# Patient Record
Sex: FEMALE | Race: WHITE | NOT HISPANIC OR LATINO | Employment: OTHER | URBAN - METROPOLITAN AREA
[De-identification: names, ages, dates, MRNs, and addresses within clinical notes are randomized per-mention and may not be internally consistent; named-entity substitution may affect disease eponyms.]

---

## 2017-01-02 ENCOUNTER — HOSPITAL ENCOUNTER (OUTPATIENT)
Dept: MAMMOGRAPHY | Facility: CLINIC | Age: 82
Discharge: HOME | End: 2017-01-02
Attending: INTERNAL MEDICINE | Admitting: INTERNAL MEDICINE
Payer: COMMERCIAL

## 2017-01-02 ENCOUNTER — ANTICOAGULATION THERAPY VISIT (OUTPATIENT)
Dept: ANTICOAGULATION | Facility: CLINIC | Age: 82
End: 2017-01-02
Payer: COMMERCIAL

## 2017-01-02 DIAGNOSIS — I48.0 INTERMITTENT ATRIAL FIBRILLATION (H): Primary | ICD-10-CM

## 2017-01-02 DIAGNOSIS — Z79.01 LONG TERM CURRENT USE OF ANTICOAGULANT THERAPY: Primary | ICD-10-CM

## 2017-01-02 DIAGNOSIS — Z85.3 HISTORY OF LEFT BREAST CANCER: ICD-10-CM

## 2017-01-02 DIAGNOSIS — I82.409 DVT (DEEP VENOUS THROMBOSIS) (H): ICD-10-CM

## 2017-01-02 LAB — INR POINT OF CARE: 1.4 (ref 0.86–1.14)

## 2017-01-02 PROCEDURE — 36416 COLLJ CAPILLARY BLOOD SPEC: CPT

## 2017-01-02 PROCEDURE — 99207 ZZC NO CHARGE NURSE ONLY: CPT

## 2017-01-02 PROCEDURE — 85610 PROTHROMBIN TIME: CPT | Mod: QW

## 2017-01-02 PROCEDURE — G0202 SCR MAMMO BI INCL CAD: HCPCS | Mod: 52

## 2017-01-02 RX ORDER — WARFARIN SODIUM 1 MG/1
TABLET ORAL
Qty: 105 TABLET | Refills: 1 | Status: SHIPPED | OUTPATIENT
Start: 2017-01-02 | End: 2017-01-12

## 2017-01-02 NOTE — PROGRESS NOTES
ANTICOAGULATION FOLLOW-UP CLINIC VISIT    Patient Name:  Ellie Leigh  Date:  1/2/2017  Contact Type:  Face to Face    SUBJECTIVE:     Patient Findings     Positives Unexplained INR or factor level change (no reason found for the low INR today)    Comments Pt states no missed dose and less greens than usual           OBJECTIVE    INR PROTIME   Date Value Ref Range Status   01/02/2017 1.4* 0.86 - 1.14 Final       ASSESSMENT / PLAN  INR assessment SUB    Recheck INR In: 3 WEEKS    INR Location Clinic      Anticoagulation Summary as of 1/2/2017     INR goal 1.5-2.0   Selected INR 1.4! (1/2/2017)   Maintenance plan 2 mg (1 mg x 2) on Mon; 1 mg (1 mg x 1) all other days   Full instructions 1/3: 2 mg; Otherwise 2 mg on Mon; 1 mg all other days   Weekly total 8 mg   Plan last modified Jessica Johnson RN (9/8/2016)   Next INR check 1/23/2017   Priority INR   Target end date Indefinite    Indications   Atrial fibrillation with rapid ventricular response (H) (Resolved) [I48.91]  DVT (deep venous thrombosis) [I82.409]  Long term current use of anticoagulant therapy [Z79.01]         Anticoagulation Episode Summary     INR check location     Preferred lab     Send INR reminders to [facility] CLINIC POOL    Comments * Actual INR goal is 1.7-2.3  please follow Dec 2015 INR referral (June 2016 goal range is an error)      Anticoagulation Care Providers     Provider Role Specialty Phone number    Josefina Gómez MD [facility] [phone]            See the Encounter Report to view Anticoagulation Flowsheet and Dosing Calendar (Go to Encounters tab in chart review, and find the Anticoagulation Therapy Visit)        Sapna Peterson RN

## 2017-01-02 NOTE — MR AVS SNAPSHOT
Ellie CARVER Lu   1/2/2017 2:15 PM   Anticoagulation Therapy Visit    Description:  86 year old female   Provider:  [place] [facility]   Department:  [facility]           INR as of 1/2/2017     Selected INR 1.4! (1/2/2017)      Anticoagulation Summary as of 1/2/2017     INR goal 1.5-2.0   Selected INR 1.4! (1/2/2017)   Full instructions 1/3: 2 mg; Otherwise 2 mg on Mon; 1 mg all other days   Next INR check 1/23/2017    Indications   Atrial fibrillation with rapid ventricular response (H) (Resolved) [I48.91]  DVT (deep venous thrombosis) [I82.409]  Long term current use of anticoagulant therapy [Z79.01]         Description     Recheck  INR 1/23/17  Take warfarin 2 mg Mon, and Tues, then resume usual dose of 2 mg Mon, 1 mg rest of week .  No greens for 2 days      Your next Anticoagulation Clinic appointment(s)     Jan 23, 2017  2:30 PM   [facility] with [place] [facility]   [facility]    [address] [facility] [address] [phone]              Contact Numbers     Please call [phone] to cancel and/or reschedule your appointment.  Please call [phone] with any problems or questions regarding your therapy          January 2017 Details    Sun Mon Tue Wed Thu Fri Sat     1               2      2 mg   See details      3      2 mg   See details      4      1 mg         5      1 mg         6      1 mg         7      1 mg           8      1 mg         9      2 mg         10      1 mg         11      1 mg         12      1 mg         13      1 mg         14      1 mg           15      1 mg         16      2 mg         17      1 mg         18      1 mg         19      1 mg         20      1 mg         21      1 mg           22      1 mg         23            24               25               26               27               28                 29               30               31                    Date Details   01/02 This INR check   no greens      01/03  Take 2 mg (1 mg tablets x 2)   no greens       Date of next INR:  1/23/2017         How to take your warfarin dose     To take:  1 mg Take 1 of the 1 mg tablets.    To take:  2 mg Take 2 of the 1 mg tablets.

## 2017-01-02 NOTE — TELEPHONE ENCOUNTER
Warfarin 1 mg    Last Written Prescription Date: 10/18/16  Last Fill Qty: 90, # refills: 0  Last Office Visit with [facility], [facility] or [facility] prescribing provider: 12/16/16   Next 5 appointments (look out 90 days)     Jan 05, 2017 10:30 AM   Return Visit with Gayle Nieves MD   [facility] ([facility])    [facility]  [address]  [facility] [address]   [phone]            Jan 13, 2017 11:00 AM   SHORT with Josefina Gómez MD   [facility] ([facility])    [address] [place] [address]   [phone]                   Date and Result of Last PT/INR:   INR     1.98   12/23/2016  INR      1.4   12/20/2016  INR      1.7   11/22/2016  INR     1.85   8/22/2016  PT      24.4   6/29/2014

## 2017-01-05 ENCOUNTER — ONCOLOGY VISIT (OUTPATIENT)
Dept: ONCOLOGY | Facility: CLINIC | Age: 82
End: 2017-01-05
Attending: INTERNAL MEDICINE
Payer: COMMERCIAL

## 2017-01-05 VITALS
DIASTOLIC BLOOD PRESSURE: 86 MMHG | HEART RATE: 110 BPM | TEMPERATURE: 98 F | BODY MASS INDEX: 25.05 KG/M2 | OXYGEN SATURATION: 94 % | SYSTOLIC BLOOD PRESSURE: 146 MMHG | RESPIRATION RATE: 16 BRPM | WEIGHT: 119.8 LBS

## 2017-01-05 DIAGNOSIS — M81.0 OSTEOPOROSIS: ICD-10-CM

## 2017-01-05 DIAGNOSIS — D64.9 ANEMIA, UNSPECIFIED TYPE: ICD-10-CM

## 2017-01-05 DIAGNOSIS — E87.1 HYPONATREMIA: ICD-10-CM

## 2017-01-05 DIAGNOSIS — Z85.3 HISTORY OF LEFT BREAST CANCER: Primary | ICD-10-CM

## 2017-01-05 PROCEDURE — 99214 OFFICE O/P EST MOD 30 MIN: CPT | Performed by: INTERNAL MEDICINE

## 2017-01-05 PROCEDURE — 99211 OFF/OP EST MAY X REQ PHY/QHP: CPT

## 2017-01-05 ASSESSMENT — PAIN SCALES - GENERAL: PAINLEVEL: NO PAIN (0)

## 2017-01-05 NOTE — PATIENT INSTRUCTIONS
We will see you back in 1 year.   Mammogram on right side December 2017.     Call with questions or concerns in the interim [phone].   Makenzie Roblero

## 2017-01-05 NOTE — NURSING NOTE
"Ellie Leigh is a 86 year old female who presents for:  Chief Complaint   Patient presents with     Oncology Clinic Visit     1 year recheck Breast CA, review Mammogram        Initial Vitals:  /86 mmHg  Pulse 110  Temp(Src) 98  F (36.7  C) (Tympanic)  Resp 16  Ht   Wt 54.341 kg (119 lb 12.8 oz)  SpO2 94%  LMP 06/15/1985  Breastfeeding? No Estimated body mass index is 25.05 kg/(m^2) as calculated from the following:    Height as of 8/26/16: 1.473 m (4' 10\").    Weight as of this encounter: 54.341 kg (119 lb 12.8 oz).. There is no height on file to calculate BSA. BP completed using cuff size: regular  No Pain (0) Patient's last menstrual period was 06/15/1985. Allergies and medications reviewed.     Medications: MEDICATION REFILLS NEEDED TODAY.  Pharmacy name entered into [employer]:    [id number] - [facility] [address] [id number] [address]    Comments:     10  minutes for nursing intake (face to face time)   Marizol Carrera CMA        Ellie Leigh is a 86 year old female who presents for:  Chief Complaint   Patient presents with     Oncology Clinic Visit     1 year recheck Breast CA, review Mammogram        Initial Vitals:  /86 mmHg  Pulse 110  Temp(Src) 98  F (36.7  C) (Tympanic)  Resp 16  Ht   Wt 54.341 kg (119 lb 12.8 oz)  SpO2 94%  LMP 06/15/1985  Breastfeeding? No Estimated body mass index is 25.05 kg/(m^2) as calculated from the following:    Height as of 8/26/16: 1.473 m (4' 10\").    Weight as of this encounter: 54.341 kg (119 lb 12.8 oz).. There is no height on file to calculate BSA. BP completed using cuff size: regular  No Pain (0) Patient's last menstrual period was 06/15/1985. Allergies and medications reviewed.     Medications: MEDICATION REFILLS NEEDED TODAY.  Pharmacy name entered into [employer]: [facility] #[id number] - [facility] [address] [facility]    Comments: 1 year recheck Breast CA, review " Mammogram. Patient has been Nauseous for 6 months.     10  minutes for nursing intake (face to face time)   Marizol Carrera CMA

## 2017-01-05 NOTE — PROGRESS NOTES
CHIEF COMPLAINT AND REASON FOR VISIT: left breast cancer followup.   HISTORY OF PRESENT ILLNESS: Ellie Leigh was diagnosed with a screening mammogram more than 10 years ago on the left side. 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 She had 6 months of CMF treatment followed by 5 years of tamoxifen. She is currently on followup.     PAST MEDICAL HISTORY: Benign high blood pressure, reflux, osteoporosis, asthma, osteoarthritis, pulmonary fibrosis by imaging, allergy. Cervical spine fracture after a fall in 2011, had fusion procedure done. On coumadin for A fib.  SIADH dx in 2014, hx of TB    MEDICATIONS: Reviewed in Epic system.   ALLERGIES: Penicillin, cephalosporins, sulfa.   FAMILY HISTORY: One daughter had breast cancer. She is a survivor.   SOCIAL HISTORY: She is . She lives with daughter.     REVIEW OF SYSTEMS:  She was found to have SIADH in [place] 2014.   She has chronic episodic and self limiting nausea with extensive work up with her PMD, no etiology found.   She is taking vitamin D. Weight is down,  no particular pain. No night sweat.  She is on coumadin for DVT in leg and A fib.     PHYSICAL EXAMINATION:   VITAL SIGNS: Blood pressure 146/86, pulse 110, temperature 98  F (36.7  C), temperature source Tympanic, resp. rate 16, weight 54.341 kg (119 lb 12.8 oz), last menstrual period 06/15/1985, SpO2 94 %, not currently breastfeeding.  GENERAL APPEARANCE: Elderly lady looks like her stated age, not in acute distress.   BACK: Severe kyphosis in the thoracic spine.   BREASTS: Right breast reveals no palpable lesions, no skin changes. Left chest wall, no palpable lesions.   HEENT: The patient is normocephalic, atraumatic. Pupils are equal, react to light. Sclerae are anicteric. Moist oral mucosa. Negative pharynx. No oral thrush.   NECK: Supple. No jugular venous distention. Thyroid is not palpable.   LYMPH NODES: Superficial lymphadenopathy is not  appreciable in the bilateral cervical, supraclavicular, axillary or inguinal adenopathy.   CARDIOVASCULAR: S1, S2 regular, with no murmurs or gallops. No carotid or abdominal bruits. Pace maker on left upper chest wall.  PULMONARY: Lungs are clear to auscultation and percussion bilaterally. There is no wheezing or rhonchi.   GASTROINTESTINAL: Abdomen is soft, nontender. No hepatosplenomegaly. No signs of ascites. No mass appreciable.   MUSCULOSKELETAL AND EXTREMITIES: No edema. No cyanotic changes. No signs of joint deformity. No lymphedema.   NEUROLOGIC: Cranial nerves II through XII are grossly intact. Sensation intact. Muscle strength and muscle tone symmetrical all through, 5/5.     CURRENT LAB DATA   Hb 11.5, MCV nl.   12/2016 Na 131 stable from last yr, from previous 128  since earlier 2014, Na NL  in 2013.    CURRENT IMAGE  Right MA 1/2017: NEGATIVE  CT head 12/2016: negative.     OLD DATA REVIEW IN SUMMARY:   3/2016 hb 11.8  7/2014 urine and serum osmolarity studies were consistent with SIADH    CT chest 12/2014: new patchy and consolidative process in RLL ? For PNA. Stable fibronodular abnormality and bronchiectasis in right upper lung zone.  CT a/p 7/2014: No mass, adenopathy, or acute finding is seen.   CT head in [facility] 6/2014: no acute disease.     DEXA scan from 02/2011 indicating osteoporosis. CT chest: Patchy consolidation with associated bronchiectasis in the upper lobes, right greater than left, consistent with chronic fibrosis.     ASSESSMENT AND PLAN:   1. Breast cancer. Continue yearly followup with us, self-breast check, chest wall check every once in a while, annual mammogram on the right side.   She wants to come here for yearly follow up. Due right MA end of yr.    2. Osteoporosis, status post fall with fracture. Advise vitamin D.  She did not tolerate bisphosphonate.     3. hyponatremia since 2014, work up is most consistent with SIADN. She also has abnormal CT chest. These two could be  related. Recommended seeing Dr. Maloney for further work up. DDX is broad now, with infection, inflammation, granulomatous disease, or Ca.  She was cleared by Dr. Burton later. Her Na is stable.    4. Tough of anemia in 12/2016. She had similar reading of hb in 3/2016. We talked about eating balanced diet.

## 2017-01-05 NOTE — MR AVS SNAPSHOT
After Visit Summary   1/5/2017    Ellie Leigh    MRN: [id number]           Patient Information     Date Of Birth          9/12/1930        Visit Information        Provider Department      1/5/2017 10:30 AM Gayle Nieves MD [facility]        Today's Diagnoses     History of left breast cancer    -  1     Osteoporosis         Hyponatremia         Anemia, unspecified type           Care Instructions    We will see you back in 1 year.   Mammogram on right side December 2017.     Call with questions or concerns in the interim [phone].   Makenzie or Glenda Roblero         Follow-ups after your visit        Your next 10 appointments already scheduled     Jan 13, 2017 11:00 AM   SHORT with Josefina Gómez MD   [facility]    [address] [place] [address] [phone]            Jan 16, 2017  8:00 AM   Remote PPM Check with RUEDA TECH1   [facility] [place] ([facility])    [address] [place], [place] [id number]  [facility] [id number] [phone]           This appointment is for a remote check of your pacemaker.  This is not an appointment at the office.            Jan 23, 2017  2:30 PM   Anticoagulation Visit with [place] [facility]   [facility]    [address] [facility] [address]   [phone]            Jan 03, 2018  2:00 PM   LAB with [facility] ([facility])    [address] [facility] [address]   [phone]           Patient must bring picture ID.  Patient should be prepared to give a urine specimen  Please do not eat 10-12 hours before your appointment if you are coming in fasting for labs on lipids, cholesterol, or glucose (sugar).  Pregnant women should follow their Care Team instructions. Water with medications is okay. Do not drink coffee or other fluids.   If you have concerns  about taking  your medications, please ask at office or if scheduling via [employer], send a message by clicking on Secure Messaging, Message Your Care Team.            Jan 03, 2018  2:45 PM   MA SCREENING DIGITAL RIGHT with [email or web address]   [facility] ([facility])    [address]  [facility] [address]   [phone]           Do not use any powder, lotion or deodorant under your arms or on your breast. If you do, we will ask you to remove it before your exam.  Wear comfortable, two-piece clothing.  If you have any allergies, tell your care team.  Bring any previous mammograms from other facilities or have them mailed to the breast center.              Future tests that were ordered for you today     Open Future Orders        Priority Expected Expires Ordered    Folate Routine 12/1/2017 1/5/2018 1/5/2017    Ferritin Routine 12/1/2017 1/5/2018 1/5/2017    Vitamin B12 Routine 12/1/2017 1/5/2018 1/5/2017    Ca27.29  breast tumor marker Routine 12/1/2017 1/5/2018 1/5/2017    CBC with platelets differential Routine 12/1/2017 1/5/2018 1/5/2017    Comprehensive metabolic panel Routine 12/1/2017 1/5/2018 1/5/2017    MA Screening Digital Right Routine 12/1/2017 1/5/2018 1/5/2017            Who to contact     If you have questions or need follow up information about today's clinic visit or your schedule please contact [facility] directly at [phone].  Normal or non-critical lab and imaging results will be communicated to you by MyChart, letter or phone within 4 business days after the clinic has received the results. If you do not hear from us within 7 days, please contact the clinic through [employer] or phone. If you have a critical or abnormal lab result, we will notify you by phone as soon as possible.  Submit refill requests through [employer] or call your pharmacy and they will forward the refill request to us. Please allow 3 business days for your refill to be completed.           "Additional Information About Your Visit        MyChart Information     Epos lets you send messages to your doctor, view your test results, renew your prescriptions, schedule appointments and more. To sign up, go to [email or web address] . Click on \"Log in\" on the left side of the screen, which will take you to the Welcome page. Then click on \"Sign up Now\" on the right side of the page.     You will be asked to enter the access code listed below, as well as some personal information. Please follow the directions to create your username and password.     Your access code is: [email or web address]  Expires: 2017  2:36 PM     Your access code will  in 90 days. If you need help or a new code, please call your [place] clinic or [phone].        Care EveryWhere ID     This is your Care EveryWhere ID. This could be used by other organizations to access your [place] medical records  [id number]        Your Vitals Were     Pulse Temperature Respirations Pulse Oximetry Last Period       110 98  F (36.7  C) (Tympanic) 16 94% 06/15/1985     Breastfeeding?                   No            Blood Pressure from Last 3 Encounters:   17 146/86   16 186/97   16 144/78    Weight from Last 3 Encounters:   17 54.341 kg (119 lb 12.8 oz)   16 53.615 kg (118 lb 3.2 oz)   16 53.524 kg (118 lb)               Primary Care Provider Office Phone # Fax #    Josefina Gómez [phone]       [facility] [address] [facility] [address]        Thank you!     Thank you for choosing [facility]  for your care. Our goal is always to provide you with excellent care. Hearing back from our patients is one way we can continue to improve our services. Please take a few minutes to complete the written survey that you may receive in the mail after your visit with us. Thank you!             Your Updated Medication List - Protect others around you: Learn how to " safely use, store and throw away your medicines at www.disposemymeds.org.          This list is accurate as of: 1/5/17 12:10 PM.  Always use your most recent med list.                   Brand Name Dispense Instructions for use    albuterol 108 (90 BASE) MCG/ACT Inhaler    PROAIR HFA/PROVENTIL HFA/VENTOLIN HFA    3 Inhaler    Inhale 2 puffs into the lungs every 6 hours as needed for shortness of breath / dyspnea       budesonide-formoterol 160-4.5 MCG/ACT Inhaler    SYMBICORT    1 Inhaler    Inhale 2 puffs into the lungs 2 times daily       CRANBERRY      Take 475 mg by mouth 2 times daily.       diclofenac 1 % Gel topical gel    VOLTAREN    100 g    Apply 4 grams to knees or 2 grams to hands four times daily using enclosed dosing card.       ipratropium - albuterol 0.5 mg/2.5 mg/3 mL 0.5-2.5 (3) MG/3ML neb solution    DUONEB    90 vial    Take 1 vial (3 mLs) by nebulization every 6 hours as needed for shortness of breath / dyspnea or wheezing       levothyroxine 50 MCG tablet    SYNTHROID/LEVOTHROID    90 tablet    Take 1 tablet (50 mcg) by mouth daily       metoprolol 25 MG 24 hr tablet    TOPROL-XL    180 tablet    2  tablets (50 mg) twice daily       omeprazole 20 MG CR capsule    priLOSEC    60 capsule    Take 1 capsule (20 mg) by mouth 2 times daily       polyethylene glycol powder    MIRALAX    510 g    Take 17 g by mouth daily as needed       probiotic Caps      Take 1 capsule by mouth every evening       sucralfate 1 GM/10ML suspension    CARAFATE    420 mL    Take 10 mLs (1 g) by mouth 4 times daily       warfarin 1 MG tablet    COUMADIN    105 tablet    Take 2mg by mouth Mondays and 1mg all other days or as directed by Anticoagulation Clinic

## 2017-01-12 ENCOUNTER — ANTICOAGULATION THERAPY VISIT (OUTPATIENT)
Dept: ANTICOAGULATION | Facility: CLINIC | Age: 82
End: 2017-01-12
Payer: COMMERCIAL

## 2017-01-12 DIAGNOSIS — I48.0 INTERMITTENT ATRIAL FIBRILLATION (H): ICD-10-CM

## 2017-01-12 DIAGNOSIS — Z79.01 LONG TERM CURRENT USE OF ANTICOAGULANT THERAPY: Primary | ICD-10-CM

## 2017-01-12 LAB — INR POINT OF CARE: 1.5 (ref 0.86–1.14)

## 2017-01-12 PROCEDURE — 99207 ZZC NO CHARGE NURSE ONLY: CPT

## 2017-01-12 PROCEDURE — 36416 COLLJ CAPILLARY BLOOD SPEC: CPT

## 2017-01-12 PROCEDURE — 85610 PROTHROMBIN TIME: CPT | Mod: QW

## 2017-01-12 RX ORDER — WARFARIN SODIUM 1 MG/1
TABLET ORAL
Qty: 105 TABLET | Refills: 1 | COMMUNITY
Start: 2017-01-12 | End: 2017-02-02

## 2017-01-12 NOTE — PROGRESS NOTES
"  ANTICOAGULATION FOLLOW-UP CLINIC VISIT    Patient Name:  Ellie Leigh  Date:  1/12/2017  Contact Type:  Face to Face    SUBJECTIVE:     Patient Findings     Positives Diet Changes (report she recently \"cut back\" on greens), No Problem Findings    Comments Has appt with PCP tomorrow.            OBJECTIVE    INR PROTIME   Date Value Ref Range Status   01/12/2017 1.5* 0.86 - 1.14 Final       ASSESSMENT / PLAN  INR assessment SUB for goal range of 1.7 - 2.3. Increase maintenance dose 12.5%   Recheck INR In: 3 WEEKS    INR Location Clinic      Anticoagulation Summary as of 1/12/2017     INR goal 1.5-2.0   Selected INR 1.5 (1/12/2017)   Maintenance plan 2 mg (1 mg x 2) on Mon, Thu; 1 mg (1 mg x 1) all other days   Full instructions 2 mg on Mon, Thu; 1 mg all other days   Weekly total 9 mg   Plan last modified Jessica Johnson RN (1/12/2017)   Next INR check 2/2/2017   Priority INR   Target end date Indefinite    Indications   Atrial fibrillation with rapid ventricular response (H) (Resolved) [I48.91]  DVT (deep venous thrombosis) [I82.409]  Long term current use of anticoagulant therapy [Z79.01]         Anticoagulation Episode Summary     INR check location     Preferred lab     Send INR reminders to [facility] POOL    Comments * Actual INR goal is 1.7-2.3  please follow Dec 2015 INR referral (June 2016 goal range is an error)      Anticoagulation Care Providers     Provider Role Specialty Phone number    Josefina Gómez MD [facility] [phone]            See the Encounter Report to view Anticoagulation Flowsheet and Dosing Calendar (Go to Encounters tab in chart review, and find the Anticoagulation Therapy Visit)    Jessica Johnson RN                 "

## 2017-01-12 NOTE — MR AVS SNAPSHOT
Ellie Leigh   1/12/2017 3:00 PM   Anticoagulation Therapy Visit    Description:  86 year old female   Provider:  [place] [facility]   Department:  [facility]           INR as of 1/12/2017     Selected INR 1.5 (1/12/2017)      Anticoagulation Summary as of 1/12/2017     INR goal 1.5-2.0   Selected INR 1.5 (1/12/2017)   Full instructions 2 mg on Mon, Thu; 1 mg all other days   Next INR check 2/2/2017    Indications   Atrial fibrillation with rapid ventricular response (H) (Resolved) [I48.91]  DVT (deep venous thrombosis) [I82.409]  Long term current use of anticoagulant therapy [Z79.01]         Description     Increase dose to 2mg every Mon & Thurs and 1mg all other days.  Recheck INR in three weeks.      Your next Anticoagulation Clinic appointment(s)     Feb 02, 2017 11:15 AM   Anticoagulation Visit with [place] AN[facility]AG   [facility]    [address] [facility] [address] [phone]              Contact Numbers     Please call [phone] to cancel and/or reschedule your appointment.  Please call [phone] with any problems or questions regarding your therapy          January 2017 Details    Sun Mon Tue Wed Thu Fri Sat     1               2               3               4               5               6               7                 8               9               10               11               12      2 mg   See details      13      1 mg         14      1 mg           15      1 mg         16      2 mg         17      1 mg         18      1 mg         19      2 mg         20      1 mg         21      1 mg           22      1 mg         23      2 mg         24      1 mg         25      1 mg         26      2 mg         27      1 mg         28      1 mg           29      1 mg         30      2 mg         31      1 mg              Date Details   01/12 This INR check               How to take your warfarin dose     To take:  1 mg Take 1 of the 1 mg tablets.     To take:  2 mg Take 2 of the 1 mg tablets.           February 2017 Details    Sun Mon Tue Wed Thu Fri Sat        1      1 mg         2            3               4                 5               6               7               8               9               10               11                 12               13               14               15               16               17               18                 19               20               21               22               23               24               25                 26               27               28                    Date Details   No additional details    Date of next INR:  2/2/2017         How to take your warfarin dose     To take:  1 mg Take 1 of the 1 mg tablets.    To take:  2 mg Take 2 of the 1 mg tablets.

## 2017-01-13 ENCOUNTER — OFFICE VISIT (OUTPATIENT)
Dept: FAMILY MEDICINE | Facility: CLINIC | Age: 82
End: 2017-01-13
Payer: COMMERCIAL

## 2017-01-13 VITALS
TEMPERATURE: 97 F | SYSTOLIC BLOOD PRESSURE: 132 MMHG | HEIGHT: 58 IN | OXYGEN SATURATION: 97 % | HEART RATE: 65 BPM | DIASTOLIC BLOOD PRESSURE: 64 MMHG | WEIGHT: 118.2 LBS | BODY MASS INDEX: 24.81 KG/M2

## 2017-01-13 DIAGNOSIS — J45.30 MILD PERSISTENT ASTHMA WITHOUT COMPLICATION: ICD-10-CM

## 2017-01-13 DIAGNOSIS — K21.9 GASTROESOPHAGEAL REFLUX DISEASE WITHOUT ESOPHAGITIS: ICD-10-CM

## 2017-01-13 DIAGNOSIS — R11.0 NAUSEA: Primary | ICD-10-CM

## 2017-01-13 DIAGNOSIS — M15.0 PRIMARY OSTEOARTHRITIS INVOLVING MULTIPLE JOINTS: ICD-10-CM

## 2017-01-13 PROCEDURE — 99214 OFFICE O/P EST MOD 30 MIN: CPT | Performed by: FAMILY MEDICINE

## 2017-01-13 RX ORDER — ALBUTEROL SULFATE 90 UG/1
2 AEROSOL, METERED RESPIRATORY (INHALATION) EVERY 6 HOURS PRN
Qty: 3 INHALER | Refills: 1 | Status: SHIPPED | OUTPATIENT
Start: 2017-01-13 | End: 2017-10-27

## 2017-01-13 RX ORDER — BUDESONIDE AND FORMOTEROL FUMARATE DIHYDRATE 160; 4.5 UG/1; UG/1
2 AEROSOL RESPIRATORY (INHALATION) 2 TIMES DAILY
Qty: 1 INHALER | Refills: 6 | Status: SHIPPED | OUTPATIENT
Start: 2017-01-13 | End: 2017-04-24

## 2017-01-13 RX ORDER — SUCRALFATE ORAL 1 G/10ML
1 SUSPENSION ORAL 4 TIMES DAILY
Qty: 420 ML | Refills: 3 | Status: SHIPPED | OUTPATIENT
Start: 2017-01-13 | End: 2017-04-28

## 2017-01-13 ASSESSMENT — ANXIETY QUESTIONNAIRES
6. BECOMING EASILY ANNOYED OR IRRITABLE: NOT AT ALL
2. NOT BEING ABLE TO STOP OR CONTROL WORRYING: NOT AT ALL
1. FEELING NERVOUS, ANXIOUS, OR ON EDGE: NOT AT ALL
GAD7 TOTAL SCORE: 0
3. WORRYING TOO MUCH ABOUT DIFFERENT THINGS: NOT AT ALL
5. BEING SO RESTLESS THAT IT IS HARD TO SIT STILL: NOT AT ALL
7. FEELING AFRAID AS IF SOMETHING AWFUL MIGHT HAPPEN: NOT AT ALL

## 2017-01-13 ASSESSMENT — PATIENT HEALTH QUESTIONNAIRE - PHQ9: 5. POOR APPETITE OR OVEREATING: NOT AT ALL

## 2017-01-13 NOTE — MR AVS SNAPSHOT
After Visit Summary   1/13/2017    Ellie Leigh    MRN: [id number]           Patient Information     Date Of Birth          9/12/1930        Visit Information        Provider Department      1/13/2017 11:00 AM Josefina Gómez MD [facility]        Today's Diagnoses     Primary osteoarthritis involving multiple joints    -  1     Gastroesophageal reflux disease without esophagitis         Mild persistent asthma without complication           Care Instructions    Refilled meds    See me back in 6-8 weeks     Stay on gluten free diet        Follow-ups after your visit        Your next 10 appointments already scheduled     Jan 16, 2017  8:00 AM   Remote PPM Check with RUEDA TECH1   [facility] [place] ([facility])    [address]  [place] [address] [phone]           This appointment is for a remote check of your pacemaker.  This is not an appointment at the office.            Feb 02, 2017 11:15 AM   Anticoagulation Visit with [place] ANTI COAG   [facility] ([facility]    [address] [address]   [phone]            Jan 03, 2018  2:00 PM   LAB with [facility] ([facility])    [address] [address]   [phone]           Patient must bring picture ID.  Patient should be prepared to give a urine specimen  Please do not eat 10-12 hours before your appointment if you are coming in fasting for labs on lipids, cholesterol, or glucose (sugar).  Pregnant women should follow their Care Team instructions. Water with medications is okay. Do not drink coffee or other fluids.   If you have concerns about taking  your medications, please ask at office or if scheduling via [employer], send a message by clicking on Secure Messaging, Message Your Care Team.            Jan 03, 2018  2:45 PM   MA SCREENING DIGITAL RIGHT  "with W[place]MA2   [facility] ([facility])    [address]  [facility] [id number] [phone]           Do not use any powder, lotion or deodorant under your arms or on your breast. If you do, we will ask you to remove it before your exam.  Wear comfortable, two-piece clothing.  If you have any allergies, tell your care team.  Bring any previous mammograms from other facilities or have them mailed to the breast center.            Jan 11, 2018 11:00 AM   Return Visit with Gayle Nieves MD   [facility] ([facility])    [facility]  [address]  [facility] [id number] [phone]              Who to contact     If you have questions or need follow up information about today's clinic visit or your schedule please contact [facility] directly at [phone].  Normal or non-critical lab and imaging results will be communicated to you by MyChart, letter or phone within 4 business days after the clinic has received the results. If you do not hear from us within 7 days, please contact the clinic through [employer] or phone. If you have a critical or abnormal lab result, we will notify you by phone as soon as possible.  Submit refill requests through [employer] or call your pharmacy and they will forward the refill request to us. Please allow 3 business days for your refill to be completed.          Additional Information About Your Visit        [employer] Information     [employer] lets you send messages to your doctor, view your test results, renew your prescriptions, schedule appointments and more. To sign up, go to [email or web address] . Click on \"Log in\" on the left side of the screen, which will take you to the Welcome page. Then click on \"Sign up Now\" on the right side of the page.     You will be asked to enter the access code listed below, as well as some personal information. Please follow the directions to create " "your username and password.     Your access code is: [email or web address]  Expires: 2017  2:36 PM     Your access code will  in 90 days. If you need help or a new code, please call your [facility] clinic or [phone].        Care EveryWhere ID     This is your Care EveryWhere ID. This could be used by other organizations to access your [facility] medical records  [id number]        Your Vitals Were     Pulse Temperature Height BMI (Body Mass Index) Pulse Oximetry Last Period    65 97  F (36.1  C) (Tympanic) 4' 10\" (1.473 m) 24.71 kg/m2 97% 06/15/1985    Breastfeeding?                   No            Blood Pressure from Last 3 Encounters:   17 132/64   17 146/86   16 186/97    Weight from Last 3 Encounters:   17 118 lb 3.2 oz (53.615 kg)   17 119 lb 12.8 oz (54.341 kg)   16 118 lb 3.2 oz (53.615 kg)              Today, you had the following     No orders found for display         Where to get your medicines      These medications were sent to [facility] [place] [id number]     Phone:  [phone]    - albuterol 108 (90 BASE) MCG/ACT Inhaler  - budesonide-formoterol 160-4.5 MCG/ACT Inhaler  - diclofenac 1 % Gel topical gel  - sucralfate 1 GM/10ML suspension       Primary Care Provider Office Phone # Fax #    Josefina Gómez [phone]       [address] [address]        Thank you!     Thank you for choosing [facility]  for your care. Our goal is always to provide you with excellent care. Hearing back from our patients is one way we can continue to improve our services. Please take a few minutes to complete the written survey that you may receive in the mail after your visit with us. Thank you!             Your Updated Medication List - Protect others around you: Learn how to safely use, store and throw away your " medicines at www.disposemymeds.org.          This list is accurate as of: 1/13/17 11:21 AM.  Always use your most recent med list.                   Brand Name Dispense Instructions for use    albuterol 108 (90 BASE) MCG/ACT Inhaler    PROAIR HFA/PROVENTIL HFA/VENTOLIN HFA    3 Inhaler    Inhale 2 puffs into the lungs every 6 hours as needed for shortness of breath / dyspnea       budesonide-formoterol 160-4.5 MCG/ACT Inhaler    SYMBICORT    1 Inhaler    Inhale 2 puffs into the lungs 2 times daily       CRANBERRY      Take 475 mg by mouth 2 times daily.       diclofenac 1 % Gel topical gel    VOLTAREN    100 g    Apply 4 grams to knees or 2 grams to hands four times daily using enclosed dosing card.       ipratropium - albuterol 0.5 mg/2.5 mg/3 mL 0.5-2.5 (3) MG/3ML neb solution    DUONEB    90 vial    Take 1 vial (3 mLs) by nebulization every 6 hours as needed for shortness of breath / dyspnea or wheezing       levothyroxine 50 MCG tablet    SYNTHROID/LEVOTHROID    90 tablet    Take 1 tablet (50 mcg) by mouth daily       metoprolol 25 MG 24 hr tablet    TOPROL-XL    180 tablet    2  tablets (50 mg) twice daily       omeprazole 20 MG CR capsule    priLOSEC    60 capsule    Take 1 capsule (20 mg) by mouth 2 times daily       polyethylene glycol powder    MIRALAX    510 g    Take 17 g by mouth daily as needed       probiotic Caps      Take 1 capsule by mouth every evening       sucralfate 1 GM/10ML suspension    CARAFATE    420 mL    Take 10 mLs (1 g) by mouth 4 times daily       warfarin 1 MG tablet    COUMADIN    105 tablet    Take 2mg by mouth Mon & Thurs and 1mg all other days or as directed by Anticoagulation Clinic

## 2017-01-13 NOTE — PROGRESS NOTES
"  SUBJECTIVE:                                                    Ellie Leigh is a 86 year old female who presents to clinic today for the following health issues:      Nausea recheck          Therapies tried and outcome: prilosec, carafate.  She has noticed a significant improvement after rally committing to the gluten free diet    Had not been doing well over the holidays and now is really determined and feels much better    Also is off of prilosec     carafate is now just tid     No stool issues        Arthritis pain is well controlled on the voltaren gel    Asthma Follow-Up    Was ACT completed today?    Yes    ACT Total Scores 1/13/2017   ACT TOTAL SCORE -   ASTHMA ER VISITS -   ASTHMA HOSPITALIZATIONS -   ACT TOTAL SCORE (Goal Greater than or Equal to 20) 20   In the past 12 months, how many times did you visit the emergency room for your asthma without being admitted to the hospital? 0   In the past 12 months, how many times were you hospitalized overnight because of your asthma? 0     symbicort is very expensive is wanting to try one puff bid       Problem list and histories reviewed & adjusted, as indicated.  Additional history: as documented    Problem list, Medication list, Allergies, and Medical/Social/Surgical histories reviewed in [facility] and updated as appropriate.    ROS:  Constitutional, HEENT, cardiovascular, pulmonary, gi and gu systems are negative, except as otherwise noted.    OBJECTIVE:                                                    /64 mmHg  Pulse 65  Temp(Src) 97  F (36.1  C) (Tympanic)  Ht 4' 10\" (1.473 m)  Wt 118 lb 3.2 oz (53.615 kg)  BMI 24.71 kg/m2  SpO2 97%  LMP 06/15/1985  Breastfeeding? No  Body mass index is 24.71 kg/(m^2).  GENERAL APPEARANCE: healthy, alert and no distress  RESP: lungs clear to auscultation - no rales, rhonchi or wheezes  CV: regular rates and rhythm, normal S1 S2, no S3 or S4 and no murmur, click or rub  ABDOMEN: soft, nontender, without " hepatosplenomegaly or masses and bowel sounds normal  PSYCH: mentation appears normal and affect normal/bright         ASSESSMENT/PLAN:                                                    1. Nausea  Improved on diet     2. Gastroesophageal reflux disease without esophagitis  Stable no change in treatment plan.   - sucralfate (CARAFATE) 1 GM/10ML suspension; Take 10 mLs (1 g) by mouth 4 times daily  Dispense: 420 mL; Refill: 3    3. Primary osteoarthritis involving multiple joints  Stable no change in treatment plan.   - diclofenac (VOLTAREN) 1 % GEL topical gel; Apply 4 grams to knees or 2 grams to hands four times daily using enclosed dosing card.  Dispense: 100 g; Refill: 1    4. Mild persistent asthma without complication  Stable no change in treatment plan.   - albuterol (PROAIR HFA/PROVENTIL HFA/VENTOLIN HFA) 108 (90 BASE) MCG/ACT Inhaler; Inhale 2 puffs into the lungs every 6 hours as needed for shortness of breath / dyspnea  Dispense: 3 Inhaler; Refill: 1  - budesonide-formoterol (SYMBICORT) 160-4.5 MCG/ACT Inhaler; Inhale 2 puffs into the lungs 2 times daily  Dispense: 1 Inhaler; Refill: 6      Patient Instructions   Refilled meds    See me back in 6-8 weeks     Stay on gluten free diet      Risks, benefits, side effects and rationale for treatment plan fully discussed with the patient and understanding expressed.   Josefina Gómez MD  [facility]

## 2017-01-13 NOTE — NURSING NOTE
"Chief Complaint   Patient presents with     Arthritis     /64 mmHg  Pulse 65  Temp(Src) 97  F (36.1  C) (Tympanic)  Ht 4' 10\" (1.473 m)  Wt 118 lb 3.2 oz (53.615 kg)  BMI 24.71 kg/m2  SpO2 97%  LMP 06/15/1985  Breastfeeding? No Estimated body mass index is 24.71 kg/(m^2) as calculated from the following:    Height as of this encounter: 4' 10\" (1.473 m).    Weight as of this encounter: 118 lb 3.2 oz (53.615 kg).  bp completed using cuff size: regular            "

## 2017-01-14 ASSESSMENT — PATIENT HEALTH QUESTIONNAIRE - PHQ9: SUM OF ALL RESPONSES TO PHQ QUESTIONS 1-9: 0

## 2017-01-14 ASSESSMENT — ASTHMA QUESTIONNAIRES: ACT_TOTALSCORE: 20

## 2017-01-14 ASSESSMENT — ANXIETY QUESTIONNAIRES: GAD7 TOTAL SCORE: 0

## 2017-01-16 ENCOUNTER — ALLIED HEALTH/NURSE VISIT (OUTPATIENT)
Dept: CARDIOLOGY | Facility: CLINIC | Age: 82
End: 2017-01-16
Payer: COMMERCIAL

## 2017-01-16 DIAGNOSIS — Z95.0 CARDIAC PACEMAKER IN SITU: Primary | ICD-10-CM

## 2017-01-16 PROCEDURE — 93294 REM INTERROG EVL PM/LDLS PM: CPT | Performed by: INTERNAL MEDICINE

## 2017-01-16 PROCEDURE — 93296 REM INTERROG EVL PM/IDS: CPT | Performed by: INTERNAL MEDICINE

## 2017-01-16 NOTE — PROGRESS NOTES
B[employer] Evia DR-T Remote PPM Device Check  AP: 65% : 2%  Mode: DDD-CLS        Presenting Rhythm: AP/VS  Heart Rate: adequate heart rates per histogram  Sensing: stable    Pacing Threshold: stable    Impedance: stable  Battery Status: 75% full  Atrial Arrhythmia: over 1500 mode switch episodes. Longest episode lasted 2.5 hours. Controlled vent response while in mode switch. Taking Warfarin.  Ventricular Arrhythmia: none     Care Plan: F/U [employer] q 3 months.  Gave results and next transmission date over the phone to daughter AnaChilo PETERT

## 2017-01-31 DIAGNOSIS — I10 ESSENTIAL HYPERTENSION, BENIGN: Primary | ICD-10-CM

## 2017-01-31 RX ORDER — METOPROLOL SUCCINATE 25 MG/1
TABLET, EXTENDED RELEASE ORAL
Qty: 180 TABLET | Refills: 0 | Status: SHIPPED | OUTPATIENT
Start: 2017-01-31 | End: 2017-03-12

## 2017-01-31 NOTE — TELEPHONE ENCOUNTER
Toprol XL 25 mg      Last Written Prescription Date: 10/21/16  Last Fill Quantity: 180, # refills: 1  Last Office Visit with G, P or [facility] prescribing provider: 1/13/17   Next 5 appointments (look out 90 days)     Feb 24, 2017  3:20 PM   SHORT with Josefina Gómez MD   [facility] ([facility])    [address] [place] [address]   [phone]                   POTASSIUM   Date Value Ref Range Status   12/23/2016 4.0 3.4 - 5.3 mmol/L Final     CREATININE   Date Value Ref Range Status   12/23/2016 0.64 0.52 - 1.04 mg/dL Final     BP Readings from Last 3 Encounters:   01/13/17 132/64   01/05/17 146/86   12/23/16 186/97

## 2017-02-02 ENCOUNTER — ANTICOAGULATION THERAPY VISIT (OUTPATIENT)
Dept: ANTICOAGULATION | Facility: CLINIC | Age: 82
End: 2017-02-02
Payer: COMMERCIAL

## 2017-02-02 DIAGNOSIS — I48.0 INTERMITTENT ATRIAL FIBRILLATION (H): ICD-10-CM

## 2017-02-02 DIAGNOSIS — Z79.01 LONG TERM CURRENT USE OF ANTICOAGULANT THERAPY: Primary | ICD-10-CM

## 2017-02-02 DIAGNOSIS — I82.409 DVT (DEEP VENOUS THROMBOSIS) (H): ICD-10-CM

## 2017-02-02 LAB — INR POINT OF CARE: 1.6 (ref 0.86–1.14)

## 2017-02-02 PROCEDURE — 99207 ZZC NO CHARGE NURSE ONLY: CPT

## 2017-02-02 PROCEDURE — 36416 COLLJ CAPILLARY BLOOD SPEC: CPT

## 2017-02-02 PROCEDURE — 85610 PROTHROMBIN TIME: CPT | Mod: QW

## 2017-02-02 RX ORDER — WARFARIN SODIUM 1 MG/1
TABLET ORAL
Qty: 105 TABLET | Refills: 1 | COMMUNITY
Start: 2017-02-02 | End: 2017-08-02

## 2017-02-02 NOTE — PROGRESS NOTES
ANTICOAGULATION FOLLOW-UP CLINIC VISIT    Patient Name:  Ellie Leigh  Date:  2/2/2017  Contact Type:  Face to Face    SUBJECTIVE:     Patient Findings     Positives Unexplained INR or factor level change    Comments Despite dose increase, INR still subtherapeutic No changes noted, denies missed doses. Plan to increase dose by ~5.6% and recheck INR in 2 weeks.           OBJECTIVE    INR PROTIME   Date Value Ref Range Status   02/02/2017 1.6* 0.86 - 1.14 Final       ASSESSMENT / PLAN  INR assessment SUB    Recheck INR In: 2 WEEKS    INR Location Clinic      Anticoagulation Summary as of 2/2/2017     INR goal 1.5-2.0   Selected INR 1.6 (2/2/2017)   Maintenance plan 1 mg (1 mg x 1) on Mon, Fri; 1.5 mg (1 mg x 1.5) all other days   Full instructions 1 mg on Mon, Fri; 1.5 mg all other days   Weekly total 9.5 mg   Plan last modified Katia Landrum RN (2/2/2017)   Next INR check 2/16/2017   Priority INR   Target end date Indefinite    Indications   Atrial fibrillation with rapid ventricular response (H) (Resolved) [I48.91]  DVT (deep venous thrombosis) [I82.409]  Long term current use of anticoagulant therapy [Z79.01]         Anticoagulation Episode Summary     INR check location     Preferred lab     Send INR reminders to [facility]    Comments * Actual INR goal is 1.7-2.3  please follow Dec 2015 INR referral (June 2016 goal range is an error)      Anticoagulation Care Providers     Provider Role Specialty Phone number    Josefina Gómez MD [facility] [phone]            See the Encounter Report to view Anticoagulation Flowsheet and Dosing Calendar (Go to Encounters tab in chart review, and find the Anticoagulation Therapy Visit)        Katia Landrum RN

## 2017-02-02 NOTE — MR AVS SNAPSHOT
Ellie Blakeshivani   2/2/2017 3:30 PM   Anticoagulation Therapy Visit    Description:  86 year old female   Provider:  [place] ANTI LALITA   Department:  [place] Anticoag           INR as of 2/2/2017     Selected INR 1.6 (2/2/2017)      Anticoagulation Summary as of 2/2/2017     INR goal 1.5-2.0   Selected INR 1.6 (2/2/2017)   Full instructions 1 mg on Mon, Fri; 1.5 mg all other days   Next INR check 2/16/2017    Indications   Atrial fibrillation with rapid ventricular response (H) (Resolved) [I48.91]  DVT (deep venous thrombosis) [I82.409]  Long term current use of anticoagulant therapy [Z79.01]         Your next Anticoagulation Clinic appointment(s)     Feb 16, 2017 10:30 AM   Anticoagulation Visit with [place] ANTI COAG   [facility]    [address] [address] [phone]              Contact Numbers     Please call [phone] to cancel and/or reschedule your appointment.  Please call [phone] with any problems or questions regarding your therapy          February 2017 Details    Sun Mon Tue Wed Thu Fri Sat        1               2      1.5 mg   See details      3      1 mg         4      1.5 mg           5      1.5 mg         6      1 mg         7      1.5 mg         8      1.5 mg         9      1.5 mg         10      1 mg         11      1.5 mg           12      1.5 mg         13      1 mg         14      1.5 mg         15      1.5 mg         16            17               18                 19               20               21               22               23               24               25                 26               27               28                    Date Details   02/02 This INR check       Date of next INR:  2/16/2017         How to take your warfarin dose     To take:  1 mg Take 1 of the 1 mg tablets.    To take:  1.5 mg Take 1.5 of the 1 mg tablets.

## 2017-02-03 ENCOUNTER — DOCUMENTATION ONLY (OUTPATIENT)
Dept: CASE MANAGEMENT | Facility: CLINIC | Age: 82
End: 2017-02-03

## 2017-02-16 ENCOUNTER — ANTICOAGULATION THERAPY VISIT (OUTPATIENT)
Dept: ANTICOAGULATION | Facility: CLINIC | Age: 82
End: 2017-02-16
Payer: COMMERCIAL

## 2017-02-16 DIAGNOSIS — Z79.01 LONG TERM CURRENT USE OF ANTICOAGULANT THERAPY: ICD-10-CM

## 2017-02-16 LAB — INR POINT OF CARE: 1.9 (ref 0.86–1.14)

## 2017-02-16 PROCEDURE — 85610 PROTHROMBIN TIME: CPT | Mod: QW

## 2017-02-16 PROCEDURE — 99207 ZZC NO CHARGE NURSE ONLY: CPT

## 2017-02-16 PROCEDURE — 36416 COLLJ CAPILLARY BLOOD SPEC: CPT

## 2017-02-16 NOTE — MR AVS SNAPSHOT
Ellie Leigh   2/16/2017 10:30 AM   Anticoagulation Therapy Visit    Description:  86 year old female   Provider:  [place] ANTI LALITA   Department:  [place] Anticoag           INR as of 2/16/2017     Today's INR 1.9      Anticoagulation Summary as of 2/16/2017     INR goal 1.5-2.0   Today's INR 1.9   Full instructions 1 mg on Mon, Fri; 1.5 mg all other days   Next INR check 3/16/2017    Indications   Atrial fibrillation with rapid ventricular response (H) (Resolved) [I48.91]  DVT (deep venous thrombosis) [I82.409]  Long term current use of anticoagulant therapy [Z79.01]         Description     No change, recheck INR in 4 weeks.      Your next Anticoagulation Clinic appointment(s)     Mar 16, 2017 10:30 AM CDT   Anticoagulation Visit with [place] ANTI COAG   [facility]    [address] [address] [phone]              Contact Numbers     Please call [phone] to cancel and/or reschedule your appointment.  Please call [phone] with any problems or questions regarding your therapy          February 2017 Details    Sun Mon Tue Wed Thu Fri Sat        1               2               3               4                 5               6               7               8               9               10               11                 12               13               14               15               16      1.5 mg   See details      17      1 mg         18      1.5 mg           19      1.5 mg         20      1 mg         21      1.5 mg         22      1.5 mg         23      1.5 mg         24      1 mg         25      1.5 mg           26      1.5 mg         27      1 mg         28      1.5 mg              Date Details   02/16 This INR check               How to take your warfarin dose     To take:  1 mg Take 1 of the 1 mg tablets.    To take:  1.5 mg Take 1.5 of the 1 mg tablets.           March 2017 Details    Sun Mon Tue Wed Thu Fri Sat        1      1.5 mg          2      1.5 mg         3      1 mg         4      1.5 mg           5      1.5 mg         6      1 mg         7      1.5 mg         8      1.5 mg         9      1.5 mg         10      1 mg         11      1.5 mg           12      1.5 mg         13      1 mg         14      1.5 mg         15      1.5 mg         16            17               18                 19               20               21               22               23               24               25                 26               27               28               29               30               31                 Date Details   No additional details    Date of next INR:  3/16/2017         How to take your warfarin dose     To take:  1 mg Take 1 of the 1 mg tablets.    To take:  1.5 mg Take 1.5 of the 1 mg tablets.

## 2017-02-16 NOTE — PROGRESS NOTES
ANTICOAGULATION FOLLOW-UP CLINIC VISIT    Patient Name:  Ellie Leigh  Date:  2/16/2017  Contact Type:  Face to Face, accompanied by dtr    SUBJECTIVE:     Patient Findings     Positives No Problem Findings    Comments Has appt with Dr. Gómez 2/24/17.           OBJECTIVE    INR Protime   Date Value Ref Range Status   02/16/2017 1.9 (A) 0.86 - 1.14 Final       ASSESSMENT / PLAN  INR assessment THER    Recheck INR In: 4 WEEKS    INR Location Clinic      Anticoagulation Summary as of 2/16/2017     INR goal 1.5-2.0   Today's INR 1.9   Maintenance plan 1 mg (1 mg x 1) on Mon, Fri; 1.5 mg (1 mg x 1.5) all other days   Full instructions 1 mg on Mon, Fri; 1.5 mg all other days   Weekly total 9.5 mg   No change documented Jessica Johnson RN   Plan last modified Katia Landrum RN (2/2/2017)   Next INR check 3/16/2017   Priority INR   Target end date Indefinite    Indications   Atrial fibrillation with rapid ventricular response (H) (Resolved) [I48.91]  DVT (deep venous thrombosis) [I82.409]  Long term current use of anticoagulant therapy [Z79.01]         Anticoagulation Episode Summary     INR check location     Preferred lab     Send INR reminders to [facility] POOL    Comments * Actual INR goal is 1.7-2.3  please follow Dec 2015 INR referral (June 2016 goal range is an error)      Anticoagulation Care Providers     Provider Role Specialty Phone number    Josefian Gómez MD [facility] [phone]            See the Encounter Report to view Anticoagulation Flowsheet and Dosing Calendar (Go to Encounters tab in chart review, and find the Anticoagulation Therapy Visit)    Jessica Johnson, AYLIN

## 2017-02-21 ENCOUNTER — OFFICE VISIT (OUTPATIENT)
Dept: FAMILY MEDICINE | Facility: CLINIC | Age: 82
End: 2017-02-21
Payer: COMMERCIAL

## 2017-02-21 VITALS
HEIGHT: 58 IN | HEART RATE: 85 BPM | WEIGHT: 115 LBS | BODY MASS INDEX: 24.14 KG/M2 | SYSTOLIC BLOOD PRESSURE: 115 MMHG | DIASTOLIC BLOOD PRESSURE: 55 MMHG | TEMPERATURE: 95.6 F | OXYGEN SATURATION: 95 %

## 2017-02-21 DIAGNOSIS — J40 BRONCHITIS: Primary | ICD-10-CM

## 2017-02-21 PROCEDURE — 99213 OFFICE O/P EST LOW 20 MIN: CPT | Performed by: FAMILY MEDICINE

## 2017-02-21 RX ORDER — PREDNISONE 20 MG/1
20 TABLET ORAL DAILY
Qty: 5 TABLET | Refills: 0 | Status: SHIPPED | OUTPATIENT
Start: 2017-02-21 | End: 2017-04-10

## 2017-02-21 NOTE — NURSING NOTE
"Chief Complaint   Patient presents with     URI       Initial /55 (BP Location: Right arm, Cuff Size: Adult Regular)  Pulse 85  Temp 95.6  F (35.3  C) (Oral)  Ht 4' 10\" (1.473 m)  Wt 115 lb (52.2 kg)  LMP 06/15/1985  SpO2 95%  BMI 24.04 kg/m2 Estimated body mass index is 24.04 kg/(m^2) as calculated from the following:    Height as of this encounter: 4' 10\" (1.473 m).    Weight as of this encounter: 115 lb (52.2 kg).  Medication Reconciliation: complete  "

## 2017-02-21 NOTE — MR AVS SNAPSHOT
After Visit Summary   2/21/2017    Ellie Leigh    MRN: [id number]           Patient Information     Date Of Birth          9/12/1930        Visit Information        Provider Department      2/21/2017 9:40 AM Ailyn Roland MD [facility]        Today's Diagnoses     Bronchitis    -  1      Care Instructions          Thank you for choosing [facility].  You may be receiving a survey in the mail from [facility] regarding your visit today.  Please take a few minutes to complete and return the survey to let us know how we are doing.      If you have questions or concerns, please contact us via [employer] or you can contact your care team at [phone].    Our Clinic hours are:  Monday 6:40 am  to 7:00 pm  Tuesday -Friday 6:40 am to 5:00 pm    The [place] outpatient lab hours are:  Monday - Friday 6:10 am to 4:45 pm  Saturdays 7:00 am to 11:00 am  Appointments are required, call [phone]    If you have clinical questions after hours or would like to schedule an appointment,  call the clinic at [phone].  Bronchitis, Viral (Adult)    You have a viral bronchitis. Bronchitis is inflammation and swelling of the lining of the lungs. This is often caused by an infection. Symptoms include a dry, hacking cough that is worse at night. The cough may bring up yellow-green mucus. You may also feel short of breath or wheeze. Other symptoms may include tiredness, chest discomfort, and chills.  Bronchitis that is caused by a virus is not treated with antibiotics. Instead, medicines may be given to help relieve symptoms. Symptoms can last up to 2 weeks, although the cough may last much longer.  This illness is contagious during the first few days and is spread through the air by coughing and sneezing, or by direct contact (touching the sick person and then touching your own eyes, nose, or mouth).  Most viral illnesses resolve within 10 to 14 days with rest and simple home remedies,  although they may sometimes last for several weeks.  Home care    If symptoms are severe, rest at home for the first 2 to 3 days. When you go back to your usual activities, don't let yourself get too tired.    Do not smoke. Also avoid being exposed to secondhand smoke.    You may use over-the-counter medicine to control fever or pain, unless another pain medicine was prescribed. (Note: If you have chronic liver or kidney disease or have ever had a stomach ulcer or gastrointestinal bleeding, talk with your healthcare provider before using these medicines. Also talk to your provider if you are taking medicine to prevent blood clots.) Aspirin should never be given to anyone younger than 18 years of age who is ill with a viral infection or fever. It may cause severe liver or brain damage.    Your appetite may be poor, so a light diet is fine. Avoid dehydration by drinking 6 to 8 glasses of fluids per day (such as water, soft drinks, sports drinks, juices, tea, or soup). Extra fluids will help loosen secretions in the nose and lungs.    Over-the-counter cough, cold, and sore-throat medicines will not shorten the length of the illness, but they may help to reduce symptoms. (Note: Do not use decongestants if you have high blood pressure.)  Follow-up care  Follow up with your healthcare provider, or as advised.  If you had an X-ray or ECG (electrocardiogram), a specialist will review it. You will be notified of any new findings that may affect your care.  Note: If you are age 65 or older, or if you have a chronic lung disease or condition that affects your immune system, or you smoke, talk to your healthcare provider about having pneumococcal vaccinations and a yearly influenza vaccination (flu shot).  When to seek medical advice   Call your healthcare provider right away if any of these occur:    Fever of 100.4 F (38 C) or higher    Coughing up increased amounts of colored sputum    Weakness, drowsiness, headache, facial  pain, ear pain, or a stiff neck  Call 911, or get immediate medical care  Contact emergency services right away if any of these occur:    Coughing up blood    Worsening weakness, drowsiness, headache, or stiff neck    Trouble breathing, wheezing, or pain with breathing    [id number] [employer]. [address] [place] [address]. All rights reserved. This information is not intended as a substitute for professional medical care. Always follow your healthcare professional's instructions.              Follow-ups after your visit        Your next 10 appointments already scheduled     Feb 24, 2017  3:20 PM CST   SHORT with Josefina Gómez MD   [facility] ([facility]    [address] [place] [address]   [phone]            Mar 16, 2017 10:30 AM CDT   Anticoagulation Visit with [place] ANTI COAG   [facility]    [address] [address]   [phone]            Apr 17, 2017 10:00 AM CDT   Remote PPM Check with RUEDA TECH1   [facility] [place] ([facility])    [address]  [facility] [address]   [phone]           This appointment is for a remote check of your pacemaker.  This is not an appointment at the office.            Jan 03, 2018  2:00 PM CST   LAB with [facility] ([facility])    [address] [address]   [phone]           Patient must bring picture ID.  Patient should be prepared to give a urine specimen  Please do not eat 10-12 hours before your appointment if you are coming in fasting for labs on lipids, cholesterol, or glucose (sugar).  Pregnant women should follow their Care Team instructions. Water with medications is okay. Do not drink coffee or other fluids.   If you have concerns about taking  your medications, please ask at office or if  "scheduling via [employer], send a message by clicking on Secure Messaging, Message Your Care Team.            Jan 03, 2018  2:45 PM CST   MA SCREENING DIGITAL RIGHT with [place]MA2   [facility] ([facility])    [address]  [facility] [address] [phone]           Do not use any powder, lotion or deodorant under your arms or on your breast. If you do, we will ask you to remove it before your exam.  Wear comfortable, two-piece clothing.  If you have any allergies, tell your care team.  Bring any previous mammograms from other facilities or have them mailed to the breast center.            Jan 11, 2018 11:00 AM CST   Return Visit with Gayle Nieves MD   [facility] ([facility])    [facility]  [address]  [facility] [address] [phone]              Who to contact     If you have questions or need follow up information about today's clinic visit or your schedule please contact [facility] directly at [phone].  Normal or non-critical lab and imaging results will be communicated to you by MyChart, letter or phone within 4 business days after the clinic has received the results. If you do not hear from us within 7 days, please contact the clinic through MyChart or phone. If you have a critical or abnormal lab result, we will notify you by phone as soon as possible.  Submit refill requests through [employer] or call your pharmacy and they will forward the refill request to us. Please allow 3 business days for your refill to be completed.          Additional Information About Your Visit        [employer]or Information     [employer] lets you send messages to your doctor, view your test results, renew your prescriptions, schedule appointments and more. To sign up, go to [email or web address] . Click on \"Log in\" on the left side of the screen, which will take you to the Welcome page. Then click on \"Sign up Now\" on the " "right side of the page.     You will be asked to enter the access code listed below, as well as some personal information. Please follow the directions to create your username and password.     Your access code is: [email or web address]  Expires: 2017  9:54 AM     Your access code will  in 90 days. If you need help or a new code, please call your [facility] or [phone].        Care EveryWhere ID     This is your Care EveryWhere ID. This could be used by other organizations to access your [place] medical records  [id number]        Your Vitals Were     Pulse Temperature Height Last Period Pulse Oximetry BMI (Body Mass Index)    85 95.6  F (35.3  C) (Oral) 4' 10\" (1.473 m) 06/15/1985 95% 24.04 kg/m2       Blood Pressure from Last 3 Encounters:   17 115/55   17 132/64   17 146/86    Weight from Last 3 Encounters:   17 115 lb (52.2 kg)   17 118 lb 3.2 oz (53.6 kg)   17 119 lb 12.8 oz (54.3 kg)              Today, you had the following     No orders found for display         Today's Medication Changes          These changes are accurate as of: 17  9:54 AM.  If you have any questions, ask your nurse or doctor.               Start taking these medicines.        Dose/Directions    predniSONE 20 MG tablet   Commonly known as:  DELTASONE   Used for:  Bronchitis   Started by:  Ailyn Roland MD        Dose:  20 mg   Take 1 tablet (20 mg) by mouth daily   Quantity:  5 tablet   Refills:  0            Where to get your medicines      Call your pharmacy to confirm that your medication is ready for pickup. It may take up to 24 hours for them to receive the prescription. If the prescription is not ready within 3 business days, please contact your clinic or your provider.     We will let you know when these medications are ready. If you don't hear back within 3 business days, please contact us.     predniSONE 20 MG tablet                Primary Care Provider Office " Phone # Fax #    Josefina Del Gómez [phone]       [facility] [address] [place] [address]        Thank you!     Thank you for choosing [facility]  for your care. Our goal is always to provide you with excellent care. Hearing back from our patients is one way we can continue to improve our services. Please take a few minutes to complete the written survey that you may receive in the mail after your visit with us. Thank you!             Your Updated Medication List - Protect others around you: Learn how to safely use, store and throw away your medicines at www.disposemymeds.org.          This list is accurate as of: 2/21/17  9:54 AM.  Always use your most recent med list.                   Brand Name Dispense Instructions for use    albuterol 108 (90 BASE) MCG/ACT Inhaler    PROAIR HFA/PROVENTIL HFA/VENTOLIN HFA    3 Inhaler    Inhale 2 puffs into the lungs every 6 hours as needed for shortness of breath / dyspnea       budesonide-formoterol 160-4.5 MCG/ACT Inhaler    SYMBICORT    1 Inhaler    Inhale 2 puffs into the lungs 2 times daily       CRANBERRY      Take 475 mg by mouth 2 times daily.       diclofenac 1 % Gel topical gel    VOLTAREN    100 g    Apply 4 grams to knees or 2 grams to hands four times daily using enclosed dosing card.       ipratropium - albuterol 0.5 mg/2.5 mg/3 mL 0.5-2.5 (3) MG/3ML neb solution    DUONEB    90 vial    Take 1 vial (3 mLs) by nebulization every 6 hours as needed for shortness of breath / dyspnea or wheezing       levothyroxine 50 MCG tablet    SYNTHROID/LEVOTHROID    90 tablet    Take 1 tablet (50 mcg) by mouth daily       metoprolol 25 MG 24 hr tablet    TOPROL-XL    180 tablet    2  tablets (50 mg) twice daily       omeprazole 20 MG CR capsule    priLOSEC    60 capsule    Take 1 capsule (20 mg) by mouth 2 times daily       polyethylene glycol powder    MIRALAX    510 g    Take 17 g by mouth daily as needed        predniSONE 20 MG tablet    DELTASONE    5 tablet    Take 1 tablet (20 mg) by mouth daily       probiotic Caps      Take 1 capsule by mouth every evening       sucralfate 1 GM/10ML suspension    CARAFATE    420 mL    Take 10 mLs (1 g) by mouth 4 times daily       warfarin 1 MG tablet    COUMADIN    105 tablet    Take 1 mg on Mon, Fri; 1.5 mg all other days  or as directed by Anticoagulation Clinic

## 2017-02-21 NOTE — PROGRESS NOTES
SUBJECTIVE:                                                    Ellie Colvin is a 86 year old female who presents to clinic today for the following health issues:      Acute Illness   Acute illness concerns: URI  Onset: 5 days     Fever: no     Chills/Sweats: no     Headache (location?): YES    Sinus Pressure:no    Conjunctivitis:  no    Ear Pain: no    Rhinorrhea: YES    Congestion: YES- chest and nasal     Sore Throat: YES- off and on     Cough: YES-mostly dry      shortness of breath- no     Wheeze: no    Decreased Appetite: no    Nausea: YES    Vomiting: no    Diarrhea:  no    Dysuria/Freq.: no    Fatigue/Achiness: YES    Sick/Strep Exposure: no     Therapies Tried and outcome: tylenol and nebs       Patient comes in with a cough, ongoing for four to five days. Cough is mostly dry but she feels like she is wanting to bring up some sputum but is unable to. Patient reports that she has had no fevers and no shortness of breath. No contact with sick persons. She denies any runny nose or sinus congestion. She has been doing her nebs at home and this have helped some . She reports that she got her Flu shot.    Problem list and histories reviewed & adjusted, as indicated.  Additional history: as documented    Patient Active Problem List   Diagnosis     Sensorineural hearing loss, asymmetrical     Generalized osteoarthrosis, unspecified site     Disease of lung     PERSONAL HISTORY OF MALIGNANCY- BREAST     Esophageal reflux     Chronic allergic conjunctivitis     Atopic rhinitis     Rhinitis, allergic seasonal     Hyperlipidemia LDL goal <130     Chronic constipation     Osteoporosis     Health Care Home     Right arm weakness     Ulnar neuropathy     Headache     Mild major depression     DVT (deep venous thrombosis)     Anxiety     Cervical vertebral fracture (H)     Intermittent atrial fibrillation (H)     Squamous cell carcinoma in situ of skin of face     SK (seborrheic keratosis)     Hypothyroidism      Hyponatremia     Recurrent UTI     History of skin cancer     BPPV (benign paroxysmal positional vertigo)     Benign essential HTN     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)     Positive fecal occult blood test     COPD (chronic obstructive pulmonary disease) (H)     Infectious colitis, enteritis and gastroenteritis     Advance Care Planning     Syncope     Hemoptysis     Long term current use of anticoagulant therapy     Mild persistent asthma without complication     Chest pain at rest     Unresponsive episode     Irritable bowel syndrome without diarrhea     Elevated troponin     Past Surgical History   Procedure Laterality Date     Surgical history of -   05/22/2001     Colonoscopy     Mastectomy, simple rt/lt/lexi       Left breast - following breast ca     Appendectomy       Cholecystectomy       Inject epidural lumbar  3/23/2011     INJECT EPIDURAL LUMBAR performed by GENERIC ANESTHESIA PROVIDER at [facility] OR     Arthroscopy knee  4/15/2011     Procedure:ARTHROSCOPY KNEE; removal of loose body; Surgeon:LEY, JEFFREY DUANE; Location:[facility] OR     Other surgical history       C1-C2 fusion after fx      Tonsillectomy       Joint replacement, hip rt/lt       Joint Replacement Hip Rt     Implant pacemaker  5/21/2013     Biotronik Moderl [id number] Serial#[id number]     Esophagoscopy, gastroscopy, duodenoscopy (egd), combined  6/9/2014     Procedure: COMBINED ESOPHAGOSCOPY, GASTROSCOPY, DUODENOSCOPY (EGD);  Surgeon: Gilberto Richard MD;  Location: [facility] GI       Social History   Substance Use Topics     Smoking status: Never Smoker     Smokeless tobacco: Never Used     Alcohol use No     Family History   Problem Relation Age of Onset     CEREBROVASCULAR DISEASE Mother      HEART DISEASE Mother      MI     CEREBROVASCULAR DISEASE Father      HEART DISEASE Father      MI     Respiratory Maternal Grandfather      TB     Neurologic Disorder Brother      ALS     HEART DISEASE Brother      CEREBROVASCULAR DISEASE Brother       Breast Cancer Daughter      age:49     Asthma Brother      CANCER Brother      brain and lung     CANCER Daughter      thyroid         Current Outpatient Prescriptions   Medication Sig Dispense Refill     predniSONE (DELTASONE) 20 MG tablet Take 1 tablet (20 mg) by mouth daily 5 tablet 0     warfarin (COUMADIN) 1 MG tablet Take 1 mg on Mon, Fri; 1.5 mg all other days  or as directed by Anticoagulation Clinic 105 tablet 1     metoprolol (TOPROL-XL) 25 MG 24 hr tablet 2  tablets (50 mg) twice daily 180 tablet 0     diclofenac (VOLTAREN) 1 % GEL topical gel Apply 4 grams to knees or 2 grams to hands four times daily using enclosed dosing card. 100 g 1     sucralfate (CARAFATE) 1 GM/10ML suspension Take 10 mLs (1 g) by mouth 4 times daily 420 mL 3     albuterol (PROAIR HFA/PROVENTIL HFA/VENTOLIN HFA) 108 (90 BASE) MCG/ACT Inhaler Inhale 2 puffs into the lungs every 6 hours as needed for shortness of breath / dyspnea 3 Inhaler 1     budesonide-formoterol (SYMBICORT) 160-4.5 MCG/ACT Inhaler Inhale 2 puffs into the lungs 2 times daily 1 Inhaler 6     ipratropium - albuterol 0.5 mg/2.5 mg/3 mL (DUONEB) 0.5-2.5 (3) MG/3ML nebulization Take 1 vial (3 mLs) by nebulization every 6 hours as needed for shortness of breath / dyspnea or wheezing 90 vial 3     levothyroxine (SYNTHROID, LEVOTHROID) 50 MCG tablet Take 1 tablet (50 mcg) by mouth daily 90 tablet 3     omeprazole (PRILOSEC) 20 MG capsule Take 1 capsule (20 mg) by mouth 2 times daily 60 capsule 3     probiotic CAPS Take 1 capsule by mouth every evening        polyethylene glycol (MIRALAX) powder Take 17 g by mouth daily as needed 510 g 5     CRANBERRY Take 475 mg by mouth 2 times daily.       Allergies   Allergen Reactions     Cephalosporins Nausea     Cefzil     Doxycycline Nausea and Vomiting     Sulfa Drugs Nausea     Penicillins Rash     PCN       ROS:  C: NEGATIVE for fever, chills, change in weight  E/M: NEGATIVE for ear, mouth and throat problems  RESP:POSITIVE for  "cough-non productive  CV: NEGATIVE for chest pain, palpitations or peripheral edema    OBJECTIVE:                                                    /55 (BP Location: Right arm, Cuff Size: Adult Regular)  Pulse 85  Temp 95.6  F (35.3  C) (Oral)  Ht 4' 10\" (1.473 m)  Wt 115 lb (52.2 kg)  LMP 06/15/1985  SpO2 95%  BMI 24.04 kg/m2  Body mass index is 24.04 kg/(m^2).  GENERAL: healthy, alert and no distress  NECK: no adenopathy, no asymmetry, masses, or scars and thyroid normal to palpation  RESP: bronchial breath sounds all over lungs  CV: regular rate and rhythm, normal S1 S2, no S3 or S4, no murmur, click or rub, no peripheral edema and peripheral pulses strong  ABDOMEN: soft, nontender, no hepatosplenomegaly, no masses and bowel sounds normal  MS: no gross musculoskeletal defects noted, no edema    Diagnostic Test Results:  none      ASSESSMENT/PLAN:                                                    1. Bronchitis  Likely a viral bronchitis. If her symptoms do not improve she is asked to call the clinic.   - predniSONE (DELTASONE) 20 MG tablet; Take 1 tablet (20 mg) by mouth daily  Dispense: 5 tablet; Refill: 0    FUTURE APPOINTMENTS:       - Follow-up visit as needed.    Ailyn Roland MD  [facility]  "

## 2017-02-21 NOTE — PATIENT INSTRUCTIONS
Thank you for choosing [facility].  You may be receiving a survey in the mail from [employer] regarding your visit today.  Please take a few minutes to complete and return the survey to let us know how we are doing.      If you have questions or concerns, please contact us via [employer] or you can contact your care team at [phone].    Our Clinic hours are:  Monday 6:40 am  to 7:00 pm  Tuesday -Friday 6:40 am to 5:00 pm    The [place] outpatient lab hours are:  Monday - Friday 6:10 am to 4:45 pm  Saturdays 7:00 am to 11:00 am  Appointments are required, call [phone]    If you have clinical questions after hours or would like to schedule an appointment,  call the clinic at [phone].  Bronchitis, Viral (Adult)    You have a viral bronchitis. Bronchitis is inflammation and swelling of the lining of the lungs. This is often caused by an infection. Symptoms include a dry, hacking cough that is worse at night. The cough may bring up yellow-green mucus. You may also feel short of breath or wheeze. Other symptoms may include tiredness, chest discomfort, and chills.  Bronchitis that is caused by a virus is not treated with antibiotics. Instead, medicines may be given to help relieve symptoms. Symptoms can last up to 2 weeks, although the cough may last much longer.  This illness is contagious during the first few days and is spread through the air by coughing and sneezing, or by direct contact (touching the sick person and then touching your own eyes, nose, or mouth).  Most viral illnesses resolve within 10 to 14 days with rest and simple home remedies, although they may sometimes last for several weeks.  Home care    If symptoms are severe, rest at home for the first 2 to 3 days. When you go back to your usual activities, don't let yourself get too tired.    Do not smoke. Also avoid being exposed to secondhand smoke.    You may use over-the-counter medicine to control fever or pain, unless another  pain medicine was prescribed. (Note: If you have chronic liver or kidney disease or have ever had a stomach ulcer or gastrointestinal bleeding, talk with your healthcare provider before using these medicines. Also talk to your provider if you are taking medicine to prevent blood clots.) Aspirin should never be given to anyone younger than 18 years of age who is ill with a viral infection or fever. It may cause severe liver or brain damage.    Your appetite may be poor, so a light diet is fine. Avoid dehydration by drinking 6 to 8 glasses of fluids per day (such as water, soft drinks, sports drinks, juices, tea, or soup). Extra fluids will help loosen secretions in the nose and lungs.    Over-the-counter cough, cold, and sore-throat medicines will not shorten the length of the illness, but they may help to reduce symptoms. (Note: Do not use decongestants if you have high blood pressure.)  Follow-up care  Follow up with your healthcare provider, or as advised.  If you had an X-ray or ECG (electrocardiogram), a specialist will review it. You will be notified of any new findings that may affect your care.  Note: If you are age 65 or older, or if you have a chronic lung disease or condition that affects your immune system, or you smoke, talk to your healthcare provider about having pneumococcal vaccinations and a yearly influenza vaccination (flu shot).  When to seek medical advice   Call your healthcare provider right away if any of these occur:    Fever of 100.4 F (38 C) or higher    Coughing up increased amounts of colored sputum    Weakness, drowsiness, headache, facial pain, ear pain, or a stiff neck  Call 911, or get immediate medical care  Contact emergency services right away if any of these occur:    Coughing up blood    Worsening weakness, drowsiness, headache, or stiff neck    Trouble breathing, wheezing, or pain with breathing    [id number] [employer]. [address] [place] [address], [place] [address].  All rights reserved. This information is not intended as a substitute for professional medical care. Always follow your healthcare professional's instructions.

## 2017-02-24 ENCOUNTER — OFFICE VISIT (OUTPATIENT)
Dept: FAMILY MEDICINE | Facility: CLINIC | Age: 82
End: 2017-02-24
Payer: COMMERCIAL

## 2017-02-24 VITALS
DIASTOLIC BLOOD PRESSURE: 62 MMHG | RESPIRATION RATE: 18 BRPM | BODY MASS INDEX: 24.75 KG/M2 | HEART RATE: 84 BPM | WEIGHT: 118.4 LBS | TEMPERATURE: 97.5 F | SYSTOLIC BLOOD PRESSURE: 108 MMHG

## 2017-02-24 DIAGNOSIS — R11.0 NAUSEA: Primary | ICD-10-CM

## 2017-02-24 DIAGNOSIS — I48.0 INTERMITTENT ATRIAL FIBRILLATION (H): ICD-10-CM

## 2017-02-24 DIAGNOSIS — F32.0 MAJOR DEPRESSIVE DISORDER, SINGLE EPISODE, MILD (H): ICD-10-CM

## 2017-02-24 PROCEDURE — 99214 OFFICE O/P EST MOD 30 MIN: CPT | Performed by: FAMILY MEDICINE

## 2017-02-24 ASSESSMENT — PAIN SCALES - GENERAL: PAINLEVEL: NO PAIN (0)

## 2017-02-24 NOTE — PROGRESS NOTES
SUBJECTIVE:                                                    Ellie Leigh is a 86 year old female who presents to clinic today for the following health issues:      Concern - Nausea     Onset: x 2 weeks    Description:   Each day is different. Sometimes is all day, and others is less. Worse in the AM    Intensity: severe    Progression of Symptoms:  worsening    Accompanying Signs & Symptoms:  None       Previous history of similar problem:   Yes    Precipitating factors:   Worsened by: Unsure    Alleviating factors:  Improved by: None       Therapies Tried and outcome: Gluten Free Diet - Does not seem to be helping    This nausea has been something we have been working at for years and it seems to come and go and at times she is good and more recently she has not been good. She has had more nausea throughout the day     daughter states she is under sig stress and has been very anxious lately due to her other daughter going through chemo  She has been very upset about this     She has no appetite she states she is forcing herself to eat  She does not have emesis    She has felt like she has had more palpitations lately and perhaps in afib more often than not  She has not had SOA or dizzy or chest pain   She is on coumadin     She has had extensive work up for nausea including upper and lower scopes she did have gastric emptying study and this revealed very slow emptying so we have been working on diet changes and that seems to help at times    She has IBS and has been seeing dietician   She is really upset by how she feels           Problem list and histories reviewed & adjusted, as indicated.  Additional history: as documented    Problem list, Medication list, Allergies, and Medical/Social/Surgical histories reviewed in EPIC and updated as appropriate.    ROS:  Constitutional, HEENT, cardiovascular, pulmonary, gi and gu systems are negative, except as otherwise noted.    OBJECTIVE:                                                     /62 (BP Location: Right arm, Patient Position: Chair, Cuff Size: Adult Regular)  Pulse 84  Temp 97.5  F (36.4  C) (Tympanic)  Resp 18  Wt 118 lb 6.4 oz (53.7 kg)  LMP 06/15/1985  BMI 24.75 kg/m2  Body mass index is 24.75 kg/(m^2).  GENERAL APPEARANCE: healthy, alert and no distress  NECK: no adenopathy, no asymmetry, masses, or scars and thyroid normal to palpation  RESP: lungs clear to auscultation - no rales, rhonchi or wheezes  CV: normal S1 S2, no S3 or S4, no murmur, click or rub, irregularly irregular rhythm and normal rate  ABDOMEN: soft, nontender, without hepatosplenomegaly or masses and bowel sounds normal  MS: extremities normal- no gross deformities noted  PSYCH: mentation appears normal and anxious         ASSESSMENT/PLAN:                                                    1. Nausea  Likely multifactorial related to stress and anxiety and slow gastric emptying also possibly related to her paroxysmal a fib     2. Intermittent atrial fibrillation (H)  See if afib episodes correspond to nausea   - Zio Patch Holter; Future  - CARDIOLOGY EVAL ADULT REFERRAL    3. Major depressive disorder, single episode, mild (H)  Fair control refuses meds       Patient Instructions   Set up zio patch  [phone]    Set up an appt with cardiology     Risks, benefits, side effects and rationale for treatment plan fully discussed with the patient and understanding expressed.   Josefina Gómez MD  [facility]

## 2017-02-24 NOTE — NURSING NOTE
"Chief Complaint   Patient presents with     RECHECK       Initial /62 (BP Location: Right arm, Patient Position: Chair, Cuff Size: Adult Regular)  Pulse 84  Temp 97.5  F (36.4  C) (Tympanic)  Resp 18  Wt 118 lb 6.4 oz (53.7 kg)  LMP 06/15/1985  BMI 24.75 kg/m2 Estimated body mass index is 24.75 kg/(m^2) as calculated from the following:    Height as of 2/21/17: 4' 10\" (1.473 m).    Weight as of this encounter: 118 lb 6.4 oz (53.7 kg).  Medication Reconciliation: complete    Health Maintenance that is potentially due pending provider review:  NONE    Aisha Cole MA  3:25 PM 2/24/2017  .    "

## 2017-02-24 NOTE — PROGRESS NOTES
"  SUBJECTIVE:                                                    Ellie Leigh is a 86 year old female who presents to clinic today for the following health issues:        Chronic Pain Follow-Up       Type / Location of Pain: Arthritis  Analgesia/pain control:       Recent changes:  { :941907382}      Overall control: { :248344::\"Tolerable with discomfort\"}  Activity level/function:      Daily activities:  { :617410}    Work:  { :722406}  Adverse effects:  { :649359::\"No\"}  Adherance    Taking medication as directed?  { :504156::\"Yes\"}    Participating in other treatments: { :228151::\"yes\"}  Risk Factors:    Sleep:  { :910076}    Mood/anxiety:  { :734082::\"controlled\"}    Recent family or social stressors:  { :213348::\"none noted\"}    Other aggravating factors: { :323041::\"none\"}  PHQ-9 SCORE 1/5/2016 6/6/2016 1/13/2017   Total Score - - -   Total Score 0 2 0     ALYSSA-7 SCORE 11/3/2015 1/5/2016 1/13/2017   Total Score - - -   Total Score 0 0 0     Encounter-Level CSA:     There are no encounter-level csa.             Amount of exercise or physical activity: {Exercise frequency days per week:368305}    Problems taking medications regularly: {Med Problems:331286::\"No\"}    Medication side effects: {CHRONIC MED SIDE EFFECTS:707851::\"none\"}  Diet: { :560228}    {additional problems for provider to add:386644}    Problem list and histories reviewed & adjusted, as indicated.  Additional history: {NONE - AS DOCUMENTED:553714::\"as documented\"}    {HIST REVIEW/ LINKS 2:759747}    {PROVIDER CHARTING PREFERENCE:081200}  "

## 2017-02-24 NOTE — MR AVS SNAPSHOT
After Visit Summary   2/24/2017    Ellie Leigh    MRN: [id number]           Patient Information     Date Of Birth          9/12/1930        Visit Information        Provider Department      2/24/2017 3:20 PM Josefina Gómez MD [facility]        Today's Diagnoses     Intermittent atrial fibrillation (H)    -  1      Care Instructions    Set up zio patch  [phone]    Set up an appt with cardiology         Follow-ups after your visit        Additional Services     CARDIOLOGY EVAL ADULT REFERRAL       Your provider has referred you to:  [facility]: [facility] [phone]   [email or web address]    Please be aware that coverage of these services is subject to the terms and limitations of your health insurance plan.  Call member services at your health plan with any benefit or coverage questions.      Type of Referral:  Cardiology Follow Up    Timeframe requested:  Within 1 month    Please bring the following to your appointment:  >>   Any x-rays, CTs or MRIs which have been performed.  Contact the facility where they were done to arrange for  prior to your scheduled appointment.    >>   List of current medications  >>   This referral request   >>   Any documents/labs given to you for this referral                  Your next 10 appointments already scheduled     Mar 16, 2017 10:30 AM CDT   Anticoagulation Visit with [place] [facility]   [facility] ([facility])    [address] [address] [phone]            Apr 17, 2017 10:00 AM CDT   Remote PPM Check with RUEDA TECH1   [facility] [place] ([facility])    [address] [place] [address] [address]  [facility] [id number] [phone]           This appointment is for a remote check of your pacemaker.  This is not an appointment at the office.            Jan 03, 2018   2:00 PM CST   LAB with [facility] ([facility])    [address] [address]   [phone]           Patient must bring picture ID.  Patient should be prepared to give a urine specimen  Please do not eat 10-12 hours before your appointment if you are coming in fasting for labs on lipids, cholesterol, or glucose (sugar).  Pregnant women should follow their Care Team instructions. Water with medications is okay. Do not drink coffee or other fluids.   If you have concerns about taking  your medications, please ask at office or if scheduling via [employer] send a message by clicking on Secure Messaging, Message Your Care Team.            Jan 03, 2018  2:45 PM CST   MA SCREENING DIGITAL RIGHT with [address] [facility] ([facility])    [address] [address]   [phone]           Do not use any powder, lotion or deodorant under your arms or on your breast. If you do, we will ask you to remove it before your exam.  Wear comfortable, two-piece clothing.  If you have any allergies, tell your care team.  Bring any previous mammograms from other facilities or have them mailed to the breast center.            Jan 11, 2018 11:00 AM CST   Return Visit with Gayle Nieves MD   [facility] ([facility])    [facility]  [address]  [facility] [address]   [phone]              Future tests that were ordered for you today     Open Future Orders        Priority Expected Expires Ordered    Zio Patch Holter Routine  4/10/2017 2/24/2017            Who to contact     If you have questions or need follow up information about today's clinic visit or your schedule please contact [facility] directly at [phone].  Normal or non-critical lab and imaging results will be communicated to you by MyChart, letter or phone within 4 business days after the clinic  "has received the results. If you do not hear from us within 7 days, please contact the clinic through [employer] or phone. If you have a critical or abnormal lab result, we will notify you by phone as soon as possible.  Submit refill requests through [employer] or call your pharmacy and they will forward the refill request to us. Please allow 3 business days for your refill to be completed.          Additional Information About Your Visit        [employer]â„¢ Information     DGTS lets you send messages to your doctor, view your test results, renew your prescriptions, schedule appointments and more. To sign up, go to [email or web address] . Click on \"Log in\" on the left side of the screen, which will take you to the Welcome page. Then click on \"Sign up Now\" on the right side of the page.     You will be asked to enter the access code listed below, as well as some personal information. Please follow the directions to create your username and password.     Your access code is: [email or web address]  Expires: 2017  9:54 AM     Your access code will  in 90 days. If you need help or a new code, please call your [place] clinic or [phone].        Care EveryWhere ID     This is your Care EveryWhere ID. This could be used by other organizations to access your [place] medical records  [id number]        Your Vitals Were     Pulse Temperature Respirations Last Period BMI (Body Mass Index)       84 97.5  F (36.4  C) (Tympanic) 18 06/15/1985 24.75 kg/m2        Blood Pressure from Last 3 Encounters:   17 108/62   17 115/55   17 132/64    Weight from Last 3 Encounters:   17 118 lb 6.4 oz (53.7 kg)   17 115 lb (52.2 kg)   17 118 lb 3.2 oz (53.6 kg)              We Performed the Following     CARDIOLOGY EVAL ADULT REFERRAL        Primary Care Provider Office Phone # Fax #    Josefina Gómez [phone]       [address] [address]   "      Thank you!     Thank you for choosing [facility]  for your care. Our goal is always to provide you with excellent care. Hearing back from our patients is one way we can continue to improve our services. Please take a few minutes to complete the written survey that you may receive in the mail after your visit with us. Thank you!             Your Updated Medication List - Protect others around you: Learn how to safely use, store and throw away your medicines at www.disposemymeds.org.          This list is accurate as of: 2/24/17  3:55 PM.  Always use your most recent med list.                   Brand Name Dispense Instructions for use    albuterol 108 (90 BASE) MCG/ACT Inhaler    PROAIR HFA/PROVENTIL HFA/VENTOLIN HFA    3 Inhaler    Inhale 2 puffs into the lungs every 6 hours as needed for shortness of breath / dyspnea       budesonide-formoterol 160-4.5 MCG/ACT Inhaler    SYMBICORT    1 Inhaler    Inhale 2 puffs into the lungs 2 times daily       CRANBERRY      Take 475 mg by mouth 2 times daily.       diclofenac 1 % Gel topical gel    VOLTAREN    100 g    Apply 4 grams to knees or 2 grams to hands four times daily using enclosed dosing card.       ipratropium - albuterol 0.5 mg/2.5 mg/3 mL 0.5-2.5 (3) MG/3ML neb solution    DUONEB    90 vial    Take 1 vial (3 mLs) by nebulization every 6 hours as needed for shortness of breath / dyspnea or wheezing       levothyroxine 50 MCG tablet    SYNTHROID/LEVOTHROID    90 tablet    Take 1 tablet (50 mcg) by mouth daily       metoprolol 25 MG 24 hr tablet    TOPROL-XL    180 tablet    2  tablets (50 mg) twice daily       omeprazole 20 MG CR capsule    priLOSEC    60 capsule    Take 1 capsule (20 mg) by mouth 2 times daily       polyethylene glycol powder    MIRALAX    510 g    Take 17 g by mouth daily as needed       predniSONE 20 MG tablet    DELTASONE    5 tablet    Take 1 tablet (20 mg) by mouth daily       probiotic Caps      Take 1 capsule by mouth  every evening       sucralfate 1 GM/10ML suspension    CARAFATE    420 mL    Take 10 mLs (1 g) by mouth 4 times daily       warfarin 1 MG tablet    COUMADIN    105 tablet    Take 1 mg on Mon, Fri; 1.5 mg all other days  or as directed by Anticoagulation Clinic

## 2017-03-02 ENCOUNTER — HOSPITAL ENCOUNTER (OUTPATIENT)
Dept: CARDIOLOGY | Facility: CLINIC | Age: 82
Discharge: HOME | End: 2017-03-02
Attending: FAMILY MEDICINE | Admitting: FAMILY MEDICINE
Payer: COMMERCIAL

## 2017-03-02 DIAGNOSIS — I48.0 INTERMITTENT ATRIAL FIBRILLATION (H): ICD-10-CM

## 2017-03-02 PROCEDURE — 0298T ZZC EXT ECG > 48HR TO 21 DAY REVIEW AND INTERPRETATN: CPT | Performed by: INTERNAL MEDICINE

## 2017-03-02 PROCEDURE — 0296T ZIO PATCH HOLTER: CPT

## 2017-03-12 DIAGNOSIS — I10 ESSENTIAL HYPERTENSION, BENIGN: ICD-10-CM

## 2017-03-12 NOTE — TELEPHONE ENCOUNTER
Toprol      Last Written Prescription Date: 01/31/17  Last Fill Quantity: 180, # refills: 0    [place] Office Visit with [facility] or [facility] prescribing provider:  02/24/17   Future Office Visit:    Next 5 appointments (look out 90 days)     Apr 18, 2017  1:30 PM CDT   Return Visit with Philippe Luna MD   [facility] ([facility])    [address] [place] [address]   [phone]                    BP Readings from Last 3 Encounters:   02/24/17 108/62   02/21/17 115/55   01/13/17 132/64

## 2017-03-13 RX ORDER — METOPROLOL SUCCINATE 25 MG/1
TABLET, EXTENDED RELEASE ORAL
Qty: 180 TABLET | Refills: 1 | Status: SHIPPED | OUTPATIENT
Start: 2017-03-13 | End: 2017-04-10

## 2017-03-16 ENCOUNTER — ANTICOAGULATION THERAPY VISIT (OUTPATIENT)
Dept: ANTICOAGULATION | Facility: CLINIC | Age: 82
End: 2017-03-16
Payer: COMMERCIAL

## 2017-03-16 DIAGNOSIS — Z79.01 LONG TERM CURRENT USE OF ANTICOAGULANT THERAPY: ICD-10-CM

## 2017-03-16 LAB — INR POINT OF CARE: 2 (ref 0.86–1.14)

## 2017-03-16 PROCEDURE — 36416 COLLJ CAPILLARY BLOOD SPEC: CPT

## 2017-03-16 PROCEDURE — 99207 ZZC NO CHARGE NURSE ONLY: CPT

## 2017-03-16 PROCEDURE — 85610 PROTHROMBIN TIME: CPT | Mod: QW

## 2017-03-16 NOTE — PROGRESS NOTES
ANTICOAGULATION FOLLOW-UP CLINIC VISIT    Patient Name:  Ellie Leigh  Date:  3/16/2017  Contact Type:  Face to Face    SUBJECTIVE:     Patient Findings     Positives No Problem Findings           OBJECTIVE    INR Protime   Date Value Ref Range Status   03/16/2017 2.0 (A) 0.86 - 1.14 Final       ASSESSMENT / PLAN  INR assessment THER    Recheck INR In: 4 WEEKS    INR Location Clinic      Anticoagulation Summary as of 3/16/2017     INR goal 1.5-2.0   Today's INR 2.0   Maintenance plan 1 mg (1 mg x 1) on Mon, Fri; 1.5 mg (1 mg x 1.5) all other days   Full instructions 1 mg on Mon, Fri; 1.5 mg all other days   Weekly total 9.5 mg   Plan last modified Katia Landrum RN (2/2/2017)   Next INR check 4/13/2017   Priority INR   Target end date Indefinite    Indications   Atrial fibrillation with rapid ventricular response (H) (Resolved) [I48.91]  DVT (deep venous thrombosis) [I82.409]  Long term current use of anticoagulant therapy [Z79.01]         Anticoagulation Episode Summary     INR check location     Preferred lab     Send INR reminders to [facility] POOL    Comments * Actual INR goal is 1.7-2.3  please follow Dec 2015 INR referral (June 2016 goal range is an error)      Anticoagulation Care Providers     Provider Role Specialty Phone number    Josefina Gómez MD [facility] [phone]            See the Encounter Report to view Anticoagulation Flowsheet and Dosing Calendar (Go to Encounters tab in chart review, and find the Anticoagulation Therapy Visit)      Kirsten Mathis XIANG

## 2017-03-16 NOTE — MR AVS SNAPSHOT
Ellie Leigh   3/16/2017 10:30 AM   Anticoagulation Therapy Visit    Description:  86 year old female   Provider:  [place] [facility]   Department:  [facility]           INR as of 3/16/2017     Today's INR 2.0      Anticoagulation Summary as of 3/16/2017     INR goal 1.5-2.0   Today's INR 2.0   Full instructions 1 mg on Mon, Fri; 1.5 mg all other days   Next INR check 4/13/2017    Indications   Atrial fibrillation with rapid ventricular response (H) (Resolved) [I48.91]  DVT (deep venous thrombosis) [I82.409]  Long term current use of anticoagulant therapy [Z79.01]         Description     Continue 1 mg on MF, 1.5 mg rest of week. Recheck INR in 4 weeks on 4/13 at 10:45 am.      March 2017 Details    Sun Mon Tue Wed Thu Fri Sat        1               2               3               4                 5               6               7               8               9               10               11                 12               13               14               15               16      1.5 mg   See details      17      1 mg         18      1.5 mg           19      1.5 mg         20      1 mg         21      1.5 mg         22      1.5 mg         23      1.5 mg         24      1 mg         25      1.5 mg           26      1.5 mg         27      1 mg         28      1.5 mg         29      1.5 mg         30      1.5 mg         31      1 mg           Date Details   03/16 This INR check               How to take your warfarin dose     To take:  1 mg Take 1 of the 1 mg tablets.    To take:  1.5 mg Take 1.5 of the 1 mg tablets.           April 2017 Details    Sun Mon Tue Wed Thu Fri Sat           1      1.5 mg           2      1.5 mg         3      1 mg         4      1.5 mg         5      1.5 mg         6      1.5 mg         7      1 mg         8      1.5 mg           9      1.5 mg         10      1 mg         11      1.5 mg         12      1.5 mg         13            14               15                 16                17               18               19               20               21               22                 23               24               25               26               27               28               29                 30                      Date Details   No additional details    Date of next INR:  4/13/2017         How to take your warfarin dose     To take:  1 mg Take 1 of the 1 mg tablets.    To take:  1.5 mg Take 1.5 of the 1 mg tablets.

## 2017-04-02 DIAGNOSIS — M15.0 PRIMARY OSTEOARTHRITIS INVOLVING MULTIPLE JOINTS: ICD-10-CM

## 2017-04-10 ENCOUNTER — OFFICE VISIT (OUTPATIENT)
Dept: FAMILY MEDICINE | Facility: CLINIC | Age: 82
End: 2017-04-10
Payer: COMMERCIAL

## 2017-04-10 VITALS
RESPIRATION RATE: 12 BRPM | HEART RATE: 143 BPM | HEIGHT: 58 IN | DIASTOLIC BLOOD PRESSURE: 83 MMHG | SYSTOLIC BLOOD PRESSURE: 124 MMHG | TEMPERATURE: 97.9 F | BODY MASS INDEX: 24.56 KG/M2 | WEIGHT: 117 LBS | OXYGEN SATURATION: 99 %

## 2017-04-10 DIAGNOSIS — I10 ESSENTIAL HYPERTENSION, BENIGN: ICD-10-CM

## 2017-04-10 DIAGNOSIS — R00.0 TACHYCARDIA: ICD-10-CM

## 2017-04-10 DIAGNOSIS — N30.01 ACUTE CYSTITIS WITH HEMATURIA: Primary | ICD-10-CM

## 2017-04-10 DIAGNOSIS — R30.0 DYSURIA: ICD-10-CM

## 2017-04-10 LAB
ALBUMIN UR-MCNC: NEGATIVE MG/DL
APPEARANCE UR: CLEAR
BILIRUB UR QL STRIP: NEGATIVE
COLOR UR AUTO: YELLOW
GLUCOSE UR STRIP-MCNC: NEGATIVE MG/DL
HGB UR QL STRIP: ABNORMAL
KETONES UR STRIP-MCNC: NEGATIVE MG/DL
LEUKOCYTE ESTERASE UR QL STRIP: NEGATIVE
NITRATE UR QL: NEGATIVE
PH UR STRIP: 6 PH (ref 5–7)
RBC #/AREA URNS AUTO: ABNORMAL /HPF (ref 0–2)
SP GR UR STRIP: 1.01 (ref 1–1.03)
URN SPEC COLLECT METH UR: ABNORMAL
UROBILINOGEN UR STRIP-ACNC: 0.2 EU/DL (ref 0.2–1)
WBC #/AREA URNS AUTO: ABNORMAL /HPF (ref 0–2)

## 2017-04-10 PROCEDURE — 99214 OFFICE O/P EST MOD 30 MIN: CPT | Performed by: INTERNAL MEDICINE

## 2017-04-10 PROCEDURE — 81001 URINALYSIS AUTO W/SCOPE: CPT | Performed by: INTERNAL MEDICINE

## 2017-04-10 PROCEDURE — 93000 ELECTROCARDIOGRAM COMPLETE: CPT | Performed by: INTERNAL MEDICINE

## 2017-04-10 RX ORDER — METOPROLOL SUCCINATE 25 MG/1
TABLET, EXTENDED RELEASE ORAL
Qty: 180 TABLET | Refills: 1 | Status: SHIPPED | OUTPATIENT
Start: 2017-04-10 | End: 2017-04-28

## 2017-04-10 RX ORDER — SULFAMETHOXAZOLE/TRIMETHOPRIM 800-160 MG
1 TABLET ORAL 2 TIMES DAILY
Qty: 6 TABLET | Refills: 0 | Status: SHIPPED | OUTPATIENT
Start: 2017-04-10 | End: 2017-04-12

## 2017-04-10 NOTE — NURSING NOTE
"Chief Complaint   Patient presents with     Urinary Problem       Initial /83 (BP Location: Right arm)  Pulse 143  Temp 97.9  F (36.6  C) (Tympanic)  Resp 12  Ht 4' 10\" (1.473 m)  Wt 117 lb (53.1 kg)  LMP 06/15/1985  SpO2 99%  BMI 24.45 kg/m2 Estimated body mass index is 24.45 kg/(m^2) as calculated from the following:    Height as of this encounter: 4' 10\" (1.473 m).    Weight as of this encounter: 117 lb (53.1 kg).  Medication Reconciliation: complete    "

## 2017-04-10 NOTE — MR AVS SNAPSHOT
After Visit Summary   4/10/2017    Ellie Leigh    MRN: [id number]           Patient Information     Date Of Birth          9/12/1930        Visit Information        Provider Department      4/10/2017 2:40 PM Nelson Warner MD [facility]        Today's Diagnoses     Dysuria    -  1    Tachycardia        Acute cystitis with hematuria        Essential hypertension, benign          Care Instructions    It's not clear if you're having a urine infection or now- we'll start the Bactrim in case you are.  If you are still having symptoms, especially blood in the urine in one week, please make another doctor's appointment.      Your heart rate is fast, so we will increase the metoprolol to 75mg (3 pills) twice per day- keep an eye on your blood pressure at home and return later this week (Friday) to have your blood pressure and heart rate checked with a nurse visit.        Follow-ups after your visit        Your next 10 appointments already scheduled     Apr 13, 2017 10:45 AM CDT   Anticoagulation Visit with [place] ANTI COAG   [facility] ([facility]    [address] [address]   [phone]            Apr 17, 2017 10:00 AM CDT   Remote PPM Check with RUEDA TECH1   [facility] [place] ([facility])    [address] [place] [id number] [phone]           This appointment is for a remote check of your pacemaker.  This is not an appointment at the office.            Apr 18, 2017  1:30 PM CDT   P EP RETURN with Philippe Luna MD   [facility] [place] ([facility])    [address] [address]   [phone]            Jan 03, 2018  2:00 PM CST   LAB with [facility] ([facility])    [address] [address]   [phone]           Patient must bring picture ID.   Patient should be prepared to give a urine specimen  Please do not eat 10-12 hours before your appointment if you are coming in fasting for labs on lipids, cholesterol, or glucose (sugar).  Pregnant women should follow their Care Team instructions. Water with medications is okay. Do not drink coffee or other fluids.   If you have concerns about taking  your medications, please ask at office or if scheduling via [facility], send a message by clicking on Secure Messaging, Message Your Care Team.            Jan 03, 2018  2:45 PM CST   MA SCREENING DIGITAL RIGHT with [place]MA2   [facility] ([facility])    [address] [place]  [facility] [address]   [phone]           Do not use any powder, lotion or deodorant under your arms or on your breast. If you do, we will ask you to remove it before your exam.  Wear comfortable, two-piece clothing.  If you have any allergies, tell your care team.  Bring any previous mammograms from other facilities or have them mailed to the breast center.            Jan 11, 2018 11:00 AM CST   Return Visit with Gayle Nieves MD   [facility] ([facility])    [facility]  [address]  [facility] [address]   [phone]              Who to contact     If you have questions or need follow up information about today's clinic visit or your schedule please contact [facility] directly at [phone].  Normal or non-critical lab and imaging results will be communicated to you by MyChart, letter or phone within 4 business days after the clinic has received the results. If you do not hear from us within 7 days, please contact the clinic through MyChart or phone. If you have a critical or abnormal lab result, we will notify you by phone as soon as possible.  Submit refill requests through [facility] or call your pharmacy and they will forward the refill request to us. Please allow 3 business days for your  "refill to be completed.          Additional Information About Your Visit        [employer] Information     [employer] lets you send messages to your doctor, view your test results, renew your prescriptions, schedule appointments and more. To sign up, go to [email or web address] . Click on \"Log in\" on the left side of the screen, which will take you to the Welcome page. Then click on \"Sign up Now\" on the right side of the page.     You will be asked to enter the access code listed below, as well as some personal information. Please follow the directions to create your username and password.     Your access code is: [email or web address]  Expires: 2017 10:54 AM     Your access code will  in 90 days. If you need help or a new code, please call your [facility] clinic or [phone].        Care EveryWhere ID     This is your [facility] EveryWhere ID. This could be used by other organizations to access your [facility] medical records  [id number]        Your Vitals Were     Pulse Temperature Respirations Height Last Period Pulse Oximetry    143 97.9  F (36.6  C) (Tympanic) 12 4' 10\" (1.473 m) 06/15/1985 99%    BMI (Body Mass Index)                   24.45 kg/m2            Blood Pressure from Last 3 Encounters:   04/10/17 124/83   17 108/62   17 115/55    Weight from Last 3 Encounters:   04/10/17 117 lb (53.1 kg)   17 118 lb 6.4 oz (53.7 kg)   17 115 lb (52.2 kg)              We Performed the Following     *UA reflex to Microscopic and Culture ([place] and [facility] (except [place] and [place])     EKG 12-lead complete w/read - Clinics     Urine Microscopic          Today's Medication Changes          These changes are accurate as of: 4/10/17  3:45 PM.  If you have any questions, ask your nurse or doctor.               Start taking these medicines.        Dose/Directions    sulfamethoxazole-trimethoprim 800-160 MG per tablet   Commonly known as:  BACTRIM DS/SEPTRA DS   Used for:  Acute cystitis with " hematuria        Dose:  1 tablet   Take 1 tablet by mouth 2 times daily for 3 days   Quantity:  6 tablet   Refills:  0         These medicines have changed or have updated prescriptions.        Dose/Directions    metoprolol 25 MG 24 hr tablet   Commonly known as:  TOPROL-XL   This may have changed:  additional instructions   Used for:  Essential hypertension, benign        3  tablets (75 mg) twice daily   Quantity:  180 tablet   Refills:  1            Where to get your medicines      These medications were sent to [facility] [address]  [address] [address] [facility] [address]     Phone:  [phone]     metoprolol 25 MG 24 hr tablet    sulfamethoxazole-trimethoprim 800-160 MG per tablet                Primary Care Provider Office Phone # Fax #    Josefina Gómez [phone]       [facility] [address] [facility] [address]        Thank you!     Thank you for choosing [facility]  for your care. Our goal is always to provide you with excellent care. Hearing back from our patients is one way we can continue to improve our services. Please take a few minutes to complete the written survey that you may receive in the mail after your visit with us. Thank you!             Your Updated Medication List - Protect others around you: Learn how to safely use, store and throw away your medicines at www.disposemymeds.org.          This list is accurate as of: 4/10/17  3:45 PM.  Always use your most recent med list.                   Brand Name Dispense Instructions for use    albuterol 108 (90 BASE) MCG/ACT Inhaler    PROAIR HFA/PROVENTIL HFA/VENTOLIN HFA    3 Inhaler    Inhale 2 puffs into the lungs every 6 hours as needed for shortness of breath / dyspnea       budesonide-formoterol 160-4.5 MCG/ACT Inhaler    SYMBICORT    1 Inhaler    Inhale 2 puffs into the lungs 2 times daily       CRANBERRY      Take 475 mg by mouth 2 times daily.        * diclofenac 1 % Gel topical gel    VOLTAREN    100 g    Apply 4 grams to knees or 2 grams to hands four times daily using enclosed dosing card.       * diclofenac 1 % Gel topical gel    VOLTAREN    100 g    APPLY 4 GRAMS TO KNEES OR 2 GRAMS TO HANDS FOUR TIMES DAILY USING ENCLOSED DOSING CARD.       ipratropium - albuterol 0.5 mg/2.5 mg/3 mL 0.5-2.5 (3) MG/3ML neb solution    DUONEB    90 vial    Take 1 vial (3 mLs) by nebulization every 6 hours as needed for shortness of breath / dyspnea or wheezing       levothyroxine 50 MCG tablet    SYNTHROID/LEVOTHROID    90 tablet    Take 1 tablet (50 mcg) by mouth daily       metoprolol 25 MG 24 hr tablet    TOPROL-XL    180 tablet    3  tablets (75 mg) twice daily       omeprazole 20 MG CR capsule    priLOSEC    60 capsule    Take 1 capsule (20 mg) by mouth 2 times daily       polyethylene glycol powder    MIRALAX    510 g    Take 17 g by mouth daily as needed       probiotic Caps      Take 1 capsule by mouth every evening       sucralfate 1 GM/10ML suspension    CARAFATE    420 mL    Take 10 mLs (1 g) by mouth 4 times daily       sulfamethoxazole-trimethoprim 800-160 MG per tablet    BACTRIM DS/SEPTRA DS    6 tablet    Take 1 tablet by mouth 2 times daily for 3 days       warfarin 1 MG tablet    COUMADIN    105 tablet    Take 1 mg on Mon, Fri; 1.5 mg all other days  or as directed by Anticoagulation Clinic       * Notice:  This list has 2 medication(s) that are the same as other medications prescribed for you. Read the directions carefully, and ask your doctor or other care provider to review them with you.

## 2017-04-10 NOTE — PROGRESS NOTES
"  SUBJECTIVE:                                                    Ellie Leigh is a 86 year old female who presents to clinic today for the following health issues:      URINARY TRACT SYMPTOMS     Onset: x2 days    Description:   Painful urination (Dysuria): YES  Blood in urine (Hematuria): YES- pt noticed it this morning   Delay in urine (Hesitency): YES    Intensity: moderate    Progression of Symptoms:  same    Accompanying Signs & Symptoms:  Fever/chills: no   Flank pain no   Nausea and vomiting: YES- some nausea; ongoing issue  Any vaginal symptoms: none  Abdominal/Pelvic Pain: YES over bladder  No chest pain, SOB   History:   History of frequent UTI's: YES  History of kidney stones: YES- \"once a long time ago\"   Sexually Active: no   Possibility of pregnancy: No    Precipitating factors:   Unknown          Therapies Tried and outcome: Lots of water;  Cranberry juice prn (contraindicated in Coumadin patients)    Ellie recently had a Holter monitor that showed abnormal rhythms including ventricular pacing and frequent ectopic beats.  She is rather tachycardic on presentation to the clinic today to a rate of 143 but is not have cardiac symptoms.        Problem list and histories reviewed & adjusted, as indicated.  Additional history: as documented    Current Outpatient Prescriptions   Medication Sig Dispense Refill     sulfamethoxazole-trimethoprim (BACTRIM DS/SEPTRA DS) 800-160 MG per tablet Take 1 tablet by mouth 2 times daily for 3 days 6 tablet 0     metoprolol (TOPROL-XL) 25 MG 24 hr tablet 3  tablets (75 mg) twice daily 180 tablet 1     diclofenac (VOLTAREN) 1 % GEL topical gel APPLY 4 GRAMS TO KNEES OR 2 GRAMS TO HANDS FOUR TIMES DAILY USING ENCLOSED DOSING CARD. 100 g 0     warfarin (COUMADIN) 1 MG tablet Take 1 mg on Mon, Fri; 1.5 mg all other days  or as directed by Anticoagulation Clinic 105 tablet 1     diclofenac (VOLTAREN) 1 % GEL topical gel Apply 4 grams to knees or 2 grams to hands four times " "daily using enclosed dosing card. 100 g 1     sucralfate (CARAFATE) 1 GM/10ML suspension Take 10 mLs (1 g) by mouth 4 times daily 420 mL 3     albuterol (PROAIR HFA/PROVENTIL HFA/VENTOLIN HFA) 108 (90 BASE) MCG/ACT Inhaler Inhale 2 puffs into the lungs every 6 hours as needed for shortness of breath / dyspnea 3 Inhaler 1     budesonide-formoterol (SYMBICORT) 160-4.5 MCG/ACT Inhaler Inhale 2 puffs into the lungs 2 times daily 1 Inhaler 6     ipratropium - albuterol 0.5 mg/2.5 mg/3 mL (DUONEB) 0.5-2.5 (3) MG/3ML nebulization Take 1 vial (3 mLs) by nebulization every 6 hours as needed for shortness of breath / dyspnea or wheezing 90 vial 3     levothyroxine (SYNTHROID, LEVOTHROID) 50 MCG tablet Take 1 tablet (50 mcg) by mouth daily 90 tablet 3     omeprazole (PRILOSEC) 20 MG capsule Take 1 capsule (20 mg) by mouth 2 times daily 60 capsule 3     probiotic CAPS Take 1 capsule by mouth every evening        polyethylene glycol (MIRALAX) powder Take 17 g by mouth daily as needed 510 g 5     CRANBERRY Take 475 mg by mouth 2 times daily.       [DISCONTINUED] metoprolol (TOPROL-XL) 25 MG 24 hr tablet 2  tablets (50 mg) twice daily 180 tablet 1     Allergies   Allergen Reactions     Cephalosporins Nausea     Cefzil     Doxycycline Nausea and Vomiting     Sulfa Drugs Nausea     Penicillins Rash     PCN       Reviewed and updated as needed this visit by clinical staff       Reviewed and updated as needed this visit by Provider         ROS:  Constitutional, cardiovascular, gi and gu systems are negative, except as otherwise noted.    OBJECTIVE:                                                    /83 (BP Location: Right arm)  Pulse 143  Temp 97.9  F (36.6  C) (Tympanic)  Resp 12  Ht 4' 10\" (1.473 m)  Wt 117 lb (53.1 kg)  LMP 06/15/1985  SpO2 99%  BMI 24.45 kg/m2  Body mass index is 24.45 kg/(m^2).    GENERAL: healthy, alert and no distress  RESP: lungs clear to auscultation - no rales, rhonchi or wheezes  CV: " tachycardic and irregular, normal S1 S2, no S3 or S4, no murmur, click or rub  ABDOMEN: soft, tender over the bladder, no hepatosplenomegaly, no masses and bowel sounds normal  BACK: no CVA tenderness      Diagnostic Test Results:  Results for orders placed or performed in visit on 04/10/17 (from the past 24 hour(s))   *UA reflex to Microscopic and Culture ([place] and [facility] (except [place] and [place])   Result Value Ref Range    Color Urine Yellow     Appearance Urine Clear     Glucose Urine Negative NEG mg/dL    Bilirubin Urine Negative NEG    Ketones Urine Negative NEG mg/dL    Specific Gravity Urine 1.010 1.003 - 1.035    Blood Urine Moderate (A) NEG    pH Urine 6.0 5.0 - 7.0 pH    Protein Albumin Urine Negative NEG mg/dL    Urobilinogen Urine 0.2 0.2 - 1.0 EU/dL    Nitrite Urine Negative NEG    Leukocyte Esterase Urine Negative NEG    Source Midstream Urine    Urine Microscopic   Result Value Ref Range    WBC Urine O - 2 0 - 2 /HPF    RBC Urine 2-5 (A) 0 - 2 /HPF     *Note: Due to a large number of results and/or encounters for the requested time period, some results have not been displayed. A complete set of results can be found in Results Review.        ASSESSMENT/PLAN:                                                        1. Acute cystitis with hematuria  2. Dysuria    Ellie's U/A shows blood but not WBCs, so is not definitive for UTI.  However, her symptoms are suggestive of infection, so we will treat for possible infection with Bactrim (she was asked to inform the INR clinic that she is starting this today).  If hematuria or other symptoms do not resolve with treatment, would pursue further work-up.      - sulfamethoxazole-trimethoprim (BACTRIM DS/SEPTRA DS) 800-160 MG per tablet; Take 1 tablet by mouth 2 times daily for 3 days  Dispense: 6 tablet; Refill: 0  - *UA reflex to Microscopic and Culture ([place] and [facility] (except [place] and [place])  - Urine Microscopic    3.  Tachycardia    Ellie was initially tachycardic to 143 on presentation to the clinic.  EKG was done that showed a ventricular rhythm, which was also occasionally present on her recent Holter monitoring.  Rate had improved to 115.  She was not having any chest pain or SOB.  We discussed possible admission to the hospital for observation of her heart rhythm and close monitoring of medication adjustments, but she declined and prefers to monitor HRs and BPs at home as we increase her metoprolol from 50mg BID to 75mg BID to try to get better heart rate control (she does have a pacemaker in place).  Asked her to follow-up with an RN vital sign check later this week as well.  Call if develops chest pain, SOB, dizziness.  She has an appointment already scheduled with cardiology next week as well.      - EKG 12-lead complete w/read - Clinics  - metoprolol (TOPROL-XL) 25 MG 24 hr tablet; 3  tablets (75 mg) twice daily  Dispense: 180 tablet; Refill: 1      Nelson Warner MD  [facility]

## 2017-04-10 NOTE — PATIENT INSTRUCTIONS
It's not clear if you're having a urine infection or now- we'll start the Bactrim in case you are.  If you are still having symptoms, especially blood in the urine in one week, please make another doctor's appointment.      Your heart rate is fast, so we will increase the metoprolol to 75mg (3 pills) twice per day- keep an eye on your blood pressure at home and return later this week (Friday) to have your blood pressure and heart rate checked with a nurse visit.    Let the INR clinic know today that you are starting Bactrim for 3 days.

## 2017-04-11 ENCOUNTER — ANTICOAGULATION THERAPY VISIT (OUTPATIENT)
Dept: ANTICOAGULATION | Facility: CLINIC | Age: 82
End: 2017-04-11
Payer: COMMERCIAL

## 2017-04-11 DIAGNOSIS — I82.409 DVT (DEEP VENOUS THROMBOSIS) (H): ICD-10-CM

## 2017-04-11 DIAGNOSIS — Z79.01 LONG TERM CURRENT USE OF ANTICOAGULANT THERAPY: ICD-10-CM

## 2017-04-11 PROCEDURE — 99207 ZZC NO CHARGE NURSE ONLY: CPT | Performed by: REGISTERED NURSE

## 2017-04-11 NOTE — PROGRESS NOTES
ANTICOAGULATION FOLLOW-UP CLINIC VISIT    Patient Name:  Ellie Leigh  Date:  4/11/2017  Contact Type:  Telephone/ Ellie    SUBJECTIVE:     Patient Findings     Positives Antibiotic use or infection (Bactrim started yesterday)           OBJECTIVE    INR Protime   Date Value Ref Range Status   03/16/2017 2.0 (A) 0.86 - 1.14 Final       ASSESSMENT / PLAN  INR assessment THER    Recheck INR In: 2 DAYS    INR Location Clinic      Anticoagulation Summary as of 4/11/2017     INR goal 1.5-2.0   Today's INR No new INR was available at the time of this encounter.   Maintenance plan 1 mg (1 mg x 1) on Mon, Fri; 1.5 mg (1 mg x 1.5) all other days   Full instructions 4/11: 1 mg; 4/12: 1 mg; Otherwise 1 mg on Mon, Fri; 1.5 mg all other days   Weekly total 9.5 mg   Plan last modified Katia Landrum RN (2/2/2017)   Next INR check 4/13/2017   Priority INR   Target end date Indefinite    Indications   Atrial fibrillation with rapid ventricular response (H) (Resolved) [I48.91]  DVT (deep venous thrombosis) [I82.409]  Long term current use of anticoagulant therapy [Z79.01]         Anticoagulation Episode Summary     INR check location     Preferred lab     Send INR reminders to [facility]    Comments * Actual INR goal is 1.7-2.3  please follow Dec 2015 INR referral (June 2016 goal range is an error)      Anticoagulation Care Providers     Provider Role Specialty Phone number    Josefina Gómez MD [facility] [phone]            See the Encounter Report to view Anticoagulation Flowsheet and Dosing Calendar (Go to Encounters tab in chart review, and find the Anticoagulation Therapy Visit)        Aysha Canada RN

## 2017-04-11 NOTE — MR AVS SNAPSHOT
Ellie CARVER Lu   4/11/2017   Anticoagulation Therapy Visit    Description:  86 year old female   Provider:  Aysha Canada, RN   Department:  [place]           INR as of 4/11/2017     Today's INR No new INR was available at the time of this encounter.      Anticoagulation Summary as of 4/11/2017     INR goal 1.5-2.0   Today's INR No new INR was available at the time of this encounter.   Full instructions 4/11: 1 mg; 4/12: 1 mg; Otherwise 1 mg on Mon, Fri; 1.5 mg all other days   Next INR check 4/13/2017    Indications   Atrial fibrillation with rapid ventricular response (H) (Resolved) [I48.91]  DVT (deep venous thrombosis) [I82.409]  Long term current use of anticoagulant therapy [Z79.01]         Description     Warfarin dose 1mg daily      Your next Anticoagulation Clinic appointment(s)     Apr 13, 2017 10:45 AM CDT   [facility]agulation [facility] with [place] ANTI COAG   [facility]    [address] [address]   [phone]              April 2017 Details    Sun Mon Tue Wed Thu Fri Sat           1                 2               3               4               5               6               7               8                 9               10               11      1 mg   See details      12      1 mg         13            14               15                 16               17               18               19               20               21               22                 23               24               25               26               27               28               29                 30                      Date Details   04/11 This INR check       Date of next INR:  4/13/2017         How to take your warfarin dose     To take:  1 mg Take 1 of the 1 mg tablets.    To take:  1.5 mg Take 1.5 of the 1 mg tablets.

## 2017-04-13 ENCOUNTER — ALLIED HEALTH/NURSE VISIT (OUTPATIENT)
Dept: FAMILY MEDICINE | Facility: CLINIC | Age: 82
End: 2017-04-13
Payer: COMMERCIAL

## 2017-04-13 ENCOUNTER — ANTICOAGULATION THERAPY VISIT (OUTPATIENT)
Dept: ANTICOAGULATION | Facility: CLINIC | Age: 82
End: 2017-04-13
Payer: COMMERCIAL

## 2017-04-13 VITALS — HEART RATE: 76 BPM | DIASTOLIC BLOOD PRESSURE: 66 MMHG | SYSTOLIC BLOOD PRESSURE: 107 MMHG | RESPIRATION RATE: 18 BRPM

## 2017-04-13 DIAGNOSIS — R55 SYNCOPE, UNSPECIFIED SYNCOPE TYPE: Primary | ICD-10-CM

## 2017-04-13 DIAGNOSIS — Z79.01 LONG TERM CURRENT USE OF ANTICOAGULANT THERAPY: ICD-10-CM

## 2017-04-13 LAB — INR POINT OF CARE: 1.8 (ref 0.86–1.14)

## 2017-04-13 PROCEDURE — 99207 ZZC NO CHARGE NURSE ONLY: CPT

## 2017-04-13 PROCEDURE — 36416 COLLJ CAPILLARY BLOOD SPEC: CPT

## 2017-04-13 PROCEDURE — 85610 PROTHROMBIN TIME: CPT | Mod: QW

## 2017-04-13 NOTE — MR AVS SNAPSHOT
After Visit Summary   4/13/2017    Ellie Leigh    MRN: [id number]           Patient Information     Date Of Birth          9/12/1930        Visit Information        Provider Department      4/13/2017 11:15 AM BOY CAMARA FP/IM RN [facility]        Today's Diagnoses     Syncope, unspecified syncope type    -  1       Follow-ups after your visit        Your next 10 appointments already scheduled     Apr 13, 2017  2:00 PM CDT   SHORT with LARRY SAME DAY PROVIDER   [facility] ([facility])    [address] [place] [address]   [phone]            Apr 17, 2017 10:00 AM CDT   Remote PPM Check with RUEDA TECH1   [facility] PHYSICIANS HEART AT [place] ([facility])    [address]  [facility] [id number] [phone]           This appointment is for a remote check of your pacemaker.  This is not an appointment at the office.            Apr 18, 2017  1:30 PM CDT   [facility] EP RETURN with Philippe Luna MD   [facility] PHYSICIAN HEART AT [facility] ([facility])    [address] [facility] [address]   [phone]            May 11, 2017 10:45 AM CDT   Anticoagulation Visit with [place] ANTI COAG   [facility] ([facility])    [address] [facility] [address]   [phone]            Jan 03, 2018  2:00 PM CST   LAB with [facility] ([facility])    [address] [facility] [address]   [phone]           Patient must bring picture ID.  Patient should be prepared to give a urine specimen  Please do not eat 10-12 hours before your appointment if you are coming in fasting for labs on lipids, cholesterol, or glucose (sugar).  Pregnant women should follow their Care Team instructions. Water with medications is okay. Do not drink coffee or other fluids.   If you have concerns about taking  your  "medications, please ask at office or if scheduling via [employer], send a message by clicking on Secure Messaging, Message Your Care Team.            Jan 03, 2018  2:45 PM CST   MA SCREENING DIGITAL RIGHT with WYMA2   [facility] ([facility])    [address]  [facility] [address]   [phone]           Do not use any powder, lotion or deodorant under your arms or on your breast. If you do, we will ask you to remove it before your exam.  Wear comfortable, two-piece clothing.  If you have any allergies, tell your care team.  Bring any previous mammograms from other facilities or have them mailed to the breast center.            Jan 11, 2018 11:00 AM CST   Return Visit with Gayle Nieves MD   [facility] ([facility])    [facility]  [address]  [facility] [address]   [phone]              Who to contact     If you have questions or need follow up information about today's clinic visit or your schedule please contact [facility] directly at [phone].  Normal or non-critical lab and imaging results will be communicated to you by MyChart, letter or phone within 4 business days after the clinic has received the results. If you do not hear from us within 7 days, please contact the clinic through MyChart or phone. If you have a critical or abnormal lab result, we will notify you by phone as soon as possible.  Submit refill requests through [employer] or call your pharmacy and they will forward the refill request to us. Please allow 3 business days for your refill to be completed.          Additional Information About Your Visit        [employer] Information     [employer] lets you send messages to your doctor, view your test results, renew your prescriptions, schedule appointments and more. To sign up, go to [email or web address] . Click on \"Log in\" on the left side of the screen, which will take you to the Welcome " "page. Then click on \"Sign up Now\" on the right side of the page.     You will be asked to enter the access code listed below, as well as some personal information. Please follow the directions to create your username and password.     Your access code is: [email or web address]  Expires: 2017 10:54 AM     Your access code will  in 90 days. If you need help or a new code, please call your [place] clinic or [phone].        Care EveryWhere ID     This is your Care EveryWhere ID. This could be used by other organizations to access your [facility] medical records  [id number]        Your Vitals Were     Pulse Respirations Last Period             76 18 06/15/1985          Blood Pressure from Last 3 Encounters:   17 107/66   04/10/17 124/83   17 108/62    Weight from Last 3 Encounters:   04/10/17 117 lb (53.1 kg)   17 118 lb 6.4 oz (53.7 kg)   17 115 lb (52.2 kg)              Today, you had the following     No orders found for display       Primary Care Provider Office Phone # Fax #    Josefina Gómez [phone]       [address] [address]        Thank you!     Thank you for choosing [facility]  for your care. Our goal is always to provide you with excellent care. Hearing back from our patients is one way we can continue to improve our services. Please take a few minutes to complete the written survey that you may receive in the mail after your visit with us. Thank you!             Your Updated Medication List - Protect others around you: Learn how to safely use, store and throw away your medicines at www.disposemymeds.org.          This list is accurate as of: 17 12:33 PM.  Always use your most recent med list.                   Brand Name Dispense Instructions for use    albuterol 108 (90 BASE) MCG/ACT Inhaler    PROAIR HFA/PROVENTIL HFA/VENTOLIN HFA    3 Inhaler    Inhale 2 puffs into the lungs every 6 hours " as needed for shortness of breath / dyspnea       budesonide-formoterol 160-4.5 MCG/ACT Inhaler    SYMBICORT    1 Inhaler    Inhale 2 puffs into the lungs 2 times daily       CRANBERRY      Take 475 mg by mouth 2 times daily.       * diclofenac 1 % Gel topical gel    VOLTAREN    100 g    Apply 4 grams to knees or 2 grams to hands four times daily using enclosed dosing card.       * diclofenac 1 % Gel topical gel    VOLTAREN    100 g    APPLY 4 GRAMS TO KNEES OR 2 GRAMS TO HANDS FOUR TIMES DAILY USING ENCLOSED DOSING CARD.       ipratropium - albuterol 0.5 mg/2.5 mg/3 mL 0.5-2.5 (3) MG/3ML neb solution    DUONEB    90 vial    Take 1 vial (3 mLs) by nebulization every 6 hours as needed for shortness of breath / dyspnea or wheezing       levothyroxine 50 MCG tablet    SYNTHROID/LEVOTHROID    90 tablet    Take 1 tablet (50 mcg) by mouth daily       metoprolol 25 MG 24 hr tablet    TOPROL-XL    180 tablet    3  tablets (75 mg) twice daily       omeprazole 20 MG CR capsule    priLOSEC    60 capsule    Take 1 capsule (20 mg) by mouth 2 times daily       polyethylene glycol powder    MIRALAX    510 g    Take 17 g by mouth daily as needed       probiotic Caps      Take 1 capsule by mouth every evening       sucralfate 1 GM/10ML suspension    CARAFATE    420 mL    Take 10 mLs (1 g) by mouth 4 times daily       warfarin 1 MG tablet    COUMADIN    105 tablet    Take 1 mg on Mon, Fri; 1.5 mg all other days  or as directed by Anticoagulation Clinic       * Notice:  This list has 2 medication(s) that are the same as other medications prescribed for you. Read the directions carefully, and ask your doctor or other care provider to review them with you.

## 2017-04-13 NOTE — PROGRESS NOTES
Patient came into clinic today to have blood pressure checked.  Daughter reports she believes her mother did pass out at home this morning.  Daughter reports her mother was sitting in the chair and had her eyes closed but did not respond for a few moments. Patient denies passing out this morning.  The patient states she heard her the entire time.   Patient did not fall over or hit her head.  Patient has passed out before.  Daughter reports it has been since June or July since the last time this has happened.  Patient and daughter report they have been told it could be from a low sodium level.  Daughter remembers the last time they tried to increase her metoprolol she has this happen to her too. Patient has increased her salt intake. Patient was seen in clinic on 4/10/17 for UTI with hematuria.  Patient did have elevated blood pressure and tachycardia.  Provider recommended to increase her metoprolol to 75 mg bid.   Patient is done with the antibiotics.  Patient has been nausea this is not new.  Patient states she has been eating and drinking ok.  Patient did check her blood pressure this morning before her medication and it was 134/81.      Advised patient to go to UC/ER for further evaluation.  Patient refuses to go to ER/UC.  Patient will see a provider in clinic but will not go to UC/ER.  Patient does have a cardiology appt on 4/18/17.  Patient denies any signs or symptoms of chest pain, SOB, or dizziness at this time. OV with Dr. Warner patient was advised to be admitted to the hospital for observation and the patient refused.  Advised patient and daughter this is not appropriate for clinic but patient said this is the only thing she will do. Patient and daughter were advised if she has dizziness, chest pain or shortness of breath to go to ED.       Edelmira ALBARADO RN

## 2017-04-13 NOTE — PROGRESS NOTES
ANTICOAGULATION FOLLOW-UP CLINIC VISIT    Patient Name:  Ellie Leigh  Date:  4/13/2017  Contact Type:  Face to Face, accompanied by dtr    SUBJECTIVE:     Patient Findings     Positives Change in medications (metoprolol dose increased 4/10/2017), Antibiotic use or infection (completed course of Bactrim DS yesterday)    Comments Pt reports a /81 prior to taking meds this morning, pulse in the 70's. Became very dizzy sitting at the kitchen table this morning, as well as nauseated. Pt repots this has happened in the past when metoprolol dose was increased. States she did not mention this to the MD Monday. Suggested pt go downstairs and have BP check done today vs tomorrow and report concerns to care team.  Has cardiology appt next week also.           OBJECTIVE    INR Protime   Date Value Ref Range Status   04/13/2017 1.8 (A) 0.86 - 1.14 Final       ASSESSMENT / PLAN  INR assessment THER    Recheck INR In: 4 WEEKS    INR Location Clinic      Anticoagulation Summary as of 4/13/2017     INR goal 1.5-2.0   Today's INR 1.8   Maintenance plan 1 mg (1 mg x 1) on Mon, Fri; 1.5 mg (1 mg x 1.5) all other days   Full instructions 1 mg on Mon, Fri; 1.5 mg all other days   Weekly total 9.5 mg   No change documented Jessica Johnson, RN   Plan last modified Katia Landrum RN (2/2/2017)   Next INR check 5/11/2017   Priority INR   Target end date Indefinite    Indications   Atrial fibrillation with rapid ventricular response (H) (Resolved) [I48.91]  DVT (deep venous thrombosis) [I82.409]  Long term current use of anticoagulant therapy [Z79.01]         Anticoagulation Episode Summary     INR check location     Preferred lab     Send INR reminders to [facility] CLINIC POOL    Comments * Actual INR goal is 1.7-2.3  please follow Dec 2015 INR referral (June 2016 goal range is an error)      Anticoagulation Care Providers     Provider Role Specialty Phone number    Josefina Gómez MD Responsible Family Practice  [phone]            See the Encounter Report to view Anticoagulation Flowsheet and Dosing Calendar (Go to Encounters tab in chart review, and find the Anticoagulation Therapy Visit)    Jessica Johnson RN

## 2017-04-13 NOTE — MR AVS SNAPSHOT
Ellie CARVER Lu   4/13/2017 10:45 AM   Anticoagulation Therapy Visit    Description:  86 year old female   Provider:  [place] ANTI COAG   Department:  [place] Anticoag           INR as of 4/13/2017     Today's INR 1.8      Anticoagulation Summary as of 4/13/2017     INR goal 1.5-2.0   Today's INR 1.8   Full instructions 1 mg on Mon, Fri; 1.5 mg all other days   Next INR check 5/11/2017    Indications   Atrial fibrillation with rapid ventricular response (H) (Resolved) [I48.91]  DVT (deep venous thrombosis) [I82.409]  Long term current use of anticoagulant therapy [Z79.01]         Description     Resume warfarin as usual: 1mg every Monday and Friday and 1.5mg all other days.  Recheck INR in 4 weeks.      Your next Anticoagulation Clinic appointment(s)     May 11, 2017 10:45 AM CDT   Anticoagulation Visit with [place] ANTI COAG   [facility] ([facility])    [address] [address] [phone]              Contact Numbers     Please call [phone] to cancel and/or reschedule your appointment.  Please call [phone] with any problems or questions regarding your therapy          April 2017 Details    Sun Mon Tue Wed Thu Fri Sat           1                 2               3               4               5               6               7               8                 9               10               11               12               13      1.5 mg   See details      14      1 mg         15      1.5 mg           16      1.5 mg         17      1 mg         18      1.5 mg         19      1.5 mg         20      1.5 mg         21      1 mg         22      1.5 mg           23      1.5 mg         24      1 mg         25      1.5 mg         26      1.5 mg         27      1.5 mg         28      1 mg         29      1.5 mg           30      1.5 mg                Date Details   04/13 This INR check               How to take your warfarin dose     To take:  1 mg Take 1 of the 1 mg  tablets.    To take:  1.5 mg Take 1.5 of the 1 mg tablets.           May 2017 Details    Sun Mon Tue Wed Thu Fri Sat      1      1 mg         2      1.5 mg         3      1.5 mg         4      1.5 mg         5      1 mg         6      1.5 mg           7      1.5 mg         8      1 mg         9      1.5 mg         10      1.5 mg         11            12               13                 14               15               16               17               18               19               20                 21               22               23               24               25               26               27                 28               29               30               31                   Date Details   No additional details    Date of next INR:  5/11/2017         How to take your warfarin dose     To take:  1 mg Take 1 of the 1 mg tablets.    To take:  1.5 mg Take 1.5 of the 1 mg tablets.

## 2017-04-17 ENCOUNTER — ALLIED HEALTH/NURSE VISIT (OUTPATIENT)
Dept: CARDIOLOGY | Facility: CLINIC | Age: 82
End: 2017-04-17
Payer: COMMERCIAL

## 2017-04-17 DIAGNOSIS — Z95.0 CARDIAC PACEMAKER IN SITU: Primary | ICD-10-CM

## 2017-04-17 PROCEDURE — 93296 REM INTERROG EVL PM/IDS: CPT | Performed by: INTERNAL MEDICINE

## 2017-04-17 PROCEDURE — 93294 REM INTERROG EVL PM/LDLS PM: CPT | Performed by: INTERNAL MEDICINE

## 2017-04-17 NOTE — PROGRESS NOTES
[employer] Denia RALPH (ANIYA) Remote PPM Device Check  AP: 68% : 3%  Mode: DDD-CLS        Presenting Rhythm: AP/VS  Heart Rate: adequate heart rates per histogram  Sensing: stable    Pacing Threshold: stable    Impedance: stable  Battery Status: 75% full  Atrial Arrhythmia: 3000 mode switch episodes. Longest episode lasted 2 hours. Controlled vent response while in mode switch. Taking Warfarin.   Ventricular Arrhythmia: none     Care Plan: order placed for annual threshold to be scheduled in July. No answer, left message with results on daughter Ana's voicemail. Hung SANTIAGO

## 2017-04-17 NOTE — MR AVS SNAPSHOT
After Visit Summary   4/17/2017    Ellie Leigh    MRN: [id number]           Patient Information     Date Of Birth          9/12/1930        Visit Information        Provider Department      4/17/2017 10:00 AM RUEDA TECH1 [facility] PHYSICIANS HEART AT [place]        Today's Diagnoses     Cardiac pacemaker in situ    -  1       Follow-ups after your visit        Additional Services     Follow-Up with Device Clinic                 Your next 10 appointments already scheduled     Apr 18, 2017  1:30 PM CDT   [facility] EP RETURN with Philippe Luna MD   [facility] PHYSICIAN HEART AT [facility] ([facility])    [address] [address]   [phone]            Apr 28, 2017  2:20 PM CDT   SHORT with Josefina Gómez MD   [facility] ([facility])    [address] [place] [address]   [phone]            May 11, 2017 10:45 AM CDT   Anticoagulation Visit with [place] ANTI COAG   [facility] ([facility])    [address] [address]   [phone]            Jan 03, 2018  2:00 PM CST   LAB with [facility] ([facility])    [address] [address]   [phone]           Patient must bring picture ID.  Patient should be prepared to give a urine specimen  Please do not eat 10-12 hours before your appointment if you are coming in fasting for labs on lipids, cholesterol, or glucose (sugar).  Pregnant women should follow their Care Team instructions. Water with medications is okay. Do not drink coffee or other fluids.   If you have concerns about taking  your medications, please ask at office or if scheduling via [employer], send a message by clicking on Secure Messaging, Message Your Care Team.            Jan 03, 2018  2:45 PM CST   MA SCREENING DIGITAL RIGHT with [address] [facility] ([place]  "[facility])    [address]  [facility] [address]   [phone]           Do not use any powder, lotion or deodorant under your arms or on your breast. If you do, we will ask you to remove it before your exam.  Wear comfortable, two-piece clothing.  If you have any allergies, tell your care team.  Bring any previous mammograms from other facilities or have them mailed to the breast center.            Jan 11, 2018 11:00 AM CST   Return Visit with Gayle Nieves MD   [facility] ([facility])    [facility]  [address]  [facility] [address]   [phone]              Future tests that were ordered for you today     Open Future Orders        Priority Expected Expires Ordered    Follow-Up with Device Clinic Routine 7/17/2017 5/22/2018 4/17/2017            Who to contact     If you have questions or need follow up information about today's clinic visit or your schedule please contact [facility] PHYS[facility] HE[facility] AT [place] directly at [phone].  Normal or non-critical lab and imaging results will be communicated to you by [employer], letter or phone within 4 business days after the clinic has received the results. If you do not hear from us within 7 days, please contact the clinic through [employer] or phone. If you have a critical or abnormal lab result, we will notify you by phone as soon as possible.  Submit refill requests through [employer] or call your pharmacy and they will forward the refill request to us. Please allow 3 business days for your refill to be completed.          Additional Information About Your Visit        [employer] Information     [employer] lets you send messages to your doctor, view your test results, renew your prescriptions, schedule appointments and more. To sign up, go to [email or web address] . Click on \"Log in\" on the left side of the screen, which will take you to the Welcome page. Then click on \"Sign up Now\" " on the right side of the page.     You will be asked to enter the access code listed below, as well as some personal information. Please follow the directions to create your username and password.     Your access code is: [email or web address]  Expires: 2017 10:54 AM     Your access code will  in 90 days. If you need help or a new code, please call your [place] clinic or [phone].        Care EveryWhere ID     This is your Care EveryWhere ID. This could be used by other organizations to access your [place] medical records  [id number]        Your Vitals Were     Last Period                   06/15/1985            Blood Pressure from Last 3 Encounters:   17 107/66   04/10/17 124/83   17 108/62    Weight from Last 3 Encounters:   04/10/17 53.1 kg (117 lb)   17 53.7 kg (118 lb 6.4 oz)   17 52.2 kg (115 lb)              We Performed the Following     INTERROGATION DEVICE EVAL REMOTE, PACER/ICD (05569)     PM DEVICE INTERROGATE REMOTE (18262)        Primary Care Provider Office Phone # Fax #    Josefina Gómez [phone]       [address] [address]        Thank you!     Thank you for choosing [facility]YSIC[facility]ANS HEART AT [place]  for your care. Our goal is always to provide you with excellent care. Hearing back from our patients is one way we can continue to improve our services. Please take a few minutes to complete the written survey that you may receive in the mail after your visit with us. Thank you!             Your Updated Medication List - Protect others around you: Learn how to safely use, store and throw away your medicines at www.disposemymeds.org.          This list is accurate as of: 17 10:41 AM.  Always use your most recent med list.                   Brand Name Dispense Instructions for use    albuterol 108 (90 BASE) MCG/ACT Inhaler    PROAIR HFA/PROVENTIL HFA/VENTOLIN HFA    3 Inhaler     Inhale 2 puffs into the lungs every 6 hours as needed for shortness of breath / dyspnea       budesonide-formoterol 160-4.5 MCG/ACT Inhaler    SYMBICORT    1 Inhaler    Inhale 2 puffs into the lungs 2 times daily       CRANBERRY      Take 475 mg by mouth 2 times daily.       * diclofenac 1 % Gel topical gel    VOLTAREN    100 g    Apply 4 grams to knees or 2 grams to hands four times daily using enclosed dosing card.       * diclofenac 1 % Gel topical gel    VOLTAREN    100 g    APPLY 4 GRAMS TO KNEES OR 2 GRAMS TO HANDS FOUR TIMES DAILY USING ENCLOSED DOSING CARD.       ipratropium - albuterol 0.5 mg/2.5 mg/3 mL 0.5-2.5 (3) MG/3ML neb solution    DUONEB    90 vial    Take 1 vial (3 mLs) by nebulization every 6 hours as needed for shortness of breath / dyspnea or wheezing       levothyroxine 50 MCG tablet    SYNTHROID/LEVOTHROID    90 tablet    Take 1 tablet (50 mcg) by mouth daily       metoprolol 25 MG 24 hr tablet    TOPROL-XL    180 tablet    3  tablets (75 mg) twice daily       omeprazole 20 MG CR capsule    priLOSEC    60 capsule    Take 1 capsule (20 mg) by mouth 2 times daily       polyethylene glycol powder    MIRALAX    510 g    Take 17 g by mouth daily as needed       probiotic Caps      Take 1 capsule by mouth every evening       sucralfate 1 GM/10ML suspension    CARAFATE    420 mL    Take 10 mLs (1 g) by mouth 4 times daily       warfarin 1 MG tablet    COUMADIN    105 tablet    Take 1 mg on Mon, Fri; 1.5 mg all other days  or as directed by Anticoagulation Clinic       * Notice:  This list has 2 medication(s) that are the same as other medications prescribed for you. Read the directions carefully, and ask your doctor or other care provider to review them with you.

## 2017-04-18 ENCOUNTER — OFFICE VISIT (OUTPATIENT)
Dept: CARDIOLOGY | Facility: CLINIC | Age: 82
End: 2017-04-18
Payer: COMMERCIAL

## 2017-04-18 VITALS
WEIGHT: 118.6 LBS | BODY MASS INDEX: 24.79 KG/M2 | OXYGEN SATURATION: 98 % | DIASTOLIC BLOOD PRESSURE: 70 MMHG | HEART RATE: 66 BPM | SYSTOLIC BLOOD PRESSURE: 129 MMHG

## 2017-04-18 DIAGNOSIS — Z95.0 CARDIAC PACEMAKER IN SITU: ICD-10-CM

## 2017-04-18 PROCEDURE — 99214 OFFICE O/P EST MOD 30 MIN: CPT | Performed by: INTERNAL MEDICINE

## 2017-04-18 NOTE — PROGRESS NOTES
HPI and Plan:   See dictation  [id number]  No orders of the defined types were placed in this encounter.      No orders of the defined types were placed in this encounter.      Medications Discontinued During This Encounter   Medication Reason     diclofenac (VOLTAREN) 1 % GEL topical gel Medication Reconciliation Clean Up         Encounter Diagnosis   Name Primary?     Cardiac pacemaker in situ        CURRENT MEDICATIONS:  Current Outpatient Prescriptions   Medication Sig Dispense Refill     metoprolol (TOPROL-XL) 25 MG 24 hr tablet 3  tablets (75 mg) twice daily 180 tablet 1     diclofenac (VOLTAREN) 1 % GEL topical gel APPLY 4 GRAMS TO KNEES OR 2 GRAMS TO HANDS FOUR TIMES DAILY USING ENCLOSED DOSING CARD. 100 g 0     warfarin (COUMADIN) 1 MG tablet Take 1 mg on Mon, Fri; 1.5 mg all other days  or as directed by Anticoagulation Clinic 105 tablet 1     sucralfate (CARAFATE) 1 GM/10ML suspension Take 10 mLs (1 g) by mouth 4 times daily 420 mL 3     albuterol (PROAIR HFA/PROVENTIL HFA/VENTOLIN HFA) 108 (90 BASE) MCG/ACT Inhaler Inhale 2 puffs into the lungs every 6 hours as needed for shortness of breath / dyspnea 3 Inhaler 1     budesonide-formoterol (SYMBICORT) 160-4.5 MCG/ACT Inhaler Inhale 2 puffs into the lungs 2 times daily 1 Inhaler 6     ipratropium - albuterol 0.5 mg/2.5 mg/3 mL (DUONEB) 0.5-2.5 (3) MG/3ML nebulization Take 1 vial (3 mLs) by nebulization every 6 hours as needed for shortness of breath / dyspnea or wheezing 90 vial 3     levothyroxine (SYNTHROID, LEVOTHROID) 50 MCG tablet Take 1 tablet (50 mcg) by mouth daily 90 tablet 3     omeprazole (PRILOSEC) 20 MG capsule Take 1 capsule (20 mg) by mouth 2 times daily 60 capsule 3     probiotic CAPS Take 1 capsule by mouth every evening        polyethylene glycol (MIRALAX) powder Take 17 g by mouth daily as needed 510 g 5     CRANBERRY Take 475 mg by mouth 2 times daily.         ALLERGIES     Allergies   Allergen Reactions     Cephalosporins Nausea     Cefzil      Doxycycline Nausea and Vomiting     Sulfa Drugs Nausea     Penicillins Rash     PCN       PAST MEDICAL HISTORY:  Past Medical History:   Diagnosis Date     Breast cancer (H)      COPD (chronic obstructive pulmonary disease) (H)     ct 2014 does show some bronchiectaisi RUL as well as fibronodular disease bilat upper lobes     Dust allergy      DVT (deep vein thrombosis) in pregnancy (H)     after neck fracture when in hospital     HTN (hypertension)      Hyponatremia     chronic     Hypothyroid      Intermittent atrial fibrillation (H) 12/1/2011    hx tachy-keri, has pacer, on chronic coumadin     Loose body in joint 4/15/2011     Neck fracture (H)     after a fall     Syncope 5/12/2013    multiple hospital visits, lates 3/2016, 6/2016, 7/2016 with no clear cause found       PAST SURGICAL HISTORY:  Past Surgical History:   Procedure Laterality Date     APPENDECTOMY       ARTHROSCOPY KNEE  4/15/2011    Procedure:ARTHROSCOPY KNEE; removal of loose body; Surgeon:LEY, JEFFREY DUANE; Location:[facility]     CHOLECYSTECTOMY       ESOPHAGOSCOPY, GASTROSCOPY, DUODENOSCOPY (EGD), COMBINED  6/9/2014    Procedure: COMBINED ESOPHAGOSCOPY, GASTROSCOPY, DUODENOSCOPY (EGD);  Surgeon: Gilberto Richard MD;  Location: [facility]     IMPLANT PACEMAKER  5/21/2013    Biotronik Moder [id number] Serial#[id number]     INJECT EPIDURAL LUMBAR  3/23/2011    INJECT EPIDURAL LUMBAR performed by GENERIC ANESTHESIA PROVIDER at [facility]     JOINT REPLACEMENT, HIP RT/LT      Joint Replacement Hip Rt     MASTECTOMY, SIMPLE RT/LT/CAITLYN      Left breast - following breast ca     OTHER SURGICAL HISTORY      C1-C2 fusion after fx      SURGICAL HISTORY OF -   05/22/2001    Colonoscopy     TONSILLECTOMY         FAMILY HISTORY:  Family History   Problem Relation Age of Onset     CEREBROVASCULAR DISEASE Mother      HEART DISEASE Mother      MI     CEREBROVASCULAR DISEASE Father      HEART DISEASE Father      MI     Respiratory Maternal Grandfather      TB     Neurologic  Disorder Brother      ALS     HEART DISEASE Brother      CEREBROVASCULAR DISEASE Brother      Breast Cancer Daughter      age:49     Asthma Brother      CANCER Brother      brain and lung     CANCER Daughter      thyroid       SOCIAL HISTORY:  Social History     Social History     Marital status:      Spouse name: N/A     Number of children: N/A     Years of education: N/A     Occupational History      Retired     Social History Main Topics     Smoking status: Never Smoker     Smokeless tobacco: Never Used     Alcohol use No     Drug use: No     Sexual activity: Not Currently     Other Topics Concern     Parent/Sibling W/ Cabg, Mi Or Angioplasty Before 65f 55m? No     Social History Narrative    Lives in [facility].      Daughters helping since her accident, getting home physical therapy.      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      - 2000    5 daughters, 11 grandchildren, 3 great granchildren       Review of Systems:  Skin:  Negative       Eyes:  Positive for glasses    ENT:  Positive for hearing loss    Respiratory:  Positive for shortness of breath;dyspnea on exertion;wheezing;cough Asthma   Cardiovascular:  Negative      Gastroenterology: Positive for nausea    Genitourinary:  Negative      Musculoskeletal:  Negative      Neurologic:  Positive for headaches;numbness or tingling of hands    Psychiatric:  Negative      Heme/Lymph/Imm:  Negative      Endocrine:  Negative        Physical Exam:  Vitals: /70 (BP Location: Right arm, Patient Position: Chair, Cuff Size: Adult Regular)  Pulse 66  Wt 53.8 kg (118 lb 9.6 oz)  LMP 06/15/1985  SpO2 98%  BMI 24.79 kg/m2    Constitutional:  cooperative, alert and oriented, well developed, well nourished, in no acute distress        Skin:  warm and dry to the touch, no apparent skin lesions or masses noted        Head:  normocephalic, no masses or lesions        Eyes:  pupils equal and round, conjunctivae and lids  unremarkable, sclera white, no xanthalasma, EOMS intact, no nystagmus        ENT:  no pallor or cyanosis, dentition good        Neck:  carotid pulses are full and equal bilaterally, JVP normal, no carotid bruit, no thyromegaly        Chest:  normal breath sounds, clear to auscultation, normal A-P diameter, normal symmetry, normal respiratory excursion, no use of accessory muscles          Cardiac: regular rhythm, normal S1/S2, no S3 or S4, apical impulse not displaced, no murmurs, gallops or rubs                  Abdomen:  abdomen soft, non-tender, BS normoactive, no mass, no HSM, no bruits        Vascular: pulses full and equal, no bruits auscultated                                        Extremities and Back:  no deformities, clubbing, cyanosis, erythema observed              Neurological:  affect appropriate, oriented to time, person and place              CC  No referring provider defined for this encounter.

## 2017-04-18 NOTE — MR AVS SNAPSHOT
After Visit Summary   4/18/2017    Ellie Leigh    MRN: [id number]           Patient Information     Date Of Birth          9/12/1930        Visit Information        Provider Department      4/18/2017 1:30 PM Philippe Orozco MD [facility] PHYSICIAN HEART AT [facility]        Today's Diagnoses     Cardiac pacemaker in situ           Follow-ups after your visit        Your next 10 appointments already scheduled     Apr 28, 2017  2:20 PM CDT   SHORT with Josefina Gómez MD   [facility] ([facility])    [address] [place] [address]   [phone]            May 11, 2017 10:45 AM CDT   Anticoagulation Visit with [facility]   [facility] ([facility])    [address] [address]   [phone]            Jan 03, 2018  2:00 PM CST   LAB with [facility] ([facility])    [address] [address]   [phone]           Patient must bring picture ID.  Patient should be prepared to give a urine specimen  Please do not eat 10-12 hours before your appointment if you are coming in fasting for labs on lipids, cholesterol, or glucose (sugar).  Pregnant women should follow their Care Team instructions. Water with medications is okay. Do not drink coffee or other fluids.   If you have concerns about taking  your medications, please ask at office or if scheduling via [employer], send a message by clicking on Secure Messaging, Message Your Care Team.            Jan 03, 2018  2:45 PM CST   MA SCREENING DIGITAL RIGHT with [address] [facility] ([facility])    [address] [address]   [phone]           Do not use any powder, lotion or deodorant under your arms or on your breast. If you do, we will ask you to remove it before your exam.  Wear comfortable, two-piece clothing.  If you have  "any allergies, tell your care team.  Bring any previous mammograms from other facilities or have them mailed to the breast center.            Jan 11, 2018 11:00 AM CST   Return Visit with Gayle Nieves MD   [facility] ([facility])    [facility]  [address] [facility] [address]  [facility] [address]   [phone]              Future tests that were ordered for you today     Open Future Orders        Priority Expected Expires Ordered    Follow-Up with Device Clinic Routine 7/17/2017 5/22/2018 4/17/2017            Who to contact     If you have questions or need follow up information about today's clinic visit or your schedule please contact [facility] PHYSICIAN HEART AT [facility] directly at [phone].  Normal or non-critical lab and imaging results will be communicated to you by MyChart, letter or phone within 4 business days after the clinic has received the results. If you do not hear from us within 7 days, please contact the clinic through MyChart or phone. If you have a critical or abnormal lab result, we will notify you by phone as soon as possible.  Submit refill requests through [employer] or call your pharmacy and they will forward the refill request to us. Please allow 3 business days for your refill to be completed.          Additional Information About Your Visit        [employer] Information     [employer] lets you send messages to your doctor, view your test results, renew your prescriptions, schedule appointments and more. To sign up, go to [email or web address] . Click on \"Log in\" on the left side of the screen, which will take you to the Welcome page. Then click on \"Sign up Now\" on the right side of the page.     You will be asked to enter the access code listed below, as well as some personal information. Please follow the directions to create your username and password.     Your access code is: [email or web address]  Expires: 5/22/2017 10:54 AM     Your " access code will  in 90 days. If you need help or a new code, please call your [facility] clinic or [phone].        Care EveryWhere ID     This is your Care EveryWhere ID. This could be used by other organizations to access your [facility] medical records  [id number]        Your Vitals Were     Pulse Last Period Pulse Oximetry BMI (Body Mass Index)          66 06/15/1985 98% 24.79 kg/m2         Blood Pressure from Last 3 Encounters:   17 129/70   17 107/66   04/10/17 124/83    Weight from Last 3 Encounters:   17 53.8 kg (118 lb 9.6 oz)   04/10/17 53.1 kg (117 lb)   17 53.7 kg (118 lb 6.4 oz)              We Performed the Following     Follow-Up with Electrophysiologist        Primary Care Provider Office Phone # Fax #    Josefina Gómez [phone]       [address] [address]        Thank you!     Thank you for choosing [facility] PHYSICIAN HEART AT [facility]  for your care. Our goal is always to provide you with excellent care. Hearing back from our patients is one way we can continue to improve our services. Please take a few minutes to complete the written survey that you may receive in the mail after your visit with us. Thank you!             Your Updated Medication List - Protect others around you: Learn how to safely use, store and throw away your medicines at www.disposemymeds.org.          This list is accurate as of: 17  2:00 PM.  Always use your most recent med list.                   Brand Name Dispense Instructions for use    albuterol 108 (90 BASE) MCG/ACT Inhaler    PROAIR HFA/PROVENTIL HFA/VENTOLIN HFA    3 Inhaler    Inhale 2 puffs into the lungs every 6 hours as needed for shortness of breath / dyspnea       budesonide-formoterol 160-4.5 MCG/ACT Inhaler    SYMBICORT    1 Inhaler    Inhale 2 puffs into the lungs 2 times daily       CRANBERRY      Take 475 mg by mouth 2 times daily.        diclofenac 1 % Gel topical gel    VOLTAREN    100 g    APPLY 4 GRAMS TO KNEES OR 2 GRAMS TO HANDS FOUR TIMES DAILY USING ENCLOSED DOSING CARD.       ipratropium - albuterol 0.5 mg/2.5 mg/3 mL 0.5-2.5 (3) MG/3ML neb solution    DUONEB    90 vial    Take 1 vial (3 mLs) by nebulization every 6 hours as needed for shortness of breath / dyspnea or wheezing       levothyroxine 50 MCG tablet    SYNTHROID/LEVOTHROID    90 tablet    Take 1 tablet (50 mcg) by mouth daily       metoprolol 25 MG 24 hr tablet    TOPROL-XL    180 tablet    3  tablets (75 mg) twice daily       omeprazole 20 MG CR capsule    priLOSEC    60 capsule    Take 1 capsule (20 mg) by mouth 2 times daily       polyethylene glycol powder    MIRALAX    510 g    Take 17 g by mouth daily as needed       probiotic Caps      Take 1 capsule by mouth every evening       sucralfate 1 GM/10ML suspension    CARAFATE    420 mL    Take 10 mLs (1 g) by mouth 4 times daily       warfarin 1 MG tablet    COUMADIN    105 tablet    Take 1 mg on Mon, Fri; 1.5 mg all other days  or as directed by Anticoagulation Clinic

## 2017-04-18 NOTE — LETTER
4/18/2017    Josefina Gómez MD  [facility]   [address] [place] [address]      RE: Ellie Leigh       Dear Colleague,    Thank you for allowing me to participate in the care of your delightful patient.  As you know, Ellie is an 86-year-old female with a history of tachybrady syndrome which may be the reason why she had a syncopal episode in the past, and therefore I recommend a pacemaker with a Biotronik device 4 years ago.  Since then she has not had any eder syncopal episodes asides from near-syncope mostly due to hypotension.  Unfortunately, she has been having nausea for the past couple of years, along with fatigue and shortness of breath.  I understand you have had her undergo an extensive evaluation for nausea but nothing revealing at this point in time.  You had her subsequently wear 1 week's worth of Zio Patch monitor, looking for evidence of atrial tachyarrhythmias as well.  In light of that, you asked her to see me for further evaluation.      I did review the results of the Zio Patch monitor.  On those 7 days, she did not have any evidence of atrial fibrillation.  There was some nonsustained atrial tachyarrhythmias for which the patient was asymptomatic.  All of the episodes of nausea were related to normal sinus rhythm.  I understand you have reduced the dose of her metoprolol as well.  Initially she was on 75 mg twice a day, but you reduced it to 50 mg twice a day.  Certainly, as one ages they do not metabolize the drug as well, and it could be that the Toprol is too much at this point in time.  I would encourage her to taper off the metoprolol and reassess, and if she does have atrial fibrillation with rapid ventricular response and yet she feels much better off the beta blocker, I would consider an AV node ablation.      Regarding her nausea, given the fact that extensive evaluation was done in the past and no unifying diagnosis has been made, one should consider a  medicinal use of marijuana in this case, as it has been done in the past on patients on chemotherapy.  I have referred the patient to you for discussion of these issues, as the patient is quite symptomatic from her chronic nausea.  I told Ellie and her daughter that I am not too worried about the atrial fibrillation with rapid ventricular response, should she have it when the metoprolol is weaning off.  If that is the case and the patient feels much better, as I mentioned, we have enough evidence of suggest that an AV node ablation will be helpful for her without resorting back to a different medication to control her atrial fibrillation.     Again, thank you for allowing me to participate in the care of your patient.      Sincerely,    Philippe Luna MD     [facility]

## 2017-04-19 NOTE — PROGRESS NOTES
HISTORY OF PRESENT ILLNESS:  Thank you for allowing me to participate in the care of your delightful patient.  As you know, Ellie is an 86-year-old female with a history of tachybrady syndrome which may be the reason why she had a syncopal episode in the past, and therefore I recommend a pacemaker with a Biotronik device 4 years ago.  Since then she has not had any eder syncopal episodes asides from near-syncope mostly due to hypotension.  Unfortunately, she has been having nausea for the past couple of years, along with fatigue and shortness of breath.  I understand you have had her undergo an extensive evaluation for nausea but nothing revealing at this point in time.  You had her subsequently wear 1 week's worth of Zio Patch monitor, looking for evidence of atrial tachyarrhythmias as well.  In light of that, you asked her to see me for further evaluation.      I did review the results of the Zio Patch monitor.  On those 7 days, she did not have any evidence of atrial fibrillation.  There was some nonsustained atrial tachyarrhythmias for which the patient was asymptomatic.  All of the episodes of nausea were related to normal sinus rhythm.  I understand you have reduced the dose of her metoprolol as well.  Initially she was on 75 mg twice a day, but you reduced it to 50 mg twice a day.  Certainly, as one ages they do not metabolize the drug as well, and it could be that the Toprol is too much at this point in time.  I would encourage her to taper off the metoprolol and reassess, and if she does have atrial fibrillation with rapid ventricular response and yet she feels much better off the beta blocker, I would consider an AV node ablation.      Regarding her nausea, given the fact that extensive evaluation was done in the past and no unifying diagnosis has been made, one should consider a medicinal use of marijuana in this case, as it has been done in the past on patients on chemotherapy.  I have referred the  patient to you for discussion of these issues, as the patient is quite symptomatic from her chronic nausea.  I told Ellie and her daughter that I am not too worried about the atrial fibrillation with rapid ventricular response, should she have it when the metoprolol is weaning off.  If that is the case and the patient feels much better, as I mentioned, we have enough evidence of suggest that an AV node ablation will be helpful for her without resorting back to a different medication to control her atrial fibrillation.      cc:   Josefina Gmó[place] [address]         JATINDER GARDNER MD             D: 2017 13:59   T: 2017 05:01   MT: ADAL      Name:     ELLIE JOSHI   MRN:      [id number]        Account:      [id number]   :      1930           Service Date: 2017      Document: [id number]

## 2017-04-24 DIAGNOSIS — J44.9 CHRONIC OBSTRUCTIVE PULMONARY DISEASE, UNSPECIFIED COPD TYPE (H): ICD-10-CM

## 2017-04-24 DIAGNOSIS — J45.30 MILD PERSISTENT ASTHMA WITHOUT COMPLICATION: ICD-10-CM

## 2017-04-24 RX ORDER — BUDESONIDE AND FORMOTEROL FUMARATE DIHYDRATE 160; 4.5 UG/1; UG/1
AEROSOL RESPIRATORY (INHALATION)
Qty: 10.2 G | Refills: 5 | Status: SHIPPED | OUTPATIENT
Start: 2017-04-24 | End: 2018-05-30

## 2017-04-24 RX ORDER — IPRATROPIUM BROMIDE AND ALBUTEROL SULFATE 2.5; .5 MG/3ML; MG/3ML
SOLUTION RESPIRATORY (INHALATION)
Qty: 180 ML | Refills: 10 | Status: SHIPPED | OUTPATIENT
Start: 2017-04-24 | End: 2018-01-17

## 2017-04-24 NOTE — TELEPHONE ENCOUNTER
ipratropium - albuterol 0.5 mg/2.5 mg/3 mL (DUONEB) 0.5-2.5 (3) MG/3ML nebulization       Last Written Prescription Date: 9/14/17  Last Fill Quantity: 90, # refills: 3    Last Office Visit with G, P or [facility] prescribing provider:  4/18/17   Future Office Visit:    Next 5 appointments (look out 90 days)     Apr 28, 2017  2:20 PM CDT   SHORT with Josefina G[facility]mez MD   [facility]    [address] [place] [address]   [phone]                   Date of Last Asthma Action Plan Letter:   Asthma Action Plan Q1 Year    Topic Date Due     Asthma Action Plan - yearly  03/25/2017      Asthma Control Test:   ACT Total Scores 1/13/2017   ACT TOTAL SCORE -   ASTHMA ER VISITS -   ASTHMA HOSPITALIZATIONS -   ACT TOTAL SCORE (Goal Greater than or Equal to 20) 20   In the past 12 months, how many times did you visit the emergency room for your asthma without being admitted to the hospital? 0   In the past 12 months, how many times were you hospitalized overnight because of your asthma? 0       Date of Last Spirometry Test:   No results found. However, due to the size of the patient record, not all encounters were searched. Please check Results Review for a complete set of results.

## 2017-04-24 NOTE — TELEPHONE ENCOUNTER
Symbicort       Last Written Prescription Date: 1/13/17  Last Fill Quantity: 1, # refills: 6    Last Office Visit with [facility], P or [facility] prescribing provider:  4/10/17   Future Office Visit:    Next 5 appointments (look out 90 days)     Apr 28, 2017  2:20 PM CDT   SHORT with Josefina Gómez MD   [facility]    [address] [place] [address] [phone]                   Date of Last Asthma Action Plan Letter:   Asthma Action Plan Q1 Year    Topic Date Due     Asthma Action Plan - yearly  03/25/2017      Asthma Control Test:   ACT Total Scores 1/13/2017   ACT TOTAL SCORE -   ASTHMA ER VISITS -   ASTHMA HOSPITALIZATIONS -   ACT TOTAL SCORE (Goal Greater than or Equal to 20) 20   In the past 12 months, how many times did you visit the emergency room for your asthma without being admitted to the hospital? 0   In the past 12 months, how many times were you hospitalized overnight because of your asthma? 0       Date of Last Spirometry Test:   No results found. However, due to the size of the patient record, not all encounters were searched. Please check Results Review for a complete set of results.

## 2017-04-24 NOTE — TELEPHONE ENCOUNTER
budesonide-formoterol (SYMBICORT) 160-4.5 MCG/ACT Inhaler       Last Written Prescription Date: 1/13/178  Last Fill Quantity: 1, # refills: 6    Last Office Visit with G, P or [facility] prescribing provider:  4/18/17   Future Office Visit:    Next 5 appointments (look out 90 days)     Apr 28, 2017  2:20 PM CDT   SHORT with Josefina Gómez MD   [facility]    [address] [place] [address]   [phone]                   Date of Last Asthma Action Plan Letter:   Asthma Action Plan Q1 Year    Topic Date Due     Asthma Action Plan - yearly  03/25/2017      Asthma Control Test:   ACT Total Scores 1/13/2017   ACT TOTAL SCORE -   ASTHMA ER VISITS -   ASTHMA HOSPITALIZATIONS -   ACT TOTAL SCORE (Goal Greater than or Equal to 20) 20   In the past 12 months, how many times did you visit the emergency room for your asthma without being admitted to the hospital? 0   In the past 12 months, how many times were you hospitalized overnight because of your asthma? 0       Date of Last Spirometry Test:   No results found. However, due to the size of the patient record, not all encounters were searched. Please check Results Review for a complete set of results.

## 2017-04-28 ENCOUNTER — ANTICOAGULATION THERAPY VISIT (OUTPATIENT)
Dept: ANTICOAGULATION | Facility: CLINIC | Age: 82
End: 2017-04-28
Payer: COMMERCIAL

## 2017-04-28 ENCOUNTER — OFFICE VISIT (OUTPATIENT)
Dept: FAMILY MEDICINE | Facility: CLINIC | Age: 82
End: 2017-04-28
Payer: COMMERCIAL

## 2017-04-28 VITALS
WEIGHT: 118.2 LBS | BODY MASS INDEX: 24.7 KG/M2 | SYSTOLIC BLOOD PRESSURE: 122 MMHG | HEART RATE: 68 BPM | TEMPERATURE: 97.8 F | DIASTOLIC BLOOD PRESSURE: 66 MMHG | OXYGEN SATURATION: 96 %

## 2017-04-28 DIAGNOSIS — R11.0 NAUSEA: Primary | ICD-10-CM

## 2017-04-28 DIAGNOSIS — I10 ESSENTIAL HYPERTENSION, BENIGN: ICD-10-CM

## 2017-04-28 DIAGNOSIS — Z79.01 LONG TERM CURRENT USE OF ANTICOAGULANT THERAPY: ICD-10-CM

## 2017-04-28 PROCEDURE — 99207 ZZC NO CHARGE NURSE ONLY: CPT | Performed by: REGISTERED NURSE

## 2017-04-28 PROCEDURE — 99213 OFFICE O/P EST LOW 20 MIN: CPT | Performed by: FAMILY MEDICINE

## 2017-04-28 RX ORDER — METOPROLOL SUCCINATE 25 MG/1
TABLET, EXTENDED RELEASE ORAL
Qty: 180 TABLET | Refills: 1 | COMMUNITY
Start: 2017-04-28 | End: 2017-04-28

## 2017-04-28 RX ORDER — METOPROLOL SUCCINATE 25 MG/1
TABLET, EXTENDED RELEASE ORAL
Qty: 180 TABLET | Refills: 1 | COMMUNITY
Start: 2017-04-28 | End: 2017-07-07

## 2017-04-28 NOTE — PATIENT INSTRUCTIONS
Stop coumadin for one month     See me back in one month     Cut dose of omeprazole down to 1 daily and if no heartburn stop it

## 2017-04-28 NOTE — PROGRESS NOTES
ANTICOAGULATION FOLLOW-UP CLINIC VISIT    Patient Name:  Ellie Leigh  Date:  4/28/2017  Contact Type:  Telephone/ pt called to update and cancel [facility] appt scheduled for 5/11/17    SUBJECTIVE:     Patient Findings     Positives Change in medications (4/18/17, Dr. Orozco: 'I would encourage her to taper off the metoprolol and reassess,'), OTC meds (4/28/17: 'Cut dose of omeprazole down to 1 daily and if no heartburn stop it ')    Comments Seen by Dr. Orozco 4/18/17.  Seen by Dr. Gómez today.            OBJECTIVE    INR Protime   Date Value Ref Range Status   04/13/2017 1.8 (A) 0.86 - 1.14 Final       ASSESSMENT / PLAN  No question data found.  Anticoagulation Summary as of 4/28/2017     INR goal 1.5-2.0   Today's INR No new INR was available at the time of this encounter.   Maintenance plan 1 mg (1 mg x 1) on Mon, Fri; 1.5 mg (1 mg x 1.5) all other days   Full instructions 4/28: Hold; 4/29: Hold; 4/30: Hold; 5/1: Hold; 5/2: Hold; 5/3: Hold; 5/4: Hold; 5/5: Hold; 5/6: Hold; 5/7: Hold; 5/8: Hold; 5/9: Hold; 5/10: Hold; 5/11: Hold; 5/12: Hold; 5/13: Hold; 5/14: Hold; 5/15: Hold; 5/16: Hold; 5/17: Hold; 5/18: Hold; 5/19: Hold; 5/20: Hold; 5/21: Hold; 5/22: Hold; 5/23: Hold; 5/24: Hold; 5/25: Hold; Otherwise 1 mg on Mon, Fri; 1.5 mg all other days   Weekly total 9.5 mg   Plan last modified Katia Landrum RN (2/2/2017)   Next INR check 5/26/2017   Priority INR   Target end date Indefinite    Indications   Atrial fibrillation with rapid ventricular response (H) (Resolved) [I48.91]  DVT (deep venous thrombosis) [I82.409]  Long term current use of anticoagulant therapy [Z79.01]         Anticoagulation Episode Summary     INR check location     Preferred lab     Send INR reminders to [facility] POOL    Comments * Actual INR goal is 1.7-2.3  please follow Dec 2015 INR referral (June 2016 goal range is an error)      Anticoagulation Care Providers     Provider Role Specialty Phone number    Josefina Gómez,  MD [facility] [phone]            See the Encounter Report to view Anticoagulation Flowsheet and Dosing Calendar (Go to Encounters tab in chart review, and find the Anticoagulation Therapy Visit)    Jessica Johnson RN

## 2017-04-28 NOTE — MR AVS SNAPSHOT
Ellie Leigh   4/28/2017   Anticoagulation Therapy Visit    Description:  86 year old female   Provider:  Jessica Johnson, RN   Department:  [facility]           INR as of 4/28/2017     Today's INR No new INR was available at the time of this encounter.      Anticoagulation Summary as of 4/28/2017     INR goal 1.5-2.0   Today's INR No new INR was available at the time of this encounter.   Full instructions 4/28: Hold; 4/29: Hold; 4/30: Hold; 5/1: Hold; 5/2: Hold; 5/3: Hold; 5/4: Hold; 5/5: Hold; 5/6: Hold; 5/7: Hold; 5/8: Hold; 5/9: Hold; 5/10: Hold; 5/11: Hold; 5/12: Hold; 5/13: Hold; 5/14: Hold; 5/15: Hold; 5/16: Hold; 5/17: Hold; 5/18: Hold; 5/19: Hold; 5/20: Hold; 5/21: Hold; 5/22: Hold; 5/23: Hold; 5/24: Hold; 5/25: Hold; Otherwise 1 mg on Mon, Fri; 1.5 mg all other days   Next INR check 5/26/2017    Indications   Atrial fibrillation with rapid ventricular response (H) (Resolved) [I48.91]  DVT (deep venous thrombosis) [I82.409]  Long term current use of anticoagulant therapy [Z79.01]         Description     4/28/2017: 'Stay off warfarin for one month' - per Dr Gómez      April 2017 Details    Sun Mon Tue Wed Thu Fri Sat           1                 2               3               4               5               6               7               8                 9               10               11               12               13               14               15                 16               17               18               19               20               21               22                 23               24               25               26               27               28      Hold   See details      29      Hold           30      Hold                Date Details   04/28 This INR check               How to take your warfarin dose     Hold Do not take your warfarin dose. See the Details table to the right for additional instructions.                May 2017 Details    Sun Mon Tue Wed Thu Fri  Sat      1      Hold         2      Hold         3      Hold         4      Hold         5      Hold         6      Hold           7      Hold         8      Hold         9      Hold         10      Hold         11      Hold         12      Hold         13      Hold           14      Hold         15      Hold         16      Hold         17      Hold         18      Hold         19      Hold         20      Hold           21      Hold         22      Hold         23      Hold         24      Hold         25      Hold         26            27                 28               29               30               31                   Date Details   No additional details    Date of next INR:  5/26/2017         How to take your warfarin dose     To take:  1 mg Take 1 of the 1 mg tablets.    Hold Do not take your warfarin dose. See the Details table to the right for additional instructions.

## 2017-04-28 NOTE — NURSING NOTE
"Chief Complaint   Patient presents with     Nausea       Initial /66 (BP Location: Right arm, Patient Position: Chair, Cuff Size: Adult Regular)  Pulse 68  Temp 97.8  F (36.6  C) (Tympanic)  Wt 118 lb 3.2 oz (53.6 kg)  LMP 06/15/1985  SpO2 96%  BMI 24.7 kg/m2 Estimated body mass index is 24.7 kg/(m^2) as calculated from the following:    Height as of 4/10/17: 4' 10\" (1.473 m).    Weight as of this encounter: 118 lb 3.2 oz (53.6 kg).  Medication Reconciliation: complete        "

## 2017-04-28 NOTE — MR AVS SNAPSHOT
After Visit Summary   4/28/2017    Ellie Leigh    MRN: [id number]           Patient Information     Date Of Birth          9/12/1930        Visit Information        Provider Department      4/28/2017 2:20 PM Josefina Gómez MD [facility]        Today's Diagnoses     Essential hypertension, benign          Care Instructions    Stop coumadin for one month     See me back in one month     Cut dose of omeprazole down to 1 daily and if no heartburn stop it             Follow-ups after your visit        Your next 10 appointments already scheduled     May 11, 2017 10:45 AM CDT   [place] Visit with [place] [facility]   [facility] ([facility])    [address] [address]   [phone]            Jul 11, 2017 11:20 AM CDT   Pacemaker Check with [place] [facility]   [facility] ([facility])    [address] [facility] [address]   [phone]            Jan 03, 2018  2:00 PM CST   LAB with [place] [facility]   [facility] ([facility])    [address] [address]   [phone]           Patient must bring picture ID.  Patient should be prepared to give a urine specimen  Please do not eat 10-12 hours before your appointment if you are coming in fasting for labs on lipids, cholesterol, or glucose (sugar).  Pregnant women should follow their Care Team instructions. Water with medications is okay. Do not drink coffee or other fluids.   If you have concerns about taking  your medications, please ask at office or if scheduling via [employer], send a message by clicking on Secure Messaging, Message Your Care Team.            Jan 03, 2018  2:45 PM CST   MA SCREENING DIGITAL RIGHT with [place]   [facility] ([facility])    [address] [address]   [phone]           Do not use any powder, lotion or deodorant  "under your arms or on your breast. If you do, we will ask you to remove it before your exam.  Wear comfortable, two-piece clothing.  If you have any allergies, tell your care team.  Bring any previous mammograms from other facilities or have them mailed to the breast center.            2018 11:00 AM CST   Return Visit with Gayle Nieves MD   [facility] ([facility])    [facility]  [address]  [facility] [address] [phone]              Who to contact     If you have questions or need follow up information about today's clinic visit or your schedule please contact [facility] directly at [phone].  Normal or non-critical lab and imaging results will be communicated to you by MyChart, letter or phone within 4 business days after the clinic has received the results. If you do not hear from us within 7 days, please contact the clinic through MyChart or phone. If you have a critical or abnormal lab result, we will notify you by phone as soon as possible.  Submit refill requests through [employer] or call your pharmacy and they will forward the refill request to us. Please allow 3 business days for your refill to be completed.          Additional Information About Your Visit        [employer] Information     [employer] lets you send messages to your doctor, view your test results, renew your prescriptions, schedule appointments and more. To sign up, go to [email or web address] . Click on \"Log in\" on the left side of the screen, which will take you to the Welcome page. Then click on \"Sign up Now\" on the right side of the page.     You will be asked to enter the access code listed below, as well as some personal information. Please follow the directions to create your username and password.     Your access code is: [email or web address]  Expires: 2017 10:54 AM     Your access code will  in 90 days. If you need help or a new code, please " call your [place] clinic or [phone].        Care EveryWhere ID     This is your Care EveryWhere ID. This could be used by other organizations to access your [place] medical records  [id number]        Your Vitals Were     Pulse Temperature Last Period Pulse Oximetry BMI (Body Mass Index)       68 97.8  F (36.6  C) (Tympanic) 06/15/1985 96% 24.7 kg/m2        Blood Pressure from Last 3 Encounters:   04/28/17 122/66   04/18/17 129/70   04/13/17 107/66    Weight from Last 3 Encounters:   04/28/17 118 lb 3.2 oz (53.6 kg)   04/18/17 118 lb 9.6 oz (53.8 kg)   04/10/17 117 lb (53.1 kg)              We Performed the Following     DEPRESSION ACTION PLAN (DAP)          Today's Medication Changes          These changes are accurate as of: 4/28/17  2:57 PM.  If you have any questions, ask your nurse or doctor.               Start taking these medicines.        Dose/Directions    metoprolol 25 MG 24 hr tablet   Commonly known as:  TOPROL-XL   Used for:  Essential hypertension, benign   Started by:  Josefina Gómez MD        2  tablets twice daily   Quantity:  180 tablet   Refills:  1                Primary Care Provider Office Phone # Fax #    Josefina Gómez [phone]       [facility] [address] [place] [address]        Thank you!     Thank you for choosing [facility]  for your care. Our goal is always to provide you with excellent care. Hearing back from our patients is one way we can continue to improve our services. Please take a few minutes to complete the written survey that you may receive in the mail after your visit with us. Thank you!             Your Updated Medication List - Protect others around you: Learn how to safely use, store and throw away your medicines at www.disposemymeds.org.          This list is accurate as of: 4/28/17  2:57 PM.  Always use your most recent med list.                   Brand Name Dispense Instructions for use     albuterol 108 (90 BASE) MCG/ACT Inhaler    PROAIR HFA/PROVENTIL HFA/VENTOLIN HFA    3 Inhaler    Inhale 2 puffs into the lungs every 6 hours as needed for shortness of breath / dyspnea       CRANBERRY      Take 475 mg by mouth 2 times daily.       diclofenac 1 % Gel topical gel    VOLTAREN    100 g    APPLY 4 GRAMS TO KNEES OR 2 GRAMS TO HANDS FOUR TIMES DAILY USING ENCLOSED DOSING CARD.       ipratropium - albuterol 0.5 mg/2.5 mg/3 mL 0.5-2.5 (3) MG/3ML neb solution    DUONEB    180 mL    TAKE 1 VIAL (3 MLS) BY NEBULIZATION EVERY 6 HOURS AS NEEDED FOR SHORTNESS OF BREATH / DYSPNEA OR WHEEZING       levothyroxine 50 MCG tablet    SYNTHROID/LEVOTHROID    90 tablet    Take 1 tablet (50 mcg) by mouth daily       metoprolol 25 MG 24 hr tablet    TOPROL-XL    180 tablet    2  tablets twice daily       omeprazole 20 MG CR capsule    priLOSEC    60 capsule    Take 1 capsule (20 mg) by mouth 2 times daily       polyethylene glycol powder    MIRALAX    510 g    Take 17 g by mouth daily as needed       probiotic Caps      Take 1 capsule by mouth every evening       SYMBICORT 160-4.5 MCG/ACT Inhaler   Generic drug:  budesonide-formoterol     10.2 g    INHALE 2 PUFFS INTO THE LUNGS 2 TIMES DAILY       warfarin 1 MG tablet    COUMADIN    105 tablet    Take 1 mg on Mon, Fri; 1.5 mg all other days  or as directed by Anticoagulation Clinic

## 2017-04-28 NOTE — PROGRESS NOTES
SUBJECTIVE:                                                    Ellie Leigh is a 86 year old female who presents to clinic today for the following health issues:      Nausea      Duration: years    Description (location/character/radiation): none    Intensity:  moderate    Accompanying signs and symptoms: none    History (similar episodes/previous evaluation): yes    Precipitating or alleviating factors: None    Therapies tried and outcome: multiple testing and medications and nothing has been found    She states there has been really no change at all in her sxs   She is intermittently nauseous   No weight loss   Not associated with a fib at all            Hypertension Follow-up      Outpatient blood pressures are not being checked.    Low Salt Diet: no added salt  Patient presents for evaluation of hypertension.  She indicates that she is feeling well and denies any symptoms referable to her elevated blood pressure. Specifically denies chest pain, palpitations, dyspnea, orthopnea, PND or peripheral edema. Current medication regimen is as listed below. Patient denies any side effects of medication.           Problem list and histories reviewed & adjusted, as indicated.  Additional history: as documented    Labs reviewed in EPIC    Reviewed and updated as needed this visit by clinical staff  Tobacco  Allergies  Meds  Problems       Reviewed and updated as needed this visit by Provider  Allergies  Meds  Problems         ROS:  Constitutional, HEENT, cardiovascular, pulmonary, gi and gu systems are negative, except as otherwise noted.    OBJECTIVE:                                                    /66 (BP Location: Right arm, Patient Position: Chair, Cuff Size: Adult Regular)  Pulse 68  Temp 97.8  F (36.6  C) (Tympanic)  Wt 118 lb 3.2 oz (53.6 kg)  LMP 06/15/1985  SpO2 96%  BMI 24.7 kg/m2  Body mass index is 24.7 kg/(m^2).  GENERAL APPEARANCE: healthy, alert and no distress  RESP: lungs clear  to auscultation - no rales, rhonchi or wheezes  CV: regular rates and rhythm, normal S1 S2, no S3 or S4 and no murmur, click or rub  ABDOMEN: soft, nontender, without hepatosplenomegaly or masses and bowel sounds normal  PSYCH: mentation appears normal and affect normal/bright         ASSESSMENT/PLAN:                                                    1. Nausea  Unknown etiol     2. Essential hypertension, benign  Stable no change in treatment plan.   - metoprolol (TOPROL-XL) 25 MG 24 hr tablet; 2  tablets twice daily  Dispense: 180 tablet; Refill: 1    25 minutes spent with this patient greater than 50% in face to face counseling regarding the above. Long discussion regarding the etiol of nausea and we may not ever find it. She would like to try one more thing and feels perhaps it is the coumadin. Would like to try off for one month and see.     She understands the risk of doing this and we will meet back in one month and determine what next steps are   Daughter present for entire visit   Patient Instructions   Stop coumadin for one month     See me back in one month     Cut dose of omeprazole down to 1 daily and if no heartburn stop it         Risks, benefits, side effects and rationale for treatment plan fully discussed with the patient and understanding expressed.   Josefina Gómez MD  [facility]

## 2017-05-26 ENCOUNTER — OFFICE VISIT (OUTPATIENT)
Dept: FAMILY MEDICINE | Facility: CLINIC | Age: 82
End: 2017-05-26
Payer: COMMERCIAL

## 2017-05-26 VITALS
OXYGEN SATURATION: 95 % | HEART RATE: 81 BPM | HEIGHT: 58 IN | BODY MASS INDEX: 24.77 KG/M2 | RESPIRATION RATE: 16 BRPM | SYSTOLIC BLOOD PRESSURE: 129 MMHG | TEMPERATURE: 97.9 F | DIASTOLIC BLOOD PRESSURE: 71 MMHG | WEIGHT: 118 LBS

## 2017-05-26 DIAGNOSIS — R11.0 NAUSEA: Primary | ICD-10-CM

## 2017-05-26 PROCEDURE — 99213 OFFICE O/P EST LOW 20 MIN: CPT | Performed by: FAMILY MEDICINE

## 2017-05-26 NOTE — MR AVS SNAPSHOT
After Visit Summary   5/26/2017    Ellie Leigh    MRN: [id number]           Patient Information     Date Of Birth          9/12/1930        Visit Information        Provider Department      5/26/2017 4:00 PM Josefina Gómez MD [facility]        Care Instructions    Gluten free     Journal diet again     More fresh air          Follow-ups after your visit        Your next 10 appointments already scheduled     Jul 11, 2017 11:20 AM CDT   [place] Check with [place] [facility]   [facility] ([facility])    [address] [address]   [phone]            Jan 03, 2018  2:00 PM CST   LAB with [place] [facility] ([facility])    [address] [facility] [address]   [phone]           Patient must bring picture ID.  Patient should be prepared to give a urine specimen  Please do not eat 10-12 hours before your appointment if you are coming in fasting for labs on lipids, cholesterol, or glucose (sugar).  Pregnant women should follow their Care Team instructions. Water with medications is okay. Do not drink coffee or other fluids.   If you have concerns about taking  your medications, please ask at office or if scheduling via [employer], send a message by clicking on Secure Messaging, Message Your Care Team.            Jan 03, 2018  2:45 PM CST   [place] SCREENING DIGITAL RIGHT with [facility]   [facility] ([facility])    [address] [address] [facility] [address]   [phone]           Do not use any powder, lotion or deodorant under your arms or on your breast. If you do, we will ask you to remove it before your exam.  Wear comfortable, two-piece clothing.  If you have any allergies, tell your care team.  Bring any previous mammograms from other facilities or have them mailed to the breast center.            Jan 11, 2018 11:00 AM CST   Return Visit with Gayle  "La Nieves MD   [facility] ([facility])    [facility]  [address]  [facility] [address] [phone]              Who to contact     If you have questions or need follow up information about today's clinic visit or your schedule please contact [facility] directly at [phone].  Normal or non-critical lab and imaging results will be communicated to you by MyChart, letter or phone within 4 business days after the clinic has received the results. If you do not hear from us within 7 days, please contact the clinic through MyChart or phone. If you have a critical or abnormal lab result, we will notify you by phone as soon as possible.  Submit refill requests through [employer] or call your pharmacy and they will forward the refill request to us. Please allow 3 business days for your refill to be completed.          Additional Information About Your Visit        [email or web address] Information     [employer] lets you send messages to your doctor, view your test results, renew your prescriptions, schedule appointments and more. To sign up, go to [email or web address] . Click on \"Log in\" on the left side of the screen, which will take you to the Welcome page. Then click on \"Sign up Now\" on the right side of the page.     You will be asked to enter the access code listed below, as well as some personal information. Please follow the directions to create your username and password.     Your access code is: [email or web address]  Expires: 2017  4:24 PM     Your access code will  in 90 days. If you need help or a new code, please call your [facility] or [phone].        Care EveryWhere ID     This is your Care EveryWhere ID. This could be used by other organizations to access your [place] medical records  [id number]        Your Vitals Were     Pulse Temperature Respirations Height Last Period Pulse Oximetry    81 97.9  F (36.6  C) (Tympanic) 16 4' " "10\" (1.473 m) 06/15/1985 95%    BMI (Body Mass Index)                   24.66 kg/m2            Blood Pressure from Last 3 Encounters:   05/26/17 129/71   04/28/17 122/66   04/18/17 129/70    Weight from Last 3 Encounters:   05/26/17 118 lb (53.5 kg)   04/28/17 118 lb 3.2 oz (53.6 kg)   04/18/17 118 lb 9.6 oz (53.8 kg)              Today, you had the following     No orders found for display         Today's Medication Changes          These changes are accurate as of: 5/26/17  4:24 PM.  If you have any questions, ask your nurse or doctor.               These medicines have changed or have updated prescriptions.        Dose/Directions    omeprazole 20 MG CR capsule   Commonly known as:  priLOSEC   This may have changed:  when to take this   Used for:  Gastroesophageal reflux disease without esophagitis        Dose:  20 mg   Take 1 capsule (20 mg) by mouth 2 times daily   Quantity:  60 capsule   Refills:  3                Primary Care Provider Office Phone # Fax #    Joesfina Gómez [phone]       [facility] [address] [place] [address]        Thank you!     Thank you for choosing [facility]  for your care. Our goal is always to provide you with excellent care. Hearing back from our patients is one way we can continue to improve our services. Please take a few minutes to complete the written survey that you may receive in the mail after your visit with us. Thank you!             Your Updated Medication List - Protect others around you: Learn how to safely use, store and throw away your medicines at www.disposemymeds.org.          This list is accurate as of: 5/26/17  4:24 PM.  Always use your most recent med list.                   Brand Name Dispense Instructions for use    albuterol 108 (90 BASE) MCG/ACT Inhaler    PROAIR HFA/PROVENTIL HFA/VENTOLIN HFA    3 Inhaler    Inhale 2 puffs into the lungs every 6 hours as needed for shortness of breath / " dyspnea       CRANBERRY      Take 475 mg by mouth 2 times daily.       diclofenac 1 % Gel topical gel    VOLTAREN    100 g    APPLY 4 GRAMS TO KNEES OR 2 GRAMS TO HANDS FOUR TIMES DAILY USING ENCLOSED DOSING CARD.       ipratropium - albuterol 0.5 mg/2.5 mg/3 mL 0.5-2.5 (3) MG/3ML neb solution    DUONEB    180 mL    TAKE 1 VIAL (3 MLS) BY NEBULIZATION EVERY 6 HOURS AS NEEDED FOR SHORTNESS OF BREATH / DYSPNEA OR WHEEZING       levothyroxine 50 MCG tablet    SYNTHROID/LEVOTHROID    90 tablet    Take 1 tablet (50 mcg) by mouth daily       metoprolol 25 MG 24 hr tablet    TOPROL-XL    180 tablet    2  tablets twice daily       omeprazole 20 MG CR capsule    priLOSEC    60 capsule    Take 1 capsule (20 mg) by mouth 2 times daily       polyethylene glycol powder    MIRALAX    510 g    Take 17 g by mouth daily as needed       probiotic Caps      Take 1 capsule by mouth every evening       SYMBICORT 160-4.5 MCG/ACT Inhaler   Generic drug:  budesonide-formoterol     10.2 g    INHALE 2 PUFFS INTO THE LUNGS 2 TIMES DAILY       warfarin 1 MG tablet    COUMADIN    105 tablet    Take 1 mg on Mon, Fri; 1.5 mg all other days  or as directed by Anticoagulation Clinic

## 2017-05-26 NOTE — NURSING NOTE
"Chief Complaint   Patient presents with     RECHECK       Initial /73 (BP Location: Right arm, Patient Position: Chair, Cuff Size: Adult Regular)  Pulse 81  Temp 97.9  F (36.6  C) (Tympanic)  Resp 16  Ht 4' 10\" (1.473 m)  Wt 118 lb (53.5 kg)  LMP 06/15/1985  SpO2 95%  BMI 24.66 kg/m2 Estimated body mass index is 24.66 kg/(m^2) as calculated from the following:    Height as of this encounter: 4' 10\" (1.473 m).    Weight as of this encounter: 118 lb (53.5 kg).  Medication Reconciliation: complete  "

## 2017-05-26 NOTE — PROGRESS NOTES
"  SUBJECTIVE:                                                    Ellie Leigh is a 86 year old female who presents to clinic today for the following health issues:    Nausea  Pt is here for a recheck after stopping the Coumadin and taking just 1 Prilosec daily and is no better.  Not any better after change in meds as above. She is still the same and has 2 out of 7 good days if she has nausea it does not last all day     The only pattern she sees is that if she goes out of the house she feels good    She also notes if she tries to just push through it she is ok after a while   She has had a lot of stress with her daughter going through chemo for recurrent breast CA   She is anxious about this   We have discussed that as playing a role in how she feels     She has worked extensively with dietician and gluten free is better for her and she has not been strict on this       Problem list and histories reviewed & adjusted, as indicated.  Additional history: as documented    Labs reviewed in EPIC    Reviewed and updated as needed this visit by clinical staff  Tobacco  Allergies  Meds  Problems  Med Hx  Surg Hx  Fam Hx  Soc Hx        Reviewed and updated as needed this visit by Provider  Allergies  Meds  Problems         ROS:  Constitutional, HEENT, cardiovascular, pulmonary, gi and gu systems are negative, except as otherwise noted.    OBJECTIVE:                                                    /71  Pulse 81  Temp 97.9  F (36.6  C) (Tympanic)  Resp 16  Ht 4' 10\" (1.473 m)  Wt 118 lb (53.5 kg)  LMP 06/15/1985  SpO2 95%  BMI 24.66 kg/m2  Body mass index is 24.66 kg/(m^2).  GENERAL APPEARANCE: healthy, alert and no distress  ABDOMEN: soft, nontender, without hepatosplenomegaly or masses and bowel sounds normal  PSYCH: mentation appears normal and anxious         ASSESSMENT/PLAN:                                                    1. Nausea  Unclear etiol and no further work up is wanted at this time "     This is most likely related to her anxiety and slow emptying as found on nuclear study years ago  This has been an issue for 10+ years and seems to come and go for her     Patient Instructions   Gluten free     Journal diet again     More fresh air    Risks, benefits, side effects and rationale for treatment plan fully discussed with the patient and understanding expressed.   Josefina Gómez MD  [facility]

## 2017-05-30 ENCOUNTER — TELEPHONE (OUTPATIENT)
Dept: FAMILY MEDICINE | Facility: CLINIC | Age: 82
End: 2017-05-30

## 2017-05-30 DIAGNOSIS — F32.0 MAJOR DEPRESSIVE DISORDER, SINGLE EPISODE, MILD (H): Primary | ICD-10-CM

## 2017-05-30 DIAGNOSIS — E03.9 HYPOTHYROIDISM, UNSPECIFIED TYPE: Chronic | ICD-10-CM

## 2017-05-30 NOTE — TELEPHONE ENCOUNTER
Patient's daughter says Ellie has been very tired lately and she is wondering if Dr Gómez could order lab to check thyroid, Vitamin D and whatever she feels she may need.  Please call Luz Maria at her work number if orders are put in. [phone]   OK to leave message on her private work number.

## 2017-05-31 DIAGNOSIS — E03.9 HYPOTHYROIDISM, UNSPECIFIED TYPE: Chronic | ICD-10-CM

## 2017-05-31 DIAGNOSIS — F32.0 MAJOR DEPRESSIVE DISORDER, SINGLE EPISODE, MILD (H): ICD-10-CM

## 2017-05-31 LAB
DEPRECATED CALCIDIOL+CALCIFEROL SERPL-MC: 52 UG/L (ref 20–75)
ERYTHROCYTE [DISTWIDTH] IN BLOOD BY AUTOMATED COUNT: 14.1 % (ref 10–15)
HCT VFR BLD AUTO: 38.4 % (ref 35–47)
HGB BLD-MCNC: 13.1 G/DL (ref 11.7–15.7)
MCH RBC QN AUTO: 28.6 PG (ref 26.5–33)
MCHC RBC AUTO-ENTMCNC: 34.1 G/DL (ref 31.5–36.5)
MCV RBC AUTO: 84 FL (ref 78–100)
PLATELET # BLD AUTO: 213 10E9/L (ref 150–450)
RBC # BLD AUTO: 4.58 10E12/L (ref 3.8–5.2)
TSH SERPL DL<=0.005 MIU/L-ACNC: 1.91 MU/L (ref 0.4–4)
WBC # BLD AUTO: 4.4 10E9/L (ref 4–11)

## 2017-05-31 PROCEDURE — 36415 COLL VENOUS BLD VENIPUNCTURE: CPT | Performed by: FAMILY MEDICINE

## 2017-05-31 PROCEDURE — 85027 COMPLETE CBC AUTOMATED: CPT | Performed by: FAMILY MEDICINE

## 2017-05-31 PROCEDURE — 82306 VITAMIN D 25 HYDROXY: CPT | Performed by: FAMILY MEDICINE

## 2017-05-31 PROCEDURE — 84443 ASSAY THYROID STIM HORMONE: CPT | Performed by: FAMILY MEDICINE

## 2017-06-12 ENCOUNTER — TELEPHONE (OUTPATIENT)
Dept: CARDIOLOGY | Facility: CLINIC | Age: 82
End: 2017-06-12

## 2017-06-13 ENCOUNTER — TELEPHONE (OUTPATIENT)
Dept: FAMILY MEDICINE | Facility: CLINIC | Age: 82
End: 2017-06-13

## 2017-06-13 DIAGNOSIS — K21.9 GASTROESOPHAGEAL REFLUX DISEASE WITHOUT ESOPHAGITIS: ICD-10-CM

## 2017-06-13 DIAGNOSIS — I10 ESSENTIAL HYPERTENSION, BENIGN: ICD-10-CM

## 2017-06-13 RX ORDER — SUCRALFATE ORAL 1 G/10ML
1 SUSPENSION ORAL 4 TIMES DAILY
Qty: 420 ML | Refills: 3 | Status: SHIPPED | OUTPATIENT
Start: 2017-06-13 | End: 2017-06-14

## 2017-06-13 RX ORDER — SUCRALFATE ORAL 1 G/10ML
1 SUSPENSION ORAL 4 TIMES DAILY
Qty: 420 ML | Status: CANCELLED | OUTPATIENT
Start: 2017-06-13

## 2017-06-13 RX ORDER — METOPROLOL SUCCINATE 25 MG/1
TABLET, EXTENDED RELEASE ORAL
Qty: 180 TABLET | Refills: 0 | Status: SHIPPED | OUTPATIENT
Start: 2017-06-13 | End: 2017-07-31

## 2017-06-13 NOTE — TELEPHONE ENCOUNTER
Spoke with Ellie, she will restart the Carafate, advised to call back if stools continue to be dark

## 2017-06-13 NOTE — TELEPHONE ENCOUNTER
Carafate      Last Written Prescription Date: 6/13/2017  Last Fill Quantity: 420 ml,  # refills: 3   Last Office Visit with G, P or [facility] prescribing provider: 5/26/2017                                         Next 5 appointments (look out 90 days)     Jul 14, 2017  3:40 PM CDT   Office Visit with Josefina Gómez MD   [facility]    [address] [place] [id number] [phone]                  Per pharmacy Change to tablets. Note ! Please resend order.  Insurance does not cover suspension

## 2017-06-13 NOTE — TELEPHONE ENCOUNTER
metoprolol (TOPROL-XL) 25 MG 24 hr tablet      Last Written Prescription Date: 4/28/2017  Last Fill Quantity: 180, # refills: 1    Last Office Visit with [facility], [facility] or [facility] prescribing provider:  5/26/2017   Future Office Visit:    Next 5 appointments (look out 90 days)     Jul 14, 2017  3:40 PM CDT   Office Visit with Josefina [facility]z MD   [facility]    [address] [place] [id number] [phone]                    BP Readings from Last 3 Encounters:   05/26/17 129/71   04/28/17 122/66   04/18/17 129/70

## 2017-06-13 NOTE — TELEPHONE ENCOUNTER
Reason for call:  Patient reporting a symptom    Symptom or request: Possible Bleeding Ulcer     Duration (how long have symptoms been present): Heart Burn off and on Black Stools for 2 days    Have you been treated for this before? Yes    Additional comments: She is having heart burn and black stools for 2 days.  She thinks that she might have a bleeding ulcer.  She has been off Carafate for about 1 month.  Please advise.  She uses [employer].    Phone Number patient can be reached at:  Home number on file [phone] (home)    Best Time:  any    Can we leave a detailed message on this number:  YES    Call taken on 6/13/2017 at 8:57 AM by Dariana Mills

## 2017-06-14 ENCOUNTER — TELEPHONE (OUTPATIENT)
Dept: FAMILY MEDICINE | Facility: CLINIC | Age: 82
End: 2017-06-14

## 2017-06-14 DIAGNOSIS — K21.9 GASTROESOPHAGEAL REFLUX DISEASE WITHOUT ESOPHAGITIS: ICD-10-CM

## 2017-06-14 RX ORDER — SUCRALFATE ORAL 1 G/10ML
SUSPENSION ORAL 4 TIMES DAILY
Qty: 420 ML | Refills: 3 | Status: CANCELLED | OUTPATIENT
Start: 2017-06-14

## 2017-06-14 RX ORDER — SUCRALFATE 1 G/1
1 TABLET ORAL 4 TIMES DAILY
Qty: 40 TABLET | Refills: 1 | Status: SHIPPED | OUTPATIENT
Start: 2017-06-14 | End: 2017-07-07 | Stop reason: ALTCHOICE

## 2017-06-14 NOTE — TELEPHONE ENCOUNTER
Dr. Gómez,   [facility] pharmacy in [place] called and stated that the carafate liquid is not covered by patient's insurance, and are wondering if this can be switched to tablet form instead which is covered. Patient has paid for 2 days worth to get the medication started. Please advise.  Wilner

## 2017-06-14 NOTE — TELEPHONE ENCOUNTER
Carafate switched to pill form per Dr. Gómez, have contacted [facility] pharmacy and notified.  Wilner

## 2017-06-23 ENCOUNTER — HOSPITAL ENCOUNTER (EMERGENCY)
Facility: CLINIC | Age: 82
Discharge: HOME | End: 2017-06-23
Attending: STUDENT IN AN ORGANIZED HEALTH CARE EDUCATION/TRAINING PROGRAM | Admitting: STUDENT IN AN ORGANIZED HEALTH CARE EDUCATION/TRAINING PROGRAM
Payer: COMMERCIAL

## 2017-06-23 VITALS
TEMPERATURE: 97.6 F | RESPIRATION RATE: 9 BRPM | SYSTOLIC BLOOD PRESSURE: 153 MMHG | HEART RATE: 95 BPM | OXYGEN SATURATION: 95 % | DIASTOLIC BLOOD PRESSURE: 93 MMHG

## 2017-06-23 DIAGNOSIS — I10 BENIGN ESSENTIAL HYPERTENSION: ICD-10-CM

## 2017-06-23 LAB
ANION GAP SERPL CALCULATED.3IONS-SCNC: 4 MMOL/L (ref 3–14)
BASOPHILS # BLD AUTO: 0 10E9/L (ref 0–0.2)
BASOPHILS NFR BLD AUTO: 0.5 %
BUN SERPL-MCNC: 21 MG/DL (ref 7–30)
CALCIUM SERPL-MCNC: 9.1 MG/DL (ref 8.5–10.1)
CHLORIDE SERPL-SCNC: 95 MMOL/L (ref 94–109)
CO2 SERPL-SCNC: 31 MMOL/L (ref 20–32)
CREAT SERPL-MCNC: 0.56 MG/DL (ref 0.52–1.04)
DIFFERENTIAL METHOD BLD: NORMAL
EOSINOPHIL # BLD AUTO: 0.1 10E9/L (ref 0–0.7)
EOSINOPHIL NFR BLD AUTO: 1.9 %
ERYTHROCYTE [DISTWIDTH] IN BLOOD BY AUTOMATED COUNT: 13.9 % (ref 10–15)
GFR SERPL CREATININE-BSD FRML MDRD: ABNORMAL ML/MIN/1.7M2
GLUCOSE SERPL-MCNC: 102 MG/DL (ref 70–99)
HCT VFR BLD AUTO: 38 % (ref 35–47)
HGB BLD-MCNC: 12.8 G/DL (ref 11.7–15.7)
IMM GRANULOCYTES # BLD: 0 10E9/L (ref 0–0.4)
IMM GRANULOCYTES NFR BLD: 0.2 %
INR PPP: 0.97 (ref 0.86–1.14)
LIPASE SERPL-CCNC: 332 U/L (ref 73–393)
LYMPHOCYTES # BLD AUTO: 2.8 10E9/L (ref 0.8–5.3)
LYMPHOCYTES NFR BLD AUTO: 43.4 %
MCH RBC QN AUTO: 28.1 PG (ref 26.5–33)
MCHC RBC AUTO-ENTMCNC: 33.7 G/DL (ref 31.5–36.5)
MCV RBC AUTO: 84 FL (ref 78–100)
MONOCYTES # BLD AUTO: 0.8 10E9/L (ref 0–1.3)
MONOCYTES NFR BLD AUTO: 12.6 %
NEUTROPHILS # BLD AUTO: 2.6 10E9/L (ref 1.6–8.3)
NEUTROPHILS NFR BLD AUTO: 41.4 %
PLATELET # BLD AUTO: 245 10E9/L (ref 150–450)
POTASSIUM SERPL-SCNC: 4.1 MMOL/L (ref 3.4–5.3)
RBC # BLD AUTO: 4.55 10E12/L (ref 3.8–5.2)
SODIUM SERPL-SCNC: 130 MMOL/L (ref 133–144)
TROPONIN I SERPL-MCNC: 0.04 UG/L (ref 0–0.04)
WBC # BLD AUTO: 6.4 10E9/L (ref 4–11)

## 2017-06-23 PROCEDURE — 99284 EMERGENCY DEPT VISIT MOD MDM: CPT | Mod: 25

## 2017-06-23 PROCEDURE — 84484 ASSAY OF TROPONIN QUANT: CPT | Performed by: STUDENT IN AN ORGANIZED HEALTH CARE EDUCATION/TRAINING PROGRAM

## 2017-06-23 PROCEDURE — 80048 BASIC METABOLIC PNL TOTAL CA: CPT | Performed by: STUDENT IN AN ORGANIZED HEALTH CARE EDUCATION/TRAINING PROGRAM

## 2017-06-23 PROCEDURE — 85025 COMPLETE CBC W/AUTO DIFF WBC: CPT | Performed by: STUDENT IN AN ORGANIZED HEALTH CARE EDUCATION/TRAINING PROGRAM

## 2017-06-23 PROCEDURE — 93010 ELECTROCARDIOGRAM REPORT: CPT | Performed by: STUDENT IN AN ORGANIZED HEALTH CARE EDUCATION/TRAINING PROGRAM

## 2017-06-23 PROCEDURE — 93005 ELECTROCARDIOGRAM TRACING: CPT

## 2017-06-23 PROCEDURE — 85610 PROTHROMBIN TIME: CPT | Performed by: STUDENT IN AN ORGANIZED HEALTH CARE EDUCATION/TRAINING PROGRAM

## 2017-06-23 PROCEDURE — 25000132 ZZH RX MED GY IP 250 OP 250 PS 637: Performed by: STUDENT IN AN ORGANIZED HEALTH CARE EDUCATION/TRAINING PROGRAM

## 2017-06-23 PROCEDURE — 83690 ASSAY OF LIPASE: CPT | Performed by: STUDENT IN AN ORGANIZED HEALTH CARE EDUCATION/TRAINING PROGRAM

## 2017-06-23 PROCEDURE — 99285 EMERGENCY DEPT VISIT HI MDM: CPT | Mod: 25 | Performed by: STUDENT IN AN ORGANIZED HEALTH CARE EDUCATION/TRAINING PROGRAM

## 2017-06-23 RX ORDER — ASPIRIN 81 MG/1
162 TABLET, CHEWABLE ORAL ONCE
Status: COMPLETED | OUTPATIENT
Start: 2017-06-23 | End: 2017-06-23

## 2017-06-23 RX ORDER — LIDOCAINE 40 MG/G
CREAM TOPICAL
Status: DISCONTINUED | OUTPATIENT
Start: 2017-06-23 | End: 2017-06-24 | Stop reason: HOSPADM

## 2017-06-23 RX ADMIN — ASPIRIN 162 MG: 81 TABLET, CHEWABLE ORAL at 22:17

## 2017-06-23 NOTE — ED AVS SNAPSHOT
[facility]    [address] [facility] [address]    Phone:  [phone]    Fax:  [phone]                                       Ellie Leigh   MRN: [id number]    Department:  [facility]   Date of Visit:  6/23/2017           Patient Information     Date Of Birth          9/12/1930        Your diagnoses for this visit were:     Benign essential hypertension        You were seen by Anjum Hobbs DO.      Follow-up Information     Follow up with Josefina Gómez MD. Schedule an appointment as soon as possible for a visit in 3 days.    Specialty:  Family Practice    Why:  Followup for reevaluation and managment plan.    Contact information:    [facility]  [address] [facility] [address]  [phone]          Discharge Instructions         Step-by-Step  Checking Your Blood Pressure    Date Last Reviewed: 4/27/2016 2000-2017 [employer]. [address] [place] [address]. All rights reserved. This information is not intended as a substitute for professional medical care. Always follow your healthcare professional's instructions.          Future Appointments        Provider Department Dept Phone Center    7/11/2017 11:20 AM [place] [facility] [phone] [facility]    7/14/2017 3:40 PM Josefina Gómez MD [facility] [phone] [facility]    1/3/2018 2:00 PM [facility] [phone] [facility]    1/3/2018 2:45 PM [facility] MAMMO ROOM 2 [facility] Imaging [phone] [facility]    1/11/2018 11:00 AM Gayle Nieves MD, MD [facility] [phone] [facility]      24 Hour Appointment Hotline       To make an appointment at any [facility], call [phone] ([phone]). If you don't have a family doctor or clinic, we will help you find one. [facility] are conveniently located to  serve the needs of you and your family.             Review of your medicines      Our records show that you are taking the medicines listed below. If these are incorrect, please call your family doctor or clinic.        Dose / Directions Last dose taken    albuterol 108 (90 BASE) MCG/ACT Inhaler   Commonly known as:  PROAIR HFA/PROVENTIL HFA/VENTOLIN HFA   Dose:  2 puff   Quantity:  3 Inhaler        Inhale 2 puffs into the lungs every 6 hours as needed for shortness of breath / dyspnea   Refills:  1        CRANBERRY   Dose:  475 mg        Take 475 mg by mouth 2 times daily.   Refills:  0        diclofenac 1 % Gel topical gel   Commonly known as:  VOLTAREN   Quantity:  100 g        APPLY 4 GRAMS TO KNEES OR 2 GRAMS TO HANDS FOUR TIMES DAILY USING ENCLOSED DOSING CARD.   Refills:  0        ipratropium - albuterol 0.5 mg/2.5 mg/3 mL 0.5-2.5 (3) MG/3ML neb solution   Commonly known as:  DUONEB   Quantity:  180 mL        TAKE 1 VIAL (3 MLS) BY NEBULIZATION EVERY 6 HOURS AS NEEDED FOR SHORTNESS OF BREATH / DYSPNEA OR WHEEZING   Refills:  10        levothyroxine 50 MCG tablet   Commonly known as:  SYNTHROID/LEVOTHROID   Dose:  50 mcg   Quantity:  90 tablet        Take 1 tablet (50 mcg) by mouth daily   Refills:  3        * metoprolol 25 MG 24 hr tablet   Commonly known as:  TOPROL-XL   Quantity:  180 tablet        2  tablets twice daily   Refills:  1        * metoprolol 25 MG 24 hr tablet   Commonly known as:  TOPROL-XL   Quantity:  180 tablet        TAKE TWO TABLETS BY MOUTH TWICE DAILY   Refills:  0        omeprazole 20 MG CR capsule   Commonly known as:  priLOSEC   Dose:  20 mg   Quantity:  60 capsule        Take 1 capsule (20 mg) by mouth 2 times daily   Refills:  3        polyethylene glycol powder   Commonly known as:  MIRALAX   Dose:  1 capful   Quantity:  510 g        Take 17 g by mouth daily as needed   Refills:  5        probiotic Caps   Dose:  1 capsule        Take 1 capsule by mouth every evening   Refills:  0         sucralfate 1 GM tablet   Commonly known as:  CARAFATE   Dose:  1 g   Quantity:  40 tablet        Take 1 tablet (1 g) by mouth 4 times daily   Refills:  1        SYMBICORT 160-4.5 MCG/ACT Inhaler   Quantity:  10.2 g   Generic drug:  budesonide-formoterol        INHALE 2 PUFFS INTO THE LUNGS 2 TIMES DAILY   Refills:  5        warfarin 1 MG tablet   Commonly known as:  COUMADIN   Quantity:  105 tablet        Take 1 mg on Mon, Fri; 1.5 mg all other days  or as directed by Anticoagulation Clinic   Refills:  1        * Notice:  This list has 2 medication(s) that are the same as other medications prescribed for you. Read the directions carefully, and ask your doctor or other care provider to review them with you.            Procedures and tests performed during your visit     Procedure/Test Number of Times Performed    Basic metabolic panel 1    CBC with platelets differential 1    Cardiac Continuous Monitoring 1    EKG 12 lead 3    EKG 12-lead, tracing only 1    INR 1    Lipase 1    Peripheral IV: Standard 1    Pulse oximetry nursing 1    Troponin I 1    Vital signs 1      Orders Needing Specimen Collection     None      Pending Results     No orders found from 6/21/2017 to 6/24/2017.            Pending Culture Results     No orders found from 6/21/2017 to 6/24/2017.            Pending Results Instructions     If you had any lab results that were not finalized at the time of your Discharge, you can call the ED Lab Result RN at [phone]. You will be contacted by this team for any positive Lab results or changes in treatment. The nurses are available 7 days a week from 10A to 6:30P.  You can leave a message 24 hours per day and they will return your call.        Test Results From Your Hospital Stay        6/23/2017 10:11 PM      Component Results     Component Value Ref Range & Units Status    WBC 6.4 4.0 - 11.0 10e9/L Final    RBC Count 4.55 3.8 - 5.2 10e12/L Final    Hemoglobin 12.8 11.7 - 15.7 g/dL Final     Hematocrit 38.0 35.0 - 47.0 % Final    MCV 84 78 - 100 fl Final    MCH 28.1 26.5 - 33.0 pg Final    MCHC 33.7 31.5 - 36.5 g/dL Final    RDW 13.9 10.0 - 15.0 % Final    Platelet Count 245 150 - 450 10e9/L Final    Diff Method Automated Method  Final    % Neutrophils 41.4 % Final    % Lymphocytes 43.4 % Final    % Monocytes 12.6 % Final    % Eosinophils 1.9 % Final    % Basophils 0.5 % Final    % Immature Granulocytes 0.2 % Final    Absolute Neutrophil 2.6 1.6 - 8.3 10e9/L Final    Absolute Lymphocytes 2.8 0.8 - 5.3 10e9/L Final    Absolute Monocytes 0.8 0.0 - 1.3 10e9/L Final    Absolute Eosinophils 0.1 0.0 - 0.7 10e9/L Final    Absolute Basophils 0.0 0.0 - 0.2 10e9/L Final    Abs Immature Granulocytes 0.0 0 - 0.4 10e9/L Final         6/23/2017 10:31 PM      Component Results     Component Value Ref Range & Units Status    Sodium 130 (L) 133 - 144 mmol/L Final    Potassium 4.1 3.4 - 5.3 mmol/L Final    Chloride 95 94 - 109 mmol/L Final    Carbon Dioxide 31 20 - 32 mmol/L Final    Anion Gap 4 3 - 14 mmol/L Final    Glucose 102 (H) 70 - 99 mg/dL Final    Urea Nitrogen 21 7 - 30 mg/dL Final    Creatinine 0.56 0.52 - 1.04 mg/dL Final    GFR Estimate >90  Non  GFR Calc   >60 mL/min/1.7m2 Final    GFR Estimate If Black >90   GFR Calc   >60 mL/min/1.7m2 Final    Calcium 9.1 8.5 - 10.1 mg/dL Final         6/23/2017 10:31 PM      Component Results     Component Value Ref Range & Units Status    Troponin I ES 0.035 0.000 - 0.045 ug/L Final    The 99th percentile for upper reference range is 0.045 ug/L.  Troponin values   in   the range of 0.045 - 0.120 ug/L may be associated with risks of adverse   clinical events.           6/23/2017 10:19 PM      Component Results     Component Value Ref Range & Units Status    INR 0.97 0.86 - 1.14 Final         6/23/2017 10:31 PM      Component Results     Component Value Ref Range & Units Status    Lipase 332 73 - 393 U/L Final                Thank you for  "choosing [facility]       Thank you for choosing [facility] for your care. Our goal is always to provide you with excellent care. Hearing back from our patients is one way we can continue to improve our services. Please take a few minutes to complete the written survey that you may receive in the mail after you visit with us. Thank you!        [employer] Information     [employer] lets you send messages to your doctor, view your test results, renew your prescriptions, schedule appointments and more. To sign up, go to [email or web address] . Click on \"Log in\" on the left side of the screen, which will take you to the Welcome page. Then click on \"Sign up Now\" on the right side of the page.     You will be asked to enter the access code listed below, as well as some personal information. Please follow the directions to create your username and password.     Your access code is: [email or web address]  Expires: 2017  4:24 PM     Your access code will  in 90 days. If you need help or a new code, please call your [facility] clinic or [phone].        Care EveryWhere ID     This is your Care EveryWhere ID. This could be used by other organizations to access your [facility] medical records  [id number]        Equal Access to Services     BROOKS JOSÉ AH: Reyna Interiano, wadeeptida leopoldo, qaybta kaalmada adebakari, verónica estrada. So [facility] [phone].    ATENCIÓN: Si habla español, tiene a sahni disposición servicios gratuitos de asistencia lingüística. Llame al [phone].    We comply with applicable federal civil rights laws and [place] laws. We do not discriminate on the basis of race, color, national origin, age, disability sex, sexual orientation or gender identity.            After Visit Summary       This is your record. Keep this with you and show to your community pharmacist(s) and doctor(s) at your next visit.                  "

## 2017-06-23 NOTE — ED AVS SNAPSHOT
[facility]    [address] [facility] [address]    Phone:  [phone]    Fax:  [phone]                                       Ellie Leigh   MRN: [id number]    Department:  [facility]   Date of Visit:  6/23/2017           After Visit Summary Signature Page     I have received my discharge instructions, and my questions have been answered. I have discussed any challenges I see with this plan with the nurse or doctor.    ..........................................................................................................................................  Patient/Patient Representative Signature      ..........................................................................................................................................  Patient Representative Print Name and Relationship to Patient    ..................................................               ................................................  Date                                            Time    ..........................................................................................................................................  Reviewed by Signature/Title    ...................................................              ..............................................  Date                                                            Time

## 2017-06-24 NOTE — DISCHARGE INSTRUCTIONS
Step-by-Step  Checking Your Blood Pressure    Date Last Reviewed: 4/27/2016 2000-2017 [employer]. [address] [place] [address], [place] [address]. All rights reserved. This information is not intended as a substitute for professional medical care. Always follow your healthcare professional's instructions.

## 2017-06-24 NOTE — ED PROVIDER NOTES
History     Chief Complaint   Patient presents with     Hypertension     high bp and chest discomfort     HPI  Ellie Leigh is a 86 year old female with past medical history which includes esophageal reflux, atopic rhinitis, hyperlipidemia, hypertension, COPD, previous DVT, and intermittent atrial fibrillation presents for evaluation of elevated blood pressure reading. Patient explains that she takes her blood pressure twice daily and this evening noted an elevated blood pressure with SBP approaching 160. She believes that her readings have been somewhat elevated from typical over the past 2 days she has also felt nauseous at times. Earlier in the evening she had some deep burning in her chest which is similar to her acid reflux, she maintains. Patient denies recent fevers or chills, shortness of breath, hemoptysis, abdominal pain, vomiting, back pain, or recent injury.    I have reviewed the Medications, Allergies, Past Medical and Surgical History, and Social History in the Epic system.    Patient Active Problem List   Diagnosis     Sensorineural hearing loss, asymmetrical     Generalized osteoarthrosis, unspecified site     Disease of lung     PERSONAL HISTORY OF MALIGNANCY- BREAST     Esophageal reflux     Chronic allergic conjunctivitis     Atopic rhinitis     Rhinitis, allergic seasonal     Hyperlipidemia LDL goal <130     Chronic constipation     Osteoporosis     Health Care Home     Right arm weakness     Ulnar neuropathy     Headache     Mild major depression     DVT (deep venous thrombosis)     Anxiety     Cervical vertebral fracture (H)     Intermittent atrial fibrillation (H)     Squamous cell carcinoma in situ of skin of face     SK (seborrheic keratosis)     Hypothyroidism     Hyponatremia     Recurrent UTI     History of skin cancer     BPPV (benign paroxysmal positional vertigo)     Benign essential HTN     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)     Positive fecal occult blood test      COPD (chronic obstructive pulmonary disease) (H)     Infectious colitis, enteritis and gastroenteritis     Advance Care Planning     Syncope     Hemoptysis     Long term current use of anticoagulant therapy     Mild persistent asthma without complication     Chest pain at rest     Unresponsive episode     Irritable bowel syndrome without diarrhea     Elevated troponin     Nausea       Past Surgical History:   Procedure Laterality Date     APPENDECTOMY       ARTHROSCOPY KNEE  4/15/2011    Procedure:ARTHROSCOPY KNEE; removal of loose body; Surgeon:LEY, JEFFREY DUANE; Location:[facility]     CHOLECYSTECTOMY       ESOPHAGOSCOPY, GASTROSCOPY, DUODENOSCOPY (EGD), COMBINED  6/9/2014    Procedure: COMBINED ESOPHAGOSCOPY, GASTROSCOPY, DUODENOSCOPY (EGD);  Surgeon: Gilberto Richard MD;  Location: [facility]     IMPLANT PACEMAKER  5/21/2013    Biotroni[employer]iva Moderl [id number] Serial#[id number]     INJECT EPIDURAL LUMBAR  3/23/2011    INJECT EPIDURAL LUMBAR performed by GENERIC ANESTHESIA PROVIDER at [facility]     JOINT REPLACEMENT, HIP RT/LT      Joint Replacement Hip Rt     MASTECTOMY, SIMPLE RT/LT/CAITLYN      Left breast - following breast ca     OTHER SURGICAL HISTORY      C1-C2 fusion after fx      SURGICAL HISTORY OF -   05/22/2001    Colonoscopy     TONSILLECTOMY         Social History     Social History     Marital status:      Spouse name: N/A     Number of children: N/A     Years of education: N/A     Occupational History      Retired     Social History Main Topics     Smoking status: Never Smoker     Smokeless tobacco: Never Used     Alcohol use No     Drug use: No     Sexual activity: Not Currently     Other Topics Concern     Parent/Sibling W/ Cabg, Mi Or Angioplasty Before 65f 55m? No     Social History Narrative    Lives in [facility].      Daughters helping since her accident, getting home physical therapy.      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      - 2000    5 daughters, 11  grandchildren, 3 great granchildren       Family History   Problem Relation Age of Onset     CEREBROVASCULAR DISEASE Mother      HEART DISEASE Mother      MI     CEREBROVASCULAR DISEASE Father      HEART DISEASE Father      MI     Respiratory Maternal Grandfather      TB     Neurologic Disorder Brother      ALS     HEART DISEASE Brother      CEREBROVASCULAR DISEASE Brother      Breast Cancer Daughter      age:49     Asthma Brother      CANCER Brother      brain and lung     CANCER Daughter      thyroid       Most Recent Immunizations   Administered Date(s) Administered     Influenza (High Dose) 3 valent vaccine 11/04/2016     Influenza (IIV3) 10/29/2008     Pneumococcal (PCV 13) 01/12/2015     Pneumococcal 23 valent 09/01/1996     TD (ADULT, 7+) 07/11/2008         Review of Systems  Constitutional: Negative for fever or chills.  HENT: Negative oral or throat pain.  Respiratory: Negative for cough, hemoptysis, or shortness of breath.  Cardiovascular: Positive for substernal chest burning similar to GERD, resolved. Negative for chest pressure or pleuritic pain.  Gastrointestinal: Negative for abdominal pain, vomiting, or diarrhea.   Genitourinary: Negative for dysuria.  Musculoskeletal: Negative for back pain or recent injuries.  Neurological: Negative for headache or dizziness.    All others reviewed and are negative.      Physical Exam   BP: (!) 146/96  Pulse: 95  Temp: 97.6  F (36.4  C)  SpO2: 96 %  Physical Exam  Constitutional: Well developed, well nourished. Appears nontoxic and in no acute distress. Resting comfortably on the gurney without any active symptoms.  Head: Normocephalic and atraumatic. Symmetric in appearance.  Eyes: Conjunctivae are normal.  Neck: Neck supple.  Cardiovascular: No cyanosis. RRR. No audible murmurs noted. No lower extremity edema or asymmetry.   Respiratory: Effort normal, no respiratory distress. CTAB without diminished regions. No wheezing, rhonchi, or crackles.  Gastrointestinal:  Soft, nondistended abdomen. Nontender and without guarding. No rigidity or rebound tenderness. Negative McBurney's point. Negative for Stearns's sign. No palpable pulsatile mass.  Musculoskeletal: Moves all extremities spontaneously and without complaint.  Neuro: Patient is alert.  Skin: Skin is warm and dry, not diaphoretic.      ED Course     ED Course     Procedures               EKG Interpretation:      Interpreted by: Anjum Hobbs  Time reviewed: Upon arrival     Symptoms at time of EKG: Asymptomatic  Rhythm: Sinus and/or ventricularly paced  Rate: Normal  Axis: Normal    Conduction: None atypical   ST Segments/ T Waves: No pathologic ST-elevations or T-wave abnormalities.  Q Waves: None  Comparison to prior: Similar morphology to previous     Clinical Impression: No sign of ischemia         Critical Care time:  none               Results for orders placed or performed during the hospital encounter of 06/23/17 (from the past 24 hour(s))   CBC with platelets differential   Result Value Ref Range    WBC 6.4 4.0 - 11.0 10e9/L    RBC Count 4.55 3.8 - 5.2 10e12/L    Hemoglobin 12.8 11.7 - 15.7 g/dL    Hematocrit 38.0 35.0 - 47.0 %    MCV 84 78 - 100 fl    MCH 28.1 26.5 - 33.0 pg    MCHC 33.7 31.5 - 36.5 g/dL    RDW 13.9 10.0 - 15.0 %    Platelet Count 245 150 - 450 10e9/L    Diff Method Automated Method     % Neutrophils 41.4 %    % Lymphocytes 43.4 %    % Monocytes 12.6 %    % Eosinophils 1.9 %    % Basophils 0.5 %    % Immature Granulocytes 0.2 %    Absolute Neutrophil 2.6 1.6 - 8.3 10e9/L    Absolute Lymphocytes 2.8 0.8 - 5.3 10e9/L    Absolute Monocytes 0.8 0.0 - 1.3 10e9/L    Absolute Eosinophils 0.1 0.0 - 0.7 10e9/L    Absolute Basophils 0.0 0.0 - 0.2 10e9/L    Abs Immature Granulocytes 0.0 0 - 0.4 10e9/L   Basic metabolic panel   Result Value Ref Range    Sodium 130 (L) 133 - 144 mmol/L    Potassium 4.1 3.4 - 5.3 mmol/L    Chloride 95 94 - 109 mmol/L    Carbon Dioxide 31 20 - 32 mmol/L    Anion Gap 4 3 - 14  mmol/L    Glucose 102 (H) 70 - 99 mg/dL    Urea Nitrogen 21 7 - 30 mg/dL    Creatinine 0.56 0.52 - 1.04 mg/dL    GFR Estimate >90  Non  GFR Calc   >60 mL/min/1.7m2    GFR Estimate If Black >90   GFR Calc   >60 mL/min/1.7m2    Calcium 9.1 8.5 - 10.1 mg/dL   Troponin I   Result Value Ref Range    Troponin I ES 0.035 0.000 - 0.045 ug/L   INR   Result Value Ref Range    INR 0.97 0.86 - 1.14   Lipase   Result Value Ref Range    Lipase 332 73 - 393 U/L     *Note: Due to a large number of results and/or encounters for the requested time period, some results have not been displayed. A complete set of results can be found in Results Review.         Assessments & Plan (with Medical Decision Making)   Ellie Leigh is a 86 year old female who presents to the department for evaluation of elevated blood pressure reading this evening. She did report burning chest discomfort typical of indigestion earlier, but since resolved. She adamantly denies history of myocardial infarction or heart attack, her EKG morphology is similar to previous and without ischemia, troponin within reference range. Patient does not have typical signs/symptoms of hypertensive emergency or organ failure, low suspicion for pulmonary embolism, aortic dissection, pneumonia or infectious etiology. After initially ordering a chest radiograph, patient felt it was unnecessary and refused.    Clinical impression is the patient presented for concern of asymptomatic hypertension and incidentally noted indigestion like symptoms earlier which have resolved. Recommend she continue monitoring her symptoms at home but also plan to schedule follow-up with primary provider to discuss her home blood pressure readings and any other concerns. She also noted that she discontinued warfarin in conjunction with her primary provider because they thought it may have been causing her nausea, she was counseled about the importance of this medication in  [place] connolly with history of atrial fibrillation. Prior to discharge, I made it clear that illness can unexpectedly develop/progress so she has been instructed to return to the emergency department for reevaluation of evolving symptoms, unusual chest symptoms, headache, or other concerns. Both she and her accompanying family member seem comfortable with the discharge plan we discussed including follow up.    Disclaimer: This note consists of symbols derived from keyboarding, dictation, and/or voice recognition software. As a result, there may be errors in the script that have gone undetected.  Please consider this when interpreting information found in the chart.        I have reviewed the nursing notes.    I have reviewed the findings, diagnosis, plan and need for follow up with the patient.      Discharge Medication List as of 6/23/2017 11:44 PM          Final diagnoses:   Benign essential hypertension       6/23/2017   [facility] EMERGENCY DEPARTMENT     Anjum Hobbs DO  06/24/17 0012

## 2017-06-24 NOTE — ED NOTES
Pt presents with daughter with c/o chest discomfort. Pt states pain is constant, mid chest, started this afternoon while reading. Pt describes pain as a type of burning. Denies n/v, c/o mild SOB. No radiation of pain. EKG completed on arrival. Pt does have pacemaker.

## 2017-06-27 ENCOUNTER — OFFICE VISIT (OUTPATIENT)
Dept: FAMILY MEDICINE | Facility: CLINIC | Age: 82
End: 2017-06-27
Payer: COMMERCIAL

## 2017-06-27 VITALS
TEMPERATURE: 97.8 F | DIASTOLIC BLOOD PRESSURE: 70 MMHG | SYSTOLIC BLOOD PRESSURE: 136 MMHG | BODY MASS INDEX: 25.19 KG/M2 | WEIGHT: 120 LBS | HEIGHT: 58 IN | HEART RATE: 72 BPM

## 2017-06-27 DIAGNOSIS — K21.00 GASTROESOPHAGEAL REFLUX DISEASE WITH ESOPHAGITIS: Primary | ICD-10-CM

## 2017-06-27 DIAGNOSIS — I10 BENIGN ESSENTIAL HTN: ICD-10-CM

## 2017-06-27 PROCEDURE — 99213 OFFICE O/P EST LOW 20 MIN: CPT | Performed by: NURSE PRACTITIONER

## 2017-06-27 NOTE — PROGRESS NOTES
SUBJECTIVE:                                                    Ellie Leigh is a 86 year old female who presents to clinic today for the following health issues:    ED/UC Followup:    Facility:  [facility]   Date of visit: 6/23/17  Reason for visit: Hypertension  Current Status: Patient states that she is still having trouble with heartburn. She is checking her blood pressure at home. This morning it was 149/88.     GERD symptoms 3-4 times per week all day.  Otherwise intermittent symptoms.  Currently taking Carafate but this is a difficult schedule to keep  Spicy foods make symptoms worse  Was having black stools that resolved with Carafate.    Problem list and histories reviewed & adjusted, as indicated.  Additional history: as documented    Patient Active Problem List   Diagnosis     Sensorineural hearing loss, asymmetrical     Generalized osteoarthrosis, unspecified site     Disease of lung     PERSONAL HISTORY OF MALIGNANCY- BREAST     Esophageal reflux     Chronic allergic conjunctivitis     Atopic rhinitis     Rhinitis, allergic seasonal     Hyperlipidemia LDL goal <130     Chronic constipation     Osteoporosis     Health Care Home     Right arm weakness     Ulnar neuropathy     Headache     Mild major depression     DVT (deep venous thrombosis)     Anxiety     Cervical vertebral fracture (H)     Intermittent atrial fibrillation (H)     Squamous cell carcinoma in situ of skin of face     SK (seborrheic keratosis)     Hypothyroidism     Hyponatremia     Recurrent UTI     History of skin cancer     BPPV (benign paroxysmal positional vertigo)     Benign essential HTN     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)     Positive fecal occult blood test     COPD (chronic obstructive pulmonary disease) (H)     Infectious colitis, enteritis and gastroenteritis     Advance Care Planning     Syncope     Hemoptysis     Long term current use of anticoagulant therapy     Mild persistent asthma without  complication     Chest pain at rest     Unresponsive episode     Irritable bowel syndrome without diarrhea     Elevated troponin     Nausea     Past Surgical History:   Procedure Laterality Date     APPENDECTOMY       ARTHROSCOPY KNEE  4/15/2011    Procedure:ARTHROSCOPY KNEE; removal of loose body; Surgeon:LEY, JEFFREY DUANE; Location:[facility]     CHOLECYSTECTOMY       ESOPHAGOSCOPY, GASTROSCOPY, DUODENOSCOPY (EGD), COMBINED  6/9/2014    Procedure: COMBINED ESOPHAGOSCOPY, GASTROSCOPY, DUODENOSCOPY (EGD);  Surgeon: Gilberto Richard MD;  Location: [facility]     IMPLANT PACEMAKER  5/21/2013    Biotronik Moderl [id number] Serial#[id number]     INJECT EPIDURAL LUMBAR  3/23/2011    INJECT EPIDURAL LUMBAR performed by GENERIC ANESTHESIA PROVIDER at [facility]     JOINT REPLACEMENT, HIP RT/LT      Joint Replacement Hip Rt     MASTECTOMY, SIMPLE RT/LT/CAITLYN      Left breast - following breast ca     OTHER SURGICAL HISTORY      C1-C2 fusion after fx      SURGICAL HISTORY OF -   05/22/2001    Colonoscopy     TONSILLECTOMY         Social History   Substance Use Topics     Smoking status: Never Smoker     Smokeless tobacco: Never Used     Alcohol use No     Family History   Problem Relation Age of Onset     CEREBROVASCULAR DISEASE Mother      HEART DISEASE Mother      MI     CEREBROVASCULAR DISEASE Father      HEART DISEASE Father      MI     Respiratory Maternal Grandfather      TB     Neurologic Disorder Brother      ALS     HEART DISEASE Brother      CEREBROVASCULAR DISEASE Brother      Breast Cancer Daughter      age:49     Asthma Brother      CANCER Brother      brain and lung     CANCER Daughter      thyroid         Current Outpatient Prescriptions   Medication Sig Dispense Refill     ranitidine (ZANTAC) 300 MG tablet Take 1 tablet (300 mg) by mouth At Bedtime 30 tablet 3     sucralfate (CARAFATE) 1 GM tablet Take 1 tablet (1 g) by mouth 4 times daily 40 tablet 1     metoprolol (TOPROL-XL) 25 MG 24 hr tablet TAKE TWO TABLETS BY MOUTH TWICE  DAILY  180 tablet 0     metoprolol (TOPROL-XL) 25 MG 24 hr tablet 2  tablets twice daily 180 tablet 1     ipratropium - albuterol 0.5 mg/2.5 mg/3 mL (DUONEB) 0.5-2.5 (3) MG/3ML neb solution TAKE 1 VIAL (3 MLS) BY NEBULIZATION EVERY 6 HOURS AS NEEDED FOR SHORTNESS OF BREATH / DYSPNEA OR WHEEZING 180 mL 10     SYMBICORT 160-4.5 MCG/ACT Inhaler INHALE 2 PUFFS INTO THE LUNGS 2 TIMES DAILY 10.2 g 5     diclofenac (VOLTAREN) 1 % GEL topical gel APPLY 4 GRAMS TO KNEES OR 2 GRAMS TO HANDS FOUR TIMES DAILY USING ENCLOSED DOSING CARD. 100 g 0     warfarin (COUMADIN) 1 MG tablet Take 1 mg on Mon, Fri; 1.5 mg all other days  or as directed by Anticoagulation Clinic 105 tablet 1     albuterol (PROAIR HFA/PROVENTIL HFA/VENTOLIN HFA) 108 (90 BASE) MCG/ACT Inhaler Inhale 2 puffs into the lungs every 6 hours as needed for shortness of breath / dyspnea 3 Inhaler 1     levothyroxine (SYNTHROID, LEVOTHROID) 50 MCG tablet Take 1 tablet (50 mcg) by mouth daily 90 tablet 3     omeprazole (PRILOSEC) 20 MG capsule Take 1 capsule (20 mg) by mouth 2 times daily (Patient taking differently: Take 20 mg by mouth daily ) 60 capsule 3     probiotic CAPS Take 1 capsule by mouth every evening        polyethylene glycol (MIRALAX) powder Take 17 g by mouth daily as needed 510 g 5     CRANBERRY Take 475 mg by mouth 2 times daily.       Allergies   Allergen Reactions     Cephalosporins Nausea     Cefzil     Doxycycline Nausea and Vomiting     Sulfa Drugs Nausea     Penicillins Rash     PCN     Labs reviewed in EPIC    Reviewed and updated as needed this visit by clinical staff  Tobacco  Allergies  Meds  Med Hx  Surg Hx  Fam Hx  Soc Hx      Reviewed and updated as needed this visit by Provider         ROS:  Constitutional, HEENT, cardiovascular, pulmonary, gi and gu systems are negative, except as otherwise noted.    OBJECTIVE:     /70 (BP Location: Right arm, Cuff Size: Adult Regular)  Pulse 72  Temp 97.8  F (36.6  C) (Tympanic)  Ht 4'  "10\" (1.473 m)  Wt 120 lb (54.4 kg)  LMP 06/15/1985  BMI 25.08 kg/m2  Body mass index is 25.08 kg/(m^2).  GENERAL: healthy, alert and no distress  ABDOMEN: soft, nontender, no hepatosplenomegaly, no masses and bowel sounds normal  PSYCH: mentation appears normal, affect normal/bright    Diagnostic Test Results:  none     ASSESSMENT/PLAN:     1. Gastroesophageal reflux disease with esophagitis  Not controlled.  Will start Zantac for symptoms. Avoid triggers.  Symptomatic care and follow up discussed, any worsening symptoms or return of black stools needs to follow up right away.  - ranitidine (ZANTAC) 300 MG tablet; Take 1 tablet (300 mg) by mouth At Bedtime  Dispense: 30 tablet; Refill: 3    2. Benign essential hypertension  Controlled in clinic.  Can bring in home BP monitor to compare at next visit.    Home care instructions were reviewed with the patient. The risks, benefits and treatment options of prescribed medications or other treatments have been discussed with the patient. The patient verbalized their understanding and should call or follow up if no improvement or if they develop further problems.      Patient Instructions       Zantac sent to the pharmacy  Follow up if symptoms do not improve or any worsening symptoms  Tips to Control Acid Reflux    To control acid reflux, you ll need to make some basic diet and lifestyle changes. The simple steps outlined below may be all you ll need to ease discomfort.  Watch what you eat    Avoid fatty foods and spicy foods.    Eat fewer acidic foods, such as citrus and tomato-based foods. These can increase symptoms.    Limit drinking alcohol, caffeine, and fizzy beverages. All increase acid reflux.    Try limiting chocolate, peppermint, and spearmint. These can worsen acid reflux in some people.  Watch when you eat    Avoid lying down for 3 hours after eating.    Do not snack before going to bed.  Raise your head  Raising your head and upper body by 4 to 6 " inches helps limit reflux when you re lying down. Put blocks under the head of your bed frame to raise it.  Other changes    Lose weight, if you need to    Don t exercise near bedtime    Avoid tight-fitting clothes    Limit aspirin and ibuprofen    Stop smoking   Date Last Reviewed: 7/1/2016 2000-2017 [employer]. [address] [place] [address], [place] [address]. All rights reserved. This information is not intended as a substitute for professional medical care. Always follow your healthcare professional's instructions.            LACI Azul [facility]

## 2017-06-27 NOTE — PATIENT INSTRUCTIONS
Zantac sent to the pharmacy  Follow up if symptoms do not improve or any worsening symptoms  Tips to Control Acid Reflux    To control acid reflux, you ll need to make some basic diet and lifestyle changes. The simple steps outlined below may be all you ll need to ease discomfort.  Watch what you eat    Avoid fatty foods and spicy foods.    Eat fewer acidic foods, such as citrus and tomato-based foods. These can increase symptoms.    Limit drinking alcohol, caffeine, and fizzy beverages. All increase acid reflux.    Try limiting chocolate, peppermint, and spearmint. These can worsen acid reflux in some people.  Watch when you eat    Avoid lying down for 3 hours after eating.    Do not snack before going to bed.  Raise your head  Raising your head and upper body by 4 to 6 inches helps limit reflux when you re lying down. Put blocks under the head of your bed frame to raise it.  Other changes    Lose weight, if you need to    Don t exercise near bedtime    Avoid tight-fitting clothes    Limit aspirin and ibuprofen    Stop smoking   Date Last Reviewed: 7/1/2016 2000-2017 [employer]. [address] [place] [address], [place] [address]. All rights reserved. This information is not intended as a substitute for professional medical care. Always follow your healthcare professional's instructions.

## 2017-06-27 NOTE — MR AVS SNAPSHOT
After Visit Summary   6/27/2017    Ellie Leigh    MRN: [id number]           Patient Information     Date Of Birth          9/12/1930        Visit Information        Provider Department      6/27/2017 10:00 AM Silvana Velez APRN [facility]        Today's Diagnoses     Mild persistent asthma without complication    -  1    Gastroesophageal reflux disease with esophagitis          Care Instructions      Zantac sent to the pharmacy  Follow up if symptoms do not improve or any worsening symptoms  Tips to Control Acid Reflux    To control acid reflux, you ll need to make some basic diet and lifestyle changes. The simple steps outlined below may be all you ll need to ease discomfort.  Watch what you eat    Avoid fatty foods and spicy foods.    Eat fewer acidic foods, such as citrus and tomato-based foods. These can increase symptoms.    Limit drinking alcohol, caffeine, and fizzy beverages. All increase acid reflux.    Try limiting chocolate, peppermint, and spearmint. These can worsen acid reflux in some people.  Watch when you eat    Avoid lying down for 3 hours after eating.    Do not snack before going to bed.  Raise your head  Raising your head and upper body by 4 to 6 inches helps limit reflux when you re lying down. Put blocks under the head of your bed frame to raise it.  Other changes    Lose weight, if you need to    Don t exercise near bedtime    Avoid tight-fitting clothes    Limit aspirin and ibuprofen    Stop smoking   Date Last Reviewed: 7/1/2016 2000-2017 [employer]. [address] [place] [address], [place] [address]. All rights reserved. This information is not intended as a substitute for professional medical care. Always follow your healthcare professional's instructions.                Follow-ups after your visit        Your next 10 appointments already scheduled     Jul 11, 2017 11:20 AM CDT   Pacemaker Check with [facility]   [place]  [facility] ([facility])    [address] [facility] [address] [address]   [phone]            Jul 14, 2017  3:40 PM CDT   Office Visit with Josefina G[facility]mez MD   [facility]    [address] [place] [id number] [phone]           Bring a current list of meds and any records pertaining to this visit.  For Physicals, please bring immunization records and any forms needing to be filled out.  Please arrive 10 minutes early to complete paperwork.            Jan 03, 2018  2:00 PM CST   LAB with [facility] ([facility])    [address] [facility] [facility] [address] [address]   [phone]           Patient must bring picture ID.  Patient should be prepared to give a urine specimen  Please do not eat 10-12 hours before your appointment if you are coming in fasting for labs on lipids, cholesterol, or glucose (sugar).  Pregnant women should follow their Care Team instructions. Water with medications is okay. Do not drink coffee or other fluids.   If you have concerns about taking  your medications, please ask at office or if scheduling via [employer], send a message by clicking on Secure Messaging, Message Your Care Team.            Jan 03, 2018  2:45 PM CST   MA SCREENING DIGITAL RIGHT with [address] [facility] [facility] ([facility])    [address] [address]   [phone]           Do not use any powder, lotion or deodorant under your arms or on your breast. If you do, we will ask you to remove it before your exam.  Wear comfortable, two-piece clothing.  If you have any allergies, tell your care team.  Bring any previous mammograms from other facilities or have them mailed to the breast center.            Jan 11, 2018 11:00 AM CST   Return Visit with Gayle Nieves MD   [facility] ([facility])    [facility]  [address] [address]  [facility] [address]   [phone]              Who to contact     If you have questions or need follow up information about today's clinic visit or your schedule please contact [facility] directly at [phone].  Normal or non-critical lab and imaging results will be communicated to you by MyChart, letter or phone within 4 business days after the clinic has received the results. If you do not hear from us within 7 days, please contact the clinic through MyChart or phone. If you have a critical or abnormal lab result, we will notify you by phone as soon as possible.  Submit refill requests through [employer] or call your pharmacy and they will forward the refill request to us. Please allow 3 business days for your refill to be completed.          Additional Information About Your Visit        MyChart Information     ThisNext lets you send messages to your doctor, view your test results, renew your prescriptions, schedule appointments and more. To sign up, go to [email or web address] . Click on \"Log in\" on the left side of the screen, which will take you to the Welcome page. Then click on \"Sign up Now\" on the right side of the page.     You will be asked to enter the access code listed below, as well as some personal information. Please follow the directions to create your username and password.     Your access code is: [email or web address]  Expires: 2017  4:24 PM     Your access code will  in 90 days. If you need help or a new code, please call your [facility] or [phone].        Care EveryWhere ID     This is your Care EveryWhere ID. This could be used by other organizations to access your [facility] medical records  [id number]        Your Vitals Were     Pulse Temperature Height Last Period BMI (Body Mass Index)       72 97.8  F (36.6  C) (Tympanic) 4' 10\" (1.473 m) 06/15/1985 25.08 kg/m2        Blood Pressure from Last 3 Encounters:   17 136/70 "   06/23/17 (!) 153/93   05/26/17 129/71    Weight from Last 3 Encounters:   06/27/17 120 lb (54.4 kg)   05/26/17 118 lb (53.5 kg)   04/28/17 118 lb 3.2 oz (53.6 kg)              We Performed the Following     Asthma Action Plan (AAP)          Today's Medication Changes          These changes are accurate as of: 6/27/17 10:21 AM.  If you have any questions, ask your nurse or doctor.               Start taking these medicines.        Dose/Directions    ranitidine 300 MG tablet   Commonly known as:  ZANTAC   Used for:  Gastroesophageal reflux disease with esophagitis   Started by:  Silvana Velze APRN CNP        Dose:  300 mg   Take 1 tablet (300 mg) by mouth At Bedtime   Quantity:  30 tablet   Refills:  3         These medicines have changed or have updated prescriptions.        Dose/Directions    omeprazole 20 MG CR capsule   Commonly known as:  priLOSEC   This may have changed:  when to take this   Used for:  Gastroesophageal reflux disease without esophagitis        Dose:  20 mg   Take 1 capsule (20 mg) by mouth 2 times daily   Quantity:  60 capsule   Refills:  3            Where to get your medicines      These medications were sent to [id number] - [place], [place] - [address] [facility]  [address] [facility] [address]     Phone:  [phone]     ranitidine 300 MG tablet                Primary Care Provider Office Phone # Fax #    Josefina Gómez [phone]       [address] [address]        Equal Access to Services     BROOKS JOSÉ AH: Hadii daniel ku hadasho Soomaali, waaxda luqadaha, qaybta kaalmada adeegyada, waxay jessica estrada. So [facility] [phone].    ATENCIÓN: Si habla español, tiene a sahni disposición servicios gratuitos de asistencia lingüística. Llame al [phone].    We comply with applicable federal civil rights laws and [place] laws. We do not discriminate on the basis of race,  color, national origin, age, disability sex, sexual orientation or gender identity.            Thank you!     Thank you for choosing [facility]  for your care. Our goal is always to provide you with excellent care. Hearing back from our patients is one way we can continue to improve our services. Please take a few minutes to complete the written survey that you may receive in the mail after your visit with us. Thank you!             Your Updated Medication List - Protect others around you: Learn how to safely use, store and throw away your medicines at www.disposemymeds.org.          This list is accurate as of: 6/27/17 10:21 AM.  Always use your most recent med list.                   Brand Name Dispense Instructions for use Diagnosis    albuterol 108 (90 BASE) MCG/ACT Inhaler    PROAIR HFA/PROVENTIL HFA/VENTOLIN HFA    3 Inhaler    Inhale 2 puffs into the lungs every 6 hours as needed for shortness of breath / dyspnea    Mild persistent asthma without complication       CRANBERRY      Take 475 mg by mouth 2 times daily.        diclofenac 1 % Gel topical gel    VOLTAREN    100 g    APPLY 4 GRAMS TO KNEES OR 2 GRAMS TO HANDS FOUR TIMES DAILY USING ENCLOSED DOSING CARD.    Primary osteoarthritis involving multiple joints       ipratropium - albuterol 0.5 mg/2.5 mg/3 mL 0.5-2.5 (3) MG/3ML neb solution    DUONEB    180 mL    TAKE 1 VIAL (3 MLS) BY NEBULIZATION EVERY 6 HOURS AS NEEDED FOR SHORTNESS OF BREATH / DYSPNEA OR WHEEZING    Chronic obstructive pulmonary disease, unspecified COPD type (H)       levothyroxine 50 MCG tablet    SYNTHROID/LEVOTHROID    90 tablet    Take 1 tablet (50 mcg) by mouth daily    Hypothyroidism, unspecified type       * metoprolol 25 MG 24 hr tablet    TOPROL-XL    180 tablet    2  tablets twice daily    Essential hypertension, benign       * metoprolol 25 MG 24 hr tablet    TOPROL-XL    180 tablet    TAKE TWO TABLETS BY MOUTH TWICE DAILY    Essential hypertension, benign        omeprazole 20 MG CR capsule    priLOSEC    60 capsule    Take 1 capsule (20 mg) by mouth 2 times daily    Gastroesophageal reflux disease without esophagitis       polyethylene glycol powder    MIRALAX    510 g    Take 17 g by mouth daily as needed    Constipation       probiotic Caps      Take 1 capsule by mouth every evening        ranitidine 300 MG tablet    ZANTAC    30 tablet    Take 1 tablet (300 mg) by mouth At Bedtime    Gastroesophageal reflux disease with esophagitis       sucralfate 1 GM tablet    CARAFATE    40 tablet    Take 1 tablet (1 g) by mouth 4 times daily    Gastroesophageal reflux disease without esophagitis       SYMBICORT 160-4.5 MCG/ACT Inhaler   Generic drug:  budesonide-formoterol     10.2 g    INHALE 2 PUFFS INTO THE LUNGS 2 TIMES DAILY    Mild persistent asthma without complication       warfarin 1 MG tablet    COUMADIN    105 tablet    Take 1 mg on Mon, Fri; 1.5 mg all other days  or as directed by Anticoagulation Clinic    Intermittent atrial fibrillation (H)       * Notice:  This list has 2 medication(s) that are the same as other medications prescribed for you. Read the directions carefully, and ask your doctor or other care provider to review them with you.

## 2017-06-27 NOTE — LETTER
My Asthma Action Plan  Name: Ellie Leigh   YOB: 1930  Date: 6/27/2017   My doctor: LACI Azul CNP   My clinic: [facility]        My Control Medicine: Budesonide + formoterol (Symbicort) -  160/4.5 mcg .  My Rescue Medicine: Albuterol (Proair/Ventolin/Proventil) inhaler 108 (90 base) MCG   My Asthma Severity: mild persistent  Avoid your asthma triggers: .               GREEN ZONE   Good Control    I feel good    No cough or wheeze    Can work, sleep and play without asthma symptoms       Take your asthma control medicine every day.     1. If exercise triggers your asthma, take your rescue medication    15 minutes before exercise or sports, and    During exercise if you have asthma symptoms  2. Spacer to use with inhaler: If you have a spacer, make sure to use it with your inhaler             YELLOW ZONE Getting Worse  I have ANY of these:    I do not feel good    Cough or wheeze    Chest feels tight    Wake up at night   1. Keep taking your Green Zone medications  2. Start taking your rescue medicine:    every 20 minutes for up to 1 hour. Then every 4 hours for 24-48 hours.  3. If you stay in the Yellow Zone for more than 12-24 hours, contact your doctor.  4. If you do not return to the Green Zone in 12-24 hours or you get worse, start taking your oral steroid medicine if prescribed by your provider.           RED ZONE Medical Alert - Get Help  I have ANY of these:    I feel awful    Medicine is not helping    Breathing getting harder    Trouble walking or talking    Nose opens wide to breathe       1. Take your rescue medicine NOW  2. If your provider has prescribed an oral steroid medicine, start taking it NOW  3. Call your doctor NOW  4. If you are still in the Red Zone after 20 minutes and you have not reached your doctor:    Take your rescue medicine again and    Call 911 or go to the emergency room right away    See your regular doctor within 2 weeks of an  Emergency Room or Urgent Care visit for follow-up treatment.        Electronically signed by: Cailin Pham, June 27, 2017    Annual Reminders:  Meet with Asthma Educator,  Flu Shot in the Fall, consider Pneumonia Vaccination for patients with asthma (aged 19 and older).    Pharmacy: [id number] - [address]                    Asthma Triggers  How To Control Things That Make Your Asthma Worse    Triggers are things that make your asthma worse.  Look at the list below to help you find your triggers and what you can do about them.  You can help prevent asthma flare-ups by staying away from your triggers.      Trigger                                                          What you can do   Cigarette Smoke  Tobacco smoke can make asthma worse. Do not allow smoking in your home, car or around you.  Be sure no one smokes at a child s day care or school.  If you smoke, ask your health care provider for ways to help you quit.  Ask family members to quit too.  Ask your health care provider for a referral to Quit Plan to help you quit smoking, or call 9-605-622-PLAN.     Colds, Flu, Bronchitis  These are common triggers of asthma. Wash your hands often.  Don t touch your eyes, nose or mouth.  Get a flu shot every year.     Dust Mites  These are tiny bugs that live in cloth or carpet. They are too small to see. Wash sheets and blankets in hot water every week.   Encase pillows and mattress in dust mite proof covers.  Avoid having carpet if you can. If you have carpet, vacuum weekly.   Use a dust mask and HEPA vacuum.   Pollen and Outdoor Mold  Some people are allergic to trees, grass, or weed pollen, or molds. Try to keep your windows closed.  Limit time out doors when pollen count is high.   Ask you health care provider about taking medicine during allergy season.     Animal Dander  Some people are allergic to skin flakes, urine or saliva from pets with fur or feathers. Keep pets  with fur or feathers out of your home.    If you can t keep the pet outdoors, then keep the pet out of your bedroom.  Keep the bedroom door closed.  Keep pets off cloth furniture and away from stuffed toys.     Mice, Rats, and Cockroaches  Some people are allergic to the waste from these pests.   Cover food and garbage.  Clean up spills and food crumbs.  Store grease in the refrigerator.   Keep food out of the bedroom.   Indoor Mold  This can be a trigger if your home has high moisture. Fix leaking faucets, pipes, or other sources of water.   Clean moldy surfaces.  Dehumidify basement if it is damp and smelly.   Smoke, Strong Odors, and Sprays  These can reduce air quality. Stay away from strong odors and sprays, such as perfume, powder, hair spray, paints, smoke incense, paint, cleaning products, candles and new carpet.   Exercise or Sports  Some people with asthma have this trigger. Be active!  Ask your doctor about taking medicine before sports or exercise to prevent symptoms.    Warm up for 5-10 minutes before and after sports or exercise.     Other Triggers of Asthma  Cold air:  Cover your nose and mouth with a scarf.  Sometimes laughing or crying can be a trigger.  Some medicines and food can trigger asthma.

## 2017-06-28 ASSESSMENT — ASTHMA QUESTIONNAIRES: ACT_TOTALSCORE: 19

## 2017-06-30 ENCOUNTER — TELEPHONE (OUTPATIENT)
Dept: CARDIOLOGY | Facility: CLINIC | Age: 82
End: 2017-06-30

## 2017-06-30 NOTE — TELEPHONE ENCOUNTER
"Received alert from [employer] that patient device has not transmitted in past 43 days. I called and spoke with patient. I asked if she may have accidentally unplugged it. She stated she had not, [place] she [place] it woth her. Upon returning she plugged it back in and reports that \"the light is on\". I then called [employer] for some trouble shooting ideas. They confirmed that the monitor was active however no \"medical information\" was transmitting. He stated this could be due to sleeping too far away from device (monitor checks pt between the hours of Midnight and 0300) or other electric devices interfering with the signal. I called back and confirmed that she had returned the monitor to it's original position and there was no other equipment near it. I then asked her to contact [employer] for assistance per their recommendation. AYLIN Shahid  "

## 2017-07-07 ENCOUNTER — OFFICE VISIT (OUTPATIENT)
Dept: FAMILY MEDICINE | Facility: CLINIC | Age: 82
End: 2017-07-07
Payer: COMMERCIAL

## 2017-07-07 VITALS
WEIGHT: 119.6 LBS | BODY MASS INDEX: 25 KG/M2 | SYSTOLIC BLOOD PRESSURE: 122 MMHG | OXYGEN SATURATION: 95 % | DIASTOLIC BLOOD PRESSURE: 70 MMHG | HEART RATE: 71 BPM | TEMPERATURE: 97.6 F

## 2017-07-07 DIAGNOSIS — M67.432 GANGLION, LEFT WRIST: ICD-10-CM

## 2017-07-07 DIAGNOSIS — J44.9 CHRONIC OBSTRUCTIVE PULMONARY DISEASE, UNSPECIFIED COPD TYPE (H): ICD-10-CM

## 2017-07-07 DIAGNOSIS — K21.9 GASTROESOPHAGEAL REFLUX DISEASE WITHOUT ESOPHAGITIS: Primary | ICD-10-CM

## 2017-07-07 PROCEDURE — 99214 OFFICE O/P EST MOD 30 MIN: CPT | Performed by: FAMILY MEDICINE

## 2017-07-07 ASSESSMENT — ANXIETY QUESTIONNAIRES
6. BECOMING EASILY ANNOYED OR IRRITABLE: NOT AT ALL
1. FEELING NERVOUS, ANXIOUS, OR ON EDGE: NOT AT ALL
7. FEELING AFRAID AS IF SOMETHING AWFUL MIGHT HAPPEN: NOT AT ALL
2. NOT BEING ABLE TO STOP OR CONTROL WORRYING: NOT AT ALL
3. WORRYING TOO MUCH ABOUT DIFFERENT THINGS: NOT AT ALL

## 2017-07-07 ASSESSMENT — PATIENT HEALTH QUESTIONNAIRE - PHQ9: 5. POOR APPETITE OR OVEREATING: NOT AT ALL

## 2017-07-07 NOTE — NURSING NOTE
"Chief Complaint   Patient presents with     Hypertension       Initial LMP 06/15/1985 Estimated body mass index is 25.08 kg/(m^2) as calculated from the following:    Height as of 6/27/17: 4' 10\" (1.473 m).    Weight as of 6/27/17: 120 lb (54.4 kg).  Medication Reconciliation: complete    Health Maintenance that is potentially due pending provider review:  PHQ9    Possibly completing today per provider review.      "

## 2017-07-07 NOTE — MR AVS SNAPSHOT
After Visit Summary   7/7/2017    Ellie Leigh    MRN: [id number]           Patient Information     Date Of Birth          9/12/1930        Visit Information        Provider Department      7/7/2017 9:20 AM Josefina Gmóez MD [facility]        Today's Diagnoses     Gastroesophageal reflux disease without esophagitis    -  1      Care Instructions    Change the dose of zantac to 150 mg am and pm     Stop the carafate     See me back in 1-2 months               Follow-ups after your visit        Your next 10 appointments already scheduled     Jul 11, 2017 11:20 AM CDT   Pac[facility] Check with [facility]   [facility] ([facility])    [address] [address]   [phone]            Jan 03, 2018  2:00 PM CST   LAB with [facility] ([facility])    [address] [address]   [phone]           Patient must bring picture ID.  Patient should be prepared to give a urine specimen  Please do not eat 10-12 hours before your appointment if you are coming in fasting for labs on lipids, cholesterol, or glucose (sugar).  Pregnant women should follow their Care Team instructions. Water with medications is okay. Do not drink coffee or other fluids.   If you have concerns about taking  your medications, please ask at office or if scheduling via [employer], send a message by clicking on Secure Messaging, Message Your Care Team.            Jan 03, 2018  2:45 PM CST   MA SCREENING DIGITAL RIGHT with [facility]   [facility] ([facility])    [address] [address]   [phone]           Do not use any powder, lotion or deodorant under your arms or on your breast. If you do, we will ask you to remove it before your exam.  Wear comfortable, two-piece clothing.  If you have any allergies, tell your care team.  Bring any previous  "mammograms from other facilities or have them mailed to the breast center.            2018 11:00 AM CST   Return Visit with Gayle Nieves MD   [facility] ([facility])    [facility]  [address]  [facility] [address] [phone]              Who to contact     If you have questions or need follow up information about today's clinic visit or your schedule please contact [facility] directly at [phone].  Normal or non-critical lab and imaging results will be communicated to you by Rent[employer], letter or phone within 4 business days after the clinic has received the results. If you do not hear from us within 7 days, please contact the clinic through Taskforcet or phone. If you have a critical or abnormal lab result, we will notify you by phone as soon as possible.  Submit refill requests through [employer] or call your pharmacy and they will forward the refill request to us. Please allow 3 business days for your refill to be completed.          Additional Information About Your Visit        [employer] Information     [employer] lets you send messages to your doctor, view your test results, renew your prescriptions, schedule appointments and more. To sign up, go to [email or web address] . Click on \"Log in\" on the left side of the screen, which will take you to the Welcome page. Then click on \"Sign up Now\" on the right side of the page.     You will be asked to enter the access code listed below, as well as some personal information. Please follow the directions to create your username and password.     Your access code is: [email or web address]  Expires: 2017  4:24 PM     Your access code will  in 90 days. If you need help or a new code, please call your [facility] or [phone].        Care EveryWhere ID     This is your Care EveryWhere ID. This could be used by other organizations to access your [place] medical " records  [id number]        Your Vitals Were     Pulse Temperature Last Period Pulse Oximetry BMI (Body Mass Index)       71 97.6  F (36.4  C) (Tympanic) 06/15/1985 95% 25 kg/m2        Blood Pressure from Last 3 Encounters:   07/07/17 122/70   06/27/17 136/70   06/23/17 (!) 153/93    Weight from Last 3 Encounters:   07/07/17 119 lb 9.6 oz (54.3 kg)   06/27/17 120 lb (54.4 kg)   05/26/17 118 lb (53.5 kg)              Today, you had the following     No orders found for display         Today's Medication Changes          These changes are accurate as of: 7/7/17 10:08 AM.  If you have any questions, ask your nurse or doctor.               These medicines have changed or have updated prescriptions.        Dose/Directions    ranitidine 150 MG tablet   Commonly known as:  ZANTAC   This may have changed:    - medication strength  - how much to take  - when to take this   Used for:  Gastroesophageal reflux disease without esophagitis   Changed by:  Josefina Gómez MD        Dose:  150 mg   Take 1 tablet (150 mg) by mouth 2 times daily   Quantity:  60 tablet   Refills:  11         Stop taking these medicines if you haven't already. Please contact your care team if you have questions.     sucralfate 1 GM tablet   Commonly known as:  CARAFATE   Stopped by:  Josefina Gómez MD                Where to get your medicines      These medications were sent to [id number] - [place], [place] - [address]  [address] [address]     Phone:  [phone]     ranitidine 150 MG tablet                Primary Care Provider Office Phone # Fax #    Josefina Gómez [phone]       [facility] [address] [place] [address]        Equal Access to Services     BROOKS JOSÉ : Reyna Interiano, bill mina, verónica capps. [facility] [phone].    ATENCIÓN: Josiane lynch  español, tiene a sahni disposición servicios gratuitos de asistencia lingüística. Darius tanner [phone].    We comply with applicable federal civil rights laws and [place] laws. We do not discriminate on the basis of race, color, national origin, age, disability sex, sexual orientation or gender identity.            Thank you!     Thank you for choosing [facility]  for your care. Our goal is always to provide you with excellent care. Hearing back from our patients is one way we can continue to improve our services. Please take a few minutes to complete the written survey that you may receive in the mail after your visit with us. Thank you!             Your Updated Medication List - Protect others around you: Learn how to safely use, store and throw away your medicines at www.disposemymeds.org.          This list is accurate as of: 7/7/17 10:08 AM.  Always use your most recent med list.                   Brand Name Dispense Instructions for use Diagnosis    albuterol 108 (90 BASE) MCG/ACT Inhaler    PROAIR HFA/PROVENTIL HFA/VENTOLIN HFA    3 Inhaler    Inhale 2 puffs into the lungs every 6 hours as needed for shortness of breath / dyspnea    Mild persistent asthma without complication       CRANBERRY      Take 475 mg by mouth 2 times daily.        diclofenac 1 % Gel topical gel    VOLTAREN    100 g    APPLY 4 GRAMS TO KNEES OR 2 GRAMS TO HANDS FOUR TIMES DAILY USING ENCLOSED DOSING CARD.    Primary osteoarthritis involving multiple joints       ipratropium - albuterol 0.5 mg/2.5 mg/3 mL 0.5-2.5 (3) MG/3ML neb solution    DUONEB    180 mL    TAKE 1 VIAL (3 MLS) BY NEBULIZATION EVERY 6 HOURS AS NEEDED FOR SHORTNESS OF BREATH / DYSPNEA OR WHEEZING    Chronic obstructive pulmonary disease, unspecified COPD type (H)       levothyroxine 50 MCG tablet    SYNTHROID/LEVOTHROID    90 tablet    Take 1 tablet (50 mcg) by mouth daily    Hypothyroidism, unspecified type       metoprolol 25 MG 24 hr tablet     TOPROL-XL    180 tablet    TAKE TWO TABLETS BY MOUTH TWICE DAILY    Essential hypertension, benign       polyethylene glycol powder    MIRALAX    510 g    Take 17 g by mouth daily as needed    Constipation       probiotic Caps      Take 1 capsule by mouth every evening        ranitidine 150 MG tablet    ZANTAC    60 tablet    Take 1 tablet (150 mg) by mouth 2 times daily    Gastroesophageal reflux disease without esophagitis       SYMBICORT 160-4.5 MCG/ACT Inhaler   Generic drug:  budesonide-formoterol     10.2 g    INHALE 2 PUFFS INTO THE LUNGS 2 TIMES DAILY    Mild persistent asthma without complication       warfarin 1 MG tablet    COUMADIN    105 tablet    Take 1 mg on Mon, Fri; 1.5 mg all other days  or as directed by Anticoagulation Clinic    Intermittent atrial fibrillation (H)

## 2017-07-07 NOTE — PATIENT INSTRUCTIONS
Change the dose of zantac to 150 mg am and pm     Stop the carafate     See me back in 1-2 months

## 2017-07-07 NOTE — PROGRESS NOTES
SUBJECTIVE:                                                    Ellie Leigh is a 86 year old female who presents to clinic today for the following health issues:      COPD Follow-Up    Symptoms are currently: stable    Current fatigue or dyspnea with ambulation: none    Shortness of breath: stable    Increased or change in Cough/Sputum: No    Fever(s): No    Baseline ambulation without stopping to rest 2-3 blocks. Able to walk up 2 flights of stairs without stopping to rest.    Any ER/UC or hospital admissions since your last visit? No     History   Smoking Status     Never Smoker   Smokeless Tobacco     Never Used     No results found for: FEV1, TNI0XDX    Amount of exercise or physical activity: None    Problems taking medications regularly: No    Medication side effects: none    Diet: low salt    Musculoskeletal problem/pain      Duration: a couple weeks    Description  Location: left wrist    Intensity:  No pain    Accompanying signs and symptoms: swelling    History  Previous similar problem: no   Previous evaluation:  none    Precipitating or alleviating factors:  Trauma or overuse: no   Aggravating factors include: none    Therapies tried and outcome: nothing      GERD   sxs are back some she had not been taking zantac but had been using the carafate and she does not like taking this  Seems like her GERD flares when allergies act up   She then has nausea    Problem list and histories reviewed & adjusted, as indicated.  Additional history: as documented    Labs reviewed in EPIC    Reviewed and updated as needed this visit by clinical staff  Tobacco  Allergies  Meds  Problems       Reviewed and updated as needed this visit by Provider  Allergies  Meds  Problems         ROS:  Constitutional, HEENT, cardiovascular, pulmonary, gi and gu systems are negative, except as otherwise noted.    OBJECTIVE:                                                    /70 (BP Location: Right arm, Patient Position:  Chair, Cuff Size: Adult Regular)  Pulse 71  Temp 97.6  F (36.4  C) (Tympanic)  Wt 119 lb 9.6 oz (54.3 kg)  LMP 06/15/1985  SpO2 95%  BMI 25 kg/m2  Body mass index is 25 kg/(m^2).  GENERAL APPEARANCE: healthy, alert and no distress  RESP: lungs clear to auscultation - no rales, rhonchi or wheezes  CV: regular rates and rhythm, normal S1 S2, no S3 or S4 and no murmur, click or rub  ABDOMEN: soft, nontender, without hepatosplenomegaly or masses and bowel sounds normal  MS: extremities normal- no gross deformities noted  ORTHO: Wrist Exam: WRIST:  Inspection: deformity: left wrist ganglion  Palpation: Tender: NON TENDER:   Non-tender:   Range of Motion: normal  Strength: no deficits  Special tests:     ELBOW:  elbow exam not done    PSYCH: mentation appears normal and affect normal/bright         ASSESSMENT/PLAN:                                                    1. Gastroesophageal reflux disease without esophagitis  Not well controlled   - ranitidine (ZANTAC) 150 MG tablet; Take 1 tablet (150 mg) by mouth 2 times daily  Dispense: 60 tablet; Refill: 11    2. Ganglion, left wrist  Reassurance regarding this     3. Chronic obstructive pulmonary disease, unspecified COPD type (H)  Stable no change in treatment plan.       Patient Instructions   Change the dose of zantac to 150 mg am and pm     Stop the carafate     See me back in 1-2 months         Risks, benefits, side effects and rationale for treatment plan fully discussed with the patient and understanding expressed.   Josefina Gómez MD  [facility]

## 2017-07-08 ASSESSMENT — PATIENT HEALTH QUESTIONNAIRE - PHQ9: SUM OF ALL RESPONSES TO PHQ QUESTIONS 1-9: 2

## 2017-07-11 ENCOUNTER — HOSPITAL ENCOUNTER (OUTPATIENT)
Dept: CARDIOLOGY | Facility: CLINIC | Age: 82
Discharge: HOME | End: 2017-07-11
Attending: INTERNAL MEDICINE | Admitting: INTERNAL MEDICINE
Payer: COMMERCIAL

## 2017-07-11 ENCOUNTER — DOCUMENTATION ONLY (OUTPATIENT)
Dept: CARDIOLOGY | Facility: CLINIC | Age: 82
End: 2017-07-11

## 2017-07-11 DIAGNOSIS — Z95.0 CARDIAC PACEMAKER IN SITU: ICD-10-CM

## 2017-07-11 PROCEDURE — 93288 INTERROG EVL PM/LDLS PM IP: CPT | Mod: 26 | Performed by: INTERNAL MEDICINE

## 2017-07-11 PROCEDURE — 93288 INTERROG EVL PM/LDLS PM IP: CPT

## 2017-07-11 NOTE — PROGRESS NOTES
Solartrecronik Evia Pacemaker Device Check  AP: 66 % : 3 %  Mode: DDD-CLS 60        Underlying Rhythm: SR  Heart Rate: Stable with adequate rates; patient denies any activity intolerance  Sensing: Stable    Pacing Threshold: Stable   Impedance: Stable  Battery Status: 8 years and 11 months  Atrial Arrhythmia: N/A; however there were 20 EGMs which showed AF  Ventricular Arrhythmia: N/A; a couple of EGMs reviewed from 08/2016 suggests AF with RVR  Setting Change: No changes    Care Plan: 3 month remote scheduled. Kofi

## 2017-07-17 ENCOUNTER — ANTICOAGULATION THERAPY VISIT (OUTPATIENT)
Dept: ANTICOAGULATION | Facility: CLINIC | Age: 82
End: 2017-07-17

## 2017-07-17 DIAGNOSIS — I82.409 DVT (DEEP VENOUS THROMBOSIS) (H): ICD-10-CM

## 2017-07-17 DIAGNOSIS — Z79.01 LONG TERM CURRENT USE OF ANTICOAGULANT THERAPY: ICD-10-CM

## 2017-07-17 NOTE — MR AVS SNAPSHOT
Ellie Leigh   7/17/2017   Anticoagulation Therapy Visit    Description:  86 year old female   Provider:  Katia Landrum, RN   Department:  [facility]           INR as of 7/17/2017         The patient was not given dosing instructions in this encounter.      Anticoagulation Summary as of 7/17/2017         The patient was not given dosing instructions in this encounter.      April 2017 Details    Sun Mon Tue Wed Thu Fri Sat           1                 2               3               4               5               6               7               8                 9               10               11               12               13               14               15                 16               17               18               19               20               21               22                 23               24               25               26               27               28      Hold   See details      29      Hold           30      Hold                Date Details   04/28 This INR check               How to take your warfarin dose     Hold Do not take your warfarin dose. See the Details table to the right for additional instructions.                May 2017 Details    Sun Mon Tue Wed Thu Fri Sat      1      Hold         2      Hold         3      Hold         4      Hold         5      Hold         6      Hold           7      Hold         8      Hold         9      Hold         10      Hold         11      Hold         12      Hold         13      Hold           14      Hold         15      Hold         16      Hold         17      Hold         18      Hold         19      Hold         20      Hold           21      Hold         22      Hold         23      Hold         24      Hold         25      Hold         26            27                 28               29               30               31                   Date Details   No additional details    Date of next INR:  5/26/2017          How to take your warfarin dose     To take:  1 mg Take 1 of the 1 mg tablets.    Hold Do not take your warfarin dose. See the Details table to the right for additional instructions.

## 2017-07-17 NOTE — PROGRESS NOTES
Writer called and left a non-detailed message for patient's daughter Suyapa requesting she call [facility] when she receives the message. Last notes for patient state to stop warfarin for 1 month, at her follow up appointment there is no mention of if she restarting her warfarin. Writer called to receive an update. If she has restarted, she should schedule a follow up INR check.     Katia Landrum RN  Anticoagulation Clinic

## 2017-07-27 ENCOUNTER — RADIANT APPOINTMENT (OUTPATIENT)
Dept: GENERAL RADIOLOGY | Facility: CLINIC | Age: 82
End: 2017-07-27
Attending: NURSE PRACTITIONER
Payer: COMMERCIAL

## 2017-07-27 ENCOUNTER — OFFICE VISIT (OUTPATIENT)
Dept: FAMILY MEDICINE | Facility: CLINIC | Age: 82
End: 2017-07-27
Payer: COMMERCIAL

## 2017-07-27 VITALS
TEMPERATURE: 96.9 F | BODY MASS INDEX: 24.27 KG/M2 | HEIGHT: 58 IN | HEART RATE: 54 BPM | DIASTOLIC BLOOD PRESSURE: 60 MMHG | SYSTOLIC BLOOD PRESSURE: 119 MMHG | OXYGEN SATURATION: 98 % | WEIGHT: 115.6 LBS

## 2017-07-27 DIAGNOSIS — J20.9 ACUTE BRONCHITIS WITH SYMPTOMS > 10 DAYS: ICD-10-CM

## 2017-07-27 DIAGNOSIS — J20.9 ACUTE BRONCHITIS WITH SYMPTOMS > 10 DAYS: Primary | ICD-10-CM

## 2017-07-27 PROCEDURE — 71020 XR CHEST 2 VW: CPT

## 2017-07-27 PROCEDURE — 99213 OFFICE O/P EST LOW 20 MIN: CPT | Performed by: NURSE PRACTITIONER

## 2017-07-27 RX ORDER — AZITHROMYCIN 250 MG/1
TABLET, FILM COATED ORAL
Qty: 6 TABLET | Refills: 0 | Status: SHIPPED | OUTPATIENT
Start: 2017-07-27 | End: 2017-08-02

## 2017-07-27 RX ORDER — PREDNISONE 20 MG/1
20 TABLET ORAL 2 TIMES DAILY
Qty: 10 TABLET | Refills: 0 | Status: SHIPPED | OUTPATIENT
Start: 2017-07-27 | End: 2017-08-02

## 2017-07-27 NOTE — PATIENT INSTRUCTIONS
guaifenesin, or robitussin as needed for cough  Start Z-pack, take 2 tablets today and then 1 tablet daily on day 2-5  Start Prednisone 20 mg twice daily with meals for 5 days    Chest Xray    Albuterol as needed for shortness of breath  and wheezing

## 2017-07-27 NOTE — MR AVS SNAPSHOT
After Visit Summary   7/27/2017    Ellie Leigh    MRN: [id number]           Patient Information     Date Of Birth          9/12/1930        Visit Information        Provider Department      7/27/2017 9:40 AM Olesya Machado APRN CNP [facility]        Today's Diagnoses     Acute bronchitis with symptoms > 10 days    -  1      Care Instructions    guaifenesin, or robitussin as needed for cough  Start Z-pack, take 2 tablets today and then 1 tablet daily on day 2-5  Start Prednisone 20 mg twice daily with meals for 5 days    Chest Xray    A;lbuterol as needed for shortness of breath  and wheezing               Follow-ups after your visit        Your next 10 appointments already scheduled     Sep 01, 2017  4:00 PM CDT   Office Visit with Josefina Gómez MD   [facility]    [address] [place] [id number] [phone]           Bring a current list of meds and any records pertaining to this visit. For Physicals, please bring immunization records and any forms needing to be filled out. Please arrive 10 minutes early to complete paperwork.            Oct 11, 2017  3:00 PM CDT   Remote PPM Check with RUEDA TECH1   [facility] [place] ([facility])    [address] [place] [address] [address]  [facility] [address] [phone]           This appointment is for a remote check of your pacemaker.  This is not an appointment at the office.            Jan 03, 2018  2:00 PM CST   LAB with [facility] ([facility])    [address] [facility] [address] [phone]           Patient must bring picture ID. Patient should be prepared to give a urine specimen  Please do not eat 10-12 hours before your appointment if you are coming in fasting for labs on lipids, cholesterol, or glucose (sugar). Pregnant women should follow their Care Team  "instructions. Water with medications is okay. Do not drink coffee or other fluids. If you have concerns about taking  your medications, please ask at office or if scheduling via [employer], send a message by clicking on Secure Messaging, Message Your Care Team.            Jan 03, 2018  2:45 PM CST   MA SCREENING DIGITAL RIGHT with [email or web address]   [facility] ([facility])    [address]  [facility] [id number] [phone]           Do not use any powder, lotion or deodorant under your arms or on your breast. If you do, we will ask you to remove it before your exam.  Wear comfortable, two-piece clothing.  If you have any allergies, tell your care team.  Bring any previous mammograms from other facilities or have them mailed to the breast center. Three-dimensional (3D) mammograms are available at [place] locations in [facility], and [place]. [facility] locations include [place] and [facility] in [place]. Benefits of 3D mammograms include: - Improved rate of cancer detection - Decreases your chance of having to go back for more tests, which means fewer: - \"False-positive\" results (This means that there is an abnormal area but it isn't cancer.) - Invasive testing procedures, such as a biopsy or surgery - Can provide clearer images of the breast if you have dense breast tissue. 3D mammography is an optional exam that anyone can have with a 2D mammogram. It doesn't replace or take the place of a 2D mammogram. 2D mammograms remain an effective screening test for all women.  Not all insurance companies cover the cost of a 3D mammogram. Check with your insurance.            Jan 11, 2018 11:00 AM CST   Return Visit with Gayle Nieves MD   [facility] ([facility])    [facility]  [address]  [facility] [id number] [phone]              Future tests that were ordered for you " "today     Open Future Orders        Priority Expected Expires Ordered    XR Chest 2 Views Routine 2017            Who to contact     If you have questions or need follow up information about today's clinic visit or your schedule please contact [facility] directly at [phone].  Normal or non-critical lab and imaging results will be communicated to you by MyChart, letter or phone within 4 business days after the clinic has received the results. If you do not hear from us within 7 days, please contact the clinic through [employer] or phone. If you have a critical or abnormal lab result, we will notify you by phone as soon as possible.  Submit refill requests through [employer] or call your pharmacy and they will forward the refill request to us. Please allow 3 business days for your refill to be completed.          Additional Information About Your Visit        [employer] Information     [employer] lets you send messages to your doctor, view your test results, renew your prescriptions, schedule appointments and more. To sign up, go to [email or web address] . Click on \"Log in\" on the left side of the screen, which will take you to the Welcome page. Then click on \"Sign up Now\" on the right side of the page.     You will be asked to enter the access code listed below, as well as some personal information. Please follow the directions to create your username and password.     Your access code is: [email or web address]  Expires: 2017  4:24 PM     Your access code will  in 90 days. If you need help or a new code, please call your [facility] or [phone].        Care EveryWhere ID     This is your Care EveryWhere ID. This could be used by other organizations to access your [facility] medical records  [id number]        Your Vitals Were     Pulse Temperature Height Last Period Pulse Oximetry BMI (Body Mass Index)    54 96.9  F (36.1  C) (Tympanic) 4' 10\" (1.473 m) 06/15/1985 98% 24.16 " kg/m2       Blood Pressure from Last 3 Encounters:   07/27/17 119/60   07/07/17 122/70   06/27/17 136/70    Weight from Last 3 Encounters:   07/27/17 115 lb 9.6 oz (52.4 kg)   07/07/17 119 lb 9.6 oz (54.3 kg)   06/27/17 120 lb (54.4 kg)                 Today's Medication Changes          These changes are accurate as of: 7/27/17 10:00 AM.  If you have any questions, ask your nurse or doctor.               Start taking these medicines.        Dose/Directions    azithromycin 250 MG tablet   Commonly known as:  ZITHROMAX   Used for:  Acute bronchitis with symptoms > 10 days   Started by:  Olesya Machado APRN CNP        Two tablets first day, then one tablet daily for four days.   Quantity:  6 tablet   Refills:  0       predniSONE 20 MG tablet   Commonly known as:  DELTASONE   Used for:  Acute bronchitis with symptoms > 10 days   Started by:  Olesya Machado APRN CNP        Dose:  20 mg   Take 1 tablet (20 mg) by mouth 2 times daily   Quantity:  10 tablet   Refills:  0            Where to get your medicines      These medications were sent to [id number] - [place], [place] - [address] [address] [facility]  [address] [facility] [address]     Phone:  [phone]     azithromycin 250 MG tablet    predniSONE 20 MG tablet                Primary Care Provider Office Phone # Fax #    Josefina Gómez [phone]       [address] [address]        Equal Access to Services     BROOKS JOSÉ AH: Hadii daniel marceloo Somaurilio, waaxda luqadaha, qaybta kaalmada adeegyada, waxay jessica estrada. So [facility] [phone].    ATENCIÓN: Si habla heriberto, tiene a sahni disposición servicios gratuitos de asistencia lingüística. Llame al [phone].    We comply with applicable federal civil rights laws and [place] laws. We do not discriminate on the basis of race, color, national origin, age, disability sex, sexual orientation  or gender identity.            Thank you!     Thank you for choosing [facility]  for your care. Our goal is always to provide you with excellent care. Hearing back from our patients is one way we can continue to improve our services. Please take a few minutes to complete the written survey that you may receive in the mail after your visit with us. Thank you!             Your Updated Medication List - Protect others around you: Learn how to safely use, store and throw away your medicines at www.disposemymeds.org.          This list is accurate as of: 7/27/17 10:00 AM.  Always use your most recent med list.                   Brand Name Dispense Instructions for use Diagnosis    albuterol 108 (90 BASE) MCG/ACT Inhaler    PROAIR HFA/PROVENTIL HFA/VENTOLIN HFA    3 Inhaler    Inhale 2 puffs into the lungs every 6 hours as needed for shortness of breath / dyspnea    Mild persistent asthma without complication       azithromycin 250 MG tablet    ZITHROMAX    6 tablet    Two tablets first day, then one tablet daily for four days.    Acute bronchitis with symptoms > 10 days       CRANBERRY      Take 475 mg by mouth 2 times daily.        diclofenac 1 % Gel topical gel    VOLTAREN    100 g    APPLY 4 GRAMS TO KNEES OR 2 GRAMS TO HANDS FOUR TIMES DAILY USING ENCLOSED DOSING CARD.    Primary osteoarthritis involving multiple joints       ipratropium - albuterol 0.5 mg/2.5 mg/3 mL 0.5-2.5 (3) MG/3ML neb solution    DUONEB    180 mL    TAKE 1 VIAL (3 MLS) BY NEBULIZATION EVERY 6 HOURS AS NEEDED FOR SHORTNESS OF BREATH / DYSPNEA OR WHEEZING    Chronic obstructive pulmonary disease, unspecified COPD type (H)       levothyroxine 50 MCG tablet    SYNTHROID/LEVOTHROID    90 tablet    Take 1 tablet (50 mcg) by mouth daily    Hypothyroidism, unspecified type       metoprolol 25 MG 24 hr tablet    TOPROL-XL    180 tablet    TAKE TWO TABLETS BY MOUTH TWICE DAILY    Essential hypertension, benign       polyethylene glycol  powder    MIRALAX    510 g    Take 17 g by mouth daily as needed    Constipation       predniSONE 20 MG tablet    DELTASONE    10 tablet    Take 1 tablet (20 mg) by mouth 2 times daily    Acute bronchitis with symptoms > 10 days       probiotic Caps      Take 1 capsule by mouth every evening        ranitidine 150 MG tablet    ZANTAC    60 tablet    Take 1 tablet (150 mg) by mouth 2 times daily    Gastroesophageal reflux disease without esophagitis       SYMBICORT 160-4.5 MCG/ACT Inhaler   Generic drug:  budesonide-formoterol     10.2 g    INHALE 2 PUFFS INTO THE LUNGS 2 TIMES DAILY    Mild persistent asthma without complication       warfarin 1 MG tablet    COUMADIN    105 tablet    Take 1 mg on Mon, Fri; 1.5 mg all other days  or as directed by Anticoagulation Clinic    Intermittent atrial fibrillation (H)

## 2017-07-27 NOTE — NURSING NOTE
"Chief Complaint   Patient presents with     Cough     one week        Initial /60  Pulse 54  Temp 96.9  F (36.1  C) (Tympanic)  Ht 4' 10\" (1.473 m)  Wt 115 lb 9.6 oz (52.4 kg)  LMP 06/15/1985  SpO2 98%  BMI 24.16 kg/m2 Estimated body mass index is 24.16 kg/(m^2) as calculated from the following:    Height as of this encounter: 4' 10\" (1.473 m).    Weight as of this encounter: 115 lb 9.6 oz (52.4 kg).  Medication Reconciliation: complete  "

## 2017-07-27 NOTE — PROGRESS NOTES
"  SUBJECTIVE:                                                    Ellie Leigh is a 86 year old female who presents to clinic today for the following health issues: productive cough, shortness of breath, wheezing, fatigue, ear fullness. Symptoms started over a week ago. Not improving. The patient has history of asthma, currently on Symbicort and also using Albuterol as needed. History of afib, stopped Coumadin about a month ago, because she thought its causing her nausea, but nausea did not improve. I highly recommended patient to restart Coumadin and follow up with INR clinic in 3-4 days after restarting Coumadin.     ENT Symptoms             Symptoms: cc Present Absent Comment   Fever/Chills   x    Fatigue   x    Muscle Aches   x    Eye Irritation   x    Sneezing   x    Nasal Oniel/Drg  x     Sinus Pressure/Pain   x    Loss of smell   x    Dental pain   x    Sore Throat  x     Swollen Glands   x    Ear Pain/Fullness  x  both   Cough  x     Wheeze  x     Chest Pain   x    Shortness of breath  x     Rash   x    Other         Symptom duration:  over one week    Symptom severity:  moderate   Treatments tried:  nebulizer, inhalers    Contacts:       Problem list and histories reviewed & adjusted, as indicated.  Additional history: as documented    Labs reviewed in EPIC    Reviewed and updated as needed this visit by clinical staffTobacco  Allergies  Med Hx  Surg Hx  Fam Hx  Soc Hx      Reviewed and updated as needed this visit by Provider         ROS:  Constitutional, HEENT, cardiovascular, pulmonary, gi and gu systems are negative, except as otherwise noted.      OBJECTIVE:   /60  Pulse 54  Temp 96.9  F (36.1  C) (Tympanic)  Ht 4' 10\" (1.473 m)  Wt 115 lb 9.6 oz (52.4 kg)  LMP 06/15/1985  SpO2 98%  BMI 24.16 kg/m2  Body mass index is 24.16 kg/(m^2).  GENERAL: healthy, alert and no distress  HENT: ear canals and TM's normal, nose and mouth without ulcers or lesions  NECK: no adenopathy, no asymmetry, " masses, or scars and thyroid normal to palpation  RESP: rhonchi R lower posterior, L lower posterior and bilateral and expiratory wheezes throughout  CV: regular rates and rhythm, normal S1 S2, no S3 or S4, no murmur, click or rub and no peripheral edema  PSYCH: mentation appears normal, affect normal/bright    Diagnostic Test Results:  CXR - pending     ASSESSMENT/PLAN:     1. Acute bronchitis with symptoms > 10 days  - azithromycin (ZITHROMAX) 250 MG tablet; Two tablets first day, then one tablet daily for four days.  Dispense: 6 tablet; Refill: 0  - predniSONE (DELTASONE) 20 MG tablet; Take 1 tablet (20 mg) by mouth 2 times daily  Dispense: 10 tablet; Refill: 0  - XR Chest 2 Views; Future  -guaifenesin, or robitussin as needed for cough  -continue Symbicort  -Albuterol as needed for wheezing and shortness of breath      See Patient Instructions    LACI Rosas [facility]

## 2017-07-31 DIAGNOSIS — I10 ESSENTIAL HYPERTENSION, BENIGN: ICD-10-CM

## 2017-08-01 RX ORDER — METOPROLOL SUCCINATE 25 MG/1
TABLET, EXTENDED RELEASE ORAL
Qty: 180 TABLET | Refills: 0 | Status: SHIPPED | OUTPATIENT
Start: 2017-08-01 | End: 2017-09-13

## 2017-08-01 NOTE — TELEPHONE ENCOUNTER
metoprolol (TOPROL-XL) 25 MG 24 hr tablet      Last Written Prescription Date: 6/13/17  Last Fill Quantity: 180, # refills: 1  Last Office Visit with G, P or [facility] prescribing provider: 7/27/17  Next 5 appointments (look out 90 days)     Sep 01, 2017  4:00 PM CDT   [facility]    [address] [place] [address] [phone]                   Potassium   Date Value Ref Range Status   06/23/2017 4.1 3.4 - 5.3 mmol/L Final     Creatinine   Date Value Ref Range Status   06/23/2017 0.56 0.52 - 1.04 mg/dL Final     BP Readings from Last 3 Encounters:   07/27/17 119/60   07/07/17 122/70   06/27/17 136/70

## 2017-08-02 ENCOUNTER — HOSPITAL ENCOUNTER (EMERGENCY)
Facility: CLINIC | Age: 82
Discharge: HOME | End: 2017-08-02
Attending: EMERGENCY MEDICINE | Admitting: EMERGENCY MEDICINE
Payer: COMMERCIAL

## 2017-08-02 ENCOUNTER — APPOINTMENT (OUTPATIENT)
Dept: GENERAL RADIOLOGY | Facility: CLINIC | Age: 82
End: 2017-08-02
Attending: EMERGENCY MEDICINE
Payer: COMMERCIAL

## 2017-08-02 VITALS
SYSTOLIC BLOOD PRESSURE: 139 MMHG | RESPIRATION RATE: 19 BRPM | DIASTOLIC BLOOD PRESSURE: 72 MMHG | TEMPERATURE: 98.4 F | WEIGHT: 114 LBS | OXYGEN SATURATION: 96 % | BODY MASS INDEX: 23.93 KG/M2 | HEIGHT: 58 IN

## 2017-08-02 DIAGNOSIS — R42 LIGHTHEADEDNESS: ICD-10-CM

## 2017-08-02 DIAGNOSIS — Z95.0 CARDIAC PACEMAKER IN SITU: ICD-10-CM

## 2017-08-02 DIAGNOSIS — R07.9 ACUTE CHEST PAIN: ICD-10-CM

## 2017-08-02 DIAGNOSIS — R05.9 COUGH: ICD-10-CM

## 2017-08-02 LAB
ALBUMIN SERPL-MCNC: 3.5 G/DL (ref 3.4–5)
ALP SERPL-CCNC: 96 U/L (ref 40–150)
ALT SERPL W P-5'-P-CCNC: 21 U/L (ref 0–50)
ANION GAP SERPL CALCULATED.3IONS-SCNC: 10 MMOL/L (ref 3–14)
AST SERPL W P-5'-P-CCNC: 16 U/L (ref 0–45)
BASOPHILS # BLD AUTO: 0 10E9/L (ref 0–0.2)
BASOPHILS NFR BLD AUTO: 0.1 %
BILIRUB SERPL-MCNC: 0.6 MG/DL (ref 0.2–1.3)
BUN SERPL-MCNC: 18 MG/DL (ref 7–30)
CALCIUM SERPL-MCNC: 8.5 MG/DL (ref 8.5–10.1)
CHLORIDE SERPL-SCNC: 93 MMOL/L (ref 94–109)
CO2 SERPL-SCNC: 29 MMOL/L (ref 20–32)
CREAT SERPL-MCNC: 0.6 MG/DL (ref 0.52–1.04)
DIFFERENTIAL METHOD BLD: ABNORMAL
EOSINOPHIL # BLD AUTO: 0 10E9/L (ref 0–0.7)
EOSINOPHIL NFR BLD AUTO: 0.2 %
ERYTHROCYTE [DISTWIDTH] IN BLOOD BY AUTOMATED COUNT: 13.5 % (ref 10–15)
GFR SERPL CREATININE-BSD FRML MDRD: ABNORMAL ML/MIN/1.7M2
GLUCOSE SERPL-MCNC: 100 MG/DL (ref 70–99)
HCT VFR BLD AUTO: 39 % (ref 35–47)
HGB BLD-MCNC: 13.3 G/DL (ref 11.7–15.7)
IMM GRANULOCYTES # BLD: 0 10E9/L (ref 0–0.4)
IMM GRANULOCYTES NFR BLD: 0.3 %
INR PPP: 1.06 (ref 0.86–1.14)
LYMPHOCYTES # BLD AUTO: 1.6 10E9/L (ref 0.8–5.3)
LYMPHOCYTES NFR BLD AUTO: 13.4 %
MCH RBC QN AUTO: 28.1 PG (ref 26.5–33)
MCHC RBC AUTO-ENTMCNC: 34.1 G/DL (ref 31.5–36.5)
MCV RBC AUTO: 82 FL (ref 78–100)
MONOCYTES # BLD AUTO: 1.7 10E9/L (ref 0–1.3)
MONOCYTES NFR BLD AUTO: 13.9 %
NEUTROPHILS # BLD AUTO: 8.8 10E9/L (ref 1.6–8.3)
NEUTROPHILS NFR BLD AUTO: 72.1 %
PLATELET # BLD AUTO: 267 10E9/L (ref 150–450)
POTASSIUM SERPL-SCNC: 3.5 MMOL/L (ref 3.4–5.3)
PROT SERPL-MCNC: 6.9 G/DL (ref 6.8–8.8)
RBC # BLD AUTO: 4.74 10E12/L (ref 3.8–5.2)
SODIUM SERPL-SCNC: 132 MMOL/L (ref 133–144)
TROPONIN I SERPL-MCNC: 0.03 UG/L (ref 0–0.04)
TROPONIN I SERPL-MCNC: 0.03 UG/L (ref 0–0.04)
WBC # BLD AUTO: 12.2 10E9/L (ref 4–11)

## 2017-08-02 PROCEDURE — 80053 COMPREHEN METABOLIC PANEL: CPT | Performed by: EMERGENCY MEDICINE

## 2017-08-02 PROCEDURE — 84484 ASSAY OF TROPONIN QUANT: CPT | Performed by: EMERGENCY MEDICINE

## 2017-08-02 PROCEDURE — 93010 ELECTROCARDIOGRAM REPORT: CPT | Performed by: EMERGENCY MEDICINE

## 2017-08-02 PROCEDURE — 85610 PROTHROMBIN TIME: CPT | Performed by: EMERGENCY MEDICINE

## 2017-08-02 PROCEDURE — 71020 XR CHEST 2 VW: CPT

## 2017-08-02 PROCEDURE — 85025 COMPLETE CBC W/AUTO DIFF WBC: CPT | Performed by: EMERGENCY MEDICINE

## 2017-08-02 PROCEDURE — 25000128 H RX IP 250 OP 636: Performed by: EMERGENCY MEDICINE

## 2017-08-02 PROCEDURE — 99285 EMERGENCY DEPT VISIT HI MDM: CPT | Mod: 25

## 2017-08-02 PROCEDURE — 99285 EMERGENCY DEPT VISIT HI MDM: CPT | Mod: 25 | Performed by: EMERGENCY MEDICINE

## 2017-08-02 PROCEDURE — 96374 THER/PROPH/DIAG INJ IV PUSH: CPT

## 2017-08-02 PROCEDURE — 93005 ELECTROCARDIOGRAM TRACING: CPT

## 2017-08-02 PROCEDURE — 96361 HYDRATE IV INFUSION ADD-ON: CPT

## 2017-08-02 RX ORDER — ONDANSETRON 2 MG/ML
4 INJECTION INTRAMUSCULAR; INTRAVENOUS EVERY 6 HOURS PRN
Status: DISCONTINUED | OUTPATIENT
Start: 2017-08-02 | End: 2017-08-02 | Stop reason: HOSPADM

## 2017-08-02 RX ADMIN — ONDANSETRON 4 MG: 2 INJECTION INTRAMUSCULAR; INTRAVENOUS at 08:07

## 2017-08-02 RX ADMIN — SODIUM CHLORIDE 500 ML: 9 INJECTION, SOLUTION INTRAVENOUS at 08:07

## 2017-08-02 ASSESSMENT — ENCOUNTER SYMPTOMS
COUGH: 1
NEUROLOGICAL NEGATIVE: 1
BACK PAIN: 1
HEMATOLOGIC/LYMPHATIC NEGATIVE: 1
CHEST TIGHTNESS: 1
PSYCHIATRIC NEGATIVE: 1
EYES NEGATIVE: 1
ALLERGIC/IMMUNOLOGIC NEGATIVE: 1
CONSTITUTIONAL NEGATIVE: 1
GASTROINTESTINAL NEGATIVE: 1
ENDOCRINE NEGATIVE: 1

## 2017-08-02 NOTE — ED PROVIDER NOTES
History     Chief Complaint   Patient presents with     Chest Pain     pt woke up with 10/10 chest paIN About 40 minutes ago, she called the ambulance and upon arrival her pain was 9/10.  Shortly after, she felt weak.    15 minutes ago, pain dropped to 2/10, and now 0/10     HPI  Ellie Leigh is a 86 year old female with a history of chronic constipation, osteoporosis, history of DVT, history of recurrent urinary tract infection with hyponatremia and SIADH, hypothyroidism and a known history of chest pain at rest who presents for evaluation for chest pain after waking from sleep that lasted about 40 minutes and resolved without medical intervention.  Patient arrived by EMS and prehospital EKG showed a paced reading with related changes.Patient tells me she awoke with chest discomfort and pain.  She had a [place] salad yesterday and felt she may have had a piece stuck in her throat.  She does have a history of esophageal reflux.  Patient tells me she is currently pain-free on arrival in the emergency department.  She did have blood chest pain and back pain.  She reports no shortness of breath.  She tells me she was seen in the clinic on July 27 and diagnosed with probable pneumonia.  She is not Currently on antibiotics.  She tells me she is on warfarin for history of atrial fibrillation.  On arrival in the emergency department patient was stable in no distress with intermittent episodes of coughing.      Social history:Lives at home in [place] with her daughter and son-in-law.  She arrived alone by EMS.     Past medical history:  Patient Active Problem List   Diagnosis     Sensorineural hearing loss, asymmetrical     Generalized osteoarthrosis, unspecified site     Disease of lung     PERSONAL HISTORY OF MALIGNANCY- BREAST     Esophageal reflux     Chronic allergic conjunctivitis     Atopic rhinitis     Rhinitis, allergic seasonal     Hyperlipidemia LDL goal <130     Chronic constipation      Osteoporosis     Health Care Home     Right arm weakness     Ulnar neuropathy     Headache     Mild major depression     DVT (deep venous thrombosis)     Anxiety     Cervical vertebral fracture (H)     Intermittent atrial fibrillation (H)     Squamous cell carcinoma in situ of skin of face     SK (seborrheic keratosis)     Hypothyroidism     Hyponatremia     Recurrent UTI     History of skin cancer     BPPV (benign paroxysmal positional vertigo)     Benign essential HTN     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)     Positive fecal occult blood test     COPD (chronic obstructive pulmonary disease) (H)     Infectious colitis, enteritis and gastroenteritis     Advance Care Planning     Syncope     Hemoptysis     Long term current use of anticoagulant therapy     Mild persistent asthma without complication     Chest pain at rest     Unresponsive episode     Irritable bowel syndrome without diarrhea     Elevated troponin     Nausea     Medications:  Current Facility-Administered Medications   Medication     ondansetron (ZOFRAN) injection 4 mg     Current Outpatient Prescriptions   Medication     metoprolol (TOPROL-XL) 25 MG 24 hr tablet     ranitidine (ZANTAC) 150 MG tablet     SYMBICORT 160-4.5 MCG/ACT Inhaler     albuterol (PROAIR HFA/PROVENTIL HFA/VENTOLIN HFA) 108 (90 BASE) MCG/ACT Inhaler     levothyroxine (SYNTHROID, LEVOTHROID) 50 MCG tablet     probiotic CAPS     polyethylene glycol (MIRALAX) powder     CRANBERRY     ipratropium - albuterol 0.5 mg/2.5 mg/3 mL (DUONEB) 0.5-2.5 (3) MG/3ML neb solution     diclofenac (VOLTAREN) 1 % GEL topical gel     Allergies:     Allergies   Allergen Reactions     Cephalosporins Nausea     Cefzil     Doxycycline Nausea and Vomiting     Sulfa Drugs Nausea     Penicillins Rash     PCN     I have reviewed the Medications, Allergies, Past Medical and Surgical History, and Social History in the Epic system.         Review of Systems   Constitutional: Negative.    HENT:  "Negative.    Eyes: Negative.    Respiratory: Positive for cough and chest tightness.    Cardiovascular: Positive for chest pain.   Gastrointestinal: Negative.    Endocrine: Negative.    Genitourinary: Negative.    Musculoskeletal: Positive for back pain.   Allergic/Immunologic: Negative.    Neurological: Negative.    Hematological: Negative.    Psychiatric/Behavioral: Negative.        Physical Exam   BP: 123/70  Heart Rate: 83  Temp: 98.4  F (36.9  C)  Resp: 20  Height: 147.3 cm (4' 10\")  Weight: 51.7 kg (114 lb)  SpO2: 96 %  Physical Exam   Constitutional: She is oriented to person, place, and time. She appears well-developed and well-nourished. No distress.   HENT:   Head: Normocephalic and atraumatic.   Eyes: Conjunctivae and EOM are normal. Pupils are equal, round, and reactive to light. Right eye exhibits no discharge. Left eye exhibits no discharge. No scleral icterus.   Neck: Normal range of motion. Neck supple. No JVD present. No tracheal deviation present. No thyromegaly present.   Cardiovascular: Normal rate.  Exam reveals no gallop and no friction rub.    Pulmonary/Chest: Effort normal. No stridor. She has no wheezes. She has no rales. She exhibits no tenderness.   Abdominal: Soft.   Lymphadenopathy:     She has no cervical adenopathy.   Neurological: She is alert and oriented to person, place, and time. She displays normal reflexes. No cranial nerve deficit. She exhibits normal muscle tone. Coordination normal.   Skin: No rash noted. She is not diaphoretic. No erythema. No pallor.   Psychiatric: She has a normal mood and affect. Her behavior is normal. Judgment and thought content normal.       ED Course     ED Course     Procedures             EKG Interpretation:      Interpreted by Mehul Eason  Time reviewed:22:10  Symptoms at time of EKG: cough   Rhythm: paced  Rate: Normal  Axis: Normal  Ectopy: none  Conduction: nonspecific interventricular conduction block  ST Segments/ T Waves: " Non-specific ST-T wave changes  Q Waves: nonspecific  Comparison to prior: Unchanged from 6/23/17    Clinical Impression: paced rhythm with related changes.        Critical Care time:  none               ED medications:  Medications   ondansetron (ZOFRAN) injection 4 mg (4 mg Intravenous Given 8/2/17 0807)   0.9% sodium chloride BOLUS (0 mLs Intravenous Stopped 8/2/17 1101)       ED labs and imaging:  Results for orders placed or performed during the hospital encounter of 08/02/17   Chest XR,  PA & LAT    Narrative    CHEST 2 VIEWS  8/2/2017 6:37 AM     HISTORY: Chest pain and cough.    COMPARISON: 7/27/2017.    FINDINGS: Hyperinflated lungs. A few hazy opacities in the right lung  apex, unchanged. There is also a 1.5 cm nodular opacity in the right  suprahilar region that was not visualized on the prior study, likely  due to differences in technique. The lungs are otherwise clear.  Normal-sized cardiac silhouette. Tortuous or ectatic thoracic aorta.  Left anterior chest wall cardiac device with lead tips in the right  atrium and right ventricle. Surgical clips in the upper right  hemiabdomen likely related to prior cholecystectomy. Moderate gradual  kyphosis centered about the upper to mid thoracic spine.      Impression    IMPRESSION:  1. No convincing interval change since 7/27/2017.  2. A few hazy opacities in the right lung apex in addition to a small  nodular opacity in the right suprahilar region. These are nonspecific,  but could be infectious or inflammatory in etiology. A follow-up chest  radiograph is recommended in one month for reevaluation.  3. Hyperinflated lungs, suggestive of chronic obstructive pulmonary  disease.   CBC with platelets differential   Result Value Ref Range    WBC 12.2 (H) 4.0 - 11.0 10e9/L    RBC Count 4.74 3.8 - 5.2 10e12/L    Hemoglobin 13.3 11.7 - 15.7 g/dL    Hematocrit 39.0 35.0 - 47.0 %    MCV 82 78 - 100 fl    MCH 28.1 26.5 - 33.0 pg    MCHC 34.1 31.5 - 36.5 g/dL    RDW 13.5  10.0 - 15.0 %    Platelet Count 267 150 - 450 10e9/L    Diff Method Automated Method     % Neutrophils 72.1 %    % Lymphocytes 13.4 %    % Monocytes 13.9 %    % Eosinophils 0.2 %    % Basophils 0.1 %    % Immature Granulocytes 0.3 %    Absolute Neutrophil 8.8 (H) 1.6 - 8.3 10e9/L    Absolute Lymphocytes 1.6 0.8 - 5.3 10e9/L    Absolute Monocytes 1.7 (H) 0.0 - 1.3 10e9/L    Absolute Eosinophils 0.0 0.0 - 0.7 10e9/L    Absolute Basophils 0.0 0.0 - 0.2 10e9/L    Abs Immature Granulocytes 0.0 0 - 0.4 10e9/L   Comprehensive metabolic panel   Result Value Ref Range    Sodium 132 (L) 133 - 144 mmol/L    Potassium 3.5 3.4 - 5.3 mmol/L    Chloride 93 (L) 94 - 109 mmol/L    Carbon Dioxide 29 20 - 32 mmol/L    Anion Gap 10 3 - 14 mmol/L    Glucose 100 (H) 70 - 99 mg/dL    Urea Nitrogen 18 7 - 30 mg/dL    Creatinine 0.60 0.52 - 1.04 mg/dL    GFR Estimate >90  Non  GFR Calc   >60 mL/min/1.7m2    GFR Estimate If Black >90   GFR Calc   >60 mL/min/1.7m2    Calcium 8.5 8.5 - 10.1 mg/dL    Bilirubin Total 0.6 0.2 - 1.3 mg/dL    Albumin 3.5 3.4 - 5.0 g/dL    Protein Total 6.9 6.8 - 8.8 g/dL    Alkaline Phosphatase 96 40 - 150 U/L    ALT 21 0 - 50 U/L    AST 16 0 - 45 U/L   Troponin I   Result Value Ref Range    Troponin I ES 0.030 0.000 - 0.045 ug/L   INR   Result Value Ref Range    INR 1.06 0.86 - 1.14   Troponin I (second draw)   Result Value Ref Range    Troponin I ES 0.031 0.000 - 0.045 ug/L     *Note: Due to a large number of results and/or encounters for the requested time period, some results have not been displayed. A complete set of results can be found in Results Review.       ED Vitals:  Vitals:    08/02/17 0900 08/02/17 0914 08/02/17 0932 08/02/17 1100   BP:       Resp: 20 21 19    Temp:       TempSrc:       SpO2: 98% 98% 97% 96%   Weight:       Height:         Prior diagnostic imaging and work-up:  CHEST TWO VIEWS  7/27/2017 10:10 AM      HISTORY: Acute bronchitis,  unspecified     COMPARISON: 8/21/2016         IMPRESSION: Pacemaker in the heart. Interstitial infiltrates in the  right upper lobe and elevation of the right pulmonary hilum from  volume loss. No pleural effusion. Normal heart size and pulmonary  vascularity. Spine suggests ankylosing spondylitis. Old mid thoracic  compression deformity. No change.     DAYNE ENCARNACION MD  Assessments & Plan (with Medical Decision Making)   Clinical impression:Pleasant 86-year-old female with multiple comorbidities who presented for evaluation for chest pain, cough and back pain.  Symptoms resolved on ED arrival.  Symptoms began when she awoke from sleep and lasted about 40 minutes by report about an hour prior to ED arrival. Recent clinic evaluation for probable pneumonia.  Patient is asymptomatic on my exam.  She is hemodynamically normal.  EKG reviewed from prehospital as well as on ED arrival shows paced rhythm with nonspecific changes which is not new from 06/23/2017.  She had intermittent coughing spells on my exam.  Her lungs are clear to auscultation.  She is afebrile.  She has an irregularly irregular heartbeat.        ED course and Plan:   I reviewed her clinic evaluation from July 27 as well as her x-ray from July 27.  I also reviewed her ED visit from June 23rd. See her medical records for additional details of that assessment and care.  She was monitored on telemetry.  Serial EKGs were obtained.  Blood work included an INR given her history of chronic anticoagulation was obtained.  INR today is 1.06.  Arrival troponin is 0.030 this is obtained about an hour after patient reports symptoms began and while patient was asymptomatic.  Hemoglobin is stable, creatinine is 0.6.  X-ray of the chest today does not show any interval change from x-ray obtained on July 27.  Hyperinflation of the lungs consistent with underlying history COPD.  She does have a hazy opacity in the right lung apex and some nodular irregularity in the  suprahilar region    while awaiting for delta troponin patient had an episode of hypotension with near-syncope while laying in bed.  Family at the bedside reports she's had fainting spells in the morning which has been intubated to low blood sugars.  Patient's did not have any episode of loss of consciousness.  She had some hyperacute EKG changes related to her paced rhythm. Delta troponin is 0.031. No change from arrival EKG.  I felt discharging the patent home in custody of her family was reasonable as she reported feeling much improved after gentle fluids an IV zofran.  The exact cause of her symptoms today are unclear maybe related to [place] salad she ate yesterday.  We discussed that she may need to return to the ED if she continues to have episodes that were also reviewed reasons to return to the ED for care.        Disclaimer: This note consists of symbols derived from keyboarding, dictation and/or voice recognition software. As a result, there may be errors in the script that have gone undetected. Please consider this when interpreting information found in this chart.  I have reviewed the nursing notes.    I have reviewed the findings, diagnosis, plan and need for follow up with the patient.       New Prescriptions    No medications on file       Final diagnoses:   Acute chest pain - of unclear cause   Cough   Lightheadedness   Cardiac pacemaker in situ       8/2/2017   [facility] EMERGENCY DEPARTMENT     Mehul Eason MD  08/02/17 6116

## 2017-08-02 NOTE — ED AVS SNAPSHOT
[facility]    [address] [place] [address]    Phone:  [phone]    Fax:  [phone]                                       Ellie Leigh   MRN: [id number]    Department:  [facility]   Date of Visit:  8/2/2017           After Visit Summary Signature Page     I have received my discharge instructions, and my questions have been answered. I have discussed any challenges I see with this plan with the nurse or doctor.    ..........................................................................................................................................  Patient/Patient Representative Signature      ..........................................................................................................................................  Patient Representative Print Name and Relationship to Patient    ..................................................               ................................................  Date                                            Time    ..........................................................................................................................................  Reviewed by Signature/Title    ...................................................              ..............................................  Date                                                            Time

## 2017-08-02 NOTE — ED AVS SNAPSHOT
[facility] Emergency Department    [address] [facility] [address]    Phone:  [phone]    Fax:  [phone]                                       Ellie Leigh   MRN: [id number]    Department:  [facility] Emergency Department   Date of Visit:  8/2/2017           Patient Information     Date Of Birth          9/12/1930        Your diagnoses for this visit were:     Acute chest pain of unclear cause    Cough     Lightheadedness     Cardiac pacemaker in situ        You were seen by Mehul Eason MD.      Follow-up Information     Follow up with Josefina Gómez MD.    Specialty:  Family Practice    Why:  For recheck and care    Contact information:    [facility]  [address] [facility] [address]  [phone]          Follow up with [facility] Emergency Department.    Specialty:  EMERGENCY MEDICINE    Why:  As needed, If symptoms worsen    Contact information:    [address] [place] [id number] [phone]    Additional information:    The medical center is located at   [address] [place] [address] (between [facility] and   [facility] [place], four miles north   of [place]).        Discharge Instructions          *CHEST PAIN, UNCERTAIN CAUSE    Based on your exam today, the exact cause of your chest pain is not certain. Your condition does not seem serious at this time, and your pain does not appear to be coming from your heart. However, sometimes the signs of a serious problem take more time to appear. Therefore, watch for the warning signs listed below.  HOME CARE:  1. Rest today and avoid strenuous activity.  2. Take any prescribed medicine as directed.  FOLLOW UP with your doctor in 1-3 days.   GET PROMPT MEDICAL ATTENTION if any of the following occur:    A change in the type of pain: if it feels different, becomes more severe, lasts longer, or begins to spread into your shoulder, arm, neck, jaw or back    Shortness of breath or increased  pain with breathing    Weakness, dizziness, or fainting    Cough with blood or dark colored sputum (phlegm)    Fever over 101  F (38.3  C)    Swelling, pain or redness in one leg    4413-2822 [facility], [address] [place] [address], [place] [address]. All rights reserved. This information is not intended as a substitute for professional medical care. Always follow your healthcare professional's instructions.      Future Appointments        Provider Department Dept Phone Center    9/1/2017 4:00 PM Josefina Gómez MD [facility] [phone] FLNB    10/11/2017 3:00 PM RUEDA [facility] [place] [phone] UM PSA CLIN    1/3/2018 2:00 PM [facility] [phone] [facility]    1/3/2018 2:45 PM [facility] 2 [facility] Imaging [phone] [facility]    1/11/2018 11:00 AM Gayle Nieves MD, MD [facility] [phone] [facility]      24 Hour Appointment Hotline       To make an appointment at any [facility], call [phone] ([phone]). If you don't have a family doctor or clinic, we will help you find one. [facility] are conveniently located to serve the needs of you and your family.             Review of your medicines      Our records show that you are taking the medicines listed below. If these are incorrect, please call your family doctor or clinic.        Dose / Directions Last dose taken    albuterol 108 (90 BASE) MCG/ACT Inhaler   Commonly known as:  PROAIR HFA/PROVENTIL HFA/VENTOLIN HFA   Dose:  2 puff   Quantity:  3 Inhaler        Inhale 2 puffs into the lungs every 6 hours as needed for shortness of breath / dyspnea   Refills:  1        CRANBERRY   Dose:  475 mg        Take 475 mg by mouth 2 times daily.   Refills:  0        diclofenac 1 % Gel topical gel   Commonly known as:  VOLTAREN   Quantity:  100 g        APPLY 4 GRAMS TO KNEES OR 2 GRAMS TO HANDS FOUR  TIMES DAILY USING ENCLOSED DOSING CARD.   Refills:  0        ipratropium - albuterol 0.5 mg/2.5 mg/3 mL 0.5-2.5 (3) MG/3ML neb solution   Commonly known as:  DUONEB   Quantity:  180 mL        TAKE 1 VIAL (3 MLS) BY NEBULIZATION EVERY 6 HOURS AS NEEDED FOR SHORTNESS OF BREATH / DYSPNEA OR WHEEZING   Refills:  10        levothyroxine 50 MCG tablet   Commonly known as:  SYNTHROID/LEVOTHROID   Dose:  50 mcg   Quantity:  90 tablet        Take 1 tablet (50 mcg) by mouth daily   Refills:  3        metoprolol 25 MG 24 hr tablet   Commonly known as:  TOPROL-XL   Quantity:  180 tablet        TAKE TWO TABLETS BY MOUTH TWICE DAILY   Refills:  0        polyethylene glycol powder   Commonly known as:  MIRALAX   Dose:  1 capful   Quantity:  510 g        Take 17 g by mouth daily as needed   Refills:  5        probiotic Caps   Dose:  1 capsule        Take 1 capsule by mouth every evening   Refills:  0        ranitidine 150 MG tablet   Commonly known as:  ZANTAC   Dose:  150 mg   Quantity:  60 tablet        Take 1 tablet (150 mg) by mouth 2 times daily   Refills:  11        SYMBICORT 160-4.5 MCG/ACT Inhaler   Quantity:  10.2 g   Generic drug:  budesonide-formoterol        INHALE 2 PUFFS INTO THE LUNGS 2 TIMES DAILY   Refills:  5                Procedures and tests performed during your visit     CBC with platelets differential    Chest XR,  PA & LAT    Comprehensive metabolic panel    EKG 12 lead    INR    Saline Lock IV    Troponin I    Troponin I (second draw)      Orders Needing Specimen Collection     None      Pending Results     Date and Time Order Name Status Description    8/2/2017 0610 Chest XR,  PA & LAT Preliminary             Pending Culture Results     No orders found from 7/31/2017 to 8/3/2017.            Pending Results Instructions     If you had any lab results that were not finalized at the time of your Discharge, you can call the ED Lab Result RN at [phone]. You will be contacted by this team for any positive  Lab results or changes in treatment. The nurses are available 7 days a week from 10A to 6:30P.  You can leave a message 24 hours per day and they will return your call.        Test Results From Your Hospital Stay        8/2/2017  6:10 AM      Component Results     Component Value Ref Range & Units Status    WBC 12.2 (H) 4.0 - 11.0 10e9/L Final    RBC Count 4.74 3.8 - 5.2 10e12/L Final    Hemoglobin 13.3 11.7 - 15.7 g/dL Final    Hematocrit 39.0 35.0 - 47.0 % Final    MCV 82 78 - 100 fl Final    MCH 28.1 26.5 - 33.0 pg Final    MCHC 34.1 31.5 - 36.5 g/dL Final    RDW 13.5 10.0 - 15.0 % Final    Platelet Count 267 150 - 450 10e9/L Final    Diff Method Automated Method  Final    % Neutrophils 72.1 % Final    % Lymphocytes 13.4 % Final    % Monocytes 13.9 % Final    % Eosinophils 0.2 % Final    % Basophils 0.1 % Final    % Immature Granulocytes 0.3 % Final    Absolute Neutrophil 8.8 (H) 1.6 - 8.3 10e9/L Final    Absolute Lymphocytes 1.6 0.8 - 5.3 10e9/L Final    Absolute Monocytes 1.7 (H) 0.0 - 1.3 10e9/L Final    Absolute Eosinophils 0.0 0.0 - 0.7 10e9/L Final    Absolute Basophils 0.0 0.0 - 0.2 10e9/L Final    Abs Immature Granulocytes 0.0 0 - 0.4 10e9/L Final         8/2/2017  6:28 AM      Component Results     Component Value Ref Range & Units Status    Sodium 132 (L) 133 - 144 mmol/L Final    Potassium 3.5 3.4 - 5.3 mmol/L Final    Chloride 93 (L) 94 - 109 mmol/L Final    Carbon Dioxide 29 20 - 32 mmol/L Final    Anion Gap 10 3 - 14 mmol/L Final    Glucose 100 (H) 70 - 99 mg/dL Final    Urea Nitrogen 18 7 - 30 mg/dL Final    Creatinine 0.60 0.52 - 1.04 mg/dL Final    GFR Estimate >90  Non  GFR Calc   >60 mL/min/1.7m2 Final    GFR Estimate If Black >90   GFR Calc   >60 mL/min/1.7m2 Final    Calcium 8.5 8.5 - 10.1 mg/dL Final    Bilirubin Total 0.6 0.2 - 1.3 mg/dL Final    Albumin 3.5 3.4 - 5.0 g/dL Final    Protein Total 6.9 6.8 - 8.8 g/dL Final    Alkaline Phosphatase 96 40 - 150 U/L  Final    ALT 21 0 - 50 U/L Final    AST 16 0 - 45 U/L Final         8/2/2017  6:28 AM      Component Results     Component Value Ref Range & Units Status    Troponin I ES 0.030 0.000 - 0.045 ug/L Final    The 99th percentile for upper reference range is 0.045 ug/L.  Troponin values   in   the range of 0.045 - 0.120 ug/L may be associated with risks of adverse   clinical events.           8/2/2017  6:16 AM      Component Results     Component Value Ref Range & Units Status    INR 1.06 0.86 - 1.14 Final         8/2/2017  6:44 AM      Narrative     CHEST 2 VIEWS  8/2/2017 6:37 AM     HISTORY: Chest pain and cough.    COMPARISON: 7/27/2017.    FINDINGS: Hyperinflated lungs. A few hazy opacities in the right lung  apex, unchanged. There is also a 1.5 cm nodular opacity in the right  suprahilar region that was not visualized on the prior study, likely  due to differences in technique. The lungs are otherwise clear.  Normal-sized cardiac silhouette. Tortuous or ectatic thoracic aorta.  Left anterior chest wall cardiac device with lead tips in the right  atrium and right ventricle. Surgical clips in the upper right  hemiabdomen likely related to prior cholecystectomy. Moderate gradual  kyphosis centered about the upper to mid thoracic spine.        Impression     IMPRESSION:  1. No convincing interval change since 7/27/2017.  2. A few hazy opacities in the right lung apex in addition to a small  nodular opacity in the right suprahilar region. These are nonspecific,  but could be infectious or inflammatory in etiology. A follow-up chest  radiograph is recommended in one month for reevaluation.  3. Hyperinflated lungs, suggestive of chronic obstructive pulmonary  disease.         8/2/2017  9:13 AM      Component Results     Component Value Ref Range & Units Status    Troponin I ES 0.031 0.000 - 0.045 ug/L Final    The 99th percentile for upper reference range is 0.045 ug/L.  Troponin values   in   the range of 0.045 - 0.120  "ug/L may be associated with risks of adverse   clinical events.                  Thank you for choosing [facility]       Thank you for choosing [facility] for your care. Our goal is always to provide you with excellent care. Hearing back from our patients is one way we can continue to improve our services. Please take a few minutes to complete the written survey that you may receive in the mail after you visit with us. Thank you!        [employer] Information     [employer] lets you send messages to your doctor, view your test results, renew your prescriptions, schedule appointments and more. To sign up, go to [email or web address] . Click on \"Log in\" on the left side of the screen, which will take you to the Welcome page. Then click on \"Sign up Now\" on the right side of the page.     You will be asked to enter the access code listed below, as well as some personal information. Please follow the directions to create your username and password.     Your access code is: [email or web address]  Expires: 2017  4:24 PM     Your access code will  in 90 days. If you need help or a new code, please call your [facility] clinic or [phone].        Care EveryWhere ID     This is your Care EveryWhere ID. This could be used by other organizations to access your [facility] medical records  [id number]        Equal Access to Services     BROOKS JOSÉ AH: Reyna Interiano, waaxda luqadaha, qaybta kaalmada trini, verónica estrada. So [facility] [phone].    ATENCIÓN: Si habla español, tiene a sahni disposición servicios gratuitos de asistencia lingüística. Llame al [phone].    We comply with applicable federal civil rights laws and [place] laws. We do not discriminate on the basis of race, color, national origin, age, disability sex, sexual orientation or gender identity.            After Visit Summary       This is your record. Keep this with you and show to your community pharmacist(s) and " doctor(s) at your next visit.

## 2017-08-02 NOTE — ED NOTES
0800 called into pts room by family who state pt is nauseated and has episodes where she feels faint in the morning and may need an oxygen boost. recheck BP low. md aware and in room to assess. Pt given 500cc fluid flush and zofran. Will monitor

## 2017-08-02 NOTE — DISCHARGE INSTRUCTIONS
*CHEST PAIN, UNCERTAIN CAUSE    Based on your exam today, the exact cause of your chest pain is not certain. Your condition does not seem serious at this time, and your pain does not appear to be coming from your heart. However, sometimes the signs of a serious problem take more time to appear. Therefore, watch for the warning signs listed below.  HOME CARE:  1. Rest today and avoid strenuous activity.  2. Take any prescribed medicine as directed.  FOLLOW UP with your doctor in 1-3 days.   GET PROMPT MEDICAL ATTENTION if any of the following occur:    A change in the type of pain: if it feels different, becomes more severe, lasts longer, or begins to spread into your shoulder, arm, neck, jaw or back    Shortness of breath or increased pain with breathing    Weakness, dizziness, or fainting    Cough with blood or dark colored sputum (phlegm)    Fever over 101  F (38.3  C)    Swelling, pain or redness in one leg    [id number] [address], [place] [address]. All rights reserved. This information is not intended as a substitute for professional medical care. Always follow your healthcare professional's instructions.

## 2017-08-03 ENCOUNTER — CARE COORDINATION (OUTPATIENT)
Dept: CARE COORDINATION | Facility: CLINIC | Age: 82
End: 2017-08-03

## 2017-08-03 ENCOUNTER — ALLIED HEALTH/NURSE VISIT (OUTPATIENT)
Dept: CARDIOLOGY | Facility: CLINIC | Age: 82
End: 2017-08-03
Payer: COMMERCIAL

## 2017-08-03 DIAGNOSIS — I49.5 SSS (SICK SINUS SYNDROME) (H): ICD-10-CM

## 2017-08-03 DIAGNOSIS — Z95.0 CARDIAC PACEMAKER IN SITU: Primary | ICD-10-CM

## 2017-08-03 PROCEDURE — 99207 ZZC NO CHARGE LOS: CPT

## 2017-08-03 NOTE — PROGRESS NOTES
Biotronik Letyia (D) PPM Courtesy check  Alert received for increasing burden of AF.  Patient has had AF 3% of the time in past few days with a controlled average V response of 82 BPM.  I reviewed chart and noted that she told her PCP office that she had stopped warfarin as she felt it was making her nauseous. They urged her to restart warfarin, I do not believe she restarted as of yet. She was in ED with CP and a few other complaints, they obtained an INR of 1.06. She was discharged home a few hours later as all symtpoms resolved.  I called patient today to ask if she was going to restart this. She had not yet restarted but after I mentioned that she was having more A fib she did say that she would restart it. I reminded her to call for INR follow up after she resumed it. She/with daughter on speaker phone verbalized understanding. Chema Shahid

## 2017-08-04 ENCOUNTER — ANTICOAGULATION THERAPY VISIT (OUTPATIENT)
Dept: ANTICOAGULATION | Facility: CLINIC | Age: 82
End: 2017-08-04
Payer: COMMERCIAL

## 2017-08-04 DIAGNOSIS — Z79.01 LONG TERM CURRENT USE OF ANTICOAGULANT THERAPY: ICD-10-CM

## 2017-08-04 DIAGNOSIS — I48.0 INTERMITTENT ATRIAL FIBRILLATION (H): ICD-10-CM

## 2017-08-04 DIAGNOSIS — I82.409 DVT (DEEP VENOUS THROMBOSIS) (H): Primary | Chronic | ICD-10-CM

## 2017-08-04 PROCEDURE — 99207 ZZC NO CHARGE NURSE ONLY: CPT | Performed by: REGISTERED NURSE

## 2017-08-04 RX ORDER — WARFARIN SODIUM 1 MG/1
TABLET ORAL
Qty: 105 TABLET | Refills: 1 | COMMUNITY
Start: 2017-08-04 | End: 2017-12-05

## 2017-08-04 NOTE — MR AVS SNAPSHOT
Ellie CARVER Lu   8/4/2017   Anticoagulation Therapy Visit    Description:  86 year old female   Provider:  Jessica Johnson, RN   Department:  [facility]           INR as of 8/4/2017     Today's INR 1.06! (8/2/2017)      Anticoagulation Summary as of 8/4/2017     INR goal 1.5-2.0   Today's INR 1.06! (8/2/2017)   Full instructions 1 mg on Mon, Fri; 1.5 mg all other days   Next INR check 8/8/2017    Indications   Atrial fibrillation with rapid ventricular response (H) (Resolved) [I48.91]  DVT (deep venous thrombosis) [I82.409]  Long term current use of anticoagulant therapy [Z79.01]         Description     Restart warfarin at prior dose: 1mg Mon & Fri and 1.5mg all other days.  Recheck INR Tuesday 8/8/2017.      Your next Anticoagulation Clinic appointment(s)     Aug 08, 2017  2:30 PM CDT   Anticoagulation Visit with [place] [facility]   [facility]    [address] [facility] [address]   [phone]              August 2017 Details    Sun Mon Tue Wed Thu Fri Sat       1               2               3               4      1 mg   See details      5      1.5 mg           6      1.5 mg         7      1 mg         8            9               10               11               12                 13               14               15               16               17               18               19                 20               21               22               23               24               25               26                 27               28               29               30               31                  Date Details   08/04 This INR check       Date of next INR:  8/8/2017         How to take your warfarin dose     To take:  1 mg Take 1 of the 1 mg tablets.    To take:  1.5 mg Take 1.5 of the 1 mg tablets.

## 2017-08-04 NOTE — PROGRESS NOTES
ANTICOAGULATION FOLLOW-UP CLINIC VISIT    Patient Name:  Ellie Leigh  Date:  8/4/2017  Contact Type:  Telephone/ discussed with Ellie. Routed note to Dr. Gómez as FYI.    SUBJECTIVE:     Patient Findings     Positives Hospital admission (Seen in ED 8/2), Initiation of therapy (restarted today), Intentional hold of therapy (pt restarted warfarin today after it being on hold since 4/28/2017)           OBJECTIVE    INR   Date Value Ref Range Status   08/02/2017 1.06 0.86 - 1.14 Final       ASSESSMENT / PLAN  INR assessment SUB on hold since 4/28/17   Recheck INR In: 4 DAYS    INR Location Clinic INR drawn in ED 8/2/17     Anticoagulation Summary as of 8/4/2017     INR goal 1.5-2.0   Today's INR 1.06! (8/2/2017)   Maintenance plan 1 mg (1 mg x 1) on Mon, Fri; 1.5 mg (1 mg x 1.5) all other days   Full instructions 1 mg on Mon, Fri; 1.5 mg all other days   Weekly total 9.5 mg   Plan last modified Katia Landrum RN (2/2/2017)   Next INR check 8/8/2017   Priority INR   Target end date Indefinite    Indications   Atrial fibrillation with rapid ventricular response (H) (Resolved) [I48.91]  DVT (deep venous thrombosis) [I82.409]  Long term current use of anticoagulant therapy [Z79.01]         Anticoagulation Episode Summary     INR check location     Preferred lab     Send INR reminders to [facility] POOL    Comments * Actual INR goal is 1.7-2.3  please follow Dec 2015 INR referral (June 2016 goal range is an error)      Anticoagulation Care Providers     Provider Role Specialty Phone number    Josefina Gómez MD [facility] [phone]            See the Encounter Report to view Anticoagulation Flowsheet and Dosing Calendar (Go to Encounters tab in chart review, and find the Anticoagulation Therapy Visit)    Jessica Johnson RN

## 2017-08-07 ENCOUNTER — CARE COORDINATION (OUTPATIENT)
Dept: CARE COORDINATION | Facility: CLINIC | Age: 82
End: 2017-08-07

## 2017-08-07 ENCOUNTER — TELEPHONE (OUTPATIENT)
Dept: FAMILY MEDICINE | Facility: CLINIC | Age: 82
End: 2017-08-07

## 2017-08-07 NOTE — PROGRESS NOTES
Clinic Care Coordination Contact  OUTREACH    Referral Information:  Referral Source: ED Follow-Up  Reason for Contact: Hospital follow up        Universal Utilization:   ED Visits in last year: 3  Hospital visits in last year: 1  Last PCP appointment: 07/07/17  Missed Appointments:  (N/A)  Concerns: ED visits x 3 this year  Multiple Providers or Specialists:  (Dr. N[facility])    Clinical Concerns:  Current Medical Concerns: Chest Pain, Dizziness, Abdominal Pain.   Current Behavioral Concerns: Unable to assess  Education Provided to patient: Reviewed discharge instructions  Clinical Pathway Name: None      Medication Management: Reports she takes medications as directed.       Functional Status:  Mobility Status: Independent w/Device  Equipment Currently Used at Home: walker, standard  Transportation: Family           Psychosocial:  Current living arrangement:: I live alone, I live in a private home with family  Financial/Insurance: [employer] for Seniors    Advanced Care Plans/Directives on file: Yes    Patient Understanding: Patient reports she does not feel well today. Stated she feels the same way she did the day she went to the emergency room.  Chest Pain: Denies having chest pain or shortness or breath.   Dizziness: Feels dizzy when she turns her head, or when she stands up after sitting. Stated she does have a history of ear problems.   Abdominal Pain: Pain in her abdomen that starts in her stomach and goes up to her throat. Pain is worse in the evening.       Patient asked RN Care Coordinator to contact clinic to find out if she could be scheduled to see a provider tomorrow. She is scheduled for INR lab draw tomrrow at 2:30.      Spoke with scheduling, and left voice mail message for Triage Nurse.     Upcoming appointment: 08/08/17 INR     Plan: RN Care Coordinator will await Triage Nurse call.     Cathy Lentz RN  Clinic Care Coordinator  [facility]  [phone]

## 2017-08-07 NOTE — PROGRESS NOTES
Clinic Care Coordination Contact  Care Team Conversations    Spoke with [facility] Triage Nurse, Shellie returned call.  Patient has been scheduled to see Dr. Gómez tomorrow at 3:00.     Notified patient of appointment date and time.     Cathy Lentz RN  Clinic Care Coordinator  [facility]  [phone]

## 2017-08-07 NOTE — TELEPHONE ENCOUNTER
Reason for Call:  Other concern     Detailed comments: Cathy bustillo  is calling with concerns   Pt is having dizziness and abdominal pain, pt was seen in the ER on aug 2nd tor this same issue   INR 8/8/17 they are wanting to know if she can be seen at the same time as her lab wanting to speak to RN     Phone Number Patient can be reached at: Other phone number:  [phone] Cathy     Best Time: any     Can we leave a detailed message on this number? YES    Call taken on 8/7/2017 at 10:28 AM by Heidi Andersen

## 2017-08-07 NOTE — PROGRESS NOTES
Clinic Care Coordination Contact  [facility]/Voicemail    Referral Source: ED Follow-Up  Clinical Data: Care Coordinator Outreach  Outreach attempted x 1.  Left message on voicemail with call back information and requested return call.  Plan: Care Coordinator will try to reach patient again in 1-2 business days.    Cathy Lentz RN  Clinic Care Coordinator  [facility]  [phone]

## 2017-08-08 ENCOUNTER — OFFICE VISIT (OUTPATIENT)
Dept: FAMILY MEDICINE | Facility: CLINIC | Age: 82
End: 2017-08-08
Payer: COMMERCIAL

## 2017-08-08 VITALS
OXYGEN SATURATION: 94 % | HEART RATE: 95 BPM | BODY MASS INDEX: 24.49 KG/M2 | WEIGHT: 117.2 LBS | SYSTOLIC BLOOD PRESSURE: 124 MMHG | TEMPERATURE: 98.8 F | DIASTOLIC BLOOD PRESSURE: 76 MMHG

## 2017-08-08 DIAGNOSIS — F32.0 MILD MAJOR DEPRESSION (H): Primary | ICD-10-CM

## 2017-08-08 DIAGNOSIS — K21.9 GASTROESOPHAGEAL REFLUX DISEASE WITHOUT ESOPHAGITIS: ICD-10-CM

## 2017-08-08 DIAGNOSIS — F41.9 ANXIETY: ICD-10-CM

## 2017-08-08 PROCEDURE — 99214 OFFICE O/P EST MOD 30 MIN: CPT | Performed by: FAMILY MEDICINE

## 2017-08-08 RX ORDER — SUCRALFATE 1 G/1
1 TABLET ORAL 4 TIMES DAILY
Qty: 40 TABLET | Refills: 1 | Status: SHIPPED | OUTPATIENT
Start: 2017-08-08 | End: 2017-10-27

## 2017-08-08 NOTE — PROGRESS NOTES
SUBJECTIVE:                                                    Ellie Leigh is a 86 year old female who presents to clinic today for the following health issues:      ED/UC Followup:    Facility:  [facility]  Date of visit: 8/2/2017  Reason for visit: lightheaded/dizziness  Current Status: stable, feeling better this am, not as good this afternoon    She feels better but sxs come and go and are directly related to hwo stressed she has been with her daughter and her cancer struggle     She does admittedly have more GERD sxs now had been off carafate and just on others  She had done well but now finds off and on some break through sxs          Depression and Anxiety Follow-Up    Status since last visit: Worsened     Other associated symptoms:neck is tight and feels tension     Complicating factors:     Significant life event: Yes-  Daughter is struggling with cancer dx      Current substance abuse: None    PHQ-9 SCORE 6/6/2016 1/13/2017 7/7/2017   Total Score - - -   Total Score 2 0 2     ALYSSA-7 SCORE 11/3/2015 1/5/2016 1/13/2017   Total Score - - -   Total Score 0 0 0       PHQ-9  English  PHQ-9   Any Language  GAD7        Problem list and histories reviewed & adjusted, as indicated.  Additional history: as documented    Labs reviewed in EPIC    Reviewed and updated as needed this visit by clinical staffTobacco  Allergies  Meds  Problems  Med Hx  Surg Hx  Fam Hx  Soc Hx        Reviewed and updated as needed this visit by Provider  Allergies  Meds  Problems         ROS:  Constitutional, HEENT, cardiovascular, pulmonary, gi and gu systems are negative, except as otherwise noted.      OBJECTIVE:                                                    /76 (BP Location: Right arm, Patient Position: Chair, Cuff Size: Adult Regular)  Pulse 95  Temp 98.8  F (37.1  C) (Tympanic)  Wt 117 lb 3.2 oz (53.2 kg)  LMP 06/15/1985  SpO2 94%  BMI 24.49 kg/m2  Body mass index is 24.49 kg/(m^2).  GENERAL  APPEARANCE: healthy, alert and no distress  RESP: lungs clear to auscultation - no rales, rhonchi or wheezes  CV: regular rates and rhythm, normal S1 S2, no S3 or S4 and no murmur, click or rub  ABDOMEN: soft, nontender, without hepatosplenomegaly or masses and bowel sounds normal  PSYCH: mentation appears normal and anxious         ASSESSMENT/PLAN:                                                    1. Gastroesophageal reflux disease without esophagitis  Break through sxs   - sucralfate (CARAFATE) 1 GM tablet; Take 1 tablet (1 g) by mouth 4 times daily  Dispense: 40 tablet; Refill: 1    2. Mild major depression  Fair control    3. Anxiety  Fair control with some neck tension related to her stress and anxiety       Patient Instructions   Rice bag, hot, for neck pain     Restart the carafate and use this 3 times daily for 3-4 days     Stick with diet of gluten small meals frequently during the day   Risks, benefits, side effects and rationale for treatment plan fully discussed with the patient and understanding expressed.     Josefina Gómez MD  [facility]

## 2017-08-08 NOTE — NURSING NOTE
"Chief Complaint   Patient presents with     Er F/u       Initial /76 (BP Location: Right arm, Patient Position: Chair, Cuff Size: Adult Regular)  Pulse 95  Temp 98.8  F (37.1  C) (Tympanic)  Wt 117 lb 3.2 oz (53.2 kg)  LMP 06/15/1985  SpO2 94%  BMI 24.49 kg/m2 Estimated body mass index is 24.49 kg/(m^2) as calculated from the following:    Height as of 8/2/17: 4' 10\" (1.473 m).    Weight as of this encounter: 117 lb 3.2 oz (53.2 kg).  Medication Reconciliation: complete    Health Maintenance that is potentially due pending provider review:  NONE    n/a    Is there anyone who you would like to be able to receive your results? Not Applicable  If yes have patient fill out JOSUE    "

## 2017-08-08 NOTE — MR AVS SNAPSHOT
After Visit Summary   8/8/2017    Ellie Leigh    MRN: [id number]           Patient Information     Date Of Birth          9/12/1930        Visit Information        Provider Department      8/8/2017 3:00 PM Josefina Gómez MD [facility]        Today's Diagnoses     Gastroesophageal reflux disease without esophagitis          Care Instructions    Rice bag, hot, for neck pain     Restart the carafate and use this 3 times daily for 3-4 days     Stick with diet of gluten small meals frequently during the day           Follow-ups after your visit        Your next 10 appointments already scheduled     Aug 09, 2017  8:45 AM CDT   [facility] Visit with [place] [facility]   [facility]    [address] [address]   [phone]            Sep 01, 2017  4:00 PM CDT   Office Visit with Josefina Gómez MD   [facility]    [address] [place] [id number] [phone]           Bring a current list of meds and any records pertaining to this visit. For Physicals, please bring immunization records and any forms needing to be filled out. Please arrive 10 minutes early to complete paperwork.            Oct 11, 2017  3:00 PM CDT   Remote PPM Check with RUEDA TECH1   [facility] [place] ([facility])    [address] [place] [id number] [phone]           This appointment is for a remote check of your pacemaker.  This is not an appointment at the office.            Jan 03, 2018  2:00 PM CST   LAB with [facility] ([facility])    [address] [address]   [phone]           Patient must bring picture ID. Patient should be prepared to give a urine specimen  Please do not eat 10-12 hours before your appointment if you are coming in fasting  "for labs on lipids, cholesterol, or glucose (sugar). Pregnant women should follow their Care Team instructions. Water with medications is okay. Do not drink coffee or other fluids. If you have concerns about taking  your medications, please ask at office or if scheduling via [place], send a message by clicking on Secure Messaging, Message Your Care Team.            Jan 03, 2018  2:45 PM CST   MA SCREENING DIGITAL RIGHT with [facility]MA2   [facility] ([facility])    [address] [place]  [facility] [address] [phone]           Do not use any powder, lotion or deodorant under your arms or on your breast. If you do, we will ask you to remove it before your exam.  Wear comfortable, two-piece clothing.  If you have any allergies, tell your care team.  Bring any previous mammograms from other facilities or have them mailed to the breast center. Three-dimensional (3D) mammograms are available at [place] locations in [facility], and [place]. [facility] locations include [facility] in [place]. Benefits of 3D mammograms include: - Improved rate of cancer detection - Decreases your chance of having to go back for more tests, which means fewer: - \"False-positive\" results (This means that there is an abnormal area but it isn't cancer.) - Invasive testing procedures, such as a biopsy or surgery - Can provide clearer images of the breast if you have dense breast tissue. 3D mammography is an optional exam that anyone can have with a 2D mammogram. It doesn't replace or take the place of a 2D mammogram. 2D mammograms remain an effective screening test for all women.  Not all insurance companies cover the cost of a 3D mammogram. Check with your insurance.            Jan 11, 2018 11:00 AM CST   Return Visit with Gayle Nieves MD   [facility] ([facility])    [facility]  [address]  [facility] [address]   [phone]              Who to contact     If you have questions or need follow up information about today's clinic visit or your schedule please contact [facility] directly at [phone].  Normal or non-critical lab and imaging results will be communicated to you by MyChart, letter or phone within 4 business days after the clinic has received the results. If you do not hear from us within 7 days, please contact the clinic through MyChart or phone. If you have a critical or abnormal lab result, we will notify you by phone as soon as possible.  Submit refill requests through [employer] or call your pharmacy and they will forward the refill request to us. Please allow 3 business days for your refill to be completed.          Additional Information About Your Visit        [employer] Information     [employer] lets you send messages to your doctor, view your test results, renew your prescriptions, schedule appointments and more. To sign up, go to [email or web address] . Click on \"Log in\" on the left side of the screen, which will take you to the Welcome page. Then click on \"Sign up Now\" on the right side of the page.     You will be asked to enter the access code listed below, as well as some personal information. Please follow the directions to create your username and password.     Your access code is: [email or web address]  Expires: 2017  4:24 PM     Your access code will  in 90 days. If you need help or a new code, please call your [facility] or [phone].        Care EveryWhere ID     This is your Care EveryWhere ID. This could be used by other organizations to access your [facility] medical records  [id number]        Your Vitals Were     Pulse Temperature Last Period Pulse Oximetry BMI (Body Mass Index)       95 98.8  F (37.1  C) (Tympanic) 06/15/1985 94% 24.49 kg/m2        Blood Pressure from Last 3 Encounters:   17 124/76   17 139/72   17 " 119/60    Weight from Last 3 Encounters:   08/08/17 117 lb 3.2 oz (53.2 kg)   08/02/17 114 lb (51.7 kg)   07/27/17 115 lb 9.6 oz (52.4 kg)              Today, you had the following     No orders found for display         Today's Medication Changes          These changes are accurate as of: 8/8/17  3:43 PM.  If you have any questions, ask your nurse or doctor.               Start taking these medicines.        Dose/Directions    sucralfate 1 GM tablet   Commonly known as:  CARAFATE   Used for:  Gastroesophageal reflux disease without esophagitis   Started by:  Josefina Gómez MD        Dose:  1 g   Take 1 tablet (1 g) by mouth 4 times daily   Quantity:  40 tablet   Refills:  1            Where to get your medicines      These medications were sent to [id number] - [place], [place] - [address]  [address] [address]     Phone:  [phone]     sucralfate 1 GM tablet                Primary Care Provider Office Phone # Fax #    Josefina Gómez [phone] [address] [place] [address]        Equal Access to Services     [facility] campusNOLBERTO AH: Hadii daniel ku hadasho Soomaali, waaxda luqadaha, qaybta kaalmada adeegyada, verónica bedoya . So [facility] [phone].    ATENCIÓN: Si habla español, tiene a sahni disposición servicios gratuitos de asistencia lingüística. Llame al [phone].    We comply with applicable federal civil rights laws and [place] laws. We do not discriminate on the basis of race, color, national origin, age, disability sex, sexual orientation or gender identity.            Thank you!     Thank you for choosing [facility]  for your care. Our goal is always to provide you with excellent care. Hearing back from our patients is one way we can continue to improve our services. Please take a few minutes to complete the written survey that you may receive in the mail after your visit  with us. Thank you!             Your Updated Medication List - Protect others around you: Learn how to safely use, store and throw away your medicines at www.disposemymeds.org.          This list is accurate as of: 8/8/17  3:43 PM.  Always use your most recent med list.                   Brand Name Dispense Instructions for use Diagnosis    albuterol 108 (90 BASE) MCG/ACT Inhaler    PROAIR HFA/PROVENTIL HFA/VENTOLIN HFA    3 Inhaler    Inhale 2 puffs into the lungs every 6 hours as needed for shortness of breath / dyspnea    Mild persistent asthma without complication       CRANBERRY      Take 475 mg by mouth 2 times daily.        diclofenac 1 % Gel topical gel    VOLTAREN    100 g    APPLY 4 GRAMS TO KNEES OR 2 GRAMS TO HANDS FOUR TIMES DAILY USING ENCLOSED DOSING CARD.    Primary osteoarthritis involving multiple joints       ipratropium - albuterol 0.5 mg/2.5 mg/3 mL 0.5-2.5 (3) MG/3ML neb solution    DUONEB    180 mL    TAKE 1 VIAL (3 MLS) BY NEBULIZATION EVERY 6 HOURS AS NEEDED FOR SHORTNESS OF BREATH / DYSPNEA OR WHEEZING    Chronic obstructive pulmonary disease, unspecified COPD type (H)       levothyroxine 50 MCG tablet    SYNTHROID/LEVOTHROID    90 tablet    Take 1 tablet (50 mcg) by mouth daily    Hypothyroidism, unspecified type       metoprolol 25 MG 24 hr tablet    TOPROL-XL    180 tablet    TAKE TWO TABLETS BY MOUTH TWICE DAILY    Essential hypertension, benign       polyethylene glycol powder    MIRALAX    510 g    Take 17 g by mouth daily as needed    Constipation       probiotic Caps      Take 1 capsule by mouth every evening        ranitidine 150 MG tablet    ZANTAC    60 tablet    Take 1 tablet (150 mg) by mouth 2 times daily    Gastroesophageal reflux disease without esophagitis       sucralfate 1 GM tablet    CARAFATE    40 tablet    Take 1 tablet (1 g) by mouth 4 times daily    Gastroesophageal reflux disease without esophagitis       SYMBICORT 160-4.5 MCG/ACT Inhaler   Generic drug:   budesonide-formoterol     10.2 g    INHALE 2 PUFFS INTO THE LUNGS 2 TIMES DAILY    Mild persistent asthma without complication       warfarin 1 MG tablet    COUMADIN    105 tablet    Take 1 mg on Mon, Fri; 1.5 mg all other days  or as directed by Anticoagulation Clinic    Long term current use of anticoagulant therapy

## 2017-08-08 NOTE — PATIENT INSTRUCTIONS
Rice bag, hot, for neck pain     Restart the carafate and use this 3 times daily for 3-4 days     Stick with diet of gluten small meals frequently during the day

## 2017-08-09 ENCOUNTER — ANTICOAGULATION THERAPY VISIT (OUTPATIENT)
Dept: ANTICOAGULATION | Facility: CLINIC | Age: 82
End: 2017-08-09
Payer: COMMERCIAL

## 2017-08-09 DIAGNOSIS — Z79.01 LONG TERM CURRENT USE OF ANTICOAGULANT THERAPY: ICD-10-CM

## 2017-08-09 LAB — INR POINT OF CARE: 1.4 (ref 0.86–1.14)

## 2017-08-09 PROCEDURE — 85610 PROTHROMBIN TIME: CPT | Mod: QW

## 2017-08-09 PROCEDURE — 36416 COLLJ CAPILLARY BLOOD SPEC: CPT

## 2017-08-09 PROCEDURE — 99207 ZZC NO CHARGE NURSE ONLY: CPT

## 2017-08-09 NOTE — MR AVS SNAPSHOT
Ellie Leigh   8/9/2017 8:45 AM   Anticoagulation Therapy Visit    Description:  86 year old female   Provider:  [place] [facility]   Department:  [facility]           INR as of 8/9/2017     Today's INR 1.4!      Anticoagulation Summary as of 8/9/2017     INR goal 1.5-2.0   Today's INR 1.4!   Full instructions 8/9: 2 mg; Otherwise 1 mg on Mon, Fri; 1.5 mg all other days   Next INR check 8/21/2017    Indications   Atrial fibrillation with rapid ventricular response (H) (Resolved) [I48.91]  DVT (deep venous thrombosis) [I82.409]  Long term current use of anticoagulant therapy [Z79.01]         Description     Take 2 mg today. Then resume previous maintenance dose.      Contact Numbers     Please call [phone] to cancel and/or reschedule your appointment.  Please call [phone] with any problems or questions regarding your therapy          August 2017 Details    Sun Mon Tue Wed Thu Fri Sat       1               2               3               4               5                 6               7               8               9      2 mg   See details      10      1.5 mg         11      1 mg         12      1.5 mg           13      1.5 mg         14      1 mg         15      1.5 mg         16      1.5 mg         17      1.5 mg         18      1 mg         19      1.5 mg           20      1.5 mg         21            22               23               24               25               26                 27               28               29               30               31                  Date Details   08/09 This INR check       Date of next INR:  8/21/2017         How to take your warfarin dose     To take:  1 mg Take 1 of the 1 mg tablets.    To take:  1.5 mg Take 1.5 of the 1 mg tablets.    To take:  2 mg Take 2 of the 1 mg tablets.

## 2017-08-09 NOTE — PROGRESS NOTES
ANTICOAGULATION FOLLOW-UP CLINIC VISIT    Patient Name:  Ellie Leigh  Date:  8/9/2017  Contact Type:  Face to Face    SUBJECTIVE:     Patient Findings     Positives Change in diet/appetite (has been eating more greens than normal)    Comments Patient will be out of town until 8-21-17. Will recheck when back. Writer reviewed with patient that she should be consistent with greens.           OBJECTIVE    INR Protime   Date Value Ref Range Status   08/09/2017 1.4 (A) 0.86 - 1.14 Final       ASSESSMENT / PLAN  INR assessment SUB long hold prior   Recheck INR In: 10 DAYS    INR Location Clinic      Anticoagulation Summary as of 8/9/2017     INR goal 1.5-2.0   Today's INR 1.4!   Maintenance plan 1 mg (1 mg x 1) on Mon, Fri; 1.5 mg (1 mg x 1.5) all other days   Full instructions 8/9: 2 mg; Otherwise 1 mg on Mon, Fri; 1.5 mg all other days   Weekly total 9.5 mg   Plan last modified Katia Landrum RN (2/2/2017)   Next INR check 8/21/2017   Priority INR   Target end date Indefinite    Indications   Atrial fibrillation with rapid ventricular response (H) (Resolved) [I48.91]  DVT (deep venous thrombosis) [I82.409]  Long term current use of anticoagulant therapy [Z79.01]         Anticoagulation Episode Summary     INR check location     Preferred lab     Send INR reminders to [facility] POOL    Comments * Actual INR goal is 1.7-2.3  please follow Dec 2015 INR referral (June 2016 goal range is an error)      Anticoagulation Care Providers     Provider Role Specialty Phone number    Josefina Gómez MD [facility] [phone]            See the Encounter Report to view Anticoagulation Flowsheet and Dosing Calendar (Go to Encounters tab in chart review, and find the Anticoagulation Therapy Visit)        Lissy Beaulieu RN

## 2017-08-17 ENCOUNTER — TELEPHONE (OUTPATIENT)
Dept: FAMILY MEDICINE | Facility: CLINIC | Age: 82
End: 2017-08-17

## 2017-08-17 NOTE — TELEPHONE ENCOUNTER
Heather from [facility] called and said Ellie is in their area visiting her daughter and would like to have her INR drawn there. They are asking if you could fax an order to them. They have not set apt for her yet and won't do it until they get orders. She is followed by Anticoagulation Clinic in [place] when she is home.     Fax to: [phone]  Phone if questions: [phone]

## 2017-08-22 ENCOUNTER — ANTICOAGULATION THERAPY VISIT (OUTPATIENT)
Dept: ANTICOAGULATION | Facility: CLINIC | Age: 82
End: 2017-08-22
Payer: COMMERCIAL

## 2017-08-22 DIAGNOSIS — I82.409 DVT (DEEP VENOUS THROMBOSIS) (H): ICD-10-CM

## 2017-08-22 DIAGNOSIS — Z79.01 LONG TERM CURRENT USE OF ANTICOAGULANT THERAPY: ICD-10-CM

## 2017-08-22 LAB — INR PPP: 2.2

## 2017-08-22 PROCEDURE — 99207 ZZC NO CHARGE NURSE ONLY: CPT | Performed by: REGISTERED NURSE

## 2017-08-22 NOTE — PROGRESS NOTES
ANTICOAGULATION FOLLOW-UP CLINIC VISIT    Patient Name:  Ellie Leigh  Date:  8/22/2017  Contact Type:  Telephone/ Ellie    SUBJECTIVE:     Patient Findings     Positives No Problem Findings           OBJECTIVE    INR   Date Value Ref Range Status   08/22/2017 2.2  Final       ASSESSMENT / PLAN  No question data found.  Anticoagulation Summary as of 8/22/2017     INR goal 1.5-2.0   Today's INR 2.2!   Maintenance plan 1 mg (1 mg x 1) on Mon, Fri; 1.5 mg (1 mg x 1.5) all other days   Full instructions 1 mg on Mon, Fri; 1.5 mg all other days   Weekly total 9.5 mg   Plan last modified Katia Landrum RN (2/2/2017)   Next INR check 9/19/2017   Priority INR   Target end date Indefinite    Indications   Atrial fibrillation with rapid ventricular response (H) (Resolved) [I48.91]  DVT (deep venous thrombosis) [I82.409]  Long term current use of anticoagulant therapy [Z79.01]         Anticoagulation Episode Summary     INR check location     Preferred lab     Send INR reminders to [facility] POOL    Comments * Actual INR goal is 1.7-2.3  please follow Dec 2015 INR referral (June 2016 goal range is an error)      Anticoagulation Care Providers     Provider Role Specialty Phone number    Josefina Gómez MD [facility] [phone]            See the Encounter Report to view Anticoagulation Flowsheet and Dosing Calendar (Go to Encounters tab in chart review, and find the Anticoagulation Therapy Visit)        Aysha Canada RN

## 2017-08-22 NOTE — MR AVS SNAPSHOT
Ellie CARVER Loushivani   8/22/2017   Anticoagulation Therapy Visit    Description:  86 year old female   Provider:  Aysha Canada, RN   Department:  [facility]           INR as of 8/22/2017     Today's INR 2.2!      Anticoagulation Summary as of 8/22/2017     INR goal 1.5-2.0   Today's INR 2.2!   Full instructions 1 mg on Mon, Fri; 1.5 mg all other days   Next INR check 9/19/2017    Indications   Atrial fibrillation with rapid ventricular response (H) (Resolved) [I48.91]  DVT (deep venous thrombosis) [I82.409]  Long term current use of anticoagulant therapy [Z79.01]         Description     Warfarin dose: 1mg MF and 1.5mg the rest of the days of the week.        August 2017 Details    Sun Mon Tue Wed Thu Fri Sat       1               2               3               4               5                 6               7               8               9               10               11               12                 13               14               15               16               17               18               19                 20               21               22      1.5 mg   See details      23      1.5 mg         24      1.5 mg         25      1 mg         26      1.5 mg           27      1.5 mg         28      1 mg         29      1.5 mg         30      1.5 mg         31      1.5 mg            Date Details   08/22 This INR check               How to take your warfarin dose     To take:  1 mg Take 1 of the 1 mg tablets.    To take:  1.5 mg Take 1.5 of the 1 mg tablets.           September 2017 Details    Sun Mon Tue Wed Thu Fri Sat          1      1 mg         2      1.5 mg           3      1.5 mg         4      1 mg         5      1.5 mg         6      1.5 mg         7      1.5 mg         8      1 mg         9      1.5 mg           10      1.5 mg         11      1 mg         12      1.5 mg         13      1.5 mg         14      1.5 mg         15      1 mg         16      1.5 mg           17      1.5 mg          18      1 mg         19            20               21               22               23                 24               25               26               27               28               29               30                Date Details   No additional details    Date of next INR:  9/19/2017         How to take your warfarin dose     To take:  1 mg Take 1 of the 1 mg tablets.    To take:  1.5 mg Take 1.5 of the 1 mg tablets.

## 2017-09-01 ENCOUNTER — OFFICE VISIT (OUTPATIENT)
Dept: FAMILY MEDICINE | Facility: CLINIC | Age: 82
End: 2017-09-01
Payer: COMMERCIAL

## 2017-09-01 VITALS
SYSTOLIC BLOOD PRESSURE: 114 MMHG | BODY MASS INDEX: 24.56 KG/M2 | TEMPERATURE: 97.7 F | DIASTOLIC BLOOD PRESSURE: 64 MMHG | WEIGHT: 117 LBS | HEIGHT: 58 IN | HEART RATE: 84 BPM | OXYGEN SATURATION: 96 %

## 2017-09-01 DIAGNOSIS — Z23 NEED FOR PROPHYLACTIC VACCINATION AND INOCULATION AGAINST INFLUENZA: ICD-10-CM

## 2017-09-01 DIAGNOSIS — J44.9 CHRONIC OBSTRUCTIVE PULMONARY DISEASE, UNSPECIFIED COPD TYPE (H): Primary | ICD-10-CM

## 2017-09-01 PROCEDURE — 90662 IIV NO PRSV INCREASED AG IM: CPT | Performed by: FAMILY MEDICINE

## 2017-09-01 PROCEDURE — G0008 ADMIN INFLUENZA VIRUS VAC: HCPCS | Performed by: FAMILY MEDICINE

## 2017-09-01 PROCEDURE — 99213 OFFICE O/P EST LOW 20 MIN: CPT | Mod: 25 | Performed by: FAMILY MEDICINE

## 2017-09-01 NOTE — NURSING NOTE
"No chief complaint on file.      Initial /64 (BP Location: Right arm, Cuff Size: Adult Regular)  Pulse 84  Temp 97.7  F (36.5  C) (Tympanic)  Ht 4' 10\" (1.473 m)  Wt 117 lb (53.1 kg)  LMP 06/15/1985  SpO2 96%  BMI 24.45 kg/m2 Estimated body mass index is 24.45 kg/(m^2) as calculated from the following:    Height as of this encounter: 4' 10\" (1.473 m).    Weight as of this encounter: 117 lb (53.1 kg).  Medication Reconciliation: complete    Health Maintenance that is potentially due pending provider review:  NONE    Is there anyone who you would like to be able to receive your results? No  If yes have patient fill out JOSUE    Cailin Parikh MA       "

## 2017-09-01 NOTE — PROGRESS NOTES
"  SUBJECTIVE:   Ellie Leigh is a 86 year old female who presents to clinic today for the following health issues:          COPD Follow-Up    Symptoms are currently: stable    Current fatigue or dyspnea with ambulation: none    Shortness of breath: stable    Increased or change in Cough/Sputum: No    Fever(s): No    Baseline ambulation without stopping to rest:  2 blocks. Able to walk up 1 flights of stairs without stopping to rest.    Any ER/UC or hospital admissions since your last visit? No     History   Smoking Status     Never Smoker   Smokeless Tobacco     Never Used     No results found for: FEV1, TRO8BJE              Problem list and histories reviewed & adjusted, as indicated.  Additional history: as documented    Labs reviewed in EPIC    Reviewed and updated as needed this visit by clinical staff  Tobacco  Allergies  Meds  Problems  Med Hx  Surg Hx  Fam Hx  Soc Hx        Reviewed and updated as needed this visit by Provider  Allergies  Meds  Problems         ROS:  Constitutional, HEENT, cardiovascular, pulmonary, gi and gu systems are negative, except as otherwise noted.      OBJECTIVE:                                                    /64 (BP Location: Right arm, Cuff Size: Adult Regular)  Pulse 84  Temp 97.7  F (36.5  C) (Tympanic)  Ht 4' 10\" (1.473 m)  Wt 117 lb (53.1 kg)  LMP 06/15/1985  SpO2 96%  BMI 24.45 kg/m2  Body mass index is 24.45 kg/(m^2).  GENERAL APPEARANCE: healthy, alert and no distress  NECK: no adenopathy, no asymmetry, masses, or scars and thyroid normal to palpation  RESP: lungs clear to auscultation - no rales, rhonchi or wheezes  CV: regular rates and rhythm, normal S1 S2, no S3 or S4 and no murmur, click or rub  PSYCH: mentation appears normal and affect normal/bright         ASSESSMENT/PLAN:                                                    1. Chronic obstructive pulmonary disease, unspecified COPD type (H)  Doing well stable     Nausea is the best it has " been she is over all doing well     2. Need for prophylactic vaccination and inoculation against influenza    - FLU VACCINE, INCREASED ANTIGEN, PRESV FREE, AGE 65+ [55892]  - Vaccine Administration, Initial [58743]      Patient Instructions   Recheck with me in 2 months regarding nausea and breathing     Flu shot today       Josefina Gómez MD  [facility]  Injectable Influenza Immunization Documentation    1.  Is the person to be vaccinated sick today?  No    2. Does the person to be vaccinated have an allergy to eggs or to a component of the vaccine?  No    3. Has the person to be vaccinated today ever had a serious reaction to influenza vaccine in the past?  No    4. Has the person to be vaccinated ever had Guillain-Stanberry syndrome?  No     Form completed by Cailin Parikh MA

## 2017-09-01 NOTE — MR AVS SNAPSHOT
After Visit Summary   9/1/2017    Ellie Leigh    MRN: [id number]           Patient Information     Date Of Birth          9/12/1930        Visit Information        Provider Department      9/1/2017 4:00 PM Josefina Gómez MD [facility]        Care Instructions    Recheck with me in 2 months regarding nausea and breathing     Flu shot today           Follow-ups after your visit        Your next 10 appointments already scheduled     Oct 11, 2017  3:00 PM CDT   Remote PPM Check with RUEDA TECH1   [facility] [place] ([facility])    [facility]  [place] [id number] [phone]           This appointment is for a remote check of your pacemaker.  This is not an appointment at the office.            Jan 03, 2018  2:00 PM CST   LAB with [facility] ([facility])    [address] [facility] [id number] [phone]           Patient must bring picture ID. Patient should be prepared to give a urine specimen  Please do not eat 10-12 hours before your appointment if you are coming in fasting for labs on lipids, cholesterol, or glucose (sugar). Pregnant women should follow their Care Team instructions. Water with medications is okay. Do not drink coffee or other fluids. If you have concerns about taking  your medications, please ask at office or if scheduling via [employer], send a message by clicking on Secure Messaging, Message Your Care Team.            Jan 03, 2018  2:45 PM CST   MA SCREENING DIGITAL RIGHT with [address] [facility] ([facility])    [address] [facility] [address] [phone]           Do not use any powder, lotion or deodorant under your arms or on your breast. If you do, we will ask you to remove it before your exam.  Wear comfortable, two-piece clothing.  If you have any allergies, tell your care team.  Bring any  "previous mammograms from other facilities or have them mailed to the breast center. Three-dimensional (3D) mammograms are available at [facility] locations in [facility], [facility], [facility], and [place]. [facility] locations include [place] and [facility] in [place]. Benefits of 3D mammograms include: - Improved rate of cancer detection - Decreases your chance of having to go back for more tests, which means fewer: - \"False-positive\" results (This means that there is an abnormal area but it isn't cancer.) - Invasive testing procedures, such as a biopsy or surgery - Can provide clearer images of the breast if you have dense breast tissue. 3D mammography is an optional exam that anyone can have with a 2D mammogram. It doesn't replace or take the place of a 2D mammogram. 2D mammograms remain an effective screening test for all women.  Not all insurance companies cover the cost of a 3D mammogram. Check with your insurance.            Jan 11, 2018 11:00 AM CST   Return Visit with Gayle Nieves MD   [facility] ([facility])    [facility]  [address]  [facility] [address] [phone]              Who to contact     If you have questions or need follow up information about today's clinic visit or your schedule please contact [facility] [address] directly at [phone].  Normal or non-critical lab and imaging results will be communicated to you by MyChart, letter or phone within 4 business days after the clinic has received the results. If you do not hear from us within 7 days, please contact the clinic through MyChart or phone. If you have a critical or abnormal lab result, we will notify you by phone as soon as possible.  Submit refill requests through [employer] or call your pharmacy and they will forward the refill request to us. Please allow 3 business days for your refill to be completed.          Additional " "Information About Your Visit        MyChart Information     [employer] lets you send messages to your doctor, view your test results, renew your prescriptions, schedule appointments and more. To sign up, go to [email or web address] . Click on \"Log in\" on the left side of the screen, which will take you to the Welcome page. Then click on \"Sign up Now\" on the right side of the page.     You will be asked to enter the access code listed below, as well as some personal information. Please follow the directions to create your username and password.     Your access code is: [email or web address]  Expires: 2017  4:11 PM     Your access code will  in 90 days. If you need help or a new code, please call your [facility] clinic or [phone].        Care EveryWhere ID     This is your Care EveryWhere ID. This could be used by other organizations to access your [facility] medical records  [id number]        Your Vitals Were     Pulse Temperature Height Last Period Pulse Oximetry BMI (Body Mass Index)    84 97.7  F (36.5  C) (Tympanic) 4' 10\" (1.473 m) 06/15/1985 96% 24.45 kg/m2       Blood Pressure from Last 3 Encounters:   17 114/64   17 124/76   17 139/72    Weight from Last 3 Encounters:   17 117 lb (53.1 kg)   17 117 lb 3.2 oz (53.2 kg)   17 114 lb (51.7 kg)              Today, you had the following     No orders found for display       Primary Care Provider Office Phone # Fax #    Josefina Gómez [phone] [address] [place] [address]        Equal Access to Services     BROOKS JOSÉ : bill Roberto, verónica capps. So [facility] [phone].    ATENCIÓN: Si habla español, tiene a sahni disposición servicios gratuitos de asistencia lingüística. Llame al [phone].    We comply with applicable federal civil rights laws and [place] laws. We do not discriminate on the " basis of race, color, national origin, age, disability sex, sexual orientation or gender identity.            Thank you!     Thank you for choosing [facility]  for your care. Our goal is always to provide you with excellent care. Hearing back from our patients is one way we can continue to improve our services. Please take a few minutes to complete the written survey that you may receive in the mail after your visit with us. Thank you!             Your Updated Medication List - Protect others around you: Learn how to safely use, store and throw away your medicines at www.disposemymeds.org.          This list is accurate as of: 9/1/17  4:11 PM.  Always use your most recent med list.                   Brand Name Dispense Instructions for use Diagnosis    albuterol 108 (90 BASE) MCG/ACT Inhaler    PROAIR HFA/PROVENTIL HFA/VENTOLIN HFA    3 Inhaler    Inhale 2 puffs into the lungs every 6 hours as needed for shortness of breath / dyspnea    Mild persistent asthma without complication       CRANBERRY      Take 475 mg by mouth 2 times daily.        diclofenac 1 % Gel topical gel    VOLTAREN    100 g    APPLY 4 GRAMS TO KNEES OR 2 GRAMS TO HANDS FOUR TIMES DAILY USING ENCLOSED DOSING CARD.    Primary osteoarthritis involving multiple joints       ipratropium - albuterol 0.5 mg/2.5 mg/3 mL 0.5-2.5 (3) MG/3ML neb solution    DUONEB    180 mL    TAKE 1 VIAL (3 MLS) BY NEBULIZATION EVERY 6 HOURS AS NEEDED FOR SHORTNESS OF BREATH / DYSPNEA OR WHEEZING    Chronic obstructive pulmonary disease, unspecified COPD type (H)       levothyroxine 50 MCG tablet    SYNTHROID/LEVOTHROID    90 tablet    Take 1 tablet (50 mcg) by mouth daily    Hypothyroidism, unspecified type       metoprolol 25 MG 24 hr tablet    TOPROL-XL    180 tablet    TAKE TWO TABLETS BY MOUTH TWICE DAILY    Essential hypertension, benign       polyethylene glycol powder    MIRALAX    510 g    Take 17 g by mouth daily as needed    Constipation        probiotic Caps      Take 1 capsule by mouth every evening        ranitidine 150 MG tablet    ZANTAC    60 tablet    Take 1 tablet (150 mg) by mouth 2 times daily    Gastroesophageal reflux disease without esophagitis       sucralfate 1 GM tablet    CARAFATE    40 tablet    Take 1 tablet (1 g) by mouth 4 times daily    Gastroesophageal reflux disease without esophagitis       SYMBICORT 160-4.5 MCG/ACT Inhaler   Generic drug:  budesonide-formoterol     10.2 g    INHALE 2 PUFFS INTO THE LUNGS 2 TIMES DAILY    Mild persistent asthma without complication       warfarin 1 MG tablet    COUMADIN    105 tablet    Take 1 mg on Mon, Fri; 1.5 mg all other days  or as directed by Anticoagulation Clinic    Long term current use of anticoagulant therapy

## 2017-09-07 DIAGNOSIS — E03.9 HYPOTHYROIDISM, UNSPECIFIED TYPE: Chronic | ICD-10-CM

## 2017-09-07 NOTE — TELEPHONE ENCOUNTER
levothyroxine (SYNTHROID, LEVOTHROID) 50 MCG tablet     Last Written Prescription Date: 9/15/16  Last Quantity: 90, # refills: 3  Last Office Visit with G, P or [facility] prescribing provider: 9/1/17   Next 5 appointments (look out 90 days)     Oct 27, 2017  3:20 PM CDT   Office Visit with Josefina Gómez MD   [facility] ([facility]    [address] [place] [address] [phone]                   TSH   Date Value Ref Range Status   05/31/2017 1.91 0.40 - 4.00 mU/L Final

## 2017-09-11 RX ORDER — LEVOTHYROXINE SODIUM 50 UG/1
TABLET ORAL
Qty: 90 TABLET | Refills: 2 | Status: SHIPPED | OUTPATIENT
Start: 2017-09-11 | End: 2018-05-31

## 2017-09-13 DIAGNOSIS — I10 ESSENTIAL HYPERTENSION, BENIGN: ICD-10-CM

## 2017-09-13 DIAGNOSIS — I48.0 INTERMITTENT ATRIAL FIBRILLATION (H): ICD-10-CM

## 2017-09-13 RX ORDER — WARFARIN SODIUM 1 MG/1
TABLET ORAL
Qty: 105 TABLET | Refills: 0 | Status: SHIPPED | OUTPATIENT
Start: 2017-09-13 | End: 2017-09-19

## 2017-09-13 RX ORDER — METOPROLOL SUCCINATE 25 MG/1
TABLET, EXTENDED RELEASE ORAL
Qty: 180 TABLET | Refills: 0 | Status: SHIPPED | OUTPATIENT
Start: 2017-09-13 | End: 2017-10-22

## 2017-09-13 NOTE — TELEPHONE ENCOUNTER
Warfarin 1 mg  Last Written Prescription Date: 8/4/17  Last Fill Qty:105, # refills: 1  Last Office Visit with [facility], [facility] or  Health prescribing provider: 9/1/17  Next 5 appointments (look out 90 days)     Oct 27, 2017  3:20 PM CDT   Office Visit with Josefina Gómez MD   [facility] ([facility]    [address] [place] [address]   [phone]                   Date and Result of Last PT/INR:   Lab Results   Component Value Date    INR 2.2 08/22/2017    INR 1.4 08/09/2017    INR 1.06 08/02/2017    PT 24.4 06/29/2014      Metoprolol 25 mg      Last Written Prescription Date: 8/1/17  Last Fill Quantity: 180, # refills: 0  Last Office Visit with [facility], [facility] or [facility] prescribing provider: 9/1/17  Next 5 appointments (look out 90 days)     Oct 27, 2017  3:20 PM CDT   Office Visit with Josefina Gómez MD   [facility] ([facility])    [address] [place] [address]   [phone]                   Potassium   Date Value Ref Range Status   08/02/2017 3.5 3.4 - 5.3 mmol/L Final     Creatinine   Date Value Ref Range Status   08/02/2017 0.60 0.52 - 1.04 mg/dL Final     BP Readings from Last 3 Encounters:   09/01/17 114/64   08/08/17 124/76   08/02/17 139/72

## 2017-09-19 ENCOUNTER — ANTICOAGULATION THERAPY VISIT (OUTPATIENT)
Dept: ANTICOAGULATION | Facility: CLINIC | Age: 82
End: 2017-09-19
Payer: COMMERCIAL

## 2017-09-19 DIAGNOSIS — I82.409 DVT (DEEP VENOUS THROMBOSIS) (H): ICD-10-CM

## 2017-09-19 DIAGNOSIS — Z79.01 LONG TERM CURRENT USE OF ANTICOAGULANT THERAPY: ICD-10-CM

## 2017-09-19 LAB — INR POINT OF CARE: 1.9 (ref 0.86–1.14)

## 2017-09-19 PROCEDURE — 36416 COLLJ CAPILLARY BLOOD SPEC: CPT

## 2017-09-19 PROCEDURE — 85610 PROTHROMBIN TIME: CPT | Mod: QW

## 2017-09-19 PROCEDURE — 99207 ZZC NO CHARGE NURSE ONLY: CPT

## 2017-09-19 NOTE — PROGRESS NOTES
ANTICOAGULATION FOLLOW-UP CLINIC VISIT    Patient Name:  Ellie Leigh  Date:  9/19/2017  Contact Type:  Face to Face    SUBJECTIVE:     Patient Findings     Positives No Problem Findings    Comments Pt denies changes in meds, diet or activity.           OBJECTIVE    INR Protime   Date Value Ref Range Status   09/19/2017 1.9 (A) 0.86 - 1.14 Final       ASSESSMENT / PLAN  INR assessment THER    Recheck INR In: 3 WEEKS    INR Location Clinic      Anticoagulation Summary as of 9/19/2017     INR goal 1.5-2.0   Today's INR 1.9   Maintenance plan 1 mg (1 mg x 1) on Mon, Fri; 1.5 mg (1 mg x 1.5) all other days   Full instructions 1 mg on Mon, Fri; 1.5 mg all other days   Weekly total 9.5 mg   No change documented Angelia Bo RN   Plan last modified Katia Landrum RN (2/2/2017)   Next INR check 10/10/2017   Priority INR   Target end date Indefinite    Indications   Atrial fibrillation with rapid ventricular response (H) (Resolved) [I48.91]  DVT (deep venous thrombosis) [I82.409]  Long term current use of anticoagulant therapy [Z79.01]         Anticoagulation Episode Summary     INR check location     Preferred lab     Send INR reminders to [facility] POOL    Comments * Actual INR goal is 1.7-2.3  please follow Dec 2015 INR referral (June 2016 goal range is an error)      Anticoagulation Care Providers     Provider Role Specialty Phone number    Josefina Gómez MD [facility] [phone]            See the Encounter Report to view Anticoagulation Flowsheet and Dosing Calendar (Go to Encounters tab in chart review, and find the Anticoagulation Therapy Visit)    Dosage adjustment made based on physician directed care plan.    Angelia Bo RN

## 2017-09-19 NOTE — MR AVS SNAPSHOT
Ellie Leigh   9/19/2017 10:15 AM   Anticoagulation Therapy Visit    Description:  87 year old female   Provider:  [facility]   Department:  [facility]           INR as of 9/19/2017     Today's INR 1.9      Anticoagulation Summary as of 9/19/2017     INR goal 1.5-2.0   Today's INR 1.9   Full instructions 1 mg on Mon, Fri; 1.5 mg all other days   Next INR check 10/10/2017    Indications   Atrial fibrillation with rapid ventricular response (H) (Resolved) [I48.91]  DVT (deep venous thrombosis) [I82.409]  Long term current use of anticoagulant therapy [Z79.01]         Your next Anticoagulation Clinic appointment(s)     Oct 10, 2017 10:30 AM CDT   Anticoagulation Visit with [facility]   [facility]    [address] [address] [phone]              Contact Numbers     Please call [phone] to cancel and/or reschedule your appointment.  Please call [phone] with any problems or questions regarding your therapy          September 2017 Details    Sun Mon Tue Wed Thu Fri Sat          1               2                 3               4               5               6               7               8               9                 10               11               12               13               14               15               16                 17               18               19      1.5 mg   See details      20      1.5 mg         21      1.5 mg         22      1 mg         23      1.5 mg           24      1.5 mg         25      1 mg         26      1.5 mg         27      1.5 mg         28      1.5 mg         29      1 mg         30      1.5 mg          Date Details   09/19 This INR check               How to take your warfarin dose     To take:  1 mg Take 1 of the 1 mg tablets.    To take:  1.5 mg Take 1.5 of the 1 mg tablets.           October 2017 Details    Sun Mon Tue Wed Thu Fri Sat     1      1.5 mg         2      1 mg          3      1.5 mg         4      1.5 mg         5      1.5 mg         6      1 mg         7      1.5 mg           8      1.5 mg         9      1 mg         10            11               12               13               14                 15               16               17               18               19               20               21                 22               23               24               25               26               27               28                 29               30               31                    Date Details   No additional details    Date of next INR:  10/10/2017         How to take your warfarin dose     To take:  1 mg Take 1 of the 1 mg tablets.    To take:  1.5 mg Take 1.5 of the 1 mg tablets.

## 2017-09-20 ENCOUNTER — OFFICE VISIT (OUTPATIENT)
Dept: FAMILY MEDICINE | Facility: CLINIC | Age: 82
End: 2017-09-20
Payer: COMMERCIAL

## 2017-09-20 VITALS
BODY MASS INDEX: 24.37 KG/M2 | HEART RATE: 70 BPM | TEMPERATURE: 97.3 F | SYSTOLIC BLOOD PRESSURE: 102 MMHG | WEIGHT: 116.6 LBS | DIASTOLIC BLOOD PRESSURE: 56 MMHG | OXYGEN SATURATION: 94 % | RESPIRATION RATE: 18 BRPM

## 2017-09-20 DIAGNOSIS — J40 BRONCHITIS: Primary | ICD-10-CM

## 2017-09-20 PROCEDURE — 99213 OFFICE O/P EST LOW 20 MIN: CPT | Performed by: NURSE PRACTITIONER

## 2017-09-20 RX ORDER — PREDNISONE 20 MG/1
40 TABLET ORAL DAILY
Qty: 10 TABLET | Refills: 0 | Status: SHIPPED | OUTPATIENT
Start: 2017-09-20 | End: 2017-09-25

## 2017-09-20 ASSESSMENT — PAIN SCALES - GENERAL: PAINLEVEL: NO PAIN (0)

## 2017-09-20 NOTE — NURSING NOTE
"Chief Complaint   Patient presents with     Cough       Initial /56 (BP Location: Right arm, Patient Position: Chair, Cuff Size: Adult Regular)  Pulse 70  Temp 97.3  F (36.3  C)  Resp 18  Wt 116 lb 9.6 oz (52.9 kg)  LMP 06/15/1985  SpO2 94%  BMI 24.37 kg/m2 Estimated body mass index is 24.37 kg/(m^2) as calculated from the following:    Height as of 9/1/17: 4' 10\" (1.473 m).    Weight as of this encounter: 116 lb 9.6 oz (52.9 kg).  Medication Reconciliation: complete    Health Maintenance that is potentially due pending provider review:  NONE    Aisha Florentino MA  2:11 PM 9/20/2017  .    Is there anyone who you would like to be able to receive your results? on file  If yes have patient fill out JOSUE    "

## 2017-09-20 NOTE — MR AVS SNAPSHOT
After Visit Summary   9/20/2017    Ellie Leigh    MRN: [id number]           Patient Information     Date Of Birth          9/12/1930        Visit Information        Provider Department      9/20/2017 2:00 PM Mercedes Guerin APRN CNP [facility]        Today's Diagnoses     Bronchitis    -  1      Care Instructions    Start Prednisone for 5 days  - take in the morning so it will wear off by bedtime    Continue your nebulizers at home    Make sure you're getting in enough fluids     You can start an over the counter anti-histamine such as Zyrtec or Claritin to help allergies - you would take this every day     If this isn't working and still continually wheezy and short of breath can try Singulair, would have to come into the office to re-evaluate    Return to clinic if not improving or worsening           Follow-ups after your visit        Your next 10 appointments already scheduled     Oct 10, 2017 10:30 AM CDT   Anticoagulation Visit with [place] [facility]   [facility] ([facility])    [address] [address]   [phone]            Oct 11, 2017  3:00 PM CDT   Remote PPM Check with RUEDA TECH1   [facility] [place] ([facility])    [address] [place] [id number] [phone]           This appointment is for a remote check of your pacemaker.  This is not an appointment at the office.            Oct 27, 2017  3:20 PM CDT   Office Visit with Josefina Gómez MD   [facility] ([facility])    [address] [place] [id number] [phone]           Bring a current list of meds and any records pertaining to this visit. For Physicals, please bring immunization records and any forms needing to be filled out. Please arrive 10 minutes early to complete paperwork.            Jan 03, 2018  2:00 PM CST   LAB with [place] [facility]  "HOSPITAL   [facility] ([facility])    [address] [facility] [address]   [phone]           Patient must bring picture ID. Patient should be prepared to give a urine specimen  Please do not eat 10-12 hours before your appointment if you are coming in fasting for labs on lipids, cholesterol, or glucose (sugar). Pregnant women should follow their Care Team instructions. Water with medications is okay. Do not drink coffee or other fluids. If you have concerns about taking  your medications, please ask at office or if scheduling via hi5, send a message by clicking on Secure Messaging, Message Your Care Team.            Jan 03, 2018  2:45 PM CST   MA SCREENING DIGITAL RIGHT with [facility]MA2   [facility] ([facility])    [address] [facility] [address]   [phone]           Do not use any powder, lotion or deodorant under your arms or on your breast. If you do, we will ask you to remove it before your exam.  Wear comfortable, two-piece clothing.  If you have any allergies, tell your care team.  Bring any previous mammograms from other facilities or have them mailed to the breast center. Three-dimensional (3D) mammograms are available at [place] locations in [facility], [facility], [place], [place], and [place]. [facility] locations include [place] and [facility] in [place]. Benefits of 3D mammograms include: - Improved rate of cancer detection - Decreases your chance of having to go back for more tests, which means fewer: - \"False-positive\" results (This means that there is an abnormal area but it isn't cancer.) - Invasive testing procedures, such as a biopsy or surgery - Can provide clearer images of the breast if you have dense breast tissue. 3D mammography is an optional exam that anyone can have with a 2D mammogram. It doesn't replace or take the place of a 2D mammogram. 2D mammograms " "remain an effective screening test for all women.  Not all insurance companies cover the cost of a 3D mammogram. Check with your insurance.            2018 11:00 AM CST   Return Visit with Gayle Nieves MD   [facility] ([facility])    [facility]  [address]  [facility] [address] [phone]              Who to contact     If you have questions or need follow up information about today's clinic visit or your schedule please contact [facility] directly at [phone].  Normal or non-critical lab and imaging results will be communicated to you by SkyBitzhart, letter or phone within 4 business days after the clinic has received the results. If you do not hear from us within 7 days, please contact the clinic through [email or web address] or phone. If you have a critical or abnormal lab result, we will notify you by phone as soon as possible.  Submit refill requests through Trilibis or call your pharmacy and they will forward the refill request to us. Please allow 3 business days for your refill to be completed.          Additional Information About Your Visit        MyChart Information     Trilibis lets you send messages to your doctor, view your test results, renew your prescriptions, schedule appointments and more. To sign up, go to [email or web address] . Click on \"Log in\" on the left side of the screen, which will take you to the Welcome page. Then click on \"Sign up Now\" on the right side of the page.     You will be asked to enter the access code listed below, as well as some personal information. Please follow the directions to create your username and password.     Your access code is: [email or web address]  Expires: 2017  4:11 PM     Your access code will  in 90 days. If you need help or a new code, please call your [facility] or [phone].        Care EveryWhere ID     This is your Care EveryWhere ID. This could be used by other " organizations to access your [place] medical records  [id number]        Your Vitals Were     Pulse Temperature Respirations Last Period Pulse Oximetry BMI (Body Mass Index)    70 97.3  F (36.3  C) 18 06/15/1985 94% 24.37 kg/m2       Blood Pressure from Last 3 Encounters:   09/20/17 102/56   09/01/17 114/64   08/08/17 124/76    Weight from Last 3 Encounters:   09/20/17 116 lb 9.6 oz (52.9 kg)   09/01/17 117 lb (53.1 kg)   08/08/17 117 lb 3.2 oz (53.2 kg)              Today, you had the following     No orders found for display         Today's Medication Changes          These changes are accurate as of: 9/20/17  2:32 PM.  If you have any questions, ask your nurse or doctor.               Start taking these medicines.        Dose/Directions    predniSONE 20 MG tablet   Commonly known as:  DELTASONE   Used for:  Bronchitis   Started by:  Mercedes Guerin APRN CNP        Dose:  40 mg   Take 2 tablets (40 mg) by mouth daily for 5 days   Quantity:  10 tablet   Refills:  0            Where to get your medicines      These medications were sent to [id number] - [place], [place] - [address]  [address] [address]     Phone:  [phone]     predniSONE 20 MG tablet                Primary Care Provider Office Phone # Fax #    Josefina Gómez [phone] [address] [place] [address]        Equal Access to Services     BROOKS JOSÉ AH: Reyna marceloo Somaurilio, waaxda luqadaha, qaybta kaalmada adeegyada, wax jessica astorga adesukhdeep estrada. So [facility] [phone].    ATENCIÓN: Si habla español, tiene a sahni disposición servicios gratuitos de asistencia lingüística. Llame al [phone].    We comply with applicable federal civil rights laws and [place] laws. We do not discriminate on the basis of race, color, national origin, age, disability sex, sexual orientation or gender identity.            Thank you!     Thank you for  choosing [facility]  for your care. Our goal is always to provide you with excellent care. Hearing back from our patients is one way we can continue to improve our services. Please take a few minutes to complete the written survey that you may receive in the mail after your visit with us. Thank you!             Your Updated Medication List - Protect others around you: Learn how to safely use, store and throw away your medicines at www.disposemymeds.org.          This list is accurate as of: 9/20/17  2:32 PM.  Always use your most recent med list.                   Brand Name Dispense Instructions for use Diagnosis    albuterol 108 (90 BASE) MCG/ACT Inhaler    PROAIR HFA/PROVENTIL HFA/VENTOLIN HFA    3 Inhaler    Inhale 2 puffs into the lungs every 6 hours as needed for shortness of breath / dyspnea    Mild persistent asthma without complication       CRANBERRY      Take 475 mg by mouth 2 times daily.        diclofenac 1 % Gel topical gel    VOLTAREN    100 g    APPLY 4 GRAMS TO KNEES OR 2 GRAMS TO HANDS FOUR TIMES DAILY USING ENCLOSED DOSING CARD.    Primary osteoarthritis involving multiple joints       ipratropium - albuterol 0.5 mg/2.5 mg/3 mL 0.5-2.5 (3) MG/3ML neb solution    DUONEB    180 mL    TAKE 1 VIAL (3 MLS) BY NEBULIZATION EVERY 6 HOURS AS NEEDED FOR SHORTNESS OF BREATH / DYSPNEA OR WHEEZING    Chronic obstructive pulmonary disease, unspecified COPD type (H)       levothyroxine 50 MCG tablet    SYNTHROID/LEVOTHROID    90 tablet    TAKE 1 TABLET (50 MCG) BY MOUTH DAILY    Hypothyroidism, unspecified type       metoprolol 25 MG 24 hr tablet    TOPROL-XL    180 tablet    TAKE TWO TABLETS BY MOUTH TWICE DAILY    Essential hypertension, benign       polyethylene glycol powder    MIRALAX    510 g    Take 17 g by mouth daily as needed    Constipation       predniSONE 20 MG tablet    DELTASONE    10 tablet    Take 2 tablets (40 mg) by mouth daily for 5 days    Bronchitis       probiotic Caps       Take 1 capsule by mouth every evening        ranitidine 150 MG tablet    ZANTAC    60 tablet    Take 1 tablet (150 mg) by mouth 2 times daily    Gastroesophageal reflux disease without esophagitis       sucralfate 1 GM tablet    CARAFATE    40 tablet    Take 1 tablet (1 g) by mouth 4 times daily    Gastroesophageal reflux disease without esophagitis       SYMBICORT 160-4.5 MCG/ACT Inhaler   Generic drug:  budesonide-formoterol     10.2 g    INHALE 2 PUFFS INTO THE LUNGS 2 TIMES DAILY    Mild persistent asthma without complication       warfarin 1 MG tablet    COUMADIN    105 tablet    Take 1 mg on Mon, Fri; 1.5 mg all other days  or as directed by Anticoagulation Clinic    Long term current use of anticoagulant therapy

## 2017-09-20 NOTE — PATIENT INSTRUCTIONS
Start Prednisone for 5 days  - take in the morning so it will wear off by bedtime    Continue your nebulizers at home    Make sure you're getting in enough fluids     You can start an over the counter anti-histamine such as Zyrtec or Claritin to help allergies - you would take this every day     If this isn't working and still continually wheezy and short of breath can try Singulair, would have to come into the office to re-evaluate    Return to clinic if not improving or worsening

## 2017-09-20 NOTE — PROGRESS NOTES
SUBJECTIVE:   Ellie Leigh is a 87 year old female who presents to clinic today for the following health issues:      Acute Illness   Acute illness concerns: cough  Onset: x 3 days -    Fever: no    Chills/Sweats: no    Headache (location?): no    Sinus Pressure:no    Conjunctivitis:  no    Ear Pain: YES- once in a while the right ear hurts    Rhinorrhea: no    Congestion: YES    Sore Throat: no     Cough: YES-productive of green sputum    Wheeze: YES    Decreased Appetite: YES    Nausea: no     Vomiting: no     Diarrhea:  no     Dysuria/Freq.: no     Fatigue/Achiness: YES    Sick/Strep Exposure: no      Therapies Tried and outcome: duoneb for the nebulizer - not sure if it helped - only did once last night; does Symbicort inhaler    Night before difficulty breathing - started hurting in chest, tight  Has had a lot of allergy symptoms recently as well   No fevers  Denies any chest pain or pain  No more shortness of breath than her normal  Has COPD and ?asthma      Problem list and histories reviewed & adjusted, as indicated.  Additional history: as documented    Patient Active Problem List   Diagnosis     Sensorineural hearing loss, asymmetrical     Generalized osteoarthrosis, unspecified site     Disease of lung     PERSONAL HISTORY OF MALIGNANCY- BREAST     Esophageal reflux     Chronic allergic conjunctivitis     Atopic rhinitis     Rhinitis, allergic seasonal     Hyperlipidemia LDL goal <130     Chronic constipation     Osteoporosis     Health Care Home     Right arm weakness     Ulnar neuropathy     Headache     Mild major depression     DVT (deep venous thrombosis)     Anxiety     Cervical vertebral fracture (H)     Intermittent atrial fibrillation (H)     Squamous cell carcinoma in situ of skin of face     SK (seborrheic keratosis)     Hypothyroidism     Hyponatremia     Recurrent UTI     History of skin cancer     BPPV (benign paroxysmal positional vertigo)     Benign essential HTN     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)     Positive fecal occult blood test     COPD (chronic obstructive pulmonary disease) (H)     Infectious colitis, enteritis and gastroenteritis     Advance Care Planning     Syncope     Hemoptysis     Long term current use of anticoagulant therapy     Mild persistent asthma without complication     Chest pain at rest     Unresponsive episode     Irritable bowel syndrome without diarrhea     Elevated troponin     Nausea     Past Surgical History:   Procedure Laterality Date     APPENDECTOMY       ARTHROSCOPY KNEE  4/15/2011    Procedure:ARTHROSCOPY KNEE; removal of loose body; Surgeon:LEY, JEFFREY DUANE; Location:[facility]     CHOLECYSTECTOMY       ESOPHAGOSCOPY, GASTROSCOPY, DUODENOSCOPY (EGD), COMBINED  6/9/2014    Procedure: COMBINED ESOPHAGOSCOPY, GASTROSCOPY, DUODENOSCOPY (EGD);  Surgeon: Gilberto Richard MD;  Location: [facility]     IMPLANT PACEMAKER  5/21/2013    [employer] [facility] [id number] Serial#[id number]     INJECT EPIDURAL LUMBAR  3/23/2011    INJECT EPIDURAL LUMBAR performed by GENERIC ANESTHESIA PROVIDER at [facility] OR     JOINT REPLACEMENT, HIP RT/LT      Joint Replacement Hip Rt     MASTECTOMY, SIMPLE RT/LT/CAITLYN      Left breast - following breast ca     OTHER SURGICAL HISTORY      C1-C2 fusion after fx      SURGICAL HISTORY OF -   05/22/2001    Colonoscopy     TONSILLECTOMY         Social History   Substance Use Topics     Smoking status: Never Smoker     Smokeless tobacco: Never Used     Alcohol use No     Family History   Problem Relation Age of Onset     CEREBROVASCULAR DISEASE Mother      HEART DISEASE Mother      MI     CEREBROVASCULAR DISEASE Father      HEART DISEASE Father      MI     Respiratory Maternal Grandfather      TB     Neurologic Disorder Brother      ALS     HEART DISEASE Brother      CEREBROVASCULAR DISEASE Brother      Breast Cancer Daughter      age:49     Asthma Brother      CANCER Brother      brain and lung     CANCER Daughter      thyroid         Current  Outpatient Prescriptions   Medication Sig Dispense Refill     predniSONE (DELTASONE) 20 MG tablet Take 2 tablets (40 mg) by mouth daily for 5 days 10 tablet 0     metoprolol (TOPROL-XL) 25 MG 24 hr tablet TAKE TWO TABLETS BY MOUTH TWICE DAILY  180 tablet 0     levothyroxine (SYNTHROID/LEVOTHROID) 50 MCG tablet TAKE 1 TABLET (50 MCG) BY MOUTH DAILY 90 tablet 2     sucralfate (CARAFATE) 1 GM tablet Take 1 tablet (1 g) by mouth 4 times daily 40 tablet 1     warfarin (COUMADIN) 1 MG tablet Take 1 mg on Mon, Fri; 1.5 mg all other days  or as directed by Anticoagulation Clinic 105 tablet 1     ranitidine (ZANTAC) 150 MG tablet Take 1 tablet (150 mg) by mouth 2 times daily 60 tablet 11     ipratropium - albuterol 0.5 mg/2.5 mg/3 mL (DUONEB) 0.5-2.5 (3) MG/3ML neb solution TAKE 1 VIAL (3 MLS) BY NEBULIZATION EVERY 6 HOURS AS NEEDED FOR SHORTNESS OF BREATH / DYSPNEA OR WHEEZING 180 mL 10     SYMBICORT 160-4.5 MCG/ACT Inhaler INHALE 2 PUFFS INTO THE LUNGS 2 TIMES DAILY 10.2 g 5     diclofenac (VOLTAREN) 1 % GEL topical gel APPLY 4 GRAMS TO KNEES OR 2 GRAMS TO HANDS FOUR TIMES DAILY USING ENCLOSED DOSING CARD. 100 g 0     albuterol (PROAIR HFA/PROVENTIL HFA/VENTOLIN HFA) 108 (90 BASE) MCG/ACT Inhaler Inhale 2 puffs into the lungs every 6 hours as needed for shortness of breath / dyspnea 3 Inhaler 1     probiotic CAPS Take 1 capsule by mouth every evening        polyethylene glycol (MIRALAX) powder Take 17 g by mouth daily as needed 510 g 5     CRANBERRY Take 475 mg by mouth 2 times daily.       Allergies   Allergen Reactions     Cephalosporins Nausea     Cefzil     Doxycycline Nausea and Vomiting     Sulfa Drugs Nausea     Penicillins Rash     PCN         Reviewed and updated as needed this visit by clinical staffTobacco  Allergies  Meds  Problems       Reviewed and updated as needed this visit by Provider  Allergies  Meds  Problems         ROS:  Constitutional, HEENT, cardiovascular, pulmonary, gi and gu systems are  negative, except as otherwise noted.      OBJECTIVE:   /56 (BP Location: Right arm, Patient Position: Chair, Cuff Size: Adult Regular)  Pulse 70  Temp 97.3  F (36.3  C)  Resp 18  Wt 116 lb 9.6 oz (52.9 kg)  LMP 06/15/1985  SpO2 94%  BMI 24.37 kg/m2  Body mass index is 24.37 kg/(m^2).  GENERAL APPEARANCE: healthy, alert and no distress  EYES: Eyes grossly normal to inspection, PERRL and conjunctivae and sclerae normal  HENT: ear canals and TM's normal with small amount of serous fluid and nose and mouth without ulcers or lesions  NECK: no adenopathy and no asymmetry, masses, or scars  RESP: minimally scattered wheezes upper lobes bilateral, congested cough  CV: regular rates and rhythm, normal S1 S2, no S3 or S4 and no murmur, click or rub  ABDOMEN: soft, nontender, without hepatosplenomegaly or masses and bowel sounds normal  MS: extremities normal- no gross deformities noted  NEURO: Normal strength and tone, mentation intact and speech normal    Diagnostic Test Results:  none     ASSESSMENT/PLAN:     1. Bronchitis  Patient has been sick for 3 days, has underlying COPD ?asthma. Has some scattered wheezes with cough. Steroid burst, nebulizers, rest. Return to clinic as needed or if not improving.   - predniSONE (DELTASONE) 20 MG tablet; Take 2 tablets (40 mg) by mouth daily for 5 days  Dispense: 10 tablet; Refill: 0    Patient Instructions   Start Prednisone for 5 days  - take in the morning so it will wear off by bedtime    Continue your nebulizers at home    Make sure you're getting in enough fluids     You can start an over the counter anti-histamine such as Zyrtec or Claritin to help allergies - you would take this every day     If this isn't working and still continually wheezy and short of breath can try Singulair, would have to come into the office to re-evaluate    Return to clinic if not improving or worsening       LACI Dickens [facility]

## 2017-10-10 ENCOUNTER — ANTICOAGULATION THERAPY VISIT (OUTPATIENT)
Dept: ANTICOAGULATION | Facility: CLINIC | Age: 82
End: 2017-10-10
Payer: COMMERCIAL

## 2017-10-10 ENCOUNTER — ALLIED HEALTH/NURSE VISIT (OUTPATIENT)
Dept: CARDIOLOGY | Facility: CLINIC | Age: 82
End: 2017-10-10
Payer: COMMERCIAL

## 2017-10-10 DIAGNOSIS — I82.409 DVT (DEEP VENOUS THROMBOSIS) (H): ICD-10-CM

## 2017-10-10 DIAGNOSIS — Z95.0 CARDIAC PACEMAKER IN SITU: Primary | ICD-10-CM

## 2017-10-10 DIAGNOSIS — Z79.01 LONG TERM CURRENT USE OF ANTICOAGULANT THERAPY: ICD-10-CM

## 2017-10-10 LAB — INR POINT OF CARE: 1.9 (ref 0.86–1.14)

## 2017-10-10 PROCEDURE — 93294 REM INTERROG EVL PM/LDLS PM: CPT | Performed by: INTERNAL MEDICINE

## 2017-10-10 PROCEDURE — 93296 REM INTERROG EVL PM/IDS: CPT | Performed by: INTERNAL MEDICINE

## 2017-10-10 PROCEDURE — 85610 PROTHROMBIN TIME: CPT | Mod: QW

## 2017-10-10 PROCEDURE — 36416 COLLJ CAPILLARY BLOOD SPEC: CPT

## 2017-10-10 PROCEDURE — 99207 ZZC NO CHARGE NURSE ONLY: CPT

## 2017-10-10 NOTE — PROGRESS NOTES
Biot[employer] Evia (D) Remote PPM Device Check  AP: 65 % : 6 %  Mode: DDD-CLS        Presenting Rhythm: AP/VS  Heart Rate: Adequate rates per histogram  Sensing: Stable    Pacing Threshold: Stable    Impedance: Stable  Battery Status: 75% remaining   Atrial Arrhythmia: 100 mode switches per day comprising 3% of the time. Mean ventricular rate during mode switch is 85bpm. Taking Warfarin.     Ventricular Arrhythmia: None     Care Plan: F/u PPM remote Biotronik q 3 months. Gave patient's daughter results over the phone. Tristian,CVT

## 2017-10-10 NOTE — MR AVS SNAPSHOT
Ellie Leigh   10/10/2017 10:30 AM   Anticoagulation Therapy Visit    Description:  87 year old female   Provider:  [facility]   Department:  [facility]           INR as of 10/10/2017     Today's INR 1.9      Anticoagulation Summary as of 10/10/2017     INR goal 1.5-2.0   Today's INR 1.9   Full instructions 1 mg on Mon, Fri; 1.5 mg all other days   Next INR check 11/7/2017    Indications   Atrial fibrillation with rapid ventricular response (H) (Resolved) [I48.91]  DVT (deep venous thrombosis) [I82.409]  Long term current use of anticoagulant therapy [Z79.01]         Your next Anticoagulation Clinic appointment(s)     Nov 07, 2017 10:30 AM CST   Anticoagulation Visit with [place] [facility] COAG   [facility]    [address] [address] [phone]              Contact Numbers     Please call [phone] to cancel and/or reschedule your appointment.  Please call [phone] with any problems or questions regarding your therapy          October 2017 Details    Sun Mon Tue Wed Thu Fri Sat     1               2               3               4               5               6               7                 8               9               10      1.5 mg   See details      11      1.5 mg         12      1.5 mg         13      1 mg         14      1.5 mg           15      1.5 mg         16      1 mg         17      1.5 mg         18      1.5 mg         19      1.5 mg         20      1 mg         21      1.5 mg           22      1.5 mg         23      1 mg         24      1.5 mg         25      1.5 mg         26      1.5 mg         27      1 mg         28      1.5 mg           29      1.5 mg         30      1 mg         31      1.5 mg              Date Details   10/10 This INR check               How to take your warfarin dose     To take:  1 mg Take 1 of the 1 mg tablets.    To take:  1.5 mg Take 1.5 of the 1 mg tablets.           November 2017 Details     Sun Mon Tue Wed Thu Fri Sat        1      1.5 mg         2      1.5 mg         3      1 mg         4      1.5 mg           5      1.5 mg         6      1 mg         7            8               9               10               11                 12               13               14               15               16               17               18                 19               20               21               22               23               24               25                 26               27               28               29               30                  Date Details   No additional details    Date of next INR:  11/7/2017         How to take your warfarin dose     To take:  1 mg Take 1 of the 1 mg tablets.    To take:  1.5 mg Take 1.5 of the 1 mg tablets.

## 2017-10-10 NOTE — MR AVS SNAPSHOT
After Visit Summary   10/10/2017    Ellie Leigh    MRN: [id number]           Patient Information     Date Of Birth          9/12/1930        Visit Information        Provider Department      10/10/2017 3:30 PM UREDA TECH1 [facility] [place]        Today's Diagnoses     Cardiac pacemaker in situ    -  1       Follow-ups after your visit        Your next 10 appointments already scheduled     Oct 10, 2017 10:30 AM CDT   Anticoagulation Visit with [place] ANTI COAG   [facility] ([facility]    [address] [address]   [phone]            Oct 10, 2017  3:30 PM CDT   Remote PPM Check with RUEDA TECH1   [facility] ([facility])    [address] [place] [address] [address] [place] [id number] [phone]           This appointment is for a remote check of your pacemaker.  This is not an appointment at the office.            Oct 11, 2017  3:00 PM CDT   Remote PPM Check with RUEDA TECH1   [facility] ([facility])    [address] [place] [address] [address] [place] [id number] [phone]           This appointment is for a remote check of your pacemaker.  This is not an appointment at the office.            Oct 27, 2017  3:20 PM CDT   Office Visit with Josefina Gómez MD   [facility]    [address] [place] [id number] [phone]           Bring a current list of meds and any records pertaining to this visit. For Physicals, please bring immunization records and any forms needing to be filled out. Please arrive 10 minutes early to complete paperwork.            Jan 03, 2018  2:00 PM CST   LAB with [facility] ([facility])    [address] [address]   [phone]           Patient must bring picture ID. Patient  "should be prepared to give a urine specimen  Please do not eat 10-12 hours before your appointment if you are coming in fasting for labs on lipids, cholesterol, or glucose (sugar). Pregnant women should follow their Care Team instructions. Water with medications is okay. Do not drink coffee or other fluids. If you have concerns about taking  your medications, please ask at office or if scheduling via [employer], send a message by clicking on Secure Messaging, Message Your Care Team.            Jan 03, 2018  2:45 PM CST   MA SCREENING DIGITAL RIGHT with W[place]MA2   [facility] ([facility])    [address]  [facility] [address]   [phone]           Do not use any powder, lotion or deodorant under your arms or on your breast. If you do, we will ask you to remove it before your exam.  Wear comfortable, two-piece clothing.  If you have any allergies, tell your care team.  Bring any previous mammograms from other facilities or have them mailed to the breast center. Three-dimensional (3D) mammograms are available at [place] locations in [facility], [facility], [facility], and [place] locations include [place] and [facility] in [place]. Benefits of 3D mammograms include: - Improved rate of cancer detection - Decreases your chance of having to go back for more tests, which means fewer: - \"False-positive\" results (This means that there is an abnormal area but it isn't cancer.) - Invasive testing procedures, such as a biopsy or surgery - Can provide clearer images of the breast if you have dense breast tissue. 3D mammography is an optional exam that anyone can have with a 2D mammogram. It doesn't replace or take the place of a 2D mammogram. 2D mammograms remain an effective screening test for all women.  Not all insurance companies cover the cost of a 3D mammogram. Check with your insurance.            Jan 11, 2018 11:00 " "AM CST   Return Visit with Gayle Nieves MD   [facility] ([facility])    [facility]  [address]  [facility] [address] [phone]              Who to contact     If you have questions or need follow up information about today's clinic visit or your schedule please contact [facility] [place] directly at [phone].  Normal or non-critical lab and imaging results will be communicated to you by [employer], letter or phone within 4 business days after the clinic has received the results. If you do not hear from us within 7 days, please contact the clinic through [employer] or phone. If you have a critical or abnormal lab result, we will notify you by phone as soon as possible.  Submit refill requests through [employer] or call your pharmacy and they will forward the refill request to us. Please allow 3 business days for your refill to be completed.          Additional Information About Your Visit        [employer] Information     [employer] lets you send messages to your doctor, view your test results, renew your prescriptions, schedule appointments and more. To sign up, go to [email or web address] . Click on \"Log in\" on the left side of the screen, which will take you to the Welcome page. Then click on \"Sign up Now\" on the right side of the page.     You will be asked to enter the access code listed below, as well as some personal information. Please follow the directions to create your username and password.     Your access code is: [email or web address]  Expires: 2017  4:11 PM     Your access code will  in 90 days. If you need help or a new code, please call your [place] clinic or [phone].        Care EveryWhere ID     This is your Care EveryWhere ID. This could be used by other organizations to access your [place] medical records  [id number]        Your Vitals Were     Last Period                   06/15/1985            " Blood Pressure from Last 3 Encounters:   09/20/17 102/56   09/01/17 114/64   08/08/17 124/76    Weight from Last 3 Encounters:   09/20/17 52.9 kg (116 lb 9.6 oz)   09/01/17 53.1 kg (117 lb)   08/08/17 53.2 kg (117 lb 3.2 oz)              We Performed the Following     INTERROGATION DEVICE EVAL REMOTE, PACER/ICD (60950)     PM DEVICE INTERROGATE REMOTE (02513)        Primary Care Provider Office Phone # Fax #    Josefina Gómez [phone] [address] [place] [id number]        Equal Access to Services     BROOKS JOSÉ : Hadii daniel dumont hadasho Somaurilio, waaxda luqadaha, qaybta kaalmada adeegyada, verónica bedoya . So [facility] [phone].    ATENCIÓN: Si habla español, tiene a sahni disposición servicios gratuitos de asistencia lingüística. Llame al [phone].    We comply with applicable federal civil rights laws and [place] laws. We do not discriminate on the basis of race, color, national origin, age, disability, sex, sexual orientation, or gender identity.            Thank you!     Thank you for choosing [facility] [place]  for your care. Our goal is always to provide you with excellent care. Hearing back from our patients is one way we can continue to improve our services. Please take a few minutes to complete the written survey that you may receive in the mail after your visit with us. Thank you!             Your Updated Medication List - Protect others around you: Learn how to safely use, store and throw away your medicines at www.disposemymeds.org.          This list is accurate as of: 10/10/17  8:11 AM.  Always use your most recent med list.                   Brand Name Dispense Instructions for use Diagnosis    albuterol 108 (90 BASE) MCG/ACT Inhaler    PROAIR HFA/PROVENTIL HFA/VENTOLIN HFA    3 Inhaler    Inhale 2 puffs into the lungs every 6 hours as needed for shortness of breath / dyspnea    Mild persistent asthma  without complication       CRANBERRY      Take 475 mg by mouth 2 times daily.        diclofenac 1 % Gel topical gel    VOLTAREN    100 g    APPLY 4 GRAMS TO KNEES OR 2 GRAMS TO HANDS FOUR TIMES DAILY USING ENCLOSED DOSING CARD.    Primary osteoarthritis involving multiple joints       ipratropium - albuterol 0.5 mg/2.5 mg/3 mL 0.5-2.5 (3) MG/3ML neb solution    DUONEB    180 mL    TAKE 1 VIAL (3 MLS) BY NEBULIZATION EVERY 6 HOURS AS NEEDED FOR SHORTNESS OF BREATH / DYSPNEA OR WHEEZING    Chronic obstructive pulmonary disease, unspecified COPD type (H)       levothyroxine 50 MCG tablet    SYNTHROID/LEVOTHROID    90 tablet    TAKE 1 TABLET (50 MCG) BY MOUTH DAILY    Hypothyroidism, unspecified type       metoprolol 25 MG 24 hr tablet    TOPROL-XL    180 tablet    TAKE TWO TABLETS BY MOUTH TWICE DAILY    Essential hypertension, benign       polyethylene glycol powder    MIRALAX    510 g    Take 17 g by mouth daily as needed    Constipation       probiotic Caps      Take 1 capsule by mouth every evening        ranitidine 150 MG tablet    ZANTAC    60 tablet    Take 1 tablet (150 mg) by mouth 2 times daily    Gastroesophageal reflux disease without esophagitis       sucralfate 1 GM tablet    CARAFATE    40 tablet    Take 1 tablet (1 g) by mouth 4 times daily    Gastroesophageal reflux disease without esophagitis       SYMBICORT 160-4.5 MCG/ACT Inhaler   Generic drug:  budesonide-formoterol     10.2 g    INHALE 2 PUFFS INTO THE LUNGS 2 TIMES DAILY    Mild persistent asthma without complication       warfarin 1 MG tablet    COUMADIN    105 tablet    Take 1 mg on Mon, Fri; 1.5 mg all other days  or as directed by Anticoagulation Clinic    Long term current use of anticoagulant therapy

## 2017-10-10 NOTE — PROGRESS NOTES
ANTICOAGULATION FOLLOW-UP CLINIC VISIT    Patient Name:  Ellie Leigh  Date:  10/10/2017  Contact Type:  Face to Face    SUBJECTIVE:     Patient Findings     Positives No Problem Findings    Comments Pt denies changes in meds, diet or activity, reports taking warfarin as directed.           OBJECTIVE    INR Protime   Date Value Ref Range Status   10/10/2017 1.9 (A) 0.86 - 1.14 Final       ASSESSMENT / PLAN  INR assessment THER    Recheck INR In: 4 WEEKS    INR Location Clinic      Anticoagulation Summary as of 10/10/2017     INR goal 1.5-2.0   Today's INR 1.9   Maintenance plan 1 mg (1 mg x 1) on Mon, Fri; 1.5 mg (1 mg x 1.5) all other days   Full instructions 1 mg on Mon, Fri; 1.5 mg all other days   Weekly total 9.5 mg   Plan last modified Katia Landrum RN (2/2/2017)   Next INR check 11/7/2017   Priority INR   Target end date Indefinite    Indications   Atrial fibrillation with rapid ventricular response (H) (Resolved) [I48.91]  DVT (deep venous thrombosis) [I82.409]  Long term current use of anticoagulant therapy [Z79.01]         Anticoagulation Episode Summary     INR check location     Preferred lab     Send INR reminders to [place] PHONE [facility] POOL    Comments * Actual INR goal is 1.7-2.3  please follow Dec 2015 INR referral (June 2016 goal range is an error)      Anticoagulation Care Providers     Provider Role Specialty Phone number    Josefina Gómez MD [facility] [phone]            See the Encounter Report to view Anticoagulation Flowsheet and Dosing Calendar (Go to Encounters tab in chart review, and find the Anticoagulation Therapy Visit)        Angelia Bo RN

## 2017-10-22 DIAGNOSIS — I10 ESSENTIAL HYPERTENSION, BENIGN: ICD-10-CM

## 2017-10-23 RX ORDER — METOPROLOL SUCCINATE 25 MG/1
TABLET, EXTENDED RELEASE ORAL
Qty: 120 TABLET | Refills: 3 | Status: SHIPPED | OUTPATIENT
Start: 2017-10-23 | End: 2018-03-14

## 2017-10-27 ENCOUNTER — OFFICE VISIT (OUTPATIENT)
Dept: FAMILY MEDICINE | Facility: CLINIC | Age: 82
End: 2017-10-27
Payer: COMMERCIAL

## 2017-10-27 VITALS
WEIGHT: 119.8 LBS | HEART RATE: 77 BPM | DIASTOLIC BLOOD PRESSURE: 80 MMHG | SYSTOLIC BLOOD PRESSURE: 116 MMHG | BODY MASS INDEX: 25.04 KG/M2 | OXYGEN SATURATION: 97 % | TEMPERATURE: 97.2 F

## 2017-10-27 DIAGNOSIS — J45.30 MILD PERSISTENT ASTHMA WITHOUT COMPLICATION: Primary | ICD-10-CM

## 2017-10-27 DIAGNOSIS — J20.9 ACUTE BRONCHITIS, UNSPECIFIED ORGANISM: ICD-10-CM

## 2017-10-27 PROCEDURE — 99214 OFFICE O/P EST MOD 30 MIN: CPT | Performed by: FAMILY MEDICINE

## 2017-10-27 RX ORDER — PREDNISONE 20 MG/1
20 TABLET ORAL DAILY
Qty: 5 TABLET | Refills: 0 | Status: SHIPPED | OUTPATIENT
Start: 2017-10-27 | End: 2017-11-07

## 2017-10-27 RX ORDER — ALBUTEROL SULFATE 90 UG/1
2 AEROSOL, METERED RESPIRATORY (INHALATION) EVERY 6 HOURS PRN
Qty: 3 INHALER | Refills: 1 | Status: SHIPPED | OUTPATIENT
Start: 2017-10-27 | End: 2018-07-05

## 2017-10-27 RX ORDER — AZITHROMYCIN 250 MG/1
TABLET, FILM COATED ORAL
Qty: 6 TABLET | Refills: 0 | Status: SHIPPED | OUTPATIENT
Start: 2017-10-27 | End: 2017-11-07

## 2017-10-27 NOTE — PATIENT INSTRUCTIONS
Keep up on your nebs     Start zpak     Start prednisone on Monday if not significantly better     See me back in 6 weeks

## 2017-10-27 NOTE — PROGRESS NOTES
SUBJECTIVE:   Ellie Leigh is a 87 year old female who presents to clinic today for the following health issues:      Asthma Follow-Up    Symptoms are currently: stable    Current fatigue or dyspnea with ambulation: stable     Shortness of breath: stable    Increased or change in Cough/Sputum: Yes-  Since URI a couple weeks ago    Fever(s): No    Baseline ambulation without stopping to rest:  6-8 rooms. Able to walk up 3 flights of stairs without stopping to rest.    Any ER/UC or hospital admissions since your last visit? No     History   Smoking Status     Never Smoker   Smokeless Tobacco     Never Used     No results found for: FEV1, PZO7MCE      Amount of exercise or physical activity: none    Problems taking medications regularly: No    Medication side effects: none    Diet: regular (no restrictions)  She is now coughing up some yellow to green phlegm no fever and not generally feeling ill.   She feels like she is getting sick though and is worried she may get worse     Nausea is gone   She feels less anxious  Her daughter is doing better in terms of her cancer treatemtn           Problem list and histories reviewed & adjusted, as indicated.  Additional history: as documented    Labs reviewed in EPIC    Reviewed and updated as needed this visit by clinical staffTobacco  Allergies  Meds  Problems  Med Hx  Surg Hx  Fam Hx  Soc Hx        Reviewed and updated as needed this visit by Provider  Allergies  Meds  Problems         ROS:  Constitutional, HEENT, cardiovascular, pulmonary, gi and gu systems are negative, except as otherwise noted.      OBJECTIVE:                                                    /80 (BP Location: Right arm, Patient Position: Chair, Cuff Size: Adult Regular)  Pulse 77  Temp 97.2  F (36.2  C) (Tympanic)  Wt 119 lb 12.8 oz (54.3 kg)  LMP 06/15/1985  SpO2 97%  BMI 25.04 kg/m2  Body mass index is 25.04 kg/(m^2).  GENERAL APPEARANCE: healthy, alert and no distress  NECK:  no adenopathy, no asymmetry, masses, or scars and thyroid normal to palpation  RESP: lungs diffuse extp wheeze but good air movement  CV: regular rates and rhythm, normal S1 S2, no S3 or S4 and no murmur, click or rub  MS: extremities normal- no gross deformities noted  PSYCH: mentation appears normal and affect normal/bright         ASSESSMENT/PLAN:                                                    1. Mild persistent asthma without complication  Mild flare   - albuterol (PROAIR HFA/PROVENTIL HFA/VENTOLIN HFA) 108 (90 BASE) MCG/ACT Inhaler; Inhale 2 puffs into the lungs every 6 hours as needed for shortness of breath / dyspnea  Dispense: 3 Inhaler; Refill: 1  - predniSONE (DELTASONE) 20 MG tablet; Take 1 tablet (20 mg) by mouth daily  Dispense: 5 tablet; Refill: 0    2. Acute bronchitis, unspecified organism  Early   - azithromycin (ZITHROMAX) 250 MG tablet; Two tablets first day, then one tablet daily for four days.  Dispense: 6 tablet; Refill: 0      Patient Instructions   Keep up on your nebs     Start zpak     Start prednisone on Monday if not significantly better     See me back in 6 weeks     Risks, benefits, side effects and rationale for treatment plan fully discussed with the patient and understanding expressed.   Josefina Gómez MD  [facility]

## 2017-10-27 NOTE — MR AVS SNAPSHOT
After Visit Summary   10/27/2017    Ellie Leigh    MRN: [id number]           Patient Information     Date Of Birth          9/12/1930        Visit Information        Provider Department      10/27/2017 3:20 PM Josefina Gómez MD [facility]        Today's Diagnoses     Acute bronchitis, unspecified organism    -  1    Mild persistent asthma without complication          Care Instructions    Keep up on your nebs     Start zpak     Start prednisone on Monday if not significantly better     See me back in 6 weeks           Follow-ups after your visit        Your next 10 appointments already scheduled     Nov 07, 2017 10:30 AM CST   Anticoagulation Visit with [place] [facility]   [facility]    [address] [address]   [phone]            Jan 03, 2018  2:00 PM CST   LAB with [facility] ([facility])    [address] [address]   [phone]           Patient must bring picture ID. Patient should be prepared to give a urine specimen  Please do not eat 10-12 hours before your appointment if you are coming in fasting for labs on lipids, cholesterol, or glucose (sugar). Pregnant women should follow their Care Team instructions. Water with medications is okay. Do not drink coffee or other fluids. If you have concerns about taking  your medications, please ask at office or if scheduling via Tech21, send a message by clicking on Secure Messaging, Message Your Care Team.            Jan 03, 2018  2:45 PM CST   MA SCREENING DIGITAL RIGHT with [facility]MA2   [facility] ([facility])    [address] [address]   [phone]           Do not use any powder, lotion or deodorant under your arms or on your breast. If you do, we will ask you to remove it before your exam.  Wear comfortable, two-piece clothing.  If you have any  "allergies, tell your care team.  Bring any previous mammograms from other facilities or have them mailed to the breast center. Three-dimensional (3D) mammograms are available at [place] locations in [facility], [place], [place], [facility], [place], [place], and [place]. Health locations include [place] and [facility] in [place]. Benefits of 3D mammograms include: - Improved rate of cancer detection - Decreases your chance of having to go back for more tests, which means fewer: - \"False-positive\" results (This means that there is an abnormal area but it isn't cancer.) - Invasive testing procedures, such as a biopsy or surgery - Can provide clearer images of the breast if you have dense breast tissue. 3D mammography is an optional exam that anyone can have with a 2D mammogram. It doesn't replace or take the place of a 2D mammogram. 2D mammograms remain an effective screening test for all women.  Not all insurance companies cover the cost of a 3D mammogram. Check with your insurance.            Jan 08, 2018  4:45 PM CST   Remote PPM Check with RUEDA TECH1   [facility] [place] ([facility])    [address]  [facility] [address] [phone]           This appointment is for a remote check of your pacemaker.  This is not an appointment at the office.            Jan 11, 2018 11:00 AM CST   Return Visit with Gayle Nieves MD   [facility] ([facility])    [facility]  [address]  [facility] [address] [phone]              Who to contact     If you have questions or need follow up information about today's clinic visit or your schedule please contact [facility] directly at [phone].  Normal or non-critical lab and imaging results will be communicated to you by MyChart, letter or phone within 4 business days after the clinic has received the " "results. If you do not hear from us within 7 days, please contact the clinic through Wecash or phone. If you have a critical or abnormal lab result, we will notify you by phone as soon as possible.  Submit refill requests through Wecash or call your pharmacy and they will forward the refill request to us. Please allow 3 business days for your refill to be completed.          Additional Information About Your Visit        Wecash Information     Wecash lets you send messages to your doctor, view your test results, renew your prescriptions, schedule appointments and more. To sign up, go to [email or web address] . Click on \"Log in\" on the left side of the screen, which will take you to the Welcome page. Then click on \"Sign up Now\" on the right side of the page.     You will be asked to enter the access code listed below, as well as some personal information. Please follow the directions to create your username and password.     Your access code is: [email or web address]  Expires: 2017  4:11 PM     Your access code will  in 90 days. If you need help or a new code, please call your [place] clinic or [phone].        Care EveryWhere ID     This is your Care EveryWhere ID. This could be used by other organizations to access your [place] medical records  [id number]        Your Vitals Were     Pulse Temperature Last Period Pulse Oximetry BMI (Body Mass Index)       77 97.2  F (36.2  C) (Tympanic) 06/15/1985 97% 25.04 kg/m2        Blood Pressure from Last 3 Encounters:   10/27/17 116/80   17 102/56   17 114/64    Weight from Last 3 Encounters:   10/27/17 119 lb 12.8 oz (54.3 kg)   17 116 lb 9.6 oz (52.9 kg)   17 117 lb (53.1 kg)              We Performed the Following     PAF COMPLETED          Today's Medication Changes          These changes are accurate as of: 10/27/17  3:38 PM.  If you have any questions, ask your nurse or doctor.               Start taking these medicines.        " Dose/Directions    azithromycin 250 MG tablet   Commonly known as:  ZITHROMAX   Used for:  Acute bronchitis, unspecified organism   Started by:  Josefina Gómez MD        Two tablets first day, then one tablet daily for four days.   Quantity:  6 tablet   Refills:  0       predniSONE 20 MG tablet   Commonly known as:  DELTASONE   Used for:  Mild persistent asthma without complication   Started by:  Josefina Gómez MD        Dose:  20 mg   Take 1 tablet (20 mg) by mouth daily   Quantity:  5 tablet   Refills:  0            Where to get your medicines      These medications were sent to [id number] - [place], [place] - [address]  [address] [address]     Phone:  [phone]     albuterol 108 (90 BASE) MCG/ACT Inhaler    azithromycin 250 MG tablet         Some of these will need a paper prescription and others can be bought over the counter.  Ask your nurse if you have questions.     Bring a paper prescription for each of these medications     predniSONE 20 MG tablet                Primary Care Provider Office Phone # Fax #    Josefina Gómez [phone] [address] [place] [address]        Equal Access to Services     BURT JOSÉ : Hadii daniel vinson Somaurilio, waaxda luqadaha, qaybta kaalmatalon feliz, verónica estrada. So [facility] [phone].    ATENCIÓN: Si habla español, tiene a sahni disposición servicios gratuitos de asistencia lingüística. Ll[facility] [phone].    We comply with applicable federal civil rights laws and [place] laws. We do not discriminate on the basis of race, color, national origin, age, disability, sex, sexual orientation, or gender identity.            Thank you!     Thank you for choosing [facility]  for your care. Our goal is always to provide you with excellent care. Hearing back from our patients is one way we can continue to improve our services.  Please take a few minutes to complete the written survey that you may receive in the mail after your visit with us. Thank you!             Your Updated Medication List - Protect others around you: Learn how to safely use, store and throw away your medicines at www.disposemymeds.org.          This list is accurate as of: 10/27/17  3:38 PM.  Always use your most recent med list.                   Brand Name Dispense Instructions for use Diagnosis    albuterol 108 (90 BASE) MCG/ACT Inhaler    PROAIR HFA/PROVENTIL HFA/VENTOLIN HFA    3 Inhaler    Inhale 2 puffs into the lungs every 6 hours as needed for shortness of breath / dyspnea    Mild persistent asthma without complication       azithromycin 250 MG tablet    ZITHROMAX    6 tablet    Two tablets first day, then one tablet daily for four days.    Acute bronchitis, unspecified organism       CRANBERRY      Take 475 mg by mouth 2 times daily.        diclofenac 1 % Gel topical gel    VOLTAREN    100 g    APPLY 4 GRAMS TO KNEES OR 2 GRAMS TO HANDS FOUR TIMES DAILY USING ENCLOSED DOSING CARD.    Primary osteoarthritis involving multiple joints       ipratropium - albuterol 0.5 mg/2.5 mg/3 mL 0.5-2.5 (3) MG/3ML neb solution    DUONEB    180 mL    TAKE 1 VIAL (3 MLS) BY NEBULIZATION EVERY 6 HOURS AS NEEDED FOR SHORTNESS OF BREATH / DYSPNEA OR WHEEZING    Chronic obstructive pulmonary disease, unspecified COPD type (H)       levothyroxine 50 MCG tablet    SYNTHROID/LEVOTHROID    90 tablet    TAKE 1 TABLET (50 MCG) BY MOUTH DAILY    Hypothyroidism, unspecified type       metoprolol 25 MG 24 hr tablet    TOPROL-XL    120 tablet    TAKE TWO TABLETS BY MOUTH TWICE DAILY    Essential hypertension, benign       polyethylene glycol powder    MIRALAX    510 g    Take 17 g by mouth daily as needed    Constipation       predniSONE 20 MG tablet    DELTASONE    5 tablet    Take 1 tablet (20 mg) by mouth daily    Mild persistent asthma without complication       probiotic Caps      Take  1 capsule by mouth every evening        ranitidine 150 MG tablet    ZANTAC    60 tablet    Take 1 tablet (150 mg) by mouth 2 times daily    Gastroesophageal reflux disease without esophagitis       SYMBICORT 160-4.5 MCG/ACT Inhaler   Generic drug:  budesonide-formoterol     10.2 g    INHALE 2 PUFFS INTO THE LUNGS 2 TIMES DAILY    Mild persistent asthma without complication       warfarin 1 MG tablet    COUMADIN    105 tablet    Take 1 mg on Mon, Fri; 1.5 mg all other days  or as directed by Anticoagulation Clinic    Long term current use of anticoagulant therapy

## 2017-10-27 NOTE — NURSING NOTE
"Chief Complaint   Patient presents with     COPD       Initial /80 (BP Location: Right arm, Patient Position: Chair, Cuff Size: Adult Regular)  Pulse 77  Temp 97.2  F (36.2  C) (Tympanic)  Wt 119 lb 12.8 oz (54.3 kg)  LMP 06/15/1985  SpO2 97%  BMI 25.04 kg/m2 Estimated body mass index is 25.04 kg/(m^2) as calculated from the following:    Height as of 9/1/17: 4' 10\" (1.473 m).    Weight as of this encounter: 119 lb 12.8 oz (54.3 kg).  Medication Reconciliation: complete    Health Maintenance that is potentially due pending provider review:  NONE    n/a    Is there anyone who you would like to be able to receive your results? Yes  If yes have patient fill out JOSUE    "

## 2017-11-07 ENCOUNTER — TELEPHONE (OUTPATIENT)
Dept: FAMILY MEDICINE | Facility: CLINIC | Age: 82
End: 2017-11-07

## 2017-11-07 ENCOUNTER — RADIANT APPOINTMENT (OUTPATIENT)
Dept: GENERAL RADIOLOGY | Facility: CLINIC | Age: 82
End: 2017-11-07
Attending: FAMILY MEDICINE
Payer: COMMERCIAL

## 2017-11-07 ENCOUNTER — ANTICOAGULATION THERAPY VISIT (OUTPATIENT)
Dept: ANTICOAGULATION | Facility: CLINIC | Age: 82
End: 2017-11-07
Payer: COMMERCIAL

## 2017-11-07 ENCOUNTER — OFFICE VISIT (OUTPATIENT)
Dept: FAMILY MEDICINE | Facility: CLINIC | Age: 82
End: 2017-11-07
Payer: COMMERCIAL

## 2017-11-07 VITALS
WEIGHT: 120 LBS | TEMPERATURE: 98 F | SYSTOLIC BLOOD PRESSURE: 139 MMHG | BODY MASS INDEX: 25.08 KG/M2 | DIASTOLIC BLOOD PRESSURE: 78 MMHG | OXYGEN SATURATION: 97 % | HEART RATE: 76 BPM

## 2017-11-07 DIAGNOSIS — R05.9 COUGH: Primary | ICD-10-CM

## 2017-11-07 DIAGNOSIS — R05.9 COUGH: ICD-10-CM

## 2017-11-07 DIAGNOSIS — Z79.01 LONG TERM CURRENT USE OF ANTICOAGULANT THERAPY: ICD-10-CM

## 2017-11-07 DIAGNOSIS — K21.9 GASTROESOPHAGEAL REFLUX DISEASE WITHOUT ESOPHAGITIS: ICD-10-CM

## 2017-11-07 LAB — INR POINT OF CARE: 2.4 (ref 0.86–1.14)

## 2017-11-07 PROCEDURE — 36416 COLLJ CAPILLARY BLOOD SPEC: CPT

## 2017-11-07 PROCEDURE — 99207 ZZC NO CHARGE NURSE ONLY: CPT

## 2017-11-07 PROCEDURE — 71020 XR CHEST 2 VW: CPT

## 2017-11-07 PROCEDURE — 85610 PROTHROMBIN TIME: CPT | Mod: QW

## 2017-11-07 PROCEDURE — 99214 OFFICE O/P EST MOD 30 MIN: CPT | Performed by: FAMILY MEDICINE

## 2017-11-07 RX ORDER — BENZONATATE 200 MG/1
200 CAPSULE ORAL 3 TIMES DAILY PRN
Qty: 21 CAPSULE | Refills: 0 | Status: SHIPPED | OUTPATIENT
Start: 2017-11-07 | End: 2018-01-08

## 2017-11-07 NOTE — NURSING NOTE
"Chief Complaint   Patient presents with     Cough       Initial /78 (BP Location: Right arm, Patient Position: Chair, Cuff Size: Adult Regular)  Pulse 76  Temp 98  F (36.7  C) (Tympanic)  Wt 120 lb (54.4 kg)  LMP 06/15/1985  SpO2 97%  BMI 25.08 kg/m2 Estimated body mass index is 25.08 kg/(m^2) as calculated from the following:    Height as of 9/1/17: 4' 10\" (1.473 m).    Weight as of this encounter: 120 lb (54.4 kg).  Medication Reconciliation: complete    Health Maintenance that is potentially due pending provider review:  NONE    n/a    Is there anyone who you would like to be able to receive your results? Yes  If yes have patient fill out JOSUE    "

## 2017-11-07 NOTE — MR AVS SNAPSHOT
Ellie Leigh   11/7/2017 10:30 AM   Anticoagulation Therapy Visit    Description:  87 year old female   Provider:  [place] [facility]   Department:  [facility]           INR as of 11/7/2017     Today's INR 2.4!      Anticoagulation Summary as of 11/7/2017     INR goal 1.5-2.0   Today's INR 2.4!   Full instructions 1 mg on Mon, Fri; 1.5 mg all other days   Next INR check 12/5/2017    Indications   Atrial fibrillation with rapid ventricular response (H) (Resolved) [I48.91]  DVT (deep venous thrombosis) [I82.409]  Long term current use of anticoagulant therapy [Z79.01]         Your next Anticoagulation Clinic appointment(s)     Dec 05, 2017 10:30 AM CST   Anticoagulation Visit with [place] [facility]   [facility]    [address] [address] [phone]              Contact Numbers     Please call [phone] to cancel and/or reschedule your appointment.  Please call [phone] with any problems or questions regarding your therapy          November 2017 Details    Sun Mon Tue Wed Thu Fri Sat        1               2               3               4                 5               6               7      1.5 mg   See details      8      1.5 mg         9      1.5 mg         10      1 mg         11      1.5 mg           12      1.5 mg         13      1 mg         14      1.5 mg         15      1.5 mg         16      1.5 mg         17      1 mg         18      1.5 mg           19      1.5 mg         20      1 mg         21      1.5 mg         22      1.5 mg         23      1.5 mg         24      1 mg         25      1.5 mg           26      1.5 mg         27      1 mg         28      1.5 mg         29      1.5 mg         30      1.5 mg            Date Details   11/07 This INR check               How to take your warfarin dose     To take:  1 mg Take 1 of the 1 mg tablets.    To take:  1.5 mg Take 1.5 of the 1 mg tablets.           December 2017 Details    Sun  Mon Tue Wed Thu Fri Sat          1      1 mg         2      1.5 mg           3      1.5 mg         4      1 mg         5            6               7               8               9                 10               11               12               13               14               15               16                 17               18               19               20               21               22               23                 24               25               26               27               28               29               30                 31                      Date Details   No additional details    Date of next INR:  12/5/2017         How to take your warfarin dose     To take:  1 mg Take 1 of the 1 mg tablets.    To take:  1.5 mg Take 1.5 of the 1 mg tablets.

## 2017-11-07 NOTE — PROGRESS NOTES
SUBJECTIVE:   Ellie Leigh is a 87 year old female who presents to clinic today for the following health issues:      ENT Symptoms             Symptoms: cc Present Absent Comment   Fever/Chills   x    Fatigue  x     Muscle Aches  x     Eye Irritation  x     Sneezing  x  sometimes   Nasal Oniel/Drg  x  slight   Sinus Pressure/Pain       Loss of smell   x    Dental pain   x    Sore Throat   x    Swollen Glands   x    Ear Pain/Fullness   x    Cough  x     Wheeze   x    Chest Pain  x     Shortness of breath   x    Rash   x    Other         Symptom duration:  10/27/2017   Symptom severity:  moderate   Treatments tried:  z-laura and prednisone   Contacts:  none         GERD is stable on the zantac no sxs now       Problem list and histories reviewed & adjusted, as indicated.  Additional history: as documented    Labs reviewed in EPIC    Reviewed and updated as needed this visit by clinical staffTobacco  Allergies  Meds  Problems  Med Hx  Surg Hx  Fam Hx  Soc Hx        Reviewed and updated as needed this visit by Provider  Allergies  Meds  Problems         ROS:  Constitutional, HEENT, cardiovascular, pulmonary, gi and gu systems are negative, except as otherwise noted.      OBJECTIVE:                                                    /78 (BP Location: Right arm, Patient Position: Chair, Cuff Size: Adult Regular)  Pulse 76  Temp 98  F (36.7  C) (Tympanic)  Wt 120 lb (54.4 kg)  LMP 06/15/1985  SpO2 97%  BMI 25.08 kg/m2  Body mass index is 25.08 kg/(m^2).  GENERAL APPEARANCE: healthy, alert and no distress  EYES: Eyes grossly normal to inspection, PERRL and conjunctivae and sclerae normal  HENT: ear canals and TM's normal and nose and mouth without ulcers or lesions  NECK: no adenopathy, no asymmetry, masses, or scars and thyroid normal to palpation  RESP: lungs clear to auscultation - no rales, rhonchi or wheezes  CV: regular rates and rhythm, normal S1 S2, no S3 or S4 and no murmur, click or  rub  PSYCH: mentation appears normal and affect normal/bright    Xray is reviewed independently by Josefina Gómez MD and negative other than old RUL infiltrate      ASSESSMENT/PLAN:                                                    1. Gastroesophageal reflux disease without esophagitis  Stable no change in treatment plan.   - ranitidine (ZANTAC) 150 MG tablet; Take 1 tablet (150 mg) by mouth 2 times daily  Dispense: 60 tablet; Refill: 11    2. Cough  Likely viral syndrome   - XR Chest 2 Views; Future  - benzonatate (TESSALON) 200 MG capsule; Take 1 capsule (200 mg) by mouth 3 times daily as needed for cough  Dispense: 21 capsule; Refill: 0      Patient Instructions   Tessalon perles three times a day for cough     Rest   Hydrate   Patience    Keep me posted if things change       Josefina Gómez MD  [facility]      ADDENDUM: radiologist did read xray as sl increase in RUL infiltrate so will treat with levaquin for 10 days and repeat cxr in 1 month   Pt notified of this   Josefina Gómez MD

## 2017-11-07 NOTE — PATIENT INSTRUCTIONS
Tessalon perles three times a day for cough     Rest   Hydrate   Patience    Keep me posted if things change

## 2017-11-07 NOTE — PROGRESS NOTES
ANTICOAGULATION FOLLOW-UP CLINIC VISIT    Patient Name:  Ellie Leigh  Date:  11/7/2017  Contact Type:  Face to Face    SUBJECTIVE:     Patient Findings     Positives Other complaints (has a cough and congestion. Had some body aches but no fever or nasal symptoms. ), Antibiotic use or infection (patient was on zpak and predinsone but finished this about 1 week ago)    Comments Patient is seeing Dr. Gómez today to follow up regarding her respiratory symptoms. She was instructed to let [facility] know if any medication changes.            OBJECTIVE    INR Protime   Date Value Ref Range Status   11/07/2017 2.4 (A) 0.86 - 1.14 Final       ASSESSMENT / PLAN  INR assessment THER    Recheck INR In: 4 WEEKS    INR Location Clinic      Anticoagulation Summary as of 11/7/2017     INR goal 1.5-2.0   Today's INR 2.4!   Maintenance plan 1 mg (1 mg x 1) on Mon, Fri; 1.5 mg (1 mg x 1.5) all other days   Full instructions 1 mg on Mon, Fri; 1.5 mg all other days   Weekly total 9.5 mg   No change documented Lissy Beaulieu RN   Plan last modified Katia Landrum RN (2/2/2017)   Next INR check 12/5/2017   Priority INR   Target end date Indefinite    Indications   Atrial fibrillation with rapid ventricular response (H) (Resolved) [I48.91]  DVT (deep venous thrombosis) [I82.409]  Long term current use of anticoagulant therapy [Z79.01]         Anticoagulation Episode Summary     INR check location     Preferred lab     Send INR reminders to [facility] PHONE [facility] POOL    Comments * Actual INR goal is 1.7-2.3  please follow Dec 2015 INR referral (June 2016 goal range is an error)      Anticoagulation Care Providers     Provider Role Specialty Phone number    Josefina Gómez MD [facility] [phone]            See the Encounter Report to view Anticoagulation Flowsheet and Dosing Calendar (Go to Encounters tab in chart review, and find the Anticoagulation Therapy Visit)        Lissy Beaulieu RN

## 2017-11-07 NOTE — MR AVS SNAPSHOT
After Visit Summary   11/7/2017    Ellie Leigh    MRN: [id number]           Patient Information     Date Of Birth          9/12/1930        Visit Information        Provider Department      11/7/2017 3:40 PM Josefina Gómez MD [facility]        Today's Diagnoses     Cough    -  1    Gastroesophageal reflux disease without esophagitis          Care Instructions    Tessalon perles three times a day for cough     Rest   Hydrate   Patience    Keep me posted if things change           Follow-ups after your visit        Your next 10 appointments already scheduled     Dec 05, 2017 10:30 AM CST   Anticoagulation Visit with [place] [facility]   [facility] ([facility])    [address] [facility] [address]   [phone]            Dec 08, 2017  3:20 PM CST   [facility] with Josef[facility]z [facility]   [facility]    [address] [place] [id number] [phone]           Bring a current list of meds and any records pertaining to this visit. For Physicals, please bring immunization records and any forms needing to be filled out. Please arrive 10 minutes early to complete paperwork.            Jan 03, 2018  2:00 PM CST   LAB with [facility] ([facility])    [address] [facility] [address] [phone]           Please do not eat 10-12 hours before your appointment if you are coming in fasting for labs on lipids, cholesterol, or glucose (sugar). This does not apply to pregnant women. Water, hot tea and black coffee (with nothing added) are okay. Do not drink other fluids, diet soda or chew gum.            Jan 03, 2018  2:45 PM CST   MA SCREENING DIGITAL RIGHT with [address] [facility] ([facility])    [address] [facility] [address] [phone]           Do not use any powder, lotion or  "deodorant under your arms or on your breast. If you do, we will ask you to remove it before your exam.  Wear comfortable, two-piece clothing.  If you have any allergies, tell your care team.  Bring any previous mammograms from other facilities or have them mailed to the breast center. Three-dimensional (3D) mammograms are available at [facility] locations in [facility], [place], [place], [facility], [place], [place], and [place]. Health locations include [place] and [facility] in [place]. Benefits of 3D mammograms include: - Improved rate of cancer detection - Decreases your chance of having to go back for more tests, which means fewer: - \"False-positive\" results (This means that there is an abnormal area but it isn't cancer.) - Invasive testing procedures, such as a biopsy or surgery - Can provide clearer images of the breast if you have dense breast tissue. 3D mammography is an optional exam that anyone can have with a 2D mammogram. It doesn't replace or take the place of a 2D mammogram. 2D mammograms remain an effective screening test for all women.  Not all insurance companies cover the cost of a 3D mammogram. Check with your insurance.            Jan 08, 2018  4:45 PM CST   Remote PPM Check with RUEDA TECH1   [facility] ([facility])    [address] [place] [address] [address]  [facility] [id number] [phone]           This appointment is for a remote check of your pacemaker.  This is not an appointment at the office.            Jan 11, 2018 11:00 AM CST   Return Visit with Gayle Nieves MD   [facility] ([facility])    [facility]  [address] [facility] [address]  [facility] [address] [phone]              Who to contact     If you have questions or need follow up information about today's clinic visit or your schedule please contact [facility] directly at " "[phone].  Normal or non-critical lab and imaging results will be communicated to you by MyChart, letter or phone within 4 business days after the clinic has received the results. If you do not hear from us within 7 days, please contact the clinic through Jellyvisionhart or phone. If you have a critical or abnormal lab result, we will notify you by phone as soon as possible.  Submit refill requests through Jalbum or call your pharmacy and they will forward the refill request to us. Please allow 3 business days for your refill to be completed.          Additional Information About Your Visit        Jellyvisionhart Information     Jalbum lets you send messages to your doctor, view your test results, renew your prescriptions, schedule appointments and more. To sign up, go to [email or web address] . Click on \"Log in\" on the left side of the screen, which will take you to the Welcome page. Then click on \"Sign up Now\" on the right side of the page.     You will be asked to enter the access code listed below, as well as some personal information. Please follow the directions to create your username and password.     Your access code is: [email or web address]  Expires: 2017  3:11 PM     Your access code will  in 90 days. If you need help or a new code, please call your [facility] clinic or [phone].        Care EveryWhere ID     This is your Care EveryWhere ID. This could be used by other organizations to access your [facility] medical records  [id number]        Your Vitals Were     Pulse Temperature Last Period Pulse Oximetry BMI (Body Mass Index)       76 98  F (36.7  C) (Tympanic) 06/15/1985 97% 25.08 kg/m2        Blood Pressure from Last 3 Encounters:   17 139/78   10/27/17 116/80   17 102/56    Weight from Last 3 Encounters:   17 120 lb (54.4 kg)   10/27/17 119 lb 12.8 oz (54.3 kg)   17 116 lb 9.6 oz (52.9 kg)                 Today's Medication Changes          These changes are accurate as of: " 11/7/17  4:13 PM.  If you have any questions, ask your nurse or doctor.               Start taking these medicines.        Dose/Directions    benzonatate 200 MG capsule   Commonly known as:  TESSALON   Used for:  Cough   Started by:  Josefina Gómez MD        Dose:  200 mg   Take 1 capsule (200 mg) by mouth 3 times daily as needed for cough   Quantity:  21 capsule   Refills:  0            Where to get your medicines      These medications were sent to [id number] - [place], [place] - [address] [facility] [facility]  [address] [facility] [address]     Phone:  [phone]     benzonatate 200 MG capsule    ranitidine 150 MG tablet                Primary Care Provider Office Phone # Fax #    Josefina Gómez [phone] [address] [place] [address]        Equal Access to Services     [facility] Hospital[facility]ERT[facility] : Hadii daniel dumont hadasho Soomaali, waaxda luqadaha, qaybta kaalmada adesukhdeepyatalon, verónica estrada. So [facility] [phone].    ATENCIÓN: Si habla español, tiene a sahni disposición servicios gratuitos de asistencia lingüística. Llame al [phone].    We comply with applicable federal civil rights laws and [place] laws. We do not discriminate on the basis of race, color, national origin, age, disability, sex, sexual orientation, or gender identity.            Thank you!     Thank you for choosing [facility]  for your care. Our goal is always to provide you with excellent care. Hearing back from our patients is one way we can continue to improve our services. Please take a few minutes to complete the written survey that you may receive in the mail after your visit with us. Thank you!             Your Updated Medication List - Protect others around you: Learn how to safely use, store and throw away your medicines at www.disposemymeds.org.          This list is accurate as of: 11/7/17  4:13 PM.  Always use your most recent med list.                    Brand Name Dispense Instructions for use Diagnosis    albuterol 108 (90 BASE) MCG/ACT Inhaler    PROAIR HFA/PROVENTIL HFA/VENTOLIN HFA    3 Inhaler    Inhale 2 puffs into the lungs every 6 hours as needed for shortness of breath / dyspnea    Mild persistent asthma without complication       benzonatate 200 MG capsule    TESSALON    21 capsule    Take 1 capsule (200 mg) by mouth 3 times daily as needed for cough    Cough       CRANBERRY      Take 475 mg by mouth 2 times daily.        diclofenac 1 % Gel topical gel    VOLTAREN    100 g    APPLY 4 GRAMS TO KNEES OR 2 GRAMS TO HANDS FOUR TIMES DAILY USING ENCLOSED DOSING CARD.    Primary osteoarthritis involving multiple joints       ipratropium - albuterol 0.5 mg/2.5 mg/3 mL 0.5-2.5 (3) MG/3ML neb solution    DUONEB    180 mL    TAKE 1 VIAL (3 MLS) BY NEBULIZATION EVERY 6 HOURS AS NEEDED FOR SHORTNESS OF BREATH / DYSPNEA OR WHEEZING    Chronic obstructive pulmonary disease, unspecified COPD type (H)       levothyroxine 50 MCG tablet    SYNTHROID/LEVOTHROID    90 tablet    TAKE 1 TABLET (50 MCG) BY MOUTH DAILY    Hypothyroidism, unspecified type       metoprolol 25 MG 24 hr tablet    TOPROL-XL    120 tablet    TAKE TWO TABLETS BY MOUTH TWICE DAILY    Essential hypertension, benign       polyethylene glycol powder    MIRALAX    510 g    Take 17 g by mouth daily as needed    Constipation       probiotic Caps      Take 1 capsule by mouth every evening        ranitidine 150 MG tablet    ZANTAC    60 tablet    Take 1 tablet (150 mg) by mouth 2 times daily    Gastroesophageal reflux disease without esophagitis       SYMBICORT 160-4.5 MCG/ACT Inhaler   Generic drug:  budesonide-formoterol     10.2 g    INHALE 2 PUFFS INTO THE LUNGS 2 TIMES DAILY    Mild persistent asthma without complication       warfarin 1 MG tablet    COUMADIN    105 tablet    Take 1 mg on Mon, Fri; 1.5 mg all other days  or as directed by Anticoagulation Clinic    Long term current use of  anticoagulant therapy

## 2017-11-08 ENCOUNTER — TELEPHONE (OUTPATIENT)
Dept: FAMILY MEDICINE | Facility: CLINIC | Age: 82
End: 2017-11-08

## 2017-11-08 DIAGNOSIS — R05.9 COUGH: Primary | ICD-10-CM

## 2017-11-08 RX ORDER — LEVOFLOXACIN 500 MG/1
500 TABLET, FILM COATED ORAL DAILY
Qty: 10 TABLET | Refills: 0 | Status: SHIPPED | OUTPATIENT
Start: 2017-11-08 | End: 2017-12-08

## 2017-11-08 NOTE — TELEPHONE ENCOUNTER
Result Notes   Notes Recorded by Celena Laurent RN on 11/8/2017 at 10:55 AM  Suyapa and Thalia notified of Dr Gómez's message  Celena Laurent RN    ------    Notes Recorded by Josefina Gómez MD on 11/8/2017 at 10:47 AM  Dariana MORGAN please call pt and start her on levaquin 500 mg qd for 10 days let her know very mild infection possible in the right upper lung  Needs repeat cxr in 1 month   Levaquin order faxed to [facility] in [place]  Celena Laurent RN

## 2017-11-09 ENCOUNTER — ANTICOAGULATION THERAPY VISIT (OUTPATIENT)
Dept: ANTICOAGULATION | Facility: CLINIC | Age: 82
End: 2017-11-09
Payer: COMMERCIAL

## 2017-11-09 DIAGNOSIS — I82.409 DVT (DEEP VENOUS THROMBOSIS) (H): ICD-10-CM

## 2017-11-09 DIAGNOSIS — Z79.01 LONG TERM CURRENT USE OF ANTICOAGULANT THERAPY: ICD-10-CM

## 2017-11-09 PROCEDURE — 99207 ZZC NO CHARGE NURSE ONLY: CPT

## 2017-11-09 NOTE — MR AVS SNAPSHOT
Ellie Leigh   11/9/2017   Anticoagulation Therapy Visit    Description:  87 year old female   Provider:  Katia Landrum, RN   Department:  [place] [facility]           INR as of 11/9/2017     Today's INR No new INR was available at the time of this encounter.      Anticoagulation Summary as of 11/9/2017     INR goal 1.5-2.0   Today's INR No new INR was available at the time of this encounter.   Full instructions 11/9: 0.5 mg; Otherwise 1 mg on Mon, Fri; 1.5 mg all other days   Next INR check 11/14/2017    Indications   Atrial fibrillation with rapid ventricular response (H) (Resolved) [I48.91]  DVT (deep venous thrombosis) [I82.409]  Long term current use of anticoagulant therapy [Z79.01]         Your next Anticoagulation Clinic appointment(s)     Nov 14, 2017  2:15 PM CST   Anticoagulation Visit with [place] [facility]   [facility]    [address] [facility] [address]   [phone]            Dec 05, 2017 10:30 AM CST   Anticoagulation Visit with [place]AG   [facility]    [address] [facility] [address]   [phone]              November 2017 Details    Sun Mon Tue Wed Thu Fri Sat        1               2               3               4                 5               6               7               8               9      0.5 mg   See details      10      1 mg         11      1.5 mg           12      1.5 mg         13      1 mg         14            15               16               17               18                 19               20               21               22               23               24               25                 26               27               28               29               30                  Date Details   11/09 This INR check       Date of next INR:  11/14/2017         How to take your warfarin dose     To take:  0.5 mg Take 0.5 of a 1 mg tablet.    To take:  1 mg  Take 1 of the 1 mg tablets.    To take:  1.5 mg Take 1.5 of the 1 mg tablets.

## 2017-11-09 NOTE — PROGRESS NOTES
"8:30am - Writer received voicemail left by patient from yesterday at 4:39pm. Patient was prescribed Levaquin for 10 days. Patient should have her INR drawn again early next week due to the potential interaction between Levaquin and warfarin. Requested patient call [facility] back when she received the message.   ANTICOAGULATION FOLLOW-UP CLINIC VISIT    Patient Name:  Ellie Leigh  Date:  11/9/2017  Contact Type:  Telephone/ Ellie & her daughter Suyapa    SUBJECTIVE:     Patient Findings     Positives Antibiotic use or infection (Levaquin, 10 day course for \"very mild infection possible in the right upper lung\")    Comments Patient's daughter Suyapa called back at 10:37 and an appointment was made for Tuesday next week. Writer adjusted patient's dose down today due to slightly supratherapeutic INR on Tuesday and starting Levaquin yesterday. INR was 2.4 (goal range 1.7-2.3).               OBJECTIVE    INR Protime   Date Value Ref Range Status   11/07/2017 2.4 (A) 0.86 - 1.14 Final       ASSESSMENT / PLAN  No question data found.  Anticoagulation Summary as of 11/9/2017     INR goal 1.5-2.0   Today's INR No new INR was available at the time of this encounter.   Maintenance plan 1 mg (1 mg x 1) on Mon, Fri; 1.5 mg (1 mg x 1.5) all other days   Full instructions 11/9: 0.5 mg; Otherwise 1 mg on Mon, Fri; 1.5 mg all other days   Weekly total 9.5 mg   Plan last modified Katia Landrum RN (2/2/2017)   Next INR check 11/14/2017   Priority INR   Target end date Indefinite    Indications   Atrial fibrillation with rapid ventricular response (H) (Resolved) [I48.91]  DVT (deep venous thrombosis) [I82.409]  Long term current use of anticoagulant therapy [Z79.01]         Anticoagulation Episode Summary     INR check location     Preferred lab     Send INR reminders to [place] [facility] POOL    Comments * Actual INR goal is 1.7-2.3  please follow Dec 2015 INR referral (June 2016 goal range is an error)      Anticoagulation Care " Providers     Provider Role Specialty Phone number    Josefina Gómez MD [facility] [phone]            See the Encounter Report to view Anticoagulation Flowsheet and Dosing Calendar (Go to Encounters tab in chart review, and find the Anticoagulation Therapy Visit)        Katia Landrum RN

## 2017-11-14 ENCOUNTER — ANTICOAGULATION THERAPY VISIT (OUTPATIENT)
Dept: ANTICOAGULATION | Facility: CLINIC | Age: 82
End: 2017-11-14
Payer: COMMERCIAL

## 2017-11-14 DIAGNOSIS — Z79.01 LONG TERM CURRENT USE OF ANTICOAGULANT THERAPY: ICD-10-CM

## 2017-11-14 LAB — INR POINT OF CARE: 2.3 (ref 0.86–1.14)

## 2017-11-14 PROCEDURE — 85610 PROTHROMBIN TIME: CPT | Mod: QW

## 2017-11-14 PROCEDURE — 99207 ZZC NO CHARGE NURSE ONLY: CPT

## 2017-11-14 PROCEDURE — 36416 COLLJ CAPILLARY BLOOD SPEC: CPT

## 2017-11-14 NOTE — PROGRESS NOTES
ANTICOAGULATION FOLLOW-UP CLINIC VISIT    Patient Name:  Ellie Leigh  Date:  11/14/2017  Contact Type:  Face to Face    SUBJECTIVE:     Patient Findings     Positives Antibiotic use or infection (4 days left of levaquin for pneumonia)           OBJECTIVE    INR Protime   Date Value Ref Range Status   11/14/2017 2.3 (A) 0.86 - 1.14 Final       ASSESSMENT / PLAN  INR assessment THER    Recheck INR In: 3 WEEKS    INR Location Clinic      Anticoagulation Summary as of 11/14/2017     INR goal 1.5-2.0   Today's INR 2.3!   Maintenance plan 1 mg (1 mg x 1) on Mon, Fri; 1.5 mg (1 mg x 1.5) all other days   Full instructions 11/15: 1 mg; 11/16: 1 mg; Otherwise 1 mg on Mon, Fri; 1.5 mg all other days   Weekly total 9.5 mg   Plan last modified Katia Lnadrum RN (2/2/2017)   Next INR check 12/5/2017   Priority INR   Target end date Indefinite    Indications   Atrial fibrillation with rapid ventricular response (H) (Resolved) [I48.91]  DVT (deep venous thrombosis) [I82.409]  Long term current use of anticoagulant therapy [Z79.01]         Anticoagulation Episode Summary     INR check location     Preferred lab     Send INR reminders to [facility] POOL    Comments * Actual INR goal is 1.7-2.3  please follow Dec 2015 INR referral (June 2016 goal range is an error)      Anticoagulation Care Providers     Provider Role Specialty Phone number    Josefina Gómez MD [facility] [phone]            See the Encounter Report to view Anticoagulation Flowsheet and Dosing Calendar (Go to Encounters tab in chart review, and find the Anticoagulation Therapy Visit)        Lissy Beaulieu RN

## 2017-11-14 NOTE — MR AVS SNAPSHOT
Ellie Leigh   11/14/2017 2:15 PM   Anticoagulation Therapy Visit    Description:  87 year old female   Provider:  [place] [facility]   Department:  [facility]           INR as of 11/14/2017     Today's INR 2.3!      Anticoagulation Summary as of 11/14/2017     INR goal 1.5-2.0   Today's INR 2.3!   Full instructions 11/15: 1 mg; 11/16: 1 mg; Otherwise 1 mg on Mon, Fri; 1.5 mg all other days   Next INR check 12/5/2017    Indications   Atrial fibrillation with rapid ventricular response (H) (Resolved) [I48.91]  DVT (deep venous thrombosis) [I82.409]  Long term current use of anticoagulant therapy [Z79.01]         Description     Take 1 mg Wed and Thursday this week. Then resume usual dosing on Friday.      Your next Anticoagulation Clinic appointment(s)     Dec 05, 2017 10:30 AM CST   Anticoagulation Visit with [place] [facility]   [facility] ([facility]    [address] [facility] [address] [phone]              Contact Numbers     Please call [phone] to cancel and/or reschedule your appointment.  Please call [phone] with any problems or questions regarding your therapy          November 2017 Details    Sun Mon Tue Wed Thu Fri Sat        1               2               3               4                 5               6               7               8               9               10               11                 12               13               14      1.5 mg   See details      15      1 mg         16      1 mg         17      1 mg         18      1.5 mg           19      1.5 mg         20      1 mg         21      1.5 mg         22      1.5 mg         23      1.5 mg         24      1 mg         25      1.5 mg           26      1.5 mg         27      1 mg         28      1.5 mg         29      1.5 mg         30      1.5 mg            Date Details   11/14 This INR check               How to take your warfarin dose     To take:  1 mg Take 1 of the 1 mg tablets.     To take:  1.5 mg Take 1.5 of the 1 mg tablets.           December 2017 Details    Sun Mon Tue Wed Thu Fri Sat          1      1 mg         2      1.5 mg           3      1.5 mg         4      1 mg         5            6               7               8               9                 10               11               12               13               14               15               16                 17               18               19               20               21               22               23                 24               25               26               27               28               29               30                 31                      Date Details   No additional details    Date of next INR:  12/5/2017         How to take your warfarin dose     To take:  1 mg Take 1 of the 1 mg tablets.    To take:  1.5 mg Take 1.5 of the 1 mg tablets.

## 2017-11-20 ENCOUNTER — TELEPHONE (OUTPATIENT)
Dept: FAMILY MEDICINE | Facility: CLINIC | Age: 82
End: 2017-11-20

## 2017-11-20 ENCOUNTER — OFFICE VISIT (OUTPATIENT)
Dept: FAMILY MEDICINE | Facility: CLINIC | Age: 82
End: 2017-11-20
Payer: COMMERCIAL

## 2017-11-20 VITALS
BODY MASS INDEX: 25.19 KG/M2 | HEART RATE: 82 BPM | WEIGHT: 120 LBS | OXYGEN SATURATION: 96 % | HEIGHT: 58 IN | SYSTOLIC BLOOD PRESSURE: 120 MMHG | DIASTOLIC BLOOD PRESSURE: 78 MMHG | TEMPERATURE: 98.2 F

## 2017-11-20 DIAGNOSIS — K21.00 GASTROESOPHAGEAL REFLUX DISEASE WITH ESOPHAGITIS: Primary | ICD-10-CM

## 2017-11-20 PROCEDURE — 99213 OFFICE O/P EST LOW 20 MIN: CPT | Performed by: NURSE PRACTITIONER

## 2017-11-20 NOTE — MR AVS SNAPSHOT
After Visit Summary   11/20/2017    Ellie Leigh    MRN: [id number]           Patient Information     Date Of Birth          9/12/1930        Visit Information        Provider Department      11/20/2017 2:20 PM Silvana Velez APRN CNP [facility]        Today's Diagnoses     Gastroesophageal reflux disease with esophagitis    -  1       Follow-ups after your visit        Your next 10 appointments already scheduled     Dec 05, 2017 10:30 AM CST   Anticoagulation Visit with [place] [facility]   [facility]    [address] [place] [address]   [phone]            Dec 08, 2017  3:20 PM CST   Office Visit with Josefina Gómez MD   [facility] ([facility])    [address] [place] [address] [phone]           Bring a current list of meds and any records pertaining to this visit. For Physicals, please bring immunization records and any forms needing to be filled out. Please arrive 10 minutes early to complete paperwork.            Jan 03, 2018  2:00 PM CST   LAB with [facility] ([facility])    [address] [facility] [place] [address]   [phone]           Please do not eat 10-12 hours before your appointment if you are coming in fasting for labs on lipids, cholesterol, or glucose (sugar). This does not apply to pregnant women. Water, hot tea and black coffee (with nothing added) are okay. Do not drink other fluids, diet soda or chew gum.            Jan 03, 2018  2:45 PM CST   MA SCREENING DIGITAL RIGHT with [address] [facility] ([facility])    [address] [facility] [place] [address]   [phone]           Do not use any powder, lotion or deodorant under your arms or on your breast. If you do, we will ask you to remove it before your exam.  Wear comfortable, two-piece clothing.  If you  "have any allergies, tell your care team.  Bring any previous mammograms from other facilities or have them mailed to the breast center. Three-dimensional (3D) mammograms are available at [facility] locations in [facility], [place], [place], [facility], [place], [place], and [place]. [facility] locations include [place] and [facility] in [place]. Benefits of 3D mammograms include: - Improved rate of cancer detection - Decreases your chance of having to go back for more tests, which means fewer: - \"False-positive\" results (This means that there is an abnormal area but it isn't cancer.) - Invasive testing procedures, such as a biopsy or surgery - Can provide clearer images of the breast if you have dense breast tissue. 3D mammography is an optional exam that anyone can have with a 2D mammogram. It doesn't replace or take the place of a 2D mammogram. 2D mammograms remain an effective screening test for all women.  Not all insurance companies cover the cost of a 3D mammogram. Check with your insurance.            Jan 08, 2018  4:45 PM CST   Remote PPM Check with RUEDA TECH1   [facility] ([facility])    [address]  [facility] [address] [phone]           This appointment is for a remote check of your pacemaker.  This is not an appointment at the office.            Jan 11, 2018 11:00 AM CST   Return Visit with Gayle Nieves MD   [facility] ([facility])    [facility]  [address]  [facility] [address] [phone]              Who to contact     If you have questions or need follow up information about today's clinic visit or your schedule please contact [facility] directly at [phone].  Normal or non-critical lab and imaging results will be communicated to you by MyChart, letter or phone within 4 business days after the clinic has received " "the results. If you do not hear from us within 7 days, please contact the clinic through [employer] or phone. If you have a critical or abnormal lab result, we will notify you by phone as soon as possible.  Submit refill requests through [employer] or call your pharmacy and they will forward the refill request to us. Please allow 3 business days for your refill to be completed.          Additional Information About Your Visit        [employer] Information     Net[employer] lets you send messages to your doctor, view your test results, renew your prescriptions, schedule appointments and more. To sign up, go to [email or web address] . Click on \"Log in\" on the left side of the screen, which will take you to the Welcome page. Then click on \"Sign up Now\" on the right side of the page.     You will be asked to enter the access code listed below, as well as some personal information. Please follow the directions to create your username and password.     Your access code is: [email or web address]  Expires: 2017  3:11 PM     Your access code will  in 90 days. If you need help or a new code, please call your [place] clinic or [phone].        Care EveryWhere ID     This is your Care EveryWhere ID. This could be used by other organizations to access your [place] medical records  [id number]        Your Vitals Were     Pulse Temperature Height Last Period Pulse Oximetry BMI (Body Mass Index)    82 98.2  F (36.8  C) (Tympanic) 4' 10\" (1.473 m) 06/15/1985 96% 25.08 kg/m2       Blood Pressure from Last 3 Encounters:   17 120/78   17 139/78   10/27/17 116/80    Weight from Last 3 Encounters:   17 120 lb (54.4 kg)   17 120 lb (54.4 kg)   10/27/17 119 lb 12.8 oz (54.3 kg)              Today, you had the following     No orders found for display       Primary Care Provider Office Phone # Fax #    Josefina Gómez [phone] [address] [place] [address]        Equal Access to " Services     [facility]: Hadii dnaiel dumont alisson Arroyoali, wadeeptida luqadaha, qaybta kaalmatalon feliz, verónica bedoya . So [facility] [phone].    ATENCIÓN: Si shanala heriberto, tiene a sahni disposición servicios gratuitos de asistencia lingüística. Llame al [phone].    We comply with applicable federal civil rights laws and [place] laws. We do not discriminate on the basis of race, color, national origin, age, disability, sex, sexual orientation, or gender identity.            Thank you!     Thank you for choosing [facility]  for your care. Our goal is always to provide you with excellent care. Hearing back from our patients is one way we can continue to improve our services. Please take a few minutes to complete the written survey that you may receive in the mail after your visit with us. Thank you!             Your Updated Medication List - Protect others around you: Learn how to safely use, store and throw away your medicines at www.disposemymeds.org.          This list is accurate as of: 11/20/17 11:59 PM.  Always use your most recent med list.                   Brand Name Dispense Instructions for use Diagnosis    albuterol 108 (90 BASE) MCG/ACT Inhaler    PROAIR HFA/PROVENTIL HFA/VENTOLIN HFA    3 Inhaler    Inhale 2 puffs into the lungs every 6 hours as needed for shortness of breath / dyspnea    Mild persistent asthma without complication       benzonatate 200 MG capsule    TESSALON    21 capsule    Take 1 capsule (200 mg) by mouth 3 times daily as needed for cough    Cough       CRANBERRY      Take 475 mg by mouth 2 times daily.        diclofenac 1 % Gel topical gel    VOLTAREN    100 g    APPLY 4 GRAMS TO KNEES OR 2 GRAMS TO HANDS FOUR TIMES DAILY USING ENCLOSED DOSING CARD.    Primary osteoarthritis involving multiple joints       ipratropium - albuterol 0.5 mg/2.5 mg/3 mL 0.5-2.5 (3) MG/3ML neb solution    DUONEB    180 mL    TAKE 1 VIAL (3 MLS) BY  NEBULIZATION EVERY 6 HOURS AS NEEDED FOR SHORTNESS OF BREATH / DYSPNEA OR WHEEZING    Chronic obstructive pulmonary disease, unspecified COPD type (H)       levofloxacin 500 MG tablet    LEVAQUIN    10 tablet    Take 1 tablet (500 mg) by mouth daily    Cough       levothyroxine 50 MCG tablet    SYNTHROID/LEVOTHROID    90 tablet    TAKE 1 TABLET (50 MCG) BY MOUTH DAILY    Hypothyroidism, unspecified type       metoprolol 25 MG 24 hr tablet    TOPROL-XL    120 tablet    TAKE TWO TABLETS BY MOUTH TWICE DAILY    Essential hypertension, benign       polyethylene glycol powder    MIRALAX    510 g    Take 17 g by mouth daily as needed    Constipation       probiotic Caps      Take 1 capsule by mouth every evening        ranitidine 150 MG tablet    ZANTAC    60 tablet    Take 1 tablet (150 mg) by mouth 2 times daily    Gastroesophageal reflux disease without esophagitis       SYMBICORT 160-4.5 MCG/ACT Inhaler   Generic drug:  budesonide-formoterol     10.2 g    INHALE 2 PUFFS INTO THE LUNGS 2 TIMES DAILY    Mild persistent asthma without complication       warfarin 1 MG tablet    COUMADIN    105 tablet    Take 1 mg on Mon, Fri; 1.5 mg all other days  or as directed by Anticoagulation Clinic    Long term current use of anticoagulant therapy

## 2017-11-20 NOTE — TELEPHONE ENCOUNTER
Patient was seen two weeks ago for pneumonia and she is not feeling any better. Please advise.    Sapna [facility]-Station

## 2017-11-20 NOTE — PROGRESS NOTES
"  SUBJECTIVE:   Ellie Leigh is a 87 year old female who presents to clinic today for the following health issues:      CHEST PAIN     Onset: 3 days     Description:   Location:  entire chest  Character: \"raw\"  Radiation: throat  Duration: constant     Intensity: moderate    Progression of Symptoms:  same    Accompanying Signs & Symptoms:  Shortness of breath: YES- at times   Sweating: no   Nausea/vomiting: no  Lightheadedness: no  Palpitations: no  Fever/Chills: no  Cough: YES  Heartburn: YES   History:   Family history of heart disease YES- yes father   Tobacco use: no    Precipitating factors:   Worse with exertion: no  Worse with deep breaths :  no  Related to food: YES- possible - spicy foods make this worse     Alleviating factors:  None        Therapies Tried and outcome: none     Started after really bag reflux on Saturday-then \"raw\" feeling in chest followed.  Improved on Sunday but raw feeling remains.     Problem list and histories reviewed & adjusted, as indicated.  Additional history: as documented    Patient Active Problem List   Diagnosis     Sensorineural hearing loss, asymmetrical     Generalized osteoarthrosis, unspecified site     Disease of lung     PERSONAL HISTORY OF MALIGNANCY- BREAST     Esophageal reflux     Chronic allergic conjunctivitis     Atopic rhinitis     Rhinitis, allergic seasonal     Hyperlipidemia LDL goal <130     Chronic constipation     Osteoporosis     Health Care Home     Right arm weakness     Ulnar neuropathy     Headache     Mild major depression     DVT (deep venous thrombosis)     Anxiety     Cervical vertebral fracture (H)     Intermittent atrial fibrillation (H)     Squamous cell carcinoma in situ of skin of face     SK (seborrheic keratosis)     Hypothyroidism     Hyponatremia     Recurrent UTI     History of skin cancer     BPPV (benign paroxysmal positional vertigo)     Benign essential HTN     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)     Positive " fecal occult blood test     COPD (chronic obstructive pulmonary disease) (H)     Infectious colitis, enteritis and gastroenteritis     Advance Care Planning     Syncope     Hemoptysis     Long term current use of anticoagulant therapy     Mild persistent asthma without complication     Chest pain at rest     Unresponsive episode     Irritable bowel syndrome without diarrhea     Elevated troponin     Nausea     Past Surgical History:   Procedure Laterality Date     APPENDECTOMY       ARTHROSCOPY KNEE  4/15/2011    Procedure:ARTHROSCOPY KNEE; removal of loose body; Surgeon:LEY, JEFFREY DUANE; Location:[facility]     CHOLECYSTECTOMY       ESOPHAGOSCOPY, GASTROSCOPY, DUODENOSCOPY (EGD), COMBINED  6/9/2014    Procedure: COMBINED ESOPHAGOSCOPY, GASTROSCOPY, DUODENOSCOPY (EGD);  Surgeon: Gilberto Richard MD;  Location: [facility]     IMPLANT PACEMAKER  5/21/2013    Biotronik Moder [id number] Serial#[id number]     INJECT EPIDURAL LUMBAR  3/23/2011    INJECT EPIDURAL LUMBAR performed by GENERIC ANESTHESIA PROVIDER at [facility] OR     JOINT REPLACEMENT, HIP RT/LT      Joint Replacement Hip Rt     MASTECTOMY, SIMPLE RT/LT/CAITLYN      Left breast - following breast ca     OTHER SURGICAL HISTORY      C1-C2 fusion after fx      SURGICAL HISTORY OF -   05/22/2001    Colonoscopy     TONSILLECTOMY         Social History   Substance Use Topics     Smoking status: Never Smoker     Smokeless tobacco: Never Used     Alcohol use No     Family History   Problem Relation Age of Onset     CEREBROVASCULAR DISEASE Mother      HEART DISEASE Mother      MI     CEREBROVASCULAR DISEASE Father      HEART DISEASE Father      MI     Respiratory Maternal Grandfather      TB     Neurologic Disorder Brother      ALS     HEART DISEASE Brother      CEREBROVASCULAR DISEASE Brother      Breast Cancer Daughter      age:49     Asthma Brother      CANCER Brother      brain and lung     CANCER Daughter      thyroid         Current Outpatient Prescriptions   Medication Sig Dispense  Refill     levofloxacin (LEVAQUIN) 500 MG tablet Take 1 tablet (500 mg) by mouth daily 10 tablet 0     ranitidine (ZANTAC) 150 MG tablet Take 1 tablet (150 mg) by mouth 2 times daily 60 tablet 11     benzonatate (TESSALON) 200 MG capsule Take 1 capsule (200 mg) by mouth 3 times daily as needed for cough 21 capsule 0     albuterol (PROAIR HFA/PROVENTIL HFA/VENTOLIN HFA) 108 (90 BASE) MCG/ACT Inhaler Inhale 2 puffs into the lungs every 6 hours as needed for shortness of breath / dyspnea 3 Inhaler 1     metoprolol (TOPROL-XL) 25 MG 24 hr tablet TAKE TWO TABLETS BY MOUTH TWICE DAILY  120 tablet 3     levothyroxine (SYNTHROID/LEVOTHROID) 50 MCG tablet TAKE 1 TABLET (50 MCG) BY MOUTH DAILY 90 tablet 2     warfarin (COUMADIN) 1 MG tablet Take 1 mg on Mon, Fri; 1.5 mg all other days  or as directed by Anticoagulation Clinic 105 tablet 1     ipratropium - albuterol 0.5 mg/2.5 mg/3 mL (DUONEB) 0.5-2.5 (3) MG/3ML neb solution TAKE 1 VIAL (3 MLS) BY NEBULIZATION EVERY 6 HOURS AS NEEDED FOR SHORTNESS OF BREATH / DYSPNEA OR WHEEZING 180 mL 10     SYMBICORT 160-4.5 MCG/ACT Inhaler INHALE 2 PUFFS INTO THE LUNGS 2 TIMES DAILY 10.2 g 5     diclofenac (VOLTAREN) 1 % GEL topical gel APPLY 4 GRAMS TO KNEES OR 2 GRAMS TO HANDS FOUR TIMES DAILY USING ENCLOSED DOSING CARD. 100 g 0     probiotic CAPS Take 1 capsule by mouth every evening        polyethylene glycol (MIRALAX) powder Take 17 g by mouth daily as needed 510 g 5     CRANBERRY Take 475 mg by mouth 2 times daily.       Allergies   Allergen Reactions     Cephalosporins Nausea     Cefzil     Doxycycline Nausea and Vomiting     Sulfa Drugs Nausea     Penicillins Rash     PCN     Labs reviewed in EPIC      Reviewed and updated as needed this visit by clinical staff  Tobacco  Allergies  Meds  Med Hx  Surg Hx  Fam Hx  Soc Hx      Reviewed and updated as needed this visit by Provider       ROS:  Constitutional, HEENT, cardiovascular, pulmonary, gi and gu systems are negative, except  "as otherwise noted.      OBJECTIVE:   /78 (Cuff Size: Adult Regular)  Pulse 82  Temp 98.2  F (36.8  C) (Tympanic)  Ht 4' 10\" (1.473 m)  Wt 120 lb (54.4 kg)  LMP 06/15/1985  SpO2 96%  BMI 25.08 kg/m2  Body mass index is 25.08 kg/(m^2).  GENERAL: healthy, alert and no distress  HENT: ear canals and TM's normal, nose and mouth without ulcers or lesions  NECK: no adenopathy  RESP: lungs clear to auscultation - no rales, rhonchi or wheezes  CV: regular rate and rhythm, normal S1 S2, no S3 or S4, no murmur, click or rub  ABDOMEN: soft, nontender, and bowel sounds normal  PSYCH: mentation appears normal, affect normal/bright    Diagnostic Test Results:  none     ASSESSMENT/PLAN:     1. Gastroesophageal reflux disease with esophagitis  GI cocktail given in clinic with relief of symptoms.  Consider adding Maalox or similar when GERD symptoms are significant-speak with pharmacist for potential interactions with medications.  Continue with the Zantac and follow up if symptoms do not continue to improve or any worsening symptoms.    Home care instructions were reviewed with the patient. The risks, benefits and treatment options of prescribed medications or other treatments have been discussed with the patient. The patient verbalized their understanding and should call or follow up if no improvement or if they develop further problems.    LACI Azul [facility]  "

## 2017-11-20 NOTE — TELEPHONE ENCOUNTER
Cough is better but just is more tired than usual and she wants to be seen today. Appointment scheduled

## 2017-11-20 NOTE — NURSING NOTE
"Chief Complaint   Patient presents with     Cough       Initial /78 (Cuff Size: Adult Regular)  Pulse 82  Temp 98.2  F (36.8  C) (Tympanic)  Ht 4' 10\" (1.473 m)  Wt 120 lb (54.4 kg)  LMP 06/15/1985  SpO2 96%  BMI 25.08 kg/m2 Estimated body mass index is 25.08 kg/(m^2) as calculated from the following:    Height as of this encounter: 4' 10\" (1.473 m).    Weight as of this encounter: 120 lb (54.4 kg).  Medication Reconciliation: complete    Health Maintenance that is potentially due pending provider review:  NONE    n/a    Is there anyone who you would like to be able to receive your results? No  If yes have patient fill out JOSUE    Susan Azar CMA    "

## 2017-11-30 DIAGNOSIS — I48.0 INTERMITTENT ATRIAL FIBRILLATION (H): ICD-10-CM

## 2017-11-30 RX ORDER — WARFARIN SODIUM 1 MG/1
TABLET ORAL
Qty: 60 TABLET | Refills: 0 | Status: SHIPPED | OUTPATIENT
Start: 2017-11-30 | End: 2017-12-05

## 2017-12-05 ENCOUNTER — ANTICOAGULATION THERAPY VISIT (OUTPATIENT)
Dept: ANTICOAGULATION | Facility: CLINIC | Age: 82
End: 2017-12-05
Payer: COMMERCIAL

## 2017-12-05 DIAGNOSIS — I48.0 INTERMITTENT ATRIAL FIBRILLATION (H): ICD-10-CM

## 2017-12-05 DIAGNOSIS — I82.409 DVT (DEEP VENOUS THROMBOSIS) (H): ICD-10-CM

## 2017-12-05 DIAGNOSIS — Z79.01 LONG TERM CURRENT USE OF ANTICOAGULANT THERAPY: ICD-10-CM

## 2017-12-05 LAB — INR POINT OF CARE: 2.7 (ref 0.86–1.14)

## 2017-12-05 PROCEDURE — 99207 ZZC NO CHARGE NURSE ONLY: CPT

## 2017-12-05 PROCEDURE — 36416 COLLJ CAPILLARY BLOOD SPEC: CPT

## 2017-12-05 PROCEDURE — 85610 PROTHROMBIN TIME: CPT | Mod: QW

## 2017-12-05 RX ORDER — WARFARIN SODIUM 1 MG/1
TABLET ORAL
Qty: 60 TABLET | Refills: 0 | COMMUNITY
Start: 2017-12-05 | End: 2018-01-02

## 2017-12-05 NOTE — PROGRESS NOTES
ANTICOAGULATION FOLLOW-UP CLINIC VISIT    Patient Name:  Ellie Leigh  Date:  12/5/2017  Contact Type:  Face to Face/accompanied by daughter      SUBJECTIVE:     Patient Findings     Positives Change in medications (on benzonatate 200 mg capsules for cough, no interaction per micromedix), Inflammation (Pt reports she has cough, has been worse this week, saw PMD for it)    Comments Pt reports taking warfarin as directed, denies s/s of bleeding. Patient had 9.5 mg in the previous 7 days, will decrease dose to 9 mg over next 7 days, then resume maintenance dose, recheck INR check in 3 weeks.           OBJECTIVE    INR Protime   Date Value Ref Range Status   12/05/2017 2.7 (A) 0.86 - 1.14 Final       ASSESSMENT / PLAN  INR assessment SUPRA    Recheck INR In: 3 WEEKS    INR Location Clinic      Anticoagulation Summary as of 12/5/2017     INR goal 1.5-2.0   Today's INR 2.7!   Maintenance plan 1 mg (1 mg x 1) on Mon, Fri; 1.5 mg (1 mg x 1.5) all other days   Full instructions 12/5: 1 mg; Otherwise 1 mg on Mon, Fri; 1.5 mg all other days   Weekly total 9.5 mg   Plan last modified Katia Landrum RN (2/2/2017)   Next INR check 12/26/2017   Priority INR   Target end date Indefinite    Indications   Atrial fibrillation with rapid ventricular response (H) (Resolved) [I48.91]  DVT (deep venous thrombosis) [I82.409]  Long term current use of anticoagulant therapy [Z79.01]         Anticoagulation Episode Summary     INR check location     Preferred lab     Send INR reminders to [facility]    Comments * Actual INR goal is 1.7-2.3  please follow Dec 2015 INR referral (June 2016 goal range is an error)      Anticoagulation Care Providers     Provider Role Specialty Phone number    Josefina Gómez MD [facility] [phone]            See the Encounter Report to view Anticoagulation Flowsheet and Dosing Calendar (Go to Encounters tab in chart review, and find the Anticoagulation Therapy  Visit)        Angelia oB RN

## 2017-12-05 NOTE — MR AVS SNAPSHOT
Ellie Leigh   12/5/2017 10:30 AM   Anticoagulation Therapy Visit    Description:  87 year old female   Provider:  [place] [facility]   Department:  [facility]           INR as of 12/5/2017     Today's INR 2.7!      Anticoagulation Summary as of 12/5/2017     INR goal 1.5-2.0   Today's INR 2.7!   Full instructions 12/5: 1 mg; Otherwise 1 mg on Mon, Fri; 1.5 mg all other days   Next INR check 12/26/2017    Indications   Atrial fibrillation with rapid ventricular response (H) (Resolved) [I48.91]  DVT (deep venous thrombosis) [I82.409]  Long term current use of anticoagulant therapy [Z79.01]         Your next Anticoagulation Clinic appointment(s)     Dec 26, 2017 10:30 AM CST   Anticoagulation Visit with [place] [facility]   [facility]    [address] [facility] [address] [phone]              Contact Numbers     Please call [phone] to cancel and/or reschedule your appointment.  Please call [phone] with any problems or questions regarding your therapy          December 2017 Details    Sun Mon Tue Wed Thu Fri Sat          1               2                 3               4               5      1 mg   See details      6      1.5 mg         7      1.5 mg         8      1 mg         9      1.5 mg           10      1.5 mg         11      1 mg         12      1.5 mg         13      1.5 mg         14      1.5 mg         15      1 mg         16      1.5 mg           17      1.5 mg         18      1 mg         19      1.5 mg         20      1.5 mg         21      1.5 mg         22      1 mg         23      1.5 mg           24      1.5 mg         25      1 mg         26            27               28               29               30                 31                      Date Details   12/05 This INR check       Date of next INR:  12/26/2017         How to take your warfarin dose     To take:  1 mg Take 1 of the 1 mg tablets.    To take:  1.5 mg Take 1.5 of the  1 mg tablets.

## 2017-12-06 DIAGNOSIS — M15.0 PRIMARY OSTEOARTHRITIS INVOLVING MULTIPLE JOINTS: ICD-10-CM

## 2017-12-06 NOTE — TELEPHONE ENCOUNTER
diclofenac (VOLTAREN) 1 % GEL topical gel      Last Written Prescription Date: 4/3/17  Last Fill Quantity: 100g,  # refills: 0   Last Office Visit with G, P or [facility] prescribing provider: 11/20/17                                         Next 5 appointments (look out 90 days)     Dec 08, 2017  3:20 PM CST   Office Visit with Josefina Gómez MD   [facility] ([facility])    [address] [place] [address]   [phone]            Jan 11, 2018 11:00 AM CST   Return Visit with Gayle Nieves MD   [facility] ([facility])    [facility]  [address] [address]   [phone]

## 2017-12-08 ENCOUNTER — OFFICE VISIT (OUTPATIENT)
Dept: FAMILY MEDICINE | Facility: CLINIC | Age: 82
End: 2017-12-08
Payer: COMMERCIAL

## 2017-12-08 VITALS
DIASTOLIC BLOOD PRESSURE: 64 MMHG | SYSTOLIC BLOOD PRESSURE: 124 MMHG | BODY MASS INDEX: 25.4 KG/M2 | TEMPERATURE: 97.7 F | OXYGEN SATURATION: 95 % | WEIGHT: 121 LBS | HEART RATE: 70 BPM | HEIGHT: 58 IN

## 2017-12-08 DIAGNOSIS — G89.29 CHRONIC PAIN OF RIGHT KNEE: ICD-10-CM

## 2017-12-08 DIAGNOSIS — M25.561 CHRONIC PAIN OF RIGHT KNEE: ICD-10-CM

## 2017-12-08 DIAGNOSIS — J45.41 MODERATE PERSISTENT ASTHMA WITH ACUTE EXACERBATION: Primary | ICD-10-CM

## 2017-12-08 PROCEDURE — 99214 OFFICE O/P EST MOD 30 MIN: CPT | Performed by: FAMILY MEDICINE

## 2017-12-08 RX ORDER — PREDNISONE 10 MG/1
TABLET ORAL
Qty: 25 TABLET | Refills: 0 | Status: SHIPPED | OUTPATIENT
Start: 2017-12-08 | End: 2017-12-26

## 2017-12-08 NOTE — MR AVS SNAPSHOT
After Visit Summary   12/8/2017    Ellie Leigh    MRN: [id number]           Patient Information     Date Of Birth          9/12/1930        Visit Information        Provider Department      12/8/2017 3:00 PM Josefina Gómez MD [facility]        Today's Diagnoses     Moderate persistent asthma with acute exacerbation    -  1    Chronic pain of right knee          Care Instructions    Duoneb for sure 4 times daily may use the albuterol alone up to 6 times a day if needed     Take the prednisone as directed     If not better on Monday let me know     Set up to see ortho, referral in           Follow-ups after your visit        Additional Services     ORTHO  REFERRAL       [facility] is referring you to the Orthopedic  Services at [facility].       The  Representative will assist you in the coordination of your Orthopedic and Musculoskeletal Care as prescribed by your physician.    The  Representative will call you within 1 business day to help schedule your appointment, or you may contact the  Representative at:    All areas ~ [phone]     Type of Referral : Surgical / Specialist     Pt requests Dr Gardner  Timeframe requested: routine     Coverage of these services is subject to the terms and limitations of your health insurance plan.  Please call member services at your health plan with any benefit or coverage questions.      If X-rays, CT or MRI's have been performed, please contact the facility where they were done to arrange for , prior to your scheduled appointment.  Please bring this referral request to your appointment and present it to your specialist.                  Your next 10 appointments already scheduled     Dec 26, 2017 10:30 AM CST   Anticoagulation Visit with [place] [facility]   [facility] ([facility]    [address]  "[place] [address]   [phone]            Jan 03, 2018  2:00 PM CST   LAB with [facility] ([facility])    [address] [address]   [phone]           Please do not eat 10-12 hours before your appointment if you are coming in fasting for labs on lipids, cholesterol, or glucose (sugar). This does not apply to pregnant women. Water, hot tea and black coffee (with nothing added) are okay. Do not drink other fluids, diet soda or chew gum.            Jan 03, 2018  2:45 PM CST   MA SCREENING DIGITAL RIGHT with [facility]   [facility] ([facility])    [address] [address]   [phone]           Do not use any powder, lotion or deodorant under your arms or on your breast. If you do, we will ask you to remove it before your exam.  Wear comfortable, two-piece clothing.  If you have any allergies, tell your care team.  Bring any previous mammograms from other facilities or have them mailed to the breast center. Three-dimensional (3D) mammograms are available at [place] locations in [facility], [facility], [place], [place], and [place]. [facility] locations include [place] and [facility] in [place]. Benefits of 3D mammograms include: - Improved rate of cancer detection - Decreases your chance of having to go back for more tests, which means fewer: - \"False-positive\" results (This means that there is an abnormal area but it isn't cancer.) - Invasive testing procedures, such as a biopsy or surgery - Can provide clearer images of the breast if you have dense breast tissue. 3D mammography is an optional exam that anyone can have with a 2D mammogram. It doesn't replace or take the place of a 2D mammogram. 2D mammograms remain an effective screening test for all women.  Not all insurance companies cover the cost of a 3D mammogram. Check with your insurance.            " "2018  4:45 PM CST   Remote PPM Check with RUEDA TECH1   [facility]   [place] ([facility])    [address]  [place] [id number] [phone]           This appointment is for a remote check of your pacemaker.  This is not an appointment at the office.            2018 11:00 AM CST   Return Visit with Gayle Nieves MD   [facility] ([facility])    [facility]  [address]  [facility] [address] [phone]              Who to contact     If you have questions or need follow up information about today's clinic visit or your schedule please contact [facility] directly at [phone].  Normal or non-critical lab and imaging results will be communicated to you by MyChart, letter or phone within 4 business days after the clinic has received the results. If you do not hear from us within 7 days, please contact the clinic through MyChart or phone. If you have a critical or abnormal lab result, we will notify you by phone as soon as possible.  Submit refill requests through [employer] or call your pharmacy and they will forward the refill request to us. Please allow 3 business days for your refill to be completed.          Additional Information About Your Visit        [employer] Information     [employer] lets you send messages to your doctor, view your test results, renew your prescriptions, schedule appointments and more. To sign up, go to [email or web address] . Click on \"Log in\" on the left side of the screen, which will take you to the Welcome page. Then click on \"Sign up Now\" on the right side of the page.     You will be asked to enter the access code listed below, as well as some personal information. Please follow the directions to create your username and password.     Your access code is: [email or web address]  Expires: 3/8/2018  3:24 PM     Your access code will  in 90 days. " "If you need help or a new code, please call your [place] clinic or [phone].        Care EveryWhere ID     This is your Care EveryWhere ID. This could be used by other organizations to access your [place] medical records  [id number]        Your Vitals Were     Pulse Temperature Height Last Period Pulse Oximetry BMI (Body Mass Index)    70 97.7  F (36.5  C) (Tympanic) 4' 10\" (1.473 m) 06/15/1985 95% 25.29 kg/m2       Blood Pressure from Last 3 Encounters:   12/08/17 124/64   11/20/17 120/78   11/07/17 139/78    Weight from Last 3 Encounters:   12/08/17 121 lb (54.9 kg)   11/20/17 120 lb (54.4 kg)   11/07/17 120 lb (54.4 kg)              We Performed the Following     ORTHO  REFERRAL          Today's Medication Changes          These changes are accurate as of: 12/8/17  3:24 PM.  If you have any questions, ask your nurse or doctor.               Start taking these medicines.        Dose/Directions    predniSONE 10 MG tablet   Commonly known as:  DELTASONE   Used for:  Moderate persistent asthma with acute exacerbation   Started by:  Josefina Gómez MD        3 tabs daily for 4 days then 2 tabs daily for 4 days then 1 tab daily for 4 days   Quantity:  25 tablet   Refills:  0         Stop taking these medicines if you haven't already. Please contact your care team if you have questions.     levofloxacin 500 MG tablet   Commonly known as:  LEVAQUIN   Stopped by:  Josefina Gómez MD                Where to get your medicines      Call your pharmacy to confirm that your medication is ready for pickup. It may take up to 24 hours for them to receive the prescription. If the prescription is not ready within 3 business days, please contact your clinic or your provider.     We will let you know when these medications are ready. If you don't hear back within 3 business days, please contact us.     predniSONE 10 MG tablet                Primary Care Provider Office Phone # Fax #    Josefina Mathis " MD [place] [phone]       [address] [facility] [address]        Equal Access to Services     BROOKS JSOÉ : Hadselin aad ku hadroly Interiano, bill alicjajorgeha, jeanie betojacey feliz, verónica astorga kaelasukhdeep durhamsaritha sean. So [facility] [phone].    ATENCIÓN: Si habla español, tiene a sahni disposición servicios gratuitos de asistencia lingüística. Llame al [phone].    We comply with applicable federal civil rights laws and [place] laws. We do not discriminate on the basis of race, color, national origin, age, disability, sex, sexual orientation, or gender identity.            Thank you!     Thank you for choosing [facility]  for your care. Our goal is always to provide you with excellent care. Hearing back from our patients is one way we can continue to improve our services. Please take a few minutes to complete the written survey that you may receive in the mail after your visit with us. Thank you!             Your Updated Medication List - Protect others around you: Learn how to safely use, store and throw away your medicines at www.disposemymeds.org.          This list is accurate as of: 12/8/17  3:24 PM.  Always use your most recent med list.                   Brand Name Dispense Instructions for use Diagnosis    albuterol 108 (90 BASE) MCG/ACT Inhaler    PROAIR HFA/PROVENTIL HFA/VENTOLIN HFA    3 Inhaler    Inhale 2 puffs into the lungs every 6 hours as needed for shortness of breath / dyspnea    Mild persistent asthma without complication       benzonatate 200 MG capsule    TESSALON    21 capsule    Take 1 capsule (200 mg) by mouth 3 times daily as needed for cough    Cough       CRANBERRY      Take 475 mg by mouth 2 times daily.        diclofenac 1 % Gel topical gel    VOLTAREN    100 g    APPLY 4 GRAMS TO KNEES OR 2 GRAMS TO HANDS 4 TIMES DAILY USING ENCLOSED DOSING CARD    Primary osteoarthritis involving multiple joints       ipratropium - albuterol 0.5  mg/2.5 mg/3 mL 0.5-2.5 (3) MG/3ML neb solution    DUONEB    180 mL    TAKE 1 VIAL (3 MLS) BY NEBULIZATION EVERY 6 HOURS AS NEEDED FOR SHORTNESS OF BREATH / DYSPNEA OR WHEEZING    Chronic obstructive pulmonary disease, unspecified COPD type (H)       levothyroxine 50 MCG tablet    SYNTHROID/LEVOTHROID    90 tablet    TAKE 1 TABLET (50 MCG) BY MOUTH DAILY    Hypothyroidism, unspecified type       metoprolol 25 MG 24 hr tablet    TOPROL-XL    120 tablet    TAKE TWO TABLETS BY MOUTH TWICE DAILY    Essential hypertension, benign       polyethylene glycol powder    MIRALAX    510 g    Take 17 g by mouth daily as needed    Constipation       predniSONE 10 MG tablet    DELTASONE    25 tablet    3 tabs daily for 4 days then 2 tabs daily for 4 days then 1 tab daily for 4 days    Moderate persistent asthma with acute exacerbation       probiotic Caps      Take 1 capsule by mouth every evening        ranitidine 150 MG tablet    ZANTAC    60 tablet    Take 1 tablet (150 mg) by mouth 2 times daily    Gastroesophageal reflux disease without esophagitis       SYMBICORT 160-4.5 MCG/ACT Inhaler   Generic drug:  budesonide-formoterol     10.2 g    INHALE 2 PUFFS INTO THE LUNGS 2 TIMES DAILY    Mild persistent asthma without complication       warfarin 1 MG tablet    COUMADIN    60 tablet    Take 1 mg Mon and Fri, 1.5 mg rest of the days or as directed by Anticoagulation Clinic.    Intermittent atrial fibrillation (H)

## 2017-12-08 NOTE — PROGRESS NOTES
SUBJECTIVE:   Ellie Leigh is a 87 year old female who presents to clinic today for the following health issues:    Asthma Follow-Up    Was ACT completed today?    No  Has had more SOA and cough productive of clear sputum   Her albuterol does help   She just feel like she is not getting better   ACT Total Scores 6/27/2017   ACT TOTAL SCORE -   ASTHMA ER VISITS -   ASTHMA HOSPITALIZATIONS -   ACT TOTAL SCORE (Goal Greater than or Equal to 20) 19   In the past 12 months, how many times did you visit the emergency room for your asthma without being admitted to the hospital? 1   In the past 12 months, how many times were you hospitalized overnight because of your asthma? 0       Recent asthma triggers that patient is dealing with: cold air        Amount of exercise or physical activity: None    Problems taking medications regularly: No    Medication side effects: none  Diet: regular (no restrictions)      Musculoskeletal problem/pain OA right knee       Duration: years    Description  Location: right knee getting worse   She has had ortho eval and she is ready to do something now would like to see Dr Gardner     Intensity:  moderate    Accompanying signs and symptoms: none    History  Previous similar problem: YES  Previous evaluation:  x-ray    Precipitating or alleviating factors:  Trauma or overuse: no   Aggravating factors include: walking, climbing stairs and overuse    Therapies tried and outcome: rest/inactivity, heat, ice and NSAID - off and on         Problem list and histories reviewed & adjusted, as indicated.  Additional history: as documented    Labs reviewed in EPIC    Reviewed and updated as needed this visit by clinical staff  Tobacco  Allergies  Meds  Problems  Med Hx  Surg Hx  Fam Hx  Soc Hx        Reviewed and updated as needed this visit by Provider  Allergies  Meds  Problems         ROS:  Constitutional, HEENT, cardiovascular, pulmonary, gi and gu systems are negative, except as  "otherwise noted.      OBJECTIVE:                                                    /64 (BP Location: Right arm, Cuff Size: Adult Regular)  Pulse 70  Temp 97.7  F (36.5  C) (Tympanic)  Ht 4' 10\" (1.473 m)  Wt 121 lb (54.9 kg)  LMP 06/15/1985  SpO2 95%  BMI 25.29 kg/m2  Body mass index is 25.29 kg/(m^2).  GENERAL APPEARANCE: healthy, alert and no distress  HENT: ear canals and TM's normal and nose and mouth without ulcers or lesions  NECK: no adenopathy, no asymmetry, masses, or scars and thyroid normal to palpation  RESP: expiratory wheezes throughout and inspiratory wheezes throughout  CV: regular rates and rhythm, normal S1 S2, no S3 or S4 and no murmur, click or rub  ORTHO: Knee Exam: Inspection: AP/lateral alignment normal  Tender: lateral joint line, medial joint line  Non-tender:   Active Range of Motion: full flexion, full extension  Strength: intact  Special tests: negative Lachman's test    Also examined: hip full range of motion     PSYCH: mentation appears normal and affect normal/bright         ASSESSMENT/PLAN:                                                    1. Moderate persistent asthma with acute exacerbation  Flare   - predniSONE (DELTASONE) 10 MG tablet; 3 tabs daily for 4 days then 2 tabs daily for 4 days then 1 tab daily for 4 days  Dispense: 25 tablet; Refill: 0    2. Chronic pain of right knee  OA   - ORTHO  REFERRAL      Patient Instructions   Duoneb for sure 4 times daily may use the albuterol alone up to 6 times a day if needed     Take the prednisone as directed     If not better on Monday let me know     Set up to see ortho, referral in     Risks, benefits, side effects and rationale for treatment plan fully discussed with the patient and understanding expressed.    Josefina Gómez MD  [facility]  "

## 2017-12-08 NOTE — NURSING NOTE
"Chief Complaint   Patient presents with     Asthma     Cough     Follow up       Initial /64 (BP Location: Right arm, Cuff Size: Adult Regular)  Pulse 70  Temp 97.7  F (36.5  C) (Tympanic)  Ht 4' 10\" (1.473 m)  Wt 121 lb (54.9 kg)  LMP 06/15/1985  SpO2 95%  BMI 25.29 kg/m2 Estimated body mass index is 25.29 kg/(m^2) as calculated from the following:    Height as of this encounter: 4' 10\" (1.473 m).    Weight as of this encounter: 121 lb (54.9 kg).  Medication Reconciliation: complete    Health Maintenance that is potentially due pending provider review:  PHQ9 and ACT    ACT and PHQ9 given to patient.    Is there anyone who you would like to be able to receive your results? No  If yes have patient fill out JOSUE    Cailin Parikh MA     "

## 2017-12-08 NOTE — PATIENT INSTRUCTIONS
Duoneb for sure 4 times daily may use the albuterol alone up to 6 times a day if needed     Take the prednisone as directed     If not better on Monday let me know     Set up to see ortho, referral in

## 2017-12-11 ASSESSMENT — ANXIETY QUESTIONNAIRES
5. BEING SO RESTLESS THAT IT IS HARD TO SIT STILL: NOT AT ALL
3. WORRYING TOO MUCH ABOUT DIFFERENT THINGS: NOT AT ALL
7. FEELING AFRAID AS IF SOMETHING AWFUL MIGHT HAPPEN: NOT AT ALL
2. NOT BEING ABLE TO STOP OR CONTROL WORRYING: NOT AT ALL
6. BECOMING EASILY ANNOYED OR IRRITABLE: NOT AT ALL
1. FEELING NERVOUS, ANXIOUS, OR ON EDGE: NOT AT ALL
GAD7 TOTAL SCORE: 0

## 2017-12-11 ASSESSMENT — PATIENT HEALTH QUESTIONNAIRE - PHQ9
5. POOR APPETITE OR OVEREATING: NOT AT ALL
SUM OF ALL RESPONSES TO PHQ QUESTIONS 1-9: 3

## 2017-12-12 ASSESSMENT — ASTHMA QUESTIONNAIRES: ACT_TOTALSCORE: 18

## 2017-12-12 ASSESSMENT — ANXIETY QUESTIONNAIRES: GAD7 TOTAL SCORE: 0

## 2017-12-26 ENCOUNTER — HOSPITAL ENCOUNTER (EMERGENCY)
Facility: CLINIC | Age: 82
Discharge: HOME | End: 2017-12-26
Attending: STUDENT IN AN ORGANIZED HEALTH CARE EDUCATION/TRAINING PROGRAM | Admitting: STUDENT IN AN ORGANIZED HEALTH CARE EDUCATION/TRAINING PROGRAM
Payer: COMMERCIAL

## 2017-12-26 ENCOUNTER — ANTICOAGULATION THERAPY VISIT (OUTPATIENT)
Dept: ANTICOAGULATION | Facility: CLINIC | Age: 82
End: 2017-12-26
Payer: COMMERCIAL

## 2017-12-26 ENCOUNTER — APPOINTMENT (OUTPATIENT)
Dept: GENERAL RADIOLOGY | Facility: CLINIC | Age: 82
End: 2017-12-26
Attending: STUDENT IN AN ORGANIZED HEALTH CARE EDUCATION/TRAINING PROGRAM
Payer: COMMERCIAL

## 2017-12-26 VITALS
HEART RATE: 76 BPM | RESPIRATION RATE: 18 BRPM | HEIGHT: 58 IN | TEMPERATURE: 98.4 F | DIASTOLIC BLOOD PRESSURE: 79 MMHG | SYSTOLIC BLOOD PRESSURE: 150 MMHG | OXYGEN SATURATION: 95 %

## 2017-12-26 DIAGNOSIS — R53.81 MALAISE: ICD-10-CM

## 2017-12-26 DIAGNOSIS — K30 UPSET STOMACH: ICD-10-CM

## 2017-12-26 LAB
ALBUMIN SERPL-MCNC: 3 G/DL (ref 3.4–5)
ALBUMIN UR-MCNC: NEGATIVE MG/DL
ALP SERPL-CCNC: 70 U/L (ref 40–150)
ALT SERPL W P-5'-P-CCNC: 17 U/L (ref 0–50)
ANION GAP SERPL CALCULATED.3IONS-SCNC: 6 MMOL/L (ref 3–14)
APPEARANCE UR: ABNORMAL
AST SERPL W P-5'-P-CCNC: 20 U/L (ref 0–45)
BASOPHILS # BLD AUTO: 0 10E9/L (ref 0–0.2)
BASOPHILS NFR BLD AUTO: 0.4 %
BILIRUB SERPL-MCNC: 0.3 MG/DL (ref 0.2–1.3)
BILIRUB UR QL STRIP: NEGATIVE
BUN SERPL-MCNC: 17 MG/DL (ref 7–30)
CALCIUM SERPL-MCNC: 8.1 MG/DL (ref 8.5–10.1)
CHLORIDE SERPL-SCNC: 96 MMOL/L (ref 94–109)
CO2 SERPL-SCNC: 27 MMOL/L (ref 20–32)
COLOR UR AUTO: ABNORMAL
CREAT SERPL-MCNC: 0.5 MG/DL (ref 0.52–1.04)
DIFFERENTIAL METHOD BLD: ABNORMAL
EOSINOPHIL # BLD AUTO: 0.1 10E9/L (ref 0–0.7)
EOSINOPHIL NFR BLD AUTO: 1.1 %
ERYTHROCYTE [DISTWIDTH] IN BLOOD BY AUTOMATED COUNT: 14.6 % (ref 10–15)
GFR SERPL CREATININE-BSD FRML MDRD: >90 ML/MIN/1.7M2
GLUCOSE SERPL-MCNC: 100 MG/DL (ref 70–99)
GLUCOSE UR STRIP-MCNC: NEGATIVE MG/DL
HCT VFR BLD AUTO: 34.8 % (ref 35–47)
HGB BLD-MCNC: 11.7 G/DL (ref 11.7–15.7)
HGB UR QL STRIP: ABNORMAL
IMM GRANULOCYTES # BLD: 0 10E9/L (ref 0–0.4)
IMM GRANULOCYTES NFR BLD: 0.4 %
INR POINT OF CARE: 2.9 (ref 0.86–1.14)
KETONES UR STRIP-MCNC: NEGATIVE MG/DL
LEUKOCYTE ESTERASE UR QL STRIP: ABNORMAL
LIPASE SERPL-CCNC: 152 U/L (ref 73–393)
LYMPHOCYTES # BLD AUTO: 1.6 10E9/L (ref 0.8–5.3)
LYMPHOCYTES NFR BLD AUTO: 22.3 %
MCH RBC QN AUTO: 27.5 PG (ref 26.5–33)
MCHC RBC AUTO-ENTMCNC: 33.6 G/DL (ref 31.5–36.5)
MCV RBC AUTO: 82 FL (ref 78–100)
MONOCYTES # BLD AUTO: 0.8 10E9/L (ref 0–1.3)
MONOCYTES NFR BLD AUTO: 11.2 %
MUCOUS THREADS #/AREA URNS LPF: PRESENT /LPF
NEUTROPHILS # BLD AUTO: 4.8 10E9/L (ref 1.6–8.3)
NEUTROPHILS NFR BLD AUTO: 64.6 %
NITRATE UR QL: NEGATIVE
PH UR STRIP: 7 PH (ref 5–7)
PLATELET # BLD AUTO: 244 10E9/L (ref 150–450)
POTASSIUM SERPL-SCNC: 4.2 MMOL/L (ref 3.4–5.3)
PROT SERPL-MCNC: 6.4 G/DL (ref 6.8–8.8)
RBC # BLD AUTO: 4.26 10E12/L (ref 3.8–5.2)
RBC #/AREA URNS AUTO: 4 /HPF (ref 0–2)
SODIUM SERPL-SCNC: 129 MMOL/L (ref 133–144)
SOURCE: ABNORMAL
SP GR UR STRIP: 1.01 (ref 1–1.03)
SQUAMOUS #/AREA URNS AUTO: <1 /HPF (ref 0–1)
TRANS CELLS #/AREA URNS HPF: <1 /HPF (ref 0–1)
UROBILINOGEN UR STRIP-MCNC: NORMAL MG/DL (ref 0–2)
WBC # BLD AUTO: 7.4 10E9/L (ref 4–11)
WBC #/AREA URNS AUTO: 2 /HPF (ref 0–2)

## 2017-12-26 PROCEDURE — 85025 COMPLETE CBC W/AUTO DIFF WBC: CPT | Performed by: STUDENT IN AN ORGANIZED HEALTH CARE EDUCATION/TRAINING PROGRAM

## 2017-12-26 PROCEDURE — 99284 EMERGENCY DEPT VISIT MOD MDM: CPT | Mod: Z6 | Performed by: STUDENT IN AN ORGANIZED HEALTH CARE EDUCATION/TRAINING PROGRAM

## 2017-12-26 PROCEDURE — 81001 URINALYSIS AUTO W/SCOPE: CPT | Performed by: STUDENT IN AN ORGANIZED HEALTH CARE EDUCATION/TRAINING PROGRAM

## 2017-12-26 PROCEDURE — 83690 ASSAY OF LIPASE: CPT | Performed by: STUDENT IN AN ORGANIZED HEALTH CARE EDUCATION/TRAINING PROGRAM

## 2017-12-26 PROCEDURE — 36416 COLLJ CAPILLARY BLOOD SPEC: CPT

## 2017-12-26 PROCEDURE — 71020 XR CHEST 2 VW: CPT

## 2017-12-26 PROCEDURE — 99284 EMERGENCY DEPT VISIT MOD MDM: CPT | Mod: 25 | Performed by: STUDENT IN AN ORGANIZED HEALTH CARE EDUCATION/TRAINING PROGRAM

## 2017-12-26 PROCEDURE — 80053 COMPREHEN METABOLIC PANEL: CPT | Performed by: STUDENT IN AN ORGANIZED HEALTH CARE EDUCATION/TRAINING PROGRAM

## 2017-12-26 PROCEDURE — 99207 ZZC NO CHARGE NURSE ONLY: CPT

## 2017-12-26 PROCEDURE — 25000132 ZZH RX MED GY IP 250 OP 250 PS 637: Performed by: STUDENT IN AN ORGANIZED HEALTH CARE EDUCATION/TRAINING PROGRAM

## 2017-12-26 PROCEDURE — 85610 PROTHROMBIN TIME: CPT | Mod: QW

## 2017-12-26 RX ORDER — AMOXICILLIN 250 MG
1 CAPSULE ORAL 2 TIMES DAILY PRN
Status: CANCELLED | OUTPATIENT
Start: 2017-12-26

## 2017-12-26 RX ORDER — IPRATROPIUM BROMIDE AND ALBUTEROL SULFATE 2.5; .5 MG/3ML; MG/3ML
3 SOLUTION RESPIRATORY (INHALATION) EVERY 4 HOURS PRN
Status: CANCELLED | OUTPATIENT
Start: 2017-12-26

## 2017-12-26 RX ORDER — BENZONATATE 200 MG/1
200 CAPSULE ORAL 3 TIMES DAILY PRN
Status: CANCELLED | OUTPATIENT
Start: 2017-12-26

## 2017-12-26 RX ORDER — AMOXICILLIN 250 MG
2 CAPSULE ORAL 2 TIMES DAILY PRN
Status: CANCELLED | OUTPATIENT
Start: 2017-12-26

## 2017-12-26 RX ORDER — POLYETHYLENE GLYCOL 3350 17 G/17G
17 POWDER, FOR SOLUTION ORAL DAILY
Status: CANCELLED | OUTPATIENT
Start: 2017-12-26

## 2017-12-26 RX ORDER — BUDESONIDE AND FORMOTEROL FUMARATE DIHYDRATE 160; 4.5 UG/1; UG/1
2 AEROSOL RESPIRATORY (INHALATION)
Status: CANCELLED | OUTPATIENT
Start: 2017-12-26

## 2017-12-26 RX ORDER — POLYETHYLENE GLYCOL 3350 17 G/17G
17 POWDER, FOR SOLUTION ORAL DAILY PRN
Status: CANCELLED | OUTPATIENT
Start: 2017-12-26

## 2017-12-26 RX ORDER — ONDANSETRON 2 MG/ML
4 INJECTION INTRAMUSCULAR; INTRAVENOUS EVERY 6 HOURS PRN
Status: CANCELLED | OUTPATIENT
Start: 2017-12-26

## 2017-12-26 RX ORDER — LEVOTHYROXINE SODIUM 50 UG/1
50 TABLET ORAL
Status: CANCELLED | OUTPATIENT
Start: 2017-12-27

## 2017-12-26 RX ORDER — ONDANSETRON 4 MG/1
4 TABLET, ORALLY DISINTEGRATING ORAL EVERY 6 HOURS PRN
Status: CANCELLED | OUTPATIENT
Start: 2017-12-26

## 2017-12-26 RX ORDER — ACETAMINOPHEN 325 MG/1
650 TABLET ORAL EVERY 4 HOURS PRN
Status: CANCELLED | OUTPATIENT
Start: 2017-12-26

## 2017-12-26 RX ORDER — NALOXONE HYDROCHLORIDE 0.4 MG/ML
.1-.4 INJECTION, SOLUTION INTRAMUSCULAR; INTRAVENOUS; SUBCUTANEOUS
Status: CANCELLED | OUTPATIENT
Start: 2017-12-26

## 2017-12-26 RX ORDER — ACETAMINOPHEN 325 MG/1
650 TABLET ORAL EVERY 8 HOURS
Status: CANCELLED | OUTPATIENT
Start: 2017-12-26

## 2017-12-26 RX ORDER — ALUMINA, MAGNESIA, AND SIMETHICONE 2400; 2400; 240 MG/30ML; MG/30ML; MG/30ML
30 SUSPENSION ORAL ONCE
Status: COMPLETED | OUTPATIENT
Start: 2017-12-26 | End: 2017-12-26

## 2017-12-26 RX ADMIN — ALUMINUM HYDROXIDE, MAGNESIUM HYDROXIDE, AND DIMETHICONE 30 ML: 400; 400; 40 SUSPENSION ORAL at 14:06

## 2017-12-26 NOTE — ED NOTES
Pt dx with pneumonia on 12/15.  Pt states that she does not feel better after completion of antibiotic.  Pt completed cefuroxime on 12/22/17.  Pt has hx of asthma, and COPD.  Pt takes daily duonebs and steroid inhaler.  No respiratory distress noted.  Denies n/v/d.  Pt denies CP.  States she is sore from coughing.  Pt states cough is productive.  Pt had CT completed on 12/15 she has disk with her.  Pt takes warfarin d/t afib.  Pt INR was 2.9 today (range 1.5-2.5).  Lung sounds clear bilaterally.  Denies any fevers.

## 2017-12-26 NOTE — PROGRESS NOTES
ANTICOAGULATION FOLLOW-UP CLINIC VISIT    Patient Name:  Ellie Leigh  Date:  12/26/2017  Contact Type:  Face to Face    SUBJECTIVE:     Patient Findings     Positives Other complaints (was seen in the ED at a hospital up [place] last week for pneumonia), Antibiotic use or infection (finished an antibiotic last Friday (thinks it was azithromycin))           OBJECTIVE    INR Protime   Date Value Ref Range Status   12/26/2017 2.9 (A) 0.86 - 1.14 Final       ASSESSMENT / PLAN  INR assessment SUPRA    Recheck INR In: 1 WEEK    INR Location Clinic      Anticoagulation Summary as of 12/26/2017     INR goal 1.5-2.0   Today's INR 2.9!   Maintenance plan 1 mg (1 mg x 1) on Mon, Fri; 1.5 mg (1 mg x 1.5) all other days   Full instructions 12/26: Hold; Otherwise 1 mg on Mon, Fri; 1.5 mg all other days   Weekly total 9.5 mg   Plan last modified Katia Landrum RN (2/2/2017)   Next INR check 1/2/2018   Priority INR   Target end date Indefinite    Indications   Atrial fibrillation with rapid ventricular response (H) (Resolved) [I48.91]  DVT (deep venous thrombosis) [I82.409]  Long term current use of anticoagulant therapy [Z79.01]         Anticoagulation Episode Summary     INR check location     Preferred lab     Send INR reminders to [facility]    Comments * Actual INR goal is 1.7-2.3  please follow Dec 2015 INR referral (June 2016 goal range is an error)      Anticoagulation Care Providers     Provider Role Specialty Phone number    Josefina Gómez MD [facility] [phone]            See the Encounter Report to view Anticoagulation Flowsheet and Dosing Calendar (Go to Encounters tab in chart review, and find the Anticoagulation Therapy Visit)    Dosage adjustment made based on physician directed care plan.    Kirsten Mathis RPH

## 2017-12-26 NOTE — MR AVS SNAPSHOT
Ellie Leigh   12/26/2017 10:30 AM   Anticoagulation Therapy Visit    Description:  87 year old female   Provider:  [place] [facility]   Department:  [facility]           INR as of 12/26/2017     Today's INR 2.9!      Anticoagulation Summary as of 12/26/2017     INR goal 1.5-2.0   Today's INR 2.9!   Full instructions 12/26: Hold; Otherwise 1 mg on Mon, Fri; 1.5 mg all other days   Next INR check 1/2/2018    Indications   Atrial fibrillation with rapid ventricular response (H) (Resolved) [I48.91]  DVT (deep venous thrombosis) [I82.409]  Long term current use of anticoagulant therapy [Z79.01]         Description     Hold Coumadin today (12/26), then resume normal dose of 1 mg on MonFri, 1.5 mg rest of week. Recheck INR in 1 week.       December 2017 Details    Sun Mon Tue Wed Thu Fri Sat          1               2                 3               4               5               6               7               8               9                 10               11               12               13               14               15               16                 17               18               19               20               21               22               23                 24               25               26      Hold   See details      27      1.5 mg         28      1.5 mg         29      1 mg         30      1.5 mg           31      1.5 mg                Date Details   12/26 This INR check               How to take your warfarin dose     To take:  1 mg Take 1 of the 1 mg tablets.    To take:  1.5 mg Take 1.5 of the 1 mg tablets.    Hold Do not take your warfarin dose. See the Details table to the right for additional instructions.                January 2018 Details    Sun Mon Tue Wed Thu Fri Sat      1      1 mg         2            3               4               5               6                 7               8               9               10               11               12               13                  14               15               16               17               18               19               20                 21               22               23               24               25               26               27                 28               29               30               31                   Date Details   No additional details    Date of next INR:  1/2/2018         How to take your warfarin dose     To take:  1 mg Take 1 of the 1 mg tablets.    To take:  1.5 mg Take 1.5 of the 1 mg tablets.

## 2017-12-26 NOTE — ED AVS SNAPSHOT
[facility] Emergency Department    [address]    [facility] [address]    Phone:  [phone]    Fax:  [phone]                                       Ellie Leigh   MRN: [id number]    Department:  [facility] Emergency Department   Date of Visit:  12/26/2017           Patient Information     Date Of Birth          9/12/1930        Your diagnoses for this visit were:     Malaise     Upset stomach        You were seen by Anjum Hobbs DO.      Follow-up Information     Follow up with Josefina Gómez MD. Schedule an appointment as soon as possible for a visit in 2 days.    Specialty:  Family Practice    Why:  Followup for reevaluation and managment plan.    Contact information:    [address] [place] [address]  [phone]        Discharge References/Attachments     ABDOMINAL PAIN, ADULT (ENGLISH)      Future Appointments        Provider Department Dept Phone Center    1/2/2018 11:45 AM [place] [facility] provider, [facility] [phone] FLWY    1/3/2018 2:00 PM [facility] [phone] [facility]    1/3/2018 2:45 PM [facility] MAMMO ROOM 2 [facility] [phone] [facility]    1/8/2018 11:20 AM Josefina Gómez MD [facility] [phone] FLNB    1/8/2018 4:45 PM [facility]   [place] [phone] UM PSA CLIN    1/11/2018 11:00 AM Gayle Nieves MD, MD [facility] [phone] [facility]      24 Hour Appointment Hotline       To make an appointment at any [facility], call [phone] ([phone]). If you don't have a family doctor or clinic, we will help you find one. [facility] are conveniently located to serve the needs of you and your family.             Review of your medicines      Our records show that you are taking the medicines listed below. If these are incorrect, please call your family doctor or  clinic.        Dose / Directions Last dose taken    albuterol 108 (90 BASE) MCG/ACT Inhaler   Commonly known as:  PROAIR HFA/PROVENTIL HFA/VENTOLIN HFA   Dose:  2 puff   Quantity:  3 Inhaler        Inhale 2 puffs into the lungs every 6 hours as needed for shortness of breath / dyspnea   Refills:  1        benzonatate 200 MG capsule   Commonly known as:  TESSALON   Dose:  200 mg   Quantity:  21 capsule        Take 1 capsule (200 mg) by mouth 3 times daily as needed for cough   Refills:  0        CRANBERRY   Dose:  475 mg        Take 475 mg by mouth 2 times daily.   Refills:  0        diclofenac 1 % Gel topical gel   Commonly known as:  VOLTAREN   Quantity:  100 g        APPLY 4 GRAMS TO KNEES OR 2 GRAMS TO HANDS 4 TIMES DAILY USING ENCLOSED DOSING CARD   Refills:  11        ipratropium - albuterol 0.5 mg/2.5 mg/3 mL 0.5-2.5 (3) MG/3ML neb solution   Commonly known as:  DUONEB   Quantity:  180 mL        TAKE 1 VIAL (3 MLS) BY NEBULIZATION EVERY 6 HOURS AS NEEDED FOR SHORTNESS OF BREATH / DYSPNEA OR WHEEZING   Refills:  10        levothyroxine 50 MCG tablet   Commonly known as:  SYNTHROID/LEVOTHROID   Quantity:  90 tablet        TAKE 1 TABLET (50 MCG) BY MOUTH DAILY   Refills:  2        metoprolol 25 MG 24 hr tablet   Commonly known as:  TOPROL-XL   Quantity:  120 tablet        TAKE TWO TABLETS BY MOUTH TWICE DAILY   Refills:  3        polyethylene glycol powder   Commonly known as:  MIRALAX   Dose:  1 capful   Quantity:  510 g        Take 17 g by mouth daily as needed   Refills:  5        probiotic Caps   Dose:  1 capsule        Take 1 capsule by mouth every evening   Refills:  0        ranitidine 150 MG tablet   Commonly known as:  ZANTAC   Dose:  150 mg   Quantity:  60 tablet        Take 1 tablet (150 mg) by mouth 2 times daily   Refills:  11        SYMBICORT 160-4.5 MCG/ACT Inhaler   Quantity:  10.2 g   Generic drug:  budesonide-formoterol        INHALE 2 PUFFS INTO THE LUNGS 2 TIMES DAILY   Refills:  5         warfarin 1 MG tablet   Commonly known as:  COUMADIN   Quantity:  60 tablet        Take 1 mg Mon and Fri, 1.5 mg rest of the days or as directed by Anticoagulation Clinic.   Refills:  0                Procedures and tests performed during your visit     CBC with platelets differential    Chest XR,  PA & LAT    Comprehensive metabolic panel    Lipase    UA reflex to Microscopic and Culture    Vital signs      Orders Needing Specimen Collection     None      Pending Results     No orders found from 12/24/2017 to 12/27/2017.            Pending Culture Results     No orders found from 12/24/2017 to 12/27/2017.            Pending Results Instructions     If you had any lab results that were not finalized at the time of your Discharge, you can call the ED Lab Result RN at [phone]. You will be contacted by this team for any positive Lab results or changes in treatment. The nurses are available 7 days a week from 10A to 6:30P.  You can leave a message 24 hours per day and they will return your call.        Test Results From Your Hospital Stay        12/26/2017  2:29 PM      Component Results     Component Value Ref Range & Units Status    WBC 7.4 4.0 - 11.0 10e9/L Final    RBC Count 4.26 3.8 - 5.2 10e12/L Final    Hemoglobin 11.7 11.7 - 15.7 g/dL Final    Hematocrit 34.8 (L) 35.0 - 47.0 % Final    MCV 82 78 - 100 fl Final    MCH 27.5 26.5 - 33.0 pg Final    MCHC 33.6 31.5 - 36.5 g/dL Final    RDW 14.6 10.0 - 15.0 % Final    Platelet Count 244 150 - 450 10e9/L Final    Diff Method Automated Method  Final    % Neutrophils 64.6 % Final    % Lymphocytes 22.3 % Final    % Monocytes 11.2 % Final    % Eosinophils 1.1 % Final    % Basophils 0.4 % Final    % Immature Granulocytes 0.4 % Final    Absolute Neutrophil 4.8 1.6 - 8.3 10e9/L Final    Absolute Lymphocytes 1.6 0.8 - 5.3 10e9/L Final    Absolute Monocytes 0.8 0.0 - 1.3 10e9/L Final    Absolute Eosinophils 0.1 0.0 - 0.7 10e9/L Final    Absolute Basophils 0.0 0.0 - 0.2  10e9/L Final    Abs Immature Granulocytes 0.0 0 - 0.4 10e9/L Final         12/26/2017  2:47 PM      Component Results     Component Value Ref Range & Units Status    Sodium 129 (L) 133 - 144 mmol/L Final    Potassium 4.2 3.4 - 5.3 mmol/L Final    Chloride 96 94 - 109 mmol/L Final    Carbon Dioxide 27 20 - 32 mmol/L Final    Anion Gap 6 3 - 14 mmol/L Final    Glucose 100 (H) 70 - 99 mg/dL Final    Urea Nitrogen 17 7 - 30 mg/dL Final    Creatinine 0.50 (L) 0.52 - 1.04 mg/dL Final    GFR Estimate >90 >60 mL/min/1.7m2 Final    Non  GFR Calc    GFR Estimate If Black >90 >60 mL/min/1.7m2 Final    African American GFR Calc    Calcium 8.1 (L) 8.5 - 10.1 mg/dL Final    Bilirubin Total 0.3 0.2 - 1.3 mg/dL Final    Albumin 3.0 (L) 3.4 - 5.0 g/dL Final    Protein Total 6.4 (L) 6.8 - 8.8 g/dL Final    Alkaline Phosphatase 70 40 - 150 U/L Final    ALT 17 0 - 50 U/L Final    AST 20 0 - 45 U/L Final         12/26/2017  2:43 PM      Component Results     Component Value Ref Range & Units Status    Lipase 152 73 - 393 U/L Final         12/26/2017  3:00 PM      Component Results     Component Value Ref Range & Units Status    Color Urine Light Yellow  Final    Appearance Urine Slightly Cloudy  Final    Glucose Urine Negative NEG^Negative mg/dL Final    Bilirubin Urine Negative NEG^Negative Final    Ketones Urine Negative NEG^Negative mg/dL Final    Specific Gravity Urine 1.006 1.003 - 1.035 Final    Blood Urine Trace (A) NEG^Negative Final    pH Urine 7.0 5.0 - 7.0 pH Final    Protein Albumin Urine Negative NEG^Negative mg/dL Final    Urobilinogen mg/dL Normal 0.0 - 2.0 mg/dL Final    Nitrite Urine Negative NEG^Negative Final    Leukocyte Esterase Urine Small (A) NEG^Negative Final    Source Unspecified Urine  Final    RBC Urine 4 (H) 0 - 2 /HPF Final    WBC Urine 2 0 - 2 /HPF Final    Squamous Epithelial /HPF Urine <1 0 - 1 /HPF Final    Transitional Epi <1 0 - 1 /HPF Final    Mucous Urine Present (A) NEG^Negative  "/LPF Final         2017  2:44 PM      Narrative     XR CHEST 2 VW 2017 2:37 PM    HISTORY: Cough.    COMPARISON: Several prior studies, the most recent one being dated  2017.        Impression     IMPRESSION: 2 views of the chest show no acute cardiopulmonary  disease. Chronic lung abnormality in the right upper lung zone shows  no significant change dating back to at least 2017 and has been  shown to be a chronic inflammatory change and bronchiectasis on prior  chest CT.    NICOLE VALDIVIA MD                Thank you for choosing [facility]       Thank you for choosing [facility] for your care. Our goal is always to provide you with excellent care. Hearing back from our patients is one way we can continue to improve our services. Please take a few minutes to complete the written survey that you may receive in the mail after you visit with us. Thank you!        [employer] [facility] Information     [employer] lets you send messages to your doctor, view your test results, renew your prescriptions, schedule appointments and more. To sign up, go to [email or web address] . Click on \"Log in\" on the left side of the screen, which will take you to the Welcome page. Then click on \"Sign up Now\" on the right side of the page.     You will be asked to enter the access code listed below, as well as some personal information. Please follow the directions to create your username and password.     Your access code is: [email or web address]  Expires: 3/8/2018  3:24 PM     Your access code will  in 90 days. If you need help or a new code, please call your [facility] clinic or [phone].        Care EveryWhere ID     This is your Care EveryWhere ID. This could be used by other organizations to access your [facility] medical records  [id number]        Equal Access to Services     BROOKS JOSÉ : Reyna Interiano, bill mina, verónica capps. So sascha " [phone].    ATENCIÓN: Si habla español, tiene a sahni disposición servicios gratuitos de asistencia lingüística. Llame al [phone].    We comply with applicable federal civil rights laws and [place] laws. We do not discriminate on the basis of race, color, national origin, age, disability, sex, sexual orientation, or gender identity.            After Visit Summary       This is your record. Keep this with you and show to your community pharmacist(s) and doctor(s) at your next visit.

## 2017-12-26 NOTE — ED NOTES
Pt will follow up with PCP.  Pt daughter arrived to give her a ride home.  Discharge instructions reviewed in detail.  Pt verbalized understanding.  No further questions or concerns.

## 2017-12-26 NOTE — ED AVS SNAPSHOT
[facility] Emergency Department    [address] [address]    Phone:  [phone]    Fax:  [phone]                                       Ellie Leigh   MRN: [id number]    Department:  [facility] Emergency Department   Date of Visit:  12/26/2017           After Visit Summary Signature Page     I have received my discharge instructions, and my questions have been answered. I have discussed any challenges I see with this plan with the nurse or doctor.    ..........................................................................................................................................  Patient/Patient Representative Signature      ..........................................................................................................................................  Patient Representative Print Name and Relationship to Patient    ..................................................               ................................................  Date                                            Time    ..........................................................................................................................................  Reviewed by Signature/Title    ...................................................              ..............................................  Date                                                            Time

## 2017-12-26 NOTE — ED PROVIDER NOTES
"  History   No chief complaint on file.    HPI  Ellie Leigh is a 87 year old female with past medical history which includes irritable bowel syndrome, asthma, COPD, GERD, essential hypertension, SIADH, and atrial fibrillation anticoagulated via warfarin who presents from lab for evaluation.  Patient explains that she was seen at a hospital [facility] on 12/15/17 after report of syncopal episode with cough, diagnosed with pneumonia and has finished her antibiotics recently but still not feeling much better.  She describes \"upset stomach\" and nausea without vomiting, abdominal pain, or diarrhea.  She admits that her dry cough has improved and is only mild at this time and feels a little short of breath upon awakening in the morning.  She denies fever, chills, chest pain, back pain, headache, or recent injuries.  While having her INR level checked today a  suggested she be seen if she does not feel well.      Problem List:    Patient Active Problem List    Diagnosis Date Noted     Nausea 06/05/2017     Priority: Medium     Elevated troponin 08/21/2016     Priority: Medium     Irritable bowel syndrome without diarrhea 05/02/2016     Priority: Medium     Unresponsive episode 03/18/2016     Priority: Medium     Chest pain at rest 12/07/2015     Priority: Medium     Mild persistent asthma without complication 12/01/2015     Priority: Medium     Long term current use of anticoagulant therapy 03/02/2015     Priority: Medium     Problem list name updated by automated process. Provider to review       Hemoptysis 01/21/2015     Priority: Medium     Syncope 01/12/2015     Priority: Medium     Advance Care Planning 01/09/2015     Priority: Medium     Advance Care Planning 7/2/2015: Receipt of ACP document:  Received: Health Care Directive which was witnessed or notarized on 1-9-15.  Document previously scanned on 2-27-15.  Validation form completed and sent to be scanned.  Code Status reflects choices in most " recent ACP document.  Confirmed/documented designated decision maker(s).  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  Patient has had lung CT, will see Dr Gómez for consideration of further workup.  Also has pulmonology appt 8/18.       Positive fecal occult blood test 07/07/2014     Priority: Medium     x2 -- first was in ED.  Patient expresses that she does not want colonoscopy.  She'll set appt to discuss with her PCP.       Benign essential HTN 02/11/2014     Priority: Medium     BPPV (benign paroxysmal positional vertigo) 11/05/2013     Priority: Medium     History of skin cancer 05/07/2013     Priority: Medium     Recurrent UTI 07/16/2012     Priority: Medium     recurrent UTI  Recent Urologic workup neg, 2005  Has Cipro at home for treatment as needed       Hypothyroidism 05/07/2012     Priority: Medium     Hyponatremia 05/07/2012     Priority: Medium     Squamous cell carcinoma in situ of skin of face 04/19/2012     Priority: Medium     SK (seborrheic keratosis) 04/19/2012     Priority: Medium     Intermittent atrial fibrillation (H) 12/01/2011     Priority: Medium     Cervical vertebral fracture (H) 11/20/2011     Priority: Medium     Anxiety 11/15/2011     Priority: Medium     DVT (deep venous thrombosis) 10/12/2011     Priority: Medium     H/o in past, on current coumadin therapy.       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 Provider to review       Right arm weakness 07/29/2011     Priority: Medium     Ulnar neuropathy 07/29/2011     Priority: Medium     Health Care Home 04/21/2011     Priority: Medium     Conchis Robles RN  [phone]  [facility] [place] [facility]              DX V65.8 REPLACED WITH 7[facility] CARE HOME (04/08/2013)       Chronic constipation 03/03/2011     Priority: Medium     Osteoporosis 03/03/2011     Priority: Medium     Hyperlipidemia LDL goal <130 10/31/2010     Priority: Medium     Infectious colitis, enteritis and gastroenteritis 07/10/2010     Priority: Medium     Chronic allergic conjunctivitis 11/18/2008     Priority: Medium     Atopic rhinitis 11/18/2008     Priority: Medium     (Problem list name updated by automated process. Provider to review and confirm.)       Rhinitis, allergic seasonal 11/18/2008     Priority: Medium     Esophageal reflux 11/29/2007     Priority: Medium     PERSONAL HISTORY OF MALIGNANCY- BREAST 06/13/2007     Priority: Medium     Disease of lung 12/27/2006     Priority: Medium     Pt with chronic RUL infiltrate with pos AFB sputum  Full treatmetn for TB following ID consult in 2000's  Problem list name updated by automated process. Provider to review       Sensorineural hearing loss, asymmetrical 06/20/2005     Priority: Medium     Generalized osteoarthrosis, unspecified site 06/20/2005     Priority: Medium     Right hip and knee are the most symptomatic          Past Medical History:    Past Medical History:   Diagnosis Date     Breast cancer (H)      COPD (chronic obstructive pulmonary disease) (H)      Dust allergy      DVT (deep vein thrombosis) in pregnancy (H)      HTN (hypertension)      Hyponatremia      Hypothyroid      Intermittent atrial fibrillation (H) 12/1/2011     Loose body in joint 4/15/2011     Neck fracture (H)      Syncope 5/12/2013       Past Surgical History:    Past Surgical History:   Procedure Laterality Date     APPENDECTOMY       ARTHROSCOPY KNEE  4/15/2011    Procedure:ARTHROSCOPY KNEE; removal of loose body; Surgeon:LEY, JEFFREY DUANE; Location:[facility]     CHOLECYSTECTOMY       ESOPHAGOSCOPY, GASTROSCOPY, DUODENOSCOPY (EGD), COMBINED   6/9/2014    Procedure: COMBINED ESOPHAGOSCOPY, GASTROSCOPY, DUODENOSCOPY (EGD);  Surgeon: Gilberto Richard MD;  Location: [facility]     IMPLANT PACEMAKER  5/21/2013    Michelleronitrinity Buttsl [id number] Serial#[id number]     INJECT EPIDURAL LUMBAR  3/23/2011    INJECT EPIDURAL LUMBAR performed by GENERIC ANESTHESIA PROVIDER at [facility]     JOINT REPLACEMENT, HIP RT/LT      Joint Replacement Hip Rt     MASTECTOMY, SIMPLE RT/LT/CAITLYN      Left breast - following breast ca     OTHER SURGICAL HISTORY      C1-C2 fusion after fx      SURGICAL HISTORY OF -   05/22/2001    Colonoscopy     TONSILLECTOMY         Family History:    Family History   Problem Relation Age of Onset     CEREBROVASCULAR DISEASE Mother      HEART DISEASE Mother      MI     CEREBROVASCULAR DISEASE Father      HEART DISEASE Father      MI     Respiratory Maternal Grandfather      TB     Neurologic Disorder Brother      ALS     HEART DISEASE Brother      CEREBROVASCULAR DISEASE Brother      Breast Cancer Daughter      age:49     Asthma Brother      CANCER Brother      brain and lung     CANCER Daughter      thyroid       Social History:  Marital Status:   [5]  Social History   Substance Use Topics     Smoking status: Never Smoker     Smokeless tobacco: Never Used     Alcohol use No        Medications:      warfarin (COUMADIN) 1 MG tablet   ranitidine (ZANTAC) 150 MG tablet   albuterol (PROAIR HFA/PROVENTIL HFA/VENTOLIN HFA) 108 (90 BASE) MCG/ACT Inhaler   metoprolol (TOPROL-XL) 25 MG 24 hr tablet   levothyroxine (SYNTHROID/LEVOTHROID) 50 MCG tablet   ipratropium - albuterol 0.5 mg/2.5 mg/3 mL (DUONEB) 0.5-2.5 (3) MG/3ML neb solution   SYMBICORT 160-4.5 MCG/ACT Inhaler   probiotic CAPS   polyethylene glycol (MIRALAX) powder   CRANBERRY   diclofenac (VOLTAREN) 1 % GEL topical gel   benzonatate (TESSALON) 200 MG capsule         Review of Systems  Constitutional: Negative for fever or chills.  HENT: Negative oral or throat pain.  Respiratory: Positive for dry cough with  "mild dyspnea upon awakening in the morning.  Cardiovascular: Negative for chest pain.  Gastrointestinal: Positive for nausea.  Negative for abdominal pain, vomiting, or diarrhea. Denies blood with bowel movements.  Musculoskeletal: Negative for back pain or recent injuries.  Neurological: Negative for headache or dizziness.    All others reviewed and are negative.      Physical Exam   BP: 140/71  Pulse: 76  Temp: 98.4  F (36.9  C)  Resp: 18  Height: 147.3 cm (4' 10\")  SpO2: 95 %      Physical Exam  Constitutional: Well developed, well nourished. Appears nontoxic and in no acute distress. Resting comfortably on the gurney.  Head: Normocephalic and atraumatic. Symmetric in appearance.  Eyes: Conjunctivae are normal.  Neck: Neck supple.  Cardiovascular: No cyanosis. Irregularly irregular. No audible murmurs noted. No lower extremity edema or asymmetry.   Respiratory: Effort normal, no respiratory distress. CTAB without diminished regions. No wheezing, rhonchi, or crackles.  Gastrointestinal: Soft, nondistended abdomen. Nontender and without guarding. No rigidity or rebound tenderness. Negative McBurney's point. Negative for Stearns's sign. No palpable pulsatile mass.  Benign abdomen.    Musculoskeletal: Moves all extremities spontaneously and without complaint.  Neuro: Patient is alert.  Skin: Skin is warm and dry, not diaphoretic.  Psych: Appears to have a normal mood and affect.      ED Course     ED Course     Procedures              Critical Care time:  none               Results for orders placed or performed during the hospital encounter of 12/26/17 (from the past 24 hour(s))   CBC with platelets differential   Result Value Ref Range    WBC 7.4 4.0 - 11.0 10e9/L    RBC Count 4.26 3.8 - 5.2 10e12/L    Hemoglobin 11.7 11.7 - 15.7 g/dL    Hematocrit 34.8 (L) 35.0 - 47.0 %    MCV 82 78 - 100 fl    MCH 27.5 26.5 - 33.0 pg    MCHC 33.6 31.5 - 36.5 g/dL    RDW 14.6 10.0 - 15.0 %    Platelet Count 244 150 - 450 10e9/L    " Diff Method Automated Method     % Neutrophils 64.6 %    % Lymphocytes 22.3 %    % Monocytes 11.2 %    % Eosinophils 1.1 %    % Basophils 0.4 %    % Immature Granulocytes 0.4 %    Absolute Neutrophil 4.8 1.6 - 8.3 10e9/L    Absolute Lymphocytes 1.6 0.8 - 5.3 10e9/L    Absolute Monocytes 0.8 0.0 - 1.3 10e9/L    Absolute Eosinophils 0.1 0.0 - 0.7 10e9/L    Absolute Basophils 0.0 0.0 - 0.2 10e9/L    Abs Immature Granulocytes 0.0 0 - 0.4 10e9/L   Comprehensive metabolic panel   Result Value Ref Range    Sodium 129 (L) 133 - 144 mmol/L    Potassium 4.2 3.4 - 5.3 mmol/L    Chloride 96 94 - 109 mmol/L    Carbon Dioxide 27 20 - 32 mmol/L    Anion Gap 6 3 - 14 mmol/L    Glucose 100 (H) 70 - 99 mg/dL    Urea Nitrogen 17 7 - 30 mg/dL    Creatinine 0.50 (L) 0.52 - 1.04 mg/dL    GFR Estimate >90 >60 mL/min/1.7m2    GFR Estimate If Black >90 >60 mL/min/1.7m2    Calcium 8.1 (L) 8.5 - 10.1 mg/dL    Bilirubin Total 0.3 0.2 - 1.3 mg/dL    Albumin 3.0 (L) 3.4 - 5.0 g/dL    Protein Total 6.4 (L) 6.8 - 8.8 g/dL    Alkaline Phosphatase 70 40 - 150 U/L    ALT 17 0 - 50 U/L    AST 20 0 - 45 U/L   Lipase   Result Value Ref Range    Lipase 152 73 - 393 U/L   UA reflex to Microscopic and Culture   Result Value Ref Range    Color Urine Light Yellow     Appearance Urine Slightly Cloudy     Glucose Urine Negative NEG^Negative mg/dL    Bilirubin Urine Negative NEG^Negative    Ketones Urine Negative NEG^Negative mg/dL    Specific Gravity Urine 1.006 1.003 - 1.035    Blood Urine Trace (A) NEG^Negative    pH Urine 7.0 5.0 - 7.0 pH    Protein Albumin Urine Negative NEG^Negative mg/dL    Urobilinogen mg/dL Normal 0.0 - 2.0 mg/dL    Nitrite Urine Negative NEG^Negative    Leukocyte Esterase Urine Small (A) NEG^Negative    Source Unspecified Urine     RBC Urine 4 (H) 0 - 2 /HPF    WBC Urine 2 0 - 2 /HPF    Squamous Epithelial /HPF Urine <1 0 - 1 /HPF    Transitional Epi <1 0 - 1 /HPF    Mucous Urine Present (A) NEG^Negative /LPF   Chest XR,  PA & LAT     "Narrative    XR CHEST 2 VW 12/26/2017 2:37 PM    HISTORY: Cough.    COMPARISON: Several prior studies, the most recent one being dated  11/7/2017.      Impression    IMPRESSION: 2 views of the chest show no acute cardiopulmonary  disease. Chronic lung abnormality in the right upper lung zone shows  no significant change dating back to at least 8/2/2017 and has been  shown to be a chronic inflammatory change and bronchiectasis on prior  chest CT.    NICOLE VALDIVIA MD     *Note: Due to a large number of results and/or encounters for the requested time period, some results have not been displayed. A complete set of results can be found in Results Review.         Assessments & Plan (with Medical Decision Making)   Ellie Leigh is a 87 year old female who presents to the department for evaluation of \"upset stomach\" after having her INR value drawn at lab.  She admits that she had finished her oral antibiotic for recently diagnosed pneumonia but has had the upset stomach for the past few days.  She has not vomited or suffered from diarrhea.  She has constipation at baseline which is relatively unchanged.  Her abdominal examination is benign, CBC without leukocytosis, lipase and hepatic enzymes within reference range.  Urinalysis unimpressive for infection.  Chest radiograph does not reveal infiltrate remaining from recently treated pneumonia.    Clinical impression is that the patient has an upset stomach which improved with Maalox in the department, she correlates with initiation of oral antibiotics but low suspicion for Clostridium difficile. Recommend she follow up with primary provider over the next couple of days for reevaluation.  She had also noted occasionally feeling short of breath in the morning but lung sounds clear to auscultation. Prior to discharge, I made it clear that illness can unexpectedly develop/progress so she has been instructed to return to the emergency department for reevaluation of evolving " symptoms, change in severity, fever, or other concerns.  Both she and her accompanying family member seem comfortable with the discharge plan we discussed including follow up.    Disclaimer: This note consists of symbols derived from keyboarding, dictation, and/or voice recognition software. As a result, there may be errors in the script that have gone undetected.  Please consider this when interpreting information found in the chart.              I have reviewed the nursing notes.    I have reviewed the findings, diagnosis, plan and need for follow up with the patient.      Discharge Medication List as of 12/26/2017  4:12 PM          Final diagnoses:   Malaise   Upset stomach       12/26/2017   [facility] EMERGENCY DEPARTMENT     Anjum Hobbs,   12/26/17 1042

## 2017-12-26 NOTE — ED NOTES
"Diagnosed with pneumonia one week ago placed on antibiotics she comes into today with shortness of breath.  \"I do not feel like I am getting better\".      "

## 2017-12-27 ENCOUNTER — CARE COORDINATION (OUTPATIENT)
Dept: CARE COORDINATION | Facility: CLINIC | Age: 82
End: 2017-12-27

## 2017-12-29 NOTE — PROGRESS NOTES
"Clinic Care Coordination Contact  OUTREACH    Referral Information:  Referral Source: ED Follow-Up  Reason for Contact: ED follow up regarding malaise and GI upset.         Universal Utilization:   ED Visits in last year: 3  Hospital visits in last year: 0  Last PCP appointment: 12/08/17  Concerns: Proper Utilization   Multiple Providers or Specialists: No     Clinical Concerns:  Current Medical Concerns:  Patient presented to the ED for evaluation of an \"upset stomach\" after having her INR value drawn at lab.  She admitted that she had finished her oral antibiotic for recently diagnosed pneumonia but has had the upset stomach for the past few days.  She has not vomited or suffered from diarrhea.  She has constipation at baseline which is relatively unchanged.  Her abdominal examination is benign, CBC without leukocytosis, lipase and hepatic enzymes within reference range.  Urinalysis unimpressive for infection.  Chest radiograph does not reveal infiltrate remaining from recently treated pneumonia. Patient was treated with Maalox in the ED which resolved her symptoms. Patient was encouraged to follow up with her PCP. Spoke with the patient's daughter Suyapa today who stated her mother is doing better. Patient has a follow up scheduled for 1/8/18.   Current Behavioral Concerns: Patient is alert and orientated. Able to make her needs known. Family had no behavioral concerns at this time.     Education Provided to patient: To call the clinic should needs arise.      Medication Management:  Unable to review medications at this time. Daughter did not have a list of her medications during the phone call. Daughter denied any concerns. Stated her mother is adherent to her regimen.      Functional Status:  Mobility Status: Independent w/Device  Equipment Currently Used at Home: walker, standard  Transportation: Family provides transportation at this time.            Psychosocial:  Current living arrangement:: I live alone, I " live in a private home with family  Financial/Insurance: ARE for seniors insurance. Denied financial concerns at this time.      Resources and Interventions:  Advanced Care Plans/Directives on file:: Yes     Patient/Caregiver understanding: Patient's daughter denied any concerns at this time. Patient's family feels she is doing well. Daughter understands to the call the clinic should needs arise.   Frequency of Care Coordination: no further outreaches. Pt denied services  Upcoming appointment: 01/08/18     Plan:   1. No further outreaches at this time due to family feeling that patient is doing well. Patient denied concerns at this time. RN CC will be available in the future should another referral be made.     Loan Beavers RN  Clinic Care Coordinator  [phone]  [email or web address], [email or web address]

## 2018-01-02 ENCOUNTER — ANTICOAGULATION THERAPY VISIT (OUTPATIENT)
Dept: ANTICOAGULATION | Facility: CLINIC | Age: 83
End: 2018-01-02
Payer: COMMERCIAL

## 2018-01-02 DIAGNOSIS — Z79.01 LONG TERM CURRENT USE OF ANTICOAGULANT THERAPY: ICD-10-CM

## 2018-01-02 DIAGNOSIS — I48.0 INTERMITTENT ATRIAL FIBRILLATION (H): ICD-10-CM

## 2018-01-02 DIAGNOSIS — I82.409 DVT (DEEP VENOUS THROMBOSIS) (H): ICD-10-CM

## 2018-01-02 LAB — INR POINT OF CARE: 2.4 (ref 0.86–1.14)

## 2018-01-02 PROCEDURE — 85610 PROTHROMBIN TIME: CPT | Mod: QW

## 2018-01-02 PROCEDURE — 99207 ZZC NO CHARGE NURSE ONLY: CPT

## 2018-01-02 PROCEDURE — 36416 COLLJ CAPILLARY BLOOD SPEC: CPT

## 2018-01-02 RX ORDER — WARFARIN SODIUM 1 MG/1
TABLET ORAL
Qty: 60 TABLET | Refills: 0 | COMMUNITY
Start: 2018-01-02 | End: 2018-01-16

## 2018-01-02 NOTE — MR AVS SNAPSHOT
Ellie Leigh   1/2/2018 11:45 AM   Anticoagulation Therapy Visit    Description:  87 year old female   Provider:  [facility]   Department:  [facility]           INR as of 1/2/2018     Today's INR 2.4!      Anticoagulation Summary as of 1/2/2018     INR goal 1.5-2.0   Today's INR 2.4!   Full instructions 1/2: 1 mg; 1/3: 1 mg; 1/4: 1 mg; 1/5: 1.5 mg; 1/6: 1 mg; 1/7: 1 mg; 1/8: 1.5 mg; Otherwise 1.5 mg on Mon, Fri; 1 mg all other days   Next INR check 1/16/2018    Indications   Atrial fibrillation with rapid ventricular response (H) (Resolved) [I48.91]  DVT (deep venous thrombosis) [I82.409]  Long term current use of anticoagulant therapy [Z79.01]         January 2018 Details    Sun Mon Tue Wed Thu Fri Sat      1               2      1 mg   See details      3      1 mg         4      1 mg         5      1.5 mg         6      1 mg           7      1 mg         8      1.5 mg         9      1 mg         10      1 mg         11      1 mg         12      1.5 mg         13      1 mg           14      1 mg         15      1.5 mg         16            17               18               19               20                 21               22               23               24               25               26               27                 28               29               30               31                   Date Details   01/02 This INR check       Date of next INR:  1/16/2018         How to take your warfarin dose     To take:  1 mg Take 1 of the 1 mg tablets.    To take:  1.5 mg Take 1.5 of the 1 mg tablets.

## 2018-01-03 ENCOUNTER — HOSPITAL ENCOUNTER (OUTPATIENT)
Dept: MAMMOGRAPHY | Facility: CLINIC | Age: 83
Discharge: HOME | End: 2018-01-03
Attending: INTERNAL MEDICINE | Admitting: INTERNAL MEDICINE
Payer: COMMERCIAL

## 2018-01-03 ENCOUNTER — HOSPITAL ENCOUNTER (OUTPATIENT)
Dept: LAB | Facility: CLINIC | Age: 83
End: 2018-01-03
Attending: INTERNAL MEDICINE
Payer: COMMERCIAL

## 2018-01-03 DIAGNOSIS — E87.1 HYPONATREMIA: ICD-10-CM

## 2018-01-03 DIAGNOSIS — M81.0 OSTEOPOROSIS: ICD-10-CM

## 2018-01-03 DIAGNOSIS — Z85.3 HISTORY OF LEFT BREAST CANCER: ICD-10-CM

## 2018-01-03 DIAGNOSIS — D64.9 ANEMIA, UNSPECIFIED TYPE: ICD-10-CM

## 2018-01-03 LAB
ALBUMIN SERPL-MCNC: 3.1 G/DL (ref 3.4–5)
ALP SERPL-CCNC: 79 U/L (ref 40–150)
ALT SERPL W P-5'-P-CCNC: 14 U/L (ref 0–50)
ANION GAP SERPL CALCULATED.3IONS-SCNC: 7 MMOL/L (ref 3–14)
AST SERPL W P-5'-P-CCNC: 24 U/L (ref 0–45)
BASOPHILS # BLD AUTO: 0 10E9/L (ref 0–0.2)
BASOPHILS NFR BLD AUTO: 0.7 %
BILIRUB SERPL-MCNC: 0.3 MG/DL (ref 0.2–1.3)
BUN SERPL-MCNC: 21 MG/DL (ref 7–30)
CALCIUM SERPL-MCNC: 8.2 MG/DL (ref 8.5–10.1)
CANCER AG27-29 SERPL-ACNC: 19 U/ML (ref 0–39)
CHLORIDE SERPL-SCNC: 94 MMOL/L (ref 94–109)
CO2 SERPL-SCNC: 25 MMOL/L (ref 20–32)
CREAT SERPL-MCNC: 0.43 MG/DL (ref 0.52–1.04)
DIFFERENTIAL METHOD BLD: ABNORMAL
EOSINOPHIL # BLD AUTO: 0.1 10E9/L (ref 0–0.7)
EOSINOPHIL NFR BLD AUTO: 2.5 %
ERYTHROCYTE [DISTWIDTH] IN BLOOD BY AUTOMATED COUNT: 14.2 % (ref 10–15)
FERRITIN SERPL-MCNC: 66 NG/ML (ref 8–252)
FOLATE SERPL-MCNC: 11 NG/ML
GFR SERPL CREATININE-BSD FRML MDRD: >90 ML/MIN/1.7M2
GLUCOSE SERPL-MCNC: 114 MG/DL (ref 70–99)
HCT VFR BLD AUTO: 33.4 % (ref 35–47)
HGB BLD-MCNC: 11.4 G/DL (ref 11.7–15.7)
IMM GRANULOCYTES # BLD: 0 10E9/L (ref 0–0.4)
IMM GRANULOCYTES NFR BLD: 0.2 %
LYMPHOCYTES # BLD AUTO: 1.4 10E9/L (ref 0.8–5.3)
LYMPHOCYTES NFR BLD AUTO: 33.9 %
MCH RBC QN AUTO: 27.7 PG (ref 26.5–33)
MCHC RBC AUTO-ENTMCNC: 34.1 G/DL (ref 31.5–36.5)
MCV RBC AUTO: 81 FL (ref 78–100)
MONOCYTES # BLD AUTO: 0.5 10E9/L (ref 0–1.3)
MONOCYTES NFR BLD AUTO: 12.5 %
NEUTROPHILS # BLD AUTO: 2 10E9/L (ref 1.6–8.3)
NEUTROPHILS NFR BLD AUTO: 50.2 %
PLATELET # BLD AUTO: 241 10E9/L (ref 150–450)
POTASSIUM SERPL-SCNC: 4.2 MMOL/L (ref 3.4–5.3)
PROT SERPL-MCNC: 6.8 G/DL (ref 6.8–8.8)
RBC # BLD AUTO: 4.11 10E12/L (ref 3.8–5.2)
SODIUM SERPL-SCNC: 126 MMOL/L (ref 133–144)
VIT B12 SERPL-MCNC: 397 PG/ML (ref 193–986)
WBC # BLD AUTO: 4.1 10E9/L (ref 4–11)

## 2018-01-03 PROCEDURE — 85025 COMPLETE CBC W/AUTO DIFF WBC: CPT | Performed by: INTERNAL MEDICINE

## 2018-01-03 PROCEDURE — 82607 VITAMIN B-12: CPT | Performed by: INTERNAL MEDICINE

## 2018-01-03 PROCEDURE — 77067 SCR MAMMO BI INCL CAD: CPT | Mod: 52

## 2018-01-03 PROCEDURE — 82728 ASSAY OF FERRITIN: CPT | Performed by: INTERNAL MEDICINE

## 2018-01-03 PROCEDURE — 86300 IMMUNOASSAY TUMOR CA 15-3: CPT | Performed by: INTERNAL MEDICINE

## 2018-01-03 PROCEDURE — 82746 ASSAY OF FOLIC ACID SERUM: CPT | Performed by: INTERNAL MEDICINE

## 2018-01-03 PROCEDURE — 80053 COMPREHEN METABOLIC PANEL: CPT | Performed by: INTERNAL MEDICINE

## 2018-01-05 NOTE — PROGRESS NOTES
Called and reviewed results with the patient per Dr. Nieves. Patient had no further questions or concerns at this time and also verbalized understanding. Direct line provided if any further questions/concerns arise.

## 2018-01-06 ENCOUNTER — APPOINTMENT (OUTPATIENT)
Dept: GENERAL RADIOLOGY | Facility: CLINIC | Age: 83
End: 2018-01-06
Attending: FAMILY MEDICINE
Payer: COMMERCIAL

## 2018-01-06 ENCOUNTER — APPOINTMENT (OUTPATIENT)
Dept: CT IMAGING | Facility: CLINIC | Age: 83
End: 2018-01-06
Attending: FAMILY MEDICINE
Payer: COMMERCIAL

## 2018-01-06 ENCOUNTER — HOSPITAL ENCOUNTER (EMERGENCY)
Facility: CLINIC | Age: 83
Discharge: HOME | End: 2018-01-06
Attending: FAMILY MEDICINE | Admitting: FAMILY MEDICINE
Payer: COMMERCIAL

## 2018-01-06 VITALS
BODY MASS INDEX: 27.65 KG/M2 | SYSTOLIC BLOOD PRESSURE: 126 MMHG | DIASTOLIC BLOOD PRESSURE: 67 MMHG | OXYGEN SATURATION: 94 % | WEIGHT: 132.28 LBS | RESPIRATION RATE: 16 BRPM | HEART RATE: 74 BPM | TEMPERATURE: 98.1 F

## 2018-01-06 DIAGNOSIS — R10.9 ABDOMINAL CRAMPING: ICD-10-CM

## 2018-01-06 DIAGNOSIS — R55 SYNCOPE, UNSPECIFIED SYNCOPE TYPE: ICD-10-CM

## 2018-01-06 DIAGNOSIS — E87.1 HYPONATREMIA: ICD-10-CM

## 2018-01-06 LAB
ALBUMIN SERPL-MCNC: 3.1 G/DL (ref 3.4–5)
ALBUMIN UR-MCNC: NEGATIVE MG/DL
ALP SERPL-CCNC: 82 U/L (ref 40–150)
ALT SERPL W P-5'-P-CCNC: 15 U/L (ref 0–50)
AMORPH CRY #/AREA URNS HPF: ABNORMAL /HPF
ANION GAP SERPL CALCULATED.3IONS-SCNC: 10 MMOL/L (ref 3–14)
APPEARANCE UR: CLEAR
AST SERPL W P-5'-P-CCNC: 33 U/L (ref 0–45)
BASOPHILS # BLD AUTO: 0 10E9/L (ref 0–0.2)
BASOPHILS NFR BLD AUTO: 0.7 %
BILIRUB SERPL-MCNC: 0.6 MG/DL (ref 0.2–1.3)
BILIRUB UR QL STRIP: NEGATIVE
BUN SERPL-MCNC: 12 MG/DL (ref 7–30)
CALCIUM SERPL-MCNC: 8 MG/DL (ref 8.5–10.1)
CHLORIDE SERPL-SCNC: 89 MMOL/L (ref 94–109)
CO2 SERPL-SCNC: 23 MMOL/L (ref 20–32)
COLOR UR AUTO: ABNORMAL
CREAT SERPL-MCNC: 0.42 MG/DL (ref 0.52–1.04)
DIFFERENTIAL METHOD BLD: ABNORMAL
EOSINOPHIL # BLD AUTO: 0.2 10E9/L (ref 0–0.7)
EOSINOPHIL NFR BLD AUTO: 3.6 %
ERYTHROCYTE [DISTWIDTH] IN BLOOD BY AUTOMATED COUNT: 13.7 % (ref 10–15)
GFR SERPL CREATININE-BSD FRML MDRD: >90 ML/MIN/1.7M2
GLUCOSE SERPL-MCNC: 114 MG/DL (ref 70–99)
GLUCOSE UR STRIP-MCNC: NEGATIVE MG/DL
HCT VFR BLD AUTO: 32.5 % (ref 35–47)
HGB BLD-MCNC: 11.4 G/DL (ref 11.7–15.7)
HGB UR QL STRIP: NEGATIVE
IMM GRANULOCYTES # BLD: 0 10E9/L (ref 0–0.4)
IMM GRANULOCYTES NFR BLD: 0.2 %
KETONES UR STRIP-MCNC: NEGATIVE MG/DL
LACTATE BLD-SCNC: 0.6 MMOL/L (ref 0.7–2)
LEUKOCYTE ESTERASE UR QL STRIP: NEGATIVE
LIPASE SERPL-CCNC: 140 U/L (ref 73–393)
LYMPHOCYTES # BLD AUTO: 1.9 10E9/L (ref 0.8–5.3)
LYMPHOCYTES NFR BLD AUTO: 45.9 %
MCH RBC QN AUTO: 27.9 PG (ref 26.5–33)
MCHC RBC AUTO-ENTMCNC: 35.1 G/DL (ref 31.5–36.5)
MCV RBC AUTO: 80 FL (ref 78–100)
MONOCYTES # BLD AUTO: 0.6 10E9/L (ref 0–1.3)
MONOCYTES NFR BLD AUTO: 14.5 %
MUCOUS THREADS #/AREA URNS LPF: PRESENT /LPF
NEUTROPHILS # BLD AUTO: 1.5 10E9/L (ref 1.6–8.3)
NEUTROPHILS NFR BLD AUTO: 35.1 %
NITRATE UR QL: NEGATIVE
PH UR STRIP: 8 PH (ref 5–7)
PLATELET # BLD AUTO: 274 10E9/L (ref 150–450)
POTASSIUM SERPL-SCNC: 3.9 MMOL/L (ref 3.4–5.3)
PROT SERPL-MCNC: 6.6 G/DL (ref 6.8–8.8)
RBC # BLD AUTO: 4.08 10E12/L (ref 3.8–5.2)
RBC #/AREA URNS AUTO: 4 /HPF (ref 0–2)
SODIUM SERPL-SCNC: 122 MMOL/L (ref 133–144)
SOURCE: ABNORMAL
SP GR UR STRIP: 1.01 (ref 1–1.03)
SQUAMOUS #/AREA URNS AUTO: <1 /HPF (ref 0–1)
TROPONIN I SERPL-MCNC: 0.03 UG/L (ref 0–0.04)
UROBILINOGEN UR STRIP-MCNC: NORMAL MG/DL (ref 0–2)
WBC # BLD AUTO: 4.1 10E9/L (ref 4–11)
WBC #/AREA URNS AUTO: 0 /HPF (ref 0–2)

## 2018-01-06 PROCEDURE — 85025 COMPLETE CBC W/AUTO DIFF WBC: CPT | Performed by: FAMILY MEDICINE

## 2018-01-06 PROCEDURE — 84484 ASSAY OF TROPONIN QUANT: CPT | Performed by: FAMILY MEDICINE

## 2018-01-06 PROCEDURE — 87086 URINE CULTURE/COLONY COUNT: CPT | Performed by: FAMILY MEDICINE

## 2018-01-06 PROCEDURE — 93005 ELECTROCARDIOGRAM TRACING: CPT | Performed by: FAMILY MEDICINE

## 2018-01-06 PROCEDURE — 71046 X-RAY EXAM CHEST 2 VIEWS: CPT

## 2018-01-06 PROCEDURE — 96361 HYDRATE IV INFUSION ADD-ON: CPT | Performed by: FAMILY MEDICINE

## 2018-01-06 PROCEDURE — 83690 ASSAY OF LIPASE: CPT | Performed by: FAMILY MEDICINE

## 2018-01-06 PROCEDURE — 70450 CT HEAD/BRAIN W/O DYE: CPT

## 2018-01-06 PROCEDURE — 25000128 H RX IP 250 OP 636: Performed by: FAMILY MEDICINE

## 2018-01-06 PROCEDURE — 81001 URINALYSIS AUTO W/SCOPE: CPT | Performed by: FAMILY MEDICINE

## 2018-01-06 PROCEDURE — 99285 EMERGENCY DEPT VISIT HI MDM: CPT | Mod: 25 | Performed by: FAMILY MEDICINE

## 2018-01-06 PROCEDURE — 83605 ASSAY OF LACTIC ACID: CPT | Performed by: FAMILY MEDICINE

## 2018-01-06 PROCEDURE — 96374 THER/PROPH/DIAG INJ IV PUSH: CPT | Performed by: FAMILY MEDICINE

## 2018-01-06 PROCEDURE — 80053 COMPREHEN METABOLIC PANEL: CPT | Performed by: FAMILY MEDICINE

## 2018-01-06 PROCEDURE — 99285 EMERGENCY DEPT VISIT HI MDM: CPT | Mod: Z6 | Performed by: FAMILY MEDICINE

## 2018-01-06 PROCEDURE — 93010 ELECTROCARDIOGRAM REPORT: CPT | Mod: Z6 | Performed by: FAMILY MEDICINE

## 2018-01-06 RX ORDER — ONDANSETRON 2 MG/ML
4 INJECTION INTRAMUSCULAR; INTRAVENOUS ONCE
Status: COMPLETED | OUTPATIENT
Start: 2018-01-06 | End: 2018-01-06

## 2018-01-06 RX ADMIN — SODIUM CHLORIDE 500 ML: 9 INJECTION, SOLUTION INTRAVENOUS at 09:39

## 2018-01-06 RX ADMIN — ONDANSETRON 4 MG: 2 INJECTION INTRAMUSCULAR; INTRAVENOUS at 09:40

## 2018-01-06 NOTE — ED NOTES
Pt desires to void. Discussed that she had just had cath UA done at 1010, may have sensation from that procedure. Will use bedside commode.

## 2018-01-06 NOTE — DISCHARGE INSTRUCTIONS
Return to the Emergency Room if the following occurs:     Recurrent fainting, fever >101, concerning changes in behavior, worsened pain, or for any concern at anytime.    Or, follow-up with the following provider as we discussed:     Return to your primary doctor on Monday or Tuesday for reevaluation.  I did place an order for a Zio patch.  You can call and set up an appointment to have this placed.  Duration of monitoring was 14 days.  He may need a repeat sodium test but your primary doctor can determine this.    Medications discussed:    None new.  No changes.    If you received pain-relieving or sedating medication during your time in the ER, avoid alcohol, driving automobiles, or working with machinery.  Also, a responsible adult must stay with you.        Call the Nurse Advice Line at [phone] or [phone] for any concern at anytime.

## 2018-01-06 NOTE — ED NOTES
Pt arrives from home via [facility] following ongoing abdominal pain. Seen 12/15/17 in [place] with negative CT scan + pneumonia with finished abx., here 12/26/17 with negative work up, and now pain got worse again. BM yesterday, normal. Distended. Given Fentanyl en route.

## 2018-01-06 NOTE — ED PROVIDER NOTES
"HPI  Patient is an 87-year-old female presenting by ambulance for \"not feeling well.\"  She was diagnosed with pneumonia while in [place], [place] 12/15.  She had a CT scan of her abdomen as well but this was unremarkable, per the medical record.  She was seen again on 12/26 and had a repeat chest x-ray.  She finished her antibiotics as recommended.  She also has a known history of atrial fibrillation and takes Coumadin.  She has COPD.  She has had a cholecystectomy and appendectomy.  She does not smoke.  No drugs.  No alcohol.  She lives in a private home.    The patient cannot provide history.  There is no family present in the room.  EMS reported to nursing.  Apparently, she had complaints of \"just not feeling well.\"  She referred to her abdomen and so fentanyl 50 mcg was given in route.  Since then, the patient has not been talking.  She has said a few words in response to the nurse asking questions.  She is not responding to me verbally.  She was sleeping when I entered the room but she opened her eyes quickly and has been somewhat responsive to my requests during the examination.  She does not indicate whether or not she has pain or any other symptom at this time.  Family is apparently coming in further history will be obtained and documented below.    ROS: All other review of systems are negative other than that noted above.     Past Medical History:   Diagnosis Date     Breast cancer (H)      COPD (chronic obstructive pulmonary disease) (H)     ct 2014 does show some bronchiectaisi RUL as well as fibronodular disease bilat upper lobes     Dust allergy      DVT (deep vein thrombosis) in pregnancy (H)     after neck fracture when in hospital     HTN (hypertension)      Hyponatremia     chronic     Hypothyroid      Intermittent atrial fibrillation (H) 12/1/2011    hx tachy-keri, has pacer, on chronic coumadin     Loose body in joint 4/15/2011     Neck fracture (H)     after a fall     Syncope 5/12/2013    " multiple hospital visits, lates 3/2016, 6/2016, 7/2016 with no clear cause found     Past Surgical History:   Procedure Laterality Date     APPENDECTOMY       ARTHROSCOPY KNEE  4/15/2011    Procedure:ARTHROSCOPY KNEE; removal of loose body; Surgeon:LEY, JEFFREY DUANE; Location:[facility]     CHOLECYSTECTOMY       ESOPHAGOSCOPY, GASTROSCOPY, DUODENOSCOPY (EGD), COMBINED  6/9/2014    Procedure: COMBINED ESOPHAGOSCOPY, GASTROSCOPY, DUODENOSCOPY (EGD);  Surgeon: Gilberto Richard MD;  Location: [facility]     IMPLANT PACEMAKER  5/21/2013    Biotronik Moderl [id number] Serial#[id number]     INJECT EPIDURAL LUMBAR  3/23/2011    INJECT EPIDURAL LUMBAR performed by GENERIC ANESTHESIA PROVIDER at [facility]     JOINT REPLACEMENT, HIP RT/LT      Joint Replacement Hip Rt     MASTECTOMY, SIMPLE RT/LT/CAITLYN      Left breast - following breast ca     OTHER SURGICAL HISTORY      C1-C2 fusion after fx      SURGICAL HISTORY OF -   05/22/2001    Colonoscopy     TONSILLECTOMY       Family History   Problem Relation Age of Onset     CEREBROVASCULAR DISEASE Mother      HEART DISEASE Mother      MI     CEREBROVASCULAR DISEASE Father      HEART DISEASE Father      MI     Respiratory Maternal Grandfather      TB     Neurologic Disorder Brother      ALS     HEART DISEASE Brother      CEREBROVASCULAR DISEASE Brother      Breast Cancer Daughter      age:49     Asthma Brother      CANCER Brother      brain and lung     CANCER Daughter      thyroid     Social History   Substance Use Topics     Smoking status: Never Smoker     Smokeless tobacco: Never Used     Alcohol use No         PHYSICAL  /70  Pulse 74  Temp 98.1  F (36.7  C) (Oral)  Resp 16  Wt 60 kg (132 lb 4.4 oz)  LMP 06/15/1985  SpO2 94%  BMI 27.65 kg/m2  General: Patient is alert and in moderate distress.  Sleeping on arrival.  As I talk to her she opened her eyes.  She is not responding to questions.  She is somewhat helpful with my examination.  She does open her mouth.  She does try to lift her  leg when I am testing her hips and knees.  Neurological: Alert.  Moving upper and lower extremities equally, bilaterally.  Not cooperative to examination for other testing.  Cranial nerves II through VIII are intact grossly though she does have an abnormal pupil on the left, likely related to prior surgery.  Extraocular movements are intact.  Head / Neck: Atraumatic.  Ears: Not done.  Eyes: Pupils are equal, round, and reactive.  Normal conjunctiva.  Nose: Midline.  No epistaxis.  Mouth / Throat: No ulcerations or lesions.  Upper pharynx is not erythematous.  Moist.  Respiratory: No respiratory distress. CTA B.  Cardiovascular: Regular rhythm.  Peripheral extremities are warm.  No edema.  No calf tenderness.  Abdomen / Pelvis: Not tender.  No distention.  Soft throughout.  Genitalia: Not done.  Musculoskeletal: No tenderness over major muscles and joints.  Skin: No evidence of rash or trauma.        PHYSICIAN  0958.  Patient has vague complaints prompting family to call EMS.  See history taking above.  Family not available currently.  Examination shows aphasia.  No evidence of significant distress.  Cooperative though not responding fully to my requests.  Related to fentanyl?  CVA?  Abdomen is soft and nontender.  Broad workup pending.    Labs Ordered and Resulted from Time of ED Arrival Up to the Time of Departure from the ED   CBC WITH PLATELETS DIFFERENTIAL - Abnormal; Notable for the following:        Result Value    Hemoglobin 11.4 (*)     Hematocrit 32.5 (*)     Absolute Neutrophil 1.5 (*)     All other components within normal limits   COMPREHENSIVE METABOLIC PANEL - Abnormal; Notable for the following:     Sodium 122 (*)     Chloride 89 (*)     Glucose 114 (*)     Creatinine 0.42 (*)     Calcium 8.0 (*)     Albumin 3.1 (*)     Protein Total 6.6 (*)     All other components within normal limits   LACTIC ACID WHOLE BLOOD - Abnormal; Notable for the following:     Lactic Acid 0.6 (*)     All other components  within normal limits   ROUTINE UA WITH MICROSCOPIC - Abnormal; Notable for the following:     pH Urine 8.0 (*)     RBC Urine 4 (*)     Mucous Urine Present (*)     Amorphous Crystals Few (*)     All other components within normal limits   LIPASE   TROPONIN I   PERIPHERAL IV CATHETER   URINE CULTURE AEROBIC BACTERIAL       IMAGING  Images reviewed by me.  Radiology report also reviewed.  XR Chest 2 Views   Preliminary Result   IMPRESSION: No significant interval change. Hyperinflation both lungs.   Chronic hazy opacity in the right upper lung medially is unchanged,   and may represent chronic scarring or fibrosis. Dual lead cardiac   device left chest wall, lead tips unchanged in position. Heart size   upper limits of normal. Pulmonary vascularity is within normal limits.   No pleural effusions. Thoracic kyphosis.       CT Head w/o Contrast   Preliminary Result   IMPRESSION: No acute pathology, no bleed, mass, or acute infarcts are   seen.           1035.  I was able to speak with the patient's daughter who is in the room now.  Apparently, the patient lives with her other daughter and son-in-law.  She has been experiencing episodes of lightheadedness and fainting in the morning.  This happened again today.  The patient was up and walking when she fainted.  She apparently is unresponsive for quite a long time, up to about 15 minutes.  This is why EMS was called.  The patient did not have any focal neurological deficit that was noticed.  The patient had not complained of chest pain or shortness of breath.  The patient's daughter here is not aware of her having a dysrhythmia or recent Holter monitor.  The patient does have continued trouble finding words but she is able to say a few sentences very slowly.  The patient's daughter tells me this is very unusual for her.  CT imaging pending.  EKG ordered.  Lab values pending.    EKG  (1101)   Interpretation performed by me.  Rate: 66     Rhythm: atrial paced     Axis:  nl  Intervals: NV (12-2) 244, QRS (<12) 82, QTc (>5) 438  P wave: -     QRS complex: nl  ST segment / T-wave: J-pt elevation in V3-V5  Conclusion: Paced rhythm, J-point elevation similar to previous    1205.  The patient has been improving here nicely.  Her mentation has returned to baseline.  She is conversant and her daughter who has been present throughout agrees she is back to normal behavior.  I suspect her change in mentation was related to the fentanyl.  CT scan is unremarkable.  No other focal neurological deficit to suggest stroke.  EKG shows no evidence of ischemia.  She has had episodes of fainting and today's was no different.  A Zio Patch order was placed.  Family and the patient believe her fainting episodes are related to her hyponatremia.  She does have a lower value than has been seen in the recent past.  She does have chronic hyponatremia at baseline.  She has a known history of SIADH.  In reviewing her medication list there is nothing that I could quickly remove to provide some improvement.  They will follow up with her primary doctor on Monday or Tuesday for reevaluation.  They will come back here for worsened symptoms as discussed.      IMPRESSION    ICD-10-CM    1. Syncope, unspecified syncope type R55 Zio Patch Holter   2. Hyponatremia E87.1    3. Abdominal cramping R10.9            Critical Care time:  none                    Steve Ugalde MD  01/06/18 1207

## 2018-01-06 NOTE — ED AVS SNAPSHOT
[facility] Emergency Department    [address]    [place] [facility] [place] [address]    Phone:  [phone]    Fax:  [phone]                                       Ellie Leigh   MRN: [id number]    Department:  [facility] Emergency Department   Date of Visit:  1/6/2018           Patient Information     Date Of Birth          9/12/1930        Your diagnoses for this visit were:     Syncope, unspecified syncope type     Hyponatremia     Abdominal cramping        You were seen by Steve Ugalde MD.        Discharge Instructions       Return to the Emergency Room if the following occurs:     Recurrent fainting, fever >101, concerning changes in behavior, worsened pain, or for any concern at anytime.    Or, follow-up with the following provider as we discussed:     Return to your primary doctor on Monday or Tuesday for reevaluation.  I did place an order for a Zio patch.  You can call and set up an appointment to have this placed.  Duration of monitoring was 14 days.  He may need a repeat sodium test but your primary doctor can determine this.    Medications discussed:    None new.  No changes.    If you received pain-relieving or sedating medication during your time in the ER, avoid alcohol, driving automobiles, or working with machinery.  Also, a responsible adult must stay with you.        Call the Nurse Advice Line at [phone] or [phone] for any concern at anytime.      Future Appointments        Provider Department Dept Phone Center    1/8/2018 11:20 AM Josefina Gómez MD [facility] [phone] FLNB    1/8/2018 4:45 PM RUEDA [facility] Heart Tech [facility] Heart [facility] [phone] [facility] PSA CLIN    1/11/2018 11:00 AM Gayle Nieves MD, MD [facility] Cancer [facility] [phone] [place] [facility]    1/16/2018 1:30 PM [place] [facility] provider, [facility] [phone] [facility]      24 Hour Appointment Hotline       To make an appointment at any  [facility], call [phone] ([phone]). If you don't have a family doctor or clinic, we will help you find one. [facility] clinics are conveniently located to serve the needs of you and your family.          ED Discharge Orders     Zio Patch Holter                    Review of your medicines      Our records show that you are taking the medicines listed below. If these are incorrect, please call your family doctor or clinic.        Dose / Directions Last dose taken    albuterol 108 (90 BASE) MCG/ACT Inhaler   Commonly known as:  PROAIR HFA/PROVENTIL HFA/VENTOLIN HFA   Dose:  2 puff   Quantity:  3 Inhaler        Inhale 2 puffs into the lungs every 6 hours as needed for shortness of breath / dyspnea   Refills:  1        benzonatate 200 MG capsule   Commonly known as:  TESSALON   Dose:  200 mg   Quantity:  21 capsule        Take 1 capsule (200 mg) by mouth 3 times daily as needed for cough   Refills:  0        CRANBERRY   Dose:  475 mg        Take 475 mg by mouth 2 times daily.   Refills:  0        diclofenac 1 % Gel topical gel   Commonly known as:  VOLTAREN   Quantity:  100 g        APPLY 4 GRAMS TO KNEES OR 2 GRAMS TO HANDS 4 TIMES DAILY USING ENCLOSED DOSING CARD   Refills:  11        ipratropium - albuterol 0.5 mg/2.5 mg/3 mL 0.5-2.5 (3) MG/3ML neb solution   Commonly known as:  DUONEB   Quantity:  180 mL        TAKE 1 VIAL (3 MLS) BY NEBULIZATION EVERY 6 HOURS AS NEEDED FOR SHORTNESS OF BREATH / DYSPNEA OR WHEEZING   Refills:  10        levothyroxine 50 MCG tablet   Commonly known as:  SYNTHROID/LEVOTHROID   Quantity:  90 tablet        TAKE 1 TABLET (50 MCG) BY MOUTH DAILY   Refills:  2        metoprolol 25 MG 24 hr tablet   Commonly known as:  TOPROL-XL   Quantity:  120 tablet        TAKE TWO TABLETS BY MOUTH TWICE DAILY   Refills:  3        polyethylene glycol powder   Commonly known as:  MIRALAX   Dose:  1 capful   Quantity:  510 g        Take 17 g by mouth daily as needed   Refills:  5         probiotic Caps   Dose:  1 capsule        Take 1 capsule by mouth every evening   Refills:  0        ranitidine 150 MG tablet   Commonly known as:  ZANTAC   Dose:  150 mg   Quantity:  60 tablet        Take 1 tablet (150 mg) by mouth 2 times daily   Refills:  11        SYMBICORT 160-4.5 MCG/ACT Inhaler   Quantity:  10.2 g   Generic drug:  budesonide-formoterol        INHALE 2 PUFFS INTO THE LUNGS 2 TIMES DAILY   Refills:  5        warfarin 1 MG tablet   Commonly known as:  COUMADIN   Quantity:  60 tablet        Take 1.5 mg Mon and Fri, 1 mg rest of the days or as directed by Anticoagulation Clinic.   Refills:  0                Procedures and tests performed during your visit     CBC with platelets, differential    CT Head w/o Contrast    Comprehensive metabolic panel    EKG 12-lead, tracing only    Lactic acid whole blood    Lipase    Peripheral IV catheter    Troponin I    UA with Microscopic    Urine Culture Aerobic Bacterial    XR Chest 2 Views      Orders Needing Specimen Collection     None      Pending Results     Date and Time Order Name Status Description    1/6/2018 0951 XR Chest 2 Views Preliminary     1/6/2018 0951 CT Head w/o Contrast Preliminary     1/6/2018 0951 Urine Culture Aerobic Bacterial In process             Pending Culture Results     Date and Time Order Name Status Description    1/6/2018 0951 Urine Culture Aerobic Bacterial In process             Pending Results Instructions     If you had any lab results that were not finalized at the time of your Discharge, you can call the ED Lab Result RN at [phone]. You will be contacted by this team for any positive Lab results or changes in treatment. The nurses are available 7 days a week from 10A to 6:30P.  You can leave a message 24 hours per day and they will return your call.        Test Results From Your Hospital Stay        1/6/2018 10:05 AM      Component Results     Component Value Ref Range & Units Status    WBC 4.1 4.0 - 11.0 10e9/L  Final    RBC Count 4.08 3.8 - 5.2 10e12/L Final    Hemoglobin 11.4 (L) 11.7 - 15.7 g/dL Final    Hematocrit 32.5 (L) 35.0 - 47.0 % Final    MCV 80 78 - 100 fl Final    MCH 27.9 26.5 - 33.0 pg Final    MCHC 35.1 31.5 - 36.5 g/dL Final    RDW 13.7 10.0 - 15.0 % Final    Platelet Count 274 150 - 450 10e9/L Final    Diff Method Automated Method  Final    % Neutrophils 35.1 % Final    % Lymphocytes 45.9 % Final    % Monocytes 14.5 % Final    % Eosinophils 3.6 % Final    % Basophils 0.7 % Final    % Immature Granulocytes 0.2 % Final    Absolute Neutrophil 1.5 (L) 1.6 - 8.3 10e9/L Final    Absolute Lymphocytes 1.9 0.8 - 5.3 10e9/L Final    Absolute Monocytes 0.6 0.0 - 1.3 10e9/L Final    Absolute Eosinophils 0.2 0.0 - 0.7 10e9/L Final    Absolute Basophils 0.0 0.0 - 0.2 10e9/L Final    Abs Immature Granulocytes 0.0 0 - 0.4 10e9/L Final         1/6/2018 10:18 AM      Component Results     Component Value Ref Range & Units Status    Sodium 122 (L) 133 - 144 mmol/L Final    Potassium 3.9 3.4 - 5.3 mmol/L Final    Chloride 89 (L) 94 - 109 mmol/L Final    Carbon Dioxide 23 20 - 32 mmol/L Final    Anion Gap 10 3 - 14 mmol/L Final    Glucose 114 (H) 70 - 99 mg/dL Final    Urea Nitrogen 12 7 - 30 mg/dL Final    Creatinine 0.42 (L) 0.52 - 1.04 mg/dL Final    GFR Estimate >90 >60 mL/min/1.7m2 Final    Non  GFR Calc    GFR Estimate If Black >90 >60 mL/min/1.7m2 Final    African American GFR Calc    Calcium 8.0 (L) 8.5 - 10.1 mg/dL Final    Bilirubin Total 0.6 0.2 - 1.3 mg/dL Final    Albumin 3.1 (L) 3.4 - 5.0 g/dL Final    Protein Total 6.6 (L) 6.8 - 8.8 g/dL Final    Alkaline Phosphatase 82 40 - 150 U/L Final    ALT 15 0 - 50 U/L Final    AST 33 0 - 45 U/L Final         1/6/2018 10:16 AM      Component Results     Component Value Ref Range & Units Status    Lipase 140 73 - 393 U/L Final         1/6/2018 10:02 AM      Component Results     Component Value Ref Range & Units Status    Lactic Acid 0.6 (L) 0.7 - 2.0  mmol/L Final         1/6/2018 10:20 AM         1/6/2018 10:29 AM      Component Results     Component Value Ref Range & Units Status    Color Urine Light Yellow  Final    Appearance Urine Clear  Final    Glucose Urine Negative NEG^Negative mg/dL Final    Bilirubin Urine Negative NEG^Negative Final    Ketones Urine Negative NEG^Negative mg/dL Final    Specific Gravity Urine 1.006 1.003 - 1.035 Final    Blood Urine Negative NEG^Negative Final    pH Urine 8.0 (H) 5.0 - 7.0 pH Final    Protein Albumin Urine Negative NEG^Negative mg/dL Final    Urobilinogen mg/dL Normal 0.0 - 2.0 mg/dL Final    Nitrite Urine Negative NEG^Negative Final    Leukocyte Esterase Urine Negative NEG^Negative Final    Source Catheterized Urine  Final    WBC Urine 0 0 - 2 /HPF Final    RBC Urine 4 (H) 0 - 2 /HPF Final    Squamous Epithelial /HPF Urine <1 0 - 1 /HPF Final    Mucous Urine Present (A) NEG^Negative /LPF Final    Amorphous Crystals Few (A) NEG^Negative /HPF Final         1/6/2018 10:52 AM      Narrative     CT SCAN OF THE HEAD WITHOUT CONTRAST   1/6/2018 10:48 AM     HISTORY: aphasic;     TECHNIQUE:  Axial images of the head and coronal reformations without  IV contrast material. Radiation dose for this scan was reduced using  automated exposure control, adjustment of the mA and/or kV according  to patient size, or iterative reconstruction technique.    COMPARISON: CT dated 12/23/2016    FINDINGS:  There is generalized atrophy of the brain.  White matter  changes are present in the cerebral hemispheres that are consistent  with small vessel ischemic disease in this age patient. There is no  evidence of intracranial hemorrhage, mass, acute infarct or anomaly.  The visualized portions of the sinuses and mastoids appear normal.  There is no evidence of trauma.        Impression     IMPRESSION: No acute pathology, no bleed, mass, or acute infarcts are  seen.           1/6/2018 11:06 AM      Narrative     CHEST TWO VIEWS  1/6/2018 10:53 AM  "    HISTORY: Aphasia. Recent pneumonia.    COMPARISON: 2017.        Impression     IMPRESSION: No significant interval change. Hyperinflation both lungs.  Chronic hazy opacity in the right upper lung medially is unchanged,  and may represent chronic scarring or fibrosis. Dual lead cardiac  device left chest wall, lead tips unchanged in position. Heart size  upper limits of normal. Pulmonary vascularity is within normal limits.  No pleural effusions. Thoracic kyphosis.          2018 11:46 AM      Component Results     Component Value Ref Range & Units Status    Troponin I ES 0.034 0.000 - 0.045 ug/L Final    The 99th percentile for upper reference range is 0.045 ug/L.  Troponin values   in the range of 0.045 - 0.120 ug/L may be associated with risks of adverse   clinical events.                  Thank you for choosing [facility]       Thank you for choosing [facility] for your care. Our goal is always to provide you with excellent care. Hearing back from our patients is one way we can continue to improve our services. Please take a few minutes to complete the written survey that you may receive in the mail after you visit with us. Thank you!        [employer] Information     [employer] lets you send messages to your doctor, view your test results, renew your prescriptions, schedule appointments and more. To sign up, go to [email or web address] . Click on \"Log in\" on the left side of the screen, which will take you to the Welcome page. Then click on \"Sign up Now\" on the right side of the page.     You will be asked to enter the access code listed below, as well as some personal information. Please follow the directions to create your username and password.     Your access code is: [email or web address]  Expires: 3/8/2018  3:24 PM     Your access code will  in 90 days. If you need help or a new code, please call your [facility] clinic or [phone].        Care EveryWhere ID     This is your Care EveryWhere ID. This " could be used by other organizations to access your [place] [facility] medical records  [id number]        Equal Access to Services     BROOKS JOSÉ : Hadii daniel Interiano, bill mina, jeanie feliz, verónica estrada. So [facility] [phone].    ATENCIÓN: Si habla español, tiene a sahni disposición servicios gratuitos de asistencia lingüística. Llame al [phone].    We comply with applicable federal civil rights laws and [place] laws. We do not discriminate on the basis of race, color, national origin, age, disability, sex, sexual orientation, or gender identity.            After Visit Summary       This is your record. Keep this with you and show to your community pharmacist(s) and doctor(s) at your next visit.

## 2018-01-06 NOTE — ED AVS SNAPSHOT
[facility] Emergency Department    [address] [address]    Phone:  [phone]    Fax:  [phone]                                       Ellie Leigh   MRN: [id number]    Department:  [facility] Emergency Department   Date of Visit:  1/6/2018           After Visit Summary Signature Page     I have received my discharge instructions, and my questions have been answered. I have discussed any challenges I see with this plan with the nurse or doctor.    ..........................................................................................................................................  Patient/Patient Representative Signature      ..........................................................................................................................................  Patient Representative Print Name and Relationship to Patient    ..................................................               ................................................  Date                                            Time    ..........................................................................................................................................  Reviewed by Signature/Title    ...................................................              ..............................................  Date                                                            Time

## 2018-01-07 LAB
BACTERIA SPEC CULT: NO GROWTH
Lab: NORMAL
SPECIMEN SOURCE: NORMAL

## 2018-01-08 ENCOUNTER — OFFICE VISIT (OUTPATIENT)
Dept: FAMILY MEDICINE | Facility: CLINIC | Age: 83
End: 2018-01-08
Payer: COMMERCIAL

## 2018-01-08 ENCOUNTER — ALLIED HEALTH/NURSE VISIT (OUTPATIENT)
Dept: CARDIOLOGY | Facility: CLINIC | Age: 83
End: 2018-01-08
Payer: COMMERCIAL

## 2018-01-08 VITALS
HEART RATE: 72 BPM | SYSTOLIC BLOOD PRESSURE: 118 MMHG | TEMPERATURE: 97.6 F | OXYGEN SATURATION: 96 % | DIASTOLIC BLOOD PRESSURE: 64 MMHG | BODY MASS INDEX: 25.4 KG/M2 | WEIGHT: 121 LBS | HEIGHT: 58 IN

## 2018-01-08 DIAGNOSIS — E03.9 HYPOTHYROIDISM, UNSPECIFIED TYPE: Primary | Chronic | ICD-10-CM

## 2018-01-08 DIAGNOSIS — Z95.0 CARDIAC PACEMAKER IN SITU: Primary | ICD-10-CM

## 2018-01-08 DIAGNOSIS — E87.1 HYPONATREMIA: ICD-10-CM

## 2018-01-08 PROCEDURE — 80048 BASIC METABOLIC PNL TOTAL CA: CPT | Performed by: FAMILY MEDICINE

## 2018-01-08 PROCEDURE — 36415 COLL VENOUS BLD VENIPUNCTURE: CPT | Performed by: FAMILY MEDICINE

## 2018-01-08 PROCEDURE — 84443 ASSAY THYROID STIM HORMONE: CPT | Performed by: FAMILY MEDICINE

## 2018-01-08 PROCEDURE — 93294 REM INTERROG EVL PM/LDLS PM: CPT | Performed by: INTERNAL MEDICINE

## 2018-01-08 PROCEDURE — 99214 OFFICE O/P EST MOD 30 MIN: CPT | Performed by: FAMILY MEDICINE

## 2018-01-08 PROCEDURE — 93296 REM INTERROG EVL PM/IDS: CPT | Performed by: INTERNAL MEDICINE

## 2018-01-08 NOTE — MR AVS SNAPSHOT
After Visit Summary   1/8/2018    Ellie Leigh    MRN: [id number]           Patient Information     Date Of Birth          9/12/1930        Visit Information        Provider Department      1/8/2018 11:20 AM Josefina Gómez MD [facility]        Care Instructions    Check labs today sodium and thyroid     Increase miralax to 1 cap plus 1TBSP or 1TSP daily and adjust up every three days to get bowels back on track     gatorade 1-2 small bottles a day, First in the am with food     Fluid should not exceed 32 ounces per day           Follow-ups after your visit        Your next 10 appointments already scheduled     Jan 08, 2018  4:45 PM CST   Remote PPM Check with RUEDA TECH1   [facility] ([facility])    [address]  [facility] [id number] [phone]           This appointment is for a remote check of your pacemaker.  This is not an appointment at the office.            Jan 11, 2018 11:00 AM CST   Return Visit with Gayle Nieves MD   [facility] ([facility])    [facility]  [address]  [facility] [address]   [phone]            Jan 16, 2018  1:30 PM CST   Anticoagulation Visit with [place] ANTI COAG   [facility] ([facility])    [address]  [facility] [address]   [phone]            Apr 03, 2018 10:30 AM CDT   Remote PPM Check with RUEDA TECH1   [facility] ([facility])    [address]  [facility] [id number] [phone]           This appointment is for a remote check of your pacemaker.  This is not an appointment at the office.              Who to contact     If you have questions or need follow up information about today's clinic visit or your schedule please contact [facility] directly at [phone].  Normal or non-critical lab  "and imaging results will be communicated to you by MyChart, letter or phone within 4 business days after the clinic has received the results. If you do not hear from us within 7 days, please contact the clinic through Mofibot or phone. If you have a critical or abnormal lab result, we will notify you by phone as soon as possible.  Submit refill requests through Survmetrics or call your pharmacy and they will forward the refill request to us. Please allow 3 business days for your refill to be completed.          Additional Information About Your Visit        H-art (WPP)harikeGPS Information     Survmetrics lets you send messages to your doctor, view your test results, renew your prescriptions, schedule appointments and more. To sign up, go to [email or web address] . Click on \"Log in\" on the left side of the screen, which will take you to the Welcome page. Then click on \"Sign up Now\" on the right side of the page.     You will be asked to enter the access code listed below, as well as some personal information. Please follow the directions to create your username and password.     Your access code is: [email or web address]  Expires: 3/8/2018  3:24 PM     Your access code will  in 90 days. If you need help or a new code, please call your [place] clinic or [phone].        Care EveryWhere ID     This is your Care EveryWhere ID. This could be used by other organizations to access your [place] medical records  [id number]        Your Vitals Were     Pulse Temperature Height Last Period Pulse Oximetry BMI (Body Mass Index)    72 97.6  F (36.4  C) (Tympanic) 4' 10\" (1.473 m) 06/15/1985 96% 25.29 kg/m2       Blood Pressure from Last 3 Encounters:   18 118/64   18 126/67   17 150/79    Weight from Last 3 Encounters:   18 121 lb (54.9 kg)   18 132 lb 4.4 oz (60 kg)   17 121 lb (54.9 kg)              Today, you had the following     No orders found for display       Primary Care Provider Office Phone # Fax " #    Josefina Gómez [phone]       [address] [place] [address]        Equal Access to Services     BROOKS JOSÉ : Reyna daniel dumont alisson Interiano, bill alicjajorgeha, jeanie feliz, verónica durhamsaritha sean. So [facility] [phone].    ATENCIÓN: Si habla español, tiene a sahni disposición servicios gratuitos de asistencia lingüística. Llame al [phone].    We comply with applicable federal civil rights laws and [place] laws. We do not discriminate on the basis of race, color, national origin, age, disability, sex, sexual orientation, or gender identity.            Thank you!     Thank you for choosing [facility]  for your care. Our goal is always to provide you with excellent care. Hearing back from our patients is one way we can continue to improve our services. Please take a few minutes to complete the written survey that you may receive in the mail after your visit with us. Thank you!             Your Updated Medication List - Protect others around you: Learn how to safely use, store and throw away your medicines at www.disposemymeds.org.          This list is accurate as of: 1/8/18 11:55 AM.  Always use your most recent med list.                   Brand Name Dispense Instructions for use Diagnosis    albuterol 108 (90 BASE) MCG/ACT Inhaler    PROAIR HFA/PROVENTIL HFA/VENTOLIN HFA    3 Inhaler    Inhale 2 puffs into the lungs every 6 hours as needed for shortness of breath / dyspnea    Mild persistent asthma without complication       CRANBERRY      Take 475 mg by mouth 2 times daily.        diclofenac 1 % Gel topical gel    VOLTAREN    100 g    APPLY 4 GRAMS TO KNEES OR 2 GRAMS TO HANDS 4 TIMES DAILY USING ENCLOSED DOSING CARD    Primary osteoarthritis involving multiple joints       ipratropium - albuterol 0.5 mg/2.5 mg/3 mL 0.5-2.5 (3) MG/3ML neb solution    DUONEB    180 mL    TAKE 1 VIAL (3 MLS) BY NEBULIZATION EVERY 6 HOURS AS  NEEDED FOR SHORTNESS OF BREATH / DYSPNEA OR WHEEZING    Chronic obstructive pulmonary disease, unspecified COPD type (H)       levothyroxine 50 MCG tablet    SYNTHROID/LEVOTHROID    90 tablet    TAKE 1 TABLET (50 MCG) BY MOUTH DAILY    Hypothyroidism, unspecified type       metoprolol 25 MG 24 hr tablet    TOPROL-XL    120 tablet    TAKE TWO TABLETS BY MOUTH TWICE DAILY    Essential hypertension, benign       polyethylene glycol powder    MIRALAX    510 g    Take 17 g by mouth daily as needed    Constipation       probiotic Caps      Take 1 capsule by mouth every evening        ranitidine 150 MG tablet    ZANTAC    60 tablet    Take 1 tablet (150 mg) by mouth 2 times daily    Gastroesophageal reflux disease without esophagitis       SYMBICORT 160-4.5 MCG/ACT Inhaler   Generic drug:  budesonide-formoterol     10.2 g    INHALE 2 PUFFS INTO THE LUNGS 2 TIMES DAILY    Mild persistent asthma without complication       warfarin 1 MG tablet    COUMADIN    60 tablet    Take 1.5 mg Mon and Fri, 1 mg rest of the days or as directed by Anticoagulation Clinic.    Intermittent atrial fibrillation (H)

## 2018-01-08 NOTE — NURSING NOTE
"Chief Complaint   Patient presents with     Er F/u       Initial /64 (BP Location: Right arm, Patient Position: Chair, Cuff Size: Adult Regular)  Pulse 72  Temp 97.6  F (36.4  C) (Tympanic)  Ht 4' 10\" (1.473 m)  Wt 121 lb (54.9 kg)  LMP 06/15/1985  SpO2 96%  BMI 25.29 kg/m2 Estimated body mass index is 25.29 kg/(m^2) as calculated from the following:    Height as of this encounter: 4' 10\" (1.473 m).    Weight as of this encounter: 121 lb (54.9 kg).  Medication Reconciliation: complete    Health Maintenance that is potentially due pending provider review:  NONE    n/a    Is there anyone who you would like to be able to receive your results? Yes  If yes have patient fill out JOSUE    "

## 2018-01-08 NOTE — PROGRESS NOTES
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  Additional history: as documented    Labs reviewed in EPIC    Reviewed and updated as needed this visit by clinical staff  Tobacco  Allergies  Meds  Problems  Med Hx  Surg Hx  Fam Hx  Soc Hx        Reviewed and updated as needed this visit by Provider  Allergies  Meds  Problems         ROS:  Constitutional, HEENT, cardiovascular, pulmonary, gi and gu systems are negative, except as otherwise noted.      OBJECTIVE:                                                    /64 (BP Location: Right arm, Patient Position: Chair, Cuff Size: Adult Regular)  Pulse 72  Temp 97.6  F (36.4  C) (Tympanic)  Ht 4' 10\" (1.473 m)  Wt 121 lb (54.9 kg)  LMP 06/15/1985  SpO2 96%  BMI 25.29 kg/m2  Body mass index is 25.29 kg/(m^2).  GENERAL APPEARANCE: healthy and pale  NECK: no adenopathy, no asymmetry, masses, or scars and thyroid normal to palpation  RESP: lungs clear to auscultation - no rales, rhonchi or wheezes  CV: regular rates and rhythm, normal S1 S2, no S3 or S4 and no murmur, click or rub  ABDOMEN: soft, nontender, without hepatosplenomegaly or masses and bowel sounds normal  MS: extremities normal- no gross " deformities noted  SKIN: no suspicious lesions or rashes  PSYCH: mentation appears normal and affect flat         ASSESSMENT/PLAN:                                                    1. Hypothyroidism, unspecified type  Adjust meds as indicated by above labs.   - TSH with free T4 reflex      2. Hyponatremia  See below  - Basic metabolic panel        Patient Instructions   Check labs today sodium and thyroid     Increase miralax to 1 cap plus 1TBSP or 1TSP daily and adjust up every three days to get bowels back on track     gatorade 1-2 small bottles a day, First in the am with food     Fluid should not exceed 32 ounces per day     Recheck in one week with me         Risks, benefits, side effects and rationale for treatment plan fully discussed with the patient and understanding expressed.   Josefina Gómez MD  [facility]

## 2018-01-08 NOTE — MR AVS SNAPSHOT
After Visit Summary   1/8/2018    Ellie Leigh    MRN: [id number]           Patient Information     Date Of Birth          9/12/1930        Visit Information        Provider Department      1/8/2018 4:45 PM MARYBETH SHARP [facility]        Today's Diagnoses     Cardiac pacemaker in situ    -  1       Follow-ups after your visit        Your next 10 appointments already scheduled     Jan 08, 2018 11:20 AM CST   SHORT with Josefina Gómez MD   [facility]    [address] [place] [address]   [phone]            Jan 08, 2018  4:45 PM CST   Remote PPM Check with RUEDA TECH1   [facility]   [place] ([facility])    [address]  [facility] [id number] [phone]           This appointment is for a remote check of your pacemaker.  This is not an appointment at the office.            Jan 11, 2018 11:00 AM CST   Return Visit with Gayle Nieves MD   [facility] ([facility])    [facility]  [address]  [facility] [address]   [phone]            Jan 16, 2018  1:30 PM CST   Anticoagulation Visit with [place] ANTI COAG   [facility]    [address]  [facility] [address]   [phone]              Who to contact     If you have questions or need follow up information about today's clinic visit or your schedule please contact [facility] directly at [phone].  Normal or non-critical lab and imaging results will be communicated to you by MyChart, letter or phone within 4 business days after the clinic has received the results. If you do not hear from us within 7 days, please contact the clinic through MyChart or phone. If you have a critical or abnormal lab result, we will notify you by phone as soon as  "possible.  Submit refill requests through Haileo or call your pharmacy and they will forward the refill request to us. Please allow 3 business days for your refill to be completed.          Additional Information About Your Visit        Internet [employer] Information     Haileo lets you send messages to your doctor, view your test results, renew your prescriptions, schedule appointments and more. To sign up, go to [email or web address] . Click on \"Log in\" on the left side of the screen, which will take you to the Welcome page. Then click on \"Sign up Now\" on the right side of the page.     You will be asked to enter the access code listed below, as well as some personal information. Please follow the directions to create your username and password.     Your access code is: [email or web address]  Expires: 3/8/2018  3:24 PM     Your access code will  in 90 days. If you need help or a new code, please call your [place] clinic or [phone].        Care EveryWhere ID     This is your Care EveryWhere ID. This could be used by other organizations to access your [place] medical records  [id number]        Your Vitals Were     Last Period                   06/15/1985            Blood Pressure from Last 3 Encounters:   18 126/67   17 150/79   17 124/64    Weight from Last 3 Encounters:   18 60 kg (132 lb 4.4 oz)   17 54.9 kg (121 lb)   17 54.4 kg (120 lb)              We Performed the Following     INTERROGATION DEVICE EVAL REMOTE, PACER/ICD (32586)     PM DEVICE INTERROGATE REMOTE (49163)        Primary Care Provider Office Phone # Fax #    Josefina Gómez [phone] [address] [place] [address]        Equal Access to Services     BURT JOSÉ : Reyna Interiano, waaxda luqadaha, qaybta kaalmatalon feliz, verónica estrada. So [facility] [phone].    ATENCIÓN: Si habla español, tiene a sahni disposición servicios gratuitos " de asistencia lingüística. Darius tanner [phone].    We comply with applicable federal civil rights laws and [place] laws. We do not discriminate on the basis of race, color, national origin, age, disability, sex, sexual orientation, or gender identity.            Thank you!     Thank you for choosing [facility]  for your care. Our goal is always to provide you with excellent care. Hearing back from our patients is one way we can continue to improve our services. Please take a few minutes to complete the written survey that you may receive in the mail after your visit with us. Thank you!             Your Updated Medication List - Protect others around you: Learn how to safely use, store and throw away your medicines at www.disposemymeds.org.          This list is accurate as of: 1/8/18  8:33 AM.  Always use your most recent med list.                   Brand Name Dispense Instructions for use Diagnosis    albuterol 108 (90 BASE) MCG/ACT Inhaler    PROAIR HFA/PROVENTIL HFA/VENTOLIN HFA    3 Inhaler    Inhale 2 puffs into the lungs every 6 hours as needed for shortness of breath / dyspnea    Mild persistent asthma without complication       benzonatate 200 MG capsule    TESSALON    21 capsule    Take 1 capsule (200 mg) by mouth 3 times daily as needed for cough    Cough       CRANBERRY      Take 475 mg by mouth 2 times daily.        diclofenac 1 % Gel topical gel    VOLTAREN    100 g    APPLY 4 GRAMS TO KNEES OR 2 GRAMS TO HANDS 4 TIMES DAILY USING ENCLOSED DOSING CARD    Primary osteoarthritis involving multiple joints       ipratropium - albuterol 0.5 mg/2.5 mg/3 mL 0.5-2.5 (3) MG/3ML neb solution    DUONEB    180 mL    TAKE 1 VIAL (3 MLS) BY NEBULIZATION EVERY 6 HOURS AS NEEDED FOR SHORTNESS OF BREATH / DYSPNEA OR WHEEZING    Chronic obstructive pulmonary disease, unspecified COPD type (H)       levothyroxine 50 MCG tablet    SYNTHROID/LEVOTHROID    90 tablet    TAKE 1 TABLET  (50 MCG) BY MOUTH DAILY    Hypothyroidism, unspecified type       metoprolol 25 MG 24 hr tablet    TOPROL-XL    120 tablet    TAKE TWO TABLETS BY MOUTH TWICE DAILY    Essential hypertension, benign       polyethylene glycol powder    MIRALAX    510 g    Take 17 g by mouth daily as needed    Constipation       probiotic Caps      Take 1 capsule by mouth every evening        ranitidine 150 MG tablet    ZANTAC    60 tablet    Take 1 tablet (150 mg) by mouth 2 times daily    Gastroesophageal reflux disease without esophagitis       SYMBICORT 160-4.5 MCG/ACT Inhaler   Generic drug:  budesonide-formoterol     10.2 g    INHALE 2 PUFFS INTO THE LUNGS 2 TIMES DAILY    Mild persistent asthma without complication       warfarin 1 MG tablet    COUMADIN    60 tablet    Take 1.5 mg Mon and Fri, 1 mg rest of the days or as directed by Anticoagulation Clinic.    Intermittent atrial fibrillation (H)

## 2018-01-08 NOTE — PROGRESS NOTES
Study[employer] Denia RALPH (ANIYA) Remote PPM Device Check  AP: 68% : 5%  Mode: DDD-CLS        Presenting Rhythm: SR, vent rate 70bpm  Heart Rate: adequate heart rates per histogram  Sensing: stable    Pacing Threshold: stable    Impedance: stable  Battery Status: battery is 75% full  Atrial Arrhythmia: 84 mode switches per day comprising 3% of the time. Mean vent rate during mode switch is 84bpm. Taking Warfarin   Ventricular Arrhythmia: none     Care Plan: F/U home monitoring q 3 months. No answer, left message with results and next transmission date on daughter Ana's voicemail. Hung SANTIAGO

## 2018-01-09 ENCOUNTER — TELEPHONE (OUTPATIENT)
Dept: FAMILY MEDICINE | Facility: CLINIC | Age: 83
End: 2018-01-09

## 2018-01-09 ENCOUNTER — HOSPITAL ENCOUNTER (OUTPATIENT)
Dept: CARDIOLOGY | Facility: CLINIC | Age: 83
Discharge: HOME | End: 2018-01-09
Attending: FAMILY MEDICINE | Admitting: FAMILY MEDICINE
Payer: COMMERCIAL

## 2018-01-09 DIAGNOSIS — R55 SYNCOPE, UNSPECIFIED SYNCOPE TYPE: ICD-10-CM

## 2018-01-09 DIAGNOSIS — E87.1 LOW SODIUM LEVELS: Primary | ICD-10-CM

## 2018-01-09 LAB
ANION GAP SERPL CALCULATED.3IONS-SCNC: 6 MMOL/L (ref 3–14)
BUN SERPL-MCNC: 15 MG/DL (ref 7–30)
CALCIUM SERPL-MCNC: 8.6 MG/DL (ref 8.5–10.1)
CHLORIDE SERPL-SCNC: 88 MMOL/L (ref 94–109)
CO2 SERPL-SCNC: 28 MMOL/L (ref 20–32)
CREAT SERPL-MCNC: 0.55 MG/DL (ref 0.52–1.04)
GFR SERPL CREATININE-BSD FRML MDRD: >90 ML/MIN/1.7M2
GLUCOSE SERPL-MCNC: 99 MG/DL (ref 70–99)
POTASSIUM SERPL-SCNC: 4.3 MMOL/L (ref 3.4–5.3)
SODIUM SERPL-SCNC: 122 MMOL/L (ref 133–144)
TSH SERPL DL<=0.005 MIU/L-ACNC: 2.54 MU/L (ref 0.4–4)

## 2018-01-09 PROCEDURE — 0298T ZZC EXT ECG > 48HR TO 21 DAY REVIEW AND INTERPRETATN: CPT | Performed by: INTERNAL MEDICINE

## 2018-01-09 PROCEDURE — 0296T ZIO PATCH HOLTER: CPT | Performed by: FAMILY MEDICINE

## 2018-01-09 NOTE — TELEPHONE ENCOUNTER
Please call Ellie. Sodium is still quite low. Please follow the plan outlined yesterday at our visit and plan to recheck chem 8 on Thursday              Patients daughter called for results - please place future lab orders

## 2018-01-11 ENCOUNTER — ONCOLOGY VISIT (OUTPATIENT)
Dept: ONCOLOGY | Facility: CLINIC | Age: 83
End: 2018-01-11
Attending: INTERNAL MEDICINE
Payer: COMMERCIAL

## 2018-01-11 VITALS
HEART RATE: 79 BPM | SYSTOLIC BLOOD PRESSURE: 136 MMHG | WEIGHT: 120.9 LBS | OXYGEN SATURATION: 95 % | DIASTOLIC BLOOD PRESSURE: 71 MMHG | RESPIRATION RATE: 18 BRPM | TEMPERATURE: 97.3 F | BODY MASS INDEX: 27.2 KG/M2 | HEIGHT: 56 IN

## 2018-01-11 DIAGNOSIS — M81.0 OSTEOPOROSIS, UNSPECIFIED OSTEOPOROSIS TYPE, UNSPECIFIED PATHOLOGICAL FRACTURE PRESENCE: ICD-10-CM

## 2018-01-11 DIAGNOSIS — Z85.3 HISTORY OF LEFT BREAST CANCER: Primary | ICD-10-CM

## 2018-01-11 DIAGNOSIS — E87.1 HYPONATREMIA: ICD-10-CM

## 2018-01-11 DIAGNOSIS — E87.1 HYPONATREMIA: Primary | ICD-10-CM

## 2018-01-11 DIAGNOSIS — E87.1 LOW SODIUM LEVELS: ICD-10-CM

## 2018-01-11 DIAGNOSIS — D64.9 ANEMIA, UNSPECIFIED TYPE: ICD-10-CM

## 2018-01-11 LAB
ANION GAP SERPL CALCULATED.3IONS-SCNC: 7 MMOL/L (ref 3–14)
BUN SERPL-MCNC: 10 MG/DL (ref 7–30)
CALCIUM SERPL-MCNC: 8.5 MG/DL (ref 8.5–10.1)
CHLORIDE SERPL-SCNC: 91 MMOL/L (ref 94–109)
CO2 SERPL-SCNC: 28 MMOL/L (ref 20–32)
CREAT SERPL-MCNC: 0.48 MG/DL (ref 0.52–1.04)
GFR SERPL CREATININE-BSD FRML MDRD: >90 ML/MIN/1.7M2
GLUCOSE SERPL-MCNC: 93 MG/DL (ref 70–99)
POTASSIUM SERPL-SCNC: 3.7 MMOL/L (ref 3.4–5.3)
SODIUM SERPL-SCNC: 126 MMOL/L (ref 133–144)

## 2018-01-11 PROCEDURE — 80048 BASIC METABOLIC PNL TOTAL CA: CPT | Performed by: FAMILY MEDICINE

## 2018-01-11 PROCEDURE — 99214 OFFICE O/P EST MOD 30 MIN: CPT | Performed by: INTERNAL MEDICINE

## 2018-01-11 PROCEDURE — 36415 COLL VENOUS BLD VENIPUNCTURE: CPT | Performed by: FAMILY MEDICINE

## 2018-01-11 PROCEDURE — G0463 HOSPITAL OUTPT CLINIC VISIT: HCPCS

## 2018-01-11 ASSESSMENT — PAIN SCALES - GENERAL: PAINLEVEL: NO PAIN (0)

## 2018-01-11 NOTE — LETTER
"    1/11/2018         RE: Ellie Leigh  [address]  [place] [address]        Dear Colleague,    Thank you for referring your patient, Ellie Leigh, to the [facility]. Please see a copy of my visit note below.    CHIEF COMPLAINT AND REASON FOR VISIT: left breast cancer followup.   HISTORY OF PRESENT ILLNESS: Ellie Leigh was diagnosed with a screening mammogram more than 10 years ago on the left side. 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 She had 6 months of CMF treatment followed by 5 years of tamoxifen. She is currently on followup.     PAST MEDICAL HISTORY: Benign high blood pressure, reflux, osteoporosis, asthma, osteoarthritis, pulmonary fibrosis by imaging, allergy. Cervical spine fracture after a fall in 2011, had fusion procedure done. On coumadin for A fib.  SIADH dx in 2014, hx of TB    MEDICATIONS: Reviewed in Epic system.   ALLERGIES: Penicillin, cephalosporins, sulfa.   FAMILY HISTORY: One daughter had breast cancer. She is a survivor.   SOCIAL HISTORY: She is . She lives with daughter.     REVIEW OF SYSTEMS:  She was found to have SIADH in [place] 2014.   She has chronic episodic and self limiting nausea with extensive work up with her PMD, no etiology found.   She is taking vitamin D. Weight is down,  no particular pain. No night sweat.  She is on coumadin for DVT in leg and A fib.     PHYSICAL EXAMINATION:   VITAL SIGNS: Blood pressure 136/71, pulse 79, temperature 97.3  F (36.3  C), temperature source Oral, resp. rate 18, height 1.429 m (4' 8.25\"), weight 54.8 kg (120 lb 14.4 oz), last menstrual period 06/15/1985, SpO2 95 %, not currently breastfeeding.  GENERAL APPEARANCE: Elderly lady looks like her stated age, not in acute distress.   BACK: Severe kyphosis in the thoracic spine.   BREASTS: Right breast reveals no palpable lesions, no skin changes. Left chest wall, no palpable lesions.   HEENT: The patient is " normocephalic, atraumatic. Pupils are equal, react to light. Sclerae are anicteric. Moist oral mucosa. Negative pharynx. No oral thrush.   NECK: Supple. No jugular venous distention. Thyroid is not palpable.   LYMPH NODES: Superficial lymphadenopathy is not appreciable in the bilateral cervical, supraclavicular, axillary or inguinal adenopathy.   CARDIOVASCULAR: S1, S2 regular, with no murmurs or gallops. No carotid or abdominal bruits. Pace maker on left upper chest wall.  PULMONARY: Lungs are clear to auscultation and percussion bilaterally. There is no wheezing or rhonchi.   GASTROINTESTINAL: Abdomen is soft, nontender. No hepatosplenomegaly. No signs of ascites. No mass appreciable.   MUSCULOSKELETAL AND EXTREMITIES: No edema. No cyanotic changes. No signs of joint deformity. No lymphedema.   NEUROLOGIC: Cranial nerves II through XII are grossly intact. Sensation intact. Muscle strength and muscle tone symmetrical all through, 5/5.     CURRENT LAB DATA REVIEWED  Na 122, other CMP is fine, hb 11.4, ANC 1.5, wbc 4.1, PL nl.    CURRENT IMAGE  Right MA 1/2018: NEGATIVE  CT head 1/208: negative.     OLD DATA REVIEW IN SUMMARY:   12/2016 Na 131 stable from last yr, from previous 128  since earlier 2014, Na NL  in 2013.  3/2016 hb 11.8  7/2014 urine and serum osmolarity studies were consistent with SIADH    CT chest 12/2014: new patchy and consolidative process in RLL ? For PNA. Stable fibronodular abnormality and bronchiectasis in right upper lung zone.  CT a/p 7/2014: No mass, adenopathy, or acute finding is seen.   CT head in [facility] 6/2014: no acute disease.     DEXA scan from 02/2011 indicating osteoporosis. CT chest: Patchy consolidation with associated bronchiectasis in the upper lobes, right greater than left, consistent with chronic fibrosis.     ASSESSMENT AND PLAN:   1. Breast cancer > 10 yrs ago at least.   We discuss the disease course, she has age and co morbidities competing her longevity more than breast  cancer.   She is due right MA.     2. Osteoporosis, status post fall with fracture. Advise vitamin D.  She did not tolerate bisphosphonate.     3. hyponatremia since 2014, work up is most consistent with SIADH. She also has poor oral intake to account for that.     4. Tough of anemia since 12/2016. We talked about eating balanced diet.       Again, thank you for allowing me to participate in the care of your patient.        Sincerely,        Gayle Nieves MD, MD

## 2018-01-11 NOTE — NURSING NOTE
"Oncology Rooming Note    January 11, 2018 11:05 AM   Ellie Leigh is a 87 year old female who presents for:    Chief Complaint   Patient presents with     Oncology Clinic Visit     1 year recheck Breast CA, review Labs & Mammogram     Initial Vitals: /71 (BP Location: Right arm, Patient Position: Sitting, Cuff Size: Adult Small)  Pulse 79  Temp 97.3  F (36.3  C) (Oral)  Resp 18  Ht 1.429 m (4' 8.25\")  Wt 54.8 kg (120 lb 14.4 oz)  LMP 06/15/1985  SpO2 95%  Breastfeeding? No  BMI 26.86 kg/m2 Estimated body mass index is 26.86 kg/(m^2) as calculated from the following:    Height as of this encounter: 1.429 m (4' 8.25\").    Weight as of this encounter: 54.8 kg (120 lb 14.4 oz). Body surface area is 1.47 meters squared.  No Pain (0) Comment: Data Unavailable   Patient's last menstrual period was 06/15/1985.  Allergies reviewed: Yes  Medications reviewed: Yes    Medications: Medication refills not needed today.  Pharmacy name entered into [employer]: [id number] - [place], [place] - [address] [place] [address]    Clinical concerns: 1 year recheck Breast CA, review Labs & Mammogram. Overall doing very well.      7 minutes for nursing intake (face to face time)     Brittany Nava, [facility]            "

## 2018-01-11 NOTE — MR AVS SNAPSHOT
After Visit Summary   1/11/2018    Ellie Leigh    MRN: [id number]           Patient Information     Date Of Birth          9/12/1930        Visit Information        Provider Department      1/11/2018 11:00 AM Gayle Nieves MD [facility]        Today's Diagnoses     History of left breast cancer    -  1    Osteoporosis, unspecified osteoporosis type, unspecified pathological fracture presence        Hyponatremia        Anemia, unspecified type          Care Instructions    F/u with PMD.           Follow-ups after your visit        Your next 10 appointments already scheduled     Jan 16, 2018  1:30 PM CST   Anticoagulation Visit with [place] ANTI COAG   [facility]    [address] [facility] [address]   [phone]            Jan 16, 2018  3:00 PM CST   SHORT with Josefina Gómze MD   [facility] ([facility]    [address] [place] [id number]   [phone]            Apr 03, 2018 10:30 AM CDT   Remote PPM Check with RUEDA TECH1   [facility] ([facility])    [address] [place] [address] [address]  [facility] [id number] [phone]           This appointment is for a remote check of your pacemaker.  This is not an appointment at the office.              Who to contact     If you have questions or need follow up information about today's clinic visit or your schedule please contact [facility] directly at [phone].  Normal or non-critical lab and imaging results will be communicated to you by MyChart, letter or phone within 4 business days after the clinic has received the results. If you do not hear from us within 7 days, please contact the clinic through MyChart or phone. If you have a critical or abnormal lab result, we will notify you by phone as soon as possible.  Submit refill requests through [employer] or call your pharmacy and they will  "forward the refill request to us. Please allow 3 business days for your refill to be completed.          Additional Information About Your Visit        MyChart Information     [employer] lets you send messages to your doctor, view your test results, renew your prescriptions, schedule appointments and more. To sign up, go to [email or web address] . Click on \"Log in\" on the left side of the screen, which will take you to the Welcome page. Then click on \"Sign up Now\" on the right side of the page.     You will be asked to enter the access code listed below, as well as some personal information. Please follow the directions to create your username and password.     Your access code is: [email or web address]  Expires: 3/8/2018  3:24 PM     Your access code will  in 90 days. If you need help or a new code, please call your [facility] clinic or [phone].        Care EveryWhere ID     This is your Care EveryWhere ID. This could be used by other organizations to access your [facility] medical records  [id number]        Your Vitals Were     Pulse Temperature Respirations Height Last Period Pulse Oximetry    79 97.3  F (36.3  C) (Oral) 18 1.429 m (4' 8.25\") 06/15/1985 95%    Breastfeeding? BMI (Body Mass Index)                No 26.86 kg/m2           Blood Pressure from Last 3 Encounters:   18 136/71   18 118/64   18 126/67    Weight from Last 3 Encounters:   18 54.8 kg (120 lb 14.4 oz)   18 54.9 kg (121 lb)   18 60 kg (132 lb 4.4 oz)              Today, you had the following     No orders found for display       Primary Care Provider Office Phone # Fax #    Josefina Gómez [phone] [address] [place] [address]        Equal Access to Services     [facility] ARNAV : Reyna Interiano, bill mina, verónica capps. [facility] [phone].    ATENCIÓN: Si cris louis, tiene a sahni disposición " servicios gratuitos de asistencia lingüística. Darius tanner [phone].    We comply with applicable federal civil rights laws and [place] laws. We do not discriminate on the basis of race, color, national origin, age, disability, sex, sexual orientation, or gender identity.            Thank you!     Thank you for choosing [facility]  for your care. Our goal is always to provide you with excellent care. Hearing back from our patients is one way we can continue to improve our services. Please take a few minutes to complete the written survey that you may receive in the mail after your visit with us. Thank you!             Your Updated Medication List - Protect others around you: Learn how to safely use, store and throw away your medicines at www.disposemymeds.org.          This list is accurate as of: 1/11/18 11:37 AM.  Always use your most recent med list.                   Brand Name Dispense Instructions for use Diagnosis    albuterol 108 (90 BASE) MCG/ACT Inhaler    PROAIR HFA/PROVENTIL HFA/VENTOLIN HFA    3 Inhaler    Inhale 2 puffs into the lungs every 6 hours as needed for shortness of breath / dyspnea    Mild persistent asthma without complication       CRANBERRY      Take 475 mg by mouth 2 times daily.        diclofenac 1 % Gel topical gel    VOLTAREN    100 g    APPLY 4 GRAMS TO KNEES OR 2 GRAMS TO HANDS 4 TIMES DAILY USING ENCLOSED DOSING CARD    Primary osteoarthritis involving multiple joints       ipratropium - albuterol 0.5 mg/2.5 mg/3 mL 0.5-2.5 (3) MG/3ML neb solution    DUONEB    180 mL    TAKE 1 VIAL (3 MLS) BY NEBULIZATION EVERY 6 HOURS AS NEEDED FOR SHORTNESS OF BREATH / DYSPNEA OR WHEEZING    Chronic obstructive pulmonary disease, unspecified COPD type (H)       levothyroxine 50 MCG tablet    SYNTHROID/LEVOTHROID    90 tablet    TAKE 1 TABLET (50 MCG) BY MOUTH DAILY    Hypothyroidism, unspecified type       metoprolol 25 MG 24 hr tablet    TOPROL-XL    120 tablet    TAKE TWO TABLETS BY  MOUTH TWICE DAILY    Essential hypertension, benign       polyethylene glycol powder    MIRALAX    510 g    Take 17 g by mouth daily as needed    Constipation       probiotic Caps      Take 1 capsule by mouth every evening        ranitidine 150 MG tablet    ZANTAC    60 tablet    Take 1 tablet (150 mg) by mouth 2 times daily    Gastroesophageal reflux disease without esophagitis       SYMBICORT 160-4.5 MCG/ACT Inhaler   Generic drug:  budesonide-formoterol     10.2 g    INHALE 2 PUFFS INTO THE LUNGS 2 TIMES DAILY    Mild persistent asthma without complication       warfarin 1 MG tablet    COUMADIN    60 tablet    Take 1.5 mg Mon and Fri, 1 mg rest of the days or as directed by Anticoagulation Clinic.    Intermittent atrial fibrillation (H)

## 2018-01-11 NOTE — PATIENT INSTRUCTIONS
Dr. Nieves would like you to follow with primary care.   When you are in need of a refill, please call your pharmacy and they will send us a request.  Copy of appointments, and after visit summary (AVS) given to patient.  If you have any questions please call Jolene Culp RN, BSN Oncology Hematology  [facility] [phone]. For questions after business hours, or on holidays/weekends, please call our after hours Nurse Triage line [phone]. Thank you.           F/u with PMD.

## 2018-01-11 NOTE — PROGRESS NOTES
"CHIEF COMPLAINT AND REASON FOR VISIT: left breast cancer followup.   HISTORY OF PRESENT ILLNESS: Ellie Leigh was diagnosed with a screening mammogram more than 10 years ago on the left side. 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 She had 6 months of CMF treatment followed by 5 years of tamoxifen. She is currently on followup.     PAST MEDICAL HISTORY: Benign high blood pressure, reflux, osteoporosis, asthma, osteoarthritis, pulmonary fibrosis by imaging, allergy. Cervical spine fracture after a fall in 2011, had fusion procedure done. On coumadin for A fib.  SIADH dx in 2014, hx of TB    MEDICATIONS: Reviewed in Epic system.   ALLERGIES: Penicillin, cephalosporins, sulfa.   FAMILY HISTORY: One daughter had breast cancer. She is a survivor.   SOCIAL HISTORY: She is . She lives with daughter.     REVIEW OF SYSTEMS:  She was found to have SIADH in [place] 2014.   She has chronic episodic and self limiting nausea with extensive work up with her PMD, no etiology found.   She is taking vitamin D. Weight is down,  no particular pain. No night sweat.  She is on coumadin for DVT in leg and A fib.     PHYSICAL EXAMINATION:   VITAL SIGNS: Blood pressure 136/71, pulse 79, temperature 97.3  F (36.3  C), temperature source Oral, resp. rate 18, height 1.429 m (4' 8.25\"), weight 54.8 kg (120 lb 14.4 oz), last menstrual period 06/15/1985, SpO2 95 %, not currently breastfeeding.  GENERAL APPEARANCE: Elderly lady looks like her stated age, not in acute distress.   BACK: Severe kyphosis in the thoracic spine.   BREASTS: Right breast reveals no palpable lesions, no skin changes. Left chest wall, no palpable lesions.   HEENT: The patient is normocephalic, atraumatic. Pupils are equal, react to light. Sclerae are anicteric. Moist oral mucosa. Negative pharynx. No oral thrush.   NECK: Supple. No jugular venous distention. Thyroid is not palpable.   LYMPH NODES: Superficial " lymphadenopathy is not appreciable in the bilateral cervical, supraclavicular, axillary or inguinal adenopathy.   CARDIOVASCULAR: S1, S2 regular, with no murmurs or gallops. No carotid or abdominal bruits. Pace maker on left upper chest wall.  PULMONARY: Lungs are clear to auscultation and percussion bilaterally. There is no wheezing or rhonchi.   GASTROINTESTINAL: Abdomen is soft, nontender. No hepatosplenomegaly. No signs of ascites. No mass appreciable.   MUSCULOSKELETAL AND EXTREMITIES: No edema. No cyanotic changes. No signs of joint deformity. No lymphedema.   NEUROLOGIC: Cranial nerves II through XII are grossly intact. Sensation intact. Muscle strength and muscle tone symmetrical all through, 5/5.     CURRENT LAB DATA REVIEWED  Na 122, other CMP is fine, hb 11.4, ANC 1.5, wbc 4.1, PL nl.    CURRENT IMAGE  Right MA 1/2018: NEGATIVE  CT head 1/208: negative.     OLD DATA REVIEW IN SUMMARY:   12/2016 Na 131 stable from last yr, from previous 128  since earlier 2014, Na NL  in 2013.  3/2016 hb 11.8  7/2014 urine and serum osmolarity studies were consistent with SIADH    CT chest 12/2014: new patchy and consolidative process in RLL ? For PNA. Stable fibronodular abnormality and bronchiectasis in right upper lung zone.  CT a/p 7/2014: No mass, adenopathy, or acute finding is seen.   CT head in [facility] 6/2014: no acute disease.     DEXA scan from 02/2011 indicating osteoporosis. CT chest: Patchy consolidation with associated bronchiectasis in the upper lobes, right greater than left, consistent with chronic fibrosis.     ASSESSMENT AND PLAN:   1. Breast cancer > 10 yrs ago at least.   We discuss the disease course, she has age and co morbidities competing her longevity more than breast cancer.   She is due right MA.     2. Osteoporosis, status post fall with fracture. Advise vitamin D.  She did not tolerate bisphosphonate.     3. hyponatremia since 2014, work up is most consistent with SIADH. She also has poor oral  intake to account for that.     4. Tough of anemia since 12/2016. We talked about eating balanced diet.

## 2018-01-16 ENCOUNTER — RADIANT APPOINTMENT (OUTPATIENT)
Dept: GENERAL RADIOLOGY | Facility: CLINIC | Age: 83
End: 2018-01-16
Attending: FAMILY MEDICINE
Payer: COMMERCIAL

## 2018-01-16 ENCOUNTER — OFFICE VISIT (OUTPATIENT)
Dept: FAMILY MEDICINE | Facility: CLINIC | Age: 83
End: 2018-01-16
Payer: COMMERCIAL

## 2018-01-16 ENCOUNTER — ANTICOAGULATION THERAPY VISIT (OUTPATIENT)
Dept: ANTICOAGULATION | Facility: CLINIC | Age: 83
End: 2018-01-16
Payer: COMMERCIAL

## 2018-01-16 VITALS
OXYGEN SATURATION: 94 % | WEIGHT: 120.6 LBS | DIASTOLIC BLOOD PRESSURE: 72 MMHG | SYSTOLIC BLOOD PRESSURE: 126 MMHG | HEART RATE: 68 BPM | BODY MASS INDEX: 26.8 KG/M2 | TEMPERATURE: 97.5 F

## 2018-01-16 DIAGNOSIS — Z79.01 LONG TERM CURRENT USE OF ANTICOAGULANT THERAPY: ICD-10-CM

## 2018-01-16 DIAGNOSIS — I48.0 INTERMITTENT ATRIAL FIBRILLATION (H): ICD-10-CM

## 2018-01-16 DIAGNOSIS — R11.0 NAUSEA: ICD-10-CM

## 2018-01-16 DIAGNOSIS — I82.409 DVT (DEEP VENOUS THROMBOSIS) (H): ICD-10-CM

## 2018-01-16 DIAGNOSIS — R11.0 NAUSEA: Primary | ICD-10-CM

## 2018-01-16 LAB
ALBUMIN UR-MCNC: NEGATIVE MG/DL
APPEARANCE UR: CLEAR
BACTERIA #/AREA URNS HPF: ABNORMAL /HPF
BASOPHILS # BLD AUTO: 0 10E9/L (ref 0–0.2)
BASOPHILS NFR BLD AUTO: 0.6 %
BILIRUB UR QL STRIP: NEGATIVE
COLOR UR AUTO: YELLOW
DIFFERENTIAL METHOD BLD: ABNORMAL
EOSINOPHIL # BLD AUTO: 0.1 10E9/L (ref 0–0.7)
EOSINOPHIL NFR BLD AUTO: 2.1 %
ERYTHROCYTE [DISTWIDTH] IN BLOOD BY AUTOMATED COUNT: 14.4 % (ref 10–15)
GLUCOSE UR STRIP-MCNC: NEGATIVE MG/DL
HCT VFR BLD AUTO: 34.7 % (ref 35–47)
HGB BLD-MCNC: 11.6 G/DL (ref 11.7–15.7)
HGB UR QL STRIP: ABNORMAL
INR POINT OF CARE: 2.6 (ref 0.86–1.14)
KETONES UR STRIP-MCNC: ABNORMAL MG/DL
LEUKOCYTE ESTERASE UR QL STRIP: NEGATIVE
LYMPHOCYTES # BLD AUTO: 1.9 10E9/L (ref 0.8–5.3)
LYMPHOCYTES NFR BLD AUTO: 30.5 %
MCH RBC QN AUTO: 27.9 PG (ref 26.5–33)
MCHC RBC AUTO-ENTMCNC: 33.4 G/DL (ref 31.5–36.5)
MCV RBC AUTO: 83 FL (ref 78–100)
MONOCYTES # BLD AUTO: 1.1 10E9/L (ref 0–1.3)
MONOCYTES NFR BLD AUTO: 17.3 %
MUCOUS THREADS #/AREA URNS LPF: PRESENT /LPF
NEUTROPHILS # BLD AUTO: 3.1 10E9/L (ref 1.6–8.3)
NEUTROPHILS NFR BLD AUTO: 49.5 %
NITRATE UR QL: NEGATIVE
NON-SQ EPI CELLS #/AREA URNS LPF: ABNORMAL /LPF
PH UR STRIP: 7 PH (ref 5–7)
PLATELET # BLD AUTO: 340 10E9/L (ref 150–450)
RBC # BLD AUTO: 4.16 10E12/L (ref 3.8–5.2)
RBC #/AREA URNS AUTO: ABNORMAL /HPF
SOURCE: ABNORMAL
SP GR UR STRIP: 1.01 (ref 1–1.03)
UROBILINOGEN UR STRIP-ACNC: 0.2 EU/DL (ref 0.2–1)
WBC # BLD AUTO: 6.2 10E9/L (ref 4–11)
WBC #/AREA URNS AUTO: ABNORMAL /HPF

## 2018-01-16 PROCEDURE — 36416 COLLJ CAPILLARY BLOOD SPEC: CPT

## 2018-01-16 PROCEDURE — 99207 ZZC NO CHARGE NURSE ONLY: CPT

## 2018-01-16 PROCEDURE — 81001 URINALYSIS AUTO W/SCOPE: CPT | Performed by: FAMILY MEDICINE

## 2018-01-16 PROCEDURE — 85025 COMPLETE CBC W/AUTO DIFF WBC: CPT | Performed by: FAMILY MEDICINE

## 2018-01-16 PROCEDURE — 99214 OFFICE O/P EST MOD 30 MIN: CPT | Performed by: FAMILY MEDICINE

## 2018-01-16 PROCEDURE — 80048 BASIC METABOLIC PNL TOTAL CA: CPT | Performed by: FAMILY MEDICINE

## 2018-01-16 PROCEDURE — 74019 RADEX ABDOMEN 2 VIEWS: CPT | Mod: FY

## 2018-01-16 PROCEDURE — 85610 PROTHROMBIN TIME: CPT | Mod: QW

## 2018-01-16 RX ORDER — WARFARIN SODIUM 1 MG/1
TABLET ORAL
Qty: 60 TABLET | Refills: 0 | COMMUNITY
Start: 2018-01-16 | End: 2018-03-02

## 2018-01-16 NOTE — NURSING NOTE
"Chief Complaint   Patient presents with     hyponatremia       Initial /72 (BP Location: Right arm, Patient Position: Chair, Cuff Size: Adult Regular)  Pulse 68  Temp 97.5  F (36.4  C) (Tympanic)  Wt 120 lb 9.6 oz (54.7 kg)  LMP 06/15/1985  SpO2 94%  BMI 26.8 kg/m2 Estimated body mass index is 26.8 kg/(m^2) as calculated from the following:    Height as of 1/11/18: 4' 8.25\" (1.429 m).    Weight as of this encounter: 120 lb 9.6 oz (54.7 kg).  Medication Reconciliation: complete    Health Maintenance that is potentially due pending provider review:  NONE    n/a    Is there anyone who you would like to be able to receive your results? No  If yes have patient fill out JOSUE    "

## 2018-01-16 NOTE — PROGRESS NOTES
SUBJECTIVE:   Ellie Leigh is a 87 year old female who presents to clinic today for the following health issues:      Follow up hyponatremia      Pt is unchanged     She still has low sodium at 126     Still has nausea    Feels bloated and full     No appetite but no weight loss    Stools are daily and soft   She has some mild cramping     Feels weak and tired   She is not feeling any better  No fever and no CP SOA no cough   No uri sxs     No heartburn   Worst time is in the am and better later in the day   This has been present now for 2 weeks or more    She has had similar episodes in the past with no etiol found              Problem list and histories reviewed & adjusted, as indicated.  Additional history: as documented    Labs reviewed in EPIC    Reviewed and updated as needed this visit by clinical staffTobacco  Allergies  Meds  Problems  Med Hx  Surg Hx  Fam Hx  Soc Hx        Reviewed and updated as needed this visit by Provider  Allergies  Meds  Problems         ROS:  Constitutional, HEENT, cardiovascular, pulmonary, gi and gu systems are negative, except as otherwise noted.      OBJECTIVE:                                                    /72 (BP Location: Right arm, Patient Position: Chair, Cuff Size: Adult Regular)  Pulse 68  Temp 97.5  F (36.4  C) (Tympanic)  Wt 120 lb 9.6 oz (54.7 kg)  LMP 06/15/1985  SpO2 94%  BMI 26.8 kg/m2  Body mass index is 26.8 kg/(m^2).  GENERAL APPEARANCE: healthy, alert and no distress  RESP: lungs clear to auscultation - no rales, rhonchi or wheezes  CV: regular rates and rhythm, normal S1 S2, no S3 or S4 and no murmur, click or rub  ABDOMEN: soft, nontender, without hepatosplenomegaly or masses and bowel sounds normal  MS: extremities normal- no gross deformities noted  PSYCH: mentation appears normal and affect normal/bright    Xray is reviewed independently by Josefina Gómez MD and negative.      ASSESSMENT/PLAN:                                                     1. Nausea  Unclear etiol   - Basic metabolic panel  - CBC with platelets differential  - *UA reflex to Microscopic  - XR Abdomen 2 Views; Future  - Urine Microscopic  - CT Abdomen Pelvis w Contrast; Future      Reassurance     Will check labs and CT     Petroleum diet   Recheck in one week     Risks, benefits, side effects and rationale for treatment plan fully discussed with the patient and understanding expressed.     Josefina Gómez MD  [facility]

## 2018-01-16 NOTE — PROGRESS NOTES
ANTICOAGULATION FOLLOW-UP CLINIC VISIT    Patient Name:  Ellie Leigh  Date:  1/16/2018  Contact Type:  Face to Face    SUBJECTIVE:     Patient Findings     Positives Activity level change (Pt reports she is less active than she used to be)    Comments Pt denies changes in medications, diet or health, reports taking warfarin as directed. Patient had 8mg in the previous 7 days, will decrease dose to 7.5 mg by next INR check in 14 days (~6% decrease).               OBJECTIVE    INR Protime   Date Value Ref Range Status   01/16/2018 2.6 (A) 0.86 - 1.14 Final       ASSESSMENT / PLAN  INR assessment SUPRA    Recheck INR In: 2 WEEKS    INR Location Clinic      Anticoagulation Summary as of 1/16/2018     INR goal 1.5-2.0   Today's INR 2.6!   Maintenance plan 1.5 mg (1 mg x 1.5) on Mon; 1 mg (1 mg x 1) all other days   Full instructions 1/19: 1 mg; Otherwise 1.5 mg on Mon; 1 mg all other days   Weekly total 7.5 mg   Plan last modified Angelia Bo RN (1/16/2018)   Next INR check 1/30/2018   Priority INR   Target end date Indefinite    Indications   Atrial fibrillation with rapid ventricular response (H) (Resolved) [I48.91]  DVT (deep venous thrombosis) [I82.409]  Long term current use of anticoagulant therapy [Z79.01]         Anticoagulation Episode Summary     INR check location     Preferred lab     Send INR reminders to [facility] POOL    Comments * Actual INR goal is 1.7-2.3  please follow Dec 2015 INR referral (June 2016 goal range is an error)      Anticoagulation Care Providers     Provider Role Specialty Phone number    Josefina Gómez MD [facility] [phone]            See the Encounter Report to view Anticoagulation Flowsheet and Dosing Calendar (Go to Encounters tab in chart review, and find the Anticoagulation Therapy Visit)        Angelia Bo RN

## 2018-01-16 NOTE — MR AVS SNAPSHOT
After Visit Summary   1/16/2018    Ellie Leigh    MRN: [id number]           Patient Information     Date Of Birth          9/12/1930        Visit Information        Provider Department      1/16/2018 3:00 PM Josefina Gómez MD [facility]        Today's Diagnoses     Nausea    -  1       Follow-ups after your visit        Your next 10 appointments already scheduled     Jan 25, 2018 11:00 AM CST   LAB with [place] LAB   [facility] ([facility])    [address] [place] [address]   [phone]           Please do not eat 10-12 hours before your appointment if you are coming in fasting for labs on lipids, cholesterol, or glucose (sugar). This does not apply to pregnant women. Water, hot tea and black coffee (with nothing added) are okay. Do not drink other fluids, diet soda or chew gum.            Jan 26, 2018  3:00 PM CST   SHORT with Josefina Gómez MD   [facility] ([facility])    [address] [place] [address]   [phone]            Jan 30, 2018 11:00 AM CST   Anticoagulation Visit with [place] ANTI COAG   [facility] ([facility])    [address] [facility] [address]   [phone]            Apr 03, 2018 10:30 AM CDT   Remote PPM Check with RUEDA TECH1   [facility] ([facility])    [address] [place], [place] [address]  [facility] [address] [phone]           This appointment is for a remote check of your pacemaker.  This is not an appointment at the office.              Future tests that were ordered for you today     Open Future Orders        Priority Expected Expires Ordered    Basic metabolic panel  (Ca, Cl, CO2, Creat, Gluc, K, Na, BUN) Routine 1/26/2018 1/26/2018 1/19/2018            Who to contact     If you have questions or need follow up information about today's clinic visit or your  "schedule please contact [facility] directly at [phone].  Normal or non-critical lab and imaging results will be communicated to you by MyChart, letter or phone within 4 business days after the clinic has received the results. If you do not hear from us within 7 days, please contact the clinic through MyChart or phone. If you have a critical or abnormal lab result, we will notify you by phone as soon as possible.  Submit refill requests through [employer] or call your pharmacy and they will forward the refill request to us. Please allow 3 business days for your refill to be completed.          Additional Information About Your Visit        [employer] Information     [employer] lets you send messages to your doctor, view your test results, renew your prescriptions, schedule appointments and more. To sign up, go to [email or web address] . Click on \"Log in\" on the left side of the screen, which will take you to the Welcome page. Then click on \"Sign up Now\" on the right side of the page.     You will be asked to enter the access code listed below, as well as some personal information. Please follow the directions to create your username and password.     Your access code is: [email or web address]  Expires: 3/8/2018  3:24 PM     Your access code will  in 90 days. If you need help or a new code, please call your [facility] clinic or [phone].        Care EveryWhere ID     This is your Care EveryWhere ID. This could be used by other organizations to access your [facility] medical records  [id number]        Your Vitals Were     Pulse Temperature Last Period Pulse Oximetry BMI (Body Mass Index)       68 97.5  F (36.4  C) (Tympanic) 06/15/1985 94% 26.8 kg/m2        Blood Pressure from Last 3 Encounters:   18 126/72   18 136/71   18 118/64    Weight from Last 3 Encounters:   18 120 lb 9.6 oz (54.7 kg)   18 120 lb 14.4 oz (54.8 kg)   18 121 lb (54.9 kg)              We " Performed the Following     *UA reflex to Microscopic     Basic metabolic panel     CBC with platelets differential     Urine Microscopic          Today's Medication Changes          These changes are accurate as of: 1/16/18 11:59 PM.  If you have any questions, ask your nurse or doctor.               These medicines have changed or have updated prescriptions.        Dose/Directions    warfarin 1 MG tablet   Commonly known as:  COUMADIN   This may have changed:  additional instructions   Used for:  Intermittent atrial fibrillation (H)        Take 1.5 mg Mon, 1 mg rest of the days or as directed by Anticoagulation Clinic.   Quantity:  60 tablet   Refills:  0                Primary Care Provider Office Phone # Fax #    Josefina Gómez [phone] [address] [address]        Equal Access to Services     BROOKS JOSÉ : Reyna Interiano, bill mina, jeanie collazomatalon feliz, verónica estrada. So [facility] [phone].    ATENCIÓN: Si habla español, tiene a sahni disposición servicios gratuitos de asistencia lingüística. Llame al [phone].    We comply with applicable federal civil rights laws and [place] laws. We do not discriminate on the basis of race, color, national origin, age, disability, sex, sexual orientation, or gender identity.            Thank you!     Thank you for choosing [facility]  for your care. Our goal is always to provide you with excellent care. Hearing back from our patients is one way we can continue to improve our services. Please take a few minutes to complete the written survey that you may receive in the mail after your visit with us. Thank you!             Your Updated Medication List - Protect others around you: Learn how to safely use, store and throw away your medicines at www.disposemymeds.org.          This list is accurate as of: 1/16/18 11:59 PM.  Always use your most recent med  list.                   Brand Name Dispense Instructions for use Diagnosis    albuterol 108 (90 BASE) MCG/ACT Inhaler    PROAIR HFA/PROVENTIL HFA/VENTOLIN HFA    3 Inhaler    Inhale 2 puffs into the lungs every 6 hours as needed for shortness of breath / dyspnea    Mild persistent asthma without complication       CRANBERRY      Take 475 mg by mouth 2 times daily.        diclofenac 1 % Gel topical gel    VOLTAREN    100 g    APPLY 4 GRAMS TO KNEES OR 2 GRAMS TO HANDS 4 TIMES DAILY USING ENCLOSED DOSING CARD    Primary osteoarthritis involving multiple joints       levothyroxine 50 MCG tablet    SYNTHROID/LEVOTHROID    90 tablet    TAKE 1 TABLET (50 MCG) BY MOUTH DAILY    Hypothyroidism, unspecified type       metoprolol succinate 25 MG 24 hr tablet    TOPROL-XL    120 tablet    TAKE TWO TABLETS BY MOUTH TWICE DAILY    Essential hypertension, benign       polyethylene glycol powder    MIRALAX    510 g    Take 17 g by mouth daily as needed    Constipation       probiotic Caps      Take 1 capsule by mouth every evening        ranitidine 150 MG tablet    ZANTAC    60 tablet    Take 1 tablet (150 mg) by mouth 2 times daily    Gastroesophageal reflux disease without esophagitis       SYMBICORT 160-4.5 MCG/ACT Inhaler   Generic drug:  budesonide-formoterol     10.2 g    INHALE 2 PUFFS INTO THE LUNGS 2 TIMES DAILY    Mild persistent asthma without complication       warfarin 1 MG tablet    COUMADIN    60 tablet    Take 1.5 mg Mon, 1 mg rest of the days or as directed by Anticoagulation Clinic.    Intermittent atrial fibrillation (H)

## 2018-01-17 DIAGNOSIS — J44.9 CHRONIC OBSTRUCTIVE PULMONARY DISEASE, UNSPECIFIED COPD TYPE (H): ICD-10-CM

## 2018-01-17 LAB
ANION GAP SERPL CALCULATED.3IONS-SCNC: 4 MMOL/L (ref 3–14)
BUN SERPL-MCNC: 13 MG/DL (ref 7–30)
CALCIUM SERPL-MCNC: 8.8 MG/DL (ref 8.5–10.1)
CHLORIDE SERPL-SCNC: 92 MMOL/L (ref 94–109)
CO2 SERPL-SCNC: 30 MMOL/L (ref 20–32)
CREAT SERPL-MCNC: 0.5 MG/DL (ref 0.52–1.04)
GFR SERPL CREATININE-BSD FRML MDRD: >90 ML/MIN/1.7M2
GLUCOSE SERPL-MCNC: 94 MG/DL (ref 70–99)
POTASSIUM SERPL-SCNC: 4 MMOL/L (ref 3.4–5.3)
SODIUM SERPL-SCNC: 126 MMOL/L (ref 133–144)

## 2018-01-17 RX ORDER — IOPAMIDOL 755 MG/ML
58 INJECTION, SOLUTION INTRAVASCULAR ONCE
Status: COMPLETED | OUTPATIENT
Start: 2018-01-18 | End: 2018-01-18

## 2018-01-17 NOTE — TELEPHONE ENCOUNTER
"Requested Prescriptions   Pending Prescriptions Disp Refills     ipratropium - albuterol 0.5 mg/2.5 mg/3 mL (DUONEB) 0.5-2.5 (3) MG/3ML neb solution [Pharmacy Med Name: Ipratropium-Albuterol Inhalation Solution 0.5-2.5 (3) MG/3ML] 180 mL 9     Sig: TAKE 1 VIAL BY NEBULIZATION  EVERY SIX HOURS AS NEEDED FOR SHORTNESS OF BREATH/ DYSPNEA OR WHEEZING    Asthma Nebs Protocol Failed    1/17/2018  7:01 AM       Failed - Asthma control test score is 20 or greater    Please review ACT score.      ACT Total Scores 1/13/2017 6/27/2017 12/11/2017   ACT TOTAL SCORE - - -   ASTHMA ER VISITS - - -   ASTHMA HOSPITALIZATIONS - - -   ACT TOTAL SCORE (Goal Greater than or Equal to 20) 20 19 18   In the past 12 months, how many times did you visit the emergency room for your asthma without being admitted to the hospital? 0 1 0   In the past 12 months, how many times were you hospitalized overnight because of your asthma? 0 0 0              Passed - Patient is age 4 years or older       Passed - Recent (6 mo) or future visit with authorizing provider's specialty    Patient had office visit in the last 6 months or has a visit in the next 30 days with authorizing provider.  See \"Patient Info\" tab in inbasket, or \"Choose Columns\" in Meds & Orders section of the refill encounter.     Last Written Prescription Date:  4/28/17  Last Fill Quantity: 180 ml,  # refills: 10   Last Office Visit with [facility] or [facility] prescribing provider:  1/18/17   Future Office Visit:                 "

## 2018-01-18 ENCOUNTER — HOSPITAL ENCOUNTER (OUTPATIENT)
Dept: CT IMAGING | Facility: CLINIC | Age: 83
Discharge: HOME | End: 2018-01-18
Attending: FAMILY MEDICINE | Admitting: FAMILY MEDICINE
Payer: COMMERCIAL

## 2018-01-18 DIAGNOSIS — R11.0 NAUSEA: ICD-10-CM

## 2018-01-18 PROCEDURE — 25000125 ZZHC RX 250: Performed by: RADIOLOGY

## 2018-01-18 PROCEDURE — 74177 CT ABD & PELVIS W/CONTRAST: CPT

## 2018-01-18 PROCEDURE — 25000128 H RX IP 250 OP 636: Performed by: RADIOLOGY

## 2018-01-18 RX ORDER — IPRATROPIUM BROMIDE AND ALBUTEROL SULFATE 2.5; .5 MG/3ML; MG/3ML
SOLUTION RESPIRATORY (INHALATION)
Qty: 180 ML | Refills: 9 | Status: SHIPPED | OUTPATIENT
Start: 2018-01-18 | End: 2019-01-23

## 2018-01-18 RX ADMIN — IOPAMIDOL 58 ML: 755 INJECTION, SOLUTION INTRAVENOUS at 13:38

## 2018-01-18 RX ADMIN — SODIUM CHLORIDE 55 ML: 9 INJECTION, SOLUTION INTRAVENOUS at 13:38

## 2018-01-19 DIAGNOSIS — E87.1 HYPONATREMIA: Primary | ICD-10-CM

## 2018-01-25 DIAGNOSIS — I48.0 INTERMITTENT ATRIAL FIBRILLATION (H): ICD-10-CM

## 2018-01-25 DIAGNOSIS — E87.1 HYPONATREMIA: ICD-10-CM

## 2018-01-25 LAB
ANION GAP SERPL CALCULATED.3IONS-SCNC: 6 MMOL/L (ref 3–14)
BUN SERPL-MCNC: 15 MG/DL (ref 7–30)
CALCIUM SERPL-MCNC: 8.6 MG/DL (ref 8.5–10.1)
CHLORIDE SERPL-SCNC: 94 MMOL/L (ref 94–109)
CO2 SERPL-SCNC: 29 MMOL/L (ref 20–32)
CREAT SERPL-MCNC: 0.49 MG/DL (ref 0.52–1.04)
GFR SERPL CREATININE-BSD FRML MDRD: >90 ML/MIN/1.7M2
GLUCOSE SERPL-MCNC: 109 MG/DL (ref 70–99)
POTASSIUM SERPL-SCNC: 3.6 MMOL/L (ref 3.4–5.3)
SODIUM SERPL-SCNC: 129 MMOL/L (ref 133–144)

## 2018-01-25 PROCEDURE — 80048 BASIC METABOLIC PNL TOTAL CA: CPT | Performed by: FAMILY MEDICINE

## 2018-01-25 PROCEDURE — 36415 COLL VENOUS BLD VENIPUNCTURE: CPT | Performed by: FAMILY MEDICINE

## 2018-01-25 RX ORDER — WARFARIN SODIUM 1 MG/1
TABLET ORAL
Qty: 60 TABLET | Refills: 0 | Status: SHIPPED | OUTPATIENT
Start: 2018-01-25 | End: 2018-01-30

## 2018-01-26 ENCOUNTER — OFFICE VISIT (OUTPATIENT)
Dept: FAMILY MEDICINE | Facility: CLINIC | Age: 83
End: 2018-01-26
Payer: COMMERCIAL

## 2018-01-26 ENCOUNTER — RADIANT APPOINTMENT (OUTPATIENT)
Dept: GENERAL RADIOLOGY | Facility: CLINIC | Age: 83
End: 2018-01-26
Attending: FAMILY MEDICINE
Payer: COMMERCIAL

## 2018-01-26 VITALS
HEART RATE: 96 BPM | BODY MASS INDEX: 26.4 KG/M2 | TEMPERATURE: 97.1 F | DIASTOLIC BLOOD PRESSURE: 86 MMHG | OXYGEN SATURATION: 94 % | SYSTOLIC BLOOD PRESSURE: 138 MMHG | WEIGHT: 118.8 LBS

## 2018-01-26 DIAGNOSIS — E87.1 HYPONATREMIA: ICD-10-CM

## 2018-01-26 DIAGNOSIS — E22.2 SIADH (SYNDROME OF INAPPROPRIATE ADH PRODUCTION) (H): ICD-10-CM

## 2018-01-26 DIAGNOSIS — J44.9 CHRONIC OBSTRUCTIVE PULMONARY DISEASE, UNSPECIFIED COPD TYPE (H): ICD-10-CM

## 2018-01-26 DIAGNOSIS — G89.29 CHRONIC RIGHT-SIDED LOW BACK PAIN WITHOUT SCIATICA: ICD-10-CM

## 2018-01-26 DIAGNOSIS — G89.29 CHRONIC RIGHT-SIDED LOW BACK PAIN WITHOUT SCIATICA: Primary | ICD-10-CM

## 2018-01-26 DIAGNOSIS — M54.50 CHRONIC RIGHT-SIDED LOW BACK PAIN WITHOUT SCIATICA: Primary | ICD-10-CM

## 2018-01-26 DIAGNOSIS — M54.50 CHRONIC RIGHT-SIDED LOW BACK PAIN WITHOUT SCIATICA: ICD-10-CM

## 2018-01-26 PROCEDURE — 72100 X-RAY EXAM L-S SPINE 2/3 VWS: CPT | Mod: FY

## 2018-01-26 PROCEDURE — 99214 OFFICE O/P EST MOD 30 MIN: CPT | Performed by: FAMILY MEDICINE

## 2018-01-26 NOTE — PATIENT INSTRUCTIONS
Set up PT     See me back in 2 weeks     Labs before our visit to check sodium, set up lab only appt     Continue on the fluid restriction and gatorade

## 2018-01-26 NOTE — NURSING NOTE
"Chief Complaint   Patient presents with     Hyponatremia follow up       Initial /86 (BP Location: Right arm, Patient Position: Chair, Cuff Size: Adult Regular)  Pulse 96  Temp 97.1  F (36.2  C) (Tympanic)  Wt 118 lb 12.8 oz (53.9 kg)  LMP 06/15/1985  SpO2 94%  BMI 26.4 kg/m2 Estimated body mass index is 26.4 kg/(m^2) as calculated from the following:    Height as of 1/11/18: 4' 8.25\" (1.429 m).    Weight as of this encounter: 118 lb 12.8 oz (53.9 kg).  Medication Reconciliation: complete    Health Maintenance that is potentially due pending provider review:  NONE    n/a    Is there anyone who you would like to be able to receive your results? Yes  If yes have patient fill out JOSUE    "

## 2018-01-26 NOTE — MR AVS SNAPSHOT
"              After Visit Summary   1/26/2018    Ellie Leigh    MRN: [id number]           Patient Information     Date Of Birth          9/12/1930        Visit Information        Provider Department      1/26/2018 3:00 PM Josefina Gómez MD [facility]        Today's Diagnoses     Chronic right-sided low back pain without sciatica    -  1      Care Instructions    Set up PT     See me back in 2 weeks     Labs before our visit to check sodium, set up lab only appt     Continue on the fluid restriction and gatorade          Follow-ups after your visit        Additional Services     PHYSICAL THERAPY REFERRAL       *This therapy referral will be filtered to a centralized scheduling office at [facility] and the patient will receive a call to schedule an appointment at a [facility] location most convenient for them. *     [facility] provides Physical Therapy evaluation and treatment and many specialty services across the [facility] system.  If requesting a specialty program, please choose from the list below.    If you have not heard from the scheduling office within 2 business days, please call [phone] for all locations, with the exception of [place], please call [phone].  Treatment: Evaluation & Treatment  Special Instructions/Modalities:   Special Programs: None    Please be aware that coverage of these services is subject to the terms and limitations of your health insurance plan.  Call member services at your health plan with any benefit or coverage questions.      **Note to Provider:  If you are referring outside of [facility] for the therapy appointment, please list the name of the location in the \"special instructions\" above, print the referral and give to the patient to schedule the appointment.                  Your next 10 appointments already scheduled     Jan 30, 2018 11:00 AM CST   Anticoagulation Visit with [place] ANTI COAG   [facility] " "[facility] ([facility])    [address] [place]  [facility] [address]   [phone]            Apr 03, 2018 10:30 AM CDT   Remote PPM Check with RUEDA TECH1   [facility]   [place] ([facility])    [address] [facility] [address]  [place] [address]   [phone]           This appointment is for a remote check of your pacemaker.  This is not an appointment at the office.              Future tests that were ordered for you today     Open Future Orders        Priority Expected Expires Ordered    Basic metabolic panel Routine  1/25/2019 1/25/2018            Who to contact     If you have questions or need follow up information about today's clinic visit or your schedule please contact [facility] directly at [phone].  Normal or non-critical lab and imaging results will be communicated to you by [employer], letter or phone within 4 business days after the clinic has received the results. If you do not hear from us within 7 days, please contact the clinic through [employer] or phone. If you have a critical or abnormal lab result, we will notify you by phone as soon as possible.  Submit refill requests through [employer] or call your pharmacy and they will forward the refill request to us. Please allow 3 business days for your refill to be completed.          Additional Information About Your Visit        [employer] Information     [employer] lets you send messages to your doctor, view your test results, renew your prescriptions, schedule appointments and more. To sign up, go to [email or web address] . Click on \"Log in\" on the left side of the screen, which will take you to the Welcome page. Then click on \"Sign up Now\" on the right side of the page.     You will be asked to enter the access code listed below, as well as some personal information. Please follow the directions to create your username and password.     Your access code is: " [email or web address]  Expires: 3/8/2018  3:24 PM     Your access code will  in 90 days. If you need help or a new code, please call your [facility] or [phone].        Care EveryWhere ID     This is your Care EveryWhere ID. This could be used by other organizations to access your [facility] medical records  [id number]        Your Vitals Were     Pulse Temperature Last Period Pulse Oximetry BMI (Body Mass Index)       96 97.1  F (36.2  C) (Tympanic) 06/15/1985 94% 26.4 kg/m2        Blood Pressure from Last 3 Encounters:   18 138/86   18 126/72   18 136/71    Weight from Last 3 Encounters:   18 118 lb 12.8 oz (53.9 kg)   18 120 lb 9.6 oz (54.7 kg)   18 120 lb 14.4 oz (54.8 kg)              We Performed the Following     PHYSICAL THERAPY REFERRAL        Primary Care Provider Office Phone # Fax #    Josefina Gómez [phone] [address] [place], [place] [address]        Equal Access to Services     [facility] HospitalNOLBERTO : Hadii daniel ku hadasho Sohéctorali, waaxda luqadaha, qaybta kaalmada adesukhdeepyada, verónica estrada. So [facility] [phone].    ATENCIÓN: Si habla español, tiene a sahni disposición servicios gratuitos de asistencia lingüística. Ll[facility] [phone].    We comply with applicable federal civil rights laws and [place] laws. We do not discriminate on the basis of race, color, national origin, age, disability, sex, sexual orientation, or gender identity.            Thank you!     Thank you for choosing [facility]  for your care. Our goal is always to provide you with excellent care. Hearing back from our patients is one way we can continue to improve our services. Please take a few minutes to complete the written survey that you may receive in the mail after your visit with us. Thank you!             Your Updated Medication List - Protect others around you: Learn how to safely use, store and throw away your  medicines at www.disposemymeds.org.          This list is accurate as of 1/26/18  3:37 PM.  Always use your most recent med list.                   Brand Name Dispense Instructions for use Diagnosis    albuterol 108 (90 BASE) MCG/ACT Inhaler    PROAIR HFA/PROVENTIL HFA/VENTOLIN HFA    3 Inhaler    Inhale 2 puffs into the lungs every 6 hours as needed for shortness of breath / dyspnea    Mild persistent asthma without complication       CRANBERRY      Take 475 mg by mouth 2 times daily.        diclofenac 1 % Gel topical gel    VOLTAREN    100 g    APPLY 4 GRAMS TO KNEES OR 2 GRAMS TO HANDS 4 TIMES DAILY USING ENCLOSED DOSING CARD    Primary osteoarthritis involving multiple joints       ipratropium - albuterol 0.5 mg/2.5 mg/3 mL 0.5-2.5 (3) MG/3ML neb solution    DUONEB    180 mL    TAKE 1 VIAL BY NEBULIZATION  EVERY SIX HOURS AS NEEDED FOR SHORTNESS OF BREATH/ DYSPNEA OR WHEEZING    Chronic obstructive pulmonary disease, unspecified COPD type (H)       levothyroxine 50 MCG tablet    SYNTHROID/LEVOTHROID    90 tablet    TAKE 1 TABLET (50 MCG) BY MOUTH DAILY    Hypothyroidism, unspecified type       metoprolol succinate 25 MG 24 hr tablet    TOPROL-XL    120 tablet    TAKE TWO TABLETS BY MOUTH TWICE DAILY    Essential hypertension, benign       polyethylene glycol powder    MIRALAX    510 g    Take 17 g by mouth daily as needed    Constipation       probiotic Caps      Take 1 capsule by mouth every evening        ranitidine 150 MG tablet    ZANTAC    60 tablet    Take 1 tablet (150 mg) by mouth 2 times daily    Gastroesophageal reflux disease without esophagitis       SYMBICORT 160-4.5 MCG/ACT Inhaler   Generic drug:  budesonide-formoterol     10.2 g    INHALE 2 PUFFS INTO THE LUNGS 2 TIMES DAILY    Mild persistent asthma without complication       * warfarin 1 MG tablet    COUMADIN    60 tablet    Take 1.5 mg Mon, 1 mg rest of the days or as directed by Anticoagulation Clinic.    Intermittent atrial fibrillation (H)        * warfarin 1 MG tablet    COUMADIN    60 tablet    TAKE 2 TABLETS BY MOUTH ON MONDAYS AND 1 TABLET ALL OTHER DAYS OR AS DIRECTED BY ANTICOAGULATION CLINIC.    Intermittent atrial fibrillation (H)       * Notice:  This list has 2 medication(s) that are the same as other medications prescribed for you. Read the directions carefully, and ask your doctor or other care provider to review them with you.

## 2018-01-26 NOTE — PROGRESS NOTES
SUBJECTIVE:   Ellie Leigh is a 87 year old female who presents to clinic today for the following health issues:      Hyponatremia follow up  Sodium is better   She is on the fluid restriction and it is slowly coming up now at 129 was as low as 122  She is less nauseous       Amount of exercise or physical activity: None    Problems taking medications regularly: No    Medication side effects: none    Diet: increased sodium        Musculoskeletal problem/pain      Duration: years    Description  Location: back mid back     Intensity:  moderate, severe    Accompanying signs and symptoms: none    History  Previous similar problem: YES  Previous evaluation:  x-ray    Precipitating or alleviating factors:  Trauma or overuse: no   Aggravating factors include: standing, walking, exercise and overuse    Therapies tried and outcome: nothing    COPD Follow-Up    Symptoms are currently: stable    Current fatigue or dyspnea with ambulation: none    Shortness of breath: stable    Increased or change in Cough/Sputum: No    Fever(s): No    Baseline ambulation without stopping to rest: 2  rooms. Able to walk up 0 flights of stairs without stopping to rest.    Any ER/UC or hospital admissions since your last visit? No     History   Smoking Status     Never Smoker   Smokeless Tobacco     Never Used     No results found for: FEV1, [id number]    Problem list and histories reviewed & adjusted, as indicated.  Additional history: as documented    Labs reviewed in EPIC    Reviewed and updated as needed this visit by clinical staff  Allergies  Meds  Problems       Reviewed and updated as needed this visit by Provider  Allergies  Meds  Problems         ROS:  Constitutional, HEENT, cardiovascular, pulmonary, gi and gu systems are negative, except as otherwise noted.    OBJECTIVE:                                                    /86 (BP Location: Right arm, Patient Position: Chair, Cuff Size: Adult Regular)  Pulse 96  Temp  97.1  F (36.2  C) (Tympanic)  Wt 118 lb 12.8 oz (53.9 kg)  LMP 06/15/1985  SpO2 94%  BMI 26.4 kg/m2  Body mass index is 26.4 kg/(m^2).  GENERAL APPEARANCE: healthy, alert and no distress  RESP: lungs clear to auscultation - no rales, rhonchi or wheezes  CV: regular rates and rhythm, normal S1 S2, no S3 or S4 and no murmur, click or rub  ABDOMEN: soft, nontender, without hepatosplenomegaly or masses and bowel sounds normal  ORTHO: Lumber/Thoracic Spine Exam: Tender:  right SI joint  Non-tender:    Range of Motion:  Minimal rom due to pain   Strength:  Le strength intact  Special tests:  negative straight leg raises    Hip Exam: Hip ROM full    PSYCH: mentation appears normal and affect flat    Xray is reviewed independently by Josefina Gómez MD and sig degeneration lumbar spine      ASSESSMENT/PLAN:                                                    1. Chronic right-sided low back pain without sciatica  Worse   - XR Lumbar Spine 2/3 Views; Future  - PHYSICAL THERAPY REFERRAL    2. Hyponatremia  Improved   - Basic metabolic panel  (Ca, Cl, CO2, Creat, Gluc, K, Na, BUN); Future    3. 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)  Fluid restriction    4. Chronic obstructive pulmonary disease, unspecified COPD type (H)  Stable no change in treatment plan.       Patient Instructions   Set up PT     See me back in 2 weeks     Labs before our visit to check sodium, set up lab only appt     Continue on the fluid restriction and gatorade      Josefina Gómez MD  [facility]

## 2018-01-30 ENCOUNTER — HOSPITAL ENCOUNTER (OUTPATIENT)
Dept: PHYSICAL THERAPY | Facility: CLINIC | Age: 83
Setting detail: THERAPIES SERIES
End: 2018-01-30
Attending: FAMILY MEDICINE
Payer: COMMERCIAL

## 2018-01-30 ENCOUNTER — ANTICOAGULATION THERAPY VISIT (OUTPATIENT)
Dept: ANTICOAGULATION | Facility: CLINIC | Age: 83
End: 2018-01-30
Payer: COMMERCIAL

## 2018-01-30 DIAGNOSIS — Z79.01 LONG TERM CURRENT USE OF ANTICOAGULANT THERAPY: ICD-10-CM

## 2018-01-30 LAB — INR POINT OF CARE: 1.9 (ref 0.86–1.14)

## 2018-01-30 PROCEDURE — 97110 THERAPEUTIC EXERCISES: CPT | Mod: GP | Performed by: PHYSICAL THERAPIST

## 2018-01-30 PROCEDURE — 99207 ZZC NO CHARGE NURSE ONLY: CPT

## 2018-01-30 PROCEDURE — 36416 COLLJ CAPILLARY BLOOD SPEC: CPT

## 2018-01-30 PROCEDURE — 97162 PT EVAL MOD COMPLEX 30 MIN: CPT | Mod: GP | Performed by: PHYSICAL THERAPIST

## 2018-01-30 PROCEDURE — G8979 MOBILITY GOAL STATUS: HCPCS | Mod: GP,CI | Performed by: PHYSICAL THERAPIST

## 2018-01-30 PROCEDURE — G8978 MOBILITY CURRENT STATUS: HCPCS | Mod: GP,CK | Performed by: PHYSICAL THERAPIST

## 2018-01-30 PROCEDURE — 40000718 ZZHC STATISTIC PT DEPARTMENT ORTHO VISIT: Performed by: PHYSICAL THERAPIST

## 2018-01-30 PROCEDURE — 85610 PROTHROMBIN TIME: CPT | Mod: QW

## 2018-01-30 NOTE — MR AVS SNAPSHOT
Ellie Leigh   1/30/2018 1:30 PM   Anticoagulation Therapy Visit    Description:  87 year old female   Provider:  [facility]   Department:  [facility]           INR as of 1/30/2018     Today's INR 1.9      Anticoagulation Summary as of 1/30/2018     INR goal 1.5-2.0   Today's INR 1.9   Full instructions 1.5 mg on Mon; 1 mg all other days   Next INR check 2/27/2018    Indications   Atrial fibrillation with rapid ventricular response (H) (Resolved) [I48.91]  DVT (deep venous thrombosis) [I82.409]  Long term current use of anticoagulant therapy [Z79.01]         Your next Anticoagulation Clinic appointment(s)     Feb 27, 2018  2:00 PM CST   [facility] with [facility]   [facility]    [address] [address] [phone]              Contact Numbers     Please call [phone] with any problems or questions regarding your therapy.    If you need to cancel and/or reschedule your appointment please call one of the following numbers:  [facility] [phone]  [facility] [phone]  [facility] [phone]  [facility] [phone]  [place] - [phone]            January 2018 Details    Sun Mon Tue Wed Thu Fri Sat      1               2               3               4               5               6                 7               8               9               10               11               12               13                 14               15               16               17               18               19               20                 21               22               23               24               25               26               27                 28               29               30      1 mg   See details      31      1 mg             Date Details   01/30 This INR check               How to take your warfarin dose     To take:  1 mg Take 1 of the 1 mg tablets.           February 2018 Details    Sun Mon  Tue Wed Thu Fri Sat         1      1 mg         2      1 mg         3      1 mg           4      1 mg         5      1.5 mg         6      1 mg         7      1 mg         8      1 mg         9      1 mg         10      1 mg           11      1 mg         12      1.5 mg         13      1 mg         14      1 mg         15      1 mg         16      1 mg         17      1 mg           18      1 mg         19      1.5 mg         20      1 mg         21      1 mg         22      1 mg         23      1 mg         24      1 mg           25      1 mg         26      1.5 mg         27            28                   Date Details   No additional details    Date of next INR:  2/27/2018         How to take your warfarin dose     To take:  1 mg Take 1 of the 1 mg tablets.    To take:  1.5 mg Take 1.5 of the 1 mg tablets.

## 2018-01-30 NOTE — PROGRESS NOTES
ANTICOAGULATION FOLLOW-UP CLINIC VISIT    Patient Name:  Ellie Leigh  Date:  1/30/2018  Contact Type:  Face to Face    SUBJECTIVE:     Patient Findings     Positives Change in diet/appetite (fluid restriction and drinking gatorade), Other complaints (back pain), OTC meds (taking extra strength tylenol for her back pain)    Comments Patient is currently on a fluid restriction for hyponatremia. She is rechecking labs next week. Starting PT for her back today.           OBJECTIVE    INR Protime   Date Value Ref Range Status   01/30/2018 1.9 (A) 0.86 - 1.14 Final       ASSESSMENT / PLAN  INR assessment THER    Recheck INR In: 4 WEEKS    INR Location Clinic      Anticoagulation Summary as of 1/30/2018     INR goal 1.5-2.0   Today's INR 1.9   Maintenance plan 1.5 mg (1 mg x 1.5) on Mon; 1 mg (1 mg x 1) all other days   Full instructions 1.5 mg on Mon; 1 mg all other days   Weekly total 7.5 mg   No change documented Lissy Beaulieu RN   Plan last modified Angelia Bo RN (1/16/2018)   Next INR check 2/27/2018   Priority INR   Target end date Indefinite    Indications   Atrial fibrillation with rapid ventricular response (H) (Resolved) [I48.91]  DVT (deep venous thrombosis) [I82.409]  Long term current use of anticoagulant therapy [Z79.01]         Anticoagulation Episode Summary     INR check location     Preferred lab     Send INR reminders to [facility]    Comments * Actual INR goal is 1.7-2.3  please follow Dec 2015 INR referral (June 2016 goal range is an error)      Anticoagulation Care Providers     Provider Role Specialty Phone number    Josefina Gómez MD [facility] [phone]            See the Encounter Report to view Anticoagulation Flowsheet and Dosing Calendar (Go to Encounters tab in chart review, and find the Anticoagulation Therapy Visit)        Lissy Beaulieu RN

## 2018-01-30 NOTE — PROGRESS NOTES
01/30/18 1400   General Information   Type of Visit Initial OP Ortho PT Evaluation   Start of Care Date 01/30/18   Referring Physician Josefina Gómez MD   Patient/Family Goals Statement To get rid of the pain   Orders Evaluate and Treat   Date of Order 01/26/18   Insurance Type [employer]   Medical Diagnosis chronic R LBP   Body Part(s)   Body Part(s) Lumbar Spine/SI   Presentation and Etiology   Pertinent history of current problem (include personal factors and/or comorbidities that impact the POC) Pt states she was loading the  and was bending fwd and R back started to hurt.  Pt saw chiro X 10 visits --noted temporary relief but the following day felt worse.  Pt saw MD after that.  Currently : B LBP since 1/26/18 ( previously just on the R).  Pain intermittent can go to 10/10.  Pt notes the pain can radiate down L LE to the foot.  Negative numbness/ tingling;.  + cough.  Negative bowel / bladder.  Pt states she is afraid to walk as both legs feel weak.  Xray in chart osteopenia, arthritis ? compression.   Meds:  tylenol.  Pt is on coumadin .  PMHX:  intermittent back pain.  HBP.  L side mastectomy.  PACEMAKER.  R LONG.    C 2 fx 5-6 years ago.  R knee pain currently. Moderate: comorbidities   Impairments A. Pain;B. Decreased WB tolerance;F. Decreased strength and endurance;G. Impaired balance;H. Impaired gait;L. Tingling;M. Locking or catching   Onset date of current episode/exacerbation 12/18/17   Pain quality A. Sharp;C. Aching;D. Burning;E. Shooting;F. Stabbing   Pain exacerbation comment getting up from lying down.  Difficulty getting to sleep--only sleeping about 1 hour then waking due to the LB.  Rolling R side <>L side.  Has not done much walking last couple days.  Legs feel unsteady when getting up to go to the bathroom at night.  Needed assistance to get dressed the last 2 days and assistance w/ showers.   Pain/symptoms eased by A. Sitting;H. Cold;I. OTC medication(s)   Progression of  symptoms since onset: Worsened  (since Friday not sure why)   Prior Level of Function   Functional Level Prior Comment Uses walker.  Showers / dresses by self.  Ease of getting to sleep fluctuates--normally sleeps 2.5 hours before waking to go to the bathroom. Normally sleeps til 4 am.     Current Level of Function   Patient role/employment history F. Retired   Living environment House/townhome  (lives w/daughter)   Home/community accessibility 3 stairs to get in/out of house   Fall Risk Screen   Fall screen completed by PT   Have you fallen 2 or more times in the past year? No   Have you fallen and had an injury in the past year? No   Is patient a fall risk? No   Lumbar Spine/SI Objective Findings   Posture Pt standing in FB posture. PSIS/ crest level   Gait/Locomotion With FWW in clinic slow short shuffling steps.     Flexion ROM unable to assess due to limited tolerance to standing   Extension ROM unable to assess due to limited tolerance to standing   Right Side Bending ROM unable to assess due to limited tolerance to standing   Left Side Bending ROM unable to assess due to limited tolerance to standing   Hip Flexion (L2) Strength Hip flex 5-/5, L 5/5   Knee Flexion Strength B 5/5   Knee Extension (L3) Strength B 5/5   Ankle Dorsiflexion (L4) Strength B 5/5   Slump Test neg B    Segmental Mobility ERSR L4 / L5   Palpation Mild tenderness B QL and L piriformis.    Planned Therapy Interventions   Planned Therapy Interventions manual therapy;ROM;strengthening;stretching;neuromuscular re-education   Clinical Impression   Criteria for Skilled Therapeutic Interventions Met yes, treatment indicated   PT Diagnosis LBP w/ L radicular pain   Influenced by the following impairments pain, decreased mobility   Functional limitations due to impairments bed mobility, sleep, walking, dressing/showering   Clinical Presentation Evolving/Changing   Clinical Presentation Rationale recent increase to L LBP and L LE symptoms    Clinical Decision Making (Complexity) Moderate complexity   Therapy Frequency 1 time/week   Predicted Duration of Therapy Intervention (days/wks) 8 weeks   Risk & Benefits of therapy have been explained Yes   Patient, Family & other staff in agreement with plan of care Yes   Education Assessment   Barriers to Learning No barriers   Ortho Goal 1   Goal Description 1.  Pt will be walking around the home w/ FWW as previous   Target Date 03/31/18   Ortho Goal 2   Goal Description 2.  Pt will be able to get up from lying down w/ LBP no > 3/10   Target Date 03/31/18   Ortho Goal 3   Goal Description 3.  Pt will be independent  w/ showering/ dressing   Target Date 03/31/18   Ortho Goal 4   Goal Description 4.  Pt will sleep 2 to 2.5 hours before waking   Target Date 03/31/18   Ortho Goal 5   Goal Description 5.  Pt will be independent and consistent w/HEP   Target Date 03/31/18   Total Evaluation Time   Total Evaluation Time 30     Thank you for this referral,    Glenda Kasper, PT,  CEAS   #[id number]  [facility]  [phone]

## 2018-02-05 ENCOUNTER — TELEPHONE (OUTPATIENT)
Dept: FAMILY MEDICINE | Facility: CLINIC | Age: 83
End: 2018-02-05

## 2018-02-05 DIAGNOSIS — G89.29 CHRONIC RIGHT-SIDED LOW BACK PAIN WITH RIGHT-SIDED SCIATICA: Primary | ICD-10-CM

## 2018-02-05 DIAGNOSIS — M54.41 CHRONIC RIGHT-SIDED LOW BACK PAIN WITH RIGHT-SIDED SCIATICA: Primary | ICD-10-CM

## 2018-02-05 NOTE — TELEPHONE ENCOUNTER
If pain is worse she may need to be seen. I am wondering if she should be seen by spine as perhaps an injection would help her

## 2018-02-05 NOTE — TELEPHONE ENCOUNTER
Reason for call:  Patient reporting a symptom    Symptom or request: Patient calling reporting her back pain is so bad she cannot do the exercises physical therapy is telling her to do. Patient has been taking tylenol, but this is not enough to help the pain. Patient is wondering if there is anything else she can do? Any other over the counter medications she can try to help the pain? Or would a pain med be needed?    Duration (how long have symptoms been present): see office visit notes from 1-26    Have you been treated for this before? Yes    Additional comments: none    Phone Number patient can be reached at:  Home number on file [phone] (home)    Best Time:  any    Can we leave a detailed message on this number:  YES    Call taken on 2/5/2018 at 8:06 AM by Sabrina Yeboah

## 2018-02-06 ENCOUNTER — TELEPHONE (OUTPATIENT)
Dept: FAMILY MEDICINE | Facility: CLINIC | Age: 83
End: 2018-02-06

## 2018-02-06 ENCOUNTER — TRANSFERRED RECORDS (OUTPATIENT)
Dept: HEALTH INFORMATION MANAGEMENT | Facility: CLINIC | Age: 83
End: 2018-02-06

## 2018-02-06 ENCOUNTER — APPOINTMENT (OUTPATIENT)
Dept: CT IMAGING | Facility: CLINIC | Age: 83
End: 2018-02-06
Attending: PHYSICIAN ASSISTANT
Payer: COMMERCIAL

## 2018-02-06 ENCOUNTER — HOSPITAL ENCOUNTER (OUTPATIENT)
Facility: CLINIC | Age: 83
Setting detail: OBSERVATION
Discharge: NURSING, REHAB OR LONG-TERM CARE | End: 2018-02-08
Attending: PHYSICIAN ASSISTANT | Admitting: FAMILY MEDICINE
Payer: COMMERCIAL

## 2018-02-06 DIAGNOSIS — M54.42 ACUTE BILATERAL LOW BACK PAIN WITH LEFT-SIDED SCIATICA: ICD-10-CM

## 2018-02-06 DIAGNOSIS — E87.1 HYPONATREMIA: ICD-10-CM

## 2018-02-06 DIAGNOSIS — M54.9 BACK PAIN: ICD-10-CM

## 2018-02-06 PROBLEM — M54.59 INTRACTABLE LOW BACK PAIN: Status: ACTIVE | Noted: 2018-02-06

## 2018-02-06 LAB
ALBUMIN SERPL-MCNC: 3.3 G/DL (ref 3.4–5)
ALBUMIN UR-MCNC: 10 MG/DL
ALP SERPL-CCNC: 97 U/L (ref 40–150)
ALT SERPL W P-5'-P-CCNC: 22 U/L (ref 0–50)
AMORPH CRY #/AREA URNS HPF: ABNORMAL /HPF
ANION GAP SERPL CALCULATED.3IONS-SCNC: 9 MMOL/L (ref 3–14)
APPEARANCE UR: ABNORMAL
AST SERPL W P-5'-P-CCNC: 36 U/L (ref 0–45)
BACTERIA #/AREA URNS HPF: ABNORMAL /HPF
BASOPHILS # BLD AUTO: 0 10E9/L (ref 0–0.2)
BASOPHILS NFR BLD AUTO: 0.7 %
BILIRUB SERPL-MCNC: 0.4 MG/DL (ref 0.2–1.3)
BILIRUB UR QL STRIP: NEGATIVE
BUN SERPL-MCNC: 15 MG/DL (ref 7–30)
CALCIUM SERPL-MCNC: 8 MG/DL (ref 8.5–10.1)
CHLORIDE SERPL-SCNC: 89 MMOL/L (ref 94–109)
CO2 SERPL-SCNC: 25 MMOL/L (ref 20–32)
COLOR UR AUTO: ABNORMAL
CREAT SERPL-MCNC: 0.31 MG/DL (ref 0.52–1.04)
DIFFERENTIAL METHOD BLD: ABNORMAL
EOSINOPHIL # BLD AUTO: 0.1 10E9/L (ref 0–0.7)
EOSINOPHIL NFR BLD AUTO: 2.4 %
ERYTHROCYTE [DISTWIDTH] IN BLOOD BY AUTOMATED COUNT: 13.8 % (ref 10–15)
GFR SERPL CREATININE-BSD FRML MDRD: >90 ML/MIN/1.7M2
GLUCOSE SERPL-MCNC: 109 MG/DL (ref 70–99)
GLUCOSE UR STRIP-MCNC: NEGATIVE MG/DL
HCT VFR BLD AUTO: 32.2 % (ref 35–47)
HGB BLD-MCNC: 11.1 G/DL (ref 11.7–15.7)
HGB UR QL STRIP: ABNORMAL
IMM GRANULOCYTES # BLD: 0 10E9/L (ref 0–0.4)
IMM GRANULOCYTES NFR BLD: 0.3 %
INR PPP: 1.95 (ref 0.86–1.14)
KETONES UR STRIP-MCNC: 10 MG/DL
LEUKOCYTE ESTERASE UR QL STRIP: NEGATIVE
LYMPHOCYTES # BLD AUTO: 1.2 10E9/L (ref 0.8–5.3)
LYMPHOCYTES NFR BLD AUTO: 19.9 %
MCH RBC QN AUTO: 27.8 PG (ref 26.5–33)
MCHC RBC AUTO-ENTMCNC: 34.5 G/DL (ref 31.5–36.5)
MCV RBC AUTO: 81 FL (ref 78–100)
MONOCYTES # BLD AUTO: 1.1 10E9/L (ref 0–1.3)
MONOCYTES NFR BLD AUTO: 18.9 %
NEUTROPHILS # BLD AUTO: 3.4 10E9/L (ref 1.6–8.3)
NEUTROPHILS NFR BLD AUTO: 57.8 %
NITRATE UR QL: NEGATIVE
PH UR STRIP: 7.5 PH (ref 5–7)
PLATELET # BLD AUTO: 305 10E9/L (ref 150–450)
POTASSIUM SERPL-SCNC: 3.9 MMOL/L (ref 3.4–5.3)
PROT SERPL-MCNC: 6.8 G/DL (ref 6.8–8.8)
RBC # BLD AUTO: 4 10E12/L (ref 3.8–5.2)
RBC #/AREA URNS AUTO: 2 /HPF (ref 0–2)
SODIUM SERPL-SCNC: 123 MMOL/L (ref 133–144)
SOURCE: ABNORMAL
SP GR UR STRIP: 1.01 (ref 1–1.03)
SQUAMOUS #/AREA URNS AUTO: 1 /HPF (ref 0–1)
UROBILINOGEN UR STRIP-MCNC: NORMAL MG/DL (ref 0–2)
WBC # BLD AUTO: 5.8 10E9/L (ref 4–11)
WBC #/AREA URNS AUTO: 2 /HPF (ref 0–2)

## 2018-02-06 PROCEDURE — 25000125 ZZHC RX 250: Performed by: PHYSICIAN ASSISTANT

## 2018-02-06 PROCEDURE — 25000132 ZZH RX MED GY IP 250 OP 250 PS 637: Performed by: FAMILY MEDICINE

## 2018-02-06 PROCEDURE — 87086 URINE CULTURE/COLONY COUNT: CPT | Performed by: PHYSICIAN ASSISTANT

## 2018-02-06 PROCEDURE — 72131 CT LUMBAR SPINE W/O DYE: CPT

## 2018-02-06 PROCEDURE — 85025 COMPLETE CBC W/AUTO DIFF WBC: CPT | Performed by: PHYSICIAN ASSISTANT

## 2018-02-06 PROCEDURE — 80053 COMPREHEN METABOLIC PANEL: CPT | Performed by: PHYSICIAN ASSISTANT

## 2018-02-06 PROCEDURE — 99285 EMERGENCY DEPT VISIT HI MDM: CPT | Mod: 25 | Performed by: PHYSICIAN ASSISTANT

## 2018-02-06 PROCEDURE — 81003 URINALYSIS AUTO W/O SCOPE: CPT | Performed by: PHYSICIAN ASSISTANT

## 2018-02-06 PROCEDURE — G0378 HOSPITAL OBSERVATION PER HR: HCPCS

## 2018-02-06 PROCEDURE — 25000128 H RX IP 250 OP 636: Performed by: PHYSICIAN ASSISTANT

## 2018-02-06 PROCEDURE — 99220 ZZC INITIAL OBSERVATION CARE,LEVL III: CPT | Performed by: FAMILY MEDICINE

## 2018-02-06 PROCEDURE — 96374 THER/PROPH/DIAG INJ IV PUSH: CPT | Performed by: PHYSICIAN ASSISTANT

## 2018-02-06 PROCEDURE — 87088 URINE BACTERIA CULTURE: CPT | Performed by: PHYSICIAN ASSISTANT

## 2018-02-06 PROCEDURE — 87186 SC STD MICRODIL/AGAR DIL: CPT | Performed by: PHYSICIAN ASSISTANT

## 2018-02-06 PROCEDURE — 85610 PROTHROMBIN TIME: CPT | Performed by: PHYSICIAN ASSISTANT

## 2018-02-06 PROCEDURE — 94640 AIRWAY INHALATION TREATMENT: CPT

## 2018-02-06 RX ORDER — NALOXONE HYDROCHLORIDE 0.4 MG/ML
.1-.4 INJECTION, SOLUTION INTRAMUSCULAR; INTRAVENOUS; SUBCUTANEOUS
Status: DISCONTINUED | OUTPATIENT
Start: 2018-02-06 | End: 2018-02-08 | Stop reason: HOSPADM

## 2018-02-06 RX ORDER — POLYETHYLENE GLYCOL 3350 17 G/17G
17 POWDER, FOR SOLUTION ORAL DAILY PRN
Status: DISCONTINUED | OUTPATIENT
Start: 2018-02-06 | End: 2018-02-08 | Stop reason: HOSPADM

## 2018-02-06 RX ORDER — ACETAMINOPHEN 325 MG/1
650 TABLET ORAL EVERY 4 HOURS PRN
Status: DISCONTINUED | OUTPATIENT
Start: 2018-02-06 | End: 2018-02-08 | Stop reason: HOSPADM

## 2018-02-06 RX ORDER — HYDROMORPHONE HCL/0.9% NACL/PF 0.2MG/0.2
0.2 SYRINGE (ML) INTRAVENOUS
Status: CANCELLED | OUTPATIENT
Start: 2018-02-06

## 2018-02-06 RX ORDER — IPRATROPIUM BROMIDE AND ALBUTEROL SULFATE 2.5; .5 MG/3ML; MG/3ML
3 SOLUTION RESPIRATORY (INHALATION) ONCE
Status: COMPLETED | OUTPATIENT
Start: 2018-02-06 | End: 2018-02-06

## 2018-02-06 RX ORDER — WARFARIN SODIUM 1 MG/1
1 TABLET ORAL ONCE
Status: COMPLETED | OUTPATIENT
Start: 2018-02-06 | End: 2018-02-06

## 2018-02-06 RX ORDER — ONDANSETRON 2 MG/ML
4 INJECTION INTRAMUSCULAR; INTRAVENOUS EVERY 6 HOURS PRN
Status: DISCONTINUED | OUTPATIENT
Start: 2018-02-06 | End: 2018-02-08 | Stop reason: HOSPADM

## 2018-02-06 RX ORDER — ONDANSETRON 4 MG/1
4 TABLET, ORALLY DISINTEGRATING ORAL EVERY 6 HOURS PRN
Status: DISCONTINUED | OUTPATIENT
Start: 2018-02-06 | End: 2018-02-08 | Stop reason: HOSPADM

## 2018-02-06 RX ORDER — METOPROLOL SUCCINATE 50 MG/1
50 TABLET, EXTENDED RELEASE ORAL 2 TIMES DAILY
Status: DISCONTINUED | OUTPATIENT
Start: 2018-02-06 | End: 2018-02-08 | Stop reason: HOSPADM

## 2018-02-06 RX ORDER — HYDROMORPHONE HCL/0.9% NACL/PF 0.2MG/0.2
0.2 SYRINGE (ML) INTRAVENOUS ONCE
Status: COMPLETED | OUTPATIENT
Start: 2018-02-06 | End: 2018-02-06

## 2018-02-06 RX ORDER — LACTOBACILLUS RHAMNOSUS GG 10B CELL
1 CAPSULE ORAL EVERY EVENING
Status: DISCONTINUED | OUTPATIENT
Start: 2018-02-06 | End: 2018-02-08 | Stop reason: HOSPADM

## 2018-02-06 RX ORDER — BUDESONIDE AND FORMOTEROL FUMARATE DIHYDRATE 160; 4.5 UG/1; UG/1
2 AEROSOL RESPIRATORY (INHALATION)
Status: DISCONTINUED | OUTPATIENT
Start: 2018-02-06 | End: 2018-02-06 | Stop reason: CLARIF

## 2018-02-06 RX ORDER — LEVOTHYROXINE SODIUM 50 UG/1
50 TABLET ORAL DAILY
Status: DISCONTINUED | OUTPATIENT
Start: 2018-02-07 | End: 2018-02-08 | Stop reason: HOSPADM

## 2018-02-06 RX ORDER — IPRATROPIUM BROMIDE AND ALBUTEROL SULFATE 2.5; .5 MG/3ML; MG/3ML
3 SOLUTION RESPIRATORY (INHALATION) EVERY 4 HOURS PRN
Status: DISCONTINUED | OUTPATIENT
Start: 2018-02-06 | End: 2018-02-08 | Stop reason: HOSPADM

## 2018-02-06 RX ADMIN — Medication 0.2 MG: at 16:29

## 2018-02-06 RX ADMIN — IPRATROPIUM BROMIDE AND ALBUTEROL SULFATE 3 ML: .5; 3 SOLUTION RESPIRATORY (INHALATION) at 14:20

## 2018-02-06 RX ADMIN — Medication 1 CAPSULE: at 22:12

## 2018-02-06 RX ADMIN — RANITIDINE 150 MG: 150 TABLET ORAL at 22:12

## 2018-02-06 RX ADMIN — WARFARIN SODIUM 1 MG: 1 TABLET ORAL at 22:12

## 2018-02-06 RX ADMIN — METOPROLOL SUCCINATE 50 MG: 50 TABLET, EXTENDED RELEASE ORAL at 22:12

## 2018-02-06 ASSESSMENT — ENCOUNTER SYMPTOMS
FEVER: 0
NAUSEA: 0
APPETITE CHANGE: 0
CHILLS: 0
DIARRHEA: 0
VOMITING: 0
WOUND: 0
BACK PAIN: 1
COUGH: 1
WEAKNESS: 1
DIZZINESS: 0
BLOOD IN STOOL: 0
HEADACHES: 0
COLOR CHANGE: 0
DYSURIA: 0
LIGHT-HEADEDNESS: 0
SHORTNESS OF BREATH: 1
HEMATURIA: 0
FREQUENCY: 0
ABDOMINAL PAIN: 1
SPEECH DIFFICULTY: 0

## 2018-02-06 NOTE — ED PROVIDER NOTES
"  History     Chief Complaint   Patient presents with     Back Pain     for 5 weeks, was supposed to have clinic appointment today but could not get in car, clinic told to \"call ambulance to make her appointment\"     HPI  Ellie Leigh is a 87 year old female with significant past medical history who presents to the emergency department today with concerns over persistent back pain for approximately the last 5 weeks.  Patient states that she initially thought that she may have injured her back leaning forward to put something in the  however she is unsure.  She complains of constant dull pain in low back bilaterally, left greater than right with intermittent sharp shooting pains which are typically brought on by lifting her legs, changes in position.  She denies any significant numbness, paresthesias in her lower extremity, groin.  She complains of chronic shortness of breath, diffuse abdominal pain it is unclear if they have changed significantly in the last several days since onset of symptoms and states that she has had bloody noses for the last two night which have lasted approximately 5 minutes each  She was evaluated in the clinic several weeks ago on 1/26/18 had X-ray at that time, report available under imaging tab and was given referral to sports medicine with appointment scheduled today. Patient and family who present with her states that pain has become incapacitating which has affected her ability to ambulate.  Prior to onset of back pain patient had been able to walk with the assistance of a walker.  In the last 2 weeks patient and family stated that he has become so severe that she is unable to stand or walk any distance.  She requires assistance of at least one person to transfer her from her bed to a wheelchair to transport her to go the the bathroom or into areas where she can perform activities of daily living.  Patient does live with family who helps to assist with this however primary " caregiver needs to be with her  who is having surgical procedure tomorrow and remaining family do not feel comfortable caring for her.      Problem List:    Patient Active Problem List    Diagnosis Date Noted     Nausea 06/05/2017     Priority: Medium     Elevated troponin 08/21/2016     Priority: Medium     Irritable bowel syndrome without diarrhea 05/02/2016     Priority: Medium     Unresponsive episode 03/18/2016     Priority: Medium     Chest pain at rest 12/07/2015     Priority: Medium     Mild persistent asthma without complication 12/01/2015     Priority: Medium     Long term current use of anticoagulant therapy 03/02/2015     Priority: Medium     Problem list name updated by automated process. Provider to review       Hemoptysis 01/21/2015     Priority: Medium     Syncope 01/12/2015     Priority: Medium     Advance Care Planning 01/09/2015     Priority: Medium     Advance Care Planning 7/2/2015: Receipt of ACP document:  Received: Health Care Directive which was witnessed or notarized on 1-9-15.  Document previously scanned on 2-27-15.  Validation form completed and sent to be scanned.  Code Status reflects choices in most recent ACP document.  Confirmed/documented designated decision maker(s).  Added by Verónica Green RN, System ACP Coordinator Honoring Choices   1/9/15 Copy to be scanned into chart Beulah Mathis CMA         COPD (chronic obstructive pulmonary disease) (H) 10/06/2014     Priority: Medium     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) 07/07/2014     Priority: Medium     Cause TBD.  Patient has had lung CT, will see Dr Gómez for consideration of further workup.  Also has pulmonology appt 8/18.       Positive fecal occult blood test 07/07/2014     Priority: Medium     x2 -- first was in ED.  Patient expresses that she does not want colonoscopy.  She'll set appt to discuss with her PCP.       Benign essential HTN 02/11/2014     Priority: Medium     BPPV (benign paroxysmal  positional vertigo) 11/05/2013     Priority: Medium     History of skin cancer 05/07/2013     Priority: Medium     Recurrent UTI 07/16/2012     Priority: Medium     recurrent UTI  Recent Urologic workup neg, 2005  Has Cipro at home for treatment as needed       Hypothyroidism 05/07/2012     Priority: Medium     Hyponatremia 05/07/2012     Priority: Medium     Squamous cell carcinoma in situ of skin of face 04/19/2012     Priority: Medium     SK (seborrheic keratosis) 04/19/2012     Priority: Medium     Intermittent atrial fibrillation (H) 12/01/2011     Priority: Medium     Cervical vertebral fracture (H) 11/20/2011     Priority: Medium     Anxiety 11/15/2011     Priority: Medium     DVT (deep venous thrombosis) 10/12/2011     Priority: Medium     H/o in past, on current coumadin therapy.       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 Provider to review       Right arm weakness 07/29/2011     Priority: Medium     Ulnar neuropathy 07/29/2011     Priority: Medium     Health Care Home 04/21/2011     Priority: Medium     Conchis Robles -[phone]  A / [facility]              DX V65.8 REPLACED WITH [id number] HEALTH CARE HOME (04/08/2013)       Chronic constipation 03/03/2011     Priority: Medium     Osteoporosis 03/03/2011     Priority: Medium     Hyperlipidemia LDL goal <130 10/31/2010     Priority: Medium     Infectious colitis, enteritis and gastroenteritis 07/10/2010     Priority: Medium     Chronic allergic conjunctivitis 11/18/2008     Priority: Medium     Atopic rhinitis 11/18/2008     Priority: Medium     (Problem list name updated by automated process. Provider to review and confirm.)       Rhinitis, allergic seasonal 11/18/2008     Priority: Medium     Esophageal reflux 11/29/2007     Priority: Medium     PERSONAL HISTORY OF MALIGNANCY- BREAST 06/13/2007     Priority: Medium      Disease of lung 12/27/2006     Priority: Medium     Pt with chronic RUL infiltrate with pos AFB sputum  Full treatmetn for TB following ID consult in 2000's  Problem list name updated by automated process. Provider to review       Sensorineural hearing loss, asymmetrical 06/20/2005     Priority: Medium     Generalized osteoarthrosis, unspecified site 06/20/2005     Priority: Medium     Right hip and knee are the most symptomatic        Past Medical History:    Past Medical History:   Diagnosis Date     Breast cancer (H)      COPD (chronic obstructive pulmonary disease) (H)      Dust allergy      DVT (deep vein thrombosis) in pregnancy (H)      HTN (hypertension)      Hyponatremia      Hypothyroid      Intermittent atrial fibrillation (H) 12/1/2011     Loose body in joint 4/15/2011     Neck fracture (H)      Syncope 5/12/2013     Past Surgical History:    Past Surgical History:   Procedure Laterality Date     APPENDECTOMY       ARTHROSCOPY KNEE  4/15/2011    Procedure:ARTHROSCOPY KNEE; removal of loose body; Surgeon:LEY, JEFFREY DUANE; Location:[facility]     CHOLECYSTECTOMY       ESOPHAGOSCOPY, GASTROSCOPY, DUODENOSCOPY (EGD), COMBINED  6/9/2014    Procedure: COMBINED ESOPHAGOSCOPY, GASTROSCOPY, DUODENOSCOPY (EGD);  Surgeon: Gilberto Richard MD;  Location: [facility]     IMPLANT PACEMAKER  5/21/2013    Biotronik Moder [id number] Serial#[id number]     INJECT EPIDURAL LUMBAR  3/23/2011    INJECT EPIDURAL LUMBAR performed by GENERIC ANESTHESIA PROVIDER at [facility]     JOINT REPLACEMENT, HIP RT/LT      Joint Replacement Hip Rt     MASTECTOMY, SIMPLE RT/LT/CAITLYN      Left breast - following breast ca     OTHER SURGICAL HISTORY      C1-C2 fusion after fx      SURGICAL HISTORY OF -   05/22/2001    Colonoscopy     TONSILLECTOMY       Family History:    Family History   Problem Relation Age of Onset     CEREBROVASCULAR DISEASE Mother      HEART DISEASE Mother      MI     CEREBROVASCULAR DISEASE Father      HEART DISEASE Father      MI      Respiratory Maternal Grandfather      TB     Neurologic Disorder Brother      ALS     HEART DISEASE Brother      CEREBROVASCULAR DISEASE Brother      Breast Cancer Daughter      age:49     Asthma Brother      CANCER Brother      brain and lung     CANCER Daughter      thyroid     Social History:  Marital Status:   [5]  Social History   Substance Use Topics     Smoking status: Never Smoker     Smokeless tobacco: Never Used     Alcohol use No     Medications:      ipratropium - albuterol 0.5 mg/2.5 mg/3 mL (DUONEB) 0.5-2.5 (3) MG/3ML neb solution   warfarin (COUMADIN) 1 MG tablet   diclofenac (VOLTAREN) 1 % GEL topical gel   ranitidine (ZANTAC) 150 MG tablet   albuterol (PROAIR HFA/PROVENTIL HFA/VENTOLIN HFA) 108 (90 BASE) MCG/ACT Inhaler   metoprolol (TOPROL-XL) 25 MG 24 hr tablet   levothyroxine (SYNTHROID/LEVOTHROID) 50 MCG tablet   SYMBICORT 160-4.5 MCG/ACT Inhaler   probiotic CAPS   polyethylene glycol (MIRALAX) powder   CRANBERRY     Review of Systems   Constitutional: Negative for appetite change, chills and fever.   Respiratory: Positive for cough and shortness of breath.         Consistent with baseline per her report   Gastrointestinal: Positive for abdominal pain (chonic). Negative for blood in stool, diarrhea, nausea and vomiting.   Genitourinary: Negative for dysuria, frequency and hematuria.   Musculoskeletal: Positive for back pain and gait problem.   Skin: Negative for color change and wound.   Neurological: Positive for weakness. Negative for dizziness, syncope, speech difficulty, light-headedness and headaches.      Physical Exam   BP: 137/72  Heart Rate: 80  Temp: 97.5  F (36.4  C)  Resp: 12  SpO2: 95 %    Physical Exam   Constitutional: She is oriented to person, place, and time. No distress.   HENT:   Head: Normocephalic and atraumatic.   Scant amount of dried blood present in left nares    Eyes: Conjunctivae and EOM are normal. Pupils are equal, round, and reactive to light.    Cardiovascular: Normal rate, regular rhythm and normal heart sounds.  Exam reveals no gallop and no friction rub.    No murmur heard.  Pulmonary/Chest: Effort normal. No respiratory distress. She has wheezes (diffuse bilaterally). She has no rales.   Abdominal: Soft. A hernia (umbilical ) is present.   Musculoskeletal:        Right shoulder: She exhibits tenderness and pain. She exhibits no swelling and no deformity.        Thoracic back: Normal.        Lumbar back: She exhibits decreased range of motion, tenderness and pain. She exhibits no swelling, no edema and no deformity.   unable to assess ROM due to patient's pain level and functional status   Neurological: She is alert and oriented to person, place, and time. No sensory deficit. GCS eye subscore is 4. GCS verbal subscore is 5. GCS motor subscore is 6.   Reflex Scores:       Patellar reflexes are 1+ on the right side and 1+ on the left side.  strength with flexion of hip, flexion and extension of knees and great toe is 5/5 bilaterally.     Skin: Skin is warm and dry. No abrasion, no ecchymosis and no rash noted. No erythema.   Well healed surgical scare present over cervical spine      ED Course     ED Course     Procedures        Critical Care time:  none            Labs Ordered and Resulted from Time of ED Arrival Up to the Time of Departure from the ED   COMPREHENSIVE METABOLIC PANEL - Abnormal; Notable for the following:        Result Value    Sodium 123 (*)     Chloride 89 (*)     Glucose 109 (*)     Creatinine 0.31 (*)     Calcium 8.0 (*)     Albumin 3.3 (*)     All other components within normal limits   CBC WITH PLATELETS DIFFERENTIAL - Abnormal; Notable for the following:     Hemoglobin 11.1 (*)     Hematocrit 32.2 (*)     All other components within normal limits   INR - Abnormal; Notable for the following:     INR 1.95 (*)     All other components within normal limits   URINE MACROSCOPIC WITH REFLEX TO MICRO   URINE CULTURE AEROBIC BACTERIAL      Results for orders placed or performed during the hospital encounter of 02/06/18   Lumbar spine CT w/o contrast    Narrative    CT LUMBAR SPINE WITHOUT CONTRAST  2/6/2018 2:10 PM     HISTORY: Low back pain.      TECHNIQUE: Axial images of the lumbar spine were obtained without  intravenous contrast. Multiplanar reformations were performed.   Radiation dose for this scan was reduced using automated exposure  control, adjustment of the mA and/or kV according to patient size, or  iterative reconstruction technique.    COMPARISON: None.    FINDINGS:  Chronic-appearing loss of vertebral body height at L1 due  to inferior endplate Schmorl's node or old compression fracture. Mild  old inferior endplate compression fracture of L2 appears chronic with  some sclerosis. Similar chronic inferior endplate findings at L3.  These all have the appearance of Schmorl's nodes or somewhat focal  inferior endplate compression deformities that are likely chronic. No  definite acute fracture. Diffuse osteopenia. The findings at L1 are  stable. The inferior endplate compression deformities at L2 and L3 are  new since the comparison CT of almost two years ago but have sclerosis  and no definite visible lucent fracture lines, indicating they are  likely chronic. The distal spinal cord and conus medullaris appear  normal.  The paraspinous soft tissues appear normal.    T12-L1:  Normal disc, facet joints, spinal canal and neural foramina.     L1-L2:  Normal disc, facet joints, spinal canal and neural foramina.    L2-L3:  Mild broad-based disc bulge. This lateralizes to the right  resulting in mild to moderate right foraminal stenosis. Left neural  foramen is patent. No central stenosis. Facet joints are unremarkable.      L3-L4:  Mild disc bulge and facet hypertrophy. Mild central stenosis.  Mild bilateral foraminal stenosis.     L4-L5:  Moderate facet degenerative changes. Mild disc bulge and  osteophytic ridging. Mild central stenosis.  Neural foramina are  patent.     L5-S1:  Prominent left and moderate right facet degenerative changes.  Mild disc bulge. No central stenosis. There is a slightly more focal  left foraminal disc protrusion resulting in at least moderate left  foraminal stenosis. Right neural foramen is patent. No central  stenosis.      Impression    IMPRESSION:    1. Inferior endplate central compression deformities at L2 and L3 are  new since 3/10/2016 but have a chronic imaging appearance.  2. At L2-L3 there is mild to moderate right foraminal stenosis.  3. At L3-L4 there is mild central and bilateral foraminal stenosis.  4. At L4-L5 there is mild central stenosis.  5. At L5-S1 there is moderate left foraminal stenosis.  6. Mild progression of degenerative findings since the comparison  study.       VU HARLEY MD     *Note: Due to a large number of results and/or encounters for the requested time period, some results have not been displayed. A complete set of results can be found in Results Review.     Assessments & Plan (with Medical Decision Making)     I have reviewed the nursing notes.    I have reviewed the findings, diagnosis, plan and need for follow up with the patient.  New Prescriptions    No medications on file     Final diagnoses:   Hyponatremia   Acute bilateral low back pain with left-sided sciatica     87-year-old female with significant past medical history presents to the emergency department by ambulance with family with concerns over 5 week history of bilateral low back pain, left greater than right which has been progressively worsening since onset resulting in difficulty with ambulation and performing activities daily living.    She had stable vital signs upon arrival.  Physical exam findings as described above.  As part of evaluation patient did have laboratory testing including CBC showed mild anemia, metabolic panel showed hyponatremia, hypochloremia. Urinalysis was negative for infection.  And her INR  was to be slightly subtherapeutic at 1.95. I did also order CT of her lumbar spine as she was unable to tolerate MRI given her pacemaker status which showed inferior endplate central compression deformities at L2 and L3 which are new since prior exam but do appear chroni per radiology report and multilevel mild to moderate foraminal stenosis.  The plan was initially to admit patient to the hospital given for pain control and for social risk factors, (ability to care for her self, fall risk.  I did speak with Dr. Rowley regarding patient who stated concern over admitting patient here as there is not spinal surgical evaluation available and recommended I speak with spinal specialist for further recommendations.  I spoke with on call provider Dr. Hernadez who did agree to evaluate patient in clinic tomorrow.  I then discussed plan with family to discharge patient home with additional pain medications to definitive evaluation tomorrow.  Family stated persistent concerns with ability to safely care for patient in home.  I contacted hospitalist again who would speak with spine specialist directly.  Care of patient was then transferred to Dr. Anastacio Eason at time of discharge with patient's disposition status pending.      Disclaimer: This note consists of symbols derived from keyboarding, dictation, and/or voice recognition software. As a result, there may be errors in the script that have gone undetected.  Please consider this when interpreting information found in the chart.      2/6/2018   [facility] EMERGENCY DEPARTMENT     Joanna Cardona PA-C  02/06/18 2039

## 2018-02-06 NOTE — IP AVS SNAPSHOT
[facility]    [address] [address]    Phone:  [phone]    Fax:  [phone]                                       After Visit Summary   2/6/2018    Ellie Leigh    MRN: [id number]           After Visit Summary Signature Page     I have received my discharge instructions, and my questions have been answered. I have discussed any challenges I see with this plan with the nurse or doctor.    ..........................................................................................................................................  Patient/Patient Representative Signature      ..........................................................................................................................................  Patient Representative Print Name and Relationship to Patient    ..................................................               ................................................  Date                                            Time    ..........................................................................................................................................  Reviewed by Signature/Title    ...................................................              ..............................................  Date                                                            Time

## 2018-02-06 NOTE — TELEPHONE ENCOUNTER
Ellie's daughter says Ellie is in so much pain in back and leg that she almost faints. She has ortho apt this afternoon but says they can't lift her and don't even know how they will get her to this apt.   Call to RN>

## 2018-02-06 NOTE — IP AVS SNAPSHOT
"` `           [facility] SURGICAL: [phone]                                              INTERAGENCY TRANSFER FORM - NURSING   2018                    Hospital Admission Date: 2018  EARNESTINE JOSHI   : 1930  Sex: Female        Attending Provider: Saeid Sosa MD     Allergies:  Cephalosporins, Doxycycline, Sulfa Drugs, Penicillins    Infection:  None   Service:  INTERNAL MED    Ht:  1.473 m (4' 10\")   Wt:  52.3 kg (115 lb 4.8 oz)   Admission Wt:  52.3 kg (115 lb 4.8 oz)    BMI:  24.1 kg/m 2   BSA:  1.46 m 2            Patient PCP Information     Provider PCP Type    Josefina Gómez MD General      Current Code Status     Date Active Code Status Order ID Comments User Context       Prior      Code Status History     Date Active Date Inactive Code Status Order ID Comments User Context    2016  9:29 PM 2016  5:06 PM Full Code [id number]  Marilyn Mustafa MD Inpatient    3/18/2016  4:15 PM 3/20/2016  4:15 PM DNR/DNI [id number]  Joyce Ribeiro APRN CNP Inpatient    2015  4:41 PM 3/18/2016  4:15 PM Full Code [id number]  Jared Briggs LPN Outpatient    2015 10:22 PM 2015  3:49 PM Full Code [id number]  Rory Esquivel MD Inpatient    2015  9:51 PM 2015  9:09 PM Full Code [id number]  Misty Lentz MD Inpatient    2013 12:30 AM 2013  1:58 PM Full Code [id number]  Steffanie Lentz RN Inpatient    10/12/2011  9:54 AM 10/14/2011  7:06 PM Full Code [id number]  Neal Andersen Inpatient    2011  9:32 PM 2011  7:06 PM Full Code [id number]   Inpatient      Advance Directives        Scanned docmt in ACP Activity?           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) Medium  7/7/2014 Yes    COPD (chronic obstructive pulmonary disease) (H) Medium  10/6/2014 Yes    Irritable bowel syndrome without diarrhea Medium  5/2/2016 Yes    * (Principal)Intractable low back pain Medium  2/6/2018 Yes    Back pain Medium  2/6/2018 Yes      Non-Hospital Problems as of 2/8/2018              Priority Class Noted    Sensorineural hearing loss, asymmetrical Medium  6/20/2005    Generalized osteoarthrosis, unspecified site Medium  6/20/2005    Disease of lung Medium  12/27/2006    PERSONAL HISTORY OF MALIGNANCY- BREAST Medium  6/13/2007    Chronic allergic conjunctivitis Medium  11/18/2008    Atopic rhinitis Medium  11/18/2008    Rhinitis, allergic seasonal Medium  11/18/2008    Infectious colitis, enteritis and gastroenteritis Medium  7/10/2010    Hyperlipidemia LDL goal <130 Medium  10/31/2010    Chronic constipation Medium  3/3/2011    Osteoporosis Medium  3/3/2011    Health Care Home Medium  4/21/2011    Right arm weakness Medium  7/29/2011    Ulnar neuropathy Medium  7/29/2011    Headache Medium  8/19/2011    Cervical vertebral fracture (H) Medium  11/20/2011    Squamous cell carcinoma in situ of skin of face Medium  4/19/2012    SK (seborrheic keratosis) Medium  4/19/2012    Recurrent UTI Medium  7/16/2012    History of skin cancer Medium  5/7/2013    BPPV (benign paroxysmal positional vertigo) Medium  11/5/2013    Positive fecal occult blood test Medium  7/7/2014    Advance Care Planning Medium  1/9/2015    Syncope Medium  1/12/2015    Hemoptysis Medium  1/21/2015    Long term current use of anticoagulant therapy Medium  3/2/2015    Mild persistent asthma without complication Medium  12/1/2015    Chest pain at rest Medium  12/7/2015    Unresponsive episode Medium   3/18/2016    Elevated troponin Medium  8/21/2016    Nausea Medium  6/5/2017      Immunizations     Name Date      Influenza (High Dose) 3 valent vaccine 09/01/17     Influenza (High Dose) 3 valent vaccine 11/04/16     Influenza (High Dose) 3 valent vaccine 12/01/15     Influenza (High Dose) 3 valent vaccine 10/01/14     Influenza (High Dose) 3 valent vaccine 11/05/13     Influenza (IIV3) PF 10/29/08     Influenza (IIV3) PF 10/15/08     Influenza (IIV3) PF 10/26/07     Influenza (IIV3) PF 12/07/06     Pneumo Conj 13-V (2010&after) 01/12/15     Pneumococcal 23 valent 09/01/96     TD (ADULT, 7+) 07/11/08     TD (ADULT, 7+) 01/01/94          END      ASSESSMENT     Discharge Profile Flowsheet     EXPECTED DISCHARGE     Resources List  Skilled Nursing Facility;Transitional Care;Home Care 02/07/18 1242    Expected Discharge Date  02/08/18 02/07/18 1242   Referrals Placed  -- (N/A) 08/07/17 1019    DISCHARGE NEEDS ASSESSMENT     Existing Resources/Services  Pulmonary Rehab 01/14/15 1556    Concerns To Be Addressed  all concerns addressed in this encounter;no discharge needs identified;denies needs/concerns at this time 12/29/17 1027   SKIN      Concerns Comments  -- (TBD) 08/07/17 1019   Inspection of bony prominences  Full 02/08/18 0452    Equipment Currently Used at Home  walker, rolling 02/07/18 1009   Inspection under devices  Full 02/08/18 0452    # of Referrals Placed by [facility]  Homecare 04/15/16 1056   Skin WDL  ex 02/08/18 0950    Does Patient Need a Referral for Clinic CC  No 04/15/16 1056   Skin Color/Characteristics  bruised (ecchymotic) 02/08/18 0950    Equipment Used at Home  grab bar;raised toilet;shower chair;walker, rolling 03/19/16 1501   Skin Moisture  moist (jalen area) 02/07/18 0053    GASTROINTESTINAL (ADULT,PEDIATRIC,OB)     Skin Integrity  bruise(s) 02/08/18 0452    GI WDL  WDL 02/08/18 0950   Additional Documentation  -- (ist check) 02/06/18 2012    Last Bowel Movement  02/06/18 02/08/18 0352   SAFETY    "   Passing flatus  yes 02/08/18 0352   Safety WDL  WDL 02/08/18 0950    COMMUNICATION ASSESSMENT     All Alarms  alarm(s) activated and audible 02/08/18 0950    Patient's communication style  spoken language (English or Bilingual) 02/06/18 2022   Additional Documentation  Airway Safety Measures (Row) 02/06/18 2012    FINAL RESOURCES                        Assessment WDL (Within Defined Limits) Definitions           Safety WDL     Effective: 09/28/15    Row Information: <b>WDL Definition:</b> Bed in low position, wheels locked; call light in reach; upper side rails up x 2; ID band on<br> <font color=\"gray\"><i>Item=AS safety wdl>>List=AS safety wdl>>Version=F14</i></font>      Skin WDL     Effective: 09/28/15    Row Information: <b>WDL Definition:</b> Warm; dry; intact; elastic; without discoloration; pressure points without redness<br> <font color=\"gray\"><i>Item=AS skin wdl>>List=AS skin wdl>>Version=F14</i></font>      Vitals     Vital Signs Flowsheet     VITAL SIGNS     Change in Pain  getting better 02/08/18 0456    Temp  95.3  F (35.2  C) 02/08/18 0748   Pain Control  partially effective 02/08/18 0456    Temp src  Oral 02/08/18 0748   Functioning  pain keeps me from doing most of what I need to do 02/08/18 0456    Resp  18 02/08/18 0748   Sleep  normal sleep 02/08/18 0456    Pulse  77 02/08/18 0344   ANALGESIA SIDE EFFECTS MONITORING      Heart Rate  66 02/08/18 0748   Side Effects Monitoring: Respiratory Quality  R 02/08/18 0456    Pulse/Heart Rate Source  Monitor 02/08/18 0748   Side Effects Monitoring: Respiratory Depth  N 02/08/18 0456    BP  135/70 02/08/18 0748   Side Effects Monitoring: Sedation Level  S 02/08/18 0456    BP Location  Right arm 02/08/18 0748   HEIGHT AND WEIGHT      OXYGEN THERAPY     Height  1.473 m (4' 10\") 02/06/18 2103    SpO2  92 % 02/08/18 0748   Height Method  Stated 02/06/18 2103    O2 Device  None (Room air) 02/08/18 0748   Weight  52.3 kg (115 lb 4.8 oz) 02/06/18 2103    PACEMAKER "     Weight Method  Bed scale 02/06/18 2103    Pacemaker  Permanent 02/08/18 0950   BSA (Calculated - sq m)  1.46 02/06/18 2103    PAIN/COMFORT     BMI (Calculated)  24.15 02/06/18 2103    Patient Currently in Pain  yes 02/08/18 0456   POSITIONING      Preferred Pain Scale  CAPA (Clinically Aligned Pain Assessment) ([facility] Adults Only) 02/08/18 0456   Body Position  independently positioning 02/08/18 0452    0-10 Pain Scale  4 02/07/18 0502   Head of Bed (HOB)  HOB at 20-30 degrees 02/07/18 1819    Pain Location  Hip 02/07/18 0506   Chair  Recline and up in chair 02/07/18 1451    Pain Orientation  Left 02/07/18 0506   Positioning/Transfer Devices  pillows 02/07/18 1819    Pain Descriptors  Sharp 02/07/18 0506   DAILY CARE      Pain Intervention(s)  Medication (See eMAR);Cold applied 02/07/18 0506   Activity Management  activity adjusted per tolerance 02/08/18 0452    CLINICALLY ALIGNED PAIN ASSESSMENT (CAPA) ([facility] ADULTS ONLY)     Activity Assistance Provided  assistance, 1 person 02/08/18 0452    Comfort  comfortably manageable 02/08/18 0456   Assistive Device Utilized  walker 02/08/18 0452            Patient Lines/Drains/Airways Status    Active LINES/DRAINS/AIRWAYS     Name: Placement date: Placement time: Site: Days: Last dressing change:    Peripheral IV 02/06/18 Right Lower forearm 02/06/18   1305   Lower forearm   2     Pressure Injury 02/06/18 Posterior Back NA 02/06/18   2000    1     Wound 02/06/18 Bilateral Groin Abrasion(s) groin folds red, moist, irritated 02/06/18   2114   Groin   1             Patient Lines/Drains/Airways Status    Active PICC/CVC     None            Intake/Output Detail Report     Date Intake     Output Net    Shift P.O. I.V. IV Piggyback Total Urine Total       Noc 02/06/18 2300 - 02/07/18 0659 100 -- -- 100 400 400 -300    Day 02/07/18 0700 - 02/07/18 1459 600 -- -- 600 800 800 -200    Trixie 02/07/18 1500 - 02/07/18 2259 240 -- -- 240 700 700  -460    Noc 02/07/18 2300 - 02/08/18 0659 -- -- -- -- 250 250 -250    Day 02/08/18 0700 - 02/08/18 1459 240 -- -- 240 -- -- 240      Last Void/BM       Most Recent Value    Urine Occurrence 1 at 02/07/2018 0828    Stool Occurrence       Case Management/Discharge Planning     Case Management/Discharge Planning Flowsheet     REFERRAL INFORMATION     ASSESSMENT/CONCERNS TO BE ADDRESSED      Did the Initial Social Work Assessment result in a Social Work Case?  Yes 02/07/18 1242   Concerns To Be Addressed  all concerns addressed in this encounter;no discharge needs identified;denies needs/concerns at this time 12/29/17 1027    Admission Type  observation 02/07/18 1242   Concerns Comments  -- (TBD) 08/07/17 1019    Arrived From  home or self-care 02/07/18 1242   DISCHARGE PLANNING      # of Referrals Placed by [facility]  Homecare 04/15/16 1056   Does Patient Need a Referral for Clinic CC  No 04/15/16 1056    Primary Care Clinic Name  -- ([facility]) 02/07/18 1242   Equipment Used at Home  grab bar;raised toilet;shower chair;walker, rolling 03/19/16 1501    Primary Care MD Name  -- ([facility]) 02/07/18 1242   Additional Equipment Needed  other (see comments) (set up with Holter Monitor prior to discharge) 05/13/13 1056    LIVING ENVIRONMENT     FINAL NOTE      Lives With  child(grupo), adult 02/07/18 1242   Final Note  Discharge to [facility] via family car 02/08/18 1206    Living Arrangements  house 02/07/18 1242   FINAL RESOURCES      Provides Primary Care For  no one 02/07/18 1242   Equipment Currently Used at Home  walker, rolling 02/07/18 1009    ASSESSMENT OF FAMILY/SOCIAL SUPPORT     Resources List  Skilled Nursing Facility;Transitional Care;Home Care 02/07/18 1242    Who is your support system?  Children 02/07/18 1242   Referrals Placed  -- (N/A) 08/07/17 1019    Description of Support System  Supportive;Involved 02/07/18 1242   Existing Resources/Services  Pulmonary Rehab 01/14/15 1556    Support Assessment  Adequate  family and caregiver support;Adequate social supports 02/07/18 1242   ABUSE RISK SCREEN      COPING/STRESS     QUESTION TO PATIENT:  Has a member of your family or a partner(now or in the past) intimidated, hurt, manipulated, or controlled you in any way?  no 02/06/18 2022    Major Change/Loss/Stressor  none 02/06/18 2022   QUESTION TO PATIENT: Do you feel safe going back to the place where you are living?  yes 02/06/18 2022    Patient Personal Strengths  resilient;self-reliant;motivated 06/10/16 1113   OBSERVATION: Is there reason to believe there has been maltreatment of a vulnerable adult (ie. Physical/Sexual/Emotional abuse, self neglect, lack of adequate food, shelter, medical care, or financial exploitation)?  no 02/06/18 2022    EXPECTED DISCHARGE     HOMICIDE RISK      Expected Discharge Date  02/08/18 02/07/18 1242   Feels Like Hurting Others  no 02/06/18 2022

## 2018-02-06 NOTE — IP AVS SNAPSHOT
"          [facility]: [phone]                                              INTERAGENCY TRANSFER FORM - LAB / IMAGING / EKG / EMG RESULTS   2018                    Hospital Admission Date: 2018  EARNESTINE JOSHI   : 1930  Sex: Female        Attending Provider: Saeid Sosa MD     Allergies:  Cephalosporins, Doxycycline, Sulfa Drugs, Penicillins    Infection:  None   Service:  INTERNAL MED    Ht:  1.473 m (4' 10\")   Wt:  52.3 kg (115 lb 4.8 oz)   Admission Wt:  52.3 kg (115 lb 4.8 oz)    BMI:  24.1 kg/m 2   BSA:  1.46 m 2            Patient PCP Information     Provider PCP Type    Josefina Gómez MD General         Lab Results - 3 Days      Basic metabolic panel [[id number]] (Abnormal)  Resulted: 18 0750, Result status: Final result    Ordering provider: Mickey Fields MD  18 0000 Resulting lab: [facility]    Specimen Information    Type Source Collected On   Blood  18 0720          Components       Value Reference Range Flag Lab   Sodium 123 133 - 144 mmol/L L 59   Potassium 4.0 3.4 - 5.3 mmol/L  59   Chloride 90 94 - 109 mmol/L L 59   Carbon Dioxide 27 20 - 32 mmol/L  59   Anion Gap 6 3 - 14 mmol/L  59   Glucose 98 70 - 99 mg/dL  59   Urea Nitrogen 15 7 - 30 mg/dL  59   Creatinine 0.35 0.52 - 1.04 mg/dL L 59   GFR Estimate >90 >60 mL/min/1.7m2  59   Comment:  Non  GFR Calc   GFR Estimate If Black >90 >60 mL/min/1.7m2  59   Comment:  African American GFR Calc   Calcium 8.0 8.5 - 10.1 mg/dL L 59            INR [[id number]] (Abnormal)  Resulted: 18 0743, Result status: Final result    Ordering provider: Herb Rowley MD  18 0000 Resulting lab: [facility]    Specimen Information    Type Source Collected On   Blood  18 0720          Components       Value Reference Range Flag Lab   INR 2.31 0.86 - 1.14 H 59            Urine Culture Aerobic Bacterial [[id number]] (Abnormal) "  Resulted: 02/07/18 2349, Result status: Final result    Ordering provider: Joanna Cardona PA-C  02/06/18 1252 Resulting lab: [facility]    Specimen Information    Type Source Collected On   Midstream Urine  02/06/18 1435          Components       Value Reference Range Flag Lab   Specimen Description Midstream Urine      Special Requests Specimen received in preservative   75   Culture Micro --  A 75   Result:         >100,000 colonies/mL  Enterococcus faecalis              Basic metabolic panel [[id number]] (Abnormal)  Resulted: 02/07/18 0808, Result status: Final result    Ordering provider: Mehul Eason MD  02/07/18 0001 Resulting lab: [facility]    Specimen Information    Type Source Collected On   Blood  02/07/18 0720          Components       Value Reference Range Flag Lab   Sodium 123 133 - 144 mmol/L L 59   Potassium 3.6 3.4 - 5.3 mmol/L  59   Chloride 89 94 - 109 mmol/L L 59   Carbon Dioxide 26 20 - 32 mmol/L  59   Anion Gap 8 3 - 14 mmol/L  59   Glucose 96 70 - 99 mg/dL  59   Urea Nitrogen 12 7 - 30 mg/dL  59   Creatinine 0.34 0.52 - 1.04 mg/dL L 59   GFR Estimate >90 >60 mL/min/1.7m2  59   Comment:  Non  GFR Calc   GFR Estimate If Black >90 >60 mL/min/1.7m2  59   Comment:  African American GFR Calc   Calcium 7.9 8.5 - 10.1 mg/dL L 59            INR [[id number]] (Abnormal)  Resulted: 02/07/18 0756, Result status: Final result    Ordering provider: Herb Rowley MD  02/07/18 0001 Resulting lab: [facility]    Specimen Information    Type Source Collected On   Blood  02/07/18 0720          Components       Value Reference Range Flag Lab   INR 2.12 0.86 - 1.14 H 59            CBC with platelets [[id number]] (Abnormal)  Resulted: 02/07/18 0752, Result status: Final result    Ordering provider: Herb Rowley MD  02/07/18 0001 Resulting lab: [facility]    Specimen Information    Type Source  Collected On   Blood  02/07/18 0720          Components       Value Reference Range Flag Lab   WBC 5.9 4.0 - 11.0 10e9/L  59   RBC Count 3.87 3.8 - 5.2 10e12/L  59   Hemoglobin 10.8 11.7 - 15.7 g/dL L 59   Hematocrit 31.3 35.0 - 47.0 % L 59   MCV 81 78 - 100 fl  59   MCH 27.9 26.5 - 33.0 pg  59   MCHC 34.5 31.5 - 36.5 g/dL  59   RDW 13.4 10.0 - 15.0 %  59   Platelet Count 335 150 - 450 10e9/L  59            UA reflex to Microscopic [[id number]] (Abnormal)  Resulted: 02/06/18 1518, Result status: Edited Result - FINAL    Ordering provider: Joanna Cardona PA-C  02/06/18 1252 Resulting lab: [facility]    Specimen Information    Type Source Collected On   Midstream Urine Urine Midstream 02/06/18 1435          Components       Value Reference Range Flag Lab   Color Urine Straw   59   Appearance Urine Slightly Cloudy   59   Glucose Urine Negative NEG^Negative mg/dL  59   Bilirubin Urine Negative NEG^Negative  59   Ketones Urine 10 NEG^Negative mg/dL A 59   Specific Gravity Urine 1.015 1.003 - 1.035  59   Blood Urine Trace NEG^Negative A 59   pH Urine 7.5 5.0 - 7.0 pH H 59   Protein Albumin Urine 10 NEG^Negative mg/dL A 59   Urobilinogen mg/dL Normal 0.0 - 2.0 mg/dL  59   Nitrite Urine Negative NEG^Negative  59   Leukocyte Esterase Urine Negative NEG^Negative  59   Source Midstream Urine   59   WBC Urine 2 0 - 2 /HPF  59   RBC Urine 2 0 - 2 /HPF  59   Bacteria Urine Moderate NEG^Negative /HPF A 59   Squamous Epithelial /HPF Urine 1 0 - 1 /HPF  59   Amorphous Crystals Moderate NEG^Negative /HPF A 59            Comprehensive metabolic panel [[id number]] (Abnormal)  Resulted: 02/06/18 1343, Result status: Final result    Ordering provider: Joanna Cardona PA-C  02/06/18 1252 Resulting lab: [facility]    Specimen Information    Type Source Collected On   Blood  02/06/18 1310          Components       Value Reference Range Flag Lab   Sodium 123 133 - 144 mmol/L L 59   Potassium 3.9 3.4 - 5.3  mmol/L  59   Chloride 89 94 - 109 mmol/L L 59   Carbon Dioxide 25 20 - 32 mmol/L  59   Anion Gap 9 3 - 14 mmol/L  59   Glucose 109 70 - 99 mg/dL H 59   Urea Nitrogen 15 7 - 30 mg/dL  59   Creatinine 0.31 0.52 - 1.04 mg/dL L 59   GFR Estimate >90 >60 mL/min/1.7m2  59   Comment:  Non  GFR Calc   GFR Estimate If Black >90 >60 mL/min/1.7m2  59   Comment:  African American GFR Calc   Calcium 8.0 8.5 - 10.1 mg/dL L 59   Bilirubin Total 0.4 0.2 - 1.3 mg/dL  59   Albumin 3.3 3.4 - 5.0 g/dL L 59   Protein Total 6.8 6.8 - 8.8 g/dL  59   Alkaline Phosphatase 97 40 - 150 U/L  59   ALT 22 0 - 50 U/L  59   AST 36 0 - 45 U/L  59            INR [[id number]] (Abnormal)  Resulted: 02/06/18 1333, Result status: Final result    Ordering provider: Joanna Cardona PA-C  02/06/18 1252 Resulting lab: [facility]    Specimen Information    Type Source Collected On   Blood  02/06/18 1310          Components       Value Reference Range Flag Lab   INR 1.95 0.86 - 1.14 H 59            CBC with platelets, differential [[id number]] (Abnormal)  Resulted: 02/06/18 1326, Result status: Final result    Ordering provider: Joanna Cardona PA-C  02/06/18 1252 Resulting lab: [facility]    Specimen Information    Type Source Collected On   Blood  02/06/18 1310          Components       Value Reference Range Flag Lab   WBC 5.8 4.0 - 11.0 10e9/L  59   RBC Count 4.00 3.8 - 5.2 10e12/L  59   Hemoglobin 11.1 11.7 - 15.7 g/dL L 59   Hematocrit 32.2 35.0 - 47.0 % L 59   MCV 81 78 - 100 fl  59   MCH 27.8 26.5 - 33.0 pg  59   MCHC 34.5 31.5 - 36.5 g/dL  59   RDW 13.8 10.0 - 15.0 %  59   Platelet Count 305 150 - 450 10e9/L  59   Diff Method Automated Method   59   % Neutrophils 57.8 %  59   % Lymphocytes 19.9 %  59   % Monocytes 18.9 %  59   % Eosinophils 2.4 %  59   % Basophils 0.7 %  59   % Immature Granulocytes 0.3 %  59   Absolute Neutrophil 3.4 1.6 - 8.3 10e9/L  59   Absolute Lymphocytes 1.2 0.8 - 5.3 10e9/L   59   Absolute Monocytes 1.1 0.0 - 1.3 10e9/L  59   Absolute Eosinophils 0.1 0.0 - 0.7 10e9/L  59   Absolute Basophils 0.0 0.0 - 0.2 10e9/L  59   Abs Immature Granulocytes 0.0 0 - 0.4 10e9/L  59            Testing Performed By     Lab - Abbreviation Name Director Address Valid Date Range    59 - Unknown [facility] Unknown [address] [facility] [address] 12/31/14 1006 - Present    75 - Unknown [facility] Unknown [address] [facility] [id number] 01/15/15 1019 - Present            Unresulted Labs (24h ago through future)    Start       Ordered    02/07/18 0600  INR  (warfarin (COUMADIN) Pharmacy Consult-INITIAL ORDER)  DAILY,   Routine      02/06/18 2007         Imaging Results - 3 Days      Lumbar spine CT w/o contrast [[id number]]  Resulted: 02/06/18 1508, Result status: Final result    Ordering provider: Joanna Cardona PA-C  02/06/18 1328 Resulted by: Neal Conway MD    Performed: 02/06/18 1402 - 02/06/18 1410 Resulting lab: RADIOLOGY RESULTS    Narrative:       CT LUMBAR SPINE WITHOUT CONTRAST  2/6/2018 2:10 PM     HISTORY: Low back pain.      TECHNIQUE: Axial images of the lumbar spine were obtained without  intravenous contrast. Multiplanar reformations were performed.   Radiation dose for this scan was reduced using automated exposure  control, adjustment of the mA and/or kV according to patient size, or  iterative reconstruction technique.    COMPARISON: None.    FINDINGS:  Chronic-appearing loss of vertebral body height at L1 due  to inferior endplate Schmorl's node or old compression fracture. Mild  old inferior endplate compression fracture of L2 appears chronic with  some sclerosis. Similar chronic inferior endplate findings at L3.  These all have the appearance of Schmorl's nodes or somewhat focal  inferior endplate compression deformities that are likely chronic. No  definite acute fracture. Diffuse osteopenia. The findings at L1 are  stable.  The inferior endplate compression deformities at L2 and L3 are  new since the comparison CT of almost two years ago but have sclerosis  and no definite visible lucent fracture lines, indicating they are  likely chronic. The distal spinal cord and conus medullaris appear  normal.  The paraspinous soft tissues appear normal.    T12-L1:  Normal disc, facet joints, spinal canal and neural foramina.     L1-L2:  Normal disc, facet joints, spinal canal and neural foramina.    L2-L3:  Mild broad-based disc bulge. This lateralizes to the right  resulting in mild to moderate right foraminal stenosis. Left neural  foramen is patent. No central stenosis. Facet joints are unremarkable.      L3-L4:  Mild disc bulge and facet hypertrophy. Mild central stenosis.  Mild bilateral foraminal stenosis.     L4-L5:  Moderate facet degenerative changes. Mild disc bulge and  osteophytic ridging. Mild central stenosis. Neural foramina are  patent.     L5-S1:  Prominent left and moderate right facet degenerative changes.  Mild disc bulge. No central stenosis. There is a slightly more focal  left foraminal disc protrusion resulting in at least moderate left  foraminal stenosis. Right neural foramen is patent. No central  stenosis.      Impression:       IMPRESSION:    1. Inferior endplate central compression deformities at L2 and L3 are  new since 3/10/2016 but have a chronic imaging appearance.  2. At L2-L3 there is mild to moderate right foraminal stenosis.  3. At L3-L4 there is mild central and bilateral foraminal stenosis.  4. At L4-L5 there is mild central stenosis.  5. At L5-S1 there is moderate left foraminal stenosis.  6. Mild progression of degenerative findings since the comparison  study.       VU HARLEY MD      Testing Performed By     Lab - Abbreviation Name Director Address Valid Date Range    104 - Rad lts RADIOLOGY RESULTS Unknown Unknown 02/16/05 1553 - Present            Encounter-Level Documents:     There are no  encounter-level documents.      Order-Level Documents:     There are no order-level documents.

## 2018-02-06 NOTE — ED NOTES
Pt up with assist of two to BSC, 225 clear yellow urine. tolerated activity well denies need for pain meds, urine sent. Family at bedside

## 2018-02-06 NOTE — LETTER
Transition Communication Hand-off for Care Transitions to Next Level of Care Provider    Name: Ellie Leigh  MRN #: [id number]  Primary Care Provider: Josefina Gómez  Primary Care MD Name:  ([place])  Primary Clinic: [address] [place] [address]  Primary Care Clinic Name:  ([facility])  Reason for Hospitalization:  Back pain [M54.9]  Hyponatremia [E87.1]  Acute bilateral low back pain with left-sided sciatica [M54.42]  Admit Date/Time: 2/6/2018 11:53 AM  Discharge Date: 2/8/18  Payor Source: Payor: UCARE / Plan: UCARE FOR SENIORS / Product Type: HMO /     Readmission Assessment Measure (BEVERLY) Risk Score/category: None         Reason for Communication Hand-off Referral: Fragility    Discharge Plan: [facility] (Phone: [phone] Admissions: [phone] Fax: [phone])    Concern for non-adherence with plan of care:   Y/N : No    Discharge Needs Assessment:  Needs       Most Recent Value    Equipment Currently Used at Home walker, rolling        Follow-up plan:  Future Appointments  Date Time Provider Department Center   2/27/2018 2:00 PM [facility]   4/3/2018 10:30 AM [facility]            CHRISTOPHER Young  [facility] [phone]/ Christelle [phone]    AVS/Discharge Summary is the source of truth; this is a helpful guide for improved communication of patient story

## 2018-02-06 NOTE — IP AVS SNAPSHOT
` ` Patient Information     Patient Name Sex Ellie Garcia ([id number]) Female 1930       Room Bed    2301 [id number]      Patient Demographics     Address Phone    [address]  [facility] [address] [phone] (Home)  NONE (Work)  [phone] (Mobile)      Patient Ethnicity & Race     Ethnic Group Patient Race    American White      Emergency Contact(s)     Name Relation Home Work Mobile    Suyapa Mittal Daughter [phone]    Ana Abarca Daughter [phone]      Documents on File        Status Date Received Description       Documents for the Patient    Insurance Card Received () 04     Face Sheet  () 07     Privacy Notice [facility] Received 11     Face Sheet  () 05     Face Sheet  () 05     Face Sheet  () 08     Insurance Card Received () 07 Medicare & bc sg    Consent Form  06     Other Accepted 06 MEDICARE FORM    Consent Form  06     Other Accepted 06     Consent Form  06     Consent Form  06     Consent Form  07     Other Accepted 07     Consent Form  07     Insurance Card Received () 07     Insurance Card Received () 08     Consent Form  08     Other Accepted 08     Consent Form  08     Consent Form  08     Other Accepted 08     Consent Form  09     Face Sheet Received () 09     External Medication Information Consent Accepted () 11     Face Sheet Received () 07/06/10     Patient ID Received () 11     Consent for Services - Hospital/Clinic Received () 10/26/10     Insurance Card Received () 13     Consent for Services - Hospital/Clinic Received () 11     Consent for Services - Hospital/Clinic Received () 11     HIM JOSUE Authorization   Ellie  Authorization    HIM JOSUE Authorization - File Only Accepted  [facility]    HIM JOSUE Authorization - File Only Accepted  [facility] JOSUE Authorization - File Only Accepted 11 [place] IS REQUESTING [facility] TO PROVIDE MED RECORDS    HIM JOSUE Authorization - File Only Accepted  MY CHART AUTHORIZATION    HIM JOSUE Authorization - File Only Accepted  [facility]    Consent for Services - Hospital/Clinic Received () 12/10/11     CMS IM for Patient Signature       HIM JOSUE Authorization  () 12 MONICO RIMA    External Medication Information Consent Accepted () 12     Consent to Communicate Received  Suyapa Mittal (Daughter)    Consent for Services - Hospital/Clinic Received () 12     External Medication Information Consent Accepted 13     Consent for EHR Access  13 Copied from existing Consent for services - C/HOD collected on 2012    [facility] Specified Other       Patient ID Received () 13     Consent for Services - Hospital/Clinic Received () 13     Insurance Card Received () 14     Insurance Card Received () 01/05/15 [facility] 2015    Consent to Communicate   2014 Auth to Discuss Protected Health Info with Suyapa Mittal and Monico Rima     Consent for Services - Hospital/Clinic Received () 14     Consent for Services - Hospital/Clinic Received () 14     Consent for Services - Hospital/Clinic       Advance Directives and Living Will Received 02/27/15 Health Care Directive 1/9/15    Advance Directives and Living Will Not Received  Validation of AD 1/9/15    Business/Insurance/Care Coordination/Health Form - Patient Received 04/02/15 are PT auth 3/3/15-4/20/15    Consent to Communicate  10/22/15 AUTHORIZATION TO DISCUSS PROTECTED HEALTH INFORMATION    Business/Insurance/Care Coordination/Health Form - Patient  10/22/15 ATTENDANCE AGREEMENT-  [facility]    Consent for Services - Hospital/Clinic Received () 12/30/15     Insurance Card Received () 16 UCARE    Consent for Services/Privacy Notice - Hospital/Clinic Received () 16     Consent for Services/Privacy Notice - Hospital/Clinic Received () 16     HIM JOSUE Authorization  16     Consent to Communicate Received 16     Patient ID Received 16 [place] no expiration date    Insurance Card Received 18 ucare    Consent for Services/Privacy Notice - Hospital/Clinic Received 17     HIM JOSUE Authorization  17     HIM JOSUE Authorization  17     HIM JOSUE Authorization  17     Care Everywhere Prospective Auth Received 10/25/17     HIM JOSUE Authorization  17     HIM JOSUE Authorization  12/15/17 info checked    Consent for Services - Hospital/Clinic  (Deleted)      Advance Directives and Living Will Not Received (Deleted)  not valid    Insurance Card  (Deleted)      Consent for Services - Hospital/Clinic  (Deleted)      Consent for Services - Hospital/Clinic  (Deleted)         Documents for the Encounter    CMS IM for Patient Signature       Observation Notice Received 18     CMS SANCHEZ for Patient Signature Received 18       Admission Information     Attending Provider Admitting Provider Admission Type Admission Date/Time    Saeid Sosa MD [facility], Herb GEE MD Emergency 18  1153    Discharge Date Hospital Service Auth/Cert Status Service Area     Internal Medicine Incomplete [facility]    Unit Room/Bed Admission Status       [facility] 2301/2301-01 Admission (Confirmed)       Admission     Complaint    Back pain      Hospital Account     Name Acct ID Class Status Primary Coverage    Ellie Leigh [id number] Observation Open UCARE - [employer]ARE FOR SENIORS            Guarantor Account (for Hospital Account #[id number])     Name Relation to Pt Service Area Active?  Acct Type    Ellie Leigh  FCS Yes Personal/Family    Address Phone          [address] [facility] [place] [address] [phone](H)  NONE(O)              Coverage Information (for Hospital Account #[id number])     F/O Payor/Plan Precert #    UCARE/UCARE FOR SENIORS     Subscriber Subscriber #    Ellie Leigh [id number]    Address Phone    [address]  [place] [address] [phone]

## 2018-02-06 NOTE — IP AVS SNAPSHOT
MRN:[id number]                      After Visit Summary   2/6/2018    Ellie Leihg    MRN: [id number]           Thank you!     Thank you for choosing [facility] for your care. Our goal is always to provide you with excellent care. Hearing back from our patients is one way we can continue to improve our services. Please take a few minutes to complete the written survey that you may receive in the mail after you visit with us. Thank you!        Patient Information     Date Of Birth          9/12/1930        Designated Caregiver       Most Recent Value    Caregiver    Will someone help with your care after discharge? -- [up for discussion]      About your hospital stay     You were admitted on:  February 6, 2018 You last received care in the:  [facility]    You were discharged on:  February 8, 2018       Who to Call     For medical emergencies, please call 911.  For non-urgent questions about your medical care, please call your primary care provider or clinic, [phone]          Attending Provider     Provider Specialty    Joanna Cardona PA-C Physician Assistant    Mehul Eason MD Emergency Medicine    [facility], Herb GEE MD [facility]    [place], Mickey Shin MD Pulmonary    Hemmila, Saeid Valadez MD Internal Medicine       Primary Care Provider Office Phone # Fax #    Josefina Del Gómez [phone]      After Care Instructions     Activity - Up with assistive device           Activity - Up with assistive device           Activity - Up with nursing assistance           Advance Diet as Tolerated       Follow this diet upon discharge: 1500 mL fluid restriction.            General info for SNF       Length of Stay Estimate: Short Term Care: Estimated # of Days <30  Condition at Discharge: Improving  Level of care:skilled   Rehabilitation Potential: Good  Admission H&P remains valid and up-to-date: Yes  Recent Chemotherapy: N/A  Use Nursing Home  Standing Orders: Yes            Mantoux instructions       Give two-step Mantoux (PPD) Per Facility Policy Yes                  Follow-up Appointments     Follow Up and recommended labs and tests       Follow up with Dr. Pham in 1 weeks.                  Your next 10 appointments already scheduled     Feb 27, 2018  2:00 PM CST   [facility] with [place] [facility]TI [facility]   [facility]    [address] [facility] [place] [address]   [phone]            Apr 03, 2018 10:30 AM CDT   Remote PPM Check with REUDA TECH1   [facility] ([facility])    [address]  [place] [id number] [phone]           This appointment is for a remote check of your pacemaker.  This is not an appointment at the office.              Additional Services     Physical Therapy Adult Consult       Evaluate and treat as clinically indicated.    Reason:  Deconditoiing, back pain                  Further instructions from your care team       1.  Follow up with Dr. Frankie Pham in 1 weeks for follow up of your lumbar compression fracture.  Call [phone] if appointment needed or questions  2.  Use pain medication as directed  3.  LSO brace should be on while up for comfort, you may wear this brace while sleeping, however it should be removed often for skin checks.   4. Mobility is encouraged, as tolerated.    5. Warfarin Discharge Instructions: You were given 1 mg of warfarin today.  Continue your usual dose of 1.5 mg on Monday and 1 mg all the other days of the week.  Repeat INR on Monday at TCU.        Pending Results     No orders found from 2/4/2018 to 2/7/2018.            Statement of Approval     Ordered          02/08/18 1329  I have reviewed and agree with all the recommendations and orders detailed in this document.  EFFECTIVE NOW     Approved and electronically signed by:  Saeid Sosa MD             Admission  "Information     Date & Time Provider Department Dept. Phone    2018 Saeid Sosa MD [facility] Surgical [phone]      Your Vitals Were     Blood Pressure Pulse Temperature Respirations Height Weight    135/70 (BP Location: Right arm) 77 95.3  F (35.2  C) (Oral) 18 1.473 m (4' 10\") 52.3 kg (115 lb 4.8 oz)    Last Period Pulse Oximetry BMI (Body Mass Index)             06/15/1985 92% 24.1 kg/m2         [employer] Information     [employer] lets you send messages to your doctor, view your test results, renew your prescriptions, schedule appointments and more. To sign up, go to [email or web address] . Click on \"Log in\" on the left side of the screen, which will take you to the Welcome page. Then click on \"Sign up Now\" on the right side of the page.     You will be asked to enter the access code listed below, as well as some personal information. Please follow the directions to create your username and password.     Your access code is: [email or web address]  Expires: 3/8/2018  3:24 PM     Your access code will  in 90 days. If you need help or a new code, please call your [facility] clinic or [phone].        Care EveryWhere ID     This is your Care EveryWhere ID. This could be used by other organizations to access your [facility] medical records  [id number]        Equal Access to Services     BROOKS JOSÉ AH: Hadii daniel marceloo Somaurilio, waaxda luqadaha, qaybta kaalmada trini, verónica estrada. So [facility] [phone].    ATENCIÓN: Si habla español, tiene a sahni disposición servicios gratuitos de asistencia lingüística. Darius al [phone].    We comply with applicable federal civil rights laws and [place] laws. We do not discriminate on the basis of race, color, national origin, age, disability, sex, sexual orientation, or gender identity.               Review of your medicines      START taking        Dose / Directions    acetaminophen 325 MG tablet   Commonly known " as:  TYLENOL        Dose:  650 mg   Take 2 tablets (650 mg) by mouth 3 times daily   Quantity:  100 tablet   Refills:  0       traMADol 50 MG tablet   Commonly known as:  ULTRAM        Dose:  25-50 mg   Take 0.5-1 tablets (25-50 mg) by mouth every 6 hours as needed for pain (25 mg for pain rating 3-6, 50 mg for pain rating 7-10)   Quantity:  40 tablet   Refills:  0         CONTINUE these medicines which may have CHANGED, or have new prescriptions. If we are uncertain of the size of tablets/capsules you have at home, strength may be listed as something that might have changed.        Dose / Directions    polyethylene glycol powder   Commonly known as:  MIRALAX   This may have changed:    - when to take this  - additional instructions   Used for:  Constipation        Dose:  1 capful   Take 17 g by mouth daily as needed   Quantity:  510 g   Refills:  5         CONTINUE these medicines which have NOT CHANGED        Dose / Directions    albuterol 108 (90 BASE) MCG/ACT Inhaler   Commonly known as:  PROAIR HFA/PROVENTIL HFA/VENTOLIN HFA   Used for:  Mild persistent asthma without complication        Dose:  2 puff   Inhale 2 puffs into the lungs every 6 hours as needed for shortness of breath / dyspnea   Quantity:  3 Inhaler   Refills:  1       CRANBERRY        Dose:  475 mg   Take 475 mg by mouth 2 times daily.   Refills:  0       diclofenac 1 % Gel topical gel   Commonly known as:  VOLTAREN   Used for:  Primary osteoarthritis involving multiple joints        APPLY 4 GRAMS TO KNEES OR 2 GRAMS TO HANDS 4 TIMES DAILY USING ENCLOSED DOSING CARD   Quantity:  100 g   Refills:  11       ipratropium - albuterol 0.5 mg/2.5 mg/3 mL 0.5-2.5 (3) MG/3ML neb solution   Commonly known as:  DUONEB   Used for:  Chronic obstructive pulmonary disease, unspecified COPD type (H)        TAKE 1 VIAL BY NEBULIZATION  EVERY SIX HOURS AS NEEDED FOR SHORTNESS OF BREATH/ DYSPNEA OR WHEEZING   Quantity:  180 mL   Refills:  9       levothyroxine 50  MCG tablet   Commonly known as:  SYNTHROID/LEVOTHROID   Used for:  Hypothyroidism, unspecified type        TAKE 1 TABLET (50 MCG) BY MOUTH DAILY   Quantity:  90 tablet   Refills:  2       metoprolol succinate 25 MG 24 hr tablet   Commonly known as:  TOPROL-XL   Used for:  Essential hypertension, benign        TAKE TWO TABLETS BY MOUTH TWICE DAILY   Quantity:  120 tablet   Refills:  3       probiotic Caps        Dose:  1 capsule   Take 1 capsule by mouth every evening   Refills:  0       ranitidine 150 MG tablet   Commonly known as:  ZANTAC   Used for:  Gastroesophageal reflux disease without esophagitis        Dose:  150 mg   Take 1 tablet (150 mg) by mouth 2 times daily   Quantity:  60 tablet   Refills:  11       SYMBICORT 160-4.5 MCG/ACT Inhaler   Used for:  Mild persistent asthma without complication   Generic drug:  budesonide-formoterol        INHALE 2 PUFFS INTO THE LUNGS 2 TIMES DAILY   Quantity:  10.2 g   Refills:  5       warfarin 1 MG tablet   Commonly known as:  COUMADIN   Used for:  Intermittent atrial fibrillation (H)        Take 1.5 mg Mon, 1 mg rest of the days or as directed by Anticoagulation Clinic.   Quantity:  60 tablet   Refills:  0            Where to get your medicines      Some of these will need a paper prescription and others can be bought over the counter. Ask your nurse if you have questions.     Bring a paper prescription for each of these medications     traMADol 50 MG tablet       You don't need a prescription for these medications     acetaminophen 325 MG tablet                Protect others around you: Learn how to safely use, store and throw away your medicines at www.disposemymeds.org.        Information about OPIOIDS     PRESCRIPTION OPIOIDS: WHAT YOU NEED TO KNOW    Prescription opioids can be used to help relieve moderate to severe pain and are often prescribed following a surgery or injury, or for certain health conditions. These medications can be an important part of  treatment but also come with serious risks. It is important to work with your health care provider to make sure you are getting the safest, most effective care.    WHAT ARE THE RISKS AND SIDE EFFECTS OF OPIOID USE?  Prescription opioids carry serious risks of addiction and overdose, especially with prolonged use. An opioid overdose, often marked by slowed breathing can cause sudden death. The use of prescription opioids can have a number of side effects as well, even when taken as directed:      Tolerance - meaning you might need to take more of a medication for the same pain relief    Physical dependence - meaning you have symptoms of withdrawal when a medication is stopped    Increased sensitivity to pain    Constipation    Nausea, vomiting, and dry mouth    Sleepiness and dizziness    Confusion    Depression    Low levels of testosterone that can result in lower sex drive, energy, and strength    Itching and sweating    RISKS ARE GREATER WITH:    History of drug misuse, substance use disorder, or overdose    Mental health conditions (such as depression or anxiety)    Sleep apnea    Older age (65 years or older)    Pregnancy    Avoid alcohol while taking prescription opioids.   Also, unless specifically advised by your health care provider, medications to avoid include:    Benzodiazepines (such as Xanax or Valium)    Muscle relaxants (such as Soma or Flexeril)    Hypnotics (such as Ambien or Lunesta)    Other prescription opioids    KNOW YOUR OPTIONS:  Talk to your health care provider about ways to manage your pain that do not involve prescription opioids. Some of these options may actually work better and have fewer risks and side effects:    Pain relievers such as acetaminophen, ibuprofen, and naproxen    Some medications that are also used for depression or seizures    Physical therapy and exercise    Cognitive behavioral therapy, a psychological, goal-directed approach, in which patients learn how to modify  physical, behavioral, and emotional triggers of pain and stress    IF YOU ARE PRESCRIBED OPIOIDS FOR PAIN:    Never take opioids in greater amounts or more often than prescribed    Follow up with your primary health care provider and work together to create a plan on how to manage your pain.    Talk about ways to help manage your pain that do not involve prescription opioids    Talk about all concerns and side effects    Help prevent misuse and abuse    Never sell or share prescription opioids    Never use another person's prescription opioids    Store prescription opioids in a secure place and out of reach of others (this may include visitors, children, friends, and family)    Visit www.cdc.gov/drugoverdose to learn about risks of opioid abuse and overdose    If you believe you may be struggling with addiction, tell your health care provider and ask for guidance or call [facility]'s National Helpline at 8-182-360-HELP    LEARN MORE / www.cdc.gov/drugoverdose/prescribing/guideline.html    Safely dispose of unused prescription opioids: Find your local drug take-back programs and more information about the importance of safe disposal at www.doseofreality.[place].gov             Medication List: This is a list of all your medications and when to take them. Check marks below indicate your daily home schedule. Keep this list as a reference.      Medications           Morning Afternoon Evening Bedtime As Needed    acetaminophen 325 MG tablet   Commonly known as:  TYLENOL   Take 2 tablets (650 mg) by mouth 3 times daily   Last time this was given:  650 mg on 2/7/2018  8:18 AM                                albuterol 108 (90 BASE) MCG/ACT Inhaler   Commonly known as:  PROAIR HFA/PROVENTIL HFA/VENTOLIN HFA   Inhale 2 puffs into the lungs every 6 hours as needed for shortness of breath / dyspnea                                CRANBERRY   Take 475 mg by mouth 2 times daily.                                diclofenac 1 % Gel topical gel    Commonly known as:  VOLTAREN   APPLY 4 GRAMS TO KNEES OR 2 GRAMS TO HANDS 4 TIMES DAILY USING ENCLOSED DOSING CARD                                ipratropium - albuterol 0.5 mg/2.5 mg/3 mL 0.5-2.5 (3) MG/3ML neb solution   Commonly known as:  DUONEB   TAKE 1 VIAL BY NEBULIZATION  EVERY SIX HOURS AS NEEDED FOR SHORTNESS OF BREATH/ DYSPNEA OR WHEEZING   Last time this was given:  3 mLs on 2/6/2018  2:20 PM                                levothyroxine 50 MCG tablet   Commonly known as:  SYNTHROID/LEVOTHROID   TAKE 1 TABLET (50 MCG) BY MOUTH DAILY   Last time this was given:  50 mcg on 2/8/2018  8:37 AM                                metoprolol succinate 25 MG 24 hr tablet   Commonly known as:  TOPROL-XL   TAKE TWO TABLETS BY MOUTH TWICE DAILY   Last time this was given:  50 mg on 2/8/2018  8:37 AM                                polyethylene glycol powder   Commonly known as:  MIRALAX   Take 17 g by mouth daily as needed                                probiotic Caps   Take 1 capsule by mouth every evening                                ranitidine 150 MG tablet   Commonly known as:  ZANTAC   Take 1 tablet (150 mg) by mouth 2 times daily   Last time this was given:  150 mg on 2/8/2018  8:37 AM                                SYMBICORT 160-4.5 MCG/ACT Inhaler   INHALE 2 PUFFS INTO THE LUNGS 2 TIMES DAILY   Generic drug:  budesonide-formoterol                                traMADol 50 MG tablet   Commonly known as:  ULTRAM   Take 0.5-1 tablets (25-50 mg) by mouth every 6 hours as needed for pain (25 mg for pain rating 3-6, 50 mg for pain rating 7-10)   Last time this was given:  25 mg on 2/8/2018  3:36 PM                                warfarin 1 MG tablet   Commonly known as:  COUMADIN   Take 1.5 mg Mon, 1 mg rest of the days or as directed by Anticoagulation Clinic.   Last time this was given:  1 mg on 2/8/2018  3:36 PM

## 2018-02-06 NOTE — IP AVS SNAPSHOT
` `     [facility] SURGICAL: [phone]            Medication Administration Report for Ellie Leigh as of 02/08/18 1425   Legend:    Given Hold Not Given Due Canceled Entry Other Actions    Time Time (Time) Time  Time-Action       Inactive    Active    Linked        Medications 02/02/18 02/03/18 02/04/18 02/05/18 02/06/18 02/07/18 02/08/18    acetaminophen (TYLENOL) tablet 650 mg  Dose: 650 mg  Freq: EVERY 4 HOURS PRN Route: PO  PRN Reason: mild pain  Start: 02/06/18 2007   Admin Instructions: Alternate ibuprofen (if ordered) with acetaminophen.  Maximum acetaminophen dose from all sources = 75 mg/kg/day not to exceed 4 grams/day.          0136 (650 mg)-Given       0818 (650 mg)-Given            diclofenac (VOLTAREN) 1 % topical gel 2-4 g  Dose: 2-4 g  Freq: 4 TIMES DAILY PRN Route: Top  PRN Reason: moderate pain  Start: 02/06/18 2004   Admin Instructions: Apply to hands or knees  Send dosing card with product.               fluticasone-vilanterol (BREO ELLIPTA) 200-25 MCG/INH oral inhaler 1 puff  Dose: 1 puff  Freq: DAILY Route: IN  Start: 02/07/18 0800   Admin Instructions: *Do not use more frequently than once daily.*  Rinse mouth after use.          0815 (1 puff)-Given        0837 (1 puff)-Given           ipratropium - albuterol 0.5 mg/2.5 mg/3 mL (DUONEB) neb solution 3 mL  Dose: 3 mL  Freq: EVERY 4 HOURS PRN Route: NEBULIZATION  PRN Reason: wheezing  Start: 02/06/18 2003              lactobacillus rhamnosus (GG) (CULTURELL) capsule 1 capsule  Dose: 1 capsule  Freq: EVERY EVENING Route: PO  Start: 02/06/18 2030   Admin Instructions: Sub for home probiotic while at [facility]  Administer at least 2 hours before or after oral antibiotics. Capsules may be opened.         2212 (1 capsule)-Given        1754 (1 capsule)-Given        [ ] 1730           levothyroxine (SYNTHROID/LEVOTHROID) tablet 50 mcg  Dose: 50 mcg  Freq: DAILY Route: PO  Start: 02/07/18 0800   Admin Instructions: Separate oral  administration of iron- or calcium-containing products and levothyroxine by at least 4 hours.          0818 (50 mcg)-Given        0837 (50 mcg)-Given           metoprolol succinate (TOPROL-XL) 24 hr tablet 50 mg  Dose: 50 mg  Freq: 2 TIMES DAILY Route: PO  Start: 02/06/18 2015   Admin Instructions: DO NOT CRUSH. Tablet may be split in half along score line.  Hold for SBP < 100 or heart rate < 60         2212 (50 mg)-Given        0818 (50 mg)-Given       2000 (50 mg)-Given        0837 (50 mg)-Given       [ ] 2000           naloxone (NARCAN) injection 0.1-0.4 mg  Dose: 0.1-0.4 mg  Freq: EVERY 2 MIN PRN Route: IV  PRN Reason: opioid reversal  Start: 02/06/18 2011   Admin Instructions: For respiratory rate LESS than or EQUAL to 8.  Partial reversal dose:  0.1 mg titrated q 2 minutes for Analgesia Side Effects Monitoring Sedation Level of 3 (frequently drowsy, arousable, drifts to sleep during conversation).Full reversal dose:  0.4 mg bolus for Analgesia Side Effects Monitoring Sedation Level of 4 (somnolent, minimal or no response to stimulation).  For ordered doses up to 2mg give IVP. Give each 0.4mg over 15 seconds in emergency situations. For non-emergent situations further dilute in 9mL of NS to facilitate titration of response.               naloxone (NARCAN) injection 0.1-0.4 mg  Dose: 0.1-0.4 mg  Freq: EVERY 2 MIN PRN Route: IV  PRN Reason: opioid reversal  Start: 02/06/18 2007   Admin Instructions: For respiratory rate LESS than or EQUAL to 8.  Partial reversal dose:  0.1 mg titrated q 2 minutes for Analgesia Side Effects Monitoring Sedation Level of 3 (frequently drowsy, arousable, drifts to sleep during conversation).Full reversal dose:  0.4 mg bolus for Analgesia Side Effects Monitoring Sedation Level of 4 (somnolent, minimal or no response to stimulation).  For ordered doses up to 2mg give IVP. Give each 0.4mg over 15 seconds in emergency situations. For non-emergent situations further dilute in 9mL of NS to  facilitate titration of response.               ondansetron (ZOFRAN-ODT) ODT tab 4 mg  Dose: 4 mg  Freq: EVERY 6 HOURS PRN Route: PO  PRN Reasons: nausea,vomiting  Start: 02/06/18 2011   Admin Instructions: This is Step 1 of nausea and vomiting management.  If nausea not resolved in 15 minutes, go to Step 2 prochlorperazine (COMPAZINE). Do not push through foil backing. Peel back foil and gently remove. Place on tongue immediately. Administration with liquid unnecessary  With dry hands, peel back foil backing and gently remove tablet; do not push oral disintegrating tablet through foil backing; administer immediately on tongue and oral disintegrating tablet dissolves in seconds; then swallow with saliva; liquid not required.           0851 (4 mg)-Given          Or  ondansetron (ZOFRAN) injection 4 mg  Dose: 4 mg  Freq: EVERY 6 HOURS PRN Route: IV  PRN Reasons: nausea,vomiting  Start: 02/06/18 2011   Admin Instructions: This is Step 1 of nausea and vomiting management.  If nausea not resolved in 15 minutes, go to Step 2 prochlorperazine (COMPAZINE).  Irritant. For ordered doses up to 4 mg, give IV Push undiluted over 2-5 minutes.                      ondansetron (ZOFRAN-ODT) ODT tab 4 mg  Dose: 4 mg  Freq: EVERY 6 HOURS PRN Route: PO  PRN Reasons: nausea,vomiting  Start: 02/06/18 2007   Admin Instructions: This is Step 1 of nausea and vomiting management.  If nausea not resolved in 15 minutes, go to Step 2 prochlorperazine (COMPAZINE). Do not push through foil backing. Peel back foil and gently remove. Place on tongue immediately. Administration with liquid unnecessary  With dry hands, peel back foil backing and gently remove tablet; do not push oral disintegrating tablet through foil backing; administer immediately on tongue and oral disintegrating tablet dissolves in seconds; then swallow with saliva; liquid not required.              Or  ondansetron (ZOFRAN) injection 4 mg  Dose: 4 mg  Freq: EVERY 6 HOURS PRN  Route: IV  PRN Reasons: nausea,vomiting  Start: 02/06/18 2007   Admin Instructions: This is Step 1 of nausea and vomiting management.  If nausea not resolved in 15 minutes, go to Step 2 prochlorperazine (COMPAZINE).  Irritant. For ordered doses up to 4 mg, give IV Push undiluted over 2-5 minutes.               polyethylene glycol (MIRALAX/GLYCOLAX) Packet 17 g  Dose: 17 g  Freq: DAILY PRN Route: PO  PRN Reason: constipation  Start: 02/06/18 2006   Admin Instructions: 1 Packet = 17 grams. Mixed prescribed dose in 8 ounces of water.  1 Packet = 17 grams. Mixed prescribed dose in 8 ounces of water. Follow with 8 oz. of water.          1122 (17 g)-Given        1019 (17 g)-Given           ranitidine (ZANTAC) tablet 150 mg  Dose: 150 mg  Freq: 2 TIMES DAILY Route: PO  Start: 02/06/18 2015        2212 (150 mg)-Given        0818 (150 mg)-Given       2001 (150 mg)-Given        0837 (150 mg)-Given       [ ] 2000           traMADol (ULTRAM) half-tab 25-50 mg  Dose: 25-50 mg  Freq: EVERY 6 HOURS PRN Route: PO  PRN Reason: moderate pain  Start: 02/07/18 1359         1754 (50 mg)-Given        0342 (50 mg)-Given       1009 (25 mg)-Given           Warfarin Therapy Reminder (Check START DATE - warfarin may be starting in the FUTURE)  Dose: 1 each  Freq: CONTINUOUS PRN Route: XX  Start: 02/06/18 2007   Admin Instructions: *Note to reorder warfarin daily*  Pharmacy Warfarin Dosing Service  Patient is on Warfarin Therapy - check for daily order              Future Medications  Medications 02/02/18 02/03/18 02/04/18 02/05/18 02/06/18 02/07/18 02/08/18       warfarin (COUMADIN) tablet 1 mg  Dose: 1 mg  Freq: ONCE AT 6PM Route: PO  Start: 02/08/18 1800          [ ] 1800          Completed Medications  Medications 02/02/18 02/03/18 02/04/18 02/05/18 02/06/18 02/07/18 02/08/18         Dose: 0.2 mg  Freq: ONCE Route: IV  Start: 02/06/18 1627   End: 02/06/18 1629        1629 (0.2 mg)-Given               Dose: 3 mL  Freq: ONCE Route:  NEBULIZATION  Start: 02/06/18 1405   End: 02/06/18 1420        1420 (3 mL)-Given               Dose: 0.5 mg  Freq: ONCE AT 6PM Route: PO  Start: 02/07/18 1800   End: 02/07/18 1754         1754 (0.5 mg)-Given              Dose: 1 mg  Freq: ONCE Route: PO  Start: 02/06/18 2045   End: 02/06/18 2212        2212 (1 mg)-Given            Discontinued Medications  Medications 02/02/18 02/03/18 02/04/18 02/05/18 02/06/18 02/07/18 02/08/18         Dose: 2 puff  Freq: 2 TIMES DAILY Route: IN  Start: 02/06/18 2015   End: 02/06/18 2020   Admin Instructions: *Do not use more frequently than twice daily.*  Rinse mouth after use.                2020-Med Discontinued           Dose: 2.5-5 mg  Freq: EVERY 4 HOURS PRN Route: PO  PRN Reason: moderate to severe pain  Start: 02/06/18 2002   End: 02/07/18 1400         0502 (2.5 mg)-Given       1116 (2.5 mg)-Given       1400-Med Discontinued          Rate: 10 mL/hr Dose: 8 mg/hr  Freq: CONTINUOUS Route: IV  Start: 02/06/18 1538   End: 02/06/18 1539   Admin Instructions: Irritant.                1539-Med Discontinued           Dose: 1 capsule  Freq: EVERY EVENING Route: PO  Start: 02/06/18 2015   End: 02/06/18 2017               2017-Med Discontinued

## 2018-02-06 NOTE — TELEPHONE ENCOUNTER
S-(situation): daughter says Ellie is in a lot of pain    B-(background): daughter says pain scale is 10+/10, like wanting to scream.  Family unable to get her out of bed.  Ellie can not walk.  She has an orthopaedic appointment this afternoon at [place].    A-(assessment): high pain scale    R-(recommendations): advised go to the ED.    Celena Laurent RN

## 2018-02-06 NOTE — IP AVS SNAPSHOT
` `           [facility] SURGICAL: [phone]                 INTERAGENCY TRANSFER FORM - NOTES (H&P, Discharge Summary, Consults, Procedures, Therapies)   2018                    Hospital Admission Date: 2018  EARNESTINE JOSHI   : 1930  Sex: Female        Patient PCP Information     Provider PCP Type    Josefian Gómez MD General         History & Physicals      H&P by Herb Rowley MD at 2018  8:29 PM     Author:  Herb Rowley MD Service:  Hospitalist Author Type:  Physician    Filed:  2018  8:29 PM Date of Service:  2018  8:29 PM Creation Time:  2018  7:56 PM    Status:  Signed :  Herb Rowley MD (Physician)         [facility] History and Physical    Earnestine Joshi MRN# [id number]   Age: 87 year old YOB: 1930     Date of Admission:  2018      Primary care provider: Josefina Gómez          Assessment and Plan:[KK1.1]       Intractable low back pain[KK1.2]  2018 -- CT today showed Inferior endplate central compression deformities at L2 and L3 are new since 3/10/2016 but have a chronic imaging appearance along with progression of degenerative changes.  Suspect this is cause of her pain.  Both ER and I discussed with Dr. Hernadez on call for [facility] Ortho spine who was exteremly helpful - offered appointment tomorrow afternoon in clinic but patient and daughter don't feel safe with her at home so will be admitted with plan for ortho to see her tomorrow - likely PA to coordinate with spine surgeon unless surgeon is on site.  Likely plan is for oral pain control (started oxycodone 2.5-5 mg every 4 hours as needed, continue tylenol), and placement of semirigid brace tomorrow.  Hopeful discharge home tomorrow, but may need TCU if remains unable to care for herself.  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  Followed by PCP, unclear how compliant with fluid restriction she is.  Placed on 1L restriction while here which per notes is plan at home.  Recheck in AM.  Plan for ongoing outpatient follow-up with primary care provider.[KK1.1]        Esophageal reflux[KK1.2]  2/6/2018 -- continue prior to admission zantic twice daily.[KK1.1]         Mild major depression[KK1.2]/[KK1.1]Anxiety[KK1.2]  2/6/2018 -- mood and affect appropriate, not on any medications.         History of[KK1.1] DVT (deep venous thrombosis)[KK1.2]  2/6/2018 -- on coumadin as below.[KK1.1]        Intermittent atrial fibrillation (H)[KK1.2]  2/6/2018 -- rate controlled, continue metoprolol.  Pharmacy to dose coumadin.[KK1.1]         Hypothyroidism[KK1.2]  2/6/2018 -- TSH normal 1 month ago - continue prior to admission synthroid.[KK1.1]           Benign essential HTN[KK1.2]  2/6/2018 -- continue metoprolol with parameters.[KK1.1]          COPD (chronic obstructive pulmonary disease) (H)[KK1.2]  2/6/2018 -- at baseline, duonebs as needed in place of home albuterol inhaler.   Continue prior to admission Symbicort.[KK1.1]         Irritable bowel syndrome without diarrhea[KK1.2]  2/6/2018 -- asymptomatic at present.        Prophylaxis  On coumadin as above.      Lines  PIV    Disposition  Admit to observation.  Hope for discharge home tomorrow if ambulating with pain medications and brace.  May need TCU.                Chief Complaint:   Back pain   History is obtained from the patient and her daughter.            History of Present Illness:   This patient is a 87 year old  female with a significant past medical history of SAIDH, atrial fibrillation hypertension, prior DVT on coumadin, hypothyroidism and back pain who presents with worsened back pain.  Symptoms have been worse the past 4-5 weeks.  Unsure if she had an initial  "injury from leaning over her  or not, but has been worse throughout this time.  Saw primary care provider and was referred for physical therapy but thinks this just made it more sore.  Seen by primary care provider and had an x-ray 1/26 which showed probable compression fracture.  Was referred to sports med with planned appointment today.  However, pain today got to the point where she couldn't ambulate due to it so presented to ER instead.  Pain is across lower back, more on left than on right. Radiates down legs - sometimes down right as well but usually down left especially to knee but some down to foot - mostly radiates when she can't get in a comfortable position.  No numbness, no weakness, no loss of bowel or bladder control.  Location of pain unchanged but intensity worsened this AM.  Pain up to a 9/10 at home.  Only took tylenol for this, hasn't ever tried anything more than this.   In ER felt much improved at time of my evaluation after single dose of dilaudid with pain \"feeling good\" now.  However, when tried to ambulate patient and daughter report that pain was worse and she felt unsteady and feels unsafe to discharge home today.    No fever or chills.  No other new changes.  No light-headedness or syncope.    Has been being followed for hyponatremia due to SAIDH but unclear how compliant with fluid restriction she is.      Patient does live with family who helps to assist with this however primary caregiver needs to be with her  who is having surgical procedure tomorrow and remaining family do not feel comfortable caring for her.    Denies tobacco or alcohol use.    No recent medication changes.               Past Medical History:   I have reviewed this patient's past medical history.  Patient Active Problem List    Diagnosis Date Noted     Nausea 06/05/2017     Priority: Medium     Elevated troponin 08/21/2016     Priority: Medium     Irritable bowel syndrome without diarrhea 05/02/2016 "     Priority: Medium     Unresponsive episode 03/18/2016     Priority: Medium     Chest pain at rest 12/07/2015     Priority: Medium     Mild persistent asthma without complication 12/01/2015     Priority: Medium     Long term current use of anticoagulant therapy 03/02/2015     Priority: Medium     Problem list name updated by automated process. Provider to review       Hemoptysis 01/21/2015     Priority: Medium     Syncope 01/12/2015     Priority: Medium     Advance Care Planning 01/09/2015     Priority: Medium     Advance Care Planning 7/2/2015: Receipt of ACP document:  Received: Health Care Directive which was witnessed or notarized on 1-9-15.  Document previously scanned on 2-27-15.  Validation form completed and sent to be scanned.  Code Status reflects choices in most recent ACP document.  Confirmed/documented designated decision maker(s).  Added by Verónica Green RN, System ACP Coordinator Honoring Choices   1/9/15 Copy to be scanned into chart Beulah Mathis CMA         COPD (chronic obstructive pulmonary disease) (H) 10/06/2014     Priority: Medium     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) 07/07/2014     Priority: Medium     Cause TBD.  Patient has had lung CT, will see Dr Gómez for consideration of further workup.  Also has pulmonology appt 8/18.       Positive fecal occult blood test 07/07/2014     Priority: Medium     x2 -- first was in ED.  Patient expresses that she does not want colonoscopy.  She'll set appt to discuss with her PCP.       Benign essential HTN 02/11/2014     Priority: Medium     BPPV (benign paroxysmal positional vertigo) 11/05/2013     Priority: Medium     History of skin cancer 05/07/2013     Priority: Medium     Recurrent UTI 07/16/2012     Priority: Medium     recurrent UTI  Recent Urologic workup neg, 2005  Has Cipro at home for treatment as needed       Hypothyroidism 05/07/2012     Priority: Medium     Hyponatremia 05/07/2012     Priority: Medium     Squamous cell  carcinoma in situ of skin of face 04/19/2012     Priority: Medium     SK (seborrheic keratosis) 04/19/2012     Priority: Medium     Intermittent atrial fibrillation (H) 12/01/2011     Priority: Medium     Cervical vertebral fracture (H) 11/20/2011     Priority: Medium     Anxiety 11/15/2011     Priority: Medium     DVT (deep venous thrombosis) 10/12/2011     Priority: Medium     H/o in past, on current coumadin therapy.       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 Provider to review       Right arm weakness 07/29/2011     Priority: Medium     Ulnar neuropathy 07/29/2011     Priority: Medium     Health Care Home 04/21/2011     Priority: Medium     Conchsi Robles [phone]  A / [facility]              DX V65.8 REPLACED WITH [id number] HEALTH CARE HOME (04/08/2013)       Chronic constipation 03/03/2011     Priority: Medium     Osteoporosis 03/03/2011     Priority: Medium     Hyperlipidemia LDL goal <130 10/31/2010     Priority: Medium     Infectious colitis, enteritis and gastroenteritis 07/10/2010     Priority: Medium     Chronic allergic conjunctivitis 11/18/2008     Priority: Medium     Atopic rhinitis 11/18/2008     Priority: Medium     (Problem list name updated by automated process. Provider to review and confirm.)       Rhinitis, allergic seasonal 11/18/2008     Priority: Medium     Esophageal reflux 11/29/2007     Priority: Medium     PERSONAL HISTORY OF MALIGNANCY- BREAST 06/13/2007     Priority: Medium     Disease of lung 12/27/2006     Priority: Medium     Pt with chronic RUL infiltrate with pos AFB sputum  Full treatmetn for TB following ID consult in 2000's  Problem list name updated by automated process. Provider to review       Sensorineural hearing loss, asymmetrical 06/20/2005     Priority: Medium     Generalized osteoarthrosis, unspecified site 06/20/2005      Priority: Medium     Right hip and knee are the most symptomatic                Past Surgical History:   I have reviewed this patient's past surgical history   Past Surgical History:   Procedure Laterality Date     APPENDECTOMY       ARTHROSCOPY KNEE  4/15/2011    Procedure:ARTHROSCOPY KNEE; removal of loose body; Surgeon:LEY, JEFFREY DUANE; Location:[facility]     CHOLECYSTECTOMY       ESOPHAGOSCOPY, GASTROSCOPY, DUODENOSCOPY (EGD), COMBINED  6/9/2014    Procedure: COMBINED ESOPHAGOSCOPY, GASTROSCOPY, DUODENOSCOPY (EGD);  Surgeon: Gilberto Richard MD;  Location: [facility]     IMPLANT PACEMAKER  5/21/2013    Biotronik Moderl [id number] Serial#[id number]     INJECT EPIDURAL LUMBAR  3/23/2011    INJECT EPIDURAL LUMBAR performed by GENERIC ANESTHESIA PROVIDER at [facility] OR     JOINT REPLACEMENT, HIP RT/LT      Joint Replacement Hip Rt     MASTECTOMY, SIMPLE RT/LT/CAITLYN      Left breast - following breast ca     OTHER SURGICAL HISTORY      C1-C2 fusion after fx      SURGICAL HISTORY OF -   05/22/2001    Colonoscopy     TONSILLECTOMY               Social History:   I have reviewed this patient's social history   Social History   Substance Use Topics     Smoking status: Never Smoker     Smokeless tobacco: Never Used     Alcohol use No             Family History:   I have reviewed this patient's family history  Family History   Problem Relation Age of Onset     CEREBROVASCULAR DISEASE Mother      HEART DISEASE Mother      MI     CEREBROVASCULAR DISEASE Father      HEART DISEASE Father      MI     Respiratory Maternal Grandfather      TB     Neurologic Disorder Brother      ALS     HEART DISEASE Brother      CEREBROVASCULAR DISEASE Brother      Breast Cancer Daughter      age:49     Asthma Brother      CANCER Brother      brain and lung     CANCER Daughter      thyroid             Immunizations:   Immunizations are current          Allergies:     Allergies   Allergen Reactions     Cephalosporins Nausea     Cefzil     Doxycycline Nausea and  Vomiting     Sulfa Drugs Nausea     Penicillins Rash     PCN             Medications:     Prescriptions Prior to Admission   Medication Sig Dispense Refill Last Dose     ipratropium - albuterol 0.5 mg/2.5 mg/3 mL (DUONEB) 0.5-2.5 (3) MG/3ML neb solution TAKE 1 VIAL BY NEBULIZATION  EVERY SIX HOURS AS NEEDED FOR SHORTNESS OF BREATH/ DYSPNEA OR WHEEZING 180 mL 9 2/6/2018 at 1000     warfarin (COUMADIN) 1 MG tablet Take 1.5 mg Mon, 1 mg rest of the days or as directed by Anticoagulation Clinic. 60 tablet 0 2/5/2018 at pm     diclofenac (VOLTAREN) 1 % GEL topical gel APPLY 4 GRAMS TO KNEES OR 2 GRAMS TO HANDS 4 TIMES DAILY USING ENCLOSED DOSING CARD 100 g 11 Past Week at Unknown time     ranitidine (ZANTAC) 150 MG tablet Take 1 tablet (150 mg) by mouth 2 times daily 60 tablet 11 2/6/2018 at am     albuterol (PROAIR HFA/PROVENTIL HFA/VENTOLIN HFA) 108 (90 BASE) MCG/ACT Inhaler Inhale 2 puffs into the lungs every 6 hours as needed for shortness of breath / dyspnea 3 Inhaler 1 2/6/2018 at am     metoprolol (TOPROL-XL) 25 MG 24 hr tablet TAKE TWO TABLETS BY MOUTH TWICE DAILY  120 tablet 3 2/6/2018 at am     levothyroxine (SYNTHROID/LEVOTHROID) 50 MCG tablet TAKE 1 TABLET (50 MCG) BY MOUTH DAILY 90 tablet 2 2/6/2018 at early am     SYMBICORT 160-4.5 MCG/ACT Inhaler INHALE 2 PUFFS INTO THE LUNGS 2 TIMES DAILY 10.2 g 5 2/6/2018 at am     probiotic CAPS Take 1 capsule by mouth every evening    2/5/2018 at pm     polyethylene glycol (MIRALAX) powder Take 17 g by mouth daily as needed (Patient taking differently: Take 1 capful by mouth every evening After supper) 510 g 5 2/5/2018 at after supper     CRANBERRY Take 475 mg by mouth 2 times daily.   2/6/2018 at am             Review of Systems:    ROS: 10 point ROS neg other than the symptoms noted above in the HPI.             Physical Exam:   Blood pressure 132/70, temperature 97.5  F (36.4  C), temperature source Oral, resp. rate 12, last menstrual period 06/15/1985, SpO2 93 %,  not currently breastfeeding.  Temperatures:  Current - Temp: 97.5  F (36.4  C); Max - Temp  Av.5  F (36.4  C)  Min: 97.5  F (36.4  C)  Max: 97.5  F (36.4  C)  Respiration range: Resp  Av  Min: 12  Max: 12  Pulse range: No Data Recorded  Blood pressure range: Systolic (24hrs), Av , Min:99 , Max:150   ; Diastolic (24hrs), Av, Min:65, Max:90    Pulse oximetry range: SpO2  Av.2 %  Min: 92 %  Max: 95 %  No intake or output data in the 24 hours ending 18  EXAM:  General: Awake and alert, oriented x 3, no apparent distress  CV: regular rate and rhythm no murmur  Pulmonary: clear to auscultation bilaterally  Abdomen soft, non-tender, no masses   Back: no point tenderness along spine.  Muscle tension and some tenderness in the lumbar back, more prominent on the left.    Negative straight leg raise bilaterally, although does have pain in lower back with straight leg raise - no radiation down legs however.  Sensation and strength intact throughout lower extremities bilaterally  Patellar and achilles reflexes intact.    Extremities: no edema             Data:     Results for orders placed or performed during the hospital encounter of 18 (from the past 24 hour(s))   Comprehensive metabolic panel   Result Value Ref Range    Sodium 123 (L) 133 - 144 mmol/L    Potassium 3.9 3.4 - 5.3 mmol/L    Chloride 89 (L) 94 - 109 mmol/L    Carbon Dioxide 25 20 - 32 mmol/L    Anion Gap 9 3 - 14 mmol/L    Glucose 109 (H) 70 - 99 mg/dL    Urea Nitrogen 15 7 - 30 mg/dL    Creatinine 0.31 (L) 0.52 - 1.04 mg/dL    GFR Estimate >90 >60 mL/min/1.7m2    GFR Estimate If Black >90 >60 mL/min/1.7m2    Calcium 8.0 (L) 8.5 - 10.1 mg/dL    Bilirubin Total 0.4 0.2 - 1.3 mg/dL    Albumin 3.3 (L) 3.4 - 5.0 g/dL    Protein Total 6.8 6.8 - 8.8 g/dL    Alkaline Phosphatase 97 40 - 150 U/L    ALT 22 0 - 50 U/L    AST 36 0 - 45 U/L   CBC with platelets, differential   Result Value Ref Range    WBC 5.8 4.0 - 11.0 10e9/L    RBC  Count 4.00 3.8 - 5.2 10e12/L    Hemoglobin 11.1 (L) 11.7 - 15.7 g/dL    Hematocrit 32.2 (L) 35.0 - 47.0 %    MCV 81 78 - 100 fl    MCH 27.8 26.5 - 33.0 pg    MCHC 34.5 31.5 - 36.5 g/dL    RDW 13.8 10.0 - 15.0 %    Platelet Count 305 150 - 450 10e9/L    Diff Method Automated Method     % Neutrophils 57.8 %    % Lymphocytes 19.9 %    % Monocytes 18.9 %    % Eosinophils 2.4 %    % Basophils 0.7 %    % Immature Granulocytes 0.3 %    Absolute Neutrophil 3.4 1.6 - 8.3 10e9/L    Absolute Lymphocytes 1.2 0.8 - 5.3 10e9/L    Absolute Monocytes 1.1 0.0 - 1.3 10e9/L    Absolute Eosinophils 0.1 0.0 - 0.7 10e9/L    Absolute Basophils 0.0 0.0 - 0.2 10e9/L    Abs Immature Granulocytes 0.0 0 - 0.4 10e9/L   INR   Result Value Ref Range    INR 1.95 (H) 0.86 - 1.14   Lumbar spine CT w/o contrast    Narrative    CT LUMBAR SPINE WITHOUT CONTRAST  2/6/2018 2:10 PM     HISTORY: Low back pain.      TECHNIQUE: Axial images of the lumbar spine were obtained without  intravenous contrast. Multiplanar reformations were performed.   Radiation dose for this scan was reduced using automated exposure  control, adjustment of the mA and/or kV according to patient size, or  iterative reconstruction technique.    COMPARISON: None.    FINDINGS:  Chronic-appearing loss of vertebral body height at L1 due  to inferior endplate Schmorl's node or old compression fracture. Mild  old inferior endplate compression fracture of L2 appears chronic with  some sclerosis. Similar chronic inferior endplate findings at L3.  These all have the appearance of Schmorl's nodes or somewhat focal  inferior endplate compression deformities that are likely chronic. No  definite acute fracture. Diffuse osteopenia. The findings at L1 are  stable. The inferior endplate compression deformities at L2 and L3 are  new since the comparison CT of almost two years ago but have sclerosis  and no definite visible lucent fracture lines, indicating they are  likely chronic. The distal  spinal cord and conus medullaris appear  normal.  The paraspinous soft tissues appear normal.    T12-L1:  Normal disc, facet joints, spinal canal and neural foramina.     L1-L2:  Normal disc, facet joints, spinal canal and neural foramina.    L2-L3:  Mild broad-based disc bulge. This lateralizes to the right  resulting in mild to moderate right foraminal stenosis. Left neural  foramen is patent. No central stenosis. Facet joints are unremarkable.      L3-L4:  Mild disc bulge and facet hypertrophy. Mild central stenosis.  Mild bilateral foraminal stenosis.     L4-L5:  Moderate facet degenerative changes. Mild disc bulge and  osteophytic ridging. Mild central stenosis. Neural foramina are  patent.     L5-S1:  Prominent left and moderate right facet degenerative changes.  Mild disc bulge. No central stenosis. There is a slightly more focal  left foraminal disc protrusion resulting in at least moderate left  foraminal stenosis. Right neural foramen is patent. No central  stenosis.      Impression    IMPRESSION:    1. Inferior endplate central compression deformities at L2 and L3 are  new since 3/10/2016 but have a chronic imaging appearance.  2. At L2-L3 there is mild to moderate right foraminal stenosis.  3. At L3-L4 there is mild central and bilateral foraminal stenosis.  4. At L4-L5 there is mild central stenosis.  5. At L5-S1 there is moderate left foraminal stenosis.  6. Mild progression of degenerative findings since the comparison  study.       VU HARLEY MD   UA reflex to Microscopic   Result Value Ref Range    Color Urine Straw     Appearance Urine Slightly Cloudy     Glucose Urine Negative NEG^Negative mg/dL    Bilirubin Urine Negative NEG^Negative    Ketones Urine 10 (A) NEG^Negative mg/dL    Specific Gravity Urine 1.015 1.003 - 1.035    Blood Urine Trace (A) NEG^Negative    pH Urine 7.5 (H) 5.0 - 7.0 pH    Protein Albumin Urine 10 (A) NEG^Negative mg/dL    Urobilinogen mg/dL Normal 0.0 - 2.0 mg/dL     Nitrite Urine Negative NEG^Negative    Leukocyte Esterase Urine Negative NEG^Negative    Source Midstream Urine     WBC Urine 2 0 - 2 /HPF    RBC Urine 2 0 - 2 /HPF    Bacteria Urine Moderate (A) NEG^Negative /HPF    Squamous Epithelial /HPF Urine 1 0 - 1 /HPF    Amorphous Crystals Moderate (A) NEG^Negative /HPF     *Note: Due to a large number of results and/or encounters for the requested time period, some results have not been displayed. A complete set of results can be found in Results Review.       All imaging studies reviewed by me.    Attestation:  I have reviewed today's vital signs, notes, medications, labs and imaging.  Amount of time performed on this history and physical: 75 minutes including detailed discussion of options with patient and daughter.        Herb Rowley MD[KK1.1]       Revision History        User Key Date/Time User Provider Type Action    > KK1.2 2018  8:29 PM Herb Rowley MD Physician Sign     KK1.1 2018  7:56 PM Herb Rowley MD Physician                   Discharge Summaries     No notes of this type exist for this encounter.         Consult Notes      Consults by Frankie Pham MD at 2018 12:46 PM     Author:  Frankie Pham MD Service:  Orthopedics Author Type:  Physician    Filed:  2018 10:07 PM Date of Service:  2018 12:46 PM Creation Time:  2018 12:46 PM    Status:  Signed :  Frankie Pham MD (Physician)         [facility] Consultation    Consultation - [facility]  Level of consult: One-time consult to assist in determining a diagnosis and to recommend an appropriate treatment plan    TESFAYE Worrell 1930, MRN [id number]     Admitting Dx: Back pain [M54.9]  Hyponatremia [E87.1]  Acute bilateral low back pain with left-sided sciatica [M54.42]     PCP: Josefina Gómez, [phone]     Code status:  Prior     Extended Emergency Contact Information  Primary Emergency Contact:  "Suyapa Mittal   [facility]  Home Phone: [phone]  Work Phone: [phone]  Mobile Phone: [phone]  Relation: Daughter  Secondary Emergency Contact: Ana Abarca   [facility]  Home Phone: [phone]  Work Phone: [phone]  Mobile Phone: [phone]  Relation: Daughter     Assessment:[TB1.1]  Sub acute L2-L3 inferior end plate compression fx with sciatica symptoms on the left.[TB1.2]     Plan:[TB1.1]  WBAT, mobilize with PT  LSO on while up for comfort, may be off while in bed. Remove frequently for skin checks  Anticipate d/c to TCU when able  Pain medications as necessary, recommend Tramadol given patients age  Follow up in clinic with Dr. Pham or Dr. Desai in 1 week[TB1.2]    Principal Problem:    Intractable low back pain  Active Problems:    Esophageal reflux    Mild major depression    DVT (deep venous thrombosis)    Anxiety    Intermittent atrial fibrillation (H)    Hypothyroidism    Hyponatremia    Benign essential HTN    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)    COPD (chronic obstructive pulmonary disease) (H)    Irritable bowel syndrome without diarrhea    Back pain       Chief Complaint[TB1.1]  Progressively worsening low back pain[TB1.2]     HPI  We have been requested to evaluate Ellie Leigh who is a 87 year old year old female for[TB1.1] intractable low back pain[TB1.2]. Patient states she was putting the dishes away, bent over into the , about 4-5 weeks ago when she felt she \"threw her back out\". She felt pain to the center of her low back, which was sharp in nature. She states she went to see her chiropractor after the incident. She initially went twice in a two week period of time which states it initially felt better. After continuously having pain which was no longer relieved by chiropractic care, she saw her PCP who referred her to PT. By this time her pain had begun to radiate to her left side back and left leg which is constant[TB1.1] and " aggravated by certain positioning and weight bearing[TB1.2]. Days later she also began to experience pain which radiated to her right low back[TB1.1], but not into her leg[TB1.2]. At this point, she then saw a physical therapist which exacerbated her pain. Her PCP then referred her to an orthopedic provider which she made an appointment for, however her pain has progressively worsened. Yesterday she was unable to ambulate her stairs which resulted in EMS being called and was brought to the ED for evaluation. Yesterday after being admitted[TB1.1] her pain had diminished with rest and medications,[TB1.2] she[TB1.1] later[TB1.2] had gotten up to ambulate[TB1.1] when she felt the same severe pain that she had experienced earlier that day. She[TB1.2] denies lightheadedness, dizziness, SOB, CP beyond baseline. She denies distal paresthesias, saddle anesthesia, incontinence, motor function disturbances. She does report left sided low back pain aggravated by weight bearing and ambulation, she reports weakness in her knees which she states has been troublesome for a while now. She states she progressively has had a more difficult time ambulating since her pain began 4-5 weeks ago.      History is obtained from the patient     Past Medical History  Past Medical History:   Diagnosis Date     Breast cancer (H)      COPD (chronic obstructive pulmonary disease) (H)     ct 2014 does show some bronchiectaisi RUL as well as fibronodular disease bilat upper lobes     Dust allergy      DVT (deep vein thrombosis) in pregnancy (H)     after neck fracture when in hospital     HTN (hypertension)      Hyponatremia     chronic     Hypothyroid      Intermittent atrial fibrillation (H) 12/1/2011    hx tachy-keri, has pacer, on chronic coumadin     Loose body in joint 4/15/2011     Neck fracture (H)     after a fall     Syncope 5/12/2013    multiple hospital visits, lates 3/2016, 6/2016, 7/2016 with no clear cause found       Surgical  History  Past Surgical History:   Procedure Laterality Date     APPENDECTOMY       ARTHROSCOPY KNEE  4/15/2011    Procedure:ARTHROSCOPY KNEE; removal of loose body; Surgeon:LEY, JEFFREY DUANE; Location:[facility]     CHOLECYSTECTOMY       ESOPHAGOSCOPY, GASTROSCOPY, DUODENOSCOPY (EGD), COMBINED  6/9/2014    Procedure: COMBINED ESOPHAGOSCOPY, GASTROSCOPY, DUODENOSCOPY (EGD);  Surgeon: Gilberto Richard MD;  Location: [facility]     IMPLANT PACEMAKER  5/21/2013    Biotronik Moderl [id number] Serial#[id number]     INJECT EPIDURAL LUMBAR  3/23/2011    INJECT EPIDURAL LUMBAR performed by GENERIC ANESTHESIA PROVIDER at [facility]     JOINT REPLACEMENT, HIP RT/LT      Joint Replacement Hip Rt     MASTECTOMY, SIMPLE RT/LT/CAITLYN      Left breast - following breast ca     OTHER SURGICAL HISTORY      C1-C2 fusion after fx      SURGICAL HISTORY OF -   05/22/2001    Colonoscopy     TONSILLECTOMY          Social History  Social History     Social History     Marital status:      Spouse name: N/A     Number of children: N/A     Years of education: N/A     Occupational History      Retired     Social History Main Topics     Smoking status: Never Smoker     Smokeless tobacco: Never Used     Alcohol use No     Drug use: No     Sexual activity: Not Currently     Other Topics Concern     Parent/Sibling W/ Cabg, Mi Or Angioplasty Before 65f 55m? No     Social History Narrative    Lives in [facility].      Daughters helping since her accident, getting home physical therapy.      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      - 2000    5 daughters, 11 grandchildren, 3 great granchildren       Family History  Family History   Problem Relation Age of Onset     CEREBROVASCULAR DISEASE Mother      HEART DISEASE Mother      MI     CEREBROVASCULAR DISEASE Father      HEART DISEASE Father      MI     Respiratory Maternal Grandfather      TB     Neurologic Disorder Brother      ALS     HEART DISEASE Brother      CEREBROVASCULAR  DISEASE Brother      Breast Cancer Daughter      age:49     Asthma Brother      CANCER Brother      brain and lung     CANCER Daughter      thyroid        Allergies:  Cephalosporins; Doxycycline; Sulfa drugs; and Penicillins      Current Medications:  Current Facility-Administered Medications   Medication     warfarin (COUMADIN) half-tab 0.5 mg     naloxone (NARCAN) injection 0.1-0.4 mg     acetaminophen (TYLENOL) tablet 650 mg     ondansetron (ZOFRAN-ODT) ODT tab 4 mg    Or     ondansetron (ZOFRAN) injection 4 mg     oxyCODONE IR (ROXICODONE) half-tab 2.5-5 mg     ipratropium - albuterol 0.5 mg/2.5 mg/3 mL (DUONEB) neb solution 3 mL     diclofenac (VOLTAREN) 1 % topical gel 2-4 g     levothyroxine (SYNTHROID/LEVOTHROID) tablet 50 mcg     metoprolol succinate (TOPROL-XL) 24 hr tablet 50 mg     polyethylene glycol (MIRALAX/GLYCOLAX) Packet 17 g     ranitidine (ZANTAC) tablet 150 mg     Warfarin Therapy Reminder (Check START DATE - warfarin may be starting in the FUTURE)     naloxone (NARCAN) injection 0.1-0.4 mg     ondansetron (ZOFRAN-ODT) ODT tab 4 mg    Or     ondansetron (ZOFRAN) injection 4 mg     lactobacillus rhamnosus (GG) (CULTURELL) capsule 1 capsule     fluticasone-vilanterol (BREO ELLIPTA) 200-25 MCG/INH oral inhaler 1 puff       Review of Systems:  The Review of Systems is negative other than noted in the HPI    Physical Exam:  Temp:  [96.3  F (35.7  C)-98.7  F (37.1  C)] 96.3  F (35.7  C)  Pulse:  [69-81] 69  Heart Rate:  [69-83] 69  Resp:  [16] 16  BP: ()/(65-90) 149/76  SpO2:  [92 %-95 %] 95 %    Gen: alert and orientated, no apparent distress.   MSK:  Lumbar spine:[TB1.1] skin intact, no ecchymosis, no erythema, no rash. Tender to palpation diffusely from L2-L5. Tender to palpation on the right and left paraspinals. Tender to palpation of the L ischial tuberosity which causes radiation to her L posterior leg to her L knee. AROM full and without pain of the ankle, knee and hip. Able to perform  straight leg raise bilaterally. Straight leg raise test with DF of ankle negative, crossed straight leg raise test negative.   Gait: not assessed during this visit[TB1.2]  Neuro:[TB1.1] no motor function deficits appreciated, sensation intact in all dermatomes and without deficit compared to the right. Patellar reflexes intact and equal bilaterally. DP pulses 2+ bilaterally.[TB1.2]      Pertinent Labs  Lab Results: personally reviewed.  Lab Results   Component Value Date    WBC 5.9 02/07/2018    HGB 10.8 (L) 02/07/2018    HCT 31.3 (L) 02/07/2018    MCV 81 02/07/2018     02/07/2018       Recent Labs  Lab 02/07/18  0720 02/06/18  1310   INR 2.12* 1.95*       Pertinent Radiology  Radiology Results: images and radiology report reviewed  Recent Results (from the past 24 hour(s))   Lumbar spine CT w/o contrast    Narrative    CT LUMBAR SPINE WITHOUT CONTRAST  2/6/2018 2:10 PM     HISTORY: Low back pain.      TECHNIQUE: Axial images of the lumbar spine were obtained without  intravenous contrast. Multiplanar reformations were performed.   Radiation dose for this scan was reduced using automated exposure  control, adjustment of the mA and/or kV according to patient size, or  iterative reconstruction technique.    COMPARISON: None.    FINDINGS:  Chronic-appearing loss of vertebral body height at L1 due  to inferior endplate Schmorl's node or old compression fracture. Mild  old inferior endplate compression fracture of L2 appears chronic with  some sclerosis. Similar chronic inferior endplate findings at L3.  These all have the appearance of Schmorl's nodes or somewhat focal  inferior endplate compression deformities that are likely chronic. No  definite acute fracture. Diffuse osteopenia. The findings at L1 are  stable. The inferior endplate compression deformities at L2 and L3 are  new since the comparison CT of almost two years ago but have sclerosis  and no definite visible lucent fracture lines, indicating they  are  likely chronic. The distal spinal cord and conus medullaris appear  normal.  The paraspinous soft tissues appear normal.    T12-L1:  Normal disc, facet joints, spinal canal and neural foramina.     L1-L2:  Normal disc, facet joints, spinal canal and neural foramina.    L2-L3:  Mild broad-based disc bulge. This lateralizes to the right  resulting in mild to moderate right foraminal stenosis. Left neural  foramen is patent. No central stenosis. Facet joints are unremarkable.      L3-L4:  Mild disc bulge and facet hypertrophy. Mild central stenosis.  Mild bilateral foraminal stenosis.     L4-L5:  Moderate facet degenerative changes. Mild disc bulge and  osteophytic ridging. Mild central stenosis. Neural foramina are  patent.     L5-S1:  Prominent left and moderate right facet degenerative changes.  Mild disc bulge. No central stenosis. There is a slightly more focal  left foraminal disc protrusion resulting in at least moderate left  foraminal stenosis. Right neural foramen is patent. No central  stenosis.      Impression    IMPRESSION:    1. Inferior endplate central compression deformities at L2 and L3 are  new since 3/10/2016 but have a chronic imaging appearance.  2. At L2-L3 there is mild to moderate right foraminal stenosis.  3. At L3-L4 there is mild central and bilateral foraminal stenosis.  4. At L4-L5 there is mild central stenosis.  5. At L5-S1 there is moderate left foraminal stenosis.  6. Mild progression of degenerative findings since the comparison  study.       VU HARLEY MD       Attestation:[TB1.1]  I have reviewed today's vital signs, notes, medications, labs and imaging.  Amount of time performed on this consult: 45 minutes.[TB1.2]     Maite Busch[TB1.1]    Physician Attestation[KD1.1]   I,[KD1.2] Frankie Pham[KD1.1], have reviewed and discussed with the advanced practice provider their history, physical and plan for Ellie Leigh. I did not participate in a shared visit by  interviewing or examining the patient.[KD1.2]    Frankie LINDSEYChilo Pham[KD1.1]  Date of Service (when I saw the patient): I did not personally see this patient today.[KD1.2]       Revision History        User Key Date/Time User Provider Type Action    > KD1.1 2/7/2018 10:07 PM Frankie Pham MD Physician Sign     KD1.2 2/7/2018 10:05 PM Frankie Pham MD Physician      TB1.2 2/7/2018  5:03 PM Maite Busch PA-C Physician Assistant - C Sign     TB1.1 2/7/2018 12:46 PM Maite Busch PA-C Physician Assistant - C             Consults by April Reddy LICSW at 2/7/2018 12:52 PM     Author:  April Reddy LICSW Service:  (none) Author Type:      Filed:  2/7/2018 12:52 PM Date of Service:  2/7/2018 12:52 PM Creation Time:  2/7/2018 12:42 PM    Status:  Signed :  April Reddy LICSW ()     Consult Orders:    1. Social Work IP Consult [[id number]] ordered by Mehul Eason MD at 02/06/18 8478                CARE TRANSITION SOCIAL WORK INITIAL ASSESSMENT:      Met with: Patient and Family.    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       Resources List: Skilled Nursing Facility, Transitional Care, Home Care     Lives With: child(grupo), adult  Living Arrangements: house     Description of Support System: Supportive, Involved   Who is your support system?: Children   Support Assessment: Adequate  family and caregiver support, Adequate social supports   Insurance Concerns: No Insurance issues identified        This writer met with pt and pts two dtrs introduced self and role. Discussed discharge planning and medicare guidelines in regards to home care and SNF benefits. Pt lives at home with her dtr. However; at this time pt and family feels that TCU would be more appropriate. Patient was provided with Medicare certified nursing home list. Pts choices are as follows [facility] (Phone: [phone] Fax: [phone]), [facility] (Phone: [phone] Admissions: [phone] Fax: [phone]) and [facility] (Main: [phone] Admissions: [phone] Fax: [phone]).  Pt has been accepted at [facility] for as early as tomorrow, BUT pts first choice is [facility] and second choice is [facility]. Will wait on bed availability.      PAS-RR    Per DHS regulation, CTS team completed and submitted PAS-RR to [place] Board on Aging Direct Connect via the Senior LinkAge Line. CTS team advised SNF and they are aware a PAS-RR has been submitted.     CTS team reviewed with pt or health care agent that they may be contacted for a follow up appointment within 10 days of hospital discharge if SNF stay is <30 days. Contact information for Senior [employer] Line was also provided.     Pt or health care agent verbalized understanding.     PAS-RR # [id number]      PLAN    TCU    Discharge Planner   Discharge Plans in progress: TCU  Barriers to discharge plan: medical stability  Follow up plan: CTS to follow       Entered by: April Reddy 02/07/2018 12:42 PM             April Reddy MSW, MAYA, [phone][AK1.1]       Revision History        User Key Date/Time User Provider Type Action    > AK1.1 2/7/2018 12:52 PM April Reddy LICSW  Sign                     Progress Notes - Physician (Notes from 02/05/18 through 02/08/18)      Progress Notes by Annika Elam at 2/8/2018 12:06  PM     Author:  Annika Elam Service:  (none) Author Type:      Filed:  2/8/2018 12:07 PM Date of Service:  2/8/2018 12:06 PM Creation Time:  2/8/2018 12:06 PM    Status:  Signed :  Annika Elam ()         Name: Ellie Leigh    MRN#: [id number]    Reason for Hospitalization: Back pain [M54.9]  Hyponatremia [E87.1]  Acute bilateral low back pain with left-sided sciatica [M54.42]    Discharge Date: 2/8/2018    Patient / Family response to discharge plan: in agreement    Follow-Up Appt: Future Appointments  Date Time Provider Department Center   2/27/2018 2:00 PM [facility]   4/3/2018 10:30 AM [facility]       Other Providers (Care Coordinator, County Services, PCA services etc): Yes: [facility]     Discharge Disposition: transitional care unit [facility] (Phone: [phone] Admissions: [phone] Fax: [phone]) via family car.    CHRISTOPHER Young  [facility] [phone]/ [facility] [phone][JK1.1]           Revision History        User Key Date/Time User Provider Type Action    > JK1.1 2/8/2018 12:07 PM Annika Elam  Sign            Progress Notes by Mickey Fields MD at 2/7/2018  5:25 PM     Author:  Mickey Fields MD Service:  Hospitalist Author Type:  Physician    Filed:  2/7/2018  5:43 PM Date of Service:  2/7/2018  5:25 PM Creation Time:  2/7/2018  5:25 PM    Status:  Signed :  Mickey Fields MD (Physician)         [facility] Medicine Progress Note  Date of Service: 02/07/2018    Assessment & Plan   Ellie Legih is a 87 year old female who presented on 2/6/2018 with severe low back pain.        Intractable low back pain  2/6/2018 -- CT today showed Inferior endplate central compression deformities at L2 and L3 are new since 3/10/2016 but have a chronic imaging appearance along with progression of degenerative changes.  Suspect  "this is cause of her pain.  Both ER and I discussed with Dr. Hernadez on call for [facility] Ortho spine who was exteremly helpful - offered appointment tomorrow afternoon in clinic but patient and daughter don't feel safe with her at home so will be admitted with plan for ortho to see her tomorrow - likely PA to coordinate with spine surgeon unless surgeon is on site.  Likely plan is for oral pain control (started oxycodone 2.5-5 mg every 4 hours as needed, continue tylenol), and placement of semirigid brace tomorrow.  Hopeful discharge home tomorrow, but may need TCU if remains unable to care for herself.  Per ortho would consider outpatient vertebroplasty but only if not improving with conservative measures.   2/7/18 - Received a semi-rigid brace today which she has been wearing for \"a few hours\".  She finds it to be comfortable, non-restrictive, and thinks it's helping her pain.  Pain control has been good on tramadol 25 to 50 mg Q6H PRN and with brace.  Will continue this Rx.      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  Followed by PCP, unclear how compliant with fluid restriction she is.  Placed on 1L restriction while here which per notes is plan at home.  Recheck in AM.  Plan for ongoing outpatient follow-up with primary care provider.    2/7/18 - Na 123 this morning, 123 yesterday, baseline appears to be 126 - 132 over past two months.  Fluid restriction continues here, will recheck Na in AM.       Esophageal reflux  2/6/2018 -- continue prior to admission zantic twice daily.     2/7/18 - No reflux symptoms presently.       Mild major depression/Anxiety  2/6/2018 -- mood and affect appropriate, not on any medications.          History of DVT (deep venous thrombosis)  2/6/2018 -- on coumadin as below.    2/7/18 - INR 2.12 today.       Intermittent atrial fibrillation (H)  2/6/2018 -- rate " controlled, continue metoprolol.  Pharmacy to dose coumadin.     2/7/18 - Rate continues well-controlled.       Hypothyroidism  2/6/2018 -- TSH normal 1 month ago - continue prior to admission synthroid.           Benign essential HTN  2/6/2018 -- continue metoprolol with parameters.    2/7/18 - Good control on present metoprolol.        COPD (chronic obstructive pulmonary disease) (H)  2/6/2018 -- at baseline, duonebs as needed in place of home albuterol inhaler.   Continue prior to admission Symbicort.     2/7/18 - Lung exam essentially normal today.  Has no O2 needs.       Irritable bowel syndrome without diarrhea  2/6/2018 -- asymptomatic at present.         Prophylaxis  On coumadin as above.       Lines  PIV     Disposition  Continue observation.  Now that she has been fitted for a brace and pain control is good on PO meds, discharge tomorrow is possible, though may need TCU.        Attestation:  Amount of time performed on this hospital visit: 25 minutes.  Care coordination / counseling time: 15 minutes    Mickey Shin Fields       Interval History   Described above.    ROS:  CONSTITUTIONAL: NEGATIVE  for chills, fever.  EYES: NEGATIVE for visual changes, eye irritation.  ENT/MOUTH: NEGATIVE for nasal congestion or drainage.  RESP: NEGATIVE for dyspnea, productive cough, wheeze, or respiratory chest pain.  CV: NEGATIVE for chest pain, palpitations, orthopnea, or lower extremity edema.  GI: NEGATIVE for difficulties swallowing, abdominal pain, diarrhea, nausea or vomiting.  : NEGATIVE for dysuria or flank pain.  MUSCULOSKELETAL: NEGATIVE for joint pain, or joint swelling.  NEURO: NEGATIVE for focal numbness or weakness, syncope.  PSYCHIATRIC: NEGATIVE for anxiety, panic, or recent change in mood.      Physical Exam   Temp:  [96.1  F (35.6  C)-98.7  F (37.1  C)] 96.1  F (35.6  C)  Pulse:  [69-81] 71  Heart Rate:  [69-83] 69  Resp:  [16-18] 18  BP: (132-161)/(69-84) 132/69  SpO2:  [93 %-95 %] 93 %    Weights:    Vitals:    02/06/18 1958   Weight: 52.3 kg (115 lb 4.8 oz)    Body mass index is 24.1 kg/(m^2).    GENERAL: Pleasant woman seated in bedside chair eating dinner.  EYES: Eyes grossly normal to inspection, extraocular movements intact  HENT: Nares patent bilaterally.  Nasal mucosa normal, no discharge.    NECK: Trachea midline, no stridor.   RESP: No accessory muscle use.  Symmetrical breath sounds.  Exam limited by back brace, but lungs clear throughout on inspiration and expiration.  Expiration not prolonged, no wheeze.  CV: Regular rate and rhythm, non-tachycardic.  Normal S1 S2, no murmur or extra sound.  No lower extremity edema.  ABDOMEN: Back brace prevents detailed exam.  Bowel sounds positive.  MS: No bony deformities noted.  No red or inflamed joints.  SKIN: Warm and dry, no rashes where skin visible.  NEURO: Alert, oriented, conversant.  Cranial nerves II - XII grossly intact.  No gross motor or sensory deficits.  Gait not tested.  PSYCH: Calm, alert, conversant.  Able to articulate logical thoughts, no tangential thoughts, no hallucinations or delusions.  Affect normal.        Data     Recent Labs  Lab 02/07/18  0720 02/06/18  1310   WBC 5.9 5.8   HGB 10.8* 11.1*   MCV 81 81    305   INR 2.12* 1.95*   * 123*   POTASSIUM 3.6 3.9   CHLORIDE 89* 89*   CO2 26 25   BUN 12 15   CR 0.34* 0.31*   ANIONGAP 8 9   DOUG 7.9* 8.0*   GLC 96 109*   ALBUMIN  --  3.3*   PROTTOTAL  --  6.8   BILITOTAL  --  0.4   ALKPHOS  --  97   ALT  --  22   AST  --  36         Recent Labs  Lab 02/07/18  0720 02/06/18  1310   GLC 96 109*        Unresulted Labs Ordered in the Past 30 Days of this Admission     Date and Time Order Name Status Description    2/6/2018 1252 Urine Culture Aerobic Bacterial Preliminary            Imaging  No results found for this or any previous visit (from the past 24 hour(s)).     I reviewed all new labs and imaging results over the last 24 hours. I personally reviewed no images or EKG's  today.    Medications     Warfarin Therapy Reminder         warfarin  0.5 mg Oral ONCE at 18:00     levothyroxine  50 mcg Oral Daily     metoprolol succinate  50 mg Oral BID     ranitidine  150 mg Oral BID     lactobacillus rhamnosus (GG)  1 capsule Oral QPM     fluticasone-vilanterol  1 puff Inhalation Daily       Mickey Fields[JM1.1]            Revision History        User Key Date/Time User Provider Type Action    > JM1.1 2/7/2018  5:43 PM Mickey Fields MD Physician Sign            Progress Notes by Heather Newby PT at 2/7/2018 11:58 AM     Author:  Heather Newby PT Service:  (none) Author Type:  Physical Therapist    Filed:  2/7/2018 12:01 PM Date of Service:  2/7/2018 11:58 AM Creation Time:  2/7/2018 11:58 AM    Status:  Signed :  Heather Newby PT (Physical Therapist)            02/07/18 1000   Quick Adds   Type of Visit Initial PT Evaluation   Living Environment   Lives With child(grupo), adult   Living Arrangements house   Home Accessibility stairs to enter home   Number of Stairs to Enter Home 3   Self-Care   Usual Activity Tolerance moderate   Current Activity Tolerance fair   Equipment Currently Used at Home walker, rolling   Functional Level Prior   Ambulation 1-->assistive equipment   Transferring 1-->assistive equipment   Toileting 1-->assistive equipment   Bathing 2-->assistive person   Dressing 2-->assistive person   Eating 0-->independent   Communication 0-->understands/communicates without difficulty   Swallowing 0-->swallows foods/liquids without difficulty   Cognition 0 - no cognition issues reported   Fall history within last six months no   Which of the above functional risks had a recent onset or change? none   Prior Functional Level Comment PLOF- Pt indep. with ambulation using a walker/ 4ww househeold distances.  < Primarily  sitting on the walker seat the last 2 weeks with the daughter assisting     General Information   Onset of Illness/Injury or Date of Surgery - Date  "02/06/18   Referring Physician Amrik   Patient/Family Goals Statement Pt w/ eventual  goal of returning home   Pertinent History of Current Problem (include personal factors and/or comorbidities that impact the POC) 87 year old  female with a significant past medical history of SAIDH, atrial fibrillation hypertension, prior DVT on coumadin, hypothyroidism and back pain who presents with worsened back pain.  Symptoms have been worse the past 4-5 weeks.;  CT- Inferior endplate central compression deformities at L2 and L3   Precautions/Limitations spinal precautions  (semi rigid brace in  place)   General Observations Pt alert, pleasant - up in the chair    Pt reporting her pain is controlled- minimal at rest and with movement- located   Left> right LBP , initermmitent Sx to left foot   Pain Assessment   Patient Currently in Pain Yes, see Vital Sign flowsheet   Posture    Posture Kyphosis   Range of Motion (ROM)   ROM Comment AROM UE, LE WFL    Strength   Strength Comments LE strength 4/5  throughout   Bed Mobility   Bed Mobility Comments NT- pt reports needing minimal assistance w/ log roll to sitting   Transfer Skills   Transfer Comments Minimal to CGA of one w/ sit>stand w/ walker   Gait   Gait Comments Pt ambulating short in room distances- 10 feet x2 with RW and CGA of 2. ,needing  occas assistance to advance walker, uses lighter 4ww at home. Slow, steady gait. Pain controlled.    Balance   Balance Comments fair/ good w/ walker use   Clinical Impression   Criteria for Skilled Therapeutic Intervention evaluation only  (Rx to be initiated at TCU)   Anticipated Discharge Disposition Transitional Care Facility   Risk & Benefits of therapy have been explained Yes   Patient, Family & other staff in agreement with plan of care Yes   Clinical Impression Comments ; continued PT at TCU to address strength, ensure independence w/ functional mobility/ ambulation  for safe return home   [facility] AM-PAC  \"6 " "Clicks\" V.2 Basic Mobility Inpatient Short Form   1. Turning from your back to your side while in a flat bed without using bedrails? 3 - A Little   2. Moving from lying on your back to sitting on the side of a flat bed without using bedrails? 3 - A Little   3. Moving to and from a bed to a chair (including a wheelchair)? 3 - A Little   4. Standing up from a chair using your arms (e.g., wheelchair, or bedside chair)? 3 - A Little   5. To walk in hospital room? 3 - A Little   6. Climbing 3-5 steps with a railing? 3 - A Little   Basic Mobility Raw Score (Score out of 24.Lower scores equate to lower levels of function) 18   Total Evaluation Time   Total Evaluation Time (Minutes) 30[CS1.1]        Revision History        User Key Date/Time User Provider Type Action    > CS1.1 2/7/2018 12:01 PM Heather Newby, PT Physical Therapist Sign            Progress Notes by Waldemar Johnson at 2/7/2018 11:33 AM     Author:  Waldemar Johnson Service:  (none) Author Type:  Orthotist    Filed:  2/7/2018 11:34 AM Date of Service:  2/7/2018 11:33 AM Creation Time:  2/7/2018 11:33 AM    Status:  Signed :  Waldemar Johnson (Orthotist)         S: Pt was seen today at [place] med/surg, for eval/fitting for an LSO as ordered by Maite De León PA-C.  I have tried to get in contact with her on her cell and left a voicemail to call me back to discuss which style of brace she would like.  I have not heard back from her yet, so I have fit her with a [employer] brand LSO.    O/G: The objective/goal is to reduce pain and motion of the lumbar spine.  The Rx. states that the patient has L 2-3 deformity    A: At this time I have fit pt with a size 32-38\" LS corset with Velcro closures.  The Nurse has helped with the fitting of the brace. The pt/Family/nurse were instructed on donning/doffing; care and cleaning of the LSO was explained.  Care was taken in selection of the proper size LSO to insure an intimate fit, provide clearance at the rectus femoris, when sitting " and to avoid impingement at the breast anteriorly and scapula posteriorly.  Upon completion of the initial fitting the pt had no complaints, and the fit of the orthosis appeared to be satisfactory at this time.    P.  Pt./Family/Staff have been instructed to contact our facility with any future questions and/or concerns.[LR1.1]     Revision History        User Key Date/Time User Provider Type Action    > LR1.1 2/7/2018 11:34 AM Waldemar Johnson Orthotist Sign            Progress Notes by Nicole Cisneros OT at 2/7/2018 10:51 AM     Author:  Nicole Cisneros OT Service:  Acute IP Rehab Author Type:  Occupational Therapist    Filed:  2/7/2018 10:52 AM Date of Service:  2/7/2018 10:51 AM Creation Time:  2/7/2018 10:51 AM    Status:  Signed :  Nicole Cisneros OT (Occupational Therapist)            02/07/18 1032   Quick Adds   Type of Visit Initial Occupational Therapy Evaluation   Living Environment   Lives With child(grupo), adult   Living Arrangements house   Home Accessibility grab bars present (bathtub);grab bars present (toilet);stairs to enter home;tub/shower is not walk in   Number of Stairs to Enter Home 3   General Information   Onset of Illness/Injury or Date of Surgery - Date 02/06/18   Referring Physician Donnie   Patient/Family Goals Statement TCU prior to returning home   Additional Occupational Profile Info/Pertinent History of Current Problem 86 yo F with dx of inferior endplate central compression deformities with degenerative changes at L3-L4. PMH: depression/anxiety, DVT, HTN, COPD. Intractable low back pain worsening over the last 4-5 weeks limiting I mobility   Precautions/Limitations no known precautions/limitations   General Info Comments Lives in a house with her dtr. 3 steps in.  Uses a 4WW, but over the last weeks has been sitting on the seat and was pushed around. Was I dressing and toileting. Up until 1 month ago bathed I'ly. Tub/shower combo with seat and bars. Bars on toilet.  "Assists with laundry, unloading , some cooking. Plays the keyboard, likes to read, do puzzles. Used a bedside commode at home for night but more recently has needed A with that.   Cognitive Status Examination   Orientation not oriented to person, place or time   Level of Consciousness alert   Cognitive Comment No concerns   Pain Assessment   Patient Currently in Pain (None at rest, \"a little\" with mobility)   Range of Motion (ROM)   ROM Comment BUEs WNLs   Strength   Strength Comments BUEs 4-/5 and bilateral  3+/5   Transfer Skill: Bed to Chair/Chair to Bed   Level of [place]: Bed to Chair contact guard   Physical Assist/Nonphysical Assist: Bed to Chair 1 person assist   Assistive Device - Transfer Skill Bed to Chair Chair to Bed Rehab Eval rolling walker   Transfer Skill: Sit to Stand   Level of [place]: Sit/Stand minimum assist (75% patients effort)   Physical Assist/Nonphysical Assist: Sit/Stand 1 person assist   Assistive Device for Transfer: Sit/Stand rolling walker   Transfer Skill: Toilet Transfer   Level of [place]: Toilet minimum assist (75% patients effort)   Assistive Device rolling walker;grab bars   Toileting   Level of [place]: Toilet contact guard   Physical Assist/Nonphysical Assist: Toilet 1 person assist   Activities of Daily Living Analysis   Impairments Contributing to Impaired Activities of Daily Living flexibility decreased;pain;strength decreased   Clinical Impression   Criteria for Skilled Therapeutic Interventions Met evaluation only   OT Diagnosis Impaired ADLs and related mobility   Influenced by the following impairments back pain   Assessment of Occupational Performance 1-3 Performance Deficits   Identified Performance Deficits dressing, toileting, bathing   Clinical Decision Making (Complexity) Low complexity   Predicted Duration of Therapy Intervention (days/wks) Eval only   Anticipated Discharge Disposition Transitional Care Facility   Risks and " "Benefits of Treatment have been explained. Yes   Patient, Family & other staff in agreement with plan of care Yes   [facility] AM-PAC  \"6 Clicks\" Daily Activity Inpatient Short Form   1. Putting on and taking off regular lower body clothing? 3 - A Little   2. Bathing (including washing, rinsing, drying)? 3 - A Little   3. Toileting, which includes using toilet, bedpan or urinal? 3 - A Little   4. Putting on and taking off regular upper body clothing? 4 - None   5. Taking care of personal grooming such as brushing teeth? 4 - None   6. Eating meals? 4 - None   Daily Activity Raw Score (Score out of 24.Lower scores equate to lower levels of function) 21   Total Evaluation Time   Total Evaluation Time (Minutes) 45[SL1.1]     MICHELL Guido[SL1.2]     Revision History        User Key Date/Time User Provider Type Action    > SL1.1 2/7/2018 10:52 AM Nicole Cisneros OT Occupational Therapist Sign     SL1.2 2/7/2018 10:51 AM Nicole Cisneros OT Occupational Therapist             Progress Notes by Aaron Aly at 2/7/2018 10:24 AM     Author:  Aaron Aly Service:  Spiritual Health Author Type:      Filed:  2/7/2018 10:30 AM Date of Service:  2/7/2018 10:24 AM Creation Time:  2/7/2018 10:24 AM    Status:  Signed :  Aaron Aly ()         SPIRITUAL HEALTH SERVICES  SPIRITUAL ASSESSMENT Progress Note  [facility] - Med/Surg    PRIMARY FOCUS:     Assessment of emotional/spiritual/Congregational distress    Support for coping    ILLNESS CIRCUMSTANCES:   Reviewed documentation. Reflective conversation shared with Ellie Leigh which integrated elements of illness and family narratives.     Context of Serious Illness/Symptom(s) - Back pain    Resources for Support - two daughters present, one from [place], one from [place], [place]    DISTRESS:     Emotional/Existential/Relational Distress - Ellie explained the pain and expressed hope that the binder will be helpful    Spiritual/Advent Distress - " "None identified    Social/Cultural/Economic Distress - She stated that she lives with another daughter, \"who isn't here because she's taking her  to see a heart doctor\".     SPIRITUAL/Restoration COPING:     Anabaptist/Tia - [employer]    Spiritual Practice(s) - Identified with her Yarsanism and welcomed prayer for both her health and that of her Selwyn    GOALS OF CARE:    Goals of Care - Identifying source of pain and getting help with that; daughters were doting and when PT/OT arrived the attention focused on their availability    Meaning/Sense-Making - Family and tia were both lifted up as significant for Ellie    PLAN: I will provide follow up visit with pt during LOS if possible    Aaron Aly M.A., [facility]  Staff   [facility]  Office: [phone]  Cell: [phone]  Pager [phone][MC1.1]       Revision History        User Key Date/Time User Provider Type Action    > MC1.1 2/7/2018 10:30 AM Aaron Aly Sign            ED Provider Notes by Joanna Cardona PA-C at 2/6/2018 11:53 AM     Author:  Joanna Cardona PA-C Service:  Emergency Medicine Author Type:  Physician Assistant    Filed:  2/6/2018  8:38 PM Date of Service:  2/6/2018 11:53 AM Creation Time:  2/6/2018 12:36 PM    Status:  Attested :  Joanna Cardona PA-C (Physician Assistant)    Cosigner:  Mehul Eason MD at 2/7/2018  1:21 AM        Attestation signed by Mehul Eason MD at 2/7/2018  1:21 AM        Patient's plan of care was reviewed with Dr TRES Rowley- admitting hospitalist and Joanna Cardona PA-C.  Please see notes by Joanna Cardona PA-C for details of initial clinical history, presentation, ED  course of care and workup in the emergency department.    I assumde care at shift change at signout from Joanna Cardona PA-C awaiting disposition for care for patient who was seen for back pain and concern for challenges with disposition including family's inability to care for " "the patient due to pain control and concern with ability to transfer and mobilize the patient while at home. Dr TRES Rowley-admitting hospitalist had a long discussion with the patient and her family about the risk and benefit of admitting to the hospital while trying to coordinate follow-up with orthopedic surgery and address concerns about pain control.  While patient was in the emergency department she had imaging completed a plan of care was reviewed with Dr Hernadez- orthopedic surgery.    it was ultimately decided to admit the patient to the hospitalist service for further care including PT/OT, consultation with orthopedics for TLSO brace, appropriate pain management, and coordination of outpatient care .  Dr. Herb Rowley- agreed to assume care on admission.                                 History     Chief Complaint   Patient presents with     Back Pain     for 5 weeks, was supposed to have clinic appointment today but could not get in car, clinic told to \"call ambulance to make her appointment\"     HPI  Ellie Leigh is a 87 year old female[AF1.1] with[AF1.2] significant past medical history[AF1.3] who presents to the emergency department today with concerns over persistent back pain for approximately the last 5 weeks.  Patient states that she initially thought that she may have injured her back leaning forward to put something in the  however she is unsure.  She complains of constant dull pain in low back bilaterally, left greater than right with intermittent sharp shooting pains which are typically brought on by lifting her legs, changes in position.  She denies any significant numbness, paresthesias in her lower extremity, groin.[AF1.2]  She complains of chronic shortness of breath, diffuse abdominal pain it is unclear if they have changed significantly in the last several days since onset of symptoms[AF1.4] and states that she has had bloody noses for the last two night which have lasted " approximately 5 minutes each  She was evaluated in the clinic several weeks ago on 1/26/18 had X-ray at that time, report available under imaging tab and was given referral to sports medicine with appointment scheduled today. Lindy[AF1.3]ient and family who present with her states that pain has become incapacitating which has affected her ability to ambulate.  Prior to onset of back pain patient had been able to walk with the assistance of a walker.  In the last 2 weeks patient and family stated that he has become so severe that she is unable to stand or walk any distance.  She requires assistance of at least one person to transfer her from her bed to a wheelchair to transport her to go the the bathroom or into areas where she can perform activities of daily living.  Patient does live with family who helps to assist with this however primary caregiver needs to be with her  who is having surgical procedure tomorrow and remaining family do not feel comfortable caring for her.[AF1.4]      Problem List:    Patient Active Problem List    Diagnosis Date Noted     Nausea 06/05/2017     Priority: Medium     Elevated troponin 08/21/2016     Priority: Medium     Irritable bowel syndrome without diarrhea 05/02/2016     Priority: Medium     Unresponsive episode 03/18/2016     Priority: Medium     Chest pain at rest 12/07/2015     Priority: Medium     Mild persistent asthma without complication 12/01/2015     Priority: Medium     Long term current use of anticoagulant therapy 03/02/2015     Priority: Medium     Problem list name updated by automated process. Provider to review       Hemoptysis 01/21/2015     Priority: Medium     Syncope 01/12/2015     Priority: Medium     Advance Care Planning 01/09/2015     Priority: Medium     Advance Care Planning 7/2/2015: Receipt of ACP document:  Received: Health Care Directive which was witnessed or notarized on 1-9-15.  Document previously scanned on 2-27-15.  Validation form  completed and sent to be scanned.  Code Status reflects choices in most recent ACP document.  Confirmed/documented designated decision maker(s).  Added by Verónica Green RN, System ACP Coordinator Honoring Choices   1/9/15 Copy to be scanned into chart Beulah Mathis CMA         COPD (chronic obstructive pulmonary disease) (H) 10/06/2014     Priority: Medium     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) 07/07/2014     Priority: Medium     Cause TBD.  Patient has had lung CT, will see Dr Gómez for consideration of further workup.  Also has pulmonology appt 8/18.       Positive fecal occult blood test 07/07/2014     Priority: Medium     x2 -- first was in ED.  Patient expresses that she does not want colonoscopy.  She'll set appt to discuss with her PCP.       Benign essential HTN 02/11/2014     Priority: Medium     BPPV (benign paroxysmal positional vertigo) 11/05/2013     Priority: Medium     History of skin cancer 05/07/2013     Priority: Medium     Recurrent UTI 07/16/2012     Priority: Medium     recurrent UTI  Recent Urologic workup neg, 2005  Has Cipro at home for treatment as needed       Hypothyroidism 05/07/2012     Priority: Medium     Hyponatremia 05/07/2012     Priority: Medium     Squamous cell carcinoma in situ of skin of face 04/19/2012     Priority: Medium     SK (seborrheic keratosis) 04/19/2012     Priority: Medium     Intermittent atrial fibrillation (H) 12/01/2011     Priority: Medium     Cervical vertebral fracture (H) 11/20/2011     Priority: Medium     Anxiety 11/15/2011     Priority: Medium     DVT (deep venous thrombosis) 10/12/2011     Priority: Medium     H/o in past, on current coumadin therapy.       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 Provider to review       Right arm weakness 07/29/2011     Priority: Medium     Ulnar neuropathy 07/29/2011     Priority: Medium      Health Care Home 04/21/2011     Priority: Medium     Conchis Travis, [phone]  A / [facility]              DX V65.8 REPLACED WITH [id number] HEALTH CARE HOME (04/08/2013)       Chronic constipation 03/03/2011     Priority: Medium     Osteoporosis 03/03/2011     Priority: Medium     Hyperlipidemia LDL goal <130 10/31/2010     Priority: Medium     Infectious colitis, enteritis and gastroenteritis 07/10/2010     Priority: Medium     Chronic allergic conjunctivitis 11/18/2008     Priority: Medium     Atopic rhinitis 11/18/2008     Priority: Medium     (Problem list name updated by automated process. Provider to review and confirm.)       Rhinitis, allergic seasonal 11/18/2008     Priority: Medium     Esophageal reflux 11/29/2007     Priority: Medium     PERSONAL HISTORY OF MALIGNANCY- BREAST 06/13/2007     Priority: Medium     Disease of lung 12/27/2006     Priority: Medium     Pt with chronic RUL infiltrate with pos AFB sputum  Full treatmetn for TB following ID consult in 2000's  Problem list name updated by automated process. Provider to review       Sensorineural hearing loss, asymmetrical 06/20/2005     Priority: Medium     Generalized osteoarthrosis, unspecified site 06/20/2005     Priority: Medium     Right hip and knee are the most symptomatic        Past Medical History:    Past Medical History:   Diagnosis Date     Breast cancer (H)      COPD (chronic obstructive pulmonary disease) (H)      Dust allergy      DVT (deep vein thrombosis) in pregnancy (H)      HTN (hypertension)      Hyponatremia      Hypothyroid      Intermittent atrial fibrillation (H) 12/1/2011     Loose body in joint 4/15/2011     Neck fracture (H)      Syncope 5/12/2013     Past Surgical History:    Past Surgical History:   Procedure Laterality Date     APPENDECTOMY       ARTHROSCOPY KNEE  4/15/2011    Procedure:ARTHROSCOPY KNEE; removal of loose body; Surgeon:LEY, JEFFREY DUANE; Location:[facility] OR     CHOLECYSTECTOMY        ESOPHAGOSCOPY, GASTROSCOPY, DUODENOSCOPY (EGD), COMBINED  6/9/2014    Procedure: COMBINED ESOPHAGOSCOPY, GASTROSCOPY, DUODENOSCOPY (EGD);  Surgeon: Gilberto Richard MD;  Location: [facility]     IMPLANT PACEMAKER  5/21/2013    Sarah Buttsl [id number] Serial#[id number]     INJECT EPIDURAL LUMBAR  3/23/2011    INJECT EPIDURAL LUMBAR performed by GENERIC ANESTHESIA PROVIDER at [facility] OR     JOINT REPLACEMENT, HIP RT/LT      Joint Replacement Hip Rt     MASTECTOMY, SIMPLE RT/LT/CAITLYN      Left breast - following breast ca     OTHER SURGICAL HISTORY      C1-C2 fusion after fx      SURGICAL HISTORY OF -   05/22/2001    Colonoscopy     TONSILLECTOMY       Family History:    Family History   Problem Relation Age of Onset     CEREBROVASCULAR DISEASE Mother      HEART DISEASE Mother      MI     CEREBROVASCULAR DISEASE Father      HEART DISEASE Father      MI     Respiratory Maternal Grandfather      TB     Neurologic Disorder Brother      ALS     HEART DISEASE Brother      CEREBROVASCULAR DISEASE Brother      Breast Cancer Daughter      age:49     Asthma Brother      CANCER Brother      brain and lung     CANCER Daughter      thyroid     Social History:  Marital Status:   [5]  Social History   Substance Use Topics     Smoking status: Never Smoker     Smokeless tobacco: Never Used     Alcohol use No     Medications:      ipratropium - albuterol 0.5 mg/2.5 mg/3 mL (DUONEB) 0.5-2.5 (3) MG/3ML neb solution   warfarin (COUMADIN) 1 MG tablet   diclofenac (VOLTAREN) 1 % GEL topical gel   ranitidine (ZANTAC) 150 MG tablet   albuterol (PROAIR HFA/PROVENTIL HFA/VENTOLIN HFA) 108 (90 BASE) MCG/ACT Inhaler   metoprolol (TOPROL-XL) 25 MG 24 hr tablet   levothyroxine (SYNTHROID/LEVOTHROID) 50 MCG tablet   SYMBICORT 160-4.5 MCG/ACT Inhaler   probiotic CAPS   polyethylene glycol (MIRALAX) powder   CRANBERRY     Review of Systems   Constitutional: Negative for[AF1.1] appetite change[AF1.3],[AF1.1] chills[AF1.3] and[AF1.1] fever[AF1.3].    Respiratory: Positive for[AF1.1] cough[AF1.3] and[AF1.1] shortness of breath[AF1.3].[AF1.1]         Consistent with baseline per her report[AF1.3]   Gastrointestinal: Positive for[AF1.1] abdominal pain (chonic)[AF1.3]. Negative for[AF1.1] blood in stool[AF1.3],[AF1.1] diarrhea[AF1.3],[AF1.1] nausea[AF1.3] and[AF1.1] vomiting[AF1.3].   Genitourinary: Negative for[AF1.1] dysuria[AF1.3],[AF1.1] frequency[AF1.3] and[AF1.1] hematuria[AF1.3].   Musculoskeletal: Positive for[AF1.1] back pain[AF1.3] and[AF1.1] gait problem[AF1.3].   Skin: Negative for[AF1.1] color change[AF1.3] and[AF1.1] wound[AF1.3].   Neurological: Positive for[AF1.1] weakness[AF1.3]. Negative for[AF1.1] dizziness[AF1.3],[AF1.1] syncope[AF1.3],[AF1.1] speech difficulty[AF1.3],[AF1.1] light-headedness[AF1.3] and[AF1.1] headaches[AF1.3].      Physical Exam   BP: 137/72  Heart Rate: 80  Temp: 97.5  F (36.4  C)  Resp: 12  SpO2: 95 %    Physical Exam   Constitutional: She is[AF1.1] oriented to person, place, and time[AF1.3].[AF1.1] No distress[AF1.4].   HENT:   Head:[AF1.1] Normocephalic[AF1.3] and[AF1.1] atraumatic[AF1.3].[AF1.1]   Scant amount of dried blood present in left nares[AF1.3]    Eyes:[AF1.1] Conjunctivae[AF1.3] and[AF1.1] EOM[AF1.3] are normal.[AF1.1] Pupils are equal, round, and reactive to light[AF1.3].   Cardiovascular:[AF1.1] Normal rate[AF1.4],[AF1.1] regular rhythm[AF1.4] and[AF1.1] normal heart sounds[AF1.4].  Exam reveals[AF1.1] no gallop[AF1.4] and[AF1.1] no friction rub[AF1.4].[AF1.1]    No murmur[AF1.4] heard.  Pulmonary/Chest:[AF1.1] Effort normal[AF1.4]. No[AF1.1] respiratory distress[AF1.4]. She has[AF1.1] wheezes (diffuse bilaterally)[AF1.3]. She has[AF1.1] no rales[AF1.4].   Abdominal:[AF1.1] Soft[AF1.4]. A[AF1.1] hernia (umbilical )[AF1.4] is present.   Musculoskeletal:        Right shoulder: She exhibits[AF1.1] tenderness[AF1.3] and[AF1.1] pain[AF1.3]. She exhibits[AF1.1] no swelling[AF1.3] and[AF1.1] no deformity[AF1.3].         Thoracic back:[AF1.1] Normal[AF1.3].        Lumbar back: She exhibits[AF1.1] decreased range of motion[AF1.4],[AF1.1] tenderness[AF1.4] and[AF1.1] pain[AF1.4]. She exhibits[AF1.1] no swelling[AF1.4],[AF1.1] no edema[AF1.4] and[AF1.1] no deformity[AF1.4].[AF1.1]   unable to assess ROM due to patient's pain level and functional status[AF1.3]   Neurological: She is[AF1.1] alert[AF1.3] and[AF1.1] oriented to person, place, and time[AF1.3]. No[AF1.1] sensory deficit[AF1.3]. GCS eye subscore is[AF1.1] 4[AF1.3]. GCS verbal subscore is[AF1.1] 5[AF1.3]. GCS motor subscore is[AF1.1] 6[AF1.3].   Reflex Scores:       Patellar reflexes are[AF1.1] 1+[AF1.3] on the right side and[AF1.1] 1+[AF1.3] on the left side.[AF1.1]  strength with flexion of hip, flexion and extension of knees and great toe is 5/5 bilaterally.[AF1.3]     Skin: Skin is[AF1.1] warm[AF1.4] and[AF1.1] dry[AF1.4].[AF1.1] No abrasion[AF1.3],[AF1.1] no ecchymosis[AF1.3] and[AF1.1] no rash[AF1.4] noted. No[AF1.1] erythema[AF1.4].[AF1.1]   Well healed surgical scare present over cervical spine[AF1.3]      ED Course     ED Course     Procedures        Critical Care time:[AF1.1]  none[AF1.2]            Labs Ordered and Resulted from Time of ED Arrival Up to the Time of Departure from the ED   COMPREHENSIVE METABOLIC PANEL - Abnormal; Notable for the following:        Result Value    Sodium 123 (*)     Chloride 89 (*)     Glucose 109 (*)     Creatinine 0.31 (*)     Calcium 8.0 (*)     Albumin 3.3 (*)     All other components within normal limits   CBC WITH PLATELETS DIFFERENTIAL - Abnormal; Notable for the following:     Hemoglobin 11.1 (*)     Hematocrit 32.2 (*)     All other components within normal limits   INR - Abnormal; Notable for the following:     INR 1.95 (*)     All other components within normal limits   URINE MACROSCOPIC WITH REFLEX TO MICRO   URINE CULTURE AEROBIC BACTERIAL[AF1.5]     Results for orders placed or performed during the hospital encounter  of 02/06/18   Lumbar spine CT w/o contrast    Narrative    CT LUMBAR SPINE WITHOUT CONTRAST  2/6/2018 2:10 PM     HISTORY: Low back pain.      TECHNIQUE: Axial images of the lumbar spine were obtained without  intravenous contrast. Multiplanar reformations were performed.   Radiation dose for this scan was reduced using automated exposure  control, adjustment of the mA and/or kV according to patient size, or  iterative reconstruction technique.    COMPARISON: None.    FINDINGS:  Chronic-appearing loss of vertebral body height at L1 due  to inferior endplate Schmorl's node or old compression fracture. Mild  old inferior endplate compression fracture of L2 appears chronic with  some sclerosis. Similar chronic inferior endplate findings at L3.  These all have the appearance of Schmorl's nodes or somewhat focal  inferior endplate compression deformities that are likely chronic. No  definite acute fracture. Diffuse osteopenia. The findings at L1 are  stable. The inferior endplate compression deformities at L2 and L3 are  new since the comparison CT of almost two years ago but have sclerosis  and no definite visible lucent fracture lines, indicating they are  likely chronic. The distal spinal cord and conus medullaris appear  normal.  The paraspinous soft tissues appear normal.    T12-L1:  Normal disc, facet joints, spinal canal and neural foramina.     L1-L2:  Normal disc, facet joints, spinal canal and neural foramina.    L2-L3:  Mild broad-based disc bulge. This lateralizes to the right  resulting in mild to moderate right foraminal stenosis. Left neural  foramen is patent. No central stenosis. Facet joints are unremarkable.      L3-L4:  Mild disc bulge and facet hypertrophy. Mild central stenosis.  Mild bilateral foraminal stenosis.     L4-L5:  Moderate facet degenerative changes. Mild disc bulge and  osteophytic ridging. Mild central stenosis. Neural foramina are  patent.     L5-S1:  Prominent left and moderate right  facet degenerative changes.  Mild disc bulge. No central stenosis. There is a slightly more focal  left foraminal disc protrusion resulting in at least moderate left  foraminal stenosis. Right neural foramen is patent. No central  stenosis.      Impression    IMPRESSION:    1. Inferior endplate central compression deformities at L2 and L3 are  new since 3/10/2016 but have a chronic imaging appearance.  2. At L2-L3 there is mild to moderate right foraminal stenosis.  3. At L3-L4 there is mild central and bilateral foraminal stenosis.  4. At L4-L5 there is mild central stenosis.  5. At L5-S1 there is moderate left foraminal stenosis.  6. Mild progression of degenerative findings since the comparison  study.       VU HARLEY MD     *Note: Due to a large number of results and/or encounters for the requested time period, some results have not been displayed. A complete set of results can be found in Results Review.[AF1.6]     Assessments & Plan (with Medical Decision Making)     I have reviewed the nursing notes.    I have reviewed the findings, diagnosis, plan and need for follow up with the patient.[AF1.1]  New Prescriptions    No medications on file[AF1.7]     Final diagnoses:   Hyponatremia   Acute bilateral low back pain with left-sided sciatica[AF1.8]     87-year-old female with significant past medical history presents to the emergency department by ambulance with family with concerns over 5 week history of bilateral low back pain, left greater than right which has been progressively worsening since onset resulting in difficulty with ambulation and performing activities daily living.    She had stable vital signs upon arrival.  Physical exam findings as described above.  As part of evaluation patient did have laboratory testing including CBC showed mild anemia, metabolic panel showed hyponatremia, hypochloremia. Urinalysis was negative for infection.  And her INR was to be slightly subtherapeutic at 1.95. I did  also order CT of her lumbar spine as she was unable to tolerate MRI given her pacemaker status which showed inferior endplate central compression deformities at L2 and L3 which are new since prior exam but do appear chroni per radiology report and multilevel mild to moderate foraminal stenosis.  The plan was initially to admit patient to the hospital given for pain control and for social risk factors, (ability to care for her self, fall risk.  I did speak with Dr. Rowley regarding patient who stated concern over admitting patient here as there is not spinal surgical evaluation available and recommended I speak with spinal specialist for further recommendations.  I spoke with on call provider Dr. Hernadez who did agree to evaluate patient in clinic tomorrow.  I then discussed plan with family to discharge patient home with additional pain medications to definitive evaluation tomorrow.  Family stated persistent concerns with ability to safely care for patient in home.  I contacted hospitalist again who would speak with spine specialist directly.  Care of patient was then transferred to Dr. Anastacio Eason at time of discharge with patient's disposition status pending.      Disclaimer: This note consists of symbols derived from keyboarding, dictation, and/or voice recognition software. As a result, there may be errors in the script that have gone undetected.  Please consider this when interpreting information found in the chart.[AF1.3]      2/6/2018   [facility] EMERGENCY DEPARTMENT[AF1.1]     Joanna Cardona PA-C  02/06/18 2038  [AF1.9]     Revision History        User Key Date/Time User Provider Type Action    > AF1.9 2/6/2018  8:38 PM Joanna Cardona PA-C Physician Assistant Sign     AF1.6 2/6/2018  8:19 PM Joanna Cardona PA-C Physician Assistant      AF1.3 2/6/2018  8:13 PM Joanna Cardona PA-C Physician Assistant      AF1.7 2/6/2018  6:21 PM Joanna Cardona PA-C Physician Assistant      AF1.8 2/6/2018  5:44 PM  "Joanna Cardona PA-C Physician Assistant      AF1.4 2/6/2018  3:41 PM Joanna Cardona PA-C Physician Assistant      AF1.5 2/6/2018  2:05 PM Joanna Cardona PA-C Physician Assistant      AF1.2 2/6/2018 12:40 PM Joanna Cardona PA-C Physician Assistant      AF1.1 2/6/2018 12:36 PM Joanna Cardona PA-C Physician Assistant             Progress Notes by Claudine Griffin RN at 2/6/2018  9:06 PM     Author:  Claudine Griffin RN Service:  (none) Author Type:  Registered Nurse    Filed:  2/6/2018  9:07 PM Date of Service:  2/6/2018  9:06 PM Creation Time:  2/6/2018  9:06 PM    Status:  Signed :  Claudine Griffin RN (Registered Nurse)         [facility] ADMISSION NOTE    Patient admitted to room 2301 at approximately 1950 via cart from emergency room. Patient was accompanied by transport tech.     Verbal SBAR report received from Madison RN prior to patient arrival.     Patient ambulated to bed with stand-by assist. Patient alert and oriented X 3. The patient is not having any pain.  . Admission vital signs: Blood pressure 161/83, temperature 98.3  F (36.8  C), temperature source Oral, resp. rate 16, height 1.473 m (4' 10\"), weight 52.3 kg (115 lb 4.8 oz), last menstrual period 06/15/1985, SpO2 94 %, not currently breastfeeding. Patient was oriented to plan of care, call light, bed controls, tv, telephone, bathroom and visiting hours.     The following safety risks were identified during admission: fall and skin. Yellow risk band applied: YES.     Claudine Griffin  RN[LJ1.1]       Revision History        User Key Date/Time User Provider Type Action    > LJ1.1 2/6/2018  9:07 PM Claudine Griffin RN Registered Nurse Sign            ED Provider Notes by Mehul Eason MD at 2/6/2018 11:53 AM     Author:  Mehul Eason MD Service:  Emergency Medicine Author Type:  Physician    Filed:  2/6/2018  8:04 PM Date of Service:  2/6/2018 11:53 AM Creation Time:  2/6/2018  6:49 PM    Status:  Signed :  " Mehul Eason MD (Physician)              Emergency Department Patient Sign-out       Brief HPI:  This is a 87 year old female signed out to me by Joanna Cardona PA-C .  See initial ED Provider note for details of the presentation.  At signout at 6:45 PM patient is reported to be awaiting admission to the hospitalist service.  There was discussion between the orthopedic spine surgeon and Dr. Rowley the admitting hospitalist about patient's family unable to provide care and support for the patient's.  Patient has back pain that is steadily worsened over the last 2 weeks.         Significant Events prior to my assuming care: CT of the lumbar spine was completed by Joanna Cardona PA-C.  See detailed interpretation by the radiologist below.  There was discussion with both the hospitalist service and orthopedic spine surgery.  Family is unable to care for the patient.  Patient will benefit from admission to the hospital including discussion about placement in a transitional care facility while trying to adjust and manage her pain and to help her with rehabilitation.    Significant Events after my assuming care:[EA1.1] Dr. Herb Rowley- the admitting hospitalist spoke with the patient and her family.  They reviewed goals and expectation for hospitalization and care.  Dr. Rowley reported to me that he had a discussion with orthopedic surgery -[facility]- Dr Camarena.  It appears there is a plan for orthopedic surgery to see and evaluate the patient to consider a brace in the morning on 2/7/18.  After discussion plan is to admit for further care. Please see hospitalist service for additional details of care.[EA1.2]      Exam:   Patient Vitals for the past 24 hrs:   BP Temp Temp src Heart Rate Resp SpO2   02/06/18 1730 132/70 - - - - 93 %   02/06/18 1700 127/68 - - - - 92 %   02/06/18 1645 - - - - - 92 %   02/06/18 1630 107/65 - - - - 92 %   02/06/18 1615 111/66 - - - - 92 %   02/06/18 1600 99/68 - - - - 92 %   02/06/18 1500  150/75 - - - - -   02/06/18 1445 137/90 - - - - 93 %   02/06/18 1345 144/78 - - - - 93 %   02/06/18 1330 148/78 - - - - 93 %   02/06/18 1315 150/82 - - - - 93 %   02/06/18 1300 143/76 - - - - 94 %   02/06/18 1245 142/81 - - - - 93 %   02/06/18 1230 140/73 - - - - 94 %   02/06/18 1215 128/71 - - - - 95 %   02/06/18 1200 141/74 - - - - 95 %   02/06/18 1158 137/72 97.5  F (36.4  C) Oral 80 12 95 %           ED RESULTS:   Results for orders placed or performed during the hospital encounter of 02/06/18 (from the past 24 hour(s))   Comprehensive metabolic panel     Status: Abnormal    Collection Time: 02/06/18  1:10 PM   Result Value Ref Range    Sodium 123 (L) 133 - 144 mmol/L    Potassium 3.9 3.4 - 5.3 mmol/L    Chloride 89 (L) 94 - 109 mmol/L    Carbon Dioxide 25 20 - 32 mmol/L    Anion Gap 9 3 - 14 mmol/L    Glucose 109 (H) 70 - 99 mg/dL    Urea Nitrogen 15 7 - 30 mg/dL    Creatinine 0.31 (L) 0.52 - 1.04 mg/dL    GFR Estimate >90 >60 mL/min/1.7m2    GFR Estimate If Black >90 >60 mL/min/1.7m2    Calcium 8.0 (L) 8.5 - 10.1 mg/dL    Bilirubin Total 0.4 0.2 - 1.3 mg/dL    Albumin 3.3 (L) 3.4 - 5.0 g/dL    Protein Total 6.8 6.8 - 8.8 g/dL    Alkaline Phosphatase 97 40 - 150 U/L    ALT 22 0 - 50 U/L    AST 36 0 - 45 U/L   CBC with platelets, differential     Status: Abnormal    Collection Time: 02/06/18  1:10 PM   Result Value Ref Range    WBC 5.8 4.0 - 11.0 10e9/L    RBC Count 4.00 3.8 - 5.2 10e12/L    Hemoglobin 11.1 (L) 11.7 - 15.7 g/dL    Hematocrit 32.2 (L) 35.0 - 47.0 %    MCV 81 78 - 100 fl    MCH 27.8 26.5 - 33.0 pg    MCHC 34.5 31.5 - 36.5 g/dL    RDW 13.8 10.0 - 15.0 %    Platelet Count 305 150 - 450 10e9/L    Diff Method Automated Method     % Neutrophils 57.8 %    % Lymphocytes 19.9 %    % Monocytes 18.9 %    % Eosinophils 2.4 %    % Basophils 0.7 %    % Immature Granulocytes 0.3 %    Absolute Neutrophil 3.4 1.6 - 8.3 10e9/L    Absolute Lymphocytes 1.2 0.8 - 5.3 10e9/L    Absolute Monocytes 1.1 0.0 - 1.3 10e9/L     Absolute Eosinophils 0.1 0.0 - 0.7 10e9/L    Absolute Basophils 0.0 0.0 - 0.2 10e9/L    Abs Immature Granulocytes 0.0 0 - 0.4 10e9/L   INR     Status: Abnormal    Collection Time: 02/06/18  1:10 PM   Result Value Ref Range    INR 1.95 (H) 0.86 - 1.14   Lumbar spine CT w/o contrast     Status: None    Collection Time: 02/06/18  2:10 PM    Narrative    CT LUMBAR SPINE WITHOUT CONTRAST  2/6/2018 2:10 PM     HISTORY: Low back pain.      TECHNIQUE: Axial images of the lumbar spine were obtained without  intravenous contrast. Multiplanar reformations were performed.   Radiation dose for this scan was reduced using automated exposure  control, adjustment of the mA and/or kV according to patient size, or  iterative reconstruction technique.    COMPARISON: None.    FINDINGS:  Chronic-appearing loss of vertebral body height at L1 due  to inferior endplate Schmorl's node or old compression fracture. Mild  old inferior endplate compression fracture of L2 appears chronic with  some sclerosis. Similar chronic inferior endplate findings at L3.  These all have the appearance of Schmorl's nodes or somewhat focal  inferior endplate compression deformities that are likely chronic. No  definite acute fracture. Diffuse osteopenia. The findings at L1 are  stable. The inferior endplate compression deformities at L2 and L3 are  new since the comparison CT of almost two years ago but have sclerosis  and no definite visible lucent fracture lines, indicating they are  likely chronic. The distal spinal cord and conus medullaris appear  normal.  The paraspinous soft tissues appear normal.    T12-L1:  Normal disc, facet joints, spinal canal and neural foramina.     L1-L2:  Normal disc, facet joints, spinal canal and neural foramina.    L2-L3:  Mild broad-based disc bulge. This lateralizes to the right  resulting in mild to moderate right foraminal stenosis. Left neural  foramen is patent. No central stenosis. Facet joints are  unremarkable.      L3-L4:  Mild disc bulge and facet hypertrophy. Mild central stenosis.  Mild bilateral foraminal stenosis.     L4-L5:  Moderate facet degenerative changes. Mild disc bulge and  osteophytic ridging. Mild central stenosis. Neural foramina are  patent.     L5-S1:  Prominent left and moderate right facet degenerative changes.  Mild disc bulge. No central stenosis. There is a slightly more focal  left foraminal disc protrusion resulting in at least moderate left  foraminal stenosis. Right neural foramen is patent. No central  stenosis.      Impression    IMPRESSION:    1. Inferior endplate central compression deformities at L2 and L3 are  new since 3/10/2016 but have a chronic imaging appearance.  2. At L2-L3 there is mild to moderate right foraminal stenosis.  3. At L3-L4 there is mild central and bilateral foraminal stenosis.  4. At L4-L5 there is mild central stenosis.  5. At L5-S1 there is moderate left foraminal stenosis.  6. Mild progression of degenerative findings since the comparison  study.       VU HARLEY MD   UA reflex to Microscopic     Status: Abnormal    Collection Time: 02/06/18  2:35 PM   Result Value Ref Range    Color Urine Straw     Appearance Urine Slightly Cloudy     Glucose Urine Negative NEG^Negative mg/dL    Bilirubin Urine Negative NEG^Negative    Ketones Urine 10 (A) NEG^Negative mg/dL    Specific Gravity Urine 1.015 1.003 - 1.035    Blood Urine Trace (A) NEG^Negative    pH Urine 7.5 (H) 5.0 - 7.0 pH    Protein Albumin Urine 10 (A) NEG^Negative mg/dL    Urobilinogen mg/dL Normal 0.0 - 2.0 mg/dL    Nitrite Urine Negative NEG^Negative    Leukocyte Esterase Urine Negative NEG^Negative    Source Midstream Urine     WBC Urine 2 0 - 2 /HPF    RBC Urine 2 0 - 2 /HPF    Bacteria Urine Moderate (A) NEG^Negative /HPF    Squamous Epithelial /HPF Urine 1 0 - 1 /HPF    Amorphous Crystals Moderate (A) NEG^Negative /HPF       ED MEDICATIONS:   Medications   ipratropium - albuterol 0.5  mg/2.5 mg/3 mL (DUONEB) neb solution 3 mL (3 mLs Nebulization Given 2/6/18 1420)   HYDROmorphone (DILAUDID) injection 0.2 mg (0.2 mg Intravenous Given 2/6/18 1629)         Impression:    ICD-10-CM    1. Hyponatremia E87.1    2. Acute bilateral low back pain with left-sided sciatica M54.42        Plan:[EA1.1]    Admit to general medicine service for further care and evaluation inclduing consultation with orthopedics[EA1.2]      Mehul Eason[EA1.1]        Mehul Eason MD  02/06/18 2004  [EA1.3]     Revision History        User Key Date/Time User Provider Type Action    > EA1.3 2/6/2018  8:04 PM Mehul Eason MD Physician Sign     EA1.2 2/6/2018  8:01 PM Mehul Eason MD Physician      EA1.1 2/6/2018  6:49 PM Mehul Eason MD Physician             ED Notes by Madison Hong RN at 2/6/2018  7:27 PM     Author:  Madison Hong RN Service:  (none) Author Type:  Registered Nurse    Filed:  2/6/2018  7:29 PM Date of Service:  2/6/2018  7:27 PM Creation Time:  2/6/2018  7:27 PM    Status:  Signed :  Madison Hong RN (Registered Nurse)         Has decided to stay tonight  Family wants to make sure they have opportunity to work with  to jones regarding discharge plan ie transitional care vs more help at home- explore available options[BA1.1]     Revision History        User Key Date/Time User Provider Type Action    > BA1.1 2/6/2018  7:29 PM Madison Hong RN Registered Nurse Sign            ED Notes by Annika Rebolledo RN at 2/6/2018  7:03 PM     Author:  Annika Rebolledo RN Service:  (none) Author Type:  Registered Nurse    Filed:  2/6/2018  7:03 PM Date of Service:  2/6/2018  7:03 PM Creation Time:  2/6/2018  7:03 PM    Status:  Signed :  Perkel, Annika, RN (Registered Nurse)         Admit provider at bedside assessing[JP1.1]     Revision History        User Key Date/Time User Provider Type Action    > JP1.1 2/6/2018  7:03 PM Annika Rebolledo, RN Registered  Nurse Sign            ED Notes by Evie Chapman RN at 2/6/2018  6:00 PM     Author:  Evie Chapman RN Service:  (none) Author Type:  Registered Nurse    Filed:  2/6/2018  6:51 PM Date of Service:  2/6/2018  6:00 PM Creation Time:  2/6/2018  6:51 PM    Status:  Signed :  Evie Chapman RN (Registered Nurse)         Pt up with SBA with walker to ambulate, pt denies pain. Talked with dtr about how to assist her being she would be the one assisting her at home. The pt did well, she was up gait steady and slow. She denies pain with activity. dtr did well, however she is not comfortable taking care of her at home and getting her up in the noc to use BSC or anything else. She states she is worried she will be unsteady and fall. Pt assisted back into bed with leg assist only, she does well with walker. Will talk with Joanna. Continue to monitor[WC1.1]      Revision History        User Key Date/Time User Provider Type Action    > WC1.1 2/6/2018  6:51 PM Evie Chapman RN Registered Nurse Sign            ED Notes by Annika Rebolledo RN at 2/6/2018  4:05 PM     Author:  Annika Rbeolledo RN Service:  (none) Author Type:  Registered Nurse    Filed:  2/6/2018  4:05 PM Date of Service:  2/6/2018  4:05 PM Creation Time:  2/6/2018  4:05 PM    Status:  Signed :  Annika Rebolledo RN (Registered Nurse)         MD at bedside discussing pain meds and plan of care[JP1.1]     Revision History        User Key Date/Time User Provider Type Action    > JP1.1 2/6/2018  4:05 PM Annika Rebolledo RN Registered Nurse Sign            ED Notes by Evie Chapman RN at 2/6/2018  3:00 PM     Author:  Evie Chapman RN Service:  (none) Author Type:  Registered Nurse    Filed:  2/6/2018  3:00 PM Date of Service:  2/6/2018  3:00 PM Creation Time:  2/6/2018  3:00 PM    Status:  Signed :  Evie Chapman RN (Registered Nurse)         Dinner tray given, pt sitting up eating family at bedside[WC1.1]      Revision  History        User Key Date/Time User Provider Type Action    > WC1.1 2/6/2018  3:00 PM Evie Chapman RN Registered Nurse Sign            ED Notes by Evie Chapman RN at 2/6/2018  2:39 PM     Author:  Evie Chapman RN Service:  (none) Author Type:  Registered Nurse    Filed:  2/6/2018  2:50 PM Date of Service:  2/6/2018  2:39 PM Creation Time:  2/6/2018  2:40 PM    Status:  Addendum :  Evie Chapman RN (Registered Nurse)         Pt up with assist of two to [facility], 225 clear yellow urine. tolerated activity well[WC1.1] denies need for pain meds[WC1.2], urine sent. Family at bedside[WC1.1]      Revision History        User Key Date/Time User Provider Type Action    > WC1.2 2/6/2018  2:50 PM Evie Chapman RN Registered Nurse Addend     WC1.1 2/6/2018  2:40 PM Evie Chapman RN Registered Nurse Sign            ED Notes by Annika Rebolledo RN at 2/6/2018 11:53 AM     Author:  Annika Rebolledo RN Service:  (none) Author Type:  Registered Nurse    Filed:  2/6/2018 11:53 AM Date of Service:  2/6/2018 11:53 AM Creation Time:  2/6/2018 11:53 AM    Status:  Signed :  Annika Rebolledo RN (Registered Nurse)         Bed: ED20  Expected date:   Expected time:   Means of arrival: Ambulance  Comments:  backpain     Revision History        User Key Date/Time User Provider Type Action    > JP1.1 2/6/2018 11:53 AM Annika Rebolledo RN Registered Nurse Sign                  Procedure Notes     No notes of this type exist for this encounter.         Progress Notes - Therapies (Notes from 02/05/18 through 02/08/18)      Progress Notes by Heather Newby PT at 2/7/2018 11:58 AM     Author:  Heather Newby PT Service:  (none) Author Type:  Physical Therapist    Filed:  2/7/2018 12:01 PM Date of Service:  2/7/2018 11:58 AM Creation Time:  2/7/2018 11:58 AM    Status:  Signed :  Sugg, Heather, PT (Physical Therapist)            02/07/18 1000   Quick Adds   Type of Visit Initial PT Evaluation   Living  Environment   Lives With child(grupo), adult   Living Arrangements house   Home Accessibility stairs to enter home   Number of Stairs to Enter Home 3   Self-Care   Usual Activity Tolerance moderate   Current Activity Tolerance fair   Equipment Currently Used at Home walker, rolling   Functional Level Prior   Ambulation 1-->assistive equipment   Transferring 1-->assistive equipment   Toileting 1-->assistive equipment   Bathing 2-->assistive person   Dressing 2-->assistive person   Eating 0-->independent   Communication 0-->understands/communicates without difficulty   Swallowing 0-->swallows foods/liquids without difficulty   Cognition 0 - no cognition issues reported   Fall history within last six months no   Which of the above functional risks had a recent onset or change? none   Prior Functional Level Comment PLOF- Pt indep. with ambulation using a walker/ 4ww househeold distances.  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  Symptoms have been worse the past 4-5 weeks.;  CT- Inferior endplate central compression deformities at L2 and L3   Precautions/Limitations spinal precautions  (semi rigid brace in  place)   General Observations Pt alert, pleasant - up in the chair    Pt reporting her pain is controlled- minimal at rest and with movement- located   Left> right LBP , initermmitent Sx to left foot   Pain Assessment   Patient Currently in Pain Yes, see Vital Sign flowsheet   Posture   "  Posture Kyphosis   Range of Motion (ROM)   ROM Comment AROM UE, LE WFL    Strength   Strength Comments LE strength 4/5  throughout   Bed Mobility   Bed Mobility Comments NT- pt reports needing minimal assistance w/ log roll to sitting   Transfer Skills   Transfer Comments Minimal to CGA of one w/ sit>stand w/ walker   Gait   Gait Comments Pt ambulating short in room distances- 10 feet x2 with RW and CGA of 2. ,needing  occas assistance to advance walker, uses lighter 4ww at home. Slow, steady gait. Pain controlled.    Balance   Balance Comments fair/ good w/ walker use   Clinical Impression   Criteria for Skilled Therapeutic Intervention evaluation only  (Rx to be initiated at TCU)   Anticipated Discharge Disposition Transitional Care Facility   Risk & Benefits of therapy have been explained Yes   Patient, Family & other staff in agreement with plan of care Yes   Clinical Impression Comments ; continued PT at TCU to address strength, ensure independence w/ functional mobility/ ambulation  for safe return home   [facility] AM-PAC  \"6 Clicks\" V.2 Basic Mobility Inpatient Short Form   1. Turning from your back to your side while in a flat bed without using bedrails? 3 - A Little   2. Moving from lying on your back to sitting on the side of a flat bed without using bedrails? 3 - A Little   3. Moving to and from a bed to a chair (including a wheelchair)? 3 - A Little   4. Standing up from a chair using your arms (e.g., wheelchair, or bedside chair)? 3 - A Little   5. To walk in hospital room? 3 - A Little   6. Climbing 3-5 steps with a railing? 3 - A Little   Basic Mobility Raw Score (Score out of 24.Lower scores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

## 2018-02-06 NOTE — IP AVS SNAPSHOT
"    [facility] SURGICAL: [phone]                                              INTERAGENCY TRANSFER FORM - PHYSICIAN ORDERS   2018                    Hospital Admission Date: 2018  EARNESTINE JOSHI   : 1930  Sex: Female        Attending Provider: Saeid Ssoa MD     Allergies:  Cephalosporins, Doxycycline, Sulfa Drugs, Penicillins    Infection:  None   Service:  INTERNAL MED    Ht:  1.473 m (4' 10\")   Wt:  52.3 kg (115 lb 4.8 oz)   Admission Wt:  52.3 kg (115 lb 4.8 oz)    BMI:  24.1 kg/m 2   BSA:  1.46 m 2            Patient PCP Information     Provider PCP Type    Josefina Gómez MD General      ED Clinical Impression     Diagnosis Description Comment Added By Time Added    Hyponatremia [E87.1] Hyponatremia [E87.1]  Joanna Cardona PA-C 2018  5:24 PM    Acute bilateral low back pain with left-sided sciatica [M54.42] Acute bilateral low back pain with left-sided sciatica [M54.42]  Joanna Cardona PA-C 2018  5:25 PM    Back pain [M54.9] Back pain [M54.9]  Mehul Eason MD 2018  6:56 PM      Hospital Problems as of 2018              Priority Class Noted POA    Esophageal reflux Medium  2007 Yes    Mild major depression Medium  2011 Yes    DVT (deep venous thrombosis) Medium  10/12/2011 Yes    Anxiety Medium  11/15/2011 Yes    Intermittent atrial fibrillation (H) Medium  2011 Yes    Hypothyroidism Medium  2012 Yes    Hyponatremia Medium  2012 Yes    Benign essential HTN Medium  2014 Yes    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) Medium  2014 Yes    COPD (chronic obstructive pulmonary disease) (H) Medium  10/6/2014 Yes    Irritable bowel syndrome without diarrhea Medium  2016 Yes    * (Principal)Intractable low back pain Medium  2018 Yes    Back pain Medium  2018 Yes      Non-Hospital Problems as of 2018              Priority Class Noted    Sensorineural hearing loss, asymmetrical Medium  " 6/20/2005    Generalized osteoarthrosis, unspecified site Medium  6/20/2005    Disease of lung Medium  12/27/2006    PERSONAL HISTORY OF MALIGNANCY- BREAST Medium  6/13/2007    Chronic allergic conjunctivitis Medium  11/18/2008    Atopic rhinitis Medium  11/18/2008    Rhinitis, allergic seasonal Medium  11/18/2008    Infectious colitis, enteritis and gastroenteritis Medium  7/10/2010    Hyperlipidemia LDL goal <130 Medium  10/31/2010    Chronic constipation Medium  3/3/2011    Osteoporosis Medium  3/3/2011    Health Care Home Medium  4/21/2011    Right arm weakness Medium  7/29/2011    Ulnar neuropathy Medium  7/29/2011    Headache Medium  8/19/2011    Cervical vertebral fracture (H) Medium  11/20/2011    Squamous cell carcinoma in situ of skin of face Medium  4/19/2012    SK (seborrheic keratosis) Medium  4/19/2012    Recurrent UTI Medium  7/16/2012    History of skin cancer Medium  5/7/2013    BPPV (benign paroxysmal positional vertigo) Medium  11/5/2013    Positive fecal occult blood test Medium  7/7/2014    Advance Care Planning Medium  1/9/2015    Syncope Medium  1/12/2015    Hemoptysis Medium  1/21/2015    Long term current use of anticoagulant therapy Medium  3/2/2015    Mild persistent asthma without complication Medium  12/1/2015    Chest pain at rest Medium  12/7/2015    Unresponsive episode Medium  3/18/2016    Elevated troponin Medium  8/21/2016    Nausea Medium  6/5/2017      Code Status History     Date Active Date Inactive Code Status Order ID Comments User Context    8/21/2016  9:29 PM 8/22/2016  5:06 PM Full Code [id number]  Marilyn Mustafa MD Inpatient    3/18/2016  4:15 PM 3/20/2016  4:15 PM DNR/DNI [id number]  Joyce Ribeiro APRN CNP Inpatient    2/25/2015  4:41 PM 3/18/2016  4:15 PM Full Code [id number]  Jared Briggs LPN Outpatient    1/21/2015 10:22 PM 1/22/2015  3:49 PM Full Code [id number]  Rory Esquivel MD Inpatient    1/12/2015  9:51 PM 1/13/2015  9:09 PM Full Code [id number]  Tor  Misty TUBBS MD Inpatient    5/12/2013 12:30 AM 5/13/2013  1:58 PM Full Code [id number]  Steffanie Lentz RN Inpatient    10/12/2011  9:54 AM 10/14/2011  7:06 PM Full Code [id number]  Neal Andersen Inpatient    8/19/2011  9:32 PM 8/22/2011  7:06 PM Full Code [id number]  Johnsen, April Inpatient         Medication Review      START taking        Dose / Directions Comments    acetaminophen 325 MG tablet   Commonly known as:  TYLENOL        Dose:  650 mg   Take 2 tablets (650 mg) by mouth 3 times daily   Quantity:  100 tablet   Refills:  0        traMADol 50 MG tablet   Commonly known as:  ULTRAM        Dose:  25-50 mg   Take 0.5-1 tablets (25-50 mg) by mouth every 6 hours as needed for pain (25 mg for pain rating 3-6, 50 mg for pain rating 7-10)   Quantity:  40 tablet   Refills:  0          CONTINUE these medications which may have CHANGED, or have new prescriptions. If we are uncertain of the size of tablets/capsules you have at home, strength may be listed as something that might have changed.        Dose / Directions Comments    polyethylene glycol powder   Commonly known as:  MIRALAX   This may have changed:    - when to take this  - additional instructions   Used for:  Constipation        Dose:  1 capful   Take 17 g by mouth daily as needed   Quantity:  510 g   Refills:  5          CONTINUE these medications which have NOT CHANGED        Dose / Directions Comments    albuterol 108 (90 BASE) MCG/ACT Inhaler   Commonly known as:  PROAIR HFA/PROVENTIL HFA/VENTOLIN HFA   Used for:  Mild persistent asthma without complication        Dose:  2 puff   Inhale 2 puffs into the lungs every 6 hours as needed for shortness of breath / dyspnea   Quantity:  3 Inhaler   Refills:  1        CRANBERRY        Dose:  475 mg   Take 475 mg by mouth 2 times daily.   Refills:  0        diclofenac 1 % Gel topical gel   Commonly known as:  VOLTAREN   Used for:  Primary osteoarthritis involving multiple joints        APPLY 4 GRAMS TO KNEES OR 2  GRAMS TO HANDS 4 TIMES DAILY USING ENCLOSED DOSING CARD   Quantity:  100 g   Refills:  11        ipratropium - albuterol 0.5 mg/2.5 mg/3 mL 0.5-2.5 (3) MG/3ML neb solution   Commonly known as:  DUONEB   Used for:  Chronic obstructive pulmonary disease, unspecified COPD type (H)        TAKE 1 VIAL BY NEBULIZATION  EVERY SIX HOURS AS NEEDED FOR SHORTNESS OF BREATH/ DYSPNEA OR WHEEZING   Quantity:  180 mL   Refills:  9        levothyroxine 50 MCG tablet   Commonly known as:  SYNTHROID/LEVOTHROID   Used for:  Hypothyroidism, unspecified type        TAKE 1 TABLET (50 MCG) BY MOUTH DAILY   Quantity:  90 tablet   Refills:  2        metoprolol succinate 25 MG 24 hr tablet   Commonly known as:  TOPROL-XL   Used for:  Essential hypertension, benign        TAKE TWO TABLETS BY MOUTH TWICE DAILY   Quantity:  120 tablet   Refills:  3        probiotic Caps        Dose:  1 capsule   Take 1 capsule by mouth every evening   Refills:  0        ranitidine 150 MG tablet   Commonly known as:  ZANTAC   Used for:  Gastroesophageal reflux disease without esophagitis        Dose:  150 mg   Take 1 tablet (150 mg) by mouth 2 times daily   Quantity:  60 tablet   Refills:  11        SYMBICORT 160-4.5 MCG/ACT Inhaler   Used for:  Mild persistent asthma without complication   Generic drug:  budesonide-formoterol        INHALE 2 PUFFS INTO THE LUNGS 2 TIMES DAILY   Quantity:  10.2 g   Refills:  5        warfarin 1 MG tablet   Commonly known as:  COUMADIN   Used for:  Intermittent atrial fibrillation (H)        Take 1.5 mg Mon, 1 mg rest of the days or as directed by Anticoagulation Clinic.   Quantity:  60 tablet   Refills:  0                  Further instructions from your care team       1.  Follow up with Dr. Frankie Pham in 1 weeks for follow up of your lumbar compression fracture.  Call [phone] if appointment needed or questions  2.  Use pain medication as directed  3.  LSO brace should be on while up for comfort, you may wear this brace  while sleeping, however it should be removed often for skin checks.   4. Mobility is encouraged, as tolerated.    5. Warfarin Discharge Instructions: You were given 1 mg of warfarin today.  Continue your usual dose of 1.5 mg on Monday and 1 mg all the other days of the week.  Repeat INR on Monday at TCU.        After Care     Activity - Up with assistive device           Activity - Up with assistive device           Activity - Up with nursing assistance           Advance Diet as Tolerated       Follow this diet upon discharge: 1500 mL fluid restriction.       General info for SNF       Length of Stay Estimate: Short Term Care: Estimated # of Days <30  Condition at Discharge: Improving  Level of care:skilled   Rehabilitation Potential: Good  Admission H&P remains valid and up-to-date: Yes  Recent Chemotherapy: N/A  Use Nursing Home Standing Orders: Yes       Mantoux instructions       Give two-step Mantoux (PPD) Per Facility Policy Yes             Referrals     Physical Therapy Adult Consult       Evaluate and treat as clinically indicated.    Reason:  Deconditoiing, back pain             Your next 10 appointments already scheduled     Feb 27, 2018  2:00 PM CST   Anticoagulation Visit with [place] [facility]   [facility]    [address] [facility] [address]   [phone]            Apr 03, 2018 10:30 AM CDT   Remote PPM Check with RUEDA TECH1   [facility] ([facility])    [address] [place] [address] [address]  [facility] [address] [phone]           This appointment is for a remote check of your pacemaker.  This is not an appointment at the office.              Follow-Up Appointment Instructions     Future Labs/Procedures    Follow Up and recommended labs and tests     Comments:    Follow up with Dr. Pham in 1 weeks.      Follow-Up Appointment Instructions     Follow Up and recommended labs and tests       Follow up with   Ankit in 1 weeks.             Statement of Approval     Ordered          02/08/18 1329  I have reviewed and agree with all the recommendations and orders detailed in this document.  EFFECTIVE NOW     Approved and electronically signed by:  Saeid Sosa MD

## 2018-02-07 ENCOUNTER — APPOINTMENT (OUTPATIENT)
Dept: PHYSICAL THERAPY | Facility: CLINIC | Age: 83
End: 2018-02-07
Payer: COMMERCIAL

## 2018-02-07 ENCOUNTER — APPOINTMENT (OUTPATIENT)
Dept: OCCUPATIONAL THERAPY | Facility: CLINIC | Age: 83
End: 2018-02-07
Payer: COMMERCIAL

## 2018-02-07 LAB
ANION GAP SERPL CALCULATED.3IONS-SCNC: 8 MMOL/L (ref 3–14)
BACTERIA SPEC CULT: ABNORMAL
BUN SERPL-MCNC: 12 MG/DL (ref 7–30)
CALCIUM SERPL-MCNC: 7.9 MG/DL (ref 8.5–10.1)
CHLORIDE SERPL-SCNC: 89 MMOL/L (ref 94–109)
CO2 SERPL-SCNC: 26 MMOL/L (ref 20–32)
CREAT SERPL-MCNC: 0.34 MG/DL (ref 0.52–1.04)
ERYTHROCYTE [DISTWIDTH] IN BLOOD BY AUTOMATED COUNT: 13.4 % (ref 10–15)
GFR SERPL CREATININE-BSD FRML MDRD: >90 ML/MIN/1.7M2
GLUCOSE SERPL-MCNC: 96 MG/DL (ref 70–99)
HCT VFR BLD AUTO: 31.3 % (ref 35–47)
HGB BLD-MCNC: 10.8 G/DL (ref 11.7–15.7)
INR PPP: 2.12 (ref 0.86–1.14)
Lab: ABNORMAL
MCH RBC QN AUTO: 27.9 PG (ref 26.5–33)
MCHC RBC AUTO-ENTMCNC: 34.5 G/DL (ref 31.5–36.5)
MCV RBC AUTO: 81 FL (ref 78–100)
PLATELET # BLD AUTO: 335 10E9/L (ref 150–450)
POTASSIUM SERPL-SCNC: 3.6 MMOL/L (ref 3.4–5.3)
RBC # BLD AUTO: 3.87 10E12/L (ref 3.8–5.2)
SODIUM SERPL-SCNC: 123 MMOL/L (ref 133–144)
SPECIMEN SOURCE: ABNORMAL
WBC # BLD AUTO: 5.9 10E9/L (ref 4–11)

## 2018-02-07 PROCEDURE — G8979 MOBILITY GOAL STATUS: HCPCS | Mod: GP,CJ | Performed by: PHYSICAL THERAPIST

## 2018-02-07 PROCEDURE — 25000132 ZZH RX MED GY IP 250 OP 250 PS 637: Performed by: FAMILY MEDICINE

## 2018-02-07 PROCEDURE — 25000132 ZZH RX MED GY IP 250 OP 250 PS 637: Performed by: EMERGENCY MEDICINE

## 2018-02-07 PROCEDURE — G8978 MOBILITY CURRENT STATUS: HCPCS | Mod: GP,CJ | Performed by: PHYSICAL THERAPIST

## 2018-02-07 PROCEDURE — 97161 PT EVAL LOW COMPLEX 20 MIN: CPT | Mod: GP | Performed by: PHYSICAL THERAPIST

## 2018-02-07 PROCEDURE — 40000133 ZZH STATISTIC OT WARD VISIT

## 2018-02-07 PROCEDURE — 36415 COLL VENOUS BLD VENIPUNCTURE: CPT | Performed by: EMERGENCY MEDICINE

## 2018-02-07 PROCEDURE — 99207 ZZC CDG-CODE CATEGORY CHANGED: CPT

## 2018-02-07 PROCEDURE — 40000275 ZZH STATISTIC RCP TIME EA 10 MIN

## 2018-02-07 PROCEDURE — 25000132 ZZH RX MED GY IP 250 OP 250 PS 637: Performed by: PHYSICIAN ASSISTANT

## 2018-02-07 PROCEDURE — 97165 OT EVAL LOW COMPLEX 30 MIN: CPT | Mod: GO

## 2018-02-07 PROCEDURE — L0625 LO FLEX L1-BELOW L5 PRE OTS: HCPCS

## 2018-02-07 PROCEDURE — 25000132 ZZH RX MED GY IP 250 OP 250 PS 637

## 2018-02-07 PROCEDURE — G0378 HOSPITAL OBSERVATION PER HR: HCPCS

## 2018-02-07 PROCEDURE — 85027 COMPLETE CBC AUTOMATED: CPT | Performed by: EMERGENCY MEDICINE

## 2018-02-07 PROCEDURE — 80048 BASIC METABOLIC PNL TOTAL CA: CPT | Performed by: EMERGENCY MEDICINE

## 2018-02-07 PROCEDURE — 85610 PROTHROMBIN TIME: CPT | Performed by: EMERGENCY MEDICINE

## 2018-02-07 PROCEDURE — 99225 ZZC SUBSEQUENT OBSERVATION CARE,LEVEL II: CPT

## 2018-02-07 PROCEDURE — 40000193 ZZH STATISTIC PT WARD VISIT: Performed by: PHYSICAL THERAPIST

## 2018-02-07 PROCEDURE — G8980 MOBILITY D/C STATUS: HCPCS | Mod: GP,CJ | Performed by: PHYSICAL THERAPIST

## 2018-02-07 RX ADMIN — Medication 1 CAPSULE: at 17:54

## 2018-02-07 RX ADMIN — ACETAMINOPHEN 650 MG: 325 TABLET, FILM COATED ORAL at 01:36

## 2018-02-07 RX ADMIN — OXYCODONE HYDROCHLORIDE 2.5 MG: 5 TABLET ORAL at 11:16

## 2018-02-07 RX ADMIN — RANITIDINE 150 MG: 150 TABLET ORAL at 20:01

## 2018-02-07 RX ADMIN — FLUTICASONE FUROATE AND VILANTEROL TRIFENATATE 1 PUFF: 200; 25 POWDER RESPIRATORY (INHALATION) at 08:15

## 2018-02-07 RX ADMIN — METOPROLOL SUCCINATE 50 MG: 50 TABLET, EXTENDED RELEASE ORAL at 08:18

## 2018-02-07 RX ADMIN — LEVOTHYROXINE SODIUM 50 MCG: 50 TABLET ORAL at 08:18

## 2018-02-07 RX ADMIN — TRAMADOL HYDROCHLORIDE 50 MG: 50 TABLET, COATED ORAL at 17:54

## 2018-02-07 RX ADMIN — OXYCODONE HYDROCHLORIDE 2.5 MG: 5 TABLET ORAL at 05:02

## 2018-02-07 RX ADMIN — METOPROLOL SUCCINATE 50 MG: 50 TABLET, EXTENDED RELEASE ORAL at 20:00

## 2018-02-07 RX ADMIN — POLYETHYLENE GLYCOL 3350 17 G: 17 POWDER, FOR SOLUTION ORAL at 11:22

## 2018-02-07 RX ADMIN — WARFARIN SODIUM 0.5 MG: 1 TABLET ORAL at 17:54

## 2018-02-07 RX ADMIN — RANITIDINE 150 MG: 150 TABLET ORAL at 08:18

## 2018-02-07 RX ADMIN — ACETAMINOPHEN 650 MG: 325 TABLET, FILM COATED ORAL at 08:18

## 2018-02-07 NOTE — CONSULTS
"[facility] Consultation    Consultation - [facility]  Level of consult: One-time consult to assist in determining a diagnosis and to recommend an appropriate treatment plan    Ellie Leigh,  1930, MRN [id number]     Admitting Dx: Back pain [M54.9]  Hyponatremia [E87.1]  Acute bilateral low back pain with left-sided sciatica [M54.42]     PCP: Josefina Gómez, [phone]     Code status:  Prior     Extended Emergency Contact Information  Primary Emergency Contact: Suyapa Mittal   [facility]  Home Phone: [phone]  Work Phone: [phone]  Mobile Phone: [phone]  Relation: Daughter  Secondary Emergency Contact: Adriano Abarcaee   [facility]  Home Phone: [phone]  Work Phone: [phone]  Mobile Phone: [phone]  Relation: Daughter     Assessment:  Sub acute L2-L3 inferior end plate compression fx with sciatica symptoms on the left.     Plan:  WBAT, mobilize with PT  LSO on while up for comfort, may be off while in bed. Remove frequently for skin checks  Anticipate d/c to TCU when able  Pain medications as necessary, recommend Tramadol given patients age  Follow up in clinic with Dr. Pham or Dr. Desai in 1 week    Principal Problem:    Intractable low back pain  Active Problems:    Esophageal reflux    Mild major depression    DVT (deep venous thrombosis)    Anxiety    Intermittent atrial fibrillation (H)    Hypothyroidism    Hyponatremia    Benign essential HTN    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)    COPD (chronic obstructive pulmonary disease) (H)    Irritable bowel syndrome without diarrhea    Back pain       Chief Complaint  Progressively worsening low back pain     HPI  We have been requested to evaluate Ellie Leigh who is a 87 year old year old female for intractable low back pain. Patient states she was putting the dishes away, bent over into the , about 4-5 weeks ago when she felt she \"threw her back out\". She " felt pain to the center of her low back, which was sharp in nature. She states she went to see her chiropractor after the incident. She initially went twice in a two week period of time which states it initially felt better. After continuously having pain which was no longer relieved by chiropractic care, she saw her PCP who referred her to PT. By this time her pain had begun to radiate to her left side back and left leg which is constant and aggravated by certain positioning and weight bearing. Days later she also began to experience pain which radiated to her right low back, but not into her leg. At this point, she then saw a physical therapist which exacerbated her pain. Her PCP then referred her to an orthopedic provider which she made an appointment for, however her pain has progressively worsened. Yesterday she was unable to ambulate her stairs which resulted in EMS being called and was brought to the ED for evaluation. Yesterday after being admitted her pain had diminished with rest and medications, she later had gotten up to ambulate when she felt the same severe pain that she had experienced earlier that day. She denies lightheadedness, dizziness, SOB, CP beyond baseline. She denies distal paresthesias, saddle anesthesia, incontinence, motor function disturbances. She does report left sided low back pain aggravated by weight bearing and ambulation, she reports weakness in her knees which she states has been troublesome for a while now. She states she progressively has had a more difficult time ambulating since her pain began 4-5 weeks ago.      History is obtained from the patient     Past Medical History  Past Medical History:   Diagnosis Date     Breast cancer (H)      COPD (chronic obstructive pulmonary disease) (H)     ct 2014 does show some bronchiectaisi RUL as well as fibronodular disease bilat upper lobes     Dust allergy      DVT (deep vein thrombosis) in pregnancy (H)     after neck fracture when  in hospital     HTN (hypertension)      Hyponatremia     chronic     Hypothyroid      Intermittent atrial fibrillation (H) 12/1/2011    hx tachy-keri, has pacer, on chronic coumadin     Loose body in joint 4/15/2011     Neck fracture (H)     after a fall     Syncope 5/12/2013    multiple hospital visits, lates 3/2016, 6/2016, 7/2016 with no clear cause found       Surgical History  Past Surgical History:   Procedure Laterality Date     APPENDECTOMY       ARTHROSCOPY KNEE  4/15/2011    Procedure:ARTHROSCOPY KNEE; removal of loose body; Surgeon:LEY, JEFFREY DUANE; Location:[facility]     CHOLECYSTECTOMY       ESOPHAGOSCOPY, GASTROSCOPY, DUODENOSCOPY (EGD), COMBINED  6/9/2014    Procedure: COMBINED ESOPHAGOSCOPY, GASTROSCOPY, DUODENOSCOPY (EGD);  Surgeon: Gilberto Richard MD;  Location: [facility]     IMPLANT PACEMAKER  5/21/2013    Biotronik Moderl [id number] Serial#[id number]     INJECT EPIDURAL LUMBAR  3/23/2011    INJECT EPIDURAL LUMBAR performed by GENERIC ANESTHESIA PROVIDER at [facility]     JOINT REPLACEMENT, HIP RT/LT      Joint Replacement Hip Rt     MASTECTOMY, SIMPLE RT/LT/CAITLYN      Left breast - following breast ca     OTHER SURGICAL HISTORY      C1-C2 fusion after fx      SURGICAL HISTORY OF -   05/22/2001    Colonoscopy     TONSILLECTOMY          Social History  Social History     Social History     Marital status:      Spouse name: N/A     Number of children: N/A     Years of education: N/A     Occupational History      Retired     Social History Main Topics     Smoking status: Never Smoker     Smokeless tobacco: Never Used     Alcohol use No     Drug use: No     Sexual activity: Not Currently     Other Topics Concern     Parent/Sibling W/ Cabg, Mi Or Angioplasty Before 65f 55m? No     Social History Narrative    Lives in [facility].      Daughters helping since her accident, getting home physical therapy.      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      - 2000    5 daughters,  11 grandchildren, 3 great granchildren       Family History  Family History   Problem Relation Age of Onset     CEREBROVASCULAR DISEASE Mother      HEART DISEASE Mother      MI     CEREBROVASCULAR DISEASE Father      HEART DISEASE Father      MI     Respiratory Maternal Grandfather      TB     Neurologic Disorder Brother      ALS     HEART DISEASE Brother      CEREBROVASCULAR DISEASE Brother      Breast Cancer Daughter      age:49     Asthma Brother      CANCER Brother      brain and lung     CANCER Daughter      thyroid        Allergies:  Cephalosporins; Doxycycline; Sulfa drugs; and Penicillins      Current Medications:  Current Facility-Administered Medications   Medication     warfarin (COUMADIN) half-tab 0.5 mg     naloxone (NARCAN) injection 0.1-0.4 mg     acetaminophen (TYLENOL) tablet 650 mg     ondansetron (ZOFRAN-ODT) ODT tab 4 mg    Or     ondansetron (ZOFRAN) injection 4 mg     oxyCODONE IR (ROXICODONE) half-tab 2.5-5 mg     ipratropium - albuterol 0.5 mg/2.5 mg/3 mL (DUONEB) neb solution 3 mL     diclofenac (VOLTAREN) 1 % topical gel 2-4 g     levothyroxine (SYNTHROID/LEVOTHROID) tablet 50 mcg     metoprolol succinate (TOPROL-XL) 24 hr tablet 50 mg     polyethylene glycol (MIRALAX/GLYCOLAX) Packet 17 g     ranitidine (ZANTAC) tablet 150 mg     Warfarin Therapy Reminder (Check START DATE - warfarin may be starting in the FUTURE)     naloxone (NARCAN) injection 0.1-0.4 mg     ondansetron (ZOFRAN-ODT) ODT tab 4 mg    Or     ondansetron (ZOFRAN) injection 4 mg     lactobacillus rhamnosus (GG) (CULTURELL) capsule 1 capsule     fluticasone-vilanterol (BREO ELLIPTA) 200-25 MCG/INH oral inhaler 1 puff       Review of Systems:  The Review of Systems is negative other than noted in the HPI    Physical Exam:  Temp:  [96.3  F (35.7  C)-98.7  F (37.1  C)] 96.3  F (35.7  C)  Pulse:  [69-81] 69  Heart Rate:  [69-83] 69  Resp:  [16] 16  BP: ()/(65-90) 149/76  SpO2:  [92 %-95 %] 95 %    Gen: alert and orientated,  no apparent distress.   MSK:  Lumbar spine: skin intact, no ecchymosis, no erythema, no rash. Tender to palpation diffusely from L2-L5. Tender to palpation on the right and left paraspinals. Tender to palpation of the L ischial tuberosity which causes radiation to her L posterior leg to her L knee. AROM full and without pain of the ankle, knee and hip. Able to perform straight leg raise bilaterally. Straight leg raise test with DF of ankle negative, crossed straight leg raise test negative.   Gait: not assessed during this visit  Neuro: no motor function deficits appreciated, sensation intact in all dermatomes and without deficit compared to the right. Patellar reflexes intact and equal bilaterally. DP pulses 2+ bilaterally.      Pertinent Labs  Lab Results: personally reviewed.  Lab Results   Component Value Date    WBC 5.9 02/07/2018    HGB 10.8 (L) 02/07/2018    HCT 31.3 (L) 02/07/2018    MCV 81 02/07/2018     02/07/2018       Recent Labs  Lab 02/07/18  0720 02/06/18  1310   INR 2.12* 1.95*       Pertinent Radiology  Radiology Results: images and radiology report reviewed  Recent Results (from the past 24 hour(s))   Lumbar spine CT w/o contrast    Narrative    CT LUMBAR SPINE WITHOUT CONTRAST  2/6/2018 2:10 PM     HISTORY: Low back pain.      TECHNIQUE: Axial images of the lumbar spine were obtained without  intravenous contrast. Multiplanar reformations were performed.   Radiation dose for this scan was reduced using automated exposure  control, adjustment of the mA and/or kV according to patient size, or  iterative reconstruction technique.    COMPARISON: None.    FINDINGS:  Chronic-appearing loss of vertebral body height at L1 due  to inferior endplate Schmorl's node or old compression fracture. Mild  old inferior endplate compression fracture of L2 appears chronic with  some sclerosis. Similar chronic inferior endplate findings at L3.  These all have the appearance of Schmorl's nodes or somewhat  focal  inferior endplate compression deformities that are likely chronic. No  definite acute fracture. Diffuse osteopenia. The findings at L1 are  stable. The inferior endplate compression deformities at L2 and L3 are  new since the comparison CT of almost two years ago but have sclerosis  and no definite visible lucent fracture lines, indicating they are  likely chronic. The distal spinal cord and conus medullaris appear  normal.  The paraspinous soft tissues appear normal.    T12-L1:  Normal disc, facet joints, spinal canal and neural foramina.     L1-L2:  Normal disc, facet joints, spinal canal and neural foramina.    L2-L3:  Mild broad-based disc bulge. This lateralizes to the right  resulting in mild to moderate right foraminal stenosis. Left neural  foramen is patent. No central stenosis. Facet joints are unremarkable.      L3-L4:  Mild disc bulge and facet hypertrophy. Mild central stenosis.  Mild bilateral foraminal stenosis.     L4-L5:  Moderate facet degenerative changes. Mild disc bulge and  osteophytic ridging. Mild central stenosis. Neural foramina are  patent.     L5-S1:  Prominent left and moderate right facet degenerative changes.  Mild disc bulge. No central stenosis. There is a slightly more focal  left foraminal disc protrusion resulting in at least moderate left  foraminal stenosis. Right neural foramen is patent. No central  stenosis.      Impression    IMPRESSION:    1. Inferior endplate central compression deformities at L2 and L3 are  new since 3/10/2016 but have a chronic imaging appearance.  2. At L2-L3 there is mild to moderate right foraminal stenosis.  3. At L3-L4 there is mild central and bilateral foraminal stenosis.  4. At L4-L5 there is mild central stenosis.  5. At L5-S1 there is moderate left foraminal stenosis.  6. Mild progression of degenerative findings since the comparison  study.       VU HARLEY MD       Attestation:  I have reviewed today's vital signs, notes,  medications, labs and imaging.  Amount of time performed on this consult: 45 minutes.     Maite Busch    Physician Attestation   I, Frankie Pham, have reviewed and discussed with the advanced practice provider their history, physical and plan for Ellie Leigh. I did not participate in a shared visit by interviewing or examining the patient.    Frankie Pham  Date of Service (when I saw the patient): I did not personally see this patient today.

## 2018-02-07 NOTE — PLAN OF CARE
Problem: Pain, Chronic (Adult)  Goal: Acceptable Pain Control/Comfort Level  Patient will demonstrate the desired outcomes by discharge/transition of care.   Outcome: Improving  Pt has been up to bedside commode x3 t/o noc with 2 assist. Uses call light for assistance. Pt reports pain at left hip radiates down left thigh. Medicated once w/ 650mg tylenol with little relief. Later received 2.5mg oxycodone po with better relief.

## 2018-02-07 NOTE — ED PROVIDER NOTES
Emergency Department Patient Sign-out       Brief HPI:  This is a 87 year old female signed out to me by Joanna Cardona PA-C .  See initial ED Provider note for details of the presentation.  At signout at 6:45 PM patient is reported to be awaiting admission to the hospitalist service.  There was discussion between the orthopedic spine surgeon and Dr. Rowley the admitting hospitalist about patient's family unable to provide care and support for the patient's.  Patient has back pain that is steadily worsened over the last 2 weeks.         Significant Events prior to my assuming care: CT of the lumbar spine was completed by Joanna Cardona PA-C.  See detailed interpretation by the radiologist below.  There was discussion with both the hospitalist service and orthopedic spine surgery.  Family is unable to care for the patient.  Patient will benefit from admission to the hospital including discussion about placement in a transitional care facility while trying to adjust and manage her pain and to help her with rehabilitation.    Significant Events after my assuming care: Dr. Herb Rowley- the admitting hospitalist spoke with the patient and her family.  They reviewed goals and expectation for hospitalization and care.  Dr. Rowley reported to me that he had a discussion with orthopedic surgery -[facility]- Dr Camarena.  It appears there is a plan for orthopedic surgery to see and evaluate the patient to consider a brace in the morning on 2/7/18.  After discussion plan is to admit for further care. Please see hospitalist service for additional details of care.      Exam:   Patient Vitals for the past 24 hrs:   BP Temp Temp src Heart Rate Resp SpO2   02/06/18 1730 132/70 - - - - 93 %   02/06/18 1700 127/68 - - - - 92 %   02/06/18 1645 - - - - - 92 %   02/06/18 1630 107/65 - - - - 92 %   02/06/18 1615 111/66 - - - - 92 %   02/06/18 1600 99/68 - - - - 92 %   02/06/18 1500 150/75 - - - - -   02/06/18 1445 137/90 - - - - 93 %   02/06/18  1345 144/78 - - - - 93 %   02/06/18 1330 148/78 - - - - 93 %   02/06/18 1315 150/82 - - - - 93 %   02/06/18 1300 143/76 - - - - 94 %   02/06/18 1245 142/81 - - - - 93 %   02/06/18 1230 140/73 - - - - 94 %   02/06/18 1215 128/71 - - - - 95 %   02/06/18 1200 141/74 - - - - 95 %   02/06/18 1158 137/72 97.5  F (36.4  C) Oral 80 12 95 %           ED RESULTS:   Results for orders placed or performed during the hospital encounter of 02/06/18 (from the past 24 hour(s))   Comprehensive metabolic panel     Status: Abnormal    Collection Time: 02/06/18  1:10 PM   Result Value Ref Range    Sodium 123 (L) 133 - 144 mmol/L    Potassium 3.9 3.4 - 5.3 mmol/L    Chloride 89 (L) 94 - 109 mmol/L    Carbon Dioxide 25 20 - 32 mmol/L    Anion Gap 9 3 - 14 mmol/L    Glucose 109 (H) 70 - 99 mg/dL    Urea Nitrogen 15 7 - 30 mg/dL    Creatinine 0.31 (L) 0.52 - 1.04 mg/dL    GFR Estimate >90 >60 mL/min/1.7m2    GFR Estimate If Black >90 >60 mL/min/1.7m2    Calcium 8.0 (L) 8.5 - 10.1 mg/dL    Bilirubin Total 0.4 0.2 - 1.3 mg/dL    Albumin 3.3 (L) 3.4 - 5.0 g/dL    Protein Total 6.8 6.8 - 8.8 g/dL    Alkaline Phosphatase 97 40 - 150 U/L    ALT 22 0 - 50 U/L    AST 36 0 - 45 U/L   CBC with platelets, differential     Status: Abnormal    Collection Time: 02/06/18  1:10 PM   Result Value Ref Range    WBC 5.8 4.0 - 11.0 10e9/L    RBC Count 4.00 3.8 - 5.2 10e12/L    Hemoglobin 11.1 (L) 11.7 - 15.7 g/dL    Hematocrit 32.2 (L) 35.0 - 47.0 %    MCV 81 78 - 100 fl    MCH 27.8 26.5 - 33.0 pg    MCHC 34.5 31.5 - 36.5 g/dL    RDW 13.8 10.0 - 15.0 %    Platelet Count 305 150 - 450 10e9/L    Diff Method Automated Method     % Neutrophils 57.8 %    % Lymphocytes 19.9 %    % Monocytes 18.9 %    % Eosinophils 2.4 %    % Basophils 0.7 %    % Immature Granulocytes 0.3 %    Absolute Neutrophil 3.4 1.6 - 8.3 10e9/L    Absolute Lymphocytes 1.2 0.8 - 5.3 10e9/L    Absolute Monocytes 1.1 0.0 - 1.3 10e9/L    Absolute Eosinophils 0.1 0.0 - 0.7 10e9/L    Absolute  Basophils 0.0 0.0 - 0.2 10e9/L    Abs Immature Granulocytes 0.0 0 - 0.4 10e9/L   INR     Status: Abnormal    Collection Time: 02/06/18  1:10 PM   Result Value Ref Range    INR 1.95 (H) 0.86 - 1.14   Lumbar spine CT w/o contrast     Status: None    Collection Time: 02/06/18  2:10 PM    Narrative    CT LUMBAR SPINE WITHOUT CONTRAST  2/6/2018 2:10 PM     HISTORY: Low back pain.      TECHNIQUE: Axial images of the lumbar spine were obtained without  intravenous contrast. Multiplanar reformations were performed.   Radiation dose for this scan was reduced using automated exposure  control, adjustment of the mA and/or kV according to patient size, or  iterative reconstruction technique.    COMPARISON: None.    FINDINGS:  Chronic-appearing loss of vertebral body height at L1 due  to inferior endplate Schmorl's node or old compression fracture. Mild  old inferior endplate compression fracture of L2 appears chronic with  some sclerosis. Similar chronic inferior endplate findings at L3.  These all have the appearance of Schmorl's nodes or somewhat focal  inferior endplate compression deformities that are likely chronic. No  definite acute fracture. Diffuse osteopenia. The findings at L1 are  stable. The inferior endplate compression deformities at L2 and L3 are  new since the comparison CT of almost two years ago but have sclerosis  and no definite visible lucent fracture lines, indicating they are  likely chronic. The distal spinal cord and conus medullaris appear  normal.  The paraspinous soft tissues appear normal.    T12-L1:  Normal disc, facet joints, spinal canal and neural foramina.     L1-L2:  Normal disc, facet joints, spinal canal and neural foramina.    L2-L3:  Mild broad-based disc bulge. This lateralizes to the right  resulting in mild to moderate right foraminal stenosis. Left neural  foramen is patent. No central stenosis. Facet joints are unremarkable.      L3-L4:  Mild disc bulge and facet hypertrophy. Mild  central stenosis.  Mild bilateral foraminal stenosis.     L4-L5:  Moderate facet degenerative changes. Mild disc bulge and  osteophytic ridging. Mild central stenosis. Neural foramina are  patent.     L5-S1:  Prominent left and moderate right facet degenerative changes.  Mild disc bulge. No central stenosis. There is a slightly more focal  left foraminal disc protrusion resulting in at least moderate left  foraminal stenosis. Right neural foramen is patent. No central  stenosis.      Impression    IMPRESSION:    1. Inferior endplate central compression deformities at L2 and L3 are  new since 3/10/2016 but have a chronic imaging appearance.  2. At L2-L3 there is mild to moderate right foraminal stenosis.  3. At L3-L4 there is mild central and bilateral foraminal stenosis.  4. At L4-L5 there is mild central stenosis.  5. At L5-S1 there is moderate left foraminal stenosis.  6. Mild progression of degenerative findings since the comparison  study.       VU HARLEY MD   UA reflex to Microscopic     Status: Abnormal    Collection Time: 02/06/18  2:35 PM   Result Value Ref Range    Color Urine Straw     Appearance Urine Slightly Cloudy     Glucose Urine Negative NEG^Negative mg/dL    Bilirubin Urine Negative NEG^Negative    Ketones Urine 10 (A) NEG^Negative mg/dL    Specific Gravity Urine 1.015 1.003 - 1.035    Blood Urine Trace (A) NEG^Negative    pH Urine 7.5 (H) 5.0 - 7.0 pH    Protein Albumin Urine 10 (A) NEG^Negative mg/dL    Urobilinogen mg/dL Normal 0.0 - 2.0 mg/dL    Nitrite Urine Negative NEG^Negative    Leukocyte Esterase Urine Negative NEG^Negative    Source Midstream Urine     WBC Urine 2 0 - 2 /HPF    RBC Urine 2 0 - 2 /HPF    Bacteria Urine Moderate (A) NEG^Negative /HPF    Squamous Epithelial /HPF Urine 1 0 - 1 /HPF    Amorphous Crystals Moderate (A) NEG^Negative /HPF       ED MEDICATIONS:   Medications   ipratropium - albuterol 0.5 mg/2.5 mg/3 mL (DUONEB) neb solution 3 mL (3 mLs Nebulization Given 2/6/18  1420)   HYDROmorphone (DILAUDID) injection 0.2 mg (0.2 mg Intravenous Given 2/6/18 1629)         Impression:    ICD-10-CM    1. Hyponatremia E87.1    2. Acute bilateral low back pain with left-sided sciatica M54.42        Plan:    Admit to general medicine service for further care and evaluation inclduing consultation with orthopedics      Mehul Kaur MD  02/06/18 2004

## 2018-02-07 NOTE — PLAN OF CARE
Problem: Pain, Chronic (Adult)  Goal: Identify Related Risk Factors and Signs and Symptoms  Related risk factors and signs and symptoms are identified upon initiation of Human Response Clinical Practice Guideline (CPG).   Outcome: No Change  Pt states this is a chronic pain and she has been comfortable since her arrival.  Pt walked from cart to bed and bed to bathroom without difficulty.  She has good grasp of walking with walker and does not seem too unsteady.  Pt has slept since arrival.  GERARD Griffin RN

## 2018-02-07 NOTE — PLAN OF CARE
Problem: Skin Injury Risk (Adult,Obstetrics,Pediatric)  Goal: Identify Related Risk Factors and Signs and Symptoms  Related risk factors and signs and symptoms are identified upon initiation of Human Response Clinical Practice Guideline (CPG).   Pt moves around freely in bed and not on her back.  Next time pt voids, the groin area can be rechecked.  It will need to be decided if just a barrier cream or antifungal cream are needed. GERARD Griffin RN

## 2018-02-07 NOTE — PLAN OF CARE
Problem: Patient Care Overview  Goal: Plan of Care/Patient Progress Review  Outcome: Improving  Patient is eating 75% of meals and tolerating well. She has had a total of 700 ml intake for entire day. She knows she can only have another 300 ml for the rest of today. She is assisted with 2 staff and transfer belt. She had oxycodone 2.5 mg once this shift for c/o left back, hip and leg pain. She reported complete relief of pain with this. Tramadol is now ordered. Patient's daughters report that patient faints while sitting up in chair when sodium is low. She has not had any episodes of fainting today. Tabs alarm on when up in chair.

## 2018-02-07 NOTE — PROGRESS NOTES
"[facility] Medicine Progress Note  Date of Service: 02/07/2018    Assessment & Plan   Ellie Leigh is a 87 year old female who presented on 2/6/2018 with severe low back pain.        Intractable low back pain  2/6/2018 -- CT today showed Inferior endplate central compression deformities at L2 and L3 are new since 3/10/2016 but have a chronic imaging appearance along with progression of degenerative changes.  Suspect this is cause of her pain.  Both ER and I discussed with Dr. Hernadez on call for [facility] spine who was exteremly helpful - offered appointment tomorrow afternoon in clinic but patient and daughter don't feel safe with her at home so will be admitted with plan for ortho to see her tomorrow - likely PA to coordinate with spine surgeon unless surgeon is on site.  Likely plan is for oral pain control (started oxycodone 2.5-5 mg every 4 hours as needed, continue tylenol), and placement of semirigid brace tomorrow.  Hopeful discharge home tomorrow, but may need TCU if remains unable to care for herself.  Per ortho would consider outpatient vertebroplasty but only if not improving with conservative measures.   2/7/18 - Received a semi-rigid brace today which she has been wearing for \"a few hours\".  She finds it to be comfortable, non-restrictive, and thinks it's helping her pain.  Pain control has been good on tramadol 25 to 50 mg Q6H PRN and with brace.  Will continue this Rx.      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  Followed by PCP, unclear how compliant with fluid restriction she is.  Placed on 1L restriction while here which per notes is plan at home.  Recheck in AM.  Plan for ongoing outpatient follow-up with primary care provider.    2/7/18 - Na 123 this morning, 123 yesterday, baseline appears to be 126 - 132 over past two months.  Fluid " restriction continues here, will recheck Na in AM.       Esophageal reflux  2/6/2018 -- continue prior to admission zantic twice daily.     2/7/18 - No reflux symptoms presently.       Mild major depression/Anxiety  2/6/2018 -- mood and affect appropriate, not on any medications.          History of DVT (deep venous thrombosis)  2/6/2018 -- on coumadin as below.    2/7/18 - INR 2.12 today.       Intermittent atrial fibrillation (H)  2/6/2018 -- rate controlled, continue metoprolol.  Pharmacy to dose coumadin.     2/7/18 - Rate continues well-controlled.       Hypothyroidism  2/6/2018 -- TSH normal 1 month ago - continue prior to admission synthroid.           Benign essential HTN  2/6/2018 -- continue metoprolol with parameters.    2/7/18 - Good control on present metoprolol.        COPD (chronic obstructive pulmonary disease) (H)  2/6/2018 -- at baseline, duonebs as needed in place of home albuterol inhaler.   Continue prior to admission Symbicort.     2/7/18 - Lung exam essentially normal today.  Has no O2 needs.       Irritable bowel syndrome without diarrhea  2/6/2018 -- asymptomatic at present.         Prophylaxis  On coumadin as above.       Lines  PIV     Disposition  Continue observation.  Now that she has been fitted for a brace and pain control is good on PO meds, discharge tomorrow is possible, though may need TCU.        Attestation:  Amount of time performed on this hospital visit: 25 minutes.  Care coordination / counseling time: 15 minutes    [facility]       Interval History   Described above.    ROS:  CONSTITUTIONAL: NEGATIVE  for chills, fever.  EYES: NEGATIVE for visual changes, eye irritation.  ENT/MOUTH: NEGATIVE for nasal congestion or drainage.  RESP: NEGATIVE for dyspnea, productive cough, wheeze, or respiratory chest pain.  CV: NEGATIVE for chest pain, palpitations, orthopnea, or lower extremity edema.  GI: NEGATIVE for difficulties swallowing, abdominal pain, diarrhea, nausea or  vomiting.  : NEGATIVE for dysuria or flank pain.  MUSCULOSKELETAL: NEGATIVE for joint pain, or joint swelling.  NEURO: NEGATIVE for focal numbness or weakness, syncope.  PSYCHIATRIC: NEGATIVE for anxiety, panic, or recent change in mood.      Physical Exam   Temp:  [96.1  F (35.6  C)-98.7  F (37.1  C)] 96.1  F (35.6  C)  Pulse:  [69-81] 71  Heart Rate:  [69-83] 69  Resp:  [16-18] 18  BP: (132-161)/(69-84) 132/69  SpO2:  [93 %-95 %] 93 %    Weights:   Vitals:    02/06/18 1958   Weight: 52.3 kg (115 lb 4.8 oz)    Body mass index is 24.1 kg/(m^2).    GENERAL: Pleasant woman seated in bedside chair eating dinner.  EYES: Eyes grossly normal to inspection, extraocular movements intact  HENT: Nares patent bilaterally.  Nasal mucosa normal, no discharge.    NECK: Trachea midline, no stridor.   RESP: No accessory muscle use.  Symmetrical breath sounds.  Exam limited by back brace, but lungs clear throughout on inspiration and expiration.  Expiration not prolonged, no wheeze.  CV: Regular rate and rhythm, non-tachycardic.  Normal S1 S2, no murmur or extra sound.  No lower extremity edema.  ABDOMEN: Back brace prevents detailed exam.  Bowel sounds positive.  MS: No bony deformities noted.  No red or inflamed joints.  SKIN: Warm and dry, no rashes where skin visible.  NEURO: Alert, oriented, conversant.  Cranial nerves II - XII grossly intact.  No gross motor or sensory deficits.  Gait not tested.  PSYCH: Calm, alert, conversant.  Able to articulate logical thoughts, no tangential thoughts, no hallucinations or delusions.  Affect normal.        Data     Recent Labs  Lab 02/07/18  0720 02/06/18  1310   WBC 5.9 5.8   HGB 10.8* 11.1*   MCV 81 81    305   INR 2.12* 1.95*   * 123*   POTASSIUM 3.6 3.9   CHLORIDE 89* 89*   CO2 26 25   BUN 12 15   CR 0.34* 0.31*   ANIONGAP 8 9   DOUG 7.9* 8.0*   GLC 96 109*   ALBUMIN  --  3.3*   PROTTOTAL  --  6.8   BILITOTAL  --  0.4   ALKPHOS  --  97   ALT  --  22   AST  --  36          Recent Labs  Lab 02/07/18  0720 02/06/18  1310   GLC 96 109*        Unresulted Labs Ordered in the Past 30 Days of this Admission     Date and Time Order Name Status Description    2/6/2018 1252 Urine Culture Aerobic Bacterial Preliminary            Imaging  No results found for this or any previous visit (from the past 24 hour(s)).     I reviewed all new labs and imaging results over the last 24 hours. I personally reviewed no images or EKG's today.    Medications     Warfarin Therapy Reminder         warfarin  0.5 mg Oral ONCE at 18:00     levothyroxine  50 mcg Oral Daily     metoprolol succinate  50 mg Oral BID     ranitidine  150 mg Oral BID     lactobacillus rhamnosus (GG)  1 capsule Oral QPM     fluticasone-vilanterol  1 puff Inhalation Daily       Mickey Fields

## 2018-02-07 NOTE — ED NOTES
Pt up with SBA with walker to ambulate, pt denies pain. Talked with dtr about how to assist her being she would be the one assisting her at home. The pt did well, she was up gait steady and slow. She denies pain with activity. dtr did well, however she is not comfortable taking care of her at home and getting her up in the noc to use BSC or anything else. She states she is worried she will be unsteady and fall. Pt assisted back into bed with leg assist only, she does well with walker. Will talk with Joanna. Continue to monitor

## 2018-02-07 NOTE — PLAN OF CARE
Problem: Patient Care Overview  Goal: Plan of Care/Patient Progress Review  Discharge Planner PT   Patient plan for discharge: TCU  Current status: Evaluation completed.  Pt reporting minimal pain with semirigid brace in place, ; required minimal to CGA w/ sit>stand tranfers.Pt ambulating short in room distances- 10 feet x2 with RW and CGA of 2. ,needing  occas assistance to advance walker, uses lighter 4ww at home. Slow, steady gait. Pain controlled.    Barriers to return to prior living situation: Pt below baseline w/ mobility  Recommendations for discharge: TCU   Rationale for recommendations: continued PT at TCU to ensure independencies w/ mobility for return  home        Entered by: Heather Newby 02/07/2018 12:05 PM

## 2018-02-07 NOTE — PROGRESS NOTES
[place] [facility] [place]    Patient admitted to room 2301 at approximately 1950 via cart from emergency room. Patient was accompanied by transport tech.     Verbal SBAR report received from Madison VIERA prior to patient arrival.     Patient ambulated to bed with stand-by assist. Patient alert and oriented X 3. The patient is not having any pain.  . Admission vital signs: Blood pressure 161/83, temperature 98.3  F (36.8  C), temperature source Oral, resp. rate 16, height 1.473 m (4' 10\"), weight 52.3 kg (115 lb 4.8 oz), last menstrual period 06/15/1985, SpO2 94 %, not currently breastfeeding. Patient was oriented to plan of care, call light, bed controls, tv, telephone, bathroom and visiting hours.     The following safety risks were identified during admission: fall and skin. Yellow risk band applied: YES.     Claudine Griffin RN    "

## 2018-02-07 NOTE — CONSULTS
CARE TRANSITION SOCIAL WORK INITIAL ASSESSMENT:      Met with: Patient and Family.    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       Resources List: Skilled Nursing Facility, Transitional Care, Home Care     Lives With: child(grupo), adult  Living Arrangements: house     Description of Support System: Supportive, Involved   Who is your support system?: Children   Support Assessment: Adequate family and caregiver support, Adequate social supports   Insurance Concerns: No Insurance issues identified        This writer met with pt and pts two dtrs introduced self and role. Discussed discharge planning and medicare guidelines in regards to home care and SNF benefits. Pt lives at home with her dtr. However; at this time pt and family feels that TCU would be more appropriate. Patient was provided with Medicare certified nursing home list. Pts choices are as follows [facility] (Phone: [phone] Fax: [phone]), [facility] (Phone: [phone] Admissions: [phone] Fax: [phone]) and [facility] (Main: [phone] Admissions: [phone] Fax: [phone]).  Pt has been accepted at [facility] for as early as tomorrow, BUT pts first choice is [facility] and second choice is [facility]. Will wait on bed availability.      PAS-RR    Per DHS regulation, CTS team completed and submitted PAS-RR to [place] [employer] Direct  Connect via the Senior [employer] Line. CTS team advised SNF and they are aware a PAS-RR has been submitted.     CTS team reviewed with pt or health care agent that they may be contacted for a follow up appointment within 10 days of hospital discharge if SNF stay is <30 days. Contact information for [employer] was also provided.     Pt or health care agent verbalized understanding.     PAS-RR # [id number]      PLAN    TCU    Discharge Planner   Discharge Plans in progress: TCU  Barriers to discharge plan: medical stability  Follow up plan: CTS to follow       Entered by: April Reddy 02/07/2018 12:42 PM             April DUKES, LICSW, [facility] [phone]

## 2018-02-07 NOTE — H&P
[facility] History and Physical    Ellie Leigh MRN# [id number]   Age: 87 year old YOB: 1930     Date of Admission:  2/6/2018      Primary care provider: Josefina Gómez          Assessment and Plan:       Intractable low back pain  2/6/2018 -- CT today showed Inferior endplate central compression deformities at L2 and L3 are new since 3/10/2016 but have a chronic imaging appearance along with progression of degenerative changes.  Suspect this is cause of her pain.  Both ER and I discussed with Dr. Hernadez on call for [facility] spine who was exteremly helpful - offered appointment tomorrow afternoon in clinic but patient and daughter don't feel safe with her at home so will be admitted with plan for ortho to see her tomorrow - likely PA to coordinate with spine surgeon unless surgeon is on site.  Likely plan is for oral pain control (started oxycodone 2.5-5 mg every 4 hours as needed, continue tylenol), and placement of semirigid brace tomorrow.  Hopeful discharge home tomorrow, but may need TCU if remains unable to care for herself.  Per ortho would consider outpatient vertebroplasty but only if not improving with conservative measures.      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  Followed by PCP, unclear how compliant with fluid restriction she is.  Placed on 1L restriction while here which per notes is plan at home.  Recheck in AM.  Plan for ongoing outpatient follow-up with primary care provider.        Esophageal reflux  2/6/2018 -- continue prior to admission zantic twice daily.         Mild major depression/Anxiety  2/6/2018 -- mood and affect appropriate, not on any medications.         History of DVT (deep venous thrombosis)  2/6/2018 -- on coumadin as below.        Intermittent atrial fibrillation (H)  2/6/2018 -- rate controlled, continue metoprolol.   Pharmacy to dose coumadin.         Hypothyroidism  2/6/2018 -- TSH normal 1 month ago - continue prior to admission synthroid.           Benign essential HTN  2/6/2018 -- continue metoprolol with parameters.          COPD (chronic obstructive pulmonary disease) (H)  2/6/2018 -- at baseline, duonebs as needed in place of home albuterol inhaler.   Continue prior to admission Symbicort.         Irritable bowel syndrome without diarrhea  2/6/2018 -- asymptomatic at present.        Prophylaxis  On coumadin as above.      Lines  PIV    Disposition  Admit to observation.  Hope for discharge home tomorrow if ambulating with pain medications and brace.  May need TCU.                Chief Complaint:   Back pain   History is obtained from the patient and her daughter.            History of Present Illness:   This patient is a 87 year old  female with a significant past medical history of SAIDH, atrial fibrillation hypertension, prior DVT on coumadin, hypothyroidism and back pain who presents with worsened back pain.  Symptoms have been worse the past 4-5 weeks.  Unsure if she had an initial injury from leaning over her  or not, but has been worse throughout this time.  Saw primary care provider and was referred for physical therapy but thinks this just made it more sore.  Seen by primary care provider and had an x-ray 1/26 which showed probable compression fracture.  Was referred to sports med with planned appointment today.  However, pain today got to the point where she couldn't ambulate due to it so presented to ER instead.  Pain is across lower back, more on left than on right. Radiates down legs - sometimes down right as well but usually down left especially to knee but some down to foot - mostly radiates when she can't get in a comfortable position.  No numbness, no weakness, no loss of bowel or bladder control.  Location of pain unchanged but intensity worsened this AM.  Pain up to a 9/10 at home.   "Only took tylenol for this, hasn't ever tried anything more than this.   In ER felt much improved at time of my evaluation after single dose of dilaudid with pain \"feeling good\" now.  However, when tried to ambulate patient and daughter report that pain was worse and she felt unsteady and feels unsafe to discharge home today.    No fever or chills.  No other new changes.  No light-headedness or syncope.    Has been being followed for hyponatremia due to SAIDH but unclear how compliant with fluid restriction she is.      Patient does live with family who helps to assist with this however primary caregiver needs to be with her  who is having surgical procedure tomorrow and remaining family do not feel comfortable caring for her.    Denies tobacco or alcohol use.    No recent medication changes.               Past Medical History:   I have reviewed this patient's past medical history.  Patient Active Problem List    Diagnosis Date Noted     Nausea 06/05/2017     Priority: Medium     Elevated troponin 08/21/2016     Priority: Medium     Irritable bowel syndrome without diarrhea 05/02/2016     Priority: Medium     Unresponsive episode 03/18/2016     Priority: Medium     Chest pain at rest 12/07/2015     Priority: Medium     Mild persistent asthma without complication 12/01/2015     Priority: Medium     Long term current use of anticoagulant therapy 03/02/2015     Priority: Medium     Problem list name updated by automated process. Provider to review       Hemoptysis 01/21/2015     Priority: Medium     Syncope 01/12/2015     Priority: Medium     Advance Care Planning 01/09/2015     Priority: Medium     Advance Care Planning 7/2/2015: Receipt of ACP document:  Received: Health Care Directive which was witnessed or notarized on 1-9-15.  Document previously scanned on 2-27-15.  Validation form completed and sent to be scanned.  Code Status reflects choices in most recent ACP document.  Confirmed/documented designated " decision maker(s).  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  Patient has had lung CT, will see Dr Gómez for consideration of further workup.  Also has pulmonology appt 8/18.       Positive fecal occult blood test 07/07/2014     Priority: Medium     x2 -- first was in ED.  Patient expresses that she does not want colonoscopy.  She'll set appt to discuss with her PCP.       Benign essential HTN 02/11/2014     Priority: Medium     BPPV (benign paroxysmal positional vertigo) 11/05/2013     Priority: Medium     History of skin cancer 05/07/2013     Priority: Medium     Recurrent UTI 07/16/2012     Priority: Medium     recurrent UTI  Recent Urologic workup neg, 2005  Has Cipro at home for treatment as needed       Hypothyroidism 05/07/2012     Priority: Medium     Hyponatremia 05/07/2012     Priority: Medium     Squamous cell carcinoma in situ of skin of face 04/19/2012     Priority: Medium     SK (seborrheic keratosis) 04/19/2012     Priority: Medium     Intermittent atrial fibrillation (H) 12/01/2011     Priority: Medium     Cervical vertebral fracture (H) 11/20/2011     Priority: Medium     Anxiety 11/15/2011     Priority: Medium     DVT (deep venous thrombosis) 10/12/2011     Priority: Medium     H/o in past, on current coumadin therapy.       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 Provider to review       Right arm weakness 07/29/2011     Priority: Medium     Ulnar neuropathy 07/29/2011     Priority: Medium     Health Care Home 04/21/2011     Priority: Medium     Conchis Robles -[phone]  FPA / G [facility] Case  Manager             DX V65.8 REPLACED WITH [id number] (04/08/2013)       Chronic constipation 03/03/2011     Priority: Medium     Osteoporosis 03/03/2011     Priority: Medium     Hyperlipidemia LDL goal <130 10/31/2010     Priority: Medium     Infectious colitis, enteritis and gastroenteritis 07/10/2010     Priority: Medium     Chronic allergic conjunctivitis 11/18/2008     Priority: Medium     Atopic rhinitis 11/18/2008     Priority: Medium     (Problem list name updated by automated process. Provider to review and confirm.)       Rhinitis, allergic seasonal 11/18/2008     Priority: Medium     Esophageal reflux 11/29/2007     Priority: Medium     PERSONAL HISTORY OF MALIGNANCY- BREAST 06/13/2007     Priority: Medium     Disease of lung 12/27/2006     Priority: Medium     Pt with chronic RUL infiltrate with pos AFB sputum  Full treatmetn for TB following ID consult in 2000's  Problem list name updated by automated process. Provider to review       Sensorineural hearing loss, asymmetrical 06/20/2005     Priority: Medium     Generalized osteoarthrosis, unspecified site 06/20/2005     Priority: Medium     Right hip and knee are the most symptomatic                Past Surgical History:   I have reviewed this patient's past surgical history   Past Surgical History:   Procedure Laterality Date     APPENDECTOMY       ARTHROSCOPY KNEE  4/15/2011    Procedure:ARTHROSCOPY KNEE; removal of loose body; Surgeon:LEY, JEFFREY DUANE; Location:[facility]     CHOLECYSTECTOMY       ESOPHAGOSCOPY, GASTROSCOPY, DUODENOSCOPY (EGD), COMBINED  6/9/2014    Procedure: COMBINED ESOPHAGOSCOPY, GASTROSCOPY, DUODENOSCOPY (EGD);  Surgeon: Gilberto Richard MD;  Location: [facility]     IMPLANT PACEMAKER  5/21/2013    [facility] [id number] Serial#[id number]     INJECT EPIDURAL LUMBAR  3/23/2011    INJECT EPIDURAL LUMBAR performed by GENERIC ANESTHESIA PROVIDER at [facility] OR     JOINT REPLACEMENT, HIP RT/LT      Joint Replacement Hip Rt     MASTECTOMY,  SIMPLE RT/LT/CAITLYN      Left breast - following breast ca     OTHER SURGICAL HISTORY      C1-C2 fusion after fx      SURGICAL HISTORY OF -   05/22/2001    Colonoscopy     TONSILLECTOMY               Social History:   I have reviewed this patient's social history   Social History   Substance Use Topics     Smoking status: Never Smoker     Smokeless tobacco: Never Used     Alcohol use No             Family History:   I have reviewed this patient's family history  Family History   Problem Relation Age of Onset     CEREBROVASCULAR DISEASE Mother      HEART DISEASE Mother      MI     CEREBROVASCULAR DISEASE Father      HEART DISEASE Father      MI     Respiratory Maternal Grandfather      TB     Neurologic Disorder Brother      ALS     HEART DISEASE Brother      CEREBROVASCULAR DISEASE Brother      Breast Cancer Daughter      age:49     Asthma Brother      CANCER Brother      brain and lung     CANCER Daughter      thyroid             Immunizations:   Immunizations are current          Allergies:     Allergies   Allergen Reactions     Cephalosporins Nausea     Cefzil     Doxycycline Nausea and Vomiting     Sulfa Drugs Nausea     Penicillins Rash     PCN             Medications:     Prescriptions Prior to Admission   Medication Sig Dispense Refill Last Dose     ipratropium - albuterol 0.5 mg/2.5 mg/3 mL (DUONEB) 0.5-2.5 (3) MG/3ML neb solution TAKE 1 VIAL BY NEBULIZATION  EVERY SIX HOURS AS NEEDED FOR SHORTNESS OF BREATH/ DYSPNEA OR WHEEZING 180 mL 9 2/6/2018 at 1000     warfarin (COUMADIN) 1 MG tablet Take 1.5 mg Mon, 1 mg rest of the days or as directed by Anticoagulation Clinic. 60 tablet 0 2/5/2018 at pm     diclofenac (VOLTAREN) 1 % GEL topical gel APPLY 4 GRAMS TO KNEES OR 2 GRAMS TO HANDS 4 TIMES DAILY USING ENCLOSED DOSING CARD 100 g 11 Past Week at Unknown time     ranitidine (ZANTAC) 150 MG tablet Take 1 tablet (150 mg) by mouth 2 times daily 60 tablet 11 2/6/2018 at am     albuterol (PROAIR HFA/PROVENTIL  HFA/VENTOLIN HFA) 108 (90 BASE) MCG/ACT Inhaler Inhale 2 puffs into the lungs every 6 hours as needed for shortness of breath / dyspnea 3 Inhaler 1 2018 at am     metoprolol (TOPROL-XL) 25 MG 24 hr tablet TAKE TWO TABLETS BY MOUTH TWICE DAILY  120 tablet 3 2018 at am     levothyroxine (SYNTHROID/LEVOTHROID) 50 MCG tablet TAKE 1 TABLET (50 MCG) BY MOUTH DAILY 90 tablet 2 2018 at early am     SYMBICORT 160-4.5 MCG/ACT Inhaler INHALE 2 PUFFS INTO THE LUNGS 2 TIMES DAILY 10.2 g 5 2018 at am     probiotic CAPS Take 1 capsule by mouth every evening    2018 at pm     polyethylene glycol (MIRALAX) powder Take 17 g by mouth daily as needed (Patient taking differently: Take 1 capful by mouth every evening After supper) 510 g 5 2018 at after supper     CRANBERRY Take 475 mg by mouth 2 times daily.   2018 at am             Review of Systems:    ROS: 10 point ROS neg other than the symptoms noted above in the HPI.             Physical Exam:   Blood pressure 132/70, temperature 97.5  F (36.4  C), temperature source Oral, resp. rate 12, last menstrual period 06/15/1985, SpO2 93 %, not currently breastfeeding.  Temperatures:  Current - Temp: 97.5  F (36.4  C); Max - Temp  Av.5  F (36.4  C)  Min: 97.5  F (36.4  C)  Max: 97.5  F (36.4  C)  Respiration range: Resp  Av  Min: 12  Max: 12  Pulse range: No Data Recorded  Blood pressure range: Systolic (24hrs), Av , Min:99 , Max:150   ; Diastolic (24hrs), Av, Min:65, Max:90    Pulse oximetry range: SpO2  Av.2 %  Min: 92 %  Max: 95 %  No intake or output data in the 24 hours ending 18  EXAM:  General: Awake and alert, oriented x 3, no apparent distress  CV: regular rate and rhythm no murmur  Pulmonary: clear to auscultation bilaterally  Abdomen soft, non-tender, no masses   Back: no point tenderness along spine.  Muscle tension and some tenderness in the lumbar back, more prominent on the left.    Negative straight leg raise  bilaterally, although does have pain in lower back with straight leg raise - no radiation down legs however.  Sensation and strength intact throughout lower extremities bilaterally  Patellar and achilles reflexes intact.    Extremities: no edema             Data:     Results for orders placed or performed during the hospital encounter of 02/06/18 (from the past 24 hour(s))   Comprehensive metabolic panel   Result Value Ref Range    Sodium 123 (L) 133 - 144 mmol/L    Potassium 3.9 3.4 - 5.3 mmol/L    Chloride 89 (L) 94 - 109 mmol/L    Carbon Dioxide 25 20 - 32 mmol/L    Anion Gap 9 3 - 14 mmol/L    Glucose 109 (H) 70 - 99 mg/dL    Urea Nitrogen 15 7 - 30 mg/dL    Creatinine 0.31 (L) 0.52 - 1.04 mg/dL    GFR Estimate >90 >60 mL/min/1.7m2    GFR Estimate If Black >90 >60 mL/min/1.7m2    Calcium 8.0 (L) 8.5 - 10.1 mg/dL    Bilirubin Total 0.4 0.2 - 1.3 mg/dL    Albumin 3.3 (L) 3.4 - 5.0 g/dL    Protein Total 6.8 6.8 - 8.8 g/dL    Alkaline Phosphatase 97 40 - 150 U/L    ALT 22 0 - 50 U/L    AST 36 0 - 45 U/L   CBC with platelets, differential   Result Value Ref Range    WBC 5.8 4.0 - 11.0 10e9/L    RBC Count 4.00 3.8 - 5.2 10e12/L    Hemoglobin 11.1 (L) 11.7 - 15.7 g/dL    Hematocrit 32.2 (L) 35.0 - 47.0 %    MCV 81 78 - 100 fl    MCH 27.8 26.5 - 33.0 pg    MCHC 34.5 31.5 - 36.5 g/dL    RDW 13.8 10.0 - 15.0 %    Platelet Count 305 150 - 450 10e9/L    Diff Method Automated Method     % Neutrophils 57.8 %    % Lymphocytes 19.9 %    % Monocytes 18.9 %    % Eosinophils 2.4 %    % Basophils 0.7 %    % Immature Granulocytes 0.3 %    Absolute Neutrophil 3.4 1.6 - 8.3 10e9/L    Absolute Lymphocytes 1.2 0.8 - 5.3 10e9/L    Absolute Monocytes 1.1 0.0 - 1.3 10e9/L    Absolute Eosinophils 0.1 0.0 - 0.7 10e9/L    Absolute Basophils 0.0 0.0 - 0.2 10e9/L    Abs Immature Granulocytes 0.0 0 - 0.4 10e9/L   INR   Result Value Ref Range    INR 1.95 (H) 0.86 - 1.14   Lumbar spine CT w/o contrast    Narrative    CT LUMBAR SPINE WITHOUT  CONTRAST  2/6/2018 2:10 PM     HISTORY: Low back pain.      TECHNIQUE: Axial images of the lumbar spine were obtained without  intravenous contrast. Multiplanar reformations were performed.   Radiation dose for this scan was reduced using automated exposure  control, adjustment of the mA and/or kV according to patient size, or  iterative reconstruction technique.    COMPARISON: None.    FINDINGS:  Chronic-appearing loss of vertebral body height at L1 due  to inferior endplate Schmorl's node or old compression fracture. Mild  old inferior endplate compression fracture of L2 appears chronic with  some sclerosis. Similar chronic inferior endplate findings at L3.  These all have the appearance of Schmorl's nodes or somewhat focal  inferior endplate compression deformities that are likely chronic. No  definite acute fracture. Diffuse osteopenia. The findings at L1 are  stable. The inferior endplate compression deformities at L2 and L3 are  new since the comparison CT of almost two years ago but have sclerosis  and no definite visible lucent fracture lines, indicating they are  likely chronic. The distal spinal cord and conus medullaris appear  normal.  The paraspinous soft tissues appear normal.    T12-L1:  Normal disc, facet joints, spinal canal and neural foramina.     L1-L2:  Normal disc, facet joints, spinal canal and neural foramina.    L2-L3:  Mild broad-based disc bulge. This lateralizes to the right  resulting in mild to moderate right foraminal stenosis. Left neural  foramen is patent. No central stenosis. Facet joints are unremarkable.      L3-L4:  Mild disc bulge and facet hypertrophy. Mild central stenosis.  Mild bilateral foraminal stenosis.     L4-L5:  Moderate facet degenerative changes. Mild disc bulge and  osteophytic ridging. Mild central stenosis. Neural foramina are  patent.     L5-S1:  Prominent left and moderate right facet degenerative changes.  Mild disc bulge. No central stenosis. There is a  slightly more focal  left foraminal disc protrusion resulting in at least moderate left  foraminal stenosis. Right neural foramen is patent. No central  stenosis.      Impression    IMPRESSION:    1. Inferior endplate central compression deformities at L2 and L3 are  new since 3/10/2016 but have a chronic imaging appearance.  2. At L2-L3 there is mild to moderate right foraminal stenosis.  3. At L3-L4 there is mild central and bilateral foraminal stenosis.  4. At L4-L5 there is mild central stenosis.  5. At L5-S1 there is moderate left foraminal stenosis.  6. Mild progression of degenerative findings since the comparison  study.       VU HARLEY MD   UA reflex to Microscopic   Result Value Ref Range    Color Urine Straw     Appearance Urine Slightly Cloudy     Glucose Urine Negative NEG^Negative mg/dL    Bilirubin Urine Negative NEG^Negative    Ketones Urine 10 (A) NEG^Negative mg/dL    Specific Gravity Urine 1.015 1.003 - 1.035    Blood Urine Trace (A) NEG^Negative    pH Urine 7.5 (H) 5.0 - 7.0 pH    Protein Albumin Urine 10 (A) NEG^Negative mg/dL    Urobilinogen mg/dL Normal 0.0 - 2.0 mg/dL    Nitrite Urine Negative NEG^Negative    Leukocyte Esterase Urine Negative NEG^Negative    Source Midstream Urine     WBC Urine 2 0 - 2 /HPF    RBC Urine 2 0 - 2 /HPF    Bacteria Urine Moderate (A) NEG^Negative /HPF    Squamous Epithelial /HPF Urine 1 0 - 1 /HPF    Amorphous Crystals Moderate (A) NEG^Negative /HPF     *Note: Due to a large number of results and/or encounters for the requested time period, some results have not been displayed. A complete set of results can be found in Results Review.       All imaging studies reviewed by me.    Attestation:  I have reviewed today's vital signs, notes, medications, labs and imaging.  Amount of time performed on this history and physical: 75 minutes including detailed discussion of options with patient and daughter.        Herb Rowley MD

## 2018-02-07 NOTE — PROGRESS NOTES
"   02/07/18 1032   Quick Adds   Type of Visit Initial Occupational Therapy Evaluation   Living Environment   Lives With child(grupo), adult   Living Arrangements house   Home Accessibility grab bars present (bathtub);grab bars present (toilet);stairs to enter home;tub/shower is not walk in   Number of Stairs to Enter Home 3   General Information   Onset of Illness/Injury or Date of Surgery - Date 02/06/18   Referring Physician Donnie   Patient/Family Goals Statement TCU prior to returning home   Additional Occupational Profile Info/Pertinent History of Current Problem 86 yo F with dx of inferior endplate central compression deformities with degenerative changes at L3-L4. PMH: depression/anxiety, DVT, HTN, COPD. Intractable low back pain worsening over the last 4-5 weeks limiting I mobility   Precautions/Limitations no known precautions/limitations   General Info Comments Lives in a house with her dtr. 3 steps in.  Uses a 4WW, but over the last weeks has been sitting on the seat and was pushed around. Was I dressing and toileting. Up until 1 month ago bathed I'ly. Tub/shower combo with seat and bars. Bars on toilet. Assists with laundry, unloading , some cooking. Plays the keyboard, likes to read, do puzzles. Used a bedside commode at home for night but more recently has needed A with that.   Cognitive Status Examination   Orientation not oriented to person, place or time   Level of Consciousness alert   Cognitive Comment No concerns   Pain Assessment   Patient Currently in Pain (None at rest, \"a little\" with mobility)   Range of Motion (ROM)   ROM Comment BUEs WNLs   Strength   Strength Comments BUEs 4-/5 and bilateral  3+/5   Transfer Skill: Bed to Chair/Chair to Bed   Level of Schaller: Bed to Chair contact guard   Physical Assist/Nonphysical Assist: Bed to Chair 1 person assist   Assistive Device - Transfer Skill Bed to Chair Chair to Bed Rehab Eval rolling walker   Transfer Skill: Sit to Stand " "  Level of [place]on: Sit/Stand minimum assist (75% patients effort)   Physical Assist/Nonphysical Assist: Sit/Stand 1 person assist   Assistive Device for Transfer: Sit/Stand rolling walker   Transfer Skill: Toilet Transfer   Level of [place]on: Toilet minimum assist (75% patients effort)   Assistive Device rolling walker;grab bars   Toileting   Level of [place]on: Toilet contact guard   Physical Assist/Nonphysical Assist: Toilet 1 person assist   Activities of Daily Living Analysis   Impairments Contributing to Impaired Activities of Daily Living flexibility decreased;pain;strength decreased   Clinical Impression   Criteria for Skilled Therapeutic Interventions Met evaluation only   OT Diagnosis Impaired ADLs and related mobility   Influenced by the following impairments back pain   Assessment of Occupational Performance 1-3 Performance Deficits   Identified Performance Deficits dressing, toileting, bathing   Clinical Decision Making (Complexity) Low complexity   Predicted Duration of Therapy Intervention (days/wks) Eval only   Anticipated Discharge Disposition Transitional Care Facility   Risks and Benefits of Treatment have been explained. Yes   Patient, Family & other staff in agreement with plan of care Yes   [facility] AM-PAC  \"6 Clicks\" Daily Activity Inpatient Short Form   1. Putting on and taking off regular lower body clothing? 3 - A Little   2. Bathing (including washing, rinsing, drying)? 3 - A Little   3. Toileting, which includes using toilet, bedpan or urinal? 3 - A Little   4. Putting on and taking off regular upper body clothing? 4 - None   5. Taking care of personal grooming such as brushing teeth? 4 - None   6. Eating meals? 4 - None   Daily Activity Raw Score (Score out of 24.Lower scores equate to lower levels of function) 21   Total Evaluation Time   Total Evaluation Time (Minutes) 45     MICHELL Guido  "

## 2018-02-07 NOTE — PHARMACY-ANTICOAGULATION SERVICE
Clinical Pharmacy - Warfarin Dosing Consult     Pharmacy has been consulted to manage this patient s warfarin therapy.  Therapy Goal: Other - see comments Afib/HX DVT goal 1.5-2  OP Anticoag Clinic: [facility]  Warfarin Prior to Admission: Yes  Warfarin PTA Regimen: 1.5 mg on Mon; 1 mg all other days  Recent documented change in oral intake/nutrition: Unknown  Dose Comments: Home dose 1 mg NOTE Goal is 1.5-2    INR   Date Value Ref Range Status   02/06/2018 1.95 (H) 0.86 - 1.14 Final     INR Protime   Date Value Ref Range Status   01/30/2018 1.9 (A) 0.86 - 1.14 Final       Recommend warfarin 1 mg today.  Pharmacy will monitor Ellie Leigh daily and order warfarin doses to achieve specified goal.      Please contact pharmacy as soon as possible if the warfarin needs to be held for a procedure or if the warfarin goals change.

## 2018-02-07 NOTE — ED NOTES
Has decided to stay tonight  Family wants to make sure they have opportunity to work with  janis jones regarding discharge plan ie transitional care vs more help at home- explore available options

## 2018-02-07 NOTE — PROGRESS NOTES
02/07/18 1000   Quick Adds   Type of Visit Initial PT Evaluation   Living Environment   Lives With child(grupo), adult   Living Arrangements house   Home Accessibility stairs to enter home   Number of Stairs to Enter Home 3   Self-Care   Usual Activity Tolerance moderate   Current Activity Tolerance fair   Equipment Currently Used at Home walker, rolling   Functional Level Prior   Ambulation 1-->assistive equipment   Transferring 1-->assistive equipment   Toileting 1-->assistive equipment   Bathing 2-->assistive person   Dressing 2-->assistive person   Eating 0-->independent   Communication 0-->understands/communicates without difficulty   Swallowing 0-->swallows foods/liquids without difficulty   Cognition 0 - no cognition issues reported   Fall history within last six months no   Which of the above functional risks had a recent onset or change? none   Prior Functional Level Comment PLOF- Pt indep. with ambulation using a walker/ 4ww househeold distances.  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  Symptoms have been worse the past 4-5 weeks.;  CT- Inferior endplate central compression deformities at L2 and L3   Precautions/Limitations spinal precautions  (semi rigid brace in  place)   General Observations Pt alert, pleasant - up in the chair    Pt reporting her pain is controlled- minimal at rest and with movement- located   Left> right LBP , initermmitent Sx to left foot   Pain  "Assessment   Patient Currently in Pain Yes, see Vital Sign flowsheet   Posture    Posture Kyphosis   Range of Motion (ROM)   ROM Comment AROM UE, LE WFL    Strength   Strength Comments LE strength 4/5  throughout   Bed Mobility   Bed Mobility Comments NT- pt reports needing minimal assistance w/ log roll to sitting   Transfer Skills   Transfer Comments Minimal to CGA of one w/ sit>stand w/ walker   Gait   Gait Comments Pt ambulating short in room distances- 10 feet x2 with RW and CGA of 2. ,needing  occas assistance to advance walker, uses lighter 4ww at home. Slow, steady gait. Pain controlled.    Balance   Balance Comments fair/ good w/ walker use   Clinical Impression   Criteria for Skilled Therapeutic Intervention evaluation only  (Rx to be initiated at TCU)   Anticipated Discharge Disposition Transitional Care Facility   Risk & Benefits of therapy have been explained Yes   Patient, Family & other staff in agreement with plan of care Yes   Clinical Impression Comments ; continued PT at TCU to address strength, ensure independence w/ functional mobility/ ambulation  for safe return home   [facility] AM-PAC  \"6 Clicks\" V.2 Basic Mobility Inpatient Short Form   1. Turning from your back to your side while in a flat bed without using bedrails? 3 - A Little   2. Moving from lying on your back to sitting on the side of a flat bed without using bedrails? 3 - A Little   3. Moving to and from a bed to a chair (including a wheelchair)? 3 - A Little   4. Standing up from a chair using your arms (e.g., wheelchair, or bedside chair)? 3 - A Little   5. To walk in hospital room? 3 - A Little   6. Climbing 3-5 steps with a railing? 3 - A Little   Basic Mobility Raw Score (Score out of 24.Lower scores equate to lower levels of function) 18   Total Evaluation Time   Total Evaluation Time (Minutes) 30     "

## 2018-02-07 NOTE — PLAN OF CARE
Problem: Patient Care Overview  Goal: Plan of Care/Patient Progress Review  OT SUMMARY D/C:  Patient plan for D/C:   TCU    Current Status:   Min A/ CGA for self cares and related mobiity using a rolling walker. Wearing a semi rigid lumbar brace. Reports only a little pain    Barriers to return to prior living situation:   Unable to care for herself at PLOF - I    Recommendation for D/C: TCU    Rationale for D/C recommendations:  Pt is unable to perform self cares and related mobility at PLOF for her to safely return home.    Nicoel Cisneros, OTR

## 2018-02-07 NOTE — PROGRESS NOTES
"S: Pt was seen today at [place] [facility], for eval/fitting for an LSO as ordered by Maite De León PA-C.  I have tried to get in contact with her on her cell and left a voicemail to call me back to discuss which style of brace she would like.  I have not heard back from her yet, so I have fit her with a [employer] brand LSO.    O/G: The objective/goal is to reduce pain and motion of the lumbar spine.  The Rx. states that the patient has L 2-3 deformity    A: At this time I have fit pt with a size 32-38\" LS corset with Velcro closures.  The Nurse has helped with the fitting of the brace. The pt/Family/nurse were instructed on donning/doffing; care and cleaning of the LSO was explained.  Care was taken in selection of the proper size LSO to insure an intimate fit, provide clearance at the rectus femoris, when sitting and to avoid impingement at the breast anteriorly and scapula posteriorly.  Upon completion of the initial fitting the pt had no complaints, and the fit of the orthosis appeared to be satisfactory at this time.    P.  Pt./Family/Staff have been instructed to contact our facility with any future questions and/or concerns.  "

## 2018-02-07 NOTE — PROGRESS NOTES
"SPIRITUAL HEALTH SERVICES  SPIRITUAL ASSESSMENT Progress Note  [facility] - Med/Surg    PRIMARY FOCUS:     Assessment of emotional/spiritual/Advent distress    Support for coping    ILLNESS CIRCUMSTANCES:   Reviewed documentation. Reflective conversation shared with Ellie Leigh which integrated elements of illness and family narratives.     Context of Serious Illness/Symptom(s) - Back pain    Resources for Support - two daughters present, one from [place], one from [place], [place]    DISTRESS:     Emotional/Existential/Relational Distress - Ellie explained the pain and expressed hope that the binder will be helpful    Spiritual/Latter-day Distress - None identified    Social/Cultural/Economic Distress - She stated that she lives with another daughter, \"who isn't here because she's taking her  to see a heart doctor\".     SPIRITUAL/Mosque COPING:     Adventist/Tia - [employer]    Spiritual Practice(s) - Identified with her Yarsanism and welcomed prayer for both her health and that of her Selwyn    GOALS OF CARE:    Goals of Care - Identifying source of pain and getting help with that; daughters were doting and when PT/OT arrived the attention focused on their availability    Meaning/Sense-Making - Family and tia were both lifted up as significant for Ellie    PLAN: I will provide follow up visit with pt during LOS if possible    Aaron Aly M.A., [facility]  Staff   [facility]  Office: [phone]  Cell: [phone]  Pager [phone]    "

## 2018-02-07 NOTE — PLAN OF CARE
Problem: Skin Injury Risk (Adult,Obstetrics,Pediatric)  Goal: Identify Related Risk Factors and Signs and Symptoms  Related risk factors and signs and symptoms are identified upon initiation of Human Response Clinical Practice Guideline (CPG).  Outcome: No Change  When Pt admitted there were 2 skin areas noted as wounds.  One is mid back right over bony area of spine.  There is a dry reddened area, not open that looks as it may be from prolonged pressure on area.  Second area was in the bilat groin folds where the skin is red and excoriated and close to breakdown from the irritation.  Area cleaned and powder applied.  Other than mult scars, skin is in very good shape.  GERARD florian RN

## 2018-02-08 ENCOUNTER — CARE COORDINATION (OUTPATIENT)
Dept: CARE COORDINATION | Facility: CLINIC | Age: 83
End: 2018-02-08

## 2018-02-08 ENCOUNTER — ANTICOAGULATION THERAPY VISIT (OUTPATIENT)
Dept: ANTICOAGULATION | Facility: CLINIC | Age: 83
End: 2018-02-08
Payer: COMMERCIAL

## 2018-02-08 VITALS
HEART RATE: 77 BPM | BODY MASS INDEX: 24.2 KG/M2 | SYSTOLIC BLOOD PRESSURE: 135 MMHG | DIASTOLIC BLOOD PRESSURE: 70 MMHG | RESPIRATION RATE: 18 BRPM | WEIGHT: 115.3 LBS | HEIGHT: 58 IN | OXYGEN SATURATION: 92 % | TEMPERATURE: 95.3 F

## 2018-02-08 DIAGNOSIS — Z79.01 LONG TERM CURRENT USE OF ANTICOAGULANT THERAPY: ICD-10-CM

## 2018-02-08 LAB
ANION GAP SERPL CALCULATED.3IONS-SCNC: 6 MMOL/L (ref 3–14)
BUN SERPL-MCNC: 15 MG/DL (ref 7–30)
CALCIUM SERPL-MCNC: 8 MG/DL (ref 8.5–10.1)
CHLORIDE SERPL-SCNC: 90 MMOL/L (ref 94–109)
CO2 SERPL-SCNC: 27 MMOL/L (ref 20–32)
CREAT SERPL-MCNC: 0.35 MG/DL (ref 0.52–1.04)
GFR SERPL CREATININE-BSD FRML MDRD: >90 ML/MIN/1.7M2
GLUCOSE SERPL-MCNC: 98 MG/DL (ref 70–99)
INR PPP: 2.31 (ref 0.86–1.14)
POTASSIUM SERPL-SCNC: 4 MMOL/L (ref 3.4–5.3)
SODIUM SERPL-SCNC: 123 MMOL/L (ref 133–144)

## 2018-02-08 PROCEDURE — G0378 HOSPITAL OBSERVATION PER HR: HCPCS

## 2018-02-08 PROCEDURE — 99207 ZZC NO CHARGE NURSE ONLY: CPT | Performed by: REGISTERED NURSE

## 2018-02-08 PROCEDURE — 85610 PROTHROMBIN TIME: CPT | Performed by: FAMILY MEDICINE

## 2018-02-08 PROCEDURE — 80048 BASIC METABOLIC PNL TOTAL CA: CPT | Performed by: FAMILY MEDICINE

## 2018-02-08 PROCEDURE — 25000132 ZZH RX MED GY IP 250 OP 250 PS 637: Performed by: INTERNAL MEDICINE

## 2018-02-08 PROCEDURE — 25000132 ZZH RX MED GY IP 250 OP 250 PS 637: Performed by: PHYSICIAN ASSISTANT

## 2018-02-08 PROCEDURE — 36415 COLL VENOUS BLD VENIPUNCTURE: CPT | Performed by: FAMILY MEDICINE

## 2018-02-08 PROCEDURE — 25000125 ZZHC RX 250: Performed by: FAMILY MEDICINE

## 2018-02-08 PROCEDURE — 99217 ZZC OBSERVATION CARE DISCHARGE: CPT | Performed by: INTERNAL MEDICINE

## 2018-02-08 PROCEDURE — 25000132 ZZH RX MED GY IP 250 OP 250 PS 637: Performed by: FAMILY MEDICINE

## 2018-02-08 RX ORDER — WARFARIN SODIUM 1 MG/1
1 TABLET ORAL ONCE
Status: COMPLETED | OUTPATIENT
Start: 2018-02-08 | End: 2018-02-08

## 2018-02-08 RX ORDER — ACETAMINOPHEN 325 MG/1
650 TABLET ORAL 3 TIMES DAILY
Qty: 100 TABLET | DISCHARGE
Start: 2018-02-08 | End: 2018-02-09

## 2018-02-08 RX ORDER — TRAMADOL HYDROCHLORIDE 50 MG/1
25-50 TABLET ORAL EVERY 6 HOURS PRN
Qty: 40 TABLET | Refills: 0 | Status: SHIPPED | OUTPATIENT
Start: 2018-02-08 | End: 2018-03-01

## 2018-02-08 RX ORDER — WARFARIN SODIUM 1 MG/1
1 TABLET ORAL
Status: DISCONTINUED | OUTPATIENT
Start: 2018-02-08 | End: 2018-02-08

## 2018-02-08 RX ADMIN — WARFARIN SODIUM 1 MG: 1 TABLET ORAL at 15:36

## 2018-02-08 RX ADMIN — RANITIDINE 150 MG: 150 TABLET ORAL at 08:37

## 2018-02-08 RX ADMIN — FLUTICASONE FUROATE AND VILANTEROL TRIFENATATE 1 PUFF: 200; 25 POWDER RESPIRATORY (INHALATION) at 08:37

## 2018-02-08 RX ADMIN — METOPROLOL SUCCINATE 50 MG: 50 TABLET, EXTENDED RELEASE ORAL at 08:37

## 2018-02-08 RX ADMIN — TRAMADOL HYDROCHLORIDE 25 MG: 50 TABLET, COATED ORAL at 15:36

## 2018-02-08 RX ADMIN — ONDANSETRON 4 MG: 4 TABLET, ORALLY DISINTEGRATING ORAL at 08:51

## 2018-02-08 RX ADMIN — TRAMADOL HYDROCHLORIDE 25 MG: 50 TABLET, COATED ORAL at 10:09

## 2018-02-08 RX ADMIN — LEVOTHYROXINE SODIUM 50 MCG: 50 TABLET ORAL at 08:37

## 2018-02-08 RX ADMIN — POLYETHYLENE GLYCOL 3350 17 G: 17 POWDER, FOR SOLUTION ORAL at 10:19

## 2018-02-08 RX ADMIN — TRAMADOL HYDROCHLORIDE 50 MG: 50 TABLET, COATED ORAL at 03:42

## 2018-02-08 NOTE — PROGRESS NOTES
Name: Ellie Leigh    MRN#: [id number]    Reason for Hospitalization: Back pain [M54.9]  Hyponatremia [E87.1]  Acute bilateral low back pain with left-sided sciatica [M54.42]    Discharge Date: 2/8/2018    Patient / Family response to discharge plan: in agreement    Follow-Up Appt: Future Appointments  Date Time Provider Department Center   2/27/2018 2:00 PM [facility]   4/3/2018 10:30 AM [facility]       Other Providers (Care Coordinator, County Services, PCA services etc): Yes: [facility]     Discharge Disposition: transitional care unit - [facility] (Phone: [phone] Admissions: [phone] Fax: [phone]) via family car.    CHRISTOPHER Young  [facility] [phone]/ [facility] [phone]

## 2018-02-08 NOTE — DISCHARGE INSTRUCTIONS
1.  Follow up with Dr. Frankie Pham in 1 weeks for follow up of your lumbar compression fracture.  Call [phone] if appointment needed or questions  2.  Use pain medication as directed  3.  LSO brace should be on while up for comfort, you may wear this brace while sleeping, however it should be removed often for skin checks.   4. Mobility is encouraged, as tolerated.    5. Warfarin Discharge Instructions: You were given 1 mg of warfarin today.  Continue your usual dose of 1.5 mg on Monday and 1 mg all the other days of the week.  Repeat INR on Monday at TCU.

## 2018-02-08 NOTE — PLAN OF CARE
"Problem: Pain, Chronic (Adult)  Goal: Acceptable Pain Control/Comfort Level  Patient will demonstrate the desired outcomes by discharge/transition of care.   Outcome: No Change  Pt slept quietly between pain meds. Increase pain with movement, making movement slow and requiring assist. Pt voices \"felt better pain control with Oxycodone\". Noted for rounding MD to readdress with pt.       "

## 2018-02-08 NOTE — DISCHARGE SUMMARY
[facility]    Discharge Summary  Hospital Medicine    Date of Admission:  2/6/2018  Date of Discharge:  2/8/2018   Discharging Provider: Saeid Sosa MD  Date of Service: 2/8/2018     Primary Care     Rehder, Josefina Mathis  [address] [place] [id number]      Identification and Chief Compaint: Ellie Leigh is a 87 year old female who presented on 2/6/2018 with complaint of back pain.    Discharge Diagnoses       Intractable low back pain    Chronic hyponatremia, possible SIADH    Discharge Disposition   Discharged to TCU    Discharge Orders     General info for SNF   Length of Stay Estimate: Short Term Care: Estimated # of Days <30  Condition at Discharge: Improving  Level of care:skilled   Rehabilitation Potential: Good  Admission H&P remains valid and up-to-date: Yes  Recent Chemotherapy: N/A  Use Nursing Home Standing Orders: Yes     Mantoux instructions   Give two-step Mantoux (PPD) Per Facility Policy Yes     Activity - Up with assistive device     Activity - Up with nursing assistance     Physical Therapy Adult Consult   Evaluate and treat as clinically indicated.    Reason:  Deconditoiing, back pain     Advance Diet as Tolerated   Follow this diet upon discharge: 1500 mL fluid restriction.           Further instructions from your care team       1.  Follow up with Dr. Frankie Pham in 1 weeks for follow up of your lumbar compression fracture.  Call [phone] if appointment needed or questions  2.  Use pain medication as directed  3.  LSO brace should be on while up for comfort, you may wear this brace while sleeping, however it should be removed often for skin checks.   4. Mobility is encouraged, as tolerated.             Discharge Medications   Current Discharge Medication List      START taking these medications    Details   traMADol (ULTRAM) 50 MG tablet Take 0.5-1 tablets (25-50 mg) by mouth every 6 hours as needed for pain (25 mg for pain rating 3-6, 50 mg for  pain rating 7-10)  Qty: 40 tablet, Refills: 0    Associated Diagnoses: Acute bilateral low back pain with left-sided sciatica      acetaminophen (TYLENOL) 325 MG tablet Take 2 tablets (650 mg) by mouth 3 times daily  Qty: 100 tablet    Associated Diagnoses: Acute bilateral low back pain with left-sided sciatica         CONTINUE these medications which have NOT CHANGED    Details   ipratropium - albuterol 0.5 mg/2.5 mg/3 mL (DUONEB) 0.5-2.5 (3) MG/3ML neb solution TAKE 1 VIAL BY NEBULIZATION  EVERY SIX HOURS AS NEEDED FOR SHORTNESS OF BREATH/ DYSPNEA OR WHEEZING  Qty: 180 mL, Refills: 9    Associated Diagnoses: Chronic obstructive pulmonary disease, unspecified COPD type (H)      warfarin (COUMADIN) 1 MG tablet Take 1.5 mg Mon, 1 mg rest of the days or as directed by Anticoagulation Clinic.  Qty: 60 tablet, Refills: 0    Associated Diagnoses: Intermittent atrial fibrillation (H)      diclofenac (VOLTAREN) 1 % GEL topical gel APPLY 4 GRAMS TO KNEES OR 2 GRAMS TO HANDS 4 TIMES DAILY USING ENCLOSED DOSING CARD  Qty: 100 g, Refills: 11    Associated Diagnoses: Primary osteoarthritis involving multiple joints      ranitidine (ZANTAC) 150 MG tablet Take 1 tablet (150 mg) by mouth 2 times daily  Qty: 60 tablet, Refills: 11    Associated Diagnoses: Gastroesophageal reflux disease without esophagitis      albuterol (PROAIR HFA/PROVENTIL HFA/VENTOLIN HFA) 108 (90 BASE) MCG/ACT Inhaler Inhale 2 puffs into the lungs every 6 hours as needed for shortness of breath / dyspnea  Qty: 3 Inhaler, Refills: 1    Associated Diagnoses: Mild persistent asthma without complication      metoprolol (TOPROL-XL) 25 MG 24 hr tablet TAKE TWO TABLETS BY MOUTH TWICE DAILY   Qty: 120 tablet, Refills: 3    Associated Diagnoses: Essential hypertension, benign      levothyroxine (SYNTHROID/LEVOTHROID) 50 MCG tablet TAKE 1 TABLET (50 MCG) BY MOUTH DAILY  Qty: 90 tablet, Refills: 2    Associated Diagnoses: Hypothyroidism, unspecified type      SYMBICORT  160-4.5 MCG/ACT Inhaler INHALE 2 PUFFS INTO THE LUNGS 2 TIMES DAILY  Qty: 10.2 g, Refills: 5    Associated Diagnoses: Mild persistent asthma without complication      probiotic CAPS Take 1 capsule by mouth every evening       polyethylene glycol (MIRALAX) powder Take 17 g by mouth daily as needed  Qty: 510 g, Refills: 5    Associated Diagnoses: Constipation      CRANBERRY Take 475 mg by mouth 2 times daily.           Allergies   Allergies   Allergen Reactions     Cephalosporins Nausea     Cefzil     Doxycycline Nausea and Vomiting     Sulfa Drugs Nausea     Penicillins Rash     PCN       Consultations This Hospital Stay    ORTHOPEDIC SURGERY IP CONSULT      Significant Results and Procedures   Procedures    None    Data   Results for orders placed or performed during the hospital encounter of 02/06/18   Lumbar spine CT w/o contrast    Narrative    CT LUMBAR SPINE WITHOUT CONTRAST  2/6/2018 2:10 PM     HISTORY: Low back pain.      TECHNIQUE: Axial images of the lumbar spine were obtained without  intravenous contrast. Multiplanar reformations were performed.   Radiation dose for this scan was reduced using automated exposure  control, adjustment of the mA and/or kV according to patient size, or  iterative reconstruction technique.    COMPARISON: None.    FINDINGS:  Chronic-appearing loss of vertebral body height at L1 due  to inferior endplate Schmorl's node or old compression fracture. Mild  old inferior endplate compression fracture of L2 appears chronic with  some sclerosis. Similar chronic inferior endplate findings at L3.  These all have the appearance of Schmorl's nodes or somewhat focal  inferior endplate compression deformities that are likely chronic. No  definite acute fracture. Diffuse osteopenia. The findings at L1 are  stable. The inferior endplate compression deformities at L2 and L3 are  new since the comparison CT of almost two years ago but have sclerosis  and no definite visible lucent fracture  lines, indicating they are  likely chronic. The distal spinal cord and conus medullaris appear  normal.  The paraspinous soft tissues appear normal.    T12-L1:  Normal disc, facet joints, spinal canal and neural foramina.     L1-L2:  Normal disc, facet joints, spinal canal and neural foramina.    L2-L3:  Mild broad-based disc bulge. This lateralizes to the right  resulting in mild to moderate right foraminal stenosis. Left neural  foramen is patent. No central stenosis. Facet joints are unremarkable.      L3-L4:  Mild disc bulge and facet hypertrophy. Mild central stenosis.  Mild bilateral foraminal stenosis.     L4-L5:  Moderate facet degenerative changes. Mild disc bulge and  osteophytic ridging. Mild central stenosis. Neural foramina are  patent.     L5-S1:  Prominent left and moderate right facet degenerative changes.  Mild disc bulge. No central stenosis. There is a slightly more focal  left foraminal disc protrusion resulting in at least moderate left  foraminal stenosis. Right neural foramen is patent. No central  stenosis.      Impression    IMPRESSION:    1. Inferior endplate central compression deformities at L2 and L3 are  new since 3/10/2016 but have a chronic imaging appearance.  2. At L2-L3 there is mild to moderate right foraminal stenosis.  3. At L3-L4 there is mild central and bilateral foraminal stenosis.  4. At L4-L5 there is mild central stenosis.  5. At L5-S1 there is moderate left foraminal stenosis.  6. Mild progression of degenerative findings since the comparison  study.       VU HARLEY MD     *Note: Due to a large number of results and/or encounters for the requested time period, some results have not been displayed. A complete set of results can be found in Results Review.       History of Present Illness   (From H&P) is patient is a 87 year old  female with a significant past medical history of SAIDH, atrial fibrillation hypertension, prior DVT on coumadin, hypothyroidism and  "back pain who presents with worsened back pain.  Symptoms have been worse the past 4-5 weeks.  Unsure if she had an initial injury from leaning over her  or not, but has been worse throughout this time.  Saw primary care provider and was referred for physical therapy but thinks this just made it more sore.  Seen by primary care provider and had an x-ray 1/26 which showed probable compression fracture.  Was referred to sports med with planned appointment today.  However, pain today got to the point where she couldn't ambulate due to it so presented to ER instead.  Pain is across lower back, more on left than on right. Radiates down legs - sometimes down right as well but usually down left especially to knee but some down to foot - mostly radiates when she can't get in a comfortable position.  No numbness, no weakness, no loss of bowel or bladder control.  Location of pain unchanged but intensity worsened this AM.  Pain up to a 9/10 at home.  Only took tylenol for this, hasn't ever tried anything more than this.   In ER felt much improved at time of my evaluation after single dose of dilaudid with pain \"feeling good\" now.  However, when tried to ambulate patient and daughter report that pain was worse and she felt unsteady and feels unsafe to discharge home today.    No fever or chills.  No other new changes.  No light-headedness or syncope.    Has been being followed for hyponatremia due to SAIDH but unclear how compliant with fluid restriction she is.       Patient does live with family who helps to assist with this however primary caregiver needs to be with her  who is having surgical procedure tomorrow and remaining family do not feel comfortable caring for her.    Denies tobacco or alcohol use.     No recent medication changes.     Hospital Course   Ellie Leigh was admitted on 2/6/2018.  The following problems were addressed during her hospitalization:         Intractable low back pain   CT " "today showed Inferior endplate central compression deformities at L2 and L3 are new since 3/10/2016 but have a chronic imaging appearance along with progression of degenerative changes.  Suspect this is cause of her pain.  Both ER and I discussed with Dr. Hernadez on call for [facility] spine who was exteremly helpful - offered appointment tomorrow afternoon in clinic but patient and daughter don't feel safe with her at home so will be admitted with plan for ortho to see her tomorrow - likely PA to coordinate with spine surgeon unless surgeon is on site.  Likely plan is for oral pain control (started oxycodone 2.5-5 mg every 4 hours as needed, continue tylenol), and placement of semirigid brace tomorrow.  Hopeful discharge home tomorrow, but may need TCU if remains unable to care for herself.  Per ortho would consider outpatient vertebroplasty but only if not improving with conservative measures.   2/7/18 - Received a semi-rigid brace today which she has been wearing for \"a few hours\".  She finds it to be comfortable, non-restrictive, and thinks it's helping her pain.  Pain control has been good on tramadol 25 to 50 mg Q6H PRN and with brace.  Will continue this Rx.       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  Followed by PCP, unclear how compliant with fluid restriction she is.  Placed on 1L restriction while here which per notes is plan at home.   Despite fluid restriction, no change in sodium level. On exam, patient appears a little dry. No medications appear involved. Has right upper lobe chronic pulmonary process which could be cause of SIADH. Patient's oral intake has been limited. Recommended to increase sodium intake and liberalized fluid restriction to 1500 mL. Follow as outpatient.       Esophageal reflux  Continue prior to admission zantic twice daily.     No " reflux symptoms presently.        Mild major depression/Anxiety  2/6/2018 -- mood and affect appropriate, not on any medications.           History of DVT (deep venous thrombosis)  On coumadin as below.    2/7/18 - INR 2.12 today.        Intermittent atrial fibrillation (H)  Rate controlled, continue metoprolol.  Pharmacy to dose coumadin.           Hypothyroidism  TSH normal 1 month ago - continue prior to admission synthroid.           Benign essential HTN  Continue metoprolol with parameters.    Good control on present metoprolol.       COPD (chronic obstructive pulmonary disease) (H)  At baseline, duonebs as needed in place of home albuterol inhaler.   Continue prior to admission Symbicort.           Irritable bowel syndrome without diarrhea  Asymptomatic at present    Pending Results   Unresulted Labs Ordered in the Past 30 Days of this Admission     No orders found from 12/8/2017 to 2/7/2018.          Physical Exam   Temp:  [95.3  F (35.2  C)-97.2  F (36.2  C)] 95.3  F (35.2  C)  Pulse:  [71-77] 77  Heart Rate:  [66] 66  Resp:  [18] 18  BP: (132-170)/(69-85) 135/70  SpO2:  [92 %-95 %] 92 %  Vitals:    02/06/18 1958   Weight: 52.3 kg (115 lb 4.8 oz)       Constitutional: alert, interactive but tired appearing, nontoxic  CV: Regular, no edema, neck veins are flat  Respiratory: fine crackles and mild wheezes in right upper lobe otherwise CTA bilaterally  GI: Soft, nontender  Skin: Warm and dry      The discharge plan was discussed with the patient and her daughter, and they expressed understanding.     Total time on this discharge was 40 minutes.    Saeid Sosa MD  [facility]

## 2018-02-08 NOTE — PHARMACY-ANTICOAGULATION SERVICE
Clinical Pharmacy- Warfarin Discharge Note  This patient is currently on warfarin for the treatment of Atrial fibrillation.  INR Goal= 1.7-2.3  Expected length of therapy lifetime.    Anticoagulation Dose History     Recent Dosing and Labs Latest Ref Rng & Units 12/26/2017 1/2/2018 1/16/2018 1/30/2018 2/6/2018 2/7/2018 2/8/2018    Warfarin 0.5 mg - - - - - - 0.5 mg -    Warfarin 1 mg - - - - - 1 mg - -    INR 0.86 - 1.14 - - - - 1.95(H) 2.12(H) 2.31(H)    INR 0.86 - 1.14 2.9(A) 2.4(A) 2.6(A) 1.9(A) - - -            Recommend discharging the patient on a warfarin regimen of 1.5 mg Monday and 1 mg all the other days of the week. The patient should have an INR checked Monday, Feb 12th.

## 2018-02-08 NOTE — PLAN OF CARE
"Problem: Patient Care Overview  Goal: Plan of Care/Patient Progress Review  Pt has been up Assist of 1 with walker. Per patient she felt that the Oxycodone 2.5 mg did better with pain control than the Tramadol 50 mg. Brace is on for comfort when up walking and in the chair.  Plan is to recheck NA in the morning. Is on room air, lungs are diminished in the bases. Pt has met her fluid restriction at this time- is aware. Needs TCU placement as daughter is unable to care for her at this time. Will continue to monitor. /69 (BP Location: Right arm)  Pulse 71  Temp 96.1  F (35.6  C) (Oral)  Resp 18  Ht 1.473 m (4' 10\")  Wt 52.3 kg (115 lb 4.8 oz)  LMP 06/15/1985  SpO2 93%  BMI 24.1 kg/m2  Kim Lentz RN BSN      "

## 2018-02-08 NOTE — PROGRESS NOTES
ANTICOAGULATION FOLLOW-UP CLINIC VISIT    Patient Name:  Ellie Leigh  Date:  2/8/2018  Contact Type:  hospital discharge/chart update    SUBJECTIVE:     Patient Findings     Positives Change in medications (PRN tramadol), Hospital admission (2/6 - 2/8), OTC meds (Tylenol 325mg Take 2 tablets (650 mg) by mouth 3 times daily)    Comments ''Warfarin Discharge Instructions: You were given 1 mg of warfarin today.   Continue your usual dose of 1.5 mg on Monday and 1 mg all the other days of the week.   Repeat INR on Monday at TCU.'    Hospital discharge summary dictation not complete at time of this chart update.            OBJECTIVE    INR   Date Value Ref Range Status   02/08/2018 2.31 (H) 0.86 - 1.14 Final       ASSESSMENT / PLAN  INR assessment THER actual goal range is 1.7 - 2.3   Recheck INR In: 4 DAYS    INR Location Clinic hospital lab draw     Anticoagulation Summary as of 2/8/2018     INR goal 1.5-2.0   Today's INR 2.31!   Maintenance plan 1.5 mg (1 mg x 1.5) on Mon; 1 mg (1 mg x 1) all other days   Full instructions 1.5 mg on Mon; 1 mg all other days   Weekly total 7.5 mg   Plan last modified Angelia Bo RN (1/16/2018)   Next INR check 2/12/2018   Priority INR   Target end date Indefinite    Indications   Atrial fibrillation with rapid ventricular response (H) (Resolved) [I48.91]  DVT (deep venous thrombosis) [I82.409]  Long term current use of anticoagulant therapy [Z79.01]         Anticoagulation Episode Summary     INR check location     Preferred lab     Send INR reminders to [facility]    Comments * Actual INR goal is 1.7-2.3  please follow Dec 2015 INR referral (June 2016 goal range is an error) [facility]      Anticoagulation Care Providers     Provider Role Specialty Phone number    Josefina Gómez MD [facility] [phone]            See the Encounter Report to view Anticoagulation Flowsheet and Dosing Calendar (Go to Encounters tab in chart  review, and find the Anticoagulation Therapy Visit)    Jessica Johnson RN

## 2018-02-08 NOTE — PLAN OF CARE
Problem: Patient Care Overview  Goal: Plan of Care/Patient Progress Review  Outcome: Adequate for Discharge Date Met: 02/08/18  [facility] DISCHARGE NOTE    Patient discharged to transitional care unit at 3:40 PM via wheel chair. Accompanied by daughter and staff. Discharge instructions reviewed with caregiver, opportunity offered to ask questions. Prescriptions sent with patient to fill . All belongings sent with patient.    Halima Andersen

## 2018-02-08 NOTE — MR AVS SNAPSHOT
Ellie Leigh   2/8/2018   Anticoagulation Therapy Visit    Description:  87 year old female   Provider:  Jessica Johnson, RN   Department:  [facility]           INR as of 2/8/2018     Today's INR 2.31!      Anticoagulation Summary as of 2/8/2018     INR goal 1.5-2.0   Today's INR 2.31!   Full instructions 1.5 mg on Mon; 1 mg all other days   Next INR check 2/12/2018    Indications   Atrial fibrillation with rapid ventricular response (H) (Resolved) [I48.91]  DVT (deep venous thrombosis) [I82.409]  Long term current use of anticoagulant therapy [Z79.01]         Description     'Recommend discharging the patient on a warfarin regimen of 1.5 mg Monday and 1 mg all the other days of the week. The patient should have an INR checked Monday, Feb 12th.  Electronically signed by Steffanie Noel RPH at 2/8/2018  3:33 PM'          Your next Anticoagulation Clinic appointment(s)     Feb 27, 2018  2:00 PM CST   Anticoagulation Visit with [facility]   [facility]    [address] [place] [address]   [phone]              February 2018 Details    Sun Mon Tue Wed Thu Fri Sat         1               2               3                 4               5               6               7               8      1 mg   See details      9      1 mg         10      1 mg           11      1 mg         12            13               14               15               16               17                 18               19               20               21               22               23               24                 25               26               27               28                   Date Details   02/08 This INR check       Date of next INR:  2/12/2018         How to take your warfarin dose     To take:  1 mg Take 1 of the 1 mg tablets.    To take:  1.5 mg Take 1.5 of the 1 mg tablets.

## 2018-02-09 ENCOUNTER — HOSPITAL ENCOUNTER (OUTPATIENT)
Dept: CT IMAGING | Facility: CLINIC | Age: 83
Discharge: HOME | End: 2018-02-09
Attending: NURSE PRACTITIONER | Admitting: NURSE PRACTITIONER
Payer: COMMERCIAL

## 2018-02-09 ENCOUNTER — ANTICOAGULATION THERAPY VISIT (OUTPATIENT)
Dept: ANTICOAGULATION | Facility: CLINIC | Age: 83
End: 2018-02-09
Payer: COMMERCIAL

## 2018-02-09 ENCOUNTER — DOCUMENTATION ONLY (OUTPATIENT)
Dept: GERIATRICS | Facility: CLINIC | Age: 83
End: 2018-02-09
Payer: COMMERCIAL

## 2018-02-09 ENCOUNTER — NURSING HOME VISIT (OUTPATIENT)
Dept: GERIATRICS | Facility: CLINIC | Age: 83
End: 2018-02-09
Payer: COMMERCIAL

## 2018-02-09 ENCOUNTER — TRANSFERRED RECORDS (OUTPATIENT)
Dept: HEALTH INFORMATION MANAGEMENT | Facility: CLINIC | Age: 83
End: 2018-02-09

## 2018-02-09 ENCOUNTER — RECORDS - HEALTHEAST (OUTPATIENT)
Dept: LAB | Facility: CLINIC | Age: 83
End: 2018-02-09

## 2018-02-09 VITALS
HEART RATE: 68 BPM | OXYGEN SATURATION: 91 % | SYSTOLIC BLOOD PRESSURE: 159 MMHG | DIASTOLIC BLOOD PRESSURE: 81 MMHG | RESPIRATION RATE: 16 BRPM | TEMPERATURE: 98.3 F

## 2018-02-09 DIAGNOSIS — I10 BENIGN ESSENTIAL HTN: ICD-10-CM

## 2018-02-09 DIAGNOSIS — M54.59 INTRACTABLE LOW BACK PAIN: Primary | ICD-10-CM

## 2018-02-09 DIAGNOSIS — Z86.11 H/O TB (TUBERCULOSIS): ICD-10-CM

## 2018-02-09 DIAGNOSIS — J44.9 CHRONIC OBSTRUCTIVE PULMONARY DISEASE, UNSPECIFIED COPD TYPE (H): ICD-10-CM

## 2018-02-09 DIAGNOSIS — K59.09 CHRONIC CONSTIPATION: ICD-10-CM

## 2018-02-09 DIAGNOSIS — E03.9 HYPOTHYROIDISM, UNSPECIFIED TYPE: ICD-10-CM

## 2018-02-09 DIAGNOSIS — E87.1 HYPONATREMIA: ICD-10-CM

## 2018-02-09 DIAGNOSIS — I48.0 INTERMITTENT ATRIAL FIBRILLATION (H): ICD-10-CM

## 2018-02-09 DIAGNOSIS — Z79.01 LONG TERM CURRENT USE OF ANTICOAGULANT THERAPY: ICD-10-CM

## 2018-02-09 DIAGNOSIS — Z86.718 H/O DEEP VENOUS THROMBOSIS: ICD-10-CM

## 2018-02-09 DIAGNOSIS — K21.9 GASTROESOPHAGEAL REFLUX DISEASE, ESOPHAGITIS PRESENCE NOT SPECIFIED: ICD-10-CM

## 2018-02-09 DIAGNOSIS — E22.2 SIADH (SYNDROME OF INAPPROPRIATE ADH PRODUCTION) (H): ICD-10-CM

## 2018-02-09 DIAGNOSIS — I82.409 DVT (DEEP VENOUS THROMBOSIS) (H): ICD-10-CM

## 2018-02-09 DIAGNOSIS — Z86.11 H/O TB (TUBERCULOSIS): Primary | ICD-10-CM

## 2018-02-09 DIAGNOSIS — M48.56XD NON-TRAUMATIC COMPRESSION FRACTURE OF L2 LUMBAR VERTEBRA WITH ROUTINE HEALING, SUBSEQUENT ENCOUNTER: ICD-10-CM

## 2018-02-09 LAB
INR PPP: 2.44
INR PPP: 2.44 (ref 0.9–1.1)

## 2018-02-09 PROCEDURE — 99207 ZZC NO CHARGE NURSE ONLY: CPT

## 2018-02-09 PROCEDURE — 99358 PROLONG SERVICE W/O CONTACT: CPT | Performed by: NURSE PRACTITIONER

## 2018-02-09 PROCEDURE — 71250 CT THORAX DX C-: CPT

## 2018-02-09 PROCEDURE — 99310 SBSQ NF CARE HIGH MDM 45: CPT | Performed by: NURSE PRACTITIONER

## 2018-02-09 RX ORDER — IOPAMIDOL 755 MG/ML
80 INJECTION, SOLUTION INTRAVASCULAR ONCE
Status: DISCONTINUED | OUTPATIENT
Start: 2018-02-09 | End: 2018-02-09 | Stop reason: CLARIF

## 2018-02-09 RX ORDER — BISACODYL 10 MG
10 SUPPOSITORY, RECTAL RECTAL DAILY PRN
COMMUNITY
End: 2018-03-14

## 2018-02-09 RX ORDER — POLYETHYLENE GLYCOL 3350 17 G/17G
17 POWDER, FOR SOLUTION ORAL DAILY
COMMUNITY

## 2018-02-09 NOTE — PROGRESS NOTES
[facility]  PRIMARY CARE PROVIDER AND CLINIC:  Josefina Gómez [address] [place] [id number]  Chief Complaint   Patient presents with     Hospital F/U       HPI:    Ellie Leigh is a 87 year old  (9/12/1930),admitted to the [facility] from [facility].  Hospital stay 2/6/18 through 2/8/28.  Admitted to this facility for  rehab, medical management and nursing care.  HPI information obtained from: facility chart records, facility staff, patient report and [facility] chart review.  Current issues are:        Thalia Leigh is an 87 year old  female with a significant past medical history of SAIDH, atrial fibrillation hypertension, prior DVT on coumadin, hypothyroidism, TB infection with completion of treatment in the early 2000's and back pain who presented with worsened back pain. She was unsure of any specific moment of injury. CT scan done and showed Inferior endplate central compression deformities at L2 and L3. This was discussed with [facility] Ortho spine. She was treated with semi-rigid brace and oral pain medication. Per ortho would consider outpatient vertebroplasty but only if not improving with conservative measures.  She does have chronic hyponatremia which remained low but stable during hospital stay. She was placed on 1L fluid restriction, but did not have any improvement. Did appear dry so was increased to 1.5L FWR and recommended increased sodium intake. She did not feel able to care for herself at home so she was discharge to TCU for further rehab and medical management.     Intractable low back pain  Non-traumatic compression fracture of L2 lumbar vertebra with routine healing, subsequent encounter  Currently on PRN tramadol and tylenol 650mg TID for pain. Reports mild to no pain when at rest, but continues to have significant pain with activity. Does feel that semi-rigid brace has been helpful. Denies any issues with  bowel or bladder, no numbness or tingling, but pain does shoot down into left leg. 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 Attempts not to drink a lot of water at home, but is not sure how compliant she is. She is on 1.5L FWR in TCU, regular diet with no sodium restrictions. Has right upper lobe chronic pulmonary process which could be cause of SIADH.    H/O TB (tuberculosis)  From chart review appears patient had history of TB in the early 2000's which was adequately treated.She is asymptomatic - no cough, weight loss, SOB, night sweats, fevers, or chills. Had negative AFB noted in 2014. Had CXR while IP which noted scar tissue and chronic RUL lobectomy. As  TB history TCU did not due mantoux test, but instead did CXR which showed possible RUL scar tissue, but could not r/o TB and recommended f/u CT which staff if recommending.   - CT Chest w/o Contrast; Future    Benign essential HTN  Intermittent atrial fibrillation (H)  She is currently on metoprolol XL 25mg BID. She is anticoagulated on coumadin. Denies any chest pain, lightheadedness, dizziness, SOB, or palpitations.      Chronic obstructive pulmonary disease, unspecified COPD type (H)  She is currently on PTA Symbicort, denies any SOB or cough.      Chronic constipation  Reports no BM x 4 days. She had recently been told to take 1 1/2 caps of miralax daily at home, which was helping. She is currently on miralax 1 cap daily PRN. Denies any nausea, vomiting, bloating, or indigestion.     H/O deep venous thrombosis  Currently on coumadin, INR today is pending. Denies any melena, hematuria or new bruising.     Gastroesophageal reflux disease, esophagitis presence not specified  On PTA zantac. Denies any nausea or indigestion.     Hypothyroidism, unspecified type  On PTA levothyroxine. Most recent TSH on 1/8/2018 was 2.54.      CODE STATUS/ADVANCE  DIRECTIVES DISCUSSION:   DNR/DNI   Patient's living condition: lives with family, Daughter     ALLERGIES:Cephalosporins; Doxycycline; Sulfa drugs; and Penicillins  PAST MEDICAL HISTORY:  has a past medical history of Breast cancer (H); COPD (chronic obstructive pulmonary disease) (H); Dust allergy; DVT (deep vein thrombosis) in pregnancy (H); HTN (hypertension); Hyponatremia; Hypothyroid; Intermittent atrial fibrillation (H) (12/1/2011); Loose body in joint (4/15/2011); Neck fracture (H); and Syncope (5/12/2013). She also has no past medical history of Chronic kidney disease; Diabetes (H); Obese; or Sleep apnea.  PAST SURGICAL HISTORY:  has a past surgical history that includes surgical history of -  (05/22/2001); mastectomy, simple rt/lt/lexi; appendectomy; Cholecystectomy; Inject epidural lumbar (3/23/2011); Arthroscopy knee (4/15/2011); other surgical history; tonsillectomy; joint replacement, hip rt/lt; Implant pacemaker (5/21/2013); and Esophagoscopy, gastroscopy, duodenoscopy (EGD), combined (6/9/2014).  FAMILY HISTORY: family history includes Asthma in her brother; Breast Cancer in her daughter; CANCER in her brother and daughter; CEREBROVASCULAR DISEASE in her brother, father, and mother; HEART DISEASE in her brother, father, and mother; Neurologic Disorder in her brother; Respiratory in her maternal grandfather.  SOCIAL HISTORY:  reports that she has never smoked. She has never used smokeless tobacco. She reports that she does not drink alcohol or use illicit drugs.    Post Discharge Medication Reconciliation Status: discharge medications reconciled and changed, per note/orders (see AVS).  Current Outpatient Prescriptions   Medication Sig Dispense Refill     polyethylene glycol (MIRALAX/GLYCOLAX) Packet Take 1.5 packets by mouth daily       bisacodyl (DULCOLAX) 10 MG Suppository Place 10 mg rectally daily as needed for constipation       Ondansetron (ZOFRAN ODT PO) Take 4 mg by mouth every 8 hours as needed  for nausea       Acetaminophen (TYLENOL PO) Take 975 mg by mouth 3 times daily       traMADol (ULTRAM) 50 MG tablet Take 0.5-1 tablets (25-50 mg) by mouth every 6 hours as needed for pain (25 mg for pain rating 3-6, 50 mg for pain rating 7-10) 40 tablet 0     ipratropium - albuterol 0.5 mg/2.5 mg/3 mL (DUONEB) 0.5-2.5 (3) MG/3ML neb solution TAKE 1 VIAL BY NEBULIZATION  EVERY SIX HOURS AS NEEDED FOR SHORTNESS OF BREATH/ DYSPNEA OR WHEEZING 180 mL 9     warfarin (COUMADIN) 1 MG tablet Take 1.5 mg Mon, 1 mg rest of the days or as directed by Anticoagulation Clinic. 60 tablet 0     diclofenac (VOLTAREN) 1 % GEL topical gel APPLY 4 GRAMS TO KNEES OR 2 GRAMS TO HANDS 4 TIMES DAILY USING ENCLOSED DOSING CARD 100 g 11     ranitidine (ZANTAC) 150 MG tablet Take 1 tablet (150 mg) by mouth 2 times daily 60 tablet 11     albuterol (PROAIR HFA/PROVENTIL HFA/VENTOLIN HFA) 108 (90 BASE) MCG/ACT Inhaler Inhale 2 puffs into the lungs every 6 hours as needed for shortness of breath / dyspnea 3 Inhaler 1     metoprolol (TOPROL-XL) 25 MG 24 hr tablet TAKE TWO TABLETS BY MOUTH TWICE DAILY  120 tablet 3     levothyroxine (SYNTHROID/LEVOTHROID) 50 MCG tablet TAKE 1 TABLET (50 MCG) BY MOUTH DAILY 90 tablet 2     SYMBICORT 160-4.5 MCG/ACT Inhaler INHALE 2 PUFFS INTO THE LUNGS 2 TIMES DAILY 10.2 g 5     probiotic CAPS Take 1 capsule by mouth every evening        CRANBERRY Take 475 mg by mouth 2 times daily.         ROS:  10 point ROS of systems including Constitutional, Eyes, Respiratory, Cardiovascular, Gastroenterology, Genitourinary, Integumentary, Muscularskeletal, Psychiatric were all negative except for pertinent positives noted in my HPI.    Exam:  /81  Pulse 68  Temp 98.3  F (36.8  C)  Resp 16  LMP 06/15/1985  SpO2 91%  GENERAL APPEARANCE:  Alert, in no distress, oriented, cooperative  RESP:  respiratory effort and palpation of chest normal, lungs clear to auscultation , no respiratory distress, diminished breath sounds  bilat bases  CV:  Palpation and auscultation of heart done , regular rate and rhythm, no murmur, rub, or gallop, +2 pedal pulses  ABDOMEN:  normal bowel sounds, soft, nontender, no hepatosplenomegaly or other masses, no guarding or rebound  M/S:   Gait and station abnormal decreased ROM to spine, mild left hip tenderness, mild tenderness to lumbar spine,   SKIN:  Inspection of skin and subcutaneous tissue baseline, Palpation of skin and subcutaneous tissue baseline, dry fragile skin BLE  NEURO:   Cranial nerves 2-12 are normal tested and grossly at patient's baseline, Examination of sensation by touch normal  PSYCH:  oriented X 3, normal insight, judgement and memory, affect and mood normal    Lab/Diagnostic data:     CBC RESULTS:   Recent Labs   Lab Test  02/07/18   0720  02/06/18   1310   WBC  5.9  5.8   RBC  3.87  4.00   HGB  10.8*  11.1*   HCT  31.3*  32.2*   MCV  81  81   MCH  27.9  27.8   MCHC  34.5  34.5   RDW  13.4  13.8   PLT  335  305       Last Basic Metabolic Panel:  Recent Labs   Lab Test  02/08/18   0720  02/07/18   0720   NA  123*  123*   POTASSIUM  4.0  3.6   CHLORIDE  90*  89*   DOUG  8.0*  7.9*   CO2  27  26   BUN  15  12   CR  0.35*  0.34*   GLC  98  96       Liver Function Studies -   Recent Labs   Lab Test  02/06/18   1310  01/06/18   0930   PROTTOTAL  6.8  6.6*   ALBUMIN  3.3*  3.1*   BILITOTAL  0.4  0.6   ALKPHOS  97  82   AST  36  33   ALT  22  15       TSH   Date Value Ref Range Status   01/08/2018 2.54 0.40 - 4.00 mU/L Final   05/31/2017 1.91 0.40 - 4.00 mU/L Final   ]    Lab Results   Component Value Date    A1C 6.1 07/28/2014       ASSESSMENT/PLAN:  (M54.5) Intractable low back pain  (primary encounter diagnosis)  (M48.56XD) Non-traumatic compression fracture of L2 lumbar vertebra with routine healing, subsequent encounter  Comment: Variable pain control, no red flag symptoms,   Plan: increased scheduled tylenol to 975mg, continue PRN tramadol, PT/OT, semi-rigid brace when OOB, f/u with  "ortho spine in 1 week.     (E87.1) Hyponatremia  (E22.2) 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)  Comment: Chronic, stable, asymptomatic, but has been trending down over the past month. She has been on fluid restriction.   Plan: Continue 1.5L FWR, regular diet. BMP on 2/12    (Z86.11) H/O TB (tuberculosis)  Comment: Asymptomatic, no s/s of active disease - no cough, no bloody sputum, no fevers, weight loss, or night sweats. Chronic scarring has been noted on previous chest x-rays, no concerns of TB noted during recent hospital stay. However CXR in TCU was inconclusive. Per facility policy, need documentation of no active TB. Recommended TB gold test. Staff stated that due to 5 day weight results, this would not be adequate as it might expose other patients. Discussed with DON and medical director, who felt CT appropriate (see non face to face documentation).   Plan: CT Chest w/o Contrast - ordered per recommendation of medical director of facility.   ADDN: CT report states \"There are a few no findings in the right lung which can relate to  active inflammation and/or additional developing scar. Some of the  previously seen cystic areas in the right lung are slightly larger and  more thick walled which can relate to an active inflammatory component  as well. Follow-up chest CT in 3-6 months is suggested in  Reassessment.\" Staff updated medical director of results, will defer further testing/work up to him.    (I10) Benign essential HTN  Comment: controlled, but data in TCU limited  Plan: continue metoprolol, monitor and adjust PRN.     (I48.0) Intermittent atrial fibrillation (H)  Comment: rate controlled, but data in TCU limited, INR therapeutic.   Plan: continue medications as above, coumadin per [facility], monitor and adjust PRN    (J44.9) Chronic obstructive pulmonary disease, unspecified COPD type (H)  Comment: chronic, stable  Plan: Continue medications as above, monitor and adjust PRN.    (K59.09) " Chronic constipation  Comment: symptomatic, but not currently at home dose  Plan: increase miralax to 1 1/2 capfuls daily, continue to monitor and adjust PRN    (Z86.718) H/O deep venous thrombosis  Comment: on coumadin. No s/s of bleeding, No s/s of DVT currently.  INR therapeutic  Plan: Coumadin per [facility], monitor and adjust pRN    (K21.9) Gastroesophageal reflux disease, esophagitis presence not specified  Comment: chronic, stable,  Plan: Continue medications as above, monitor and adjust PRN.    (E03.9) Hypothyroidism, unspecified type  Comment: stable  Plan: Continue medications as above, monitor and adjust PRN.    Orders:  1. DNR/DNI  2. Change miralax to 1 1/2 capfuls (25 gm) po qd constipation. Hold for loose stools  3. BMP and CBC on 2/12 Dx hyponatremia and anemia  4. Zofran ODT 4 mg po q 8 hr prn nausea  5. Bisacodyl supp 10 mg pr QD prn constipation  6. Increase tylenol to 975 mg po TID pain.     Total time spent with patient visit at the skilled nursing facility was 35 min including patient visit and review of past records. Greater than 50% of total time spent with counseling and coordinating care due to discussion of history, plan of care, med rec,     Electronically signed by:  LACI Toribio CNP

## 2018-02-09 NOTE — LETTER
2/9/2018        RE: Ellie Leigh  [address]  [place] [address]        [facility]  PRIMARY CARE PROVIDER AND CLINIC:  Josefina Gómez [address] [place] [address] [place] [address]  Chief Complaint   Patient presents with     Hospital F/U       HPI:    Ellie Leigh is a 87 year old  (9/12/1930),admitted to the [facility] from [facility].  Hospital stay 2/6/18 through 2/8/28.  Admitted to this facility for  rehab, medical management and nursing care.  HPI information obtained from: facility chart records, facility staff, patient report and [facility] chart review.  Current issues are:        Thalia Leigh is an 87 year old  female with a significant past medical history of SAIDH, atrial fibrillation hypertension, prior DVT on coumadin, hypothyroidism, TB infection with completion of treatment in the early 2000's and back pain who presented with worsened back pain. She was unsure of any specific moment of injury. CT scan done and showed Inferior endplate central compression deformities at L2 and L3. This was discussed with [facility] Ortho spine. She was treated with semi-rigid brace and oral pain medication. Per ortho would consider outpatient vertebroplasty but only if not improving with conservative measures.  She does have chronic hyponatremia which remained low but stable during hospital stay. She was placed on 1L fluid restriction, but did not have any improvement. Did appear dry so was increased to 1.5L FWR and recommended increased sodium intake. She did not feel able to care for herself at home so she was discharge to TCU for further rehab and medical management.     Intractable low back pain  Non-traumatic compression fracture of L2 lumbar vertebra with routine healing, subsequent encounter  Currently on PRN tramadol and tylenol 650mg TID for pain. Reports mild to no pain when at rest, but continues to have significant  pain with activity. Does feel that semi-rigid brace has been helpful. Denies any issues with bowel or bladder, no numbness or tingling, but pain does shoot down into left leg. 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 Attempts not to drink a lot of water at home, but is not sure how compliant she is. She is on 1.5L FWR in TCU, regular diet with no sodium restrictions. Has right upper lobe chronic pulmonary process which could be cause of SIADH.    H/O TB (tuberculosis)  From chart review appears patient had history of TB in the early 2000's which was adequately treated.She is asymptomatic - no cough, weight loss, SOB, night sweats, fevers, or chills. Had negative AFB noted in 2014. Had CXR while IP which noted scar tissue and chronic RUL lobectomy. As  TB history TCU did not due mantoux test, but instead did CXR which showed possible RUL scar tissue, but could not r/o TB and recommended f/u CT which staff if recommending.   - CT Chest w/o Contrast; Future    Benign essential HTN  Intermittent atrial fibrillation (H)  She is currently on metoprolol XL 25mg BID. She is anticoagulated on coumadin. Denies any chest pain, lightheadedness, dizziness, SOB, or palpitations.      Chronic obstructive pulmonary disease, unspecified COPD type (H)  She is currently on PTA Symbicort, denies any SOB or cough.      Chronic constipation  Reports no BM x 4 days. She had recently been told to take 1 1/2 caps of miralax daily at home, which was helping. She is currently on miralax 1 cap daily PRN. Denies any nausea, vomiting, bloating, or indigestion.     H/O deep venous thrombosis  Currently on coumadin, INR today is pending. Denies any melena, hematuria or new bruising.     Gastroesophageal reflux disease, esophagitis presence not specified  On PTA zantac. Denies any nausea or indigestion.     Hypothyroidism, unspecified  type  On PTA levothyroxine. Most recent TSH on 1/8/2018 was 2.54.      CODE STATUS/ADVANCE DIRECTIVES DISCUSSION:   DNR/DNI   Patient's living condition: lives with family, Daughter     ALLERGIES:Cephalosporins; Doxycycline; Sulfa drugs; and Penicillins  PAST MEDICAL HISTORY:  has a past medical history of Breast cancer (H); COPD (chronic obstructive pulmonary disease) (H); Dust allergy; DVT (deep vein thrombosis) in pregnancy (H); HTN (hypertension); Hyponatremia; Hypothyroid; Intermittent atrial fibrillation (H) (12/1/2011); Loose body in joint (4/15/2011); Neck fracture (H); and Syncope (5/12/2013). She also has no past medical history of Chronic kidney disease; Diabetes (H); Obese; or Sleep apnea.  PAST SURGICAL HISTORY:  has a past surgical history that includes surgical history of -  (05/22/2001); mastectomy, simple rt/lt/lexi; appendectomy; Cholecystectomy; Inject epidural lumbar (3/23/2011); Arthroscopy knee (4/15/2011); other surgical history; tonsillectomy; joint replacement, hip rt/lt; Implant pacemaker (5/21/2013); and Esophagoscopy, gastroscopy, duodenoscopy (EGD), combined (6/9/2014).  FAMILY HISTORY: family history includes Asthma in her brother; Breast Cancer in her daughter; CANCER in her brother and daughter; CEREBROVASCULAR DISEASE in her brother, father, and mother; HEART DISEASE in her brother, father, and mother; Neurologic Disorder in her brother; Respiratory in her maternal grandfather.  SOCIAL HISTORY:  reports that she has never smoked. She has never used smokeless tobacco. She reports that she does not drink alcohol or use illicit drugs.    Post Discharge Medication Reconciliation Status: discharge medications reconciled and changed, per note/orders (see AVS).  Current Outpatient Prescriptions   Medication Sig Dispense Refill     polyethylene glycol (MIRALAX/GLYCOLAX) Packet Take 1.5 packets by mouth daily       bisacodyl (DULCOLAX) 10 MG Suppository Place 10 mg rectally daily as needed for  constipation       Ondansetron (ZOFRAN ODT PO) Take 4 mg by mouth every 8 hours as needed for nausea       Acetaminophen (TYLENOL PO) Take 975 mg by mouth 3 times daily       traMADol (ULTRAM) 50 MG tablet Take 0.5-1 tablets (25-50 mg) by mouth every 6 hours as needed for pain (25 mg for pain rating 3-6, 50 mg for pain rating 7-10) 40 tablet 0     ipratropium - albuterol 0.5 mg/2.5 mg/3 mL (DUONEB) 0.5-2.5 (3) MG/3ML neb solution TAKE 1 VIAL BY NEBULIZATION  EVERY SIX HOURS AS NEEDED FOR SHORTNESS OF BREATH/ DYSPNEA OR WHEEZING 180 mL 9     warfarin (COUMADIN) 1 MG tablet Take 1.5 mg Mon, 1 mg rest of the days or as directed by Anticoagulation Clinic. 60 tablet 0     diclofenac (VOLTAREN) 1 % GEL topical gel APPLY 4 GRAMS TO KNEES OR 2 GRAMS TO HANDS 4 TIMES DAILY USING ENCLOSED DOSING CARD 100 g 11     ranitidine (ZANTAC) 150 MG tablet Take 1 tablet (150 mg) by mouth 2 times daily 60 tablet 11     albuterol (PROAIR HFA/PROVENTIL HFA/VENTOLIN HFA) 108 (90 BASE) MCG/ACT Inhaler Inhale 2 puffs into the lungs every 6 hours as needed for shortness of breath / dyspnea 3 Inhaler 1     metoprolol (TOPROL-XL) 25 MG 24 hr tablet TAKE TWO TABLETS BY MOUTH TWICE DAILY  120 tablet 3     levothyroxine (SYNTHROID/LEVOTHROID) 50 MCG tablet TAKE 1 TABLET (50 MCG) BY MOUTH DAILY 90 tablet 2     SYMBICORT 160-4.5 MCG/ACT Inhaler INHALE 2 PUFFS INTO THE LUNGS 2 TIMES DAILY 10.2 g 5     probiotic CAPS Take 1 capsule by mouth every evening        CRANBERRY Take 475 mg by mouth 2 times daily.         ROS:  10 point ROS of systems including Constitutional, Eyes, Respiratory, Cardiovascular, Gastroenterology, Genitourinary, Integumentary, Muscularskeletal, Psychiatric were all negative except for pertinent positives noted in my HPI.    Exam:  /81  Pulse 68  Temp 98.3  F (36.8  C)  Resp 16  LMP 06/15/1985  SpO2 91%  GENERAL APPEARANCE:  Alert, in no distress, oriented, cooperative  RESP:  respiratory effort and palpation of chest  normal, lungs clear to auscultation , no respiratory distress, diminished breath sounds bilat bases  CV:  Palpation and auscultation of heart done , regular rate and rhythm, no murmur, rub, or gallop, +2 pedal pulses  ABDOMEN:  normal bowel sounds, soft, nontender, no hepatosplenomegaly or other masses, no guarding or rebound  M/S:   Gait and station abnormal decreased ROM to spine, mild left hip tenderness, mild tenderness to lumbar spine,   SKIN:  Inspection of skin and subcutaneous tissue baseline, Palpation of skin and subcutaneous tissue baseline, dry fragile skin BLE  NEURO:   Cranial nerves 2-12 are normal tested and grossly at patient's baseline, Examination of sensation by touch normal  PSYCH:  oriented X 3, normal insight, judgement and memory, affect and mood normal    Lab/Diagnostic data:     CBC RESULTS:   Recent Labs   Lab Test  02/07/18   0720  02/06/18   1310   WBC  5.9  5.8   RBC  3.87  4.00   HGB  10.8*  11.1*   HCT  31.3*  32.2*   MCV  81  81   MCH  27.9  27.8   MCHC  34.5  34.5   RDW  13.4  13.8   PLT  335  305       Last Basic Metabolic Panel:  Recent Labs   Lab Test  02/08/18   0720  02/07/18   0720   NA  123*  123*   POTASSIUM  4.0  3.6   CHLORIDE  90*  89*   DOUG  8.0*  7.9*   CO2  27  26   BUN  15  12   CR  0.35*  0.34*   GLC  98  96       Liver Function Studies -   Recent Labs   Lab Test  02/06/18   1310  01/06/18   0930   PROTTOTAL  6.8  6.6*   ALBUMIN  3.3*  3.1*   BILITOTAL  0.4  0.6   ALKPHOS  97  82   AST  36  33   ALT  22  15       TSH   Date Value Ref Range Status   01/08/2018 2.54 0.40 - 4.00 mU/L Final   05/31/2017 1.91 0.40 - 4.00 mU/L Final   ]    Lab Results   Component Value Date    A1C 6.1 07/28/2014       ASSESSMENT/PLAN:  (M54.5) Intractable low back pain  (primary encounter diagnosis)  (M48.56XD) Non-traumatic compression fracture of L2 lumbar vertebra with routine healing, subsequent encounter  Comment: Variable pain control, no red flag symptoms,   Plan: increased  "scheduled tylenol to 975mg, continue PRN tramadol, PT/OT, semi-rigid brace when OOB, f/u with ortho spine in 1 week.     (E87.1) Hyponatremia  (E22.2) 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)  Comment: Chronic, stable, asymptomatic, but has been trending down over the past month. She has been on fluid restriction.   Plan: Continue 1.5L FWR, regular diet. BMP on 2/12    (Z86.11) H/O TB (tuberculosis)  Comment: Asymptomatic, no s/s of active disease - no cough, no bloody sputum, no fevers, weight loss, or night sweats. Chronic scarring has been noted on previous chest x-rays, no concerns of TB noted during recent hospital stay. However CXR in TCU was inconclusive. Per facility policy, need documentation of no active TB. Recommended TB gold test. Staff stated that due to 5 day weight results, this would not be adequate as it might expose other patients. Discussed with DON and medical director, who felt CT appropriate (see non face to face documentation).   Plan: CT Chest w/o Contrast - ordered per recommendation of medical director of facility.   ADDN: CT report states \"There are a few no findings in the right lung which can relate to  active inflammation and/or additional developing scar. Some of the  previously seen cystic areas in the right lung are slightly larger and  more thick walled which can relate to an active inflammatory component  as well. Follow-up chest CT in 3-6 months is suggested in  Reassessment.\" Staff updated medical director of results, will defer further testing/work up to him.    (I10) Benign essential HTN  Comment: controlled, but data in TCU limited  Plan: continue metoprolol, monitor and adjust PRN.     (I48.0) Intermittent atrial fibrillation (H)  Comment: rate controlled, but data in TCU limited, INR therapeutic.   Plan: continue medications as above, coumadin per [facility] clinic, monitor and adjust PRN    (J44.9) Chronic obstructive pulmonary disease, unspecified COPD type " (H)  Comment: chronic, stable  Plan: Continue medications as above, monitor and adjust PRN.    (K59.09) Chronic constipation  Comment: symptomatic, but not currently at home dose  Plan: increase miralax to 1 1/2 capfuls daily, continue to monitor and adjust PRN    (Z86.718) H/O deep venous thrombosis  Comment: on coumadin. No s/s of bleeding, No s/s of DVT currently.  INR therapeutic  Plan: Coumadin per [facility] clinc, monitor and adjust pRN    (K21.9) Gastroesophageal reflux disease, esophagitis presence not specified  Comment: chronic, stable,  Plan: Continue medications as above, monitor and adjust PRN.    (E03.9) Hypothyroidism, unspecified type  Comment: stable  Plan: Continue medications as above, monitor and adjust PRN.    Orders:  1. DNR/DNI  2. Change miralax to 1 1/2 capfuls (25 gm) po qd constipation. Hold for loose stools  3. BMP and CBC on 2/12 Dx hyponatremia and anemia  4. Zofran ODT 4 mg po q 8 hr prn nausea  5. Bisacodyl supp 10 mg pr QD prn constipation  6. Increase tylenol to 975 mg po TID pain.     Total time spent with patient visit at the skilled nursing facility was 35 min including patient visit and review of past records. Greater than 50% of total time spent with counseling and coordinating care due to discussion of history, plan of care, med rec,     Electronically signed by:  LACI Toribio CNP                    Sincerely,        LACI Toribio CNP

## 2018-02-09 NOTE — MR AVS SNAPSHOT
Ellie CARVER Lu   2/9/2018   Anticoagulation Therapy Visit    Description:  87 year old female   Provider:  Angelia Bo, RN   Department:  [place]           INR as of 2/9/2018     Today's INR 2.44!      Anticoagulation Summary as of 2/9/2018     INR goal 1.5-2.0   Today's INR 2.44!   Full instructions 2/9: 0.5 mg; Otherwise 1.5 mg on Mon; 1 mg all other days   Next INR check 2/12/2018    Indications   Atrial fibrillation with rapid ventricular response (H) (Resolved) [I48.91]  DVT (deep venous thrombosis) [I82.409]  Long term current use of anticoagulant therapy [Z79.01]         Your next Anticoagulation Clinic appointment(s)     Feb 27, 2018  2:00 PM CST   Anticoagulation Visit with [place] [facility]   [facility]    [address] [facility] [address]   [phone]              February 2018 Details    Sun Mon Tue Wed Thu Fri Sat         1               2               3                 4               5               6               7               8               9      0.5 mg   See details      10      1 mg           11      1 mg         12            13               14               15               16               17                 18               19               20               21               22               23               24                 25               26               27               28                   Date Details   02/09 This INR check       Date of next INR:  2/12/2018         How to take your warfarin dose     To take:  0.5 mg Take 0.5 of a 1 mg tablet.    To take:  1 mg Take 1 of the 1 mg tablets.    To take:  1.5 mg Take 1.5 of the 1 mg tablets.

## 2018-02-09 NOTE — PROGRESS NOTES
ANTICOAGULATION FOLLOW-UP CLINIC VISIT    Patient Name:  Ellie Leigh  Date:  2/9/2018  Contact Type:  [facility]    SUBJECTIVE:     Patient Findings     Positives Hospital admission (Admitted from ED with back pain 2/6/28, d/c'd to TCU 2/8), Inflammation (back pain)    Comments AYLIN Hensley from [facility] TCU reports pt just admitted to their facility yesterday, denies changes since d/c from hospital. Patient had 7mg in the previous 7 days, will decrease dose to 6.5 mg by next INR check in 3 days (~7% decrease).             OBJECTIVE    INR   Date Value Ref Range Status   02/09/2018 2.44  Final       ASSESSMENT / PLAN  No question data found.  Anticoagulation Summary as of 2/9/2018     INR goal 1.5-2.0   Today's INR 2.44!   Maintenance plan 1.5 mg (1 mg x 1.5) on Mon; 1 mg (1 mg x 1) all other days   Full instructions 2/9: 0.5 mg; Otherwise 1.5 mg on Mon; 1 mg all other days   Weekly total 7.5 mg   Plan last modified Angelia Bo RN (1/16/2018)   Next INR check 2/12/2018   Priority INR   Target end date Indefinite    Indications   Atrial fibrillation with rapid ventricular response (H) (Resolved) [I48.91]  DVT (deep venous thrombosis) [I82.409]  Long term current use of anticoagulant therapy [Z79.01]         Anticoagulation Episode Summary     INR check location     Preferred lab     Send INR reminders to [place] PHONE [facility] POOL    Comments * Actual INR goal is 1.7-2.3  please follow Dec 2015 INR referral (June 2016 goal range is an error) [facility]CU      Anticoagulation Care Providers     Provider Role Specialty Phone number    Josefina Gómez MD [facility] [phone]            See the Encounter Report to view Anticoagulation Flowsheet and Dosing Calendar (Go to Encounters tab in chart review, and find the Anticoagulation Therapy Visit)        Angelia Bo RN

## 2018-02-09 NOTE — LETTER
2/9/2018        RE: Ellie Leigh  [address]  [facility] [address]        No notes on file      Sincerely,        LACI Toribio CNP

## 2018-02-09 NOTE — PROGRESS NOTES
Clinic Care Coordination Contact  Care Coordination Transition Communication    Referral Source: CTS    Clinical Data: Patient was hospitalized at [facility] from 2/6/18 to 2/8/18 with diagnosis of lumbar compression fracture.     [facility] to Facility:              [facility]              Contact name and phone number/fax: [phone]    Plan: RN/SW Care Coordinator will await notification from facility staff informing RN/SW Care Coordinator of patient's discharge plans/needs. RN/SW Care Coordinator will review chart and outreach to facility staff every 4 weeks and as needed.     CHREY Shaw  Care Navigator  [facility]  [phone]

## 2018-02-10 NOTE — PROGRESS NOTES
.[email or web address]   [facility]  NON-FACE TO FACE PROLONGED SERVICE    Ellie Leigh 9/12/1930    Date of Related Face to Face Service: 2/9/2018    INTENT OF SERVICE  This is an extended evaluation of patient's specific problem.  The service relates to a recent or future E/M visit.  Documentation supports medical necessity, relates to ongoing patient management and documents start times and stop times.    Regarding the following diagnoses:  H/O TB (tuberculosis),   * I reviewed records for patient's complicated medical history.  (Start time 1030  Stop time 1045)  * I coordinated care with medical Director Tha Dmaian and TORRIE Logan and nurse manager Pushpa.  (Start time 1107  Stop time 1118)  I coordinated care with supervisor Kingsley Norton and TORRIE Logan and nurse manager Pushpa  (Start time 1046  Stop time 1103)    ASSESSMENT/PLAN  H/O TB (tuberculosis)    Had prolonged discussed with TORRIE Logan, nurse manager Pushpa, [facility] supervisor Kingsley Childress, and collaborating physician Dr. Tha Damian, who is medical director of the facility. Discussed results of CXR completed in facility, facility's TB testing policy, and necessity of CT given lack of symptoms of active TB. Discussed possibility of TB gold test as alternative, however, given that it take 5 days to get results facility felt this was outside time frame stated in policy and could not risk potential exposure to other patients during that time period. It was agreed that CT scan should be completed to help r/o active TB infection.       TIME  Total time spent with Non-Face to Face prolonged service 43 minutes.     Electronically signed by:  LACI Toribio CNP

## 2018-02-12 ENCOUNTER — RECORDS - HEALTHEAST (OUTPATIENT)
Dept: LAB | Facility: CLINIC | Age: 83
End: 2018-02-12

## 2018-02-12 VITALS
TEMPERATURE: 98.5 F | HEART RATE: 76 BPM | DIASTOLIC BLOOD PRESSURE: 80 MMHG | RESPIRATION RATE: 17 BRPM | BODY MASS INDEX: 23.03 KG/M2 | SYSTOLIC BLOOD PRESSURE: 150 MMHG | OXYGEN SATURATION: 97 % | WEIGHT: 110.2 LBS

## 2018-02-12 LAB
INR PPP: 2.99
INR PPP: 2.99 (ref 0.9–1.1)

## 2018-02-12 NOTE — PROGRESS NOTES
"[facility]  PRIMARY CARE PROVIDER AND CLINIC:  Josefina Gómez [address] [place] [id number]  Chief Complaint   Patient presents with     Hospital F/U       HPI:    Ellie Leigh is a 87 year old  (9/12/1930),admitted to the [facility] from [facility].  Hospital stay 2/6/18  through 2/8/18.  Admitted to this facility for  rehab, medical management and nursing care.  HPI information obtained from: facility staff, patient report, [facility] chart review and family/first contact daughter at the bedside report.      Per medical Record: \"Thalia Leigh is an 87 year old  female with a significant past medical history of SIADH, atrial fibrillation hypertension, prior DVT on coumadin, hypothyroidism, TB infection with completion of treatment in the early 2000's and back pain who presented with worsened back pain. She was unsure of any specific moment of injury. CT scan done and showed Inferior endplate central compression deformities at L2 and L3. This was discussed with [facility] spine. She was treated with semi-rigid brace and oral pain medication. Per ortho would consider outpatient vertebroplasty but only if not improving with conservative measures.  She does have chronic hyponatremia which remained low but stable during hospital stay. She was placed on 1L fluid restriction, but did not have any improvement. Did appear dry so was increased to 1.5L FWR and recommended increased sodium intake. She did not feel able to care for herself at home so she was discharge to TCU for further rehab and medical management.       Current issues are:      -At TCU issue raised concerning for possible active TB seen on the x-ray that was done facility.  CT scan was done at [place], and  when compared to the one in 2012 showed no acute changes  -Developed nausea and vomiting started on Zofran Miralax increase with improvement.  Family feels this " is likely secondary to Tramadol  -Meet with the patient and her daughter in the room they had a very long discussion especially with the daughter about ongoing pain.  Daughter reports that her mom gets sick every time she takes tramadol.  Patient reports that pain is 10 out of 10, sharp from the lower back with radiation to the left groin and also down to the leg and the outer part, aggravated with movement better with rest.  Also complains of pain in the left knee, aching in nature aggravated with therapy, and reports when she extends her left knee she gets pain spasm-like in the upper thigh.  Reports Zofran helps with nausea and vomiting from tramadol.  Patient reports she had tramadol this morning which did not help.    -Reports that her mom has been having restless leg sometimes now, she and her sister has admission in the past and started magnesium and feel better, and also if her mom would benefit from magnesium..    -Daughter reports that her mom was diagnosed with SIADH and had few episodes of fainting.  Has been followed by her PCP but not by nephrologist.  We will advised by cardiologist to take extra salt and garroted.  However her mom is forgetful at times.. No reported that her mom takes Miralax every night at home and wonder if that could be because behind the sodium also reported that bowel movement was mixed with some formed stool in the morning.  Report that her mom had a history of breast cancer    -Status post mastectomy and was noticed that her mom had small  area of swelling over her right forearm and wonder if she has lymphedema patient reports that swelling is much better.  No concern.      =============================    CODE STATUS/ADVANCE DIRECTIVES DISCUSSION:   DNR / DNI  Patient's living condition: lives with family, daughter     ALLERGIES:Cephalosporins; Doxycycline; Sulfa drugs; and Penicillins  PAST MEDICAL HISTORY:  has a past medical history of Breast cancer (H); COPD (chronic  obstructive pulmonary disease) (H); Dust allergy; DVT (deep vein thrombosis) in pregnancy (H); HTN (hypertension); Hyponatremia; Hypothyroid; Intermittent atrial fibrillation (H) (12/1/2011); Loose body in joint (4/15/2011); Neck fracture (H); and Syncope (5/12/2013). She also has no past medical history of Chronic kidney disease; Diabetes (H); Obese; or Sleep apnea.  PAST SURGICAL HISTORY:  has a past surgical history that includes surgical history of -  (05/22/2001); mastectomy, simple rt/lt/lexi; appendectomy; Cholecystectomy; Inject epidural lumbar (3/23/2011); Arthroscopy knee (4/15/2011); other surgical history; tonsillectomy; joint replacement, hip rt/lt; Implant pacemaker (5/21/2013); and Esophagoscopy, gastroscopy, duodenoscopy (EGD), combined (6/9/2014).  FAMILY HISTORY: family history includes Asthma in her brother; Breast Cancer in her daughter; CANCER in her brother and daughter; CEREBROVASCULAR DISEASE in her brother, father, and mother; HEART DISEASE in her brother, father, and mother; Neurologic Disorder in her brother; Respiratory in her maternal grandfather.  SOCIAL HISTORY:  reports that she has never smoked. She has never used smokeless tobacco. She reports that she does not drink alcohol or use illicit drugs.    Post Discharge Medication Reconciliation Status: discharge medications reconciled and changed, per note/orders (see AVS).  Current Outpatient Prescriptions   Medication Sig Dispense Refill     polyethylene glycol (MIRALAX/GLYCOLAX) Packet Take 1.5 packets by mouth daily       bisacodyl (DULCOLAX) 10 MG Suppository Place 10 mg rectally daily as needed for constipation       Ondansetron (ZOFRAN ODT PO) Take 4 mg by mouth every 8 hours as needed for nausea       Acetaminophen (TYLENOL PO) Take 975 mg by mouth 3 times daily       traMADol (ULTRAM) 50 MG tablet Take 0.5-1 tablets (25-50 mg) by mouth every 6 hours as needed for pain (25 mg for pain rating 3-6, 50 mg for pain rating 7-10) 40  tablet 0     ipratropium - albuterol 0.5 mg/2.5 mg/3 mL (DUONEB) 0.5-2.5 (3) MG/3ML neb solution TAKE 1 VIAL BY NEBULIZATION  EVERY SIX HOURS AS NEEDED FOR SHORTNESS OF BREATH/ DYSPNEA OR WHEEZING 180 mL 9     warfarin (COUMADIN) 1 MG tablet Take 1.5 mg Mon, 1 mg rest of the days or as directed by Anticoagulation Clinic. 60 tablet 0     diclofenac (VOLTAREN) 1 % GEL topical gel APPLY 4 GRAMS TO KNEES OR 2 GRAMS TO HANDS 4 TIMES DAILY USING ENCLOSED DOSING CARD 100 g 11     ranitidine (ZANTAC) 150 MG tablet Take 1 tablet (150 mg) by mouth 2 times daily 60 tablet 11     albuterol (PROAIR HFA/PROVENTIL HFA/VENTOLIN HFA) 108 (90 BASE) MCG/ACT Inhaler Inhale 2 puffs into the lungs every 6 hours as needed for shortness of breath / dyspnea 3 Inhaler 1     metoprolol (TOPROL-XL) 25 MG 24 hr tablet TAKE TWO TABLETS BY MOUTH TWICE DAILY  120 tablet 3     levothyroxine (SYNTHROID/LEVOTHROID) 50 MCG tablet TAKE 1 TABLET (50 MCG) BY MOUTH DAILY 90 tablet 2     SYMBICORT 160-4.5 MCG/ACT Inhaler INHALE 2 PUFFS INTO THE LUNGS 2 TIMES DAILY 10.2 g 5     probiotic CAPS Take 1 capsule by mouth every evening        CRANBERRY Take 475 mg by mouth 2 times daily.         ROS:  10 point ROS of systems including Constitutional, Eyes, Respiratory, Cardiovascular, Gastroenterology, Genitourinary, Integumentary, Muscularskeletal, Psychiatric were all negative except for pertinent positives noted in my HPI.    Exam:  /80  Pulse 76  Temp 98.5  F (36.9  C)  Resp 17  Wt 110 lb 3.2 oz (50 kg)  LMP 06/15/1985  SpO2 97%  BMI 23.03 kg/m2  GENERAL APPEARANCE:  in no distress, in mild distress due to feeling sick in her stomach. , cooperative  ENT:  Mouth and posterior oropharynx normal, moist mucous membranes  EYES:  EOM, conjunctivae, lids, pupils and irises normal  NECK:  No adenopathy,masses or thyromegaly  RESP:  respiratory effort and palpation of chest normal, lungs clear to auscultation , no respiratory distress  CV:  Palpation and  auscultation of heart done , regular rate and rhythm, no murmur, rub, or gallop, had edema on the left forearm nonpitting.   ABDOMEN:  normal bowel sounds, soft, nontender, no hepatosplenomegaly or other masses  M/S:   Gait and station abnormal Use a wheelchair for ambulation.  Deformed knee joint bilaterally.  Diminished left knee extension movement pain in the thigh.   SKIN:  Inspection of skin and subcutaneous tissue baseline, Palpation of skin and subcutaneous tissue baseline  NEURO:   Cranial nerves 2-12 are normal tested and grossly at patient's baseline, no purposeful movement in upper and lower extremities  PSYCH:  normal insight, judgement and memory, affect and mood normal    Lab/Diagnostic data:    CBC RESULTS:   Recent Labs   Lab Test  02/07/18   0720  02/06/18   1310   WBC  5.9  5.8   RBC  3.87  4.00   HGB  10.8*  11.1*   HCT  31.3*  32.2*   MCV  81  81   MCH  27.9  27.8   MCHC  34.5  34.5   RDW  13.4  13.8   PLT  335  305       Last Basic Metabolic Panel:  Recent Labs   Lab Test  02/08/18   0720  02/07/18   0720   NA  123*  123*   POTASSIUM  4.0  3.6   CHLORIDE  90*  89*   DOUG  8.0*  7.9*   CO2  27  26   BUN  15  12   CR  0.35*  0.34*   GLC  98  96       Liver Function Studies -   Recent Labs   Lab Test  02/06/18   1310  01/06/18   0930   PROTTOTAL  6.8  6.6*   ALBUMIN  3.3*  3.1*   BILITOTAL  0.4  0.6   ALKPHOS  97  82   AST  36  33   ALT  22  15       TSH   Date Value Ref Range Status   01/08/2018 2.54 0.40 - 4.00 mU/L Final   05/31/2017 1.91 0.40 - 4.00 mU/L Final   ]    Lab Results   Component Value Date    A1C 6.1 07/28/2014       ASSESSMENT/PLAN:  Intractable low back pain  Non-traumatic compression fracture of L2 lumbar vertebra with routine healing, subsequent encounter  B/L Knee joint pain  -Analgesia suboptimal, we will schedule tramadol 50 mg twice daily and will continue as needed.  -We will schedule Zofran as well to be given with scheduled tramadol.  -Some progress with therapy however  "due to knee pain made needs a longer period for recovery.  Likely has bilateral knee osteoarthritis, would benefit from seeing an orthopedist on outpatient basis.  Bilateral knee steroid injection might help.      Hyponatremia  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)  - chronic, does not see a nephrologist. Now on 1.5L FWR in TCU, regular diet with no sodium restrictions. Has right upper lobe chronic pulmonary process which could be cause of SIADH.  - continue to monitor.      H/O TB (tuberculosis)  - \"From chart review appears patient had history of TB in the early 2000's which was adequately treated, ... negative AFB noted in 2014. Had CXR while IP which noted scar tissue and chronic RUL lobectomy.  As  TB history TCU did not due mantoux test, but instead did CXR which showed possible RUL scar tissue, but could not r/o TB\" just recommend CT scan.  Which was done and did not show changes when compared to previous one this back 2012, patient asymptomatic.      Benign essential HTN  Intermittent atrial fibrillation (H)  - rate controlled.  on coumadin with INR followed closely  - clinically stable.     Chronic obstructive pulmonary disease, unspecified COPD type (H)  - doing fine on current meds. continue.       Chronic constipation  Better with miralax.       H/O deep venous thrombosis  Currently on coumadin, INR today is pending. Denies any melena, hematuria or new bruising.      Gastroesophageal reflux disease, esophagitis presence not specified  On PTA zantac. continue      Hypothyroidism, unspecified type  TSH   Date Value Ref Range Status   01/08/2018 2.54 0.40 - 4.00 mU/L Final   - .on LT4, in this age group keep level around 5     RLS  -Based on history, will check magnesium level, follow on the result and manage accordingly    Physical decondition:  -Due to pain in the back and knee progression therapy might take longer  -Plan is to transfer to long-term care  at this facility if there is " availability given the daughter and has to take care of her  who just developed stroke, the other daughter lives far from here    Orders:  - See above, otherwise, continue the rest of the current POC.     Total time spent with patient visit at the skilled nursing facility was 60 min including patient visit, review of past records and discussin with nurse manager, talking to the daughter in room. . Greater than 50% of total time spent with counseling and coordinating care due to intractable back pain, knee pain, SIADH, RLS, constipation, edema in upper arm, disposition.     Electronically signed by:  Tha Damian MD

## 2018-02-13 ENCOUNTER — RECORDS - HEALTHEAST (OUTPATIENT)
Dept: LAB | Facility: CLINIC | Age: 83
End: 2018-02-13

## 2018-02-13 ENCOUNTER — ANTICOAGULATION THERAPY VISIT (OUTPATIENT)
Dept: ANTICOAGULATION | Facility: CLINIC | Age: 83
End: 2018-02-13

## 2018-02-13 ENCOUNTER — NURSING HOME VISIT (OUTPATIENT)
Dept: GERIATRICS | Facility: CLINIC | Age: 83
End: 2018-02-13
Payer: COMMERCIAL

## 2018-02-13 DIAGNOSIS — Z79.01 LONG TERM CURRENT USE OF ANTICOAGULANT THERAPY: ICD-10-CM

## 2018-02-13 DIAGNOSIS — G25.81 RESTLESS LEG SYNDROME: ICD-10-CM

## 2018-02-13 DIAGNOSIS — Z86.718 H/O DEEP VENOUS THROMBOSIS: ICD-10-CM

## 2018-02-13 DIAGNOSIS — E03.9 HYPOTHYROIDISM, UNSPECIFIED TYPE: ICD-10-CM

## 2018-02-13 DIAGNOSIS — E87.1 HYPONATREMIA: ICD-10-CM

## 2018-02-13 DIAGNOSIS — M48.56XD NON-TRAUMATIC COMPRESSION FRACTURE OF L2 LUMBAR VERTEBRA WITH ROUTINE HEALING, SUBSEQUENT ENCOUNTER: Primary | ICD-10-CM

## 2018-02-13 DIAGNOSIS — K59.09 CHRONIC CONSTIPATION: ICD-10-CM

## 2018-02-13 DIAGNOSIS — K21.9 GASTROESOPHAGEAL REFLUX DISEASE, ESOPHAGITIS PRESENCE NOT SPECIFIED: ICD-10-CM

## 2018-02-13 DIAGNOSIS — I48.0 INTERMITTENT ATRIAL FIBRILLATION (H): ICD-10-CM

## 2018-02-13 DIAGNOSIS — Z86.11 H/O TB (TUBERCULOSIS): ICD-10-CM

## 2018-02-13 DIAGNOSIS — M54.59 INTRACTABLE LOW BACK PAIN: ICD-10-CM

## 2018-02-13 DIAGNOSIS — E22.2 SIADH (SYNDROME OF INAPPROPRIATE ADH PRODUCTION) (H): ICD-10-CM

## 2018-02-13 DIAGNOSIS — R53.81 PHYSICAL DECONDITIONING: ICD-10-CM

## 2018-02-13 DIAGNOSIS — M17.0 PRIMARY OSTEOARTHRITIS OF BOTH KNEES: ICD-10-CM

## 2018-02-13 DIAGNOSIS — I10 BENIGN ESSENTIAL HTN: ICD-10-CM

## 2018-02-13 DIAGNOSIS — J44.9 CHRONIC OBSTRUCTIVE PULMONARY DISEASE, UNSPECIFIED COPD TYPE (H): ICD-10-CM

## 2018-02-13 LAB
ANION GAP SERPL CALCULATED.3IONS-SCNC: 7 MMOL/L (ref 5–18)
BUN SERPL-MCNC: 15 MG/DL (ref 8–28)
CALCIUM SERPL-MCNC: 8.8 MG/DL (ref 8.5–10.5)
CHLORIDE BLD-SCNC: 93 MMOL/L (ref 98–107)
CO2 SERPL-SCNC: 25 MMOL/L (ref 22–31)
CREAT SERPL-MCNC: 0.52 MG/DL (ref 0.6–1.1)
ERYTHROCYTE [DISTWIDTH] IN BLOOD BY AUTOMATED COUNT: 14.3 % (ref 11–14.5)
GFR SERPL CREATININE-BSD FRML MDRD: >60 ML/MIN/1.73M2
GLUCOSE BLD-MCNC: 98 MG/DL (ref 70–125)
HCT VFR BLD AUTO: 32.5 % (ref 35–47)
HGB BLD-MCNC: 10.9 G/DL (ref 12–16)
MCH RBC QN AUTO: 27.9 PG (ref 27–34)
MCHC RBC AUTO-ENTMCNC: 33.5 G/DL (ref 32–36)
MCV RBC AUTO: 83 FL (ref 80–100)
PLATELET # BLD AUTO: 337 THOU/UL (ref 140–440)
PMV BLD AUTO: 8.9 FL (ref 8.5–12.5)
POTASSIUM BLD-SCNC: 4.5 MMOL/L (ref 3.5–5)
RBC # BLD AUTO: 3.91 MILL/UL (ref 3.8–5.4)
SODIUM SERPL-SCNC: 125 MMOL/L (ref 136–145)
WBC: 7.3 THOU/UL (ref 4–11)

## 2018-02-13 PROCEDURE — 99207 ZZC NO CHARGE NURSE ONLY: CPT

## 2018-02-13 PROCEDURE — 99306 1ST NF CARE HIGH MDM 50: CPT | Performed by: FAMILY MEDICINE

## 2018-02-13 NOTE — LETTER
"    2/13/2018        RE: Ellie Leigh  [address]  [facility] [address]        [facility]  PRIMARY CARE PROVIDER AND CLINIC:  Josefina Gómez [address] [place] [id number]  Chief Complaint   Patient presents with     Hospital F/U       HPI:    Ellie Leigh is a 87 year old  (9/12/1930),admitted to the [facility] from Hospital  [facility].  Hospital stay 2/6/18  through 2/8/18.  Admitted to this facility for  rehab, medical management and nursing care.  HPI information obtained from: facility staff, patient report, [facility] chart review and family/first contact daughter at the bedside report.      Per medical Record: \"Thalia Leigh is an 87 year old  female with a significant past medical history of SIADH, atrial fibrillation hypertension, prior DVT on coumadin, hypothyroidism, TB infection with completion of treatment in the early 2000's and back pain who presented with worsened back pain. She was unsure of any specific moment of injury. CT scan done and showed Inferior endplate central compression deformities at L2 and L3. This was discussed with [facility] Ortho spine. She was treated with semi-rigid brace and oral pain medication. Per ortho would consider outpatient vertebroplasty but only if not improving with conservative measures.  She does have chronic hyponatremia which remained low but stable during hospital stay. She was placed on 1L fluid restriction, but did not have any improvement. Did appear dry so was increased to 1.5L FWR and recommended increased sodium intake. She did not feel able to care for herself at home so she was discharge to TCU for further rehab and medical management.       Current issues are:      -At TCU issue raised concerning for possible active TB seen on the x-ray that was done facility.  CT scan was done at [place], and  when compared to the one in 2012 showed no acute changes  -Developed " nausea and vomiting started on Zofran Miralax increase with improvement.  Family feels this is likely secondary to Tramadol  -Meet with the patient and her daughter in the room they had a very long discussion especially with the daughter about ongoing pain.  Daughter reports that her mom gets sick every time she takes tramadol.  Patient reports that pain is 10 out of 10, sharp from the lower back with radiation to the left groin and also down to the leg and the outer part, aggravated with movement better with rest.  Also complains of pain in the left knee, aching in nature aggravated with therapy, and reports when she extends her left knee she gets pain spasm-like in the upper thigh.  Reports Zofran helps with nausea and vomiting from tramadol.  Patient reports she had tramadol this morning which did not help.    -Reports that her mom has been having restless leg sometimes now, she and her sister has admission in the past and started magnesium and feel better, and also if her mom would benefit from magnesium..    -Daughter reports that her mom was diagnosed with SIADH and had few episodes of fainting.  Has been followed by her PCP but not by nephrologist.  We will advised by cardiologist to take extra salt and garroted.  However her mom is forgetful at times.. No reported that her mom takes Miralax every night at home and wonder if that could be because behind the sodium also reported that bowel movement was mixed with some formed stool in the morning.  Report that her mom had a history of breast cancer    -Status post mastectomy and was noticed that her mom had small  area of swelling over her right forearm and wonder if she has lymphedema patient reports that swelling is much better.  No concern.      =============================    CODE STATUS/ADVANCE DIRECTIVES DISCUSSION:   DNR / DNI  Patient's living condition: lives with family, daughter     ALLERGIES:Cephalosporins; Doxycycline; Sulfa drugs; and  Penicillins  PAST MEDICAL HISTORY:  has a past medical history of Breast cancer (H); COPD (chronic obstructive pulmonary disease) (H); Dust allergy; DVT (deep vein thrombosis) in pregnancy (H); HTN (hypertension); Hyponatremia; Hypothyroid; Intermittent atrial fibrillation (H) (12/1/2011); Loose body in joint (4/15/2011); Neck fracture (H); and Syncope (5/12/2013). She also has no past medical history of Chronic kidney disease; Diabetes (H); Obese; or Sleep apnea.  PAST SURGICAL HISTORY:  has a past surgical history that includes surgical history of -  (05/22/2001); mastectomy, simple rt/lt/lexi; appendectomy; Cholecystectomy; Inject epidural lumbar (3/23/2011); Arthroscopy knee (4/15/2011); other surgical history; tonsillectomy; joint replacement, hip rt/lt; Implant pacemaker (5/21/2013); and Esophagoscopy, gastroscopy, duodenoscopy (EGD), combined (6/9/2014).  FAMILY HISTORY: family history includes Asthma in her brother; Breast Cancer in her daughter; CANCER in her brother and daughter; CEREBROVASCULAR DISEASE in her brother, father, and mother; HEART DISEASE in her brother, father, and mother; Neurologic Disorder in her brother; Respiratory in her maternal grandfather.  SOCIAL HISTORY:  reports that she has never smoked. She has never used smokeless tobacco. She reports that she does not drink alcohol or use illicit drugs.    Post Discharge Medication Reconciliation Status: discharge medications reconciled and changed, per note/orders (see AVS).  Current Outpatient Prescriptions   Medication Sig Dispense Refill     polyethylene glycol (MIRALAX/GLYCOLAX) Packet Take 1.5 packets by mouth daily       bisacodyl (DULCOLAX) 10 MG Suppository Place 10 mg rectally daily as needed for constipation       Ondansetron (ZOFRAN ODT PO) Take 4 mg by mouth every 8 hours as needed for nausea       Acetaminophen (TYLENOL PO) Take 975 mg by mouth 3 times daily       traMADol (ULTRAM) 50 MG tablet Take 0.5-1 tablets (25-50 mg) by  mouth every 6 hours as needed for pain (25 mg for pain rating 3-6, 50 mg for pain rating 7-10) 40 tablet 0     ipratropium - albuterol 0.5 mg/2.5 mg/3 mL (DUONEB) 0.5-2.5 (3) MG/3ML neb solution TAKE 1 VIAL BY NEBULIZATION  EVERY SIX HOURS AS NEEDED FOR SHORTNESS OF BREATH/ DYSPNEA OR WHEEZING 180 mL 9     warfarin (COUMADIN) 1 MG tablet Take 1.5 mg Mon, 1 mg rest of the days or as directed by Anticoagulation Clinic. 60 tablet 0     diclofenac (VOLTAREN) 1 % GEL topical gel APPLY 4 GRAMS TO KNEES OR 2 GRAMS TO HANDS 4 TIMES DAILY USING ENCLOSED DOSING CARD 100 g 11     ranitidine (ZANTAC) 150 MG tablet Take 1 tablet (150 mg) by mouth 2 times daily 60 tablet 11     albuterol (PROAIR HFA/PROVENTIL HFA/VENTOLIN HFA) 108 (90 BASE) MCG/ACT Inhaler Inhale 2 puffs into the lungs every 6 hours as needed for shortness of breath / dyspnea 3 Inhaler 1     metoprolol (TOPROL-XL) 25 MG 24 hr tablet TAKE TWO TABLETS BY MOUTH TWICE DAILY  120 tablet 3     levothyroxine (SYNTHROID/LEVOTHROID) 50 MCG tablet TAKE 1 TABLET (50 MCG) BY MOUTH DAILY 90 tablet 2     SYMBICORT 160-4.5 MCG/ACT Inhaler INHALE 2 PUFFS INTO THE LUNGS 2 TIMES DAILY 10.2 g 5     probiotic CAPS Take 1 capsule by mouth every evening        CRANBERRY Take 475 mg by mouth 2 times daily.         ROS:  10 point ROS of systems including Constitutional, Eyes, Respiratory, Cardiovascular, Gastroenterology, Genitourinary, Integumentary, Muscularskeletal, Psychiatric were all negative except for pertinent positives noted in my HPI.    Exam:  /80  Pulse 76  Temp 98.5  F (36.9  C)  Resp 17  Wt 110 lb 3.2 oz (50 kg)  LMP 06/15/1985  SpO2 97%  BMI 23.03 kg/m2  GENERAL APPEARANCE:  in no distress, in mild distress due to feeling sick in her stomach. , cooperative  ENT:  Mouth and posterior oropharynx normal, moist mucous membranes  EYES:  EOM, conjunctivae, lids, pupils and irises normal  NECK:  No adenopathy,masses or thyromegaly  RESP:  respiratory effort and  palpation of chest normal, lungs clear to auscultation , no respiratory distress  CV:  Palpation and auscultation of heart done , regular rate and rhythm, no murmur, rub, or gallop, had edema on the left forearm nonpitting.   ABDOMEN:  normal bowel sounds, soft, nontender, no hepatosplenomegaly or other masses  M/S:   Gait and station abnormal Use a wheelchair for ambulation.  Deformed knee joint bilaterally.  Diminished left knee extension movement pain in the thigh.   SKIN:  Inspection of skin and subcutaneous tissue baseline, Palpation of skin and subcutaneous tissue baseline  NEURO:   Cranial nerves 2-12 are normal tested and grossly at patient's baseline, no purposeful movement in upper and lower extremities  PSYCH:  normal insight, judgement and memory, affect and mood normal    Lab/Diagnostic data:    CBC RESULTS:   Recent Labs   Lab Test  02/07/18   0720  02/06/18   1310   WBC  5.9  5.8   RBC  3.87  4.00   HGB  10.8*  11.1*   HCT  31.3*  32.2*   MCV  81  81   MCH  27.9  27.8   MCHC  34.5  34.5   RDW  13.4  13.8   PLT  335  305       Last Basic Metabolic Panel:  Recent Labs   Lab Test  02/08/18   0720  02/07/18   0720   NA  123*  123*   POTASSIUM  4.0  3.6   CHLORIDE  90*  89*   DOUG  8.0*  7.9*   CO2  27  26   BUN  15  12   CR  0.35*  0.34*   GLC  98  96       Liver Function Studies -   Recent Labs   Lab Test  02/06/18   1310  01/06/18   0930   PROTTOTAL  6.8  6.6*   ALBUMIN  3.3*  3.1*   BILITOTAL  0.4  0.6   ALKPHOS  97  82   AST  36  33   ALT  22  15       TSH   Date Value Ref Range Status   01/08/2018 2.54 0.40 - 4.00 mU/L Final   05/31/2017 1.91 0.40 - 4.00 mU/L Final   ]    Lab Results   Component Value Date    A1C 6.1 07/28/2014       ASSESSMENT/PLAN:  Intractable low back pain  Non-traumatic compression fracture of L2 lumbar vertebra with routine healing, subsequent encounter  B/L Knee joint pain  -Analgesia suboptimal, we will schedule tramadol 50 mg twice daily and will continue as needed.  -We will  "schedule Zofran as well to be given with scheduled tramadol.  -Some progress with therapy however due to knee pain made needs a longer period for recovery.  Likely has bilateral knee osteoarthritis, would benefit from seeing an orthopedist on outpatient basis.  Bilateral knee steroid injection might help.      Hyponatremia  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)  - chronic, does not see a nephrologist. Now on 1.5L FWR in TCU, regular diet with no sodium restrictions. Has right upper lobe chronic pulmonary process which could be cause of SIADH.  - continue to monitor.      H/O TB (tuberculosis)  - \"From chart review appears patient had history of TB in the early 2000's which was adequately treated, ... negative AFB noted in 2014. Had CXR while IP which noted scar tissue and chronic RUL lobectomy.  As  TB history TCU did not due mantoux test, but instead did CXR which showed possible RUL scar tissue, but could not r/o TB\" just recommend CT scan.  Which was done and did not show changes when compared to previous one this back 2012, patient asymptomatic.      Benign essential HTN  Intermittent atrial fibrillation (H)  - rate controlled.  on coumadin with INR followed closely  - clinically stable.     Chronic obstructive pulmonary disease, unspecified COPD type (H)  - doing fine on current meds. continue.       Chronic constipation  Better with miralax.       H/O deep venous thrombosis  Currently on coumadin, INR today is pending. Denies any melena, hematuria or new bruising.      Gastroesophageal reflux disease, esophagitis presence not specified  On PTA zantac. continue      Hypothyroidism, unspecified type  TSH   Date Value Ref Range Status   01/08/2018 2.54 0.40 - 4.00 mU/L Final   - .on LT4, in this age group keep level around 5     RLS  -Based on history, will check magnesium level, follow on the result and manage accordingly    Physical decondition:  -Due to pain in the back and knee progression " therapy might take longer  -Plan is to transfer to long-term care  at this facility if there is availability given the daughter and has to take care of her  who just developed stroke, the other daughter lives far from here    Orders:  - See above, otherwise, continue the rest of the current POC.     Total time spent with patient visit at the skilled nursing facility was 60 min including patient visit, review of past records and discussin with nurse manager, talking to the daughter in room. . Greater than 50% of total time spent with counseling and coordinating care due to intractable back pain, knee pain, SIADH, RLS, constipation, edema in upper arm, disposition.     Electronically signed by:  Tha Damian MD                    Sincerely,        Tha Damian MD

## 2018-02-13 NOTE — MR AVS SNAPSHOT
Ellie CARVER Lu   2/13/2018   Anticoagulation Therapy Visit    Description:  87 year old female   Provider:  Lissy Beaulieu, RN   Department:  [facility]           INR as of 2/13/2018     Today's INR 2.99! (2/12/2018)      Anticoagulation Summary as of 2/13/2018     INR goal 1.5-2.0   Today's INR 2.99! (2/12/2018)   Full instructions 2/14: 0.5 mg; Otherwise 1.5 mg on Mon; 1 mg all other days   Next INR check 2/16/2018    Indications   Atrial fibrillation with rapid ventricular response (H) (Resolved) [I48.91]  DVT (deep venous thrombosis) [I82.409]  Long term current use of anticoagulant therapy [Z79.01]         Your next Anticoagulation Clinic appointment(s)     Feb 27, 2018  2:00 PM CST   [facility]ticoagulation [facility] with [facility]   [facility]    [address] [address]   [phone]              February 2018 Details    Sun Mon Tue Wed Thu Fri Sat         1               2               3                 4               5               6               7               8               9               10                 11               12               13      1 mg   See details      14      0.5 mg         15      1 mg         16            17                 18               19               20               21               22               23               24                 25               26               27               28                   Date Details   02/13 This INR check       Date of next INR:  2/16/2018         How to take your warfarin dose     To take:  0.5 mg Take 0.5 of a 1 mg tablet.    To take:  1 mg Take 1 of the 1 mg tablets.

## 2018-02-13 NOTE — PROGRESS NOTES
"  ANTICOAGULATION FOLLOW-UP CLINIC VISIT    Patient Name:  Ellie Leigh  Date:  2/13/2018  Contact Type:  Writer spoke with Jessica at [facility]    SUBJECTIVE:     Patient Findings     Positives Change in medications (on prophylactic Tamiflu for 14 days. Started this on 2-10-18. also taking tramadol PRN but has been using 1-2 times daily), Intentional hold of therapy (2-12-18)    Comments Per [facility]: \"INR called by [facility] tonight as 2.99.  Chart lists goal INR as 1.5-2.0.  Last warfarin is 0.5mg on the 9th, 1mg on the 10th, 1mg on the 11th.  I instructed them to hold today's dose and [facility] to call back tomorrow (Tuesday).\"     Goal range is actually 1.7-2.3. Pt is taking prophylactic tamiflu right now, which might be why INR elevated. She has only had 5 mg/week and is still supra therapeutic. Will continue with reduced dosing and recheck INR on 1-16-18.           OBJECTIVE    INR   Date Value Ref Range Status   02/12/2018 2.99  Final       ASSESSMENT / PLAN  INR assessment SUPRA    Recheck INR In: 3 DAYS    INR Location TCU      Anticoagulation Summary as of 2/13/2018     INR goal 1.5-2.0   Today's INR 2.99! (2/12/2018)   Maintenance plan 1.5 mg (1 mg x 1.5) on Mon; 1 mg (1 mg x 1) all other days   Full instructions 2/14: 0.5 mg; Otherwise 1.5 mg on Mon; 1 mg all other days   Weekly total 7.5 mg   Plan last modified Angelia Bo RN (1/16/2018)   Next INR check 2/16/2018   Priority INR   Target end date Indefinite    Indications   Atrial fibrillation with rapid ventricular response (H) (Resolved) [I48.91]  DVT (deep venous thrombosis) [I82.409]  Long term current use of anticoagulant therapy [Z79.01]         Anticoagulation Episode Summary     INR check location     Preferred lab     Send INR reminders to [facility]    Comments * Actual INR goal is 1.7-2.3  please follow Dec 2015 INR referral (June 2016 goal range is an error) [facility]CU      Anticoagulation Care " Providers     Provider Role Specialty Phone number    Josefina Gómez MD [facility] [phone]            See the Encounter Report to view Anticoagulation Flowsheet and Dosing Calendar (Go to Encounters tab in chart review, and find the Anticoagulation Therapy Visit)        Lissy Beaulieu RN

## 2018-02-14 ENCOUNTER — RECORDS - HEALTHEAST (OUTPATIENT)
Dept: LAB | Facility: CLINIC | Age: 83
End: 2018-02-14

## 2018-02-14 ENCOUNTER — TELEPHONE (OUTPATIENT)
Dept: FAMILY MEDICINE | Facility: CLINIC | Age: 83
End: 2018-02-14

## 2018-02-14 LAB
ANION GAP SERPL CALCULATED.3IONS-SCNC: 5 MMOL/L (ref 5–18)
BUN SERPL-MCNC: 17 MG/DL (ref 8–28)
CALCIUM SERPL-MCNC: 8.6 MG/DL (ref 8.5–10.5)
CHLORIDE BLD-SCNC: 90 MMOL/L (ref 98–107)
CO2 SERPL-SCNC: 28 MMOL/L (ref 22–31)
CREAT SERPL-MCNC: 0.56 MG/DL (ref 0.6–1.1)
GFR SERPL CREATININE-BSD FRML MDRD: >60 ML/MIN/1.73M2
GLUCOSE BLD-MCNC: 93 MG/DL (ref 70–125)
MAGNESIUM SERPL-MCNC: 1.7 MG/DL (ref 1.8–2.6)
POTASSIUM BLD-SCNC: 4.7 MMOL/L (ref 3.5–5)
SODIUM SERPL-SCNC: 123 MMOL/L (ref 136–145)

## 2018-02-14 NOTE — TELEPHONE ENCOUNTER
Advised that she can not stop the coumadin.  Appointment made to discuss other options  Celena Laurent RN

## 2018-02-14 NOTE — TELEPHONE ENCOUNTER
Reason for Call:  Other     Detailed comments: Daughter is calling because the patient saw ortho at [facility] and they are wondering if patient could go off Coumadin to take a anti inflammatory for her bursitis on her left hip - please call daughter Luz Maria    Phone Number Patient can be reached at: [phone]    Best Time:     Can we leave a detailed message on this number? YES    Call taken on 2/14/2018 at 12:30 PM by Marilyn Harrison

## 2018-02-16 ENCOUNTER — RECORDS - HEALTHEAST (OUTPATIENT)
Dept: LAB | Facility: CLINIC | Age: 83
End: 2018-02-16

## 2018-02-16 ENCOUNTER — ANTICOAGULATION THERAPY VISIT (OUTPATIENT)
Dept: ANTICOAGULATION | Facility: CLINIC | Age: 83
End: 2018-02-16
Payer: COMMERCIAL

## 2018-02-16 DIAGNOSIS — Z79.01 LONG TERM CURRENT USE OF ANTICOAGULANT THERAPY: ICD-10-CM

## 2018-02-16 DIAGNOSIS — I82.409 DVT (DEEP VENOUS THROMBOSIS) (H): ICD-10-CM

## 2018-02-16 LAB
INR PPP: 2.12
INR PPP: 2.12 (ref 0.9–1.1)
INR PPP: 2.12 (ref 1.7–2.3)

## 2018-02-16 PROCEDURE — 99207 ZZC NO CHARGE NURSE ONLY: CPT

## 2018-02-16 NOTE — PATIENT INSTRUCTIONS
No changes noted in diet, activity or illness.  On Tamiflu  Continue 1 mg daily until recheck on Monday

## 2018-02-16 NOTE — PROGRESS NOTES
ANTICOAGULATION FOLLOW-UP CLINIC VISIT    Patient Name:  Ellie Leigh  Date:  2/16/2018  Contact Type:  Face to Face    SUBJECTIVE: Goal INR 1.7-2.3       Patient Findings     Positives Change in medications (Tamiflu)           OBJECTIVE      INR   Date Value Ref Range Status   02/16/2018 2.12  Final       ASSESSMENT / PLAN  INR assessment THER    Recheck INR In: 3 DAYS    INR Location TCU      Anticoagulation Summary as of 2/16/2018     INR goal 1.5-2.0   Today's INR 2.12!   Maintenance plan 1.5 mg (1 mg x 1.5) on Mon; 1 mg (1 mg x 1) all other days   Full instructions 1.5 mg on Mon; 1 mg all other days   Weekly total 7.5 mg   Plan last modified Angelia Bo RN (1/16/2018)   Next INR check 2/19/2018   Priority INR   Target end date Indefinite    Indications   Atrial fibrillation with rapid ventricular response (H) (Resolved) [I48.91]  DVT (deep venous thrombosis) [I82.409]  Long term current use of anticoagulant therapy [Z79.01]         Anticoagulation Episode Summary     INR check location     Preferred lab     Send INR reminders to [facility] POOL    Comments * Actual INR goal is 1.7-2.3  please follow Dec 2015 INR referral (June 2016 goal range is an error) [facility]      Anticoagulation Care Providers     Provider Role Specialty Phone number    Josefina Gómez MD [facility] [phone]            See the Encounter Report to view Anticoagulation Flowsheet and Dosing Calendar (Go to Encounters tab in chart review, and find the Anticoagulation Therapy Visit)    Dosage adjustment made based on physician directed care plan.    Steffanie Noel [facility]

## 2018-02-19 ENCOUNTER — NURSING HOME VISIT (OUTPATIENT)
Dept: GERIATRICS | Facility: CLINIC | Age: 83
End: 2018-02-19
Payer: COMMERCIAL

## 2018-02-19 ENCOUNTER — ANTICOAGULATION THERAPY VISIT (OUTPATIENT)
Dept: ANTICOAGULATION | Facility: CLINIC | Age: 83
End: 2018-02-19

## 2018-02-19 ENCOUNTER — TRANSFERRED RECORDS (OUTPATIENT)
Dept: HEALTH INFORMATION MANAGEMENT | Facility: CLINIC | Age: 83
End: 2018-02-19

## 2018-02-19 ENCOUNTER — RECORDS - HEALTHEAST (OUTPATIENT)
Dept: LAB | Facility: CLINIC | Age: 83
End: 2018-02-19

## 2018-02-19 VITALS
HEART RATE: 63 BPM | BODY MASS INDEX: 25.12 KG/M2 | DIASTOLIC BLOOD PRESSURE: 66 MMHG | OXYGEN SATURATION: 91 % | WEIGHT: 120.2 LBS | SYSTOLIC BLOOD PRESSURE: 137 MMHG | RESPIRATION RATE: 18 BRPM | TEMPERATURE: 98 F

## 2018-02-19 DIAGNOSIS — M17.0 PRIMARY OSTEOARTHRITIS OF BOTH KNEES: ICD-10-CM

## 2018-02-19 DIAGNOSIS — E87.1 HYPONATREMIA: Primary | ICD-10-CM

## 2018-02-19 DIAGNOSIS — Z79.01 LONG TERM CURRENT USE OF ANTICOAGULANT THERAPY: ICD-10-CM

## 2018-02-19 DIAGNOSIS — M54.59 INTRACTABLE LOW BACK PAIN: ICD-10-CM

## 2018-02-19 DIAGNOSIS — R11.0 NAUSEA: ICD-10-CM

## 2018-02-19 LAB
INR PPP: 2.08
INR PPP: 2.08 (ref 0.9–1.1)

## 2018-02-19 PROCEDURE — 99207 ZZC NO CHARGE NURSE ONLY: CPT

## 2018-02-19 PROCEDURE — 99309 SBSQ NF CARE MODERATE MDM 30: CPT | Performed by: NURSE PRACTITIONER

## 2018-02-19 NOTE — LETTER
"    2/19/2018        RE: Ellie Leigh  [address]  [facility] [place] [address]        [facility]    Chief Complaint   Patient presents with     Nursing Home Acute       HPI:    Ellie Leigh is a 87 year old  (9/12/1930), who is being seen today for an episodic care visit at [facility].    HPI information obtained from: facility chart records, facility staff and patient report. Today's concern is:  Hyponatremia  Recent labs show 2pt increase in Na++  Staff concerned patient not adhering to 1.5l Fluid restriction.  State family bring patient Gatorade in addition to calculated fluid restriction  Patient states she calculates fluids and includes the Gatorade in it.     Nausea  Patient states nausea is improved since adding Zophran with Tramadol.  States she can feel the nausea coming when meal time is approaching, her stomach is \"empty\"    Intractable low back pain  Scheduled Tramadol is helping pain.  Back/hip pain 5/10, tolerable  Participating in therapy    Primary osteoarthritis of both knees  Staff states patient has been refusing voltaren gel to knees.  Patient states her knees are not always painful so she doesn't want to use the gel if she is not having current pain.      REVIEW OF SYSTEMS:  4 point ROS including Respiratory, CV, GI and , other than that noted in the HPI,  is negative    /66  Pulse 63  Temp 98  F (36.7  C)  Resp 18  Wt 120 lb 3.2 oz (54.5 kg)  LMP 06/15/1985  SpO2 91%  BMI 25.12 kg/m2  GENERAL APPEARANCE:  Alert, in no distress, sitting in chair without pain if does not move  RESP:  respiratory effort and palpation of chest normal, auscultation of lungs clear , no respiratory distress  CV:  Palpation and auscultation of heart done , rate and rhythm regualr, no murmur, no peripheral edema  M/S:   Uses w/c, limited ROM bilateral knees, pain limits movement left hip  SKIN: no concerning lesions  NEURO: 2-12 in normal limits and at patient's " baseline, NFD  PSYCH:  insight and judgement, memory WNL , affect and mood normal      ASSESSMENT/PLAN:  Hyponatremia  Education provided re:  Necessity for Na++ restriction, how to count daily fluid allowable    Nausea  Will add snacks to daily plan to avert nausea before meals.  Instructed patient in eating smaller amounts frequently    Intractable low back pain  Scheduled tramadol providing good analgesia    Primary osteoarthritis of both knees  Will change gel to PRN rather than scheduled.  Instructed patient in need to ask for gel if having pain       Orders:  1.  BMP next lab day.  Dx:  SIADH  2.  Include personal Gatorade in fluid restriction calculations  3.  Offer snacks between meals for nausea  4.  Change scheduling of Diclofenac gel to QID PRN--not scheduled      Electronically signed by:  LACI Pena CNP      Sincerely,        LACI Pena CNP

## 2018-02-19 NOTE — MR AVS SNAPSHOT
Ellie Blakeshivani   2/19/2018   Anticoagulation Therapy Visit    Description:  87 year old female   Provider:  Katia Landrum, RN   Department:  [facility]           INR as of 2/19/2018     Today's INR 2.08!      Anticoagulation Summary as of 2/19/2018     INR goal 1.5-2.0   Today's INR 2.08!   Full instructions 2/19: 1 mg; 2/20: 0.5 mg; 2/21: 0.5 mg; 2/22: 0.5 mg   Next INR check 2/23/2018    Indications   Atrial fibrillation with rapid ventricular response (H) (Resolved) [I48.91]  DVT (deep venous thrombosis) [I82.409]  Long term current use of anticoagulant therapy [Z79.01]         Your next Anticoagulation Clinic appointment(s)     Feb 27, 2018  2:00 PM CST   [facility]agulation Visit with [facility]   [facility]    [address] [address]   [phone]              February 2018 Details    Sun Mon Tue Wed Thu Fri Sat         1               2               3                 4               5               6               7               8               9               10                 11               12               13               14               15               16               17                 18               19      1 mg   See details      20      0.5 mg         21      0.5 mg         22      0.5 mg         23            24                 25               26               27               28                   Date Details   02/19 This INR check       Date of next INR:  2/23/2018         How to take your warfarin dose     To take:  0.5 mg Take 0.5 of a 1 mg tablet.    To take:  1 mg Take 1 of the 1 mg tablets.

## 2018-02-19 NOTE — LETTER
"    2/19/2018        RE: Ellie Leigh  [address] [facility] [address]  [facility] [place] [id number]        [facility]    Chief Complaint   Patient presents with     Nursing Home Acute       HPI:    Ellie Leigh is a 87 year old  (9/12/1930), who is being seen today for an episodic care visit at [facility].    HPI information obtained from: facility chart records, facility staff and patient report. Today's concern is:  Hyponatremia  Recent labs show 2pt increase in Na++  Staff concerned patient not adhering to 1.5l Fluid restriction.  State family bring patient Gatorade in addition to calculated fluid restriction  Patient states she calculates fluids and includes the Gatorade in it.     Nausea  Patient states nausea is improved since adding Zophran with Tramadol.  States she can feel the nausea coming when meal time is approaching, her stomach is \"empty\"    Intractable low back pain  Scheduled Tramadol is helping pain.  Back/hip pain 5/10, tolerable  Participating in therapy    Primary osteoarthritis of both knees  Staff states patient has been refusing voltaren gel to knees.  Patient states her knees are not always painful so she doesn't want to use the gel if she is not having current pain.      REVIEW OF SYSTEMS:  4 point ROS including Respiratory, CV, GI and , other than that noted in the HPI,  is negative    /66  Pulse 63  Temp 98  F (36.7  C)  Resp 18  Wt 120 lb 3.2 oz (54.5 kg)  LMP 06/15/1985  SpO2 91%  BMI 25.12 kg/m2  GENERAL APPEARANCE:  Alert, in no distress, sitting in chair without pain if does not move  RESP:  respiratory effort and palpation of chest normal, auscultation of lungs clear , no respiratory distress  CV:  Palpation and auscultation of heart done , rate and rhythm regualr, no murmur, no peripheral edema  M/S:   Uses w/c, limited ROM bilateral knees, pain limits movement left hip  SKIN: no concerning lesions  NEURO: 2-12 in normal limits and at patient's " baseline, NFD  PSYCH:  insight and judgement, memory WNL , affect and mood normal      ASSESSMENT/PLAN:  Hyponatremia  Education provided re:  Necessity for Na++ restriction, how to count daily fluid allowable    Nausea  Will add snacks to daily plan to avert nausea before meals.  Instructed patient in eating smaller amounts frequently    Intractable low back pain  Scheduled tramadol providing good analgesia    Primary osteoarthritis of both knees  Will change gel to PRN rather than scheduled.  Instructed patient in need to ask for gel if having pain       Orders:  1.  BMP next lab day.  Dx:  SIADH  2.  Include personal Gatorade in fluid restriction calculations  3.  Offer snacks between meals for nausea  4.  Change scheduling of Diclofenac gel to QID PRN--not scheduled      Face to Face and Medical Necessity Statement for DME Provider visit    Demographic Information on Ellie Leigh:  Gender: female  : 1930  [address]  [facility] [place] [id number] [phone] (home) NONE (work)    Medical Record: [id number]  Social Security Number: [id number]  Primary Care Provider: Josefina Gómez  Insurance: Payor: [facility] / Plan: [facility] / Product Type: HMO /     HPI:   Ellie Leigh is a 87 year old  (1930), who is being seen today for a face to face provider visit at [facility]; medical necessity statement for DME included. This patient requires the following:  DME Ordered and Medical Necessity Statement   Walker:  4 wheeled seated  The patient has a mobility limitation of ambulation, back pain with sciatica  that significantly impairs her ability to participate in one or more mobility-related activities of daily living in the home. The patient is able to safely use the walker and the functional mobility deficit can be sufficiently resolved with the use of a walker        Pt needing above DME with expected length of need of 99 months    due to medical necessity associated with  following diagnosis:     Hyponatremia  Nausea  Intractable low back pain  Primary osteoarthritis of both knees      PMH   has a past medical history of Breast cancer (H); COPD (chronic obstructive pulmonary disease) (H); Dust allergy; DVT (deep vein thrombosis) in pregnancy (H); HTN (hypertension); Hyponatremia; Hypothyroid; Intermittent atrial fibrillation (H) (12/1/2011); Loose body in joint (4/15/2011); Neck fracture (H); and Syncope (5/12/2013). She also has no past medical history of Chronic kidney disease; Diabetes (H); Obese; or Sleep apnea.  CURRENT MEDICATIONS  Current Outpatient Prescriptions   Medication Sig Dispense Refill     diclofenac (VOLTAREN) 1 % GEL topical gel Place onto the skin 4 times daily as needed for moderate pain       TRAMADOL HCL PO Take 50 mg by mouth 2 times daily       Ondansetron HCl (ZOFRAN PO) Take 4 mg by mouth 2 times daily 30-60 min prior to tramadol       polyethylene glycol (MIRALAX/GLYCOLAX) Packet Take 1.5 packets by mouth daily       bisacodyl (DULCOLAX) 10 MG Suppository Place 10 mg rectally daily as needed for constipation       Ondansetron (ZOFRAN ODT PO) Take 4 mg by mouth every 8 hours as needed for nausea       Acetaminophen (TYLENOL PO) Take 975 mg by mouth 3 times daily       traMADol (ULTRAM) 50 MG tablet Take 0.5-1 tablets (25-50 mg) by mouth every 6 hours as needed for pain (25 mg for pain rating 3-6, 50 mg for pain rating 7-10) 40 tablet 0     ipratropium - albuterol 0.5 mg/2.5 mg/3 mL (DUONEB) 0.5-2.5 (3) MG/3ML neb solution TAKE 1 VIAL BY NEBULIZATION  EVERY SIX HOURS AS NEEDED FOR SHORTNESS OF BREATH/ DYSPNEA OR WHEEZING 180 mL 9     warfarin (COUMADIN) 1 MG tablet As directed by Anticoagulation Clinic (maintenance dose being re-established while at [facility]).  60 tablet 0     ranitidine (ZANTAC) 150 MG tablet Take 1 tablet (150 mg) by mouth 2 times daily 60 tablet 11     albuterol (PROAIR HFA/PROVENTIL HFA/VENTOLIN HFA) 108 (90 BASE) MCG/ACT Inhaler Inhale 2  puffs into the lungs every 6 hours as needed for shortness of breath / dyspnea 3 Inhaler 1     metoprolol (TOPROL-XL) 25 MG 24 hr tablet TAKE TWO TABLETS BY MOUTH TWICE DAILY  120 tablet 3     levothyroxine (SYNTHROID/LEVOTHROID) 50 MCG tablet TAKE 1 TABLET (50 MCG) BY MOUTH DAILY 90 tablet 2     SYMBICORT 160-4.5 MCG/ACT Inhaler INHALE 2 PUFFS INTO THE LUNGS 2 TIMES DAILY 10.2 g 5     probiotic CAPS Take 1 capsule by mouth every evening        CRANBERRY Take 475 mg by mouth 2 times daily.       ROS:4 point ROS including Respiratory, CV, GI and , other than that noted in the HPI,  is negative     EXAM  Vitals: /66  Pulse 63  Temp 98  F (36.7  C)  Resp 18  Wt 120 lb 3.2 oz (54.5 kg)  LMP 06/15/1985  SpO2 91%  BMI 25.12 kg/m2;BMI= Body mass index is 25.12 kg/(m^2).  See exam above     ASSESSMENT/PLAN:  1. Hyponatremia    2. Nausea    3. Intractable low back pain    4. Primary osteoarthritis of both knees        Orders:  1. Facility staff/TC to contact DME company to get their order form for provider to fill out    ELECTRONICALLY SIGNED BY [facility] CERTIFIED PROVIDER:  LACI Pena CNP   NPI: [id number]  [facility]  [address] [place] [address], [address]  [place] [address]        Electronically signed by:  LACI Pena CNP      Sincerely,        LACI Pena CNP

## 2018-02-19 NOTE — PROGRESS NOTES
"2/26/18 ADDENDUM: [facility] tried calling to cancel patient's appt for tomorrow. Writer ended up asking to speak with a nurse. AYLIN Jimenez stated the lab result on Friday came back after 5pm and they were instructed by inpatient pharmacy to take 0.5mg until Monday.  Inbasket message from inpatient pharmacy \"INR called from [facility] this evening at 2.02.  (Goal is 1.5 - 2).  Notes state that dosing from previous week is 5.5 mg.  I will decrease to 4.5 mg over the next week.  Instructed to give 0.5 mg daily until Monday, then give 1 mg M,W,F next week, 0.5mg other days.     I did NOT instruct for next INR.   Aaron Kc, [facility] \"    2/23/18 ADDENDUM: As of 4:38pm, INR has not been called in. Writer attempted to call the TCU and no one answered. Writer did not leave a voicemail as it was a very general answering machine and writer was not sure where that message would be left. Eugene FRENCH RN    ANTICOAGULATION FOLLOW-UP CLINIC VISIT    Patient Name:  Ellie Leigh  Date:  2/19/2018  Contact Type:  Telephone/ AYLIN Lopez, [facility]    SUBJECTIVE:     Patient Findings     Positives Change in medications (Continues on Tamiflu. Started on Meclazine for nausea. )    Comments Patient is having issues with nausea but no emesis. No other changes reported.     Patient had 5.5 mg in the previous 7 days, will adjust dose to keep weekly mg total the same by the next INR check on Friday.             OBJECTIVE    INR   Date Value Ref Range Status   02/19/2018 2.08  Final       ASSESSMENT / PLAN  No question data found.  Anticoagulation Summary as of 2/19/2018     INR goal 1.5-2.0   Today's INR 2.08!   Maintenance plan No maintenance plan   Full instructions 2/19: 1 mg; 2/20: 0.5 mg; 2/21: 0.5 mg; 2/22: 0.5 mg   Plan last modified Katia Landrum RN (2/19/2018)   Next INR check 2/23/2018   Priority INR   Target end date Indefinite    Indications   Atrial fibrillation with rapid ventricular response (H) (Resolved) " [I48.91]  DVT (deep venous thrombosis) [I82.409]  Long term current use of anticoagulant therapy [Z79.01]         Anticoagulation Episode Summary     INR check location     Preferred lab     Send INR reminders to [place] PH[facility] ANTICOAG POOL    Comments * Actual INR goal is 1.7-2.3  please follow Dec 2015 INR referral (June 2016 goal range is an error) [facility]      Anticoagulation Care Providers     Provider Role Specialty Phone number    Josefina Gómez MD [facility] [phone]            See the Encounter Report to view Anticoagulation Flowsheet and Dosing Calendar (Go to Encounters tab in chart review, and find the Anticoagulation Therapy Visit)        Katia Landrum RN

## 2018-02-20 ENCOUNTER — RECORDS - HEALTHEAST (OUTPATIENT)
Dept: LAB | Facility: CLINIC | Age: 83
End: 2018-02-20

## 2018-02-21 LAB
ANION GAP SERPL CALCULATED.3IONS-SCNC: 5 MMOL/L (ref 5–18)
BUN SERPL-MCNC: 17 MG/DL (ref 8–28)
CALCIUM SERPL-MCNC: 8.9 MG/DL (ref 8.5–10.5)
CHLORIDE BLD-SCNC: 93 MMOL/L (ref 98–107)
CO2 SERPL-SCNC: 29 MMOL/L (ref 22–31)
CREAT SERPL-MCNC: 0.64 MG/DL (ref 0.6–1.1)
GFR SERPL CREATININE-BSD FRML MDRD: >60 ML/MIN/1.73M2
GLUCOSE BLD-MCNC: 117 MG/DL (ref 70–125)
POTASSIUM BLD-SCNC: 4.5 MMOL/L (ref 3.5–5)
SODIUM SERPL-SCNC: 127 MMOL/L (ref 136–145)

## 2018-02-22 ENCOUNTER — RECORDS - HEALTHEAST (OUTPATIENT)
Dept: LAB | Facility: CLINIC | Age: 83
End: 2018-02-22

## 2018-02-23 ENCOUNTER — TRANSFERRED RECORDS (OUTPATIENT)
Dept: HEALTH INFORMATION MANAGEMENT | Facility: CLINIC | Age: 83
End: 2018-02-23

## 2018-02-23 LAB
INR PPP: 2.02 (ref 0.9–1.1)
INR PPP: 2.02 (ref 0.9–1.1)

## 2018-02-26 ENCOUNTER — ANTICOAGULATION THERAPY VISIT (OUTPATIENT)
Dept: ANTICOAGULATION | Facility: CLINIC | Age: 83
End: 2018-02-26
Payer: COMMERCIAL

## 2018-02-26 ENCOUNTER — RECORDS - HEALTHEAST (OUTPATIENT)
Dept: LAB | Facility: CLINIC | Age: 83
End: 2018-02-26

## 2018-02-26 ENCOUNTER — TRANSFERRED RECORDS (OUTPATIENT)
Dept: HEALTH INFORMATION MANAGEMENT | Facility: CLINIC | Age: 83
End: 2018-02-26

## 2018-02-26 DIAGNOSIS — Z79.01 LONG TERM CURRENT USE OF ANTICOAGULANT THERAPY: ICD-10-CM

## 2018-02-26 LAB
INR PPP: 1.61
INR PPP: 1.61 (ref 0.9–1.1)

## 2018-02-26 PROCEDURE — 99207 ZZC NO CHARGE NURSE ONLY: CPT

## 2018-02-26 NOTE — PROGRESS NOTES
ANTICOAGULATION FOLLOW-UP CLINIC VISIT    Patient Name:  Ellie Leigh  Date:  2/26/2018  Contact Type:  Telephone/ writer [facility]    SUBJECTIVE:     Patient Findings     Positives No Problem Findings    Comments No changes or concerns per [facility] The patient is discharging on 3-2-18 so they will check INR on 3-1-18. Prior dosing came from inpatient pharmacy, which resulted in slightly sub therapeutic levels. Pt will have had 4.5 mg/week at the time of next INR. She has had 4 mg/week at this point.           OBJECTIVE    INR   Date Value Ref Range Status   02/26/2018 1.61  Final       ASSESSMENT / PLAN  INR assessment THER    Recheck INR In: 3 DAYS    INR Location TCU      Anticoagulation Summary as of 2/26/2018     INR goal 1.5-2.0   Today's INR 1.61   Maintenance plan No maintenance plan   Full instructions 2/26: 1 mg; 2/27: 0.5 mg; 2/28: 1 mg   Plan last modified Katia Landrum RN (2/19/2018)   Next INR check 3/1/2018   Priority INR   Target end date Indefinite    Indications   Atrial fibrillation with rapid ventricular response (H) (Resolved) [I48.91]  DVT (deep venous thrombosis) [I82.409]  Long term current use of anticoagulant therapy [Z79.01]         Anticoagulation Episode Summary     INR check location     Preferred lab     Send INR reminders to [place] [facility]ICOAG POOL    Comments * Actual INR goal is 1.7-2.3  please follow Dec 2015 INR referral (June 2016 goal range is an error) [facility]      Anticoagulation Care Providers     Provider Role Specialty Phone number    Josefina Gómez MD [facility] [phone]            See the Encounter Report to view Anticoagulation Flowsheet and Dosing Calendar (Go to Encounters tab in chart review, and find the Anticoagulation Therapy Visit)        Lissy Beaulieu RN

## 2018-02-26 NOTE — MR AVS SNAPSHOT
Ellie Leigh   2/26/2018   Anticoagulation Therapy Visit    Description:  87 year old female   Provider:  Lissy Beaulieu, RN   Department:  [facility]           INR as of 2/26/2018     Today's INR 1.61      Anticoagulation Summary as of 2/26/2018     INR goal 1.5-2.0   Today's INR 1.61   Full instructions 2/26: 1 mg; 2/27: 0.5 mg; 2/28: 1 mg   Next INR check 3/1/2018    Indications   Atrial fibrillation with rapid ventricular response (H) (Resolved) [I48.91]  DVT (deep venous thrombosis) [I82.409]  Long term current use of anticoagulant therapy [Z79.01]         February 2018 Details    Sun Mon Tue Wed Thu Fri Sat         1               2               3                 4               5               6               7               8               9               10                 11               12               13               14               15               16               17                 18               19               20               21               22               23               24                 25               26      1 mg   See details      27      0.5 mg         28      1 mg             Date Details   02/26 This INR check               How to take your warfarin dose     To take:  0.5 mg Take 0.5 of a 1 mg tablet.    To take:  1 mg Take 1 of the 1 mg tablets.           March 2018 Details    Sun Mon Tue Wed Thu Fri Sat         1            2               3                 4               5               6               7               8               9               10                 11               12               13               14               15               16               17                 18               19               20               21               22               23               24                 25               26               27               28               29               30               31                Date Details   No additional details     Date of next INR:  3/1/2018

## 2018-02-27 NOTE — ADDENDUM NOTE
Encounter addended by: Glenda Kasper, PT on: 2/27/2018  1:47 PM<BR>     Actions taken: Sign clinical note, Flowsheet accepted, Episode resolved

## 2018-02-27 NOTE — PROGRESS NOTES
Discharge Note -Physical Therapy    NAME:  Ellie Leigh  MRN:   [id number]    S:   Pt cancelled appointment on 2/7/18 due to extreme pain--pt was planning on seeing ortho.     O:  Objective information is not available as pt has not returned for therapy.  Initial evaluation note will serve as final entry.    A:   Pt did not return for further treatment.    Status of goals is unknown.  Unable to report G code as patient did not return    P:  Discharge from PT this date.    Thank you for this referral,    Glenda Kasper, PT,  CEAS   #[id number]  [facility]  [phone]

## 2018-02-28 ENCOUNTER — NURSING HOME VISIT (OUTPATIENT)
Dept: GERIATRICS | Facility: CLINIC | Age: 83
End: 2018-02-28
Payer: COMMERCIAL

## 2018-02-28 VITALS
BODY MASS INDEX: 24.12 KG/M2 | RESPIRATION RATE: 18 BRPM | DIASTOLIC BLOOD PRESSURE: 60 MMHG | WEIGHT: 115.4 LBS | OXYGEN SATURATION: 96 % | SYSTOLIC BLOOD PRESSURE: 118 MMHG | HEART RATE: 71 BPM | TEMPERATURE: 97.6 F

## 2018-02-28 VITALS
HEART RATE: 71 BPM | SYSTOLIC BLOOD PRESSURE: 118 MMHG | DIASTOLIC BLOOD PRESSURE: 60 MMHG | WEIGHT: 115.4 LBS | OXYGEN SATURATION: 96 % | RESPIRATION RATE: 18 BRPM | BODY MASS INDEX: 24.12 KG/M2 | TEMPERATURE: 97.6 F

## 2018-02-28 DIAGNOSIS — R11.0 NAUSEA: ICD-10-CM

## 2018-02-28 DIAGNOSIS — M54.59 INTRACTABLE LOW BACK PAIN: Primary | ICD-10-CM

## 2018-02-28 PROCEDURE — 99309 SBSQ NF CARE MODERATE MDM 30: CPT | Performed by: NURSE PRACTITIONER

## 2018-02-28 NOTE — PROGRESS NOTES
[place]    Chief Complaint   Patient presents with     Nursing Home Acute       HPI:    Ellie Leigh is a 87 year old  (9/12/1930), who is being seen today for an episodic care visit at [facility].    HPI information obtained from: facility chart records, facility staff and patient report. Today's concern is:  Intractable low back pain  Nausea  Patient and daughter concerned that Tramadol causing nausea. Inquired about oxycodone.  Patient states that actually she feels nauseated when she takes the Zophran. She can feel the nausea coming on when the Zophran is in her mouth.      REVIEW OF SYSTEMS:  4 point ROS including Respiratory, CV, GI and , other than that noted in the HPI,  is negative4 point ROS including Respiratory, CV, GI and , other than that noted in the HPI,  is negative    /60  Pulse 71  Temp 97.6  F (36.4  C)  Resp 18  Wt 115 lb 6.4 oz (52.3 kg)  LMP 06/15/1985  SpO2 96%  BMI 24.12 kg/m2  GENERAL APPEARANCE:  Alert, in no distress  Rates pain a 3/10, even after therapy  HRR, lungs clear, ROM of extremities limited    ASSESSMENT/PLAN:  Intractable low back pain  Nausea  Education provided re:  Opioid use, dangers for the elderly  Discussed benefit ratio of pain management vs nausea  Discussed option of patient controlling when getting Tramadol rather than it being scheduled.  Patient is in favor of this.  She would like to see how much pain she can tolerate.  Discussed option of different class of anti-emetic. Patient willing to try       Orders:  1.  Change Tramadol 50mg from scheduled to BID PRN  2.  Compazine 5mg PO up to every 4hrs PRN for nausea/vomiting  3.  D/C Zophran    Total time spent with patient visit at the [facility] nursing facility was 25 min including patient visit, review of past records and conference with daughter. Greater than 50% of total time spent with counseling and coordinating care due to discussion of pain  managment    Electronically signed by:  Halima Laguerre

## 2018-02-28 NOTE — PROGRESS NOTES
[facility] [facility] DISCHARGE SUMMARY    PATIENT'S NAME: Ellie Leigh  YOB: 1930  MEDICAL RECORD NUMBER:  [id number]    PRIMARY CARE PROVIDER AND CLINIC RESPONSIBLE AFTER TRANSFER: Josefina Gómez [address] [place] [id number]     CODE STATUS/ADVANCE DIRECTIVES DISCUSSION:   DNR / DNI       Allergies   Allergen Reactions     Cephalosporins Nausea     Cefzil     Doxycycline Nausea and Vomiting     Sulfa Drugs Nausea     Penicillins Rash     PCN       TRANSFERRING PROVIDERS: LACI Toribio CNP, Dr. Tha Damian MD  DATE OF SNF ADMISSION:  February / 08 / 2018  DATE OF SNF (anticipated) DISCHARGE: March / 02 / 2018  DISCHARGE DISPOSITION: [facility] Provider   Nursing Facility: [facility] stay 2/6/18  to 2/8/18.     Condition on Discharge:  Improving.  Function:  Independent with ADL's, LEE 41/56  Cognitive Scores: SLUMS 23/30 and CPT 5.1/5.6    Equipment: walker    DISCHARGE DIAGNOSIS:   1. Non-traumatic compression fracture of L2 lumbar vertebra with routine healing, subsequent encounter    2. Hyponatremia    3. 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 Nausea    5. H/O TB (tuberculosis)    6. Intermittent atrial fibrillation (H)    7. Benign essential HTN    8. H/O deep venous thrombosis    9. Chronic obstructive pulmonary disease, unspecified COPD type (H)    10. Chronic constipation    11. Gastroesophageal reflux disease, esophagitis presence not specified    12. Hypothyroidism, unspecified type        HPI Nursing Facility Course:    HPI information obtained from: facility chart records, facility staff, patient report and [facility] chart review. Thalia Leigh is an 87 year old  female with a significant past medical history of SAIDH, atrial fibrillation hypertension, prior DVT on coumadin, hypothyroidism, TB infection with completion of treatment in the early 2000's and back pain who  presented with worsened back pain. She was unsure of any specific moment of injury. CT scan done and showed Inferior endplate central compression deformities at L2 and L3. This was discussed with [facility]. She was treated with semi-rigid brace and oral pain medication. Per ortho would consider outpatient vertebroplasty but only if not improving with conservative measures.  She does have chronic hyponatremia which remained low but stable during hospital stay. She was placed on 1L fluid restriction, but did not have any improvement. Did appear dry so was increased to 1.5L FWR and recommended increased sodium intake. She did not feel able to care for herself at home so she was discharge to TCU for further rehab and medical management.     Pain control and mobility improved. However had some intermittent nausea which she felt was related to tramadol. Zofran was started which she felt made it worse so she was started on compazine with some improvement. She continued have hyponatremia, with NA as low as 123 in TCU. She continued on 1.5l FWR and drinking Gatorade, and slowly improved to 127 prior to discharge. There was some discussion that she may need LTC at discharge. However, she is independent with ADL's and ambulation and therapy felt safe to discharge home. She will have PT, RN and HHA.     Non-traumatic compression fracture of L2 lumbar vertebra with routine healing, subsequent encounter  Pain controlled on tramadol 50mg BID PRN and scheduled APAP. Does have some chronic knee pain, which did slow mobility. She is now independent with ADL's and activity. May benefit from orthopedic consult and steroid injection to knees after discharge.  Will have home PT and HHA at discharge.     Hyponatremia  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)  Chronic, SIADH suspected to be r/t to right upper lobe chronic pulmonary process. NA as low as 123 in TCU, improved to 127. She remained compliant with 1.5L  FWR, she has been drinking Gatorade and limiting water.     Nausea  Felt to be r/t to tramadol and zofran. She was started on compazine with some improvement. She has been able to eat and drink, no recent emesis. Denies any abdominal pain.     H/O TB (tuberculosis)  History of TB in the early 2000's which was adequately treated, negative AFB noted in 2014. Had CXR while IP which noted scar tissue and chronic RUL opacity.  As TB history TCU did not do mantoux test, but instead did CXR which showed possible RUL scar tissue, but could not r/o TB. CT done and was stable with CT done in 2012. No s/s of active TB during TCU stay.     Intermittent atrial fibrillation (H)  Benign essential HTN  H/O deep venous thrombosis  She is currently on metoprolol XL 25mg BID. She is anticoagulated on coumadin. Will have home health RN to assist with INR checks. Most recent INR was 1.6, recheck scheduled today. She is follow by [facility]. No other concerns and BP stable during TCU stay. HR 60's-80's.      Chronic obstructive pulmonary disease, unspecified COPD type (H)  Currently on PTA Symbicort, PRN albuterol MDI and nebs. Does have some SOB with more strenuous activity, which she reports is baseline. No concerns of exacerbation during TCU stay.     Chronic constipation  Has  been taking miralax 1 1/2 capfuls daily. Continued to have some issues with constipation. PRN suppository available. Having a BM every 2-3 days.     Gastroesophageal reflux disease, esophagitis presence not specified  On PTA zantac BID. Denies any abdominal pain or indigestion    Hypothyroidism, unspecified type  On PTA levothyroxine. Most recent TSH on 1/8/2018 was 2.54.      PAST MEDICAL HISTORY:  has a past medical history of Breast cancer (H); COPD (chronic obstructive pulmonary disease) (H); Dust allergy; DVT (deep vein thrombosis) in pregnancy (H); HTN (hypertension); Hyponatremia; Hypothyroid; Intermittent atrial fibrillation (H) (12/1/2011); Loose body in  joint (4/15/2011); Neck fracture (H); and Syncope (5/12/2013). She also has no past medical history of Chronic kidney disease; Diabetes (H); Obese; or Sleep apnea.    DISCHARGE MEDICATIONS:  Current Outpatient Prescriptions   Medication Sig Dispense Refill     diclofenac (VOLTAREN) 1 % GEL topical gel Place onto the skin 4 times daily as needed for moderate pain       TRAMADOL HCL PO Take 50 mg by mouth 2 times daily as needed        Ondansetron HCl (ZOFRAN PO) Take 4 mg by mouth 2 times daily 30-60 min prior to tramadol       polyethylene glycol (MIRALAX/GLYCOLAX) Packet Take 1.5 packets by mouth daily       bisacodyl (DULCOLAX) 10 MG Suppository Place 10 mg rectally daily as needed for constipation       Acetaminophen (TYLENOL PO) Take 975 mg by mouth 3 times daily       traMADol (ULTRAM) 50 MG tablet Take 0.5-1 tablets (25-50 mg) by mouth every 6 hours as needed for pain (25 mg for pain rating 3-6, 50 mg for pain rating 7-10) 40 tablet 0     ipratropium - albuterol 0.5 mg/2.5 mg/3 mL (DUONEB) 0.5-2.5 (3) MG/3ML neb solution TAKE 1 VIAL BY NEBULIZATION  EVERY SIX HOURS AS NEEDED FOR SHORTNESS OF BREATH/ DYSPNEA OR WHEEZING 180 mL 9     warfarin (COUMADIN) 1 MG tablet Give 0.5 mg on Tue and 1mg Mon, Wed for AFIB  Re-check INR 3/1/18. 60 tablet 0     ranitidine (ZANTAC) 150 MG tablet Take 1 tablet (150 mg) by mouth 2 times daily 60 tablet 11     albuterol (PROAIR HFA/PROVENTIL HFA/VENTOLIN HFA) 108 (90 BASE) MCG/ACT Inhaler Inhale 2 puffs into the lungs every 6 hours as needed for shortness of breath / dyspnea 3 Inhaler 1     metoprolol (TOPROL-XL) 25 MG 24 hr tablet TAKE TWO TABLETS BY MOUTH TWICE DAILY  120 tablet 3     levothyroxine (SYNTHROID/LEVOTHROID) 50 MCG tablet TAKE 1 TABLET (50 MCG) BY MOUTH DAILY 90 tablet 2     SYMBICORT 160-4.5 MCG/ACT Inhaler INHALE 2 PUFFS INTO THE LUNGS 2 TIMES DAILY 10.2 g 5     probiotic CAPS Take 1 capsule by mouth every evening        CRANBERRY Take 475 mg by mouth 2 times  daily.       Prochlorperazine Maleate (COMPAZINE PO) Take 5 mg by mouth every 4 hours as needed for nausea       Ondansetron (ZOFRAN ODT PO) Take 4 mg by mouth every 8 hours as needed for nausea         MEDICATION CHANGES/RATIONALE:   Tramadol scheduled then changed to BID for pain control  Zofran discontinued and compazine started for better control of nausea.     Controlled medications sent with patient:   Script for tramadol medication for 30 tabs and 0 refills given to patient at dischage to have them fill at their out patient pharmacy     ROS:    10 point ROS of systems including Constitutional, Eyes, Respiratory, Cardiovascular, Gastroenterology, Genitourinary, Integumentary, Muscularskeletal, Psychiatric were all negative except for pertinent positives noted in my HPI.    Physical Exam:   Vitals: /60  Pulse 71  Temp 97.6  F (36.4  C)  Resp 18  Wt 115 lb 6.4 oz (52.3 kg)  LMP 06/15/1985  SpO2 96%  BMI 24.12 kg/m2  BMI= Body mass index is 24.12 kg/(m^2).    GENERAL APPEARANCE:  Alert, in no distress, oriented, cooperative  RESP:  respiratory effort and palpation of chest normal, lungs clear to auscultation , no respiratory distress, diminished breath sounds bilat bases  CV:  Palpation and auscultation of heart done , regular rate and rhythm, no murmur, rub, or gallop, +2 pedal pulses, peripheral edema 1+ in BLE  ABDOMEN:  normal bowel sounds, soft, nontender, no hepatosplenomegaly or other masses, no guarding or rebound  M/S:   Gait and station abnormal generalized weakness, no joint tenderness, no spinal tenderness, BLE 5/5  SKIN:  Inspection of skin and subcutaneous tissue baseline, Palpation of skin and subcutaneous tissue baseline  NEURO:   Cranial nerves 2-12 are normal tested and grossly at patient's baseline, Examination of sensation by touch normal  PSYCH:  oriented X 3, normal insight, judgement and memory, affect and mood normal    DISCHARGE PLAN:  Physical Therapy, Registered Nurse and  Home Health Aide  Patient instructed to follow-up with:  PCP in 7 days      Current [place] scheduled appointments:  Future Appointments  Date Time Provider Department Center   2/28/2018 4:00 PM Paola Carreno APRN CNP [facility]   4/3/2018 10:30 AM [facility] TECH1 [facility]       MTM referral needed and placed by this provider: No    Pending labs: none    SNF labs   Anticoagulation Therapy Visit on 02/26/2018   Component Date Value Ref Range Status     INR 02/26/2018 1.61   Final         Discharge Treatments:  F/u with PCP in 7-10 days  Medications as above  Home PT, RN, HHA  Home RN to check INR after discharge per INR clinic recommendations.     TOTAL DISCHARGE TIME:   Greater than 30 minutes  Electronically signed by:  LACI Toribio CNP         Documentation of Face-to-Face and Certification for Home Health Services     Patient: Ellie Leigh   YOB: 1930  MR Number: [id number]  Today's Date: 3/1/2018    I certify that patient: Ellie Leigh is under my care and that I, or a nurse practitioner or physician's assistant working with me, had a face-to-face encounter that meets the physician face-to-face encounter requirements with this patient on: 3/1/2018.    This encounter with the patient was in whole, or in part, for the following medical condition, which is the primary reason for home health care: lumbar compression fracture, physical deconditioning, afib on coumadin.    I certify that, based on my findings, the following services are medically necessary home health services: Nursing, Physical Therapy and home health aide.    My clinical findings support the need for the above services because: Nurse is needed: To assess INR, nausea, pain control after changes in medications or other medical regimen.., Physical Therapy Services are needed to assess and treat the following functional impairments: needs continued rehab, needs to use assistive device. and . home health  aide to assist with bathing and ADL's.     Further, I certify that my clinical findings support that this patient is homebound (i.e. absences from home require considerable and taxing effort and are for medical reasons or Religion services or infrequently or of short duration when for other reasons) because: Requires assistance of another person or specialized equipment to access medical services because patient: Is unable to walk greater than 150 feet without rest...    Based on the above findings. I certify that this patient is confined to the home and needs intermittent skilled nursing care, physical therapy and/or speech therapy.  The patient is under my care, and I have initiated the establishment of the plan of care.  This patient will be followed by a physician who will periodically review the plan of care.  Physician/Provider to provide follow up care: Josefina Gómez    Responsible Medicare certified PECOS Physician: Dr. Tha Damian  Physician Signature: See electronic signature associated with these discharge orders.  Date: 3/1/2018    Electronically signed by Dr. Tha Damian MD, and only signing for initial order. Please send all follow up questions and concerns or needed follow up signatures to the PCP Josefina Gómez.

## 2018-02-28 NOTE — LETTER
2/28/2018        RE: Ellie Leigh  [address] [address]  [facility] [place] [id number]        [facility]    Chief Complaint   Patient presents with     Nursing Home Acute       HPI:    Ellie Leigh is a 87 year old  (9/12/1930), who is being seen today for an episodic care visit at [facility].    HPI information obtained from: facility chart records, facility staff and patient report. Today's concern is:  Intractable low back pain  Nausea  Patient and daughter concerned that Tramadol causing nausea. Inquired about oxycodone.  Patient states that actually she feels nauseated when she takes the Zophran. She can feel the nausea coming on when the Zophran is in her mouth.      REVIEW OF SYSTEMS:  4 point ROS including Respiratory, CV, GI and , other than that noted in the HPI,  is negative4 point ROS including Respiratory, CV, GI and , other than that noted in the HPI,  is negative    /60  Pulse 71  Temp 97.6  F (36.4  C)  Resp 18  Wt 115 lb 6.4 oz (52.3 kg)  LMP 06/15/1985  SpO2 96%  BMI 24.12 kg/m2  GENERAL APPEARANCE:  Alert, in no distress  Rates pain a 3/10, even after therapy  HRR, lungs clear, ROM of extremities limited    ASSESSMENT/PLAN:  Intractable low back pain  Nausea  Education provided re:  Opioid use, dangers for the elderly  Discussed benefit ratio of pain management vs nausea  Discussed option of patient controlling when getting Tramadol rather than it being scheduled.  Patient is in favor of this.  She would like to see how much pain she can tolerate.  Discussed option of different class of anti-emetic. Patient willing to try       Orders:  1.  Change Tramadol 50mg from scheduled to BID PRN  2.  Compazine 5mg PO up to every 4hrs PRN for nausea/vomiting  3.  D/C Zophran    Total time spent with patient visit at the [facility] nursing facility was 25 min including patient visit, review of past records and conference with daughter. Greater than 50% of total time  spent with counseling and coordinating care due to discussion of pain managment    Electronically signed by:  Halima Laguerre      Sincerely,        LACI Pena CNP

## 2018-03-01 ENCOUNTER — DISCHARGE SUMMARY NURSING HOME (OUTPATIENT)
Dept: GERIATRICS | Facility: CLINIC | Age: 83
End: 2018-03-01
Payer: COMMERCIAL

## 2018-03-01 ENCOUNTER — RECORDS - HEALTHEAST (OUTPATIENT)
Dept: LAB | Facility: CLINIC | Age: 83
End: 2018-03-01

## 2018-03-01 DIAGNOSIS — R11.0 NAUSEA: ICD-10-CM

## 2018-03-01 DIAGNOSIS — J44.9 CHRONIC OBSTRUCTIVE PULMONARY DISEASE, UNSPECIFIED COPD TYPE (H): ICD-10-CM

## 2018-03-01 DIAGNOSIS — I48.0 INTERMITTENT ATRIAL FIBRILLATION (H): ICD-10-CM

## 2018-03-01 DIAGNOSIS — K59.09 CHRONIC CONSTIPATION: ICD-10-CM

## 2018-03-01 DIAGNOSIS — E87.1 HYPONATREMIA: ICD-10-CM

## 2018-03-01 DIAGNOSIS — Z86.11 H/O TB (TUBERCULOSIS): ICD-10-CM

## 2018-03-01 DIAGNOSIS — I10 BENIGN ESSENTIAL HTN: ICD-10-CM

## 2018-03-01 DIAGNOSIS — E03.9 HYPOTHYROIDISM, UNSPECIFIED TYPE: ICD-10-CM

## 2018-03-01 DIAGNOSIS — K21.9 GASTROESOPHAGEAL REFLUX DISEASE, ESOPHAGITIS PRESENCE NOT SPECIFIED: ICD-10-CM

## 2018-03-01 DIAGNOSIS — E22.2 SIADH (SYNDROME OF INAPPROPRIATE ADH PRODUCTION) (H): ICD-10-CM

## 2018-03-01 DIAGNOSIS — M48.56XD NON-TRAUMATIC COMPRESSION FRACTURE OF L2 LUMBAR VERTEBRA WITH ROUTINE HEALING, SUBSEQUENT ENCOUNTER: Primary | ICD-10-CM

## 2018-03-01 DIAGNOSIS — Z86.718 H/O DEEP VENOUS THROMBOSIS: ICD-10-CM

## 2018-03-01 LAB
INR PPP: 1.56
INR PPP: 1.58 (ref 0.9–1.1)

## 2018-03-01 PROCEDURE — 99316 NF DSCHRG MGMT 30 MIN+: CPT | Performed by: NURSE PRACTITIONER

## 2018-03-01 NOTE — LETTER
3/1/2018        RE: Ellie Leigh  [address] [facility] CONSTANCE  [facility] [address]          [facility] DISCHARGE SUMMARY    PATIENT'S NAME: Ellie Leigh  YOB: 1930  MEDICAL RECORD NUMBER:  [id number]    PRIMARY CARE PROVIDER AND CLINIC RESPONSIBLE AFTER TRANSFER: Josefina Gómez [id number]  / [facility] [address]     CODE STATUS/ADVANCE DIRECTIVES DISCUSSION:   DNR / DNI       Allergies   Allergen Reactions     Cephalosporins Nausea     Cefzil     Doxycycline Nausea and Vomiting     Sulfa Drugs Nausea     Penicillins Rash     PCN       TRANSFERRING PROVIDERS: LACI Toribio CNP, Dr. Tha Damian MD  DATE OF SNF ADMISSION:  February / 08 / 2018  DATE OF SNF (anticipated) DISCHARGE: March / 02 / 2018  DISCHARGE DISPOSITION: [facility] Provider   Nursing Facility: [facility] stay 2/6/18  to 2/8/18.     Condition on Discharge:  Improving.  Function:  Independent with ADL's, LEE 41/56  Cognitive Scores: SLUMS 23/30 and CPT 5.1/5.6    Equipment: walker    DISCHARGE DIAGNOSIS:   1. Non-traumatic compression fracture of L2 lumbar vertebra with routine healing, subsequent encounter    2. Hyponatremia    3. 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 Nausea    5. H/O TB (tuberculosis)    6. Intermittent atrial fibrillation (H)    7. Benign essential HTN    8. H/O deep venous thrombosis    9. Chronic obstructive pulmonary disease, unspecified COPD type (H)    10. Chronic constipation    11. Gastroesophageal reflux disease, esophagitis presence not specified    12. Hypothyroidism, unspecified type        HPI Nursing Facility Course:    HPI information obtained from: facility chart records, facility staff, patient report and [facility] chart review. Thalia Leigh is an 87 year old  female with a significant past medical history of SAIDH, atrial fibrillation hypertension, prior DVT on coumadin,  hypothyroidism, TB infection with completion of treatment in the early 2000's and back pain who presented with worsened back pain. She was unsure of any specific moment of injury. CT scan done and showed Inferior endplate central compression deformities at L2 and L3. This was discussed with [facility] Ortho spine. She was treated with semi-rigid brace and oral pain medication. Per ortho would consider outpatient vertebroplasty but only if not improving with conservative measures.  She does have chronic hyponatremia which remained low but stable during hospital stay. She was placed on 1L fluid restriction, but did not have any improvement. Did appear dry so was increased to 1.5L FWR and recommended increased sodium intake. She did not feel able to care for herself at home so she was discharge to TCU for further rehab and medical management.     Pain control and mobility improved. However had some intermittent nausea which she felt was related to tramadol. Zofran was started which she felt made it worse so she was started on compazine with some improvement. She continued have hyponatremia, with NA as low as 123 in TCU. She continued on 1.5l FWR and drinking Gatorade, and slowly improved to 127 prior to discharge. There was some discussion that she may need LTC at discharge. However, she is independent with ADL's and ambulation and therapy felt safe to discharge home. She will have PT, RN and HHA.     Non-traumatic compression fracture of L2 lumbar vertebra with routine healing, subsequent encounter  Pain controlled on tramadol 50mg BID PRN and scheduled APAP. Does have some chronic knee pain, which did slow mobility. She is now independent with ADL's and activity. May benefit from orthopedic consult and steroid injection to knees after discharge.  Will have home PT and HHA at discharge.     Hyponatremia  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)  Chronic, SIADH suspected to be r/t to right upper lobe  chronic pulmonary process. NA as low as 123 in TCU, improved to 127. She remained compliant with 1.5L FWR, she has been drinking Gatorade and limiting water.     Nausea  Felt to be r/t to tramadol and zofran. She was started on compazine with some improvement. She has been able to eat and drink, no recent emesis. Denies any abdominal pain.     H/O TB (tuberculosis)  History of TB in the early 2000's which was adequately treated, negative AFB noted in 2014. Had CXR while IP which noted scar tissue and chronic RUL opacity.   As TB history TCU did not do mantoux test, but instead did CXR which showed possible RUL scar tissue, but could not r/o TB. CT done and was stable with CT done in 2012. No s/s of active TB during TCU stay.     Intermittent atrial fibrillation (H)  Benign essential HTN  H/O deep venous thrombosis  She is currently on metoprolol XL 25mg BID. She is anticoagulated on coumadin. Will have home health RN to assist with INR checks. Most recent INR was 1.6, recheck scheduled today. She is follow by [facility]. No other concerns and BP stable during TCU stay. HR 60's-80's.      Chronic obstructive pulmonary disease, unspecified COPD type (H)  Currently on PTA Symbicort, PRN albuterol MDI and nebs. Does have some SOB with more strenuous activity, which she reports is baseline. No concerns of exacerbation during TCU stay.     Chronic constipation  Has  been taking miralax 1 1/2 capfuls daily. Continued to have some issues with constipation. PRN suppository available. Having a BM every 2-3 days.     Gastroesophageal reflux disease, esophagitis presence not specified  On PTA zantac BID. Denies any abdominal pain or indigestion    Hypothyroidism, unspecified type  On PTA levothyroxine. Most recent TSH on 1/8/2018 was 2.54.      PAST MEDICAL HISTORY:  has a past medical history of Breast cancer (H); COPD (chronic obstructive pulmonary disease) (H); Dust allergy; DVT (deep vein thrombosis) in pregnancy (H); HTN  (hypertension); Hyponatremia; Hypothyroid; Intermittent atrial fibrillation (H) (12/1/2011); Loose body in joint (4/15/2011); Neck fracture (H); and Syncope (5/12/2013). She also has no past medical history of Chronic kidney disease; Diabetes (H); Obese; or Sleep apnea.    DISCHARGE MEDICATIONS:  Current Outpatient Prescriptions   Medication Sig Dispense Refill     diclofenac (VOLTAREN) 1 % GEL topical gel Place onto the skin 4 times daily as needed for moderate pain       TRAMADOL HCL PO Take 50 mg by mouth 2 times daily as needed        Ondansetron HCl (ZOFRAN PO) Take 4 mg by mouth 2 times daily 30-60 min prior to tramadol       polyethylene glycol (MIRALAX/GLYCOLAX) Packet Take 1.5 packets by mouth daily       bisacodyl (DULCOLAX) 10 MG Suppository Place 10 mg rectally daily as needed for constipation       Acetaminophen (TYLENOL PO) Take 975 mg by mouth 3 times daily       traMADol (ULTRAM) 50 MG tablet Take 0.5-1 tablets (25-50 mg) by mouth every 6 hours as needed for pain (25 mg for pain rating 3-6, 50 mg for pain rating 7-10) 40 tablet 0     ipratropium - albuterol 0.5 mg/2.5 mg/3 mL (DUONEB) 0.5-2.5 (3) MG/3ML neb solution TAKE 1 VIAL BY NEBULIZATION  EVERY SIX HOURS AS NEEDED FOR SHORTNESS OF BREATH/ DYSPNEA OR WHEEZING 180 mL 9     warfarin (COUMADIN) 1 MG tablet Give 0.5 mg on Tue and 1mg Mon, Wed for AFIB  Re-check INR 3/1/18. 60 tablet 0     ranitidine (ZANTAC) 150 MG tablet Take 1 tablet (150 mg) by mouth 2 times daily 60 tablet 11     albuterol (PROAIR HFA/PROVENTIL HFA/VENTOLIN HFA) 108 (90 BASE) MCG/ACT Inhaler Inhale 2 puffs into the lungs every 6 hours as needed for shortness of breath / dyspnea 3 Inhaler 1     metoprolol (TOPROL-XL) 25 MG 24 hr tablet TAKE TWO TABLETS BY MOUTH TWICE DAILY  120 tablet 3     levothyroxine (SYNTHROID/LEVOTHROID) 50 MCG tablet TAKE 1 TABLET (50 MCG) BY MOUTH DAILY 90 tablet 2     SYMBICORT 160-4.5 MCG/ACT Inhaler INHALE 2 PUFFS INTO THE LUNGS 2 TIMES DAILY 10.2 g 5      probiotic CAPS Take 1 capsule by mouth every evening        CRANBERRY Take 475 mg by mouth 2 times daily.       Prochlorperazine Maleate (COMPAZINE PO) Take 5 mg by mouth every 4 hours as needed for nausea       Ondansetron (ZOFRAN ODT PO) Take 4 mg by mouth every 8 hours as needed for nausea         MEDICATION CHANGES/RATIONALE:   Tramadol scheduled then changed to BID for pain control  Zofran discontinued and compazine started for better control of nausea.     Controlled medications sent with patient:   Script for tramadol medication for 30 tabs and 0 refills given to patient at dischage to have them fill at their out patient pharmacy     ROS:    10 point ROS of systems including Constitutional, Eyes, Respiratory, Cardiovascular, Gastroenterology, Genitourinary, Integumentary, Muscularskeletal, Psychiatric were all negative except for pertinent positives noted in my HPI.    Physical Exam:   Vitals: /60  Pulse 71  Temp 97.6  F (36.4  C)  Resp 18  Wt 115 lb 6.4 oz (52.3 kg)  LMP 06/15/1985  SpO2 96%  BMI 24.12 kg/m2  BMI= Body mass index is 24.12 kg/(m^2).    GENERAL APPEARANCE:  Alert, in no distress, oriented, cooperative  RESP:  respiratory effort and palpation of chest normal, lungs clear to auscultation , no respiratory distress, diminished breath sounds bilat bases  CV:  Palpation and auscultation of heart done , regular rate and rhythm, no murmur, rub, or gallop, +2 pedal pulses, peripheral edema 1+ in BLE  ABDOMEN:  normal bowel sounds, soft, nontender, no hepatosplenomegaly or other masses, no guarding or rebound  M/S:   Gait and station abnormal generalized weakness, no joint tenderness, no spinal tenderness, BLE 5/5  SKIN:  Inspection of skin and subcutaneous tissue baseline, Palpation of skin and subcutaneous tissue baseline  NEURO:   Cranial nerves 2-12 are normal tested and grossly at patient's baseline, Examination of sensation by touch normal  PSYCH:  oriented X 3, normal insight,  judgement and memory, affect and mood normal    DISCHARGE PLAN:  Physical Therapy, Registered Nurse and Home Health Aide  Patient instructed to follow-up with:  PCP in 7 days      Current [place] scheduled appointments:  Future Appointments  Date Time Provider Department Center   2/28/2018 4:00 PM [facility]   4/3/2018 10:30 AM RU[facility] TECH1 [facility]       MTM referral needed and placed by this provider: No    Pending labs: none    SNF labs   Anticoagulation Therapy Visit on 02/26/2018   Component Date Value Ref Range Status     INR 02/26/2018 1.61   Final         Discharge Treatments:  F/u with PCP in 7-10 days  Medications as above  Home PT, RN, HHA  Home RN to check INR after discharge per INR clinic recommendations.     TOTAL DISCHARGE TIME:   Greater than 30 minutes  Electronically signed by:  LACI Toribio CNP      Sincerely,        LACI Toribio CNP

## 2018-03-02 ENCOUNTER — ANTICOAGULATION THERAPY VISIT (OUTPATIENT)
Dept: ANTICOAGULATION | Facility: CLINIC | Age: 83
End: 2018-03-02
Payer: COMMERCIAL

## 2018-03-02 DIAGNOSIS — Z79.01 LONG TERM CURRENT USE OF ANTICOAGULANT THERAPY: ICD-10-CM

## 2018-03-02 DIAGNOSIS — I82.409 DVT (DEEP VENOUS THROMBOSIS) (H): ICD-10-CM

## 2018-03-02 DIAGNOSIS — I48.0 INTERMITTENT ATRIAL FIBRILLATION (H): ICD-10-CM

## 2018-03-02 PROCEDURE — 99207 ZZC NO CHARGE NURSE ONLY: CPT

## 2018-03-02 RX ORDER — WARFARIN SODIUM 1 MG/1
TABLET ORAL
Qty: 60 TABLET | Refills: 0 | COMMUNITY
Start: 2018-03-02 | End: 2018-03-16

## 2018-03-02 NOTE — MR AVS SNAPSHOT
Ellie CARVER Lu   3/2/2018   Anticoagulation Therapy Visit    Description:  87 year old female   Provider:  Angelia Bo RN   Department:  [facility]           INR as of 3/2/2018     Today's INR 1.56 (3/1/2018)      Anticoagulation Summary as of 3/2/2018     INR goal 1.5-2.0   Today's INR 1.56 (3/1/2018)   Full instructions 3/2: 0.5 mg; 3/3: 0.5 mg; 3/4: 0.5 mg   Next INR check 3/5/2018    Indications   Atrial fibrillation with rapid ventricular response (H) (Resolved) [I48.91]  DVT (deep venous thrombosis) [I82.409]  Long term current use of anticoagulant therapy [Z79.01]         March 2018 Details    Sun Mon Tue Wed Thu Fri Sat         1               2      0.5 mg   See details      3      0.5 mg           4      0.5 mg         5            6               7               8               9               10                 11               12               13               14               15               16               17                 18               19               20               21               22               23               24                 25               26               27               28               29               30               31                Date Details   03/02 This INR check       Date of next INR:  3/5/2018         How to take your warfarin dose     To take:  0.5 mg Take 0.5 of a 1 mg tablet.

## 2018-03-02 NOTE — PROGRESS NOTES
ANTICOAGULATION FOLLOW-UP CLINIC VISIT    Patient Name:  Ellie Leigh  Date:  3/2/2018  Contact Type:  Telephone/ AYLIN Tovar [facility]    SUBJECTIVE:     Patient Findings     Comments AYLIN Tovar from [facility] reports pt is discharging from TCU today, going home with homecare from [facility]. La denies changes in medications, health, activity or diet.      Writer instructed La to give pt instructions to increase dose 11%, and have homecare check INR in 3 days.           OBJECTIVE    INR   Date Value Ref Range Status   03/01/2018 1.56  Final       ASSESSMENT / PLAN  No question data found.  Anticoagulation Summary as of 3/2/2018     INR goal 1.5-2.0   Today's INR 1.56 (3/1/2018)   Maintenance plan No maintenance plan   Full instructions 3/2: 0.5 mg; 3/3: 0.5 mg; 3/4: 0.5 mg   Plan last modified Katia Landrum RN (2/19/2018)   Next INR check 3/5/2018   Priority INR   Target end date Indefinite    Indications   Atrial fibrillation with rapid ventricular response (H) (Resolved) [I48.91]  DVT (deep venous thrombosis) [I82.409]  Long term current use of anticoagulant therapy [Z79.01]         Anticoagulation Episode Summary     INR check location     Preferred lab     Send INR reminders to [place] PHONE [facility] POOL    Comments * Actual INR goal is 1.7-2.3  please follow Dec 2015 INR referral (June 2016 goal range is an error) [facility] TCU      Anticoagulation Care Providers     Provider Role Specialty Phone number    Josefina Gómez MD [facility] [phone]            See the Encounter Report to view Anticoagulation Flowsheet and Dosing Calendar (Go to Encounters tab in chart review, and find the Anticoagulation Therapy Visit)        Angelia Bo RN

## 2018-03-05 ENCOUNTER — CARE COORDINATION (OUTPATIENT)
Dept: CARE COORDINATION | Facility: CLINIC | Age: 83
End: 2018-03-05

## 2018-03-05 ENCOUNTER — ANTICOAGULATION THERAPY VISIT (OUTPATIENT)
Dept: ANTICOAGULATION | Facility: CLINIC | Age: 83
End: 2018-03-05
Payer: COMMERCIAL

## 2018-03-05 ENCOUNTER — TELEPHONE (OUTPATIENT)
Dept: FAMILY MEDICINE | Facility: CLINIC | Age: 83
End: 2018-03-05

## 2018-03-05 DIAGNOSIS — Z79.01 LONG TERM CURRENT USE OF ANTICOAGULANT THERAPY: ICD-10-CM

## 2018-03-05 DIAGNOSIS — I82.409 DVT (DEEP VENOUS THROMBOSIS) (H): ICD-10-CM

## 2018-03-05 LAB — INR PPP: 1.6

## 2018-03-05 PROCEDURE — 99207 ZZC NO CHARGE NURSE ONLY: CPT

## 2018-03-05 NOTE — LETTER
[facility] CARE COORDINATION  [address] [facility] [address]      March 7, 2018    Ellie Leigh  [address]  [facility] [address]      Dear Ellie,    I am a clinic care coordinator who works with Josefina Gómez MD. I recently tried to call and was unable to reach you. I wanted to introduce myself and provide you with my contact informatn so that you can call me with questions or concerns about your health care. Below is a description of clinic care coordination and how I can further assist you.     The clinic care coordinator is a registered nurse and/or  who understand the health care system. The goal of clinic care coordination is to help you manage your health and improve access to the [facility] system in the most efficient manner. The registered nurse can assist you in meeting your health care goals by providing education, coordinating services, and strengthening the communication among your providers. The  can assist you with financial, behavioral, psychosocial, chemical dependency, counseling, and/or psychiatric resources.    Please feel free to contact me at [phone], with any questions or concerns. We at [facility] are focused on providing you with the highest-quality healthcare experience possible and that all starts with you.     Sincerely,     Dariana Valencia    Enclosed: I have enclosed a copy of a 24 Hour Access Plan. This has helpful phone numbers for you to call when needed. Please keep this in an easy to access place to use as needed.

## 2018-03-05 NOTE — TELEPHONE ENCOUNTER
ED/UC/IP follow up phone call:TCU discharge 3/2/18  Non traumatic compression fracture of L2 Lumbar vertibra with routine healing, Subsequent encounter    RN please call to follow up.    Number of ED visits in past 12 months = 4

## 2018-03-05 NOTE — LETTER
Health Care Home - Access Care Plan    About Me  Patient Name:  Ellie Leigh    YOB: 1930  Age:                             87 year old   [place] MRN:            [id number] Telephone Information:     Home Phone [phone]   Mobile [phone]       Address:    [address]  [facility] [address] Email address:  No e-mail address on record      Emergency Contact(s)  Name Relationship Lgl Grd Work Phone Home Phone Mobile Phone   1. SAMRA MACIAS Daughter  [phone]   2. ARLENE CRISTOBAL Daughter  [phone]             Health Maintenance:      My Access Plan  Medical Emergency 911   Questions or concerns during clinic hours Primary Clinic Line, I will call the clinic directly: Primary Clinic: [facility]- [phone]   24 Hour Appointment Line [phone] or  [phone] [place] [phone]) (toll free)   24 Hour Nurse Line [phone] (toll free)   Questions or concerns outside clinic hours 24 Hour Appointment Line, I will call the after-hours on-call line:   [facility] [phone] or [phone]) (toll-free)   Preferred Urgent Care Preferred Urgent Care: [facility], [phone]   Preferred Hospital Preferred Hospital: [place], [place]  [phone]   Preferred Pharmacy [facility] [address]     Behavioral Health Crisis Line The National Suicide Prevention Lifeline at 1-714.225.8558 or 911     My Care Team Members  Patient Care Team       Relationship Specialty Notifications Start End    DinoderJosefina MD PCP - General Family Practice  8/25/14     Phone: [phone] Fax: [phone] [address] [place] [address]    Gayle Nieves MD MD Oncology Admissions 1/6/14     Phone: [phone] Pager: [phone] Fax: [phone] [address] [place] [address] [facility] [address]    Zhen,  CHRISTOPHER Fernandez    12/16/14     Phone: [phone] Fax: [phone]         [facility] [address] [address]    Aarti Goode, AYLIN Case Manager   12/10/15     Phone: [phone] Fax: [phone]         [facility] [address] [facility] [address]        My Medical and Care Information  Problem List   Patient Active Problem List   Diagnosis     Sensorineural hearing loss, asymmetrical     Generalized osteoarthrosis, unspecified site     Disease of lung     PERSONAL HISTORY OF MALIGNANCY- BREAST     Esophageal reflux     Chronic allergic conjunctivitis     Atopic rhinitis     Rhinitis, allergic seasonal     Hyperlipidemia LDL goal <130     Chronic constipation     Osteoporosis     Health Care Home     Right arm weakness     Ulnar neuropathy     Headache     Mild major depression     DVT (deep venous thrombosis)     Anxiety     Cervical vertebral fracture (H)     Intermittent atrial fibrillation (H)     Squamous cell carcinoma in situ of skin of face     SK (seborrheic keratosis)     Hypothyroidism     Hyponatremia     Recurrent UTI     History of skin cancer     BPPV (benign paroxysmal positional vertigo)     Benign essential HTN     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

## 2018-03-05 NOTE — TELEPHONE ENCOUNTER
See Care Coordination encounter 3/5/2018        Steffanie Louis RN, [facility]  RN Care Coordinator  [facility]  Phone # [phone]  3/5/2018 11:03 AM

## 2018-03-05 NOTE — PROGRESS NOTES
ANTICOAGULATION FOLLOW-UP CLINIC VISIT    Patient Name:  Ellie Leigh  Date:  3/5/2018  Contact Type:  Telephone/ AYLIN Jackman [facility] [phone]    SUBJECTIVE:     Patient Findings     Positives No Problem Findings    Comments Patient had 5mg in the previous 7 days, will increase dose to 5.5mg by the next INR check on Thursday (~10% increase).    No changes noted. Patient is being seen by homecare for her compression fracture after discharge from the TCU. She is using tramadol twice per day and tylenol PRN (reports using up to twice a day). Homecare will be out twice per week this week and next week. After the next 2 weeks the visits will be extended to weekly.              OBJECTIVE    INR   Date Value Ref Range Status   03/05/2018 1.6  Final       ASSESSMENT / PLAN  No question data found.  Anticoagulation Summary as of 3/5/2018     INR goal    Prior goal 1.5-2.0   Today's INR 1.6   Maintenance plan No maintenance plan   Full instructions 3/5: 1 mg; 3/6: 1 mg; 3/7: 1 mg   Plan last modified Katia Landrum RN (2/19/2018)   Next INR check 3/8/2018   Priority INR   Target end date Indefinite    Indications   Atrial fibrillation with rapid ventricular response (H) (Resolved) [I48.91]  DVT (deep venous thrombosis) [I82.409]  Long term current use of anticoagulant therapy [Z79.01]         Anticoagulation Episode Summary     INR check location     Preferred lab     Send INR reminders to [facility]    Comments * Actual INR goal is 1.7-2.3  [facility] after compression fracture in back        Anticoagulation Care Providers     Provider Role Specialty Phone number    Josefina Gómez MD [facility] [phone]            See the Encounter Report to view Anticoagulation Flowsheet and Dosing Calendar (Go to Encounters tab in chart review, and find the Anticoagulation Therapy Visit)        Katia Landrum RN

## 2018-03-05 NOTE — MR AVS SNAPSHOT
Ellie CARVER Lu   3/5/2018   Anticoagulation Therapy Visit    Description:  87 year old female   Provider:  Katia Landrum, RN   Department:  [facility]           INR as of 3/5/2018     Today's INR 1.6      Anticoagulation Summary as of 3/5/2018     INR goal    Prior goal 1.5-2.0   Today's INR 1.6   Full instructions 3/5: 1 mg; 3/6: 1 mg; 3/7: 1 mg   Next INR check 3/8/2018    Indications   Atrial fibrillation with rapid ventricular response (H) (Resolved) [I48.91]  DVT (deep venous thrombosis) [I82.409]  Long term current use of anticoagulant therapy [Z79.01]         March 2018 Details    Sun Mon Tue Wed Thu Fri Sat         1               2               3                 4               5      1 mg   See details      6      1 mg         7      1 mg         8            9               10                 11               12               13               14               15               16               17                 18               19               20               21               22               23               24                 25               26               27               28               29               30               31                Date Details   03/05 This INR check       Date of next INR:  3/8/2018         How to take your warfarin dose     To take:  1 mg Take 1 of the 1 mg tablets.

## 2018-03-07 NOTE — PROGRESS NOTES
Clinic Care Coordination Contact  [facility]/Voicemail    Referral Source: [facility]  Clinical Data: Care Coordinator Outreach  Outreach attempted x 1.  Unable to leave voicemail - mail box full  Plan: Care Coordinator will mail out care coordination introduction letter with care coordinator contact information and explanation of care coordination services. Care Coordinator will do no further outreaches at this time.       Dariana Valencia RN  Clinic Care Coordinator  Mobile: [phone]  [email or web address]

## 2018-03-08 ENCOUNTER — ANTICOAGULATION THERAPY VISIT (OUTPATIENT)
Dept: ANTICOAGULATION | Facility: CLINIC | Age: 83
End: 2018-03-08
Payer: COMMERCIAL

## 2018-03-08 DIAGNOSIS — I82.409 DVT (DEEP VENOUS THROMBOSIS) (H): ICD-10-CM

## 2018-03-08 DIAGNOSIS — Z79.01 LONG TERM CURRENT USE OF ANTICOAGULANT THERAPY: ICD-10-CM

## 2018-03-08 LAB — INR PPP: 1.3

## 2018-03-08 PROCEDURE — 99207 ZZC NO CHARGE NURSE ONLY: CPT

## 2018-03-08 NOTE — PROGRESS NOTES
ANTICOAGULATION FOLLOW-UP CLINIC VISIT    Patient Name:  Ellie Leigh  Date:  3/8/2018  Contact Type:  Telephone/ spoke with AYLIN Hickey from [facility]    SUBJECTIVE:     Patient Findings     Comments AYLIN Hickey from [facility] homecare denies changes in medications, diet, activity or health, reports pt is taking warfarin as directed. Daughter is currently caring for pt while they are waiting for her to get into an [facility] in [place].    Patient had 5.5 mg in the previous 7 days, will increase dose to 6 mg by next INR check in 4 days (~9 % increase).               OBJECTIVE    INR   Date Value Ref Range Status   03/08/2018 1.3  Final       ASSESSMENT / PLAN  INR assessment SUB    Recheck INR In: 4 DAYS    INR Location Homecare INR      Anticoagulation Summary as of 3/8/2018     INR goal    Prior goal 1.5-2.0   Today's INR 1.3!   Maintenance plan No maintenance plan   Full instructions 3/8: 1 mg; 3/9: 0.5 mg; 3/10: 1 mg; 3/11: 0.5 mg   Plan last modified Katia Landrum RN (2/19/2018)   Next INR check 3/12/2018   Priority INR   Target end date Indefinite    Indications   Atrial fibrillation with rapid ventricular response (H) (Resolved) [I48.91]  DVT (deep venous thrombosis) [I82.409]  Long term current use of anticoagulant therapy [Z79.01]         Anticoagulation Episode Summary     INR check location     Preferred lab     Send INR reminders to [place] PHONE [facility] POOL    Comments * Actual INR goal is 1.7-2.3  [facility] after compression fracture in back        Anticoagulation Care Providers     Provider Role Specialty Phone number    Josefina Gómez MD [facility] [phone]            See the Encounter Report to view Anticoagulation Flowsheet and Dosing Calendar (Go to Encounters tab in chart review, and find the Anticoagulation Therapy Visit)        Angelia Bo RN

## 2018-03-08 NOTE — MR AVS SNAPSHOT
Ellie CARVER Lu   3/8/2018   Anticoagulation Therapy Visit    Description:  87 year old female   Provider:  Angelia Bo RN   Department:  [facility]           INR as of 3/8/2018     Today's INR 1.3!      Anticoagulation Summary as of 3/8/2018     INR goal    Prior goal 1.5-2.0   Today's INR 1.3!   Full instructions 3/8: 1 mg; 3/9: 0.5 mg; 3/10: 1 mg; 3/11: 0.5 mg   Next INR check 3/12/2018    Indications   Atrial fibrillation with rapid ventricular response (H) (Resolved) [I48.91]  DVT (deep venous thrombosis) [I82.409]  Long term current use of anticoagulant therapy [Z79.01]         March 2018 Details    Sun Mon Tue Wed Thu Fri Sat         1               2               3                 4               5               6               7               8      1 mg   See details      9      0.5 mg         10      1 mg           11      0.5 mg         12            13               14               15               16               17                 18               19               20               21               22               23               24                 25               26               27               28               29               30               31                Date Details   03/08 This INR check       Date of next INR:  3/12/2018         How to take your warfarin dose     To take:  0.5 mg Take 0.5 of a 1 mg tablet.    To take:  1 mg Take 1 of the 1 mg tablets.

## 2018-03-12 ENCOUNTER — ANTICOAGULATION THERAPY VISIT (OUTPATIENT)
Dept: ANTICOAGULATION | Facility: CLINIC | Age: 83
End: 2018-03-12
Payer: COMMERCIAL

## 2018-03-12 DIAGNOSIS — I82.409 DVT (DEEP VENOUS THROMBOSIS) (H): ICD-10-CM

## 2018-03-12 DIAGNOSIS — Z79.01 LONG TERM CURRENT USE OF ANTICOAGULANT THERAPY: ICD-10-CM

## 2018-03-12 LAB — INR PPP: 1.5

## 2018-03-12 PROCEDURE — 99207 ZZC NO CHARGE NURSE ONLY: CPT

## 2018-03-12 NOTE — MR AVS SNAPSHOT
Ellie Leigh   3/12/2018   Anticoagulation Therapy Visit    Description:  87 year old female   Provider:  Katia Landrum, RN   Department:  [facility]           INR as of 3/12/2018     Today's INR 1.5      Anticoagulation Summary as of 3/12/2018     INR goal    Prior goal 1.5-2.0   Today's INR 1.5   Full instructions 3/12: 1.5 mg; 3/13: 1 mg; 3/14: 1 mg; 3/15: 1 mg   Next INR check 3/16/2018    Indications   Atrial fibrillation with rapid ventricular response (H) (Resolved) [I48.91]  DVT (deep venous thrombosis) [I82.409]  Long term current use of anticoagulant therapy [Z79.01]         March 2018 Details    Sun Mon Tue Wed Thu Fri Sat         1               2               3                 4               5               6               7               8               9               10                 11               12      1.5 mg   See details      13      1 mg         14      1 mg         15      1 mg         16            17                 18               19               20               21               22               23               24                 25               26               27               28               29               30               31                Date Details   03/12 This INR check       Date of next INR:  3/16/2018         How to take your warfarin dose     To take:  1 mg Take 1 of the 1 mg tablets.    To take:  1.5 mg Take 1.5 of the 1 mg tablets.

## 2018-03-12 NOTE — PROGRESS NOTES
"3/15/18 ADDENDUM: Patient is going to start taking Celebrex, per Dr. Vidales, \"should be OK to take the celecoxib with the warfarin.  Would need to check INR earlier. \" Patient's daughter is checking to see if insurance will cover one of the NOACs.     Patient is due for an INR check tomorrow with homecare, will plan to leave this the same. Katia Landrum RN on 3/15/2018 at 2:53 PM      ANTICOAGULATION FOLLOW-UP CLINIC VISIT    Patient Name:  Ellie Leigh  Date:  3/12/2018  Contact Type:  Telephone/ [facility]    SUBJECTIVE:     Patient Findings     Positives No Problem Findings    Comments Patient had 6mg in the previous 7 days, will increase dose to 6.5mg by the next INR check on Friday (~8% increase).    No other changes noted, patient is doing well - pain is well controlled.              OBJECTIVE    INR   Date Value Ref Range Status   03/12/2018 1.5  Final       ASSESSMENT / PLAN  No question data found.  Anticoagulation Summary as of 3/12/2018     INR goal    Prior goal 1.5-2.0   Today's INR 1.5   Maintenance plan No maintenance plan   Full instructions 3/12: 1.5 mg; 3/13: 1 mg; 3/14: 1 mg; 3/15: 1 mg   Plan last modified Katia Landrum RN (2/19/2018)   Next INR check 3/16/2018   Priority INR   Target end date Indefinite    Indications   Atrial fibrillation with rapid ventricular response (H) (Resolved) [I48.91]  DVT (deep venous thrombosis) [I82.409]  Long term current use of anticoagulant therapy [Z79.01]         Anticoagulation Episode Summary     INR check location     Preferred lab     Send INR reminders to [place] PH[facility] POOL    Comments * Actual INR goal is 1.7-2.3  [facility] after compression fracture in back        Anticoagulation Care Providers     Provider Role Specialty Phone number    Josefina Gómez MD [facility] [phone]            See the Encounter Report to view Anticoagulation Flowsheet and Dosing Calendar (Go to " Encounters tab in chart review, and find the Anticoagulation Therapy Visit)        Katia Landrum RN

## 2018-03-14 ENCOUNTER — OFFICE VISIT (OUTPATIENT)
Dept: FAMILY MEDICINE | Facility: CLINIC | Age: 83
End: 2018-03-14
Payer: COMMERCIAL

## 2018-03-14 ENCOUNTER — TRANSFERRED RECORDS (OUTPATIENT)
Dept: HEALTH INFORMATION MANAGEMENT | Facility: CLINIC | Age: 83
End: 2018-03-14

## 2018-03-14 ENCOUNTER — TELEPHONE (OUTPATIENT)
Dept: FAMILY MEDICINE | Facility: CLINIC | Age: 83
End: 2018-03-14

## 2018-03-14 VITALS
BODY MASS INDEX: 24.14 KG/M2 | DIASTOLIC BLOOD PRESSURE: 68 MMHG | HEIGHT: 58 IN | HEART RATE: 64 BPM | RESPIRATION RATE: 18 BRPM | OXYGEN SATURATION: 96 % | SYSTOLIC BLOOD PRESSURE: 106 MMHG | TEMPERATURE: 97.4 F | WEIGHT: 115 LBS

## 2018-03-14 DIAGNOSIS — J44.9 CHRONIC OBSTRUCTIVE PULMONARY DISEASE, UNSPECIFIED COPD TYPE (H): ICD-10-CM

## 2018-03-14 DIAGNOSIS — E22.2 SIADH (SYNDROME OF INAPPROPRIATE ADH PRODUCTION) (H): Primary | ICD-10-CM

## 2018-03-14 DIAGNOSIS — E87.1 HYPONATREMIA: ICD-10-CM

## 2018-03-14 DIAGNOSIS — M54.59 INTRACTABLE LOW BACK PAIN: ICD-10-CM

## 2018-03-14 DIAGNOSIS — I48.0 INTERMITTENT ATRIAL FIBRILLATION (H): ICD-10-CM

## 2018-03-14 DIAGNOSIS — I10 ESSENTIAL HYPERTENSION, BENIGN: ICD-10-CM

## 2018-03-14 LAB
BASOPHILS # BLD AUTO: 0 10E9/L (ref 0–0.2)
BASOPHILS NFR BLD AUTO: 0.4 %
DIFFERENTIAL METHOD BLD: ABNORMAL
EOSINOPHIL # BLD AUTO: 0.1 10E9/L (ref 0–0.7)
EOSINOPHIL NFR BLD AUTO: 2.1 %
ERYTHROCYTE [DISTWIDTH] IN BLOOD BY AUTOMATED COUNT: 14.9 % (ref 10–15)
HCT VFR BLD AUTO: 34.2 % (ref 35–47)
HGB BLD-MCNC: 11.5 G/DL (ref 11.7–15.7)
LYMPHOCYTES # BLD AUTO: 1.9 10E9/L (ref 0.8–5.3)
LYMPHOCYTES NFR BLD AUTO: 27.1 %
MCH RBC QN AUTO: 28.5 PG (ref 26.5–33)
MCHC RBC AUTO-ENTMCNC: 33.6 G/DL (ref 31.5–36.5)
MCV RBC AUTO: 85 FL (ref 78–100)
MONOCYTES # BLD AUTO: 0.8 10E9/L (ref 0–1.3)
MONOCYTES NFR BLD AUTO: 12.2 %
NEUTROPHILS # BLD AUTO: 4 10E9/L (ref 1.6–8.3)
NEUTROPHILS NFR BLD AUTO: 58.2 %
PLATELET # BLD AUTO: 288 10E9/L (ref 150–450)
RBC # BLD AUTO: 4.04 10E12/L (ref 3.8–5.2)
WBC # BLD AUTO: 6.8 10E9/L (ref 4–11)

## 2018-03-14 PROCEDURE — 36415 COLL VENOUS BLD VENIPUNCTURE: CPT | Performed by: FAMILY MEDICINE

## 2018-03-14 PROCEDURE — 80048 BASIC METABOLIC PNL TOTAL CA: CPT | Performed by: FAMILY MEDICINE

## 2018-03-14 PROCEDURE — 99495 TRANSJ CARE MGMT MOD F2F 14D: CPT | Performed by: FAMILY MEDICINE

## 2018-03-14 PROCEDURE — 85025 COMPLETE CBC W/AUTO DIFF WBC: CPT | Performed by: FAMILY MEDICINE

## 2018-03-14 RX ORDER — METOPROLOL SUCCINATE 25 MG/1
TABLET, EXTENDED RELEASE ORAL
Qty: 360 TABLET | Refills: 11 | Status: ON HOLD | OUTPATIENT
Start: 2018-03-14 | End: 2018-04-16

## 2018-03-14 RX ORDER — CELECOXIB 100 MG/1
100 CAPSULE ORAL 2 TIMES DAILY PRN
Qty: 60 CAPSULE | Refills: 1 | Status: SHIPPED | OUTPATIENT
Start: 2018-03-14 | End: 2018-05-30

## 2018-03-14 NOTE — PATIENT INSTRUCTIONS
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   Recheck serum sodium in 2 weeks.     (M54.5) Intractable low back pain  Comment: has been improving.  I do not recommend ibuprofen or Aleve due to current warfarin.   Plan: celecoxib (CELEBREX) 100 MG capsule          For the back pain, you can take acetaminophen.  You have been taking 325mg pills three at a time.  The top dose is about 3 times a day for this (total 300-4000 mg daily).   Stop the tramadol which is causing nausea.   You can try the celebrex 100mg twice daily as needed for the back pain.     (I10) Essential hypertension, benign  Comment: doing well  Plan: COPD ACTION PLAN, metoprolol succinate         (TOPROL-XL) 25 MG 24 hr tablet, Basic metabolic        panel, CBC with platelets and differential        No change in current treatment plan.     (I48.0) Intermittent atrial fibrillation (H)  Comment: having atrial fibrillation increases risk for stroke.  CHA2 DS2 VASc score=4.  Risk of stroke=4.8% per year.  It is recommended to stay on warfarin if possible to prevent stroke.    Plan: Continue the warfarin.     (E87.1) Hyponatremia  Comment: see above     (J44.9) Chronic obstructive pulmonary disease, unspecified COPD type (H)  Comment: chronic  Plan: No change in current treatment plan.  The Duonebs could be taken 3-4 times a day on a regular basis if needed for lungs.

## 2018-03-14 NOTE — NURSING NOTE
"Chief Complaint   Patient presents with     Hospital F/U       Initial /68  Pulse 64  Temp 97.4  F (36.3  C) (Tympanic)  Resp 18  Ht 4' 10\" (1.473 m)  Wt 115 lb (52.2 kg)  LMP 06/15/1985  SpO2 96%  BMI 24.04 kg/m2 Estimated body mass index is 24.04 kg/(m^2) as calculated from the following:    Height as of this encounter: 4' 10\" (1.473 m).    Weight as of this encounter: 115 lb (52.2 kg).      Health Maintenance that is potentially due pending provider review:  NONE        Is there anyone who you would like to be able to receive your results?   If yes have patient fill out JOSUE    "

## 2018-03-14 NOTE — PROGRESS NOTES
SUBJECTIVE:   Ellie Leigh is a 87 year old female who presents to clinic today for the following health issues:  Chief Complaint   Patient presents with     Hospital F/U     Luz Maria her daughter is with her today.      Accompanied today by daughter Luz Maria.  Living with another daughter in [place].      Hospital Follow-up Visit:    Hospital/Nursing Home/IP Rehab Facility: [facility]  Date of Admission: 02/08/2018  Date of Discharge: 03/02/2018  Reason(s) for Admission: Back pain            Problems taking medications regularly:  None       Medication changes since discharge: None       Problems adhering to non-medication therapy:  None  Summary of hospitalization:  [facility] discharge summary reviewed  See abstracted notes from recent hospital stay below.   Diagnostic Tests/Treatments reviewed.  Follow up needed: sodium  Other Healthcare Providers Involved in Patient s Care:         oncology  Update since discharge: improved.     Post Discharge Medication Reconciliation: discharge medications reconciled, continue medications without change.  Plan of care communicated with patient and daughter     Coding guidelines for this visit:  Type of Medical   Decision Making Face-to-Face Visit       within 7 Days of discharge Face-to-Face Visit        within 14 days of discharge   Moderate Complexity [id number]   High Complexity [id number]        In for follow-up after leaving TCU at [facility].   Left TCU on 3/2.    Questions.  TAking tramadol.  Makes her nauseated.  Would like to stop this.  Would like to take ibuprofen instead.  Currently on warfarin for atrial fibrillation.  She would like to get off warfarin to be able to take ibuprofen.   Would like sodium checked today. She has fainting spells if too low.  She has had this for a few years.  It has been better in the past 1-2 months.  Fluid restriction currently 1500ml daily.    Would like lungs checked.  Nurse has heard  wheezing.  A little shortness of breath.   Does not seem worse than baseline.   Uses Duonebs once in a while.  Using albuteral inhaler several times a day.  Symbicort 2 puffs twice daily.     Back pain has been getting better.  Usually 2-3/10.  Can be 4-5/10 getting out of bed.   Worse if sitting or lying too long.  She saw spine surgeon today.     Needs help with dressing-shoes and socks.   Can stand by sink and do dishes.   Can walk around the house with walker.  Needs helps on steps.   Help with showers.   Feed herself.   Not driving for the past 6 years.   She has home care coming out.  She has RN, physical therapy and home health aide.     Patient Active Problem List   Diagnosis     Sensorineural hearing loss, asymmetrical     Generalized osteoarthrosis, unspecified site     Disease of lung     PERSONAL HISTORY OF MALIGNANCY- BREAST     Esophageal reflux     Chronic allergic conjunctivitis     Atopic rhinitis     Rhinitis, allergic seasonal     Hyperlipidemia LDL goal <130     Chronic constipation     Osteoporosis     Health Care Home     Right arm weakness     Ulnar neuropathy     Headache     Mild major depression     DVT (deep venous thrombosis)     Anxiety     Cervical vertebral fracture (H)     Intermittent atrial fibrillation (H)     Squamous cell carcinoma in situ of skin of face     SK (seborrheic keratosis)     Hypothyroidism     Hyponatremia     Recurrent UTI     History of skin cancer     BPPV (benign paroxysmal positional vertigo)     Benign essential HTN     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)     Positive fecal occult blood test     COPD (chronic obstructive pulmonary disease) (H)     Infectious colitis, enteritis and gastroenteritis     Advance Care Planning     Syncope     Hemoptysis     Long term current use of anticoagulant therapy     Mild persistent asthma without complication     Chest pain at rest     Unresponsive episode     Irritable bowel syndrome without diarrhea      "Elevated troponin     Nausea     Intractable low back pain     Back pain     H/O TB (tuberculosis)      ROS:General: POSITIVE for:, poor appetite, in AM with nausea.  Eats better later in the day.  Has lost a few pounds.  Sleeps OK.  Energy low.   Eyes: Negative for vision changes or eye problems  ENT: No problems with ears, nose or throat.  No difficulty swallowing.,  Some phlegm in throat.   Resp: POSITIVE for:, cough, some wheezing.  No dyspnea on exertion.   CV: No chest pains or palpitations  GI: POSITIVE for:, nausea, constipation, miralax helps.   : No urinary frequency or dysuria, bladder or kidney problems  Musculoskeletal: POSITIVE  for:, pain in lower back, neck and sides a times.   Neurologic: POSITIVE for:, numbness or tingling fingers since past neck injury.   Psychiatric: No problems with anxiety, depression or mental health  Heme/immune/allergy: on warfarin for atrial fibrillation.   Endocrine: POSITIVE for:, hypothyroidism  Skin: No rashes,worrisome lesions or skin problems    OBJECTIVE:Blood pressure 106/68, pulse 64, temperature 97.4  F (36.3  C), temperature source Tympanic, resp. rate 18, height 4' 10\" (1.473 m), weight 115 lb (52.2 kg), last menstrual period 06/15/1985, SpO2 96 %, not currently breastfeeding. BMI=Body mass index is 24.04 kg/(m^2).  GENERAL APPEARANCE ADULT: Alert, no acute distress  EYES: PERRL, EOM normal, conjunctiva and lids normal  NECK: No adenopathy,masses or thyromegaly  RESP: lungs clear to auscultation   CV: irregular rhythm-irregularly irregular, rate-normal, no murmur   MS: extremities normal, no peripheral edema   Mildly tender right lower back.     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   Recheck serum sodium in 2 weeks.     (M54.5) Intractable low back pain  Comment: has been improving.  I do not recommend ibuprofen or Aleve due to current " warfarin.   Plan: celecoxib (CELEBREX) 100 MG capsule          For the back pain, you can take acetaminophen.  You have been taking 325mg pills three at a time.  The top dose is about 3 times a day for this (total 300-4000 mg daily).   Stop the tramadol which is causing nausea.   You can try the celebrex 100mg twice daily as needed for the back pain.     (I10) Essential hypertension, benign  Comment: doing well  Plan: COPD ACTION PLAN, metoprolol succinate         (TOPROL-XL) 25 MG 24 hr tablet, Basic metabolic        panel, CBC with platelets and differential        No change in current treatment plan.     (I48.0) Intermittent atrial fibrillation (H)  Comment: having atrial fibrillation increases risk for stroke.  CHA2 DS2 VASc score=4.  Risk of stroke=4.8% per year.  It is recommended to stay on warfarin if possible to prevent stroke.    Plan: Continue the warfarin.     (E87.1) Hyponatremia  Comment: see above     (J44.9) Chronic obstructive pulmonary disease, unspecified COPD type (H)  Comment: chronic  Plan: No change in current treatment plan.  The Duonebs could be taken 3-4 times a day on a regular basis if needed for lungs.      =======================================================  [facility]     Discharge Summary  Hospital Medicine     Date of Admission:  2/6/2018  Date of Discharge:  2/8/2018     Discharge Diagnoses           Intractable low back pain    Chronic hyponatremia, possible SIADH    Further instructions from your care team        1.  Follow up with Dr. Frankie Pham in 1 weeks for follow up of your lumbar compression fracture.  Call [phone] if appointment needed or questions  2.  Use pain medication as directed  3.  LSO brace should be on while up for comfort, you may wear this brace while sleeping, however it should be removed often for skin checks.   4. Mobility is encouraged, as tolerated.    History of Present Illness     (From H&P)               is patient  "is a 87 year old  female with a significant past medical history of SAIDH, atrial fibrillation hypertension, prior DVT on coumadin, hypothyroidism and back pain who presents with worsened back pain.  Symptoms have been worse the past 4-5 weeks.  Unsure if she had an initial injury from leaning over her  or not, but has been worse throughout this time.  Saw primary care provider and was referred for physical therapy but thinks this just made it more sore.  Seen by primary care provider and had an x-ray 1/26 which showed probable compression fracture.  Was referred to sports med with planned appointment today.  However, pain today got to the point where she couldn't ambulate due to it so presented to ER instead.  Pain is across lower back, more on left than on right. Radiates down legs - sometimes down right as well but usually down left especially to knee but some down to foot - mostly radiates when she can't get in a comfortable position.  No numbness, no weakness, no loss of bowel or bladder control.  Location of pain unchanged but intensity worsened this AM.  Pain up to a 9/10 at home.  Only took tylenol for this, hasn't ever tried anything more than this.   In ER felt much improved at time of my evaluation after single dose of dilaudid with pain \"feeling good\" now.  However, when tried to ambulate patient and daughter report that pain was worse and she felt unsteady and feels unsafe to discharge home today.    No fever or chills.  No other new changes.  No light-headedness or syncope.    Has been being followed for hyponatremia due to SAIDH but unclear how compliant with fluid restriction she is.        Patient does live with family who helps to assist with this however primary caregiver needs to be with her  who is having surgical procedure tomorrow and remaining family do not feel comfortable caring for her.    Denies tobacco or alcohol use.      No recent medication changes. " "    Hospital Course     Ellie Leigh was admitted on 2/6/2018.  The following problems were addressed during her hospitalization:          Intractable low back pain   CT today showed Inferior endplate central compression deformities at L2 and L3 are new since 3/10/2016 but have a chronic imaging appearance along with progression of degenerative changes.  Suspect this is cause of her pain.  Both ER and I discussed with Dr. Hernadez on call for [facility] spine who was exteremly helpful - offered appointment tomorrow afternoon in clinic but patient and daughter don't feel safe with her at home so will be admitted with plan for ortho to see her tomorrow - likely PA to coordinate with spine surgeon unless surgeon is on site.  Likely plan is for oral pain control (started oxycodone 2.5-5 mg every 4 hours as needed, continue tylenol), and placement of semirigid brace tomorrow.  Hopeful discharge home tomorrow, but may need TCU if remains unable to care for herself.  Per ortho would consider outpatient vertebroplasty but only if not improving with conservative measures.   2/7/18 - Received a semi-rigid brace today which she has been wearing for \"a few hours\".  She finds it to be comfortable, non-restrictive, and thinks it's helping her pain.  Pain control has been good on tramadol 25 to 50 mg Q6H PRN and with brace.  Will continue this Rx.       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  Followed by PCP, unclear how compliant with fluid restriction she is.  Placed on 1L restriction while here which per notes is plan at home.   Despite fluid restriction, no change in sodium level. On exam, patient appears a little dry. No medications appear involved. Has right upper lobe chronic pulmonary process which could be cause of SIADH. Patient's oral intake has been limited. Recommended to " increase sodium intake and liberalized fluid restriction to 1500 mL. Follow as outpatient.        Esophageal reflux  Continue prior to admission zantic twice daily.     No reflux symptoms presently.        Mild major depression/Anxiety  2/6/2018 -- mood and affect appropriate, not on any medications.           History of DVT (deep venous thrombosis)  On coumadin as below.    2/7/18 - INR 2.12 today.        Intermittent atrial fibrillation (H)  Rate controlled, continue metoprolol.  Pharmacy to dose coumadin.           Hypothyroidism  TSH normal 1 month ago - continue prior to admission synthroid.            Benign essential HTN  Continue metoprolol with parameters.    Good control on present metoprolol.        COPD (chronic obstructive pulmonary disease) (H)  At baseline, duonebs as needed in place of home albuterol inhaler.   Continue prior to admission Symbicort.           Irritable bowel syndrome without diarrhea  Asymptomatic at present    =======================================  TCU discharge:  DATE OF SNF ADMISSION:  February / 08 / 2018  DATE OF SNF (anticipated) DISCHARGE: March / 02 / 2018    DISCHARGE DIAGNOSIS:   1. Non-traumatic compression fracture of L2 lumbar vertebra with routine healing, subsequent encounter    2. Hyponatremia    3. 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 Nausea    5. H/O TB (tuberculosis)    6. Intermittent atrial fibrillation (H)    7. Benign essential HTN    8. H/O deep venous thrombosis    9. Chronic obstructive pulmonary disease, unspecified COPD type (H)    10. Chronic constipation    11. Gastroesophageal reflux disease, esophagitis presence not specified    12. Hypothyroidism, unspecified type          HPI Nursing Facility Course:     HPI information obtained from: facility chart records, facility staff, patient report and [facility] chart review. Thalia Leigh is an 87 year old  female with a significant past medical history of SAIDH, atrial  fibrillation hypertension, prior DVT on coumadin, hypothyroidism, TB infection with completion of treatment in the early 2000's and back pain who presented with worsened back pain. She was unsure of any specific moment of injury. CT scan done and showed Inferior endplate central compression deformities at L2 and L3. This was discussed with [facility] spine. She was treated with semi-rigid brace and oral pain medication. Per ortho would consider outpatient vertebroplasty but only if not improving with conservative measures.  She does have chronic hyponatremia which remained low but stable during hospital stay. She was placed on 1L fluid restriction, but did not have any improvement. Did appear dry so was increased to 1.5L FWR and recommended increased sodium intake. She did not feel able to care for herself at home so she was discharge to TCU for further rehab and medical management.      Pain control and mobility improved. However had some intermittent nausea which she felt was related to tramadol. Zofran was started which she felt made it worse so she was started on compazine with some improvement. She continued have hyponatremia, with NA as low as 123 in TCU. She continued on 1.5l FWR and drinking Gatorade, and slowly improved to 127 prior to discharge. There was some discussion that she may need LTC at discharge. However, she is independent with ADL's and ambulation and therapy felt safe to discharge home. She will have PT, RN and HHA.      Non-traumatic compression fracture of L2 lumbar vertebra with routine healing, subsequent encounter  Pain controlled on tramadol 50mg BID PRN and scheduled APAP. Does have some chronic knee pain, which did slow mobility. She is now independent with ADL's and activity. May benefit from orthopedic consult and steroid injection to knees after discharge.  Will have home PT and HHA at discharge.      Hyponatremia  SIADH (syndrome of inappropriate ADH production)  (H)  Chronic, SIADH suspected to be r/t to right upper lobe chronic pulmonary process. NA as low as 123 in TCU, improved to 127. She remained compliant with 1.5L FWR, she has been drinking Gatorade and limiting water.      Nausea  Felt to be r/t to tramadol and zofran. She was started on compazine with some improvement. She has been able to eat and drink, no recent emesis. Denies any abdominal pain.      H/O TB (tuberculosis)  History of TB in the early 2000's which was adequately treated, negative AFB noted in 2014. Had CXR while IP which noted scar tissue and chronic RUL opacity.  As TB history TCU did not do mantoux test, but instead did CXR which showed possible RUL scar tissue, but could not r/o TB. CT done and was stable with CT done in 2012. No s/s of active TB during TCU stay.      Intermittent atrial fibrillation (H)  Benign essential HTN  H/O deep venous thrombosis  She is currently on metoprolol XL 25mg BID. She is anticoagulated on coumadin. Will have home health RN to assist with INR checks. Most recent INR was 1.6, recheck scheduled today. She is follow by [facility]. No other concerns and BP stable during TCU stay. HR 60's-80's.       Chronic obstructive pulmonary disease, unspecified COPD type (H)  Currently on PTA Symbicort, PRN albuterol MDI and nebs. Does have some SOB with more strenuous activity, which she reports is baseline. No concerns of exacerbation during TCU stay.      Chronic constipation  Has  been taking miralax 1 1/2 capfuls daily. Continued to have some issues with constipation. PRN suppository available. Having a BM every 2-3 days.      Gastroesophageal reflux disease, esophagitis presence not specified  On PTA zantac BID. Denies any abdominal pain or indigestion     Hypothyroidism, unspecified type  On PTA levothyroxine. Most recent TSH on 1/8/2018 was 2.54.

## 2018-03-14 NOTE — MR AVS SNAPSHOT
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   Recheck serum sodium in 2 weeks.     (M54.5) Intractable low back pain  Comment: has been improving.  I do not recommend ibuprofen or Aleve due to current warfarin.   Plan: celecoxib (CELEBREX) 100 MG capsule          For the back pain, you can take acetaminophen.  You have been taking 325mg pills three at a time.  The top dose is about 3 times a day for this (total 300-4000 mg daily).   Stop the tramadol which is causing nausea.   You can try the celebrex 100mg twice daily as needed for the back pain.     (I10) Essential hypertension, benign  Comment: doing well  Plan: COPD ACTION PLAN, metoprolol succinate         (TOPROL-XL) 25 MG 24 hr tablet, Basic metabolic        panel, CBC with platelets and differential        No change in current treatment plan.     (I48.0) Intermittent atrial fibrillation (H)  Comment: having atrial fibrillation increases risk for stroke.  CHA2 DS2 VASc score=4.  Risk of stroke=4.8% per year.  It is recommended to stay on warfarin if possible to prevent stroke.    Plan: Continue the warfarin.     (E87.1) Hyponatremia  Comment: see above     (J44.9) Chronic obstructive pulmonary disease, unspecified COPD type (H)  Comment: chronic  Plan: No  "change in current treatment plan.  The Duonebs could be taken 3-4 times a day on a regular basis if needed for lungs.          Follow-ups after your visit        Your next 10 appointments already scheduled     2018 10:30 AM CDT   Remote PPM Check with MARYBETH SHARP   [facility]   [place] ([facility])    [address] [place] [address] [address]  [place] [address] [phone]           This appointment is for a remote check of your pacemaker.  This is not an appointment at the office.              Who to contact     If you have questions or need follow up information about today's clinic visit or your schedule please contact [facility] directly at [phone].  Normal or non-critical lab and imaging results will be communicated to you by [employer], letter or phone within 4 business days after the clinic has received the results. If you do not hear from us within 7 days, please contact the clinic through [employer] or phone. If you have a critical or abnormal lab result, we will notify you by phone as soon as possible.  Submit refill requests through [employer] or call your pharmacy and they will forward the refill request to us. Please allow 3 business days for your refill to be completed.          Additional Information About Your Visit        [employer] Information     [employer] lets you send messages to your doctor, view your test results, renew your prescriptions, schedule appointments and more. To sign up, go to [email or web address] . Click on \"Log in\" on the left side of the screen, which will take you to the Welcome page. Then click on \"Sign up Now\" on the right side of the page.     You will be asked to enter the access code listed below, as well as some personal information. Please follow the directions to create your username and password.     Your access code is: [email or web address]  Expires: 2018  3:50 PM     Your access code will  in 90 days. If you need " "help or a new code, please call your [place] clinic or [phone].        Care EveryWhere ID     This is your Care EveryWhere ID. This could be used by other organizations to access your [place] medical records  [id number]        Your Vitals Were     Pulse Temperature Respirations Height Last Period Pulse Oximetry    64 97.4  F (36.3  C) (Tympanic) 18 4' 10\" (1.473 m) 06/15/1985 96%    BMI (Body Mass Index)                   24.04 kg/m2            Blood Pressure from Last 3 Encounters:   03/14/18 106/68   02/28/18 118/60   02/28/18 118/60    Weight from Last 3 Encounters:   03/14/18 115 lb (52.2 kg)   02/28/18 115 lb 6.4 oz (52.3 kg)   02/28/18 115 lb 6.4 oz (52.3 kg)              We Performed the Following     Basic metabolic panel     CBC with platelets and differential     COPD ACTION PLAN          Today's Medication Changes          These changes are accurate as of 3/14/18  3:51 PM.  If you have any questions, ask your nurse or doctor.               Start taking these medicines.        Dose/Directions    celecoxib 100 MG capsule   Commonly known as:  celeBREX   Used for:  Intractable low back pain   Started by:  Anjum Vidales MD        Dose:  100 mg   Take 1 capsule (100 mg) by mouth 2 times daily as needed for moderate pain   Quantity:  60 capsule   Refills:  1         These medicines have changed or have updated prescriptions.        Dose/Directions    metoprolol succinate 25 MG 24 hr tablet   Commonly known as:  TOPROL-XL   This may have changed:  See the new instructions.   Used for:  Essential hypertension, benign   Changed by:  Anjum Vidales MD        TAKE TWO TABLETS BY MOUTH TWICE DAILY   Quantity:  360 tablet   Refills:  11         Stop taking these medicines if you haven't already. Please contact your care team if you have questions.     bisacodyl 10 MG Suppository   Commonly known as:  DULCOLAX   Stopped by:  Anjum Vidales MD           TRAMADOL HCL PO   Stopped by:  Anjum Vidales " MD Boom                Where to get your medicines      These medications were sent to [facility][id number] - [place], [place] - [address]  [address] [address]     Phone:  [phone]     celecoxib 100 MG capsule    metoprolol succinate 25 MG 24 hr tablet                Primary Care Provider Office Phone # Fax #    Josefina Del Gómez [phone] [address] [address]        Equal Access to Services     BROOKS JOSÉ : Hadii aad ku hadasho Soomaali, waaxda luqadaha, qaybta kaalmada adeegyada, waxay idiin hayaan adeeg kharash la'jose daniel . So [facility] [phone].    ATENCIÓN: Si habla español, tiene a sahni disposición servicios gratuitos de asistencia lingüística. [facility] [phone].    We comply with applicable federal civil rights laws and [place] laws. We do not discriminate on the basis of race, color, national origin, age, disability, sex, sexual orientation, or gender identity.            Thank you!     Thank you for choosing [facility]  for your care. Our goal is always to provide you with excellent care. Hearing back from our patients is one way we can continue to improve our services. Please take a few minutes to complete the written survey that you may receive in the mail after your visit with us. Thank you!             Your Updated Medication List - Protect others around you: Learn how to safely use, store and throw away your medicines at www.disposemymeds.org.          This list is accurate as of 3/14/18  3:51 PM.  Always use your most recent med list.                   Brand Name Dispense Instructions for use Diagnosis    albuterol 108 (90 BASE) MCG/ACT Inhaler    PROAIR HFA/PROVENTIL HFA/VENTOLIN HFA    3 Inhaler    Inhale 2 puffs into the lungs every 6 hours as needed for shortness of breath / dyspnea    Mild persistent asthma without complication       celecoxib 100 MG capsule    celeBREX    60 capsule     Take 1 capsule (100 mg) by mouth 2 times daily as needed for moderate pain    Intractable low back pain       COMPAZINE PO      Take 5 mg by mouth every 8 hours as needed for nausea        CRANBERRY      Take 475 mg by mouth 2 times daily.        diclofenac 1 % Gel topical gel    VOLTAREN     Place onto the skin 4 times daily as needed for moderate pain        ipratropium - albuterol 0.5 mg/2.5 mg/3 mL 0.5-2.5 (3) MG/3ML neb solution    DUONEB    180 mL    TAKE 1 VIAL BY NEBULIZATION  EVERY SIX HOURS AS NEEDED FOR SHORTNESS OF BREATH/ DYSPNEA OR WHEEZING    Chronic obstructive pulmonary disease, unspecified COPD type (H)       levothyroxine 50 MCG tablet    SYNTHROID/LEVOTHROID    90 tablet    TAKE 1 TABLET (50 MCG) BY MOUTH DAILY    Hypothyroidism, unspecified type       metoprolol succinate 25 MG 24 hr tablet    TOPROL-XL    360 tablet    TAKE TWO TABLETS BY MOUTH TWICE DAILY    Essential hypertension, benign       polyethylene glycol Packet    MIRALAX/GLYCOLAX     Take 1.5 packets by mouth daily        probiotic Caps      Take 1 capsule by mouth every evening        ranitidine 150 MG tablet    ZANTAC    60 tablet    Take 1 tablet (150 mg) by mouth 2 times daily    Gastroesophageal reflux disease without esophagitis       SYMBICORT 160-4.5 MCG/ACT Inhaler   Generic drug:  budesonide-formoterol     10.2 g    INHALE 2 PUFFS INTO THE LUNGS 2 TIMES DAILY    Mild persistent asthma without complication       TYLENOL PO      Take 975 mg by mouth 3 times daily        warfarin 1 MG tablet    COUMADIN    60 tablet    Re-establishing maintenance dose, take as directed by Anticoagulation clinic.    Intermittent atrial fibrillation (H)

## 2018-03-14 NOTE — LETTER
My COPD Action Plan     Name: Ellie Leigh    YOB: 1930   Date: 3/14/2018    My doctor: Anjum Vidales MD   My clinic: [address] [address] [phone]  My Controller Medicine: Symbicort    Dose:      My Rescue Medicine: Albuterol (Proair/Ventolin/Proventil) inhaler   Dose: 2 puffs every 4-6 hrs as needed     My Flare Up Medicine:    Dose:      My COPD Severity:       Use of Oxygen:      Make sure you've had your pneumonia   vaccines.          GREEN ZONE       Doing well today      Usual level of activity and exercise    Usual amount of cough and mucus    No shortness of breath    Usual level of health (thinking clearly, sleeping well, feel like eating) Actions:      Take daily medicines    Use oxygen as prescribed    Follow regular exercise and diet plan    Avoid cigarette smoke and other irritants that harm the lungs           YELLOW ZONE          Having a bad day or flare up      Short of breath more than usual    A lot more sputum (mucus) than usual    Sputum looks yellow, green, tan, brown or bloody    More coughing or wheezing    Fever or chills    Less energy; trouble completing activities    Trouble thinking or focusing    Using quick relief inhaler or nebulizer more often    Poor sleep; symptoms wake me up    Do not feel like eating Actions:      Get plenty of rest    Take daily medicines    Use quick relief inhaler every ours    If you use oxygen, call you doctor to see if you should adjust your oxygen    Do breathing exercises or other things to help you relax    Let a loved one, friend or neighbor know you are feeling worse    Call your care team if you have 2 or more symptoms.  Start taking steroids or antibiotics if directed by your care team           RED ZONE       Need medical care now      Severe shortness of breath (feel you can't breathe)    Fever, chills    Not enough breath to do any activity    Trouble coughing  up mucus, walking or talking    Blood in mucus    Frequent coughing   Rescue medicines are not working    Not able to sleep because of breathing    Feel confused or drowsy    Chest pain    Actions:      Call your health care team.  If you cannot reach your care team, call 911 or go to the emergency room.        Electronically signed by: Sara Costello, March 14, 2018  Annual Reminders:  Meet with Care Team, Flu Shot every Fall  Pharmacy: [id number] - [place], [place] - [address]

## 2018-03-15 ENCOUNTER — TELEPHONE (OUTPATIENT)
Dept: FAMILY MEDICINE | Facility: CLINIC | Age: 83
End: 2018-03-15

## 2018-03-15 LAB
ANION GAP SERPL CALCULATED.3IONS-SCNC: 9 MMOL/L (ref 3–14)
BUN SERPL-MCNC: 23 MG/DL (ref 7–30)
CALCIUM SERPL-MCNC: 8.6 MG/DL (ref 8.5–10.1)
CHLORIDE SERPL-SCNC: 93 MMOL/L (ref 94–109)
CO2 SERPL-SCNC: 27 MMOL/L (ref 20–32)
CREAT SERPL-MCNC: 0.5 MG/DL (ref 0.52–1.04)
GFR SERPL CREATININE-BSD FRML MDRD: >90 ML/MIN/1.7M2
GLUCOSE SERPL-MCNC: 100 MG/DL (ref 70–99)
POTASSIUM SERPL-SCNC: 4.2 MMOL/L (ref 3.4–5.3)
SODIUM SERPL-SCNC: 129 MMOL/L (ref 133–144)

## 2018-03-15 NOTE — TELEPHONE ENCOUNTER
Updated Dee De Dios at [facility].  Updated [facility]; INR is due tomorrow through home care.    Jessica SANTO RN

## 2018-03-15 NOTE — TELEPHONE ENCOUNTER
Spoke with Luz Maria and offered the following NOACs: Pradaxa, Eliquis and Xarelto.     She will call the insurance company and see what is covered, then call clinic for order.    Jessica SANTO RN

## 2018-03-15 NOTE — TELEPHONE ENCOUNTER
Please call.  It should be OK to take the celecoxib with the warfarin.  Would need to check INR earlier.   MARCY DAUGHERTY MD

## 2018-03-15 NOTE — PROGRESS NOTES
Please call.  Sodium is a little low but improved and the best it has been recently. Other chemistries OK.   Very mild anemia which has  Improved as well.   PLAN: No new changes in treatment recommended.

## 2018-03-15 NOTE — TELEPHONE ENCOUNTER
Reason for Call:  Other     Detailed comments: Ellie's daughter says she was with her mom yesterday when she saw Dr Vidales. He mentioned there are other options for blood thinners other than Coumadin. She says she forgot to get the names of them because she wanted to check with her insurance to see if they would be covered    Phone Number Patient can be reached at: Luz Maria  [phone]    Best Time: anytime    Can we leave a detailed message on this number? YES, Please leave message if she misses the call    Call taken on 3/15/2018 at 8:27 AM by Ninoska Song

## 2018-03-15 NOTE — TELEPHONE ENCOUNTER
Call received from Dee De Dios at [facility] [phone], questioning drug interaction, new order for Celebrex and pt is currently on warfarin.    Ok to proceed with filling?    Jessica SANTO RN

## 2018-03-16 ENCOUNTER — ANTICOAGULATION THERAPY VISIT (OUTPATIENT)
Dept: ANTICOAGULATION | Facility: CLINIC | Age: 83
End: 2018-03-16
Payer: COMMERCIAL

## 2018-03-16 DIAGNOSIS — I48.0 INTERMITTENT ATRIAL FIBRILLATION (H): ICD-10-CM

## 2018-03-16 DIAGNOSIS — Z79.01 LONG TERM CURRENT USE OF ANTICOAGULANT THERAPY: ICD-10-CM

## 2018-03-16 LAB — INR PPP: 2

## 2018-03-16 PROCEDURE — 99207 ZZC NO CHARGE NURSE ONLY: CPT

## 2018-03-16 RX ORDER — WARFARIN SODIUM 1 MG/1
TABLET ORAL
Qty: 60 TABLET | Refills: 0 | COMMUNITY
Start: 2018-03-16 | End: 2018-03-19

## 2018-03-16 NOTE — MR AVS SNAPSHOT
Ellie CARVER Lu   3/16/2018   Anticoagulation Therapy Visit    Description:  87 year old female   Provider:  Lissy Beaulieu, RN   Department:  [facility]           INR as of 3/16/2018     Today's INR 2.0      Anticoagulation Summary as of 3/16/2018     INR goal    Prior goal 1.5-2.0   Today's INR 2.0   Full instructions 1 mg every day   Next INR check 3/23/2018    Indications   Atrial fibrillation with rapid ventricular response (H) (Resolved) [I48.91]  DVT (deep venous thrombosis) [I82.409]  Long term current use of anticoagulant therapy [Z79.01]         March 2018 Details    Sun Mon Tue Wed Thu Fri Sat         1               2               3                 4               5               6               7               8               9               10                 11               12               13               14               15               16      1 mg   See details      17      1 mg           18      1 mg         19      1 mg         20      1 mg         21      1 mg         22      1 mg         23            24                 25               26               27               28               29               30               31                Date Details   03/16 This INR check       Date of next INR:  3/23/2018         How to take your warfarin dose     To take:  1 mg Take 1 of the 1 mg tablets.

## 2018-03-16 NOTE — PROGRESS NOTES
ANTICOAGULATION FOLLOW-UP CLINIC VISIT    Patient Name:  Ellie Leigh  Date:  3/16/2018  Contact Type:  Telephone/ writer spoke with Edelmira at [facility]    SUBJECTIVE:     Patient Findings     Positives Change in medications (patient started celebrex 3-15-18)    Comments Writer will try 1 mg daily as a maintenance dose. She had a larger dose of 1.5 mg four days ago and 1 mg since then so this seems like a reasonable maintenance dose and is steady. Home care will be making weekly visits now so will check in one week. No increased bleeding or bruising but patient did just start celebrex yesterday. Writer advised home care to speak with patient about watching for side effects of bleeding/bruising until next recheck and call [facility] if any changes. No other changes or concerns.            OBJECTIVE    INR   Date Value Ref Range Status   03/16/2018 2.0  Final       ASSESSMENT / PLAN  INR assessment THER    Recheck INR In: 1 WEEK    INR Location Homecare INR      Anticoagulation Summary as of 3/16/2018     INR goal    Prior goal 1.5-2.0   Today's INR 2.0   Maintenance plan 1 mg (1 mg x 1) every day   Full instructions 1 mg every day   Weekly total 7 mg   Plan last modified Lissy Beaulieu RN (3/16/2018)   Next INR check 3/23/2018   Priority INR   Target end date Indefinite    Indications   Atrial fibrillation with rapid ventricular response (H) (Resolved) [I48.91]  DVT (deep venous thrombosis) [I82.409]  Long term current use of anticoagulant therapy [Z79.01]         Anticoagulation Episode Summary     INR check location     Preferred lab     Send INR reminders to [facility] [facility] POOL    Comments * Actual INR goal is 1.7-2.3  [facility] after compression fracture in back        Anticoagulation Care Providers     Provider Role Specialty Phone number    Josefina Gómez MD Responsible Family Practice [phone]            See the Encounter Report to view Anticoagulation Flowsheet and Dosing Calendar  (Go to Encounters tab in chart review, and find the Anticoagulation Therapy Visit)        Lissy Beaulieu RN

## 2018-03-19 ENCOUNTER — TELEPHONE (OUTPATIENT)
Dept: FAMILY MEDICINE | Facility: CLINIC | Age: 83
End: 2018-03-19

## 2018-03-19 DIAGNOSIS — I48.0 INTERMITTENT ATRIAL FIBRILLATION (H): ICD-10-CM

## 2018-03-19 NOTE — TELEPHONE ENCOUNTER
Please call.  She can substitute apixaban (Eliquis) for warfarin.  The dose is 2.5mg twice daily.  It may be quite a bit more expensive.  If she switches, start apixaban the day after stopping warfarin.   MARCY DAUGHERTY MD

## 2018-03-19 NOTE — TELEPHONE ENCOUNTER
"Spoke with daughter; updated and instructions given. Pt plans to take warfarin until supply gone then make the change to Eliquis.     Dtr also reported pt had \"night sweats\" a couple times in the past few days. Wondering if from Celebrex - this is not a listed s/e in [employer] nor [employer] Drug Guide for Nurses, 15th ed.  Suspect possibly due to stopping tramadol. Will monitor and if no change will contact clinic by end of week.    Routed to [facility] as VANDANA.     Jessica SANTO RN          "

## 2018-03-19 NOTE — TELEPHONE ENCOUNTER
[facility] physician order to Dr Roque to sign in Dr Gómez's absence.

## 2018-03-20 ENCOUNTER — MEDICAL CORRESPONDENCE (OUTPATIENT)
Dept: HEALTH INFORMATION MANAGEMENT | Facility: CLINIC | Age: 83
End: 2018-03-20

## 2018-03-21 ENCOUNTER — MEDICAL CORRESPONDENCE (OUTPATIENT)
Dept: HEALTH INFORMATION MANAGEMENT | Facility: CLINIC | Age: 83
End: 2018-03-21

## 2018-03-21 ENCOUNTER — TELEPHONE (OUTPATIENT)
Dept: FAMILY MEDICINE | Facility: CLINIC | Age: 83
End: 2018-03-21

## 2018-03-21 PROCEDURE — G0180 MD CERTIFICATION HHA PATIENT: HCPCS | Performed by: FAMILY MEDICINE

## 2018-03-21 NOTE — PROGRESS NOTES
02/07/18 1000   Quick Adds   Type of Visit Initial PT Evaluation   Living Environment   Lives With child(grupo), adult   Living Arrangements house   Home Accessibility stairs to enter home   Number of Stairs to Enter Home 3   Self-Care   Usual Activity Tolerance moderate   Current Activity Tolerance fair   Equipment Currently Used at Home walker, rolling   Functional Level Prior   Ambulation 1-->assistive equipment   Transferring 1-->assistive equipment   Toileting 1-->assistive equipment   Bathing 2-->assistive person   Dressing 2-->assistive person   Eating 0-->independent   Communication 0-->understands/communicates without difficulty   Swallowing 0-->swallows foods/liquids without difficulty   Cognition 0 - no cognition issues reported   Fall history within last six months no   Which of the above functional risks had a recent onset or change? none   Prior Functional Level Comment PLOF- Pt indep. with ambulation using a walker/ 4ww housheold distances.  < primarily  sitting on the walker seat the last 2 weeks with the daughter assisting  (   General Information   Onset of Illness/Injury or Date of Surgery - Date 02/06/18   Referring Physician Karan   Patient/Family Goals Statement Pt w/ eventual goal of returning home   Pertinent History of Current Problem (include personal factors and/or comorbidities that impact the POC) 87 year old  female with a significant past medical history of SAIDH, atrial fibrillation hypertension, prior DVT on coumadin, hypothyroidism and back pain who presents with worsened back pain.  Symptoms have been worse the past 4-5 weeks.;  CT- Inferior endplate central compression deformities at L2 and L3   Precautions/Limitations spinal precautions  (semi rigid brace in  place)   General Observations Pt alert, pleasant - up in the cahir,  Pt reporting her pain is controlled- minimal at rest and with movement- located  LB area Left> right, initermmitent Sx to left foot   Pain  "Assessment   Patient Currently in Pain Yes, see Vital Sign flowsheet   Posture    Posture Kyphosis   Range of Motion (ROM)   ROM Comment AROM UE, LE WFL    Strength   Strength Comments LE strength 4/5  throughout   Bed Mobility   Bed Mobility Comments NT- pt reports needing minimal assistnce w/ log roll to sitting   Transfer Skills   Transfer Comments Minimal to CGA of one w/ sit>stand w/ walker   Gait   Gait Comments Pt ambulating short in rom distances 10 feet x2 wtih RW and CGA of 2. ,needing  occas assistance to advance the  walker, uses light 4ww at home   Balance   Balance Comments fair/ good w/ walker use   Clinical Impression   Criteria for Skilled Therapeutic Intervention evaluation only  (Rx to be initaited at TCU)   Anticipated Discharge Disposition Transitional Care Facility   Risk & Benefits of therapy have been explained Yes   Patient, Family & other staff in agreement with plan of care Yes   Clinical Impression Comments ; continued PT at TCU to address strength, ensure independence w/ functional mobility; ambulation for safe return home   Mobility: Walking & Moving Around   Mobility: Current Status , Goal , Discharge -Ujid Only- Modifier the same for all G-codes CJ: 20-39% impairment   Mobility Comments based on clinical assessment   [facility] AM-PAC  \"6 Clicks\" V.2 Basic Mobility Inpatient Short Form   1. Turning from your back to your side while in a flat bed without using bedrails? 3 - A Little   2. Moving from lying on your back to sitting on the side of a flat bed without using bedrails? 3 - A Little   3. Moving to and from a bed to a chair (including a wheelchair)? 3 - A Little   4. Standing up from a chair using your arms (e.g., wheelchair, or bedside chair)? 3 - A Little   5. To walk in hospital room? 3 - A Little   6. Climbing 3-5 steps with a railing? 3 - A Little   Basic Mobility Raw Score (Score out of 24.Lower scores equate to lower levels of function) 18   Total " Evaluation Time   Total Evaluation Time (Minutes) 30

## 2018-03-22 ENCOUNTER — ANTICOAGULATION THERAPY VISIT (OUTPATIENT)
Dept: ANTICOAGULATION | Facility: CLINIC | Age: 83
End: 2018-03-22
Payer: COMMERCIAL

## 2018-03-22 ENCOUNTER — MEDICAL CORRESPONDENCE (OUTPATIENT)
Dept: HEALTH INFORMATION MANAGEMENT | Facility: CLINIC | Age: 83
End: 2018-03-22

## 2018-03-22 ENCOUNTER — TELEPHONE (OUTPATIENT)
Dept: FAMILY MEDICINE | Facility: CLINIC | Age: 83
End: 2018-03-22

## 2018-03-22 DIAGNOSIS — I82.409 DVT (DEEP VENOUS THROMBOSIS) (H): ICD-10-CM

## 2018-03-22 DIAGNOSIS — Z79.01 LONG TERM CURRENT USE OF ANTICOAGULANT THERAPY: ICD-10-CM

## 2018-03-22 LAB — INR PPP: 1.8

## 2018-03-22 PROCEDURE — 99207 ZZC NO CHARGE NURSE ONLY: CPT

## 2018-03-22 RX ORDER — WARFARIN SODIUM 1 MG/1
1 TABLET ORAL DAILY
Qty: 30 TABLET | Status: ON HOLD | COMMUNITY
Start: 2018-03-22 | End: 2018-04-16

## 2018-03-22 NOTE — MR AVS SNAPSHOT
Ellie CARVER Lu   3/22/2018   Anticoagulation Therapy Visit    Description:  87 year old female   Provider:  Katia Landrum, RN   Department:  [facility]           INR as of 3/22/2018     Today's INR 1.8      Anticoagulation Summary as of 3/22/2018     INR goal    Prior goal 1.5-2.0   Today's INR 1.8   Full instructions 1 mg every day   Next INR check 3/29/2018    Indications   Atrial fibrillation with rapid ventricular response (H) (Resolved) [I48.91]  DVT (deep venous thrombosis) [I82.409]  Long term current use of anticoagulant therapy [Z79.01]         March 2018 Details    Sun Mon Tue Wed Thu Fri Sat         1               2               3                 4               5               6               7               8               9               10                 11               12               13               14               15               16               17                 18               19               20               21               22      1 mg   See details      23      1 mg         24      1 mg           25      1 mg         26      1 mg         27      1 mg         28      1 mg         29            30               31                Date Details   03/22 This INR check       Date of next INR:  3/29/2018         How to take your warfarin dose     To take:  1 mg Take 1 of the 1 mg tablets.

## 2018-03-22 NOTE — PROGRESS NOTES
ANTICOAGULATION FOLLOW-UP CLINIC VISIT    Patient Name:  Ellie Leigh  Date:  3/22/2018  Contact Type:  Telephone/ [facility]    SUBJECTIVE:     Patient Findings     Positives No Problem Findings    Comments No changes in medications, diet, or activity noted. Took warfarin as instructed, denies any missed doses.    Has been taking celebrex, not every day, has not noticed any issues with bleeding or increased bruising.     Will recheck INR on next Thursday or Friday.                OBJECTIVE    INR   Date Value Ref Range Status   03/22/2018 1.8  Final       ASSESSMENT / PLAN  INR assessment THER    Recheck INR In: 1 WEEK      Anticoagulation Summary as of 3/22/2018     INR goal    Prior goal 1.5-2.0   Today's INR 1.8   Maintenance plan 1 mg (1 mg x 1) every day   Full instructions 1 mg every day   Weekly total 7 mg   No change documented Katia Landrum RN   Plan last modified Lissy Beaulieu RN (3/16/2018)   Next INR check 3/29/2018   Priority INR   Target end date Indefinite    Indications   Atrial fibrillation with rapid ventricular response (H) (Resolved) [I48.91]  DVT (deep venous thrombosis) [I82.409]  Long term current use of anticoagulant therapy [Z79.01]         Anticoagulation Episode Summary     INR check location     Preferred lab     Send INR reminders to [facility] POOL    Comments * Actual INR goal is 1.7-2.3  [facility] after compression fracture in back. Taking Celebrex        Anticoagulation Care Providers     Provider Role Specialty Phone number    Josefina Gómez MD [facility] [phone]            See the Encounter Report to view Anticoagulation Flowsheet and Dosing Calendar (Go to Encounters tab in chart review, and find the Anticoagulation Therapy Visit)        Katia Landrum RN

## 2018-03-22 NOTE — TELEPHONE ENCOUNTER
Reason for Call:  Form, our goal is to have forms completed with 72 hours, however, some forms may require a visit or additional information.    Type of letter, form or note:  medical    Who is the form from?: Home care    Where did the form come from: form was faxed in    What clinic location was the form placed at?: [place]    Where the form was placed: Given to physician    What number is listed as a contact on the form?: [phone]       Additional comments: 2 pages of previous form request that did not get signed.    Call taken on 3/22/2018 at 10:26 AM by Juanita Alcantar

## 2018-03-26 ENCOUNTER — TELEPHONE (OUTPATIENT)
Dept: FAMILY MEDICINE | Facility: CLINIC | Age: 83
End: 2018-03-26

## 2018-03-26 NOTE — TELEPHONE ENCOUNTER
Reason for Call:  Form, our goal is to have forms completed with 72 hours, however, some forms may require a visit or additional information.    Type of letter, form or note:  medical    Who is the form from?: Home care    Where did the form come from: form was faxed in    What clinic location was the form placed at?: [place]    Where the form was placed: 's Box    What number is listed as a contact on the form?: [phone]       Additional comments:     Call taken on 3/26/2018 at 10:06 AM by Juanita Alcantar

## 2018-03-28 ENCOUNTER — TELEPHONE (OUTPATIENT)
Dept: FAMILY MEDICINE | Facility: CLINIC | Age: 83
End: 2018-03-28

## 2018-03-28 DIAGNOSIS — E87.1 HYPONATREMIA: Primary | ICD-10-CM

## 2018-03-28 NOTE — TELEPHONE ENCOUNTER
Last sodium 129 on 3-14-18.  Thalia's family requesting repeat sodium.  Home could draw it tomorrow.  Celena Laurent RN

## 2018-03-28 NOTE — TELEPHONE ENCOUNTER
Reason for Call:  Other     Detailed comments: Edelmira - RN case Manager - [facility] - [phone] - Would like a Blood draw for sodium level - Rn could do that tomorrow - please call    Phone Number Patient can be reached at:     Best Time:     Can we leave a detailed message on this number? yes    Call taken on 3/28/2018 at 11:03 AM by Marilyn Harrison

## 2018-03-29 ENCOUNTER — ANTICOAGULATION THERAPY VISIT (OUTPATIENT)
Dept: ANTICOAGULATION | Facility: CLINIC | Age: 83
End: 2018-03-29
Payer: COMMERCIAL

## 2018-03-29 ENCOUNTER — TRANSFERRED RECORDS (OUTPATIENT)
Dept: HEALTH INFORMATION MANAGEMENT | Facility: CLINIC | Age: 83
End: 2018-03-29

## 2018-03-29 ENCOUNTER — TELEPHONE (OUTPATIENT)
Dept: FAMILY MEDICINE | Facility: CLINIC | Age: 83
End: 2018-03-29

## 2018-03-29 DIAGNOSIS — S12.9XXA CERVICAL VERTEBRAL FRACTURE (H): Primary | ICD-10-CM

## 2018-03-29 DIAGNOSIS — E22.2 SIADH (SYNDROME OF INAPPROPRIATE ADH PRODUCTION) (H): ICD-10-CM

## 2018-03-29 DIAGNOSIS — J44.9 CHRONIC OBSTRUCTIVE PULMONARY DISEASE, UNSPECIFIED COPD TYPE (H): ICD-10-CM

## 2018-03-29 DIAGNOSIS — I82.409 DVT (DEEP VENOUS THROMBOSIS) (H): ICD-10-CM

## 2018-03-29 DIAGNOSIS — M54.59 INTRACTABLE LOW BACK PAIN: ICD-10-CM

## 2018-03-29 DIAGNOSIS — Z79.01 LONG TERM CURRENT USE OF ANTICOAGULANT THERAPY: ICD-10-CM

## 2018-03-29 LAB — INR PPP: 1.2

## 2018-03-29 PROCEDURE — 99207 ZZC NO CHARGE NURSE ONLY: CPT

## 2018-03-29 NOTE — TELEPHONE ENCOUNTER
Spoke with daughter Suyapa, Ellie was in [place] for 3 weeks after hurting her back, she then went to live with her daughter in [facility] because Suyapa was caring for her  as he had a stroke. Ellie is now going to be moving back home with Suyapa on 4/8/18 and Suyapa is requesting a home care referral. She says Ellie is currently receiving nursing services, OT and PT for strengthening.

## 2018-03-29 NOTE — MR AVS SNAPSHOT
Ellie Leigh   3/29/2018   Anticoagulation Therapy Visit    Description:  87 year old female   Provider:  Ruiz Meredith, RN   Department:  [facility]           INR as of 3/29/2018     Today's INR 1.2!      Anticoagulation Summary as of 3/29/2018     INR goal    Prior goal 1.5-2.0   Today's INR 1.2!   Full instructions 3/29: 3 mg; Otherwise 1 mg every day   Next INR check 4/2/2018    Indications   Atrial fibrillation with rapid ventricular response (H) (Resolved) [I48.91]  DVT (deep venous thrombosis) [I82.409]  Long term current use of anticoagulant therapy [Z79.01]         March 2018 Details    Sun Mon Tue Wed Thu Fri Sat         1               2               3                 4               5               6               7               8               9               10                 11               12               13               14               15               16               17                 18               19               20               21               22               23               24                 25               26               27               28               29      3 mg   See details      30      1 mg         31      1 mg          Date Details   03/29 This INR check               How to take your warfarin dose     To take:  1 mg Take 1 of the 1 mg tablets.    To take:  3 mg Take 3 of the 1 mg tablets.           April 2018 Details    Sun Mon Tue Wed Thu Fri Sat     1      1 mg         2            3               4               5               6               7                 8               9               10               11               12               13               14                 15               16               17               18               19               20               21                 22               23               24               25               26               27               28                 29               30                      Date Details   No additional details    Date of next INR:  4/2/2018         How to take your warfarin dose     To take:  1 mg Take 1 of the 1 mg tablets.

## 2018-03-29 NOTE — PROGRESS NOTES
ANTICOAGULATION FOLLOW-UP CLINIC VISIT    Patient Name:  Ellie Leigh  Date:  3/29/2018  Contact Type:  Telephone/ Edelmira ROLON    SUBJECTIVE:     Patient Findings     Positives Change in medications (Took Eliquis for 2 days), Intentional hold of therapy (Coumadin not taken Sunday and Monday)    Comments Edelmira VIERA, states that the patient took Eliquis Sunday and Monday but it made her nauseated so she switched back to Coumadin. She did not take her Coumadin on those 2 days. I instructed her to take 3 mg today then resume her maintenance dose. Recheck on Monday.            OBJECTIVE    INR   Date Value Ref Range Status   03/29/2018 1.2  Final       ASSESSMENT / PLAN  INR assessment SUB    Recheck INR In: 4 DAYS    INR Location Homecare INR      Anticoagulation Summary as of 3/29/2018     INR goal    Prior goal 1.5-2.0   Today's INR 1.2!   Maintenance plan 1 mg (1 mg x 1) every day   Full instructions 3/29: 3 mg; Otherwise 1 mg every day   Weekly total 7 mg   Plan last modified Lissy Beaulieu RN (3/16/2018)   Next INR check 4/2/2018   Priority INR   Target end date Indefinite    Indications   Atrial fibrillation with rapid ventricular response (H) (Resolved) [I48.91]  DVT (deep venous thrombosis) [I82.409]  Long term current use of anticoagulant therapy [Z79.01]         Anticoagulation Episode Summary     INR check location     Preferred lab     Send INR reminders to [place] [facility] POOL    Comments * Actual INR goal is 1.7-2.3  [facility] after compression fracture in back. Taking Celebrex        Anticoagulation Care Providers     Provider Role Specialty Phone number    Josefina Gómez MD [facility] [phone]            See the Encounter Report to view Anticoagulation Flowsheet and Dosing Calendar (Go to Encounters tab in chart review, and find the Anticoagulation Therapy Visit)        Ruiz Meredith RN

## 2018-03-29 NOTE — TELEPHONE ENCOUNTER
Reason for Call: Request for an order or referral:    Order or referral being requested: Referral    Date needed: as soon as possible    Additional comments:  Need a referral to home care for home bound agency to come in. Currently the patient is living in [place] but is moving back in with them. [facility] is requiring this.    Phone number Patient can be reached at:  Home number on file [phone] (home)    Best Time:  any    Can we leave a detailed message on this number?      Call taken on 3/29/2018 at 3:15 PM by Juanita Alcantar

## 2018-04-02 ENCOUNTER — TELEPHONE (OUTPATIENT)
Dept: FAMILY MEDICINE | Facility: CLINIC | Age: 83
End: 2018-04-02

## 2018-04-02 ENCOUNTER — ANTICOAGULATION THERAPY VISIT (OUTPATIENT)
Dept: ANTICOAGULATION | Facility: CLINIC | Age: 83
End: 2018-04-02
Payer: COMMERCIAL

## 2018-04-02 DIAGNOSIS — Z79.01 LONG TERM CURRENT USE OF ANTICOAGULANT THERAPY: ICD-10-CM

## 2018-04-02 DIAGNOSIS — I82.409 DVT (DEEP VENOUS THROMBOSIS) (H): ICD-10-CM

## 2018-04-02 PROCEDURE — 99207 ZZC NO CHARGE NURSE ONLY: CPT

## 2018-04-02 NOTE — MR AVS SNAPSHOT
Ellie WEN Leigh   4/2/2018   Anticoagulation Therapy Visit    Description:  87 year old female   Provider:  Angelia Bo, RN   Department:  [place] [facility]           INR as of 4/2/2018     Today's INR No new INR was available at the time of this encounter.      Anticoagulation Summary as of 4/2/2018     INR goal    Prior goal 1.5-2.0   Today's INR No new INR was available at the time of this encounter.   Full instructions 1 mg every day   Next INR check 4/3/2018    Indications   Atrial fibrillation with rapid ventricular response (H) (Resolved) [I48.91]  DVT (deep venous thrombosis) [I82.409]  Long term current use of anticoagulant therapy [Z79.01]         April 2018 Details    Sun Mon Tue Wed Thu Fri Sat     1               2      1 mg   See details      3            4               5               6               7                 8               9               10               11               12               13               14                 15               16               17               18               19               20               21                 22               23               24               25               26               27               28                 29               30                     Date Details   04/02 This INR check       Date of next INR:  4/3/2018         How to take your warfarin dose     To take:  1 mg Take 1 of the 1 mg tablets.

## 2018-04-02 NOTE — TELEPHONE ENCOUNTER
[facility] Per Dr. Gómez, haven needs to be on 1 liter fluid restriction daily as her sodium was a little low. We will follow up with additional labs when she moves back to this area next week.

## 2018-04-02 NOTE — PROGRESS NOTES
ANTICOAGULATION FOLLOW-UP CLINIC VISIT    Patient Name:  Ellie Leigh  Date:  2018  Contact Type:  Telephone/ spoke with AYLIN Hickey from [facility], home care    SUBJECTIVE:     Patient Findings     Comments AYLIN Hickey from [facility] called, reports she planned to do pt's INR today, but discovered her strips were  when she started to check INR, states she will get new strips and check pt 1st thing tomorrow (4/3) am.  Writer instructed her to have pt continue on maintenance dose and call with INR tomorrow.      AYLIN Hickey denies changes in medications, diet, activity or health, reports pt is taking warfarin as directed.             OBJECTIVE    INR   Date Value Ref Range Status   2018 1.2  Final       ASSESSMENT / PLAN  No question data found.  Anticoagulation Summary as of 2018     INR goal    Prior goal 1.5-2.0   Today's INR No new INR was available at the time of this encounter.   Maintenance plan 1 mg (1 mg x 1) every day   Full instructions 1 mg every day   Weekly total 7 mg   Plan last modified Lissy Beaulieu RN (3/16/2018)   Next INR check 4/3/2018   Priority INR   Target end date Indefinite    Indications   Atrial fibrillation with rapid ventricular response (H) (Resolved) [I48.91]  DVT (deep venous thrombosis) [I82.409]  Long term current use of anticoagulant therapy [Z79.01]         Anticoagulation Episode Summary     INR check location     Preferred lab     Send INR reminders to [place] [facility] POOL    Comments * Actual INR goal is 1.7-2.3  [facility] after compression fracture in back. Taking Celebrex        Anticoagulation Care Providers     Provider Role Specialty Phone number    Josefina Gómez MD Responsible Family Practice [phone]            See the Encounter Report to view Anticoagulation Flowsheet and Dosing Calendar (Go to Encounters tab in chart review, and find the Anticoagulation Therapy  Visit)        Angelia Bo RN

## 2018-04-03 ENCOUNTER — ANTICOAGULATION THERAPY VISIT (OUTPATIENT)
Dept: ANTICOAGULATION | Facility: CLINIC | Age: 83
End: 2018-04-03
Payer: COMMERCIAL

## 2018-04-03 DIAGNOSIS — Z79.01 LONG TERM CURRENT USE OF ANTICOAGULANT THERAPY: ICD-10-CM

## 2018-04-03 LAB — INR PPP: 2.1

## 2018-04-03 PROCEDURE — 99207 ZZC NO CHARGE NURSE ONLY: CPT

## 2018-04-03 NOTE — PROGRESS NOTES
ANTICOAGULATION FOLLOW-UP CLINIC VISIT    Patient Name:  Ellie Leigh  Date:  4/3/2018  Contact Type:  Telephone/ Edelmira with [facility]    SUBJECTIVE:     Patient Findings     Positives No Problem Findings    Comments Patient tried to switch to eliquis last week but it made her nauseous so she resumed warfarin and plans to continue with that. She had been living near [place], [place] but is moving back to [place], [place] area next week. Family is trying to get home care for pt in [place] as [facility] will not be in her area after she moves. Writer recommended patient check INR by 4-20-18 in clinic if home care is not set up by then. Patient has had 9 mg in the last 7 days due to extra dosing after resuming warfarin (and missing two warfarin doses). Her INR had been running low, for the most part, prior to trying to switch to Eliquis. Writer will try 8.5 mg/week at this point. Recheck in 10 days or sooner. No other changes in meds, diet, activity or health.            OBJECTIVE    INR   Date Value Ref Range Status   04/03/2018 2.1  Final       ASSESSMENT / PLAN  INR assessment THER    Recheck INR In: 10 DAYS    INR Location Homecare INR      Anticoagulation Summary as of 4/3/2018     INR goal    Prior goal 1.5-2.0   Today's INR 2.1!   Maintenance plan 1.5 mg (1 mg x 1.5) on Mon, Wed, Fri; 1 mg (1 mg x 1) all other days   Full instructions 1.5 mg on Mon, Wed, Fri; 1 mg all other days   Weekly total 8.5 mg   Plan last modified Lissy Beaulieu RN (4/3/2018)   Next INR check 4/13/2018   Priority INR   Target end date Indefinite    Indications   Atrial fibrillation with rapid ventricular response (H) (Resolved) [I48.91]  DVT (deep venous thrombosis) [I82.409]  Long term current use of anticoagulant therapy [Z79.01]         Anticoagulation Episode Summary     INR check location     Preferred lab     Send INR reminders to [facility] POOL    Comments * Actual INR goal is 1.7-2.3  Taking Celebrex  PRN        Anticoagulation Care Providers     Provider Role Specialty Phone number    Josefina Gómez MD [facility] [phone]            See the Encounter Report to view Anticoagulation Flowsheet and Dosing Calendar (Go to Encounters tab in chart review, and find the Anticoagulation Therapy Visit)        Lissy Beaulieu RN

## 2018-04-03 NOTE — MR AVS SNAPSHOT
Ellie Leigh   4/3/2018   Anticoagulation Therapy Visit    Description:  87 year old female   Provider:  Lissy Beaulieu, RN   Department:  [place] [facility]           INR as of 4/3/2018     Today's INR 2.1!      Anticoagulation Summary as of 4/3/2018     INR goal    Prior goal 1.5-2.0   Today's INR 2.1!   Full instructions 1.5 mg on Mon, Wed, Fri; 1 mg all other days   Next INR check 4/13/2018    Indications   Atrial fibrillation with rapid ventricular response (H) (Resolved) [I48.91]  DVT (deep venous thrombosis) [I82.409]  Long term current use of anticoagulant therapy [Z79.01]         April 2018 Details    Sun Mon Tue Wed Thu Fri Sat     1               2               3      1 mg   See details      4      1.5 mg         5      1 mg         6      1.5 mg         7      1 mg           8      1 mg         9      1.5 mg         10      1 mg         11      1.5 mg         12      1 mg         13            14                 15               16               17               18               19               20               21                 22               23               24               25               26               27               28                 29               30                     Date Details   04/03 This INR check       Date of next INR:  4/13/2018         How to take your warfarin dose     To take:  1 mg Take 1 of the 1 mg tablets.    To take:  1.5 mg Take 1.5 of the 1 mg tablets.

## 2018-04-09 ENCOUNTER — TELEPHONE (OUTPATIENT)
Dept: FAMILY MEDICINE | Facility: CLINIC | Age: 83
End: 2018-04-09

## 2018-04-09 ENCOUNTER — MEDICAL CORRESPONDENCE (OUTPATIENT)
Dept: HEALTH INFORMATION MANAGEMENT | Facility: CLINIC | Age: 83
End: 2018-04-09

## 2018-04-09 ENCOUNTER — ALLIED HEALTH/NURSE VISIT (OUTPATIENT)
Dept: CARDIOLOGY | Facility: CLINIC | Age: 83
End: 2018-04-09
Payer: COMMERCIAL

## 2018-04-09 DIAGNOSIS — Z95.0 CARDIAC PACEMAKER IN SITU: Primary | ICD-10-CM

## 2018-04-09 PROCEDURE — 93296 REM INTERROG EVL PM/IDS: CPT | Performed by: INTERNAL MEDICINE

## 2018-04-09 PROCEDURE — 93294 REM INTERROG EVL PM/LDLS PM: CPT | Performed by: INTERNAL MEDICINE

## 2018-04-09 NOTE — MR AVS SNAPSHOT
After Visit Summary   4/9/2018    Ellie Leigh    MRN: [id number]           Patient Information     Date Of Birth          9/12/1930        Visit Information        Provider Department      4/9/2018 3:15 PM RUEDA TECH1 [facility]        Today's Diagnoses     Cardiac pacemaker in situ    -  1       Follow-ups after your visit        Your next 10 appointments already scheduled     Apr 09, 2018  3:15 PM CDT   Remote PPM Check with RUEDA TECH1   [facility]   [place] ([facility])    [address]  [place] [address] [phone] OPT 2           This appointment is for a remote check of your pacemaker.  This is not an appointment at the office.            Jul 10, 2018 11:20 AM CDT   Pacemaker Check with Wy Device Rn   [facility] ([facility])    [address] [address] [phone]              Who to contact     If you have questions or need follow up information about today's clinic visit or your schedule please contact [facility] directly at [phone].  Normal or non-critical lab and imaging results will be communicated to you by [email or web address], letter or phone within 4 business days after the clinic has received the results. If you do not hear from us within 7 days, please contact the clinic through [email or web address] or phone. If you have a critical or abnormal lab result, we will notify you by phone as soon as possible.  Submit refill requests through [employer] or call your pharmacy and they will forward the refill request to us. Please allow 3 business days for your refill to be completed.          Additional Information About Your Visit        MyChart Information     [employer] lets you send messages to your doctor, view your test results, renew your prescriptions, schedule appointments and more. To sign up, go to  [email or web address] . Click on \"Log in\" on the left side of the screen, which will take you to the Welcome page. Then click on \"Sign up Now\" on the right side of the page.     You will be asked to enter the access code listed below, as well as some personal information. Please follow the directions to create your username and password.     Your access code is: [email or web address]  Expires: 2018  3:50 PM     Your access code will  in 90 days. If you need help or a new code, please call your [place] clinic or [phone].        Care EveryWhere ID     This is your Care EveryWhere ID. This could be used by other organizations to access your [place] medical records  [id number]        Your Vitals Were     Last Period                   06/15/1985            Blood Pressure from Last 3 Encounters:   18 106/68   18 118/60   18 118/60    Weight from Last 3 Encounters:   18 52.2 kg (115 lb)   18 52.3 kg (115 lb 6.4 oz)   18 52.3 kg (115 lb 6.4 oz)              We Performed the Following     INTERROGATION DEVICE EVAL REMOTE, PACER/ICD (81104)     PM DEVICE INTERROGATE REMOTE (40849)        Primary Care Provider Office Phone # Fax #    Anjum Vidales [phone] [address] [place] [address]        Equal Access to Services     [facility] : Hadii aad ku hadasho Soomaali, waaxda luqadaha, qaybta kaalmada adeegyada, verónica bedoya . So [facility] [phone].    ATENCIÓN: Si habla español, tiene a sahni disposición servicios gratuitos de asistencia lingüística. Llame al [phone].    We comply with applicable federal civil rights laws and [place] laws. We do not discriminate on the basis of race, color, national origin, age, disability, sex, sexual orientation, or gender identity.            Thank you!     Thank you for choosing [facility]  for your care. Our goal is always to provide you " with excellent care. Hearing back from our patients is one way we can continue to improve our services. Please take a few minutes to complete the written survey that you may receive in the mail after your visit with us. Thank you!             Your Updated Medication List - Protect others around you: Learn how to safely use, store and throw away your medicines at www.disposemymeds.org.          This list is accurate as of 4/9/18 11:38 AM.  Always use your most recent med list.                   Brand Name Dispense Instructions for use Diagnosis    albuterol 108 (90 BASE) MCG/ACT Inhaler    PROAIR HFA/PROVENTIL HFA/VENTOLIN HFA    3 Inhaler    Inhale 2 puffs into the lungs every 6 hours as needed for shortness of breath / dyspnea    Mild persistent asthma without complication       celecoxib 100 MG capsule    celeBREX    60 capsule    Take 1 capsule (100 mg) by mouth 2 times daily as needed for moderate pain    Intractable low back pain       COMPAZINE PO      Take 5 mg by mouth every 8 hours as needed for nausea        CRANBERRY      Take 475 mg by mouth 2 times daily.        diclofenac 1 % Gel topical gel    VOLTAREN     Place onto the skin 4 times daily as needed for moderate pain        ipratropium - albuterol 0.5 mg/2.5 mg/3 mL 0.5-2.5 (3) MG/3ML neb solution    DUONEB    180 mL    TAKE 1 VIAL BY NEBULIZATION  EVERY SIX HOURS AS NEEDED FOR SHORTNESS OF BREATH/ DYSPNEA OR WHEEZING    Chronic obstructive pulmonary disease, unspecified COPD type (H)       levothyroxine 50 MCG tablet    SYNTHROID/LEVOTHROID    90 tablet    TAKE 1 TABLET (50 MCG) BY MOUTH DAILY    Hypothyroidism, unspecified type       metoprolol succinate 25 MG 24 hr tablet    TOPROL-XL    360 tablet    TAKE TWO TABLETS BY MOUTH TWICE DAILY    Essential hypertension, benign       polyethylene glycol Packet    MIRALAX/GLYCOLAX     Take 1.5 packets by mouth daily        probiotic Caps      Take 1 capsule by mouth every evening        ranitidine 150 MG  tablet    ZANTAC    60 tablet    Take 1 tablet (150 mg) by mouth 2 times daily    Gastroesophageal reflux disease without esophagitis       SYMBICORT 160-4.5 MCG/ACT Inhaler   Generic drug:  budesonide-formoterol     10.2 g    INHALE 2 PUFFS INTO THE LUNGS 2 TIMES DAILY    Mild persistent asthma without complication       TYLENOL PO      Take 975 mg by mouth 3 times daily        warfarin 1 MG tablet    COUMADIN    30 tablet    Take 1 mg by mouth daily On Tues, Thurs, Sat, Sun and 1.5 mg all other days or as directed by the Anticoagulation Clinic

## 2018-04-09 NOTE — PROGRESS NOTES
Biotronik Evia (D) Remote PPM Device Check  AP: 66 % : 4 %  Mode: DDD-CLS        Presenting Rhythm: AP/VS  Heart Rate: Adequate rates per histogram  Sensing: Stable    Pacing Threshold: Stable    Impedance: Stable  Battery Status: 70% battery life remaining   Atrial Arrhythmia: 1% atrial burden per day (4 episodes) and 2% mode switching per day (78 episodes). Ventricular rates controlled. No EGMs. Taking Warfarin.   Ventricular Arrhythmia: None     Care Plan: F/u annual threshold in 3 months. Gave patient's daughter results over the phone. Tristian,CVT

## 2018-04-09 NOTE — TELEPHONE ENCOUNTER
Reason for Call:  Other     Detailed comments: Emily from [facility] calling requesting verbal orders to start home care.    Phone Number Patient can be reached at: Other phone number:  Emily [phone]    Best Time: any    Can we leave a detailed message on this number? YES    Call taken on 4/9/2018 at 1:21 PM by Sabrina Yeboah

## 2018-04-10 ENCOUNTER — DOCUMENTATION ONLY (OUTPATIENT)
Dept: CARE COORDINATION | Facility: CLINIC | Age: 83
End: 2018-04-10

## 2018-04-12 ENCOUNTER — DOCUMENTATION ONLY (OUTPATIENT)
Dept: CARE COORDINATION | Facility: CLINIC | Age: 83
End: 2018-04-12

## 2018-04-12 NOTE — PROGRESS NOTES
[facility] now requests orders and shares plan of care/discharge summaries for some patients through [employer].  Please REPLY TO THIS MESSAGE in order to give authorization for orders when needed.  This is considered a verbal order, you will still receive a faxed copy of orders for signature.  Thank you for your assistance in improving collaboration for our patients.    ORDER/PT 2x/wk for 2 weeks for strengthening, falls prevention, gait and transfer training.

## 2018-04-13 ENCOUNTER — MEDICAL CORRESPONDENCE (OUTPATIENT)
Dept: HEALTH INFORMATION MANAGEMENT | Facility: CLINIC | Age: 83
End: 2018-04-13

## 2018-04-13 ENCOUNTER — ANTICOAGULATION THERAPY VISIT (OUTPATIENT)
Dept: ANTICOAGULATION | Facility: CLINIC | Age: 83
End: 2018-04-13
Payer: COMMERCIAL

## 2018-04-13 DIAGNOSIS — I82.409 DVT (DEEP VENOUS THROMBOSIS) (H): ICD-10-CM

## 2018-04-13 DIAGNOSIS — Z79.01 LONG TERM CURRENT USE OF ANTICOAGULANT THERAPY: ICD-10-CM

## 2018-04-13 LAB — INR PPP: 2.4

## 2018-04-13 PROCEDURE — 99207 ZZC NO CHARGE NURSE ONLY: CPT

## 2018-04-13 NOTE — MR AVS SNAPSHOT
Ellie Leigh   4/13/2018   Anticoagulation Therapy Visit    Description:  87 year old female   Provider:  Ruiz Meredith, RN   Department:  [facility]           INR as of 4/13/2018     Today's INR 2.4!      Anticoagulation Summary as of 4/13/2018     INR goal    Prior goal 1.5-2.0   Today's INR 2.4!   Full instructions 1.5 mg on Mon, Fri; 1 mg all other days   Next INR check 4/17/2018    Indications   Atrial fibrillation with rapid ventricular response (H) (Resolved) [I48.91]  DVT (deep venous thrombosis) [I82.409]  Long term current use of anticoagulant therapy [Z79.01]         April 2018 Details    Sun Mon Tue Wed Thu Fri Sat     1               2               3               4               5               6               7                 8               9               10               11               12               13      1.5 mg   See details      14      1 mg           15      1 mg         16      1.5 mg         17            18               19               20               21                 22               23               24               25               26               27               28                 29               30                     Date Details   04/13 This INR check       Date of next INR:  4/17/2018         How to take your warfarin dose     To take:  1 mg Take 1 of the 1 mg tablets.    To take:  1.5 mg Take 1.5 of the 1 mg tablets.

## 2018-04-13 NOTE — PROGRESS NOTES
ANTICOAGULATION FOLLOW-UP CLINIC VISIT    Patient Name:  Ellie Leigh  Date:  4/13/2018  Contact Type:  Telephone/ Torri [facility]ERA [facility]    SUBJECTIVE:     Patient Findings     Positives No Problem Findings    Comments Patient denies any changes. Goal range is 1.7-2.3. Patient is riding on the upper end of range. Patient had 8.5mg in the previous 7 days, will decrease dose to 8mg by the next INR check in 4 days (~5.9% decrease).             OBJECTIVE    INR   Date Value Ref Range Status   04/13/2018 2.4  Final       ASSESSMENT / PLAN  INR assessment THER +/-1   Recheck INR In: 4 DAYS    INR Location [facility]      Anticoagulation Summary as of 4/13/2018     INR goal    Prior goal 1.5-2.0   Today's INR 2.4!   Maintenance plan 1.5 mg (1 mg x 1.5) on Mon, Fri; 1 mg (1 mg x 1) all other days   Full instructions 1.5 mg on Mon, Fri; 1 mg all other days   Weekly total 8 mg   Plan last modified Ruiz Meredith RN (4/13/2018)   Next INR check 4/17/2018   Priority INR   Target end date Indefinite    Indications   Atrial fibrillation with rapid ventricular response (H) (Resolved) [I48.91]  DVT (deep venous thrombosis) [I82.409]  Long term current use of anticoagulant therapy [Z79.01]         Anticoagulation Episode Summary     INR check location     Preferred lab     Send INR reminders to [facility] CLINIC POOL    Comments * Actual INR goal is 1.7-2.3  Taking Celebrex PRN        Anticoagulation Care Providers     Provider Role Specialty Phone number    Josefina Gómez MD [facility] [phone]            See the Encounter Report to view Anticoagulation Flowsheet and Dosing Calendar (Go to Encounters tab in chart review, and find the Anticoagulation Therapy Visit)        Ruiz Meredith RN

## 2018-04-14 ENCOUNTER — APPOINTMENT (OUTPATIENT)
Dept: GENERAL RADIOLOGY | Facility: CLINIC | Age: 83
DRG: 191 | End: 2018-04-14
Attending: FAMILY MEDICINE
Payer: COMMERCIAL

## 2018-04-14 ENCOUNTER — HOSPITAL ENCOUNTER (INPATIENT)
Facility: CLINIC | Age: 83
LOS: 2 days | Discharge: HOME HEALTH | DRG: 191 | End: 2018-04-16
Attending: FAMILY MEDICINE | Admitting: FAMILY MEDICINE
Payer: COMMERCIAL

## 2018-04-14 DIAGNOSIS — J44.9 CHRONIC OBSTRUCTIVE PULMONARY DISEASE, UNSPECIFIED COPD TYPE (H): ICD-10-CM

## 2018-04-14 DIAGNOSIS — J18.9 COMMUNITY ACQUIRED PNEUMONIA OF RIGHT UPPER LOBE OF LUNG: Primary | ICD-10-CM

## 2018-04-14 DIAGNOSIS — R06.02 SHORTNESS OF BREATH: ICD-10-CM

## 2018-04-14 DIAGNOSIS — Z79.01 LONG TERM CURRENT USE OF ANTICOAGULANT THERAPY: ICD-10-CM

## 2018-04-14 DIAGNOSIS — R07.9 ACUTE CHEST PAIN: ICD-10-CM

## 2018-04-14 DIAGNOSIS — I48.0 INTERMITTENT ATRIAL FIBRILLATION (H): ICD-10-CM

## 2018-04-14 DIAGNOSIS — E22.2 SIADH (SYNDROME OF INAPPROPRIATE ADH PRODUCTION) (H): ICD-10-CM

## 2018-04-14 DIAGNOSIS — I10 ESSENTIAL HYPERTENSION, BENIGN: ICD-10-CM

## 2018-04-14 PROBLEM — R07.89 ATYPICAL CHEST PAIN: Status: ACTIVE | Noted: 2018-04-14

## 2018-04-14 LAB
ALBUMIN SERPL-MCNC: 3.4 G/DL (ref 3.4–5)
ALP SERPL-CCNC: 116 U/L (ref 40–150)
ALT SERPL W P-5'-P-CCNC: 13 U/L (ref 0–50)
ANION GAP SERPL CALCULATED.3IONS-SCNC: 6 MMOL/L (ref 3–14)
AST SERPL W P-5'-P-CCNC: 23 U/L (ref 0–45)
BASOPHILS # BLD AUTO: 0.1 10E9/L (ref 0–0.2)
BASOPHILS NFR BLD AUTO: 1 %
BILIRUB SERPL-MCNC: 0.3 MG/DL (ref 0.2–1.3)
BUN SERPL-MCNC: 16 MG/DL (ref 7–30)
CALCIUM SERPL-MCNC: 8 MG/DL (ref 8.5–10.1)
CHLORIDE SERPL-SCNC: 89 MMOL/L (ref 94–109)
CO2 SERPL-SCNC: 28 MMOL/L (ref 20–32)
CREAT SERPL-MCNC: 0.5 MG/DL (ref 0.52–1.04)
DIFFERENTIAL METHOD BLD: ABNORMAL
EOSINOPHIL # BLD AUTO: 0.1 10E9/L (ref 0–0.7)
EOSINOPHIL NFR BLD AUTO: 1.6 %
ERYTHROCYTE [DISTWIDTH] IN BLOOD BY AUTOMATED COUNT: 13.4 % (ref 10–15)
GFR SERPL CREATININE-BSD FRML MDRD: >90 ML/MIN/1.7M2
GLUCOSE SERPL-MCNC: 95 MG/DL (ref 70–99)
HCT VFR BLD AUTO: 33.9 % (ref 35–47)
HGB BLD-MCNC: 11.7 G/DL (ref 11.7–15.7)
IMM GRANULOCYTES # BLD: 0 10E9/L (ref 0–0.4)
IMM GRANULOCYTES NFR BLD: 0.2 %
INR PPP: 2.28 (ref 0.86–1.14)
LYMPHOCYTES # BLD AUTO: 1.7 10E9/L (ref 0.8–5.3)
LYMPHOCYTES NFR BLD AUTO: 32.8 %
MCH RBC QN AUTO: 28.7 PG (ref 26.5–33)
MCHC RBC AUTO-ENTMCNC: 34.5 G/DL (ref 31.5–36.5)
MCV RBC AUTO: 83 FL (ref 78–100)
MONOCYTES # BLD AUTO: 0.8 10E9/L (ref 0–1.3)
MONOCYTES NFR BLD AUTO: 15 %
NEUTROPHILS # BLD AUTO: 2.6 10E9/L (ref 1.6–8.3)
NEUTROPHILS NFR BLD AUTO: 49.4 %
NT-PROBNP SERPL-MCNC: 559 PG/ML (ref 0–1800)
PLATELET # BLD AUTO: 283 10E9/L (ref 150–450)
POTASSIUM SERPL-SCNC: 4.3 MMOL/L (ref 3.4–5.3)
PROT SERPL-MCNC: 7.1 G/DL (ref 6.8–8.8)
RBC # BLD AUTO: 4.08 10E12/L (ref 3.8–5.2)
SODIUM SERPL-SCNC: 123 MMOL/L (ref 133–144)
TROPONIN I SERPL-MCNC: 0.04 UG/L (ref 0–0.04)
WBC # BLD AUTO: 5.2 10E9/L (ref 4–11)

## 2018-04-14 PROCEDURE — 83880 ASSAY OF NATRIURETIC PEPTIDE: CPT | Performed by: FAMILY MEDICINE

## 2018-04-14 PROCEDURE — 93005 ELECTROCARDIOGRAM TRACING: CPT | Performed by: FAMILY MEDICINE

## 2018-04-14 PROCEDURE — 85610 PROTHROMBIN TIME: CPT | Performed by: FAMILY MEDICINE

## 2018-04-14 PROCEDURE — 99285 EMERGENCY DEPT VISIT HI MDM: CPT | Mod: 25 | Performed by: FAMILY MEDICINE

## 2018-04-14 PROCEDURE — 84484 ASSAY OF TROPONIN QUANT: CPT | Performed by: FAMILY MEDICINE

## 2018-04-14 PROCEDURE — 25000128 H RX IP 250 OP 636: Performed by: FAMILY MEDICINE

## 2018-04-14 PROCEDURE — 99222 1ST HOSP IP/OBS MODERATE 55: CPT | Mod: AI | Performed by: FAMILY MEDICINE

## 2018-04-14 PROCEDURE — 99207 ZZC CDG-CHARGE REQUIRED MANUAL ENTRY: CPT | Performed by: FAMILY MEDICINE

## 2018-04-14 PROCEDURE — 85025 COMPLETE CBC W/AUTO DIFF WBC: CPT | Performed by: FAMILY MEDICINE

## 2018-04-14 PROCEDURE — 93308 TTE F-UP OR LMTD: CPT | Performed by: FAMILY MEDICINE

## 2018-04-14 PROCEDURE — 94640 AIRWAY INHALATION TREATMENT: CPT | Mod: 76

## 2018-04-14 PROCEDURE — 25000132 ZZH RX MED GY IP 250 OP 250 PS 637: Performed by: FAMILY MEDICINE

## 2018-04-14 PROCEDURE — 71046 X-RAY EXAM CHEST 2 VIEWS: CPT

## 2018-04-14 PROCEDURE — 12000000 ZZH R&B MED SURG/OB

## 2018-04-14 PROCEDURE — 96374 THER/PROPH/DIAG INJ IV PUSH: CPT | Performed by: FAMILY MEDICINE

## 2018-04-14 PROCEDURE — 36415 COLL VENOUS BLD VENIPUNCTURE: CPT | Performed by: FAMILY MEDICINE

## 2018-04-14 PROCEDURE — 80053 COMPREHEN METABOLIC PANEL: CPT | Performed by: FAMILY MEDICINE

## 2018-04-14 PROCEDURE — 93308 TTE F-UP OR LMTD: CPT | Mod: 26 | Performed by: FAMILY MEDICINE

## 2018-04-14 PROCEDURE — 93010 ELECTROCARDIOGRAM REPORT: CPT | Mod: Z6 | Performed by: FAMILY MEDICINE

## 2018-04-14 RX ORDER — METOPROLOL SUCCINATE 50 MG/1
50 TABLET, EXTENDED RELEASE ORAL 2 TIMES DAILY
Status: DISCONTINUED | OUTPATIENT
Start: 2018-04-14 | End: 2018-04-15

## 2018-04-14 RX ORDER — ONDANSETRON 4 MG/1
4 TABLET, FILM COATED ORAL EVERY 8 HOURS PRN
Status: DISCONTINUED | OUTPATIENT
Start: 2018-04-14 | End: 2018-04-14

## 2018-04-14 RX ORDER — PROCHLORPERAZINE 25 MG
12.5 SUPPOSITORY, RECTAL RECTAL EVERY 12 HOURS PRN
Status: DISCONTINUED | OUTPATIENT
Start: 2018-04-14 | End: 2018-04-16 | Stop reason: HOSPADM

## 2018-04-14 RX ORDER — POLYETHYLENE GLYCOL 3350 17 G/17G
25.5 POWDER, FOR SOLUTION ORAL DAILY
Status: DISCONTINUED | OUTPATIENT
Start: 2018-04-14 | End: 2018-04-16 | Stop reason: HOSPADM

## 2018-04-14 RX ORDER — NALOXONE HYDROCHLORIDE 0.4 MG/ML
.1-.4 INJECTION, SOLUTION INTRAMUSCULAR; INTRAVENOUS; SUBCUTANEOUS
Status: DISCONTINUED | OUTPATIENT
Start: 2018-04-14 | End: 2018-04-14

## 2018-04-14 RX ORDER — LEVOTHYROXINE SODIUM 50 UG/1
50 TABLET ORAL ONCE
Status: COMPLETED | OUTPATIENT
Start: 2018-04-14 | End: 2018-04-14

## 2018-04-14 RX ORDER — ALBUTEROL SULFATE 90 UG/1
2 AEROSOL, METERED RESPIRATORY (INHALATION) EVERY 6 HOURS PRN
Status: DISCONTINUED | OUTPATIENT
Start: 2018-04-14 | End: 2018-04-16 | Stop reason: HOSPADM

## 2018-04-14 RX ORDER — ONDANSETRON 4 MG/1
4 TABLET, ORALLY DISINTEGRATING ORAL EVERY 6 HOURS PRN
Status: DISCONTINUED | OUTPATIENT
Start: 2018-04-14 | End: 2018-04-16 | Stop reason: HOSPADM

## 2018-04-14 RX ORDER — IPRATROPIUM BROMIDE AND ALBUTEROL SULFATE 2.5; .5 MG/3ML; MG/3ML
3 SOLUTION RESPIRATORY (INHALATION)
Status: DISCONTINUED | OUTPATIENT
Start: 2018-04-14 | End: 2018-04-15

## 2018-04-14 RX ORDER — LACTOBACILLUS RHAMNOSUS GG 10B CELL
1 CAPSULE ORAL EVERY EVENING
Status: DISCONTINUED | OUTPATIENT
Start: 2018-04-14 | End: 2018-04-16 | Stop reason: HOSPADM

## 2018-04-14 RX ORDER — LEVOFLOXACIN 500 MG/1
500 TABLET, FILM COATED ORAL EVERY 24 HOURS
Status: DISCONTINUED | OUTPATIENT
Start: 2018-04-14 | End: 2018-04-15

## 2018-04-14 RX ORDER — CELECOXIB 100 MG/1
100 CAPSULE ORAL 2 TIMES DAILY PRN
Status: DISCONTINUED | OUTPATIENT
Start: 2018-04-14 | End: 2018-04-16 | Stop reason: HOSPADM

## 2018-04-14 RX ORDER — MONTELUKAST SODIUM 10 MG/1
10 TABLET ORAL AT BEDTIME
COMMUNITY
End: 2018-07-27

## 2018-04-14 RX ORDER — ACETAMINOPHEN 325 MG/1
650 TABLET ORAL EVERY 4 HOURS PRN
Status: DISCONTINUED | OUTPATIENT
Start: 2018-04-14 | End: 2018-04-16 | Stop reason: HOSPADM

## 2018-04-14 RX ORDER — ACETAMINOPHEN 650 MG/1
650 SUPPOSITORY RECTAL EVERY 4 HOURS PRN
Status: DISCONTINUED | OUTPATIENT
Start: 2018-04-14 | End: 2018-04-16 | Stop reason: HOSPADM

## 2018-04-14 RX ORDER — AMOXICILLIN 250 MG
1 CAPSULE ORAL
Status: DISCONTINUED | OUTPATIENT
Start: 2018-04-14 | End: 2018-04-16 | Stop reason: HOSPADM

## 2018-04-14 RX ORDER — ONDANSETRON 4 MG/1
8 TABLET, ORALLY DISINTEGRATING ORAL EVERY 8 HOURS PRN
Status: DISCONTINUED | OUTPATIENT
Start: 2018-04-14 | End: 2018-04-16 | Stop reason: HOSPADM

## 2018-04-14 RX ORDER — WARFARIN SODIUM 1 MG/1
1 TABLET ORAL
Status: COMPLETED | OUTPATIENT
Start: 2018-04-14 | End: 2018-04-14

## 2018-04-14 RX ORDER — MONTELUKAST SODIUM 10 MG/1
10 TABLET ORAL AT BEDTIME
Status: DISCONTINUED | OUTPATIENT
Start: 2018-04-14 | End: 2018-04-16 | Stop reason: HOSPADM

## 2018-04-14 RX ORDER — NALOXONE HYDROCHLORIDE 0.4 MG/ML
.1-.4 INJECTION, SOLUTION INTRAMUSCULAR; INTRAVENOUS; SUBCUTANEOUS
Status: DISCONTINUED | OUTPATIENT
Start: 2018-04-14 | End: 2018-04-16 | Stop reason: HOSPADM

## 2018-04-14 RX ORDER — ONDANSETRON 2 MG/ML
4 INJECTION INTRAMUSCULAR; INTRAVENOUS EVERY 6 HOURS PRN
Status: DISCONTINUED | OUTPATIENT
Start: 2018-04-14 | End: 2018-04-16 | Stop reason: HOSPADM

## 2018-04-14 RX ORDER — PROCHLORPERAZINE MALEATE 5 MG
5 TABLET ORAL EVERY 6 HOURS PRN
Status: DISCONTINUED | OUTPATIENT
Start: 2018-04-14 | End: 2018-04-16 | Stop reason: HOSPADM

## 2018-04-14 RX ORDER — FUROSEMIDE 10 MG/ML
10 INJECTION INTRAMUSCULAR; INTRAVENOUS ONCE
Status: COMPLETED | OUTPATIENT
Start: 2018-04-14 | End: 2018-04-14

## 2018-04-14 RX ORDER — BUDESONIDE AND FORMOTEROL FUMARATE DIHYDRATE 160; 4.5 UG/1; UG/1
2 AEROSOL RESPIRATORY (INHALATION)
Status: DISCONTINUED | OUTPATIENT
Start: 2018-04-14 | End: 2018-04-14 | Stop reason: ALTCHOICE

## 2018-04-14 RX ADMIN — RANITIDINE 150 MG: 150 TABLET ORAL at 20:28

## 2018-04-14 RX ADMIN — Medication 1 MG: at 22:13

## 2018-04-14 RX ADMIN — FUROSEMIDE 10 MG: 10 INJECTION, SOLUTION INTRAVENOUS at 08:48

## 2018-04-14 RX ADMIN — WARFARIN SODIUM 1 MG: 1 TABLET ORAL at 20:29

## 2018-04-14 RX ADMIN — FLUTICASONE FUROATE AND VILANTEROL TRIFENATATE 1 PUFF: 200; 25 POWDER RESPIRATORY (INHALATION) at 20:33

## 2018-04-14 RX ADMIN — POLYETHYLENE GLYCOL 3350 25.5 G: 17 POWDER, FOR SOLUTION ORAL at 20:28

## 2018-04-14 RX ADMIN — METOPROLOL SUCCINATE 50 MG: 50 TABLET, EXTENDED RELEASE ORAL at 20:27

## 2018-04-14 RX ADMIN — Medication 1 CAPSULE: at 20:28

## 2018-04-14 RX ADMIN — LEVOTHYROXINE SODIUM 50 MCG: 50 TABLET ORAL at 20:27

## 2018-04-14 RX ADMIN — MONTELUKAST SODIUM 10 MG: 10 TABLET, COATED ORAL at 22:09

## 2018-04-14 ASSESSMENT — ENCOUNTER SYMPTOMS
DYSURIA: 0
HEADACHES: 0
DIAPHORESIS: 0
BLOOD IN STOOL: 0
FEVER: 0
ABDOMINAL PAIN: 0
SORE THROAT: 0
NAUSEA: 0
FREQUENCY: 0
VOMITING: 0
CHILLS: 0
CONSTIPATION: 0
COUGH: 0
PALPITATIONS: 0
WHEEZING: 0
SHORTNESS OF BREATH: 1
SINUS PRESSURE: 0
DIARRHEA: 0

## 2018-04-14 NOTE — CONSULTS
Clinical Pharmacy - Warfarin Dosing Consult     Pharmacy has been consulted to manage this patient s warfarin therapy.  Indication: Atrial Fibrillation  Therapy Goal: Other - see comments (1.7 - 2.3 per clininc)  Provider/Team: [facility]: Gela IBARRA  Warfarin Prior to Admission: Yes  Warfarin PTA Regimen: 1.5 on Mon and Fri, 1mg all other days  Recent documented change in oral intake/nutrition: Unknown  Dose Comments: 1mg per INR = 2.28    INR   Date Value Ref Range Status   04/14/2018 2.28 (H) 0.86 - 1.14 Final   04/13/2018 2.4  Final       Recommend warfarin 1 mg today.  Pharmacy will monitor Ellie Leigh daily and order warfarin doses to achieve specified goal.      Please contact pharmacy as soon as possible if the warfarin needs to be held for a procedure or if the warfarin goals change.

## 2018-04-14 NOTE — ED PROVIDER NOTES
History   No chief complaint on file.    HPI  Ellie Leigh is a 87 year old female who presents with a history of COPD, prior tuberculosis,  long-standing anticoagulant use on warfarin for intermittent a fib, prior DVT, and SIADH, asthma who presents with dyspnea at rest and on exertion and chest pain last PM.  She notes that she has off and on shortness of breath but it is been worse with exertion over the last week or 2.  Possible orthopnea.  No extremity edema.  Cough without significant wheezing.  No fever.  Morning she may have had some chest pain associated with the Dyspneic episode.    In early February she was diagnosed with a vertebral fracture in the lumbar spine and went to [facility] at the [facility] transitional care for 21 days following which she was at a relative's house for several weeks on home care and was homebound.  She now is with her daughter and son-in-law who she has lived with for the last 6 years and they need to support her for all care as she is unable to perform ADLs without their assistance.  She continues to be ambulatory with walker in the home.      I spoke to her son and daughter-in-law who note that she would want everything done including being full resuscitation and possible cardiac intervention if required.  problem List:    Patient Active Problem List    Diagnosis Date Noted     H/O TB (tuberculosis) 02/09/2018     Priority: Medium     Intractable low back pain 02/06/2018     Priority: Medium     Back pain 02/06/2018     Priority: Medium     Nausea 06/05/2017     Priority: Medium     Elevated troponin 08/21/2016     Priority: Medium     Irritable bowel syndrome without diarrhea 05/02/2016     Priority: Medium     Unresponsive episode 03/18/2016     Priority: Medium     Chest pain at rest 12/07/2015     Priority: Medium     Mild persistent asthma without complication 12/01/2015     Priority: Medium     Long term current use of anticoagulant therapy 03/02/2015     Priority: Medium     Problem  list name updated by automated process. Provider to review       Hemoptysis 01/21/2015     Priority: Medium     Syncope 01/12/2015     Priority: Medium     Advance Care Planning 01/09/2015     Priority: Medium     Advance Care Planning 7/2/2015: Receipt of ACP document:  Received: Health Care Directive which was witnessed or notarized on 1-9-15.  Document previously scanned on 2-27-15.  Validation form completed and sent to be scanned.  Code Status reflects choices in most recent ACP document.  Confirmed/documented designated decision maker(s).  Added by Verónica Green RN, System ACP Coordinator Honoring Choices   1/9/15 Copy to be scanned into chart Beulah Mathis CMA         COPD (chronic obstructive pulmonary disease) (H) 10/06/2014     Priority: Medium     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) 07/07/2014     Priority: Medium     Cause TBD.  Patient has had lung CT, will see Dr Gómez for consideration of further workup.  Also has pulmonology appt 8/18.       Positive fecal occult blood test 07/07/2014     Priority: Medium     x2 -- first was in ED.  Patient expresses that she does not want colonoscopy.  She'll set appt to discuss with her PCP.       Benign essential HTN 02/11/2014     Priority: Medium     BPPV (benign paroxysmal positional vertigo) 11/05/2013     Priority: Medium     History of skin cancer 05/07/2013     Priority: Medium     Recurrent UTI 07/16/2012     Priority: Medium     recurrent UTI  Recent Urologic workup neg, 2005  Has Cipro at home for treatment as needed       Hypothyroidism 05/07/2012     Priority: Medium     Hyponatremia 05/07/2012     Priority: Medium     Squamous cell carcinoma in situ of skin of face 04/19/2012     Priority: Medium     SK (seborrheic keratosis) 04/19/2012     Priority: Medium     Intermittent atrial fibrillation (H) 12/01/2011     Priority: Medium     Cervical vertebral fracture (H) 11/20/2011     Priority: Medium     Anxiety 11/15/2011     Priority:  Medium     DVT (deep venous thrombosis) 10/12/2011     Priority: Medium     H/o in past, on current coumadin therapy.       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 Provider to review       Right arm weakness 07/29/2011     Priority: Medium     Ulnar neuropathy 07/29/2011     Priority: Medium     Health Care Home 04/21/2011     Priority: Medium     Conchis Robles, [phone]  A / [facility]              DX V65.8 REPLACED WITH [id number] HEALTH CARE HOME (04/08/2013)       Chronic constipation 03/03/2011     Priority: Medium     Osteoporosis 03/03/2011     Priority: Medium     Hyperlipidemia LDL goal <130 10/31/2010     Priority: Medium     Infectious colitis, enteritis and gastroenteritis 07/10/2010     Priority: Medium     Chronic allergic conjunctivitis 11/18/2008     Priority: Medium     Atopic rhinitis 11/18/2008     Priority: Medium     (Problem list name updated by automated process. Provider to review and confirm.)       Rhinitis, allergic seasonal 11/18/2008     Priority: Medium     Esophageal reflux 11/29/2007     Priority: Medium     PERSONAL HISTORY OF MALIGNANCY- BREAST 06/13/2007     Priority: Medium     Disease of lung 12/27/2006     Priority: Medium     Pt with chronic RUL infiltrate with pos AFB sputum  Full treatmetn for TB following ID consult in 2000's  Problem list name updated by automated process. Provider to review       Sensorineural hearing loss, asymmetrical 06/20/2005     Priority: Medium     Generalized osteoarthrosis, unspecified site 06/20/2005     Priority: Medium     Right hip and knee are the most symptomatic          Past Medical History:    Past Medical History:   Diagnosis Date     Breast cancer (H)      COPD (chronic obstructive pulmonary disease) (H)      Dust allergy      DVT (deep vein thrombosis) in pregnancy (H)      HTN (hypertension)       Hyponatremia      Hypothyroid      Intermittent atrial fibrillation (H) 12/1/2011     Loose body in joint 4/15/2011     Neck fracture (H)      Syncope 5/12/2013       Past Surgical History:    Past Surgical History:   Procedure Laterality Date     APPENDECTOMY       ARTHROSCOPY KNEE  4/15/2011    Procedure:ARTHROSCOPY KNEE; removal of loose body; Surgeon:LEY, JEFFREY DUANE; Location:[facility]     CHOLECYSTECTOMY       ESOPHAGOSCOPY, GASTROSCOPY, DUODENOSCOPY (EGD), COMBINED  6/9/2014    Procedure: COMBINED ESOPHAGOSCOPY, GASTROSCOPY, DUODENOSCOPY (EGD);  Surgeon: Gilberto Richard MD;  Location: [facility]     IMPLANT PACEMAKER  5/21/2013    Biotronik Moderl [id number] Serial#[id number]     INJECT EPIDURAL LUMBAR  3/23/2011    INJECT EPIDURAL LUMBAR performed by GENERIC ANESTHESIA PROVIDER at [facility] OR     JOINT REPLACEMENT, HIP RT/LT      Joint Replacement Hip Rt     MASTECTOMY, SIMPLE RT/LT/CAITLYN      Left breast - following breast ca     OTHER SURGICAL HISTORY      C1-C2 fusion after fx      SURGICAL HISTORY OF -   05/22/2001    Colonoscopy     TONSILLECTOMY         Family History:    Family History   Problem Relation Age of Onset     CEREBROVASCULAR DISEASE Mother      HEART DISEASE Mother      MI     CEREBROVASCULAR DISEASE Father      HEART DISEASE Father      MI     Respiratory Maternal Grandfather      TB     Neurologic Disorder Brother      ALS     HEART DISEASE Brother      CEREBROVASCULAR DISEASE Brother      Breast Cancer Daughter      age:49     Asthma Brother      CANCER Brother      brain and lung     CANCER Daughter      thyroid       Social History:  Marital Status:   [5]  Social History   Substance Use Topics     Smoking status: Never Smoker     Smokeless tobacco: Never Used     Alcohol use No        Medications:      ondansetron (ZOFRAN) 4 MG tablet   warfarin (COUMADIN) 1 MG tablet   metoprolol succinate (TOPROL-XL) 25 MG 24 hr tablet   celecoxib (CELEBREX) 100 MG capsule   Prochlorperazine Maleate (COMPAZINE PO)    diclofenac (VOLTAREN) 1 % GEL topical gel   polyethylene glycol (MIRALAX/GLYCOLAX) Packet   Acetaminophen (TYLENOL PO)   ipratropium - albuterol 0.5 mg/2.5 mg/3 mL (DUONEB) 0.5-2.5 (3) MG/3ML neb solution   ranitidine (ZANTAC) 150 MG tablet   albuterol (PROAIR HFA/PROVENTIL HFA/VENTOLIN HFA) 108 (90 BASE) MCG/ACT Inhaler   levothyroxine (SYNTHROID/LEVOTHROID) 50 MCG tablet   SYMBICORT 160-4.5 MCG/ACT Inhaler   probiotic CAPS   CRANBERRY         Review of Systems   Constitutional: Negative for chills, diaphoresis and fever.   HENT: Negative for ear pain, sinus pressure and sore throat.    Eyes: Negative for visual disturbance.   Respiratory: Positive for shortness of breath. Negative for cough and wheezing.    Cardiovascular: Positive for chest pain. Negative for palpitations.   Gastrointestinal: Negative for abdominal pain, blood in stool, constipation, diarrhea, nausea and vomiting.   Genitourinary: Negative for dysuria, frequency and urgency.   Skin: Negative for rash.   Neurological: Negative for headaches.   All other systems reviewed and are negative.      Physical Exam   BP: (!) 174/96  Pulse: 89  Heart Rate: 85  Temp: 97.8  F (36.6  C)  Resp: 16  SpO2: 94 %      Physical Exam   Constitutional: No distress.   HENT:   Mouth/Throat: Oropharynx is clear and moist.   Eyes: Conjunctivae are normal.   Neck: Neck supple.   Cardiovascular: Normal rate and regular rhythm.  Exam reveals no gallop and no friction rub.    No murmur heard.  Pulmonary/Chest: Effort normal and breath sounds normal. No respiratory distress. She has no wheezes. She has no rales.   Abdominal: Soft. Bowel sounds are normal. She exhibits no distension. There is no tenderness. There is no rebound and no guarding.   Musculoskeletal: She exhibits no edema.   Neurological: She is alert. She exhibits normal muscle tone.   Skin: No rash noted. She is not diaphoretic.       ED Course     ED Course     Procedures                       EKG  Interpretation:      Interpreted by Steve Gaming MD  ekg  at 823 am, paced rhythm at 80 beats per minutes, negative scar Gela criteria with ST and T-segment deflections opposite the QRS.  Normal intervals with the exception of wide QRS for the paced beats and a SC of 268.  On the telemetry monitor she is intermittently paced.  When she had her EKG in January 2018 she was in a atrially paced rhythm but there was no ventricular pacing    On this current EKG she is paced both atria and ventricles  Impression: paced rhythm 80 bpm.     Critical Care time:  none               Results for orders placed or performed during the hospital encounter of 04/14/18 (from the past 24 hour(s))   POC US CHEST B-SCAN    Impression    [facility] Procedure Note      Limited Bedside ED Cardiac Ultrasound:    PROCEDURE: PERFORMED BY: Dr. Steve Gaming  INDICATIONS/SYMPTOM:  Shortness of Breath  PROBE: Low frequency convex probe and Cardiac phased array probe  BODY LOCATION: Chest  FINDINGS:   The ultrasound was performed utilizing the subcostal views.  Cardiac contractility:  Present  Gross estimation of cardiac kinesis: normal  Pericardial Effusion:  None  RV:LV ratio: LV > RV  IVC:    Diameter:  IVC diameter expiration (IVCe) 2-3 cm                                                   IVC diameter inspiration (IVCi) 2-3 cm                                                       Collapsibility:  IVC collapses < 50% with inspiration    No B-line aritifacts  No pleural effusions - although could miss a small effusion on left  INTERPRETATION:    Chamber size and motion were grossly normal with LV > RV, normal cardiac kinesis.  No pericardial effusion was found.  IVC visualized and findings indicate hypervolemia.  IMAGE DOCUMENTATION: Images were archived to PACs system.         Comprehensive metabolic panel   Result Value Ref Range    Sodium 123 (L) 133 - 144 mmol/L    Potassium 4.3 3.4 - 5.3 mmol/L    Chloride 89 (L) 94 - 109 mmol/L     Carbon Dioxide 28 20 - 32 mmol/L    Anion Gap 6 3 - 14 mmol/L    Glucose 95 70 - 99 mg/dL    Urea Nitrogen 16 7 - 30 mg/dL    Creatinine 0.50 (L) 0.52 - 1.04 mg/dL    GFR Estimate >90 >60 mL/min/1.7m2    GFR Estimate If Black >90 >60 mL/min/1.7m2    Calcium 8.0 (L) 8.5 - 10.1 mg/dL    Bilirubin Total 0.3 0.2 - 1.3 mg/dL    Albumin 3.4 3.4 - 5.0 g/dL    Protein Total 7.1 6.8 - 8.8 g/dL    Alkaline Phosphatase 116 40 - 150 U/L    ALT 13 0 - 50 U/L    AST 23 0 - 45 U/L   CBC with platelets, differential   Result Value Ref Range    WBC 5.2 4.0 - 11.0 10e9/L    RBC Count 4.08 3.8 - 5.2 10e12/L    Hemoglobin 11.7 11.7 - 15.7 g/dL    Hematocrit 33.9 (L) 35.0 - 47.0 %    MCV 83 78 - 100 fl    MCH 28.7 26.5 - 33.0 pg    MCHC 34.5 31.5 - 36.5 g/dL    RDW 13.4 10.0 - 15.0 %    Platelet Count 283 150 - 450 10e9/L    Diff Method Automated Method     % Neutrophils 49.4 %    % Lymphocytes 32.8 %    % Monocytes 15.0 %    % Eosinophils 1.6 %    % Basophils 1.0 %    % Immature Granulocytes 0.2 %    Absolute Neutrophil 2.6 1.6 - 8.3 10e9/L    Absolute Lymphocytes 1.7 0.8 - 5.3 10e9/L    Absolute Monocytes 0.8 0.0 - 1.3 10e9/L    Absolute Eosinophils 0.1 0.0 - 0.7 10e9/L    Absolute Basophils 0.1 0.0 - 0.2 10e9/L    Abs Immature Granulocytes 0.0 0 - 0.4 10e9/L   Troponin I   Result Value Ref Range    Troponin I ES 0.036 0.000 - 0.045 ug/L   Nt probnp inpatient (BNP)   Result Value Ref Range    N-Terminal Pro BNP Inpatient 559 0 - 1800 pg/mL   INR   Result Value Ref Range    INR 2.28 (H) 0.86 - 1.14     *Note: Due to a large number of results and/or encounters for the requested time period, some results have not been displayed. A complete set of results can be found in Results Review.       Medications - No data to display    Assessments & Plan (with Medical Decision Making)       MDM: Ellie Leigh is a 87 year old female presents with a history of COPD, prior tuberculosis,  long-standing anticoagulant use on warfarin for  intermittent a fib, prior DVT, and SIADH, asthma who presents with dyspnea at rest and on exertion and chest pain last PM.  She is a full code and would like interventions if any coronary disease is identified.  Her vital signs were reassuring on presentation with a normal oxygen saturation and normal blood pressure and heart rate.  Periodically she increases her heart rate into the 110 range, and this is typically a wide-complex rhythm but appears to be ventricularly paced.   I have asked for pacemaker interrogation.   Her examination was relatively unremarkable and a bedside ultrasound only showed an IVC that did not collapse well was 2-3 cm dilated.  Findings were most consistent with possible CHF but her BNP is normal.  Her troponin is also near normal and her EKG shows no acute change.    She does have hyponatremia associated with her long-standing SIADH and is been variably on a water restriction at home.  Her sodium today is 123 and has been up to 127 several weeks ago and prior to that was 123.  This does not appear to be a substantial change from prior.     She lives with her family and can perform minimal ADLs.  She has been in transitional care recently for 21 day stay in February for a lower lumbar spine fracture.  Has been getting home care since that time.   Her family also notes that she requires full cares at home and they have considered nursing home but she is not qualified as she was too functional.      I discussed with Dr. Rowley regarding her chest pain, dyspnea and would warrant at least an initial evaluation for this and monitoring with serial troponins.     I have reviewed the nursing notes.    I have reviewed the findings, diagnosis, plan and need for follow up with the patient.       ED to Inpatient Handoff:    Discussed with Dr. Rowley at 0945 AM   Patient accepted for Inpatient Stay  Pending studies include none  Code Status: Full Code           New Prescriptions    No medications on  file       Final diagnoses:   Acute chest pain   Shortness of breath   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)   Chronic obstructive pulmonary disease, unspecified COPD type (H)   Long term current use of anticoagulant therapy       4/14/2018   [facility] EMERGENCY DEPARTMENT     Steve Gaming MD  04/14/18 1019

## 2018-04-14 NOTE — H&P
ADMISSION HISTORY AND PHYSICAL  Patient Name: Ellie Leigh  Address: [address] [address]  Age:87 year old  Sex: female  Admission Date/Time: 4/14/2018  8:14 AM  Admitting provider: Herb Rowley MD  Hospital Attending Physician: Herb Rowley MD   Primary Care Provider: Anjum Vidales  Patient lives at home with her daughter.  History is provided by the patient stable    CHIEF COMPLAINT: Shortness of breath and chest pain    HPI:   Patient with a history of allergies, A. fib, SIADH chronic back pain, COPD, GERD and hypothyroidism amongst other medical problems as outlined below.  She also has a remote history of tuberculosis.  She was in her usual state of health until about 2-3 weeks ago when she developed shortness of breath mostly with exertion.  This has been progressively worsening and associated with a cough which is mostly dry but with which she will get small amounts of phlegm sometimes.  She denies feet swelling, no dizziness.  Associated however with chest pain in his sternal region which has been intermittent, the last time she felt he was this morning, no known aggravating or relieving factors.  She denies any palpitations or dizziness.  No congestion.  Shortness of breath and cough with progressive worsening is why she came in today.  She was recently placed on allergy medications, Celebrex of pain and an antidepressant [I am unable to locate this in her file].  No known ill contacts.        REVIEW OF SYSTEMS  No fevers, chills, weight loss, weight gain  No headaches, acute change in vision or hearing, or URI symptoms  She has nausea which is mild and not associated with vomiting, she has constipation with intermittent but denies diarrhea or abdominal pain  No urinary pain, change in color, frequency or nocturia  She has chronic back pain which she attributes to a remote vertebral fracture  No new rashes  No alterations in thinking, paresthesias, migraine headache or  seizures  She reports that she was recently started on an antidepressant for her mood [I do see telephone encounter is from 4/13/2018 talking about recent start of Celexa which was discontinued because of nausea].  Patient reports that she continues to feel depressed though she denies any suicidal or homicidal ideations.          PAST MEDICAL HISTORY  Past Medical History:   Diagnosis Date     Breast cancer (H)      COPD (chronic obstructive pulmonary disease) (H)     ct 2014 does show some bronchiectaisi RUL as well as fibronodular disease bilat upper lobes     Dust allergy      DVT (deep vein thrombosis) in pregnancy (H)     after neck fracture when in hospital     HTN (hypertension)      Hyponatremia     chronic     Hypothyroid      Intermittent atrial fibrillation (H) 12/1/2011    hx tachy-keri, has pacer, on chronic coumadin     Loose body in joint 4/15/2011     Neck fracture (H)     after a fall     Syncope 5/12/2013    multiple hospital visits, lates 3/2016, 6/2016, 7/2016 with no clear cause found          PAST SURGICAL HISTORY  Past Surgical History:   Procedure Laterality Date     APPENDECTOMY       ARTHROSCOPY KNEE  4/15/2011    Procedure:ARTHROSCOPY KNEE; removal of loose body; Surgeon:LEY, JEFFREY DUANE; Location:[facility]     CHOLECYSTECTOMY       ESOPHAGOSCOPY, GASTROSCOPY, DUODENOSCOPY (EGD), COMBINED  6/9/2014    Procedure: COMBINED ESOPHAGOSCOPY, GASTROSCOPY, DUODENOSCOPY (EGD);  Surgeon: Gilberto Richard MD;  Location: [facility]     IMPLANT PACEMAKER  5/21/2013    Biotronik Moderl [id number] Serial#[id number]     INJECT EPIDURAL LUMBAR  3/23/2011    INJECT EPIDURAL LUMBAR performed by GENERIC ANESTHESIA PROVIDER at [facility]     JOINT REPLACEMENT, HIP RT/LT      Joint Replacement Hip Rt     MASTECTOMY, SIMPLE RT/LT/CAITLYN      Left breast - following breast ca     OTHER SURGICAL HISTORY      C1-C2 fusion after fx      SURGICAL HISTORY OF -   05/22/2001    Colonoscopy     TONSILLECTOMY            MEDICATIONS  No current  "outpatient prescriptions on file.          ALLERGIES  Cephalosporins; Doxycycline; Sulfa drugs; and Penicillins        FAMILY HISTORY  Family History   Problem Relation Age of Onset     CEREBROVASCULAR DISEASE Mother      HEART DISEASE Mother      MI     CEREBROVASCULAR DISEASE Father      HEART DISEASE Father      MI     Respiratory Maternal Grandfather      TB     Neurologic Disorder Brother      ALS     HEART DISEASE Brother      CEREBROVASCULAR DISEASE Brother      Breast Cancer Daughter      age:49     Asthma Brother      CANCER Brother      brain and lung     CANCER Daughter      thyroid          SOCIAL HISTORY  Social History     Social History     Marital status:      Spouse name: N/A     Number of children: N/A     Years of education: N/A     Occupational History      Retired     Social History Main Topics     Smoking status: Never Smoker     Smokeless tobacco: Never Used     Alcohol use No     Drug use: No     Sexual activity: Not Currently     Other Topics Concern     Parent/Sibling W/ Cabg, Mi Or Angioplasty Before 65f 55m? No     Social History Narrative    Lives in [facility].      Daughters helping since her accident, getting home physical therapy.      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      - 2000    5 daughters, 11 grandchildren, 3 great granchildren          PHYSICAL EXAM  Vitals:    04/14/18 0945 04/14/18 1000 04/14/18 1015 04/14/18 1158   BP: (!) 143/91 163/74 125/81 155/78   Pulse:       Resp: 17 19 19 18   Temp:    97.3  F (36.3  C)   TempSrc:       SpO2: 94% 95%  94%   Weight:    50.4 kg (111 lb 1.8 oz)   Height:    1.473 m (4' 10\")     General - Awake and alert, not in any obvious distress. Afebrile, not pale, well hydrated.  Head - Normocephalic, atraumatic.  Eyes -  Extraocular movements are intact bilaterally. Sclera and conjunctiva clear. Lids without lesions  Lungs -distant breath sounds, coarse crackles heard over the right anterior " upper chest region, no wheezes, rales or rhonchi.  CV - Regular rate and rhythm, no murmurs, rubs or gallops.  Abdomen - Non-tender, non-distended, positive bowel sounds, no masses, no hepatosplenomegaly. No rebound or guarding.   Upper Extremities - No edema or deformities.   Lower Extremities - No edema.   Skin - Warm, dry, intact. No rashes or erythema.  Neurologic - Cranial nerves 2-12 intact.  Psych - Judgment and mental status are clear, patient has reasonable insight. Mood is stable.        LABORATORY  Results for orders placed or performed during the hospital encounter of 04/14/18   Chest XR,  PA & LAT    Narrative    CHEST TWO VIEWS  4/14/2018 9:15 AM     COMPARISON:  Two-view chest x-ray 1/6/2018.    HISTORY: Shortness of breath.      Impression    IMPRESSION:   1. A dual-lead pacemaker module implanted over the left chest with the  leads in the right atrium and right ventricle is again noted without  identifiable change.  2. Emphysematous and fibrotic changes throughout both lungs again  noted. There are no airspace opacities to suggest pneumonia. There is  no pleural effusion or pneumothorax. Heart size is normal with no  evidence for congestive failure.    PAVAN BENAVIDEZ MD   POC US CHEST B-SCAN    Impression    [facility] Procedure Note      Limited Bedside ED Cardiac Ultrasound:    PROCEDURE: PERFORMED BY: Dr. Steve Gaming  INDICATIONS/SYMPTOM:  Shortness of Breath  PROBE: Low frequency convex probe and Cardiac phased array probe  BODY LOCATION: Chest  FINDINGS:   The ultrasound was performed utilizing the subcostal views.  Cardiac contractility:  Present  Gross estimation of cardiac kinesis: normal  Pericardial Effusion:  None  RV:LV ratio: LV > RV  IVC:    Diameter:  IVC diameter expiration (IVCe) 2-3 cm                                                   IVC diameter inspiration (IVCi) 2-3 cm                                                       Collapsibility:  IVC collapses < 50% with  inspiration    No B-line aritifacts  No pleural effusions - although could miss a small effusion on left  INTERPRETATION:    Chamber size and motion were grossly normal with LV > RV, normal cardiac kinesis.  No pericardial effusion was found.  IVC visualized and findings indicate hypervolemia.  IMAGE DOCUMENTATION: Images were archived to PACs system.         Comprehensive metabolic panel   Result Value Ref Range    Sodium 123 (L) 133 - 144 mmol/L    Potassium 4.3 3.4 - 5.3 mmol/L    Chloride 89 (L) 94 - 109 mmol/L    Carbon Dioxide 28 20 - 32 mmol/L    Anion Gap 6 3 - 14 mmol/L    Glucose 95 70 - 99 mg/dL    Urea Nitrogen 16 7 - 30 mg/dL    Creatinine 0.50 (L) 0.52 - 1.04 mg/dL    GFR Estimate >90 >60 mL/min/1.7m2    GFR Estimate If Black >90 >60 mL/min/1.7m2    Calcium 8.0 (L) 8.5 - 10.1 mg/dL    Bilirubin Total 0.3 0.2 - 1.3 mg/dL    Albumin 3.4 3.4 - 5.0 g/dL    Protein Total 7.1 6.8 - 8.8 g/dL    Alkaline Phosphatase 116 40 - 150 U/L    ALT 13 0 - 50 U/L    AST 23 0 - 45 U/L   CBC with platelets, differential   Result Value Ref Range    WBC 5.2 4.0 - 11.0 10e9/L    RBC Count 4.08 3.8 - 5.2 10e12/L    Hemoglobin 11.7 11.7 - 15.7 g/dL    Hematocrit 33.9 (L) 35.0 - 47.0 %    MCV 83 78 - 100 fl    MCH 28.7 26.5 - 33.0 pg    MCHC 34.5 31.5 - 36.5 g/dL    RDW 13.4 10.0 - 15.0 %    Platelet Count 283 150 - 450 10e9/L    Diff Method Automated Method     % Neutrophils 49.4 %    % Lymphocytes 32.8 %    % Monocytes 15.0 %    % Eosinophils 1.6 %    % Basophils 1.0 %    % Immature Granulocytes 0.2 %    Absolute Neutrophil 2.6 1.6 - 8.3 10e9/L    Absolute Lymphocytes 1.7 0.8 - 5.3 10e9/L    Absolute Monocytes 0.8 0.0 - 1.3 10e9/L    Absolute Eosinophils 0.1 0.0 - 0.7 10e9/L    Absolute Basophils 0.1 0.0 - 0.2 10e9/L    Abs Immature Granulocytes 0.0 0 - 0.4 10e9/L   Troponin I   Result Value Ref Range    Troponin I ES 0.036 0.000 - 0.045 ug/L   Nt probnp inpatient (BNP)   Result Value Ref Range    N-Terminal Pro BNP Inpatient  559 0 - 1800 pg/mL   INR   Result Value Ref Range    INR 2.28 (H) 0.86 - 1.14   Troponin I   Result Value Ref Range    Troponin I ES 0.041 0.000 - 0.045 ug/L   Troponin I   Result Value Ref Range    Troponin I ES 0.036 0.000 - 0.045 ug/L   Pharmacy to dose warfarin    Narrative    Bk Mosley, [facility]     4/14/2018  6:54 PM  Clinical Pharmacy - Warfarin Dosing Consult     Pharmacy has been consulted to manage this patient s warfarin   therapy.  Indication: Atrial Fibrillation  Therapy Goal: Other - see comments (1.7 - 2.3 per clininc)  Provider/Team: Onelia CARRION [facility]tico[facility] Clinic: [facility]  Warfarin Prior to Admission: Yes  Warfarin PTA Regimen: 1.5 on Mon and Fri, 1mg all other days  Recent documented change in oral intake/nutrition: Unknown  Dose Comments: 1mg per INR = 2.28    INR   Date Value Ref Range Status   04/14/2018 2.28 (H) 0.86 - 1.14 Final   04/13/2018 2.4  Final       Recommend warfarin 1 mg today.  Pharmacy will monitor Ellie Leigh daily and order warfarin doses to achieve specified goal.      Please contact pharmacy as soon as possible if the warfarin needs   to be held for a procedure or if the warfarin goals change.       *Note: Due to a large number of results and/or encounters for the requested time period, some results have not been displayed. A complete set of results can be found in Results Review.       EKG: Paced rhythm at 80 bpm.        ASSESSMENT/PLAN  Principal Problem:    Community acquired pneumonia of right upper lobe of lung (H):   Emphysematous changes and scarring noted over both lung fields but no opacities on CT chest.  Patient however has coarse crackles as described above. She is afebrile and her WBC is within normal limits but this does not necessarily rule out pneumonia.  -Start Levaquin as she is allergic to penicillins and we have an option.  -Monitor her clinically and her O2 sats and give supplemental oxygen as needed.    Active Problems:    Esophageal  reflux  -Stable.  Continue prior to admission Zantac excellent      Chronic seasonal allergic rhinitis  -Stable.  Continue prior to admission Singulair and consider Flonase if need be.      Mild major depression/Anxiety  -Not currently on any antidepressant as the Celexa she was recently started on has been discontinued.  We will monitor her at this admission and have a follow-up with PCP after discharge so that they can decide on the medication that might work best for her while closely monitoring her.        Cervical vertebral fracture (H)/Intractable low back pain  -Continue PTA Celebrex, Tylenol and Voltaren gel which she seems to have been stable despite the fact that she is on anticoagulation.      Intermittent atrial fibrillation (H)  -Stable, paced, continue PTA Coumadin and metoprolol.  Pharmacy to help dose.      Hyponatremia  -Confirmed SIADH.  Fluid restriction to less than 1.5 L per day, avoid normal saline infusion if possible.  Patient is currently tolerating good p.o. intake so we will encourage her to continue this.      Benign essential HYPERTENSION  -Stable, continue PTA metoprolol.      COPD (chronic obstructive pulmonary disease) (H):  -She will continue nebulized treatments and antibiotics being added for community-acquired pneumonia as outlined above.      Atypical chest pain  Serial troponins negative, less concerned about cardiac origin especially with crackles heard on auscultation.  -Leave on telemetry today.    Prophylaxis -mechanical compression for DVT prophylaxis  Disposition -within the next couple of days once the patient feels better.      Caroline Boyd  Attestation:   I have reviewed today's vital signs, notes, medications, labs and imaging.   Amount of time spent on this H&P note: 45 minutes.   Caroline Boyd MD

## 2018-04-14 NOTE — LETTER
Transition Communication Hand-off for Care Transitions to Next Level of Care Provider    Name: Ellie Leigh  : 1930  MRN #: [id number]  Primary Care Provider: Anjum iVdales  Primary Care MD Name:  (Sobeida)  Primary Clinic: [address] [place] [id number]  Primary Care Clinic Name:  ([facility])  Reason for Hospitalization:  Shortness of breath [R06.02]  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) [E22.2]  Acute chest pain [R07.9]  Long term current use of anticoagulant therapy [Z79.01]  Chronic obstructive pulmonary disease, unspecified COPD type (H) [J44.9]  Admit Date/Time: 2018  8:14 AM  Discharge Date: 18  Payor Source: Payor: [facility] / Plan: ARE FOR SENIORS / Product Type: HMO /     Readmission Assessment Measure (BEVERLY) Risk Score/category: average           Reason for Communication Hand-off Referral: Fragility    Discharge Plan:home care       Concern for non-adherence with plan of care:   Y/N no  Discharge Needs Assessment:  Needs       Most Recent Value    Equipment Currently Used at Home grab bar, tub bench, walker, rolling          Already enrolled in Tele-monitoring program and name of program:  no  Follow-up specialty is recommended: No    Follow-up plan:  Future Appointments  Date Time Provider Department Center   7/10/2018 11:20 AM Rn, [facility]                 [place] Recommendations:  Pt will dc to her dtrs home today and will resume her [facility] ([phone] Fax: [phone]). Goal is for pt to remain at home with her dtr.     April Reddy MSW, LICSW, [phone]    AVS/Discharge Summary is the source of truth; this is a helpful guide for improved communication of patient story

## 2018-04-14 NOTE — IP AVS SNAPSHOT
MRN:[id number]                      After Visit Summary   4/14/2018    Ellie Leigh    MRN: [id number]           Thank you!     Thank you for choosing [facility] for your care. Our goal is always to provide you with excellent care. Hearing back from our patients is one way we can continue to improve our services. Please take a few minutes to complete the written survey that you may receive in the mail after you visit with us. Thank you!        Patient Information     Date Of Birth          9/12/1930        Designated Caregiver       Most Recent Value    Caregiver    Will someone help with your care after discharge? yes    Name of designated caregiver Suyapa (daughter)    Phone number of caregiver [phone]    Caregiver address same as patient      About your hospital stay     You were admitted on:  April 14, 2018 You last received care in the:  [facility]    You were discharged on:  April 16, 2018       Who to Call     For medical emergencies, please call 911.  For non-urgent questions about your medical care, please call your primary care provider or clinic, [phone]          Attending Provider     Provider Specialty    Steve Gaming MD [facility]    Caroline Boyd MD Family [facility]    Herb Rowley MD [facility]    Khalif Mcdaniel MD Internal Medicine       Primary Care Provider Office Phone # Fax #    Anjum Vidales [phone]      After Care Instructions     Activity       Your activity upon discharge: activity as tolerated            Diet       Follow this diet upon discharge: Orders Placed This Encounter      Fluid restriction 1500 ML FLUID      Advance Diet as Tolerated: Regular Diet Adult; No Caffeine for 24 hours (once tests completed, may have caffeine)                  Follow-up Appointments     Follow-up and recommended labs and tests        Follow up with primary care provider, Anjum Vidales, within 1-2 weeks,  for hospital follow- up.                  Your next 10 appointments already scheduled     Apr 20, 2018 11:00 AM CDT   [facility] with [facility]ales [facility]   [facility]    [address] [place] [address] [phone]           Bring a current list of meds and any records pertaining to this visit. For Physicals, please bring immunization records and any forms needing to be filled out. Please arrive 10 minutes early to complete paperwork.            Jul 10, 2018 11:20 AM CDT   Pacemaker Check with Wy Device Rn   [facility] ([facility])    [address] [address] [phone]              Additional Services     Home Care Referral       _______________________________________________________________    Your provider has referred you to: [facility]: [facility] [phone]   [email or web address]    Extended Emergency Contact Information  Primary Emergency Contact: Suyapa Mittal   [facility]  Home Phone: [phone]  Work Phone: [phone]  Mobile Phone: [phone]  Relation: Daughter  Secondary Emergency Contact: Ana Abarca   [facility]  Home Phone: [phone]  Work Phone: [phone]  Mobile Phone: [phone]  Relation: Daughter    Patient Anticipated Discharge Date: 4/16/18   RN, PT, HHA to begin 24 - 48 hours after discharge.  PLEASE EVALUATE AND TREAT (Evaluation timeline is 24 - 48 hrs. Please call if there is need for a variance to this timeline).    REASON FOR REFERRAL: Assessment & Treatment: RN    ADDITIONAL SERVICES NEEDED: OT    OTHER PERTINENT INFORMATION: Patient was last seen by provider on 4/16/18 for pneumonia.    No current outpatient prescriptions on file.    Patient Active Problem List:     Sensorineural hearing loss, asymmetrical     Generalized osteoarthrosis, unspecified site     Disease of lung      PERSONAL HISTORY OF MALIGNANCY- BREAST     Esophageal reflux     Chronic allergic conjunctivitis     Atopic rhinitis     Chronic seasonal allergic rhinitis     Hyperlipidemia LDL goal <130     Chronic constipation     Osteoporosis     Health Care Home     Right arm weakness     Ulnar neuropathy     Headache     Mild major depression     DVT (deep venous thrombosis)     Anxiety     Cervical vertebral fracture (H)     Intermittent atrial fibrillation (H)     Squamous cell carcinoma in situ of skin of face     SK (seborrheic keratosis)     Hypothyroidism     Hyponatremia     Recurrent UTI     History of skin cancer     BPPV (benign paroxysmal positional vertigo)     Benign essential HTN     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)     Positive fecal occult blood test     COPD (chronic obstructive pulmonary disease) (H)     Infectious colitis, enteritis and gastroenteritis     Advance Care Planning     Syncope     Hemoptysis     Long term current use of anticoagulant therapy     Mild persistent asthma without complication     Chest pain at rest     Unresponsive episode     Irritable bowel syndrome without diarrhea     Elevated troponin     Nausea     Intractable low back pain     Back pain     H/O TB (tuberculosis)     Chest pain     Atypical chest pain     Community acquired pneumonia of right upper lobe of lung (H)      Documentation of Face to Face and Certification for Home Health Services    I certify that patient, Ellie Leigh is under my care and that I, or a Nurse Practitioner or Physician's Assistant working with me, had a face-to-face encounter that meets the physician face-to-face encounter requirements with this patient on: 4/16/2018.    This encounter with the patient was in whole, or in part, for the following medical condition, which is the primary reason for Home Health Care: Pneumonia.    I certify that, based on my findings, the following services are medically necessary Home Health  Services: Nursing and Occupational Therapy    My clinical findings support the need for the above services because: Nurse is needed: To assess pneumonia after changes in medications or other medical regimen..    Further, I certify that my clinical findings support that this patient is homebound (i.e. absences from home require considerable and taxing effort and are for medical reasons or [employer] witness services or infrequently or of short duration when for other reasons) because: Requires assistance of another person or specialized equipment to access medical services because patient: Requires supervision of another for safe transfer..    Based on the above findings, I certify that this patient is confined to the home and needs intermittent skilled nursing care, physical therapy and/or speech therapy.  The patient is under my care, and I have initiated the establishment of the plan of care.  This patient will be followed by a physician who will periodically review the plan of care.    Physician/Provider to provide follow up care: Anjum Vidales certified Physician at time of discharge: Dr. Khalif Mcdaniel    Please be aware that coverage of these services is subject to the terms and limitations of your health insurance plan.  Call member services at your health plan with any benefit or coverage questions.            INR CLINIC REFERRAL       Warfarin dosing instructions: No warfarin dose today, then resume 1.5 mg on Mondays and Fridays, 1 mg all other days of the week.  Please have your INR drawn in the [facility] clinic lab while at your MD appointment on Friday, April 20th  Further dosing will come from [facility] (contact number [phone]).    Indication for Anticoagulation: Atrial Fibrillation  If nonstandard INR desired, range desired/explain: 1.7-2.3  Expected duration of therapy: Lifetime                  Warfarin Instruction     You have started taking a medicine  "called warfarin. This is a blood-thinning medicine (anticoagulant). It helps prevent and treat blood clots.      Before leaving the hospital, make sure you know how much to take and how long to take it.      You will need regular blood tests to make sure your blood is clotting safely. It is very important to see your doctor for regular blood tests.    Talk to your doctor before taking any new medicine (this includes over-the-counter drugs and herbal products). Many medicines can interact with warfarin. This may cause more bleeding or too much clotting.     Eating a lot of vitamin K--found in green, leafy vegetables--can change the way warfarin works in your body. Do NOT avoid these foods. Instead, try to eat the same amount each day.     Bleeding is the most common side-effect of warfarin. You may notice bleeding gums, a bloody nose, bruises and bleeding longer when you cut yourself. See a doctor at once if:   o You cough up blood  o You find blood in your stool (poop)  o You have a deep cut, or a cut that bleeds longer than 10 minutes   o You have a bad cut, hard fall, accident or hit your head (go to urgent care or the emergency room).    For women who can get pregnant: This medicine can harm an unborn baby. Be very careful not to get pregnant while taking this medicine. If you think you might be pregnant, call your doctor right away.    For more information, read \"Guide to Warfarin Therapy,  the booklet you received in the hospital.        Pending Results     Date and Time Order Name Status Description    4/15/2018 1114 Echocardiogram Complete In process             Statement of Approval     Ordered          04/16/18 1156  I have reviewed and agree with all the recommendations and orders detailed in this document.  EFFECTIVE NOW     Approved and electronically signed by:  Khalif Mcdaniel MD             Admission Information     Date & Time Provider Department Dept. Phone    4/14/2018 Khalif Mcdaniel MD [facility] " "Medical Surgical [phone]      Your Vitals Were     Blood Pressure Pulse Temperature Respirations Height Weight    118/47 (BP Location: Right arm) 71 98.1  F (36.7  C) (Oral) 16 1.473 m (4' 10\") 50.3 kg (110 lb 14.3 oz)    Last Period Pulse Oximetry BMI (Body Mass Index)             06/15/1985 94% 23.18 kg/m2         55[employer] Information     55[employer] lets you send messages to your doctor, view your test results, renew your prescriptions, schedule appointments and more. To sign up, go to [email or web address] . Click on \"Log in\" on the left side of the screen, which will take you to the Welcome page. Then click on \"Sign up Now\" on the right side of the page.     You will be asked to enter the access code listed below, as well as some personal information. Please follow the directions to create your username and password.     Your access code is: [email or web address]  Expires: 2018  3:50 PM     Your access code will  in 90 days. If you need help or a new code, please call your [facility] clinic or [phone].        Care EveryWhere ID     This is your Care EveryWhere ID. This could be used by other organizations to access your [facility] medical records  [id number]        Equal Access to Services     BROOKS JOSÉ AH: Hadii daniel vinson Somaurilio, waaxda luqadaha, qaybta kaalmada trini, verónica estrada. So [facility] [phone].    ATENCIÓN: Si habla español, tiene a sahni disposición servicios gratuitos de asistencia lingüística. Darius al [phone].    We comply with applicable federal civil rights laws and [place] laws. We do not discriminate on the basis of race, color, national origin, age, disability, sex, sexual orientation, or gender identity.               Review of your medicines      START taking        Dose / Directions    predniSONE 20 MG tablet   Commonly known as:  DELTASONE        Dose:  20 mg   Take 1 tablet (20 mg) by mouth daily   Quantity:  5 tablet   Refills:  0    "    sodium bicarbonate 650 MG tablet   Used for:  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)        Dose:  650 mg   Take 1 tablet (650 mg) by mouth 2 times daily   Quantity:  60 tablet   Refills:  1         CONTINUE these medicines which may have CHANGED, or have new prescriptions. If we are uncertain of the size of tablets/capsules you have at home, strength may be listed as something that might have changed.        Dose / Directions    metoprolol succinate 25 MG 24 hr tablet   Commonly known as:  TOPROL-XL   This may have changed:  additional instructions   Used for:  Essential hypertension, benign        TAKE ONE TABLETS BY MOUTH TWICE DAILY   Quantity:  360 tablet   Refills:  11       warfarin 1 MG tablet   Commonly known as:  COUMADIN   This may have changed:  additional instructions        Dose:  1 mg   Take 1 tablet (1 mg) by mouth daily No dose today, then resume 1.5 mg on Mon, Fri; 1 mg all other days or as directed by the Anticoagulation ClinicTake 1 mg by mouth daily 1.5 mg on Mon, Fri; 1 mg all other days or as directed by the Anticoagulation Clinic   Quantity:  30 tablet   Refills:  0         CONTINUE these medicines which have NOT CHANGED        Dose / Directions    albuterol 108 (90 Base) MCG/ACT Inhaler   Commonly known as:  PROAIR HFA/PROVENTIL HFA/VENTOLIN HFA   Used for:  Mild persistent asthma without complication        Dose:  2 puff   Inhale 2 puffs into the lungs every 6 hours as needed for shortness of breath / dyspnea   Quantity:  3 Inhaler   Refills:  1       celecoxib 100 MG capsule   Commonly known as:  celeBREX   Used for:  Intractable low back pain        Dose:  100 mg   Take 1 capsule (100 mg) by mouth 2 times daily as needed for moderate pain   Quantity:  60 capsule   Refills:  1       CRANBERRY        Dose:  475 mg   Take 475 mg by mouth 2 times daily.   Refills:  0       diclofenac 1 % Gel topical gel   Commonly known as:  VOLTAREN        Place onto the skin 4 times daily  as needed for moderate pain   Refills:  0       ipratropium - albuterol 0.5 mg/2.5 mg/3 mL 0.5-2.5 (3) MG/3ML neb solution   Commonly known as:  DUONEB        TAKE 1 VIAL BY NEBULIZATION  EVERY SIX HOURS AS NEEDED FOR SHORTNESS OF BREATH/ DYSPNEA OR WHEEZING   Quantity:  180 mL   Refills:  9       levothyroxine 50 MCG tablet   Commonly known as:  SYNTHROID/LEVOTHROID   Used for:  Hypothyroidism, unspecified type        TAKE 1 TABLET (50 MCG) BY MOUTH DAILY   Quantity:  90 tablet   Refills:  2       montelukast 10 MG tablet   Commonly known as:  SINGULAIR        Dose:  10 mg   Take 10 mg by mouth At Bedtime   Refills:  0       ondansetron 4 MG tablet   Commonly known as:  ZOFRAN   Used for:  Nausea        Dose:  4 mg   Take 1 tablet (4 mg) by mouth every 8 hours as needed for nausea   Quantity:  15 tablet   Refills:  0       polyethylene glycol Packet   Commonly known as:  MIRALAX/GLYCOLAX        Dose:  1.5 packet   Take 1.5 packets by mouth daily   Refills:  0       probiotic Caps        Dose:  1 capsule   Take 1 capsule by mouth every evening   Refills:  0       ranitidine 150 MG tablet   Commonly known as:  ZANTAC   Used for:  Gastroesophageal reflux disease without esophagitis        Dose:  150 mg   Take 1 tablet (150 mg) by mouth 2 times daily   Quantity:  60 tablet   Refills:  11       SYMBICORT 160-4.5 MCG/ACT Inhaler   Used for:  Mild persistent asthma without complication   Generic drug:  budesonide-formoterol        INHALE 2 PUFFS INTO THE LUNGS 2 TIMES DAILY   Quantity:  10.2 g   Refills:  5       TYLENOL PO        Dose:  975 mg   Take 975 mg by mouth 3 times daily   Refills:  0            Where to get your medicines      These medications were sent to [id number] - [place], [place] - [address]  [address] [facility] [address]     Phone:  [phone]     predniSONE 20 MG tablet    sodium bicarbonate 650 MG tablet                Protect others around you: Learn how to  safely use, store and throw away your medicines at www.disposemymeds.org.             Medication List: This is a list of all your medications and when to take them. Check marks below indicate your daily home schedule. Keep this list as a reference.      Medications           Morning Afternoon Evening Bedtime As Needed    albuterol 108 (90 Base) MCG/ACT Inhaler   Commonly known as:  PROAIR HFA/PROVENTIL HFA/VENTOLIN HFA   Inhale 2 puffs into the lungs every 6 hours as needed for shortness of breath / dyspnea                                   celecoxib 100 MG capsule   Commonly known as:  celeBREX   Take 1 capsule (100 mg) by mouth 2 times daily as needed for moderate pain   Last time this was given:  100 mg on 4/15/2018  8:43 PM                                   CRANBERRY   Take 475 mg by mouth 2 times daily.                                      diclofenac 1 % Gel topical gel   Commonly known as:  VOLTAREN   Place onto the skin 4 times daily as needed for moderate pain                                   ipratropium - albuterol 0.5 mg/2.5 mg/3 mL 0.5-2.5 (3) MG/3ML neb solution   Commonly known as:  DUONEB   TAKE 1 VIAL BY NEBULIZATION  EVERY SIX HOURS AS NEEDED FOR SHORTNESS OF BREATH/ DYSPNEA OR WHEEZING   Last time this was given:  3 mLs on 4/16/2018 12:44 PM                                   levothyroxine 50 MCG tablet   Commonly known as:  SYNTHROID/LEVOTHROID   TAKE 1 TABLET (50 MCG) BY MOUTH DAILY   Last time this was given:  50 mcg on 4/14/2018  8:27 PM                                   metoprolol succinate 25 MG 24 hr tablet   Commonly known as:  TOPROL-XL   TAKE ONE TABLETS BY MOUTH TWICE DAILY   Last time this was given:  50 mg on 4/15/2018  9:21 AM                                      montelukast 10 MG tablet   Commonly known as:  SINGULAIR   Take 10 mg by mouth At Bedtime   Last time this was given:  10 mg on 4/15/2018  8:41 PM                                   ondansetron 4 MG tablet   Commonly known as:   ZOFRAN   Take 1 tablet (4 mg) by mouth every 8 hours as needed for nausea                                   polyethylene glycol Packet   Commonly known as:  MIRALAX/GLYCOLAX   Take 1.5 packets by mouth daily   Last time this was given:  25.5 g on 4/16/2018  7:59 AM                                   predniSONE 20 MG tablet   Commonly known as:  DELTASONE   Take 1 tablet (20 mg) by mouth daily   Last time this was given:  40 mg on 4/16/2018  8:01 AM                                   probiotic Caps   Take 1 capsule by mouth every evening                                   ranitidine 150 MG tablet   Commonly known as:  ZANTAC   Take 1 tablet (150 mg) by mouth 2 times daily   Last time this was given:  150 mg on 4/16/2018  8:01 AM                                      sodium bicarbonate 650 MG tablet   Take 1 tablet (650 mg) by mouth 2 times daily                                      SYMBICORT 160-4.5 MCG/ACT Inhaler   INHALE 2 PUFFS INTO THE LUNGS 2 TIMES DAILY   Generic drug:  budesonide-formoterol   Last time this was given:  4/16/18 8:00 AM                                      TYLENOL PO   Take 975 mg by mouth 3 times daily   Last time this was given:  650 mg on 4/16/2018  8:00 AM                                         warfarin 1 MG tablet   Commonly known as:  COUMADIN   Take 1 tablet (1 mg) by mouth daily No dose today, then resume 1.5 mg on Mon, Fri; 1 mg all other days or as directed by the Anticoagulation ClinicTake 1 mg by mouth daily 1.5 mg on Mon, Fri; 1 mg all other days or as directed by the Anticoagulation Clinic   Last time this was given:  0.5 mg on 4/15/2018  5:35 PM

## 2018-04-14 NOTE — IP AVS SNAPSHOT
[facility]    [address] [address]    Phone:  [phone]    Fax:  [phone]                                       After Visit Summary   4/14/2018    Ellie Leigh    MRN: [id number]           After Visit Summary Signature Page     I have received my discharge instructions, and my questions have been answered. I have discussed any challenges I see with this plan with the nurse or doctor.    ..........................................................................................................................................  Patient/Patient Representative Signature      ..........................................................................................................................................  Patient Representative Print Name and Relationship to Patient    ..................................................               ................................................  Date                                            Time    ..........................................................................................................................................  Reviewed by Signature/Title    ...................................................              ..............................................  Date                                                            Time

## 2018-04-14 NOTE — PROGRESS NOTES
[place] [facility] ADMISSION NOTE    Patient admitted to room 2312 at approximately 1115 via cart from emergency room. Patient was accompanied by transport tech.     Verbal SBAR report received from [place] prior to patient arrival.     Patient ambulated to bed with one assist. Patient alert and oriented X 3. The patient is not having any pain. 0-10 Pain Scale: 0. Admission vital signs: Blood pressure 125/81, pulse 89, temperature 97.8  F (36.6  C), temperature source Oral, resp. rate 19, last menstrual period 06/15/1985, SpO2 95 %, not currently breastfeeding. Patient was oriented to plan of care, call light, bed controls, tv, telephone, bathroom and visiting hours.     Risk Assessment    The following safety risks were identified during admission: fall. Yellow risk band applied: YES.     Skin Initial Assessment    This writer admitted this patient and completed a full skin assessment and Jose Daniel score in the Adult PCS flowsheet. Appropriate interventions initiated as needed.         Jose Daniel Risk Assessment  Sensory Perception: 4-->no impairment  Moisture: 3-->occasionally moist  Activity: 3-->walks occasionally  Mobility: 3-->slightly limited  Nutrition: 3-->adequate  Friction and Shear: 3-->no apparent problem  Jose Daniel Score: 19    Kirsten Lentz

## 2018-04-14 NOTE — ED NOTES
Patient presents with c/o SOB.  Pt woke 3 times this early AM with symptoms.  Pt reports Hx of asthma with pt self nebulizing prior to presenting to the ED.  Pt currently able to speak in full sentences.

## 2018-04-14 NOTE — ED NOTES
Attempted pacemaker interrogation. Did not work. Called [employer] and was told is [employer]. Spoke with [employer] and they will send out rep. With bad weather, might be a couple hours. md updated

## 2018-04-15 LAB
ANION GAP SERPL CALCULATED.3IONS-SCNC: 7 MMOL/L (ref 3–14)
BUN SERPL-MCNC: 19 MG/DL (ref 7–30)
CALCIUM SERPL-MCNC: 8.1 MG/DL (ref 8.5–10.1)
CHLORIDE SERPL-SCNC: 89 MMOL/L (ref 94–109)
CO2 SERPL-SCNC: 26 MMOL/L (ref 20–32)
CREAT SERPL-MCNC: 0.53 MG/DL (ref 0.52–1.04)
ERYTHROCYTE [DISTWIDTH] IN BLOOD BY AUTOMATED COUNT: 13.4 % (ref 10–15)
GFR SERPL CREATININE-BSD FRML MDRD: >90 ML/MIN/1.7M2
GLUCOSE SERPL-MCNC: 99 MG/DL (ref 70–99)
HCT VFR BLD AUTO: 31.6 % (ref 35–47)
HGB BLD-MCNC: 10.9 G/DL (ref 11.7–15.7)
INR PPP: 2.36 (ref 0.86–1.14)
MCH RBC QN AUTO: 28.5 PG (ref 26.5–33)
MCHC RBC AUTO-ENTMCNC: 34.5 G/DL (ref 31.5–36.5)
MCV RBC AUTO: 83 FL (ref 78–100)
PLATELET # BLD AUTO: 273 10E9/L (ref 150–450)
POTASSIUM SERPL-SCNC: 4.4 MMOL/L (ref 3.4–5.3)
PROCALCITONIN SERPL-MCNC: <0.05 NG/ML
RBC # BLD AUTO: 3.83 10E12/L (ref 3.8–5.2)
SODIUM SERPL-SCNC: 122 MMOL/L (ref 133–144)
WBC # BLD AUTO: 5.1 10E9/L (ref 4–11)

## 2018-04-15 PROCEDURE — 12000007 ZZH R&B INTERMEDIATE

## 2018-04-15 PROCEDURE — 25000132 ZZH RX MED GY IP 250 OP 250 PS 637: Performed by: FAMILY MEDICINE

## 2018-04-15 PROCEDURE — 40000275 ZZH STATISTIC RCP TIME EA 10 MIN

## 2018-04-15 PROCEDURE — 25000128 H RX IP 250 OP 636: Performed by: FAMILY MEDICINE

## 2018-04-15 PROCEDURE — 94640 AIRWAY INHALATION TREATMENT: CPT

## 2018-04-15 PROCEDURE — 25000125 ZZHC RX 250: Performed by: FAMILY MEDICINE

## 2018-04-15 PROCEDURE — 85027 COMPLETE CBC AUTOMATED: CPT | Performed by: FAMILY MEDICINE

## 2018-04-15 PROCEDURE — 80048 BASIC METABOLIC PNL TOTAL CA: CPT | Performed by: FAMILY MEDICINE

## 2018-04-15 PROCEDURE — 84145 PROCALCITONIN (PCT): CPT | Performed by: FAMILY MEDICINE

## 2018-04-15 PROCEDURE — 99233 SBSQ HOSP IP/OBS HIGH 50: CPT | Performed by: FAMILY MEDICINE

## 2018-04-15 PROCEDURE — 36415 COLL VENOUS BLD VENIPUNCTURE: CPT | Performed by: FAMILY MEDICINE

## 2018-04-15 PROCEDURE — 85610 PROTHROMBIN TIME: CPT | Performed by: FAMILY MEDICINE

## 2018-04-15 PROCEDURE — 94640 AIRWAY INHALATION TREATMENT: CPT | Mod: 76

## 2018-04-15 RX ORDER — METOCLOPRAMIDE HYDROCHLORIDE 5 MG/ML
5 INJECTION INTRAMUSCULAR; INTRAVENOUS EVERY 6 HOURS PRN
Status: DISCONTINUED | OUTPATIENT
Start: 2018-04-15 | End: 2018-04-16 | Stop reason: HOSPADM

## 2018-04-15 RX ORDER — PREDNISONE 20 MG/1
40 TABLET ORAL DAILY
Status: DISCONTINUED | OUTPATIENT
Start: 2018-04-15 | End: 2018-04-16 | Stop reason: HOSPADM

## 2018-04-15 RX ORDER — IPRATROPIUM BROMIDE AND ALBUTEROL SULFATE 2.5; .5 MG/3ML; MG/3ML
3 SOLUTION RESPIRATORY (INHALATION) EVERY 4 HOURS PRN
Status: DISCONTINUED | OUTPATIENT
Start: 2018-04-15 | End: 2018-04-16 | Stop reason: HOSPADM

## 2018-04-15 RX ORDER — IPRATROPIUM BROMIDE AND ALBUTEROL SULFATE 2.5; .5 MG/3ML; MG/3ML
3 SOLUTION RESPIRATORY (INHALATION)
Status: DISCONTINUED | OUTPATIENT
Start: 2018-04-15 | End: 2018-04-16 | Stop reason: HOSPADM

## 2018-04-15 RX ORDER — METOCLOPRAMIDE 5 MG/1
5 TABLET ORAL EVERY 6 HOURS PRN
Status: DISCONTINUED | OUTPATIENT
Start: 2018-04-15 | End: 2018-04-16 | Stop reason: HOSPADM

## 2018-04-15 RX ADMIN — ACETAMINOPHEN 650 MG: 325 TABLET, FILM COATED ORAL at 09:25

## 2018-04-15 RX ADMIN — METOCLOPRAMIDE 5 MG: 5 INJECTION, SOLUTION INTRAMUSCULAR; INTRAVENOUS at 15:45

## 2018-04-15 RX ADMIN — Medication 0.5 MG: at 17:35

## 2018-04-15 RX ADMIN — IPRATROPIUM BROMIDE AND ALBUTEROL SULFATE 3 ML: .5; 3 SOLUTION RESPIRATORY (INHALATION) at 16:24

## 2018-04-15 RX ADMIN — Medication 1 CAPSULE: at 17:35

## 2018-04-15 RX ADMIN — ONDANSETRON 4 MG: 4 TABLET, ORALLY DISINTEGRATING ORAL at 08:17

## 2018-04-15 RX ADMIN — PROCHLORPERAZINE EDISYLATE 5 MG: 5 INJECTION INTRAMUSCULAR; INTRAVENOUS at 11:06

## 2018-04-15 RX ADMIN — IPRATROPIUM BROMIDE AND ALBUTEROL SULFATE 3 ML: .5; 3 SOLUTION RESPIRATORY (INHALATION) at 11:52

## 2018-04-15 RX ADMIN — IPRATROPIUM BROMIDE AND ALBUTEROL SULFATE 3 ML: .5; 3 SOLUTION RESPIRATORY (INHALATION) at 20:43

## 2018-04-15 RX ADMIN — IPRATROPIUM BROMIDE AND ALBUTEROL SULFATE 3 ML: .5; 3 SOLUTION RESPIRATORY (INHALATION) at 07:43

## 2018-04-15 RX ADMIN — RANITIDINE 150 MG: 150 TABLET ORAL at 09:21

## 2018-04-15 RX ADMIN — LEVOFLOXACIN 500 MG: 500 TABLET, FILM COATED ORAL at 00:13

## 2018-04-15 RX ADMIN — RANITIDINE 150 MG: 150 TABLET ORAL at 20:41

## 2018-04-15 RX ADMIN — FLUTICASONE FUROATE AND VILANTEROL TRIFENATATE 1 PUFF: 200; 25 POWDER RESPIRATORY (INHALATION) at 09:23

## 2018-04-15 RX ADMIN — MONTELUKAST SODIUM 10 MG: 10 TABLET, COATED ORAL at 20:41

## 2018-04-15 RX ADMIN — PREDNISONE 40 MG: 20 TABLET ORAL at 12:35

## 2018-04-15 RX ADMIN — METOPROLOL SUCCINATE 50 MG: 50 TABLET, EXTENDED RELEASE ORAL at 09:21

## 2018-04-15 RX ADMIN — CELECOXIB 100 MG: 100 CAPSULE ORAL at 20:43

## 2018-04-15 NOTE — CONSULTS
CARE TRANSITION SOCIAL WORK INITIAL ASSESSMENT:      Met with: Patient.    DATA  Principal Problem:    Community acquired pneumonia of right upper lobe of lung (H)  Active Problems:    Esophageal reflux    Chronic seasonal allergic rhinitis    Mild major depression    Anxiety    Cervical vertebral fracture (H)    Intermittent atrial fibrillation (H)    Hyponatremia    Benign essential HTN    COPD (chronic obstructive pulmonary disease) (H)    Intractable low back pain    Atypical chest pain       Primary Care Clinic Name:  ([facility])  Primary Care MD Name:  (Sobeida)  Contact information and PCP information verified: Yes      ASSESSMENT  Cognitive Status: awake, alert and oriented.       Resources List: Transitional Care, Skilled Nursing Facility, Home Care     Lives With: child(grupo), adult  Living Arrangements: house     Description of Support System: Supportive, Involved   Who is your support system?: Children   Support Assessment: Adequate family and caregiver support, Adequate social supports   Insurance Concerns: No Insurance issues identified        This writer met with pt  introduced self and role. Discussed discharge planning and medicare guidelines in regards to home care and SNF benefits. Pt reports that she lives at home with her dtr, Suyapa. Pt states that her dtr is home with her 24/7. Pt reports she has been to [facility] in the past, [facility] (Phone: [phone] Admissions: [phone] Fax: [phone]). She does not feel she needs this again. This writer did ask pt permission to contact Suyapa, this writer left VM with dtr. Therapy ordered. CTS will need to follow.       PLAN    undetermined    Discharge Planner   Discharge Plans in progress: undetermined  Barriers to discharge plan: medical stability, mobility  Follow up plan: CTS to follow       Entered by: April Reddy 04/15/2018 9:23 AM             April DUKES, [facility], [facility] [phone]

## 2018-04-15 NOTE — PROGRESS NOTES
[facility] Hospitalist Progress Note           Assessment and Plan:     Shortness of breath - hard to elicit - sounds like this is mostly long-standing but now worse past 1-2 weeks, more with exertion but some at rest.    4/14/18 -- Emphysematous changes and scarring noted over both lung fields but no opacities on chest x-ray.  Patient however has coarse crackles as described above. She is afebrile and her WBC is within normal limits but this does not necessarily rule out pneumonia.  starting levaquin given allergies.    4/15/2018 -- uncertain etiology, suspect some deconditioning with perhaps a component of COPD exacerbation with recent worsened productive cough but no clear pneumonia.  Was started on levaquin initially for possible CAP, but has had worsened nausea with this -will stop levaquin and check procalcitonin - would not plan for further antibiotics unless procalcitonin or WBC obviously concerning particularly given her extensive fevers.  Her cough and shortness of breath area really pretty unimpressive at present with no dyspnea reported this AM and only coughing after forced exhalation.  She may have a component of heart failure with some coarse crackles, but these are more expiratory, she has no edema and her BNP is normal so doubtful.  Had single dose of lasix in the ER but not continuing this. Doesn't look like she's had an echo since 2016, so I have ordered this for tomorrow.       Nausea- acute on chronic  4/15/2018 -- per patient and family this is a long-standing problem for her, and it gets worse with any new medication.  Stopping levaquin.  Has nausea protocol ordered as needed.       Low blood pressure   4/15/2018 -- blood pressure low this AM with ambulation, normalized with sitting. Now asymptomatic.  Follow, if symptomatic or has further blood pressures < 90 systolic will give some fluids, likely 1/2L LR, but will hold off on this for now in context of SIADH and possible CHF as above.          Esophageal reflux  4/14/18 -- Stable.  Continue prior to admission Zantac excellent  4/15/2018 -- no change.       Chronic seasonal allergic rhinitis  4/14/18 -- Stable.  Continue prior to admission Singulair and consider Flonase if need be.       Mild major depression/Anxiety  4/14/18 -- Not currently on any antidepressant as the Celexa she was recently started on has been discontinued.  We will monitor her at this admission and have a follow-up with PCP after discharge so that they can decide on a plan going forward.    4/15/2018 -- no issues.         Cervical vertebral fracture (H)/Intractable low back pain and right knee pain (osteoarthritis)  4/14/18 -- Continue PTA Celebrex, Tylenol and Voltaren gel which she seems to have been stable despite the fact that she is on anticoagulation.  4/15/2018 --  these are at baseline.        Intermittent atrial fibrillation (H)  4/14/18 -- Stable, paced, continue PTA Coumadin and metoprolol.  Pharmacy to help dose.  4/15/2018 -- no change.       Hyponatremia  4/14/18 -- has diagnosis of SIADH.  Fluid restriction to less than 1.5 L per day, avoid normal saline infusion if possible.  Patient is currently tolerating good p.o. intake so we will encourage her to continue this.  4/15/2018 -- stable, baseline range appears to fluctuate throughout the 120's. Follow for now.        Benign essential HYPERTENSION  4/14/18 -- Stable, continue PTA metoprolol.  4/15/2018 -- had AM metoprolol today, but with now low blood pressures will stop the metoplol for now - if blood pressure stabilizes could restart tomorrow, but perhaps at lowe dose.        COPD (chronic obstructive pulmonary disease) (H) with ? Exacerbation   4/14/18 -- -She will continue nebulized treatments and antibiotics being added for community-acquired pneumonia as outlined above.  4/15/2018 -- on Ashish Martin, with home singulair and scheduled duonebs, adding prednisone burst as above.          Atypical chest  "pain  4/14/18 -- Serial troponins negative, less concerned about cardiac origin especially with crackles heard on auscultation.  4/15/2018 -- troponins negative x 3, no further chest pain since admission, not clearly exertional, checking echo but at present I don't know that this needs to be pursued further.       Prophylaxis -mechanical compression for DVT prophylaxis    Lines  PIV    Disposition  Anticipate at least 1-2 days inpatient, patient may be failing at home due to multiple issues and may need TCU on discharge, physical therapy/OT to see while here.              Interval History:   This AM has worsened nausea - has had this for \"a very long time\" per family, patient says she almost always gets nausea with a new medication so Dr. Gómez tries to avoid new medications if possible, patient thinks her nausea is worse since starting the levaquin.  No vomiting.  No abdominal pain or distention.  zofran helped minimally.  Patient also had some low blood pressure with being up today, normalized once lying down.  No symptoms from this.  Currently denies dyspnea at rest, but hasn't really been up and about yet.  No fever or chills today.  Pain in low back and knee which are long-standing and at baseline.  No new pain.  No chest pain since admission.  Did eat breakfast.              Review of Systems:    ROS: 10 point ROS neg other than the symptoms noted above in the HPI.             Medications:   Current active medications and PTA medications reviewed, see medication list for details.            Physical Exam:   Vitals were reviewed  Patient Vitals for the past 24 hrs:   BP Temp Temp src Pulse Heart Rate Resp SpO2 Height Weight   04/15/18 1009 100/50 - - - 69 - 92 % - -   04/15/18 0959 96/45 - - - 70 - - - -   04/15/18 0957 (!) 85/41 - - - 72 - - - -   04/15/18 0804 114/57 98.6  F (37  C) Oral 67 - 18 92 % - -   04/15/18 0743 - - - - - - 92 % - -   04/15/18 0730 - - - - 68 - - - -   04/15/18 0006 - 98.9  F (37.2 " " C) Oral 68 - 18 92 % - -   04/15/18 0001 - - - - 68 - - - -   18 1954 148/66 97.3  F (36.3  C) Oral 77 - 18 92 % - -   18 1623 111/57 97.5  F (36.4  C) Oral 77 - 16 94 % - -   18 1600 - - - - 71 - - - -   18 1158 155/78 97.3  F (36.3  C) - - 71 18 94 % 1.473 m (4' 10\") 50.4 kg (111 lb 1.8 oz)   18 1119 - - - - 69 - - - -       Temperatures:  Current - Temp: 98.6  F (37  C); Max - Temp  Av.9  F (36.6  C)  Min: 97.3  F (36.3  C)  Max: 98.9  F (37.2  C)  Respiration range: Resp  Av.6  Min: 16  Max: 18  Pulse range: Pulse  Av.3  Min: 67  Max: 77  Blood pressure range: Systolic (24hrs), Av , Min:85 , Max:155   ; Diastolic (24hrs), Av, Min:41, Max:78    Pulse oximetry range: SpO2  Av.6 %  Min: 92 %  Max: 94 %  I/O last 3 completed shifts:  In: 740 [P.O.:740]  Out: 1575 [Urine:1575]    Intake/Output Summary (Last 24 hours) at 04/15/18 1037  Last data filed at 04/15/18 0900   Gross per 24 hour   Intake              980 ml   Output              850 ml   Net              130 ml     EXAM:  General: awake and alert, NAD, oriented x 3  Head: normocephalic  Neck: unremarkable, no lymphadenopathy   HEENT: oropharynx pink and moist    Heart: Regular rate and rhythm, no murmurs, rubs, or gallops  Lungs: very faint crackles at bases bilaterally, somewhat decreased air movement throughout, some expiratory wheezes.  No distress  Abdomen: soft, non-tender, no masses or organomegaly  Extremities: no edema in lower extremities   Skin unremarkable.               Data:     Results for orders placed or performed during the hospital encounter of 04/14/18 (from the past 24 hour(s))   Troponin I   Result Value Ref Range    Troponin I ES 0.041 0.000 - 0.045 ug/L   Pharmacy to dose warfarin    Narrative    Bk Mosley, [facility]     2018  6:54 PM  Clinical Pharmacy - Warfarin Dosing Consult     Pharmacy has been consulted to manage this patient s warfarin   therapy.  Indication: " Atrial Fibrillation  Therapy Goal: Other - see comments (1.7 - 2.3 per clininc)  Provider/Team: Onelia CARRION [facility]: [facility]  Warfarin Prior to Admission: Yes  Warfarin PTA Regimen: 1.5 on Mon and Fri, 1mg all other days  Recent documented change in oral intake/nutrition: Unknown  Dose Comments: 1mg per INR = 2.28    INR   Date Value Ref Range Status   04/14/2018 2.28 (H) 0.86 - 1.14 Final   04/13/2018 2.4  Final       Recommend warfarin 1 mg today.  Pharmacy will monitor Ellie Leigh daily and order warfarin doses to achieve specified goal.      Please contact pharmacy as soon as possible if the warfarin needs   to be held for a procedure or if the warfarin goals change.     Troponin I   Result Value Ref Range    Troponin I ES 0.036 0.000 - 0.045 ug/L   INR   Result Value Ref Range    INR 2.36 (H) 0.86 - 1.14   Basic metabolic panel   Result Value Ref Range    Sodium 122 (L) 133 - 144 mmol/L    Potassium 4.4 3.4 - 5.3 mmol/L    Chloride 89 (L) 94 - 109 mmol/L    Carbon Dioxide 26 20 - 32 mmol/L    Anion Gap 7 3 - 14 mmol/L    Glucose 99 70 - 99 mg/dL    Urea Nitrogen 19 7 - 30 mg/dL    Creatinine 0.53 0.52 - 1.04 mg/dL    GFR Estimate >90 >60 mL/min/1.7m2    GFR Estimate If Black >90 >60 mL/min/1.7m2    Calcium 8.1 (L) 8.5 - 10.1 mg/dL   CBC with platelets   Result Value Ref Range    WBC 5.1 4.0 - 11.0 10e9/L    RBC Count 3.83 3.8 - 5.2 10e12/L    Hemoglobin 10.9 (L) 11.7 - 15.7 g/dL    Hematocrit 31.6 (L) 35.0 - 47.0 %    MCV 83 78 - 100 fl    MCH 28.5 26.5 - 33.0 pg    MCHC 34.5 31.5 - 36.5 g/dL    RDW 13.4 10.0 - 15.0 %    Platelet Count 273 150 - 450 10e9/L   Care Transition RN/SW IP Consult    Narrative    April Reddy LICSW     4/15/2018  9:26 AM  CARE TRANSITION SOCIAL WORK INITIAL ASSESSMENT:      Met with: Patient.    DATA  Principal Problem:    Community acquired pneumonia of right upper lobe of lung (H)  Active Problems:    Esophageal reflux    Chronic seasonal allergic rhinitis     Mild major depression    Anxiety    Cervical vertebral fracture (H)    Intermittent atrial fibrillation (H)    Hyponatremia    Benign essential HTN    COPD (chronic obstructive pulmonary disease) (H)    Intractable low back pain    Atypical chest pain       Primary Care Clinic Name:  ([facility])  Primary Care MD Name:  (Sobeida)  Contact information and PCP information verified: Yes      ASSESSMENT  Cognitive Status: awake, alert and oriented.       Resources List: Transitional Care, Skilled Nursing Facility, Home   Care     Lives With: child(grupo), adult  Living Arrangements: house     Description of Support System: Supportive, Involved   Who is your support system?: Children   Support Assessment: Adequate family and caregiver support,   Adequate social supports   Insurance Concerns: No Insurance issues identified        This writer met with pt  introduced self and role. Discussed   discharge planning and medicare guidelines in regards to home   care and SNF benefits. Pt reports that she lives at home with her   dtr, Suyapa. Pt states that her dtr is home with her 24/7. Pt   reports [facility] been to [facility], [facility]   (Phone: [phone] Admissions: [phone] Fax: [phone]).   She does not feel she needs this again. This writer did ask pt   permission to contact Suyapa, this writer left VM with dtr.   Therapy ordered. CTS will need to follow.       PLAN    undetermined    Discharge Planner   Discharge Plans in progress: undetermined  Barriers to discharge plan: medical stability, mobility  Follow up plan: CTS to follow       Entered by: April Reddy 04/15/2018 9:23 AM             April Reddy MSW, LICSW, [facility] [phone]       *Note: Due to a large number of results and/or encounters for the requested time period, some results have not been displayed. A complete set of results can be found in Results Review.           Attestation:  I have reviewed today's vital signs, notes, medications,  labs and imaging.  Amount of time performed on this daily note: 40 minutes.     Herb Rowley MD, MD

## 2018-04-15 NOTE — PLAN OF CARE
Problem: Patient Care Overview  Goal: Plan of Care/Patient Progress Review  Outcome: No Change  Patient complains of mild nausea after receiving Reglan. Able to eat regular dinner.

## 2018-04-15 NOTE — PLAN OF CARE
Problem: Patient Care Overview  Goal: Plan of Care/Patient Progress Review  Outcome: No Change  Patient BP improved, 124/70. Tolerated ambulating to the bathroom with use of walker and standby assistance. Voided and had a bowel movement. Patient complains of right knee pain. Declined need for Tylenol but agreeable to try ice pack. Patient up to chair with feet reclined and reading book. Patient had some relief of nausea with Compazine this afternoon. Still has mild nausea. Dose of IV Reglan given. Will monitor for relief. Continues on fluid restriction.

## 2018-04-15 NOTE — PLAN OF CARE
Problem: Patient Care Overview  Goal: Plan of Care/Patient Progress Review  Outcome: No Change  Dr. Rowley was here to see patient and spoke with family. Patient continues to complain of nausea. Patient was given IV Compazine. Will continue to monitor.

## 2018-04-15 NOTE — PLAN OF CARE
Problem: Patient Care Overview  Goal: Plan of Care/Patient Progress Review  Pt alert/oriented. Up with assist of 1 & walker to the bathroom. Moves slow and has steady gait. +vdg. Gets SOA with activity. sats on RA 92%. Lungs diminished, has occasional non-productive cough. Denies pain.

## 2018-04-15 NOTE — PLAN OF CARE
Problem: Patient Care Overview  Goal: Plan of Care/Patient Progress Review  Outcome: No Change  Patient complained of nausea this morning. Patient given dose of Zofran. She was able to eat regular breakfast. Patient later complained of feeling lightheaded while sitting up in chair. BP 85/41 HR 72, Chair was reclined. Patient feeling better with BP 95/45 HR 70. Complains of neck and right knee pain and restless legs. Message sent to . Patient sodium level this morning was 122 down from 123 yesterday. Patient continues on fluid restrictions. On tele. Will continue to monitor.

## 2018-04-16 ENCOUNTER — APPOINTMENT (OUTPATIENT)
Dept: OCCUPATIONAL THERAPY | Facility: CLINIC | Age: 83
DRG: 191 | End: 2018-04-16
Payer: COMMERCIAL

## 2018-04-16 ENCOUNTER — APPOINTMENT (OUTPATIENT)
Dept: CARDIOLOGY | Facility: CLINIC | Age: 83
DRG: 191 | End: 2018-04-16
Attending: FAMILY MEDICINE
Payer: COMMERCIAL

## 2018-04-16 ENCOUNTER — APPOINTMENT (OUTPATIENT)
Dept: PHYSICAL THERAPY | Facility: CLINIC | Age: 83
DRG: 191 | End: 2018-04-16
Payer: COMMERCIAL

## 2018-04-16 ENCOUNTER — PATIENT OUTREACH (OUTPATIENT)
Dept: CARE COORDINATION | Facility: CLINIC | Age: 83
End: 2018-04-16

## 2018-04-16 ENCOUNTER — ANTICOAGULATION THERAPY VISIT (OUTPATIENT)
Dept: ANTICOAGULATION | Facility: CLINIC | Age: 83
End: 2018-04-16
Payer: COMMERCIAL

## 2018-04-16 ENCOUNTER — TELEPHONE (OUTPATIENT)
Dept: FAMILY MEDICINE | Facility: CLINIC | Age: 83
End: 2018-04-16

## 2018-04-16 VITALS
HEIGHT: 58 IN | SYSTOLIC BLOOD PRESSURE: 118 MMHG | WEIGHT: 110.89 LBS | HEART RATE: 71 BPM | OXYGEN SATURATION: 94 % | RESPIRATION RATE: 16 BRPM | BODY MASS INDEX: 23.28 KG/M2 | TEMPERATURE: 98.1 F | DIASTOLIC BLOOD PRESSURE: 47 MMHG

## 2018-04-16 DIAGNOSIS — I82.409 DVT (DEEP VENOUS THROMBOSIS) (H): ICD-10-CM

## 2018-04-16 DIAGNOSIS — Z79.01 LONG TERM CURRENT USE OF ANTICOAGULANT THERAPY: ICD-10-CM

## 2018-04-16 LAB
ANION GAP SERPL CALCULATED.3IONS-SCNC: 7 MMOL/L (ref 3–14)
BASOPHILS # BLD AUTO: 0 10E9/L (ref 0–0.2)
BASOPHILS NFR BLD AUTO: 0 %
BUN SERPL-MCNC: 25 MG/DL (ref 7–30)
CALCIUM SERPL-MCNC: 8.5 MG/DL (ref 8.5–10.1)
CHLORIDE SERPL-SCNC: 91 MMOL/L (ref 94–109)
CO2 SERPL-SCNC: 26 MMOL/L (ref 20–32)
CREAT SERPL-MCNC: 0.57 MG/DL (ref 0.52–1.04)
DIFFERENTIAL METHOD BLD: ABNORMAL
EOSINOPHIL # BLD AUTO: 0 10E9/L (ref 0–0.7)
EOSINOPHIL NFR BLD AUTO: 0 %
ERYTHROCYTE [DISTWIDTH] IN BLOOD BY AUTOMATED COUNT: 13.5 % (ref 10–15)
GFR SERPL CREATININE-BSD FRML MDRD: >90 ML/MIN/1.7M2
GLUCOSE SERPL-MCNC: 120 MG/DL (ref 70–99)
HCT VFR BLD AUTO: 31.5 % (ref 35–47)
HGB BLD-MCNC: 10.9 G/DL (ref 11.7–15.7)
IMM GRANULOCYTES # BLD: 0 10E9/L (ref 0–0.4)
IMM GRANULOCYTES NFR BLD: 0.3 %
INR PPP: 2.46 (ref 0.86–1.14)
LYMPHOCYTES # BLD AUTO: 1.6 10E9/L (ref 0.8–5.3)
LYMPHOCYTES NFR BLD AUTO: 24 %
MCH RBC QN AUTO: 28.7 PG (ref 26.5–33)
MCHC RBC AUTO-ENTMCNC: 34.6 G/DL (ref 31.5–36.5)
MCV RBC AUTO: 83 FL (ref 78–100)
MONOCYTES # BLD AUTO: 0.6 10E9/L (ref 0–1.3)
MONOCYTES NFR BLD AUTO: 9.5 %
NEUTROPHILS # BLD AUTO: 4.4 10E9/L (ref 1.6–8.3)
NEUTROPHILS NFR BLD AUTO: 66.2 %
PLATELET # BLD AUTO: 273 10E9/L (ref 150–450)
POTASSIUM SERPL-SCNC: 4.6 MMOL/L (ref 3.4–5.3)
RBC # BLD AUTO: 3.8 10E12/L (ref 3.8–5.2)
SODIUM SERPL-SCNC: 124 MMOL/L (ref 133–144)
WBC # BLD AUTO: 6.6 10E9/L (ref 4–11)

## 2018-04-16 PROCEDURE — 93306 TTE W/DOPPLER COMPLETE: CPT | Mod: 26 | Performed by: INTERNAL MEDICINE

## 2018-04-16 PROCEDURE — 25000125 ZZHC RX 250: Performed by: FAMILY MEDICINE

## 2018-04-16 PROCEDURE — 36415 COLL VENOUS BLD VENIPUNCTURE: CPT | Performed by: FAMILY MEDICINE

## 2018-04-16 PROCEDURE — 40000809 ZZH STATISTIC NO DOCUMENTATION TO SUPPORT CHARGE

## 2018-04-16 PROCEDURE — 97110 THERAPEUTIC EXERCISES: CPT | Mod: GO

## 2018-04-16 PROCEDURE — 94640 AIRWAY INHALATION TREATMENT: CPT | Mod: 76

## 2018-04-16 PROCEDURE — 80048 BASIC METABOLIC PNL TOTAL CA: CPT | Performed by: FAMILY MEDICINE

## 2018-04-16 PROCEDURE — 93306 TTE W/DOPPLER COMPLETE: CPT

## 2018-04-16 PROCEDURE — 25000132 ZZH RX MED GY IP 250 OP 250 PS 637: Performed by: FAMILY MEDICINE

## 2018-04-16 PROCEDURE — 40000133 ZZH STATISTIC OT WARD VISIT

## 2018-04-16 PROCEDURE — 99239 HOSP IP/OBS DSCHRG MGMT >30: CPT | Performed by: INTERNAL MEDICINE

## 2018-04-16 PROCEDURE — 85610 PROTHROMBIN TIME: CPT | Performed by: FAMILY MEDICINE

## 2018-04-16 PROCEDURE — 97535 SELF CARE MNGMENT TRAINING: CPT | Mod: GO

## 2018-04-16 PROCEDURE — 40000193 ZZH STATISTIC PT WARD VISIT: Performed by: PHYSICAL THERAPIST

## 2018-04-16 PROCEDURE — 94640 AIRWAY INHALATION TREATMENT: CPT

## 2018-04-16 PROCEDURE — 99207 ZZC NO CHARGE NURSE ONLY: CPT | Performed by: REGISTERED NURSE

## 2018-04-16 PROCEDURE — 97161 PT EVAL LOW COMPLEX 20 MIN: CPT | Mod: GP | Performed by: PHYSICAL THERAPIST

## 2018-04-16 PROCEDURE — 97165 OT EVAL LOW COMPLEX 30 MIN: CPT | Mod: GO

## 2018-04-16 PROCEDURE — 85025 COMPLETE CBC W/AUTO DIFF WBC: CPT | Performed by: FAMILY MEDICINE

## 2018-04-16 RX ORDER — PREDNISONE 20 MG/1
20 TABLET ORAL DAILY
Qty: 5 TABLET | Refills: 0 | Status: SHIPPED | OUTPATIENT
Start: 2018-04-16 | End: 2018-05-30

## 2018-04-16 RX ORDER — WARFARIN SODIUM 1 MG/1
TABLET ORAL
Qty: 30 TABLET | COMMUNITY
Start: 2018-04-16 | End: 2018-05-08

## 2018-04-16 RX ORDER — WARFARIN SODIUM 1 MG/1
1 TABLET ORAL DAILY
Qty: 30 TABLET | COMMUNITY
Start: 2018-04-16 | End: 2018-04-16

## 2018-04-16 RX ORDER — SODIUM BICARBONATE 650 MG/1
650 TABLET ORAL 2 TIMES DAILY
Qty: 60 TABLET | Refills: 1 | Status: SHIPPED | OUTPATIENT
Start: 2018-04-16 | End: 2018-04-23 | Stop reason: ALTCHOICE

## 2018-04-16 RX ORDER — METOPROLOL SUCCINATE 25 MG/1
TABLET, EXTENDED RELEASE ORAL
Qty: 360 TABLET | Refills: 11 | COMMUNITY
Start: 2018-04-16 | End: 2018-05-30

## 2018-04-16 RX ADMIN — IPRATROPIUM BROMIDE AND ALBUTEROL SULFATE 3 ML: .5; 3 SOLUTION RESPIRATORY (INHALATION) at 12:44

## 2018-04-16 RX ADMIN — RANITIDINE 150 MG: 150 TABLET ORAL at 08:01

## 2018-04-16 RX ADMIN — PREDNISONE 40 MG: 20 TABLET ORAL at 08:01

## 2018-04-16 RX ADMIN — POLYETHYLENE GLYCOL 3350 25.5 G: 17 POWDER, FOR SOLUTION ORAL at 07:59

## 2018-04-16 RX ADMIN — FLUTICASONE FUROATE AND VILANTEROL TRIFENATATE 1 PUFF: 200; 25 POWDER RESPIRATORY (INHALATION) at 08:07

## 2018-04-16 RX ADMIN — ACETAMINOPHEN 650 MG: 325 TABLET, FILM COATED ORAL at 08:00

## 2018-04-16 RX ADMIN — IPRATROPIUM BROMIDE AND ALBUTEROL SULFATE 3 ML: .5; 3 SOLUTION RESPIRATORY (INHALATION) at 06:58

## 2018-04-16 ASSESSMENT — ACTIVITIES OF DAILY LIVING (ADL)
DEPENDENT_IADLS:: TRANSPORTATION;MONEY MANAGEMENT;MEDICATION MANAGEMENT;MEAL PREPARATION;SHOPPING;LAUNDRY;COOKING;CLEANING

## 2018-04-16 NOTE — PROGRESS NOTES
Patient ambulated in hallway without using oxygen. No shortenss of air or decrease in oxygen saturation levels.

## 2018-04-16 NOTE — MR AVS SNAPSHOT
Ellie CARVER Lu   4/16/2018   Anticoagulation Therapy Visit    Description:  87 year old female   Provider:  Aysha Canada, RN   Department:  [facility]           INR as of 4/16/2018     Today's INR 2.46!      Anticoagulation Summary as of 4/16/2018     INR goal    Prior goal 1.5-2.0   Today's INR 2.46!   Full instructions 1.5 mg on Mon, Fri; 1 mg all other days   Next INR check 4/20/2018    Indications   Atrial fibrillation with rapid ventricular response (H) (Resolved) [I48.91]  DVT (deep venous thrombosis) [I82.409]  Long term current use of anticoagulant therapy [Z79.01]         April 2018 Details    Sun Mon Tue Wed Thu Fri Sat     1               2               3               4               5               6               7                 8               9               10               11               12               13               14                 15               16      1.5 mg   See details      17      1 mg         18      1 mg         19      1 mg         20            21                 22               23               24               25               26               27               28                 29               30                     Date Details   04/16 This INR check       Date of next INR:  4/20/2018         How to take your warfarin dose     To take:  1 mg Take 1 of the 1 mg tablets.    To take:  1.5 mg Take 1.5 of the 1 mg tablets.

## 2018-04-16 NOTE — PLAN OF CARE
Problem: Patient Care Overview  Goal: Discharge Needs Assessment  Outcome: Adequate for Discharge Date Met: 04/16/18  [facility] DISCHARGE NOTE    Patient discharged to home at 1:30 PM via wheel chair. Accompanied by spouse and daughter and staff. Discharge instructions reviewed with patient and daughter, opportunity offered to ask questions. Prescriptions sent to patients preferred pharmacy. All belongings sent with patient.    Lizet Larson

## 2018-04-16 NOTE — PROGRESS NOTES
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 She will resume her home care with [facility] ([phone] Fax: [phone]). Referral placed.     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

## 2018-04-16 NOTE — PROGRESS NOTES
"SPIRITUAL HEALTH SERVICES  SPIRITUAL ASSESSMENT Progress Note  [facility] [place] - Med/Surg    PRIMARY FOCUS:     Assessment of emotional/spiritual/Mormon distress    Support for coping    ILLNESS CIRCUMSTANCES:   Reviewed documentation. Reflective conversation shared with Ellie Leigh which integrated elements of illness narratives.     Context of Serious Illness/Symptom(s) - Chest pain; SOB    Resources for Support - Daughters, Suyapa and Christie    DISTRESS:     Emotional/Existential/Relational Distress - Ellie stated that her AB's from yesterday hadn't agreed with her but now she is back on \"just my prednisone\" and that is working well    Spiritual/Congregational Distress - None; she was watching the Shinto channel    Social/Cultural/Economic Distress - NA    SPIRITUAL/Judaism COPING:     Baptism/Tia - [employer]    Spiritual Practice(s) - [place]; enjoys Mormon programming on TV; welcomed prayer and \"Our Father\"    Emotional/Existential/Relational Connections - NA    GOALS OF CARE:    Goals of Care - Ellie stated she is looking forward to getting back home soon    Meaning/Sense-Making - Tia and family    PLAN: No follow up visit planned but patient is aware of the availability of spiritual support on request.    Aaron Aly M.A., [facility]  Staff   [facility]  Office: [phone]  Cell: [phone]  Pager [phone]    "

## 2018-04-16 NOTE — PLAN OF CARE
Problem: Patient Care Overview  Goal: Plan of Care/Patient Progress Review  Discharge Planner PT   Patient plan for discharge: TCU  Current status: SBA for oxygen levels  Barriers to return to prior living situation: Lives at home with 3 stairs to enter; daughter able to assist 24-7  Recommendations for discharge: TCU with PT  Rationale for recommendations: To continue monitoring oxygen; progress ambulation endurance       Entered by: Sanam Epperson 04/16/2018 12:33 PM

## 2018-04-16 NOTE — LETTER
Health Care Home - Access Care Plan    About Me  Patient Name:  Ellie Leigh    YOB: 1930  Age:                             87 year old   [place] MRN:            [id number] Telephone Information:     Home Phone [phone]   Mobile [phone]       Address:    [address]  [facility] [address] Email address:  No e-mail address on record      Emergency Contact(s)  Name Relationship Lgl Grd Work Phone Home Phone Mobile Phone   1. SAMRA MACIAS Daughter  [phone]   2. ARLENE CRISTOBAL Daughter  [phone]             Health Maintenance: Routine Health maintenance Reviewed: Up to date    My Access Plan  Medical Emergency 911   Questions or concerns during clinic hours Primary Clinic Line, I will call the clinic directly: [facility] - [phone]   24 Hour Appointment Line [phone] or  [phone]) (toll free)   24 Hour Nurse Line [phone] (toll free)   Questions or concerns outside clinic hours 24 Hour Appointment Line, I will call the after-hours on-call line:   [facility] [phone] or [phone]) (toll-free)   Preferred Urgent Care [facility] [phone]   Preferred [facility] [place] [place]  [phone]   Preferred Pharmacy [id number] [address]     Behavioral Health Crisis Line The National Suicide Prevention Lifeline at 1-107.355.2721 or 911     My Care Team Members  Patient Care Team       Relationship Specialty Notifications Start End    Anjum Vidales MD PCP - General Family Practice  3/29/18     Phone: [phone] Fax: [phone] [address] [address]    Gayle Nieves MD MD Oncology Admissions 1/6/14     Phone: [phone] Pager: [phone] Fax: [phone] [address] [place] [address]    Steffanie Louis, RN  Clinic Care Coordinator Primary Care - CC Admissions 4/16/18     Phone: [phone] Fax: [phone]        [facility]  4/17/18     Phone: [phone] Fax: [phone] [address] [address]           My Medical and Care Information  Problem List   Patient Active Problem List   Diagnosis     Sensorineural hearing loss, asymmetrical     Generalized osteoarthrosis, unspecified site     Disease of lung     PERSONAL HISTORY OF MALIGNANCY- BREAST     Esophageal reflux     Chronic allergic conjunctivitis     Atopic rhinitis     Chronic seasonal allergic rhinitis     Hyperlipidemia LDL goal <130     Chronic constipation     Osteoporosis     Health Care Home     Right arm weakness     Ulnar neuropathy     Headache     Mild major depression     DVT (deep venous thrombosis)     Anxiety     Cervical vertebral fracture (H)     Intermittent atrial fibrillation (H)     Squamous cell carcinoma in situ of skin of face     SK (seborrheic keratosis)     Hypothyroidism     Hyponatremia     Recurrent UTI     History of skin cancer     BPPV (benign paroxysmal positional vertigo)     Benign essential HTN     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)     Positive fecal occult blood test     COPD (chronic obstructive pulmonary disease) (H)     Infectious colitis, enteritis and gastroenteritis     Advance Care Planning     Syncope     Hemoptysis     Long term current use of anticoagulant therapy     Mild persistent asthma without complication     Chest pain at rest     Unresponsive episode     Irritable bowel syndrome without diarrhea     Elevated troponin     Nausea     Intractable low back pain     Back pain     H/O TB (tuberculosis)     Chest pain     Atypical chest pain     Community acquired pneumonia of right upper lobe of lung (H)      Current Medications and Allergies:  See printed Medication Report

## 2018-04-16 NOTE — PLAN OF CARE
Problem: Patient Care Overview  Goal: Plan of Care/Patient Progress Review  Pt alert/oriented. Up with 1 assist & walker to the bathroom, +vdg. Denies pain. Lungs diminished, sats on RA 93%, non-productive cough. Does get slightly SOA with activity.

## 2018-04-16 NOTE — PROGRESS NOTES
04/16/18 1100   Living Environment   Lives With child(grupo), adult  (Daughter)   Living Arrangements house   Home Accessibility stairs to enter home   Number of Stairs to Enter Home 3   Number of Stairs Within Home 0   Living Environment Comment Daughter is home the entire day; currently working with home PT   Self-Care   Regular Exercise yes   Activity/Exercise Type other (see comments)  (PT)   Equipment Currently Used at Home walker, rolling   Functional Level Prior   Ambulation 1-->assistive equipment   Transferring 1-->assistive equipment   Fall history within last six months no   General Information   Onset of Illness/Injury or Date of Surgery - Date 04/14/18   Patient/Family Goals Statement To go to granddaughter's wedding this upcoming weekend   Pertinent History of Current Problem (include personal factors and/or comorbidities that impact the POC) Patient experienced community acquired pneumonia resulting in impaired oxygen levels and mobility   Precautions/Limitations no known precautions/limitations   Cognitive Status Examination   Orientation orientation to person, place and time   Level of Consciousness alert   Follows Commands and Answers Questions 100% of the time   Personal Safety and Judgment intact   Memory intact   Pain Assessment   Patient Currently in Pain No   Posture    Posture Forward head position;Protracted shoulders   Transfer Skills   Transfer Transfer Skill: Sit to Stand   Transfer Skill:  Sit to Stand   Level of Abbott: Sit/Stand stand-by assist   Physical Assist/Nonphysical Assist: Sit/Stand supervision   Assistive Device for Transfer: Sit/Stand other (see comments)  (Front wheeled walker)   Gait   Gait Gait Analysis   Gait Analysis   Gait Deviations Noted decreased shruthi   Impairments Contributing to Gait Deviations other (see comments)  (Oxygen: initially 90%; with deep breathing achieved 94%)   General Therapy Interventions   Planned Therapy Interventions gait training;ADL  "retraining;IADL retraining;motor coordination training;neuromuscular re-education;ROM;strengthening;stretching;transfer training   Clinical Impression   Criteria for Skilled Therapeutic Intervention yes, treatment indicated   PT Diagnosis Impaired mobility and oxygen levels    Influenced by the following impairments Weakness   Functional limitations due to impairments Impaired gait endurance; impaired community mobility   Clinical Presentation Stable/Uncomplicated   Clinical Presentation Rationale (+) motivation; at home PT; >90% oxygen with short ~50 foot walk during evaluation   Clinical Decision Making (Complexity) Low complexity   Therapy Frequency` other (see comments)  (Discharge today)   Predicted Duration of Therapy Intervention (days/wks) 1   Anticipated Equipment Needs at Discharge other (see comments)  (4 wheeled walker)   Anticipated Discharge Disposition Transitional Care Facility  (OR home with full time assist from daughter)   Risk & Benefits of therapy have been explained No   Patient, Family & other staff in agreement with plan of care Yes   Clinical Impression Comments Ellie presents with stable oxygen levels during short, household distance walking to transfer to a TCU versus home with daughter's assist.  Continued PT is recommended.   [employer] TM \"6 Clicks\"   2016, [employer] of [employer], under license to [employer].  All rights reserved.   6 Clicks Short Forms Basic Mobility Inpatient Short Form   [employer] AM-PAC  \"6 Clicks\" V.2 Basic Mobility Inpatient Short Form   1. Turning from your back to your side while in a flat bed without using bedrails? 4 - None   2. Moving from lying on your back to sitting on the side of a flat bed without using bedrails? 3 - A Little   3. Moving to and from a bed to a chair (including a wheelchair)? 3 - A Little   4. Standing up from a chair using your arms (e.g., wheelchair, or bedside chair)? 3 - A Little   5. To walk in " hospital room? 3 - A Little   6. Climbing 3-5 steps with a railing? 3 - A Little   Basic Mobility Raw Score (Score out of 24.Lower scores equate to lower levels of function) 19   Total Evaluation Time   Total Evaluation Time (Minutes) 25 min       Sanam Epperson, PT, DPT  Doctor of Physical Therapy #[id number]  [facility]  [phone]  [email or web address]

## 2018-04-16 NOTE — PROGRESS NOTES
Clinic Care Coordination Contact  Care Team Conversations    Care Coordination Communication    Referral Source:  CTS    Clinical Data: RN CC has received a referral from CTS staff identifying a need for RN CC  to follow up with the patient once they are discharged.     Plan:  RN CC will outreach and follow up with the patient once they are discharged from the hospital. RN CC will continue to monitor for the patient to discharge.        Steffanie Louis RN, [facility]  RN Care Coordinator  [facility]  Phone # [phone]  4/16/2018 3:27 PM

## 2018-04-16 NOTE — DISCHARGE SUMMARY
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.2]  Acute chest pain [R07.9]  Long term current use of anticoagulant therapy [Z79.01]  Chronic obstructive pulmonary disease, unspecified COPD type (H) [J44.9]          Discharge Diagnosis:   Principle diagnosis: POSSIBLE COPD EXACERBATION  Secondary diagnoses:  Patient Active Problem List   Diagnosis     Sensorineural hearing loss, asymmetrical     Generalized osteoarthrosis, unspecified site     Disease of lung     PERSONAL HISTORY OF MALIGNANCY- BREAST     Esophageal reflux     Chronic allergic conjunctivitis     Atopic rhinitis     Chronic seasonal allergic rhinitis     Hyperlipidemia LDL goal <130     Chronic constipation     Osteoporosis     Health Care Home     Right arm weakness     Ulnar neuropathy     Headache     Mild major depression     DVT (deep venous thrombosis)     Anxiety     Cervical vertebral fracture (H)     Intermittent atrial fibrillation (H)     Squamous cell carcinoma in situ of skin of face     SK (seborrheic keratosis)     Hypothyroidism     Hyponatremia     Recurrent UTI     History of skin cancer     BPPV (benign paroxysmal positional vertigo)     Benign essential HTN     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)     Positive fecal occult blood test     COPD (chronic obstructive pulmonary disease) (H)     Infectious colitis, enteritis and gastroenteritis     Advance Care Planning     Syncope     Hemoptysis     Long term current use of anticoagulant therapy     Mild persistent  asthma without complication     Chest pain at rest     Unresponsive episode     Irritable bowel syndrome without diarrhea     Elevated troponin     Nausea     Intractable low back pain     Back pain     H/O TB (tuberculosis)     Chest pain     Atypical chest pain     Community acquired pneumonia of right upper lobe of lung (H)          Brief History of Presenting Illness:   As per admit hx  Patient with a history of allergies, A. fib, SIADH chronic back pain, COPD, GERD and hypothyroidism amongst other medical problems as outlined below.  She also has a remote history of tuberculosis.  She was in her usual state of health until about 2-3 weeks ago when she developed shortness of breath mostly with exertion.  This has been progressively worsening and associated with a cough which is mostly dry but with which she will get small amounts of phlegm sometimes.  She denies feet swelling, no dizziness.  Associated however with chest pain in his sternal region which has been intermittent, the last time she felt he was this morning, no known aggravating or relieving factors.  She denies any palpitations or dizziness.  No congestion.  Shortness of breath and cough with progressive worsening is why she came in today.  She was recently placed on allergy medications, Celebrex of pain and an antidepressant [I am unable to locate this in her file].  No known ill contacts.    No results found for this or any previous visit (from the past 24 hour(s)).         Hospital Course:   POSSIBLE COPD EXACERBATION  Has h/o chronic SOB--may have been worse last couple weeks. No significant sputum production or fever. Has had stable o2 sats in hospital. Was started on prednisone. Feeling better and says is at baseline breathing now.  -will d/c on prednisone x 5 days. Continue all PTA meds    Nausea- acute on chronic  Per patient, family and medical records this is a long-standing problem for her. Stable. No intervention at this time. Tolerating  po well.       Esophageal reflux    Continue prior to admission Zantac excellent       Chronic seasonal allergic rhinitis  Stable.  Continue prior to admission Singulair and consider Flonase if need be.        Mild major depression/Anxiety  F/u OP.         Cervical vertebral fracture (H)/Intractable low back pain and right knee pain (osteoarthritis)  Continue PTA Celebrex, Tylenol and Voltaren gel        Intermittent atrial fibrillation (H)  Stable, paced, continue PTA Coumadin and metoprolol.         Hyponatremia  Chronic / stable. Baseline NA is 122-123.   -will discharge on sodium tablets       Benign essential HYPERTENSION  Stable, continue PTA metoprolol. Was a little low 4/15--but stable now. Will decreasetoprol xl to 25 mg bid-        Atypical chest pain  Resolved.      DISPO  Home with HC. Discussed with SW again about possible TCU but it was felt pt is ok to d/c home with daughter. OT seen. PT apparently was not necessary.               Procedures:   No procedures performed during this admission         Allergies:      Allergies   Allergen Reactions     Cephalosporins Nausea     Cefzil     Doxycycline Nausea and Vomiting     Sulfa Drugs Nausea     Penicillins Rash     PCN             Medications Prior to Admission:     Prescriptions Prior to Admission   Medication Sig Dispense Refill Last Dose     montelukast (SINGULAIR) 10 MG tablet Take 10 mg by mouth At Bedtime   4/13/2018 at hs     ondansetron (ZOFRAN) 4 MG tablet Take 1 tablet (4 mg) by mouth every 8 hours as needed for nausea 15 tablet 0 4/13/2018 at Unknown time     warfarin (COUMADIN) 1 MG tablet Take 1 mg by mouth daily 1.5 mg on Mon, Fri; 1 mg all other days or as directed by the Anticoagulation Clinic 30 tablet  4/13/2018 at pm     celecoxib (CELEBREX) 100 MG capsule Take 1 capsule (100 mg) by mouth 2 times daily as needed for moderate pain 60 capsule 1 4/13/2018 at Unknown time     polyethylene glycol (MIRALAX/GLYCOLAX) Packet Take 1.5 packets  by mouth daily   4/13/2018 at Unknown time     Acetaminophen (TYLENOL PO) Take 975 mg by mouth 3 times daily   Past Week at Unknown time     ipratropium - albuterol 0.5 mg/2.5 mg/3 mL (DUONEB) 0.5-2.5 (3) MG/3ML neb solution TAKE 1 VIAL BY NEBULIZATION  EVERY SIX HOURS AS NEEDED FOR SHORTNESS OF BREATH/ DYSPNEA OR WHEEZING 180 mL 9 4/13/2018 at Unknown time     ranitidine (ZANTAC) 150 MG tablet Take 1 tablet (150 mg) by mouth 2 times daily 60 tablet 11 4/13/2018 at pm     albuterol (PROAIR HFA/PROVENTIL HFA/VENTOLIN HFA) 108 (90 BASE) MCG/ACT Inhaler Inhale 2 puffs into the lungs every 6 hours as needed for shortness of breath / dyspnea 3 Inhaler 1 4/13/2018 at Unknown time     levothyroxine (SYNTHROID/LEVOTHROID) 50 MCG tablet TAKE 1 TABLET (50 MCG) BY MOUTH DAILY 90 tablet 2 4/13/2018 at am     SYMBICORT 160-4.5 MCG/ACT Inhaler INHALE 2 PUFFS INTO THE LUNGS 2 TIMES DAILY 10.2 g 5 4/13/2018 at pm     probiotic CAPS Take 1 capsule by mouth every evening    4/13/2018 at pm     CRANBERRY Take 475 mg by mouth 2 times daily.   4/13/2018 at Unknown time     diclofenac (VOLTAREN) 1 % GEL topical gel Place onto the skin 4 times daily as needed for moderate pain   Unknown at Unknown time             Discharge Medications:     Current Discharge Medication List      START taking these medications    Details   predniSONE (DELTASONE) 20 MG tablet Take 1 tablet (20 mg) by mouth daily  Qty: 5 tablet, Refills: 0    Associated Diagnoses: Chronic obstructive pulmonary disease, unspecified COPD type (H)      sodium bicarbonate 650 MG tablet Take 1 tablet (650 mg) by mouth 2 times daily  Qty: 60 tablet, Refills: 1    Associated Diagnoses: 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)         CONTINUE these medications which have CHANGED    Details   metoprolol succinate (TOPROL-XL) 25 MG 24 hr tablet TAKE ONE TABLETS BY MOUTH TWICE DAILY  Qty: 360 tablet, Refills: 11    Associated Diagnoses: Essential hypertension, benign          CONTINUE these medications which have NOT CHANGED    Details   montelukast (SINGULAIR) 10 MG tablet Take 10 mg by mouth At Bedtime      ondansetron (ZOFRAN) 4 MG tablet Take 1 tablet (4 mg) by mouth every 8 hours as needed for nausea  Qty: 15 tablet, Refills: 0    Associated Diagnoses: Nausea      warfarin (COUMADIN) 1 MG tablet Take 1 mg by mouth daily 1.5 mg on Mon, Fri; 1 mg all other days or as directed by the Anticoagulation Clinic  Qty: 30 tablet      celecoxib (CELEBREX) 100 MG capsule Take 1 capsule (100 mg) by mouth 2 times daily as needed for moderate pain  Qty: 60 capsule, Refills: 1    Associated Diagnoses: Intractable low back pain      polyethylene glycol (MIRALAX/GLYCOLAX) Packet Take 1.5 packets by mouth daily      Acetaminophen (TYLENOL PO) Take 975 mg by mouth 3 times daily      ipratropium - albuterol 0.5 mg/2.5 mg/3 mL (DUONEB) 0.5-2.5 (3) MG/3ML neb solution TAKE 1 VIAL BY NEBULIZATION  EVERY SIX HOURS AS NEEDED FOR SHORTNESS OF BREATH/ DYSPNEA OR WHEEZING  Qty: 180 mL, Refills: 9    Associated Diagnoses: Chronic obstructive pulmonary disease, unspecified COPD type (H)      ranitidine (ZANTAC) 150 MG tablet Take 1 tablet (150 mg) by mouth 2 times daily  Qty: 60 tablet, Refills: 11    Associated Diagnoses: Gastroesophageal reflux disease without esophagitis      albuterol (PROAIR HFA/PROVENTIL HFA/VENTOLIN HFA) 108 (90 BASE) MCG/ACT Inhaler Inhale 2 puffs into the lungs every 6 hours as needed for shortness of breath / dyspnea  Qty: 3 Inhaler, Refills: 1    Associated Diagnoses: Mild persistent asthma without complication      levothyroxine (SYNTHROID/LEVOTHROID) 50 MCG tablet TAKE 1 TABLET (50 MCG) BY MOUTH DAILY  Qty: 90 tablet, Refills: 2    Associated Diagnoses: Hypothyroidism, unspecified type      SYMBICORT 160-4.5 MCG/ACT Inhaler INHALE 2 PUFFS INTO THE LUNGS 2 TIMES DAILY  Qty: 10.2 g, Refills: 5    Associated Diagnoses: Mild persistent asthma without complication      probiotic CAPS  "Take 1 capsule by mouth every evening       CRANBERRY Take 475 mg by mouth 2 times daily.      diclofenac (VOLTAREN) 1 % GEL topical gel Place onto the skin 4 times daily as needed for moderate pain                   Consultations:   No consultations were requested during this admission            Discharge Exam:   Blood pressure 118/47, pulse 71, temperature 98.1  F (36.7  C), temperature source Oral, resp. rate 16, height 1.473 m (4' 10\"), weight 50.3 kg (110 lb 14.3 oz), last menstrual period 06/15/1985, SpO2 93 %, not currently breastfeeding.  GENERAL APPEARANCE: healthy, alert and no distress  EYES: conjunctiva clear, eyes grossly normal  HENT: external ears and nose normal   NECK: supple, no masses or adenopathy  RESP: lungs clear to auscultation - no rales, rhonchi or wheezes  CV: regular rate and rhythm, normal S1 S2, no S3 or S4 and no murmur, click or rub   ABDOMEN: soft, nontender, no HSM or masses and bowel sounds normal  MS: no clubbing, cyanosis; no edema  SKIN: clear without significant rashes or lesions  NEURO: Normal strength and tone, sensory exam grossly normal, mentation intact and speech normal    Unresulted Labs Ordered in the Past 30 Days of this Admission     No orders found from 2/13/2018 to 4/15/2018.          No results found for this or any previous visit (from the past 24 hour(s)).         Pending Tests at Discharge:   None         Discharge Instructions and Follow-Up:   Discharge diet: Regular   Discharge activity: Activity as tolerated   Discharge follow-up: Follow up with primary care provider in 1-2 weeks           Discharge Disposition:   Discharged to home      Attestation:  I have reviewed today's vital signs, notes, medications, labs and imaging.    Time Spent on this Encounter   I, Khalif Mcdaniel, personally saw the patient today and spent greater than 30 minutes discharging this patient.    Khalif Mcdaniel MD       "

## 2018-04-16 NOTE — PLAN OF CARE
Problem: Patient Care Overview  Goal: Plan of Care/Patient Progress Review  Discharge Planner OT   Patient plan for discharge: Home with continued assist from daughter and home care    Current status: At baseline: pt has assist with socks, showers and IADLs from daughter and HHA. Today pt up with SBA and FWW, tolerates ADL assessment well and UE exercises. States she is feeling stronger than yesterday.     Barriers to return to prior living situation: None    Recommendations for discharge: Home with assist from daughter and home care    Rationale for recommendations: Pt doing well, reports feeling stronger, has assist from family at home.        Entered by: Maye Howe 04/16/2018 9:25 AM         Occupational Therapy Discharge Summary    Reason for therapy discharge:    Discharged to home with home therapy.    Progress towards therapy goal(s). See goals on Care Plan in [facility] electronic health record for goal details.  Goals not met.  Barriers to achieving goals:   discharge on same date as initial evaluation.    Therapy recommendation(s):    Continued therapy is recommended.  Rationale/Recommendations:  Continue home therapy to increase strength/activity tolerance for ADLs. .

## 2018-04-16 NOTE — PLAN OF CARE
Problem: Patient Care Overview  Goal: Plan of Care/Patient Progress Review  Pt sat up in chair most of evening. Up with 1 assist & walker to the bathroom, steady gait. Denies dizziness. States has pain in right knee & requesting pain med. +vdg. celebrex given for knee pain & ice pack applied for comfort. Pt does get SOA with activity, lungs clear, sats on RA 95%. States nausea is better.

## 2018-04-16 NOTE — PROGRESS NOTES
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) [E22.2]  Acute chest pain [R07.9]  Long term current use of anticoagulant therapy [Z79.01]  Chronic obstructive pulmonary disease, unspecified COPD type (H) [J44.9]  Admit Date/Time: 2018  8:14 AM  Discharge Date: 18  Payor Source: Payor: UCARE / Plan: [employer] FOR [employer] / Product Type: HMO /      Readmission Assessment Measure (BEVERLY) Risk Score/category: average         Reason for Communication Hand-off Referral: Fragility     Discharge Plan:home care         Concern for non-adherence with plan of care:                           Y/N no  Discharge Needs Assessment:  Needs        Most Recent Value     Equipment Currently Used at Home grab bar, tub bench, walker, rolling            Already enrolled in Tele-monitoring program and name of program:  no  Follow-up specialty is recommended: No    Follow-up plan:  Future Appointments  Date Time Provider Department Center   7/10/2018 11:20 AM Rn, [facility]                    Key Recommendations:  Pt will dc to her dtrs home today and will resume her [facility] ([phone] Fax: [phone]). Goal is for pt to remain at home with her dtr.      April Reddy MSW, LICSW, -[phone]     AVS/Discharge Summary is the source of truth; this is a helpful guide for improved communication of patient story

## 2018-04-16 NOTE — PHARMACY-ANTICOAGULATION SERVICE
Clinical Pharmacy- Warfarin Discharge Note  This patient is currently on warfarin for the treatment of Atrial fibrillation.  INR Goal= 1.7-2.3  Expected length of therapy lifetime.    Warfarin PTA Regimen: 1.5 mg on Mon and Fri, 1 mg all other days of the week.      Anticoagulation Dose History     Recent Dosing and Labs Latest Ref Rng & Units 3/22/2018 3/29/2018 4/3/2018 4/13/2018 4/14/2018 4/15/2018 4/16/2018    Warfarin 0.5 mg - - - - - - 0.5 mg -    Warfarin 1 mg - - - - - 1 mg - -    INR 0.86 - 1.14 1.8 1.2 2.1 2.4 2.28(H) 2.36(H) 2.46(H)    INR 0.86 - 1.14 - - - - - - -        NOTE:  Patient should not receive nor take a warfarin dose today.    Vitamin K doses administered during the last 7 days: none  FFP administered during the last 7 days: none  Recommend discharging the patient on a warfarin regimen of NO warfarin today, then resume home dose of 1.5 mg Mondays and Fridays, 1 mg all other days of the week.    The patient should have an INR checked in the [facility] lab on Friday, April 20th, at the time of her MD appointment.  The anticoagulation clinic will then follow up with patient when her INR result is available with further warfarin dosing instructions.    Conor Traylor.D.

## 2018-04-16 NOTE — PLAN OF CARE
Problem: Patient Care Overview  Goal: Plan of Care/Patient Progress Review  Discharge Planner PT   Patient plan for discharge: TCU versus home with 24-7 assist from daughter  Current status: SBA  Barriers to return to prior living situation: 3 stairs to enter  Recommendations for discharge: Continue PT at home versus TCU  Rationale for recommendations: To continue strength and endurance       Entered by: Sanam Epperson 04/16/2018 12:56 PM

## 2018-04-16 NOTE — PHARMACY - DISCHARGE MEDICATION RECONCILIATION
Discharge medication review for this patient is complete. Pharmacist assisted with medication reconciliation of discharge medications with prior to admission medications.     The following changes were made to the discharge medication list based on pharmacist review:  Added:  none  Discontinued: none  Changed: none      Patient's Discharge Medication List  - medications as listed on After Visit Summary (AVS)     Review of your medicines      START taking       Dose / Directions    predniSONE 20 MG tablet   Commonly known as:  DELTASONE        Dose:  20 mg   Take 1 tablet (20 mg) by mouth daily   Quantity:  5 tablet   Refills:  0       sodium bicarbonate 650 MG tablet   Used for:  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)        Dose:  650 mg   Take 1 tablet (650 mg) by mouth 2 times daily   Quantity:  60 tablet   Refills:  1         CONTINUE these medicines which may have CHANGED, or have new prescriptions. If we are uncertain of the size of tablets/capsules you have at home, strength may be listed as something that might have changed.       Dose / Directions    metoprolol succinate 25 MG 24 hr tablet   Commonly known as:  TOPROL-XL   This may have changed:  additional instructions   Used for:  Essential hypertension, benign        TAKE ONE TABLETS BY MOUTH TWICE DAILY   Quantity:  360 tablet   Refills:  11       warfarin 1 MG tablet   Commonly known as:  COUMADIN   This may have changed:    - how much to take  - how to take this  - when to take this  - additional instructions        No dose today, then resume 1.5 mg on Mon, Fri; 1 mg all other days or as directed by the Anticoagulation ClinicTake 1 mg by mouth daily 1.5 mg on Mon, Fri; 1 mg all other days or as directed by the Anticoagulation Clinic   Quantity:  30 tablet   Refills:  0         CONTINUE these medicines which have NOT CHANGED       Dose / Directions    albuterol 108 (90 Base) MCG/ACT Inhaler   Commonly known as:  PROAIR HFA/PROVENTIL  HFA/VENTOLIN HFA   Used for:  Mild persistent asthma without complication        Dose:  2 puff   Inhale 2 puffs into the lungs every 6 hours as needed for shortness of breath / dyspnea   Quantity:  3 Inhaler   Refills:  1       celecoxib 100 MG capsule   Commonly known as:  celeBREX   Used for:  Intractable low back pain        Dose:  100 mg   Take 1 capsule (100 mg) by mouth 2 times daily as needed for moderate pain   Quantity:  60 capsule   Refills:  1       CRANBERRY        Dose:  475 mg   Take 475 mg by mouth 2 times daily.   Refills:  0       diclofenac 1 % Gel topical gel   Commonly known as:  VOLTAREN        Place onto the skin 4 times daily as needed for moderate pain   Refills:  0       ipratropium - albuterol 0.5 mg/2.5 mg/3 mL 0.5-2.5 (3) MG/3ML neb solution   Commonly known as:  DUONEB        TAKE 1 VIAL BY NEBULIZATION  EVERY SIX HOURS AS NEEDED FOR SHORTNESS OF BREATH/ DYSPNEA OR WHEEZING   Quantity:  180 mL   Refills:  9       levothyroxine 50 MCG tablet   Commonly known as:  SYNTHROID/LEVOTHROID   Used for:  Hypothyroidism, unspecified type        TAKE 1 TABLET (50 MCG) BY MOUTH DAILY   Quantity:  90 tablet   Refills:  2       montelukast 10 MG tablet   Commonly known as:  SINGULAIR        Dose:  10 mg   Take 10 mg by mouth At Bedtime   Refills:  0       ondansetron 4 MG tablet   Commonly known as:  ZOFRAN   Used for:  Nausea        Dose:  4 mg   Take 1 tablet (4 mg) by mouth every 8 hours as needed for nausea   Quantity:  15 tablet   Refills:  0       polyethylene glycol Packet   Commonly known as:  MIRALAX/GLYCOLAX        Dose:  1.5 packet   Take 1.5 packets by mouth daily   Refills:  0       probiotic Caps        Dose:  1 capsule   Take 1 capsule by mouth every evening   Refills:  0       ranitidine 150 MG tablet   Commonly known as:  ZANTAC   Used for:  Gastroesophageal reflux disease without esophagitis        Dose:  150 mg   Take 1 tablet (150 mg) by mouth 2 times daily   Quantity:  60 tablet    Refills:  11       SYMBICORT 160-4.5 MCG/ACT Inhaler   Used for:  Mild persistent asthma without complication   Generic drug:  budesonide-formoterol        INHALE 2 PUFFS INTO THE LUNGS 2 TIMES DAILY   Quantity:  10.2 g   Refills:  5       TYLENOL PO        Dose:  975 mg   Take 975 mg by mouth 3 times daily   Refills:  0            Where to get your medicines      These medications were sent to [id number] - [place], [place] - [address]  [address] [address]     Phone:  [phone]      predniSONE 20 MG tablet     sodium bicarbonate 650 MG tablet

## 2018-04-16 NOTE — PROGRESS NOTES
04/16/18 1100   Living Environment   Lives With child(grupo), adult  (Daughter)   Living Arrangements house   Home Accessibility stairs to enter home   Number of Stairs to Enter Home 3   Number of Stairs Within Home 0   Living Environment Comment Daughter is home the entire day; currently working with home PT   Self-Care   Regular Exercise yes   Activity/Exercise Type other (see comments)  (PT)   Equipment Currently Used at Home walker, rolling   Functional Level Prior   Ambulation 1-->assistive equipment   Transferring 1-->assistive equipment   Fall history within last six months no   General Information   Onset of Illness/Injury or Date of Surgery - Date 04/14/18   Patient/Family Goals Statement To go to granddaughter's wedding this upcoming weekend   Pertinent History of Current Problem (include personal factors and/or comorbidities that impact the POC) Patient experienced community acquired pneumonia resulting in impaired oxygen levels and mobility   Precautions/Limitations no known precautions/limitations   Cognitive Status Examination   Orientation orientation to person, place and time   Level of Consciousness alert   Follows Commands and Answers Questions 100% of the time   Personal Safety and Judgment intact   Memory intact   Pain Assessment   Patient Currently in Pain No   Posture    Posture Forward head position;Protracted shoulders   Transfer Skills   Transfer Transfer Skill: Sit to Stand   Transfer Skill:  Sit to Stand   Level of [place]: Sit/Stand stand-by assist   Physical Assist/Nonphysical Assist: Sit/Stand supervision   Assistive Device for Transfer: Sit/Stand other (see comments)  (Front wheeled walker)   Gait   Gait Gait Analysis   Gait Analysis   Gait Deviations Noted decreased shruthi   Impairments Contributing to Gait Deviations other (see comments)  (Oxygen: initially 90%; with deep breathing achieved 94%)   General Therapy Interventions   Planned Therapy Interventions gait training;ADL  "retraining;IADL retraining;motor coordination training;neuromuscular re-education;ROM;strengthening;stretching;transfer training   Clinical Impression   Criteria for Skilled Therapeutic Intervention yes, treatment indicated   PT Diagnosis Impaired mobility and oxygen levels    Influenced by the following impairments Weakness   Functional limitations due to impairments Impaired gait endurance; impaired community mobility   Clinical Presentation Stable/Uncomplicated   Clinical Presentation Rationale (+) motivation; at home PT; >90% oxygen with short ~50 foot walk during evaluation   Clinical Decision Making (Complexity) Low complexity   Therapy Frequency` other (see comments)  (Discharge today)   Predicted Duration of Therapy Intervention (days/wks) 1   Anticipated Equipment Needs at Discharge other (see comments)  (4 wheeled walker)   Anticipated Discharge Disposition Transitional Care Facility   Risk & Benefits of therapy have been explained No   Patient, Family & other staff in agreement with plan of care Yes   Clinical Impression Comments Ellie presents with stable oxygen levels during short, household distance walking to transfer to a TCU.  Continued PT is recommended.   [facility] i[employer] Social-PAC TM \"6 Clicks\"   2016, Trustees of [facility], under license to [employer].  All rights reserved.   6 Clicks Short Forms Basic Mobility Inpatient Short Form   [facility] AM-PAC  \"6 Clicks\" V.2 Basic Mobility Inpatient Short Form   1. Turning from your back to your side while in a flat bed without using bedrails? 4 - None   2. Moving from lying on your back to sitting on the side of a flat bed without using bedrails? 3 - A Little   3. Moving to and from a bed to a chair (including a wheelchair)? 3 - A Little   4. Standing up from a chair using your arms (e.g., wheelchair, or bedside chair)? 3 - A Little   5. To walk in hospital room? 3 - A Little   6. Climbing 3-5 steps with a railing? 3 - A Little   Basic " Mobility Raw Score (Score out of 24.Lower scores equate to lower levels of function) 19   Total Evaluation Time   Total Evaluation Time (Minutes) 25 min         Sanam Epperson, PT, DPT  Doctor of Physical Therapy #[id number]  [facility]  [phone]  [email or web address]

## 2018-04-17 NOTE — PROGRESS NOTES
Clinic Care Coordination Contact    OUTREACH    Referral Information:  Referral Source: IP Handoff    Primary Diagnosis: Frailty/Failure to thrive    Chief Complaint   Patient presents with     Clinic Care Coordination - Post Hospital     Nurse: d/c from [facility] 4/16/18        Universal Utilization:   Utilization    Last refreshed: 4/17/2018 11:20 AM:  No Show Count (past year) 0       Last refreshed: 4/17/2018 11:20 AM:  ED visits 4       Last refreshed: 4/17/2018 11:20 AM:  Hospital admissions 2          Current as of: 4/17/2018 11:20 AM             Clinical Concerns:  Current Medical Concerns:  RN RUTH spoke with patient's daughter Suyapa, she states patient is doing really well. She states patient has a hospital f/u with PCP 4/20/18. [facility]1pm. She did ask RN about fluid restriction being 1500ml per day and if that includes juice, milk, RN CC told her it includes all the fluids, she verbalized understanding. RN CC suggested going over it more with the home care nurse today, she verbalized understanding.        Current Behavioral Concerns: No concerns at this time.      Education Provided to patient: RN CC educated about Care Coordination Services, discharge instructions, medications reviewed and follow up.      Clinical Pathway Name: None  Health Maintenance Reviewed: Up to date    Medication Management:  Patient's daughter helps with medication management along with home care coming in.     Functional Status:  Mobility Status: Independent w/Device  Equipment Currently Used at Home: walker, rolling, grab bar, tub bench  Transportation:  Transportation means:: Family, Regular car     Psychosocial:  Current living arrangement:: I live in a private home with family  Type of residence:: Private home - stairs  Financial/Insurance: No concerns at this time.       Resources and Interventions:  Current Resources: Skilled Nursing, Home Health Aid,  Physicial Therapy, Occupational Therapy; Home Care    RN will mail out a letter to pt's home with RN CC contact information, explanation of CC services and patient care plan.       Advanced Care Plans/Directives on file:: Yes        Goals:   n/a      Patient/Caregiver understanding: Patient's daughter Suyapa verbalized understanding of her discharge instructions and will address her questions and concerns with the home care nurse coming out today.     Future Appointments              In 3 days Anjum Vidales MD [facility]    In 2 months Chema, Wy Device [facility]           Plan:   Patient will continue to follow treatment plan as directed and follow up with PCP with concerns ongoing.   Patient to attend her hospital f/u with PCP 4/20/18 at 11am.  RN CC to f/u in 7-10 days.    Steffanie Louis RN, [facility]  RN Care Coordinator  [facility]  Phone # [phone]  4/17/2018 12:30 PM

## 2018-04-17 NOTE — PROGRESS NOTES
"  ANTICOAGULATION FOLLOW-UP CLINIC VISIT    Patient Name:  Ellie eLigh  Date:  4/17/2018  Contact Type:  Chart Update from Hospital     SUBJECTIVE:     Patient Findings     Comments Per Dr. Khalif Mcdaniel: \"No warfarin dose today, then resume                  1.5 mg on Mondays and Fridays, 1 mg all other days of the week.                  Please have your INR drawn in the [facility] lab while at your MD                 appointment on Friday, April 20th  Further dosing will come from                 [facility] (contact number                 [phone]).  Chart update only patient was given dosing instructions from the MD.             OBJECTIVE    INR   Date Value Ref Range Status   04/16/2018 2.46 (H) 0.86 - 1.14 Final       ASSESSMENT / PLAN  INR assessment THER    Recheck INR In: 5 DAYS    INR Location Outside lab      Anticoagulation Summary as of 4/16/2018     INR goal    Prior goal 1.5-2.0   Today's INR 2.46!   Maintenance plan 1.5 mg (1 mg x 1.5) on Mon, Fri; 1 mg (1 mg x 1) all other days   Full instructions 1.5 mg on Mon, Fri; 1 mg all other days   Weekly total 8 mg   No change documented Aysha Canada, RN   Plan last modified Ruiz Meredith RN (4/13/2018)   Next INR check 4/20/2018   Priority INR   Target end date Indefinite    Indications   Atrial fibrillation with rapid ventricular response (H) (Resolved) [I48.91]  DVT (deep venous thrombosis) [I82.409]  Long term current use of anticoagulant therapy [Z79.01]         Anticoagulation Episode Summary     INR check location     Preferred lab     Send INR reminders to [facility] POOL    Comments * Actual INR goal is 1.7-2.3  Taking Celebrex PRN        Anticoagulation Care Providers     Provider Role Specialty Phone number    Josefina Gómez MD Responsible Family Practice [phone]            See the Encounter Report to view Anticoagulation Flowsheet and Dosing Calendar (Go to Encounters tab in chart " review, and find the Anticoagulation Therapy Visit)        Aysha Canada RN

## 2018-04-18 ENCOUNTER — TELEPHONE (OUTPATIENT)
Dept: FAMILY MEDICINE | Facility: CLINIC | Age: 83
End: 2018-04-18

## 2018-04-19 ENCOUNTER — ANTICOAGULATION THERAPY VISIT (OUTPATIENT)
Dept: ANTICOAGULATION | Facility: CLINIC | Age: 83
End: 2018-04-19
Payer: COMMERCIAL

## 2018-04-19 ENCOUNTER — MEDICAL CORRESPONDENCE (OUTPATIENT)
Dept: HEALTH INFORMATION MANAGEMENT | Facility: CLINIC | Age: 83
End: 2018-04-19

## 2018-04-19 DIAGNOSIS — Z79.01 LONG TERM CURRENT USE OF ANTICOAGULANT THERAPY: ICD-10-CM

## 2018-04-19 DIAGNOSIS — I82.409 DVT (DEEP VENOUS THROMBOSIS) (H): ICD-10-CM

## 2018-04-19 LAB — INR PPP: 1.8

## 2018-04-19 PROCEDURE — 99207 ZZC NO CHARGE NURSE ONLY: CPT

## 2018-04-19 NOTE — MR AVS SNAPSHOT
Ellie CARVER Lu   4/19/2018   Anticoagulation Therapy Visit    Description:  87 year old female   Provider:  Katia Landrum, RN   Department:  [facility]           INR as of 4/19/2018     Today's INR 1.8      Anticoagulation Summary as of 4/19/2018     INR goal    Prior goal 1.5-2.0   Today's INR 1.8   Full instructions 4/20: 1 mg; 4/23: 1 mg; Otherwise 1.5 mg on Mon, Fri; 1 mg all other days   Next INR check 4/24/2018    Indications   Atrial fibrillation with rapid ventricular response (H) (Resolved) [I48.91]  DVT (deep venous thrombosis) [I82.409]  Long term current use of anticoagulant therapy [Z79.01]         April 2018 Details    Sun Mon Tue Wed Thu Fri Sat     1               2               3               4               5               6               7                 8               9               10               11               12               13               14                 15               16               17               18               19      1 mg   See details      20      1 mg         21      1 mg           22      1 mg         23      1 mg         24            25               26               27               28                 29               30                     Date Details   04/19 This INR check       Date of next INR:  4/24/2018         How to take your warfarin dose     To take:  1 mg Take 1 of the 1 mg tablets.

## 2018-04-19 NOTE — PROGRESS NOTES
ANTICOAGULATION FOLLOW-UP CLINIC VISIT    Patient Name:  Ellie Leigh  Date:  4/19/2018  Contact Type:  Telephone/ [facility]    SUBJECTIVE:     Patient Findings     Positives Change in medications (Prednisone for 5 days, sodium bicarb)    Comments Recently hospitalized for shortness of breath, SIADH (syndrome of inappropriate ADH production), chronic obstructive pulmonary disease exacerbation. Patient's breathing is much better, the shortness of breath has resolved.     Patient has an appointment with PCP tomorrow, will discuss adjusting prednisone to a taper instead of abruptly stopping. Patient had 6.5mg in the previous 7 days, will adjust dose to 7mg by the next INR check on Tuesday (~7% increase), however patient's prior stable maintenance dose was 8mg/week. Homecare was already scheduled to go out next Tues/Fri.              OBJECTIVE    INR   Date Value Ref Range Status   04/19/2018 1.8  Final       ASSESSMENT / PLAN  No question data found.  Anticoagulation Summary as of 4/19/2018     INR goal    Prior goal 1.5-2.0   Today's INR 1.8   Maintenance plan 1.5 mg (1 mg x 1.5) on Mon, Fri; 1 mg (1 mg x 1) all other days   Full instructions 4/20: 1 mg; 4/23: 1 mg; Otherwise 1.5 mg on Mon, Fri; 1 mg all other days   Weekly total 8 mg   Plan last modified Ruiz Meredith RN (4/13/2018)   Next INR check 4/24/2018   Priority INR   Target end date Indefinite    Indications   Atrial fibrillation with rapid ventricular response (H) (Resolved) [I48.91]  DVT (deep venous thrombosis) [I82.409]  Long term current use of anticoagulant therapy [Z79.01]         Anticoagulation Episode Summary     INR check location     Preferred lab     Send INR reminders to [facility] POOL    Comments * Actual INR goal is 1.7-2.3  Taking Celebrex PRN // [facility]        Anticoagulation Care Providers     Provider Role Specialty Phone number    Josefina Gómez MD [facility] [phone]             See the Encounter Report to view Anticoagulation Flowsheet and Dosing Calendar (Go to Encounters tab in chart review, and find the Anticoagulation Therapy Visit)        Katia Landrum RN,  CACP

## 2018-04-19 NOTE — TELEPHONE ENCOUNTER
Form signed and sent to homecare.  Dariana [facility]  Clinic Station [place]

## 2018-04-20 ENCOUNTER — TELEPHONE (OUTPATIENT)
Dept: FAMILY MEDICINE | Facility: CLINIC | Age: 83
End: 2018-04-20

## 2018-04-20 ENCOUNTER — OFFICE VISIT (OUTPATIENT)
Dept: FAMILY MEDICINE | Facility: CLINIC | Age: 83
End: 2018-04-20
Payer: COMMERCIAL

## 2018-04-20 VITALS
RESPIRATION RATE: 20 BRPM | DIASTOLIC BLOOD PRESSURE: 58 MMHG | SYSTOLIC BLOOD PRESSURE: 118 MMHG | BODY MASS INDEX: 24.14 KG/M2 | TEMPERATURE: 97.6 F | WEIGHT: 115 LBS | HEIGHT: 58 IN | OXYGEN SATURATION: 95 % | HEART RATE: 72 BPM

## 2018-04-20 DIAGNOSIS — E87.1 HYPONATREMIA: ICD-10-CM

## 2018-04-20 DIAGNOSIS — J44.9 CHRONIC OBSTRUCTIVE PULMONARY DISEASE, UNSPECIFIED COPD TYPE (H): Primary | ICD-10-CM

## 2018-04-20 DIAGNOSIS — J44.9 CHRONIC OBSTRUCTIVE PULMONARY DISEASE, UNSPECIFIED COPD TYPE (H): ICD-10-CM

## 2018-04-20 PROCEDURE — 80048 BASIC METABOLIC PNL TOTAL CA: CPT | Performed by: FAMILY MEDICINE

## 2018-04-20 PROCEDURE — 99214 OFFICE O/P EST MOD 30 MIN: CPT | Performed by: FAMILY MEDICINE

## 2018-04-20 PROCEDURE — 36415 COLL VENOUS BLD VENIPUNCTURE: CPT | Performed by: FAMILY MEDICINE

## 2018-04-20 RX ORDER — AZITHROMYCIN 250 MG/1
TABLET, FILM COATED ORAL
Qty: 6 TABLET | Refills: 0 | Status: SHIPPED | OUTPATIENT
Start: 2018-04-20 | End: 2018-04-24

## 2018-04-20 RX ORDER — PREDNISONE 20 MG/1
20 TABLET ORAL DAILY
Qty: 5 TABLET | Refills: 0 | Status: SHIPPED | OUTPATIENT
Start: 2018-04-20 | End: 2018-04-20

## 2018-04-20 RX ORDER — PREDNISONE 20 MG/1
TABLET ORAL
Qty: 5 TABLET | Refills: 0 | Status: SHIPPED | OUTPATIENT
Start: 2018-04-20 | End: 2018-05-30

## 2018-04-20 NOTE — PATIENT INSTRUCTIONS
Continue taking singulair daily and symbicort 2 puffs twice a day.  Start using duonebs 3-4 times per day while you are feeling short of breath.  Continue taking prednisone 20 mg daily as directed on the prescription.  If cough and sputum do not improve with breathing treatments, start azithromycin.  While on azithromycin, check with the warfarin clinic to check your INR.    We rechecked your sodium level today. Continue taking sodium bicarbonate for now.    Let us know if your depression returns, there are other medications you can try.

## 2018-04-20 NOTE — PROGRESS NOTES
SUBJECTIVE:   Ellie Leigh is a 87 year old female who presents to clinic today for the following health issues:      Hospital Follow-up Visit:    Hospital/Nursing Home/IP Rehab Facility: [facility]  Date of Admission: 04/14/2018  Date of Discharge: 04/16/2018  Reason(s) for Admission: SOB and Chest pain       Nurse was at her home and heard some noise in her chest.  Chest feels tight and has SOB today.  Denies fever or chills.       Problems taking medications regularly:  None       Medication changes since discharge: None       Problems adhering to non-medication therapy:  None    Summary of hospitalization:  [facility] discharge summary reviewed  Admitted for shortness of breath, dyspnea on exertion, cough due to COPD exacerbation. No signs of pneumonia or heart failure on CXR or echocardiogram. Was discharged with 5-day course of oral prednisone and sodium bicarb tablets for management of previously diagnosed SIADH.    Diagnostic Tests/Treatments reviewed.  Follow up needed: none  Other Healthcare Providers Involved in Patient s Care:         None  Update since discharge: improved.     Shortness of breath  Symptoms of shortness of breath and cough initially improved after discharge with oral prednisone. Today, has increased sob, mild fatigue, and cough productive of green sputum. Overall feels better compared to symptoms at admission. No recent fever or chills.    Hyponatremia  Has chronic hyponatremia due to SIADH with Na in 120s since 2014. Has been on 1500 mL/day fluid restriction for past 4 months. Was prescribed sodium bicarb 650 mg twice daily at discharge due to Na of 122.     Post Discharge Medication Reconciliation: discharge medications reconciled and changed, per note/orders (see AVS).  Plan of care communicated with patient and family     Coding guidelines for this visit:  Type of Medical   Decision Making Face-to-Face Visit       within 7 Days of discharge Face-to-Face Visit  "       within 14 days of discharge   Moderate Complexity 16399 [id number]   High Complexity 17811 87643        Problem list and histories reviewed & adjusted, as indicated.  Additional history: as documented    BP Readings from Last 3 Encounters:   04/20/18 118/58   04/16/18 118/47   03/14/18 106/68    Wt Readings from Last 3 Encounters:   04/20/18 115 lb (52.2 kg)   04/16/18 110 lb 14.3 oz (50.3 kg)   03/14/18 115 lb (52.2 kg)      Labs reviewed in EPIC  Reviewed and updated as needed this visit by clinical staff  Tobacco  Allergies  Meds  Med Hx  Surg Hx  Fam Hx  Soc Hx      Reviewed and updated as needed this visit by Provider       ROS:  Constitutional: Malaise. No fever, chills  ENT: Nasal drainage. No sore throat  Respiratory: Shortness of breath, cough, sputum  Cardiovascular: No chest pain, palpitations, syncope, lower extremity edema  Gastrointestinal: No nausea, diarrhea, constipation, abdominal pain  Genitourinary: No urinary urgency or change in frequency, dysuria  Musculoskeletal: Chronic low back and knee pain, improves with celebrex.   Allergic: Doxycycline, cephalosporins, sulfa drugs, penicillins  Neurologic: No headache, dizziness, focal weakness  Psychiatric: Improved mood recently. No changes in sleep    OBJECTIVE:     /58  Pulse 72  Temp 97.6  F (36.4  C) (Tympanic)  Resp 20  Ht 4' 10\" (1.473 m)  Wt 115 lb (52.2 kg)  LMP 06/15/1985  SpO2 95%  BMI 24.04 kg/m2  Body mass index is 24.04 kg/(m^2).    Constitutional: well appearing, in no acute distress  Eyes: PERRL bilaterally, anicteric sclera, no conjunctival pallor  ENT: nasal drainage, moist mucous membranes, non-erythematous oropharynx, no lymphadenopathy  Respiratory: no respiratory distress, occasional cough, mild expiratory wheezes at lung bases, lung sounds clear to auscultation with equal air flow bilaterally, no wheezes, crackles, or rhonchi  Cardiovascular: regular rate and rhythm, normal S1 and S2, no murmurs, peripheral " pulses strong and equal bilaterally, no peripheral edema, extremities well-perfused  Skin: warm and dry  Neuro: alert and oriented to person, time, and place  Psych: appropriate mood and affect, cooperative with exam    Diagnostic Test Results:  BMP - pending    ASSESSMENT/PLAN:   1. Chronic obstructive pulmonary disease, unspecified COPD type (H)  Increased dyspnea and productive cough since discharge 4 days ago with wheezing on exam. Likely acute COPD exacerbation. No recent fever, chills, lung sounds concerning for pneumonia. Will extend prednisone therapy with addition of azithromycin if symptoms do not improve with regular inhalers and duonebs 4 times daily. If azithromycin is started, will need to monitor INR as it may interact with warfarin.  - azithromycin (ZITHROMAX) 250 MG tablet; Azithromycin 250mg, 2 pills day #1, then 1 pill daily for 4 more days.  Dispense: 6 tablet; Refill: 0    2. Hyponatremia  History of SIADH. Recheck Na level today. Continue with sodium bicarbonate until labs return. Consider switching to salt tablet.  - Basic metabolic panel    Patient instructions:  Continue taking singulair daily and symbicort 2 puffs twice a day.  Start using duonebs 3-4 times per day while you are feeling short of breath.  Continue taking prednisone 20 mg daily as directed on the prescription.  If cough and sputum do not improve with breathing treatments, start azithromycin.  While on azithromycin, check with the warfarin clinic to check your INR.  We rechecked your sodium level today. Continue taking sodium bicarbonate for now.  Let us know if your depression returns, there are other medications you can try.    Ellis Childress, MS3    I did see and examine patient with Ellis Childress today.   MARCY DAUGHERTY MD   [facility]

## 2018-04-20 NOTE — NURSING NOTE
"Chief Complaint   Patient presents with     Hospital F/U     She is here with Rosemary her daughter today.       Initial /58  Pulse 72  Temp 97.6  F (36.4  C) (Tympanic)  Resp 20  Ht 4' 10\" (1.473 m)  Wt 115 lb (52.2 kg)  LMP 06/15/1985  SpO2 95%  BMI 24.04 kg/m2 Estimated body mass index is 24.04 kg/(m^2) as calculated from the following:    Height as of this encounter: 4' 10\" (1.473 m).    Weight as of this encounter: 115 lb (52.2 kg).      Health Maintenance that is potentially due pending provider review:          Is there anyone who you would like to be able to receive your results? If yes have patient fill out JOSUE    "

## 2018-04-20 NOTE — LETTER
My Depression Action Plan  Name: Ellie Leigh   Date of Birth 9/12/1930  Date: 4/20/2018    My doctor: Anjum Vidales   My clinic: [address] [id number] [phone]          GREEN    ZONE   Good Control    What it looks like:     Things are going generally well. You have normal up s and down s. You may even feel depressed from time to time, but bad moods usually last less than a day.   What you need to do:  1. Continue to care for yourself (see self care plan)  2. Check your depression survival kit and update it as needed  3. Follow your physician s recommendations including any medication.  4. Do not stop taking medication unless you consult with your physician first.           YELLOW         ZONE Getting Worse    What it looks like:     Depression is starting to interfere with your life.     It may be hard to get out of bed; you may be starting to isolate yourself from others.    Symptoms of depression are starting to last most all day and this has happened for several days.     You may have suicidal thoughts but they are not constant.   What you need to do:     1. Call your care team, your response to treatment will improve if you keep your care team informed of your progress. Yellow periods are signs an adjustment may need to be made.     2. Continue your self-care, even if you have to fake it!    3. Talk to someone in your support network    4. Open up your depression survival kit           RED    ZONE Medical Alert - Get Help    What it looks like:     Depression is seriously interfering with your life.     You may experience these or other symptoms: You can t get out of bed most days, can t work or engage in other necessary activities, you have trouble taking care of basic hygiene, or basic responsibilities, thoughts of suicide or death that will not go away, self-injurious behavior.     What you need to do:  1. Call your care team and  request a same-day appointment. If they are not available (weekends or after hours) call your local crisis line, emergency room or 911.            Depression Self Care Plan / Survival Kit    Self-Care for Depression  Here s the deal. Your body and mind are really not as separate as most people think.  What you do and think affects how you feel and how you feel influences what you do and think. This means if you do things that people who feel good do, it will help you feel better.  Sometimes this is all it takes.  There is also a place for medication and therapy depending on how severe your depression is, so be sure to consult with your medical provider and/ or Behavioral Health Consultant if your symptoms are worsening or not improving.     In order to better manage my stress, I will:    Exercise  Get some form of exercise, every day. This will help reduce pain and release endorphins, the  feel good  chemicals in your brain. This is almost as good as taking antidepressants!  This is not the same as joining a gym and then never going! (they count on that by the way ) It can be as simple as just going for a walk or doing some gardening, anything that will get you moving.      Hygiene   Maintain good hygiene (Get out of bed in the morning, Make your bed, Brush your teeth, Take a shower, and Get dressed like you were going to work, even if you are unemployed).  If your clothes don't fit try to get ones that do.    Diet  I will strive to eat foods that are good for me, drink plenty of water, and avoid excessive sugar, caffeine, alcohol, and other mood-altering substances.  Some foods that are helpful in depression are: complex carbohydrates, B vitamins, flaxseed, fish or fish oil, fresh fruits and vegetables.    Psychotherapy  I agree to participate in Individual Therapy (if recommended).    Medication  If prescribed medications, I agree to take them.  Missing doses can result in serious side effects.  I understand that  drinking alcohol, or other illicit drug use, may cause potential side effects.  I will not stop my medication abruptly without first discussing it with my provider.    Staying Connected With Others  I will stay in touch with my friends, family members, and my primary care provider/team.    Use your imagination  Be creative.  We all have a creative side; it doesn t matter if it s oil painting, sand castles, or mud pies! This will also kick up the endorphins.    Witness Beauty  (AKA stop and smell the roses) Take a look outside, even in mid-winter. Notice colors, textures. Watch the squirrels and birds.     Service to others  Be of service to others.  There is always someone else in need.  By helping others we can  get out of ourselves  and remember the really important things.  This also provides opportunities for practicing all the other parts of the program.    Humor  Laugh and be silly!  Adjust your TV habits for less news and crime-drama and more comedy.    Control your stress  Try breathing deep, massage therapy, biofeedback, and meditation. Find time to relax each day.     My support system    Clinic Contact:  Phone number:    Contact 1:  Phone number:    Contact 2:  Phone number:    Restorationist/:  Phone number:    Therapist:  Phone number:    Local crisis center:    Phone number:    Other community support:  Phone number:

## 2018-04-20 NOTE — MR AVS SNAPSHOT
After Visit Summary   4/20/2018    Ellie Leigh    MRN: [id number]           Patient Information     Date Of Birth          9/12/1930        Visit Information        Provider Department      4/20/2018 11:00 AM Anjum Vidales MD [facility]        Today's Diagnoses     Chronic obstructive pulmonary disease, unspecified COPD type (H)    -  1    Hyponatremia          Care Instructions    Continue taking singulair daily and symbicort 2 puffs twice a day.  Start using duonebs 3-4 times per day while you are feeling short of breath.  Continue taking prednisone 20 mg daily as directed on the prescription.  If cough and sputum do not improve with breathing treatments, start azithromycin.  While on azithromycin, check with the warfarin clinic to check your INR.    We rechecked your sodium level today. Continue taking sodium bicarbonate for now.    Let us know if your depression returns, there are other medications you can try.          Follow-ups after your visit        Your next 10 appointments already scheduled     Jul 10, 2018 11:20 AM CDT   Pacemaker Check with Wy Device Rn   [facility] ([facility])    [address] [place] [address] [phone]              Who to contact     If you have questions or need follow up information about today's clinic visit or your schedule please contact [facility] directly at [phone].  Normal or non-critical lab and imaging results will be communicated to you by MyChart, letter or phone within 4 business days after the clinic has received the results. If you do not hear from us within 7 days, please contact the clinic through MyChart or phone. If you have a critical or abnormal lab result, we will notify you by phone as soon as possible.  Submit refill requests through [employer] or call your pharmacy and they will forward the refill request to us. Please allow 3 business days for  "your refill to be completed.          Additional Information About Your Visit        [employer] Information     [employer] lets you send messages to your doctor, view your test results, renew your prescriptions, schedule appointments and more. To sign up, go to [email or web address] . Click on \"Log in\" on the left side of the screen, which will take you to the Welcome page. Then click on \"Sign up Now\" on the right side of the page.     You will be asked to enter the access code listed below, as well as some personal information. Please follow the directions to create your username and password.     Your access code is: [email or web address]  Expires: 2018  3:50 PM     Your access code will  in 90 days. If you need help or a new code, please call your [place] clinic or [phone].        Care EveryWhere ID     This is your Care EveryWhere ID. This could be used by other organizations to access your [place] medical records  [id number]        Your Vitals Were     Pulse Temperature Respirations Height Last Period Pulse Oximetry    72 97.6  F (36.4  C) (Tympanic) 20 4' 10\" (1.473 m) 06/15/1985 95%    BMI (Body Mass Index)                   24.04 kg/m2            Blood Pressure from Last 3 Encounters:   18 118/58   18 118/47   18 106/68    Weight from Last 3 Encounters:   18 115 lb (52.2 kg)   18 110 lb 14.3 oz (50.3 kg)   18 115 lb (52.2 kg)              We Performed the Following     Basic metabolic panel          Today's Medication Changes          These changes are accurate as of 18 12:38 PM.  If you have any questions, ask your nurse or doctor.               Start taking these medicines.        Dose/Directions    azithromycin 250 MG tablet   Commonly known as:  ZITHROMAX   Used for:  Chronic obstructive pulmonary disease, unspecified COPD type (H)   Started by:  Anjum Vidales MD        Azithromycin 250mg, 2 pills day #1, then 1 pill daily for 4 more days.   Quantity:  " 6 tablet   Refills:  0         These medicines have changed or have updated prescriptions.        Dose/Directions    * predniSONE 20 MG tablet   Commonly known as:  DELTASONE   This may have changed:  Another medication with the same name was added. Make sure you understand how and when to take each.   Used for:  Chronic obstructive pulmonary disease, unspecified COPD type (H)   Changed by:  Anjum Vidales MD        Dose:  20 mg   Take 1 tablet (20 mg) by mouth daily   Quantity:  5 tablet   Refills:  0       * predniSONE 20 MG tablet   Commonly known as:  DELTASONE   This may have changed:  You were already taking a medication with the same name, and this prescription was added. Make sure you understand how and when to take each.   Used for:  Chronic obstructive pulmonary disease, unspecified COPD type (H)   Changed by:  Anjum Vidales MD        Dose:  20 mg   Take 1 tablet (20 mg) by mouth daily Continue the prednisone at 20mg (1 pill) daily for 3 more days, then 1/2 pill(10mg) for 4 days.   Quantity:  5 tablet   Refills:  0       * Notice:  This list has 2 medication(s) that are the same as other medications prescribed for you. Read the directions carefully, and ask your doctor or other care provider to review them with you.         Where to get your medicines      Some of these will need a paper prescription and others can be bought over the counter.  Ask your nurse if you have questions.     Bring a paper prescription for each of these medications     azithromycin 250 MG tablet    predniSONE 20 MG tablet                Primary Care Provider Office Phone # Fax #    Anjum Vidales [phone] [address] [place], [place] [address]        Equal Access to Services     [facility]: Reyna vinson Somaurilio, waaxda lujazmyne, qaybta kaalulysses feliz, verónica estrada. So [facility] [phone].    ATENCIÓN: Si shanala español, tiene a sahni disposición servicios  mikaela de asistencia lingüística. Darius tanner [phone].    We comply with applicable federal civil rights laws and [place] laws. We do not discriminate on the basis of race, color, national origin, age, disability, sex, sexual orientation, or gender identity.            Thank you!     Thank you for choosing [facility]  for your care. Our goal is always to provide you with excellent care. Hearing back from our patients is one way we can continue to improve our services. Please take a few minutes to complete the written survey that you may receive in the mail after your visit with us. Thank you!             Your Updated Medication List - Protect others around you: Learn how to safely use, store and throw away your medicines at www.disposemymeds.org.          This list is accurate as of 4/20/18 12:38 PM.  Always use your most recent med list.                   Brand Name Dispense Instructions for use Diagnosis    albuterol 108 (90 Base) MCG/ACT Inhaler    PROAIR HFA/PROVENTIL HFA/VENTOLIN HFA    3 Inhaler    Inhale 2 puffs into the lungs every 6 hours as needed for shortness of breath / dyspnea    Mild persistent asthma without complication       azithromycin 250 MG tablet    ZITHROMAX    6 tablet    Azithromycin 250mg, 2 pills day #1, then 1 pill daily for 4 more days.    Chronic obstructive pulmonary disease, unspecified COPD type (H)       celecoxib 100 MG capsule    celeBREX    60 capsule    Take 1 capsule (100 mg) by mouth 2 times daily as needed for moderate pain    Intractable low back pain       CRANBERRY      Take 475 mg by mouth 2 times daily.        diclofenac 1 % Gel topical gel    VOLTAREN     Place onto the skin 4 times daily as needed for moderate pain        ipratropium - albuterol 0.5 mg/2.5 mg/3 mL 0.5-2.5 (3) MG/3ML neb solution    DUONEB    180 mL    TAKE 1 VIAL BY NEBULIZATION  EVERY SIX HOURS AS NEEDED FOR SHORTNESS OF BREATH/ DYSPNEA OR WHEEZING    Chronic obstructive  pulmonary disease, unspecified COPD type (H)       levothyroxine 50 MCG tablet    SYNTHROID/LEVOTHROID    90 tablet    TAKE 1 TABLET (50 MCG) BY MOUTH DAILY    Hypothyroidism, unspecified type       metoprolol succinate 25 MG 24 hr tablet    TOPROL-XL    360 tablet    TAKE ONE TABLETS BY MOUTH TWICE DAILY    Essential hypertension, benign       montelukast 10 MG tablet    SINGULAIR     Take 10 mg by mouth At Bedtime        ondansetron 4 MG tablet    ZOFRAN    15 tablet    Take 1 tablet (4 mg) by mouth every 8 hours as needed for nausea    Nausea       polyethylene glycol Packet    MIRALAX/GLYCOLAX     Take 1.5 packets by mouth daily        * predniSONE 20 MG tablet    DELTASONE    5 tablet    Take 1 tablet (20 mg) by mouth daily    Chronic obstructive pulmonary disease, unspecified COPD type (H)       * predniSONE 20 MG tablet    DELTASONE    5 tablet    Take 1 tablet (20 mg) by mouth daily Continue the prednisone at 20mg (1 pill) daily for 3 more days, then 1/2 pill(10mg) for 4 days.    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 Read the  directions carefully, and ask your doctor or other care provider to review them with you.

## 2018-04-21 LAB
ANION GAP SERPL CALCULATED.3IONS-SCNC: 5 MMOL/L (ref 3–14)
BUN SERPL-MCNC: 29 MG/DL (ref 7–30)
CALCIUM SERPL-MCNC: 8.6 MG/DL (ref 8.5–10.1)
CHLORIDE SERPL-SCNC: 92 MMOL/L (ref 94–109)
CO2 SERPL-SCNC: 30 MMOL/L (ref 20–32)
CREAT SERPL-MCNC: 0.6 MG/DL (ref 0.52–1.04)
GFR SERPL CREATININE-BSD FRML MDRD: >90 ML/MIN/1.7M2
GLUCOSE SERPL-MCNC: 131 MG/DL (ref 70–99)
POTASSIUM SERPL-SCNC: 4.6 MMOL/L (ref 3.4–5.3)
SODIUM SERPL-SCNC: 127 MMOL/L (ref 133–144)

## 2018-04-22 NOTE — PROGRESS NOTES
Please call.  Sodium still low but improving.   PLAN: Continue the fluid restriction.  Continue salt.  Taking 1 gram salt tablets three times daily would be a better choice than the sodium bicarbonate with more sodium.   We could send prescription if desired #100 with 11 refills.   MARCY DAUGHERTY MD

## 2018-04-23 ENCOUNTER — TELEPHONE (OUTPATIENT)
Dept: FAMILY MEDICINE | Facility: CLINIC | Age: 83
End: 2018-04-23

## 2018-04-23 DIAGNOSIS — E87.1 HYPONATREMIA: Primary | ICD-10-CM

## 2018-04-23 RX ORDER — SODIUM CHLORIDE 1 G/1
1 TABLET ORAL 3 TIMES DAILY
Qty: 100 TABLET | Refills: 11 | Status: SHIPPED | OUTPATIENT
Start: 2018-04-23 | End: 2019-04-25

## 2018-04-23 NOTE — TELEPHONE ENCOUNTER
Notes Recorded by Anjum Vidales MD on 4/22/2018 at 11:30 AM  Please call.  Sodium still low but improving.   PLAN: Continue the fluid restriction.  Continue salt.  Taking 1 gram salt tablets three times daily would be a better choice than the sodium bicarbonate with more sodium.   We could send prescription if desired #100 with 11 refills.   ANJUM VIDALES MD    Results for orders placed or performed in visit on 04/20/18   Basic metabolic panel   Result Value Ref Range    Sodium 127 (L) 133 - 144 mmol/L    Potassium 4.6 3.4 - 5.3 mmol/L    Chloride 92 (L) 94 - 109 mmol/L    Carbon Dioxide 30 20 - 32 mmol/L    Anion Gap 5 3 - 14 mmol/L    Glucose 131 (H) 70 - 99 mg/dL    Urea Nitrogen 29 7 - 30 mg/dL    Creatinine 0.60 0.52 - 1.04 mg/dL    GFR Estimate >90 >60 mL/min/1.7m2    GFR Estimate If Black >90 >60 mL/min/1.7m2    Calcium 8.6 8.5 - 10.1 mg/dL     *Note: Due to a large number of results and/or encounters for the requested time period, some results have not been displayed. A complete set of results can be found in Results Review.

## 2018-04-23 NOTE — TELEPHONE ENCOUNTER
Daughter Suyapa informed of results and understands Dr. Todd's orders- to continue with fluid restrictions and to stop sodium bicarbonate and start salt tablets.  I checked with Dr. Vidales and she should have another basic chem repeated in 1-2 weeks.  Encounter sent to Dariana MORGAN RN and rx should be sent to [facility] in [place]. Celena Montano,[facility]  Med list updated.

## 2018-04-24 ENCOUNTER — MEDICAL CORRESPONDENCE (OUTPATIENT)
Dept: HEALTH INFORMATION MANAGEMENT | Facility: CLINIC | Age: 83
End: 2018-04-24

## 2018-04-24 ENCOUNTER — DOCUMENTATION ONLY (OUTPATIENT)
Dept: CARE COORDINATION | Facility: CLINIC | Age: 83
End: 2018-04-24

## 2018-04-24 ENCOUNTER — ANTICOAGULATION THERAPY VISIT (OUTPATIENT)
Dept: ANTICOAGULATION | Facility: CLINIC | Age: 83
End: 2018-04-24
Payer: COMMERCIAL

## 2018-04-24 DIAGNOSIS — I82.409 DVT (DEEP VENOUS THROMBOSIS) (H): ICD-10-CM

## 2018-04-24 DIAGNOSIS — Z79.01 LONG TERM CURRENT USE OF ANTICOAGULANT THERAPY: ICD-10-CM

## 2018-04-24 LAB — INR PPP: 2

## 2018-04-24 PROCEDURE — 99207 ZZC NO CHARGE NURSE ONLY: CPT

## 2018-04-24 NOTE — MR AVS SNAPSHOT
Ellie CARVER Lu   4/24/2018   Anticoagulation Therapy Visit    Description:  87 year old female   Provider:  Angelia Bo, RN   Department:  [facility]           INR as of 4/24/2018     Today's INR 2.0      Anticoagulation Summary as of 4/24/2018     INR goal    Prior goal 1.5-2.0   Today's INR 2.0   Full instructions 4/27: 1 mg; 4/30: 1 mg; Otherwise 1.5 mg on Mon, Fri; 1 mg all other days   Next INR check 5/4/2018    Indications   Atrial fibrillation with rapid ventricular response (H) (Resolved) [I48.91]  DVT (deep venous thrombosis) [I82.409]  Long term current use of anticoagulant therapy [Z79.01]         April 2018 Details    Sun Mon Tue Wed Thu Fri Sat     1               2               3               4               5               6               7                 8               9               10               11               12               13               14                 15               16               17               18               19               20               21                 22               23               24      1 mg   See details      25      1 mg         26      1 mg         27      1 mg         28      1 mg           29      1 mg         30      1 mg               Date Details   04/24 This INR check               How to take your warfarin dose     To take:  1 mg Take 1 of the 1 mg tablets.           May 2018 Details    Sun Mon Tue Wed Thu Fri Sat       1      1 mg         2      1 mg         3      1 mg         4            5                 6               7               8               9               10               11               12                 13               14               15               16               17               18               19                 20               21               22               23               24               25               26                 27               28               29               30               31                   Date Details   No additional details    Date of next INR:  5/4/2018         How to take your warfarin dose     To take:  1 mg Take 1 of the 1 mg tablets.    To take:  1.5 mg Take 1.5 of the 1 mg tablets.

## 2018-04-24 NOTE — PROGRESS NOTES
ANTICOAGULATION FOLLOW-UP CLINIC VISIT    Patient Name:  Ellie Leigh  Date:  4/24/2018  Contact Type:  Telephone/ ELIZABETH Palencia from  homecare    SUBJECTIVE:     Patient Findings     Positives Change in medications (prednisone taper, decreasing to 10 mg daily thru 4/28, then d/c; d/c'd sodium bicarb, to take sodium tabs instead)    Comments ELIZABETH Palencia from  Homecare reports pt's respiratory s/s are decreasing, pt states she is feeling better, denies other changes in diet or activity.  Med changes were made at PCP visit last Friday 4/20.    Writer will have pt continue on 1 mg warfarin daily and recheck pt's INR on Fri 5/4.           OBJECTIVE    INR   Date Value Ref Range Status   04/24/2018 2.0  Final       ASSESSMENT / PLAN  No question data found.  Anticoagulation Summary as of 4/24/2018     INR goal    Prior goal 1.5-2.0   Today's INR 2.0   Maintenance plan 1.5 mg (1 mg x 1.5) on Mon, Fri; 1 mg (1 mg x 1) all other days   Full instructions 4/27: 1 mg; 4/30: 1 mg; Otherwise 1.5 mg on Mon, Fri; 1 mg all other days   Weekly total 8 mg   Plan last modified Ruiz Meredith RN (4/13/2018)   Next INR check 5/4/2018   Priority INR   Target end date Indefinite    Indications   Atrial fibrillation with rapid ventricular response (H) (Resolved) [I48.91]  DVT (deep venous thrombosis) [I82.409]  Long term current use of anticoagulant therapy [Z79.01]         Anticoagulation Episode Summary     INR check location     Preferred lab     Send INR reminders to [facility]    Comments * Actual INR goal is 1.7-2.3  Taking Celebrex PRN // [facility]        Anticoagulation Care Providers     Provider Role Specialty Phone number    Anjum Vidales MD [facility] [phone]            See the Encounter Report to view Anticoagulation Flowsheet and Dosing Calendar (Go to Encounters tab in chart review, and find the Anticoagulation Therapy Visit)        Angelia Bo RN

## 2018-04-25 ENCOUNTER — TRANSFERRED RECORDS (OUTPATIENT)
Dept: HEALTH INFORMATION MANAGEMENT | Facility: CLINIC | Age: 83
End: 2018-04-25

## 2018-04-25 ENCOUNTER — MEDICAL CORRESPONDENCE (OUTPATIENT)
Dept: HEALTH INFORMATION MANAGEMENT | Facility: CLINIC | Age: 83
End: 2018-04-25

## 2018-04-25 NOTE — PROGRESS NOTES
[facility] now requests orders and shares plan of care/discharge summaries for some patients through [employer].  Please REPLY TO THIS MESSAGE in order to give authorization for orders when needed.  This is considered a verbal order, you will still receive a faxed copy of orders for signature.  Thank you for your assistance in improving collaboration for our patients.    ORDER    Skilled OT 2 week x 4 for ADL retraining, cognitive assessments and UB strengthening.

## 2018-04-26 ENCOUNTER — TELEPHONE (OUTPATIENT)
Dept: FAMILY MEDICINE | Facility: CLINIC | Age: 83
End: 2018-04-26

## 2018-04-27 ENCOUNTER — TELEPHONE (OUTPATIENT)
Dept: FAMILY MEDICINE | Facility: CLINIC | Age: 83
End: 2018-04-27

## 2018-04-27 ENCOUNTER — MEDICAL CORRESPONDENCE (OUTPATIENT)
Dept: HEALTH INFORMATION MANAGEMENT | Facility: CLINIC | Age: 83
End: 2018-04-27

## 2018-04-27 NOTE — TELEPHONE ENCOUNTER
Form signed faxed and sent to scanning.  Dariana [facility]  Clinic Station [place]

## 2018-04-30 ENCOUNTER — TELEPHONE (OUTPATIENT)
Dept: FAMILY MEDICINE | Facility: CLINIC | Age: 83
End: 2018-04-30

## 2018-05-02 ENCOUNTER — TELEPHONE (OUTPATIENT)
Dept: FAMILY MEDICINE | Facility: CLINIC | Age: 83
End: 2018-05-02

## 2018-05-02 ENCOUNTER — APPOINTMENT (OUTPATIENT)
Dept: CT IMAGING | Facility: CLINIC | Age: 83
End: 2018-05-02
Attending: EMERGENCY MEDICINE
Payer: COMMERCIAL

## 2018-05-02 ENCOUNTER — HOSPITAL ENCOUNTER (EMERGENCY)
Facility: CLINIC | Age: 83
Discharge: HOME | End: 2018-05-02
Attending: EMERGENCY MEDICINE | Admitting: EMERGENCY MEDICINE
Payer: COMMERCIAL

## 2018-05-02 VITALS
OXYGEN SATURATION: 96 % | DIASTOLIC BLOOD PRESSURE: 70 MMHG | SYSTOLIC BLOOD PRESSURE: 125 MMHG | WEIGHT: 115 LBS | TEMPERATURE: 97.8 F | RESPIRATION RATE: 16 BRPM | BODY MASS INDEX: 24.04 KG/M2

## 2018-05-02 DIAGNOSIS — E87.1 CHRONIC HYPONATREMIA: ICD-10-CM

## 2018-05-02 DIAGNOSIS — R42 DIZZINESS: ICD-10-CM

## 2018-05-02 LAB
ALBUMIN UR-MCNC: NEGATIVE MG/DL
ANION GAP SERPL CALCULATED.3IONS-SCNC: 3 MMOL/L (ref 3–14)
APPEARANCE UR: CLEAR
BASOPHILS # BLD AUTO: 0.1 10E9/L (ref 0–0.2)
BASOPHILS NFR BLD AUTO: 0.9 %
BILIRUB UR QL STRIP: NEGATIVE
BUN SERPL-MCNC: 16 MG/DL (ref 7–30)
CALCIUM SERPL-MCNC: 8.4 MG/DL (ref 8.5–10.1)
CHLORIDE SERPL-SCNC: 95 MMOL/L (ref 94–109)
CO2 SERPL-SCNC: 30 MMOL/L (ref 20–32)
COLOR UR AUTO: ABNORMAL
CREAT SERPL-MCNC: 0.46 MG/DL (ref 0.52–1.04)
DEPRECATED S PYO AG THROAT QL EIA: NORMAL
DIFFERENTIAL METHOD BLD: ABNORMAL
EOSINOPHIL # BLD AUTO: 0.1 10E9/L (ref 0–0.7)
EOSINOPHIL NFR BLD AUTO: 1.9 %
ERYTHROCYTE [DISTWIDTH] IN BLOOD BY AUTOMATED COUNT: 14.1 % (ref 10–15)
GFR SERPL CREATININE-BSD FRML MDRD: >90 ML/MIN/1.7M2
GLUCOSE SERPL-MCNC: 86 MG/DL (ref 70–99)
GLUCOSE UR STRIP-MCNC: NEGATIVE MG/DL
HCT VFR BLD AUTO: 33.8 % (ref 35–47)
HGB BLD-MCNC: 11.3 G/DL (ref 11.7–15.7)
HGB UR QL STRIP: NEGATIVE
IMM GRANULOCYTES # BLD: 0 10E9/L (ref 0–0.4)
IMM GRANULOCYTES NFR BLD: 0.2 %
IRON SATN MFR SERPL: 17 % (ref 15–50)
IRON SERPL-MCNC: 52 UG/DL (ref 35–180)
KETONES UR STRIP-MCNC: NEGATIVE MG/DL
LEUKOCYTE ESTERASE UR QL STRIP: ABNORMAL
LYMPHOCYTES # BLD AUTO: 1.6 10E9/L (ref 0.8–5.3)
LYMPHOCYTES NFR BLD AUTO: 27.6 %
MCH RBC QN AUTO: 28.5 PG (ref 26.5–33)
MCHC RBC AUTO-ENTMCNC: 33.4 G/DL (ref 31.5–36.5)
MCV RBC AUTO: 85 FL (ref 78–100)
MONOCYTES # BLD AUTO: 0.6 10E9/L (ref 0–1.3)
MONOCYTES NFR BLD AUTO: 10.9 %
MUCOUS THREADS #/AREA URNS LPF: PRESENT /LPF
NEUTROPHILS # BLD AUTO: 3.5 10E9/L (ref 1.6–8.3)
NEUTROPHILS NFR BLD AUTO: 58.5 %
NITRATE UR QL: NEGATIVE
PH UR STRIP: 7 PH (ref 5–7)
PLATELET # BLD AUTO: 278 10E9/L (ref 150–450)
POTASSIUM SERPL-SCNC: 4 MMOL/L (ref 3.4–5.3)
RBC # BLD AUTO: 3.97 10E12/L (ref 3.8–5.2)
RBC #/AREA URNS AUTO: 6 /HPF (ref 0–2)
SODIUM SERPL-SCNC: 128 MMOL/L (ref 133–144)
SOURCE: ABNORMAL
SP GR UR STRIP: 1.01 (ref 1–1.03)
SPECIMEN SOURCE: NORMAL
SQUAMOUS #/AREA URNS AUTO: 1 /HPF (ref 0–1)
TIBC SERPL-MCNC: 302 UG/DL (ref 240–430)
TSH SERPL DL<=0.005 MIU/L-ACNC: 1.7 MU/L (ref 0.4–4)
UROBILINOGEN UR STRIP-MCNC: 0 MG/DL (ref 0–2)
WBC # BLD AUTO: 5.9 10E9/L (ref 4–11)
WBC #/AREA URNS AUTO: 12 /HPF (ref 0–5)

## 2018-05-02 PROCEDURE — 87086 URINE CULTURE/COLONY COUNT: CPT | Performed by: EMERGENCY MEDICINE

## 2018-05-02 PROCEDURE — 99285 EMERGENCY DEPT VISIT HI MDM: CPT | Mod: 25 | Performed by: EMERGENCY MEDICINE

## 2018-05-02 PROCEDURE — 81001 URINALYSIS AUTO W/SCOPE: CPT | Performed by: EMERGENCY MEDICINE

## 2018-05-02 PROCEDURE — 87081 CULTURE SCREEN ONLY: CPT | Mod: XS | Performed by: EMERGENCY MEDICINE

## 2018-05-02 PROCEDURE — 87880 STREP A ASSAY W/OPTIC: CPT | Performed by: EMERGENCY MEDICINE

## 2018-05-02 PROCEDURE — 83540 ASSAY OF IRON: CPT | Performed by: EMERGENCY MEDICINE

## 2018-05-02 PROCEDURE — 85025 COMPLETE CBC W/AUTO DIFF WBC: CPT | Performed by: EMERGENCY MEDICINE

## 2018-05-02 PROCEDURE — 84443 ASSAY THYROID STIM HORMONE: CPT | Performed by: EMERGENCY MEDICINE

## 2018-05-02 PROCEDURE — 83550 IRON BINDING TEST: CPT | Performed by: EMERGENCY MEDICINE

## 2018-05-02 PROCEDURE — 70450 CT HEAD/BRAIN W/O DYE: CPT

## 2018-05-02 PROCEDURE — 93005 ELECTROCARDIOGRAM TRACING: CPT | Performed by: EMERGENCY MEDICINE

## 2018-05-02 PROCEDURE — 93010 ELECTROCARDIOGRAM REPORT: CPT | Mod: Z6 | Performed by: EMERGENCY MEDICINE

## 2018-05-02 PROCEDURE — 80048 BASIC METABOLIC PNL TOTAL CA: CPT | Performed by: FAMILY MEDICINE

## 2018-05-02 ASSESSMENT — ENCOUNTER SYMPTOMS
CARDIOVASCULAR NEGATIVE: 1
SORE THROAT: 1
RESPIRATORY NEGATIVE: 1
DIZZINESS: 1
WEAKNESS: 1
MUSCULOSKELETAL NEGATIVE: 1
ALLERGIC/IMMUNOLOGIC NEGATIVE: 1
ENDOCRINE NEGATIVE: 1
EYES NEGATIVE: 1
PSYCHIATRIC NEGATIVE: 1
GASTROINTESTINAL NEGATIVE: 1

## 2018-05-02 NOTE — ED AVS SNAPSHOT
[facility]    [address] [address]    Phone:  [phone]    Fax:  [phone]                                       Ellie Leigh   MRN: [id number]    Department:  [facility]   Date of Visit:  5/2/2018           After Visit Summary Signature Page     I have received my discharge instructions, and my questions have been answered. I have discussed any challenges I see with this plan with the nurse or doctor.    ..........................................................................................................................................  Patient/Patient Representative Signature      ..........................................................................................................................................  Patient Representative Print Name and Relationship to Patient    ..................................................               ................................................  Date                                            Time    ..........................................................................................................................................  Reviewed by Signature/Title    ...................................................              ..............................................  Date                                                            Time

## 2018-05-02 NOTE — ED AVS SNAPSHOT
[facility] Emergency Department    [address] [facility] [address]    Phone:  [phone]    Fax:  [phone]                                       Ellie Leigh   MRN: [id number]    Department:  [facility] Emergency Department   Date of Visit:  5/2/2018           Patient Information     Date Of Birth          9/12/1930        Your diagnoses for this visit were:     Chronic hyponatremia baseline range is 122-129    Dizziness unclear cause       You were seen by Mehul Eason MD.      Follow-up Information     Follow up with Anjum Vidales MD. Call in 1 day.    Specialty:  Family Practice    Why:  Call Dr Vidales to review how long you should be on salt tablets and how often youu should be taking it     Contact information:    [address] [place] [id number] [phone]          Follow up with [facility].    Specialty:  EMERGENCY MEDICINE    Why:  As needed, If symptoms worsen    Contact information:    [address] [place] [address] [phone]    Additional information:    The medical center is located at   [address] [place] [address] (between [address]   [facility] in [place], [place] miles north   of [place]).      Discharge References/Attachments     DIZZINESS, UNCERTAIN CAUSE (ENGLISH)    HYPONATREMIA, DISCHARGE INSTRUCTIONS (ENGLISH)    HYPONATREMIA (ENGLISH)      Your next 10 appointments already scheduled     Jul 10, 2018 11:20 AM CDT   Pacemaker Check with [facility] Rn   [facility] ([facility] [place])    [address] [place] [address] [phone]              24 Hour Appointment Hotline       To make an appointment at any [facility] clinic, call [phone] ([phone]). If you don't have a family doctor or clinic, we will help you find one. [facility] clinics are conveniently located to serve the needs of you and your family.             Review of your medicines      Our records  show that you are taking the medicines listed below. If these are incorrect, please call your family doctor or clinic.        Dose / Directions Last dose taken    albuterol 108 (90 Base) MCG/ACT Inhaler   Commonly known as:  PROAIR HFA/PROVENTIL HFA/VENTOLIN HFA   Dose:  2 puff   Quantity:  3 Inhaler        Inhale 2 puffs into the lungs every 6 hours as needed for shortness of breath / dyspnea   Refills:  1        azithromycin 250 MG tablet   Commonly known as:  ZITHROMAX   Quantity:  6 tablet        Azithromycin 250mg, 2 pills day #1, then 1 pill daily for 4 more days.   Refills:  0        celecoxib 100 MG capsule   Commonly known as:  celeBREX   Dose:  100 mg   Quantity:  60 capsule        Take 1 capsule (100 mg) by mouth 2 times daily as needed for moderate pain   Refills:  1        CRANBERRY   Dose:  475 mg        Take 475 mg by mouth 2 times daily.   Refills:  0        diclofenac 1 % Gel topical gel   Commonly known as:  VOLTAREN        Place onto the skin 4 times daily as needed for moderate pain   Refills:  0        ipratropium - albuterol 0.5 mg/2.5 mg/3 mL 0.5-2.5 (3) MG/3ML neb solution   Commonly known as:  DUONEB   Quantity:  180 mL        TAKE 1 VIAL BY NEBULIZATION  EVERY SIX HOURS AS NEEDED FOR SHORTNESS OF BREATH/ DYSPNEA OR WHEEZING   Refills:  9        levothyroxine 50 MCG tablet   Commonly known as:  SYNTHROID/LEVOTHROID   Quantity:  90 tablet        TAKE 1 TABLET (50 MCG) BY MOUTH DAILY   Refills:  2        metoprolol succinate 25 MG 24 hr tablet   Commonly known as:  TOPROL-XL   Quantity:  360 tablet        TAKE ONE TABLETS BY MOUTH TWICE DAILY   Refills:  11        montelukast 10 MG tablet   Commonly known as:  SINGULAIR   Dose:  10 mg        Take 10 mg by mouth At Bedtime   Refills:  0        ondansetron 4 MG tablet   Commonly known as:  ZOFRAN   Dose:  4 mg   Quantity:  15 tablet        Take 1 tablet (4 mg) by mouth every 8 hours as needed for nausea   Refills:  0        polyethylene glycol  Packet   Commonly known as:  MIRALAX/GLYCOLAX   Dose:  1.5 packet        Take 1.5 packets by mouth daily   Refills:  0        * predniSONE 20 MG tablet   Commonly known as:  DELTASONE   Dose:  20 mg   Quantity:  5 tablet        Take 1 tablet (20 mg) by mouth daily   Refills:  0        * predniSONE 20 MG tablet   Commonly known as:  DELTASONE   Quantity:  5 tablet        20mg (1 pill) daily for 3  days, then 1/2 pill(10mg) for 4 days then stop   Refills:  0        probiotic Caps   Dose:  1 capsule        Take 1 capsule by mouth every evening   Refills:  0        ranitidine 150 MG tablet   Commonly known as:  ZANTAC   Dose:  150 mg   Quantity:  60 tablet        Take 1 tablet (150 mg) by mouth 2 times daily   Refills:  11        sodium chloride 1 GM tablet   Dose:  1 g   Quantity:  100 tablet        Take 1 tablet (1 g) by mouth 3 times daily   Refills:  11        SYMBICORT 160-4.5 MCG/ACT Inhaler   Quantity:  10.2 g   Generic drug:  budesonide-formoterol        INHALE 2 PUFFS INTO THE LUNGS 2 TIMES DAILY   Refills:  5        TYLENOL PO   Dose:  975 mg        Take 975 mg by mouth 3 times daily   Refills:  0        * warfarin 1 MG tablet   Commonly known as:  COUMADIN   Quantity:  30 tablet        As directed by Anticoagulation Clinic (maintenance dose being re-established)   Refills:  0        * warfarin 1 MG tablet   Commonly known as:  COUMADIN   Quantity:  96 tablet        Take 1.5 mg on MF; 1 mg the rest of the week or as directed by Anticoagulation Clinic.   Refills:  0        * Notice:  This list has 4 medication(s) that are the same as other medications prescribed for you. Read the directions carefully, and ask your doctor or other care provider to review them with you.            Procedures and tests performed during your visit     Beta strep group A culture    CBC with platelets differential    EKG 12 lead    Head CT w/o contrast    Iron and iron binding capacity    Orthostatic blood pressure and pulse    Rapid  Strep Screen    TSH with free T4 reflex    UA reflex to Microscopic    Urine Culture      Orders Needing Specimen Collection     None      Pending Results     Date and Time Order Name Status Description    5/2/2018 1648 Head CT w/o contrast Preliminary     5/2/2018 1640 Urine Culture In process     5/2/2018 1550 Beta strep group A culture In process     5/2/2018 1549 Rapid Strep Screen Preliminary             Pending Culture Results     Date and Time Order Name Status Description    5/2/2018 1640 Urine Culture In process     5/2/2018 1550 Beta strep group A culture In process     5/2/2018 1549 Rapid Strep Screen Preliminary             Pending Results Instructions     If you had any lab results that were not finalized at the time of your Discharge, you can call the ED Lab Result RN at [phone]. You will be contacted by this team for any positive Lab results or changes in treatment. The nurses are available 7 days a week from 10A to 6:30P.  You can leave a message 24 hours per day and they will return your call.        Test Results From Your Hospital Stay        5/2/2018  4:23 PM      Component Results     Component Value Ref Range & Units Status    Color Urine Straw  Final    Appearance Urine Clear  Final    Glucose Urine Negative NEG^Negative mg/dL Final    Bilirubin Urine Negative NEG^Negative Final    Ketones Urine Negative NEG^Negative mg/dL Final    Specific Gravity Urine 1.011 1.003 - 1.035 Final    Blood Urine Negative NEG^Negative Final    pH Urine 7.0 5.0 - 7.0 pH Final    Protein Albumin Urine Negative NEG^Negative mg/dL Final    Urobilinogen mg/dL 0.0 0.0 - 2.0 mg/dL Final    Nitrite Urine Negative NEG^Negative Final    Leukocyte Esterase Urine Small (A) NEG^Negative Final    Source Midstream Urine  Final    RBC Urine 6 (H) 0 - 2 /HPF Final    WBC Urine 12 (H) 0 - 5 /HPF Final    Squamous Epithelial /HPF Urine 1 0 - 1 /HPF Final    Mucous Urine Present (A) NEG^Negative /LPF Final         5/2/2018   4:16 PM      Component Results     Component Value Ref Range & Units Status    WBC 5.9 4.0 - 11.0 10e9/L Final    RBC Count 3.97 3.8 - 5.2 10e12/L Final    Hemoglobin 11.3 (L) 11.7 - 15.7 g/dL Final    Hematocrit 33.8 (L) 35.0 - 47.0 % Final    MCV 85 78 - 100 fl Final    MCH 28.5 26.5 - 33.0 pg Final    MCHC 33.4 31.5 - 36.5 g/dL Final    RDW 14.1 10.0 - 15.0 % Final    Platelet Count 278 150 - 450 10e9/L Final    Diff Method Automated Method  Final    % Neutrophils 58.5 % Final    % Lymphocytes 27.6 % Final    % Monocytes 10.9 % Final    % Eosinophils 1.9 % Final    % Basophils 0.9 % Final    % Immature Granulocytes 0.2 % Final    Absolute Neutrophil 3.5 1.6 - 8.3 10e9/L Final    Absolute Lymphocytes 1.6 0.8 - 5.3 10e9/L Final    Absolute Monocytes 0.6 0.0 - 1.3 10e9/L Final    Absolute Eosinophils 0.1 0.0 - 0.7 10e9/L Final    Absolute Basophils 0.1 0.0 - 0.2 10e9/L Final    Abs Immature Granulocytes 0.0 0 - 0.4 10e9/L Final         5/2/2018  4:37 PM      Component Results     Component Value Ref Range & Units Status    TSH 1.70 0.40 - 4.00 mU/L Final         5/2/2018  5:22 PM      Component Results     Component Value Ref Range & Units Status    Iron 52 35 - 180 ug/dL Final    Iron Binding Cap 302 240 - 430 ug/dL Final    Iron Saturation Index 17 15 - 50 % Final         5/2/2018  4:16 PM      Component Results     Component    Specimen Description    Throat    Rapid Strep A Screen    NEGATIVE: No Group A streptococcal antigen detected by immunoassay, await culture report.         5/2/2018  4:21 PM         5/2/2018  4:42 PM         5/2/2018  6:29 PM      Narrative     CT SCAN OF THE HEAD WITHOUT CONTRAST   5/2/2018 6:20 PM     HISTORY: Dizziness, unsteadiness, chronic hyponatremia.      TECHNIQUE:  Axial images of the head and coronal reformations without  IV contrast material. Radiation dose for this scan was reduced using  automated exposure control, adjustment of the mA and/or kV according  to patient size, or  "iterative reconstruction technique.    COMPARISON: 2018    FINDINGS:  There is generalized atrophy of the brain.  There is low  attenuation in the white matter of the cerebral hemispheres consistent  with sequelae of small vessel ischemic disease. There is no evidence  of intracranial hemorrhage, mass, acute infarct or anomaly.     The visualized portions of the sinuses and mastoids appear normal.  There is no evidence of trauma.        Impression     IMPRESSION:   1. No acute abnormality.  2. Atrophy of the brain.  White matter changes consistent with  sequelae of small vessel ischemic disease. This is unchanged.                  Thank you for choosing [facility]       Thank you for choosing [facility] for your care. Our goal is always to provide you with excellent care. Hearing back from our patients is one way we can continue to improve our services. Please take a few minutes to complete the written survey that you may receive in the mail after you visit with us. Thank you!        [employer] Information     [facility] lets you send messages to your doctor, view your test results, renew your prescriptions, schedule appointments and more. To sign up, go to [email or web address] . Click on \"Log in\" on the left side of the screen, which will take you to the Welcome page. Then click on \"Sign up Now\" on the right side of the page.     You will be asked to enter the access code listed below, as well as some personal information. Please follow the directions to create your username and password.     Your access code is: [email or web address]  Expires: 2018  3:50 PM     Your access code will  in 90 days. If you need help or a new code, please call your [facility] clinic or [phone].        Care EveryWhere ID     This is your Care EveryWhere ID. This could be used by other organizations to access your [facility] medical records  [id number]        Equal Access to Services     BROOKS JOSÉ AH: Reyna Interiano, " bill mina, verónica capps. So [facility] [phone].    ATENCIÓN: Si habla español, tiene a sahni disposición servicios gratuitos de asistencia lingüística. Llame al [phone].    We comply with applicable federal civil rights laws and [place] laws. We do not discriminate on the basis of race, color, national origin, age, disability, sex, sexual orientation, or gender identity.            After Visit Summary       This is your record. Keep this with you and show to your community pharmacist(s) and doctor(s) at your next visit.

## 2018-05-02 NOTE — TELEPHONE ENCOUNTER
"S-(situation): dizzy, lightheaded, feeling faint, weak    B-(background): patient has low sodium level, see lab note today. Reporting symptoms have been ongoing since starting sodium pill last Saturday    A-(assessment): Patient says she feels weak, dizzy, light headed, says she feels \"funny\" and feels faint.  Denies heart palpitations, not confused, but says she has been wheezing.  Says her esophagus feels \"raw.\"    R-(recommendations): Advised patient be evaluated in ER visit based on symptomatic for hyponatremia. Daughter Suyapa is there with patient and she will take the patient there now.    AYLIN Lombardi                                                "

## 2018-05-02 NOTE — ED NOTES
Ambulated PT to restroom, upon returning to room pt was at 81% on room air. PT instructed to take slow deep breaths, PT back up to 92%. RN notified.

## 2018-05-02 NOTE — PROGRESS NOTES
Please call.  Sodium remains low but improved.  It has been like this since 2010.    PLAN: No new changes in treatment recommended.

## 2018-05-02 NOTE — TELEPHONE ENCOUNTER
Reason for Call:  Other     Detailed comments: Patient started sodium pills last week and since Saturday she has felt dizzy and tired and wind pipe feels weird - please call pt    Phone Number Patient can be reached at: Home number on file [phone] (home)    Best Time:     Can we leave a detailed message on this number? YES    Call taken on 5/2/2018 at 1:47 PM by Marilyn Harrison

## 2018-05-02 NOTE — ED PROVIDER NOTES
History     Chief Complaint   Patient presents with     Abnormal Labs     abnormal labs , had lab drawn today, called and told to come in to be checked, Sodium 128     HPI  Ellie Leigh is a 87 year old female multiple comorbidities including a history of irritable bowel syndrome, prior history of COPD and SIADH, with chronic hyponatremia since 2010.  She has a history of BPPV and hypertension hypothyroidism and recurrent urinary tract infection patient presented with concerns that she is feeling weak and faint and dizzy in the context of abnormal lab results that were obtained from clinic after her visit 24 hours earlier on May 1.  Patient reports a sore throat she reports feeling dizzy.  She denies any headache she reports no chest pain or pressure or shortness of breath she reports generalized fatigue and weakness and low energy.  No other sick contacts at home.  She lives with her daughter who was at the bedside.  No fever no urinary symptoms.  No fall or trauma and no headache.  Because she reported to her home health nurse that she was dizzy and weak and her sodium was noted to be 128 although chronically low she was referred to the emergency department for further care.    Problem List:    Patient Active Problem List    Diagnosis Date Noted     Chest pain 04/14/2018     Priority: Medium     Community acquired pneumonia of right upper lobe of lung (H) 04/14/2018     Priority: Medium     H/O TB (tuberculosis) 02/09/2018     Priority: Medium     Back pain 02/06/2018     Priority: Medium     Nausea 06/05/2017     Priority: Medium     Elevated troponin 08/21/2016     Priority: Medium     Irritable bowel syndrome without diarrhea 05/02/2016     Priority: Medium     Unresponsive episode 03/18/2016     Priority: Medium     Mild persistent asthma without complication 12/01/2015     Priority: Medium     Long term current use of anticoagulant therapy 03/02/2015     Priority: Medium     Problem list name updated by  automated process. Provider to review       Hemoptysis 01/21/2015     Priority: Medium     Syncope 01/12/2015     Priority: Medium     Advance Care Planning 01/09/2015     Priority: Medium     Advance Care Planning 7/2/2015: Receipt of ACP document:  Received: Health Care Directive which was witnessed or notarized on 1-9-15.  Document previously scanned on 2-27-15.  Validation form completed and sent to be scanned.  Code Status reflects choices in most recent ACP document.  Confirmed/documented designated decision maker(s).  Added by Verónica Green RN, System ACP Coordinator Honoring Choices   1/9/15 Copy to be scanned into chart Beulah Mathis CMA         COPD (chronic obstructive pulmonary disease) (H) 10/06/2014     Priority: Medium     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) 07/07/2014     Priority: Medium     Cause TBD.  Patient has had lung CT, will see Dr Gómez for consideration of further workup.  Also has pulmonology appt 8/18.  5/2/2018:Reviewing chart she has had hyponatremia since 2010.        Positive fecal occult blood test 07/07/2014     Priority: Medium     x2 -- first was in ED.  Patient expresses that she does not want colonoscopy.  She'll set appt to discuss with her PCP.       Benign essential HTN 02/11/2014     Priority: Medium     BPPV (benign paroxysmal positional vertigo) 11/05/2013     Priority: Medium     History of skin cancer 05/07/2013     Priority: Medium     Recurrent UTI 07/16/2012     Priority: Medium     recurrent UTI  Recent Urologic workup neg, 2005  Has Cipro at home for treatment as needed       Hypothyroidism 05/07/2012     Priority: Medium     Hyponatremia 05/07/2012     Priority: Medium     Squamous cell carcinoma in situ of skin of face 04/19/2012     Priority: Medium     SK (seborrheic keratosis) 04/19/2012     Priority: Medium     Intermittent atrial fibrillation (H) 12/01/2011     Priority: Medium     Cervical vertebral fracture (H) 11/20/2011     Priority:  Medium     Anxiety 11/15/2011     Priority: Medium     DVT (deep venous thrombosis) 10/12/2011     Priority: Medium     H/o in past, on current coumadin therapy.       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 Provider to review       Right arm weakness 07/29/2011     Priority: Medium     Ulnar neuropathy 07/29/2011     Priority: Medium     Health Care Home 04/21/2011     Priority: Medium     Conchis Robles, [phone]  FPA / [facility]              DX V65.8 REPLACED WITH [id number] HEALTH CARE HOME (04/08/2013)       Chronic constipation 03/03/2011     Priority: Medium     Osteoporosis 03/03/2011     Priority: Medium     Hyperlipidemia LDL goal <130 10/31/2010     Priority: Medium     Infectious colitis, enteritis and gastroenteritis 07/10/2010     Priority: Medium     Chronic allergic conjunctivitis 11/18/2008     Priority: Medium     Chronic seasonal allergic rhinitis 11/18/2008     Priority: Medium     Esophageal reflux 11/29/2007     Priority: Medium     PERSONAL HISTORY OF MALIGNANCY- BREAST 06/13/2007     Priority: Medium     Disease of lung 12/27/2006     Priority: Medium     Pt with chronic RUL infiltrate with pos AFB sputum  Full treatmetn for TB following ID consult in 2000's  Problem list name updated by automated process. Provider to review       Sensorineural hearing loss, asymmetrical 06/20/2005     Priority: Medium     Generalized osteoarthrosis, unspecified site 06/20/2005     Priority: Medium     Right hip and knee are the most symptomatic          Past Medical History:    Past Medical History:   Diagnosis Date     Breast cancer (H)      COPD (chronic obstructive pulmonary disease) (H)      Dust allergy      DVT (deep vein thrombosis) in pregnancy (H)      HTN (hypertension)      Hyponatremia      Hypothyroid      Intermittent atrial fibrillation (H) 12/1/2011      Loose body in joint 4/15/2011     Neck fracture (H)      Syncope 5/12/2013       Past Surgical History:    Past Surgical History:   Procedure Laterality Date     APPENDECTOMY       ARTHROSCOPY KNEE  4/15/2011    Procedure:ARTHROSCOPY KNEE; removal of loose body; Surgeon:LEY, JEFFREY DUANE; Location:[facility]     CHOLECYSTECTOMY       ESOPHAGOSCOPY, GASTROSCOPY, DUODENOSCOPY (EGD), COMBINED  6/9/2014    Procedure: COMBINED ESOPHAGOSCOPY, GASTROSCOPY, DUODENOSCOPY (EGD);  Surgeon: Gilberto Richard MD;  Location: [facility]     IMPLANT PACEMAKER  5/21/2013    Biotronik Moderl [id number] Serial#[id number]     INJECT EPIDURAL LUMBAR  3/23/2011    INJECT EPIDURAL LUMBAR performed by GENERIC ANESTHESIA PROVIDER at [facility]     JOINT REPLACEMENT, HIP RT/LT      Joint Replacement Hip Rt     MASTECTOMY, SIMPLE RT/LT/CAITLYN      Left breast - following breast ca     OTHER SURGICAL HISTORY      C1-C2 fusion after fx      SURGICAL HISTORY OF -   05/22/2001    Colonoscopy     TONSILLECTOMY         Family History:    Family History   Problem Relation Age of Onset     CEREBROVASCULAR DISEASE Mother      HEART DISEASE Mother      MI     CEREBROVASCULAR DISEASE Father      HEART DISEASE Father      MI     Respiratory Maternal Grandfather      TB     Neurologic Disorder Brother      ALS     HEART DISEASE Brother      CEREBROVASCULAR DISEASE Brother      Breast Cancer Daughter      age:49     Asthma Brother      CANCER Brother      brain and lung     CANCER Daughter      thyroid       Social History:  Marital Status:   [5]  Social History   Substance Use Topics     Smoking status: Never Smoker     Smokeless tobacco: Never Used     Alcohol use No        Medications:      Acetaminophen (TYLENOL PO)   albuterol (PROAIR HFA/PROVENTIL HFA/VENTOLIN HFA) 108 (90 BASE) MCG/ACT Inhaler   azithromycin (ZITHROMAX) 250 MG tablet   celecoxib (CELEBREX) 100 MG capsule   CRANBERRY   diclofenac (VOLTAREN) 1 % GEL topical gel   ipratropium - albuterol 0.5 mg/2.5 mg/3  mL (DUONEB) 0.5-2.5 (3) MG/3ML neb solution   levothyroxine (SYNTHROID/LEVOTHROID) 50 MCG tablet   metoprolol succinate (TOPROL-XL) 25 MG 24 hr tablet   montelukast (SINGULAIR) 10 MG tablet   ondansetron (ZOFRAN) 4 MG tablet   polyethylene glycol (MIRALAX/GLYCOLAX) Packet   predniSONE (DELTASONE) 20 MG tablet   predniSONE (DELTASONE) 20 MG tablet   probiotic CAPS   ranitidine (ZANTAC) 150 MG tablet   sodium chloride 1 GM tablet   SYMBICORT 160-4.5 MCG/ACT Inhaler   warfarin (COUMADIN) 1 MG tablet   warfarin (COUMADIN) 1 MG tablet         Review of Systems   HENT: Positive for sore throat.    Eyes: Negative.    Respiratory: Negative.    Cardiovascular: Negative.    Gastrointestinal: Negative.    Endocrine: Negative.    Genitourinary: Negative.    Musculoskeletal: Negative.    Skin: Negative.    Allergic/Immunologic: Negative.    Neurological: Positive for dizziness and weakness.   Psychiatric/Behavioral: Negative.        Physical Exam   BP: 151/76  Heart Rate: 71  Temp: 97.8  F (36.6  C)  Resp: 16  Weight: 52.2 kg (115 lb)  SpO2: 97 %  Lying Orthostatic BP: 134/78  Lying Orthostatic Pulse: 81 bpm  Sitting Orthostatic BP: 131/80  Sitting Orthostatic Pulse: 83 bpm  Standing Orthostatic BP: 131/76  Standing Orthostatic Pulse: 73 bpm      Physical Exam   Constitutional: She appears well-developed and well-nourished. No distress.   HENT:   Head: Normocephalic and atraumatic.   Eyes: Conjunctivae and EOM are normal. Pupils are equal, round, and reactive to light. Right eye exhibits no discharge. Left eye exhibits no discharge. No scleral icterus.   Neck: Normal range of motion. Neck supple. No JVD present. No tracheal deviation present. No thyromegaly present.   Cardiovascular: Exam reveals no gallop and no friction rub.    Murmur heard.  Pulmonary/Chest: Effort normal and breath sounds normal. No stridor. No respiratory distress. She has no wheezes. She has no rales. She exhibits no tenderness.   Abdominal: Soft. She  exhibits no distension and no mass. There is no tenderness. There is no rebound and no guarding.   Musculoskeletal: She exhibits no edema, tenderness or deformity.   Lymphadenopathy:     She has no cervical adenopathy.   Neurological: She is alert. She displays normal reflexes. No cranial nerve deficit. Coordination normal.   Skin: She is not diaphoretic. There is pallor.   Psychiatric: She has a normal mood and affect. Her behavior is normal. Judgment and thought content normal.       ED Course     ED Course     Procedures               EKG Interpretation:      Interpreted by Mehul Eason  Time reviewed: 15:25  Symptoms at time of EKG: Weakness, dizziness.   Rhythm: paced  Rate: Normal  Axis: Normal  Ectopy: none  Conduction: normal  ST Segments/ T Waves: Non-specific ST-T wave changes  Q Waves: nonspecific  Comparison to prior: Unchanged from 4/4/18    Clinical Impression: no acute changes        Critical Care time:  none                    ED medications: none      ED Labs and imaging:  Results for orders placed or performed during the hospital encounter of 05/02/18   Head CT w/o contrast    Narrative    CT SCAN OF THE HEAD WITHOUT CONTRAST   5/2/2018 6:20 PM     HISTORY: Dizziness, unsteadiness, chronic hyponatremia.      TECHNIQUE:  Axial images of the head and coronal reformations without  IV contrast material. Radiation dose for this scan was reduced using  automated exposure control, adjustment of the mA and/or kV according  to patient size, or iterative reconstruction technique.    COMPARISON: 1/6/2018    FINDINGS:  There is generalized atrophy of the brain.  There is low  attenuation in the white matter of the cerebral hemispheres consistent  with sequelae of small vessel ischemic disease. There is no evidence  of intracranial hemorrhage, mass, acute infarct or anomaly.     The visualized portions of the sinuses and mastoids appear normal.  There is no evidence of trauma.      Impression     IMPRESSION:   1. No acute abnormality.  2. Atrophy of the brain.  White matter changes consistent with  sequelae of small vessel ischemic disease. This is unchanged.     UA reflex to Microscopic   Result Value Ref Range    Color Urine Straw     Appearance Urine Clear     Glucose Urine Negative NEG^Negative mg/dL    Bilirubin Urine Negative NEG^Negative    Ketones Urine Negative NEG^Negative mg/dL    Specific Gravity Urine 1.011 1.003 - 1.035    Blood Urine Negative NEG^Negative    pH Urine 7.0 5.0 - 7.0 pH    Protein Albumin Urine Negative NEG^Negative mg/dL    Urobilinogen mg/dL 0.0 0.0 - 2.0 mg/dL    Nitrite Urine Negative NEG^Negative    Leukocyte Esterase Urine Small (A) NEG^Negative    Source Midstream Urine     RBC Urine 6 (H) 0 - 2 /HPF    WBC Urine 12 (H) 0 - 5 /HPF    Squamous Epithelial /HPF Urine 1 0 - 1 /HPF    Mucous Urine Present (A) NEG^Negative /LPF   CBC with platelets differential   Result Value Ref Range    WBC 5.9 4.0 - 11.0 10e9/L    RBC Count 3.97 3.8 - 5.2 10e12/L    Hemoglobin 11.3 (L) 11.7 - 15.7 g/dL    Hematocrit 33.8 (L) 35.0 - 47.0 %    MCV 85 78 - 100 fl    MCH 28.5 26.5 - 33.0 pg    MCHC 33.4 31.5 - 36.5 g/dL    RDW 14.1 10.0 - 15.0 %    Platelet Count 278 150 - 450 10e9/L    Diff Method Automated Method     % Neutrophils 58.5 %    % Lymphocytes 27.6 %    % Monocytes 10.9 %    % Eosinophils 1.9 %    % Basophils 0.9 %    % Immature Granulocytes 0.2 %    Absolute Neutrophil 3.5 1.6 - 8.3 10e9/L    Absolute Lymphocytes 1.6 0.8 - 5.3 10e9/L    Absolute Monocytes 0.6 0.0 - 1.3 10e9/L    Absolute Eosinophils 0.1 0.0 - 0.7 10e9/L    Absolute Basophils 0.1 0.0 - 0.2 10e9/L    Abs Immature Granulocytes 0.0 0 - 0.4 10e9/L   TSH with free T4 reflex   Result Value Ref Range    TSH 1.70 0.40 - 4.00 mU/L   Iron and iron binding capacity   Result Value Ref Range    Iron 52 35 - 180 ug/dL    Iron Binding Cap 302 240 - 430 ug/dL    Iron Saturation Index 17 15 - 50 %   Rapid Strep Screen   Result Value  Ref Range    Specimen Description Throat     Rapid Strep A Screen       NEGATIVE: No Group A streptococcal antigen detected by immunoassay, await culture report.     *Note: Due to a large number of results and/or encounters for the requested time period, some results have not been displayed. A complete set of results can be found in Results Review.         ED vitals:  Vitals:    05/02/18 1612 05/02/18 1630 05/02/18 1700 05/02/18 1730   BP: 142/88 133/75 134/79 173/87   Resp:  26 20 18   Temp:       TempSrc:       SpO2: 92% 95% 96%    Weight:         Prior or recent diagnostic imaging and work-up:  Echocardiogram 4/14/18  Interpretation Summary     Hyperdynamic left ventricular function  The visual ejection fraction is estimated at >70%.  The left ventricle is normal in size.  There is mild concentric left ventricular hypertrophy.  The right ventricle is normal in structure, function and size.  There is a pacemaker lead in the right ventricle.  Right ventricular systolic pressure is elevated, consistent with mild  pulmonary hypertension.  There is trace aortic regurgitation.     No significant change since 6/10/2016. Occasional episodes of paced beats and  nonsustained SVT noted.    Assessments & Plan (with Medical Decision Making)   Clinical impression: Pleasant 87-year-old female with a history of chronic hyponatremia, intermittent atrial fibrillation on chronic anticoagulation, hypertension, COPD not oxygen dependent, BPPV and hypothyroidism who was referred by her home health nurse for concern for hyponatremia with a constellation of concerns including dizziness, fatigue and weakness and low energy.  Patient has labs drawn routinely and was noted to have a sodium of 128.  She has chronic hyponatremia with her sodium ranging from 122-129.  She was recently placed on salt tablets per her daughter whom she lives with by her primary care provider.  She is also on a fluid restriction.  Because the patient complained  of feeling weak and fatigued and dizzy she was brought to the emergency department for further care.  She reports she has a sore throat.  No other sick contacts.  She denies any diarrhea.  She reports her appetite has been great.  She has been compliant with her medications.  On my exam she is a thin elderly female in no obvious distress.  She is afebrile and otherwise hemodynamically stable.  Posterior pharynx without petechiae or exudate normal oral mucosa.  No pharyngeal erythema.  Neck is supple.  She is not orthostatic.      ED course and plan:  Review of her medical records show that she has chronic hyponatremia her potassium ranges from 122-129.  In clinic yesterday she had a potassium of 128 which is consistent with her baseline range.  I reviewed patient's recent echocardiogram which was done on April 14, 2018.  The echo showed a hyperdynamic left ventricular function with trace aortic regurgitation with normal right ventricular structure and function and size and mild pulmonary hypertension.  There is no significant change from comparison from Erica 10, 2016.  Please see detailed report above.  Her last INR was 2.0 on April 24, 2018.  We discussed that her symptoms are likely chronic however I do not think is related to her chronic hyponatremia.  Because of her report of fatigue and weakness with sore throat a rapid strep screen was obtained.  I also obtained a CBC iron studies and TSH and urinalysis.  Hemoglobin is at baseline.  Negative rapid strep.  UA shows small leukocyte esterase, 12 white cells per high-power field and 6 red cells per high-power field.  patient is not reporting any urinary symptoms however given her age and complaint of weakness I elected to culture her urine but did not start her on empiric antibiotics as she is afebrile and does not report any urinary symptoms. She has a normal TSH.  Patient inquired about cause for her dizziness.  She reports she feels wobbly on her feet.  Because  of her age and concern about dizziness with chronic hyponatremia CT of her head was obtained.  CT head did not show any acute process or findings tonight.  See the interpreting radiologist report above.  Patient is discharged to home with dizziness of unclear cause.  We discussed care for her hyponatremia.  We also reviewed her normal iron studies and normal labs otherwise.  Patient and daughter are comfortable going home.  I recommended follow-up with her primary care provider to review her current salt tablet use and fluid restrictions.              Disclaimer: This note consists of symbols derived from keyboarding, dictation and/or voice recognition software. As a result, there may be errors in the script that have gone undetected. Please consider this when interpreting information found in this chart.  I have reviewed the nursing notes.    I have reviewed the findings, diagnosis, plan and need for follow up with the patient.       New Prescriptions    No medications on file       Final diagnoses:   Chronic hyponatremia - baseline range is 122-129   Dizziness - unclear cause       5/2/2018   [facility] EMERGENCY DEPARTMENT     Mehul Eason MD  05/02/18 0846

## 2018-05-03 ENCOUNTER — PATIENT OUTREACH (OUTPATIENT)
Dept: CARE COORDINATION | Facility: CLINIC | Age: 83
End: 2018-05-03

## 2018-05-03 LAB
BACTERIA SPEC CULT: NORMAL
Lab: NORMAL
SPECIMEN SOURCE: NORMAL

## 2018-05-03 NOTE — PROGRESS NOTES
"Clinic Care Coordination Contact  OUTREACH  Referral Information:  Referral Source: IP Handoff  Primary Diagnosis: Frailty/Failure to thrive  Chief Complaint   Patient presents with     Clinic Care Coordination - Follow-up     ED Follow up      Universal Utilization:   Utilization    Last refreshed: 5/3/2018  9:29 AM:  No Show Count (past year) 0       Last refreshed: 5/3/2018  9:29 AM:  ED visits 5       Last refreshed: 5/3/2018  9:29 AM:  Hospital admissions 2          Current as of: 5/3/2018  9:29 AM         Clinical Concerns:  Current Medical Concerns: Patient with ED visit for low Sodium. This is not new for her but she was having accompanying symptoms of feeling weak, dizzy, light headed, faint and just \"funny\" Also C/o sore throat. Was evaluated in the ED with no new findings. Instructed to monitor symptoms and f/u with PCP as needed. Cultures run given age and symptoms. No results yet.  Current Behavioral Concerns: None    Education Provided to patient: Reviewed fluid restriction ans taking salt pills for chronic Hyponatremia   Clinical Pathway Name: None  Health Maintenance Reviewed:Up to date    Medication Management:    Reports he daughter helps/oversees pillbox set up/med administration.    Functional Status:  Mobility Status: Independent w/Device  Equipment Currently Used at Home: walker, rolling, grab bar, tub bench  Transportation means:: Family, Regular car     Psychosocial:  Current living arrangement:: I live in a private home with family  Type of residence:: Private home - stairs  Financial/Insurance: No concerns     Resources and Interventions:  Current Resources: Skilled Nursing, Home Health Aid, Physicial Therapy, Occupational Therapy; Home Care  Advanced Care Plans/Directives on file:: Yes     Patient/Caregiver understanding: Patient (daughterSuyapa, also reports she appears to be feeling better)reports she is feeling better this AM. No dizziness and sore throat is better. Is up watching TV " at this time. She reports that she continues to have HC. Currently only RN visits. Reports that OT ended last Monday. Reports that the CM will be coming out tomorrow and she will likely be closed to all HC. She plans to f/u with PCP for worsening symptoms and to find out hen she can stop the salt pills.      Future Appointments              In 2 months Rn, [facility], [facility]         Plan: This covering RN CC will hand off to regular clinic RN CC, Steffanie Louis, for f/u when HC ends.    Jared FOGNN,RN- BC  Clinic Care Coordinator  [facility]  Phone: [phone]

## 2018-05-04 ENCOUNTER — ANTICOAGULATION THERAPY VISIT (OUTPATIENT)
Dept: ANTICOAGULATION | Facility: CLINIC | Age: 83
End: 2018-05-04
Payer: COMMERCIAL

## 2018-05-04 ENCOUNTER — MEDICAL CORRESPONDENCE (OUTPATIENT)
Dept: HEALTH INFORMATION MANAGEMENT | Facility: CLINIC | Age: 83
End: 2018-05-04

## 2018-05-04 DIAGNOSIS — Z79.01 LONG TERM CURRENT USE OF ANTICOAGULANT THERAPY: ICD-10-CM

## 2018-05-04 DIAGNOSIS — I48.0 INTERMITTENT ATRIAL FIBRILLATION (H): ICD-10-CM

## 2018-05-04 DIAGNOSIS — I82.409 DVT (DEEP VENOUS THROMBOSIS) (H): ICD-10-CM

## 2018-05-04 LAB
BACTERIA SPEC CULT: NORMAL
INR PPP: 2.4
SPECIMEN SOURCE: NORMAL

## 2018-05-04 PROCEDURE — 99207 ZZC NO CHARGE NURSE ONLY: CPT

## 2018-05-04 RX ORDER — WARFARIN SODIUM 1 MG/1
TABLET ORAL
Qty: 96 TABLET | Refills: 0 | COMMUNITY
Start: 2018-05-04 | End: 2018-09-17

## 2018-05-04 NOTE — PROGRESS NOTES
ANTICOAGULATION FOLLOW-UP CLINIC VISIT    Patient Name:  Ellie Leigh  Date:  5/4/2018  Contact Type:  Telephone/ Sadie VIERA, [facility]    SUBJECTIVE:     Patient Findings     Positives No Problem Findings    Comments Patient will d/c from [facility] next Tuesday. She will be going to [facility]. Goal range 1.7-2.3. Patient was taking 1.5 mg 2 x week with 1 mg all other days. Per home RN, family states she has been taking 1 mg daily. I adjusted maintenance dose to 7 mg weekly. Recheck next Tuesday.            OBJECTIVE    INR   Date Value Ref Range Status   05/04/2018 2.4  Final       ASSESSMENT / PLAN  INR assessment THER +/-1   Recheck INR In: 4 DAYS    INR Location [facility] INR      Anticoagulation Summary as of 5/4/2018     INR goal    Prior goal 1.5-2.0   Today's INR 2.4!   Maintenance plan 1 mg (1 mg x 1) every day   Full instructions 5/4: 1 mg; 5/7: 1 mg; Otherwise 1 mg every day   Weekly total 7 mg   Plan last modified Ruiz Meredith RN (5/4/2018)   Next INR check 5/8/2018   Priority INR   Target end date Indefinite    Indications   Atrial fibrillation with rapid ventricular response (H) (Resolved) [I48.91]  DVT (deep venous thrombosis) [I82.409]  Long term current use of anticoagulant therapy [Z79.01]         Anticoagulation Episode Summary     INR check location     Preferred lab     Send INR reminders to [facility] PHONE [facility] POOL    Comments * Actual INR goal is 1.7-2.3  Taking Celebrex PRN // [facility]        Anticoagulation Care Providers     Provider Role Specialty Phone number    Anjum Vidales MD [facility] [phone]            See the Encounter Report to view Anticoagulation Flowsheet and Dosing Calendar (Go to Encounters tab in chart review, and find the Anticoagulation Therapy Visit)        Ruiz Meredith RN

## 2018-05-04 NOTE — MR AVS SNAPSHOT
Ellie Blakeshivani   5/4/2018   Anticoagulation Therapy Visit    Description:  87 year old female   Provider:  Ruiz Meredith, RN   Department:  [facility]           INR as of 5/4/2018     Today's INR 2.4!      Anticoagulation Summary as of 5/4/2018     INR goal    Prior goal 1.5-2.0   Today's INR 2.4!   Full instructions 5/4: 1 mg; 5/7: 1 mg; Otherwise 1 mg every day   Next INR check 5/8/2018    Indications   Atrial fibrillation with rapid ventricular response (H) (Resolved) [I48.91]  DVT (deep venous thrombosis) [I82.409]  Long term current use of anticoagulant therapy [Z79.01]         May 2018 Details    Sun Mon Tue Wed Thu Fri Sat       1               2               3               4      1 mg   See details      5      1 mg           6      1 mg         7      1 mg         8            9               10               11               12                 13               14               15               16               17               18               19                 20               21               22               23               24               25               26                 27               28               29               30               31                  Date Details   05/04 This INR check       Date of next INR:  5/8/2018         How to take your warfarin dose     To take:  1 mg Take 1 of the 1 mg tablets.

## 2018-05-07 ENCOUNTER — TELEPHONE (OUTPATIENT)
Dept: FAMILY MEDICINE | Facility: CLINIC | Age: 83
End: 2018-05-07

## 2018-05-07 NOTE — TELEPHONE ENCOUNTER
"S-(situation): Pt calling with unsteadiness and GI sx.    B-(background): Seen in ED 5/2/18 - chronic hyponatremia and dizziness (unclear cause). Has hx IBS.    A-(assessment):   \"I wake up in the morning and am very unsteady on my feet.\"  \"I'm not dizzy but can't balance myself. I have a walker that I use.\"  Has not had a fall.  Reports by noon she is better. Fatigued easily with small tasks.  \"My stomach is not feeling very well.\" Not watery diarrhea, just softer stools. No blood in stool.   No vomiting, \"sometimes I feel like throwing up but I don't throw up.\"  First thing in the morning she feels like she is starving and has to eat but then shed does and feels very bloated.   Trouble sleeping at night, only sleeps three hours then is awake.  States she does not have body aches or have a fever. No headache.   Eating potatoes, meat, vegetables, fruit.    R-(recommendations):   Recommended (and reviewed examples of) bland foods and fluids.  Always use walker, go slow when changing positions.  Home care nurse will be out to visit tomorrow.   Offered to schedule ER f/u visit with Dr. Vidales for Monday 5/14/18; pt wants to wait to see what nurse tomorrow says before making an appt.    Jessica SNATO RN      "

## 2018-05-07 NOTE — TELEPHONE ENCOUNTER
Reason for call:  Patient reporting a symptom    Symptom or request: Ellie asked to talk to Dr Vidales's nurse. She says since a week ago Saturday she hasn't felt well. States she feels unstable on her feet and wobbly. She says her stomach doesn't feel good and has had some loose bowels for a few days. She says she feels hungry but after she eats, she feels bloated.    Duration (how long have symptoms been present): Over a week    Phone Number patient can be reached at:  Home number on file [phone] (home)    Best Time:  anytime    Can we leave a detailed message on this number:  YES    Call taken on 5/7/2018 at 9:54 AM by Ninoska Song

## 2018-05-08 ENCOUNTER — TELEPHONE (OUTPATIENT)
Dept: FAMILY MEDICINE | Facility: CLINIC | Age: 83
End: 2018-05-08

## 2018-05-08 ENCOUNTER — ANTICOAGULATION THERAPY VISIT (OUTPATIENT)
Dept: ANTICOAGULATION | Facility: CLINIC | Age: 83
End: 2018-05-08
Payer: COMMERCIAL

## 2018-05-08 DIAGNOSIS — I82.409 DVT (DEEP VENOUS THROMBOSIS) (H): ICD-10-CM

## 2018-05-08 DIAGNOSIS — Z79.01 LONG TERM CURRENT USE OF ANTICOAGULANT THERAPY: ICD-10-CM

## 2018-05-08 LAB — INR PPP: 2

## 2018-05-08 PROCEDURE — 99207 ZZC NO CHARGE NURSE ONLY: CPT

## 2018-05-08 NOTE — TELEPHONE ENCOUNTER
[facility] Orders signed by Dr Gómez in Dr Vidales's absence. .Faxed back and sent to scanning

## 2018-05-08 NOTE — MR AVS SNAPSHOT
Ellie Leigh   5/8/2018   Anticoagulation Therapy Visit    Description:  87 year old female   Provider:  Angelia Bo, RN   Department:  [facility]           INR as of 5/8/2018     Today's INR 2.0      Anticoagulation Summary as of 5/8/2018     INR goal    Prior goal 1.5-2.0   Today's INR 2.0   Full instructions 1 mg every day   Next INR check 5/15/2018    Indications   Atrial fibrillation with rapid ventricular response (H) (Resolved) [I48.91]  DVT (deep venous thrombosis) [I82.409]  Long term current use of anticoagulant therapy [Z79.01]         May 2018 Details    Sun Mon Tue Wed Thu Fri Sat       1               2               3               4               5                 6               7               8      1 mg   See details      9      1 mg         10      1 mg         11      1 mg         12      1 mg           13      1 mg         14      1 mg         15            16               17               18               19                 20               21               22               23               24               25               26                 27               28               29               30               31                  Date Details   05/08 This INR check       Date of next INR:  5/15/2018         How to take your warfarin dose     To take:  1 mg Take 1 of the 1 mg tablets.

## 2018-05-11 ENCOUNTER — TELEPHONE (OUTPATIENT)
Dept: FAMILY MEDICINE | Facility: CLINIC | Age: 83
End: 2018-05-11

## 2018-05-15 ENCOUNTER — HOSPITAL LABORATORY (OUTPATIENT)
Facility: OTHER | Age: 83
End: 2018-05-15

## 2018-05-15 ENCOUNTER — TELEPHONE (OUTPATIENT)
Dept: FAMILY MEDICINE | Facility: CLINIC | Age: 83
End: 2018-05-15

## 2018-05-15 ENCOUNTER — ANTICOAGULATION THERAPY VISIT (OUTPATIENT)
Dept: ANTICOAGULATION | Facility: CLINIC | Age: 83
End: 2018-05-15
Payer: COMMERCIAL

## 2018-05-15 DIAGNOSIS — I82.409 DVT (DEEP VENOUS THROMBOSIS) (H): ICD-10-CM

## 2018-05-15 DIAGNOSIS — Z79.01 LONG TERM CURRENT USE OF ANTICOAGULANT THERAPY: ICD-10-CM

## 2018-05-15 LAB — INR PPP: 1.88 (ref 0.86–1.14)

## 2018-05-15 PROCEDURE — 99207 ZZC NO CHARGE NURSE ONLY: CPT

## 2018-05-15 NOTE — MR AVS SNAPSHOT
Ellie CARVER Lu   5/15/2018   Anticoagulation Therapy Visit    Description:  87 year old female   Provider:  Ruiz Meredith, RN   [facility]           INR as of 5/15/2018     Today's INR 1.88      Anticoagulation Summary as of 5/15/2018     INR goal    Prior goal 1.5-2.0   Today's INR 1.88   Full instructions 1 mg every day   Next INR check 5/22/2018    Indications   Atrial fibrillation with rapid ventricular response (H) (Resolved) [I48.91]  DVT (deep venous thrombosis) [I82.409]  Long term current use of anticoagulant therapy [Z79.01]         May 2018 Details    Sun Mon Tue Wed Thu Fri Sat       1               2               3               4               5                 6               7               8               9               10               11               12                 13               14               15      1 mg   See details      16      1 mg         17      1 mg         18      1 mg         19      1 mg           20      1 mg         21      1 mg         22            23               24               25               26                 27               28               29               30               31                  Date Details   05/15 This INR check       Date of next INR:  5/22/2018         How to take your warfarin dose     To take:  1 mg Take 1 of the 1 mg tablets.

## 2018-05-15 NOTE — PROGRESS NOTES
ANTICOAGULATION FOLLOW-UP CLINIC VISIT    Patient Name:  Ellie Leigh  Date:  5/15/2018  Contact Type:  Fax to [facility] [phone]    SUBJECTIVE:     Patient Findings     Positives No Problem Findings    Comments .Patient will continue weekly maintenance dose. INR is therapeutic.              OBJECTIVE    INR   Date Value Ref Range Status   05/15/2018 1.88 (H) 0.86 - 1.14 Final       ASSESSMENT / PLAN  INR assessment THER    Recheck INR In: 1 WEEK    INR Location TCU      Anticoagulation Summary as of 5/15/2018     INR goal    Prior goal 1.5-2.0   Today's INR 1.88   Maintenance plan 1 mg (1 mg x 1) every day   Full instructions 1 mg every day   Weekly total 7 mg   No change documented Ruiz Meredith RN   Plan last modified Ruiz Meredith RN (5/4/2018)   Next INR check 5/22/2018   Priority INR   Target end date Indefinite    Indications   Atrial fibrillation with rapid ventricular response (H) (Resolved) [I48.91]  DVT (deep venous thrombosis) [I82.409]  Long term current use of anticoagulant therapy [Z79.01]         Anticoagulation Episode Summary     INR check location     Preferred lab     Send INR reminders to [facility] POOL    Comments * Actual INR goal is 1.7-2.3  Taking Celebrex PRN // [facility]        Anticoagulation Care Providers     Provider Role Specialty Phone number    Anjum Vidales MD [facility] [phone]            See the Encounter Report to view Anticoagulation Flowsheet and Dosing Calendar (Go to Encounters tab in chart review, and find the Anticoagulation Therapy Visit)        Ruiz Meredith RN

## 2018-05-16 ENCOUNTER — MEDICAL CORRESPONDENCE (OUTPATIENT)
Dept: HEALTH INFORMATION MANAGEMENT | Facility: CLINIC | Age: 83
End: 2018-05-16

## 2018-05-17 ENCOUNTER — TELEPHONE (OUTPATIENT)
Dept: FAMILY MEDICINE | Facility: CLINIC | Age: 83
End: 2018-05-17

## 2018-05-17 ENCOUNTER — TRANSFERRED RECORDS (OUTPATIENT)
Dept: HEALTH INFORMATION MANAGEMENT | Facility: CLINIC | Age: 83
End: 2018-05-17

## 2018-05-17 DIAGNOSIS — R05.9 COUGH: Primary | ICD-10-CM

## 2018-05-17 NOTE — TELEPHONE ENCOUNTER
Reason for call:  Patient reporting a symptom    Symptom or request: Shellie from [facility] on the [place] says Ellie is there for short term respite. She says Ellie has some fluid on her lungs. She has been listening to her lungs since yesterday and she has some fluid in the right lower lung and mid right lung area. She has been coughing and did cough up some green sputum. She has some difficulty breathing. Ellie says she can usually clear this up when she goes outside but was not able to clear this yesterday.  Her sats are perfect at 97 and she is afebrile. She wonders if Dr Vidales would like to order a CxR to make sure she doesn't have pneumonia. She says they have had other residents there with this lately    Additional comments: If Dr Vidales wants to order an X-ray, fax the order to [phone]  Order will have to either be electronically signed or hand signed by Dr Vidales    Phone Number Shellie can be reached at:  [phone]    Best Time:  She will be in meetings for the next hour but OK to just fax orders    Can we leave a detailed message on this number:  YES    Call taken on 5/17/2018 at 9:57 AM by Ninoska Song

## 2018-05-18 ENCOUNTER — TELEPHONE (OUTPATIENT)
Dept: FAMILY MEDICINE | Facility: CLINIC | Age: 83
End: 2018-05-18

## 2018-05-18 DIAGNOSIS — J18.9 PNEUMONIA: Primary | ICD-10-CM

## 2018-05-18 RX ORDER — LEVOFLOXACIN 750 MG/1
750 TABLET, FILM COATED ORAL DAILY
Qty: 5 TABLET | Refills: 0 | Status: SHIPPED | OUTPATIENT
Start: 2018-05-18 | End: 2019-02-06

## 2018-05-18 NOTE — TELEPHONE ENCOUNTER
[facility] faxed results of CxR done 5/17/18 at [facility] to Dr Vidales.   Impression:Atelectasis or developing pneumonia present within the right lung base. The lungs ar hyperinflated, consistent with underlying small airways disease.    Per Dr Vidales. Pneumonia    Plan: Levofloxin 750 mg   SIG: One PO Daily x 5 days  Continue Symbicort 2 puffs BID and Duoneb 3-4 x day.    Rx called to to [facility] in [place].      Copy of orders and xray sent to scan.

## 2018-05-18 NOTE — TELEPHONE ENCOUNTER
Results from Chest X-ray shows pnuemonia-Per Dr Brown Levofloxacin 750 mg po qd for 5 days ordered. Will notify assisted living and fax orders. Pt lives in [place]. Marium Richardson RN

## 2018-05-22 ENCOUNTER — ANTICOAGULATION THERAPY VISIT (OUTPATIENT)
Dept: ANTICOAGULATION | Facility: CLINIC | Age: 83
End: 2018-05-22
Payer: COMMERCIAL

## 2018-05-22 ENCOUNTER — HOSPITAL LABORATORY (OUTPATIENT)
Facility: OTHER | Age: 83
End: 2018-05-22

## 2018-05-22 DIAGNOSIS — I82.409 DVT (DEEP VENOUS THROMBOSIS) (H): ICD-10-CM

## 2018-05-22 DIAGNOSIS — Z79.01 LONG TERM CURRENT USE OF ANTICOAGULANT THERAPY: ICD-10-CM

## 2018-05-22 LAB — INR PPP: 1.94 (ref 0.86–1.14)

## 2018-05-22 PROCEDURE — 99207 ZZC NO CHARGE NURSE ONLY: CPT

## 2018-05-22 NOTE — PROGRESS NOTES
ANTICOAGULATION FOLLOW-UP CLINIC VISIT    Patient Name:  Ellie Leigh  Date:  5/22/2018  Contact Type:  Fax to [facility] [phone]    SUBJECTIVE:     Patient Findings     Positives No Problem Findings    Comments Patient's INR has been at the bottom range for a few weeks. I will increase her dose to 1.5 mg today. Recheck in 1 week. May need to increase maintenance dose.           OBJECTIVE    INR   Date Value Ref Range Status   05/22/2018 1.94 (H) 0.86 - 1.14 Final       ASSESSMENT / PLAN  INR assessment THER    Recheck INR In: 1 WEEK    INR Location TCU      Anticoagulation Summary as of 5/22/2018     INR goal    Prior goal 1.5-2.0   Today's INR 1.94   Maintenance plan 1 mg (1 mg x 1) every day   Full instructions 5/22: 1.5 mg; Otherwise 1 mg every day   Weekly total 7 mg   Plan last modified Ruiz Meredith RN (5/4/2018)   Next INR check 5/29/2018   Priority INR   Target end date Indefinite    Indications   Atrial fibrillation with rapid ventricular response (H) (Resolved) [I48.91]  DVT (deep venous thrombosis) [I82.409]  Long term current use of anticoagulant therapy [Z79.01]         Anticoagulation Episode Summary     INR check location     Preferred lab     Send INR reminders to [facility] POOL    Comments * Actual INR goal is 1.7-2.3  Taking Celebrex PRN // [facility]        Anticoagulation Care Providers     Provider Role Specialty Phone number    Anjum Vidales MD [facility] [phone]            See the Encounter Report to view Anticoagulation Flowsheet and Dosing Calendar (Go to Encounters tab in chart review, and find the Anticoagulation Therapy Visit)        Ruiz Meredith, RN

## 2018-05-22 NOTE — PROGRESS NOTES
This is a shade low.  Who is managing her INR readings and warfarin dosing?  She should get hooked up with anticoagulation clinic or Home Health to manage. If she needs dose adjustment now, I need to know what she is taking.

## 2018-05-22 NOTE — MR AVS SNAPSHOT
Ellie Leigh   5/22/2018   Anticoagulation Therapy Visit    Description:  87 year old female   Provider:  Ruiz Meredith, RN   Department:  [facility]           INR as of 5/22/2018     Today's INR 1.94      Anticoagulation Summary as of 5/22/2018     INR goal    Prior goal 1.5-2.0   Today's INR 1.94   Full instructions 5/22: 1.5 mg; Otherwise 1 mg every day   Next INR check 5/29/2018    Indications   Atrial fibrillation with rapid ventricular response (H) (Resolved) [I48.91]  DVT (deep venous thrombosis) [I82.409]  Long term current use of anticoagulant therapy [Z79.01]         Description     Have patient take 1.5 mg today then 1 mg all other days of the week. Recheck next Tuesday.      May 2018 Details    Sun Mon Tue Wed Thu Fri Sat       1               2               3               4               5                 6               7               8               9               10               11               12                 13               14               15               16               17               18               19                 20               21               22      1.5 mg   See details      23      1 mg         24      1 mg         25      1 mg         26      1 mg           27      1 mg         28      1 mg         29            30               31                  Date Details   05/22 This INR check       Date of next INR:  5/29/2018         How to take your warfarin dose     To take:  1 mg Take 1 of the 1 mg tablets.    To take:  1.5 mg Take 1.5 of the 1 mg tablets.

## 2018-05-23 ENCOUNTER — MEDICAL CORRESPONDENCE (OUTPATIENT)
Dept: HEALTH INFORMATION MANAGEMENT | Facility: CLINIC | Age: 83
End: 2018-05-23

## 2018-05-24 ENCOUNTER — TELEPHONE (OUTPATIENT)
Dept: FAMILY MEDICINE | Facility: CLINIC | Age: 83
End: 2018-05-24

## 2018-05-24 NOTE — TELEPHONE ENCOUNTER
Form given to pcp signed faxed and sent to scanning.  [facility]

## 2018-05-25 ENCOUNTER — TELEPHONE (OUTPATIENT)
Dept: FAMILY MEDICINE | Facility: CLINIC | Age: 83
End: 2018-05-25

## 2018-05-29 ENCOUNTER — TRANSFERRED RECORDS (OUTPATIENT)
Dept: HEALTH INFORMATION MANAGEMENT | Facility: CLINIC | Age: 83
End: 2018-05-29

## 2018-05-29 ENCOUNTER — ANTICOAGULATION THERAPY VISIT (OUTPATIENT)
Dept: ANTICOAGULATION | Facility: CLINIC | Age: 83
End: 2018-05-29
Payer: COMMERCIAL

## 2018-05-29 ENCOUNTER — RECORDS - HEALTHEAST (OUTPATIENT)
Dept: LAB | Facility: CLINIC | Age: 83
End: 2018-05-29

## 2018-05-29 DIAGNOSIS — I82.409 DVT (DEEP VENOUS THROMBOSIS) (H): ICD-10-CM

## 2018-05-29 DIAGNOSIS — Z79.01 LONG TERM CURRENT USE OF ANTICOAGULANT THERAPY: ICD-10-CM

## 2018-05-29 LAB
ALP SERPL-CCNC: 90 U/L (ref 45–120)
CALCIUM SERPL-MCNC: 9 MG/DL (ref 8.5–10.5)
INR POINT OF CARE: 2.8 (ref 0.86–1.14)
PTH-INTACT SERPL-MCNC: 63 PG/ML (ref 10–86)

## 2018-05-29 PROCEDURE — 99207 ZZC NO CHARGE NURSE ONLY: CPT

## 2018-05-29 PROCEDURE — 85610 PROTHROMBIN TIME: CPT | Mod: QW

## 2018-05-29 PROCEDURE — 36416 COLLJ CAPILLARY BLOOD SPEC: CPT

## 2018-05-29 NOTE — MR AVS SNAPSHOT
Ellie Leigh   5/29/2018 5:15 PM   Anticoagulation Therapy Visit    Description:  87 year old female   Provider:  [facility]   Department:  [facility]           INR as of 5/29/2018     Today's INR 2.8!      Anticoagulation Summary as of 5/29/2018     INR goal    Prior goal 1.5-2.0   Today's INR 2.8!   Full warfarin instructions 1 mg every day   Next INR check 6/5/2018    Indications   Atrial fibrillation with rapid ventricular response (H) (Resolved) [I48.91]  DVT (deep venous thrombosis) [I82.409]  Long term current use of anticoagulant therapy [Z79.01]         Your next Anticoagulation Clinic appointment(s)     Jun 05, 2018  2:15 PM CDT   [facility] with [facility]   [facility]    [address] [place] [address] [phone]              Contact Numbers     Please call [phone] with any problems or questions regarding your therapy.    If you need to cancel and/or reschedule your appointment please call one of the following numbers:  [facility] [phone]  [place] - [phone]  [facility] [phone]  [facility] [phone]  [place] - [phone]            May 2018 Details    Sun Mon Tue Wed Thu Fri Sat       1               2               3               4               5                 6               7               8               9               10               11               12                 13               14               15               16               17               18               19                 20               21               22               23               24               25               26                 27               28               29      1 mg   See details      30      1 mg         31      1 mg            Date Details   05/29 This INR check               How to take your warfarin dose     To take:  1 mg Take 1 of the 1 mg tablets.           June 2018 Details    Sun Mon  Tue Wed Thu Fri Sat          1      1 mg         2      1 mg           3      1 mg         4      1 mg         5            6               7               8               9                 10               11               12               13               14               15               16                 17               18               19               20               21               22               23                 24               25               26               27               28               29               30                Date Details   No additional details    Date of next INR:  6/5/2018         How to take your warfarin dose     To take:  1 mg Take 1 of the 1 mg tablets.

## 2018-05-29 NOTE — PROGRESS NOTES
ANTICOAGULATION FOLLOW-UP CLINIC VISIT    Patient Name:  Ellie Leigh  Date:  5/29/2018  Contact Type:  Face to Face/Daughter present.    SUBJECTIVE:     Patient Findings     Positives Inflammation    Comments Patient still has a bit of an URI but is no longer on antibiotics. I instructed patient to continue with maintenance dose. Patient has PCP tomorrow to f/u on URI. Recheck in 1 week.           OBJECTIVE    INR Protime   Date Value Ref Range Status   05/29/2018 2.8 (A) 0.86 - 1.14 Final       ASSESSMENT / PLAN  INR assessment SUPRA    Recheck INR In: 1 WEEK    INR Location Clinic      Anticoagulation Summary as of 5/29/2018     INR goal    Prior goal 1.5-2.0   Today's INR 2.8!   Warfarin maintenance plan 1 mg (1 mg x 1) every day   Full warfarin instructions 1 mg every day   Weekly warfarin total 7 mg   Plan last modified Ruiz Meredith RN (5/4/2018)   Next INR check 6/5/2018   Priority INR   Target end date Indefinite    Indications   Atrial fibrillation with rapid ventricular response (H) (Resolved) [I48.91]  DVT (deep venous thrombosis) [I82.409]  Long term current use of anticoagulant therapy [Z79.01]         Anticoagulation Episode Summary     INR check location     Preferred lab     Send INR reminders to [facility] POOL    Comments * Actual INR goal is 1.7-2.3  Taking Celebrex PRN         Anticoagulation Care Providers     Provider Role Specialty Phone number    Anjum Vidales MD [facility] [phone]            See the Encounter Report to view Anticoagulation Flowsheet and Dosing Calendar (Go to Encounters tab in chart review, and find the Anticoagulation Therapy Visit)        Ruiz Meredith RN

## 2018-05-30 ENCOUNTER — OFFICE VISIT (OUTPATIENT)
Dept: FAMILY MEDICINE | Facility: CLINIC | Age: 83
End: 2018-05-30
Payer: COMMERCIAL

## 2018-05-30 VITALS
HEIGHT: 58 IN | SYSTOLIC BLOOD PRESSURE: 110 MMHG | OXYGEN SATURATION: 95 % | DIASTOLIC BLOOD PRESSURE: 54 MMHG | HEART RATE: 70 BPM | RESPIRATION RATE: 18 BRPM | WEIGHT: 116 LBS | TEMPERATURE: 97.2 F | BODY MASS INDEX: 24.35 KG/M2

## 2018-05-30 DIAGNOSIS — J44.9 CHRONIC OBSTRUCTIVE PULMONARY DISEASE, UNSPECIFIED COPD TYPE (H): ICD-10-CM

## 2018-05-30 DIAGNOSIS — J45.30 MILD PERSISTENT ASTHMA WITHOUT COMPLICATION: ICD-10-CM

## 2018-05-30 DIAGNOSIS — E87.1 HYPONATREMIA: ICD-10-CM

## 2018-05-30 DIAGNOSIS — I10 ESSENTIAL HYPERTENSION, BENIGN: ICD-10-CM

## 2018-05-30 DIAGNOSIS — J18.9 PNEUMONIA DUE TO INFECTIOUS ORGANISM, UNSPECIFIED LATERALITY, UNSPECIFIED PART OF LUNG: Primary | ICD-10-CM

## 2018-05-30 LAB
25(OH)D3 SERPL-MCNC: 32.7 NG/ML (ref 30–80)
ANION GAP SERPL CALCULATED.3IONS-SCNC: 6 MMOL/L (ref 3–14)
BUN SERPL-MCNC: 21 MG/DL (ref 7–30)
CALCIUM SERPL-MCNC: 8.4 MG/DL (ref 8.5–10.1)
CHLORIDE SERPL-SCNC: 96 MMOL/L (ref 94–109)
CO2 SERPL-SCNC: 28 MMOL/L (ref 20–32)
CREAT SERPL-MCNC: 0.54 MG/DL (ref 0.52–1.04)
GFR SERPL CREATININE-BSD FRML MDRD: >90 ML/MIN/1.7M2
GLUCOSE SERPL-MCNC: 118 MG/DL (ref 70–99)
POTASSIUM SERPL-SCNC: 4.5 MMOL/L (ref 3.4–5.3)
SODIUM SERPL-SCNC: 130 MMOL/L (ref 133–144)

## 2018-05-30 PROCEDURE — 36415 COLL VENOUS BLD VENIPUNCTURE: CPT | Performed by: FAMILY MEDICINE

## 2018-05-30 PROCEDURE — 99214 OFFICE O/P EST MOD 30 MIN: CPT | Performed by: FAMILY MEDICINE

## 2018-05-30 PROCEDURE — 80048 BASIC METABOLIC PNL TOTAL CA: CPT | Performed by: FAMILY MEDICINE

## 2018-05-30 RX ORDER — METOPROLOL SUCCINATE 50 MG/1
50 TABLET, EXTENDED RELEASE ORAL 2 TIMES DAILY
Qty: 90 TABLET | Refills: 3 | COMMUNITY
Start: 2018-05-30 | End: 2018-06-18

## 2018-05-30 RX ORDER — BUDESONIDE AND FORMOTEROL FUMARATE DIHYDRATE 160; 4.5 UG/1; UG/1
AEROSOL RESPIRATORY (INHALATION)
Qty: 10.2 G | Refills: 4 | Status: SHIPPED | OUTPATIENT
Start: 2018-05-30 | End: 2018-11-02

## 2018-05-30 ASSESSMENT — PAIN SCALES - GENERAL: PAINLEVEL: NO PAIN (0)

## 2018-05-30 NOTE — MR AVS SNAPSHOT
After Visit Summary   5/30/2018    Ellie Leigh    MRN: [id number]           Patient Information     Date Of Birth          9/12/1930        Visit Information        Provider Department      5/30/2018 1:40 PM Anjum Vidales MD [facility]        Today's Diagnoses     Pneumonia due to infectious organism, unspecified laterality, unspecified part of lung    -  1    Hyponatremia        Chronic obstructive pulmonary disease, unspecified COPD type (H)          Care Instructions    ASSESSMENT:   (J18.9) Pneumonia due to infectious organism, unspecified laterality, unspecified part of lung  (primary encounter diagnosis)  Comment: dong better.  Some cough and breathing not back to baseline.   Plan:   Increase Duonebs to four times daily.   Continue the Symbicort 2 puffs twice daily as you have been.   albuteral nebs every 4 hours as needed.   If breathing not continuing to improve, we could add prednisone for a few days another anti-inflammatory medication.     (E87.1) Hyponatremia  Comment: SIADH  Plan: REcheck today.    I think you will need to stay on the salt tablets permanently.  Currently on 1 pill three times daily.   Also limit fluids which also helps for the low sodium.     (J44.9) Chronic obstructive pulmonary disease, unspecified COPD type (H)    Handicapped parking certificate form signed today-6 year for use of walker.           Follow-ups after your visit        Your next 10 appointments already scheduled     Jun 05, 2018  2:15 PM CDT   Anticoagulation Visit with [place] ANTI COAG   [facility]    [address] [address]   [phone]            Jun 28, 2018  2:00 PM CDT   DX HIP/PELVIS/SPINE with JORGE1   [facility] ([facility])    [address] [address]   [phone]           Please do not take any of the following 24 hours prior to the day of your exam: vitamins,  "calcium tablets, antacids.  If possible, please wear clothes without metal (snaps, zippers). A sweatsuit works well.            Jul 10, 2018 11:20 AM CDT   Pacemaker Check with Wy Device Rn   [facility] ([facility])    [address] [place] [address]   [phone]              Future tests that were ordered for you today     Open Future Orders        Priority Expected Expires Ordered    DX Hip/Pelvis/Spine Routine  5/29/2019 5/29/2018            Who to contact     If you have questions or need follow up information about today's clinic visit or your schedule please contact [facility] directly at [phone].  Normal or non-critical lab and imaging results will be communicated to you by MyChart, letter or phone within 4 business days after the clinic has received the results. If you do not hear from us within 7 days, please contact the clinic through MyChart or phone. If you have a critical or abnormal lab result, we will notify you by phone as soon as possible.  Submit refill requests through [employer] or call your pharmacy and they will forward the refill request to us. Please allow 3 business days for your refill to be completed.          Additional Information About Your Visit        Care EveryWhere ID     This is your Care EveryWhere ID. This could be used by other organizations to access your [facility] medical records  [id number]        Your Vitals Were     Pulse Temperature Respirations Height Last Period Pulse Oximetry    70 97.2  F (36.2  C) 18 4' 10\" (1.473 m) 06/15/1985 95%    BMI (Body Mass Index)                   24.24 kg/m2            Blood Pressure from Last 3 Encounters:   05/30/18 110/54   05/02/18 125/70   04/20/18 118/58    Weight from Last 3 Encounters:   05/30/18 116 lb (52.6 kg)   05/02/18 115 lb (52.2 kg)   04/20/18 115 lb (52.2 kg)              We Performed the Following     Asthma Action Plan (AAP)     Basic metabolic panel  "         Today's Medication Changes          These changes are accurate as of 5/30/18  2:32 PM.  If you have any questions, ask your nurse or doctor.               These medicines have changed or have updated prescriptions.        Dose/Directions    SYMBICORT 160-4.5 MCG/ACT Inhaler   This may have changed:  See the new instructions.   Used for:  Mild persistent asthma without complication   Generic drug:  budesonide-formoterol   Changed by:  Yanci Houston PA-C        INHALE TWO PUFFS INTO THE LUNGS TWICE DAILY   Quantity:  10.2 g   Refills:  4            Where to get your medicines      These medications were sent to [id number] - [place], [place] - [address]  [address] [address]     Phone:  [phone]     SYMBICORT 160-4.5 MCG/ACT Inhaler                Primary Care Provider Office Phone # Fax #    Anjum Vidales [phone] [address] [address]        Equal Access to Services     [facility]: Hadii aad ku hadasho Soomaali, waaxda luqadaha, qaybta kaalmada adeegyada, waxay idiin hayjose daniel bedoya . So [facility] [phone].    ATENCIÓN: Si habla español, tiene a sahni disposición servicios gratuitos de asistencia lingüística. Llame al [phone].    We comply with applicable federal civil rights laws and [place] laws. We do not discriminate on the basis of race, color, national origin, age, disability, sex, sexual orientation, or gender identity.            Thank you!     Thank you for choosing [facility]  for your care. Our goal is always to provide you with excellent care. Hearing back from our patients is one way we can continue to improve our services. Please take a few minutes to complete the written survey that you may receive in the mail after your visit with us. Thank you!             Your Updated Medication List - Protect others around you: Learn how to safely use, store and  throw away your medicines at www.disposemymeds.org.          This list is accurate as of 5/30/18  2:32 PM.  Always use your most recent med list.                   Brand Name Dispense Instructions for use Diagnosis    albuterol 108 (90 Base) MCG/ACT Inhaler    PROAIR HFA/PROVENTIL HFA/VENTOLIN HFA    3 Inhaler    Inhale 2 puffs into the lungs every 6 hours as needed for shortness of breath / dyspnea    Mild persistent asthma without complication       CRANBERRY      Take 475 mg by mouth 2 times daily.        diclofenac 1 % Gel topical gel    VOLTAREN     Place onto the skin 4 times daily as needed for moderate pain        ipratropium - albuterol 0.5 mg/2.5 mg/3 mL 0.5-2.5 (3) MG/3ML neb solution    DUONEB    180 mL    TAKE 1 VIAL BY NEBULIZATION  EVERY SIX HOURS AS NEEDED FOR SHORTNESS OF BREATH/ DYSPNEA OR WHEEZING    Chronic obstructive pulmonary disease, unspecified COPD type (H)       levothyroxine 50 MCG tablet    SYNTHROID/LEVOTHROID    90 tablet    TAKE 1 TABLET (50 MCG) BY MOUTH DAILY    Hypothyroidism, unspecified type       metoprolol succinate 25 MG 24 hr tablet    TOPROL-XL    360 tablet    TAKE ONE TABLETS BY MOUTH TWICE DAILY    Essential hypertension, benign       montelukast 10 MG tablet    SINGULAIR     Take 10 mg by mouth At Bedtime        polyethylene glycol Packet    MIRALAX/GLYCOLAX     Take 1.5 packets by mouth daily        probiotic Caps      Take 1 capsule by mouth every evening        ranitidine 150 MG tablet    ZANTAC    60 tablet    Take 1 tablet (150 mg) by mouth 2 times daily    Gastroesophageal reflux disease without esophagitis       sodium chloride 1 GM tablet     100 tablet    Take 1 tablet (1 g) by mouth 3 times daily    Hyponatremia       SYMBICORT 160-4.5 MCG/ACT Inhaler   Generic drug:  budesonide-formoterol     10.2 g    INHALE TWO PUFFS INTO THE LUNGS TWICE DAILY    Mild persistent asthma without complication       TYLENOL PO      Take 975 mg by mouth 3 times daily         warfarin 1 MG tablet    COUMADIN    96 tablet    1 mg (1 mg x 1) every day or as directed by Anticoagulation Clinic.    Intermittent atrial fibrillation (H)

## 2018-05-30 NOTE — TELEPHONE ENCOUNTER
"Requested Prescriptions   Pending Prescriptions Disp Refills     SYMBICORT 160-4.5 MCG/ACT Inhaler [Pharmacy Med Name: Symbicort Inhalation Aerosol 160-4.5 MCG/ACT] 10.2 g 4     Sig: INHALE TWO PUFFS INTO THE LUNGS TWICE DAILY    Inhaled Steroids Protocol Failed    5/30/2018  9:22 AM       Failed - Asthma control assessment score within normal limits in last 6 months    Please review ACT score.          Passed - Patient is age 12 or older       Passed - Recent (6 mo) or future (30 days) visit within the authorizing provider's specialty    Patient had office visit in the last 6 months or has a visit in the next 30 days with authorizing provider or within the authorizing provider's specialty.  See \"Patient Info\" tab in inbasket, or \"Choose Columns\" in Meds & Orders section of the refill encounter.            SYMBICORT 160-4.5 MCG/ACT Inhaler  Last Written Prescription Date:  04/24/2017  Last Fill Quantity: 10.2g,  # refills: 5   Last office visit: 4/20/2018 with prescribing provider:  CAREY Vidales   Future Office Visit:   Next 5 appointments (look out 90 days)     May 30, 2018  1:40 PM CDT   SHORT with Anjum Vidales MD   [facility]    [address] [place] [address] [phone]                 ACT Total Scores 1/13/2017 6/27/2017 12/11/2017   ACT TOTAL SCORE - - -   ASTHMA ER VISITS - - -   ASTHMA HOSPITALIZATIONS - - -   ACT TOTAL SCORE (Goal Greater than or Equal to 20) 20 19 18   In the past 12 months, how many times did you visit the emergency room for your asthma without being admitted to the hospital? 0 1 0   In the past 12 months, how many times were you hospitalized overnight because of your asthma? 0 0 0     Cleo Ambrocio RT (R) (M)    "

## 2018-05-30 NOTE — PATIENT INSTRUCTIONS
ASSESSMENT:   (J18.9) Pneumonia due to infectious organism, unspecified laterality, unspecified part of lung  (primary encounter diagnosis)  Comment: dong better.  Some cough and breathing not back to baseline.   Plan:   Increase Duonebs to four times daily.   Continue the Symbicort 2 puffs twice daily as you have been.   albuteral nebs every 4 hours as needed.   If breathing not continuing to improve, we could add prednisone for a few days another anti-inflammatory medication.     (E87.1) Hyponatremia  Comment: SIADH  Plan: REcheck today.    I think you will need to stay on the salt tablets permanently.  Currently on 1 pill three times daily.   Also limit fluids which also helps for the low sodium.     (J44.9) Chronic obstructive pulmonary disease, unspecified COPD type (H)    Handicapped parking certificate form signed today-6 year for use of walker.

## 2018-05-30 NOTE — NURSING NOTE
"Chief Complaint   Patient presents with     Cough       Initial /54  Pulse 70  Temp 97.2  F (36.2  C)  Resp 18  Ht 4' 10\" (1.473 m)  Wt 116 lb (52.6 kg)  LMP 06/15/1985  SpO2 95%  BMI 24.24 kg/m2 Estimated body mass index is 24.24 kg/(m^2) as calculated from the following:    Height as of this encounter: 4' 10\" (1.473 m).    Weight as of this encounter: 116 lb (52.6 kg).      Health Maintenance that is potentially due pending provider review:          Is there anyone who you would like to be able to receive your results? If yes have patient fill out JOSUE    "

## 2018-05-30 NOTE — LETTER
My Asthma Action Plan  Name: Ellie Leigh   YOB: 1930  Date: 5/30/2018   My doctor: Anjum Vidales MD   My clinic: [facility]      My Control Medicine: Budesonide + formoterol (Symbicort) -  160/4.5 mcg take as directed  My Rescue Medicine: Albuterol (Proair/Ventolin/Proventil) inhaler take as directed  Albuterol/ipratroprium  (Duoneb) nebulizer solution take as directed   My Asthma Severity:   My Best Peak Flow Number:  Avoid your asthma triggers:   cold air            GREEN ZONE   Good Control    I feel good    No cough or wheeze    Can work, sleep and play without asthma symptoms    My Peak Flow number is above  (>80% of Best)       Take your asthma control medicine every day.     1. If exercise triggers your asthma, take your rescue medication    15 minutes before exercise or sports, and    During exercise if you have asthma symptoms  2. Spacer to use with inhaler: If you have a spacer, make sure to use it with your inhaler             YELLOW ZONE Getting Worse  I have ANY of these:    I do not feel good    Cough or wheeze    Chest feels tight    Wake up at night    My Peak Flow number is between  (50%) and  (80%)   1. Keep taking your Green Zone medications  2. Start taking your rescue medicine:    every 20 minutes for up to 1 hour. Then every 4 hours for 24-48 hours.  3. If you stay in the Yellow Zone for more than 12-24 hours, contact your doctor.  4. If you do not return to the Green Zone in 12-24 hours or you get worse, start taking your oral steroid medicine if prescribed by your provider.           RED ZONE Medical Alert - Get Help  I have ANY of these:    I feel awful    Medicine is not helping    Breathing getting harder    Trouble walking or talking    Nose opens wide to breathe    My Peak Flow number is below (50%)       1. Take your rescue medicine NOW  2. If your provider has prescribed an oral steroid medicine, start taking it NOW  3. Call your doctor  NOW  4. If you are still in the Red Zone after 20 minutes and you have not reached your doctor:    Take your rescue medicine again and    Call 911 or go to the emergency room right away    See your regular doctor within 2 weeks of an Emergency Room or Urgent Care visit for follow-up treatment.          Annual Reminders:  Meet with Asthma Educator,  Flu Shot in the Fall, consider Pneumonia Vaccination for patients with asthma (aged 19 and older).    Pharmacy:    [id number] - [place], [place] - [address] [facility] [id number] - [place], [place] - [address]                      Asthma Triggers  How To Control Things That Make Your Asthma Worse    Triggers are things that make your asthma worse.  Look at the list below to help you find your triggers and what you can do about them.  You can help prevent asthma flare-ups by staying away from your triggers.      Trigger                                                          What you can do   Cigarette Smoke  Tobacco smoke can make asthma worse. Do not allow smoking in your home, car or around you.  Be sure no one smokes at a child s day care or school.  If you smoke, ask your health care provider for ways to help you quit.  Ask family members to quit too.  Ask your health care provider for a referral to Quit Plan to help you quit smoking, or call 1-065-993-PLAN.     Colds, Flu, Bronchitis  These are common triggers of asthma. Wash your hands often.  Don t touch your eyes, nose or mouth.  Get a flu shot every year.     Dust Mites  These are tiny bugs that live in cloth or carpet. They are too small to see. Wash sheets and blankets in hot water every week.   Encase pillows and mattress in dust mite proof covers.  Avoid having carpet if you can. If you have carpet, vacuum weekly.   Use a dust mask and HEPA vacuum.   Pollen and Outdoor Mold  Some people are allergic to trees, grass, or weed pollen, or molds. Try to keep your windows  closed.  Limit time out doors when pollen count is high.   Ask you health care provider about taking medicine during allergy season.     Animal Dander  Some people are allergic to skin flakes, urine or saliva from pets with fur or feathers. Keep pets with fur or feathers out of your home.    If you can t keep the pet outdoors, then keep the pet out of your bedroom.  Keep the bedroom door closed.  Keep pets off cloth furniture and away from stuffed toys.     Mice, Rats, and Cockroaches  Some people are allergic to the waste from these pests.   Cover food and garbage.  Clean up spills and food crumbs.  Store grease in the refrigerator.   Keep food out of the bedroom.   Indoor Mold  This can be a trigger if your home has high moisture. Fix leaking faucets, pipes, or other sources of water.   Clean moldy surfaces.  Dehumidify basement if it is damp and smelly.   Smoke, Strong Odors, and Sprays  These can reduce air quality. Stay away from strong odors and sprays, such as perfume, powder, hair spray, paints, smoke incense, paint, cleaning products, candles and new carpet.   Exercise or Sports  Some people with asthma have this trigger. Be active!  Ask your doctor about taking medicine before sports or exercise to prevent symptoms.    Warm up for 5-10 minutes before and after sports or exercise.     Other Triggers of Asthma  Cold air:  Cover your nose and mouth with a scarf.  Sometimes laughing or crying can be a trigger.  Some medicines and food can trigger asthma.

## 2018-05-30 NOTE — PROGRESS NOTES
SUBJECTIVE:   Ellie Leigh is a 87 year old female who presents to clinic today for the following health issues:  Chief Complaint   Patient presents with     Cough     She was at [facility] because daughter went on vacation and developed pneumonia, treated with antibiotic and doing better.     Recheck Medication     Does she continue the sodium chloride 1 gm tid??      Accompanied today by daughter Suyapa with whom she lives.   Last clinic visit: 4/20/2018.  This was a hospital follow-up visit.     ENT Symptoms  She was treated for pneumonia on 5/9 when at [facility] for respite care.  Done with antibiotics for at least a week.   Tired.     Some cough-dry.   NO fever.   Sometimes shortness of breath.  Does not feel like she is back to normal.   Feels she can walk half a block.    Walks with a walker for balance.  Does oK independently.   She has had allergies in the past year around.   Using Duonebs. twice daily.  They do seem to help.   Using Symbicort 2 puffs twice daily.   Using albuteral inhaler twice daily.              Symptoms: cc Present Absent Comment   Fever/Chills   x    Fatigue  x     Muscle Aches  x     Eye Irritation   x    Sneezing   x    Nasal Oniel/Drg  x  stuff   Sinus Pressure/Pain   x    Loss of smell   x    Dental pain   x    Sore Throat   x    Swollen Glands   x    Ear Pain/Fullness   x    Cough x x     Wheeze   x    Chest Pain   x    Shortness of breath  x     Rash       Other  x  nausea     Symptom duration: 05/16/18 approx   Symptom severity:     Treatments tried:  Was a [place] and developed Pneumonia  Given antibiotic.   Contacts:       Problem 2::  Questions about SIADH and need for chronic salt tablet use.   She has had SIADH chronically for years.     Patient Active Problem List   Diagnosis     Sensorineural hearing loss, asymmetrical     Generalized osteoarthrosis, unspecified site     Disease of lung     PERSONAL HISTORY OF MALIGNANCY- BREAST     Esophageal reflux     Chronic allergic  "conjunctivitis     Chronic seasonal allergic rhinitis     Hyperlipidemia LDL goal <130     Chronic constipation     Osteoporosis     Health Care Home     Right arm weakness     Ulnar neuropathy     Headache     Mild major depression     DVT (deep venous thrombosis)     Anxiety     Cervical vertebral fracture (H)     Intermittent atrial fibrillation (H)     Squamous cell carcinoma in situ of skin of face     SK (seborrheic keratosis)     Hypothyroidism     Hyponatremia     Recurrent UTI     History of skin cancer     BPPV (benign paroxysmal positional vertigo)     Benign essential HTN     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)     Positive fecal occult blood test     COPD (chronic obstructive pulmonary disease) (H)     Infectious colitis, enteritis and gastroenteritis     Advance Care Planning     Syncope     Hemoptysis     Long term current use of anticoagulant therapy     Mild persistent asthma without complication     Unresponsive episode     Irritable bowel syndrome without diarrhea     Elevated troponin     Nausea     Back pain     H/O TB (tuberculosis)     Chest pain     Community acquired pneumonia of right upper lobe of lung (H)      PHQ9 score today= 6    OBJECTIVE:Blood pressure 110/54, pulse 70, temperature 97.2  F (36.2  C), resp. rate 18, height 4' 10\" (1.473 m), weight 116 lb (52.6 kg), last menstrual period 06/15/1985, SpO2 95 %, not currently breastfeeding. BMI=Body mass index is 24.24 kg/(m^2).  GENERAL APPEARANCE ADULT: Alert, no acute distress, walks with a walker  HENT: right TM normal, left TM normal, oral mucous membranes moist, oropharynx clear, hearing aids bilateral   NECK: No adenopathy,masses or thyromegaly  RESP: lungs clear to auscultation   CV: normal rate, regular rhythm, no murmur or gallop  MS: extremities normal, no peripheral edema     ASSESSMENT:   (J18.9) Pneumonia due to infectious organism, unspecified laterality, unspecified part of lung  (primary encounter " diagnosis)  Comment: dong better.  Some cough and breathing not back to baseline.   Plan:   Increase Duonebs to four times daily.   Continue the Symbicort 2 puffs twice daily as you have been.   albuteral nebs every 4 hours as needed.   If breathing not continuing to improve, we could add prednisone for a few days another anti-inflammatory medication.     (E87.1) Hyponatremia  Comment: SIADH  Plan: REcheck today.    I think you will need to stay on the salt tablets permanently.  Currently on 1 pill three times daily.   Also limit fluids which also helps for the low sodium.     (J44.9) Chronic obstructive pulmonary disease, unspecified COPD type (H)    Handicapped parking certificate form signed today-6 year for use of walker.

## 2018-05-31 ENCOUNTER — TELEPHONE (OUTPATIENT)
Dept: FAMILY MEDICINE | Facility: CLINIC | Age: 83
End: 2018-05-31

## 2018-05-31 DIAGNOSIS — J44.9 CHRONIC OBSTRUCTIVE PULMONARY DISEASE, UNSPECIFIED COPD TYPE (H): Primary | ICD-10-CM

## 2018-05-31 RX ORDER — PREDNISONE 20 MG/1
TABLET ORAL
Qty: 16 TABLET | Refills: 0 | Status: SHIPPED | OUTPATIENT
Start: 2018-05-31 | End: 2018-06-27

## 2018-05-31 ASSESSMENT — ASTHMA QUESTIONNAIRES: ACT_TOTALSCORE: 15

## 2018-05-31 ASSESSMENT — PATIENT HEALTH QUESTIONNAIRE - PHQ9: SUM OF ALL RESPONSES TO PHQ QUESTIONS 1-9: 6

## 2018-05-31 NOTE — PROGRESS NOTES
Please call daughter, Suyapa.  Sodium is low but better than it has been in the past.  Non-fasting glucose a little increased.    PLAN:  Continue current medications including the salt tablets and fluid restriction as she has been.   PLAN: Recheck in 3 months.

## 2018-05-31 NOTE — TELEPHONE ENCOUNTER
Reason for Call:  Other   Update    Detailed comments: Ellie says she saw Dr Vidales yesterday and he told her to call the nurse today if she was no better and he would prescribe Prednisone.  She would like to get Rx sent to [facility] in [place].    Phone Number Patient can be reached at: Home number on file [phone] (home)    Best Time: anytime    Can we leave a detailed message on this number? YES    Call taken on 5/31/2018 at 11:23 AM by Ninoska Song

## 2018-05-31 NOTE — TELEPHONE ENCOUNTER
Verbal order per Dr Vidales for prednisone 40 mg for 6 days and 20 mg for 4 days.  Patient notified.  Ceelna Laurent RN

## 2018-06-05 ENCOUNTER — ANTICOAGULATION THERAPY VISIT (OUTPATIENT)
Dept: ANTICOAGULATION | Facility: CLINIC | Age: 83
End: 2018-06-05
Payer: COMMERCIAL

## 2018-06-05 DIAGNOSIS — I82.409 DVT (DEEP VENOUS THROMBOSIS) (H): ICD-10-CM

## 2018-06-05 DIAGNOSIS — Z79.01 LONG TERM CURRENT USE OF ANTICOAGULANT THERAPY: ICD-10-CM

## 2018-06-05 LAB — INR POINT OF CARE: 2.9 (ref 0.86–1.14)

## 2018-06-05 PROCEDURE — 85610 PROTHROMBIN TIME: CPT | Mod: QW

## 2018-06-05 PROCEDURE — 99207 ZZC NO CHARGE NURSE ONLY: CPT

## 2018-06-05 PROCEDURE — 36416 COLLJ CAPILLARY BLOOD SPEC: CPT

## 2018-06-05 NOTE — PROGRESS NOTES
ANTICOAGULATION FOLLOW-UP CLINIC VISIT    Patient Name:  Ellie Leigh  Date:  6/5/2018  Contact Type:  Face to Face /accompanied by daughter    SUBJECTIVE:     Patient Findings     Positives Change in medications (prednisone taper started 5/31: 40 mg daily for 6 days, then 20 mg daily/4 days), Inflammation (URI)    Comments Pt accompanied by daughter, both report pt is feeling better, but was started on prednisone at PCP appt last week for s/s of URI. Pt INR increased despite decrease in dose. Pt denies s/s of bleeding, denies other changes in diet, meds, activity or health, states she is taking warfarin as instructed.    Writer instructed pt to take 1x decreased dose of warfarin today, then resume 1 mg daily, will recheck INR in 8 days. Pt will also have a serving of vit K rich greens tonight.           OBJECTIVE    INR Protime   Date Value Ref Range Status   06/05/2018 2.9 (A) 0.86 - 1.14 Final       ASSESSMENT / PLAN  INR assessment SUPRA    Recheck INR In: 8 DAYS    INR Location Clinic      Anticoagulation Summary as of 6/5/2018     INR goal    Prior goal 1.5-2.0   Today's INR 2.9!   Warfarin maintenance plan 1 mg (1 mg x 1) every day   Full warfarin instructions 6/5: 0.5 mg; Otherwise 1 mg every day   Weekly warfarin total 7 mg   Plan last modified Ruiz Meredith RN (5/4/2018)   Next INR check 6/13/2018   Priority INR   Target end date Indefinite    Indications   Atrial fibrillation with rapid ventricular response (H) (Resolved) [I48.91]  DVT (deep venous thrombosis) [I82.409]  Long term current use of anticoagulant therapy [Z79.01]         Anticoagulation Episode Summary     INR check location     Preferred lab     Send INR reminders to [facility]    Comments * Actual INR goal is 1.7-2.3  Taking Celebrex PRN         Anticoagulation Care Providers     Provider Role Specialty Phone number    Anjum Vidales MD [facility] [phone]            See the Encounter  Report to view Anticoagulation Flowsheet and Dosing Calendar (Go to Encounters tab in chart review, and find the Anticoagulation Therapy Visit)        Angelia Bo RN

## 2018-06-05 NOTE — MR AVS SNAPSHOT
Ellie Leigh   6/5/2018 2:15 PM   Anticoagulation Therapy Visit    Description:  87 year old female   Provider:  [facility]   Department:  [facility]           INR as of 6/5/2018     Today's INR 2.9!      Anticoagulation Summary as of 6/5/2018     INR goal    Prior goal 1.5-2.0   Today's INR 2.9!   Full warfarin instructions 6/5: 0.5 mg; Otherwise 1 mg every day   Next INR check 6/13/2018    Indications   Atrial fibrillation with rapid ventricular response (H) (Resolved) [I48.91]  DVT (deep venous thrombosis) [I82.409]  Long term current use of anticoagulant therapy [Z79.01]         Your next Anticoagulation Clinic appointment(s)     Jun 13, 2018 10:30 AM CDT   [facility] with [facility]   [facility]    [address] [address] [phone]              Contact Numbers     Please call [phone] with any problems or questions regarding your therapy.    If you need to cancel and/or reschedule your appointment please call one of the following numbers:  [facility] [phone]  [place] [place] - [phone]  [facility] [phone]  \[facility] [place] [facility]\"" [phone]  [place] - [phone]            June 2018 Details    Sun Mon Tue Wed Thu Fri Sat          1               2                 3               4               5      0.5 mg   See details      6      1 mg         7      1 mg         8      1 mg         9      1 mg           10      1 mg         11      1 mg         12      1 mg         13            14               15               16                 17               18               19               20               21               22               23                 24               25               26               27               28               29               30                Date Details   06/05 This INR check       Date of next INR:  6/13/2018         How to take your warfarin dose     To take:  0.5 mg Take 0.5 of a  1 mg tablet.    To take:  1 mg Take 1 of the 1 mg tablets.

## 2018-06-07 ENCOUNTER — OFFICE VISIT (OUTPATIENT)
Dept: FAMILY MEDICINE | Facility: CLINIC | Age: 83
End: 2018-06-07
Payer: COMMERCIAL

## 2018-06-07 VITALS
TEMPERATURE: 97.5 F | BODY MASS INDEX: 24.58 KG/M2 | OXYGEN SATURATION: 97 % | RESPIRATION RATE: 24 BRPM | SYSTOLIC BLOOD PRESSURE: 124 MMHG | DIASTOLIC BLOOD PRESSURE: 65 MMHG | WEIGHT: 117.6 LBS | HEART RATE: 60 BPM

## 2018-06-07 DIAGNOSIS — J18.9 PNEUMONIA DUE TO INFECTIOUS ORGANISM, UNSPECIFIED LATERALITY, UNSPECIFIED PART OF LUNG: ICD-10-CM

## 2018-06-07 DIAGNOSIS — J44.9 CHRONIC OBSTRUCTIVE PULMONARY DISEASE, UNSPECIFIED COPD TYPE (H): ICD-10-CM

## 2018-06-07 DIAGNOSIS — J45.30 MILD PERSISTENT ASTHMA WITHOUT COMPLICATION: Primary | ICD-10-CM

## 2018-06-07 PROCEDURE — 99214 OFFICE O/P EST MOD 30 MIN: CPT | Performed by: PHYSICIAN ASSISTANT

## 2018-06-07 ASSESSMENT — ENCOUNTER SYMPTOMS
NECK PAIN: 0
CARDIOVASCULAR NEGATIVE: 1
ENDOCRINE NEGATIVE: 1
PALPITATIONS: 0
BRUISES/BLEEDS EASILY: 1
NECK STIFFNESS: 0
MUSCULOSKELETAL NEGATIVE: 1
VOMITING: 0
GASTROINTESTINAL NEGATIVE: 1
ADENOPATHY: 0
DIARRHEA: 0
SHORTNESS OF BREATH: 1
ARTHRALGIAS: 0
MYALGIAS: 0
HEADACHES: 0
DYSURIA: 0
FREQUENCY: 0
CHILLS: 0
BACK PAIN: 0
POLYDIPSIA: 0
WHEEZING: 1
RHINORRHEA: 0
NAUSEA: 0
DIZZINESS: 0
LIGHT-HEADEDNESS: 0
ABDOMINAL PAIN: 0
WEAKNESS: 1
FEVER: 0
HEMATURIA: 0
SORE THROAT: 0
COUGH: 1

## 2018-06-07 ASSESSMENT — PAIN SCALES - GENERAL: PAINLEVEL: NO PAIN (0)

## 2018-06-07 NOTE — MR AVS SNAPSHOT
After Visit Summary   6/7/2018    Ellie Leigh    MRN: [id number]           Patient Information     Date Of Birth          9/12/1930        Visit Information        Provider Department      6/7/2018 12:40 PM Ronaldo Braun PA-C [facility]        Today's Diagnoses     Mild persistent asthma without complication    -  1    Chronic obstructive pulmonary disease, unspecified COPD type (H)        Pneumonia due to infectious organism, unspecified laterality, unspecified part of lung           Follow-ups after your visit        Your next 10 appointments already scheduled     Jun 13, 2018 10:30 AM CDT   Anticoagulation Visit with [facility]   [facility]    [address] [address]   [phone]            Jun 28, 2018  2:00 PM CDT   DX HIP/PELVIS/SPINE with [facility]   [facility] ([facility])    [address] [address]   [phone]           Please do not take any of the following 24 hours prior to the day of your exam: vitamins, calcium tablets, antacids.  If possible, please wear clothes without metal (snaps, zippers). A sweatsuit works well.            Jul 10, 2018 11:20 AM CDT   Pacemaker Check with [facility] Rn   [facility] ([facility])    [address] [address] [phone]              Who to contact     If you have questions or need follow up information about today's clinic visit or your schedule please contact [facility] directly at [phone].  Normal or non-critical lab and imaging results will be communicated to you by MyChart, letter or phone within 4 business days after the clinic has received the results. If you do not hear from us within 7 days, please contact the clinic through MyChart or phone. If you have a critical or abnormal lab result, we will notify you by phone as soon as  possible.  Submit refill requests through [employer] or call your pharmacy and they will forward the refill request to us. Please allow 3 business days for your refill to be completed.          Additional Information About Your Visit        Care EveryWhere ID     This is your Care EveryWhere ID. This could be used by other organizations to access your [place] medical records  [id number]        Your Vitals Were     Pulse Temperature Respirations Last Period Pulse Oximetry Breastfeeding?    60 97.5  F (36.4  C) (Tympanic) 24 06/15/1985 97% No    BMI (Body Mass Index)                   24.58 kg/m2            Blood Pressure from Last 3 Encounters:   06/07/18 124/65   05/30/18 110/54   05/02/18 125/70    Weight from Last 3 Encounters:   06/07/18 117 lb 9.6 oz (53.3 kg)   05/30/18 116 lb (52.6 kg)   05/02/18 115 lb (52.2 kg)              Today, you had the following     No orders found for display       Primary Care Provider Office Phone # Fax #    Anjum Vidales [phone] [address] [place], [place] [address]        Equal Access to Services     [facility] : Hadii daniel dumont hadlexio Somaurilio, waaxda luqadaha, qaybta kaalmada trini, verónica bedoya . So [facility] [phone].    ATENCIÓN: Si habla español, tiene a sahni disposición servicios gratuitos de asistencia lingüística. Llame al [phone].    We comply with applicable federal civil rights laws and [place] laws. We do not discriminate on the basis of race, color, national origin, age, disability, sex, sexual orientation, or gender identity.            Thank you!     Thank you for choosing [facility]  for your care. Our goal is always to provide you with excellent care. Hearing back from our patients is one way we can continue to improve our services. Please take a few minutes to complete the written survey that you may receive in the mail after your visit with us. Thank you!             Your  Updated Medication List - Protect others around you: Learn how to safely use, store and throw away your medicines at www.disposemymeds.org.          This list is accurate as of 6/7/18  4:07 PM.  Always use your most recent med list.                   Brand Name Dispense Instructions for use Diagnosis    albuterol 108 (90 Base) MCG/ACT Inhaler    PROAIR HFA/PROVENTIL HFA/VENTOLIN HFA    3 Inhaler    Inhale 2 puffs into the lungs every 6 hours as needed for shortness of breath / dyspnea    Mild persistent asthma without complication       CRANBERRY      Take 475 mg by mouth 2 times daily.        diclofenac 1 % Gel topical gel    VOLTAREN     Place onto the skin 4 times daily as needed for moderate pain        ipratropium - albuterol 0.5 mg/2.5 mg/3 mL 0.5-2.5 (3) MG/3ML neb solution    DUONEB    180 mL    TAKE 1 VIAL BY NEBULIZATION  EVERY SIX HOURS AS NEEDED FOR SHORTNESS OF BREATH/ DYSPNEA OR WHEEZING    Chronic obstructive pulmonary disease, unspecified COPD type (H)       levothyroxine 50 MCG tablet    SYNTHROID/LEVOTHROID    90 tablet    TAKE ONE TABLET BY MOUTH ONE TIME DAILY    Hypothyroidism, unspecified type       metoprolol succinate 50 MG 24 hr tablet    TOPROL-XL    90 tablet    Take 1 tablet (50 mg) by mouth 2 times daily    Essential hypertension, benign       montelukast 10 MG tablet    SINGULAIR     Take 10 mg by mouth At Bedtime        polyethylene glycol Packet    MIRALAX/GLYCOLAX     Take 1.5 packets by mouth daily        predniSONE 20 MG tablet    DELTASONE    16 tablet    40 mg a day for six days and then 20 mg a day for four days    Chronic obstructive pulmonary disease, unspecified COPD type (H)       probiotic Caps      Take 1 capsule by mouth every evening        ranitidine 150 MG tablet    ZANTAC    60 tablet    Take 1 tablet (150 mg) by mouth 2 times daily    Gastroesophageal reflux disease without esophagitis       sodium chloride 1 GM tablet     100 tablet    Take 1 tablet (1 g) by mouth 3  times daily    Hyponatremia       SYMBICORT 160-4.5 MCG/ACT Inhaler   Generic drug:  budesonide-formoterol     10.2 g    INHALE TWO PUFFS INTO THE LUNGS TWICE DAILY    Mild persistent asthma without complication       TYLENOL PO      Take 975 mg by mouth 3 times daily        warfarin 1 MG tablet    COUMADIN    96 tablet    1 mg (1 mg x 1) every day or as directed by Anticoagulation Clinic.    Intermittent atrial fibrillation (H)
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
## 2018-06-13 ENCOUNTER — ANTICOAGULATION THERAPY VISIT (OUTPATIENT)
Dept: ANTICOAGULATION | Facility: CLINIC | Age: 83
End: 2018-06-13
Payer: COMMERCIAL

## 2018-06-13 DIAGNOSIS — Z79.01 LONG TERM CURRENT USE OF ANTICOAGULANT THERAPY: ICD-10-CM

## 2018-06-13 DIAGNOSIS — I82.409 DVT (DEEP VENOUS THROMBOSIS) (H): ICD-10-CM

## 2018-06-13 LAB — INR POINT OF CARE: 2.7 (ref 0.86–1.14)

## 2018-06-13 PROCEDURE — 99207 ZZC NO CHARGE NURSE ONLY: CPT

## 2018-06-13 PROCEDURE — 36416 COLLJ CAPILLARY BLOOD SPEC: CPT

## 2018-06-13 PROCEDURE — 85610 PROTHROMBIN TIME: CPT | Mod: QW

## 2018-06-13 NOTE — MR AVS SNAPSHOT
Ellie Leigh   6/13/2018 10:30 AM   Anticoagulation Therapy Visit    Description:  87 year old female   Provider:  [facility]   Department:  [facility]           INR as of 6/13/2018     Today's INR 2.7!      Anticoagulation Summary as of 6/13/2018     INR goal    Prior goal 1.5-2.0   Today's INR 2.7!   Full warfarin instructions 0.5 mg on Wed; 1 mg all other days   Next INR check 6/27/2018    Indications   Atrial fibrillation with rapid ventricular response (H) (Resolved) [I48.91]  DVT (deep venous thrombosis) [I82.409]  Long term current use of anticoagulant therapy [Z79.01]         Your next Anticoagulation Clinic appointment(s)     Jun 27, 2018 11:45 AM CDT   [facility] with [facility]   [facility]    [address] [address] [phone]              Contact Numbers     Please call [phone] with any problems or questions regarding your therapy.    If you need to cancel and/or reschedule your appointment please call one of the following numbers:   [phone]  [place] - [phone]  [facility] [phone]  [facility] [phone]  [place] - [phone]            June 2018 Details    Sun Mon Tue Wed Thu Fri Sat          1               2                 3               4               5               6               7               8               9                 10               11               12               13      0.5 mg   See details      14      1 mg         15      1 mg         16      1 mg           17      1 mg         18      1 mg         19      1 mg         20      0.5 mg         21      1 mg         22      1 mg         23      1 mg           24      1 mg         25      1 mg         26      1 mg         27            28               29               30                Date Details   06/13 This INR check       Date of next INR:  6/27/2018         How to take your warfarin dose     To  take:  0.5 mg Take 0.5 of a 1 mg tablet.    To take:  1 mg Take 1 of the 1 mg tablets.

## 2018-06-18 DIAGNOSIS — I10 ESSENTIAL HYPERTENSION, BENIGN: ICD-10-CM

## 2018-06-18 RX ORDER — METOPROLOL SUCCINATE 50 MG/1
TABLET, EXTENDED RELEASE ORAL
Qty: 60 TABLET | Refills: 0 | Status: SHIPPED | OUTPATIENT
Start: 2018-06-18 | End: 2019-04-30

## 2018-06-18 NOTE — TELEPHONE ENCOUNTER
"Requested Prescriptions   Pending Prescriptions Disp Refills     metoprolol succinate (TOPROL-XL) 50 MG 24 hr tablet [Pharmacy Med Name: METOPROLOL SUCC 50MG ER TAB] 60 tablet      Sig: TAKE 1 TAB BY MOUTH TWICE A DAY FOR HTN    Beta-Blockers Protocol Passed    6/18/2018  3:59 PM       Passed - Blood pressure under 140/90 in past 12 months    BP Readings from Last 3 Encounters:   06/07/18 124/65   05/30/18 110/54   05/02/18 125/70                Passed - Patient is age 6 or older       Passed - Recent (12 mo) or future (30 days) visit within the authorizing provider's specialty    Patient had office visit in the last 12 months or has a visit in the next 30 days with authorizing provider or within the authorizing provider's specialty.  See \"Patient Info\" tab in inbasket, or \"Choose Columns\" in Meds & Orders section of the refill encounter.            Last Written Prescription Date:  05/30/18  Last Fill Quantity: 90,  # refills: 3   Last office visit: 6/7/2018 with prescribing provider:  05/30/18   Future Office Visit:      "

## 2018-06-18 NOTE — TELEPHONE ENCOUNTER
"Requested Prescriptions   Pending Prescriptions Disp Refills     metoprolol succinate (TOPROL-XL) 50 MG 24 hr tablet [Pharmacy Med Name: METOPROLOL SUCC 50MG ER TAB] 60 tablet      Sig: TAKE 1 TAB BY MOUTH TWICE A DAY FOR HTN    Beta-Blockers Protocol Passed    6/18/2018  4:12 PM       Passed - Blood pressure under 140/90 in past 12 months    BP Readings from Last 3 Encounters:   06/07/18 124/65   05/30/18 110/54   05/02/18 125/70                Passed - Patient is age 6 or older       Passed - Recent (12 mo) or future (30 days) visit within the authorizing provider's specialty    Patient had office visit in the last 12 months or has a visit in the next 30 days with authorizing provider or within the authorizing provider's specialty.  See \"Patient Info\" tab in inbasket, or \"Choose Columns\" in Meds & Orders section of the refill encounter.            metoprolol succinate (TOPROL-XL) 50 MG 24 hr tablet  Last Written Prescription Date:  05/30/2018  Last Fill Quantity: 90 tablet,  # refills: 3   Last office visit: 6/7/2018 with prescribing provider:  NOLBERTO Braun   Future Office Visit:    Cleo REYES (R) (M)    "

## 2018-06-27 ENCOUNTER — ANTICOAGULATION THERAPY VISIT (OUTPATIENT)
Dept: ANTICOAGULATION | Facility: CLINIC | Age: 83
End: 2018-06-27
Payer: COMMERCIAL

## 2018-06-27 ENCOUNTER — OFFICE VISIT (OUTPATIENT)
Dept: FAMILY MEDICINE | Facility: CLINIC | Age: 83
End: 2018-06-27
Payer: COMMERCIAL

## 2018-06-27 VITALS
SYSTOLIC BLOOD PRESSURE: 112 MMHG | TEMPERATURE: 97.9 F | DIASTOLIC BLOOD PRESSURE: 66 MMHG | WEIGHT: 119 LBS | HEIGHT: 58 IN | BODY MASS INDEX: 24.98 KG/M2 | HEART RATE: 72 BPM | OXYGEN SATURATION: 94 %

## 2018-06-27 DIAGNOSIS — K21.00 GASTROESOPHAGEAL REFLUX DISEASE WITH ESOPHAGITIS: ICD-10-CM

## 2018-06-27 DIAGNOSIS — I82.409 DVT (DEEP VENOUS THROMBOSIS) (H): ICD-10-CM

## 2018-06-27 DIAGNOSIS — Z79.01 LONG TERM CURRENT USE OF ANTICOAGULANT THERAPY: ICD-10-CM

## 2018-06-27 DIAGNOSIS — R10.13 ABDOMINAL PAIN, EPIGASTRIC: Primary | ICD-10-CM

## 2018-06-27 LAB
ALBUMIN SERPL-MCNC: 3 G/DL (ref 3.4–5)
ALP SERPL-CCNC: 89 U/L (ref 40–150)
ALT SERPL W P-5'-P-CCNC: 12 U/L (ref 0–50)
AMYLASE SERPL-CCNC: 54 U/L (ref 30–110)
ANION GAP SERPL CALCULATED.3IONS-SCNC: 6 MMOL/L (ref 3–14)
AST SERPL W P-5'-P-CCNC: 23 U/L (ref 0–45)
BASOPHILS # BLD AUTO: 0.1 10E9/L (ref 0–0.2)
BASOPHILS NFR BLD AUTO: 0.8 %
BILIRUB SERPL-MCNC: 0.2 MG/DL (ref 0.2–1.3)
BUN SERPL-MCNC: 24 MG/DL (ref 7–30)
CALCIUM SERPL-MCNC: 8.6 MG/DL (ref 8.5–10.1)
CHLORIDE SERPL-SCNC: 98 MMOL/L (ref 94–109)
CO2 SERPL-SCNC: 29 MMOL/L (ref 20–32)
CREAT SERPL-MCNC: 0.62 MG/DL (ref 0.52–1.04)
DIFFERENTIAL METHOD BLD: ABNORMAL
EOSINOPHIL # BLD AUTO: 0.2 10E9/L (ref 0–0.7)
EOSINOPHIL NFR BLD AUTO: 2.9 %
ERYTHROCYTE [DISTWIDTH] IN BLOOD BY AUTOMATED COUNT: 13.6 % (ref 10–15)
GFR SERPL CREATININE-BSD FRML MDRD: >90 ML/MIN/1.7M2
GLUCOSE SERPL-MCNC: 119 MG/DL (ref 70–99)
HCT VFR BLD AUTO: 33.2 % (ref 35–47)
HGB BLD-MCNC: 11 G/DL (ref 11.7–15.7)
INR POINT OF CARE: 2.2 (ref 0.86–1.14)
LIPASE SERPL-CCNC: 210 U/L (ref 73–393)
LYMPHOCYTES # BLD AUTO: 1.8 10E9/L (ref 0.8–5.3)
LYMPHOCYTES NFR BLD AUTO: 27.7 %
MCH RBC QN AUTO: 28.7 PG (ref 26.5–33)
MCHC RBC AUTO-ENTMCNC: 33.1 G/DL (ref 31.5–36.5)
MCV RBC AUTO: 87 FL (ref 78–100)
MONOCYTES # BLD AUTO: 0.9 10E9/L (ref 0–1.3)
MONOCYTES NFR BLD AUTO: 13.4 %
NEUTROPHILS # BLD AUTO: 3.6 10E9/L (ref 1.6–8.3)
NEUTROPHILS NFR BLD AUTO: 55.2 %
PLATELET # BLD AUTO: 405 10E9/L (ref 150–450)
POTASSIUM SERPL-SCNC: 4.7 MMOL/L (ref 3.4–5.3)
PROT SERPL-MCNC: 7.2 G/DL (ref 6.8–8.8)
RBC # BLD AUTO: 3.83 10E12/L (ref 3.8–5.2)
SODIUM SERPL-SCNC: 133 MMOL/L (ref 133–144)
WBC # BLD AUTO: 6.5 10E9/L (ref 4–11)

## 2018-06-27 PROCEDURE — 82150 ASSAY OF AMYLASE: CPT | Performed by: NURSE PRACTITIONER

## 2018-06-27 PROCEDURE — 85610 PROTHROMBIN TIME: CPT | Mod: QW

## 2018-06-27 PROCEDURE — 36416 COLLJ CAPILLARY BLOOD SPEC: CPT

## 2018-06-27 PROCEDURE — 80053 COMPREHEN METABOLIC PANEL: CPT | Performed by: NURSE PRACTITIONER

## 2018-06-27 PROCEDURE — 99213 OFFICE O/P EST LOW 20 MIN: CPT | Performed by: NURSE PRACTITIONER

## 2018-06-27 PROCEDURE — 83690 ASSAY OF LIPASE: CPT | Performed by: NURSE PRACTITIONER

## 2018-06-27 PROCEDURE — 85025 COMPLETE CBC W/AUTO DIFF WBC: CPT | Performed by: NURSE PRACTITIONER

## 2018-06-27 PROCEDURE — 99207 ZZC NO CHARGE NURSE ONLY: CPT

## 2018-06-27 RX ORDER — PANTOPRAZOLE SODIUM 40 MG/1
40 TABLET, DELAYED RELEASE ORAL DAILY
Qty: 30 TABLET | Refills: 0 | Status: SHIPPED | OUTPATIENT
Start: 2018-06-27 | End: 2019-03-25

## 2018-06-27 NOTE — MR AVS SNAPSHOT
Ellie Leigh   6/27/2018 11:45 AM   Anticoagulation Therapy Visit    Description:  87 year old female   Provider:  [facility]   Department:  [facility]           INR as of 6/27/2018     Today's INR 2.2!      Anticoagulation Summary as of 6/27/2018     INR goal    Prior goal 1.5-2.0   Today's INR 2.2!   Full warfarin instructions 0.5 mg on Wed; 1 mg all other days   Next INR check 7/19/2018    Indications   Atrial fibrillation with rapid ventricular response (H) (Resolved) [I48.91]  DVT (deep venous thrombosis) [I82.409]  Long term current use of anticoagulant therapy [Z79.01]         Your next Anticoagulation Clinic appointment(s)     Jul 19, 2018 10:30 AM CDT   Anticoagulation Visit with [facility]   [facility]    [address] [address] [phone]              Contact Numbers     Please call [phone] with any problems or questions regarding your therapy.    If you need to cancel and/or reschedule your appointment please call one of the following numbers:  [facility] [phone]  [place] - [phone]  [facility] [phone]  [facility] [phone]  [place] - [phone]            June 2018 Details    Sun Mon Tue Wed Thu Fri Sat          1               2                 3               4               5               6               7               8               9                 10               11               12               13               14               15               16                 17               18               19               20               21               22               23                 24               25               26               27      0.5 mg   See details      28      1 mg         29      1 mg         30      1 mg          Date Details   06/27 This INR check               How to take your warfarin dose     To take:  0.5 mg Take 0.5 of a 1 mg tablet.    To take:  1 mg Take 1 of  the 1 mg tablets.           July 2018 Details    Sun Mon Tue Wed Thu Fri Sat     1      1 mg         2      1 mg         3      1 mg         4      0.5 mg         5      1 mg         6      1 mg         7      1 mg           8      1 mg         9      1 mg         10      1 mg         11      0.5 mg         12      1 mg         13      1 mg         14      1 mg           15      1 mg         16      1 mg         17      1 mg         18      0.5 mg         19            20               21                 22               23               24               25               26               27               28                 29               30               31                    Date Details   No additional details    Date of next INR:  7/19/2018         How to take your warfarin dose     To take:  0.5 mg Take 0.5 of a 1 mg tablet.    To take:  1 mg Take 1 of the 1 mg tablets.

## 2018-06-27 NOTE — MR AVS SNAPSHOT
After Visit Summary   6/27/2018    Ellie Leigh    MRN: [id number]           Patient Information     Date Of Birth          9/12/1930        Visit Information        Provider Department      6/27/2018 1:00 PM Silvana Velez APRN CNP [facility]        Today's Diagnoses     Abdominal pain, epigastric    -  1    Gastroesophageal reflux disease with esophagitis          Care Instructions    Labs today-we will notify you with those results    Start the Protonix daily    Follow up if symptoms do not improve or any worsening symptoms          Follow-ups after your visit        Your next 10 appointments already scheduled     Jun 28, 2018  2:00 PM CDT   DX HIP/PELVIS/SPINE with TATIANA   [facility] ([facility])    [address] [address]   [phone]           Please do not take any of the following 24 hours prior to the day of your exam: vitamins, calcium tablets, antacids.  If possible, please wear clothes without metal (snaps, zippers). A sweatsuit works well.            Jul 10, 2018 11:20 AM CDT   Pacemaker Check with [facility] Device Rn   [facility] ([facility])    [address] [address]   [phone]            Jul 19, 2018 10:30 AM CDT   Anticoagulation Visit with [facility]   [facility]    [address] [address] [phone]              Who to contact     If you have questions or need follow up information about today's clinic visit or your schedule please contact [facility] directly at [phone].  Normal or non-critical lab and imaging results will be communicated to you by MyChart, letter or phone within 4 business days after the clinic has received the results. If you do not hear from us within 7 days, please contact the clinic through MyChart or phone. If you have a critical or abnormal  "lab result, we will notify you by phone as soon as possible.  Submit refill requests through [employer] or call your pharmacy and they will forward the refill request to us. Please allow 3 business days for your refill to be completed.          Additional Information About Your Visit        Care EveryWhere ID     This is your Care EveryWhere ID. This could be used by other organizations to access your [place] medical records  [id number]        Your Vitals Were     Pulse Temperature Height Last Period Pulse Oximetry BMI (Body Mass Index)    72 97.9  F (36.6  C) (Tympanic) 4' 10\" (1.473 m) 06/15/1985 94% 24.87 kg/m2       Blood Pressure from Last 3 Encounters:   06/27/18 112/66   06/07/18 124/65   05/30/18 110/54    Weight from Last 3 Encounters:   06/27/18 119 lb (54 kg)   06/07/18 117 lb 9.6 oz (53.3 kg)   05/30/18 116 lb (52.6 kg)              We Performed the Following     Amylase     CBC with platelets and differential     Comprehensive metabolic panel     Lipase          Today's Medication Changes          These changes are accurate as of 6/27/18  1:36 PM.  If you have any questions, ask your nurse or doctor.               Start taking these medicines.        Dose/Directions    pantoprazole 40 MG EC tablet   Commonly known as:  PROTONIX   Used for:  Gastroesophageal reflux disease with esophagitis, Abdominal pain, epigastric   Started by:  Silvana Velez APRN CNP        Dose:  40 mg   Take 1 tablet (40 mg) by mouth daily Take 30-60 minutes before a meal.   Quantity:  30 tablet   Refills:  0         Stop taking these medicines if you haven't already. Please contact your care team if you have questions.     predniSONE 20 MG tablet   Commonly known as:  DELTASONE   Stopped by:  Silvana Velez APRN CNP                Where to get your medicines      These medications were sent to [id number] - [place], [place] - [address]  [address] [address]     " Phone:  [phone]     pantoprazole 40 MG EC tablet                Primary Care Provider Office Phone # Fax #    Anjum Vidales [phone] [address] [place] [address]        Equal Access to Services     BETHBURT ARNAV : Hadii daniel dumont foziao Sohéctorali, waaxda luqadaha, qaybta kaalmada adesukhdeepyada, verónica pearlin hayaasaritha sherwoodsukhdeep kerr elke estrada. So [facility] [phone].    ATENCIÓN: Si habla español, tiene a sahni disposición servicios gratuitos de asistencia lingüística. Llame al [phone].    We comply with applicable federal civil rights laws and [place] laws. We do not discriminate on the basis of race, color, national origin, age, disability, sex, sexual orientation, or gender identity.            Thank you!     Thank you for choosing [facility]  for your care. Our goal is always to provide you with excellent care. Hearing back from our patients is one way we can continue to improve our services. Please take a few minutes to complete the written survey that you may receive in the mail after your visit with us. Thank you!             Your Updated Medication List - Protect others around you: Learn how to safely use, store and throw away your medicines at www.disposemymeds.org.          This list is accurate as of 6/27/18  1:36 PM.  Always use your most recent med list.                   Brand Name Dispense Instructions for use Diagnosis    albuterol 108 (90 Base) MCG/ACT Inhaler    PROAIR HFA/PROVENTIL HFA/VENTOLIN HFA    3 Inhaler    Inhale 2 puffs into the lungs every 6 hours as needed for shortness of breath / dyspnea    Mild persistent asthma without complication       CRANBERRY      Take 475 mg by mouth 2 times daily.        diclofenac 1 % Gel topical gel    VOLTAREN     Place onto the skin 4 times daily as needed for moderate pain        ipratropium - albuterol 0.5 mg/2.5 mg/3 mL 0.5-2.5 (3) MG/3ML neb solution    DUONEB    180 mL    TAKE 1 VIAL BY NEBULIZATION   EVERY SIX HOURS AS NEEDED FOR SHORTNESS OF BREATH/ DYSPNEA OR WHEEZING    Chronic obstructive pulmonary disease, unspecified COPD type (H)       levothyroxine 50 MCG tablet    SYNTHROID/LEVOTHROID    90 tablet    TAKE ONE TABLET BY MOUTH ONE TIME DAILY    Hypothyroidism, unspecified type       metoprolol succinate 50 MG 24 hr tablet    TOPROL-XL    60 tablet    TAKE 1 TAB BY MOUTH TWICE A DAY FOR HTN    Essential hypertension, benign       montelukast 10 MG tablet    SINGULAIR     Take 10 mg by mouth At Bedtime        pantoprazole 40 MG EC tablet    PROTONIX    30 tablet    Take 1 tablet (40 mg) by mouth daily Take 30-60 minutes before a meal.    Gastroesophageal reflux disease with esophagitis, Abdominal pain, epigastric       polyethylene glycol Packet    MIRALAX/GLYCOLAX     Take 1.5 packets by mouth daily        probiotic Caps      Take 1 capsule by mouth every evening        ranitidine 150 MG tablet    ZANTAC    60 tablet    Take 1 tablet (150 mg) by mouth 2 times daily    Gastroesophageal reflux disease without esophagitis       sodium chloride 1 GM tablet     100 tablet    Take 1 tablet (1 g) by mouth 3 times daily    Hyponatremia       SYMBICORT 160-4.5 MCG/ACT Inhaler   Generic drug:  budesonide-formoterol     10.2 g    INHALE TWO PUFFS INTO THE LUNGS TWICE DAILY    Mild persistent asthma without complication       TYLENOL PO      Take 975 mg by mouth 3 times daily        warfarin 1 MG tablet    COUMADIN    96 tablet    1 mg (1 mg x 1) every day or as directed by Anticoagulation Clinic.    Intermittent atrial fibrillation (H)

## 2018-06-27 NOTE — PROGRESS NOTES
ANTICOAGULATION FOLLOW-UP CLINIC VISIT    Patient Name:  Ellie Leigh  Date:  6/27/2018  Contact Type:  Face to Face    SUBJECTIVE:     Patient Findings     Positives No Problem Findings    Comments Patient denies missed warfarin doses as well as changes to diet, activity or medications.  Patient advised to continue the same newly decreased warfarin maintenance dose, INR therapeutic.              OBJECTIVE    INR Protime   Date Value Ref Range Status   06/27/2018 2.2 (A) 0.86 - 1.14 Final       ASSESSMENT / PLAN  INR assessment THER    Recheck INR In: 4 WEEKS    INR Location Clinic      Anticoagulation Summary as of 6/27/2018     INR goal    Prior goal 1.5-2.0   Today's INR 2.2!   Warfarin maintenance plan 0.5 mg (1 mg x 0.5) on Wed; 1 mg (1 mg x 1) all other days   Full warfarin instructions 0.5 mg on Wed; 1 mg all other days   Weekly warfarin total 6.5 mg   Plan last modified Aysha Canada RN (6/13/2018)   Next INR check 7/19/2018   Priority INR   Target end date Indefinite    Indications   Atrial fibrillation with rapid ventricular response (H) (Resolved) [I48.91]  DVT (deep venous thrombosis) [I82.409]  Long term current use of anticoagulant therapy [Z79.01]         Anticoagulation Episode Summary     INR check location     Preferred lab     Send INR reminders to [facility] POOL    Comments * Actual INR goal is 1.7-2.3  Taking Celebrex PRN         Anticoagulation Care Providers     Provider Role Specialty Phone number    Anjum Vidales MD [facility] [phone]            See the Encounter Report to view Anticoagulation Flowsheet and Dosing Calendar (Go to Encounters tab in chart review, and find the Anticoagulation Therapy Visit)        Aysha Canada RN

## 2018-06-27 NOTE — PATIENT INSTRUCTIONS
Labs today-we will notify you with those results    Start the Protonix daily    Follow up if symptoms do not improve or any worsening symptoms

## 2018-06-27 NOTE — PROGRESS NOTES
SUBJECTIVE:   Ellie Leigh is a 87 year old female who presents to clinic today for the following health issues:      Abdominal Pain      Duration: 3-4 weeks     Description (location/character/radiation): epigastric        Associated flank pain: None    Intensity:  moderate    Accompanying signs and symptoms:        Fever/Chills: no        Gas/Bloating: YES- bloating        Nausea/vomitting: YES- nausea        Diarrhea: YES- for 3 days but this has resolved        Dysuria or Hematuria: no     History (previous similar pain/trauma/previous testing): none     Precipitating or alleviating factors:       Pain worse with eating/BM/urination: no       Pain relieved by BM: no     Therapies tried and outcome: None    LMP:  not applicable    Burning in throat   no known triggers    Problem list and histories reviewed & adjusted, as indicated.  Additional history: as documented    Patient Active Problem List   Diagnosis     Sensorineural hearing loss, asymmetrical     Generalized osteoarthrosis, unspecified site     Disease of lung     PERSONAL HISTORY OF MALIGNANCY- BREAST     Esophageal reflux     Chronic allergic conjunctivitis     Chronic seasonal allergic rhinitis     Hyperlipidemia LDL goal <130     Chronic constipation     Osteoporosis     Health Care Home     Right arm weakness     Ulnar neuropathy     Headache     Mild major depression     DVT (deep venous thrombosis)     Anxiety     Cervical vertebral fracture (H)     Intermittent atrial fibrillation (H)     Squamous cell carcinoma in situ of skin of face     SK (seborrheic keratosis)     Hypothyroidism     Hyponatremia     Recurrent UTI     History of skin cancer     BPPV (benign paroxysmal positional vertigo)     Benign essential HTN     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)     Positive fecal occult blood test     COPD (chronic obstructive pulmonary disease) (H)     Infectious colitis, enteritis and gastroenteritis     Advance Care Planning      Syncope     Hemoptysis     Long term current use of anticoagulant therapy     Mild persistent asthma without complication     Unresponsive episode     Irritable bowel syndrome without diarrhea     Elevated troponin     Nausea     Back pain     H/O TB (tuberculosis)     Chest pain     Community acquired pneumonia of right upper lobe of lung (H)     Past Surgical History:   Procedure Laterality Date     APPENDECTOMY       ARTHROSCOPY KNEE  4/15/2011    Procedure:ARTHROSCOPY KNEE; removal of loose body; Surgeon:LEY, JEFFREY DUANE; Location:[facility]     CHOLECYSTECTOMY       ESOPHAGOSCOPY, GASTROSCOPY, DUODENOSCOPY (EGD), COMBINED  6/9/2014    Procedure: COMBINED ESOPHAGOSCOPY, GASTROSCOPY, DUODENOSCOPY (EGD);  Surgeon: Gilberto Richard MD;  Location: [facility]     IMPLANT PACEMAKER  5/21/2013    Biotronik Moderl [id number] Serial#[id number]     INJECT EPIDURAL LUMBAR  3/23/2011    INJECT EPIDURAL LUMBAR performed by GENERIC ANESTHESIA PROVIDER at [facility]     JOINT REPLACEMENT, HIP RT/LT      Joint Replacement Hip Rt     MASTECTOMY, SIMPLE RT/LT/CAITLYN      Left breast - following breast ca     OTHER SURGICAL HISTORY      C1-C2 fusion after fx      SURGICAL HISTORY OF -   05/22/2001    Colonoscopy     TONSILLECTOMY         Social History   Substance Use Topics     Smoking status: Never Smoker     Smokeless tobacco: Never Used     Alcohol use No     Family History   Problem Relation Age of Onset     Cerebrovascular Disease Mother      HEART DISEASE Mother      MI     Cerebrovascular Disease Father      HEART DISEASE Father      MI     Respiratory Maternal Grandfather      TB     Neurologic Disorder Brother      ALS     HEART DISEASE Brother      Cerebrovascular Disease Brother      Breast Cancer Daughter      age:49     Asthma Brother      Cancer Brother      brain and lung     Cancer Daughter      thyroid         Current Outpatient Prescriptions   Medication Sig Dispense Refill     Acetaminophen (TYLENOL PO) Take 975 mg by mouth 3  "times daily       albuterol (PROAIR HFA/PROVENTIL HFA/VENTOLIN HFA) 108 (90 BASE) MCG/ACT Inhaler Inhale 2 puffs into the lungs every 6 hours as needed for shortness of breath / dyspnea 3 Inhaler 1     CRANBERRY Take 475 mg by mouth 2 times daily.       diclofenac (VOLTAREN) 1 % GEL topical gel Place onto the skin 4 times daily as needed for moderate pain       ipratropium - albuterol 0.5 mg/2.5 mg/3 mL (DUONEB) 0.5-2.5 (3) MG/3ML neb solution TAKE 1 VIAL BY NEBULIZATION  EVERY SIX HOURS AS NEEDED FOR SHORTNESS OF BREATH/ DYSPNEA OR WHEEZING 180 mL 9     levothyroxine (SYNTHROID/LEVOTHROID) 50 MCG tablet TAKE ONE TABLET BY MOUTH ONE TIME DAILY  90 tablet 1     metoprolol succinate (TOPROL-XL) 50 MG 24 hr tablet TAKE 1 TAB BY MOUTH TWICE A DAY FOR HTN 60 tablet 0     montelukast (SINGULAIR) 10 MG tablet Take 10 mg by mouth At Bedtime       polyethylene glycol (MIRALAX/GLYCOLAX) Packet Take 1.5 packets by mouth daily       probiotic CAPS Take 1 capsule by mouth every evening        ranitidine (ZANTAC) 150 MG tablet Take 1 tablet (150 mg) by mouth 2 times daily 60 tablet 11     sodium chloride 1 GM tablet Take 1 tablet (1 g) by mouth 3 times daily 100 tablet 11     SYMBICORT 160-4.5 MCG/ACT Inhaler INHALE TWO PUFFS INTO THE LUNGS TWICE DAILY  10.2 g 4     warfarin (COUMADIN) 1 MG tablet 1 mg (1 mg x 1) every day or as directed by Anticoagulation Clinic. 96 tablet 0     Allergies   Allergen Reactions     Cephalosporins Nausea     Cefzil     Doxycycline Nausea and Vomiting     Sulfa Drugs Nausea     Penicillins Rash     PCN     Labs reviewed in EPIC    Reviewed and updated as needed this visit by clinical staff       Reviewed and updated as needed this visit by Provider         ROS:  Constitutional, HEENT, cardiovascular, pulmonary, gi and gu systems are negative, except as otherwise noted.    OBJECTIVE:     /66 (Cuff Size: Adult Regular)  Pulse 72  Temp 97.9  F (36.6  C) (Tympanic)  Ht 4' 10\" (1.473 m)  Wt 119 " lb (54 kg)  LMP 06/15/1985  BMI 24.87 kg/m2  Body mass index is 24.87 kg/(m^2).  GENERAL: healthy, alert and no distress  RESP: lungs clear to auscultation - no rales, rhonchi or wheezes  CV: regular rate and rhythm, normal S1 S2, no S3 or S4, no murmur, click or rub, no peripheral edema and peripheral pulses strong  ABDOMEN: soft, nontender, no hepatosplenomegaly, no masses and bowel sounds normal  MS: no gross musculoskeletal defects noted, no edema  NEURO: Normal strength and tone, mentation intact and speech normal  PSYCH: mentation appears normal, affect normal/bright    Diagnostic Test Results:  Results for orders placed or performed in visit on 06/27/18   Comprehensive metabolic panel   Result Value Ref Range    Sodium 133 133 - 144 mmol/L    Potassium 4.7 3.4 - 5.3 mmol/L    Chloride 98 94 - 109 mmol/L    Carbon Dioxide 29 20 - 32 mmol/L    Anion Gap 6 3 - 14 mmol/L    Glucose 119 (H) 70 - 99 mg/dL    Urea Nitrogen 24 7 - 30 mg/dL    Creatinine 0.62 0.52 - 1.04 mg/dL    GFR Estimate >90 >60 mL/min/1.7m2    GFR Estimate If Black >90 >60 mL/min/1.7m2    Calcium 8.6 8.5 - 10.1 mg/dL    Bilirubin Total 0.2 0.2 - 1.3 mg/dL    Albumin 3.0 (L) 3.4 - 5.0 g/dL    Protein Total 7.2 6.8 - 8.8 g/dL    Alkaline Phosphatase 89 40 - 150 U/L    ALT 12 0 - 50 U/L    AST 23 0 - 45 U/L   Amylase   Result Value Ref Range    Amylase 54 30 - 110 U/L   Lipase   Result Value Ref Range    Lipase 210 73 - 393 U/L   CBC with platelets and differential   Result Value Ref Range    WBC 6.5 4.0 - 11.0 10e9/L    RBC Count 3.83 3.8 - 5.2 10e12/L    Hemoglobin 11.0 (L) 11.7 - 15.7 g/dL    Hematocrit 33.2 (L) 35.0 - 47.0 %    MCV 87 78 - 100 fl    MCH 28.7 26.5 - 33.0 pg    MCHC 33.1 31.5 - 36.5 g/dL    RDW 13.6 10.0 - 15.0 %    Platelet Count 405 150 - 450 10e9/L    Diff Method Automated Method     % Neutrophils 55.2 %    % Lymphocytes 27.7 %    % Monocytes 13.4 %    % Eosinophils 2.9 %    % Basophils 0.8 %    Absolute Neutrophil 3.6 1.6 -  8.3 10e9/L    Absolute Lymphocytes 1.8 0.8 - 5.3 10e9/L    Absolute Monocytes 0.9 0.0 - 1.3 10e9/L    Absolute Eosinophils 0.2 0.0 - 0.7 10e9/L    Absolute Basophils 0.1 0.0 - 0.2 10e9/L     *Note: Due to a large number of results and/or encounters for the requested time period, some results have not been displayed. A complete set of results can be found in Results Review.     ASSESSMENT/PLAN:     1. Abdominal pain, epigastric  No acute distress.  Labs unremarkable for cause. Will add Protonix for symptoms.  Monitor and follow up with any worsening or ongoing symptoms.  Symptomatic care and follow up discussed.  - Comprehensive metabolic panel  - Amylase  - Lipase  - CBC with platelets and differential  - pantoprazole (PROTONIX) 40 MG EC tablet; Take 1 tablet (40 mg) by mouth daily Take 30-60 minutes before a meal.  Dispense: 30 tablet; Refill: 0    2. Gastroesophageal reflux disease with esophagitis  Per above.   - pantoprazole (PROTONIX) 40 MG EC tablet; Take 1 tablet (40 mg) by mouth daily Take 30-60 minutes before a meal.  Dispense: 30 tablet; Refill: 0    Home care instructions were reviewed with the patient. The risks, benefits and treatment options of prescribed medications or other treatments have been discussed with the patient. The patient verbalized their understanding and should call or follow up if no improvement or if they develop further problems.    Patient Instructions   Labs today-we will notify you with those results    Start the Protonix daily    Follow up if symptoms do not improve or any worsening symptoms      LACI Azul [facility]

## 2018-06-28 ENCOUNTER — HOSPITAL ENCOUNTER (OUTPATIENT)
Dept: BONE DENSITY | Facility: CLINIC | Age: 83
Discharge: HOME | End: 2018-06-28
Admitting: FAMILY MEDICINE
Payer: COMMERCIAL

## 2018-06-28 DIAGNOSIS — Z86.39 H/O VITAMIN D DEFICIENCY: ICD-10-CM

## 2018-06-28 PROCEDURE — 77080 DXA BONE DENSITY AXIAL: CPT

## 2018-07-05 ENCOUNTER — OFFICE VISIT (OUTPATIENT)
Dept: FAMILY MEDICINE | Facility: CLINIC | Age: 83
End: 2018-07-05
Payer: COMMERCIAL

## 2018-07-05 VITALS
SYSTOLIC BLOOD PRESSURE: 132 MMHG | TEMPERATURE: 96.1 F | DIASTOLIC BLOOD PRESSURE: 70 MMHG | HEART RATE: 75 BPM | HEIGHT: 58 IN | BODY MASS INDEX: 24.98 KG/M2 | WEIGHT: 119 LBS | OXYGEN SATURATION: 96 %

## 2018-07-05 DIAGNOSIS — J20.9 ACUTE BRONCHITIS, UNSPECIFIED ORGANISM: Primary | ICD-10-CM

## 2018-07-05 DIAGNOSIS — J45.30 MILD PERSISTENT ASTHMA WITHOUT COMPLICATION: ICD-10-CM

## 2018-07-05 PROCEDURE — 99214 OFFICE O/P EST MOD 30 MIN: CPT | Performed by: FAMILY MEDICINE

## 2018-07-05 RX ORDER — AZITHROMYCIN 250 MG/1
TABLET, FILM COATED ORAL
Qty: 6 TABLET | Refills: 0 | Status: SHIPPED | OUTPATIENT
Start: 2018-07-05 | End: 2018-07-27

## 2018-07-05 RX ORDER — ALBUTEROL SULFATE 90 UG/1
2 AEROSOL, METERED RESPIRATORY (INHALATION) EVERY 6 HOURS PRN
Qty: 1 INHALER | Refills: 11 | Status: SHIPPED | OUTPATIENT
Start: 2018-07-05 | End: 2020-05-27

## 2018-07-05 NOTE — PROGRESS NOTES
SUBJECTIVE:   Ellie Leigh is a 87 year old female who presents to clinic today for the following health issues:      ENT Symptoms             Symptoms: cc Present Absent Comment   Fever/Chills   x Very seldom gets a fever.  No chills.   Fatigue  x     Muscle Aches  x     Eye Irritation  x     Sneezing   x    Nasal Oniel/Drg  x     Sinus Pressure/Pain   x    Loss of smell  x     Dental pain   x    Sore Throat       Swollen Glands   x    Ear Pain/Fullness  x  Ear pain-bilateral off and on.   Cough x x  Coughing out green colored phlegm.   Wheeze  x     Chest Pain  x  Chest tightness and it hurts.   Shortness of breath  x     Rash   x    Other         Symptom duration:  The worst has been the past two days.  She has asthma and will cough due to that.   Symptom severity:  Getting worse.   Treatments tried:  None.   Contacts:  Unknown.       SINGULAIR:  She took her last pill last night.  She was wanting to wait and see if this would help her symptoms before taking again.      Current Outpatient Prescriptions:      Acetaminophen (TYLENOL PO), Take 975 mg by mouth 3 times daily, Disp: , Rfl:      albuterol (PROAIR HFA/PROVENTIL HFA/VENTOLIN HFA) 108 (90 BASE) MCG/ACT Inhaler, Inhale 2 puffs into the lungs every 6 hours as needed for shortness of breath / dyspnea, Disp: 3 Inhaler, Rfl: 1     CRANBERRY, Take 475 mg by mouth 2 times daily., Disp: , Rfl:      diclofenac (VOLTAREN) 1 % GEL topical gel, Place onto the skin 4 times daily as needed for moderate pain, Disp: , Rfl:      ipratropium - albuterol 0.5 mg/2.5 mg/3 mL (DUONEB) 0.5-2.5 (3) MG/3ML neb solution, TAKE 1 VIAL BY NEBULIZATION  EVERY SIX HOURS AS NEEDED FOR SHORTNESS OF BREATH/ DYSPNEA OR WHEEZING, Disp: 180 mL, Rfl: 9     levothyroxine (SYNTHROID/LEVOTHROID) 50 MCG tablet, TAKE ONE TABLET BY MOUTH ONE TIME DAILY , Disp: 90 tablet, Rfl: 1     metoprolol succinate (TOPROL-XL) 50 MG 24 hr tablet, TAKE 1 TAB BY MOUTH TWICE A DAY FOR HTN, Disp: 60 tablet, Rfl:  0     pantoprazole (PROTONIX) 40 MG EC tablet, Take 1 tablet (40 mg) by mouth daily Take 30-60 minutes before a meal., Disp: 30 tablet, Rfl: 0     polyethylene glycol (MIRALAX/GLYCOLAX) Packet, Take 1.5 packets by mouth daily, Disp: , Rfl:      probiotic CAPS, Take 1 capsule by mouth every evening , Disp: , Rfl:      ranitidine (ZANTAC) 150 MG tablet, Take 1 tablet (150 mg) by mouth 2 times daily, Disp: 60 tablet, Rfl: 11     sodium chloride 1 GM tablet, Take 1 tablet (1 g) by mouth 3 times daily, Disp: 100 tablet, Rfl: 11     SYMBICORT 160-4.5 MCG/ACT Inhaler, INHALE TWO PUFFS INTO THE LUNGS TWICE DAILY , Disp: 10.2 g, Rfl: 4     warfarin (COUMADIN) 1 MG tablet, 1 mg (1 mg x 1) every day or as directed by Anticoagulation Clinic., Disp: 96 tablet, Rfl: 0     montelukast (SINGULAIR) 10 MG tablet, Take 10 mg by mouth At Bedtime, Disp: , Rfl:     Patient Active Problem List   Diagnosis     Sensorineural hearing loss, asymmetrical     Generalized osteoarthrosis, unspecified site     Disease of lung     PERSONAL HISTORY OF MALIGNANCY- BREAST     Esophageal reflux     Chronic allergic conjunctivitis     Chronic seasonal allergic rhinitis     Hyperlipidemia LDL goal <130     Chronic constipation     Osteoporosis     Health Care Home     Right arm weakness     Ulnar neuropathy     Headache     Mild major depression     DVT (deep venous thrombosis)     Anxiety     Cervical vertebral fracture (H)     Intermittent atrial fibrillation (H)     Squamous cell carcinoma in situ of skin of face     SK (seborrheic keratosis)     Hypothyroidism     Hyponatremia     Recurrent UTI     History of skin cancer     BPPV (benign paroxysmal positional vertigo)     Benign essential HTN     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)     Positive fecal occult blood test     COPD (chronic obstructive pulmonary disease) (H)     Infectious colitis, enteritis and gastroenteritis     Advance Care Planning     Syncope     Hemoptysis     Long  "term current use of anticoagulant therapy     Mild persistent asthma without complication     Unresponsive episode     Irritable bowel syndrome without diarrhea     Elevated troponin     Nausea     Back pain     H/O TB (tuberculosis)     Chest pain     Community acquired pneumonia of right upper lobe of lung (H)       Blood pressure 132/70, pulse 75, temperature 96.1  F (35.6  C), temperature source Oral, height 4' 10\" (1.473 m), weight 119 lb (54 kg), last menstrual period 06/15/1985, SpO2 96 %, not currently breastfeeding.    Exam:  GENERAL APPEARANCE: healthy, alert and no distress  EYES: EOMI,  PERRL  HENT: ear canals and TM's normal and nose and mouth without ulcers or lesions  HENT: cerumen bilateral about 20%.   NECK: no adenopathy, no asymmetry, masses, or scars and thyroid normal to palpation  RESP: lungs clear to auscultation - no rales, rhonchi or wheezes  CV: regular rates and rhythm, normal S1 S2, no S3 or S4 and no murmur, click or rub -  ABDOMEN:  soft, nontender, no HSM or masses and bowel sounds normal  SKIN: no suspicious lesions or rashes  PSYCH: mentation appears normal and affect normal/bright      (J20.9) Acute bronchitis, unspecified organism  (primary encounter diagnosis)  Comment:   Plan: azithromycin (ZITHROMAX) 250 MG tablet        Take the med at 2 pills today with food and then one daily for the next 4 days. Call if any side effects.   Stay well hydrated. You may use the DM cough meds and elevate the head of the bed.     (J45.30) Mild persistent asthma without complication  Comment:   Plan: albuterol (PROAIR HFA/PROVENTIL HFA/VENTOLIN         HFA) 108 (90 Base) MCG/ACT Inhaler        Instructions given on diagnoses and identify and avoid triggers. The inhaler is refilled for one year.     Ross Davis      "

## 2018-07-05 NOTE — MR AVS SNAPSHOT
After Visit Summary   7/5/2018    Ellie Leigh    MRN: [id number]           Patient Information     Date Of Birth          9/12/1930        Visit Information        Provider Department      7/5/2018 11:20 AM Ross Davis MD [facility]        Today's Diagnoses     Acute bronchitis, unspecified organism    -  1    Mild persistent asthma without complication          Care Instructions          Thank you for choosing [facility].  You may be receiving a survey in the mail from [facility] regarding your visit today.  Please take a few minutes to complete and return the survey to let us know how we are doing.      If you have questions or concerns, please contact us via Wavesat or you can contact your care team at [phone].    Our Clinic hours are:  Monday 6:40 am  to 7:00 pm  Tuesday -Friday 6:40 am to 5:00 pm    The [place] outpatient lab hours are:  Monday - Friday 6:10 am to 4:45 pm  Saturdays 7:00 am to 11:00 am  Appointments are required, call [phone]    If you have clinical questions after hours or would like to schedule an appointment,  call the clinic at [phone].      (J20.9) Acute bronchitis, unspecified organism  (primary encounter diagnosis)  Comment:   Plan: azithromycin (ZITHROMAX) 250 MG tablet        Take the med at 2 pills today with food and then one daily for the next 4 days. Call if any side effects.   Stay well hydrated. You may use the DM cough meds and elevate the head of the bed.     (J45.30) Mild persistent asthma without complication  Comment:   Plan: albuterol (PROAIR HFA/PROVENTIL HFA/VENTOLIN         HFA) 108 (90 Base) MCG/ACT Inhaler        Instructions given on diagnoses and identify and avoid triggers. The inhaler is refilled for one year.               Follow-ups after your visit        Your next 10 appointments already scheduled     Jul 10, 2018 11:20 AM CDT   Pacemaker Check with [place] Device Rn   [facility]  "([facility])    [address]  [facility] [address]   [phone]            Jul 19, 2018 10:30 AM CDT   Anticoagulation Visit with [place] ANTI COAGIANCARLO   [facility] ([facility]    [address]  [facility] [address]   [phone]              Who to contact     If you have questions or need follow up information about today's clinic visit or your schedule please contact [facility] directly at [phone].  Normal or non-critical lab and imaging results will be communicated to you by MyChart, letter or phone within 4 business days after the clinic has received the results. If you do not hear from us within 7 days, please contact the clinic through MyChart or phone. If you have a critical or abnormal lab result, we will notify you by phone as soon as possible.  Submit refill requests through [employer] or call your pharmacy and they will forward the refill request to us. Please allow 3 business days for your refill to be completed.          Additional Information About Your Visit        Care EveryWhere ID     This is your Care EveryWhere ID. This could be used by other organizations to access your [place] medical records  [id number]        Your Vitals Were     Pulse Temperature Height Last Period Pulse Oximetry BMI (Body Mass Index)    75 96.1  F (35.6  C) (Oral) 4' 10\" (1.473 m) 06/15/1985 96% 24.87 kg/m2       Blood Pressure from Last 3 Encounters:   07/05/18 132/70   06/27/18 112/66   06/07/18 124/65    Weight from Last 3 Encounters:   07/05/18 119 lb (54 kg)   06/27/18 119 lb (54 kg)   06/07/18 117 lb 9.6 oz (53.3 kg)              Today, you had the following     No orders found for display         Today's Medication Changes          These changes are accurate as of 7/5/18 12:22 PM.  If you have any questions, ask your nurse or doctor.               Start taking these medicines.        Dose/Directions    azithromycin 250 MG tablet   Commonly " known as:  ZITHROMAX   Used for:  Acute bronchitis, unspecified organism   Started by:  Ross Davis MD        Two tablets first day, then one tablet daily for four days.   Quantity:  6 tablet   Refills:  0            Where to get your medicines      These medications were sent to [id number] - [place], [place] - [address]  [address] [address]     Phone:  [phone]     albuterol 108 (90 Base) MCG/ACT Inhaler    azithromycin 250 MG tablet                Primary Care Provider Office Phone # Fax #    Anjum Vidales [phone] [address] [address]        Equal Access to Services     [facility] HospitalNOLBERTO : Hadii daniel Interiano, waaxda leopoldo, qaybta kaalmada trini, verónica estrada. So [facility] [phone].    ATENCIÓN: Si habla español, tiene a sahni disposición servicios gratuitos de asistencia lingüística. Llame al [phone].    We comply with applicable federal civil rights laws and [place] laws. We do not discriminate on the basis of race, color, national origin, age, disability, sex, sexual orientation, or gender identity.            Thank you!     Thank you for choosing [facility]  for your care. Our goal is always to provide you with excellent care. Hearing back from our patients is one way we can continue to improve our services. Please take a few minutes to complete the written survey that you may receive in the mail after your visit with us. Thank you!             Your Updated Medication List - Protect others around you: Learn how to safely use, store and throw away your medicines at www.disposemymeds.org.          This list is accurate as of 7/5/18 12:22 PM.  Always use your most recent med list.                   Brand Name Dispense Instructions for use Diagnosis    albuterol 108 (90 Base) MCG/ACT Inhaler    PROAIR HFA/PROVENTIL HFA/VENTOLIN HFA    1 Inhaler     Inhale 2 puffs into the lungs every 6 hours as needed for shortness of breath / dyspnea    Mild persistent asthma without complication       azithromycin 250 MG tablet    ZITHROMAX    6 tablet    Two tablets first day, then one tablet daily for four days.    Acute bronchitis, unspecified organism       CRANBERRY      Take 475 mg by mouth 2 times daily.        diclofenac 1 % Gel topical gel    VOLTAREN     Place onto the skin 4 times daily as needed for moderate pain        ipratropium - albuterol 0.5 mg/2.5 mg/3 mL 0.5-2.5 (3) MG/3ML neb solution    DUONEB    180 mL    TAKE 1 VIAL BY NEBULIZATION  EVERY SIX HOURS AS NEEDED FOR SHORTNESS OF BREATH/ DYSPNEA OR WHEEZING    Chronic obstructive pulmonary disease, unspecified COPD type (H)       levothyroxine 50 MCG tablet    SYNTHROID/LEVOTHROID    90 tablet    TAKE ONE TABLET BY MOUTH ONE TIME DAILY    Hypothyroidism, unspecified type       metoprolol succinate 50 MG 24 hr tablet    TOPROL-XL    60 tablet    TAKE 1 TAB BY MOUTH TWICE A DAY FOR HTN    Essential hypertension, benign       montelukast 10 MG tablet    SINGULAIR     Take 10 mg by mouth At Bedtime        pantoprazole 40 MG EC tablet    PROTONIX    30 tablet    Take 1 tablet (40 mg) by mouth daily Take 30-60 minutes before a meal.    Gastroesophageal reflux disease with esophagitis, Abdominal pain, epigastric       polyethylene glycol Packet    MIRALAX/GLYCOLAX     Take 1.5 packets by mouth daily        probiotic Caps      Take 1 capsule by mouth every evening        ranitidine 150 MG tablet    ZANTAC    60 tablet    Take 1 tablet (150 mg) by mouth 2 times daily    Gastroesophageal reflux disease without esophagitis       sodium chloride 1 GM tablet     100 tablet    Take 1 tablet (1 g) by mouth 3 times daily    Hyponatremia       SYMBICORT 160-4.5 MCG/ACT Inhaler   Generic drug:  budesonide-formoterol     10.2 g    INHALE TWO PUFFS INTO THE LUNGS TWICE DAILY    Mild persistent asthma without complication        TYLENOL PO      Take 975 mg by mouth 3 times daily        warfarin 1 MG tablet    COUMADIN    96 tablet    1 mg (1 mg x 1) every day or as directed by Anticoagulation Clinic.    Intermittent atrial fibrillation (H)

## 2018-07-10 ENCOUNTER — HOSPITAL ENCOUNTER (OUTPATIENT)
Dept: CARDIOLOGY | Facility: CLINIC | Age: 83
Discharge: HOME | End: 2018-07-10
Attending: FAMILY MEDICINE | Admitting: FAMILY MEDICINE
Payer: COMMERCIAL

## 2018-07-10 ENCOUNTER — DOCUMENTATION ONLY (OUTPATIENT)
Dept: CARDIOLOGY | Facility: CLINIC | Age: 83
End: 2018-07-10

## 2018-07-10 PROCEDURE — 93288 INTERROG EVL PM/LDLS PM IP: CPT | Mod: 26 | Performed by: INTERNAL MEDICINE

## 2018-07-10 PROCEDURE — 93288 INTERROG EVL PM/LDLS PM IP: CPT

## 2018-07-10 NOTE — PROGRESS NOTES
Sarah Loza DR Pacemaker Device Check/ [place]  AP: 62 % : 4 %  Mode: DDD/CLS        Underlying Rhythm: SR/ frequent 2-4 beats of PAT  Heart Rate: Histogram is stable/ pt is active with no complaints  Sensing: Stable    Pacing Threshold: stable   Impedance: WnL  Battery Status: 6.8 years estimated longevity  Device Site: No issues  Atrial Arrhythmia: Time in mode switch in the past year: 8 days. Pt remains on Warfarin  Ventricular Arrhythmia: NONE  Setting Change: NONE    Care Plan: Remote in 3 months. sml

## 2018-07-15 ENCOUNTER — PATIENT OUTREACH (OUTPATIENT)
Dept: CARE COORDINATION | Facility: CLINIC | Age: 83
End: 2018-07-15

## 2018-07-15 NOTE — PROGRESS NOTES
Clinic Care Coordination Contact    Patient was last contacted 4/16/18 date. Chart reviewed. No concerns noted at this time. Patient has not outreached to RN CC.  Patient has a letter with my contact information and access plan to reference if needed. Will close to active CC outreaches at this time.    Steffanie Louis RN, [facility]  RN Care Coordinator  [facility]  Phone # [phone]  7/15/2018 1:29 PM

## 2018-07-17 ENCOUNTER — TRANSFERRED RECORDS (OUTPATIENT)
Dept: HEALTH INFORMATION MANAGEMENT | Facility: CLINIC | Age: 83
End: 2018-07-17

## 2018-07-18 ENCOUNTER — TELEPHONE (OUTPATIENT)
Dept: FAMILY MEDICINE | Facility: CLINIC | Age: 83
End: 2018-07-18

## 2018-07-19 ENCOUNTER — ANTICOAGULATION THERAPY VISIT (OUTPATIENT)
Dept: ANTICOAGULATION | Facility: CLINIC | Age: 83
End: 2018-07-19
Payer: COMMERCIAL

## 2018-07-19 DIAGNOSIS — Z79.01 LONG TERM CURRENT USE OF ANTICOAGULANT THERAPY: ICD-10-CM

## 2018-07-19 DIAGNOSIS — I82.409 DVT (DEEP VENOUS THROMBOSIS) (H): ICD-10-CM

## 2018-07-19 LAB — INR POINT OF CARE: 1.5 (ref 0.86–1.14)

## 2018-07-19 PROCEDURE — 85610 PROTHROMBIN TIME: CPT | Mod: QW

## 2018-07-19 PROCEDURE — 99207 ZZC NO CHARGE NURSE ONLY: CPT

## 2018-07-19 PROCEDURE — 36416 COLLJ CAPILLARY BLOOD SPEC: CPT

## 2018-07-19 NOTE — MR AVS SNAPSHOT
Ellie Leigh   7/19/2018 10:30 AM   Anticoagulation Therapy Visit    Description:  87 year old female   Provider:  [place] ANTI COAG   Department:  [place] [facility]           INR as of 7/19/2018     Today's INR 1.5      Anticoagulation Summary as of 7/19/2018     INR goal    Prior goal 1.5-2.0   Today's INR 1.5   Full warfarin instructions 0.5 mg on Wed; 1 mg all other days   Next INR check 8/2/2018    Indications   Atrial fibrillation with rapid ventricular response (H) (Resolved) [I48.91]  DVT (deep venous thrombosis) [I82.409]  Long term current use of anticoagulant therapy [Z79.01]         Your next Anticoagulation Clinic appointment(s)     Aug 02, 2018  3:45 PM CDT   Anticoagulation Visit with [place] ANTI COAG   [facility]    [address] [facility] [address] [facility] [address] [phone]              Contact Numbers     Please call [phone] with any problems or questions regarding your therapy.    If you need to cancel and/or reschedule your appointment please call one of the following numbers:  [facility] [phone]  [place] - [phone]  [facility] [phone]  [facility] [phone]  [place] - [phone]            July 2018 Details    Sun Mon Tue Wed Thu Fri Sat     1               2               3               4               5               6               7                 8               9               10               11               12               13               14                 15               16               17               18               19      1 mg   See details      20      1 mg         21      1 mg           22      1 mg         23      1 mg         24      1 mg         25      0.5 mg         26      1 mg         27      1 mg         28      1 mg           29      1 mg         30      1 mg         31      1 mg              Date Details   07/19 This INR check               How to take your warfarin dose     To take:  0.5 mg Take  0.5 of a 1 mg tablet.    To take:  1 mg Take 1 of the 1 mg tablets.           August 2018 Details    Sun Mon Tue Wed Thu Fri Sat        1      0.5 mg         2            3               4                 5               6               7               8               9               10               11                 12               13               14               15               16               17               18                 19               20               21               22               23               24               25                 26               27               28               29               30               31                 Date Details   No additional details    Date of next INR:  8/2/2018         How to take your warfarin dose     To take:  0.5 mg Take 0.5 of a 1 mg tablet.    To take:  1 mg Take 1 of the 1 mg tablets.

## 2018-07-19 NOTE — PROGRESS NOTES
ANTICOAGULATION FOLLOW-UP CLINIC VISIT    Patient Name:  Ellie Leigh  Date:  7/19/2018  Contact Type:  Face to Face accompanied w/ daughter    SUBJECTIVE:     Patient Findings     Positives Unexplained INR or factor level change    Comments No changes in medications, activity, or diet noted. No bleeding or increased bruising noted. Took warfarin as prescribed.  Patient is to continue maintenance warfarin plan, and check INR in 2 weeks - if pt continues to have a low INR, may consider increasing dose.  Patient and daughter verbalizes understanding and agrees to plan. No further questions or concerns.           OBJECTIVE    INR Protime   Date Value Ref Range Status   07/19/2018 1.5 (A) 0.86 - 1.14 Final       ASSESSMENT / PLAN  INR assessment SUB    Recheck INR In: 2 WEEKS    INR Location Clinic      Anticoagulation Summary as of 7/19/2018     INR goal    Prior goal 1.5-2.0   Today's INR 1.5   Warfarin maintenance plan 0.5 mg (1 mg x 0.5) on Wed; 1 mg (1 mg x 1) all other days   Full warfarin instructions 0.5 mg on Wed; 1 mg all other days   Weekly warfarin total 6.5 mg   No change documented Kylah Dorsey, AYLIN   Plan last modified Aysha Canada, RN (6/13/2018)   Next INR check 8/2/2018   Priority INR   Target end date Indefinite    Indications   Atrial fibrillation with rapid ventricular response (H) (Resolved) [I48.91]  DVT (deep venous thrombosis) [I82.409]  Long term current use of anticoagulant therapy [Z79.01]         Anticoagulation Episode Summary     INR check location     Preferred lab     Send INR reminders to [facility]    Comments * Actual INR goal is 1.7-2.3  Taking Celebrex PRN         Anticoagulation Care Providers     Provider Role Specialty Phone number    Anjum Vidales MD [facility] [phone]            See the Encounter Report to view Anticoagulation Flowsheet and Dosing Calendar (Go to Encounters tab in chart review, and find the Anticoagulation  Therapy Visit)        Kylah Dorsey RN

## 2018-07-26 ENCOUNTER — TELEPHONE (OUTPATIENT)
Dept: FAMILY MEDICINE | Facility: CLINIC | Age: 83
End: 2018-07-26

## 2018-07-26 NOTE — TELEPHONE ENCOUNTER
S-(situation): gets out of breath really bad and sometimes not out of breath, it comes and goes.      B-(background): she just doesn't feel good.  Not sure why she says. No fever , nausea, vomiting or diarrhea.    A-(assessment): not feeling well.  Does not need to go to the ED she says    R-(recommendations): she will call for appointment tomorrow  Celena Laurent RN

## 2018-07-26 NOTE — TELEPHONE ENCOUNTER
"Reason for call:  Patient reporting a symptom    Symptom or request: Pt said she feels SOB, burns in chest- Pt said I just don't know what to say \" I just don't feel good\". Pt was placed on a medication for GERD and didn't help.    Duration (how long have symptoms been present):  Two weeks    Have you been treated for this before? Yes    Phone Number patient can be reached at:  Home number on file [phone] (home)    Best Time:  Any Time      Can we leave a detailed message on this number:  YES    Call taken on 7/26/2018 at 11:13 AM by Luz Maria Liang    "

## 2018-07-27 ENCOUNTER — HOSPITAL ENCOUNTER (EMERGENCY)
Facility: CLINIC | Age: 83
Discharge: HOME | End: 2018-07-27
Attending: FAMILY MEDICINE | Admitting: FAMILY MEDICINE
Payer: COMMERCIAL

## 2018-07-27 VITALS
OXYGEN SATURATION: 95 % | SYSTOLIC BLOOD PRESSURE: 150 MMHG | RESPIRATION RATE: 16 BRPM | TEMPERATURE: 97.9 F | DIASTOLIC BLOOD PRESSURE: 74 MMHG

## 2018-07-27 DIAGNOSIS — A49.9 UTI (URINARY TRACT INFECTION), BACTERIAL: ICD-10-CM

## 2018-07-27 DIAGNOSIS — N39.0 UTI (URINARY TRACT INFECTION), BACTERIAL: ICD-10-CM

## 2018-07-27 LAB
ALBUMIN UR-MCNC: NEGATIVE MG/DL
APPEARANCE UR: ABNORMAL
BACTERIA #/AREA URNS HPF: ABNORMAL /HPF
BILIRUB UR QL STRIP: NEGATIVE
COLOR UR AUTO: YELLOW
GLUCOSE UR STRIP-MCNC: NEGATIVE MG/DL
HGB UR QL STRIP: NEGATIVE
KETONES UR STRIP-MCNC: NEGATIVE MG/DL
LEUKOCYTE ESTERASE UR QL STRIP: ABNORMAL
MUCOUS THREADS #/AREA URNS LPF: PRESENT /LPF
NITRATE UR QL: NEGATIVE
PH UR STRIP: 6 PH (ref 5–7)
RBC #/AREA URNS AUTO: 22 /HPF (ref 0–2)
SOURCE: ABNORMAL
SP GR UR STRIP: 1.02 (ref 1–1.03)
SQUAMOUS #/AREA URNS AUTO: 1 /HPF (ref 0–1)
TRANS CELLS #/AREA URNS HPF: 2 /HPF (ref 0–1)
UROBILINOGEN UR STRIP-MCNC: 0 MG/DL (ref 0–2)
WBC #/AREA URNS AUTO: >182 /HPF (ref 0–5)
WBC CLUMPS #/AREA URNS HPF: PRESENT /HPF

## 2018-07-27 PROCEDURE — 25000125 ZZHC RX 250: Performed by: FAMILY MEDICINE

## 2018-07-27 PROCEDURE — 99283 EMERGENCY DEPT VISIT LOW MDM: CPT | Performed by: FAMILY MEDICINE

## 2018-07-27 PROCEDURE — 25000132 ZZH RX MED GY IP 250 OP 250 PS 637: Performed by: FAMILY MEDICINE

## 2018-07-27 PROCEDURE — 99284 EMERGENCY DEPT VISIT MOD MDM: CPT | Mod: Z6 | Performed by: FAMILY MEDICINE

## 2018-07-27 PROCEDURE — 81001 URINALYSIS AUTO W/SCOPE: CPT | Performed by: FAMILY MEDICINE

## 2018-07-27 RX ORDER — CIPROFLOXACIN 500 MG/1
500 TABLET, FILM COATED ORAL 2 TIMES DAILY
Qty: 20 TABLET | Refills: 0 | Status: SHIPPED | OUTPATIENT
Start: 2018-07-27 | End: 2019-02-06

## 2018-07-27 RX ORDER — CIPROFLOXACIN 500 MG/1
500 TABLET, FILM COATED ORAL ONCE
Status: COMPLETED | OUTPATIENT
Start: 2018-07-27 | End: 2018-07-27

## 2018-07-27 RX ORDER — ONDANSETRON 4 MG/1
4 TABLET, ORALLY DISINTEGRATING ORAL ONCE
Status: COMPLETED | OUTPATIENT
Start: 2018-07-27 | End: 2018-07-27

## 2018-07-27 RX ADMIN — ONDANSETRON 4 MG: 4 TABLET, ORALLY DISINTEGRATING ORAL at 14:01

## 2018-07-27 RX ADMIN — CIPROFLOXACIN 500 MG: 500 TABLET, FILM COATED ORAL at 14:57

## 2018-07-27 NOTE — ED AVS SNAPSHOT
" [facility] Emergency Department    [address] [facility] [address]    Phone:  [phone]    Fax:  [phone]                                       Ellie Leigh   MRN: [id number]    Department:  [facility] Emergency Department   Date of Visit:  7/27/2018           Patient Information     Date Of Birth          9/12/1930        Your diagnoses for this visit were:     UTI (urinary tract infection), bacterial        You were seen by Herb Carrero MD.      Follow-up Information     Schedule an appointment as soon as possible for a visit with Anjum Vidales MD.    Specialty:  Family Practice    Why:  As needed, If symptoms worsen    Contact information:    [address] [place] [address]  [phone]          Discharge Instructions          * BLADDER INFECTION,Female (Adult)    A bladder infection (\"cystitis\" or \"UTI\") usually causes a constant urge to urinate and a burning when passing urine. Urine may be cloudy, smelly or dark. There may be pain in the lower abdomen. A bladder infection occurs when bacteria from the vaginal area enter the bladder opening (urethra). This can occur from sexual intercourse, wearing tight clothing, dehydration and other factors.  HOME CARE:  1. Drink lots of fluids (at least 6-8 glasses a day, unless you must restrict fluids for other medical reasons). This will force the medicine into your urinary system and flush the bacteria out of your body. Cranberry juice has been shown to help clear out the bacteria.  2. Avoid sexual intercourse until your symptoms are gone.  3. A bladder infection is treated with antibiotics. You may also be given Pyridium (generic = phenazopyridine) to reduce the burning sensation. This medicine will cause your urine to become a bright orange color. The orange urine may stain clothing. You may wear a pad or panty-liner to protect clothing.  PREVENTING FUTURE INFECTIONS:  1. Always wipe from front to back after a bowel " movement.  2. Keep the genital area clean and dry.  3. Drink plenty of fluids each day to avoid dehydration.  4. Urinate right after intercourse to flush out the bladder.  5. Wear cotton underwear and cotton-lined panty hose; avoid tight-fitting pants.  6. If you are on birth control pills and are having frequent bladder infections, discuss with your doctor.  FOLLOW UP: Return to this facility or see your doctor if ALL symptoms are not gone after three days of treatment.  GET PROMPT MEDICAL ATTENTION if any of the following occur:    Fever over 101 F (38.3 C)    No improvement by the third day of treatment    Increasing back or abdominal pain    Repeated vomiting; unable to keep medicine down    Weakness, dizziness or fainting    Vaginal discharge    Pain, redness or swelling in the labia (outer vaginal area)    3680-3075 [employer] [address] [place] [address]. All rights reserved. This information is not intended as a substitute for professional medical care. Always follow your healthcare professional's instructions.  This information has been modified by your health care provider with permission from the publisher.  Push fluids, rest.  Cipro 500 mg 2 times daily ×10 days.  Please complete all of the antibiotic.  If you are not feeling better over the course of the next couple of days please return to the emergency department.      Your next 10 appointments already scheduled     Aug 02, 2018  3:45 PM CDT   An[facility] with [place] [facility]   [facility]    [address] [facility] [address]   [phone]            Oct 22, 2018 10:30 AM CDT   Remote PPM Check with [facility] [facility]   [facility] ([facility])    [address] [facility] [address]   [phone]           This appointment is for a remote check of your pacemaker.  This is not an appointment at the office.              24  Hour Appointment Hotline       To make an appointment at any [facility], call [phone] ([phone]). If you don't have a family doctor or clinic, we will help you find one. [facility] clinics are conveniently located to serve the needs of you and your family.             Review of your medicines      START taking        Dose / Directions Last dose taken    ciprofloxacin 500 MG tablet   Commonly known as:  CIPRO   Dose:  500 mg   Quantity:  20 tablet        Take 1 tablet (500 mg) by mouth 2 times daily for 10 days   Refills:  0          Our records show that you are taking the medicines listed below. If these are incorrect, please call your family doctor or clinic.        Dose / Directions Last dose taken    albuterol 108 (90 Base) MCG/ACT Inhaler   Commonly known as:  PROAIR HFA/PROVENTIL HFA/VENTOLIN HFA   Dose:  2 puff   Quantity:  1 Inhaler        Inhale 2 puffs into the lungs every 6 hours as needed for shortness of breath / dyspnea   Refills:  11        CRANBERRY   Dose:  475 mg        Take 475 mg by mouth 2 times daily.   Refills:  0        diclofenac 1 % Gel topical gel   Commonly known as:  VOLTAREN        Place onto the skin 4 times daily as needed for moderate pain   Refills:  0        ipratropium - albuterol 0.5 mg/2.5 mg/3 mL 0.5-2.5 (3) MG/3ML neb solution   Commonly known as:  DUONEB   Quantity:  180 mL        TAKE 1 VIAL BY NEBULIZATION  EVERY SIX HOURS AS NEEDED FOR SHORTNESS OF BREATH/ DYSPNEA OR WHEEZING   Refills:  9        levothyroxine 50 MCG tablet   Commonly known as:  SYNTHROID/LEVOTHROID   Quantity:  90 tablet        TAKE ONE TABLET BY MOUTH ONE TIME DAILY   Refills:  1        metoprolol succinate 50 MG 24 hr tablet   Commonly known as:  TOPROL-XL   Quantity:  60 tablet        TAKE 1 TAB BY MOUTH TWICE A DAY FOR HTN   Refills:  0        pantoprazole 40 MG EC tablet   Commonly known as:  PROTONIX   Dose:  40 mg   Quantity:  30 tablet        Take 1 tablet (40 mg) by mouth daily  Take 30-60 minutes before a meal.   Refills:  0        polyethylene glycol Packet   Commonly known as:  MIRALAX/GLYCOLAX   Dose:  17 g        Take 17 g by mouth daily   Refills:  0        probiotic Caps   Dose:  1 capsule        Take 1 capsule by mouth every evening   Refills:  0        ranitidine 150 MG tablet   Commonly known as:  ZANTAC   Dose:  150 mg   Quantity:  60 tablet        Take 1 tablet (150 mg) by mouth 2 times daily   Refills:  11        sodium chloride 1 GM tablet   Dose:  1 g   Quantity:  100 tablet        Take 1 tablet (1 g) by mouth 3 times daily   Refills:  11        SYMBICORT 160-4.5 MCG/ACT Inhaler   Quantity:  10.2 g   Generic drug:  budesonide-formoterol        INHALE TWO PUFFS INTO THE LUNGS TWICE DAILY   Refills:  4        TYLENOL PO   Dose:  975 mg        Take 975 mg by mouth 3 times daily   Refills:  0        warfarin 1 MG tablet   Commonly known as:  COUMADIN   Quantity:  96 tablet        1 mg (1 mg x 1) every day or as directed by Anticoagulation Clinic.   Refills:  0                Prescriptions were sent or printed at these locations (1 Prescription)                   [id number] - [place], [place] - [address] [place] [address]   [address] [facility] [address]    Telephone:  [phone]   Fax:  [phone]   Hours:                  E-Prescribed (1 of 1)         ciprofloxacin (CIPRO) 500 MG tablet                Procedures and tests performed during your visit     UA reflex to Microscopic      Orders Needing Specimen Collection     None      Pending Results     No orders found from 7/25/2018 to 7/28/2018.            Pending Culture Results     No orders found from 7/25/2018 to 7/28/2018.            Pending Results Instructions     If you had any lab results that were not finalized at the time of your Discharge, you can call the ED Lab Result RN at [phone]. You will be contacted by this team for any positive Lab results or changes in treatment. The nurses are  available 7 days a week from 10A to 6:30P.  You can leave a message 24 hours per day and they will return your call.        Test Results From Your Hospital Stay        7/27/2018  2:14 PM      Component Results     Component Value Ref Range & Units Status    Color Urine Yellow  Final    Appearance Urine Cloudy  Final    Glucose Urine Negative NEG^Negative mg/dL Final    Bilirubin Urine Negative NEG^Negative Final    Ketones Urine Negative NEG^Negative mg/dL Final    Specific Gravity Urine 1.017 1.003 - 1.035 Final    Blood Urine Negative NEG^Negative Final    pH Urine 6.0 5.0 - 7.0 pH Final    Protein Albumin Urine Negative NEG^Negative mg/dL Final    Urobilinogen mg/dL 0.0 0.0 - 2.0 mg/dL Final    Nitrite Urine Negative NEG^Negative Final    Leukocyte Esterase Urine Large (A) NEG^Negative Final    Source Midstream Urine  Final    RBC Urine 22 (H) 0 - 2 /HPF Final    WBC Urine >182 (H) 0 - 5 /HPF Final    WBC Clumps Present (A) NEG^Negative /HPF Final    Bacteria Urine Few (A) NEG^Negative /HPF Final    Squamous Epithelial /HPF Urine 1 0 - 1 /HPF Final    Transitional Epi 2 (H) 0 - 1 /HPF Final    Mucous Urine Present (A) NEG^Negative /LPF Final                Thank you for choosing [facility]       Thank you for choosing [facility] for your care. Our goal is always to provide you with excellent care. Hearing back from our patients is one way we can continue to improve our services. Please take a few minutes to complete the written survey that you may receive in the mail after you visit with us. Thank you!        Care EveryWhere ID     This is your Care EveryWhere ID. This could be used by other organizations to access your [facility] medical records  [id number]        Equal Access to Services     BROOKS JOSÉ : Reyna Interiano, bill mina, verónica capps idiin hayaan adeeg kharash la'aan . [facility] [phone].    ATENCIÓN: Si habla español, tiene a sahni disposición servicios  mikaela de asistencia lingüística. Darius tanner [phone].    We comply with applicable federal civil rights laws and [place] laws. We do not discriminate on the basis of race, color, national origin, age, disability, sex, sexual orientation, or gender identity.            After Visit Summary       This is your record. Keep this with you and show to your community pharmacist(s) and doctor(s) at your next visit.

## 2018-07-27 NOTE — ED NOTES
Pt here with burning and pain with urination that started this morning. Has a hx of UTI's with these symptoms. She also mentions that she has epigastric discomfort occasionally, not correlated with eating. Feels nauseated when she has the pain, no vomiting or diarrhea. Having normal bowel movements, last BM today, no blood in stools. Had her gallbladder removed years ago.

## 2018-07-27 NOTE — ED AVS SNAPSHOT
[facility] Emergency Department    [address] [facility] [address]    Phone:  [phone]    Fax:  [phone]                                       Ellie Leigh   MRN: [id number]    Department:  [facility] Emergency Department   Date of Visit:  7/27/2018           After Visit Summary Signature Page     I have received my discharge instructions, and my questions have been answered. I have discussed any challenges I see with this plan with the nurse or doctor.    ..........................................................................................................................................  Patient/Patient Representative Signature      ..........................................................................................................................................  Patient Representative Print Name and Relationship to Patient    ..................................................               ................................................  Date                                            Time    ..........................................................................................................................................  Reviewed by Signature/Title    ...................................................              ..............................................  Date                                                            Time

## 2018-07-27 NOTE — DISCHARGE INSTRUCTIONS
"   * BLADDER INFECTION,Female (Adult)    A bladder infection (\"cystitis\" or \"UTI\") usually causes a constant urge to urinate and a burning when passing urine. Urine may be cloudy, smelly or dark. There may be pain in the lower abdomen. A bladder infection occurs when bacteria from the vaginal area enter the bladder opening (urethra). This can occur from sexual intercourse, wearing tight clothing, dehydration and other factors.  HOME CARE:  1. Drink lots of fluids (at least 6-8 glasses a day, unless you must restrict fluids for other medical reasons). This will force the medicine into your urinary system and flush the bacteria out of your body. Cranberry juice has been shown to help clear out the bacteria.  2. Avoid sexual intercourse until your symptoms are gone.  3. A bladder infection is treated with antibiotics. You may also be given Pyridium (generic = phenazopyridine) to reduce the burning sensation. This medicine will cause your urine to become a bright orange color. The orange urine may stain clothing. You may wear a pad or panty-liner to protect clothing.  PREVENTING FUTURE INFECTIONS:  1. Always wipe from front to back after a bowel movement.  2. Keep the genital area clean and dry.  3. Drink plenty of fluids each day to avoid dehydration.  4. Urinate right after intercourse to flush out the bladder.  5. Wear cotton underwear and cotton-lined panty hose; avoid tight-fitting pants.  6. If you are on birth control pills and are having frequent bladder infections, discuss with your doctor.  FOLLOW UP: Return to this facility or see your doctor if ALL symptoms are not gone after three days of treatment.  GET PROMPT MEDICAL ATTENTION if any of the following occur:    Fever over 101 F (38.3 C)    No improvement by the third day of treatment    Increasing back or abdominal pain    Repeated vomiting; unable to keep medicine down    Weakness, dizziness or fainting    Vaginal discharge    Pain, redness or swelling in " the labia (outer vaginal area)    5559-7496 [employer]. [address] [place] [address], [place] [address]. All rights reserved. This information is not intended as a substitute for professional medical care. Always follow your healthcare professional's instructions.  This information has been modified by your health care provider with permission from the publisher.  Push fluids, rest.  Cipro 500 mg 2 times daily ×10 days.  Please complete all of the antibiotic.  If you are not feeling better over the course of the next couple of days please return to the emergency department.

## 2018-07-27 NOTE — ED PROVIDER NOTES
History     Chief Complaint   Patient presents with     Abdominal Pain     burning with urination   hx of frequent uti     HPI  Ellie Leigh is a 87 year old female, past medical history is significant for community acquired pneumonia, chronic back pain, nausea, irritable bowel syndrome, chronic anticoagulant therapy, syncope, COPD, SIADH, BPPV, recurrent UTI, hypertension, chronic constipation, osteoporosis, hyperlipidemia, chronic conjunctivitis, chronic seasonal allergic rhinitis, GERD, osteoarthritis, presents to the emergency department with concerns of less than 24 hours of urinary frequency, dysuria and urgency.  She notes no fever chills or sweats.  No nausea or vomiting.  She states she always has some abdominal discomfort, perhaps slightly more than usual.  No change in bowel habit no constipation or diarrhea.        Problem List:    Patient Active Problem List    Diagnosis Date Noted     Chest pain 04/14/2018     Priority: Medium     Community acquired pneumonia of right upper lobe of lung (H) 04/14/2018     Priority: Medium     H/O TB (tuberculosis) 02/09/2018     Priority: Medium     Back pain 02/06/2018     Priority: Medium     Nausea 06/05/2017     Priority: Medium     Elevated troponin 08/21/2016     Priority: Medium     Irritable bowel syndrome without diarrhea 05/02/2016     Priority: Medium     Unresponsive episode 03/18/2016     Priority: Medium     Mild persistent asthma without complication 12/01/2015     Priority: Medium     Long term current use of anticoagulant therapy 03/02/2015     Priority: Medium     Problem list name updated by automated process. Provider to review       Hemoptysis 01/21/2015     Priority: Medium     Syncope 01/12/2015     Priority: Medium     Advance Care Planning 01/09/2015     Priority: Medium     Advance Care Planning 7/2/2015: Receipt of ACP document:  Received: Health Care Directive which was witnessed or notarized on 1-9-15.  Document previously scanned on  2-27-15.  Validation form completed and sent to be scanned.  Code Status reflects choices in most recent ACP document.  Confirmed/documented designated decision maker(s).  Added by Verónica Green RN, System ACP Coordinator Honoring Choices   1/9/15 Copy to be scanned into chart Beulah Mathis CMA         COPD (chronic obstructive pulmonary disease) (H) 10/06/2014     Priority: Medium     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) 07/07/2014     Priority: Medium     Cause TBD.  Patient has had lung CT, will see Dr Gómez for consideration of further workup.  Also has pulmonology appt 8/18.  5/2/2018:Reviewing chart she has had hyponatremia since 2010.        Positive fecal occult blood test 07/07/2014     Priority: Medium     x2 -- first was in ED.  Patient expresses that she does not want colonoscopy.  She'll set appt to discuss with her PCP.       Benign essential HTN 02/11/2014     Priority: Medium     BPPV (benign paroxysmal positional vertigo) 11/05/2013     Priority: Medium     History of skin cancer 05/07/2013     Priority: Medium     Recurrent UTI 07/16/2012     Priority: Medium     recurrent UTI  Recent Urologic workup neg, 2005  Has Cipro at home for treatment as needed       Hypothyroidism 05/07/2012     Priority: Medium     Hyponatremia 05/07/2012     Priority: Medium     Squamous cell carcinoma in situ of skin of face 04/19/2012     Priority: Medium     SK (seborrheic keratosis) 04/19/2012     Priority: Medium     Intermittent atrial fibrillation (H) 12/01/2011     Priority: Medium     Cervical vertebral fracture (H) 11/20/2011     Priority: Medium     Anxiety 11/15/2011     Priority: Medium     DVT (deep venous thrombosis) 10/12/2011     Priority: Medium     H/o in past, on current coumadin therapy.       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 Provider to review       Right arm weakness  07/29/2011     Priority: Medium     Ulnar neuropathy 07/29/2011     Priority: Medium     Health Care Home 04/21/2011     Priority: Medium     Conchis Travis, [phone]  [facility]              DX V65.8 REPLACED WITH [id number] HEALTH CARE HOME (04/08/2013)       Chronic constipation 03/03/2011     Priority: Medium     Osteoporosis 03/03/2011     Priority: Medium     Hyperlipidemia LDL goal <130 10/31/2010     Priority: Medium     Infectious colitis, enteritis and gastroenteritis 07/10/2010     Priority: Medium     Chronic allergic conjunctivitis 11/18/2008     Priority: Medium     Chronic seasonal allergic rhinitis 11/18/2008     Priority: Medium     Esophageal reflux 11/29/2007     Priority: Medium     PERSONAL HISTORY OF MALIGNANCY- BREAST 06/13/2007     Priority: Medium     Disease of lung 12/27/2006     Priority: Medium     Pt with chronic RUL infiltrate with pos AFB sputum  Full treatmetn for TB following ID consult in 2000's  Problem list name updated by automated process. Provider to review       Sensorineural hearing loss, asymmetrical 06/20/2005     Priority: Medium     Generalized osteoarthrosis, unspecified site 06/20/2005     Priority: Medium     Right hip and knee are the most symptomatic          Past Medical History:    Past Medical History:   Diagnosis Date     Breast cancer (H)      COPD (chronic obstructive pulmonary disease) (H)      Dust allergy      DVT (deep vein thrombosis) in pregnancy (H)      HTN (hypertension)      Hyponatremia      Hypothyroid      Intermittent atrial fibrillation (H) 12/1/2011     Loose body in joint 4/15/2011     Neck fracture (H)      Syncope 5/12/2013       Past Surgical History:    Past Surgical History:   Procedure Laterality Date     APPENDECTOMY       ARTHROSCOPY KNEE  4/15/2011    Procedure:ARTHROSCOPY KNEE; removal of loose body; Surgeon:LEY, JEFFREY DUANE; Location:[facility]     CHOLECYSTECTOMY       ESOPHAGOSCOPY, GASTROSCOPY,  DUODENOSCOPY (EGD), COMBINED  6/9/2014    Procedure: COMBINED ESOPHAGOSCOPY, GASTROSCOPY, DUODENOSCOPY (EGD);  Surgeon: Gilberto Richard MD;  Location: [facility]     IMPLANT PACEMAKER  5/21/2013    Biotronitrinity Buttsl [id number] Serial#[id number]     INJECT EPIDURAL LUMBAR  3/23/2011    INJECT EPIDURAL LUMBAR performed by GENERIC ANESTHESIA PROVIDER at [facility] OR     JOINT REPLACEMENT, HIP RT/LT      Joint Replacement Hip Rt     MASTECTOMY, SIMPLE RT/LT/CAITLYN      Left breast - following breast ca     OTHER SURGICAL HISTORY      C1-C2 fusion after fx      SURGICAL HISTORY OF -   05/22/2001    Colonoscopy     TONSILLECTOMY         Family History:    Family History   Problem Relation Age of Onset     Cerebrovascular Disease Mother      HEART DISEASE Mother      MI     Cerebrovascular Disease Father      HEART DISEASE Father      MI     Respiratory Maternal Grandfather      TB     Neurologic Disorder Brother      ALS     HEART DISEASE Brother      Cerebrovascular Disease Brother      Breast Cancer Daughter      age:49     Asthma Brother      Cancer Brother      brain and lung     Cancer Daughter      thyroid       Social History:  Marital Status:   [5]  Social History   Substance Use Topics     Smoking status: Never Smoker     Smokeless tobacco: Never Used     Alcohol use No        Medications:      Acetaminophen (TYLENOL PO)   albuterol (PROAIR HFA/PROVENTIL HFA/VENTOLIN HFA) 108 (90 Base) MCG/ACT Inhaler   ciprofloxacin (CIPRO) 500 MG tablet   CRANBERRY   diclofenac (VOLTAREN) 1 % GEL topical gel   ipratropium - albuterol 0.5 mg/2.5 mg/3 mL (DUONEB) 0.5-2.5 (3) MG/3ML neb solution   levothyroxine (SYNTHROID/LEVOTHROID) 50 MCG tablet   metoprolol succinate (TOPROL-XL) 50 MG 24 hr tablet   pantoprazole (PROTONIX) 40 MG EC tablet   polyethylene glycol (MIRALAX/GLYCOLAX) Packet   probiotic CAPS   ranitidine (ZANTAC) 150 MG tablet   sodium chloride 1 GM tablet   SYMBICORT 160-4.5 MCG/ACT Inhaler   warfarin (COUMADIN) 1 MG tablet          Review of Systems   All other systems reviewed and are negative.      Physical Exam   BP: 141/76  Heart Rate: 72  Temp: 97.9  F (36.6  C)  Resp: 16  SpO2: 95 %      Physical Exam   Constitutional: She is oriented to person, place, and time. She appears well-developed and well-nourished.   Pleasant, does not appear ill or toxic.   HENT:   Head: Normocephalic and atraumatic.   Right Ear: External ear normal.   Left Ear: External ear normal.   Nose: Nose normal.   Mouth/Throat: Oropharynx is clear and moist.   Eyes: Conjunctivae and EOM are normal. Pupils are equal, round, and reactive to light.   Neck: Normal range of motion. Neck supple.   Cardiovascular: Normal rate, regular rhythm, normal heart sounds and intact distal pulses.    Pulmonary/Chest: Effort normal and breath sounds normal.   Abdominal: Soft. Bowel sounds are normal.   Musculoskeletal: Normal range of motion.   Neurological: She is alert and oriented to person, place, and time.   Skin: Skin is warm and dry.   Psychiatric: She has a normal mood and affect. Her behavior is normal.   Nursing note and vitals reviewed.      ED Course     ED Course     Procedures               Critical Care time:  none               Results for orders placed or performed during the hospital encounter of 07/27/18 (from the past 24 hour(s))   UA reflex to Microscopic   Result Value Ref Range    Color Urine Yellow     Appearance Urine Cloudy     Glucose Urine Negative NEG^Negative mg/dL    Bilirubin Urine Negative NEG^Negative    Ketones Urine Negative NEG^Negative mg/dL    Specific Gravity Urine 1.017 1.003 - 1.035    Blood Urine Negative NEG^Negative    pH Urine 6.0 5.0 - 7.0 pH    Protein Albumin Urine Negative NEG^Negative mg/dL    Urobilinogen mg/dL 0.0 0.0 - 2.0 mg/dL    Nitrite Urine Negative NEG^Negative    Leukocyte Esterase Urine Large (A) NEG^Negative    Source Midstream Urine     RBC Urine 22 (H) 0 - 2 /HPF    WBC Urine >182 (H) 0 - 5 /HPF    WBC Clumps  Present (A) NEG^Negative /HPF    Bacteria Urine Few (A) NEG^Negative /HPF    Squamous Epithelial /HPF Urine 1 0 - 1 /HPF    Transitional Epi 2 (H) 0 - 1 /HPF    Mucous Urine Present (A) NEG^Negative /LPF     *Note: Due to a large number of results and/or encounters for the requested time period, some results have not been displayed. A complete set of results can be found in Results Review.       Medications   ciprofloxacin (CIPRO) tablet 500 mg (not administered)   ondansetron (ZOFRAN-ODT) ODT tab 4 mg (4 mg Oral Given 7/27/18 1401)     2:56 PM  Reviewed urinalysis with the patient and her daughter in the room.  I think given the fact that she is not nauseated or vomiting, afebrile and has no concerns for potentially surgical process on exam on her abdomen that she would be a good candidate for outpatient therapy.  I do not feel that we need to do more diagnostic testing in the emergency department given her stability at the present time.  They were in agreement with this plan and plan for disposition to home with oral antibiotics fluids and return if not improving or worse.    Assessments & Plan (with Medical Decision Making)   Assessment and plan with medical decision making at the time stamp above.  Disposition is to home in the care of her daughter.    I have reviewed the nursing notes.    I have reviewed the findings, diagnosis, plan and need for follow up with the patient.       New Prescriptions    CIPROFLOXACIN (CIPRO) 500 MG TABLET    Take 1 tablet (500 mg) by mouth 2 times daily for 10 days       Final diagnoses:   UTI (urinary tract infection), bacterial       7/27/2018   [facility] EMERGENCY DEPARTMENT     Herb Carrero MD  07/27/18 7031

## 2018-07-30 ENCOUNTER — PATIENT OUTREACH (OUTPATIENT)
Dept: CARE COORDINATION | Facility: CLINIC | Age: 83
End: 2018-07-30

## 2018-07-30 NOTE — LETTER
Health Care Home - Access Care Plan    About Me  Patient Name:  Ellie Leigh    YOB: 1930  Age:                             87 year old   [place] MRN:            [id number] Telephone Information:     Home Phone [phone]   Mobile [phone]       Address:    [address]  [facility] [address] Email address:  No e-mail address on record      Emergency Contact(s)  Name Relationship Lgl Grd Work Phone Home Phone Mobile Phone   1. SAMRA MACIAS Daughter  [phone]   2. ARLENE CRISTOBAL Daughter  [phone]             Health Maintenance: Routine Health maintenance Reviewed: Due/Overdue   Health Maintenance Due   Topic Date Due     TETANUS IMMUNIZATION (SYSTEM ASSIGNED)  07/11/2018         My Access Plan  Medical Emergency 911   Questions or concerns during clinic hours Primary Clinic Line, I will call the clinic directly: [facility] - [phone]   24 Hour Appointment Line [phone] or  [phone]) (toll free)   24 Hour Nurse Line [phone] (toll free)   Questions or concerns outside clinic hours 24 Hour Appointment Line, I will call the after-hours on-call line:   [facility] [phone] or [phone]) (toll-free)   Preferred Urgent Care [facility] [phone]   Preferred Hospital [place], [place]  [phone]   Preferred Pharmacy [id number] [place], [place] - [address]     Behavioral Health Crisis Line The National Suicide Prevention Lifeline at 1-159.457.8386 or 911     My Care Team Members  Patient Care Team       Relationship Specialty Notifications Start End    Anjum Vidales MD PCP - General Family Practice  3/29/18     Phone: [phone] Fax: [phone] [address] [facility] [address]    Gayle Nieves MD MD Oncology Admissions 1/6/14     Phone: [phone] Pager:  [phone] Fax: [phone] [facility] [address] [facility] [address]    Steffanei Louis, RN Clinic Care Coordinator Primary Care - CC Admissions 7/30/18     Phone: [phone] Fax: [phone]               My Medical and Care Information  Problem List   Patient Active Problem List   Diagnosis     Sensorineural hearing loss, asymmetrical     Generalized osteoarthrosis, unspecified site     Disease of lung     PERSONAL HISTORY OF MALIGNANCY- BREAST     Esophageal reflux     Chronic allergic conjunctivitis     Chronic seasonal allergic rhinitis     Hyperlipidemia LDL goal <130     Chronic constipation     Osteoporosis     Health Care Home     Right arm weakness     Ulnar neuropathy     Headache     Mild major depression     DVT (deep venous thrombosis)     Anxiety     Cervical vertebral fracture (H)     Intermittent atrial fibrillation (H)     Squamous cell carcinoma in situ of skin of face     SK (seborrheic keratosis)     Hypothyroidism     Hyponatremia     Recurrent UTI     History of skin cancer     BPPV (benign paroxysmal positional vertigo)     Benign essential HTN     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)     Positive fecal occult blood test     COPD (chronic obstructive pulmonary disease) (H)     Infectious colitis, enteritis and gastroenteritis     Advance Care Planning     Syncope     Hemoptysis     Long term current use of anticoagulant therapy     Mild persistent asthma without complication     Unresponsive episode     Irritable bowel syndrome without diarrhea     Elevated troponin     Nausea     Back pain     H/O TB (tuberculosis)     Chest pain     Community acquired pneumonia of right upper lobe of lung (H)      Current Medications and Allergies:  See printed Medication Report

## 2018-07-30 NOTE — PROGRESS NOTES
Clinic Care Coordination Contact    Clinic Care Coordination Contact  OUTREACH    Referral Information:  Referral Source: ED Follow-Up    Primary Diagnosis: Genitourinary Disorders    Chief Complaint   Patient presents with     ER F/U     Nurse: ER f/u 7/27/18 UTI        Universal Utilization: Pt has had 5 ED/ 2 Hospital visits in past 90 days;  Pt did trigger high for risk of readmission-ED/hospital utilization.      Clinic Utilization  Difficulty keeping appointments:: No  Utilization    Last refreshed: 7/30/2018  8:05 AM:  No Show Count (past year) 0       Last refreshed: 7/30/2018  8:05 AM:  ED visits 5       Last refreshed: 7/30/2018  8:05 AM:  Hospital admissions 2          Current as of: 7/30/2018  8:05 AM             Clinical Concerns:  Current Medical Concerns:  Care Coordinator RN spoke with patient and patient's daughter, patient is reporting having significant nausea today. She is taking her Cipro after breakfast and after supper but states she is very sensitive to medications and has list of medications in her allergies that cause her nausea. Care Coordinator RN asked patient if she is trying to drink, she said yes. She reports having tried ginger ale, Care Coordinator RN recommended trying some cocoa cola as there is a old wives tale that states the Silvia in the cocoa cola tends to help settle the tummy as well, she verbalized understanding. She denies having a fever but is having a bad cough, reports coughing up green colored phlegm and this morning had a tint of red in it. Due to patient's symptoms, Care Coordinator RN did recommend patient go to the ER for evaluation however she refused. There is no openings today in [facility] or [place] except for urgent care. Patient asked if there is any openings tomorrow at any of the locations, Care Coordinator RN did look and found one with Lissy Cavazos in [place] at 9am, she verbalized she will take that, appointment made. She denies having  any other questions or concerns right now. Care Coordinator RN did recommend that should her symptoms worsen before tomorrows appointment that it would be highly recommended she be seen in the ER, she and daughter Suyapa verbalized understanding.    Current Behavioral Concerns: No concerns at this time.      Education Provided to patient: RN CC educated about Care Coordination Services, discharge instructions, medications reviewed and follow up.      Pain  Chronic pain (GOAL):: No  Health Maintenance Reviewed: Due/Overdue   Health Maintenance Due   Topic Date Due     TETANUS IMMUNIZATION (SYSTEM ASSIGNED)  07/11/2018       Clinical Pathway: None    Medication Management:  Reports he daughter helps/oversees pillbox set up/med administration.    Functional Status:  Dependent ADLs:: Ambulation-walker, Bathing, Dressing  Dependent IADLs:: Transportation, Money Management, Medication Management, Meal Preparation, Shopping, Laundry, Cooking, Cleaning  Bed or wheelchair confined:: No  Mobility Status: Independent w/Device  Fallen 2 or more times in the past year?: No  Any fall with injury in the past year?: No    Living Situation:  Current living arrangement:: I live in a private home with family  Type of residence:: Private home - [facility]    Diet/Exercise/Sleep:  Diet:: Other, Regular  Inadequate nutrition (GOAL):: No  Food Insecurity: No  Tube Feeding: No  Exercise:: Currently not exercising  Inadequate activity/exercise (GOAL):: No  Significant changes in sleep pattern (GOAL): No    Transportation:  Transportation concerns (GOAL):: No  Transportation means:: Family, Regular car     Psychosocial:  Lutheran or spiritual beliefs that impact treatment:: No  Mental health DX:: Yes  Mental health DX how managed:: Alternative Treatments  Mental health management concern (GOAL):: No  Informal Support system:: Children, Family, Friends     Financial/Insurance:   Financial/Insurance concerns (GOAL):: No  No concerns at this  time.     Resources and Interventions:  Current Resources: RN will mail out a letter to pt's home with RN CC contact information, explanation of CC services and patient care plan.     Supplies used at home:: None  Equipment Currently Used at Home: walker, rolling, grab bar, tub bench    Advance Care Plan/Directive  Advanced Care Plans/Directives on file:: Yes  Type Advanced Care Plans/Directives: Advanced Directive - On File    Referrals Placed: None       Future Appointments              [facility]    Tomorrow [facility]    In 2 months [facility]ARDIAC SERVICES [facility], [place] [facility]          Plan:   Patient will continue to follow treatment plan as directed and follow up with PCP with concerns ongoing.   Care Coordinator RN to f/u with patient and daughter later this week.    Steffanie Louis RN, [facility]  RN Care Coordinator  [facility]  Phone # [phone]  7/30/2018 12:47 PM

## 2018-07-30 NOTE — LETTER
[place] CARE COORDINATION  [place] [id number] [place] [address]  [phone]    July 30, 2018    Ellie Leigh  [address]  [facility] [address]      Dear Ellie,    I am a clinic care coordinator who works with Anjum Vidales MD at [facility]. I wanted to thank you and your daughter Suyapa for spending the time talking with me.  I wanted to provide you with my contact information so that you can call me with questions or concerns about your health care. Below is a description of clinic care coordination and how I can further assist you.     The clinic care coordinator is a registered nurse and/or  who understand the health care system. The goal of clinic care coordination is to help you manage your health and improve access to the [facility] system in the most efficient manner. The registered nurse can assist you in meeting your health care goals by providing education, coordinating services, and strengthening the communication among your providers. The  can assist you with financial, behavioral, psychosocial, chemical dependency, counseling, and/or psychiatric resources.    Please feel free to contact me at [phone], with any questions or concerns. We at [facility] are focused on providing you with the highest-quality healthcare experience possible and that all starts with you.     Sincerely,     Steffanie Louis    Enclosed: I have enclosed a copy of a 24 Hour Access Plan. This has helpful phone numbers for you to call when needed. Please keep this in an easy to access place to use as needed.

## 2018-07-31 ENCOUNTER — ANTICOAGULATION THERAPY VISIT (OUTPATIENT)
Dept: ANTICOAGULATION | Facility: CLINIC | Age: 83
End: 2018-07-31
Payer: COMMERCIAL

## 2018-07-31 ENCOUNTER — OFFICE VISIT (OUTPATIENT)
Dept: FAMILY MEDICINE | Facility: CLINIC | Age: 83
End: 2018-07-31
Payer: COMMERCIAL

## 2018-07-31 VITALS
OXYGEN SATURATION: 95 % | HEART RATE: 89 BPM | HEIGHT: 58 IN | DIASTOLIC BLOOD PRESSURE: 68 MMHG | BODY MASS INDEX: 25.19 KG/M2 | SYSTOLIC BLOOD PRESSURE: 110 MMHG | WEIGHT: 120 LBS | TEMPERATURE: 97.6 F

## 2018-07-31 DIAGNOSIS — I82.409 DVT (DEEP VENOUS THROMBOSIS) (H): ICD-10-CM

## 2018-07-31 DIAGNOSIS — R30.0 DYSURIA: Primary | ICD-10-CM

## 2018-07-31 DIAGNOSIS — J44.9 CHRONIC OBSTRUCTIVE PULMONARY DISEASE, UNSPECIFIED COPD TYPE (H): ICD-10-CM

## 2018-07-31 DIAGNOSIS — Z79.01 LONG TERM CURRENT USE OF ANTICOAGULANT THERAPY: ICD-10-CM

## 2018-07-31 DIAGNOSIS — N39.0 RECURRENT UTI: Chronic | ICD-10-CM

## 2018-07-31 DIAGNOSIS — R05.9 COUGH: ICD-10-CM

## 2018-07-31 LAB — INR POINT OF CARE: 1.8 (ref 0.86–1.14)

## 2018-07-31 PROCEDURE — 85610 PROTHROMBIN TIME: CPT | Mod: QW

## 2018-07-31 PROCEDURE — 99213 OFFICE O/P EST LOW 20 MIN: CPT | Performed by: NURSE PRACTITIONER

## 2018-07-31 PROCEDURE — 36416 COLLJ CAPILLARY BLOOD SPEC: CPT

## 2018-07-31 PROCEDURE — 99207 ZZC NO CHARGE NURSE ONLY: CPT

## 2018-07-31 NOTE — PROGRESS NOTES
SUBJECTIVE:   Ellie Leigh is a 87 year old female who presents to clinic today for the following health issues:    ED/UC Followup:    Facility:  [place]   Date of visit: 7/27/18  Reason for visit: UTI   Current Status: Pt is still taking course of antibiotics - cipro, she is on day 5. Pt states she has been feeling very nauseated. This started when she started taking cipro. Pt states her urinary symptoms are getting better.              ENT Symptoms             Symptoms: cc Present Absent Comment   Fever/Chills   x    Fatigue  x     Muscle Aches   x    Eye Irritation   x    Sneezing   x    Nasal Oniel/Drg  x     Sinus Pressure/Pain   x    Loss of smell   x    Dental pain   x    Sore Throat   x    Swollen Glands   x    Ear Pain/Fullness   x    Cough x x  Productive cough, phlegm has been green and red.    Wheeze  x     Chest Pain   x    Shortness of breath  x     Rash   x    Other   x      Symptom duration:  2-3 weeks    Symptom severity:  moderate    Treatments tried:  She was in clinic 1 month ago and was given a zpack.    Contacts:  None        Was drinking less water recently as her Sodium was low and was told to reduce this.  Was also on Sodium tablets.    Noticed symptoms Friday am - overall wasn't feeling well and having dysuria.  No fevers/chills.    Appetite okay - but having nausea.  Having side effects of nausea from Ciprofloxacin.    Has taken this for 5 days now.  No new symptoms.          Problem list and histories reviewed & adjusted, as indicated.  Additional history: as documented    Patient Active Problem List   Diagnosis     Sensorineural hearing loss, asymmetrical     Generalized osteoarthrosis, unspecified site     Disease of lung     PERSONAL HISTORY OF MALIGNANCY- BREAST     Esophageal reflux     Chronic allergic conjunctivitis     Chronic seasonal allergic rhinitis     Hyperlipidemia LDL goal <130     Chronic constipation     Osteoporosis     Health Care Home     Right arm weakness      Ulnar neuropathy     Headache     Mild major depression     DVT (deep venous thrombosis)     Anxiety     Cervical vertebral fracture (H)     Intermittent atrial fibrillation (H)     Squamous cell carcinoma in situ of skin of face     SK (seborrheic keratosis)     Hypothyroidism     Hyponatremia     Recurrent UTI     History of skin cancer     BPPV (benign paroxysmal positional vertigo)     Benign essential HTN     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)     Positive fecal occult blood test     COPD (chronic obstructive pulmonary disease) (H)     Infectious colitis, enteritis and gastroenteritis     Advance Care Planning     Syncope     Hemoptysis     Long term current use of anticoagulant therapy     Mild persistent asthma without complication     Unresponsive episode     Irritable bowel syndrome without diarrhea     Elevated troponin     Nausea     Back pain     H/O TB (tuberculosis)     Chest pain     Community acquired pneumonia of right upper lobe of lung (H)     Past Surgical History:   Procedure Laterality Date     APPENDECTOMY       ARTHROSCOPY KNEE  4/15/2011    Procedure:ARTHROSCOPY KNEE; removal of loose body; Surgeon:LEY, JEFFREY DUANE; Location:[facility]     CHOLECYSTECTOMY       ESOPHAGOSCOPY, GASTROSCOPY, DUODENOSCOPY (EGD), COMBINED  6/9/2014    Procedure: COMBINED ESOPHAGOSCOPY, GASTROSCOPY, DUODENOSCOPY (EGD);  Surgeon: Gilberto Richard MD;  Location: [facility]     IMPLANT PACEMAKER  5/21/2013    Biotronik Moderl [id number] Serial#[id number]     INJECT EPIDURAL LUMBAR  3/23/2011    INJECT EPIDURAL LUMBAR performed by GENERIC ANESTHESIA PROVIDER at [facility]     JOINT REPLACEMENT, HIP RT/LT      Joint Replacement Hip Rt     MASTECTOMY, SIMPLE RT/LT/CAITLYN      Left breast - following breast ca     OTHER SURGICAL HISTORY      C1-C2 fusion after fx      SURGICAL HISTORY OF -   05/22/2001    Colonoscopy     TONSILLECTOMY         Social History   Substance Use Topics     Smoking status: Never Smoker     Smokeless  tobacco: Never Used     Alcohol use No     Family History   Problem Relation Age of Onset     Cerebrovascular Disease Mother      HEART DISEASE Mother      MI     Cerebrovascular Disease Father      HEART DISEASE Father      MI     Respiratory Maternal Grandfather      TB     Neurologic Disorder Brother      ALS     HEART DISEASE Brother      Cerebrovascular Disease Brother      Breast Cancer Daughter      age:49     Asthma Brother      Cancer Brother      brain and lung     Cancer Daughter      thyroid         Current Outpatient Prescriptions   Medication Sig Dispense Refill     Acetaminophen (TYLENOL PO) Take 975 mg by mouth 3 times daily       albuterol (PROAIR HFA/PROVENTIL HFA/VENTOLIN HFA) 108 (90 Base) MCG/ACT Inhaler Inhale 2 puffs into the lungs every 6 hours as needed for shortness of breath / dyspnea 1 Inhaler 11     ciprofloxacin (CIPRO) 500 MG tablet Take 1 tablet (500 mg) by mouth 2 times daily for 10 days 20 tablet 0     CRANBERRY Take 475 mg by mouth 2 times daily.       diclofenac (VOLTAREN) 1 % GEL topical gel Place onto the skin 4 times daily as needed for moderate pain       ipratropium - albuterol 0.5 mg/2.5 mg/3 mL (DUONEB) 0.5-2.5 (3) MG/3ML neb solution TAKE 1 VIAL BY NEBULIZATION  EVERY SIX HOURS AS NEEDED FOR SHORTNESS OF BREATH/ DYSPNEA OR WHEEZING 180 mL 9     levothyroxine (SYNTHROID/LEVOTHROID) 50 MCG tablet TAKE ONE TABLET BY MOUTH ONE TIME DAILY  90 tablet 1     metoprolol succinate (TOPROL-XL) 50 MG 24 hr tablet TAKE 1 TAB BY MOUTH TWICE A DAY FOR HTN 60 tablet 0     pantoprazole (PROTONIX) 40 MG EC tablet Take 1 tablet (40 mg) by mouth daily Take 30-60 minutes before a meal. 30 tablet 0     polyethylene glycol (MIRALAX/GLYCOLAX) Packet Take 17 g by mouth daily        probiotic CAPS Take 1 capsule by mouth every evening        ranitidine (ZANTAC) 150 MG tablet Take 1 tablet (150 mg) by mouth 2 times daily 60 tablet 11     sodium chloride 1 GM tablet Take 1 tablet (1 g) by mouth 3  times daily 100 tablet 11     SYMBICORT 160-4.5 MCG/ACT Inhaler INHALE TWO PUFFS INTO THE LUNGS TWICE DAILY  10.2 g 4     warfarin (COUMADIN) 1 MG tablet 1 mg (1 mg x 1) every day or as directed by Anticoagulation Clinic. 96 tablet 0     Allergies   Allergen Reactions     Cephalosporins Nausea     Cefzil     Doxycycline Nausea and Vomiting     Sulfa Drugs Nausea     Penicillins Rash     PCN     Recent Labs   Lab Test  06/27/18   1338  05/30/18   1435  05/02/18   1556   04/14/18   0825   02/06/18   1310   01/08/18   1203   07/28/14   0952   03/03/11   0931   01/06/11   0954   06/03/10   0847   A1C   --    --    --    --    --    --    --    --    --    --   6.1*   --    --    --    --    --    --    LDL   --    --    --    --    --    --    --    --    --    --    --    --   91   --   148*   --   75   HDL   --    --    --    --    --    --    --    --    --    --    --    --   76   --   83   --   77   TRIG   --    --    --    --    --    --    --    --    --    --    --    --   66   --   65   --   67   ALT  12   --    --    --   13   --   22   --    --    < >   --    < >  22   < >  25   --   9   CR  0.62  0.54   --    < >  0.50*   < >  0.31*   < >  0.55   < >   --    < >   --    < >  0.65   < >  0.66   GFRESTIMATED  >90  >90   --    < >  >90   < >  >90   < >  >90   < >   --    < >   --    < >  88   < >  86   GFRESTBLACK  >90  >90   --    < >  >90   < >  >90   < >  >90   < >   --    < >   --    < >  >90   < >  >90   POTASSIUM  4.7  4.5   --    < >  4.3   < >  3.9   < >  4.3   < >   --    < >   --    < >  4.8   < >  4.8   TSH   --    --   1.70   --    --    --    --    --   2.54   < >   --    < >   --    --   3.50   --   3.95    < > = values in this interval not displayed.      BP Readings from Last 3 Encounters:   07/31/18 110/68   07/27/18 150/74   07/05/18 132/70    Wt Readings from Last 3 Encounters:   07/31/18 120 lb (54.4 kg)   07/05/18 119 lb (54 kg)   06/27/18 119 lb (54 kg)                  Labs reviewed in  "EPIC    Reviewed and updated as needed this visit by clinical staff  Tobacco  Allergies  Meds  Problems  Med Hx  Surg Hx  Fam Hx  Soc Hx        Reviewed and updated as needed this visit by Provider  Allergies  Meds  Problems         ROS:  Constitutional, HEENT, cardiovascular, pulmonary, GI, , musculoskeletal, neuro, skin, endocrine and psych systems are negative, except as otherwise noted.    OBJECTIVE:     /68  Pulse 89  Temp 97.6  F (36.4  C) (Tympanic)  Ht 4' 10\" (1.473 m)  Wt 120 lb (54.4 kg)  LMP 06/15/1985  SpO2 95%  Breastfeeding? No  BMI 25.08 kg/m2  Body mass index is 25.08 kg/(m^2).  GENERAL: alert, no distress, frail and elderly  NECK: no adenopathy, no asymmetry, masses, or scars and thyroid normal to palpation  RESP: no rales , no rhonchi, expiratory wheezes bibasilar, inspiratory wheezes R upper posterior and L upper posterior and oxygen at 95% RR 18 and no dyspnea.  CV: regular rate and rhythm, normal S1 S2, no S3 or S4, no murmur, click or rub, no peripheral edema and peripheral pulses strong  ABDOMEN: soft, nontender, no hepatosplenomegaly, no masses and bowel sounds normal  MS: no gross musculoskeletal defects noted, no edema    Diagnostic Test Results:  Results for orders placed or performed during the hospital encounter of 07/27/18   UA reflex to Microscopic   Result Value Ref Range    Color Urine Yellow     Appearance Urine Cloudy     Glucose Urine Negative NEG^Negative mg/dL    Bilirubin Urine Negative NEG^Negative    Ketones Urine Negative NEG^Negative mg/dL    Specific Gravity Urine 1.017 1.003 - 1.035    Blood Urine Negative NEG^Negative    pH Urine 6.0 5.0 - 7.0 pH    Protein Albumin Urine Negative NEG^Negative mg/dL    Urobilinogen mg/dL 0.0 0.0 - 2.0 mg/dL    Nitrite Urine Negative NEG^Negative    Leukocyte Esterase Urine Large (A) NEG^Negative    Source Midstream Urine     RBC Urine 22 (H) 0 - 2 /HPF    WBC Urine >182 (H) 0 - 5 /HPF    WBC Clumps Present (A) " NEG^Negative /HPF    Bacteria Urine Few (A) NEG^Negative /HPF    Squamous Epithelial /HPF Urine 1 0 - 1 /HPF    Transitional Epi 2 (H) 0 - 1 /HPF    Mucous Urine Present (A) NEG^Negative /LPF     *Note: Due to a large number of results and/or encounters for the requested time period, some results have not been displayed. A complete set of results can be found in Results Review.       ASSESSMENT/PLAN:     1. Dysuria  Having side effects from Cipro - advised stopping antibiotics - has taken for 5 days.  Repeat urine - bring to lab in 1-2 days after stopping antibiotics.    - *UA reflex to Microscopic and Culture ([facility] (except [place] and [place]); Future    2. Recurrent UTI     - *UA reflex to Microscopic and Culture ([facility] (except [place] and [place]); Future    3. Chronic obstructive pulmonary disease, unspecified COPD type (H)   Stable - no new infection suspected.  Continue Nebulizer treatments 3 X daily.    4. Cough  Suspect due to allergen, dry - mostly just in the morning.  Add humidity if needed.  Nebs as needed.  Follow up if any new or worsening symptoms.      Patient Instructions   Stop antibiotic today.  Recheck urine in 1-2 days - bring to [facility] clinic check in and drop off sample to lab.    Continue staying hydrated and emptying bladder often.    Follow up if symptoms of urinary tract infection recur.    For cough - recommend continuing nebulizer treatments every 6 hours to help with this.  Add humidity if needed - cool mist or nasal sprays.    Follow up if you develop more productive cough throughout the day, fevers, shortness of breath, or new or worsening symptoms.    VINEET Whitman NP  [facility]

## 2018-07-31 NOTE — PROGRESS NOTES
ANTICOAGULATION FOLLOW-UP CLINIC VISIT    Patient Name:  Ellie Leigh  Date:  7/31/2018  Contact Type:  Face to Face, accompanied by a family member    SUBJECTIVE:     Patient Findings     Positives Antibiotic use or infection (On day 5 of course of Cipro for UTI)    Comments Patient has been feeling very nauseous from the Cipro. She was seen by Lissy Cavazos today and instructed to stop taking the Cipro. Her symptoms of the UTI are resolving. She is to bring in another urine sample in 1-2 days to see if the infection has cleared. Patient will continue with same warfarin dose and recheck the INR in 2 weeks.            OBJECTIVE    INR Protime   Date Value Ref Range Status   07/31/2018 1.8 (A) 0.86 - 1.14 Final       ASSESSMENT / PLAN  INR assessment THER    Recheck INR In: 2 WEEKS    INR Location Clinic      Anticoagulation Summary as of 7/31/2018     INR goal    Prior goal 1.5-2.0   Today's INR 1.8   Warfarin maintenance plan 0.5 mg (1 mg x 0.5) on Wed; 1 mg (1 mg x 1) all other days   Full warfarin instructions 0.5 mg on Wed; 1 mg all other days   Weekly warfarin total 6.5 mg   No change documented Katia Landrum RN   Plan last modified Aysha Canada, RN (6/13/2018)   Next INR check 8/14/2018   Priority INR   Target end date Indefinite    Indications   Atrial fibrillation with rapid ventricular response (H) (Resolved) [I48.91]  DVT (deep venous thrombosis) [I82.409]  Long term current use of anticoagulant therapy [Z79.01]         Anticoagulation Episode Summary     INR check location     Preferred lab     Send INR reminders to [facility]    Comments * Actual INR goal is 1.7-2.3  Taking Celebrex PRN         Anticoagulation Care Providers     Provider Role Specialty Phone number    Anjum Vidales MD Responsible Family Practice [phone]            See the Encounter Report to view Anticoagulation Flowsheet and Dosing Calendar (Go to Encounters tab in chart review, and find the  Anticoagulation Therapy Visit)        Katia Landrum RN, CACP

## 2018-07-31 NOTE — MR AVS SNAPSHOT
Ellie Leigh   7/31/2018 3:15 PM   Anticoagulation Therapy Visit    Description:  87 year old female   Provider:  [facility]   Department:  [facility]           INR as of 7/31/2018     Today's INR 1.8      Anticoagulation Summary as of 7/31/2018     INR goal    Prior goal 1.5-2.0   Today's INR 1.8   Full warfarin instructions 0.5 mg on Wed; 1 mg all other days   Next INR check 8/14/2018    Indications   Atrial fibrillation with rapid ventricular response (H) (Resolved) [I48.91]  DVT (deep venous thrombosis) [I82.409]  Long term current use of anticoagulant therapy [Z79.01]         Your next Anticoagulation Clinic appointment(s)     Jul 31, 2018  3:15 PM CDT   [facility] with [facility]   [facility]    [address] [address]   [phone]            Aug 14, 2018 10:45 AM CDT   [facility] with [facility]   [facility]    [address] [address]   [phone]              Contact Numbers     Please call [phone] with any problems or questions regarding your therapy.    If you need to cancel and/or reschedule your appointment please call one of the following numbers:  [facility] - [phone]  [place] - [phone]  [place] - [phone]  [facility] [phone]  [place] - [phone]            July 2018 Details    Sun Mon Tue Wed Thu Fri Sat     1               2               3               4               5               6               7                 8               9               10               11               12               13               14                 15               16               17               18               19               20               21                 22               23               24               25               26               27               28                 29               30                31      1 mg   See details           Date Details   07/31 This INR check               How to take your warfarin dose     To take:  1 mg Take 1 of the 1 mg tablets.           August 2018 Details    Sun Mon Tue Wed Thu Fri Sat        1      0.5 mg         2      1 mg         3      1 mg         4      1 mg           5      1 mg         6      1 mg         7      1 mg         8      0.5 mg         9      1 mg         10      1 mg         11      1 mg           12      1 mg         13      1 mg         14            15               16               17               18                 19               20               21               22               23               24               25                 26               27               28               29               30               31                 Date Details   No additional details    Date of next INR:  8/14/2018         How to take your warfarin dose     To take:  0.5 mg Take 0.5 of a 1 mg tablet.    To take:  1 mg Take 1 of the 1 mg tablets.

## 2018-07-31 NOTE — NURSING NOTE
"Initial /68  Pulse 89  Temp 97.6  F (36.4  C) (Tympanic)  Ht 4' 10\" (1.473 m)  Wt 120 lb (54.4 kg)  LMP 06/15/1985  SpO2 95%  Breastfeeding? No  BMI 25.08 kg/m2 Estimated body mass index is 25.08 kg/(m^2) as calculated from the following:    Height as of this encounter: 4' 10\" (1.473 m).    Weight as of this encounter: 120 lb (54.4 kg). .    Gloria Christianson CMA ([facility])  "

## 2018-07-31 NOTE — PATIENT INSTRUCTIONS
Stop antibiotic today.  Recheck urine in 1-2 days - bring to Family Practice clinic check in and drop off sample to lab.    Continue staying hydrated and emptying bladder often.    Follow up if symptoms of urinary tract infection recur.    For cough - recommend continuing nebulizer treatments every 6 hours to help with this.  Add humidity if needed - cool mist or nasal sprays.    Follow up if you develop more productive cough throughout the day, fevers, shortness of breath, or new or worsening symptoms.    Lissy Cavazos, VINEET

## 2018-07-31 NOTE — MR AVS SNAPSHOT
After Visit Summary   7/31/2018    Ellie Leigh    MRN: [id number]           Patient Information     Date Of Birth          9/12/1930        Visit Information        Provider Department      7/31/2018 9:00 AM Lissy Cavazos, NP [facility]        Today's Diagnoses     Dysuria    -  1    Recurrent UTI        Chronic obstructive pulmonary disease, unspecified COPD type (H)        Cough          Care Instructions    Stop antibiotic today.  Recheck urine in 1-2 days - bring to Family Practice clinic check in and drop off sample to lab.    Continue staying hydrated and emptying bladder often.    Follow up if symptoms of urinary tract infection recur.    For cough - recommend continuing nebulizer treatments every 6 hours to help with this.  Add humidity if needed - cool mist or nasal sprays.    Follow up if you develop more productive cough throughout the day, fevers, shortness of breath, or new or worsening symptoms.    VINEET Whitman            Follow-ups after your visit        Your next 10 appointments already scheduled     Jul 31, 2018  3:15 PM CDT   [place] Visit with [place] [facility]   [facility] ([facility])    [address] [facility] [address]   [phone]            Oct 22, 2018 10:30 AM CDT   Remote [place]PM Check with [place] [facility]   [facility] ([facility])    [address] [facility] [address]   [phone]           This appointment is for a remote check of your pacemaker.  This is not an appointment at the office.              Future tests that were ordered for you today     Open Future Orders        Priority Expected Expires Ordered    *UA reflex to Microscopic and Culture ([facility] (except [place] and [place]) Routine  7/31/2019 7/31/2018            Who to contact     If you have questions or need follow up information about today's clinic visit or  "your schedule please contact [facility] directly at [phone].  Normal or non-critical lab and imaging results will be communicated to you by MyChart, letter or phone within 4 business days after the clinic has received the results. If you do not hear from us within 7 days, please contact the clinic through MyChart or phone. If you have a critical or abnormal lab result, we will notify you by phone as soon as possible.  Submit refill requests through Re5ult or call your pharmacy and they will forward the refill request to us. Please allow 3 business days for your refill to be completed.          Additional Information About Your Visit        Care EveryWhere ID     This is your Care EveryWhere ID. This could be used by other organizations to access your [place] medical records  [id number]        Your Vitals Were     Pulse Temperature Height Last Period Pulse Oximetry Breastfeeding?    89 97.6  F (36.4  C) (Tympanic) 4' 10\" (1.473 m) 06/15/1985 95% No    BMI (Body Mass Index)                   25.08 kg/m2            Blood Pressure from Last 3 Encounters:   07/31/18 110/68   07/27/18 150/74   07/05/18 132/70    Weight from Last 3 Encounters:   07/31/18 120 lb (54.4 kg)   07/05/18 119 lb (54 kg)   06/27/18 119 lb (54 kg)               Primary Care Provider Office Phone # Fax #    Anjum Vidales [phone] [address] [place], [place] [address]        Equal Access to Services     BURT JOSÉ AH: Hadii daniel ku hadasho Soomaali, waaxda luqadaha, qaybta kaalmada kaelaegyada, verónica bedoya . So [facility] [phone].    ATENCIÓN: Si habla español, tiene a sahni disposición servicios gratuitos de asistencia lingüística. Llame al [phone].    We comply with applicable federal civil rights laws and [place] laws. We do not discriminate on the basis of race, color, national origin, age, disability, sex, sexual orientation, or gender identity.            Thank " you!     Thank you for choosing [facility]  for your care. Our goal is always to provide you with excellent care. Hearing back from our patients is one way we can continue to improve our services. Please take a few minutes to complete the written survey that you may receive in the mail after your visit with us. Thank you!             Your Updated Medication List - Protect others around you: Learn how to safely use, store and throw away your medicines at www.disposemymeds.org.          This list is accurate as of 7/31/18  9:53 AM.  Always use your most recent med list.                   Brand Name Dispense Instructions for use Diagnosis    albuterol 108 (90 Base) MCG/ACT Inhaler    PROAIR HFA/PROVENTIL HFA/VENTOLIN HFA    1 Inhaler    Inhale 2 puffs into the lungs every 6 hours as needed for shortness of breath / dyspnea    Mild persistent asthma without complication       ciprofloxacin 500 MG tablet    CIPRO    20 tablet    Take 1 tablet (500 mg) by mouth 2 times daily for 10 days        CRANBERRY      Take 475 mg by mouth 2 times daily.        diclofenac 1 % Gel topical gel    VOLTAREN     Place onto the skin 4 times daily as needed for moderate pain        ipratropium - albuterol 0.5 mg/2.5 mg/3 mL 0.5-2.5 (3) MG/3ML neb solution    DUONEB    180 mL    TAKE 1 VIAL BY NEBULIZATION  EVERY SIX HOURS AS NEEDED FOR SHORTNESS OF BREATH/ DYSPNEA OR WHEEZING    Chronic obstructive pulmonary disease, unspecified COPD type (H)       levothyroxine 50 MCG tablet    SYNTHROID/LEVOTHROID    90 tablet    TAKE ONE TABLET BY MOUTH ONE TIME DAILY    Hypothyroidism, unspecified type       metoprolol succinate 50 MG 24 hr tablet    TOPROL-XL    60 tablet    TAKE 1 TAB BY MOUTH TWICE A DAY FOR HTN    Essential hypertension, benign       pantoprazole 40 MG EC tablet    PROTONIX    30 tablet    Take 1 tablet (40 mg) by mouth daily Take 30-60 minutes before a meal.    Gastroesophageal reflux disease with esophagitis,  Abdominal pain, epigastric       polyethylene glycol Packet    MIRALAX/GLYCOLAX     Take 17 g by mouth daily        probiotic Caps      Take 1 capsule by mouth every evening        ranitidine 150 MG tablet    ZANTAC    60 tablet    Take 1 tablet (150 mg) by mouth 2 times daily    Gastroesophageal reflux disease without esophagitis       sodium chloride 1 GM tablet     100 tablet    Take 1 tablet (1 g) by mouth 3 times daily    Hyponatremia       SYMBICORT 160-4.5 MCG/ACT Inhaler   Generic drug:  budesonide-formoterol     10.2 g    INHALE TWO PUFFS INTO THE LUNGS TWICE DAILY    Mild persistent asthma without complication       TYLENOL PO      Take 975 mg by mouth 3 times daily        warfarin 1 MG tablet    COUMADIN    96 tablet    1 mg (1 mg x 1) every day or as directed by Anticoagulation Clinic.    Intermittent atrial fibrillation (H)

## 2018-08-02 ENCOUNTER — PATIENT OUTREACH (OUTPATIENT)
Dept: CARE COORDINATION | Facility: CLINIC | Age: 83
End: 2018-08-02

## 2018-08-02 ENCOUNTER — TRANSFERRED RECORDS (OUTPATIENT)
Dept: HEALTH INFORMATION MANAGEMENT | Facility: CLINIC | Age: 83
End: 2018-08-02

## 2018-08-02 DIAGNOSIS — N39.0 RECURRENT UTI: Chronic | ICD-10-CM

## 2018-08-02 DIAGNOSIS — R30.0 DYSURIA: ICD-10-CM

## 2018-08-02 LAB
ALBUMIN UR-MCNC: NEGATIVE MG/DL
APPEARANCE UR: CLEAR
BILIRUB UR QL STRIP: NEGATIVE
COLOR UR AUTO: YELLOW
GLUCOSE UR STRIP-MCNC: NEGATIVE MG/DL
HGB UR QL STRIP: ABNORMAL
KETONES UR STRIP-MCNC: NEGATIVE MG/DL
LEUKOCYTE ESTERASE UR QL STRIP: NEGATIVE
NITRATE UR QL: NEGATIVE
PH UR STRIP: 7.5 PH (ref 5–7)
RBC #/AREA URNS AUTO: NORMAL /HPF
SOURCE: ABNORMAL
SP GR UR STRIP: 1.01 (ref 1–1.03)
UROBILINOGEN UR STRIP-ACNC: 0.2 EU/DL (ref 0.2–1)
WBC #/AREA URNS AUTO: NORMAL /HPF

## 2018-08-02 PROCEDURE — 81001 URINALYSIS AUTO W/SCOPE: CPT | Performed by: NURSE PRACTITIONER

## 2018-08-02 NOTE — PROGRESS NOTES
Clinic Care Coordination Contact  [facility]/Voicemail    Referral Source: ED Follow-Up    Clinical Data: Care Coordinator Outreach, f/u to see how patient is doing since OV with Lissy Cavazos and being off of her Cipro.    Outreach attempted x 1.  Left message on voicemail with call back information and requested return call.    Plan: Care Coordinator will try to reach patient again in 3-5 business days.    Steffanie Louis RN, [facility]  RN Care Coordinator  [facility], [facility]  Phone # [phone]  8/2/2018 3:59 PM

## 2018-08-03 ENCOUNTER — TELEPHONE (OUTPATIENT)
Dept: FAMILY MEDICINE | Facility: CLINIC | Age: 83
End: 2018-08-03

## 2018-08-03 ENCOUNTER — ALLIED HEALTH/NURSE VISIT (OUTPATIENT)
Dept: FAMILY MEDICINE | Facility: CLINIC | Age: 83
End: 2018-08-03
Payer: COMMERCIAL

## 2018-08-03 DIAGNOSIS — J44.9 CHRONIC OBSTRUCTIVE PULMONARY DISEASE, UNSPECIFIED COPD TYPE (H): Primary | ICD-10-CM

## 2018-08-03 PROCEDURE — 99207 ZZC NO CHARGE NURSE ONLY: CPT

## 2018-08-03 NOTE — TELEPHONE ENCOUNTER
Ellie calling asking for new neb machine states the last one was given to her at the clinic. And asking for the same again today    Irene Silva CSS

## 2018-08-03 NOTE — TELEPHONE ENCOUNTER
Patient requesting new neb machine.  Hers is no longer working.  DME ready for MD sign.    Will route to her PCP as she still plans to keep Dr. Vidales as PCP.    Routing to provider.  Yoon MCLAUGHLIN RN

## 2018-08-03 NOTE — TELEPHONE ENCOUNTER
Reason for Call: Request for an order or referral:    Order or referral being requested: order    Date needed: as soon as possible    Has the patient been seen by the PCP for this problem? YES    Additional comments: pt calling to see if you can order a new nebulizer machine for her. She states that hers broke and she needs a new one.    Phone number Patient can be reached at:  Home number on file [phone] (home)    Best Time:  any    Can we leave a detailed message on this number?  YES    Call taken on 8/3/2018 at 11:00 AM by Celina Lemus

## 2018-08-03 NOTE — MR AVS SNAPSHOT
After Visit Summary   8/3/2018    Ellie Leigh    MRN: [id number]           Patient Information     Date Of Birth          9/12/1930        Visit Information        Provider Department      8/3/2018 3:15 PM FL NB RN [facility]        Today's Diagnoses     Chronic obstructive pulmonary disease, unspecified COPD type (H)    -  1       Follow-ups after your visit        Your next 10 appointments already scheduled     Aug 14, 2018 10:45 AM CDT   [place] with [place] [facility]   [facility]    [address] [place]  [facility] [address]   [phone]            Oct 22, 2018 10:30 AM CDT   [place] Check with [place] [facility]   [facility] ([facility])    [address] [facility] [address]   [phone]           This appointment is for a remote check of your pacemaker.  This is not an appointment at the office.              Who to contact     If you have questions or need follow up information about today's clinic visit or your schedule please contact [facility] directly at [phone].  Normal or non-critical lab and imaging results will be communicated to you by MyChart, letter or phone within 4 business days after the clinic has received the results. If you do not hear from us within 7 days, please contact the clinic through MyChart or phone. If you have a critical or abnormal lab result, we will notify you by phone as soon as possible.  Submit refill requests through Flomiohart or call your pharmacy and they will forward the refill request to us. Please allow 3 business days for your refill to be completed.          Additional Information About Your Visit        Care EveryWhere ID     This is your Care EveryWhere ID. This could be used by other organizations to access your [place] medical records  [id number]        Your Vitals Were     Last Period                    06/15/1985            Blood Pressure from Last 3 Encounters:   07/31/18 110/68   07/27/18 150/74   07/05/18 132/70    Weight from Last 3 Encounters:   07/31/18 120 lb (54.4 kg)   07/05/18 119 lb (54 kg)   06/27/18 119 lb (54 kg)              Today, you had the following     No orders found for display         Today's Medication Changes          These changes are accurate as of 8/3/18  3:27 PM.  If you have any questions, ask your nurse or doctor.               Start taking these medicines.        Dose/Directions    order for DME   Used for:  Chronic obstructive pulmonary disease, unspecified COPD type (H)   Started by:  Anjum Vidales MD        Equipment being ordered: Nebulizer   Quantity:  1 Device   Refills:  0            Where to get your medicines      Some of these will need a paper prescription and others can be bought over the counter.  Ask your nurse if you have questions.     Bring a paper prescription for each of these medications     order for DME                Primary Care Provider Office Phone # Fax #    Anjum Vidales -[phone] [address] [place] [address]        Equal Access to Services     [facility] Development[facility] CenterNOLBERTO : Hadii daniel Interiano, waaxda leopoldo, qaybta katie feliz, verónica estrada. So [facility] [phone].    ATENCIÓN: Si habla español, tiene a sahni disposición servicios gratuitos de asistencia lingüística. Llame al [phone].    We comply with applicable federal civil rights laws and [place] laws. We do not discriminate on the basis of race, color, national origin, age, disability, sex, sexual orientation, or gender identity.            Thank you!     Thank you for choosing [facility]  for your care. Our goal is always to provide you with excellent care. Hearing back from our patients is one way we can continue to improve our services. Please take a few minutes to complete the written survey that  you may receive in the mail after your visit with us. Thank you!             Your Updated Medication List - Protect others around you: Learn how to safely use, store and throw away your medicines at www.disposemymeds.org.          This list is accurate as of 8/3/18  3:27 PM.  Always use your most recent med list.                   Brand Name Dispense Instructions for use Diagnosis    albuterol 108 (90 Base) MCG/ACT Inhaler    PROAIR HFA/PROVENTIL HFA/VENTOLIN HFA    1 Inhaler    Inhale 2 puffs into the lungs every 6 hours as needed for shortness of breath / dyspnea    Mild persistent asthma without complication       ciprofloxacin 500 MG tablet    CIPRO    20 tablet    Take 1 tablet (500 mg) by mouth 2 times daily for 10 days        CRANBERRY      Take 475 mg by mouth 2 times daily.        diclofenac 1 % Gel topical gel    VOLTAREN     Place onto the skin 4 times daily as needed for moderate pain        ipratropium - albuterol 0.5 mg/2.5 mg/3 mL 0.5-2.5 (3) MG/3ML neb solution    DUONEB    180 mL    TAKE 1 VIAL BY NEBULIZATION  EVERY SIX HOURS AS NEEDED FOR SHORTNESS OF BREATH/ DYSPNEA OR WHEEZING    Chronic obstructive pulmonary disease, unspecified COPD type (H)       levothyroxine 50 MCG tablet    SYNTHROID/LEVOTHROID    90 tablet    TAKE ONE TABLET BY MOUTH ONE TIME DAILY    Hypothyroidism, unspecified type       metoprolol succinate 50 MG 24 hr tablet    TOPROL-XL    60 tablet    TAKE 1 TAB BY MOUTH TWICE A DAY FOR HTN    Essential hypertension, benign       order for DME     1 Device    Equipment being ordered: Nebulizer    Chronic obstructive pulmonary disease, unspecified COPD type (H)       pantoprazole 40 MG EC tablet    PROTONIX    30 tablet    Take 1 tablet (40 mg) by mouth daily Take 30-60 minutes before a meal.    Gastroesophageal reflux disease with esophagitis, Abdominal pain, epigastric       polyethylene glycol Packet    MIRALAX/GLYCOLAX     Take 17 g by mouth daily        probiotic Caps      Take  1 capsule by mouth every evening        ranitidine 150 MG tablet    ZANTAC    60 tablet    Take 1 tablet (150 mg) by mouth 2 times daily    Gastroesophageal reflux disease without esophagitis       sodium chloride 1 GM tablet     100 tablet    Take 1 tablet (1 g) by mouth 3 times daily    Hyponatremia       SYMBICORT 160-4.5 MCG/ACT Inhaler   Generic drug:  budesonide-formoterol     10.2 g    INHALE TWO PUFFS INTO THE LUNGS TWICE DAILY    Mild persistent asthma without complication       TYLENOL PO      Take 975 mg by mouth 3 times daily        warfarin 1 MG tablet    COUMADIN    96 tablet    Take 0.5 mg on Wed; 1 mg all other days or as directed by the Anticoagulation Clinic    Intermittent atrial fibrillation (H)

## 2018-08-09 ENCOUNTER — TRANSFERRED RECORDS (OUTPATIENT)
Dept: HEALTH INFORMATION MANAGEMENT | Facility: CLINIC | Age: 83
End: 2018-08-09

## 2018-08-14 ENCOUNTER — ANTICOAGULATION THERAPY VISIT (OUTPATIENT)
Dept: ANTICOAGULATION | Facility: CLINIC | Age: 83
End: 2018-08-14
Payer: COMMERCIAL

## 2018-08-14 DIAGNOSIS — I82.409 DVT (DEEP VENOUS THROMBOSIS) (H): ICD-10-CM

## 2018-08-14 DIAGNOSIS — Z79.01 LONG TERM CURRENT USE OF ANTICOAGULANT THERAPY: ICD-10-CM

## 2018-08-14 LAB — INR POINT OF CARE: 1.5 (ref 0.86–1.14)

## 2018-08-14 PROCEDURE — 85610 PROTHROMBIN TIME: CPT | Mod: QW

## 2018-08-14 PROCEDURE — 36416 COLLJ CAPILLARY BLOOD SPEC: CPT

## 2018-08-14 PROCEDURE — 99207 ZZC NO CHARGE NURSE ONLY: CPT

## 2018-08-14 NOTE — MR AVS SNAPSHOT
Ellie Leigh   8/14/2018 10:45 AM   Anticoagulation Therapy Visit    Description:  87 year old female   Provider:  [place] [facility]   [place]  [place] [facility]           INR as of 8/14/2018     Today's INR 1.5      Anticoagulation Summary as of 8/14/2018     INR goal    Prior goal 1.5-2.0   Today's INR 1.5   Full warfarin instructions 8/14: 1.5 mg; Otherwise 0.5 mg on Wed; 1 mg all other days   Next INR check 8/21/2018    Indications   Atrial fibrillation with rapid ventricular response (H) (Resolved) [I48.91]  DVT (deep venous thrombosis) [I82.409]  Long term current use of anticoagulant therapy [Z79.01]         Your next Anticoagulation Clinic appointment(s)     Aug 21, 2018  1:45 PM CDT   [place] Visit with [place] [facility] [facility]   [facility]    [address] [facility] [address] [phone]              Contact Numbers     Please call [phone] with any problems or questions regarding your therapy.    If you need to cancel and/or reschedule your appointment please call one of the following numbers:  [facility] - [phone]  [facility] [phone]  [facility] [phone]  [facility] [place] [phone]  [place] - [phone]            August 2018 Details    Sun Mon Tue Wed Thu Fri Sat        1               2               3               4                 5               6               7               8               9               10               11                 12               13               14      1.5 mg   See details      15      0.5 mg         16      1 mg         17      1 mg         18      1 mg           19      1 mg         20      1 mg         21            22               23               24               25                 26               27               28               29               30               31                 Date Details   08/14 This INR check       Date of next INR:  8/21/2018         How to take your warfarin  dose     To take:  0.5 mg Take 0.5 of a 1 mg tablet.    To take:  1 mg Take 1 of the 1 mg tablets.    To take:  1.5 mg Take 1.5 of the 1 mg tablets.

## 2018-08-20 ENCOUNTER — OFFICE VISIT (OUTPATIENT)
Dept: FAMILY MEDICINE | Facility: CLINIC | Age: 83
End: 2018-08-20
Payer: COMMERCIAL

## 2018-08-20 VITALS
RESPIRATION RATE: 24 BRPM | SYSTOLIC BLOOD PRESSURE: 152 MMHG | TEMPERATURE: 97.4 F | OXYGEN SATURATION: 95 % | HEIGHT: 58 IN | HEART RATE: 99 BPM | WEIGHT: 120 LBS | BODY MASS INDEX: 25.19 KG/M2 | DIASTOLIC BLOOD PRESSURE: 82 MMHG

## 2018-08-20 DIAGNOSIS — K29.00 ACUTE GASTRITIS WITHOUT HEMORRHAGE, UNSPECIFIED GASTRITIS TYPE: Primary | ICD-10-CM

## 2018-08-20 DIAGNOSIS — K21.9 GASTROESOPHAGEAL REFLUX DISEASE WITHOUT ESOPHAGITIS: ICD-10-CM

## 2018-08-20 DIAGNOSIS — K58.9 IRRITABLE BOWEL SYNDROME WITHOUT DIARRHEA: ICD-10-CM

## 2018-08-20 PROCEDURE — 99214 OFFICE O/P EST MOD 30 MIN: CPT | Performed by: PHYSICIAN ASSISTANT

## 2018-08-20 ASSESSMENT — ENCOUNTER SYMPTOMS
FEVER: 0
RESPIRATORY NEGATIVE: 1
DIARRHEA: 0
NAUSEA: 1
HEADACHES: 0
CARDIOVASCULAR NEGATIVE: 1
WOUND: 0
MYALGIAS: 0
ARTHRALGIAS: 0
SORE THROAT: 0
DIZZINESS: 0
VOMITING: 0
LIGHT-HEADEDNESS: 0
RHINORRHEA: 0
WEAKNESS: 0
ROS GI COMMENTS: POSITIVE FOR EPIGASTRIC PAIN
SHORTNESS OF BREATH: 0
ENDOCRINE NEGATIVE: 1
BRUISES/BLEEDS EASILY: 0
COUGH: 0
BACK PAIN: 0
MUSCULOSKELETAL NEGATIVE: 1
CHILLS: 0
NECK STIFFNESS: 0
EYES NEGATIVE: 1
PALPITATIONS: 0
NECK PAIN: 0
ALLERGIC/IMMUNOLOGIC NEGATIVE: 1
HEMATOLOGIC/LYMPHATIC NEGATIVE: 1
JOINT SWELLING: 0
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
## 2018-08-20 NOTE — NURSING NOTE
"Chief Complaint   Patient presents with     Abdominal Pain       Initial /82  Pulse 99  Temp 97.4  F (36.3  C) (Tympanic)  Resp 24  Ht 4' 9.99\" (1.473 m)  Wt 120 lb (54.4 kg)  LMP 06/15/1985  SpO2 95%  BMI 25.09 kg/m2 Estimated body mass index is 25.09 kg/(m^2) as calculated from the following:    Height as of this encounter: 4' 9.99\" (1.473 m).    Weight as of this encounter: 120 lb (54.4 kg).      Health Maintenance that is potentially due pending provider review:  NONE    n/a    Is there anyone who you would like to be able to receive your results? No  If yes have patient fill out JOSUE      "

## 2018-08-20 NOTE — MR AVS SNAPSHOT
After Visit Summary   8/20/2018    Ellie Leigh    MRN: [id number]           Patient Information     Date Of Birth          9/12/1930        Visit Information        Provider Department      8/20/2018 11:20 AM Ronaldo Braun PA-C [facility]        Today's Diagnoses     Acute gastritis without hemorrhage, unspecified gastritis type    -  1    Irritable bowel syndrome without diarrhea        Gastroesophageal reflux disease without esophagitis           Follow-ups after your visit        Your next 10 appointments already scheduled     Aug 21, 2018  1:45 PM CDT   [facility] with [facility]   [facility]    [address] [address]   [phone]            Oct 22, 2018 10:30 AM CDT   Remote PPM Check with [facility]   [facility] ([facility])    [address] [address]   [phone]           This appointment is for a remote check of your pacemaker.  This is not an appointment at the office.            Oct 30, 2018  1:30 PM CDT   UMP EP RETURN with Philippe Luna MD   [facility] ([facility])    [address] [address]   [phone]              Who to contact     If you have questions or need follow up information about today's clinic visit or your schedule please contact [facility] directly at [phone].  Normal or non-critical lab and imaging results will be communicated to you by MyChart, letter or phone within 4 business days after the clinic has received the results. If you do not hear from us within 7 days, please contact the clinic through MyChart or phone. If you have a critical or abnormal lab result, we will notify you by phone as soon as possible.  Submit refill requests through [employer] or call your pharmacy and they will forward the refill  "request to us. Please allow 3 business days for your refill to be completed.          Additional Information About Your Visit        Care EveryWhere ID     This is your Care EveryWhere ID. This could be used by other organizations to access your [facility] medical records  [id number]        Your Vitals Were     Pulse Temperature Respirations Height Last Period Pulse Oximetry    99 97.4  F (36.3  C) (Tympanic) 24 4' 9.99\" (1.473 m) 06/15/1985 95%    BMI (Body Mass Index)                   25.09 kg/m2            Blood Pressure from Last 3 Encounters:   08/20/18 152/82   07/31/18 110/68   07/27/18 150/74    Weight from Last 3 Encounters:   08/20/18 120 lb (54.4 kg)   07/31/18 120 lb (54.4 kg)   07/05/18 119 lb (54 kg)              Today, you had the following     No orders found for display       Primary Care Provider Office Phone # Fax #    Anjum Vidales [phone] [address] [place], [place] [address]        Equal Access to Services     [facility]: Hadii daniel ku hadasho Somaurilio, waaxda luqadaha, qaybta kaalmada trini, verónica bedoya . So [facility] [phone].    ATENCIÓN: Si habla español, tiene a sahni disposición servicios gratuitos de asistencia lingüística. Llame al [phone].    We comply with applicable federal civil rights laws and [place] laws. We do not discriminate on the basis of race, color, national origin, age, disability, sex, sexual orientation, or gender identity.            Thank you!     Thank you for choosing [facility]  for your care. Our goal is always to provide you with excellent care. Hearing back from our patients is one way we can continue to improve our services. Please take a few minutes to complete the written survey that you may receive in the mail after your visit with us. Thank you!             Your Updated Medication List - Protect others around you: Learn how to safely use, store and throw away your " medicines at www.disposemymeds.org.          This list is accurate as of 8/20/18  3:22 PM.  Always use your most recent med list.                   Brand Name Dispense Instructions for use Diagnosis    albuterol 108 (90 Base) MCG/ACT inhaler    PROAIR HFA/PROVENTIL HFA/VENTOLIN HFA    1 Inhaler    Inhale 2 puffs into the lungs every 6 hours as needed for shortness of breath / dyspnea    Mild persistent asthma without complication       CRANBERRY      Take 475 mg by mouth 2 times daily.        diclofenac 1 % Gel topical gel    VOLTAREN     Place onto the skin 4 times daily as needed for moderate pain        ipratropium - albuterol 0.5 mg/2.5 mg/3 mL 0.5-2.5 (3) MG/3ML neb solution    DUONEB    180 mL    TAKE 1 VIAL BY NEBULIZATION  EVERY SIX HOURS AS NEEDED FOR SHORTNESS OF BREATH/ DYSPNEA OR WHEEZING    Chronic obstructive pulmonary disease, unspecified COPD type (H)       levothyroxine 50 MCG tablet    SYNTHROID/LEVOTHROID    90 tablet    TAKE ONE TABLET BY MOUTH ONE TIME DAILY    Hypothyroidism, unspecified type       metoprolol succinate 50 MG 24 hr tablet    TOPROL-XL    60 tablet    TAKE 1 TAB BY MOUTH TWICE A DAY FOR HTN    Essential hypertension, benign       order for DME     1 Device    Equipment being ordered: Nebulizer    Chronic obstructive pulmonary disease, unspecified COPD type (H)       order for DME     1 each    Equipment being ordered: Nebulizer    Chronic obstructive pulmonary disease, unspecified COPD type (H)       pantoprazole 40 MG EC tablet    PROTONIX    30 tablet    Take 1 tablet (40 mg) by mouth daily Take 30-60 minutes before a meal.    Gastroesophageal reflux disease with esophagitis, Abdominal pain, epigastric       polyethylene glycol Packet    MIRALAX/GLYCOLAX     Take 17 g by mouth daily        probiotic Caps      Take 1 capsule by mouth every evening        ranitidine 150 MG tablet    ZANTAC    60 tablet    Take 1 tablet (150 mg) by mouth 2 times daily    Gastroesophageal reflux  disease without esophagitis       sodium chloride 1 GM tablet     100 tablet    Take 1 tablet (1 g) by mouth 3 times daily    Hyponatremia       SYMBICORT 160-4.5 MCG/ACT Inhaler   Generic drug:  budesonide-formoterol     10.2 g    INHALE TWO PUFFS INTO THE LUNGS TWICE DAILY    Mild persistent asthma without complication       TYLENOL PO      Take 975 mg by mouth 3 times daily        warfarin 1 MG tablet    COUMADIN    96 tablet    Take 0.5 mg on Wed; 1 mg all other days or as directed by the Anticoagulation Clinic    Intermittent atrial fibrillation (H)

## 2018-08-21 ENCOUNTER — ANTICOAGULATION THERAPY VISIT (OUTPATIENT)
Dept: ANTICOAGULATION | Facility: CLINIC | Age: 83
End: 2018-08-21
Payer: COMMERCIAL

## 2018-08-21 DIAGNOSIS — I82.409 DVT (DEEP VENOUS THROMBOSIS) (H): ICD-10-CM

## 2018-08-21 DIAGNOSIS — Z79.01 LONG TERM CURRENT USE OF ANTICOAGULANT THERAPY: ICD-10-CM

## 2018-08-21 LAB — INR POINT OF CARE: 1.9 (ref 0.86–1.14)

## 2018-08-21 PROCEDURE — 99207 ZZC NO CHARGE NURSE ONLY: CPT

## 2018-08-21 PROCEDURE — 85610 PROTHROMBIN TIME: CPT | Mod: QW

## 2018-08-21 PROCEDURE — 36416 COLLJ CAPILLARY BLOOD SPEC: CPT

## 2018-08-21 NOTE — MR AVS SNAPSHOT
Ellie Leigh   8/21/2018 1:45 PM   Anticoagulation Therapy Visit    Description:  87 year old female   Provider:  [facility]   Department:  [facility]           INR as of 8/21/2018     Today's INR 1.9      Anticoagulation Summary as of 8/21/2018     INR goal    Prior goal 1.5-2.0   Today's INR 1.9   Full warfarin instructions 8/22: 1 mg; Otherwise 1 mg every day   Next INR check 9/11/2018    Indications   Atrial fibrillation with rapid ventricular response (H) (Resolved) [I48.91]  DVT (deep venous thrombosis) [I82.409]  Long term current use of anticoagulant therapy [Z79.01]         Your next Anticoagulation Clinic appointment(s)     Sep 11, 2018  1:45 PM CDT   [facility]   [facility]    [address] [address] [phone]              Contact Numbers     Please call [phone] with any problems or questions regarding your therapy.    If you need to cancel and/or reschedule your appointment please call one of the following numbers:  [facility] [phone]  [place] - [phone]  [facility] [phone]  hospitals [phone]  [place] - [phone]            August 2018 Details    Sun Mon Tue Wed Thu Fri Sat        1               2               3               4                 5               6               7               8               9               10               11                 12               13               14               15               16               17               18                 19               20               21      1 mg   See details      22      1 mg         23      1 mg         24      1 mg         25      1 mg           26      1 mg         27      1 mg         28      1 mg         29      1 mg         30      1 mg         31      1 mg           Date Details   08/21 This INR check               How to take your warfarin dose     To take:  1 mg Take 1 of the  1 mg tablets.           September 2018 Details    Sun Mon Tue Wed Thu Fri Sat           1      1 mg           2      1 mg         3      1 mg         4      1 mg         5      1 mg         6      1 mg         7      1 mg         8      1 mg           9      1 mg         10      1 mg         11            12               13               14               15                 16               17               18               19               20               21               22                 23               24               25               26               27               28               29                 30                      Date Details   No additional details    Date of next INR:  9/11/2018         How to take your warfarin dose     To take:  1 mg Take 1 of the 1 mg tablets.

## 2018-08-21 NOTE — PROGRESS NOTES
ANTICOAGULATION FOLLOW-UP CLINIC VISIT    Patient Name:  Ellie Leigh  Date:  8/21/2018  Contact Type:  Face to Face/Daughter present    SUBJECTIVE:     Patient Findings     Positives No Problem Findings    Comments Patient had 7 mg in the last 7 days. Writer adjusted maintenance dose to 7 mg for an 8% increase. Recheck INR in 3 weeks.            OBJECTIVE    INR Protime   Date Value Ref Range Status   08/21/2018 1.9 (A) 0.86 - 1.14 Final       ASSESSMENT / PLAN  INR assessment THER    Recheck INR In: 3 WEEKS    INR Location Clinic      Anticoagulation Summary as of 8/21/2018     INR goal    Prior goal 1.5-2.0   Today's INR 1.9   Warfarin maintenance plan 1 mg (1 mg x 1) every day   Full warfarin instructions 8/22: 1 mg; Otherwise 1 mg every day   Weekly warfarin total 7 mg   Plan last modified Ruiz Meredith RN (8/21/2018)   Next INR check 9/11/2018   Priority INR   Target end date Indefinite    Indications   Atrial fibrillation with rapid ventricular response (H) (Resolved) [I48.91]  DVT (deep venous thrombosis) [I82.409]  Long term current use of anticoagulant therapy [Z79.01]         Anticoagulation Episode Summary     INR check location     Preferred lab     Send INR reminders to [facility] POOL    Comments * Actual INR goal is 1.7-2.3  Taking Celebrex PRN         Anticoagulation Care Providers     Provider Role Specialty Phone number    Anjum Vidales MD [facility] [phone]            See the Encounter Report to view Anticoagulation Flowsheet and Dosing Calendar (Go to Encounters tab in chart review, and find the Anticoagulation Therapy Visit)        Ruiz Meredith RN

## 2018-09-05 ENCOUNTER — RADIANT APPOINTMENT (OUTPATIENT)
Dept: GENERAL RADIOLOGY | Facility: CLINIC | Age: 83
End: 2018-09-05
Attending: FAMILY MEDICINE
Payer: COMMERCIAL

## 2018-09-05 ENCOUNTER — OFFICE VISIT (OUTPATIENT)
Dept: FAMILY MEDICINE | Facility: CLINIC | Age: 83
End: 2018-09-05
Payer: COMMERCIAL

## 2018-09-05 VITALS — TEMPERATURE: 97.7 F | HEART RATE: 64 BPM | OXYGEN SATURATION: 96 % | RESPIRATION RATE: 24 BRPM

## 2018-09-05 DIAGNOSIS — R05.9 COUGH: ICD-10-CM

## 2018-09-05 DIAGNOSIS — E03.9 HYPOTHYROIDISM, UNSPECIFIED TYPE: Chronic | ICD-10-CM

## 2018-09-05 DIAGNOSIS — E22.2 SIADH (SYNDROME OF INAPPROPRIATE ADH PRODUCTION) (H): ICD-10-CM

## 2018-09-05 DIAGNOSIS — R09.81 NASAL CONGESTION: ICD-10-CM

## 2018-09-05 DIAGNOSIS — Z23 NEED FOR PROPHYLACTIC VACCINATION AND INOCULATION AGAINST INFLUENZA: ICD-10-CM

## 2018-09-05 DIAGNOSIS — R05.9 COUGH: Primary | ICD-10-CM

## 2018-09-05 DIAGNOSIS — R11.0 NAUSEA: ICD-10-CM

## 2018-09-05 LAB
ANION GAP SERPL CALCULATED.3IONS-SCNC: 6 MMOL/L (ref 3–14)
BUN SERPL-MCNC: 21 MG/DL (ref 7–30)
CALCIUM SERPL-MCNC: 8.4 MG/DL (ref 8.5–10.1)
CHLORIDE SERPL-SCNC: 92 MMOL/L (ref 94–109)
CO2 SERPL-SCNC: 29 MMOL/L (ref 20–32)
CREAT SERPL-MCNC: 0.53 MG/DL (ref 0.52–1.04)
GFR SERPL CREATININE-BSD FRML MDRD: >90 ML/MIN/1.7M2
GLUCOSE SERPL-MCNC: 85 MG/DL (ref 70–99)
POTASSIUM SERPL-SCNC: 4.4 MMOL/L (ref 3.4–5.3)
SODIUM SERPL-SCNC: 127 MMOL/L (ref 133–144)
TSH SERPL DL<=0.005 MIU/L-ACNC: 2.95 MU/L (ref 0.4–4)

## 2018-09-05 PROCEDURE — 99214 OFFICE O/P EST MOD 30 MIN: CPT | Mod: 25 | Performed by: FAMILY MEDICINE

## 2018-09-05 PROCEDURE — 36415 COLL VENOUS BLD VENIPUNCTURE: CPT | Performed by: FAMILY MEDICINE

## 2018-09-05 PROCEDURE — 90662 IIV NO PRSV INCREASED AG IM: CPT | Performed by: FAMILY MEDICINE

## 2018-09-05 PROCEDURE — 84443 ASSAY THYROID STIM HORMONE: CPT | Performed by: FAMILY MEDICINE

## 2018-09-05 PROCEDURE — 80048 BASIC METABOLIC PNL TOTAL CA: CPT | Performed by: FAMILY MEDICINE

## 2018-09-05 PROCEDURE — 71046 X-RAY EXAM CHEST 2 VIEWS: CPT | Mod: FY

## 2018-09-05 PROCEDURE — G0008 ADMIN INFLUENZA VIRUS VAC: HCPCS | Performed by: FAMILY MEDICINE

## 2018-09-05 RX ORDER — AZITHROMYCIN 250 MG/1
TABLET, FILM COATED ORAL
Qty: 6 TABLET | Refills: 0 | Status: SHIPPED | OUTPATIENT
Start: 2018-09-05 | End: 2018-09-17

## 2018-09-05 RX ORDER — FLUTICASONE PROPIONATE 50 MCG
2 SPRAY, SUSPENSION (ML) NASAL DAILY
Qty: 1 BOTTLE | Refills: 11 | Status: SHIPPED | OUTPATIENT
Start: 2018-09-05 | End: 2020-10-02

## 2018-09-05 NOTE — PROGRESS NOTES
"  SUBJECTIVE:   Ellie Leigh is a 87 year old female who presents to clinic today for the following health issues:  Chief Complaint   Patient presents with     Abdominal Pain     should she get a Tdap?       Accompanied today by daughter Suyapa.     Last clinic visit: 8/20 for 5 day history of abdominal pain, nausea and loose stools.  She was continued on her protonix.     ABDOMINAL PAIN     Onset: May 2018     Description:   Character: Dull ache  Location: epigastric region  Radiation:Sternal area    Intensity: moderate    Progression of Symptoms:  Intermittent- gets better, then gets worse again    Accompanying Signs & Symptoms:  Fever/Chills?: no   Gas/Bloating: YES- bloating  Nausea: YES-  Vomitting: no   Diarrhea?: no   Constipation:YES- off and on  Dysuria or Hematuria: no    History:   Trauma: no   Previous similar pain: no    Previous tests done: none    Precipitating factors:   Does the pain change with:     Food: YES- sometimes goes away after eating     BM: no     Urination: no     Alleviating factors:  None    Therapies Tried and outcome: Tried to monitor different foods-no change, Pepto Bismol- no change    LMP:  not applicable   Some nausea which goes up into chest.  It is and \"odd feeling\" in chest.  Not a pain. Can occur in afternoon or evening.    Nausea, no emesis.   No further diarrhea, more like constipation.   Takes polyethylene glycol (Miralax) daily for bowels.   No waterbrash.   Currently taking ranitidine twice daily 150mg.  Tried Protonix which did not help.  Took for a few weeks.   Last gastroscopy in 2014.  She has had a coulpe time in the past \"they have never found anything\".     Breathing variable.  Feels shortness of breath some days.  Some cough daily.  This has been chronic.  Seems worse than in the past.  Gets tickle in her throat.  Currently using Duonebs three times daily.  Not sure if they make a difference.  She has been using Symbicort twice daily.     Problem 2:   Would like " "to talk about sodium levels and if needs to continue taking the sodium pills.  Does not like to take salt pills.  Difficult to swallow.    Patient Active Problem List   Diagnosis     Sensorineural hearing loss, asymmetrical     Generalized osteoarthrosis, unspecified site     Disease of lung     PERSONAL HISTORY OF MALIGNANCY- BREAST     Esophageal reflux     Chronic allergic conjunctivitis     Chronic seasonal allergic rhinitis     Hyperlipidemia LDL goal <130     Chronic constipation     Osteoporosis     Health Care Home     Right arm weakness     Ulnar neuropathy     Headache     Mild major depression     DVT (deep venous thrombosis)     Anxiety     Cervical vertebral fracture (H)     Intermittent atrial fibrillation (H)     Squamous cell carcinoma in situ of skin of face     SK (seborrheic keratosis)     Hypothyroidism     Hyponatremia     Recurrent UTI     History of skin cancer     BPPV (benign paroxysmal positional vertigo)     Benign essential HTN     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)     Positive fecal occult blood test     COPD (chronic obstructive pulmonary disease) (H)     Infectious colitis, enteritis and gastroenteritis     Advance Care Planning     Syncope     Hemoptysis     Long term current use of anticoagulant therapy     Mild persistent asthma without complication     Unresponsive episode     Irritable bowel syndrome without diarrhea     Elevated troponin     Nausea     Back pain     H/O TB (tuberculosis)     Chest pain     Community acquired pneumonia of right upper lobe of lung (H)      OBJECTIVE:Blood pressure (P) 140/70, pulse 64, temperature 97.7  F (36.5  C), temperature source Tympanic, resp. rate 24, height (P) 4' 10\" (1.473 m), weight (P) 119 lb (54 kg), last menstrual period 06/15/1985, SpO2 96 %, not currently breastfeeding. BMI=Body mass index is 24.87 kg/(m^2) (pended).  GENERAL APPEARANCE ADULT: Alert, no acute distress, frail elderly woman using walker to get " around.   HENT: oral mucous membranes moist, oropharynx clear  NECK: No adenopathy,masses or thyromegaly  RESP: lungs clear to auscultation   CV: normal rate, regular rhythm, no murmur or gallop  ABDOMEN: soft, no organomegaly, masses or tenderness  MS: extremities normal, no peripheral edema   CXR: Looks like scarring or infiltrate upper right lung near hilum, pacemaker present with leads by my interpretation, I independently visualized the xrays.     ASSESSMENT:   (R05) Cough  (primary encounter diagnosis)  Comment: Right upper lung appears abnormal.    Plan: XR Chest 2 Views, fluticasone (FLONASE) 50         MCG/ACT spray        Await radiology report.     (R11.0) Nausea  Comment: Some chronic stomach symptoms.  This could be related to phlegm.   You have beeen on various stomach medications   Plan: Trial Flonase.  Continue the ranitidine.    We could consider another gastroscopy-it hs been 5 years.     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  If doing well, we can cut back on the salt pills.    Limiting fluid intake can also help.     (Z23) Need for prophylactic vaccination and inoculation against influenza  Comment:   Plan: FLU VACCINE, INCREASED ANTIGEN, PRESV FREE, AGE        65+ [41157], ADMIN INFLUENZA (For MEDICARE         Patients ONLY) []        Flu shot today.     (R09.81) Nasal congestion  Comment:   Plan: fluticasone (FLONASE) 50 MCG/ACT spray        Use Flonase nasal spray, 2 sprays in each nostril once daily.  It may take several days to a week or more to notice a benefit.      (E03.9) Hypothyroidism, unspecified type  Comment: tired  Plan: TSH        Recheck thyroid today.      Check at pharmacy about tetanus vaccine.

## 2018-09-05 NOTE — PROGRESS NOTES
I spoke with daughter Suyapa.  Possible infection although may be more chronic changes.   PLAN: Azithromycin 250mg, 2 pills day #1, then 1 pill daily for 4 more days..  Caution about interaction with warfarin.  She has anticoagulation appointment next week, 9/11,  Recheck chest x-ray in a month.  Let me know if worse breathing, cough or fever before then.   I will order CT if not improving or chest x-ray changes present after this course of antibiotics in a month.

## 2018-09-05 NOTE — NURSING NOTE
"Chief Complaint   Patient presents with     Abdominal Pain       Initial BP (P) 140/70 (BP Location: Right arm, Patient Position: Sitting, Cuff Size: Adult Regular)  Pulse 64  Temp 97.7  F (36.5  C) (Tympanic)  Resp 24  Ht (P) 4' 10\" (1.473 m)  Wt (P) 119 lb (54 kg)  LMP 06/15/1985  SpO2 96%  BMI (P) 24.87 kg/m2 Estimated body mass index is 24.87 kg/(m^2) (pended) as calculated from the following:    Height as of this encounter: (P) 4' 10\" (1.473 m).    Weight as of this encounter: (P) 119 lb (54 kg).      Health Maintenance that is potentially due pending provider review:  Tetanus    Possibly completing today per provider review.    Is there anyone who you would like to be able to receive your results? No  If yes have patient fill out JOSUE  Apoorva Figueroa MA Student    "

## 2018-09-05 NOTE — PROGRESS NOTES

## 2018-09-05 NOTE — PATIENT INSTRUCTIONS
ASSESSMENT:   (R05) Cough  (primary encounter diagnosis)  Comment: Right upper lung appears abnormal.    Plan: XR Chest 2 Views, fluticasone (FLONASE) 50         MCG/ACT spray        Await radiology report.     (R11.0) Nausea  Comment: Some chronic stomach symptoms.  This could be related to phlegm.   You have beeen on various stomach medications   Plan: Trial Flonase.  Continue the ranitidine.    We could consider another gastroscopy-it hs been 5 years.     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  If doing well, we can cut back on the salt pills.    Limiting fluid intake can also help.     (Z23) Need for prophylactic vaccination and inoculation against influenza  Comment:   Plan: FLU VACCINE, INCREASED ANTIGEN, PRESV FREE, AGE        65+ [57101], ADMIN INFLUENZA (For MEDICARE         Patients ONLY) []        Flu shot today.     (R09.81) Nasal congestion  Comment:   Plan: fluticasone (FLONASE) 50 MCG/ACT spray        Use Flonase nasal spray, 2 sprays in each nostril once daily.  It may take several days to a week or more to notice a benefit.      (E03.9) Hypothyroidism, unspecified type  Comment: tired  Plan: TSH        Recheck thyroid today.      Check at pharmacy about tetanus vaccine.

## 2018-09-05 NOTE — MR AVS SNAPSHOT
After Visit Summary   9/5/2018    Ellie Leigh    MRN: [id number]           Patient Information     Date Of Birth          9/12/1930        Visit Information        Provider Department      9/5/2018 10:40 AM Anjum Vidales MD [facility]        Today's Diagnoses     Cough    -  1    Nausea        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)        Need for prophylactic vaccination and inoculation against influenza        Nasal congestion        Hypothyroidism, unspecified type          Care Instructions    ASSESSMENT:   (R05) Cough  (primary encounter diagnosis)  Comment: Right upper lung appears abnormal.    Plan: XR Chest 2 Views, fluticasone (FLONASE) 50         MCG/ACT spray        Await radiology report.     (R11.0) Nausea  Comment: Some chronic stomach symptoms.  This could be related to phlegm.   You have beeen on various stomach medications   Plan: Trial Flonase.  Continue the ranitidine.    We could consider another gastroscopy-it hs been 5 years.     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  If doing well, we can cut back on the salt pills.    Limiting fluid intake can also help.     (Z23) Need for prophylactic vaccination and inoculation against influenza  Comment:   Plan: FLU VACCINE, INCREASED ANTIGEN, PRESV FREE, AGE        65+ [40272], ADMIN INFLUENZA (For MEDICARE         Patients ONLY) []        Flu shot today.     (R09.81) Nasal congestion  Comment:   Plan: fluticasone (FLONASE) 50 MCG/ACT spray        Use Flonase nasal spray, 2 sprays in each nostril once daily.  It may take several days to a week or more to notice a benefit.      (E03.9) Hypothyroidism, unspecified type  Comment: tired  Plan: TSH        Recheck thyroid today.      Check at pharmacy about tetanus vaccine.           Follow-ups after your visit        Your next 10 appointments already scheduled      Sep 11, 2018  1:45 PM CDT   [facility] with [place] [facility]   [facility]    [address] [address]   [phone]            Oct 22, 2018 10:30 AM CDT   Remote PPM Check with [place] [facility]   [facility] ([facility]    [address] [address] [phone]           This appointment is for a remote check of your pacemaker.  This is not an appointment at the office.            Oct 30, 2018  1:30 PM CDT   UMP EP RETURN with Philippe Luna MD   [facility] ([facility])    [address] [address] [phone]              Who to contact     If you have questions or need follow up information about today's clinic visit or your schedule please contact [facility] directly at [phone].  Normal or non-critical lab and imaging results will be communicated to you by MyChart, letter or phone within 4 business days after the clinic has received the results. If you do not hear from us within 7 days, please contact the clinic through MyChart or phone. If you have a critical or abnormal lab result, we will notify you by phone as soon as possible.  Submit refill requests through [employer] or call your pharmacy and they will forward the refill request to us. Please allow 3 business days for your refill to be completed.          Additional Information About Your Visit        Care EveryWhere ID     This is your Care EveryWhere ID. This could be used by other organizations to access your [facility] medical records  [id number]        Your Vitals Were     Pulse Temperature Respirations Last Period Pulse Oximetry       64 97.7  F (36.5  C) (Tympanic) 24 06/15/1985 96%        Blood Pressure from Last 3 Encounters:   09/05/18 (P) 140/70   08/20/18 152/82   07/31/18 110/68    Weight from Last 3 Encounters:   09/05/18  (P) 119 lb (54 kg)   08/20/18 120 lb (54.4 kg)   07/31/18 120 lb (54.4 kg)              We Performed the Following     ADMIN INFLUENZA (For MEDICARE Patients ONLY) []     Basic metabolic panel     FLU VACCINE, INCREASED ANTIGEN, PRESV FREE, AGE 65+ [91006]     TSH          Today's Medication Changes          These changes are accurate as of 9/5/18 12:39 PM.  If you have any questions, ask your nurse or doctor.               Start taking these medicines.        Dose/Directions    fluticasone 50 MCG/ACT spray   Commonly known as:  FLONASE   Used for:  Cough, Nasal congestion   Started by:  Anjum Vidales MD        Dose:  2 spray   Spray 2 sprays into both nostrils daily   Quantity:  1 Bottle   Refills:  11            Where to get your medicines      Some of these will need a paper prescription and others can be bought over the counter.  Ask your nurse if you have questions.     Bring a paper prescription for each of these medications     fluticasone 50 MCG/ACT spray                Primary Care Provider Office Phone # Fax #    Anjum Vidales [phone] [address] [place], [place] [address]        Equal Access to Services     [facility] AH: Hadii daniel dumont hadasho Somaurilio, waaxda luqadaha, qaybta kaalmada adebakari, verónica bedoya . So [facility] [phone].    ATENCIÓN: Si habla español, tiene a sahni disposición servicios gratuitos de asistencia lingüística. Llame al [phone].    We comply with applicable federal civil rights laws and [place] laws. We do not discriminate on the basis of race, color, national origin, age, disability, sex, sexual orientation, or gender identity.            Thank you!     Thank you for choosing [facility]  for your care. Our goal is always to provide you with excellent care. Hearing back from our patients is one way we can continue to improve our services. Please take a few minutes to complete the written survey  that you may receive in the mail after your visit with us. Thank you!             Your Updated Medication List - Protect others around you: Learn how to safely use, store and throw away your medicines at www.disposemymeds.org.          This list is accurate as of 9/5/18 12:39 PM.  Always use your most recent med list.                   Brand Name Dispense Instructions for use Diagnosis    albuterol 108 (90 Base) MCG/ACT inhaler    PROAIR HFA/PROVENTIL HFA/VENTOLIN HFA    1 Inhaler    Inhale 2 puffs into the lungs every 6 hours as needed for shortness of breath / dyspnea    Mild persistent asthma without complication       CRANBERRY      Take 475 mg by mouth 2 times daily.        diclofenac 1 % Gel topical gel    VOLTAREN     Place onto the skin 4 times daily as needed for moderate pain        fluticasone 50 MCG/ACT spray    FLONASE    1 Bottle    Spray 2 sprays into both nostrils daily    Cough, Nasal congestion       ipratropium - albuterol 0.5 mg/2.5 mg/3 mL 0.5-2.5 (3) MG/3ML neb solution    DUONEB    180 mL    TAKE 1 VIAL BY NEBULIZATION  EVERY SIX HOURS AS NEEDED FOR SHORTNESS OF BREATH/ DYSPNEA OR WHEEZING    Chronic obstructive pulmonary disease, unspecified COPD type (H)       levothyroxine 50 MCG tablet    SYNTHROID/LEVOTHROID    90 tablet    TAKE ONE TABLET BY MOUTH ONE TIME DAILY    Hypothyroidism, unspecified type       metoprolol succinate 50 MG 24 hr tablet    TOPROL-XL    60 tablet    TAKE 1 TAB BY MOUTH TWICE A DAY FOR HTN    Essential hypertension, benign       order for DME     1 Device    Equipment being ordered: Nebulizer    Chronic obstructive pulmonary disease, unspecified COPD type (H)       order for DME     1 each    Equipment being ordered: Nebulizer    Chronic obstructive pulmonary disease, unspecified COPD type (H)       pantoprazole 40 MG EC tablet    PROTONIX    30 tablet    Take 1 tablet (40 mg) by mouth daily Take 30-60 minutes before a meal.    Gastroesophageal reflux disease with  esophagitis, Abdominal pain, epigastric       polyethylene glycol Packet    MIRALAX/GLYCOLAX     Take 17 g by mouth daily        probiotic Caps      Take 1 capsule by mouth every evening        ranitidine 150 MG tablet    ZANTAC    60 tablet    Take 1 tablet (150 mg) by mouth 2 times daily    Gastroesophageal reflux disease without esophagitis       sodium chloride 1 GM tablet     100 tablet    Take 1 tablet (1 g) by mouth 3 times daily    Hyponatremia       SYMBICORT 160-4.5 MCG/ACT Inhaler   Generic drug:  budesonide-formoterol     10.2 g    INHALE TWO PUFFS INTO THE LUNGS TWICE DAILY    Mild persistent asthma without complication       TYLENOL PO      Take 975 mg by mouth 3 times daily        warfarin 1 MG tablet    COUMADIN    96 tablet    1 mg (1 mg x 1) every day or as directed by the Anticoagulation Clinic    Intermittent atrial fibrillation (H)

## 2018-09-06 NOTE — PROGRESS NOTES
Please call.  Sodium remains low.  TSH is normal indicating that thyroid function is normal..   PLAN: Continue the salt tablets and limit fluids to less than a quart a day.   Continue current levothyroxine.

## 2018-09-11 ENCOUNTER — ANTICOAGULATION THERAPY VISIT (OUTPATIENT)
Dept: ANTICOAGULATION | Facility: CLINIC | Age: 83
End: 2018-09-11
Payer: COMMERCIAL

## 2018-09-11 DIAGNOSIS — I82.409 DVT (DEEP VENOUS THROMBOSIS) (H): ICD-10-CM

## 2018-09-11 DIAGNOSIS — Z79.01 LONG TERM CURRENT USE OF ANTICOAGULANT THERAPY: ICD-10-CM

## 2018-09-11 LAB — INR POINT OF CARE: 2.3 (ref 0.86–1.14)

## 2018-09-11 PROCEDURE — 36416 COLLJ CAPILLARY BLOOD SPEC: CPT

## 2018-09-11 PROCEDURE — 99207 ZZC NO CHARGE NURSE ONLY: CPT

## 2018-09-11 PROCEDURE — 85610 PROTHROMBIN TIME: CPT | Mod: QW

## 2018-09-11 NOTE — PROGRESS NOTES
ANTICOAGULATION FOLLOW-UP CLINIC VISIT    Patient Name:  Ellie Leigh  Date:  9/11/2018  Contact Type:  Face to Face/Daughter present    SUBJECTIVE:     Patient Findings     Positives No Problem Findings    Comments Patient denies any changes. Patient will continue weekly maintenance dose. INR is therapeutic.              OBJECTIVE    INR Protime   Date Value Ref Range Status   09/11/2018 2.3 (A) 0.86 - 1.14 Final       ASSESSMENT / PLAN  INR assessment THER    Recheck INR In: 4 WEEKS    INR Location Clinic      Anticoagulation Summary as of 9/11/2018     INR goal    Prior goal 1.5-2.0   Today's INR 2.3!   Warfarin maintenance plan 1 mg (1 mg x 1) every day   Full warfarin instructions 1 mg every day   Weekly warfarin total 7 mg   No change documented Ruiz Meredith RN   Plan last modified Ruiz Meredith RN (8/21/2018)   Next INR check 10/9/2018   Priority INR   Target end date Indefinite    Indications   Atrial fibrillation with rapid ventricular response (H) (Resolved) [I48.91]  DVT (deep venous thrombosis) [I82.409]  Long term current use of anticoagulant therapy [Z79.01]         Anticoagulation Episode Summary     INR check location     Preferred lab     Send INR reminders to [facility] POOL    Comments * Actual INR goal is 1.7-2.3  Taking Celebrex PRN         Anticoagulation Care Providers     Provider Role Specialty Phone number    Anjum Vidales MD [facility] [phone]            See the Encounter Report to view Anticoagulation Flowsheet and Dosing Calendar (Go to Encounters tab in chart review, and find the Anticoagulation Therapy Visit)        Ruiz Meredith RN

## 2018-09-11 NOTE — MR AVS SNAPSHOT
Ellie Leigh   9/11/2018 1:45 PM   Anticoagulation Therapy Visit    Description:  87 year old female   Provider:  [place] [facility]   Department:  [facility]           INR as of 9/11/2018     Today's INR 2.3!      Anticoagulation Summary as of 9/11/2018     INR goal    Prior goal 1.5-2.0   Today's INR 2.3!   Full warfarin instructions 1 mg every day   Next INR check 10/9/2018    Indications   Atrial fibrillation with rapid ventricular response (H) (Resolved) [I48.91]  DVT (deep venous thrombosis) [I82.409]  Long term current use of anticoagulant therapy [Z79.01]         Your next Anticoagulation Clinic appointment(s)     Oct 09, 2018  2:00 PM CDT   [facility] Visit with [place] [facility]   [facility]    [address] [address] [phone]              Contact Numbers     Please call [phone] with any problems or questions regarding your therapy.    If you need to cancel and/or reschedule your appointment please call one of the following numbers:  [facility] [phone]  [place] - [phone]  [facility] [phone]  [facility] [phone]  [place] - [phone]            September 2018 Details    Sun Mon Tue Wed Thu Fri Sat           1                 2               3               4               5               6               7               8                 9               10               11      1 mg   See details      12      1 mg         13      1 mg         14      1 mg         15      1 mg           16      1 mg         17      1 mg         18      1 mg         19      1 mg         20      1 mg         21      1 mg         22      1 mg           23      1 mg         24      1 mg         25      1 mg         26      1 mg         27      1 mg         28      1 mg         29      1 mg           30      1 mg                Date Details   09/11 This INR check               How to take your warfarin dose     To take:  1 mg  Take 1 of the 1 mg tablets.           October 2018 Details    Sun Mon Tue Wed Thu Fri Sat      1      1 mg         2      1 mg         3      1 mg         4      1 mg         5      1 mg         6      1 mg           7      1 mg         8      1 mg         9            10               11               12               13                 14               15               16               17               18               19               20                 21               22               23               24               25               26               27                 28               29               30               31                   Date Details   No additional details    Date of next INR:  10/9/2018         How to take your warfarin dose     To take:  1 mg Take 1 of the 1 mg tablets.

## 2018-09-12 NOTE — PROGRESS NOTES
Clinic Care Coordination Contact    Situation: Patient chart reviewed by care coordinator.    Background: UTI follow up    Assessment: per review, patient saw PCP 9/5/18 for cough, SIADH. Patient given azithromycin for 5 days.     Plan/Recommendations: Patient's daughter was last contacted 7/30/18 date. Chart reviewed. No concerns noted at this time. Patient or daughter has not outreached to RN CC.  Patient and daughter haave a letter with my contact information and access plan to reference if needed. Will close to active CC outreaches at this time.    Steffanie Louis RN, [facility]  RN Care Coordinator  [facility]  Phone # [phone]  9/12/2018 10:45 AM

## 2018-09-17 ENCOUNTER — TELEPHONE (OUTPATIENT)
Dept: FAMILY MEDICINE | Facility: CLINIC | Age: 83
End: 2018-09-17

## 2018-09-17 ENCOUNTER — OFFICE VISIT (OUTPATIENT)
Dept: FAMILY MEDICINE | Facility: CLINIC | Age: 83
End: 2018-09-17
Payer: COMMERCIAL

## 2018-09-17 VITALS
WEIGHT: 120.8 LBS | OXYGEN SATURATION: 97 % | HEART RATE: 70 BPM | SYSTOLIC BLOOD PRESSURE: 152 MMHG | TEMPERATURE: 97.7 F | DIASTOLIC BLOOD PRESSURE: 74 MMHG | BODY MASS INDEX: 25.36 KG/M2 | RESPIRATION RATE: 16 BRPM | HEIGHT: 58 IN

## 2018-09-17 DIAGNOSIS — I48.0 INTERMITTENT ATRIAL FIBRILLATION (H): ICD-10-CM

## 2018-09-17 DIAGNOSIS — J18.9 PNEUMONIA OF RIGHT UPPER LOBE DUE TO INFECTIOUS ORGANISM: Primary | ICD-10-CM

## 2018-09-17 PROCEDURE — 99213 OFFICE O/P EST LOW 20 MIN: CPT | Performed by: FAMILY MEDICINE

## 2018-09-17 RX ORDER — LEVOFLOXACIN 750 MG/1
750 TABLET, FILM COATED ORAL DAILY
Qty: 5 TABLET | Refills: 0 | Status: SHIPPED | OUTPATIENT
Start: 2018-09-17 | End: 2018-10-30

## 2018-09-17 RX ORDER — WARFARIN SODIUM 1 MG/1
TABLET ORAL
Qty: 96 TABLET | Refills: 3 | Status: SHIPPED | OUTPATIENT
Start: 2018-09-17 | End: 2018-12-04

## 2018-09-17 ASSESSMENT — PAIN SCALES - GENERAL: PAINLEVEL: MILD PAIN (3)

## 2018-09-17 NOTE — TELEPHONE ENCOUNTER
Reason for call:  Patient reporting a symptom    Symptom or request: Hurts in Chest, weak like she could faint. Extremely Tired Pt said her cough is coming back. Pt was into see Dr. Vidales 9/5/18 Diagnosis Pneumonia. Please Advise next step.    Have you been treated for this before? Yes    Phone Number patient can be reached at:  Home number on file [phone] (home)    Best Time:  Any Time      Can we leave a detailed message on this number:  YES    Call taken on 9/17/2018 at 8:28 AM by Luz Maria Liang

## 2018-09-17 NOTE — NURSING NOTE
Chief Complaint   Patient presents with     Cough     Patient finished her round with zpak, was feeling better for about 2-3 days and now is feeling out of breath and coughing again.     Kavitha CARVER CMA

## 2018-09-17 NOTE — PROGRESS NOTES
SUBJECTIVE:   Ellie Leigh is a 88 year old female who presents to clinic today for the following health issues:  Chief Complaint   Patient presents with     Cough     Patient finished her round with zpak, was feeling better for about 2-3 days and now is feeling out of breath and coughing again.        Acute Illness   Acute illness concerns: Coughing, short of breath  Onset: Started again about 3 days ago    Fever: no    Chills/Sweats: no    Headache (location?): no    Sinus Pressure:no    Conjunctivitis:  no    Ear Pain: no    Rhinorrhea: no    Congestion: YES    Sore Throat: no     Cough: YES-productive of green sputum and red    Wheeze: YES- comes and goes    Decreased Appetite: YES    Nausea: no    Vomiting: no    Diarrhea:  no    Dysuria/Freq.: no    Fatigue/Achiness: YES    Sick/Strep Exposure: no     Therapies Tried and outcome:Patient finished z-laura    She did finish the azithromycin.  Felt better for a few days, cough mostly gone.   Now some phlegm once daily.   Has had recurrence of shortness of breath.  No fever.   She had abnormal chest x-ray at last visit.    Tired.   Gets faint feeling at times.     Patient Active Problem List   Diagnosis     Sensorineural hearing loss, asymmetrical     Generalized osteoarthrosis, unspecified site     Disease of lung     PERSONAL HISTORY OF MALIGNANCY- BREAST     Esophageal reflux     Chronic allergic conjunctivitis     Chronic seasonal allergic rhinitis     Hyperlipidemia LDL goal <130     Chronic constipation     Osteoporosis     Health Care Home     Right arm weakness     Ulnar neuropathy     Headache     Mild major depression     DVT (deep venous thrombosis)     Anxiety     Cervical vertebral fracture (H)     Intermittent atrial fibrillation (H)     Squamous cell carcinoma in situ of skin of face     SK (seborrheic keratosis)     Hypothyroidism     Hyponatremia     Recurrent UTI     History of skin cancer     BPPV (benign paroxysmal positional vertigo)      "Benign essential HTN     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)     Positive fecal occult blood test     COPD (chronic obstructive pulmonary disease) (H)     Infectious colitis, enteritis and gastroenteritis     Advance Care Planning     Syncope     Hemoptysis     Long term current use of anticoagulant therapy     Mild persistent asthma without complication     Unresponsive episode     Irritable bowel syndrome without diarrhea     Elevated troponin     Nausea     Back pain     H/O TB (tuberculosis)     Chest pain     Community acquired pneumonia of right upper lobe of lung (H)       OBJECTIVE:Blood pressure 152/74, pulse 70, temperature 97.7  F (36.5  C), temperature source Tympanic, resp. rate 16, height 4' 10\" (1.473 m), weight 120 lb 12.8 oz (54.8 kg), last menstrual period 06/15/1985, SpO2 97 %, not currently breastfeeding. BMI=Body mass index is 25.25 kg/(m^2).  GENERAL APPEARANCE ADULT: Alert, no acute distress  NECK: No adenopathy,masses or thyromegaly  RESP: a few scattered expiratory wheezes   CV: normal rate, regular rhythm, no murmur or gallop  ABDOMEN: soft, no organomegaly, masses or tenderness  MS: extremities normal, no peripheral edema     ASSESSMENT:   (J18.1) Pneumonia of right upper lobe due to infectious organism (H)  (primary encounter diagnosis)  Comment: abnormal chest x-ray at last visit.   Plan: levofloxacin (LEVAQUIN) 750 MG tablet          TAke the Levaquin 750mg daily for 5 days.   REcheck chest x-ray in a month.   Let me know if fever or breathing is worse.   Have INR checked this week in the next few days s the Levaquin can interact with warfarin.      "

## 2018-09-17 NOTE — PATIENT INSTRUCTIONS
ASSESSMENT:   (J18.1) Pneumonia of right upper lobe due to infectious organism (H)  (primary encounter diagnosis)  Comment: abnormal chest x-ray at last visit.   Plan: levofloxacin (LEVAQUIN) 750 MG tablet          TAke the Levaquin 750mg daily for 5 days.   REcheck chest x-ray in a month.   Let me know if fever or breathing is worse.   Have INR checked this week in the next few days s the Levaquin can interact with warfarin.

## 2018-09-17 NOTE — TELEPHONE ENCOUNTER
S-(situation): I talked with Ellie and she says she is not feeling well again    B-(background): saw Dr Vidales on 9-5-18 and treated with azithromycin and felt better while taking this and 2 days after finishing the antibiotic.  States slowly started to get cough back and some wheezing.  Felt great 3 days ago.  Denies fever.  Sounds good while talking with her    A-(assessment): cough    R-(recommendations): appointment made for her to see Dr Sobeida Laurent RN

## 2018-09-17 NOTE — MR AVS SNAPSHOT
After Visit Summary   9/17/2018    Ellie Leigh    MRN: [id number]           Patient Information     Date Of Birth          9/12/1930        Visit Information        Provider Department      9/17/2018 2:20 PM Anjum Vidales MD [facility]        Today's Diagnoses     Pneumonia of right upper lobe due to infectious organism (H)    -  1    Intermittent atrial fibrillation (H)          Care Instructions    ASSESSMENT:   (J18.1) Pneumonia of right upper lobe due to infectious organism (H)  (primary encounter diagnosis)  Comment: abnormal chest x-ray at last visit.   Plan: levofloxacin (LEVAQUIN) 750 MG tablet          TAke the Levaquin 750mg daily for 5 days.   REcheck chest x-ray in a month.   Let me know if fever or breathing is worse.   Have INR checked this week in the next few days s the Levaquin can interact with warfarin.          Follow-ups after your visit        Your next 10 appointments already scheduled     Oct 09, 2018  2:00 PM CDT   [facility] with [place] [facility]   [facility]    [address] [address]   [phone]            Oct 22, 2018 10:30 AM CDT   Remote PPM Check with [place] [facility]   [facility] ([facility])    [address] [address] [phone]           This appointment is for a remote check of your pacemaker.  This is not an appointment at the office.            Oct 30, 2018  1:30 PM CDT   P EP RETURN with Philippe Luna MD   [facility] ([facility])    [address] [address]   [phone]              Who to contact     If you have questions or need follow up information about today's clinic visit or your schedule please contact [facility] directly at [phone].  Normal or non-critical lab and imaging results will be communicated  "to you by MyChart, letter or phone within 4 business days after the clinic has received the results. If you do not hear from us within 7 days, please contact the clinic through MyChart or phone. If you have a critical or abnormal lab result, we will notify you by phone as soon as possible.  Submit refill requests through [employer] or call your pharmacy and they will forward the refill request to us. Please allow 3 business days for your refill to be completed.          Additional Information About Your Visit        Care EveryWhere ID     This is your Care EveryWhere ID. This could be used by other organizations to access your [place] medical records  [id number]        Your Vitals Were     Pulse Temperature Respirations Height Last Period Pulse Oximetry    70 97.7  F (36.5  C) (Tympanic) 16 4' 10\" (1.473 m) 06/15/1985 97%    BMI (Body Mass Index)                   25.25 kg/m2            Blood Pressure from Last 3 Encounters:   09/17/18 152/74   09/05/18 (P) 140/70   08/20/18 152/82    Weight from Last 3 Encounters:   09/17/18 120 lb 12.8 oz (54.8 kg)   09/05/18 (P) 119 lb (54 kg)   08/20/18 120 lb (54.4 kg)              Today, you had the following     No orders found for display         Today's Medication Changes          These changes are accurate as of 9/17/18  3:42 PM.  If you have any questions, ask your nurse or doctor.               Start taking these medicines.        Dose/Directions    levofloxacin 750 MG tablet   Commonly known as:  LEVAQUIN   Used for:  Pneumonia of right upper lobe due to infectious organism (H)   Started by:  Anjum Vidales MD        Dose:  750 mg   Take 1 tablet (750 mg) by mouth daily   Quantity:  5 tablet   Refills:  0         These medicines have changed or have updated prescriptions.        Dose/Directions    warfarin 1 MG tablet   Commonly known as:  COUMADIN   This may have changed:  Another medication with the same name was removed. Continue taking this medication, and " follow the directions you see here.   Used for:  Intermittent atrial fibrillation (H)   Changed by:  Anjum Vidales MD        1 mg (1 mg x 1) every day or as directed by the Anticoagulation Clinic   Quantity:  96 tablet   Refills:  3            Where to get your medicines      These medications were sent to [id number] - [place], [place] - [address]  [address] [address]     Phone:  [phone]     levofloxacin 750 MG tablet    warfarin 1 MG tablet                Primary Care Provider Office Phone # Fax #    Anjum Vidales [phone] [address] [facility] [address]        Equal Access to Services     [facility]: Hadii aad ku hadasho Soomaali, waaxda luqadaha, qaybta kaalmada adeegyada, waxay jessica bedoya . So [facility] [phone].    ATENCIÓN: Si habla español, tiene a sahni disposición servicios gratuitos de asistencia lingüística. [facility] [phone].    We comply with applicable federal civil rights laws and [place] laws. We do not discriminate on the basis of race, color, national origin, age, disability, sex, sexual orientation, or gender identity.            Thank you!     Thank you for choosing [facility]  for your care. Our goal is always to provide you with excellent care. Hearing back from our patients is one way we can continue to improve our services. Please take a few minutes to complete the written survey that you may receive in the mail after your visit with us. Thank you!             Your Updated Medication List - Protect others around you: Learn how to safely use, store and throw away your medicines at www.disposemymeds.org.          This list is accurate as of 9/17/18  3:42 PM.  Always use your most recent med list.                   Brand Name Dispense Instructions for use Diagnosis    albuterol 108 (90 Base) MCG/ACT inhaler    PROAIR HFA/PROVENTIL HFA/VENTOLIN HFA    1  Inhaler    Inhale 2 puffs into the lungs every 6 hours as needed for shortness of breath / dyspnea    Mild persistent asthma without complication       CRANBERRY      Take 475 mg by mouth 2 times daily.        diclofenac 1 % Gel topical gel    VOLTAREN     Place onto the skin 4 times daily as needed for moderate pain        fluticasone 50 MCG/ACT spray    FLONASE    1 Bottle    Spray 2 sprays into both nostrils daily    Cough, Nasal congestion       ipratropium - albuterol 0.5 mg/2.5 mg/3 mL 0.5-2.5 (3) MG/3ML neb solution    DUONEB    180 mL    TAKE 1 VIAL BY NEBULIZATION  EVERY SIX HOURS AS NEEDED FOR SHORTNESS OF BREATH/ DYSPNEA OR WHEEZING    Chronic obstructive pulmonary disease, unspecified COPD type (H)       levofloxacin 750 MG tablet    LEVAQUIN    5 tablet    Take 1 tablet (750 mg) by mouth daily    Pneumonia of right upper lobe due to infectious organism (H)       levothyroxine 50 MCG tablet    SYNTHROID/LEVOTHROID    90 tablet    TAKE ONE TABLET BY MOUTH ONE TIME DAILY    Hypothyroidism, unspecified type       metoprolol succinate 50 MG 24 hr tablet    TOPROL-XL    60 tablet    TAKE 1 TAB BY MOUTH TWICE A DAY FOR HTN    Essential hypertension, benign       order for DME     1 Device    Equipment being ordered: Nebulizer    Chronic obstructive pulmonary disease, unspecified COPD type (H)       order for DME     1 each    Equipment being ordered: Nebulizer    Chronic obstructive pulmonary disease, unspecified COPD type (H)       pantoprazole 40 MG EC tablet    PROTONIX    30 tablet    Take 1 tablet (40 mg) by mouth daily Take 30-60 minutes before a meal.    Gastroesophageal reflux disease with esophagitis, Abdominal pain, epigastric       polyethylene glycol Packet    MIRALAX/GLYCOLAX     Take 17 g by mouth daily        probiotic Caps      Take 1 capsule by mouth every evening        ranitidine 150 MG tablet    ZANTAC    60 tablet    Take 1 tablet (150 mg) by mouth 2 times daily    Gastroesophageal reflux  disease without esophagitis       sodium chloride 1 GM tablet     100 tablet    Take 1 tablet (1 g) by mouth 3 times daily    Hyponatremia       SYMBICORT 160-4.5 MCG/ACT Inhaler   Generic drug:  budesonide-formoterol     10.2 g    INHALE TWO PUFFS INTO THE LUNGS TWICE DAILY    Mild persistent asthma without complication       TYLENOL PO      Take 975 mg by mouth 3 times daily        warfarin 1 MG tablet    COUMADIN    96 tablet    1 mg (1 mg x 1) every day or as directed by the Anticoagulation Clinic    Intermittent atrial fibrillation (H)

## 2018-09-21 ENCOUNTER — ANTICOAGULATION THERAPY VISIT (OUTPATIENT)
Dept: ANTICOAGULATION | Facility: CLINIC | Age: 83
End: 2018-09-21
Payer: COMMERCIAL

## 2018-09-21 DIAGNOSIS — Z79.01 LONG TERM CURRENT USE OF ANTICOAGULANT THERAPY: ICD-10-CM

## 2018-09-21 LAB — INR POINT OF CARE: 2.3 (ref 0.86–1.14)

## 2018-09-21 PROCEDURE — 99207 ZZC NO CHARGE NURSE ONLY: CPT

## 2018-09-21 PROCEDURE — 85610 PROTHROMBIN TIME: CPT | Mod: QW

## 2018-09-21 PROCEDURE — 36416 COLLJ CAPILLARY BLOOD SPEC: CPT

## 2018-09-21 NOTE — PROGRESS NOTES
ANTICOAGULATION FOLLOW-UP CLINIC VISIT    Patient Name:  Ellie Leigh  Date:  9/21/2018  Contact Type:  Face to Face    SUBJECTIVE:     Patient Findings     Positives Change in medications (Last dose of Levaquin is tomorrow (9/22). Breathing has improved.)           OBJECTIVE    INR Protime   Date Value Ref Range Status   09/21/2018 2.3 (A) 0.86 - 1.14 Final       ASSESSMENT / PLAN  INR assessment THER    Recheck INR In: 2 WEEKS    INR Location Clinic      Anticoagulation Summary as of 9/21/2018     INR goal    Prior goal 1.5-2.0   Today's INR 2.3!   Warfarin maintenance plan 1 mg (1 mg x 1) every day   Full warfarin instructions 1 mg every day   Weekly warfarin total 7 mg   No change documented Kirsten Mathis RP   Plan last modified Ruiz Meredith RN (8/21/2018)   Next INR check 10/9/2018   Priority INR   Target end date Indefinite    Indications   Atrial fibrillation with rapid ventricular response (H) (Resolved) [I48.91]  DVT (deep venous thrombosis) [I82.409]  Long term current use of anticoagulant therapy [Z79.01]         Anticoagulation Episode Summary     INR check location     Preferred lab     Send INR reminders to [facility] POOL    Comments * Actual INR goal is 1.7-2.3  Taking Celebrex PRN         Anticoagulation Care Providers     Provider Role Specialty Phone number    Anjum Vidales MD [facility] [phone]            See the Encounter Report to view Anticoagulation Flowsheet and Dosing Calendar (Go to Encounters tab in chart review, and find the Anticoagulation Therapy Visit)      Kirsten Mathis RPH

## 2018-09-21 NOTE — MR AVS SNAPSHOT
Ellie Leigh   9/21/2018 10:00 AM   Anticoagulation Therapy Visit    Description:  88 year old female   Provider:  [place] [facility]   [place]  [place] [facility]           INR as of 9/21/2018     Today's INR 2.3!      Anticoagulation Summary as of 9/21/2018     INR goal    Prior goal 1.5-2.0   Today's INR 2.3!   Full warfarin instructions 1 mg every day   Next INR check 10/9/2018    Indications   Atrial fibrillation with rapid ventricular response (H) (Resolved) [I48.91]  DVT (deep venous thrombosis) [I82.409]  Long term current use of anticoagulant therapy [Z79.01]         Description     Continue 1 mg daily. Recheck INR on October 9 at 2 pm.       Your next Anticoagulation Clinic appointment(s)     Oct 09, 2018  2:00 PM CDT   [place] Visit with [place] [facility]   [facility] ([facility]    [address] [address] [phone]              Contact Numbers     Please call [phone] with any problems or questions regarding your therapy.    If you need to cancel and/or reschedule your appointment please call one of the following numbers:  [facility] - [phone]  [place] - [phone]  [facility] [phone]  [facility] [phone]  [place] - [phone]            September 2018 Details    Sun Mon Tue Wed Thu Fri Sat           1                 2               3               4               5               6               7               8                 9               10               11               12               13               14               15                 16               17               18               19               20               21      1 mg   See details      22      1 mg           23      1 mg         24      1 mg         25      1 mg         26      1 mg         27      1 mg         28      1 mg         29      1 mg           30      1 mg                Date Details   09/21 This INR check               How to take  your warfarin dose     To take:  1 mg Take 1 of the 1 mg tablets.           October 2018 Details    Sun Mon Tue Wed Thu Fri Sat      1      1 mg         2      1 mg         3      1 mg         4      1 mg         5      1 mg         6      1 mg           7      1 mg         8      1 mg         9            10               11               12               13                 14               15               16               17               18               19               20                 21               22               23               24               25               26               27                 28               29               30               31                   Date Details   No additional details    Date of next INR:  10/9/2018         How to take your warfarin dose     To take:  1 mg Take 1 of the 1 mg tablets.

## 2018-10-09 ENCOUNTER — ANTICOAGULATION THERAPY VISIT (OUTPATIENT)
Dept: ANTICOAGULATION | Facility: CLINIC | Age: 83
End: 2018-10-09
Payer: COMMERCIAL

## 2018-10-09 DIAGNOSIS — Z79.01 LONG TERM CURRENT USE OF ANTICOAGULANT THERAPY: ICD-10-CM

## 2018-10-09 DIAGNOSIS — I82.409 DVT (DEEP VENOUS THROMBOSIS) (H): ICD-10-CM

## 2018-10-09 LAB — INR POINT OF CARE: 2.3 (ref 0.86–1.14)

## 2018-10-09 PROCEDURE — 36416 COLLJ CAPILLARY BLOOD SPEC: CPT

## 2018-10-09 PROCEDURE — 99207 ZZC NO CHARGE NURSE ONLY: CPT

## 2018-10-09 PROCEDURE — 85610 PROTHROMBIN TIME: CPT | Mod: QW

## 2018-10-09 NOTE — PROGRESS NOTES
ANTICOAGULATION FOLLOW-UP CLINIC VISIT    Patient Name:  Ellie Leigh  Date:  10/9/2018  Contact Type:  Face to Face    SUBJECTIVE:     Patient Findings     Positives No Problem Findings    Comments No changes in medications, diet, or activity. No concerns with bleeding or bruising. Took warfarin as prescribed.   Continue maintenance warfarin dose. Will recheck INR in 3 weeks.   Patient verbalizes understanding and agrees with plan. No further questions at this time.              OBJECTIVE    INR Protime   Date Value Ref Range Status   10/09/2018 2.3 (A) 0.86 - 1.14 Final       ASSESSMENT / PLAN  INR assessment THER    Recheck INR In: 3 WEEKS    INR Location Clinic      Anticoagulation Summary as of 10/9/2018     INR goal    Prior goal 1.5-2.0   Today's INR 2.3!   Warfarin maintenance plan 1 mg (1 mg x 1) every day   Full warfarin instructions 1 mg every day   Weekly warfarin total 7 mg   Plan last modified Ruiz Meredith RN (8/21/2018)   Next INR check 10/30/2018   Priority INR   Target end date Indefinite    Indications   Atrial fibrillation with rapid ventricular response (H) (Resolved) [I48.91]  DVT (deep venous thrombosis) [I82.409]  Long term current use of anticoagulant therapy [Z79.01]         Anticoagulation Episode Summary     INR check location     Preferred lab     Send INR reminders to [facility] POOL    Comments * Actual INR goal is 1.7-2.3  Taking Celebrex PRN         Anticoagulation Care Providers     Provider Role Specialty Phone number    Anjum Vidales MD [facility] [phone]            See the Encounter Report to view Anticoagulation Flowsheet and Dosing Calendar (Go to Encounters tab in chart review, and find the Anticoagulation Therapy Visit)        Caroline Stewart RN

## 2018-10-09 NOTE — MR AVS SNAPSHOT
Ellie Leigh   10/9/2018 2:00 PM   Anticoagulation Therapy Visit    Description:  88 year old female   Provider:  [place] [facility]   [facility]  [facility]           INR as of 10/9/2018     Today's INR 2.3!      Anticoagulation Summary as of 10/9/2018     INR goal    Prior goal 1.5-2.0   Today's INR 2.3!   Full warfarin instructions 1 mg every day   Next INR check 10/30/2018    Indications   Atrial fibrillation with rapid ventricular response (H) (Resolved) [I48.91]  DVT (deep venous thrombosis) [I82.409]  Long term current use of anticoagulant therapy [Z79.01]         Your next Anticoagulation Clinic appointment(s)     Oct 30, 2018  1:15 PM CDT   [facility] with [place] [facility]   [facility]    [address] [address] [phone]              Contact Numbers     Please call [phone] with any problems or questions regarding your therapy.    If you need to cancel and/or reschedule your appointment please call one of the following numbers:   [phone]  [place] - [phone]  [facility] [phone]  [facility] [phone]  [place] - [phone]            October 2018 Details    Sun Mon Tue Wed Thu Fri Sat      1               2               3               4               5               6                 7               8               9      1 mg   See details      10      1 mg         11      1 mg         12      1 mg         13      1 mg           14      1 mg         15      1 mg         16      1 mg         17      1 mg         18      1 mg         19      1 mg         20      1 mg           21      1 mg         22      1 mg         23      1 mg         24      1 mg         25      1 mg         26      1 mg         27      1 mg           28      1 mg         29      1 mg         30            31                   Date Details   10/09 This INR check       Date of next INR:  10/30/2018         How to take  your warfarin dose     To take:  1 mg Take 1 of the 1 mg tablets.

## 2018-10-12 ENCOUNTER — ALLIED HEALTH/NURSE VISIT (OUTPATIENT)
Dept: CARDIOLOGY | Facility: CLINIC | Age: 83
End: 2018-10-12
Payer: COMMERCIAL

## 2018-10-12 DIAGNOSIS — Z95.0 CARDIAC PACEMAKER IN SITU: Primary | ICD-10-CM

## 2018-10-12 PROCEDURE — 93294 REM INTERROG EVL PM/LDLS PM: CPT | Performed by: INTERNAL MEDICINE

## 2018-10-12 PROCEDURE — 93296 REM INTERROG EVL PM/IDS: CPT | Performed by: INTERNAL MEDICINE

## 2018-10-12 NOTE — PROGRESS NOTES
[employer] Evia (D) Remote PPM Device Check  AP: 71 % : 2 %  Mode: DDD-CLS        Presenting Rhythm: AP/VS  Heart Rate: Adequate rates per histogram  Sensing: Stable    Pacing Threshold: Stable    Impedance: Stable  Battery Status: 60% battery life remaining  Atrial Arrhythmia: average of 53 mode switches per day comprising 1% of the time. Ventricular rates controlled. Taking Warfarin.   Ventricular Arrhythmia: None     Care Plan: F/u PPM [employer] remote q 3 months. LM with results. ROME LubinT

## 2018-10-12 NOTE — MR AVS SNAPSHOT
After Visit Summary   10/12/2018    Ellie Leigh    MRN: [id number]           Patient Information     Date Of Birth          9/12/1930        Visit Information        Provider Department      10/12/2018 10:30 AM RUEDA TECH1 [facility]        Today's Diagnoses     Cardiac pacemaker in situ    -  1       Follow-ups after your visit        Your next 10 appointments already scheduled     Oct 12, 2018 10:30 AM CDT   Remote PPM Check with RUEDA TECH1   [facility] ([facility])    [address] [id number] [phone] OPT 2           This appointment is for a remote check of your pacemaker.  This is not an appointment at the office.            Oct 30, 2018  1:15 PM CDT   Anticoagulation Visit with [place] ANTI COAG   [facility]    [address] [address]   [phone]            Oct 30, 2018  1:30 PM CDT   UMP EP RETURN with Philippe Luna MD   [facility] ([facility])    [address] [address]   [phone]            Jan 09, 2019  4:45 PM CST   Remote PPM Check with RUEDA TECH1   [facility] ([facility])    [address] [address] [phone] OPT 2           This appointment is for a remote check of your pacemaker.  This is not an appointment at the office.              Who to contact     If you have questions or need follow up information about today's clinic visit or your schedule please contact [facility] directly at [phone].  Normal or non-critical lab and imaging results will be communicated to you by MyChart, letter or phone within 4 business days after the clinic has received the results. If you do not hear from us within 7 days, please contact  the clinic through [employer] or phone. If you have a critical or abnormal lab result, we will notify you by phone as soon as possible.  Submit refill requests through [employer] or call your pharmacy and they will forward the refill request to us. Please allow 3 business days for your refill to be completed.          Additional Information About Your Visit        Care EveryWhere ID     This is your Care EveryWhere ID. This could be used by other organizations to access your [place] medical records  [id number]        Your Vitals Were     Last Period                   06/15/1985            Blood Pressure from Last 3 Encounters:   09/17/18 152/74   09/05/18 (P) 140/70   08/20/18 152/82    Weight from Last 3 Encounters:   09/17/18 54.8 kg (120 lb 12.8 oz)   09/05/18 (P) 54 kg (119 lb)   08/20/18 54.4 kg (120 lb)              We Performed the Following     INTERROGATION DEVICE EVAL REMOTE, PACER/ICD (25623)     PM DEVICE INTERROGATE REMOTE (05032)        Primary Care Provider Office Phone # Fax #    Anjum Vidales [phone] [address] [place] [address]        Equal Access to Services     BROOKS JOSÉ AH: Hadii aad julia marceloo Somaurilio, waaxda luqadaha, qaybta kaalmada adesukhdeepyada, verónica estrada. So [facility] [phone].    ATENCIÓN: Si habla español, tiene a sahni disposición servicios gratuitos de asistencia lingüística. [facility] [phone].    We comply with applicable federal civil rights laws and [place] laws. We do not discriminate on the basis of race, color, national origin, age, disability, sex, sexual orientation, or gender identity.            Thank you!     Thank you for choosing [facility]  for your care. Our goal is always to provide you with excellent care. Hearing back from our patients is one way we can continue to improve our services. Please take a few minutes to complete the written survey that you may receive  in the mail after your visit with us. Thank you!             Your Updated Medication List - Protect others around you: Learn how to safely use, store and throw away your medicines at www.disposemymeds.org.          This list is accurate as of 10/12/18 10:03 AM.  Always use your most recent med list.                   Brand Name Dispense Instructions for use Diagnosis    albuterol 108 (90 Base) MCG/ACT inhaler    PROAIR HFA/PROVENTIL HFA/VENTOLIN HFA    1 Inhaler    Inhale 2 puffs into the lungs every 6 hours as needed for shortness of breath / dyspnea    Mild persistent asthma without complication       CRANBERRY      Take 475 mg by mouth 2 times daily.        diclofenac 1 % Gel topical gel    VOLTAREN     Place onto the skin 4 times daily as needed for moderate pain        fluticasone 50 MCG/ACT spray    FLONASE    1 Bottle    Spray 2 sprays into both nostrils daily    Cough, Nasal congestion       ipratropium - albuterol 0.5 mg/2.5 mg/3 mL 0.5-2.5 (3) MG/3ML neb solution    DUONEB    180 mL    TAKE 1 VIAL BY NEBULIZATION  EVERY SIX HOURS AS NEEDED FOR SHORTNESS OF BREATH/ DYSPNEA OR WHEEZING    Chronic obstructive pulmonary disease, unspecified COPD type (H)       levofloxacin 750 MG tablet    LEVAQUIN    5 tablet    Take 1 tablet (750 mg) by mouth daily    Pneumonia of right upper lobe due to infectious organism (H)       levothyroxine 50 MCG tablet    SYNTHROID/LEVOTHROID    90 tablet    TAKE ONE TABLET BY MOUTH ONE TIME DAILY    Hypothyroidism, unspecified type       metoprolol succinate 50 MG 24 hr tablet    TOPROL-XL    60 tablet    TAKE 1 TAB BY MOUTH TWICE A DAY FOR HTN    Essential hypertension, benign       order for DME     1 Device    Equipment being ordered: Nebulizer    Chronic obstructive pulmonary disease, unspecified COPD type (H)       order for DME     1 each    Equipment being ordered: Nebulizer    Chronic obstructive pulmonary disease, unspecified COPD type (H)       pantoprazole 40 MG EC  tablet    PROTONIX    30 tablet    Take 1 tablet (40 mg) by mouth daily Take 30-60 minutes before a meal.    Gastroesophageal reflux disease with esophagitis, Abdominal pain, epigastric       polyethylene glycol Packet    MIRALAX/GLYCOLAX     Take 17 g by mouth daily        probiotic Caps      Take 1 capsule by mouth every evening        ranitidine 150 MG tablet    ZANTAC    60 tablet    Take 1 tablet (150 mg) by mouth 2 times daily    Gastroesophageal reflux disease without esophagitis       sodium chloride 1 GM tablet     100 tablet    Take 1 tablet (1 g) by mouth 3 times daily    Hyponatremia       SYMBICORT 160-4.5 MCG/ACT Inhaler   Generic drug:  budesonide-formoterol     10.2 g    INHALE TWO PUFFS INTO THE LUNGS TWICE DAILY    Mild persistent asthma without complication       TYLENOL PO      Take 975 mg by mouth 3 times daily        warfarin 1 MG tablet    COUMADIN    96 tablet    1 mg (1 mg x 1) every day or as directed by the Anticoagulation Clinic    Intermittent atrial fibrillation (H)

## 2018-10-18 ENCOUNTER — TELEPHONE (OUTPATIENT)
Dept: FAMILY MEDICINE | Facility: CLINIC | Age: 83
End: 2018-10-18

## 2018-10-18 DIAGNOSIS — R05.9 COUGH: Primary | ICD-10-CM

## 2018-10-18 NOTE — TELEPHONE ENCOUNTER
Pt had a CXR in September was told to F/U in 3 months. Pt is wondering should she have this done? If yes please place the order.  Luz Maria [facility]

## 2018-10-19 ENCOUNTER — HOSPITAL ENCOUNTER (OUTPATIENT)
Dept: GENERAL RADIOLOGY | Facility: CLINIC | Age: 83
Discharge: HOME | End: 2018-10-19
Attending: FAMILY MEDICINE | Admitting: FAMILY MEDICINE
Payer: COMMERCIAL

## 2018-10-19 DIAGNOSIS — R05.9 COUGH: ICD-10-CM

## 2018-10-19 PROCEDURE — 71046 X-RAY EXAM CHEST 2 VIEWS: CPT

## 2018-10-19 NOTE — PROGRESS NOTES
Please call.  Her chest x-ray is still abnormal but looks more like some chronic scar tissue.   She had a chest CT scan in February.  They had suggested follow-up CT scan which we could do to make sure there is no other process going on in lungs.   PLAN: Chest CT for follow-up on abnormalities.   Please arrange for orders.

## 2018-10-22 DIAGNOSIS — R93.89 ABNORMAL CHEST X-RAY: Primary | ICD-10-CM

## 2018-10-25 ENCOUNTER — TRANSFERRED RECORDS (OUTPATIENT)
Dept: HEALTH INFORMATION MANAGEMENT | Facility: CLINIC | Age: 83
End: 2018-10-25

## 2018-10-25 ENCOUNTER — HOSPITAL ENCOUNTER (OUTPATIENT)
Dept: CT IMAGING | Facility: CLINIC | Age: 83
Discharge: HOME | End: 2018-10-25
Attending: FAMILY MEDICINE | Admitting: FAMILY MEDICINE
Payer: COMMERCIAL

## 2018-10-25 DIAGNOSIS — R93.89 ABNORMAL CHEST X-RAY: ICD-10-CM

## 2018-10-25 PROCEDURE — 71250 CT THORAX DX C-: CPT

## 2018-10-26 NOTE — PROGRESS NOTES
I did contact Ellie today regarding her CT chest.  It remains abnormal.  She reports no fever, her cough is improved.   Still a little cough, some days worse than others, sometimes a little phlegm.  She has had a little shortness of breath that also has improved.  I note she has had clinic visit several times in the last 6 months for respiratory infections.  She has seen pulmonology in the past, the last visit was in 2015 with Dr. Maloney.  She had a history of reactivated latent tuberculosis and was treated with isoniazid, pyrazinamide, ethambutol as well as rifampin sometime between 2002 and 2003.  No further evaluation is recommended at that time.  It was thought that the CT scan was abnormal due to a prior tuberculosis.  I reviewed her case with Alice Orozco NP with pulmonology at [facility] today.  She will arrange for Thalia to have follow-up with pulmonology.  They will contact her next week.

## 2018-10-30 ENCOUNTER — OFFICE VISIT (OUTPATIENT)
Dept: CARDIOLOGY | Facility: CLINIC | Age: 83
End: 2018-10-30
Payer: COMMERCIAL

## 2018-10-30 ENCOUNTER — HOSPITAL ENCOUNTER (EMERGENCY)
Facility: CLINIC | Age: 83
Discharge: HOME | End: 2018-10-30
Attending: EMERGENCY MEDICINE | Admitting: EMERGENCY MEDICINE
Payer: COMMERCIAL

## 2018-10-30 ENCOUNTER — APPOINTMENT (OUTPATIENT)
Dept: CT IMAGING | Facility: CLINIC | Age: 83
End: 2018-10-30
Attending: EMERGENCY MEDICINE
Payer: COMMERCIAL

## 2018-10-30 ENCOUNTER — APPOINTMENT (OUTPATIENT)
Dept: GENERAL RADIOLOGY | Facility: CLINIC | Age: 83
End: 2018-10-30
Attending: EMERGENCY MEDICINE
Payer: COMMERCIAL

## 2018-10-30 VITALS
OXYGEN SATURATION: 94 % | HEART RATE: 80 BPM | SYSTOLIC BLOOD PRESSURE: 115 MMHG | WEIGHT: 118 LBS | BODY MASS INDEX: 24.66 KG/M2 | DIASTOLIC BLOOD PRESSURE: 63 MMHG

## 2018-10-30 VITALS
OXYGEN SATURATION: 93 % | SYSTOLIC BLOOD PRESSURE: 156 MMHG | DIASTOLIC BLOOD PRESSURE: 72 MMHG | BODY MASS INDEX: 24.77 KG/M2 | TEMPERATURE: 98.6 F | HEIGHT: 58 IN | WEIGHT: 118 LBS

## 2018-10-30 DIAGNOSIS — R07.89 ATYPICAL CHEST PAIN: ICD-10-CM

## 2018-10-30 DIAGNOSIS — E87.1 HYPONATREMIA: ICD-10-CM

## 2018-10-30 DIAGNOSIS — R10.84 ABDOMINAL PAIN, GENERALIZED: ICD-10-CM

## 2018-10-30 DIAGNOSIS — I48.0 PAROXYSMAL ATRIAL FIBRILLATION (H): Primary | ICD-10-CM

## 2018-10-30 DIAGNOSIS — I82.409 DVT (DEEP VENOUS THROMBOSIS) (H): ICD-10-CM

## 2018-10-30 DIAGNOSIS — I82.409 DVT (DEEP VENOUS THROMBOSIS) (H): Primary | ICD-10-CM

## 2018-10-30 DIAGNOSIS — J98.4 PULMONARY LESION, RIGHT: ICD-10-CM

## 2018-10-30 DIAGNOSIS — N39.0 ACUTE UTI: ICD-10-CM

## 2018-10-30 LAB
ALBUMIN UR-MCNC: NEGATIVE MG/DL
ANION GAP SERPL CALCULATED.3IONS-SCNC: 8 MMOL/L (ref 3–14)
APPEARANCE UR: ABNORMAL
BACTERIA #/AREA URNS HPF: ABNORMAL /HPF
BASOPHILS # BLD AUTO: 0 10E9/L (ref 0–0.2)
BASOPHILS NFR BLD AUTO: 0.1 %
BILIRUB UR QL STRIP: NEGATIVE
BUN SERPL-MCNC: 22 MG/DL (ref 7–30)
CALCIUM SERPL-MCNC: 8.4 MG/DL (ref 8.5–10.1)
CHLORIDE SERPL-SCNC: 91 MMOL/L (ref 94–109)
CO2 SERPL-SCNC: 27 MMOL/L (ref 20–32)
COLOR UR AUTO: YELLOW
CREAT SERPL-MCNC: 0.53 MG/DL (ref 0.52–1.04)
DIFFERENTIAL METHOD BLD: ABNORMAL
EOSINOPHIL # BLD AUTO: 0 10E9/L (ref 0–0.7)
EOSINOPHIL NFR BLD AUTO: 0 %
ERYTHROCYTE [DISTWIDTH] IN BLOOD BY AUTOMATED COUNT: 14.7 % (ref 10–15)
GFR SERPL CREATININE-BSD FRML MDRD: >90 ML/MIN/1.7M2
GLUCOSE SERPL-MCNC: 123 MG/DL (ref 70–99)
GLUCOSE UR STRIP-MCNC: NEGATIVE MG/DL
HCT VFR BLD AUTO: 37.3 % (ref 35–47)
HGB BLD-MCNC: 12.4 G/DL (ref 11.7–15.7)
HGB UR QL STRIP: NEGATIVE
IMM GRANULOCYTES # BLD: 0.1 10E9/L (ref 0–0.4)
IMM GRANULOCYTES NFR BLD: 0.6 %
INR PPP: 2.6 (ref 0.86–1.14)
KETONES UR STRIP-MCNC: NEGATIVE MG/DL
LEUKOCYTE ESTERASE UR QL STRIP: ABNORMAL
LIPASE SERPL-CCNC: 260 U/L (ref 73–393)
LYMPHOCYTES # BLD AUTO: 1.2 10E9/L (ref 0.8–5.3)
LYMPHOCYTES NFR BLD AUTO: 9.4 %
MCH RBC QN AUTO: 27.5 PG (ref 26.5–33)
MCHC RBC AUTO-ENTMCNC: 33.2 G/DL (ref 31.5–36.5)
MCV RBC AUTO: 83 FL (ref 78–100)
MONOCYTES # BLD AUTO: 1.8 10E9/L (ref 0–1.3)
MONOCYTES NFR BLD AUTO: 13.5 %
MUCOUS THREADS #/AREA URNS LPF: PRESENT /LPF
NEUTROPHILS # BLD AUTO: 10.1 10E9/L (ref 1.6–8.3)
NEUTROPHILS NFR BLD AUTO: 76.4 %
NITRATE UR QL: POSITIVE
NRBC # BLD AUTO: 0 10*3/UL
NRBC BLD AUTO-RTO: 0 /100
PH UR STRIP: 6 PH (ref 5–7)
PLATELET # BLD AUTO: 286 10E9/L (ref 150–450)
POTASSIUM SERPL-SCNC: 4.2 MMOL/L (ref 3.4–5.3)
RBC # BLD AUTO: 4.51 10E12/L (ref 3.8–5.2)
RBC #/AREA URNS AUTO: 13 /HPF (ref 0–2)
SODIUM SERPL-SCNC: 126 MMOL/L (ref 133–144)
SOURCE: ABNORMAL
SP GR UR STRIP: 1.02 (ref 1–1.03)
SQUAMOUS #/AREA URNS AUTO: <1 /HPF (ref 0–1)
TROPONIN I SERPL-MCNC: 0.04 UG/L (ref 0–0.04)
UROBILINOGEN UR STRIP-MCNC: 0 MG/DL (ref 0–2)
WBC # BLD AUTO: 13.2 10E9/L (ref 4–11)
WBC #/AREA URNS AUTO: 143 /HPF (ref 0–5)

## 2018-10-30 PROCEDURE — 84484 ASSAY OF TROPONIN QUANT: CPT | Performed by: EMERGENCY MEDICINE

## 2018-10-30 PROCEDURE — 71046 X-RAY EXAM CHEST 2 VIEWS: CPT

## 2018-10-30 PROCEDURE — 99285 EMERGENCY DEPT VISIT HI MDM: CPT | Mod: 25 | Performed by: EMERGENCY MEDICINE

## 2018-10-30 PROCEDURE — 87088 URINE BACTERIA CULTURE: CPT | Performed by: EMERGENCY MEDICINE

## 2018-10-30 PROCEDURE — 87186 SC STD MICRODIL/AGAR DIL: CPT | Performed by: EMERGENCY MEDICINE

## 2018-10-30 PROCEDURE — 96365 THER/PROPH/DIAG IV INF INIT: CPT | Mod: 59 | Performed by: EMERGENCY MEDICINE

## 2018-10-30 PROCEDURE — 85610 PROTHROMBIN TIME: CPT | Performed by: FAMILY MEDICINE

## 2018-10-30 PROCEDURE — 87086 URINE CULTURE/COLONY COUNT: CPT | Performed by: EMERGENCY MEDICINE

## 2018-10-30 PROCEDURE — 83690 ASSAY OF LIPASE: CPT | Performed by: EMERGENCY MEDICINE

## 2018-10-30 PROCEDURE — 25000125 ZZHC RX 250: Performed by: EMERGENCY MEDICINE

## 2018-10-30 PROCEDURE — 80048 BASIC METABOLIC PNL TOTAL CA: CPT | Performed by: FAMILY MEDICINE

## 2018-10-30 PROCEDURE — 36415 COLL VENOUS BLD VENIPUNCTURE: CPT | Performed by: FAMILY MEDICINE

## 2018-10-30 PROCEDURE — 81001 URINALYSIS AUTO W/SCOPE: CPT | Performed by: EMERGENCY MEDICINE

## 2018-10-30 PROCEDURE — 93005 ELECTROCARDIOGRAM TRACING: CPT | Performed by: EMERGENCY MEDICINE

## 2018-10-30 PROCEDURE — 99213 OFFICE O/P EST LOW 20 MIN: CPT | Performed by: INTERNAL MEDICINE

## 2018-10-30 PROCEDURE — 25000128 H RX IP 250 OP 636: Performed by: EMERGENCY MEDICINE

## 2018-10-30 PROCEDURE — 96361 HYDRATE IV INFUSION ADD-ON: CPT | Performed by: EMERGENCY MEDICINE

## 2018-10-30 PROCEDURE — 85025 COMPLETE CBC W/AUTO DIFF WBC: CPT | Performed by: EMERGENCY MEDICINE

## 2018-10-30 PROCEDURE — 93010 ELECTROCARDIOGRAM REPORT: CPT | Mod: Z6 | Performed by: EMERGENCY MEDICINE

## 2018-10-30 PROCEDURE — 74177 CT ABD & PELVIS W/CONTRAST: CPT

## 2018-10-30 RX ORDER — IOPAMIDOL 755 MG/ML
57 INJECTION, SOLUTION INTRAVASCULAR ONCE
Status: COMPLETED | OUTPATIENT
Start: 2018-10-30 | End: 2018-10-30

## 2018-10-30 RX ORDER — LEVOFLOXACIN 500 MG/1
500 TABLET, FILM COATED ORAL DAILY
Qty: 7 TABLET | Refills: 0 | Status: SHIPPED | OUTPATIENT
Start: 2018-10-30 | End: 2018-12-06

## 2018-10-30 RX ORDER — CEFTRIAXONE SODIUM 1 G/50ML
1 INJECTION, SOLUTION INTRAVENOUS ONCE
Status: COMPLETED | OUTPATIENT
Start: 2018-10-30 | End: 2018-10-30

## 2018-10-30 RX ADMIN — IOPAMIDOL 57 ML: 755 INJECTION, SOLUTION INTRAVENOUS at 15:32

## 2018-10-30 RX ADMIN — SODIUM CHLORIDE 500 ML: 9 INJECTION, SOLUTION INTRAVENOUS at 15:00

## 2018-10-30 RX ADMIN — CEFTRIAXONE SODIUM 1 G: 1 INJECTION, SOLUTION INTRAVENOUS at 16:21

## 2018-10-30 RX ADMIN — SODIUM CHLORIDE 54 ML: 9 INJECTION, SOLUTION INTRAVENOUS at 15:33

## 2018-10-30 ASSESSMENT — ENCOUNTER SYMPTOMS
LIGHT-HEADEDNESS: 1
CONSTIPATION: 0
COUGH: 1
APPETITE CHANGE: 1
HEMATURIA: 0
DYSURIA: 0
NAUSEA: 0
VOMITING: 0
FREQUENCY: 0
FATIGUE: 0
PALPITATIONS: 0
CHILLS: 0
ABDOMINAL PAIN: 1
DIARRHEA: 0

## 2018-10-30 NOTE — ED AVS SNAPSHOT
[facility] Emergency Department    [address] [address]    Phone:  [phone]    Fax:  [phone]                                       Ellie Leigh   MRN: [id number]    Department:  [facility] Emergency Department   Date of Visit:  10/30/2018           Patient Information     Date Of Birth          9/12/1930        Your diagnoses for this visit were:     Atypical chest pain     Abdominal pain, generalized     Acute UTI     Pulmonary lesion, right possible pneumonia    Hyponatremia        You were seen by Ross Wright MD.      Follow-up Information     Follow up with Anjum Vidales MD.    Specialty:  Family Practice    Why:  On Friday 2-week check your sodium level and possibly INR level    Contact information:    [address] [place] [address]  [phone]          Please follow up.    Why:  If symptoms worsen        Follow up with [facility] Emergency Department.    Specialty:  EMERGENCY MEDICINE    Why:  If symptoms worsen    Contact information:    [address] [facility] [address] [phone]    Additional information:    The medical center is located at   [address] [facility] (between [address] in [place], [place] north   of [place]).        Follow up with coumadin clinic.    Why:  On Friday secondary to antibiotic use and need to monitor INR level        Discharge Instructions       Return if symptoms worsen or new symptoms develop.  Follow-up with primary care physician on Friday for recheck of your sodium level. Follow up with your coumadin clinic on Friday to Recheck your INR as the levaquin could increase your level.  Drink plenty of fluids.take antibiotic as directed.  If any worsening shortness of breath chest pain abdominal pain decreased urine output or other symptoms present please return for further evaluation and care.  *Abdominal Pain, Unknown Cause (Female)    The exact cause of your abdominal  (stomach) pain is not certain. This does not mean that this is something to worry about, or the right tests were not done. Everyone likes to know the exact cause of the problem, but sometimes with abdominal pain, there is no clear-cut cause, and this could be a good thing. The good news is that your symptoms can be treated, and you will feel better.   Your condition does not seem serious now; however, sometimes the signs of a serious problem may take more time to appear. For this reason, it is important for you to watch for any new symptoms, problems, or worsening of your condition.  Over the next few days, the abdominal pain may come and go, or be continuous. Other common symptoms can include nausea and vomiting. Sometimes it can be difficult to tell if you feel nauseous, you may just feel bad and not associate that feeling with nausea. Constipation, diarrhea, and a fever may go along with the pain.  The pain may continue even if treated correctly over the following days. Depending on how things go, sometimes the cause can become clear and may require further or different treatment. Additional evaluations, medications, or tests may be needed.  Home care  Your health care provider may prescribe medications for pain, symptoms, or an infection.  Follow the health care provider's instructions for taking these medications.  General care    Rest until your next exam. No strenuous activities.    Try to find positions that ease discomfort. A small pillow placed on the abdomen may help relieve pain.    Something warm on your abdomen (such as a heating pad) may help, but be careful not to burn yourself.  Diet    Do not force yourself to eat, especially if having cramps, vomiting, or diarrhea.    Water is important so you do not get dehydrated. Soup may also be good. Sports drinks may also help, especially if they are not too acidic. Make sure you don't drink sugary drinks as this can make things worse. Take liquids in small  amounts. Do not guzzle them.    Caffeine sometimes makes the pain and cramping worse.    Avoid dairy products if you have vomiting or diarrhea.    Don't eat large amounts at a time. Wait a few minutes between bites.    Eat a diet low in fiber (called a low-residue diet). Foods allowed include refined breads, white rice, fruit and vegetable juices without pulp, tender meats. These foods will pass more easily through the intestine.    Avoid fried or fatty foods, dairy, alcohol and spicy foods until your symptoms go away.  Follow-up care  Follow up with your health care provider as instructed, or if your pain does not begin to improve in the next 24 hours.  When to seek medical care  Seek prompt medical care if any of the following occur:    Pain gets worse or moves to the right lower abdomen    New or worsening vomiting or diarrhea    Swelling of the abdomen    Unable to pass stool for more than three days    New fever over 101  F (38.3 C), or rising fever    Blood in vomit or bowel movements (dark red or black color)    Jaundice (yellow color of eyes and skin)    Weakness, dizziness    Chest, arm, back, neck or jaw pain    Unexpected vaginal bleeding or missed period  Call 911  Call emergency services if any of the following occur:    Trouble breathing    Confusion    Fainting or loss of consciousness    Rapid heart rate    Seizure    [id number] [address], [place] [id number]. All rights reserved. This information is not intended as a substitute for professional medical care. Always follow your healthcare professional's instructions.           *CHEST PAIN, UNCERTAIN CAUSE    Based on your exam today, the exact cause of your chest pain is not certain. Your condition does not seem serious at this time, and your pain does not appear to be coming from your heart. However, sometimes the signs of a serious problem take more time to appear. Therefore, watch for the warning signs listed  below.  HOME CARE:    1. Rest today and avoid strenuous activity.  2. Take any prescribed medicine as directed.  FOLLOW UP with your doctor in 1-3 days.   GET PROMPT MEDICAL ATTENTION if any of the following occur:    A change in the type of pain: if it feels different, becomes more severe, lasts longer, or begins to spread into your shoulder, arm, neck, jaw or back    Shortness of breath or increased pain with breathing    Weakness, dizziness, or fainting    Cough with blood or dark colored sputum (phlegm)    Fever over 101  F (38.3  C)    Swelling, pain or redness in one leg    5196-9115 [employer]. [address] [place] [address]. All rights reserved. This information is not intended as a substitute for professional medical care. Always follow your healthcare professional's instructions.  This information has been modified by your health care provider with permission from the publisher.      Hyponatremia  Hyponatremia means low sodium levels in the blood. This condition most often occurs after prolonged vomiting or diarrhea, which causes your body to lose too much water and sodium. It can also result from drinking excess amounts of water or the use of diuretics (water pills). Rarely, it can be associated with disorders of your endocrine system, as side effects of illicit drug use (ecstasy), as a complication of some cancers especially small cell lung cancer, or as a complication of renal and liver disease or heart failure.  Mild hyponatremia causes no symptoms. It is only discovered with a blood test. As sodium levels in the blood decreases, symptoms begin to appear. This includes weakness, confusion, muscle cramping and seizures.  Home care    Reduce your daily water intake until the problem is corrected.    If you have been taking diuretics, you may be asked to stop taking them for a short time.    If you are having symptoms of weakness or confusion, do not drive or operate dangerous  machinery until symptoms resolve.    If your sodium levels are too low to be managed at home with the above recommendations, you will be asked to go to the hospital to have your sodium replaced through your vein.  Follow-up care  Follow up with your healthcare provider for a repeat blood test within the next week, or as advised.  When to seek medical advice  Call your healthcare provider if any of the following occur:    Increasing weakness    Dizziness    Irregular heartbeat, extra beats or very fast heart rate    Increasing confusion    Fainting or loss of consciousness    Seizure  Date Last Reviewed: 7/1/2017 2000-2017 [employer]. [address] [place] [address]. All rights reserved. This information is not intended as a substitute for professional medical care. Always follow your healthcare professional's instructions.          Your next 10 appointments already scheduled     Oct 31, 2018  1:00 PM CDT   Ortho Eval with Sanam Epperson PT   [facility] ([facility])    [address]  [address]  [facility] [address]   [phone]            Nov 28, 2018  3:45 PM CST   (Arrive by 3:30 PM)   Return Visit with Luzmaria Gutierrez MD   [facility] ([facility])    [address]  [address]  [facility] [address] [phone]            Jan 09, 2019  4:45 PM CST   Remote PPM Check with RUEDA TECH1   [facility] ([facility])    [address]  [facility] [id number] [phone] OPT 2           This appointment is for a remote check of your pacemaker.  This is not an appointment at the office.              24 Hour Appointment Hotline       To make an appointment at any [facility], call [phone] ([phone]). If you don't have a family doctor or clinic, we will help you find one. [facility] are conveniently located to  serve the needs of you and your family.             Review of your medicines      CONTINUE these medicines which may have CHANGED, or have new prescriptions. If we are uncertain of the size of tablets/capsules you have at home, strength may be listed as something that might have changed.        Dose / Directions Last dose taken    levofloxacin 500 MG tablet   Commonly known as:  LEVAQUIN   Dose:  500 mg   What changed:    - medication strength  - how much to take   Quantity:  7 tablet        Take 1 tablet (500 mg) by mouth daily   Refills:  0          Our records show that you are taking the medicines listed below. If these are incorrect, please call your family doctor or clinic.        Dose / Directions Last dose taken    albuterol 108 (90 Base) MCG/ACT inhaler   Commonly known as:  PROAIR HFA/PROVENTIL HFA/VENTOLIN HFA   Dose:  2 puff   Quantity:  1 Inhaler        Inhale 2 puffs into the lungs every 6 hours as needed for shortness of breath / dyspnea   Refills:  11        CRANBERRY   Dose:  475 mg        Take 475 mg by mouth 2 times daily.   Refills:  0        diclofenac 1 % Gel topical gel   Commonly known as:  VOLTAREN        Place onto the skin 4 times daily as needed for moderate pain   Refills:  0        fluticasone 50 MCG/ACT spray   Commonly known as:  FLONASE   Dose:  2 spray   Quantity:  1 Bottle        Spray 2 sprays into both nostrils daily   Refills:  11        ipratropium - albuterol 0.5 mg/2.5 mg/3 mL 0.5-2.5 (3) MG/3ML neb solution   Commonly known as:  DUONEB   Quantity:  180 mL        TAKE 1 VIAL BY NEBULIZATION  EVERY SIX HOURS AS NEEDED FOR SHORTNESS OF BREATH/ DYSPNEA OR WHEEZING   Refills:  9        levothyroxine 50 MCG tablet   Commonly known as:  SYNTHROID/LEVOTHROID   Quantity:  90 tablet        TAKE ONE TABLET BY MOUTH ONE TIME DAILY   Refills:  1        metoprolol succinate 50 MG 24 hr tablet   Commonly known as:  TOPROL-XL   Quantity:  60 tablet        TAKE 1 TAB BY MOUTH TWICE A DAY FOR  HTN   Refills:  0        order for DME   Quantity:  1 Device        Equipment being ordered: Nebulizer   Refills:  0        order for DME   Quantity:  1 each        Equipment being ordered: Nebulizer   Refills:  0        pantoprazole 40 MG EC tablet   Commonly known as:  PROTONIX   Dose:  40 mg   Quantity:  30 tablet        Take 1 tablet (40 mg) by mouth daily Take 30-60 minutes before a meal.   Refills:  0        polyethylene glycol Packet   Commonly known as:  MIRALAX/GLYCOLAX   Dose:  17 g        Take 17 g by mouth daily   Refills:  0        probiotic Caps   Dose:  1 capsule        Take 1 capsule by mouth every evening   Refills:  0        ranitidine 150 MG tablet   Commonly known as:  ZANTAC   Dose:  150 mg   Quantity:  60 tablet        Take 1 tablet (150 mg) by mouth 2 times daily   Refills:  11        sodium chloride 1 GM tablet   Dose:  1 g   Quantity:  100 tablet        Take 1 tablet (1 g) by mouth 3 times daily   Refills:  11        SYMBICORT 160-4.5 MCG/ACT Inhaler   Quantity:  10.2 g   Generic drug:  budesonide-formoterol        INHALE TWO PUFFS INTO THE LUNGS TWICE DAILY   Refills:  4        TYLENOL PO   Dose:  975 mg        Take 975 mg by mouth 3 times daily   Refills:  0        warfarin 1 MG tablet   Commonly known as:  COUMADIN   Quantity:  96 tablet        1 mg (1 mg x 1) every day or as directed by the Anticoagulation Clinic   Refills:  3                Prescriptions were sent or printed at these locations (1 Prescription)                   [id number] - [place], [place] - [address]   [address] [address]    Telephone:  [phone]   Fax:  [phone]   Hours:                  Printed at Department/Unit printer (1 of 1)         levofloxacin (LEVAQUIN) 500 MG tablet                Procedures and tests performed during your visit     CBC with platelets differential    CT Abdomen Pelvis w Contrast    Chest XR,  PA & LAT    EKG 12 lead    Lipase     Troponin I    UA reflex to Microscopic    Urine Culture Aerobic Bacterial      Orders Needing Specimen Collection     None      Pending Results     Date and Time Order Name Status Description    10/30/2018 1535 Urine Culture Aerobic Bacterial In process             Pending Culture Results     Date and Time Order Name Status Description    10/30/2018 1535 Urine Culture Aerobic Bacterial In process             Pending Results Instructions     If you had any lab results that were not finalized at the time of your Discharge, you can call the ED Lab Result RN at [phone]. You will be contacted by this team for any positive Lab results or changes in treatment. The nurses are available 7 days a week from 10A to 6:30P.  You can leave a message 24 hours per day and they will return your call.        Test Results From Your Hospital Stay        10/30/2018  1:57 PM      Component Results     Component Value Ref Range & Units Status    WBC 13.2 (H) 4.0 - 11.0 10e9/L Final    RBC Count 4.51 3.8 - 5.2 10e12/L Final    Hemoglobin 12.4 11.7 - 15.7 g/dL Final    Hematocrit 37.3 35.0 - 47.0 % Final    MCV 83 78 - 100 fl Final    MCH 27.5 26.5 - 33.0 pg Final    MCHC 33.2 31.5 - 36.5 g/dL Final    RDW 14.7 10.0 - 15.0 % Final    Platelet Count 286 150 - 450 10e9/L Final    Diff Method Automated Method  Final    % Neutrophils 76.4 % Final    % Lymphocytes 9.4 % Final    % Monocytes 13.5 % Final    % Eosinophils 0.0 % Final    % Basophils 0.1 % Final    % Immature Granulocytes 0.6 % Final    Nucleated RBCs 0 0 /100 Final    Absolute Neutrophil 10.1 (H) 1.6 - 8.3 10e9/L Final    Absolute Lymphocytes 1.2 0.8 - 5.3 10e9/L Final    Absolute Monocytes 1.8 (H) 0.0 - 1.3 10e9/L Final    Absolute Eosinophils 0.0 0.0 - 0.7 10e9/L Final    Absolute Basophils 0.0 0.0 - 0.2 10e9/L Final    Abs Immature Granulocytes 0.1 0 - 0.4 10e9/L Final    Absolute Nucleated RBC 0.0  Final         10/30/2018  3:02 PM      Narrative     CHEST TWO VIEWS  10/30/2018 2:54 PM     HISTORY: Shortness of breath.    COMPARISON: 7/25/2018    FINDINGS: Abnormal airspace opacity redemonstrated in the right upper  lobe. The lungs are emphysematous. No pneumothorax or pleural  effusion. Heart size normal. There is a left-sided pacer device. Wedge  deformities noted near the thoracolumbar junction.         Impression     IMPRESSION: Persistent right upper lobe pneumonia.     BRITTANY VO MD               10/30/2018  2:16 PM      Component Results     Component Value Ref Range & Units Status    Troponin I ES 0.039 0.000 - 0.045 ug/L Final    The 99th percentile for upper reference range is 0.045 ug/L.  Troponin values   in the range of 0.045 - 0.120 ug/L may be associated with risks of adverse   clinical events.           10/30/2018  2:16 PM      Component Results     Component Value Ref Range & Units Status    Lipase 260 73 - 393 U/L Final               10/30/2018  3:21 PM      Component Results     Component Value Ref Range & Units Status    Color Urine Yellow  Final    Appearance Urine Slightly Cloudy  Final    Glucose Urine Negative NEG^Negative mg/dL Final    Bilirubin Urine Negative NEG^Negative Final    Ketones Urine Negative NEG^Negative mg/dL Final    Specific Gravity Urine 1.016 1.003 - 1.035 Final    Blood Urine Negative NEG^Negative Final    pH Urine 6.0 5.0 - 7.0 pH Final    Protein Albumin Urine Negative NEG^Negative mg/dL Final    Urobilinogen mg/dL 0.0 0.0 - 2.0 mg/dL Final    Nitrite Urine Positive (A) NEG^Negative Final    Leukocyte Esterase Urine Moderate (A) NEG^Negative Final    Source Unspecified Urine  Final    RBC Urine 13 (H) 0 - 2 /HPF Final    WBC Urine 143 (H) 0 - 5 /HPF Final    Bacteria Urine Moderate (A) NEG^Negative /HPF Final    Squamous Epithelial /HPF Urine <1 0 - 1 /HPF Final    Mucous Urine Present (A) NEG^Negative /LPF Final         10/30/2018  4:29 PM      Narrative     CT ABDOMEN AND PELVIS WITH CONTRAST  10/30/2018 3:38 PM     HISTORY:   Upper abdominal pain.     TECHNIQUE:   57 mL Isovue-370. Radiation dose for this scan was  reduced using automated exposure control, adjustment of the mA and/or  kV according to patient size, or iterative reconstruction technique.    COMPARISON: None.    FINDINGS:  Mild new or more prominent right base atelectasis or  infiltrate. Visualized left lung is clear.    The liver is unremarkable. Previous cholecystectomy. Spleen and  pancreas are normal.    No adrenal lesions.  No kidney stones or hydronephrosis. No suspicious  renal lesions.  Multiple left peripelvic renal cysts. No  retroperitoneal adenopathy or evidence of aortic aneurysm.    Scans through the pelvis demonstrate a normal appearing bladder.  No  pelvic adenopathy.    No evidence of diverticulitis. No inflammation in the right lower  quadrant or evidence of appendicitis. There is a broad central lower  abdominal hernia that contains some adjacent colon but no evidence of  bowel obstruction. No free air or free fluid. There are compression  fractures which are new since the prior study involving the inferior  endplate of L5, superior endplate of L4, and L3. There are  chronic-appearing compression fractures elsewhere without significant  loss of vertebral body height.        Impression     IMPRESSION:  1. There is new or more prominent right base atelectasis or  infiltrate.  2. Multiple peripelvic renal cysts on the left.  3. Broad-based low midline abdominal hernia. No evidence of bowel  obstruction.  4. Multiple lumbar compression fractures with mild loss of vertebral  body height, many of which appear new since the prior study but still  have a chronic appearance.    VU HARLEY MD         10/30/2018  4:06 PM                Thank you for choosing [facility]       Thank you for choosing [facility] for your care. Our goal is always to provide you with excellent care. Hearing back from our patients is one way we can continue to improve our services. Please  take a few minutes to complete the written survey that you may receive in the mail after you visit with us. Thank you!        Care EveryWhere ID     This is your Care EveryWhere ID. This could be used by other organizations to access your [facility] medical records  [id number]        Equal Access to Services     BROOKS JOSÉ : Reyna Interiano, bill mina, jeanie kaalulysses feliz, verónica estrada. So [facility] [phone].    ATENCIÓN: Si habla español, tiene a sahni disposición servicios gratuitos de asistencia lingüística. Llame al [phone].    We comply with applicable federal civil rights laws and [place] laws. We do not discriminate on the basis of race, color, national origin, age, disability, sex, sexual orientation, or gender identity.            After Visit Summary       This is your record. Keep this with you and show to your community pharmacist(s) and doctor(s) at your next visit.
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
## 2018-10-30 NOTE — PROGRESS NOTES
"Service Date: 10/30/2018      HISTORY OF PRESENT ILLNESS:  Thank you for allowing me to participate in the care of this delightful patient.  As you know, Ellie is an 88-year-old female with a history of syncope and had evidence of tachybrady syndrome, status post pacemaker implantation about 6 years ago.  Since then she has not had any syncope.  Her most recent device interrogation shows a few episodes of atrial fibrillation for which she was asymptomatic but otherwise pretty unremarkable.  She has not been to the hospital or ER visits recently, although she plans to go to Urgent Care Clinic after this visit.  The patient states she has felt quite \"sick\" for the last couple of days.  When I asked for more details by her being \"sick,\" the patient has a variety of concerns, namely her cough but no fever, chills or shortness of breath.  She stated that yesterday she had what appeared to be some swallowing difficulty but had no problem today.  Her vitals are stable during this visit, and the patient does not appear \"sick\" to me.  It is unclear what the urgent care will have to offer her.  The patient will continue to follow in the Device Clinic and see me every few years.  She has been on warfarin without any problem.      cc:   [place] [address]         JATINDER GARDNER MD             D: 10/30/2018   T: 10/30/2018   MT: ADAL      Name:     ELLIE JOSHI   MRN:      [id number]        Account:      [id number]   :      1930           Service Date: 10/30/2018      Document: [id number]      "

## 2018-10-30 NOTE — ED PROVIDER NOTES
History     Chief Complaint   Patient presents with     Abdominal Pain     felt like she had somthing stuck in her throat and she cough hard on Sat and it seemed to let loose and on Sunday she woke just not feeling well and is having upper GI pain      HPI  Ellie Leigh is a 88 year old female with a history of atrial fibrillation on warfarin, COPD, IBS, hyperlipidemia, and hypothyroidism who present to the Emergency Department with upper abdominal pain. Three days ago, patient had a foreign body sensation in her esophagus and was concerned something was stuck. She coughed to clear her throat which resolved the pain until the following morning when the pain returned. She continues to complain of pain, described as soreness, from the esphagus to the upper abdomen. She also feels faint and lightheaded. She has decreased appetite, but denies fever and chills. She has a cough which is chronic. Normal bowel movements and bladder function. No vomiting or diarrhea.     Problem List:    Patient Active Problem List    Diagnosis Date Noted     Chest pain 04/14/2018     Priority: Medium     Community acquired pneumonia of right upper lobe of lung (H) 04/14/2018     Priority: Medium     H/O TB (tuberculosis) 02/09/2018     Priority: Medium     Back pain 02/06/2018     Priority: Medium     Nausea 06/05/2017     Priority: Medium     Elevated troponin 08/21/2016     Priority: Medium     Irritable bowel syndrome without diarrhea 05/02/2016     Priority: Medium     Unresponsive episode 03/18/2016     Priority: Medium     Mild persistent asthma without complication 12/01/2015     Priority: Medium     Long term current use of anticoagulant therapy 03/02/2015     Priority: Medium     Problem list name updated by automated process. Provider to review       Hemoptysis 01/21/2015     Priority: Medium     Syncope 01/12/2015     Priority: Medium     Advance Care Planning 01/09/2015     Priority: Medium     Advance Care Planning  7/2/2015: Receipt of ACP document:  Received: Health Care Directive which was witnessed or notarized on 1-9-15.  Document previously scanned on 2-27-15.  Validation form completed and sent to be scanned.  Code Status reflects choices in most recent ACP document.  Confirmed/documented designated decision maker(s).  Added by Verónica Green RN, System ACP Coordinator Honoring Choices   1/9/15 Copy to be scanned into chart Beulah Del GUTIERRES         COPD (chronic obstructive pulmonary disease) (H) 10/06/2014     Priority: Medium     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) 07/07/2014     Priority: Medium     Cause TBD.  Patient has had lung CT, will see Dr Gómez for consideration of further workup.  Also has pulmonology appt 8/18.  5/2/2018:Reviewing chart she has had hyponatremia since 2010.        Positive fecal occult blood test 07/07/2014     Priority: Medium     x2 -- first was in ED.  Patient expresses that she does not want colonoscopy.  She'll set appt to discuss with her PCP.       Benign essential HTN 02/11/2014     Priority: Medium     BPPV (benign paroxysmal positional vertigo) 11/05/2013     Priority: Medium     History of skin cancer 05/07/2013     Priority: Medium     Recurrent UTI 07/16/2012     Priority: Medium     recurrent UTI  Recent Urologic workup neg, 2005  Has Cipro at home for treatment as needed       Hypothyroidism 05/07/2012     Priority: Medium     Hyponatremia 05/07/2012     Priority: Medium     Squamous cell carcinoma in situ of skin of face 04/19/2012     Priority: Medium     SK (seborrheic keratosis) 04/19/2012     Priority: Medium     Intermittent atrial fibrillation (H) 12/01/2011     Priority: Medium     Cervical vertebral fracture (H) 11/20/2011     Priority: Medium     Anxiety 11/15/2011     Priority: Medium     DVT (deep venous thrombosis) 10/12/2011     Priority: Medium     H/o in past, on current coumadin therapy.       Mild major depression 08/23/2011     Priority:  Medium     Headache 08/19/2011     Priority: Medium     Problem list name updated by automated process. Provider to review       Right arm weakness 07/29/2011     Priority: Medium     Ulnar neuropathy 07/29/2011     Priority: Medium     Health Care Home 04/21/2011     Priority: Medium     Conchis Robles, [phone]  FPA / [facility]              DX V65.8 REPLACED WITH [id number] HEALTH CARE HOME (04/08/2013)       Chronic constipation 03/03/2011     Priority: Medium     Osteoporosis 03/03/2011     Priority: Medium     Hyperlipidemia LDL goal <130 10/31/2010     Priority: Medium     Infectious colitis, enteritis and gastroenteritis 07/10/2010     Priority: Medium     Chronic allergic conjunctivitis 11/18/2008     Priority: Medium     Chronic seasonal allergic rhinitis 11/18/2008     Priority: Medium     Esophageal reflux 11/29/2007     Priority: Medium     PERSONAL HISTORY OF MALIGNANCY- BREAST 06/13/2007     Priority: Medium     Disease of lung 12/27/2006     Priority: Medium     Pt with chronic RUL infiltrate with pos AFB sputum  Full treatmetn for TB following ID consult in 2000's  Problem list name updated by automated process. Provider to review       Sensorineural hearing loss, asymmetrical 06/20/2005     Priority: Medium     Generalized osteoarthrosis, unspecified site 06/20/2005     Priority: Medium     Right hip and knee are the most symptomatic          Past Medical History:    Past Medical History:   Diagnosis Date     Breast cancer (H)      COPD (chronic obstructive pulmonary disease) (H)      Dust allergy      DVT (deep vein thrombosis) in pregnancy (H)      HTN (hypertension)      Hyponatremia      Hypothyroid      Intermittent atrial fibrillation (H) 12/1/2011     Loose body in joint 4/15/2011     Neck fracture (H)      Syncope 5/12/2013       Past Surgical History:    Past Surgical History:   Procedure Laterality Date     APPENDECTOMY       ARTHROSCOPY KNEE  4/15/2011     Procedure:ARTHROSCOPY KNEE; removal of loose body; Surgeon:LEY, JEFFREY DUANE; Location:[facility]     CHOLECYSTECTOMY       ESOPHAGOSCOPY, GASTROSCOPY, DUODENOSCOPY (EGD), COMBINED  6/9/2014    Procedure: COMBINED ESOPHAGOSCOPY, GASTROSCOPY, DUODENOSCOPY (EGD);  Surgeon: Gilberto Richard MD;  Location: [facility]     IMPLANT PACEMAKER  5/21/2013    Biotronik Moderl [id number] Serial#[id number]     INJECT EPIDURAL LUMBAR  3/23/2011    INJECT EPIDURAL LUMBAR performed by GENERIC ANESTHESIA PROVIDER at [facility]     JOINT REPLACEMENT, HIP RT/LT      Joint Replacement Hip Rt     MASTECTOMY, SIMPLE RT/LT/CAITLYN      Left breast - following breast ca     OTHER SURGICAL HISTORY      C1-C2 fusion after fx      SURGICAL HISTORY OF -   05/22/2001    Colonoscopy     TONSILLECTOMY         Family History:    Family History   Problem Relation Age of Onset     Cerebrovascular Disease Mother      HEART DISEASE Mother      MI     Cerebrovascular Disease Father      HEART DISEASE Father      MI     Respiratory Maternal Grandfather      TB     Neurologic Disorder Brother      ALS     HEART DISEASE Brother      Cerebrovascular Disease Brother      Breast Cancer Daughter      age:49     Asthma Brother      Cancer Brother      brain and lung     Cancer Daughter      thyroid       Social History:  Marital Status:   her sodium  Social History   Substance Use Topics     Smoking status: Never Smoker     Smokeless tobacco: Never Used     Alcohol use No        Medications:      Acetaminophen (TYLENOL PO)   albuterol (PROAIR HFA/PROVENTIL HFA/VENTOLIN HFA) 108 (90 Base) MCG/ACT Inhaler   CRANBERRY   diclofenac (VOLTAREN) 1 % GEL topical gel   fluticasone (FLONASE) 50 MCG/ACT spray   ipratropium - albuterol 0.5 mg/2.5 mg/3 mL (DUONEB) 0.5-2.5 (3) MG/3ML neb solution   levofloxacin (LEVAQUIN) 750 MG tablet   levothyroxine (SYNTHROID/LEVOTHROID) 50 MCG tablet   metoprolol succinate (TOPROL-XL) 50 MG 24 hr tablet   order for DME   order for DME   pantoprazole  "(PROTONIX) 40 MG EC tablet   polyethylene glycol (MIRALAX/GLYCOLAX) Packet   probiotic CAPS   ranitidine (ZANTAC) 150 MG tablet   sodium chloride 1 GM tablet   SYMBICORT 160-4.5 MCG/ACT Inhaler   warfarin (COUMADIN) 1 MG tablet       Review of Systems   Constitutional: Positive for appetite change (decreased). Negative for chills and fatigue.   HENT: Negative for congestion and trouble swallowing.    Eyes: Negative for visual disturbance.   Respiratory: Positive for cough (chronic). Negative for shortness of breath, wheezing and stridor.    Cardiovascular: Negative for chest pain and palpitations.   Gastrointestinal: Positive for abdominal pain (periumbilical). Negative for constipation, diarrhea, nausea and vomiting.   Genitourinary: Negative for dysuria, frequency, hematuria and urgency.   Musculoskeletal: Negative for back pain and neck pain.   Skin: Negative for rash.   Neurological: Positive for light-headedness. Negative for dizziness, facial asymmetry, weakness, numbness and headaches.   Hematological: Does not bruise/bleed easily.   Psychiatric/Behavioral: Negative for confusion.       Physical Exam   Heart Rate: 82  Temp: 98.6  F (37  C)  Height: 147.3 cm (4' 10\")  Weight: 53.5 kg (118 lb)  SpO2: 95 %      Physical Exam   Constitutional: She appears well-developed and well-nourished.   Hard of hearing   Eyes: Conjunctivae are normal.   Psychiatric: She has a normal mood and affect.   Nursing note and vitals reviewed.    HENT: Oral mucosa moist. No lesions.  Neck: Supple  Pulmonary/Chest: Lungs are clear to auscultation bilaterally. Decreased at bases bilaterally.   Cardiovascular: Heart is regular rate and rhythm. No murmur.  Abdomen: Soft, non-distended mildly tender to palpation periumbilical region. No guarding or rebound. Bowel sounds positive.   Musculoskeletal: Moving all extremities well. No peripheral edema.   Neurological: Alert. No focal neurologic deficit.   Skin: No rash.    ED Course     ED " Course     Procedures                  EKG Interpretation:      Interpreted by Ross Wright    Rhythm: electronic atrial pacemaker  Rate: Normal  ST Segments/ T Waves: No ST-T wave changes, No acute ischemic changes and Non-specific ST-T wave changes  Q Waves: None  Comparison to prior: Unchanged from 5/13/18    Clinical Impression: Electronic atrial pacemaker    Critical Care time:  none               Results for orders placed or performed during the hospital encounter of 10/30/18   Chest XR,  PA & LAT    Narrative    CHEST TWO VIEWS 10/30/2018 2:54 PM     HISTORY: Shortness of breath.    COMPARISON: 7/25/2018    FINDINGS: Abnormal airspace opacity redemonstrated in the right upper  lobe. The lungs are emphysematous. No pneumothorax or pleural  effusion. Heart size normal. There is a left-sided pacer device. Wedge  deformities noted near the thoracolumbar junction.       Impression    IMPRESSION: Persistent right upper lobe pneumonia.     BRITTANY VO MD   CT Abdomen Pelvis w Contrast    Narrative    CT ABDOMEN AND PELVIS WITH CONTRAST  10/30/2018 3:38 PM     HISTORY:  Upper abdominal pain.     TECHNIQUE:   57 mL Isovue-370. Radiation dose for this scan was  reduced using automated exposure control, adjustment of the mA and/or  kV according to patient size, or iterative reconstruction technique.    COMPARISON: None.    FINDINGS:  Mild new or more prominent right base atelectasis or  infiltrate. Visualized left lung is clear.    The liver is unremarkable. Previous cholecystectomy. Spleen and  pancreas are normal.    No adrenal lesions.  No kidney stones or hydronephrosis. No suspicious  renal lesions.  Multiple left peripelvic renal cysts. No  retroperitoneal adenopathy or evidence of aortic aneurysm.    Scans through the pelvis demonstrate a normal appearing bladder.  No  pelvic adenopathy.    No evidence of diverticulitis. No inflammation in the right lower  quadrant or evidence of appendicitis. There  is a broad central lower  abdominal hernia that contains some adjacent colon but no evidence of  bowel obstruction. No free air or free fluid. There are compression  fractures which are new since the prior study involving the inferior  endplate of L5, superior endplate of L4, and L3. There are  chronic-appearing compression fractures elsewhere without significant  loss of vertebral body height.      Impression    IMPRESSION:  1. There is new or more prominent right base atelectasis or  infiltrate.  2. Multiple peripelvic renal cysts on the left.  3. Broad-based low midline abdominal hernia. No evidence of bowel  obstruction.  4. Multiple lumbar compression fractures with mild loss of vertebral  body height, many of which appear new since the prior study but still  have a chronic appearance.    VU HARLEY MD     *Note: Due to a large number of results and/or encounters for the requested time period, some results have not been displayed. A complete set of results can be found in Results Review.     Labs Ordered and Resulted from Time of ED Arrival Up to the Time of Departure from the ED   CBC WITH PLATELETS DIFFERENTIAL - Abnormal; Notable for the following:        Result Value    WBC 13.2 (*)     Absolute Neutrophil 10.1 (*)     Absolute Monocytes 1.8 (*)     All other components within normal limits   URINE MACROSCOPIC WITH REFLEX TO MICRO - Abnormal; Notable for the following:     Nitrite Urine Positive (*)     Leukocyte Esterase Urine Moderate (*)     RBC Urine 13 (*)     WBC Urine 143 (*)     Bacteria Urine Moderate (*)     Mucous Urine Present (*)     All other components within normal limits   TROPONIN I   LIPASE       Medications - No data to display    1:36 PM Patient assessed.     Assessments & Plan (with Medical Decision Making) records were reviewed.  Labs were obtained.  Due to her symptoms a chest x-ray was obtained.  Labs revealed.  A white count of 13.2.  Hemoglobin 12.4 there is a left shift  present.  Urine analysis  Positive leukocyte Estrace moderate and 43 WBCs with moderate bacteria.  Basic metabolic panel and INR had been done earlier today and INR was 2.6 with base metabolic panel without significant abnormality..  Chest x-ray revealed a persistent right upper lobe pneumonia.  Patient in review of records has had chronic changes on her chest x-ray and had a recent CT scan done that showed a nodule/lesion in her right upper chest.  She has been on antibiotics several times for this and a coordinated group study including oncology were planning to review this for further therapy for the patient.  Due to her abdominal pain a CT scan of the abdomen pelvis was obtained.  This revealed prominent right base atelectatic changes or infiltrate a broad-based low midline abdominal hernia.  Multiple lumbar compression fractures noted with chronic nature and appearance.  No acute abnormality in the abdomen.  Patient was covered with ceftriaxone IV for her urinary tract infection.  I discussed antibiotic coverage with pharmacy.  She has numerous allergies and it was decided that Levaquin which would cover both her respiratory and urinary tract symptoms.   I discussed admission with patient and her daughter but patient does not want to be admitted at this time.  She feels comfortable going home on oral antibiotics.  She will be started on Levaquin and will follow up with her primary care she needs to have her INR rechecked on Friday to make sure it is not changed secondary to this medication.  She understands if she has any shortness of breath with ascending abdominal pain fevers chills or other symptoms she should return for recheck.  Her sodium is low at 126 but this is near baseline for patient and she was given fluids.  This should also be rechecked by her primary.     I have reviewed the nursing notes.    I have reviewed the findings, diagnosis, plan and need for follow up with the patient.       Discharge  Medication List as of 10/30/2018  5:40 PM          Final diagnoses:   Atypical chest pain   Abdominal pain, generalized   Acute UTI   Pulmonary lesion, right - possible pneumonia   Hyponatremia     This document serves as a record of the services and decisions personally performed and made by Ross Wright MD. It was created on his behalf by Lissa Rios, a trained medical scribe. The creation of this document is based the provider's statements to the medical scribe.  Lissa Rios 1:36 PM 10/30/2018    Provider:   The information in this document, created by the medical scribe for me, accurately reflects the services I personally performed and the decisions made by me. I have reviewed and approved this document for accuracy prior to leaving the patient care area.  Ross Wright MD 1:36 PM 10/30/2018    10/30/2018   [facility] EMERGENCY DEPARTMENT     Ross Wright MD  11/01/18 1233       Ross Wright MD  11/07/18 0918

## 2018-10-30 NOTE — DISCHARGE INSTRUCTIONS
Return if symptoms worsen or new symptoms develop.  Follow-up with primary care physician on Friday for recheck of your sodium level. Follow up with your coumadin clinic on Friday to Recheck your INR as the levaquin could increase your level.  Drink plenty of fluids.take antibiotic as directed.  If any worsening shortness of breath chest pain abdominal pain decreased urine output or other symptoms present please return for further evaluation and care.  *Abdominal Pain, Unknown Cause (Female)    The exact cause of your abdominal (stomach) pain is not certain. This does not mean that this is something to worry about, or the right tests were not done. Everyone likes to know the exact cause of the problem, but sometimes with abdominal pain, there is no clear-cut cause, and this could be a good thing. The good news is that your symptoms can be treated, and you will feel better.   Your condition does not seem serious now; however, sometimes the signs of a serious problem may take more time to appear. For this reason, it is important for you to watch for any new symptoms, problems, or worsening of your condition.  Over the next few days, the abdominal pain may come and go, or be continuous. Other common symptoms can include nausea and vomiting. Sometimes it can be difficult to tell if you feel nauseous, you may just feel bad and not associate that feeling with nausea. Constipation, diarrhea, and a fever may go along with the pain.  The pain may continue even if treated correctly over the following days. Depending on how things go, sometimes the cause can become clear and may require further or different treatment. Additional evaluations, medications, or tests may be needed.  Home care  Your health care provider may prescribe medications for pain, symptoms, or an infection.  Follow the health care provider's instructions for taking these medications.  General care    Rest until your next exam. No strenuous  activities.    Try to find positions that ease discomfort. A small pillow placed on the abdomen may help relieve pain.    Something warm on your abdomen (such as a heating pad) may help, but be careful not to burn yourself.  Diet    Do not force yourself to eat, especially if having cramps, vomiting, or diarrhea.    Water is important so you do not get dehydrated. Soup may also be good. Sports drinks may also help, especially if they are not too acidic. Make sure you don't drink sugary drinks as this can make things worse. Take liquids in small amounts. Do not guzzle them.    Caffeine sometimes makes the pain and cramping worse.    Avoid dairy products if you have vomiting or diarrhea.    Don't eat large amounts at a time. Wait a few minutes between bites.    Eat a diet low in fiber (called a low-residue diet). Foods allowed include refined breads, white rice, fruit and vegetable juices without pulp, tender meats. These foods will pass more easily through the intestine.    Avoid fried or fatty foods, dairy, alcohol and spicy foods until your symptoms go away.  Follow-up care  Follow up with your health care provider as instructed, or if your pain does not begin to improve in the next 24 hours.  When to seek medical care  Seek prompt medical care if any of the following occur:    Pain gets worse or moves to the right lower abdomen    New or worsening vomiting or diarrhea    Swelling of the abdomen    Unable to pass stool for more than three days    New fever over 101  F (38.3 C), or rising fever    Blood in vomit or bowel movements (dark red or black color)    Jaundice (yellow color of eyes and skin)    Weakness, dizziness    Chest, arm, back, neck or jaw pain    Unexpected vaginal bleeding or missed period  Call 911  Call emergency services if any of the following occur:    Trouble breathing    Confusion    Fainting or loss of consciousness    Rapid heart rate    Seizure    [id number] [facility], [address] [place] [address]  [place] [address]. All rights reserved. This information is not intended as a substitute for professional medical care. Always follow your healthcare professional's instructions.           *CHEST PAIN, UNCERTAIN CAUSE    Based on your exam today, the exact cause of your chest pain is not certain. Your condition does not seem serious at this time, and your pain does not appear to be coming from your heart. However, sometimes the signs of a serious problem take more time to appear. Therefore, watch for the warning signs listed below.  HOME CARE:    1. Rest today and avoid strenuous activity.  2. Take any prescribed medicine as directed.  FOLLOW UP with your doctor in 1-3 days.   GET PROMPT MEDICAL ATTENTION if any of the following occur:    A change in the type of pain: if it feels different, becomes more severe, lasts longer, or begins to spread into your shoulder, arm, neck, jaw or back    Shortness of breath or increased pain with breathing    Weakness, dizziness, or fainting    Cough with blood or dark colored sputum (phlegm)    Fever over 101  F (38.3  C)    Swelling, pain or redness in one leg    6844-6402 [employer]. [address] [place] [address]. All rights reserved. This information is not intended as a substitute for professional medical care. Always follow your healthcare professional's instructions.  This information has been modified by your health care provider with permission from the publisher.      Hyponatremia  Hyponatremia means low sodium levels in the blood. This condition most often occurs after prolonged vomiting or diarrhea, which causes your body to lose too much water and sodium. It can also result from drinking excess amounts of water or the use of diuretics (water pills). Rarely, it can be associated with disorders of your endocrine system, as side effects of illicit drug use (ecstasy), as a complication of some cancers especially small cell lung  cancer, or as a complication of renal and liver disease or heart failure.  Mild hyponatremia causes no symptoms. It is only discovered with a blood test. As sodium levels in the blood decreases, symptoms begin to appear. This includes weakness, confusion, muscle cramping and seizures.  Home care    Reduce your daily water intake until the problem is corrected.    If you have been taking diuretics, you may be asked to stop taking them for a short time.    If you are having symptoms of weakness or confusion, do not drive or operate dangerous machinery until symptoms resolve.    If your sodium levels are too low to be managed at home with the above recommendations, you will be asked to go to the hospital to have your sodium replaced through your vein.  Follow-up care  Follow up with your healthcare provider for a repeat blood test within the next week, or as advised.  When to seek medical advice  Call your healthcare provider if any of the following occur:    Increasing weakness    Dizziness    Irregular heartbeat, extra beats or very fast heart rate    Increasing confusion    Fainting or loss of consciousness    Seizure  Date Last Reviewed: 7/1/2017 2000-2017 [employer]. [address] [place] [address], [place] [address]. All rights reserved. This information is not intended as a substitute for professional medical care. Always follow your healthcare professional's instructions.

## 2018-10-30 NOTE — MR AVS SNAPSHOT
After Visit Summary   10/30/2018    Ellie Leigh    MRN: [id number]           Patient Information     Date Of Birth          9/12/1930        Visit Information        Provider Department      10/30/2018 1:30 PM Philippe Orozco MD [facility]        Today's Diagnoses     Paroxysmal atrial fibrillation (H)    -  1       Follow-ups after your visit        Additional Services     Follow-Up with Electrophysiologist                 Your next 10 appointments already scheduled     Oct 31, 2018 10:00 AM CDT   SHORT with LACI Trent CNP   [facility] ([facility])    [address] [place] [address]   [phone]            Oct 31, 2018  1:00 PM CDT   Ortho Eval with Sanam Epperson PT   [facility] ([facility])    [address]  [address]  [facility] [id number] [phone]            Nov 28, 2018  3:45 PM CST   (Arrive by 3:30 PM)   Return Visit with Luzmaria Gutierrez MD   [facility] ([facility])    [address]  [address]  [facility] [id number] [phone]            Jan 09, 2019  4:45 PM CST   Remote PPM Check with RUEDA TECH1   [facility] ([facility])    [address] [place] [address] [address]  [facility] [id number] [phone] OPT 2           This appointment is for a remote check of your pacemaker.  This is not an appointment at the office.              Future tests that were ordered for you today     Open Future Orders        Priority Expected Expires Ordered    Follow-Up with Electrophysiologist Routine 10/29/2020 11/18/2020 10/30/2018            Who to contact     If you have questions or need follow up information about today's clinic visit or your schedule please contact [facility] directly at [phone].  Normal  or non-critical lab and imaging results will be communicated to you by MyChart, letter or phone within 4 business days after the clinic has received the results. If you do not hear from us within 7 days, please contact the clinic through MyChart or phone. If you have a critical or abnormal lab result, we will notify you by phone as soon as possible.  Submit refill requests through [employer] or call your pharmacy and they will forward the refill request to us. Please allow 3 business days for your refill to be completed.          Additional Information About Your Visit        Care EveryWhere ID     This is your Care EveryWhere ID. This could be used by other organizations to access your [place] medical records  [id number]        Your Vitals Were     Pulse Last Period Pulse Oximetry BMI (Body Mass Index)          80 06/15/1985 94% 24.66 kg/m2         Blood Pressure from Last 3 Encounters:   10/30/18 115/63   09/17/18 152/74   09/05/18 (P) 140/70    Weight from Last 3 Encounters:   10/30/18 53.5 kg (118 lb)   10/30/18 53.5 kg (118 lb)   09/17/18 54.8 kg (120 lb 12.8 oz)               Primary Care Provider Office Phone # Fax #    Anjum Vidales [phone] [address] [place] [address]        Equal Access to Services     BROOKS JOSÉ : Hadii daniel ku hadasho Soomaali, waaxda luqadaha, qaybta kaalmada adeegyada, verónica lopez hayjose daniel bedoya . So [facility] [phone].    ATENCIÓN: Si habla español, tiene a sahni disposición servicios gratuitos de asistencia lingüística. Llame al [phone].    We comply with applicable federal civil rights laws and [place] laws. We do not discriminate on the basis of race, color, national origin, age, disability, sex, sexual orientation, or gender identity.            Thank you!     Thank you for choosing [facility]  for your care. Our goal is always to provide you with excellent care. Hearing back from our  patients is one way we can continue to improve our services. Please take a few minutes to complete the written survey that you may receive in the mail after your visit with us. Thank you!             Your Updated Medication List - Protect others around you: Learn how to safely use, store and throw away your medicines at www.disposemymeds.org.          This list is accurate as of 10/30/18  1:33 PM.  Always use your most recent med list.                   Brand Name Dispense Instructions for use Diagnosis    albuterol 108 (90 Base) MCG/ACT inhaler    PROAIR HFA/PROVENTIL HFA/VENTOLIN HFA    1 Inhaler    Inhale 2 puffs into the lungs every 6 hours as needed for shortness of breath / dyspnea    Mild persistent asthma without complication       CRANBERRY      Take 475 mg by mouth 2 times daily.        diclofenac 1 % Gel topical gel    VOLTAREN     Place onto the skin 4 times daily as needed for moderate pain        fluticasone 50 MCG/ACT spray    FLONASE    1 Bottle    Spray 2 sprays into both nostrils daily    Cough, Nasal congestion       ipratropium - albuterol 0.5 mg/2.5 mg/3 mL 0.5-2.5 (3) MG/3ML neb solution    DUONEB    180 mL    TAKE 1 VIAL BY NEBULIZATION  EVERY SIX HOURS AS NEEDED FOR SHORTNESS OF BREATH/ DYSPNEA OR WHEEZING    Chronic obstructive pulmonary disease, unspecified COPD type (H)       levofloxacin 750 MG tablet    LEVAQUIN    5 tablet    Take 1 tablet (750 mg) by mouth daily    Pneumonia of right upper lobe due to infectious organism (H)       levothyroxine 50 MCG tablet    SYNTHROID/LEVOTHROID    90 tablet    TAKE ONE TABLET BY MOUTH ONE TIME DAILY    Hypothyroidism, unspecified type       metoprolol succinate 50 MG 24 hr tablet    TOPROL-XL    60 tablet    TAKE 1 TAB BY MOUTH TWICE A DAY FOR HTN    Essential hypertension, benign       order for DME     1 Device    Equipment being ordered: Nebulizer    Chronic obstructive pulmonary disease, unspecified COPD type (H)       order for DME     1 each     Equipment being ordered: Nebulizer    Chronic obstructive pulmonary disease, unspecified COPD type (H)       pantoprazole 40 MG EC tablet    PROTONIX    30 tablet    Take 1 tablet (40 mg) by mouth daily Take 30-60 minutes before a meal.    Gastroesophageal reflux disease with esophagitis, Abdominal pain, epigastric       polyethylene glycol Packet    MIRALAX/GLYCOLAX     Take 17 g by mouth daily        probiotic Caps      Take 1 capsule by mouth every evening        ranitidine 150 MG tablet    ZANTAC    60 tablet    Take 1 tablet (150 mg) by mouth 2 times daily    Gastroesophageal reflux disease without esophagitis       sodium chloride 1 GM tablet     100 tablet    Take 1 tablet (1 g) by mouth 3 times daily    Hyponatremia       SYMBICORT 160-4.5 MCG/ACT Inhaler   Generic drug:  budesonide-formoterol     10.2 g    INHALE TWO PUFFS INTO THE LUNGS TWICE DAILY    Mild persistent asthma without complication       TYLENOL PO      Take 975 mg by mouth 3 times daily        warfarin 1 MG tablet    COUMADIN    96 tablet    1 mg (1 mg x 1) every day or as directed by the Anticoagulation Clinic    Intermittent atrial fibrillation (H)

## 2018-10-30 NOTE — ED NOTES
"Pain for past 3 days in esophagus to stomach. Decreased appetite. Normal bowel and bladder. Pain described as \"sore.\"  Chronic cough, has COPD. Lung sounds clear. SOB with activity.     "

## 2018-10-30 NOTE — PROGRESS NOTES
HPI and Plan:   See dictation  [id number]  Orders Placed This Encounter   Procedures     Follow-Up with Electrophysiologist       No orders of the defined types were placed in this encounter.      There are no discontinued medications.      Encounter Diagnosis   Name Primary?     Paroxysmal atrial fibrillation (H) Yes       CURRENT MEDICATIONS:  Current Outpatient Prescriptions   Medication Sig Dispense Refill     albuterol (PROAIR HFA/PROVENTIL HFA/VENTOLIN HFA) 108 (90 Base) MCG/ACT Inhaler Inhale 2 puffs into the lungs every 6 hours as needed for shortness of breath / dyspnea 1 Inhaler 11     CRANBERRY Take 475 mg by mouth 2 times daily.       diclofenac (VOLTAREN) 1 % GEL topical gel Place onto the skin 4 times daily as needed for moderate pain       fluticasone (FLONASE) 50 MCG/ACT spray Spray 2 sprays into both nostrils daily 1 Bottle 11     ipratropium - albuterol 0.5 mg/2.5 mg/3 mL (DUONEB) 0.5-2.5 (3) MG/3ML neb solution TAKE 1 VIAL BY NEBULIZATION  EVERY SIX HOURS AS NEEDED FOR SHORTNESS OF BREATH/ DYSPNEA OR WHEEZING 180 mL 9     levofloxacin (LEVAQUIN) 750 MG tablet Take 1 tablet (750 mg) by mouth daily 5 tablet 0     levothyroxine (SYNTHROID/LEVOTHROID) 50 MCG tablet TAKE ONE TABLET BY MOUTH ONE TIME DAILY  90 tablet 1     metoprolol succinate (TOPROL-XL) 50 MG 24 hr tablet TAKE 1 TAB BY MOUTH TWICE A DAY FOR HTN 60 tablet 0     order for DME Equipment being ordered: Nebulizer 1 each 0     order for DME Equipment being ordered: Nebulizer 1 Device 0     pantoprazole (PROTONIX) 40 MG EC tablet Take 1 tablet (40 mg) by mouth daily Take 30-60 minutes before a meal. 30 tablet 0     polyethylene glycol (MIRALAX/GLYCOLAX) Packet Take 17 g by mouth daily        probiotic CAPS Take 1 capsule by mouth every evening        ranitidine (ZANTAC) 150 MG tablet Take 1 tablet (150 mg) by mouth 2 times daily 60 tablet 11     sodium chloride 1 GM tablet Take 1 tablet (1 g) by mouth 3 times daily 100 tablet 11     SYMBICORT  160-4.5 MCG/ACT Inhaler INHALE TWO PUFFS INTO THE LUNGS TWICE DAILY  10.2 g 4     warfarin (COUMADIN) 1 MG tablet 1 mg (1 mg x 1) every day or as directed by the Anticoagulation Clinic 96 tablet 3     Acetaminophen (TYLENOL PO) Take 975 mg by mouth 3 times daily         ALLERGIES     Allergies   Allergen Reactions     Cephalosporins Nausea     Cefzil     Doxycycline Nausea and Vomiting     Sulfa Drugs Nausea     Penicillins Rash     PCN       PAST MEDICAL HISTORY:  Past Medical History:   Diagnosis Date     Breast cancer (H)      COPD (chronic obstructive pulmonary disease) (H)     ct 2014 does show some bronchiectaisi RUL as well as fibronodular disease bilat upper lobes     Dust allergy      DVT (deep vein thrombosis) in pregnancy (H)     after neck fracture when in hospital     HTN (hypertension)      Hyponatremia     chronic     Hypothyroid      Intermittent atrial fibrillation (H) 12/1/2011    hx tachy-keri, has pacer, on chronic coumadin     Loose body in joint 4/15/2011     Neck fracture (H)     after a fall     Syncope 5/12/2013    multiple hospital visits, lates 3/2016, 6/2016, 7/2016 with no clear cause found       PAST SURGICAL HISTORY:  Past Surgical History:   Procedure Laterality Date     APPENDECTOMY       ARTHROSCOPY KNEE  4/15/2011    Procedure:ARTHROSCOPY KNEE; removal of loose body; Surgeon:LEY, JEFFREY DUANE; Location:[facility]     CHOLECYSTECTOMY       ESOPHAGOSCOPY, GASTROSCOPY, DUODENOSCOPY (EGD), COMBINED  6/9/2014    Procedure: COMBINED ESOPHAGOSCOPY, GASTROSCOPY, DUODENOSCOPY (EGD);  Surgeon: Gilberto Richard MD;  Location: [facility]     IMPLANT PACEMAKER  5/21/2013    [facility] [id number] Serial#[id number]     INJECT EPIDURAL LUMBAR  3/23/2011    INJECT EPIDURAL LUMBAR performed by GENERIC ANESTHESIA PROVIDER at [facility] OR     JOINT REPLACEMENT, HIP RT/LT      Joint Replacement Hip Rt     MASTECTOMY, SIMPLE RT/LT/CAITLYN      Left breast - following breast ca     OTHER SURGICAL HISTORY      C1-C2 fusion  after fx      SURGICAL HISTORY OF -   05/22/2001    Colonoscopy     TONSILLECTOMY         FAMILY HISTORY:  Family History   Problem Relation Age of Onset     Cerebrovascular Disease Mother      HEART DISEASE Mother      MI     Cerebrovascular Disease Father      HEART DISEASE Father      MI     Respiratory Maternal Grandfather      TB     Neurologic Disorder Brother      ALS     HEART DISEASE Brother      Cerebrovascular Disease Brother      Breast Cancer Daughter      age:49     Asthma Brother      Cancer Brother      brain and lung     Cancer Daughter      thyroid       SOCIAL HISTORY:  Social History     Social History     Marital status:      Spouse name: N/A     Number of children: N/A     Years of education: N/A     Occupational History      Retired     Social History Main Topics     Smoking status: Never Smoker     Smokeless tobacco: Never Used     Alcohol use No     Drug use: No     Sexual activity: Not Currently     Other Topics Concern     Parent/Sibling W/ Cabg, Mi Or Angioplasty Before 65f 55m? No     Social History Narrative    Lives in [facility].      Daughters helping since her accident, getting home physical therapy.      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      - 2000    5 daughters, 11 grandchildren, 3 great granchildren       Review of Systems:  Skin:  Negative       Eyes:  Positive for glasses    ENT:  Positive for hearing loss    Respiratory:  Positive for shortness of breath;dyspnea on exertion;wheezing;cough Asthma   Cardiovascular:  chest pain;palpitations;Negative for;edema;syncope or near-syncope fatigue;Positive for;lightheadedness;dizziness    Gastroenterology: Negative for nausea;heartburn;vomiting    Genitourinary:  Negative      Musculoskeletal:  Negative      Neurologic:  Positive for headaches;numbness or tingling of hands    Psychiatric:  Negative      Heme/Lymph/Imm:  Negative      Endocrine:  Negative        Physical  Exam:  Vitals: /63 (BP Location: Right arm, Patient Position: Sitting, Cuff Size: Adult Regular)  Pulse 80  Wt 53.5 kg (118 lb)  LMP 06/15/1985  SpO2 94%  BMI 24.66 kg/m2    Constitutional:  cooperative, alert and oriented, well developed, well nourished, in no acute distress appears younger than stated age      Skin:  warm and dry to the touch, no apparent skin lesions or masses noted          Head:  normocephalic, no masses or lesions        Eyes:  pupils equal and round, conjunctivae and lids unremarkable, sclera white, no xanthalasma, EOMS intact, no nystagmus        Lymph:No Cervical lymphadenopathy present     ENT:  no pallor or cyanosis, dentition good        Neck:  carotid pulses are full and equal bilaterally, JVP normal, no carotid bruit        Respiratory:  normal breath sounds, clear to auscultation, normal A-P diameter, normal symmetry, normal respiratory excursion, no use of accessory muscles         Cardiac: regular rhythm, normal S1/S2, no S3 or S4, apical impulse not displaced, no murmurs, gallops or rubs                                                         GI:  abdomen soft, non-tender, BS normoactive, no mass, no HSM, no bruits        Extremities and Muscular Skeletal:  no deformities, clubbing, cyanosis, erythema observed              Neurological:  no gross motor deficits        Psych:  Alert and Oriented x 3        CC  No referring provider defined for this encounter.

## 2018-10-30 NOTE — ED AVS SNAPSHOT
[facility]    [address] [address]    Phone:  [phone]    Fax:  [phone]                                       Ellie Leigh   MRN: [id number]    Department:  [facility]   Date of Visit:  10/30/2018           After Visit Summary Signature Page     I have received my discharge instructions, and my questions have been answered. I have discussed any challenges I see with this plan with the nurse or doctor.    ..........................................................................................................................................  Patient/Patient Representative Signature      ..........................................................................................................................................  Patient Representative Print Name and Relationship to Patient    ..................................................               ................................................  Date                                   Time    ..........................................................................................................................................  Reviewed by Signature/Title    ...................................................              ..............................................  Date                                               Time          22EPIC Rev 08/18

## 2018-10-30 NOTE — LETTER
"10/30/2018      Anjum Vidales MD  [address] [place] [address]      RE: Ellie Leigh       Dear Colleague,    I had the pleasure of seeing Ellie Leigh in the [facility].    Service Date: 10/30/2018      HISTORY OF PRESENT ILLNESS:  Thank you for allowing me to participate in the care of this delightful patient.  As you know, Ellie is an 88-year-old female with a history of syncope and had evidence of tachybrady syndrome, status post pacemaker implantation about 6 years ago.  Since then she has not had any syncope.  Her most recent device interrogation shows a few episodes of atrial fibrillation for which she was asymptomatic but otherwise pretty unremarkable.  She has not been to the hospital or ER visits recently, although she plans to go to Urgent Care Clinic after this visit.  The patient states she has felt quite \"sick\" for the last couple of days.  When I asked for more details by her being \"sick,\" the patient has a variety of concerns, namely her cough but no fever, chills or shortness of breath.  She stated that yesterday she had what appeared to be some swallowing difficulty but had no problem today.  Her vitals are stable during this visit, and the patient does not appear \"sick\" to me.  It is unclear what the urgent care will have to offer her.  The patient will continue to follow in the Device Clinic and see me every few years.  She has been on warfarin without any problem.      cc:   Anjum Vidales MD    [address]  [address]         JATINDER GARDNER MD             D: 10/30/2018   T: 10/30/2018   MT: ADAL      Name:     ELLIE LEIGH   MRN:      [id number]        Account:      [id number]   :      1930           Service Date: 10/30/2018      Document: [id number]           Outpatient Encounter Prescriptions as of 10/30/2018   Medication Sig Dispense Refill     albuterol (PROAIR HFA/PROVENTIL " HFA/VENTOLIN HFA) 108 (90 Base) MCG/ACT Inhaler Inhale 2 puffs into the lungs every 6 hours as needed for shortness of breath / dyspnea 1 Inhaler 11     CRANBERRY Take 475 mg by mouth 2 times daily.       diclofenac (VOLTAREN) 1 % GEL topical gel Place onto the skin 4 times daily as needed for moderate pain       fluticasone (FLONASE) 50 MCG/ACT spray Spray 2 sprays into both nostrils daily 1 Bottle 11     ipratropium - albuterol 0.5 mg/2.5 mg/3 mL (DUONEB) 0.5-2.5 (3) MG/3ML neb solution TAKE 1 VIAL BY NEBULIZATION  EVERY SIX HOURS AS NEEDED FOR SHORTNESS OF BREATH/ DYSPNEA OR WHEEZING 180 mL 9     levothyroxine (SYNTHROID/LEVOTHROID) 50 MCG tablet TAKE ONE TABLET BY MOUTH ONE TIME DAILY  90 tablet 1     metoprolol succinate (TOPROL-XL) 50 MG 24 hr tablet TAKE 1 TAB BY MOUTH TWICE A DAY FOR HTN 60 tablet 0     order for DME Equipment being ordered: Nebulizer 1 each 0     order for DME Equipment being ordered: Nebulizer 1 Device 0     pantoprazole (PROTONIX) 40 MG EC tablet Take 1 tablet (40 mg) by mouth daily Take 30-60 minutes before a meal. 30 tablet 0     polyethylene glycol (MIRALAX/GLYCOLAX) Packet Take 17 g by mouth daily        probiotic CAPS Take 1 capsule by mouth every evening        ranitidine (ZANTAC) 150 MG tablet Take 1 tablet (150 mg) by mouth 2 times daily 60 tablet 11     sodium chloride 1 GM tablet Take 1 tablet (1 g) by mouth 3 times daily 100 tablet 11     warfarin (COUMADIN) 1 MG tablet 1 mg (1 mg x 1) every day or as directed by the Anticoagulation Clinic 96 tablet 3     [DISCONTINUED] levofloxacin (LEVAQUIN) 750 MG tablet Take 1 tablet (750 mg) by mouth daily 5 tablet 0     [DISCONTINUED] SYMBICORT 160-4.5 MCG/ACT Inhaler INHALE TWO PUFFS INTO THE LUNGS TWICE DAILY  10.2 g 4     Acetaminophen (TYLENOL PO) Take 975 mg by mouth 3 times daily       No facility-administered encounter medications on file as of 10/30/2018.        Again, thank you for allowing me to participate in the care of your  patient.      Sincerely,    Philippe Luna MD     [facility]

## 2018-10-31 ENCOUNTER — TELEPHONE (OUTPATIENT)
Dept: FAMILY MEDICINE | Facility: CLINIC | Age: 83
End: 2018-10-31

## 2018-10-31 ENCOUNTER — ANTICOAGULATION THERAPY VISIT (OUTPATIENT)
Dept: ANTICOAGULATION | Facility: CLINIC | Age: 83
End: 2018-10-31
Payer: COMMERCIAL

## 2018-10-31 DIAGNOSIS — I82.409 DVT (DEEP VENOUS THROMBOSIS) (H): Primary | Chronic | ICD-10-CM

## 2018-10-31 DIAGNOSIS — I48.0 INTERMITTENT ATRIAL FIBRILLATION (H): ICD-10-CM

## 2018-10-31 DIAGNOSIS — Z79.01 LONG TERM CURRENT USE OF ANTICOAGULANT THERAPY: ICD-10-CM

## 2018-10-31 DIAGNOSIS — I82.409 DVT (DEEP VENOUS THROMBOSIS) (H): ICD-10-CM

## 2018-10-31 PROCEDURE — 99207 ZZC NO CHARGE NURSE ONLY: CPT

## 2018-10-31 NOTE — PROGRESS NOTES
ANTICOAGULATION FOLLOW-UP CLINIC VISIT    Patient Name:  Ellie Leigh  Date:  10/31/2018  Contact Type:  Telephone/ Suyapa, patient's daughter    SUBJECTIVE:     Patient Findings     Positives Antibiotic use or infection (Levaquin for UTI)    Comments Patient was seen in the ED yesterday for abdominal pain, atypical chest pain, and acute UTI. A pulmonary lesion was found and she was referred to the lung nodule clinic (had latent TB sometime between 2002 and 2003).     Patient was prescribed Levaquin. Due to patient's already low goal range of 1.7-2.3 (INR yesterday was 2.6), will plan to hold warfarin dose today and recheck the INR in the lab on Friday. Patient's daughter will call [facility] if she does not hear from our clinic by 3:30pm.            OBJECTIVE    INR   Date Value Ref Range Status   10/30/2018 2.60 (H) 0.86 - 1.14 Final       ASSESSMENT / PLAN  No question data found.  Anticoagulation Summary as of 10/31/2018     INR goal    Prior goal 1.5-2.0   Today's INR 2.60! (10/30/2018)   Warfarin maintenance plan 1 mg (1 mg x 1) every day   Full warfarin instructions 10/31: Hold; Otherwise 1 mg every day   Weekly warfarin total 7 mg   Plan last modified Ruiz Meredith RN (8/21/2018)   Next INR check 11/2/2018   Priority INR   Target end date Indefinite    Indications   Atrial fibrillation with rapid ventricular response (H) (Resolved) [I48.91]  DVT (deep venous thrombosis) [I82.409]  Long term current use of anticoagulant therapy [Z79.01]         Anticoagulation Episode Summary     INR check location     Preferred lab     Send INR reminders to [facility]    Comments * Actual INR goal is 1.7-2.3  Taking Celebrex PRN         Anticoagulation Care Providers     Provider Role Specialty Phone number    Anjum Vidales MD [facility] [phone]            See the Encounter Report to view Anticoagulation Flowsheet and Dosing Calendar (Go to Encounters tab in chart review, and  find the Anticoagulation Therapy Visit)        Katia Landrum RN CACP

## 2018-10-31 NOTE — MR AVS SNAPSHOT
Ellie Leigh   10/31/2018   Anticoagulation Therapy Visit    Description:  88 year old female   Provider:  Katia Landrum, RN   Department:  [facility]           INR as of 10/31/2018     Today's INR 2.60! (10/30/2018)      Anticoagulation Summary as of 10/31/2018     INR goal    Prior goal 1.5-2.0   Today's INR 2.60! (10/30/2018)   Full warfarin instructions 10/31: Hold; Otherwise 1 mg every day   Next INR check 11/2/2018    Indications   Atrial fibrillation with rapid ventricular response (H) (Resolved) [I48.91]  DVT (deep venous thrombosis) [I82.409]  Long term current use of anticoagulant therapy [Z79.01]         October 2018 Details    Sun Mon Tue Wed Thu Fri Sat      1               2               3               4               5               6                 7               8               9               10               11               12               13                 14               15               16               17               18               19               20                 21               22               23               24               25               26               27                 28               29               30               31      Hold   See details          Date Details   10/31 This INR check               How to take your warfarin dose     Hold Do not take your warfarin dose. See the Details table to the right for additional instructions.                November 2018 Details    Sun Mon Tue Wed Thu Fri Sat         1      1 mg         2            3                 4               5               6               7               8               9               10                 11               12               13               14               15               16               17                 18               19               20               21               22               23               24                 25               26               27                28               29               30                 Date Details   No additional details    Date of next INR:  11/2/2018         How to take your warfarin dose     To take:  1 mg Take 1 of the 1 mg tablets.

## 2018-10-31 NOTE — TELEPHONE ENCOUNTER
Reason for Call:  Other     Detailed comments: Luz Maria Abarca (daughter) [phone].  Daughter is wondering why they have to go to pulmonology - Please call daughter    Phone Number Patient can be reached at:     Best Time:     Can we leave a detailed message on this number? YES    Call taken on 10/31/2018 at 1:32 PM by Marilyn Harrison

## 2018-11-01 LAB
BACTERIA SPEC CULT: ABNORMAL
Lab: ABNORMAL
SPECIMEN SOURCE: ABNORMAL

## 2018-11-01 ASSESSMENT — ENCOUNTER SYMPTOMS
STRIDOR: 0
CONFUSION: 0
WEAKNESS: 0
HEADACHES: 0
WHEEZING: 0
BACK PAIN: 0
TROUBLE SWALLOWING: 0
NUMBNESS: 0
SHORTNESS OF BREATH: 0
BRUISES/BLEEDS EASILY: 0
DIZZINESS: 0
FACIAL ASYMMETRY: 0
NECK PAIN: 0

## 2018-11-02 ENCOUNTER — ANTICOAGULATION THERAPY VISIT (OUTPATIENT)
Dept: ANTICOAGULATION | Facility: CLINIC | Age: 83
End: 2018-11-02

## 2018-11-02 DIAGNOSIS — I82.409 DVT (DEEP VENOUS THROMBOSIS) (H): ICD-10-CM

## 2018-11-02 DIAGNOSIS — I82.409 DVT (DEEP VENOUS THROMBOSIS) (H): Chronic | ICD-10-CM

## 2018-11-02 DIAGNOSIS — J45.30 MILD PERSISTENT ASTHMA WITHOUT COMPLICATION: ICD-10-CM

## 2018-11-02 DIAGNOSIS — I48.0 INTERMITTENT ATRIAL FIBRILLATION (H): ICD-10-CM

## 2018-11-02 DIAGNOSIS — Z79.01 LONG TERM CURRENT USE OF ANTICOAGULANT THERAPY: ICD-10-CM

## 2018-11-02 LAB — INR PPP: 1.68 (ref 0.86–1.14)

## 2018-11-02 PROCEDURE — 85610 PROTHROMBIN TIME: CPT | Performed by: FAMILY MEDICINE

## 2018-11-02 PROCEDURE — 36415 COLL VENOUS BLD VENIPUNCTURE: CPT | Performed by: FAMILY MEDICINE

## 2018-11-02 PROCEDURE — 99207 ZZC NO CHARGE NURSE ONLY: CPT

## 2018-11-02 NOTE — MR AVS SNAPSHOT
Ellie CARVER Lu   11/2/2018   Anticoagulation Therapy Visit    Description:  88 year old female   Provider:  Caroline Stewart, RN   Department:  [facility]           INR as of 11/2/2018     Today's INR 1.68      Anticoagulation Summary as of 11/2/2018     INR goal    Prior goal 1.5-2.0   Today's INR 1.68   Full warfarin instructions 1 mg every day   Next INR check 11/5/2018    Indications   Atrial fibrillation with rapid ventricular response (H) (Resolved) [I48.91]  DVT (deep venous thrombosis) [I82.409]  Long term current use of anticoagulant therapy [Z79.01]         Your next Anticoagulation Clinic appointment(s)     Nov 05, 2018  2:45 PM CST   [facility] [place] [facility]    [address] [address]   [phone]              November 2018 Details    Sun Mon Tue Wed Thu Fri Sat         1               2      1 mg   See details      3      1 mg           4      1 mg         5            6               7               8               9               10                 11               12               13               14               15               16               17                 18               19               20               21               22               23               24                 25               26               27               28               29               30                 Date Details   11/02 This INR check       Date of next INR:  11/5/2018         How to take your warfarin dose     To take:  1 mg Take 1 of the 1 mg tablets.

## 2018-11-02 NOTE — TELEPHONE ENCOUNTER
"Requested Prescriptions   Pending Prescriptions Disp Refills     SYMBICORT 160-4.5 MCG/ACT Inhaler [Pharmacy Med Name: Symbicort Inhalation Aerosol 160-4.5 MCG/ACT] 10.2 g 3     Sig: INHALE TWO PUFFS INTO THE LUNGS TWICE DAILY    Inhaled Steroids Protocol Failed    11/2/2018  1:46 PM       Failed - Asthma control assessment score within normal limits in last 6 months    Please review ACT score.          Passed - Patient is age 12 or older       Passed - Recent (6 mo) or future (30 days) visit within the authorizing provider's specialty    Patient had office visit in the last 6 months or has a visit in the next 30 days with authorizing provider or within the authorizing provider's specialty.  See \"Patient Info\" tab in inbasket, or \"Choose Columns\" in Meds & Orders section of the refill encounter.            SYMBICORT 160-4.5 MCG/ACT Inhaler  Last Written Prescription Date:  05/30/2018  Last Fill Quantity: 10.2g,  # refills: 4   Last office visit: 9/17/2018 with prescribing provider:  CAREY Vidales   Future Office Visit:        ACT Total Scores 6/27/2017 12/11/2017 5/30/2018   ACT TOTAL SCORE - - -   ASTHMA ER VISITS - - -   ASTHMA HOSPITALIZATIONS - - -   ACT TOTAL SCORE (Goal Greater than or Equal to 20) 19 18 15   In the past 12 months, how many times did you visit the emergency room for your asthma without being admitted to the hospital? 1 0 0   In the past 12 months, how many times were you hospitalized overnight because of your asthma? 0 0 0     Cleo Ambrocio RT (R) (M)    "

## 2018-11-05 ENCOUNTER — ANTICOAGULATION THERAPY VISIT (OUTPATIENT)
Dept: ANTICOAGULATION | Facility: CLINIC | Age: 83
End: 2018-11-05
Payer: COMMERCIAL

## 2018-11-05 DIAGNOSIS — I82.409 DVT (DEEP VENOUS THROMBOSIS) (H): ICD-10-CM

## 2018-11-05 DIAGNOSIS — Z79.01 LONG TERM CURRENT USE OF ANTICOAGULANT THERAPY: ICD-10-CM

## 2018-11-05 LAB — INR POINT OF CARE: 1.5 (ref 0.86–1.14)

## 2018-11-05 PROCEDURE — 85610 PROTHROMBIN TIME: CPT | Mod: QW

## 2018-11-05 PROCEDURE — 36416 COLLJ CAPILLARY BLOOD SPEC: CPT

## 2018-11-05 PROCEDURE — 99207 ZZC NO CHARGE NURSE ONLY: CPT

## 2018-11-05 RX ORDER — BUDESONIDE AND FORMOTEROL FUMARATE DIHYDRATE 160; 4.5 UG/1; UG/1
AEROSOL RESPIRATORY (INHALATION)
Qty: 10.2 G | Refills: 3 | Status: SHIPPED | OUTPATIENT
Start: 2018-11-05 | End: 2019-04-01

## 2018-11-05 NOTE — PROGRESS NOTES
ANTICOAGULATION FOLLOW-UP CLINIC VISIT    Patient Name:  Ellie Leigh  Date:  11/5/2018  Contact Type:  Face to Face    SUBJECTIVE:     Patient Findings     Positives Change in medications (Finishing up her Levaquin)    Comments Patient is more tired lately but believes it is related to her Levaquin. Last dose is tomorrow. Should symptoms continue, I have asked her to follow up with her provider.  Patient denies any missed doses.  Patient is to continue maintenance warfarin plan, and check INR in ** weeks.  Patient verbalizes understanding and agrees to plan. No further questions or concerns               OBJECTIVE    INR Protime   Date Value Ref Range Status   11/05/2018 1.5 (A) 0.86 - 1.14 Final       ASSESSMENT / PLAN  INR assessment SUB    Recheck INR In: 1 WEEK    INR Location Clinic      Anticoagulation Summary as of 11/5/2018     INR goal    Prior goal 1.5-2.0   Today's INR 1.5   Warfarin maintenance plan 1 mg (1 mg x 1) every day   Full warfarin instructions 11/5: 1.5 mg; Otherwise 1 mg every day   Weekly warfarin total 7 mg   Plan last modified Ruiz Meredith RN (8/21/2018)   Next INR check 11/12/2018   Priority INR   Target end date Indefinite    Indications   Atrial fibrillation with rapid ventricular response (H) (Resolved) [I48.91]  DVT (deep venous thrombosis) [I82.409]  Long term current use of anticoagulant therapy [Z79.01]         Anticoagulation Episode Summary     INR check location     Preferred lab     Send INR reminders to [facility]    Comments * Actual INR goal is 1.7-2.3  Taking Celebrex PRN         Anticoagulation Care Providers     Provider Role Specialty Phone number    Anjum Vidales MD [facility] [phone]            See the Encounter Report to view Anticoagulation Flowsheet and Dosing Calendar (Go to Encounters tab in chart review, and find the Anticoagulation Therapy Visit)        Steffanie Noel [facility]

## 2018-11-05 NOTE — MR AVS SNAPSHOT
Ellie Leigh   11/5/2018 2:45 PM   Anticoagulation Therapy Visit    Description:  88 year old female   Provider:  [place] [facility]   Department:  [facility]           INR as of 11/5/2018     Today's INR 1.5      Anticoagulation Summary as of 11/5/2018     INR goal    Prior goal 1.5-2.0   Today's INR 1.5   Full warfarin instructions 11/5: 1.5 mg; Otherwise 1 mg every day   Next INR check 11/12/2018    Indications   Atrial fibrillation with rapid ventricular response (H) (Resolved) [I48.91]  DVT (deep venous thrombosis) [I82.409]  Long term current use of anticoagulant therapy [Z79.01]         Your next Anticoagulation Clinic appointment(s)     Nov 12, 2018  2:00 PM CST   Anticoagulation Visit with [place] [facility]   [facility]    [address] [address] [phone]              Contact Numbers     Please call [phone] with any problems or questions regarding your therapy.    If you need to cancel and/or reschedule your appointment please call one of the following numbers:  [facility] [phone]  [place] - [phone]  [facility] [phone]  [facility] [phone]  [place] - [phone]            November 2018 Details    Sun Mon Tue Wed Thu Fri Sat         1               2               3                 4               5      1.5 mg   See details      6      1 mg         7      1 mg         8      1 mg         9      1 mg         10      1 mg           11      1 mg         12            13               14               15               16               17                 18               19               20               21               22               23               24                 25               26               27               28               29               30                 Date Details   11/05 This INR check       Date of next INR:  11/12/2018         How to take your warfarin dose     To take:  1 mg Take 1 of  the 1 mg tablets.    To take:  1.5 mg Take 1.5 of the 1 mg tablets.

## 2018-11-08 ENCOUNTER — HOSPITAL ENCOUNTER (OUTPATIENT)
Dept: PHYSICAL THERAPY | Facility: CLINIC | Age: 83
Setting detail: THERAPIES SERIES
End: 2018-11-08
Attending: PHYSICIAN ASSISTANT
Payer: COMMERCIAL

## 2018-11-08 PROCEDURE — G8978 MOBILITY CURRENT STATUS: HCPCS | Mod: GP,CK | Performed by: PHYSICAL THERAPIST

## 2018-11-08 PROCEDURE — 40000718 ZZHC STATISTIC PT DEPARTMENT ORTHO VISIT: Performed by: PHYSICAL THERAPIST

## 2018-11-08 PROCEDURE — G8979 MOBILITY GOAL STATUS: HCPCS | Mod: GP,CJ | Performed by: PHYSICAL THERAPIST

## 2018-11-08 PROCEDURE — 97161 PT EVAL LOW COMPLEX 20 MIN: CPT | Mod: GP | Performed by: PHYSICAL THERAPIST

## 2018-11-08 PROCEDURE — 97110 THERAPEUTIC EXERCISES: CPT | Mod: GP | Performed by: PHYSICAL THERAPIST

## 2018-11-08 NOTE — PROGRESS NOTES
Ellie Leigh   Initial Physical Therapy Evaluation  11/08/18 1300   General Information   Type of Visit Initial OP Ortho PT Evaluation   Start of Care Date 11/08/18   Referring Physician Randy Lyman   Patient/Family Goals Statement get stronger, get rid of the pain   Orders Evaluate and Treat   Insurance Type are   Insurance Comments/Visits Authorized [facility], AUTH, G-codes   Medical Diagnosis lexi knee pain   Surgical/Medical history reviewed Yes   Precautions/Limitations no known precautions/limitations   General Information Comments history of compression fractures in spine, R hip LONG   Body Part(s)   Body Part(s) Knee   Presentation and Etiology   Pertinent history of current problem (include personal factors and/or comorbidities that impact the POC) Pt reports more pain on right compared to the left and that the x-rays showed bone on bone.  She reports some trouble with sleeping and ADLs. She denies any sudden unexplained weightloss, bowel or bladder dysfunction, or dizziness at present   Impairments B. Decreased WB tolerance;C. Swelling;E. Decreased flexibility;F. Decreased strength and endurance;G. Impaired balance  (numbness in toes)   Functional Limitations perform activities of daily living;perform desired leisure / sports activities   Symptom Location Both knees, R>L   How/Where did it occur From Degenerative Joint Disease   Chronicity Chronic   Pain rating (0-10 point scale) Best (/10);Worst (/10)   Best (/10) 0   Worst (/10) 10   Pain quality A. Sharp;E. Shooting;F. Stabbing  (right knee gives out)   Frequency of pain/symptoms C. With activity   Pain/symptoms are: Worse during the day   Pain/symptoms exacerbated by I. Bending;G. Certain positions  (twisting the knee)   Pain/symptoms eased by A. Sitting;C. Rest;J. Braces/supports  (cortisone injections)   Progression of symptoms since onset: Improved   Prior Level of Function   Prior Level of Function-Mobility independent with all  ADLS/IADLs at home with decreased speed   Current Level of Function   Patient role/employment history F. Retired   Current equipment-Gait/Locomotion Walker   Fall Risk Screen   Fall screen completed by PT   Have you fallen 2 or more times in the past year? No   Have you fallen and had an injury in the past year? No   Is patient a fall risk? No   Knee Objective Findings   Side (if bilateral, select both right and left) Right;Left   Observation Wearing a support brace on the right knee   Integumentary  no redness, swelling or tenderness noted bilaterally   Posture kyphotic hunched forward posture   Gait/Locomotion wide RICKY with rollator, slow gait speed, looks down at feet   Balance/Proprioception (Single Leg Stance) unable to stand on one leg without holding on   Knee ROM Comment stiff OA hard end feel on right   Knee/Hip Strength Comments hip flexion 4/5 R, 3+/5L   Knee Special Test Comments tenderness along lateral joint line both knees   Palpation tenderness along joint line   Accessory Motion/Joint Mobility medial and lateral patellar glides non-painful bilaterally, inferior and superior glides slightly painful both knees   Right Knee Extension AROM 0-(130) hard end feel   Right Knee Flexion Strength 5/5   Right Knee Extension Strength 5/5   Right Hip Abduction Strength 3+/5   Right Hamstring Flexibility 90/90 test 20 degrees tightness bilaterally   Left Knee Extension AROM 0-8-140(145) hard end feel   Left Knee Flexion Strength 5/5   Left Knee Extension Strength 5/5   Left Hip Abduction Strength 3+/5   Planned Therapy Interventions   Planned Therapy Interventions balance training;gait training;joint mobilization;manual therapy;ROM;strengthening;stretching;neuromuscular re-education   Planned Therapy Interventions Comment Too address above impairments, improve overall functional capacity   Clinical Impression   Criteria for Skilled Therapeutic Interventions Met yes, treatment indicated   PT Diagnosis  bilateral knee pain, decreased ROM, weakness   Influenced by the following impairments pain, weakness, impaired ROM   Functional limitations due to impairments impaired gait, functional weakness, poor balance   Clinical Presentation Stable/Uncomplicated   Clinical Presentation Rationale PT judgement, subjective and objective data   Clinical Decision Making (Complexity) Low complexity   Therapy Frequency 1 time/week   Predicted Duration of Therapy Intervention (days/wks) 8 weeks   Risk & Benefits of therapy have been explained Yes   Patient, Family & other staff in agreement with plan of care Yes   Clinical Impression Comments Pt is a pleasant 87 y/o female presenting to PT with bilaterally knee pain. She presents with bilateral weakness in the lower extremities and poor balance that will benefit from skilled PT intervention. Pt is a fair PT candidate.   Education Assessment   Preferred Learning Style Listening;Demonstration;Pictures/video   Barriers to Learning No barriers   ORTHO GOALS   PT Ortho Eval Goals 1;2;3;4   Ortho Goal 1   Goal Identifier 1   Goal Description Pt will be able to stand at the counter while washing dishes w/o knee pain   Target Date 01/03/19   Ortho Goal 2   Goal Identifier 2   Goal Description Pt will be able to walk > .5 miles w/o knee pain and normalized gait pattern with rollator   Target Date 01/03/19   Ortho Goal 3   Goal Identifier 3   Goal Description Pt will be able to sleep through the night without being awoken by knee pain   Target Date 01/03/19   Ortho Goal 4   Goal Identifier 4   Goal Description Pt will be independent with updated HEP   Target Date 01/03/19   Total Evaluation Time   Total Evaluation Time 20       Please Contact me with any questions or concerns. Thank you for for patience and cooperation.     Gilmer Crum PT, DPT  Flexible Workforce Physical Therapist   [facility]  [email or web address]

## 2018-11-12 ENCOUNTER — ANTICOAGULATION THERAPY VISIT (OUTPATIENT)
Dept: ANTICOAGULATION | Facility: CLINIC | Age: 83
End: 2018-11-12
Payer: COMMERCIAL

## 2018-11-12 DIAGNOSIS — Z79.01 LONG TERM CURRENT USE OF ANTICOAGULANT THERAPY: ICD-10-CM

## 2018-11-12 DIAGNOSIS — I82.409 DVT (DEEP VENOUS THROMBOSIS) (H): ICD-10-CM

## 2018-11-12 LAB — INR POINT OF CARE: 1.6 (ref 0.86–1.14)

## 2018-11-12 PROCEDURE — 85610 PROTHROMBIN TIME: CPT | Mod: QW

## 2018-11-12 PROCEDURE — 99207 ZZC NO CHARGE NURSE ONLY: CPT

## 2018-11-12 PROCEDURE — 36416 COLLJ CAPILLARY BLOOD SPEC: CPT

## 2018-11-12 NOTE — PROGRESS NOTES
ANTICOAGULATION FOLLOW-UP CLINIC VISIT    Patient Name:  Ellie Leigh  Date:  11/12/2018  Contact Type:  Face to Face    SUBJECTIVE:     Patient Findings     Positives Unexplained INR or factor level change    Comments Patient had 7.5 mg in the previous 7 days, will increase dose to 8 mg by the next INR check on 11/20/18, which is a 6.3% increase.   No changes in medications, diet, or activity. No concerns with bleeding or bruising. Took warfarin as prescribed.   Recheck INR in 8 days due to no openings in ACC schedule.   Patient verbalizes understanding and agrees with plan. No further questions at this time.              OBJECTIVE    INR Protime   Date Value Ref Range Status   11/12/2018 1.6 (A) 0.86 - 1.14 Final       ASSESSMENT / PLAN  INR assessment SUB    Recheck INR In: 8 DAYS    INR Location Clinic      Anticoagulation Summary as of 11/12/2018     INR goal    Prior goal 1.5-2.0   Today's INR 1.6   Warfarin maintenance plan 1 mg (1 mg x 1) every day   Full warfarin instructions 11/12: 1.5 mg; 11/16: 1.5 mg; Otherwise 1 mg every day   Weekly warfarin total 7 mg   Plan last modified Ruiz Meredith RN (8/21/2018)   Next INR check 11/19/2018   Priority INR   Target end date Indefinite    Indications   Atrial fibrillation with rapid ventricular response (H) (Resolved) [I48.91]  DVT (deep venous thrombosis) [I82.409]  Long term current use of anticoagulant therapy [Z79.01]         Anticoagulation Episode Summary     INR check location     Preferred lab     Send INR reminders to [facility] [place]    Comments * Actual INR goal is 1.7-2.3  Taking Celebrex PRN         Anticoagulation Care Providers     Provider Role Specialty Phone number    Anjum Vidales MD [facility] [phone]            See the Encounter Report to view Anticoagulation Flowsheet and Dosing Calendar (Go to Encounters tab in chart review, and find the Anticoagulation Therapy Visit)        Caroline Stewart,  RN

## 2018-11-12 NOTE — MR AVS SNAPSHOT
Ellie Leigh   11/12/2018 2:00 PM   Anticoagulation Therapy Visit    Description:  88 year old female   Provider:  [place] [facility]   Department:  [facility]           INR as of 11/12/2018     Today's INR 1.6      Anticoagulation Summary as of 11/12/2018     INR goal    Prior goal 1.5-2.0   Today's INR 1.6   Full warfarin instructions 11/12: 1.5 mg; 11/16: 1.5 mg; Otherwise 1 mg every day   Next INR check 11/19/2018    Indications   Atrial fibrillation with rapid ventricular response (H) (Resolved) [I48.91]  DVT (deep venous thrombosis) [I82.409]  Long term current use of anticoagulant therapy [Z79.01]         Your next Anticoagulation Clinic appointment(s)     Nov 20, 2018  3:45 PM CST   Anticoagulation Visit with [place] [facility]   [facility]    [address] [address] [phone]              Contact Numbers     Please call [phone] with any problems or questions regarding your therapy.    If you need to cancel and/or reschedule your appointment please call one of the following numbers:  [facility] - [phone]  [facility] [phone]  [facility] [phone]  [facility] [phone]  [place] - [phone]            November 2018 Details    Sun Mon Tue Wed Thu Fri Sat         1               2               3                 4               5               6               7               8               9               10                 11               12      1.5 mg   See details      13      1 mg         14      1 mg         15      1 mg         16      1.5 mg         17      1 mg           18      1 mg         19            20               21               22               23               24                 25               26               27               28               29               30                 Date Details   11/12 This INR check       Date of next INR:  11/19/2018         How to take your warfarin dose     To  take:  1 mg Take 1 of the 1 mg tablets.    To take:  1.5 mg Take 1.5 of the 1 mg tablets.

## 2018-11-16 ENCOUNTER — HOSPITAL ENCOUNTER (OUTPATIENT)
Dept: PHYSICAL THERAPY | Facility: CLINIC | Age: 83
Setting detail: THERAPIES SERIES
End: 2018-11-16
Attending: PHYSICIAN ASSISTANT
Payer: COMMERCIAL

## 2018-11-16 PROCEDURE — 40000718 ZZHC STATISTIC PT DEPARTMENT ORTHO VISIT: Performed by: PHYSICAL THERAPIST

## 2018-11-16 PROCEDURE — 97110 THERAPEUTIC EXERCISES: CPT | Mod: GP | Performed by: PHYSICAL THERAPIST

## 2018-11-20 ENCOUNTER — ANTICOAGULATION THERAPY VISIT (OUTPATIENT)
Dept: ANTICOAGULATION | Facility: CLINIC | Age: 83
End: 2018-11-20
Payer: COMMERCIAL

## 2018-11-20 DIAGNOSIS — I82.409 DVT (DEEP VENOUS THROMBOSIS) (H): ICD-10-CM

## 2018-11-20 DIAGNOSIS — Z79.01 LONG TERM CURRENT USE OF ANTICOAGULANT THERAPY: ICD-10-CM

## 2018-11-20 LAB — INR POINT OF CARE: 1.8 (ref 0.86–1.14)

## 2018-11-20 PROCEDURE — 36416 COLLJ CAPILLARY BLOOD SPEC: CPT

## 2018-11-20 PROCEDURE — 99207 ZZC NO CHARGE NURSE ONLY: CPT

## 2018-11-20 PROCEDURE — 85610 PROTHROMBIN TIME: CPT | Mod: QW

## 2018-11-20 NOTE — PROGRESS NOTES
ANTICOAGULATION FOLLOW-UP CLINIC VISIT    Patient Name:  Ellie Leigh  Date:  11/20/2018  Contact Type:  Face to Face    SUBJECTIVE:     Patient Findings     Positives No Problem Findings    Comments No changes in medications, diet, or activity. No concerns with bleeding or bruising. Missed one dose of warfarin.   Continue maintenance warfarin dose. Will recheck INR in 2 weeks. Alere home monitor sheet faxed to PCP as patient would like to obtain home monitor.    Patient verbalizes understanding and agrees with plan. No further questions at this time.              OBJECTIVE    INR Protime   Date Value Ref Range Status   11/20/2018 1.8 (A) 0.86 - 1.14 Final       ASSESSMENT / PLAN  INR assessment THER    Recheck INR In: 2 WEEKS    INR Location Clinic      Anticoagulation Summary as of 11/20/2018     INR goal    Prior goal 1.5-2.0   Today's INR 1.8   Warfarin maintenance plan 1.5 mg (1 mg x 1.5) on Mon; 1 mg (1 mg x 1) all other days   Full warfarin instructions 1.5 mg on Mon; 1 mg all other days   Weekly warfarin total 7.5 mg   Plan last modified Caroline Stewart RN (11/20/2018)   Next INR check 12/4/2018   Priority INR   Target end date Indefinite    Indications   Atrial fibrillation with rapid ventricular response (H) (Resolved) [I48.91]  DVT (deep venous thrombosis) [I82.409]  Long term current use of anticoagulant therapy [Z79.01]         Anticoagulation Episode Summary     INR check location     Preferred lab     Send INR reminders to [facility]    Comments * Actual INR goal is 1.7-2.3  Taking Celebrex PRN         Anticoagulation Care Providers     Provider Role Specialty Phone number    Anjum Vidales MD [facility] [phone]            See the Encounter Report to view Anticoagulation Flowsheet and Dosing Calendar (Go to Encounters tab in chart review, and find the Anticoagulation Therapy Visit)        Caroline Stewart, AYLIN

## 2018-11-20 NOTE — MR AVS SNAPSHOT
Ellie Leigh   11/20/2018 3:45 PM   Anticoagulation Therapy Visit    Description:  88 year old female   Provider:  [facility]   Department:  [facility]           INR as of 11/20/2018     Today's INR 1.8      Anticoagulation Summary as of 11/20/2018     INR goal    Prior goal 1.5-2.0   Today's INR 1.8   Full warfarin instructions 1.5 mg on Mon; 1 mg all other days   Next INR check 12/4/2018    Indications   Atrial fibrillation with rapid ventricular response (H) (Resolved) [I48.91]  DVT (deep venous thrombosis) [I82.409]  Long term current use of anticoagulant therapy [Z79.01]         Your next Anticoagulation Clinic appointment(s)     Dec 04, 2018  3:15 PM CST   [facility]ticoagulation Visit with [facility]   [facility]    [address] [address] [phone]              Contact Numbers     Please call [phone] with any problems or questions regarding your therapy.    If you need to cancel and/or reschedule your appointment please call one of the following numbers:  [facility] [phone]  [place] [facility] - [phone]  [facility] [phone]  [facility] [phone]  [place] - [phone]            November 2018 Details    Sun Mon Tue Wed Thu Fri Sat         1               2               3                 4               5               6               7               8               9               10                 11               12               13               14               15               16               17                 18               19               20      1 mg   See details      21      1 mg         22      1 mg         23      1 mg         24      1 mg           25      1 mg         26      1.5 mg         27      1 mg         28      1 mg         29      1 mg         30      1 mg           Date Details   11/20 This INR check               How to take your warfarin dose     To take:  1 mg Take 1 of the 1 mg  tablets.    To take:  1.5 mg Take 1.5 of the 1 mg tablets.           December 2018 Details    Sun Mon Tue Wed Thu Fri Sat           1      1 mg           2      1 mg         3      1.5 mg         4            5               6               7               8                 9               10               11               12               13               14               15                 16               17               18               19               20               21               22                 23               24               25               26               27               28               29                 30               31                     Date Details   No additional details    Date of next INR:  12/4/2018         How to take your warfarin dose     To take:  1 mg Take 1 of the 1 mg tablets.    To take:  1.5 mg Take 1.5 of the 1 mg tablets.

## 2018-11-21 ENCOUNTER — TELEPHONE (OUTPATIENT)
Dept: FAMILY MEDICINE | Facility: CLINIC | Age: 83
End: 2018-11-21

## 2018-11-23 ENCOUNTER — HOSPITAL ENCOUNTER (OUTPATIENT)
Dept: PHYSICAL THERAPY | Facility: CLINIC | Age: 83
Setting detail: THERAPIES SERIES
End: 2018-11-23
Attending: PHYSICIAN ASSISTANT
Payer: COMMERCIAL

## 2018-11-23 PROCEDURE — 97110 THERAPEUTIC EXERCISES: CPT | Mod: GP | Performed by: PHYSICAL THERAPIST

## 2018-11-23 PROCEDURE — 40000718 ZZHC STATISTIC PT DEPARTMENT ORTHO VISIT: Performed by: PHYSICAL THERAPIST

## 2018-11-28 ENCOUNTER — OFFICE VISIT (OUTPATIENT)
Dept: PULMONOLOGY | Facility: CLINIC | Age: 83
End: 2018-11-28
Attending: INTERNAL MEDICINE
Payer: COMMERCIAL

## 2018-11-28 VITALS
WEIGHT: 125.4 LBS | TEMPERATURE: 98.6 F | BODY MASS INDEX: 26.32 KG/M2 | RESPIRATION RATE: 16 BRPM | HEART RATE: 67 BPM | SYSTOLIC BLOOD PRESSURE: 153 MMHG | DIASTOLIC BLOOD PRESSURE: 74 MMHG | HEIGHT: 58 IN

## 2018-11-28 DIAGNOSIS — J47.9 BRONCHIECTASIS WITHOUT COMPLICATION (H): Primary | ICD-10-CM

## 2018-11-28 PROCEDURE — G0463 HOSPITAL OUTPT CLINIC VISIT: HCPCS | Mod: ZF

## 2018-11-28 RX ORDER — ALBUTEROL SULFATE 0.83 MG/ML
2.5 SOLUTION RESPIRATORY (INHALATION) 3 TIMES DAILY
Qty: 360 ML | Refills: 6 | Status: SHIPPED | OUTPATIENT
Start: 2018-11-28 | End: 2022-11-10

## 2018-11-28 ASSESSMENT — PAIN SCALES - GENERAL: PAINLEVEL: NO PAIN (0)

## 2018-11-28 NOTE — PROGRESS NOTES
[facility] Initial Visit      Reason for Visit  Ellie Leigh is a 88 year old female who is referred by Dr. MONDRAGON for lung nodule  Pulmonary HPI    - No new respiratory symptoms or complaints.    - cough, not feeling good are frequent/chronic issues.   - reports asthma for about 10 years  - nebulizer every 6 hours as needed until the cough improves, (her family says she has been using it 3 times a day)      ROS Pulmonary  Dyspnea: Yes, Cough: Yes, Chest pain: No, Wheezing: No, Sputum Production: Yes, Hemoptysis: No  A complete ROS was otherwise negative except as noted in the HPI.  The patient was seen and examined by Luzmaria Gutierrez MD   Current Outpatient Prescriptions   Medication     Acetaminophen (TYLENOL PO)     albuterol (PROAIR HFA/PROVENTIL HFA/VENTOLIN HFA) 108 (90 Base) MCG/ACT Inhaler     CRANBERRY     diclofenac (VOLTAREN) 1 % GEL topical gel     fluticasone (FLONASE) 50 MCG/ACT spray     ipratropium - albuterol 0.5 mg/2.5 mg/3 mL (DUONEB) 0.5-2.5 (3) MG/3ML neb solution     levofloxacin (LEVAQUIN) 500 MG tablet     levothyroxine (SYNTHROID/LEVOTHROID) 50 MCG tablet     metoprolol succinate (TOPROL-XL) 50 MG 24 hr tablet     pantoprazole (PROTONIX) 40 MG EC tablet     polyethylene glycol (MIRALAX/GLYCOLAX) Packet     probiotic CAPS     ranitidine (ZANTAC) 150 MG tablet     sodium chloride 1 GM tablet     SYMBICORT 160-4.5 MCG/ACT Inhaler     warfarin (COUMADIN) 1 MG tablet     order for DME     order for DME     No current facility-administered medications for this visit.      Allergies   Allergen Reactions     Cephalosporins Nausea     Cefzil     Doxycycline Nausea and Vomiting     Sulfa Drugs Nausea     Penicillins Rash     PCN     Social History     Social History     Marital status:      Spouse name: N/A     Number of children: N/A     Years of education: N/A     Occupational History      Retired     Social History Main Topics     Smoking status: Never  Smoker     Smokeless tobacco: Never Used     Alcohol use No     Drug use: No     Sexual activity: Not Currently     Other Topics Concern     Parent/Sibling W/ Cabg, Mi Or Angioplasty Before 65f 55m? No     Social History Narrative    Lives in [facility].      Daughters helping since her accident, getting home physical therapy.      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      - 2000    5 daughters, 11 grandchildren, 3 great granchildren     Past Medical History:   Diagnosis Date     Breast cancer (H)      COPD (chronic obstructive pulmonary disease) (H)     ct 2014 does show some bronchiectaisi RUL as well as fibronodular disease bilat upper lobes     Dust allergy      DVT (deep vein thrombosis) in pregnancy (H)     after neck fracture when in hospital     HTN (hypertension)      Hyponatremia     chronic     Hypothyroid      Intermittent atrial fibrillation (H) 12/1/2011    hx tachy-keri, has pacer, on chronic coumadin     Loose body in joint 4/15/2011     Neck fracture (H)     after a fall     Syncope 5/12/2013    multiple hospital visits, lates 3/2016, 6/2016, 7/2016 with no clear cause found     Past Surgical History:   Procedure Laterality Date     APPENDECTOMY       ARTHROSCOPY KNEE  4/15/2011    Procedure:ARTHROSCOPY KNEE; removal of loose body; Surgeon:LEY, JEFFREY DUANE; Location:[facility]     CHOLECYSTECTOMY       ESOPHAGOSCOPY, GASTROSCOPY, DUODENOSCOPY (EGD), COMBINED  6/9/2014    Procedure: COMBINED ESOPHAGOSCOPY, GASTROSCOPY, DUODENOSCOPY (EGD);  Surgeon: Gilberto Richard MD;  Location: [facility]     IMPLANT PACEMAKER  5/21/2013    Biotronik Moder [id number] Serial#[id number]     INJECT EPIDURAL LUMBAR  3/23/2011    INJECT EPIDURAL LUMBAR performed by GENERIC ANESTHESIA PROVIDER at [facility]     JOINT REPLACEMENT, HIP RT/LT      Joint Replacement Hip Rt     MASTECTOMY, SIMPLE RT/LT/CAITLYN      Left breast - following breast ca     OTHER SURGICAL HISTORY      C1-C2 fusion after fx       "SURGICAL HISTORY OF -   05/22/2001    Colonoscopy     TONSILLECTOMY       Family History   Problem Relation Age of Onset     Cerebrovascular Disease Mother      Heart Disease Mother      MI     Cerebrovascular Disease Father      Heart Disease Father      MI     Respiratory Maternal Grandfather      TB     Neurologic Disorder Brother      ALS     Heart Disease Brother      Cerebrovascular Disease Brother      Breast Cancer Daughter      age:49     Asthma Brother      Cancer Brother      brain and lung     Cancer Daughter      thyroid       Exam:   /74 (BP Location: Right arm, Patient Position: Chair, Cuff Size: Adult Regular)  Pulse 67  Temp 98.6  F (37  C)  Resp 16  Ht 1.473 m (4' 9.99\")  Wt 56.9 kg (125 lb 6.4 oz)  LMP 06/15/1985  BMI 26.22 kg/m2  GENERAL APPEARANCE: Well developed, well nourished, alert, and in no apparent distress.  EYES: PERRL, EOMI  HENT: Nasal mucosa with no edema and no hyperemia. No nasal polyps.  EARS: Canals clear, TMs normal  MOUTH: Oral mucosa is moist, without any lesions, no tonsillar enlargement, no oropharyngeal exudate.  NECK: supple, no masses, no thyromegaly.  LYMPHATICS: No significant axillary, cervical, or supraclavicular nodes.  RESP: normal percussion, reduced air flow throughout.  No crackles. No rhonchi. No wheezes.  CV: Normal S1, S2, regular rhythm, normal rate. No murmur.  No rub. No gallop. No LE edema.   ABDOMEN:  Bowel sounds normal, soft, nontender, no HSM or masses.   MS: extremities normal. No clubbing. No cyanosis. Severe kyphosis  SKIN: no rash on limited exam  NEURO: Mentation intact, speech normal, normal strength and tone, normal gait and stance  PSYCH: mentation appears normal. and affect normal/bright  Results:  - My interpretation of the images relevant for this visit includes: Anterior and medial right lung bronchiectasis with associated consolidation.  Mild bronchiectasis in both lungs, right lateral subpleural irregularly-shaped opacity new " on the most recent CT in October.  - My interpretation of the PFT's relevant for this visit includes: severe obstruction in 2014     Assessment and plan: Ellie is an 88-year-old female with a history of active tuberculosis, asthma, bronchiectasis.  She is being evaluated today for a new lung nodule.  She has previously seen my partner Dr. Maloney.  Lung nodule - seen on follow-up chest CT, given her clinical history and overall imaging findings, most of these waxing and waning opacities are almost certainly related to infection and mucous plugging.   -Her age is her only risk factor for lung cancer, and has a more compelling clinical reason for abnormal chest imaging.  I am not really inclined to recommend follow-up CTs for the lung nodules, but follow-up could be performed in 3-6 months.    Bronchiectasis-she and her family appear to be hearing this term for the first time today.  She has previously been treated for asthma, but no specific bronchiectasis treatment.  She has 1 prior sputum culture in 2014 showing Pseudomonas, none since then.  Today we spent most of our visit explaining the nature of bronchiectasis, need for sputum cultures in her case especially given her history of tuberculosis and Pseudomonas to guide antibiotic therapy.  At this time I am not sure if she is colonized with any organisms, the sputum culture will be helpful to discern that as well as treatment of acute illness.  I am asking her to start doing albuterol followed by normal saline nebulizer twice a day.  If she develops chest congestion or the need to expectorate more sputum, she can increase to 3 or 4 times daily.  I have asked her to stop using the DuoNeb regularly.  History of active TB 2001 -it sounds like she had an active (reactivation?)  TB infection that was treated with multidrug therapy in approximately 2001.  She has a remote history of exposure as a child, so it is not clear if perhaps that is the reason for her tuber  bronchiectasis   Asthma -she does have a history of dust mite sensitivity, so she may have a concomitant asthma diagnosis.  She certainly has severe obstruction on PFTs.  She currently takes Symbicort twice a day, has not had any definite asthma exacerbations recently.     I am recommending and she enthusiastically agrees to be seen by my pulmonology partners at [facility].      Total face-to-face visit time 30 minutes, over 50% of which (20 minutes) was spent in counseling and/or coordination of care.  We discussed the management of bronchiectasis.

## 2018-11-28 NOTE — MR AVS SNAPSHOT
After Visit Summary   11/28/2018    Ellie Leigh    MRN: [id number]           Patient Information     Date Of Birth          9/12/1930        Visit Information        Provider Department      11/28/2018 3:45 PM Luzmaria Gutierrez MD [facility]        Today's Diagnoses     Bronchiectasis without complication (H)    -  1      Care Instructions    You have bronchiectasis. This is a condition of abnormally dilated bronchial tubes.  I recommend nebulizer twice a day every day - albuterol followed by salt water (NaCl saline)    If you feel chest congestion, like you need to cough up stuff, increase to three or four times a day nebulizer.  Stop the Duoneb medication, you can use if needed for shortness of breath.     Keep the specimen containers to submit phlegm sample when you are feeling sick. You can just bring to [place] lab.    I will arrange for you to follow up in [place] for pulmonary medicine          Follow-ups after your visit        Your next 10 appointments already scheduled     Dec 04, 2018  3:15 PM CST   [facility]    [address] [address]   [phone]            Dec 07, 2018  2:00 PM CST   Treatment with Gilmer Crum PT   [facility] ([facility])    [address]  [address]  [facility] [facility] [address]   [phone]            Jan 09, 2019  4:45 PM CST   Remote PPM Check with RUEDA TECH1   [facility] ([facility])    [address]  [place] [facility] [address] [phone] OPT 2           This appointment is for a remote check of your pacemaker.  This is not an appointment at the office.            Jan 22, 2019  2:30 PM CST   New Visit with Silvana Falcon MD   [facility] ([facility])    [address] [place] [address]  "[id number] [phone]              Who to contact     If you have questions or need follow up information about today's clinic visit or your schedule please contact [facility] directly at [phone].  Normal or non-critical lab and imaging results will be communicated to you by Slicebookshart, letter or phone within 4 business days after the clinic has received the results. If you do not hear from us within 7 days, please contact the clinic through [place] or phone. If you have a critical or abnormal lab result, we will notify you by phone as soon as possible.  Submit refill requests through MarginPoint or call your pharmacy and they will forward the refill request to us. Please allow 3 business days for your refill to be completed.          Additional Information About Your Visit        MarginPoint Information     M[employer]Point gives you secure access to your electronic health record. If you see a primary care provider, you can also send messages to your care team and make appointments. If you have questions, please call your primary care clinic.  If you do not have a primary care provider, please call [phone] and they will assist you.        Care EveryWhere ID     This is your Care EveryWhere ID. This could be used by other organizations to access your [facility] medical records  [id number]        Your Vitals Were     Pulse Temperature Respirations Height Last Period BMI (Body Mass Index)    67 98.6  F (37  C) 16 1.473 m (4' 9.99\") 06/15/1985 26.22 kg/m2       Blood Pressure from Last 3 Encounters:   11/28/18 153/74   10/30/18 156/72   10/30/18 115/63    Weight from Last 3 Encounters:   11/28/18 56.9 kg (125 lb 6.4 oz)   10/30/18 53.5 kg (118 lb)   10/30/18 53.5 kg (118 lb)              Today, you had the following     No orders found for display         Today's Medication Changes          These changes are accurate as of 11/28/18  4:26 PM.  If you have any questions, ask your nurse or doctor.          "      Start taking these medicines.        Dose/Directions    sodium chloride 0.9 % areosol solution   Commonly known as:  BRONCHO SALINE   Used for:  Bronchiectasis without complication (H)   Started by:  Luzmraia Gutierrez MD        Dose:  5 mL   Inhale 5 mLs into the lungs 3 times daily Use after albuterol in nebulizer to thin secretions   Quantity:  480 mL   Refills:  6         These medicines have changed or have updated prescriptions.        Dose/Directions    * albuterol 108 (90 Base) MCG/ACT inhaler   Commonly known as:  PROAIR HFA/PROVENTIL HFA/VENTOLIN HFA   This may have changed:  Another medication with the same name was added. Make sure you understand how and when to take each.   Used for:  Mild persistent asthma without complication   Changed by:  Luzmaria Gutierrez MD        Dose:  2 puff   Inhale 2 puffs into the lungs every 6 hours as needed for shortness of breath / dyspnea   Quantity:  1 Inhaler   Refills:  11       * albuterol (2.5 MG/3ML) 0.083% neb solution   Commonly known as:  PROVENTIL   This may have changed:  You were already taking a medication with the same name, and this prescription was added. Make sure you understand how and when to take each.   Used for:  Bronchiectasis without complication (H)   Changed by:  Luzmaria Gutierrez MD        Dose:  2.5 mg   Take 1 vial (2.5 mg) by nebulization 3 times daily   Quantity:  360 mL   Refills:  6       * Notice:  This list has 2 medication(s) that are the same as other medications prescribed for you. Read the directions carefully, and ask your doctor or other care provider to review them with you.         Where to get your medicines      These medications were sent to [id number] - [place], [place] - [address]  [address] [address]     Phone:  [phone]     albuterol (2.5 MG/3ML) 0.083% neb solution    sodium chloride 0.9 % areosol solution                Primary Care Provider Office  Phone # Fax #    Anjum Vidales [phone] [address] [place] [address]        Equal Access to Services     BROOKS JOSÉ : Reyna daniel dumont alisson Arroyoali, wadeeptida alicjazacarias, christianota kajacey feliz, verónica kerr laginasaritha sean. So [facility] [place] [phone].    ATENCIÓN: Si habla español, tiene a sahni disposición servicios gratuitos de asistencia lingüística. Llame al [phone].    We comply with applicable federal civil rights laws and [place] laws. We do not discriminate on the basis of race, color, national origin, age, disability, sex, sexual orientation, or gender identity.            Thank you!     Thank you for choosing [facility]  for your care. Our goal is always to provide you with excellent care. Hearing back from our patients is one way we can continue to improve our services. Please take a few minutes to complete the written survey that you may receive in the mail after your visit with us. Thank you!             Your Updated Medication List - Protect others around you: Learn how to safely use, store and throw away your medicines at www.disposemymeds.org.          This list is accurate as of 11/28/18  4:26 PM.  Always use your most recent med list.                   Brand Name Dispense Instructions for use Diagnosis    * albuterol 108 (90 Base) MCG/ACT inhaler    PROAIR HFA/PROVENTIL HFA/VENTOLIN HFA    1 Inhaler    Inhale 2 puffs into the lungs every 6 hours as needed for shortness of breath / dyspnea    Mild persistent asthma without complication       * albuterol (2.5 MG/3ML) 0.083% neb solution    PROVENTIL    360 mL    Take 1 vial (2.5 mg) by nebulization 3 times daily    Bronchiectasis without complication (H)       CRANBERRY      Take 475 mg by mouth 2 times daily.        diclofenac 1 % topical gel    VOLTAREN     Place onto the skin 4 times daily as needed for moderate pain        fluticasone 50 MCG/ACT nasal spray    FLONASE    1  Bottle    Spray 2 sprays into both nostrils daily    Cough, Nasal congestion       ipratropium - albuterol 0.5 mg/2.5 mg/3 mL 0.5-2.5 (3) MG/3ML neb solution    DUONEB    180 mL    TAKE 1 VIAL BY NEBULIZATION  EVERY SIX HOURS AS NEEDED FOR SHORTNESS OF BREATH/ DYSPNEA OR WHEEZING    Chronic obstructive pulmonary disease, unspecified COPD type (H)       levofloxacin 500 MG tablet    LEVAQUIN    7 tablet    Take 1 tablet (500 mg) by mouth daily        levothyroxine 50 MCG tablet    SYNTHROID/LEVOTHROID    90 tablet    TAKE ONE TABLET BY MOUTH ONE TIME DAILY    Hypothyroidism, unspecified type       metoprolol succinate 50 MG 24 hr tablet    TOPROL-XL    60 tablet    TAKE 1 TAB BY MOUTH TWICE A DAY FOR HTN    Essential hypertension, benign       order for DME     1 Device    Equipment being ordered: Nebulizer    Chronic obstructive pulmonary disease, unspecified COPD type (H)       order for DME     1 each    Equipment being ordered: Nebulizer    Chronic obstructive pulmonary disease, unspecified COPD type (H)       pantoprazole 40 MG EC tablet    PROTONIX    30 tablet    Take 1 tablet (40 mg) by mouth daily Take 30-60 minutes before a meal.    Gastroesophageal reflux disease with esophagitis, Abdominal pain, epigastric       polyethylene glycol packet    MIRALAX/GLYCOLAX     Take 17 g by mouth daily        probiotic Caps      Take 1 capsule by mouth every evening        ranitidine 150 MG tablet    ZANTAC    60 tablet    Take 1 tablet (150 mg) by mouth 2 times daily    Gastroesophageal reflux disease without esophagitis       sodium chloride 0.9 % areosol solution    BRONCHO SALINE    480 mL    Inhale 5 mLs into the lungs 3 times daily Use after albuterol in nebulizer to thin secretions    Bronchiectasis without complication (H)       sodium chloride 1 GM tablet     100 tablet    Take 1 tablet (1 g) by mouth 3 times daily    Hyponatremia       SYMBICORT 160-4.5 MCG/ACT Inhaler   Generic drug:  budesonide-formoterol      10.2 g    INHALE TWO PUFFS INTO THE LUNGS TWICE DAILY    Mild persistent asthma without complication       TYLENOL PO      Take 975 mg by mouth 3 times daily        warfarin 1 MG tablet    COUMADIN    96 tablet    1 mg (1 mg x 1) every day or as directed by the Anticoagulation Clinic    Intermittent atrial fibrillation (H)       * Notice:  This list has 2 medication(s) that are the same as other medications prescribed for you. Read the directions carefully, and ask your doctor or other care provider to review them with you.

## 2018-11-28 NOTE — NURSING NOTE
"Oncology Rooming Note    November 28, 2018 3:47 PM   Ellie Leigh is a 88 year old female who presents for:    Chief Complaint   Patient presents with     Oncology Clinic Visit     New - lung nodule      Initial Vitals: /74 (BP Location: Right arm, Patient Position: Chair, Cuff Size: Adult Regular)  Pulse 67  Temp 98.6  F (37  C)  Resp 16  Ht 1.473 m (4' 9.99\")  Wt 56.9 kg (125 lb 6.4 oz)  LMP 06/15/1985  BMI 26.22 kg/m2 Estimated body mass index is 26.22 kg/(m^2) as calculated from the following:    Height as of this encounter: 1.473 m (4' 9.99\").    Weight as of this encounter: 56.9 kg (125 lb 6.4 oz). Body surface area is 1.53 meters squared.  No Pain (0) Comment: Data Unavailable   Patient's last menstrual period was 06/15/1985.  Allergies reviewed: Yes  Medications reviewed: Yes    Medications: Medication refills not needed today.  Pharmacy name entered into [facility]:    [id number] - [place], [place] - [address] - [place], [place] - [address] [place] [address]    Clinical concerns: New Patient      6 minutes for nursing intake (face to face time)     Tatiana Baker CMA            "

## 2018-11-28 NOTE — LETTER
11/28/2018       RE: Ellie Leigh  [id number]  [facility] [address]     Dear Colleague,    Thank you for referring your patient, Ellie Leigh, to the [facility] at [facility]. Please see a copy of my visit note below.    [facility]od[facility] Initial Visit      Reason for Visit  Ellie Leigh is a 88 year old female who is referred by Dr. MONDRAGON for lung nodule  Pulmonary HPI    - No new respiratory symptoms or complaints.    - cough, not feeling good are frequent/chronic issues.   - reports asthma for about 10 years  - nebulizer every 6 hours as needed until the cough improves, (her family says she has been using it 3 times a day)      ROS Pulmonary  Dyspnea: Yes, Cough: Yes, Chest pain: No, Wheezing: No, Sputum Production: Yes, Hemoptysis: No  A complete ROS was otherwise negative except as noted in the HPI.  The patient was seen and examined by Luzmaria Gutierrez MD   Current Outpatient Prescriptions   Medication     Acetaminophen (TYLENOL PO)     albuterol (PROAIR HFA/PROVENTIL HFA/VENTOLIN HFA) 108 (90 Base) MCG/ACT Inhaler     CRANBERRY     diclofenac (VOLTAREN) 1 % GEL topical gel     fluticasone (FLONASE) 50 MCG/ACT spray     ipratropium - albuterol 0.5 mg/2.5 mg/3 mL (DUONEB) 0.5-2.5 (3) MG/3ML neb solution     levofloxacin (LEVAQUIN) 500 MG tablet     levothyroxine (SYNTHROID/LEVOTHROID) 50 MCG tablet     metoprolol succinate (TOPROL-XL) 50 MG 24 hr tablet     pantoprazole (PROTONIX) 40 MG EC tablet     polyethylene glycol (MIRALAX/GLYCOLAX) Packet     probiotic CAPS     ranitidine (ZANTAC) 150 MG tablet     sodium chloride 1 GM tablet     SYMBICORT 160-4.5 MCG/ACT Inhaler     warfarin (COUMADIN) 1 MG tablet     order for DME     order for DME     No current facility-administered medications for this visit.      Allergies   Allergen Reactions     Cephalosporins Nausea     Cefzil     Doxycycline Nausea and  Vomiting     Sulfa Drugs Nausea     Penicillins Rash     PCN     Social History     Social History     Marital status:      Spouse name: N/A     Number of children: N/A     Years of education: N/A     Occupational History      Retired     Social History Main Topics     Smoking status: Never Smoker     Smokeless tobacco: Never Used     Alcohol use No     Drug use: No     Sexual activity: Not Currently     Other Topics Concern     Parent/Sibling W/ Cabg, Mi Or Angioplasty Before 65f 55m? No     Social History Narrative    Lives in [facility].      Daughters helping since her accident, getting home physical therapy.      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      - 2000    5 daughters, 11 grandchildren, 3 great granchildren     Past Medical History:   Diagnosis Date     Breast cancer (H)      COPD (chronic obstructive pulmonary disease) (H)     ct 2014 does show some bronchiectaisi RUL as well as fibronodular disease bilat upper lobes     Dust allergy      DVT (deep vein thrombosis) in pregnancy (H)     after neck fracture when in hospital     HTN (hypertension)      Hyponatremia     chronic     Hypothyroid      Intermittent atrial fibrillation (H) 12/1/2011    hx tachy-keri, has pacer, on chronic coumadin     Loose body in joint 4/15/2011     Neck fracture (H)     after a fall     Syncope 5/12/2013    multiple hospital visits, lates 3/2016, 6/2016, 7/2016 with no clear cause found     Past Surgical History:   Procedure Laterality Date     APPENDECTOMY       ARTHROSCOPY KNEE  4/15/2011    Procedure:ARTHROSCOPY KNEE; removal of loose body; Surgeon:LEY, JEFFREY DUANE; Location:[facility]     CHOLECYSTECTOMY       ESOPHAGOSCOPY, GASTROSCOPY, DUODENOSCOPY (EGD), COMBINED  6/9/2014    Procedure: COMBINED ESOPHAGOSCOPY, GASTROSCOPY, DUODENOSCOPY (EGD);  Surgeon: Gilberto Richard MD;  Location: [facility]     IMPLANT PACEMAKER  5/21/2013    Biotronik Moder [id number] Serial#[id number]      "INJECT EPIDURAL LUMBAR  3/23/2011    INJECT EPIDURAL LUMBAR performed by GENERIC ANESTHESIA PROVIDER at [facility] OR     JOINT REPLACEMENT, HIP RT/LT      Joint Replacement Hip Rt     MASTECTOMY, SIMPLE RT/LT/CAITLYN      Left breast - following breast ca     OTHER SURGICAL HISTORY      C1-C2 fusion after fx      SURGICAL HISTORY OF -   05/22/2001    Colonoscopy     TONSILLECTOMY       Family History   Problem Relation Age of Onset     Cerebrovascular Disease Mother      Heart Disease Mother      MI     Cerebrovascular Disease Father      Heart Disease Father      MI     Respiratory Maternal Grandfather      TB     Neurologic Disorder Brother      ALS     Heart Disease Brother      Cerebrovascular Disease Brother      Breast Cancer Daughter      age:49     Asthma Brother      Cancer Brother      brain and lung     Cancer Daughter      thyroid       Exam:   /74 (BP Location: Right arm, Patient Position: Chair, Cuff Size: Adult Regular)  Pulse 67  Temp 98.6  F (37  C)  Resp 16  Ht 1.473 m (4' 9.99\")  Wt 56.9 kg (125 lb 6.4 oz)  LMP 06/15/1985  BMI 26.22 kg/m2  GENERAL APPEARANCE: Well developed, well nourished, alert, and in no apparent distress.  EYES: PERRL, EOMI  HENT: Nasal mucosa with no edema and no hyperemia. No nasal polyps.  EARS: Canals clear, TMs normal  MOUTH: Oral mucosa is moist, without any lesions, no tonsillar enlargement, no oropharyngeal exudate.  NECK: supple, no masses, no thyromegaly.  LYMPHATICS: No significant axillary, cervical, or supraclavicular nodes.  RESP: normal percussion, reduced air flow throughout.  No crackles. No rhonchi. No wheezes.  CV: Normal S1, S2, regular rhythm, normal rate. No murmur.  No rub. No gallop. No LE edema.   ABDOMEN:  Bowel sounds normal, soft, nontender, no HSM or masses.   MS: extremities normal. No clubbing. No cyanosis. Severe kyphosis  SKIN: no rash on limited exam  NEURO: Mentation intact, speech normal, normal strength and tone, normal gait and " stance  PSYCH: mentation appears normal. and affect normal/bright  Results:  - My interpretation of the images relevant for this visit includes: Anterior and medial right lung bronchiectasis with associated consolidation.  Mild bronchiectasis in both lungs, right lateral subpleural irregularly-shaped opacity new on the most recent CT in October.  - My interpretation of the PFT's relevant for this visit includes: severe obstruction in 2014     Assessment and plan: Ellie is an 88-year-old female with a history of active tuberculosis, asthma, bronchiectasis.  She is being evaluated today for a new lung nodule.  She has previously seen my partner Dr. Maloney.  Lung nodule - seen on follow-up chest CT, given her clinical history and overall imaging findings, most of these waxing and waning opacities are almost certainly related to infection and mucous plugging.   -Her age is her only risk factor for lung cancer, and has a more compelling clinical reason for abnormal chest imaging.  I am not really inclined to recommend follow-up CTs for the lung nodules, but follow-up could be performed in 3-6 months.    Bronchiectasis-she and her family appear to be hearing this term for the first time today.  She has previously been treated for asthma, but no specific bronchiectasis treatment.  She has 1 prior sputum culture in 2014 showing Pseudomonas, none since then.  Today we spent most of our visit explaining the nature of bronchiectasis, need for sputum cultures in her case especially given her history of tuberculosis and Pseudomonas to guide antibiotic therapy.  At this time I am not sure if she is colonized with any organisms, the sputum culture will be helpful to discern that as well as treatment of acute illness.  I am asking her to start doing albuterol followed by normal saline nebulizer twice a day.  If she develops chest congestion or the need to expectorate more sputum, she can increase to 3 or 4 times daily.  I have  asked her to stop using the DuoNeb regularly.  History of active TB 2001 -it sounds like she had an active (reactivation?)  TB infection that was treated with multidrug therapy in approximately 2001.  She has a remote history of exposure as a child, so it is not clear if perhaps that is the reason for her tuber bronchiectasis   Asthma -she does have a history of dust mite sensitivity, so she may have a concomitant asthma diagnosis.  She certainly has severe obstruction on PFTs.  She currently takes Symbicort twice a day, has not had any definite asthma exacerbations recently.     I am recommending and she enthusiastically agrees to be seen by my pulmonology partners at [facility].      Total face-to-face visit time 30 minutes, over 50% of which (20 minutes) was spent in counseling and/or coordination of care.  We discussed the management of bronchiectasis.      Again, thank you for allowing me to participate in the care of your patient.      Sincerely,    Luzmaria Gutierrez MD

## 2018-11-28 NOTE — PATIENT INSTRUCTIONS
You have bronchiectasis. This is a condition of abnormally dilated bronchial tubes.  I recommend nebulizer twice a day every day - albuterol followed by salt water (NaCl saline)    If you feel chest congestion, like you need to cough up stuff, increase to three or four times a day nebulizer.  Stop the Duoneb medication, you can use if needed for shortness of breath.     Keep the specimen containers to submit phlegm sample when you are feeling sick. You can just bring to [place] lab.    I will arrange for you to follow up in [facility] pulmonary medicine

## 2018-12-02 DIAGNOSIS — K21.9 GASTROESOPHAGEAL REFLUX DISEASE WITHOUT ESOPHAGITIS: ICD-10-CM

## 2018-12-03 DIAGNOSIS — J47.9 BRONCHIECTASIS WITHOUT COMPLICATION (H): ICD-10-CM

## 2018-12-03 LAB
GRAM STN SPEC: NORMAL
Lab: NORMAL
SPECIMEN SOURCE: NORMAL

## 2018-12-03 PROCEDURE — 87070 CULTURE OTHR SPECIMN AEROBIC: CPT | Performed by: INTERNAL MEDICINE

## 2018-12-03 PROCEDURE — 99000 SPECIMEN HANDLING OFFICE-LAB: CPT | Performed by: INTERNAL MEDICINE

## 2018-12-03 PROCEDURE — 87015 SPECIMEN INFECT AGNT CONCNTJ: CPT | Mod: 90 | Performed by: INTERNAL MEDICINE

## 2018-12-03 PROCEDURE — 87116 MYCOBACTERIA CULTURE: CPT | Mod: 90 | Performed by: INTERNAL MEDICINE

## 2018-12-03 PROCEDURE — 87206 SMEAR FLUORESCENT/ACID STAI: CPT | Mod: 90 | Performed by: INTERNAL MEDICINE

## 2018-12-03 PROCEDURE — 87106 FUNGI IDENTIFICATION YEAST: CPT | Performed by: INTERNAL MEDICINE

## 2018-12-03 PROCEDURE — 87102 FUNGUS ISOLATION CULTURE: CPT | Performed by: INTERNAL MEDICINE

## 2018-12-03 PROCEDURE — 87077 CULTURE AEROBIC IDENTIFY: CPT | Performed by: INTERNAL MEDICINE

## 2018-12-03 PROCEDURE — 87186 SC STD MICRODIL/AGAR DIL: CPT | Performed by: INTERNAL MEDICINE

## 2018-12-03 PROCEDURE — 87205 SMEAR GRAM STAIN: CPT | Performed by: INTERNAL MEDICINE

## 2018-12-03 NOTE — TELEPHONE ENCOUNTER
"Requested Prescriptions   Pending Prescriptions Disp Refills     ranitidine (ZANTAC) 150 MG tablet [Pharmacy Med Name: RaNITidine HCl Oral Tablet 150 MG] 60 tablet 10     Sig: Take 1 tablet (150 mg) by mouth 2 times daily    H2 Blockers Protocol Passed    12/2/2018  6:33 PM       Passed - Patient is age 12 or older       Passed - Recent (12 mo) or future (30 days) visit within the authorizing provider's specialty    Patient had office visit in the last 12 months or has a visit in the next 30 days with authorizing provider or within the authorizing provider's specialty.  See \"Patient Info\" tab in inbasket, or \"Choose Columns\" in Meds & Orders section of the refill encounter.      Last Written Prescription Date:  11/7/17  Last Fill Quantity: 60,  # refills: 11   Last office visit: 9/17/2018 with prescribing provider:     Future Office Visit:                  "

## 2018-12-04 ENCOUNTER — ANTICOAGULATION THERAPY VISIT (OUTPATIENT)
Dept: ANTICOAGULATION | Facility: CLINIC | Age: 83
End: 2018-12-04
Payer: COMMERCIAL

## 2018-12-04 DIAGNOSIS — Z79.01 LONG TERM CURRENT USE OF ANTICOAGULANT THERAPY: ICD-10-CM

## 2018-12-04 DIAGNOSIS — I82.409 DVT (DEEP VENOUS THROMBOSIS) (H): ICD-10-CM

## 2018-12-04 DIAGNOSIS — I48.0 INTERMITTENT ATRIAL FIBRILLATION (H): ICD-10-CM

## 2018-12-04 LAB — INR POINT OF CARE: 1.9 (ref 0.86–1.14)

## 2018-12-04 PROCEDURE — 36416 COLLJ CAPILLARY BLOOD SPEC: CPT

## 2018-12-04 PROCEDURE — 99207 ZZC NO CHARGE NURSE ONLY: CPT

## 2018-12-04 PROCEDURE — 85610 PROTHROMBIN TIME: CPT | Mod: QW

## 2018-12-04 RX ORDER — WARFARIN SODIUM 1 MG/1
TABLET ORAL
Qty: 96 TABLET | Refills: 3 | COMMUNITY
Start: 2018-12-04 | End: 2019-10-31

## 2018-12-04 NOTE — MR AVS SNAPSHOT
Ellie Blakeshivani   12/4/2018 3:15 PM   Anticoagulation Therapy Visit    Description:  88 year old female   Provider:  [facility]   Department:  [facility]           INR as of 12/4/2018     Today's INR 1.9      Anticoagulation Summary as of 12/4/2018     INR goal    Prior goal 1.5-2.0   Today's INR 1.9   Full warfarin instructions 1.5 mg on Mon; 1 mg all other days   Next INR check 12/24/2018    Indications   Atrial fibrillation with rapid ventricular response (H) (Resolved) [I48.91]  DVT (deep venous thrombosis) [I82.409]  Long term current use of anticoagulant therapy [Z79.01]         Your next Anticoagulation Clinic appointment(s)     Dec 24, 2018 11:30 AM CST   Anticoagulation Visit with [facility]   [facility]    [address] [facility] [address] [facility] [address] [phone]              Contact Numbers     Please call [phone] with any problems or questions regarding your therapy.    If you need to cancel and/or reschedule your appointment please call one of the following numbers:  [facility] [phone]  [place] - [phone]  [facility] [phone]  [facility] [phone]  [place] - [phone]            December 2018 Details    Sun Mon Tue Wed Thu Fri Sat           1                 2               3               4      1 mg   See details      5      1 mg         6      1 mg         7      1 mg         8      1 mg           9      1 mg         10      1.5 mg         11      1 mg         12      1 mg         13      1 mg         14      1 mg         15      1 mg           16      1 mg         17      1.5 mg         18      1 mg         19      1 mg         20      1 mg         21      1 mg         22      1 mg           23      1 mg         24            25               26               27               28               29                 30               31                     Date Details   12/04 This INR check       Date of next INR:   12/24/2018         How to take your warfarin dose     To take:  1 mg Take 1 of the 1 mg tablets.    To take:  1.5 mg Take 1.5 of the 1 mg tablets.

## 2018-12-04 NOTE — PROGRESS NOTES
12/07/18    Luzmaria Gutierrez MD Danell, Britain Kaye, RN                     Hello. I am prescribing a fluorquinolone for 10 days so likely Ellie will need warfarin dose adjustment. thanks       I have sent Rx for Cipro 500 BD x 10 days. I will notify anticoagulation clinic.     Writer spoke with Suyapa and scheduled patient for an INR on 12/11/18    Ruiz Meredith RN, BSN, PHN  Anticoagulation Clinic   [phone]          ANTICOAGULATION FOLLOW-UP CLINIC VISIT    Patient Name:  Ellie Leigh  Date:  12/4/2018  Contact Type:  Face to Face/daughter    SUBJECTIVE:     Patient Findings     Positives No Problem Findings    Comments No changes in medications, activity, or diet noted. No bleeding or increased bruising noted. Took warfarin as prescribed.  Patient will continue weekly maintenance dose. INR is therapeutic.   Recheck in 3 weeks.   Patient verbalizes understanding and agrees to plan. No further questions or concerns.             OBJECTIVE    INR Protime   Date Value Ref Range Status   12/04/2018 1.9 (A) 0.86 - 1.14 Final       ASSESSMENT / PLAN  INR assessment THER    Recheck INR In: 3 WEEKS    INR Location Clinic      Anticoagulation Summary as of 12/4/2018     INR goal    Prior goal 1.5-2.0   Today's INR 1.9   Warfarin maintenance plan 1.5 mg (1 mg x 1.5) on Mon; 1 mg (1 mg x 1) all other days   Full warfarin instructions 1.5 mg on Mon; 1 mg all other days   Weekly warfarin total 7.5 mg   No change documented Ruiz Meredith, RN   Plan last modified Caroline Stewart RN (11/20/2018)   Next INR check 12/24/2018   Priority INR   Target end date Indefinite    Indications   Atrial fibrillation with rapid ventricular response (H) (Resolved) [I48.91]  DVT (deep venous thrombosis) [I82.409]  Long term current use of anticoagulant therapy [Z79.01]         Anticoagulation Episode Summary     INR check location     Preferred lab     Send INR reminders to [facility]    Comments * Actual INR  goal is 1.7-2.3  Taking Celebrex PRN         Anticoagulation Care Providers     Provider Role Specialty Phone number    Anjum Vidales MD [facility] [phone]            See the Encounter Report to view Anticoagulation Flowsheet and Dosing Calendar (Go to Encounters tab in chart review, and find the Anticoagulation Therapy Visit)        Ruiz Meredith RN

## 2018-12-05 ENCOUNTER — TELEPHONE (OUTPATIENT)
Dept: FAMILY MEDICINE | Facility: CLINIC | Age: 83
End: 2018-12-05

## 2018-12-06 DIAGNOSIS — J47.1 BRONCHIECTASIS WITH ACUTE EXACERBATION (H): ICD-10-CM

## 2018-12-06 DIAGNOSIS — J15.1 PNEUMONIA DUE TO PSEUDOMONAS SPECIES, UNSPECIFIED LATERALITY, UNSPECIFIED PART OF LUNG (H): Primary | ICD-10-CM

## 2018-12-06 LAB
ACID FAST STN SPEC QL: NORMAL
SPECIMEN SOURCE: NORMAL

## 2018-12-06 RX ORDER — CIPROFLOXACIN 500 MG/1
500 TABLET, FILM COATED ORAL 2 TIMES DAILY
Qty: 20 TABLET | Refills: 0 | Status: SHIPPED | OUTPATIENT
Start: 2018-12-06 | End: 2019-01-26

## 2018-12-06 NOTE — Clinical Note
Hello. I am prescribing a fluorquinolone for 10 days so likely Ellie will need warfarin dose adjustment. thanks

## 2018-12-06 NOTE — PROGRESS NOTES
I called the patient to relay the results of recent sputum culture, she is growing Pseudomonas.  I reached her daughter Suyapa, and discussed my recommendation for antibiotics.  She noted that back in October, the patient was prescribed levofloxacin and started taking it but developed nausea so she stopped it.  I informed her that the only oral antibiotics that would be effective against Pseudomonas would be levofloxacin or ciprofloxacin.  It certainly possible with ciprofloxacin she would have the same symptoms that she might not.  Suyapa said currently she is feeling pretty well, not coughing too much and not complaining.  I reiterated to her that bronchiectasis, colonization with bacterial organisms, especially Pseudomonas, is common.  Therefore, we will treat this sputum culture as evidence of colonization.    Plan:  No antibiotic treatment at this time    If the patient develops systemic symptoms of illness, or worsening respiratory symptoms, I would recommend treatment for Pseudomonas.  Might be reasonable to try ciprofloxacin 500 twice daily to start with, only other options would be IV and would require hospitalization.    Addendum:   Suyapa called me back in the afternoon to relay message from patient that she has been feeling badly and wants to take antibiotics. I have sent Rx for Cipro 500 BD x 10 days. I will notify anticoagulation clinic.

## 2018-12-08 LAB
BACTERIA SPEC CULT: ABNORMAL
BACTERIA SPEC CULT: ABNORMAL
SPECIMEN SOURCE: ABNORMAL

## 2018-12-11 ENCOUNTER — ANTICOAGULATION THERAPY VISIT (OUTPATIENT)
Dept: ANTICOAGULATION | Facility: CLINIC | Age: 83
End: 2018-12-11
Payer: COMMERCIAL

## 2018-12-11 DIAGNOSIS — Z79.01 LONG TERM CURRENT USE OF ANTICOAGULANT THERAPY: ICD-10-CM

## 2018-12-11 LAB — INR POINT OF CARE: 2.2 (ref 0.86–1.14)

## 2018-12-11 PROCEDURE — 36416 COLLJ CAPILLARY BLOOD SPEC: CPT

## 2018-12-11 PROCEDURE — 85610 PROTHROMBIN TIME: CPT | Mod: QW

## 2018-12-11 NOTE — PROGRESS NOTES
ANTICOAGULATION FOLLOW-UP CLINIC VISIT    Patient Name:  Ellie Leigh  Date:  2018  Contact Type:  Face to Face    SUBJECTIVE:     Patient Findings     Positives:   Antibiotic use or infection (pt prescribed cipro 500 mg bid x 10 days, starting on 18. Is feeling better now since starting abx.)           OBJECTIVE    INR Protime   Date Value Ref Range Status   2018 2.2 (A) 0.86 - 1.14 Final       ASSESSMENT / PLAN  INR assessment THER    Recheck INR In: 2 WEEKS    INR Location Clinic      Anticoagulation Summary  As of 2018    INR goal:      TTR:   46.7 % (3.7 y)   Prior goal:   1.5-2.0   INR used for dosin.2! (2018)   Warfarin maintenance plan:   1.5 mg (1 mg x 1.5) every Mon; 1 mg (1 mg x 1) all other days   Full warfarin instructions:   1.5 mg every Mon; 1 mg all other days   Weekly warfarin total:   7.5 mg   No change documented:   Kirsten Mathis RPH   Plan last modified:   Caroline Stewart RN (2018)   Next INR check:   2018   Priority:   INR   Target end date:   Indefinite    Indications    Atrial fibrillation with rapid ventricular response (H) (Resolved) [I48.91]  DVT (deep venous thrombosis) [I82.409]  Long term current use of anticoagulant therapy [Z79.01]             Anticoagulation Episode Summary     INR check location:       Preferred lab:       Send INR reminders to:   [facility]    Comments:   * Actual INR goal is 1.7-2.3  Taking Celebrex PRN         Anticoagulation Care Providers     Provider Role Specialty Phone number    Anjum Vidales MD [facility] [phone]            See the Encounter Report to view Anticoagulation Flowsheet and Dosing Calendar (Go to Encounters tab in chart review, and find the Anticoagulation Therapy Visit)      Kirsten Mathis RPH

## 2018-12-24 ENCOUNTER — ANTICOAGULATION THERAPY VISIT (OUTPATIENT)
Dept: ANTICOAGULATION | Facility: CLINIC | Age: 83
End: 2018-12-24
Payer: COMMERCIAL

## 2018-12-24 DIAGNOSIS — Z79.01 LONG TERM CURRENT USE OF ANTICOAGULANT THERAPY: ICD-10-CM

## 2018-12-24 DIAGNOSIS — I82.409 DVT (DEEP VENOUS THROMBOSIS) (H): ICD-10-CM

## 2018-12-24 LAB — INR POINT OF CARE: 2.2 (ref 0.86–1.14)

## 2018-12-24 PROCEDURE — 85610 PROTHROMBIN TIME: CPT | Mod: QW

## 2018-12-24 PROCEDURE — 36416 COLLJ CAPILLARY BLOOD SPEC: CPT

## 2018-12-24 PROCEDURE — 99207 ZZC NO CHARGE NURSE ONLY: CPT

## 2018-12-24 NOTE — PROGRESS NOTES
ANTICOAGULATION FOLLOW-UP CLINIC VISIT    Patient Name:  Ellie Leigh  Date:  2018  Contact Type:  Face to Face accompanied by Daughter    SUBJECTIVE:     Patient Findings     Positives:   No Problem Findings    Comments:   No changes in medication, activity, or diet. Patient reports has taken warfarin as instructed, no missed doses. Patient reports no abnormal bruising or bleeding. Will continue maintenance dose and recheck INR in 4 weeks due to patient schedule (will be gone 2 weeks in ). Patient to call [facility] with any changes or concerns. Patient verbalized understanding of all instructions, denies questions or concerns at this time.              OBJECTIVE    INR Protime   Date Value Ref Range Status   2018 2.2 (A) 0.86 - 1.14 Final       ASSESSMENT / PLAN  INR assessment THER    Recheck INR In: 4 WEEKS    INR Location Clinic      Anticoagulation Summary  As of 2018    INR goal:      TTR:   46.3 % (3.8 y)   Prior goal:   1.5-2.0   INR used for dosin.2! (2018)   Warfarin maintenance plan:   1.5 mg (1 mg x 1.5) every Mon; 1 mg (1 mg x 1) all other days   Full warfarin instructions:   1.5 mg every Mon; 1 mg all other days   Weekly warfarin total:   7.5 mg   No change documented:   Gina Persaud RN   Plan last modified:   Caroline Stewart RN (2018)   Next INR check:   2019   Priority:   INR   Target end date:   Indefinite    Indications    Atrial fibrillation with rapid ventricular response (H) (Resolved) [I48.91]  DVT (deep venous thrombosis) [I82.409]  Long term current use of anticoagulant therapy [Z79.01]             Anticoagulation Episode Summary     INR check location:       Preferred lab:       Send INR reminders to:   [facility]    Comments:   * Actual INR goal is 1.7-2.3  Taking Celebrex PRN         Anticoagulation Care Providers     Provider Role Specialty Phone number    Anjum Vidales MD [facility] [phone]             See the Encounter Report to view Anticoagulation Flowsheet and Dosing Calendar (Go to Encounters tab in chart review, and find the Anticoagulation Therapy Visit)        Gina Persaud RN

## 2018-12-27 ENCOUNTER — TELEPHONE (OUTPATIENT)
Dept: FAMILY MEDICINE | Facility: CLINIC | Age: 83
End: 2018-12-27

## 2018-12-27 NOTE — TELEPHONE ENCOUNTER
"S-(situation): Diarrhea daily for the past x3 weeks.    B-(background): Pt was prescribed 10 day course of ciprofloxacin 12/6/18.     A-(assessment):   \"I was on an antibiotic three weeks ago. Bowels got really loose and runny.\"  \"Bowels are still really loose, not feeling food at all, my stomach hurts, I have headaches.\"  Abdominal pain is \"all over.\" \"It's not really bad I can tell it's there.\" Rates the pain a \"2.\" No fever, no stomach swelling. Says she feel weak, not lethargic.  No blood in stool. Some nausea but has not vomited.   Drinking fluids, urinating enough.  Loose stools 2-3 in the AM and the same again in the evening.  Has not taken anything OTC for it.  Eating \"regular food, what we always eat.\" Meat, potatoes, vegetables, toast, salad, banana.     R-(recommendations): Clear liquids, BRAT diet, reviewed foods to avoid. Call first thing in the AM to schedule with same day provider in [place] (for diarrhea > 5 days, seek medical care within 24 hours), since there are no regular openings available. Be seen in ED if severe stomach pain develops, there is swelling or a fever develops. Pt verbalized understanding and agreement with plan.     Reference used: Telephone Triage Protocol for Nurses, 5th Edition: Diarrhea, Adult    Jessica SANTO RN        "

## 2018-12-27 NOTE — TELEPHONE ENCOUNTER
Reason for call:  Patient reporting a symptom    Symptom or request: Pt has Loose Stools. Pt was treated with Antibiotic for a cough and green Phlegm. Pt has had the Loose stools for 3 weeks ago.  Please Advise what should be the next step?     Have you been treated for this before? No    Phone Number patient can be reached at:  Home number on file [phone] (home)    Best Time:  Any Time      Can we leave a detailed message on this number:  YES    Call taken on 12/27/2018 at 8:21 AM by Luz Maria Liang

## 2018-12-28 DIAGNOSIS — E03.9 HYPOTHYROIDISM, UNSPECIFIED TYPE: Chronic | ICD-10-CM

## 2018-12-28 RX ORDER — LEVOTHYROXINE SODIUM 50 UG/1
TABLET ORAL
Qty: 90 TABLET | Refills: 0 | Status: SHIPPED | OUTPATIENT
Start: 2018-12-28 | End: 2019-04-01

## 2018-12-31 LAB
FUNGUS SPEC CULT: ABNORMAL
FUNGUS SPEC CULT: ABNORMAL
SPECIMEN SOURCE: ABNORMAL

## 2019-01-08 NOTE — ADDENDUM NOTE
Encounter addended by: Gilmer Crum, PT on: 1/8/2019 12:31 PM   Actions taken: Flowsheet accepted, Sign clinical note, Episode resolved

## 2019-01-08 NOTE — PROGRESS NOTES
Outpatient Physical Therapy Discharge Note     Patient: Ellie Leigh  : 1930    Beginning/End Dates of Reporting Period:  2018 to 2019    Referring Provider: Randy Lyman Diagnosis: lexi knee pain     Client Self Report: Pt reports not feeling so well due to her stomach at present. Her knees feel good at present, but her back gets sore at times.    Goals:  Goal Identifier 1   Goal Description Pt will be able to stand at the counter while washing dishes w/o knee pain   Target Date 19   Date Met  18   Progress:     Goal Identifier 2   Goal Description Pt will be able to walk > .5 miles w/o knee pain and normalized gait pattern with rollator(reports being able to walk 2 blocks at present)   Target Date 19   Date Met      Progress:     Goal Identifier 3   Goal Description Pt will be able to sleep through the night without being awoken by knee pain   Target Date 19   Date Met  18   Progress:     Goal Identifier 4   Goal Description Pt will be independent with updated HEP   Target Date 19   Date Met  (performs exercises at home as much as she can tolerate)   Progress:     Progress Toward Goals:   Progress limited due to not returning for more follow-up visits, subjectively reporting improvement at last visit.     Plan:  Discharge from therapy.    Discharge:    Reason for Discharge: Patient has failed to schedule further appointments.    Equipment Issued: none    Discharge Plan: Patient to continue home program.   Unable to assess G-Codes due to not returning for final treatment session.   Please Contact me with any questions or concerns. Thank you for for patience and cooperation.     Gilmer Crum PT, DPT  Flexible Workforce Physical Therapist   [facility]  [email or web address]

## 2019-01-14 ENCOUNTER — ANCILLARY PROCEDURE (OUTPATIENT)
Dept: CARDIOLOGY | Facility: CLINIC | Age: 84
End: 2019-01-14
Payer: COMMERCIAL

## 2019-01-14 DIAGNOSIS — R55 SYNCOPE: ICD-10-CM

## 2019-01-14 PROCEDURE — 93294 REM INTERROG EVL PM/LDLS PM: CPT | Performed by: INTERNAL MEDICINE

## 2019-01-14 PROCEDURE — 93296 REM INTERROG EVL PM/IDS: CPT | Performed by: INTERNAL MEDICINE

## 2019-01-15 LAB
ICDO DEVICE COMMENTS: NORMAL
MDC_IDC_EPISODE_DTM: NORMAL
MDC_IDC_EPISODE_ID: 64
MDC_IDC_EPISODE_TYPE: NORMAL
MDC_IDC_LEAD_IMPLANT_DT: NORMAL
MDC_IDC_LEAD_IMPLANT_DT: NORMAL
MDC_IDC_LEAD_LOCATION: NORMAL
MDC_IDC_LEAD_LOCATION: NORMAL
MDC_IDC_LEAD_LOCATION_DETAIL_1: NORMAL
MDC_IDC_LEAD_LOCATION_DETAIL_1: NORMAL
MDC_IDC_LEAD_MFG: NORMAL
MDC_IDC_LEAD_MFG: NORMAL
MDC_IDC_LEAD_MODEL: NORMAL
MDC_IDC_LEAD_MODEL: NORMAL
MDC_IDC_LEAD_POLARITY_TYPE: NORMAL
MDC_IDC_LEAD_SERIAL: NORMAL
MDC_IDC_LEAD_SERIAL: NORMAL
MDC_IDC_MSMT_BATTERY_REMAINING_PERCENTAGE: 65 %
MDC_IDC_MSMT_BATTERY_STATUS: NORMAL
MDC_IDC_MSMT_LEADCHNL_RA_IMPEDANCE_VALUE: 438 OHM
MDC_IDC_MSMT_LEADCHNL_RA_IMPEDANCE_VALUE: 449 OHM
MDC_IDC_MSMT_LEADCHNL_RA_LEAD_CHANNEL_STATUS: NORMAL
MDC_IDC_MSMT_LEADCHNL_RA_PACING_THRESHOLD_AMPLITUDE: 0.8 V
MDC_IDC_MSMT_LEADCHNL_RA_PACING_THRESHOLD_PULSEWIDTH: 0.4 MS
MDC_IDC_MSMT_LEADCHNL_RV_IMPEDANCE_VALUE: 488 OHM
MDC_IDC_MSMT_LEADCHNL_RV_IMPEDANCE_VALUE: 489 OHM
MDC_IDC_MSMT_LEADCHNL_RV_LEAD_CHANNEL_STATUS: NORMAL
MDC_IDC_MSMT_LEADCHNL_RV_PACING_THRESHOLD_AMPLITUDE: 0.6 V
MDC_IDC_MSMT_LEADCHNL_RV_PACING_THRESHOLD_PULSEWIDTH: 0.4 MS
MDC_IDC_PG_IMPLANT_DTM: NORMAL
MDC_IDC_PG_MFG: NORMAL
MDC_IDC_PG_MODEL: NORMAL
MDC_IDC_PG_SERIAL: NORMAL
MDC_IDC_PG_TYPE: NORMAL
MDC_IDC_SESS_CLINIC_NAME: NORMAL
MDC_IDC_SESS_DTM: NORMAL
MDC_IDC_SESS_REPROGRAMMED: NO
MDC_IDC_SESS_TYPE: NORMAL
MDC_IDC_SET_BRADY_AT_MODE_SWITCH_MODE: NORMAL
MDC_IDC_SET_BRADY_AT_MODE_SWITCH_RATE: 160 {BEATS}/MIN
MDC_IDC_SET_BRADY_LOWRATE: 60 {BEATS}/MIN
MDC_IDC_SET_BRADY_MAX_SENSOR_RATE: 125 {BEATS}/MIN
MDC_IDC_SET_BRADY_MAX_TRACKING_RATE: 130 {BEATS}/MIN
MDC_IDC_SET_BRADY_MODE: NORMAL
MDC_IDC_SET_BRADY_PAV_DELAY_HIGH: 150 MS
MDC_IDC_SET_BRADY_PAV_DELAY_LOW: 180 MS
MDC_IDC_SET_BRADY_SAV_DELAY_HIGH: 105 MS
MDC_IDC_SET_BRADY_SAV_DELAY_LOW: 135 MS
MDC_IDC_SET_LEADCHNL_RA_PACING_POLARITY: NORMAL
MDC_IDC_SET_LEADCHNL_RA_PACING_PULSEWIDTH: 0.4 MS
MDC_IDC_SET_LEADCHNL_RA_SENSING_ADAPTATION_MODE: NORMAL
MDC_IDC_SET_LEADCHNL_RA_SENSING_POLARITY: NORMAL
MDC_IDC_SET_LEADCHNL_RV_PACING_POLARITY: NORMAL
MDC_IDC_SET_LEADCHNL_RV_PACING_PULSEWIDTH: 0.4 MS
MDC_IDC_SET_LEADCHNL_RV_SENSING_ADAPTATION_MODE: NORMAL
MDC_IDC_SET_LEADCHNL_RV_SENSING_POLARITY: NORMAL
MDC_IDC_STAT_AT_BURDEN_PERCENT: 0 %
MDC_IDC_STAT_AT_DTM_END: NORMAL
MDC_IDC_STAT_AT_DTM_START: NORMAL
MDC_IDC_STAT_AT_MODE_SW_COUNT_PER_DAY: 0
MDC_IDC_STAT_AT_MODE_SW_PERCENT_TIME_PER_DAY: 0 %
MDC_IDC_STAT_BRADY_AP_VP_PERCENT: 2 %
MDC_IDC_STAT_BRADY_AP_VS_PERCENT: 70 %
MDC_IDC_STAT_BRADY_AS_VP_PERCENT: 0 %
MDC_IDC_STAT_BRADY_AS_VS_PERCENT: 28 %
MDC_IDC_STAT_BRADY_DTM_END: NORMAL
MDC_IDC_STAT_BRADY_DTM_START: NORMAL
MDC_IDC_STAT_BRADY_RA_PERCENT_PACED: 72 %
MDC_IDC_STAT_BRADY_RV_PERCENT_PACED: 2 %
MDC_IDC_STAT_CRT_DTM_END: NORMAL
MDC_IDC_STAT_CRT_DTM_START: NORMAL

## 2019-01-21 ENCOUNTER — HOSPITAL ENCOUNTER (OUTPATIENT)
Dept: MAMMOGRAPHY | Facility: CLINIC | Age: 84
Discharge: HOME | End: 2019-01-21
Attending: FAMILY MEDICINE | Admitting: FAMILY MEDICINE
Payer: COMMERCIAL

## 2019-01-21 ENCOUNTER — ANTICOAGULATION THERAPY VISIT (OUTPATIENT)
Dept: ANTICOAGULATION | Facility: CLINIC | Age: 84
End: 2019-01-21
Payer: COMMERCIAL

## 2019-01-21 DIAGNOSIS — Z12.31 VISIT FOR SCREENING MAMMOGRAM: ICD-10-CM

## 2019-01-21 DIAGNOSIS — I82.409 DVT (DEEP VENOUS THROMBOSIS) (H): ICD-10-CM

## 2019-01-21 DIAGNOSIS — Z79.01 LONG TERM CURRENT USE OF ANTICOAGULANT THERAPY: ICD-10-CM

## 2019-01-21 LAB — INR POINT OF CARE: 1.4 (ref 0.86–1.14)

## 2019-01-21 PROCEDURE — 77067 SCR MAMMO BI INCL CAD: CPT | Mod: 52

## 2019-01-21 PROCEDURE — 36416 COLLJ CAPILLARY BLOOD SPEC: CPT

## 2019-01-21 PROCEDURE — 85610 PROTHROMBIN TIME: CPT | Mod: QW

## 2019-01-21 PROCEDURE — 99207 ZZC NO CHARGE NURSE ONLY: CPT

## 2019-01-21 NOTE — PROGRESS NOTES
ANTICOAGULATION FOLLOW-UP CLINIC VISIT    Patient Name:  Ellie Leigh  Date:  2019  Contact Type:  Face to Face    SUBJECTIVE:     Patient Findings     Positives:   Antibiotic use or infection    Comments:   Patient is on an antibiotic for a UTI, was started on Thursday and tomorrow is her last dose. Patient unsure of the name of the antibiotic, was seen at an outside clinic for this. Patient states she has not missed any doses of warfarin. Will adjust her dose today, then resume her maintenance dose and recheck INR in 2 weeks. Patient denies signs or symptoms of bleeding. Patient denies any signs or symptoms of a blood clot. Writer educated patient regarding increased clot risk and when to seek immediate medical attention. Patient verbalized understanding. Patient to call [facility] with any changes or concerns. Patient verbalized understanding of all instructions, denies questions or concerns at this time.              OBJECTIVE    INR Protime   Date Value Ref Range Status   2019 1.4 (A) 0.86 - 1.14 Final       ASSESSMENT / PLAN  INR assessment SUB    Recheck INR In: 2 WEEKS    INR Location Clinic      Anticoagulation Summary  As of 2019    INR goal:      TTR:   46.6 % (3.9 y)   Prior goal:   1.5-2.0   INR used for dosin.4! (2019)   Warfarin maintenance plan:   1.5 mg (1 mg x 1.5) every Mon; 1 mg (1 mg x 1) all other days   Full warfarin instructions:   : 2 mg; Otherwise 1.5 mg every Mon; 1 mg all other days   Weekly warfarin total:   7.5 mg   Plan last modified:   Caroline Stewart RN (2018)   Next INR check:   2019   Priority:   INR   Target end date:   Indefinite    Indications    Atrial fibrillation with rapid ventricular response (H) (Resolved) [I48.91]  DVT (deep venous thrombosis) [I82.409]  Long term current use of anticoagulant therapy [Z79.01]             Anticoagulation Episode Summary     INR check location:       Preferred lab:       Send INR reminders to:   [place]  ANTICOAG CLINIC POOL    Comments:   * Actual INR goal is 1.7-2.3  Taking Celebrex PRN         Anticoagulation Care Providers     Provider Role Specialty Phone number    Anjum Vidales MD [facility] [phone]            See the Encounter Report to view Anticoagulation Flowsheet and Dosing Calendar (Go to Encounters tab in chart review, and find the Anticoagulation Therapy Visit)        Gina Persaud RN

## 2019-01-23 DIAGNOSIS — J44.9 CHRONIC OBSTRUCTIVE PULMONARY DISEASE, UNSPECIFIED COPD TYPE (H): ICD-10-CM

## 2019-01-23 NOTE — TELEPHONE ENCOUNTER
"Requested Prescriptions   Pending Prescriptions Disp Refills     ipratropium - albuterol 0.5 mg/2.5 mg/3 mL (DUONEB) 0.5-2.5 (3) MG/3ML neb solution [Pharmacy Med Name: Ipratropium-Albuterol Inhalation Solution 0.5-2.5 (3) MG/3ML] 180 mL 8     Sig: TAKE 1 VIAL BY NEBULIZATION  EVERY SIX HOURS AS NEEDED FOR SHORTNESS OF BREATH/ DYSPNEA OR WHEEZING    Short-Acting Beta Agonist Inhalers Protocol  Failed - 1/23/2019  5:08 PM       Failed - Asthma control assessment score within normal limits in last 6 months    Please review ACT score.          Passed - Patient is age 12 or older       Passed - Medication is active on med list       Passed - Recent (6 mo) or future (30 days) visit within the authorizing provider's specialty    Patient had office visit in the last 6 months or has a visit in the next 30 days with authorizing provider or within the authorizing provider's specialty.  See \"Patient Info\" tab in inbasket, or \"Choose Columns\" in Meds & Orders section of the refill encounter.            Last Written Prescription Date:  1/18/2018  Last Fill Quantity: 180 mL,  # refills: 9   Last office visit: 9/17/2018 with prescribing provider:  9/17/2018   Future Office Visit:      "

## 2019-01-24 RX ORDER — IPRATROPIUM BROMIDE AND ALBUTEROL SULFATE 2.5; .5 MG/3ML; MG/3ML
SOLUTION RESPIRATORY (INHALATION)
Qty: 180 ML | Refills: 11 | Status: SHIPPED | OUTPATIENT
Start: 2019-01-24 | End: 2020-01-27

## 2019-01-24 NOTE — TELEPHONE ENCOUNTER
Routing refill request to provider for review/approval because:  ACT score not current  Has appt with pulmonology 2/8/19    ACT Total Scores 6/27/2017 12/11/2017 5/30/2018   ACT TOTAL SCORE - - -   ASTHMA ER VISITS - - -   ASTHMA HOSPITALIZATIONS - - -   ACT TOTAL SCORE (Goal Greater than or Equal to 20) 19 18 15   In the past 12 months, how many times did you visit the emergency room for your asthma without being admitted to the hospital? 1 0 0   In the past 12 months, how many times were you hospitalized overnight because of your asthma? 0 0 0     Jessica SANTO RN

## 2019-01-26 ENCOUNTER — HOSPITAL ENCOUNTER (EMERGENCY)
Facility: CLINIC | Age: 84
Discharge: HOME | End: 2019-01-26
Attending: FAMILY MEDICINE | Admitting: FAMILY MEDICINE
Payer: COMMERCIAL

## 2019-01-26 VITALS
WEIGHT: 122 LBS | OXYGEN SATURATION: 98 % | BODY MASS INDEX: 25.61 KG/M2 | RESPIRATION RATE: 16 BRPM | TEMPERATURE: 99 F | HEIGHT: 58 IN | SYSTOLIC BLOOD PRESSURE: 156 MMHG | DIASTOLIC BLOOD PRESSURE: 81 MMHG

## 2019-01-26 DIAGNOSIS — N30.00 ACUTE CYSTITIS WITHOUT HEMATURIA: ICD-10-CM

## 2019-01-26 LAB
ALBUMIN UR-MCNC: NEGATIVE MG/DL
APPEARANCE UR: CLEAR
BILIRUB UR QL STRIP: NEGATIVE
COLOR UR AUTO: YELLOW
GLUCOSE UR STRIP-MCNC: NEGATIVE MG/DL
HGB UR QL STRIP: NEGATIVE
KETONES UR STRIP-MCNC: NEGATIVE MG/DL
LEUKOCYTE ESTERASE UR QL STRIP: ABNORMAL
MUCOUS THREADS #/AREA URNS LPF: PRESENT /LPF
NITRATE UR QL: NEGATIVE
PH UR STRIP: 7 PH (ref 5–7)
RBC #/AREA URNS AUTO: 4 /HPF (ref 0–2)
SOURCE: ABNORMAL
SP GR UR STRIP: 1.01 (ref 1–1.03)
SQUAMOUS #/AREA URNS AUTO: 1 /HPF (ref 0–1)
UROBILINOGEN UR STRIP-MCNC: 0 MG/DL (ref 0–2)
WBC #/AREA URNS AUTO: 8 /HPF (ref 0–5)

## 2019-01-26 PROCEDURE — 87086 URINE CULTURE/COLONY COUNT: CPT | Performed by: FAMILY MEDICINE

## 2019-01-26 PROCEDURE — 81001 URINALYSIS AUTO W/SCOPE: CPT | Performed by: FAMILY MEDICINE

## 2019-01-26 PROCEDURE — 99283 EMERGENCY DEPT VISIT LOW MDM: CPT | Performed by: FAMILY MEDICINE

## 2019-01-26 PROCEDURE — 99284 EMERGENCY DEPT VISIT MOD MDM: CPT | Mod: Z6 | Performed by: FAMILY MEDICINE

## 2019-01-26 RX ORDER — CIPROFLOXACIN 500 MG/1
500 TABLET, FILM COATED ORAL 2 TIMES DAILY
Qty: 14 TABLET | Refills: 0 | Status: SHIPPED | OUTPATIENT
Start: 2019-01-26 | End: 2019-02-06

## 2019-01-26 ASSESSMENT — MIFFLIN-ST. JEOR: SCORE: 873.14

## 2019-01-26 NOTE — DISCHARGE INSTRUCTIONS
Return to the Emergency Room if the following occurs:     Severely worsened pain, fever >101, concerning changes in behavior, vomiting/dehydration, or for any concern at anytime.    Or, follow-up with the following provider as we discussed:     Return to your primary doctor as needed, or if not improved over the next 3-5 days.    Medications discussed:    Ciprofloxacin.  You should reduce your Coumadin dose by half while taking the ciprofloxacin.    If you received pain-relieving or sedating medication during your time in the ER, avoid alcohol, driving automobiles, or working with machinery.  Also, a responsible adult must stay with you.        Call the Nurse Advice Line at [phone] or [phone] for any concern at anytime.

## 2019-01-26 NOTE — ED NOTES
Pt here with dysuria, started last night. Pt had a UTI 2 weeks ago, treated with 5 days of abx and cleared. Denies flank or bladder pain, no nausea.

## 2019-01-26 NOTE — ED AVS SNAPSHOT
[facility] Emergency Department  [address] [facility] [address]  Phone:  [phone]  Fax:  [phone]                                    Ellie Leigh   MRN: [id number]    Department:  [facility] Emergency Department   Date of Visit:  1/26/2019           After Visit Summary Signature Page    I have received my discharge instructions, and my questions have been answered. I have discussed any challenges I see with this plan with the nurse or doctor.    ..........................................................................................................................................  Patient/Patient Representative Signature      ..........................................................................................................................................  Patient Representative Print Name and Relationship to Patient    ..................................................               ................................................  Date                                   Time    ..........................................................................................................................................  Reviewed by Signature/Title    ...................................................              ..............................................  Date                                               Time          22EPIC Rev 08/18

## 2019-01-26 NOTE — ED PROVIDER NOTES
HPI  Current medications, past medical history, and social history are reviewed.    The patient is an 88-year-old female presenting with concerns for a urinary tract infection.  She was diagnosed with acute cystitis about 2 weeks ago and she was given 5 days of Macrobid to be taken twice daily.  She had resolution of symptoms.  However, she began to have more dysuria starting about 2 days ago.  She has some mild discomfort in the midline pelvis.  She has some urgency and frequency.  No fever.  No nausea or vomiting.  No flank pain.    ROS: All other review of systems are negative other than that noted above.      Past Medical History:   Diagnosis Date     Breast cancer (H)      COPD (chronic obstructive pulmonary disease) (H)     ct 2014 does show some bronchiectaisi RUL as well as fibronodular disease bilat upper lobes     Dust allergy      DVT (deep vein thrombosis) in pregnancy (H)     after neck fracture when in hospital     HTN (hypertension)      Hyponatremia     chronic     Hypothyroid      Intermittent atrial fibrillation (H) 12/1/2011    hx tachy-keri, has pacer, on chronic coumadin     Loose body in joint 4/15/2011     Neck fracture (H)     after a fall     Syncope 5/12/2013    multiple hospital visits, lates 3/2016, 6/2016, 7/2016 with no clear cause found     Past Surgical History:   Procedure Laterality Date     APPENDECTOMY       ARTHROSCOPY KNEE  4/15/2011    Procedure:ARTHROSCOPY KNEE; removal of loose body; Surgeon:LEY, JEFFREY DUANE; Location:[facility]     CHOLECYSTECTOMY       ESOPHAGOSCOPY, GASTROSCOPY, DUODENOSCOPY (EGD), COMBINED  6/9/2014    Procedure: COMBINED ESOPHAGOSCOPY, GASTROSCOPY, DUODENOSCOPY (EGD);  Surgeon: Gilberto Richard MD;  Location: [facility]     IMPLANT PACEMAKER  5/21/2013    Biotronik Moder [id number] Serial#[id number]     INJECT EPIDURAL LUMBAR  3/23/2011    INJECT EPIDURAL LUMBAR performed by GENERIC ANESTHESIA PROVIDER at [facility] OR     JOINT REPLACEMENT, HIP RT/LT      Joint Replacement  "Hip Rt     MASTECTOMY, SIMPLE RT/LT/CAITLYN      Left breast - following breast ca     OTHER SURGICAL HISTORY      C1-C2 fusion after fx      SURGICAL HISTORY OF -   05/22/2001    Colonoscopy     TONSILLECTOMY       Medicines    Acetaminophen (TYLENOL PO)   albuterol (PROAIR HFA/PROVENTIL HFA/VENTOLIN HFA) 108 (90 Base) MCG/ACT Inhaler   albuterol (PROVENTIL) (2.5 MG/3ML) 0.083% neb solution   ciprofloxacin (CIPRO) 500 MG tablet   CRANBERRY   fluticasone (FLONASE) 50 MCG/ACT spray   ipratropium - albuterol 0.5 mg/2.5 mg/3 mL (DUONEB) 0.5-2.5 (3) MG/3ML neb solution   levothyroxine (SYNTHROID/LEVOTHROID) 50 MCG tablet   metoprolol succinate (TOPROL-XL) 50 MG 24 hr tablet   pantoprazole (PROTONIX) 40 MG EC tablet   polyethylene glycol (MIRALAX/GLYCOLAX) Packet   probiotic CAPS   ranitidine (ZANTAC) 150 MG tablet   sodium chloride (BRONCHO SALINE) 0.9 % areosol solution   sodium chloride 1 GM tablet   SYMBICORT 160-4.5 MCG/ACT Inhaler   warfarin (COUMADIN) 1 MG tablet   order for DME     Family History   Problem Relation Age of Onset     Cerebrovascular Disease Mother      Heart Disease Mother         MI     Cerebrovascular Disease Father      Heart Disease Father         MI     Respiratory Maternal Grandfather         TB     Neurologic Disorder Brother         ALS     Heart Disease Brother      Cerebrovascular Disease Brother      Breast Cancer Daughter         age:49     Asthma Brother      Cancer Brother         brain and lung     Cancer Daughter         thyroid     Social History     Tobacco Use     Smoking status: Passive Smoke Exposure - Never Smoker     Smokeless tobacco: Never Used   Substance Use Topics     Alcohol use: No     Drug use: No         PHYSICAL  /81   Temp 99  F (37.2  C)   Resp 16   Ht 1.473 m (4' 10\")   Wt 55.3 kg (122 lb)   LMP 06/15/1985   SpO2 98%   BMI 25.50 kg/m    General: Patient is alert and in mild distress.  Neurological: Alert.  Moving upper and lower extremities equally, " bilaterally.  Head / Neck: Atraumatic.  Ears: None.  Eyes: Pupils are equal, round, and reactive.  Normal conjunctiva.  Nose: Midline.  No epistaxis.  Mouth / Throat:  Moist. Respiratory: No respiratory distress.  Cardiovascular: Regular rhythm.  Peripheral extremities are warm.  Abdomen / Pelvis: Mild midline tenderness at the pelvis.  Genitalia: Not done.  Musculoskeletal: Not done.  Skin: No evidence of rash or trauma.        PHYSICIAN  1433.  Patient has symptoms concerning for acute cystitis.  Urine analysis pending.    Labs Ordered and Resulted from Time of ED Arrival Up to the Time of Departure from the ED   ROUTINE UA WITH MICROSCOPIC - Abnormal; Notable for the following components:       Result Value    Leukocyte Esterase Urine Large (*)     WBC Urine 8 (*)     RBC Urine 4 (*)     Mucous Urine Present (*)     All other components within normal limits   URINE CULTURE AEROBIC BACTERIAL     1529.  The patient has a urine analysis that is concerning for infection and her symptoms are consistent with acute cystitis.  The patient will be treated with ciprofloxacin.  I am recommending she reduce her dose of Coumadin by half while on the Cipro.      IMPRESSION    ICD-10-CM    1. Acute cystitis without hematuria N30.00                 Steve Ugalde MD  01/26/19 1530

## 2019-01-27 LAB
BACTERIA SPEC CULT: NORMAL
Lab: NORMAL
SPECIMEN SOURCE: NORMAL

## 2019-01-27 NOTE — RESULT ENCOUNTER NOTE
Emergency Dept/Urgent Care discharge antibiotic (if prescribed): Ciprofloxacin (Cipro) 500 mg tablet, 1 tablet (500 mg) by mouth 2 times daily for 7 days.  Date of Rx (if applicable):  1/26/19  No changes in treatment per Urine culture protocol.

## 2019-01-28 ENCOUNTER — PATIENT OUTREACH (OUTPATIENT)
Dept: CARE COORDINATION | Facility: CLINIC | Age: 84
End: 2019-01-28

## 2019-01-28 NOTE — LETTER
Health Care Home - Access Care Plan    About Me  Patient Name:  Ellie Leigh    YOB: 1930  Age:                             88 year old   Gela MRN:            [id number] Telephone Information:   Home Phone [phone]   Mobile [phone]       Address:    [address]  [facility] [address] Email address:  [email or web address]      Emergency Contact(s)  Name Relationship Lgl Grd Work Phone Home Phone Mobile Phone   1. SAMRA MACIAS Daughter  [phone]   2. ARLENE CRISTOBAL Daughter  [phone]             Health Maintenance: Routine Health maintenance Reviewed: Due/Overdue   Health Maintenance Due   Topic Date Due     ZOSTER IMMUNIZATION (1 of 2) 09/12/1980     DTAP/TDAP/TD IMMUNIZATION (3 - Td) 07/11/2018     ASTHMA CONTROL TEST Q6 MOS  11/30/2018     PHQ-9 Q6 MONTHS  11/30/2018     Pt to talk with provider about what is needed or can continue wait.        My Access Plan  Medical Emergency 911   Questions or concerns during clinic hours Primary Clinic Line, I will call the clinic directly: [facility] - [phone]   24 Hour Appointment Line [phone] or  [phone]) (toll free)   24 Hour Nurse Line [phone] (toll free)   Questions or concerns outside clinic hours 24 Hour Appointment Line, I will call the after-hours on-call line:   [facility] [phone] or [phone]) (toll-free)   Preferred Urgent Care [facility], [phone]   Preferred Hospital [place], [place]  [phone]   Preferred Pharmacy [id number] - [place], [place] - [address] [place] [address]     Behavioral Health Crisis Line The National Suicide Prevention Lifeline at 1-988.602.9779 or 911     My Care Team Members  Patient Care Team       Relationship Specialty Notifications Start End    Anjum Vidales MD PCP - General Family  Practice  3/29/18     Phone: [phone] Fax: [phone]         [address] [place] [address]    Anjum Vidales MD PCP - Assigned PCP   11/11/18     Phone: [phone] Fax: [phone]         [address] [place] [address]    Gayle Nieves MD MD Oncology Admissions 1/6/14     Phone: [phone] Pager: [phone] Fax: [phone] [address] [place] [address]    INR clinic    1/28/19     Scheduling [phone]    Phone: [phone] Fax: [phone]                    My Medical and Care Information  Problem List   Patient Active Problem List   Diagnosis     Sensorineural hearing loss, asymmetrical     Generalized osteoarthrosis, unspecified site     Disease of lung     PERSONAL HISTORY OF MALIGNANCY- BREAST     Esophageal reflux     Chronic allergic conjunctivitis     Chronic seasonal allergic rhinitis     Hyperlipidemia LDL goal <130     Chronic constipation     Osteoporosis     Health Care Home     Right arm weakness     Ulnar neuropathy     Headache     Mild major depression     DVT (deep venous thrombosis)     Anxiety     Cervical vertebral fracture (H)     Intermittent atrial fibrillation (H)     Squamous cell carcinoma in situ of skin of face     SK (seborrheic keratosis)     Hypothyroidism     Hyponatremia     Recurrent UTI     History of skin cancer     BPPV (benign paroxysmal positional vertigo)     Benign essential HTN     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)     Positive fecal occult blood test     COPD (chronic obstructive pulmonary disease) (H)     Infectious colitis, enteritis and gastroenteritis     Advance Care Planning     Syncope     Hemoptysis     Long term current use of anticoagulant therapy     Mild persistent asthma without complication     Unresponsive episode     Irritable bowel syndrome without diarrhea     Elevated troponin     Nausea     Back pain     H/O TB (tuberculosis)     Chest pain     Community acquired pneumonia of right  upper lobe of lung (H)      Current Medications and Allergies:  See printed Medication Report

## 2019-01-28 NOTE — LETTER
[place] CARE COORDINATION  [address] [place] [address]  [phone]    January 28, 2019    Ellie Leigh  [address]  [facility] [place] [address]      Dear Ellie,    I am a clinic care coordinator who works with Anjum Vidales MD at [facility].  I wanted to introduce myself and provide you with my contact information so that you can call me with questions or concerns about your health care. Below is a description of clinic care coordination and how I can further assist you.     The clinic care coordinator is a registered nurse and/or  who understand the health care system. The goal of clinic care coordination is to help you manage your health and improve access to the [facility] in the most efficient manner. The registered nurse can assist you in meeting your health care goals by providing education, coordinating services, and strengthening the communication among your providers. The  can assist you with financial, behavioral, psychosocial, chemical dependency, counseling, and/or psychiatric resources.    Please feel free to contact me at [phone], with any questions or concerns. We at [facility] are focused on providing you with the highest-quality healthcare experience possible and that all starts with you.     Sincerely,     Steffanie Louis    Enclosed: I have enclosed a copy of a 24 Hour Access Plan. This has helpful phone numbers for you to call when needed. Please keep this in an easy to access place to use as needed.

## 2019-01-28 NOTE — RESULT ENCOUNTER NOTE
Final urine culture report is NEGATIVE per [place] ED Lab Result protocol.    If NEGATIVE result, no change in treatment, per [place] ED Lab Result protocol.

## 2019-01-28 NOTE — PROGRESS NOTES
Clinic Care Coordination Contact    Clinic Care Coordination Contact  OUTREACH    Referral Information:  Referral Source: ED Follow-Up    Primary Diagnosis: Genitourinary Disorders    Chief Complaint   Patient presents with     ER F/U     Nurse: ED f/u 1/26/19 UTI        Universal Utilization: Pt has had 4 ED/ 2 Hospital visits in past 90 days;  Pt did trigger high for risk of readmission-ED/hospital utilization.  Clinic Utilization  Difficulty keeping appointments:: No  Compliance Concerns: No  No-Show Concerns: No  No PCP office visit in Past Year: No  Utilization    Last refreshed: 1/27/2019  9:06 PM:  Hospital Admissions 2           Last refreshed: 1/27/2019  9:06 PM:  ED Visits 4           Last refreshed: 1/27/2019  9:06 PM:  No Show Count (past year) 0              Current as of: 1/27/2019  9:06 PM              Clinical Concerns:  Care Coordinator RN spoke with daughter Suyapa, she states patient is doing much better since her ER visit. She states she is scheduled for INR 2/4/19 which was already scheduled. Care Coordinator RN will update INR clinic of new antibiotic change. She denies having any questions or concerns regarding her discharge instructions.      Current Medical Concerns:    Patient Active Problem List   Diagnosis     Sensorineural hearing loss, asymmetrical     Generalized osteoarthrosis, unspecified site     Disease of lung     PERSONAL HISTORY OF MALIGNANCY- BREAST     Esophageal reflux     Chronic allergic conjunctivitis     Chronic seasonal allergic rhinitis     Hyperlipidemia LDL goal <130     Chronic constipation     Osteoporosis     Health Care Home     Right arm weakness     Ulnar neuropathy     Headache     Mild major depression     DVT (deep venous thrombosis)     Anxiety     Cervical vertebral fracture (H)     Intermittent atrial fibrillation (H)     Squamous cell carcinoma in situ of skin of face     SK (seborrheic keratosis)     Hypothyroidism     Hyponatremia     Recurrent UTI      History of skin cancer     BPPV (benign paroxysmal positional vertigo)     Benign essential HTN     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)     Positive fecal occult blood test     COPD (chronic obstructive pulmonary disease) (H)     Infectious colitis, enteritis and gastroenteritis     Advance Care Planning     Syncope     Hemoptysis     Long term current use of anticoagulant therapy     Mild persistent asthma without complication     Unresponsive episode     Irritable bowel syndrome without diarrhea     Elevated troponin     Nausea     Back pain     H/O TB (tuberculosis)     Chest pain     Community acquired pneumonia of right upper lobe of lung (H)         Current Behavioral Concerns: No concerns at this time.       Education Provided to patient's daughter: RN CC educated about Care Coordination Services, discharge instructions, medications reviewed and follow up.         Health Maintenance Reviewed: Due/Overdue   Health Maintenance Due   Topic Date Due     ZOSTER IMMUNIZATION (1 of 2) 09/12/1980     DTAP/TDAP/TD IMMUNIZATION (3 - Td) 07/11/2018     ASTHMA CONTROL TEST Q6 MOS  11/30/2018     PHQ-9 Q6 MONTHS  11/30/2018       Clinical Pathway: None    Medication Management:   Daughter helps/oversees pillbox set up/med administration.     Functional Status:  Dependent ADLs:: Ambulation-walker, Bathing, Dressing  Dependent IADLs:: Transportation, Money Management, Medication Management, Meal Preparation, Shopping, Laundry, Cooking, Cleaning  Bed or wheelchair confined:: No  Mobility Status: Independent w/Device  Fallen 2 or more times in the past year?: No  Any fall with injury in the past year?: No    Living Situation:  Current living arrangement:: I live in a private home with family  Type of residence:: Private home - stairs    Diet/Exercise/Sleep:  Diet:: Other, Regular  Inadequate nutrition (GOAL):: No  Food Insecurity: No  Tube Feeding: No  Exercise:: Currently not exercising  Inadequate  activity/exercise (GOAL):: No  Significant changes in sleep pattern (GOAL): No    Transportation:  Transportation concerns (GOAL):: No  Transportation means:: Family, Regular car     Psychosocial:  Advent or spiritual beliefs that impact treatment:: No  Mental health DX:: Yes  Mental health DX how managed:: Alternative Treatments  Mental health management concern (GOAL):: No  Informal Support system:: Children, Family, Friends     Financial/Insurance:   Financial/Insurance concerns (GOAL):: No  No concerns at this time.      Resources and Interventions:  Current Resources: RN CC mailed out to patient intro letter, access care plan and care coordination services brochure.  Supplies used at home:: None  Equipment Currently Used at Home: walker, standard, grab bar, toilet, grab bar, tub/shower, shower chair, tub bench    Advance Care Plan/Directive  Advanced Care Plans/Directives on file:: Yes  Type Advanced Care Plans/Directives: Advanced Directive - On File    Referrals Placed: None       Future Appointments              In 1 week [place] [facility]    In 1 week Dewey Karimi MD [facility]    In 2 months RUEDA TECH1 [facility], [facility]          Plan:   Care Coordinator RN sending update to [place] [facility] to let them know of antibiotic change.    No further Care Coordination needs identified at this time. Patient may be referred to Care Coordination in the future if additional needs arise.  Pt encouraged to contact Care Coordinator through the clinic if situation changes and assistance is needed. No follow-up planned.    Steffanie Louis RN, [facility]  RN Care Coordinator  [facility]  Phone # [phone]  1/28/2019 10:25 AM

## 2019-02-04 ENCOUNTER — ANTICOAGULATION THERAPY VISIT (OUTPATIENT)
Dept: ANTICOAGULATION | Facility: CLINIC | Age: 84
End: 2019-02-04
Payer: COMMERCIAL

## 2019-02-04 DIAGNOSIS — Z79.01 LONG TERM CURRENT USE OF ANTICOAGULANT THERAPY: ICD-10-CM

## 2019-02-04 DIAGNOSIS — I82.409 DVT (DEEP VENOUS THROMBOSIS) (H): ICD-10-CM

## 2019-02-04 LAB — INR POINT OF CARE: 1.4 (ref 0.86–1.14)

## 2019-02-04 PROCEDURE — 85610 PROTHROMBIN TIME: CPT | Mod: QW

## 2019-02-04 PROCEDURE — 99207 ZZC NO CHARGE NURSE ONLY: CPT

## 2019-02-04 PROCEDURE — 36416 COLLJ CAPILLARY BLOOD SPEC: CPT

## 2019-02-04 NOTE — PROGRESS NOTES
"ANTICOAGULATION FOLLOW-UP CLINIC VISIT    Patient Name:  Ellie Leigh  Date:  2019  Contact Type:  Face to Face accompanied by daughter    SUBJECTIVE:     Patient Findings     Positives:   Unexplained INR or factor level change    Comments:   Patient had 7mg in the previous 7 days, will adjust dose to 8mg by the next INR check on 19 (~7% increase). If INR is therapeutic at next visit, maintenance dose may need to be adjusted. Patient goal range 1.7-2.3. Patient accompanied by daughter, state patient was in to ER 19 and started on Cipro for a UTI. Per ER MD note: \"1529.  The patient has a urine analysis that is concerning for infection and her symptoms are consistent with acute cystitis.  The patient will be treated with ciprofloxacin.  I am recommending she reduce her dose of Coumadin by half while on the Cipro.\"  Patient and daughter state that they did not do this.   Patient outreach note on 19 states: \"Clinical Concerns:  Care Coordinator RN spoke with daughter Suyapa, she states patient is doing much better since her ER visit. She states she is scheduled for INR 19 which was already scheduled. Care Coordinator RN will update INR clinic of new antibiotic change. She denies having any questions or concerns regarding her discharge instructions.\"  This note was routed to patient's PCP and INR Point of Care Pool. Unclear who manages that pool; [facility] Anticoagulation pool did not receive that note.               OBJECTIVE    INR Protime   Date Value Ref Range Status   2019 1.4 (A) 0.86 - 1.14 Final       ASSESSMENT / PLAN  INR assessment SUB    Recheck INR In: 1 WEEK    INR Location Clinic      Anticoagulation Summary  As of 2019    INR goal:      TTR:   46.2 % (3.9 y)   Prior goal:   1.5-2.0   INR used for dosin.4! (2019)   Warfarin maintenance plan:   1.5 mg (1 mg x 1.5) every Mon; 1 mg (1 mg x 1) all other days   Full warfarin instructions:   : 2 mg; Otherwise " 1.5 mg every Mon; 1 mg all other days   Weekly warfarin total:   7.5 mg   Plan last modified:   Caroline Stewart RN (11/20/2018)   Next INR check:   2/11/2019   Priority:   INR   Target end date:   Indefinite    Indications    Atrial fibrillation with rapid ventricular response (H) (Resolved) [I48.91]  DVT (deep venous thrombosis) [I82.409]  Long term current use of anticoagulant therapy [Z79.01]             Anticoagulation Episode Summary     INR check location:       Preferred lab:       Send INR reminders to:   [facility]    Comments:   * Actual INR goal is 1.7-2.3  Taking Celebrex PRN         Anticoagulation Care Providers     Provider Role Specialty Phone number    Anjum Vidales MD [facility] [phone]            See the Encounter Report to view Anticoagulation Flowsheet and Dosing Calendar (Go to Encounters tab in chart review, and find the Anticoagulation Therapy Visit)        Gina Persaud RN

## 2019-02-06 ENCOUNTER — OFFICE VISIT (OUTPATIENT)
Dept: FAMILY MEDICINE | Facility: CLINIC | Age: 84
End: 2019-02-06
Payer: COMMERCIAL

## 2019-02-06 VITALS
TEMPERATURE: 97.2 F | WEIGHT: 122 LBS | BODY MASS INDEX: 25.61 KG/M2 | DIASTOLIC BLOOD PRESSURE: 66 MMHG | HEART RATE: 70 BPM | HEIGHT: 58 IN | OXYGEN SATURATION: 95 % | SYSTOLIC BLOOD PRESSURE: 120 MMHG

## 2019-02-06 DIAGNOSIS — R39.89 URINARY PROBLEM: ICD-10-CM

## 2019-02-06 DIAGNOSIS — R30.0 DYSURIA: Primary | ICD-10-CM

## 2019-02-06 LAB
ALBUMIN UR-MCNC: NEGATIVE MG/DL
APPEARANCE UR: CLEAR
BACTERIA #/AREA URNS HPF: ABNORMAL /HPF
BILIRUB UR QL STRIP: NEGATIVE
COLOR UR AUTO: YELLOW
GLUCOSE UR STRIP-MCNC: NEGATIVE MG/DL
HGB UR QL STRIP: ABNORMAL
KETONES UR STRIP-MCNC: NEGATIVE MG/DL
LEUKOCYTE ESTERASE UR QL STRIP: ABNORMAL
MUCOUS THREADS #/AREA URNS LPF: PRESENT /LPF
NITRATE UR QL: NEGATIVE
PH UR STRIP: 7.5 PH (ref 5–7)
RBC #/AREA URNS AUTO: ABNORMAL /HPF
SOURCE: ABNORMAL
SP GR UR STRIP: 1.01 (ref 1–1.03)
UROBILINOGEN UR STRIP-ACNC: 0.2 EU/DL (ref 0.2–1)
WBC #/AREA URNS AUTO: ABNORMAL /HPF

## 2019-02-06 PROCEDURE — 99213 OFFICE O/P EST LOW 20 MIN: CPT | Performed by: NURSE PRACTITIONER

## 2019-02-06 PROCEDURE — 81001 URINALYSIS AUTO W/SCOPE: CPT | Performed by: NURSE PRACTITIONER

## 2019-02-06 PROCEDURE — 87086 URINE CULTURE/COLONY COUNT: CPT | Performed by: NURSE PRACTITIONER

## 2019-02-06 RX ORDER — PHENAZOPYRIDINE HYDROCHLORIDE 100 MG/1
100 TABLET, FILM COATED ORAL 2 TIMES DAILY
Qty: 6 TABLET | Refills: 0 | Status: SHIPPED | OUTPATIENT
Start: 2019-02-06 | End: 2019-03-14

## 2019-02-06 ASSESSMENT — ANXIETY QUESTIONNAIRES
6. BECOMING EASILY ANNOYED OR IRRITABLE: NOT AT ALL
3. WORRYING TOO MUCH ABOUT DIFFERENT THINGS: NOT AT ALL
GAD7 TOTAL SCORE: 0
4. TROUBLE RELAXING: NOT AT ALL
7. FEELING AFRAID AS IF SOMETHING AWFUL MIGHT HAPPEN: NOT AT ALL
GAD7 TOTAL SCORE: 0
GAD7 TOTAL SCORE: 0
1. FEELING NERVOUS, ANXIOUS, OR ON EDGE: NOT AT ALL
7. FEELING AFRAID AS IF SOMETHING AWFUL MIGHT HAPPEN: NOT AT ALL
2. NOT BEING ABLE TO STOP OR CONTROL WORRYING: NOT AT ALL
5. BEING SO RESTLESS THAT IT IS HARD TO SIT STILL: NOT AT ALL

## 2019-02-06 ASSESSMENT — PATIENT HEALTH QUESTIONNAIRE - PHQ9
SUM OF ALL RESPONSES TO PHQ QUESTIONS 1-9: 3
10. IF YOU CHECKED OFF ANY PROBLEMS, HOW DIFFICULT HAVE THESE PROBLEMS MADE IT FOR YOU TO DO YOUR WORK, TAKE CARE OF THINGS AT HOME, OR GET ALONG WITH OTHER PEOPLE: SOMEWHAT DIFFICULT
SUM OF ALL RESPONSES TO PHQ QUESTIONS 1-9: 3

## 2019-02-06 ASSESSMENT — MIFFLIN-ST. JEOR: SCORE: 873.14

## 2019-02-06 NOTE — PROGRESS NOTES
"  SUBJECTIVE:   Ellie Leigh is a 88 year old female who presents to clinic today for the following health issues:    ENT Symptoms             Symptoms: cc Present Absent Comment   Fever/Chills       Fatigue       Muscle Aches       Eye Irritation       Sneezing       Nasal Oniel/Drg       Sinus Pressure/Pain       Loss of smell       Dental pain       Sore Throat       Swollen Glands       Ear Pain/Fullness       Cough       Wheeze       Chest Pain       Shortness of breath       Rash       Other         Symptom duration:  ***   Symptom severity:  ***   Treatments tried:  ***   Contacts:  ***         {additional problems for provider to add:586141}    Problem list and histories reviewed & adjusted, as indicated.  Additional history: {NONE - AS DOCUMENTED:647693::\"as documented\"}    {HIST REVIEW/ LINKS 2:942425}    Reviewed and updated as needed this visit by clinical staff       Reviewed and updated as needed this visit by Provider         {PROVIDER CHARTING PREFERENCE:534927}  "

## 2019-02-06 NOTE — PROGRESS NOTES
SUBJECTIVE:   Ellie Leigh is a 88 year old female who presents to clinic today for the following health issues:    URINARY TRACT SYMPTOMS  Onset: a couple days    Description:   Painful urination (Dysuria): YES  Blood in urine (Hematuria): no   Delay in urine (Hesitency): no     Intensity: moderate    Progression of Symptoms:  worsening    Accompanying Signs & Symptoms:  Fever/chills: no   Flank pain no   Nausea and vomiting: YES- nausea  Any vaginal symptoms: none  Abdominal/Pelvic Pain: YES    History:   History of frequent UTI's: YES  History of kidney stones: YES- years ago  Sexually Active: no   Possibility of pregnancy: No    Precipitating factors:   none    Therapies Tried and outcome: increased fluids, cranberry pills      Problem list and histories reviewed & adjusted, as indicated.  Additional history: as documented    Patient Active Problem List   Diagnosis     Sensorineural hearing loss, asymmetrical     Generalized osteoarthrosis, unspecified site     Disease of lung     PERSONAL HISTORY OF MALIGNANCY- BREAST     Esophageal reflux     Chronic allergic conjunctivitis     Chronic seasonal allergic rhinitis     Hyperlipidemia LDL goal <130     Chronic constipation     Osteoporosis     Health Care Home     Right arm weakness     Ulnar neuropathy     Headache     Mild major depression     DVT (deep venous thrombosis)     Anxiety     Cervical vertebral fracture (H)     Intermittent atrial fibrillation (H)     Squamous cell carcinoma in situ of skin of face     SK (seborrheic keratosis)     Hypothyroidism     Hyponatremia     Recurrent UTI     History of skin cancer     BPPV (benign paroxysmal positional vertigo)     Benign essential HTN     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)     Positive fecal occult blood test     COPD (chronic obstructive pulmonary disease) (H)     Infectious colitis, enteritis and gastroenteritis     Advance Care Planning     Syncope     Hemoptysis     Long term  current use of anticoagulant therapy     Mild persistent asthma without complication     Unresponsive episode     Irritable bowel syndrome without diarrhea     Elevated troponin     Nausea     Back pain     H/O TB (tuberculosis)     Chest pain     Community acquired pneumonia of right upper lobe of lung (H)     Past Surgical History:   Procedure Laterality Date     APPENDECTOMY       ARTHROSCOPY KNEE  4/15/2011    Procedure:ARTHROSCOPY KNEE; removal of loose body; Surgeon:LEY, JEFFREY DUANE; Location:[facility]     CHOLECYSTECTOMY       ESOPHAGOSCOPY, GASTROSCOPY, DUODENOSCOPY (EGD), COMBINED  6/9/2014    Procedure: COMBINED ESOPHAGOSCOPY, GASTROSCOPY, DUODENOSCOPY (EGD);  Surgeon: Gilberto Richard MD;  Location: [facility]     IMPLANT PACEMAKER  5/21/2013    Biotronik Moderl [id number] Serial#[id number]     INJECT EPIDURAL LUMBAR  3/23/2011    INJECT EPIDURAL LUMBAR performed by GENERIC ANESTHESIA PROVIDER at [facility] OR     JOINT REPLACEMENT, HIP RT/LT      Joint Replacement Hip Rt     MASTECTOMY, SIMPLE RT/LT/CAITLYN      Left breast - following breast ca     OTHER SURGICAL HISTORY      C1-C2 fusion after fx      SURGICAL HISTORY OF -   05/22/2001    Colonoscopy     TONSILLECTOMY         Social History     Tobacco Use     Smoking status: Passive Smoke Exposure - Never Smoker     Smokeless tobacco: Never Used   Substance Use Topics     Alcohol use: No     Family History   Problem Relation Age of Onset     Cerebrovascular Disease Mother      Heart Disease Mother         MI     Cerebrovascular Disease Father      Heart Disease Father         MI     Respiratory Maternal Grandfather         TB     Neurologic Disorder Brother         ALS     Heart Disease Brother      Cerebrovascular Disease Brother      Breast Cancer Daughter         age:49     Asthma Brother      Cancer Brother         brain and lung     Cancer Daughter         thyroid         Current Outpatient Medications   Medication Sig Dispense Refill     Acetaminophen (TYLENOL PO) Take   mg by mouth every 8 hours as needed        albuterol (PROAIR HFA/PROVENTIL HFA/VENTOLIN HFA) 108 (90 Base) MCG/ACT Inhaler Inhale 2 puffs into the lungs every 6 hours as needed for shortness of breath / dyspnea 1 Inhaler 11     albuterol (PROVENTIL) (2.5 MG/3ML) 0.083% neb solution Take 1 vial (2.5 mg) by nebulization 3 times daily 360 mL 6     CRANBERRY Take 475 mg by mouth 2 times daily.       fluticasone (FLONASE) 50 MCG/ACT spray Spray 2 sprays into both nostrils daily 1 Bottle 11     ipratropium - albuterol 0.5 mg/2.5 mg/3 mL (DUONEB) 0.5-2.5 (3) MG/3ML neb solution TAKE 1 VIAL BY NEBULIZATION  EVERY SIX HOURS AS NEEDED FOR SHORTNESS OF BREATH/ DYSPNEA OR WHEEZING 180 mL 11     levothyroxine (SYNTHROID/LEVOTHROID) 50 MCG tablet TAKE ONE TABLET BY MOUTH ONE TIME DAILY 90 tablet 0     metoprolol succinate (TOPROL-XL) 50 MG 24 hr tablet TAKE 1 TAB BY MOUTH TWICE A DAY FOR HTN 60 tablet 0     order for DME Equipment being ordered: Nebulizer 1 each 0     pantoprazole (PROTONIX) 40 MG EC tablet Take 1 tablet (40 mg) by mouth daily Take 30-60 minutes before a meal. 30 tablet 0     polyethylene glycol (MIRALAX/GLYCOLAX) Packet Take 17 g by mouth daily        probiotic CAPS Take 1 capsule by mouth every evening        ranitidine (ZANTAC) 150 MG tablet Take 1 tablet (150 mg) by mouth 2 times daily 60 tablet 11     sodium chloride (BRONCHO SALINE) 0.9 % areosol solution Inhale 5 mLs into the lungs 3 times daily Use after albuterol in nebulizer to thin secretions 480 mL 6     sodium chloride 1 GM tablet Take 1 tablet (1 g) by mouth 3 times daily 100 tablet 11     SYMBICORT 160-4.5 MCG/ACT Inhaler INHALE TWO PUFFS INTO THE LUNGS TWICE DAILY 10.2 g 3     warfarin (COUMADIN) 1 MG tablet 1.5 mg on Mon; 1 mg all other days or as directed by the Anticoagulation Clinic 96 tablet 3     Allergies   Allergen Reactions     Cephalosporins Nausea     Cefzil     Doxycycline Nausea and Vomiting     Sulfa Drugs Nausea      "Penicillins Rash     PCN     Recent Labs   Lab Test 10/30/18  1255 09/05/18  1159 06/27/18  1338  05/02/18  1556  04/14/18  0825  02/06/18  1310  07/28/14  0952  03/03/11  0931  01/06/11  0954   A1C  --   --   --   --   --   --   --   --   --   --  6.1*  --   --   --   --    LDL  --   --   --   --   --   --   --   --   --   --   --   --  91  --  148*   HDL  --   --   --   --   --   --   --   --   --   --   --   --  76  --  83   TRIG  --   --   --   --   --   --   --   --   --   --   --   --  66  --  65   ALT  --   --  12  --   --   --  13  --  22   < >  --    < > 22   < > 25   CR 0.53 0.53 0.62   < >  --    < > 0.50*   < > 0.31*   < >  --    < >  --    < > 0.65   GFRESTIMATED >90 >90 >90   < >  --    < > >90   < > >90   < >  --    < >  --    < > 88   GFRESTBLACK >90 >90 >90   < >  --    < > >90   < > >90   < >  --    < >  --    < > >90   POTASSIUM 4.2 4.4 4.7   < >  --    < > 4.3   < > 3.9   < >  --    < >  --    < > 4.8   TSH  --  2.95  --   --  1.70  --   --   --   --    < >  --    < >  --   --  3.50    < > = values in this interval not displayed.      BP Readings from Last 3 Encounters:   02/06/19 120/66   01/26/19 156/81   11/28/18 153/74    Wt Readings from Last 3 Encounters:   02/06/19 55.3 kg (122 lb)   01/26/19 55.3 kg (122 lb)   11/28/18 56.9 kg (125 lb 6.4 oz)              Reviewed and updated as needed this visit by clinical staff       Reviewed and updated as needed this visit by Provider         ROS:  Constitutional, HEENT, cardiovascular, pulmonary, gi and gu systems are negative, except as otherwise noted.    OBJECTIVE:     /66 (BP Location: Right arm, Cuff Size: Adult Regular)   Pulse 70   Temp 97.2  F (36.2  C) (Tympanic)   Ht 1.473 m (4' 10\")   Wt 55.3 kg (122 lb)   LMP 06/15/1985   SpO2 95%   BMI 25.50 kg/m     Body mass index is 25.5 kg/m .  GENERAL: healthy, alert and no distress  EYES: Eyes grossly normal to inspection, PERRL and conjunctivae and sclerae normal  NECK: no " adenopathy, no asymmetry, masses, or scars and thyroid normal to palpation  RESP: lungs clear to auscultation - no rales, rhonchi or wheezes  CV: regular rate and rhythm, normal S1 S2, no S3 or S4, no murmur, click or rub, no peripheral edema and peripheral pulses strong  ABDOMEN: soft, nontender, no hepatosplenomegaly, no masses and bowel sounds normal  : External genitalia no signs of yeast infection  MS: no gross musculoskeletal defects noted, no edema  SKIN: no suspicious lesions or rashes  NEURO: Normal strength and tone, mentation intact and speech normal  PSYCH: mentation appears normal, affect normal/bright    Results for orders placed or performed in visit on 02/06/19   *UA reflex to Microscopic and Culture ([place] and [facility] (except [place] and [place])   Result Value Ref Range    Color Urine Yellow     Appearance Urine Clear     Glucose Urine Negative NEG^Negative mg/dL    Bilirubin Urine Negative NEG^Negative    Ketones Urine Negative NEG^Negative mg/dL    Specific Gravity Urine 1.015 1.003 - 1.035    Blood Urine Trace (A) NEG^Negative    pH Urine 7.5 (H) 5.0 - 7.0 pH    Protein Albumin Urine Negative NEG^Negative mg/dL    Urobilinogen Urine 0.2 0.2 - 1.0 EU/dL    Nitrite Urine Negative NEG^Negative    Leukocyte Esterase Urine Trace (A) NEG^Negative    Source Midstream Urine    Urine Microscopic   Result Value Ref Range    WBC Urine 0 - 5 OTO5^0 - 5 /HPF    RBC Urine O - 2 OTO2^O - 2 /HPF    Bacteria Urine Few (A) NEG^Negative /HPF    Mucous Urine Present (A) NEG^Negative /LPF     *Note: Due to a large number of results and/or encounters for the requested time period, some results have not been displayed. A complete set of results can be found in Results Review.       ASSESSMENT/PLAN:   (R30.0) Dysuria  (primary encounter diagnosis)  Comment: Patient has symptoms of dysuria urine questionable for infection white blood cells negative will obtain urine culture before starting  treatment.  Plan: We will treat with a course of Pyridium    (R39.89) Urinary problem  Comment:   Plan: *UA reflex to Microscopic and Culture ([place]         and [facility] (except [place] and         [place]), Urine Microscopic, Urine Culture         Aerobic Bacterial, phenazopyridine (PYRIDIUM)         100 MG tablet      [facility]

## 2019-02-06 NOTE — PATIENT INSTRUCTIONS
"  Patient Education     Dysuria     Painful urination (dysuria) is often caused by a problem in the urinary tract.   Dysuria is pain felt during urination. It is often described as a burning. Learn more about this problem and how it can be treated.  What causes dysuria?  Possible causes include:    Infection with a bacteria or virus such as a urinary tract infection (UTI or a sexually transmitted infection (STI)    Sensitivity or allergy to chemicals such as those found in lotions and other products    Prostate or bladder problems    Radiation therapy to the pelvic area  How is dysuria diagnosed?  Your healthcare provider will examine you. He or she will ask about your symptoms and health. After talking with you and doing a physical exam, your healthcare provider may know what is causing your dysuria. He or she will usually request  a sample of your urine. Tests of your urine, or a \"urinalysis,\" are done. A urinalysis may include:    Looking at the urine sample (visual exam)    Checking for substances (chemical exam)    Looking at a small amount under a microscope (microscopic exam)  Some parts of the urinalysis may be done in the provider's office and some in a lab. And, the urine sample may be checked for bacteria and yeast (urine culture). Your healthcare provider will tell you more about these tests if they are needed.  How is dysuria treated?  Treatment depends on the cause. If you have a bacterial infection, you may need antibiotics. You may be given medicines to make it easier for you to urinate and help relieve pain. Your healthcare provider can tell you more about your treatment options. Untreated, symptoms may get worse.  When to call your healthcare provider  Call the healthcare provider right away if you have any of the following:    Fever of 100.4 F (38 C) or higher     No improvement after three days of treatment    Trouble urinating because of pain    New or increased discharge from the vagina or " penis    Rash or joint pain    Increased back or abdominal pain    Enlarged painful lymph nodes (lumps) in the groin   Date Last Reviewed: 1/1/2017 2000-2018 [employer]. [address] [place] [address], [place] [address]. All rights reserved. This information is not intended as a substitute for professional medical care. Always follow your healthcare professional's instructions.

## 2019-02-07 LAB
BACTERIA SPEC CULT: NORMAL
Lab: NORMAL
SPECIMEN SOURCE: NORMAL

## 2019-02-08 ASSESSMENT — ASTHMA QUESTIONNAIRES: ACT_TOTALSCORE: 19

## 2019-02-08 ASSESSMENT — ANXIETY QUESTIONNAIRES: GAD7 TOTAL SCORE: 0

## 2019-02-11 ENCOUNTER — ANTICOAGULATION THERAPY VISIT (OUTPATIENT)
Dept: ANTICOAGULATION | Facility: CLINIC | Age: 84
End: 2019-02-11
Payer: COMMERCIAL

## 2019-02-11 DIAGNOSIS — I82.409 DVT (DEEP VENOUS THROMBOSIS) (H): ICD-10-CM

## 2019-02-11 DIAGNOSIS — Z79.01 LONG TERM CURRENT USE OF ANTICOAGULANT THERAPY: ICD-10-CM

## 2019-02-11 LAB — INR POINT OF CARE: 1.6 (ref 0.86–1.14)

## 2019-02-11 PROCEDURE — 99207 ZZC NO CHARGE NURSE ONLY: CPT

## 2019-02-11 PROCEDURE — 85610 PROTHROMBIN TIME: CPT | Mod: QW

## 2019-02-11 PROCEDURE — 36416 COLLJ CAPILLARY BLOOD SPEC: CPT

## 2019-02-11 NOTE — PROGRESS NOTES
ANTICOAGULATION FOLLOW-UP CLINIC VISIT    Patient Name:  Ellie Leigh  Date:  2019  Contact Type:  Face to Face accompanied by daughter    SUBJECTIVE:     Patient Findings     Positives:   No Problem Findings    Comments:   Patient had 8mg in the previous 7 days, will adjust dose to keep weekly mg total the same by the next INR check on 19. Patient's daughter states she finished a course of antibiotics on Saturday for another UTI. Patient states she has no recurring UTI symptoms but has been feeling nauseated the past few days, no vomiting or diarrhea. Patient reports no concerns of bleeding or bruising or signs/symptoms of a blood clot. Patient to call [facility] with any changes or concerns. Patient in agreement to plan. Patient verbalized understanding of all instructions, denies questions or concerns at this time.              OBJECTIVE    INR Protime   Date Value Ref Range Status   2019 1.6 (A) 0.86 - 1.14 Final       ASSESSMENT / PLAN  INR assessment THER +/- 0.1 of goal range   Recheck INR In: 2 WEEKS    INR Location Clinic      Anticoagulation Summary  As of 2019    INR goal:      TTR:   46.2 % (3.9 y)   Prior goal:   1.5-2.0   INR used for dosin.6 (2019)   Warfarin maintenance plan:   1.5 mg (1 mg x 1.5) every Mon; 1 mg (1 mg x 1) all other days   Full warfarin instructions:   : 2 mg; : 2 mg; Otherwise 1.5 mg every Mon; 1 mg all other days   Weekly warfarin total:   7.5 mg   Plan last modified:   Caroline Stewart RN (2018)   Next INR check:   2019   Priority:   INR   Target end date:   Indefinite    Indications    Atrial fibrillation with rapid ventricular response (H) (Resolved) [I48.91]  DVT (deep venous thrombosis) [I82.409]  Long term current use of anticoagulant therapy [Z79.01]             Anticoagulation Episode Summary     INR check location:       Preferred lab:       Send INR reminders to:   [facility] [place]    Comments:   * Actual INR goal is  1.7-2.3  Taking Celebrex PRN         Anticoagulation Care Providers     Provider Role Specialty Phone number    Anjum Vidales MD [facility] [phone]            See the Encounter Report to view Anticoagulation Flowsheet and Dosing Calendar (Go to Encounters tab in chart review, and find the Anticoagulation Therapy Visit)        Gina Persaud RN

## 2019-02-25 ENCOUNTER — ANTICOAGULATION THERAPY VISIT (OUTPATIENT)
Dept: ANTICOAGULATION | Facility: CLINIC | Age: 84
End: 2019-02-25
Payer: COMMERCIAL

## 2019-02-25 DIAGNOSIS — Z79.01 LONG TERM CURRENT USE OF ANTICOAGULANT THERAPY: ICD-10-CM

## 2019-02-25 DIAGNOSIS — I82.409 DVT (DEEP VENOUS THROMBOSIS) (H): ICD-10-CM

## 2019-02-25 LAB — INR POINT OF CARE: 2.2 (ref 0.86–1.14)

## 2019-02-25 PROCEDURE — 85610 PROTHROMBIN TIME: CPT | Mod: QW

## 2019-02-25 PROCEDURE — 99207 ZZC NO CHARGE NURSE ONLY: CPT

## 2019-02-25 PROCEDURE — 36416 COLLJ CAPILLARY BLOOD SPEC: CPT

## 2019-02-25 NOTE — PROGRESS NOTES
ANTICOAGULATION FOLLOW-UP CLINIC VISIT    Patient Name:  Ellie Leigh  Date:  2019  Contact Type:  Face to Face with daughter present     SUBJECTIVE:     Patient Findings     Positives:   No Problem Findings    Comments:   Patient reports no changes in medication, activity, or diet. Patient reports has taken warfarin as instructed, no missed doses. Patient reports no abnormal bruising or bleeding and no signs or symptoms of a blood clot. Patient has had 8 mg in the last 7 days, INR is therapeutic today. Plan to adjust her maintenance dose to keep weekly mg total the same and recheck INR in 3 weeks. Patient to call [facility] with any changes or concerns. Patient in agreement to plan. Patient verbalized understanding of all instructions, denies questions or concerns at this time.              OBJECTIVE    INR Protime   Date Value Ref Range Status   2019 2.2 (A) 0.86 - 1.14 Final       ASSESSMENT / PLAN  INR assessment THER    Recheck INR In: 3 WEEKS    INR Location Clinic      Anticoagulation Summary  As of 2019    INR goal:      TTR:   46.4 % (3.9 y)   Prior goal:   1.5-2.0   INR used for dosin.2! (2019)   Warfarin maintenance plan:   2 mg (1 mg x 2) every Mon; 1 mg (1 mg x 1) all other days   Full warfarin instructions:   2 mg every Mon; 1 mg all other days   Weekly warfarin total:   8 mg   Plan last modified:   Gina Persaud RN (2019)   Next INR check:   3/18/2019   Priority:   INR   Target end date:   Indefinite    Indications    Atrial fibrillation with rapid ventricular response (H) (Resolved) [I48.91]  DVT (deep venous thrombosis) [I82.409]  Long term current use of anticoagulant therapy [Z79.01]             Anticoagulation Episode Summary     INR check location:       Preferred lab:       Send INR reminders to:   [facility]    Comments:   * Actual INR goal is 1.7-2.3  Taking Celebrex PRN         Anticoagulation Care Providers     Provider Role Specialty Phone  number    Glen, Anjum Nur MD [facility] [phone]            See the Encounter Report to view Anticoagulation Flowsheet and Dosing Calendar (Go to Encounters tab in chart review, and find the Anticoagulation Therapy Visit)        Gina Persaud RN

## 2019-02-27 ENCOUNTER — OFFICE VISIT (OUTPATIENT)
Dept: FAMILY MEDICINE | Facility: CLINIC | Age: 84
End: 2019-02-27
Payer: COMMERCIAL

## 2019-02-27 VITALS
OXYGEN SATURATION: 96 % | RESPIRATION RATE: 20 BRPM | BODY MASS INDEX: 25.19 KG/M2 | HEART RATE: 80 BPM | SYSTOLIC BLOOD PRESSURE: 120 MMHG | DIASTOLIC BLOOD PRESSURE: 62 MMHG | WEIGHT: 120 LBS | HEIGHT: 58 IN | TEMPERATURE: 97.9 F

## 2019-02-27 DIAGNOSIS — J06.9 UPPER RESPIRATORY TRACT INFECTION, UNSPECIFIED TYPE: ICD-10-CM

## 2019-02-27 DIAGNOSIS — N39.0 URINARY TRACT INFECTION WITHOUT HEMATURIA, SITE UNSPECIFIED: Primary | ICD-10-CM

## 2019-02-27 DIAGNOSIS — J44.1 COPD EXACERBATION (H): ICD-10-CM

## 2019-02-27 DIAGNOSIS — J44.9 CHRONIC OBSTRUCTIVE PULMONARY DISEASE, UNSPECIFIED COPD TYPE (H): ICD-10-CM

## 2019-02-27 PROBLEM — J18.9 COMMUNITY ACQUIRED PNEUMONIA OF RIGHT UPPER LOBE OF LUNG: Status: RESOLVED | Noted: 2018-04-14 | Resolved: 2019-02-27

## 2019-02-27 LAB
ALBUMIN UR-MCNC: ABNORMAL MG/DL
APPEARANCE UR: ABNORMAL
BACTERIA #/AREA URNS HPF: ABNORMAL /HPF
BILIRUB UR QL STRIP: NEGATIVE
COLOR UR AUTO: YELLOW
GLUCOSE UR STRIP-MCNC: NEGATIVE MG/DL
HGB UR QL STRIP: ABNORMAL
KETONES UR STRIP-MCNC: NEGATIVE MG/DL
LEUKOCYTE ESTERASE UR QL STRIP: ABNORMAL
NITRATE UR QL: POSITIVE
NON-SQ EPI CELLS #/AREA URNS LPF: ABNORMAL /LPF
PH UR STRIP: 7 PH (ref 5–7)
RBC #/AREA URNS AUTO: ABNORMAL /HPF
SOURCE: ABNORMAL
SP GR UR STRIP: 1.01 (ref 1–1.03)
UROBILINOGEN UR STRIP-ACNC: 0.2 EU/DL (ref 0.2–1)
WBC #/AREA URNS AUTO: >100 /HPF
WBC CLUMPS #/AREA URNS HPF: PRESENT /HPF

## 2019-02-27 PROCEDURE — 87088 URINE BACTERIA CULTURE: CPT | Performed by: FAMILY MEDICINE

## 2019-02-27 PROCEDURE — 87186 SC STD MICRODIL/AGAR DIL: CPT | Performed by: FAMILY MEDICINE

## 2019-02-27 PROCEDURE — 87086 URINE CULTURE/COLONY COUNT: CPT | Performed by: FAMILY MEDICINE

## 2019-02-27 PROCEDURE — 99214 OFFICE O/P EST MOD 30 MIN: CPT | Performed by: FAMILY MEDICINE

## 2019-02-27 PROCEDURE — 81001 URINALYSIS AUTO W/SCOPE: CPT | Performed by: FAMILY MEDICINE

## 2019-02-27 RX ORDER — NITROFURANTOIN 25; 75 MG/1; MG/1
100 CAPSULE ORAL 2 TIMES DAILY
Qty: 14 CAPSULE | Refills: 0 | Status: SHIPPED | OUTPATIENT
Start: 2019-02-27 | End: 2019-03-14

## 2019-02-27 RX ORDER — PREDNISONE 20 MG/1
TABLET ORAL
Qty: 16 TABLET | Refills: 0 | Status: SHIPPED | OUTPATIENT
Start: 2019-02-27 | End: 2019-03-14

## 2019-02-27 ASSESSMENT — MIFFLIN-ST. JEOR: SCORE: 864.07

## 2019-02-27 NOTE — NURSING NOTE
"Chief Complaint   Patient presents with     Incontinence     Cough       Initial /62   Pulse 80   Temp 97.9  F (36.6  C) (Tympanic)   Resp 20   Ht 1.473 m (4' 10\")   Wt 54.4 kg (120 lb)   LMP 06/15/1985   SpO2 96%   BMI 25.08 kg/m   Estimated body mass index is 25.08 kg/m  as calculated from the following:    Height as of this encounter: 1.473 m (4' 10\").    Weight as of this encounter: 54.4 kg (120 lb).    Patient presents to the clinic using [employer] Maintenance that is potentially due pending provider review:          Is there anyone who you would like to be able to receive your results?   If yes have patient fill out JOSUE    "

## 2019-02-27 NOTE — PATIENT INSTRUCTIONS
ASSESSMENT:     ICD-10-CM    1. Urinary tract infection without hematuria, site unspecified N39.0 nitroFURantoin macrocrystal-monohydrate (MACROBID) 100 MG capsule   2. Upper respiratory tract infection, unspecified type J06.9    3. COPD exacerbation (H) J44.1 predniSONE (DELTASONE) 20 MG tablet   4. Chronic obstructive pulmonary disease, unspecified COPD type (H) J44.9       Urine culture report will be back in 2 days.   Take Macrobid 100mg twice daily for 7 days for urinary tract infection.   If urine culture is abnormal, I recommend repeating urine test in 10-14 days.    Increase the Duonebs to four times daily for breathing.  Continue the Symbicort 2 inhalations twice daily.   Take prednisone 20mg 2 pills daily for 6 days, then 1 pill daily for another 4 days.   Recheck if fever or breathing getting worse.

## 2019-02-27 NOTE — PROGRESS NOTES
SUBJECTIVE:   Ellie Leigh is a 88 year old female who presents to clinic today for the following health issues:  Chief Complaint   Patient presents with     Incontinence     Cough      URINARY TRACT SYMPTOMS      Duration: On going    Description  frequency    Intensity:  mild, moderate, severe    Accompanying signs and symptoms:  Fever/chills: no   Flank pain YES  Nausea and vomiting: YES  Vaginal symptoms: none  Abdominal/Pelvic Pain: YES    History  History of frequent UTI's: no   History of kidney stones: YES years ago  Sexually Active: no   Possibility of pregnancy: No    Precipitating or alleviating factors: None    Therapies tried and outcome: none     Early January treated for UTI.  Seen in ED on 1/26 for urinary symptoms.  She had 8 wbc.  Treated with Cipro.  Urine culture was negative.      Seen on 2/6 for urinary symptoms.  Urine normal.   Bladder symptoms for two months.  She has noted urinary frequency and sometimes burning. Seems like they have continued even with treatment.     RESPIRATORY SYMPTOMS      Duration: several days    Description  nasal congestion, rhinorrhea, cough, fever, headache, fatigue/malaise, SOB,myalgias and nausea    Severity: moderate    Accompanying signs and symptoms: see above    History (predisposing factors):  COPD    Precipitating or alleviating factors: walking makes it worse    Therapies tried and outcome:  Inhalers and neb helps some  Some breathing problems worse in the past couple days.  Coughing up green phlegm.  Feeling more shortness of breath.  Can walk only a few steps.    No fever.   She has been doing Duonebs three times daily.   On Symbicort 2 inhalations twice daily.     Patient Active Problem List   Diagnosis     Sensorineural hearing loss, asymmetrical     Generalized osteoarthrosis, unspecified site     Disease of lung     PERSONAL HISTORY OF MALIGNANCY- BREAST     Esophageal reflux     Chronic allergic conjunctivitis     Chronic seasonal allergic  "rhinitis     Hyperlipidemia LDL goal <130     Chronic constipation     Osteoporosis     Health Care Home     Right arm weakness     Ulnar neuropathy     Headache     Mild major depression     DVT (deep venous thrombosis)     Anxiety     Cervical vertebral fracture (H)     Intermittent atrial fibrillation (H)     Squamous cell carcinoma in situ of skin of face     SK (seborrheic keratosis)     Hypothyroidism     Hyponatremia     Recurrent UTI     History of skin cancer     BPPV (benign paroxysmal positional vertigo)     Benign essential HTN     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)     Positive fecal occult blood test     COPD (chronic obstructive pulmonary disease) (H)     Infectious colitis, enteritis and gastroenteritis     Advance Care Planning     Syncope     Hemoptysis     Long term current use of anticoagulant therapy     Mild persistent asthma without complication     Unresponsive episode     Irritable bowel syndrome without diarrhea     Elevated troponin     Nausea     Back pain     H/O TB (tuberculosis)     Chest pain     Community acquired pneumonia of right upper lobe of lung (H)      OBJECTIVE:Blood pressure 120/62, pulse 80, temperature 97.9  F (36.6  C), temperature source Tympanic, resp. rate 20, height 1.473 m (4' 10\"), weight 54.4 kg (120 lb), last menstrual period 06/15/1985, SpO2 96 %, not currently breastfeeding. BMI=Body mass index is 25.08 kg/m .  GENERAL APPEARANCE ADULT: Alert, no acute distress, walks with a walker  NECK: No adenopathy,masses or thyromegaly  RESP: lungs mostly clear to auscultation scant wheezy rhonchi  CV: normal rate, regular rhythm, no murmur or gallop  ABDOMEN: soft, no organomegaly, masses or tenderness  MS: extremities normal, no peripheral edema   UA:WBC>100    ASSESSMENT:     ICD-10-CM    1. Urinary tract infection without hematuria, site unspecified N39.0 nitroFURantoin macrocrystal-monohydrate (MACROBID) 100 MG capsule   2. Upper respiratory tract " infection, unspecified type J06.9    3. COPD exacerbation (H) J44.1 predniSONE (DELTASONE) 20 MG tablet   4. Chronic obstructive pulmonary disease, unspecified COPD type (H) J44.9       Urine culture report will be back in 2 days.   Take Macrobid 100mg twice daily for 7 days for urinary tract infection.   If urine culture is abnormal, I recommend repeating urine test in 10-14 days.    Increase the Duonebs to four times daily for breathing.  Continue the Symbicort 2 inhalations twice daily.   Take prednisone 20mg 2 pills daily for 6 days, then 1 pill daily for another 4 days.   Recheck if fever or breathing getting worse.

## 2019-03-01 LAB
BACTERIA SPEC CULT: ABNORMAL
Lab: ABNORMAL
SPECIMEN SOURCE: ABNORMAL

## 2019-03-03 NOTE — RESULT ENCOUNTER NOTE
Please order future UA reflex.   Hi,   Urine culture shows a urinary tract infection.  PLAN:Continue antibiotic, Macrobid.   Recheck a urinalysis a few days after completing the antibiotic to make sure infection has resolved.  This can be a loab only appointment.   MARCY DAUGHERTY MD

## 2019-03-04 DIAGNOSIS — N39.0 RECURRENT UTI: Primary | Chronic | ICD-10-CM

## 2019-03-12 DIAGNOSIS — N39.0 RECURRENT UTI: Chronic | ICD-10-CM

## 2019-03-12 LAB
ALBUMIN UR-MCNC: NEGATIVE MG/DL
APPEARANCE UR: CLEAR
BILIRUB UR QL STRIP: NEGATIVE
COLOR UR AUTO: YELLOW
GLUCOSE UR STRIP-MCNC: NEGATIVE MG/DL
HGB UR QL STRIP: ABNORMAL
KETONES UR STRIP-MCNC: NEGATIVE MG/DL
LEUKOCYTE ESTERASE UR QL STRIP: NEGATIVE
NITRATE UR QL: NEGATIVE
PH UR STRIP: 7 PH (ref 5–7)
RBC #/AREA URNS AUTO: NORMAL /HPF
SOURCE: ABNORMAL
SP GR UR STRIP: 1.01 (ref 1–1.03)
UROBILINOGEN UR STRIP-ACNC: 0.2 EU/DL (ref 0.2–1)
WBC #/AREA URNS AUTO: NORMAL /HPF

## 2019-03-12 PROCEDURE — 81001 URINALYSIS AUTO W/SCOPE: CPT | Performed by: FAMILY MEDICINE

## 2019-03-14 ENCOUNTER — OFFICE VISIT (OUTPATIENT)
Dept: FAMILY MEDICINE | Facility: CLINIC | Age: 84
End: 2019-03-14
Payer: COMMERCIAL

## 2019-03-14 VITALS
SYSTOLIC BLOOD PRESSURE: 120 MMHG | BODY MASS INDEX: 25.73 KG/M2 | OXYGEN SATURATION: 93 % | HEIGHT: 58 IN | TEMPERATURE: 98 F | WEIGHT: 122.6 LBS | HEART RATE: 70 BPM | DIASTOLIC BLOOD PRESSURE: 70 MMHG

## 2019-03-14 DIAGNOSIS — K21.9 GASTROESOPHAGEAL REFLUX DISEASE, ESOPHAGITIS PRESENCE NOT SPECIFIED: Primary | ICD-10-CM

## 2019-03-14 PROCEDURE — 99213 OFFICE O/P EST LOW 20 MIN: CPT | Performed by: NURSE PRACTITIONER

## 2019-03-14 RX ORDER — PANTOPRAZOLE SODIUM 40 MG/1
40 TABLET, DELAYED RELEASE ORAL DAILY
Qty: 30 TABLET | Refills: 0 | Status: SHIPPED | OUTPATIENT
Start: 2019-03-14 | End: 2019-04-30

## 2019-03-14 ASSESSMENT — MIFFLIN-ST. JEOR: SCORE: 875.86

## 2019-03-14 NOTE — PATIENT INSTRUCTIONS
1.  Take protonix daily for 30 days.  2.  Use Zantac as needed during this time and then restart Zantac 150 mg twice daily after protonix is completed.  3.  Follow-up in clinic as needed.    Patient Education     Lifestyle Changes for Controlling GERD  When you have GERD, stomach acid feels as if it s backing up toward your mouth. Whether or not you take medicine to control your GERD, your symptoms can often be improved with lifestyle changes. Talk to your healthcare provider about the following suggestions. These suggestions may help you get relief from your symptoms.      Raise your head  Reflux is more likely to strike when you re lying down flat, because stomach fluid can flow backward more easily. Raising the head of your bed 4 to 6 inches can help. To do this:    Slide blocks or books under the legs at the head of your bed. Or, place a wedge under the mattress. Many [employer] can make a suitable wedge for you. The wedge should run from your waist to the top of your head.    Don t just prop your head on several pillows. This increases pressure on your stomach. It can make GERD worse.  Watch your eating habits  Certain foods may increase the acid in your stomach or relax the lower esophageal sphincter. This makes GERD more likely. It s best to avoid the following if they cause you symptoms:    Coffee, tea, and carbonated drinks (with and without caffeine)    Fatty, fried, or spicy food    Mint, chocolate, onions, and tomatoes    Peppermint    Any other foods that seem to irritate your stomach or cause you pain  Relieve the pressure  Tips include the following:    Eat smaller meals, even if you have to eat more often.    Don t lie down right after you eat. Wait a few hours for your stomach to empty.    Avoid tight belts and tight-fitting clothes.    Lose excess weight.  Tobacco and alcohol  Avoid smoking tobacco and drinking alcohol. They can make GERD symptoms worse.  Date Last Reviewed: 7/1/2016 2000-2018  [employer]. [address] [place] [address], [place] [address]. All rights reserved. This information is not intended as a substitute for professional medical care. Always follow your healthcare professional's instructions.

## 2019-03-18 ENCOUNTER — ANTICOAGULATION THERAPY VISIT (OUTPATIENT)
Dept: ANTICOAGULATION | Facility: CLINIC | Age: 84
End: 2019-03-18
Payer: COMMERCIAL

## 2019-03-18 DIAGNOSIS — I82.409 DVT (DEEP VENOUS THROMBOSIS) (H): ICD-10-CM

## 2019-03-18 DIAGNOSIS — Z79.01 LONG TERM CURRENT USE OF ANTICOAGULANT THERAPY: ICD-10-CM

## 2019-03-18 LAB — INR POINT OF CARE: 1.9 (ref 0.86–1.14)

## 2019-03-18 PROCEDURE — 36416 COLLJ CAPILLARY BLOOD SPEC: CPT

## 2019-03-18 PROCEDURE — 99207 ZZC NO CHARGE NURSE ONLY: CPT

## 2019-03-18 PROCEDURE — 85610 PROTHROMBIN TIME: CPT | Mod: QW

## 2019-03-18 NOTE — PROGRESS NOTES
ANTICOAGULATION FOLLOW-UP CLINIC VISIT    Patient Name:  Ellie Leigh  Date:  3/18/2019  Contact Type:  Face to Face with daughter    SUBJECTIVE:     Patient Findings     Positives:   Change in medications (Protonix x 1 month)    Comments:   Patient accompanied by daughter. Patient reports no changes in activity or diet. She reports she has started Protonix for one month, and since then has days where she feels nauseated, but has not made any changes to her diet. Patient reports has taken warfarin as instructed, no missed doses. Patient reports no abnormal bruising or bleeding and no signs or symptoms of a blood clot. INR is therapeutic, will plan to continue maintenance dose and recheck INR in 4 weeks. Patient to call [facility] with any changes or concerns. Patient in agreement to plan. Patient verbalized understanding of all instructions, denies questions or concerns at this time.                OBJECTIVE    INR Protime   Date Value Ref Range Status   2019 1.9 (A) 0.86 - 1.14 Final       ASSESSMENT / PLAN  INR assessment THER    Recheck INR In: 4 WEEKS    INR Location Clinic      Anticoagulation Summary  As of 3/18/2019    INR goal:      TTR:   46.2 % (4 y)   Prior goal:   1.5-2.0   INR used for dosin.9 (3/18/2019)   Warfarin maintenance plan:   2 mg (1 mg x 2) every Mon; 1 mg (1 mg x 1) all other days   Full warfarin instructions:   2 mg every Mon; 1 mg all other days   Weekly warfarin total:   8 mg   No change documented:   Gina Persaud, RN   Plan last modified:   Gina Persaud RN (2019)   Next INR check:   4/15/2019   Priority:   INR   Target end date:   Indefinite    Indications    Atrial fibrillation with rapid ventricular response (H) (Resolved) [I48.91]  DVT (deep venous thrombosis) [I82.409]  Long term current use of anticoagulant therapy [Z79.01]             Anticoagulation Episode Summary     INR check location:       Preferred lab:       Send INR reminders to:   [facility]    Comments:   * Actual INR goal is 1.7-2.3  Taking Celebrex PRN         Anticoagulation Care Providers     Provider Role Specialty Phone number    Anjum Vidales MD [facility] [phone]            See the Encounter Report to view Anticoagulation Flowsheet and Dosing Calendar (Go to Encounters tab in chart review, and find the Anticoagulation Therapy Visit)      Gina Persaud RN

## 2019-03-25 ENCOUNTER — TELEPHONE (OUTPATIENT)
Dept: FAMILY MEDICINE | Facility: CLINIC | Age: 84
End: 2019-03-25

## 2019-03-25 ENCOUNTER — OFFICE VISIT (OUTPATIENT)
Dept: FAMILY MEDICINE | Facility: CLINIC | Age: 84
End: 2019-03-25
Payer: COMMERCIAL

## 2019-03-25 VITALS
HEART RATE: 80 BPM | TEMPERATURE: 97.7 F | HEIGHT: 58 IN | OXYGEN SATURATION: 95 % | SYSTOLIC BLOOD PRESSURE: 138 MMHG | BODY MASS INDEX: 25.4 KG/M2 | RESPIRATION RATE: 14 BRPM | WEIGHT: 121 LBS | DIASTOLIC BLOOD PRESSURE: 81 MMHG

## 2019-03-25 DIAGNOSIS — N30.00 ACUTE CYSTITIS WITHOUT HEMATURIA: Primary | ICD-10-CM

## 2019-03-25 DIAGNOSIS — N39.0 RECURRENT UTI: ICD-10-CM

## 2019-03-25 DIAGNOSIS — I48.0 INTERMITTENT ATRIAL FIBRILLATION (H): ICD-10-CM

## 2019-03-25 LAB
ALBUMIN UR-MCNC: ABNORMAL MG/DL
APPEARANCE UR: ABNORMAL
BACTERIA #/AREA URNS HPF: ABNORMAL /HPF
BILIRUB UR QL STRIP: NEGATIVE
COLOR UR AUTO: YELLOW
GLUCOSE UR STRIP-MCNC: NEGATIVE MG/DL
HGB UR QL STRIP: ABNORMAL
KETONES UR STRIP-MCNC: NEGATIVE MG/DL
LEUKOCYTE ESTERASE UR QL STRIP: ABNORMAL
NITRATE UR QL: POSITIVE
PH UR STRIP: 6 PH (ref 5–7)
RBC #/AREA URNS AUTO: ABNORMAL /HPF
SOURCE: ABNORMAL
SP GR UR STRIP: 1.02 (ref 1–1.03)
UROBILINOGEN UR STRIP-ACNC: 0.2 EU/DL (ref 0.2–1)
WBC #/AREA URNS AUTO: ABNORMAL /HPF

## 2019-03-25 PROCEDURE — 99214 OFFICE O/P EST MOD 30 MIN: CPT | Performed by: PHYSICIAN ASSISTANT

## 2019-03-25 PROCEDURE — 87086 URINE CULTURE/COLONY COUNT: CPT | Performed by: PHYSICIAN ASSISTANT

## 2019-03-25 PROCEDURE — 87186 SC STD MICRODIL/AGAR DIL: CPT | Performed by: PHYSICIAN ASSISTANT

## 2019-03-25 PROCEDURE — 81001 URINALYSIS AUTO W/SCOPE: CPT | Performed by: PHYSICIAN ASSISTANT

## 2019-03-25 PROCEDURE — 87088 URINE BACTERIA CULTURE: CPT | Performed by: PHYSICIAN ASSISTANT

## 2019-03-25 RX ORDER — CEPHALEXIN 500 MG/1
500 CAPSULE ORAL 2 TIMES DAILY
Qty: 14 CAPSULE | Refills: 0 | Status: SHIPPED | OUTPATIENT
Start: 2019-03-25 | End: 2019-03-27 | Stop reason: ALTCHOICE

## 2019-03-25 ASSESSMENT — MIFFLIN-ST. JEOR: SCORE: 868.6

## 2019-03-25 NOTE — NURSING NOTE
"Chief Complaint   Patient presents with     UTI       Initial /81 (BP Location: Right arm, Patient Position: Chair, Cuff Size: Adult Regular)   Pulse 80   Temp 97.7  F (36.5  C) (Tympanic)   Resp 14   Ht 1.473 m (4' 10\")   Wt 54.9 kg (121 lb)   LMP 06/15/1985   BMI 25.29 kg/m   Estimated body mass index is 25.29 kg/m  as calculated from the following:    Height as of this encounter: 1.473 m (4' 10\").    Weight as of this encounter: 54.9 kg (121 lb).    Patient presents to the clinic using No DME    Health Maintenance that is potentially due pending provider review:  NONE        Is there anyone who you would like to be able to receive your results? No  If yes have patient fill out JOSUE      "

## 2019-03-25 NOTE — PROGRESS NOTES
"  SUBJECTIVE:   Ellie Leigh is a 88 year old female who presents to clinic today for the following health issues:      URINARY TRACT SYMPTOMS      Duration: Started 3/23/19    Description  dysuria, frequency and odor    Intensity:  moderate    Accompanying signs and symptoms:  Fever/chills: no   Flank pain no   Nausea and vomiting: no   Vaginal symptoms: none  Abdominal/Pelvic Pain: YES    History  History of frequent UTI's: YES  History of kidney stones: no   Sexually Active: no   Possibility of pregnancy: No    Precipitating or alleviating factors: None    Therapies tried and outcome: increase fluid intake and cranberry pills  5 visits for UTI since Jan - 2 in outside system EDs, unclear if cultures were done  Symptoms did fully clear after last antibiotics.  UA looked ok after.  Symptoms have returned.  Tries to drink fluids - pees twice in day, but 5-6 x per night  Constipation does ok with daily miralax  Long term urinary incontinence, no stool incontinence    Problem list and histories reviewed & adjusted, as indicated.  Additional history: as documented    BP Readings from Last 3 Encounters:   03/25/19 138/81   03/14/19 120/70   02/27/19 120/62    Wt Readings from Last 3 Encounters:   03/25/19 54.9 kg (121 lb)   03/14/19 55.6 kg (122 lb 9.6 oz)   02/27/19 54.4 kg (120 lb)           Labs reviewed in EPIC    Reviewed and updated as needed this visit by clinical staff  Tobacco  Allergies  Meds  Problems  Med Hx  Surg Hx  Fam Hx  Soc Hx        Reviewed and updated as needed this visit by Provider  Tobacco  Allergies  Meds  Problems  Med Hx  Surg Hx  Fam Hx  Soc Hx          ROS:  Constitutional, GI, , systems are negative, except as otherwise noted.    OBJECTIVE:     /81 (BP Location: Right arm, Patient Position: Chair, Cuff Size: Adult Regular)   Pulse 80   Temp 97.7  F (36.5  C) (Tympanic)   Resp 14   Ht 1.473 m (4' 10\")   Wt 54.9 kg (121 lb)   LMP 06/15/1985   SpO2 95%   BMI " 25.29 kg/m    Body mass index is 25.29 kg/m .  GENERAL: healthy, alert and no distress    See UA    ASSESSMENT/PLAN:       ICD-10-CM    1. Acute cystitis without hematuria N30.00 UA reflex to Microscopic and Culture     Urine Microscopic     Urine Culture Aerobic Bacterial     sulfamethoxazole-trimethoprim (BACTRIM DS/SEPTRA DS) 800-160 MG tablet   2. Recurrent UTI N39.0 UROLOGY ADULT REFERRAL     **UA reflex to Microscopic FUTURE anytime     Urine Culture Aerobic Bacterial     DISCONTINUED: cephALEXin (KEFLEX) 500 MG capsule   3. Intermittent atrial fibrillation (H) I48.0 Impacting med choices/coumadin interaction   med changed to bactrim after UC results - all med options either resistance or coumadin interaction - note sent to anticoag team to monitor.      Patient Instructions   Antibiotics to treat for bladder infection  Prefer you take 7 days, but at least 5 days of medication  Do a urine test a few days after antibiotics to be sure infection gone  See urology since having so much trouble with bladder infections lately      Be seen if worse symptoms, weak, dehydrated, confused, new pain or bad nausea    Call if questions        Yanci Houston PA-C  [facility]

## 2019-03-25 NOTE — TELEPHONE ENCOUNTER
Reason for call:  Patient reporting a symptom    Symptom or request: Pt has had Bladder so often. Pt feels she has another one.  Pt has burning with frequency in urination. She does not feel good at all. This started 3/24/19. Pt said Dr. Vidales was going to recommend her to go to a Urologist. Pt is wondering what her next step it. Pt said she does need to be treated at this time.                          Have you been treated for this before? Yes        Phone Number patient can be reached at:  Home number on file [phone] (home)    Best Time:  Any Time      Can we leave a detailed message on this number:  YES    Call taken on 3/25/2019 at 8:18 AM by Luz Maria Liang

## 2019-03-25 NOTE — TELEPHONE ENCOUNTER
S-(situation): patient is calling with C/O urinary frequency and burning.  No fever    B-(background): history frequent UTI, 1/26/19 (Cipro), 2/6/19 (negative), 2/27/19 (Macrobid).      A-(assessment): urinary frequency and burning    R-(recommendations): appointment made for today.  Celena Laurent RN

## 2019-03-25 NOTE — TELEPHONE ENCOUNTER
Reason for call:  Patient reporting a symptom    Symptom or request:Pt calling. Pt is having burning and urgency when urinating. She feels terrible.    Duration (how long have symptoms been present): Started yesterday    Have you been treated for this before? Yes    Additional comments: Pt wants to know if she can bring in a urine sample or if she needs to be seen.    Phone Number patient can be reached at:  Home number on file [phone] (home) - Nelda Argueta [phone]    Best Time:  any    Can we leave a detailed message on this number:      Call taken on 3/25/2019 at 8:11 AM by Juanita Alcantar

## 2019-03-25 NOTE — PATIENT INSTRUCTIONS
Antibiotics to treat for bladder infection  Prefer you take 7 days, but at least 5 days of medication  Do a urine test a few days after antibiotics to be sure infection gone  See urology since having so much trouble with bladder infections lately      Be seen if worse symptoms, weak, dehydrated, confused, new pain or bad nausea    Call if questions

## 2019-03-26 LAB
BACTERIA SPEC CULT: ABNORMAL
Lab: ABNORMAL
SPECIMEN SOURCE: ABNORMAL

## 2019-03-27 RX ORDER — SULFAMETHOXAZOLE/TRIMETHOPRIM 800-160 MG
1 TABLET ORAL 2 TIMES DAILY
Qty: 10 TABLET | Refills: 0 | Status: SHIPPED | OUTPATIENT
Start: 2019-03-27 | End: 2019-04-30

## 2019-04-01 DIAGNOSIS — J45.30 MILD PERSISTENT ASTHMA WITHOUT COMPLICATION: ICD-10-CM

## 2019-04-01 DIAGNOSIS — E03.9 HYPOTHYROIDISM, UNSPECIFIED TYPE: Chronic | ICD-10-CM

## 2019-04-01 RX ORDER — LEVOTHYROXINE SODIUM 50 UG/1
TABLET ORAL
Qty: 90 TABLET | Refills: 1 | Status: SHIPPED | OUTPATIENT
Start: 2019-04-01 | End: 2019-09-27

## 2019-04-01 RX ORDER — BUDESONIDE AND FORMOTEROL FUMARATE DIHYDRATE 160; 4.5 UG/1; UG/1
AEROSOL RESPIRATORY (INHALATION)
Qty: 10.2 G | Refills: 2 | Status: SHIPPED | OUTPATIENT
Start: 2019-04-01 | End: 2019-07-03

## 2019-04-01 NOTE — TELEPHONE ENCOUNTER
"Requested Prescriptions   Pending Prescriptions Disp Refills     levothyroxine (SYNTHROID/LEVOTHROID) 50 MCG tablet [Pharmacy Med Name: Levothyroxine Sodium Oral Tablet 50 MCG] 90 tablet 0     Sig: TAKE ONE TABLET BY MOUTH ONE TIME DAILY    Thyroid Protocol Passed - 4/1/2019  9:04 AM       Passed - Patient is 12 years or older       Passed - Recent (12 mo) or future (30 days) visit within the authorizing provider's specialty    Patient had office visit in the last 12 months or has a visit in the next 30 days with authorizing provider or within the authorizing provider's specialty.  See \"Patient Info\" tab in inbasket, or \"Choose Columns\" in Meds & Orders section of the refill encounter.             Passed - Medication is active on med list       Passed - Normal TSH on file in past 12 months    Recent Labs   Lab Test 09/05/18  1159   TSH 2.95             Passed - No active pregnancy on record    If patient is pregnant or has had a positive pregnancy test, please check TSH.         Passed - No positive pregnancy test in past 12 months    If patient is pregnant or has had a positive pregnancy test, please check TSH.            "

## 2019-04-08 DIAGNOSIS — N39.0 RECURRENT UTI: ICD-10-CM

## 2019-04-08 LAB
ALBUMIN UR-MCNC: NEGATIVE MG/DL
APPEARANCE UR: CLEAR
BILIRUB UR QL STRIP: NEGATIVE
COLOR UR AUTO: YELLOW
GLUCOSE UR STRIP-MCNC: NEGATIVE MG/DL
HGB UR QL STRIP: NEGATIVE
KETONES UR STRIP-MCNC: NEGATIVE MG/DL
LEUKOCYTE ESTERASE UR QL STRIP: NEGATIVE
NITRATE UR QL: NEGATIVE
PH UR STRIP: 7 PH (ref 5–7)
SOURCE: NORMAL
SP GR UR STRIP: 1.02 (ref 1–1.03)
UROBILINOGEN UR STRIP-ACNC: 0.2 EU/DL (ref 0.2–1)

## 2019-04-08 PROCEDURE — 81003 URINALYSIS AUTO W/O SCOPE: CPT | Performed by: PHYSICIAN ASSISTANT

## 2019-04-08 PROCEDURE — 87086 URINE CULTURE/COLONY COUNT: CPT | Performed by: PHYSICIAN ASSISTANT

## 2019-04-09 LAB
BACTERIA SPEC CULT: NORMAL
Lab: NORMAL
SPECIMEN SOURCE: NORMAL

## 2019-04-10 ENCOUNTER — ANCILLARY PROCEDURE (OUTPATIENT)
Dept: CARDIOLOGY | Facility: CLINIC | Age: 84
End: 2019-04-10
Attending: INTERNAL MEDICINE
Payer: COMMERCIAL

## 2019-04-10 DIAGNOSIS — R55 SYNCOPE: ICD-10-CM

## 2019-04-10 PROCEDURE — 93296 REM INTERROG EVL PM/IDS: CPT | Performed by: INTERNAL MEDICINE

## 2019-04-10 NOTE — RESULT ENCOUNTER NOTE
Fay Argueta,    Your urine culture shows that bladder infection was successfully treated.      Please contact me if you have questions.    Yanci Houston PA-C

## 2019-04-12 ENCOUNTER — OFFICE VISIT (OUTPATIENT)
Dept: PULMONOLOGY | Facility: CLINIC | Age: 84
End: 2019-04-12
Payer: COMMERCIAL

## 2019-04-12 VITALS
SYSTOLIC BLOOD PRESSURE: 121 MMHG | WEIGHT: 121 LBS | RESPIRATION RATE: 20 BRPM | HEIGHT: 58 IN | OXYGEN SATURATION: 96 % | DIASTOLIC BLOOD PRESSURE: 69 MMHG | HEART RATE: 76 BPM | BODY MASS INDEX: 25.4 KG/M2

## 2019-04-12 DIAGNOSIS — J47.9 BRONCHIECTASIS WITHOUT ACUTE EXACERBATION (H): Primary | ICD-10-CM

## 2019-04-12 LAB
FEF 25/75: NORMAL
FEV-1: NORMAL
FEV1/FVC: NORMAL
FVC: NORMAL
MDC_IDC_LEAD_IMPLANT_DT: NORMAL
MDC_IDC_LEAD_IMPLANT_DT: NORMAL
MDC_IDC_LEAD_LOCATION: NORMAL
MDC_IDC_LEAD_LOCATION: NORMAL
MDC_IDC_LEAD_LOCATION_DETAIL_1: NORMAL
MDC_IDC_LEAD_LOCATION_DETAIL_1: NORMAL
MDC_IDC_LEAD_MFG: NORMAL
MDC_IDC_LEAD_MFG: NORMAL
MDC_IDC_LEAD_MODEL: NORMAL
MDC_IDC_LEAD_MODEL: NORMAL
MDC_IDC_LEAD_POLARITY_TYPE: NORMAL
MDC_IDC_LEAD_SERIAL: NORMAL
MDC_IDC_LEAD_SERIAL: NORMAL
MDC_IDC_MSMT_BATTERY_REMAINING_PERCENTAGE: 65 %
MDC_IDC_MSMT_BATTERY_STATUS: NORMAL
MDC_IDC_MSMT_LEADCHNL_RA_IMPEDANCE_VALUE: 447 OHM
MDC_IDC_MSMT_LEADCHNL_RA_IMPEDANCE_VALUE: 468 OHM
MDC_IDC_MSMT_LEADCHNL_RA_LEAD_CHANNEL_STATUS: NORMAL
MDC_IDC_MSMT_LEADCHNL_RA_PACING_THRESHOLD_AMPLITUDE: 0.8 V
MDC_IDC_MSMT_LEADCHNL_RA_PACING_THRESHOLD_PULSEWIDTH: 0.4 MS
MDC_IDC_MSMT_LEADCHNL_RV_IMPEDANCE_VALUE: 494 OHM
MDC_IDC_MSMT_LEADCHNL_RV_IMPEDANCE_VALUE: 507 OHM
MDC_IDC_MSMT_LEADCHNL_RV_LEAD_CHANNEL_STATUS: NORMAL
MDC_IDC_MSMT_LEADCHNL_RV_PACING_THRESHOLD_AMPLITUDE: 0.5 V
MDC_IDC_MSMT_LEADCHNL_RV_PACING_THRESHOLD_PULSEWIDTH: 0.4 MS
MDC_IDC_PG_IMPLANT_DTM: NORMAL
MDC_IDC_PG_MFG: NORMAL
MDC_IDC_PG_MODEL: NORMAL
MDC_IDC_PG_SERIAL: NORMAL
MDC_IDC_PG_TYPE: NORMAL
MDC_IDC_SESS_CLINIC_NAME: NORMAL
MDC_IDC_SESS_DTM: NORMAL
MDC_IDC_SESS_REPROGRAMMED: NO
MDC_IDC_SESS_TYPE: NORMAL
MDC_IDC_SET_BRADY_AT_MODE_SWITCH_MODE: NORMAL
MDC_IDC_SET_BRADY_AT_MODE_SWITCH_RATE: 160 {BEATS}/MIN
MDC_IDC_SET_BRADY_LOWRATE: 60 {BEATS}/MIN
MDC_IDC_SET_BRADY_MAX_SENSOR_RATE: 125 {BEATS}/MIN
MDC_IDC_SET_BRADY_MAX_TRACKING_RATE: 130 {BEATS}/MIN
MDC_IDC_SET_BRADY_MODE: NORMAL
MDC_IDC_SET_BRADY_PAV_DELAY_HIGH: 150 MS
MDC_IDC_SET_BRADY_PAV_DELAY_LOW: 180 MS
MDC_IDC_SET_BRADY_SAV_DELAY_HIGH: 105 MS
MDC_IDC_SET_BRADY_SAV_DELAY_LOW: 135 MS
MDC_IDC_SET_LEADCHNL_RA_PACING_POLARITY: NORMAL
MDC_IDC_SET_LEADCHNL_RA_PACING_PULSEWIDTH: 0.4 MS
MDC_IDC_SET_LEADCHNL_RA_SENSING_ADAPTATION_MODE: NORMAL
MDC_IDC_SET_LEADCHNL_RA_SENSING_POLARITY: NORMAL
MDC_IDC_SET_LEADCHNL_RV_PACING_POLARITY: NORMAL
MDC_IDC_SET_LEADCHNL_RV_PACING_PULSEWIDTH: 0.4 MS
MDC_IDC_SET_LEADCHNL_RV_SENSING_ADAPTATION_MODE: NORMAL
MDC_IDC_SET_LEADCHNL_RV_SENSING_POLARITY: NORMAL
MDC_IDC_STAT_AT_BURDEN_PERCENT: 0 %
MDC_IDC_STAT_AT_DTM_END: NORMAL
MDC_IDC_STAT_AT_DTM_START: NORMAL
MDC_IDC_STAT_AT_MODE_SW_COUNT_PER_DAY: 0
MDC_IDC_STAT_AT_MODE_SW_PERCENT_TIME_PER_DAY: 0 %
MDC_IDC_STAT_BRADY_DTM_END: NORMAL
MDC_IDC_STAT_BRADY_DTM_START: NORMAL
MDC_IDC_STAT_BRADY_RA_PERCENT_PACED: 71 %
MDC_IDC_STAT_BRADY_RV_PERCENT_PACED: 1 %
MDC_IDC_STAT_CRT_DTM_END: NORMAL
MDC_IDC_STAT_CRT_DTM_START: NORMAL

## 2019-04-12 PROCEDURE — 99214 OFFICE O/P EST MOD 30 MIN: CPT | Mod: 25

## 2019-04-12 PROCEDURE — 94010 BREATHING CAPACITY TEST: CPT

## 2019-04-12 ASSESSMENT — MIFFLIN-ST. JEOR: SCORE: 868.6

## 2019-04-12 NOTE — PROGRESS NOTES
[facility]  Pulmonary Medicine  Visit Clinic Note  April 12, 2019         ASSESSMENT & PLAN       Bronchiectasis with acute lower respiratory tract infection: The etiology of her bronchiectasis is not 100% certain, but may be related to previous tuberculosis infection.  She is having slightly increasing cough and phlegm production.  She has a history of pseudomonas aeruginosa.  I suspect it is probably Pseudomonas, but best practice would be to get sputum cultures and treat with an appropriate antibiotic that is narrow spectrum.  I have asked her to get a sputum culture with Gram stain as well as an AFB culture and stain.  I do not think any new imaging is necessary at this point.  Once I get the results from her sputum culture I will call her to see how she is feeling symptomatically, and if the symptoms are persisting we can give her a round of antibiotics.  She can feel free to call me to help arrange outpatient testing if she does develop more symptoms in the future.     I Encouraged her to get regular exercise to help out with her skeletal muscle health     At the very least we should see each other in the clinic once a year to check up on her overall respiratory health.      Pedrito Karimi MD          Today's visit note:     Chief Complaint: Ellie Leigh is a 88 year old year old female who is being seen for Consult      HISTORY OF PRESENT ILLNESS:  This is an 88-year-old female with past medical history of bronchiectasis who presents the pulmonary clinic today for evaluation of cough.    The etiology of her bronchiectasis is not definitively known, but it could be related to her previous tuberculosis infection that she had in the early 2000s.  This last year has been a relatively good year from a respiratory standpoint for her.  She is only required 1-2 rounds of antibiotics for cough and sputum production.  Previously she required 5-6 rounds of antibiotics a year.  However this last  year, she has required rounds of antibiotics for urinary tract infections which could of course be partially treating lung processes.    She comes in today with a several day to week increasing cough and sputum production.  She is still doing her nebulized albuterol and saline at home, which she finds very helpful.  The sputum that she has is small in amounts but yellow.  She denies any fevers chills or night sweats.    She thinks usually in the past when she is required an antibiotic she gets a Z-Jerome.  She has had rounds of steroids as well.         Past Medical and Surgical History:     Past Medical History:   Diagnosis Date     Breast cancer (H)      COPD (chronic obstructive pulmonary disease) (H)     ct 2014 does show some bronchiectaisi RUL as well as fibronodular disease bilat upper lobes     Dust allergy      DVT (deep vein thrombosis) in pregnancy (H)     after neck fracture when in hospital     HTN (hypertension)      Hyponatremia     chronic     Hypothyroid      Intermittent atrial fibrillation (H) 12/1/2011    hx tachy-keri, has pacer, on chronic coumadin     Loose body in joint 4/15/2011     Neck fracture (H)     after a fall     Syncope 5/12/2013    multiple hospital visits, lates 3/2016, 6/2016, 7/2016 with no clear cause found     Past Surgical History:   Procedure Laterality Date     APPENDECTOMY       ARTHROSCOPY KNEE  4/15/2011    Procedure:ARTHROSCOPY KNEE; removal of loose body; Surgeon:LEY, JEFFREY DUANE; Location:[facility]     CHOLECYSTECTOMY       ESOPHAGOSCOPY, GASTROSCOPY, DUODENOSCOPY (EGD), COMBINED  6/9/2014    Procedure: COMBINED ESOPHAGOSCOPY, GASTROSCOPY, DUODENOSCOPY (EGD);  Surgeon: Gilberto Richard MD;  Location: [facility]     IMPLANT PACEMAKER  5/21/2013    Biotronik Moder [id number] Serial#[id number]     INJECT EPIDURAL LUMBAR  3/23/2011    INJECT EPIDURAL LUMBAR performed by GENERIC ANESTHESIA PROVIDER at [facility]     JOINT REPLACEMENT, HIP RT/LT      Joint Replacement Hip Rt     MASTECTOMY,  SIMPLE RT/LT/CAITLYN      Left breast - following breast ca     OTHER SURGICAL HISTORY      C1-C2 fusion after fx      SURGICAL HISTORY OF -   05/22/2001    Colonoscopy     TONSILLECTOMY             Family History:     Family History   Problem Relation Age of Onset     Cerebrovascular Disease Mother      Heart Disease Mother         MI     Cerebrovascular Disease Father      Heart Disease Father         MI     Respiratory Maternal Grandfather         TB     Neurologic Disorder Brother         ALS     Heart Disease Brother      Cerebrovascular Disease Brother      Breast Cancer Daughter         age:49     Asthma Brother      Cancer Brother         brain and lung     Cancer Daughter         thyroid              Social History:     Social History     Socioeconomic History     Marital status:      Spouse name: Not on file     Number of children: Not on file     Years of education: Not on file     Highest education level: Not on file   Occupational History     Employer: RETIRED   Social Needs     Financial resource strain: Not on file     Food insecurity:     Worry: Not on file     Inability: Not on file     Transportation needs:     Medical: Not on file     Non-medical: Not on file   Tobacco Use     Smoking status: Passive Smoke Exposure - Never Smoker     Smokeless tobacco: Never Used   Substance and Sexual Activity     Alcohol use: No     Drug use: No     Sexual activity: Not Currently   Lifestyle     Physical activity:     Days per week: Not on file     Minutes per session: Not on file     Stress: Not on file   Relationships     Social connections:     Talks on phone: Not on file     Gets together: Not on file     Attends Jainism service: Not on file     Active member of club or organization: Not on file     Attends meetings of clubs or organizations: Not on file     Relationship status: Not on file     Intimate partner violence:     Fear of current or ex partner: Not on file     Emotionally abused: Not on file  "    Physically abused: Not on file     Forced sexual activity: Not on file   Other Topics Concern     Parent/sibling w/ CABG, MI or angioplasty before 65F 55M? No   Social History Narrative    Lives in [facility].      Daughters helping since her accident, getting home physical therapy.      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      - 2000    5 daughters, 11 grandchildren, 3 great granchildren            Medications:     Current Outpatient Medications   Medication     albuterol (PROAIR HFA/PROVENTIL HFA/VENTOLIN HFA) 108 (90 Base) MCG/ACT Inhaler     albuterol (PROVENTIL) (2.5 MG/3ML) 0.083% neb solution     CRANBERRY     fluticasone (FLONASE) 50 MCG/ACT spray     ipratropium - albuterol 0.5 mg/2.5 mg/3 mL (DUONEB) 0.5-2.5 (3) MG/3ML neb solution     levothyroxine (SYNTHROID/LEVOTHROID) 50 MCG tablet     metoprolol succinate (TOPROL-XL) 50 MG 24 hr tablet     pantoprazole (PROTONIX) 40 MG EC tablet     polyethylene glycol (MIRALAX/GLYCOLAX) Packet     probiotic CAPS     sodium chloride (BRONCHO SALINE) 0.9 % areosol solution     sodium chloride 1 GM tablet     SYMBICORT 160-4.5 MCG/ACT Inhaler     warfarin (COUMADIN) 1 MG tablet     Acetaminophen (TYLENOL PO)     order for DME     ranitidine (ZANTAC) 150 MG tablet     No current facility-administered medications for this visit.             Review of Systems:       A complete review of systems was otherwise negative except as noted in the HPI.      PHYSICAL EXAM:  /69   Pulse 76   Resp 20   Ht 1.473 m (4' 10\")   Wt 54.9 kg (121 lb)   LMP 06/15/1985   SpO2 96%   BMI 25.29 kg/m       General: Pleasant, no apparent distress.  Eyes: Anicteric  Nose: Nasal mucosa with no edema or hyperemia.  No polyps  Ears: Hearing grossly normal  Mouth: Oral mucosa is moist, without any lesions. No oropharyngeal exudate.  Neck: supple, no thyromegaly  Lymphatics: No cervical or supraclavicular nodes  Respiratory: Decreased air " movement no crackles. No rhonchi. No wheezes  Cardiac: RRR, normal S1, S2. No murmurs. No JVD  Abdomen: Soft, NT/ND  Musculoskeletal: Neck with limited mobility.  No clubbing. No cyanosis. No edema.  Skin: No rash on limited exam  Neuro: Normal mentation. Normal speech.  Psych:Normal affect           Data:   All laboratory and imaging data reviewed.      Previous sputum cultures with pseudomonas aeruginosa    PFT:   Spirometry performed in the clinic today shows an FEV1/FVC ratio of 0.62.  FVC is 1.1 L which is 71% predicted, and the FEV1 is 0.68 L which is 62% predicted.    PFT Interpretation:  Moderate airflow obstruction.  Low FEV1 and FVC suggest restriction.  Valid Maneuver      Chest CT: I reviewed the chest CT scan images from 2018 and agree with the radiologist interpretation below:  FINDINGS: Assessment of the right lung again shows bronchiectasis in  the right upper lobe. There is more consolidation and/or collapse in  the right upper lobe compared to the prior study. There is more tree  in bud opacity in the right lower lobe with some modest subpleural  more confluent parenchymal abnormality also seen laterally at the  right lung base compared to the prior study. Bronchial wall thickening  in the right lung is again seen. There is a cavitary smoothly  marginated nodule in the right upper lung zone which is larger  compared to the prior study. This is seen on axial image 16. It  measures 12 mm compared to 9 mm on the prior study.     Assessment of the left lung shows stable bronchiectasis medially in  the left apical area with some irregular bandlike and nodular  parenchymal abnormality. Elsewhere in the left upper lobe is a mild  increase in tree-in-bud opacity and grouped nodular infiltrate.  This  is likely inflammatory as well and/or related to bronchiolitis. There  is a new 5 mm noncalcified nodule in the left upper lobe on axial  image 13. It is smoothly marginated. More stable chronic tree in  bud  opacity is seen posteriorly in the left lobe of the some new  tree-in-bud opacity laterally in the left lower lobe.                                                                      IMPRESSION:  1.  There are new bilateral lung abnormalities which are likely  inflammatory/infectious in etiology.  2. There is a cavitary nodule in the right upper lung zone which is  larger compared to the prior study. This could be inflammatory but  neoplasm is not excluded. Follow-up chest CT in 3 months is  recommended in reassessment.    Recent Results (from the past 168 hour(s))   Urine Culture Aerobic Bacterial    Collection Time: 04/08/19  3:58 PM   Result Value Ref Range    Specimen Description Midstream Urine     Special Requests Specimen received in preservative     Culture Micro       <10,000 colonies/mL  urogenital lexis  Susceptibility testing not routinely done     **UA reflex to Microscopic FUTURE anytime    Collection Time: 04/08/19  3:58 PM   Result Value Ref Range    Color Urine Yellow     Appearance Urine Clear     Glucose Urine Negative NEG^Negative mg/dL    Bilirubin Urine Negative NEG^Negative    Ketones Urine Negative NEG^Negative mg/dL    Specific Gravity Urine 1.020 1.003 - 1.035    Blood Urine Negative NEG^Negative    pH Urine 7.0 5.0 - 7.0 pH    Protein Albumin Urine Negative NEG^Negative mg/dL    Urobilinogen Urine 0.2 0.2 - 1.0 EU/dL    Nitrite Urine Negative NEG^Negative    Leukocyte Esterase Urine Negative NEG^Negative    Source Midstream Urine    Cardiac Device Check - Remote    Collection Time: 04/10/19 12:07 AM   Result Value Ref Range    Date Time Interrogation Session [id number]     Implantable Pulse Generator  SWIIM SystemroniSonico     Implantable Pulse Generator Model Evia DR-T     Implantable Pulse Generator Serial Number [id number]     Type Interrogation Session Remote      Re-programmed During Session NO     Clinic Name [facility]     Implantable Pulse Generator Type Pacemaker      Implantable Pulse Generator Implant Date 20130521     Implantable Lead  Biotronik     Implantable Lead Model 350 974 Setrox S S 53     Implantable Lead Serial Number [id number]     Implantable Lead Implant Date 20130521     Implantable Lead Location Detail 1 APEX     Implantable Lead Location Right Ventricle     Implantable Lead  Medtronic     Implantable Lead Model 5076 CapSureFix Novus     Implantable Lead Serial Number [id number]     Implantable Lead Implant Date 20130521     Implantable Lead Polarity Type Bipolar Lead     Implantable Lead Location Detail 1 APPENDAGE     Implantable Lead Location Right Atrium     Amrit Setting Mode (NBG Code) DDD     Amrit Setting Lower Rate Limit 60 [beats]/min    Amrit Setting Maximum Tracking Rate 130 [beats]/min    Amrit Setting Maximum Sensor Rate 125 [beats]/min    Amrit Setting ANISA Delay Low 135 ms    Amrit Setting PAV Delay Low 180 ms    Amrit Setting PAV Delay High 150 ms    Amrit Setting ANISA Delay High 105 ms    Amrit Setting AT Mode Switch Rate 160 [beats]/min    Amrit Setting AT Mode Switch Mode DDIR     Lead Channel Setting Sensing Polarity Bipolar     Lead Channel Setting Sensing Adaptation Mode Adaptive     Lead Channel Setting Sensing Polarity Bipolar     Lead Channel Setting Sensing Adaptation Mode Adaptive     Lead Channel Setting Pacing Polarity Bipolar     Lead Channel Setting Pacing Pulse Width 0.40 ms    Lead Channel Setting Pacing Polarity Bipolar     Lead Channel Setting Pacing Pulse Width 0.40 ms    Lead Channel Status Null     Lead Channel Impedance Value 447 ohm    Lead Channel Impedance Value 468 ohm    Lead Channel Pacing Threshold Amplitude 0.8 V    Lead Channel Pacing Threshold Pulse Width 0.4 ms    Lead Channel Status Null     Lead Channel Impedance Value 494 ohm    Lead Channel Impedance Value 507 ohm    Lead Channel Pacing Threshold Amplitude 0.5 V    Lead Channel Pacing Threshold Pulse Width 0.4 ms    Battery Status Middle  of Service     Battery Remaining Percentage 65.0 %    Amrit Statistic Date Time Start 20190409060100     Amrit Statistic Date Time End 20190410060100     Amrit Statistic RA Percent Paced 71 %    Amrit Statistic RV Percent Paced 1 %    CRT Statistic Date Time Start 20190409060100     CRT Statistic Date Time End 20190410060100     Atrial Tachy Statistic Date Time Start 20190409060100     Atrial Tachy Statistic Date Time End 20190410060100     Atrial Tachy Statistic AT/AF [place] Percent 0 %    Atrial Tachy Statistic Number Of Mode Switches Per Day 0     Atrial Tachy Statistic Percent Time In Mode Switch Per Day 0 %   Spirometry, Breathing Capacity: Normal Order, Clinic Performed    Collection Time: 04/12/19 12:00 AM   Result Value Ref Range    FEV-1      FVC      FEV1/FVC      FEF 25/75

## 2019-04-16 ENCOUNTER — ANTICOAGULATION THERAPY VISIT (OUTPATIENT)
Dept: ANTICOAGULATION | Facility: CLINIC | Age: 84
End: 2019-04-16
Payer: COMMERCIAL

## 2019-04-16 ENCOUNTER — OFFICE VISIT (OUTPATIENT)
Dept: UROLOGY | Facility: CLINIC | Age: 84
End: 2019-04-16
Payer: COMMERCIAL

## 2019-04-16 VITALS
RESPIRATION RATE: 16 BRPM | HEART RATE: 69 BPM | BODY MASS INDEX: 25.29 KG/M2 | SYSTOLIC BLOOD PRESSURE: 97 MMHG | DIASTOLIC BLOOD PRESSURE: 59 MMHG | WEIGHT: 121 LBS

## 2019-04-16 DIAGNOSIS — I82.409 DVT (DEEP VENOUS THROMBOSIS) (H): ICD-10-CM

## 2019-04-16 DIAGNOSIS — Z79.01 LONG TERM CURRENT USE OF ANTICOAGULANT THERAPY: ICD-10-CM

## 2019-04-16 DIAGNOSIS — N39.0 RECURRENT UTI: Primary | ICD-10-CM

## 2019-04-16 LAB
ALBUMIN UR-MCNC: NEGATIVE MG/DL
APPEARANCE UR: CLEAR
BILIRUB UR QL STRIP: NEGATIVE
COLOR UR AUTO: YELLOW
GLUCOSE UR STRIP-MCNC: NEGATIVE MG/DL
HGB UR QL STRIP: ABNORMAL
INR POINT OF CARE: 1.6 (ref 0.86–1.14)
KETONES UR STRIP-MCNC: NEGATIVE MG/DL
LEUKOCYTE ESTERASE UR QL STRIP: ABNORMAL
MUCOUS THREADS #/AREA URNS LPF: PRESENT /LPF
NITRATE UR QL: NEGATIVE
NON-SQ EPI CELLS #/AREA URNS LPF: ABNORMAL /LPF
PH UR STRIP: 7 PH (ref 5–7)
RBC #/AREA URNS AUTO: ABNORMAL /HPF
SOURCE: ABNORMAL
SP GR UR STRIP: 1.02 (ref 1–1.03)
UROBILINOGEN UR STRIP-ACNC: 0.2 EU/DL (ref 0.2–1)
WBC #/AREA URNS AUTO: ABNORMAL /HPF

## 2019-04-16 PROCEDURE — 99205 OFFICE O/P NEW HI 60 MIN: CPT | Mod: 25 | Performed by: UROLOGY

## 2019-04-16 PROCEDURE — 52000 CYSTOURETHROSCOPY: CPT | Performed by: UROLOGY

## 2019-04-16 PROCEDURE — 36416 COLLJ CAPILLARY BLOOD SPEC: CPT

## 2019-04-16 PROCEDURE — 81001 URINALYSIS AUTO W/SCOPE: CPT | Performed by: UROLOGY

## 2019-04-16 PROCEDURE — 99207 ZZC NO CHARGE NURSE ONLY: CPT

## 2019-04-16 PROCEDURE — 85610 PROTHROMBIN TIME: CPT | Mod: QW

## 2019-04-16 RX ORDER — ESTRADIOL 0.1 MG/G
2 CREAM VAGINAL
Qty: 42 G | Refills: 3 | Status: SHIPPED | OUTPATIENT
Start: 2019-04-17 | End: 2020-10-02

## 2019-04-16 NOTE — NURSING NOTE
"Initial BP 97/59 (BP Location: Right arm, Patient Position: Chair, Cuff Size: Adult Regular)   Pulse 69   Resp 16   Wt 54.9 kg (121 lb)   LMP 06/15/1985   BMI 25.29 kg/m   Estimated body mass index is 25.29 kg/m  as calculated from the following:    Height as of 4/12/19: 1.473 m (4' 10\").    Weight as of this encounter: 54.9 kg (121 lb). .    Jaswinder JOSHUA CMA    "

## 2019-04-16 NOTE — Clinical Note
Pt has appt with Urology today to address frequent UTIs. Pt think she has current UTI, check after 2:30 pm if patient started on abx.

## 2019-04-16 NOTE — PROGRESS NOTES
"Ellie Leigh is a 88 year old female seen in consultation for UTI. Consult from Anjum Vidales.    Pt reports not feeling well today, primarily reflux; wonders if that might be due to a UTI. Also with mild continuous \"burning\" with voiding for several yrs; seems to improve with short course abx.    Also c/o nocturia \"every hour on the hour\" although she can go 4 hours between voids during the day. Minimal incont, primarily CHRISTI, \"only when I'm sick.\" Wears 2 pads per day, one at nite.    Denies prior  eval, hx bladder surgery, use of bladder meds.    Hx 5 vag deliveries, not on HRT ever.    Some constipation, takes miralax about every other day with good results.    Drinks 6 glasses of [place] per day \"because I feel better that way.\" Hx hyponatremia noted on PMH; pt presently takes 'salt tablets.'    Wipes properly. Uses shower.     Enjoys reading romance novels.      Past Medical History:   Diagnosis Date     Breast cancer (H)      COPD (chronic obstructive pulmonary disease) (H)     ct 2014 does show some bronchiectaisi RUL as well as fibronodular disease bilat upper lobes     Dust allergy      DVT (deep vein thrombosis) in pregnancy (H)     after neck fracture when in hospital     HTN (hypertension)      Hyponatremia     chronic     Hypothyroid      Intermittent atrial fibrillation (H) 12/1/2011    hx tachy-keri, has pacer, on chronic coumadin     Loose body in joint 4/15/2011     Neck fracture (H)     after a fall     Syncope 5/12/2013    multiple hospital visits, lates 3/2016, 6/2016, 7/2016 with no clear cause found       Past Surgical History:   Procedure Laterality Date     APPENDECTOMY       ARTHROSCOPY KNEE  4/15/2011    Procedure:ARTHROSCOPY KNEE; removal of loose body; Surgeon:LEY, JEFFREY DUANE; Location:[facility]     CHOLECYSTECTOMY       ESOPHAGOSCOPY, GASTROSCOPY, DUODENOSCOPY (EGD), COMBINED  6/9/2014    Procedure: COMBINED ESOPHAGOSCOPY, GASTROSCOPY, DUODENOSCOPY (EGD);  Surgeon: Gilberto Richard MD; "  Location: [facility]     IMPLANT PACEMAKER  5/21/2013    Sarah Buttsl [id number] Serial#[id number]     INJECT EPIDURAL LUMBAR  3/23/2011    INJECT EPIDURAL LUMBAR performed by GENERIC ANESTHESIA PROVIDER at [facility]     JOINT REPLACEMENT, HIP RT/LT      Joint Replacement Hip Rt     MASTECTOMY, SIMPLE RT/LT/CAITLYN      Left breast - following breast ca     OTHER SURGICAL HISTORY      C1-C2 fusion after fx      SURGICAL HISTORY OF -   05/22/2001    Colonoscopy     TONSILLECTOMY         Social History     Socioeconomic History     Marital status:      Spouse name: Not on file     Number of children: Not on file     Years of education: Not on file     Highest education level: Not on file   Occupational History     Employer: RETIRED   Social Needs     Financial resource strain: Not on file     Food insecurity:     Worry: Not on file     Inability: Not on file     Transportation needs:     Medical: Not on file     Non-medical: Not on file   Tobacco Use     Smoking status: Passive Smoke Exposure - Never Smoker     Smokeless tobacco: Never Used   Substance and Sexual Activity     Alcohol use: No     Drug use: No     Sexual activity: Not Currently   Lifestyle     Physical activity:     Days per week: Not on file     Minutes per session: Not on file     Stress: Not on file   Relationships     Social connections:     Talks on phone: Not on file     Gets together: Not on file     Attends Faith service: Not on file     Active member of club or organization: Not on file     Attends meetings of clubs or organizations: Not on file     Relationship status: Not on file     Intimate partner violence:     Fear of current or ex partner: Not on file     Emotionally abused: Not on file     Physically abused: Not on file     Forced sexual activity: Not on file   Other Topics Concern     Parent/sibling w/ CABG, MI or angioplasty before 65F 55M? No   Social History Narrative    Lives in [facility].      Daughters helping since  her accident, getting home physical therapy.      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      - 2000    5 daughters, 11 grandchildren, 3 great granchildren       Current Outpatient Medications   Medication Sig Dispense Refill     Acetaminophen (TYLENOL PO) Take  mg by mouth every 8 hours as needed        albuterol (PROAIR HFA/PROVENTIL HFA/VENTOLIN HFA) 108 (90 Base) MCG/ACT Inhaler Inhale 2 puffs into the lungs every 6 hours as needed for shortness of breath / dyspnea 1 Inhaler 11     albuterol (PROVENTIL) (2.5 MG/3ML) 0.083% neb solution Take 1 vial (2.5 mg) by nebulization 3 times daily 360 mL 6     CRANBERRY Take 475 mg by mouth 2 times daily.       fluticasone (FLONASE) 50 MCG/ACT spray Spray 2 sprays into both nostrils daily 1 Bottle 11     ipratropium - albuterol 0.5 mg/2.5 mg/3 mL (DUONEB) 0.5-2.5 (3) MG/3ML neb solution TAKE 1 VIAL BY NEBULIZATION  EVERY SIX HOURS AS NEEDED FOR SHORTNESS OF BREATH/ DYSPNEA OR WHEEZING 180 mL 11     levothyroxine (SYNTHROID/LEVOTHROID) 50 MCG tablet TAKE ONE TABLET BY MOUTH ONE TIME DAILY  90 tablet 1     metoprolol succinate (TOPROL-XL) 50 MG 24 hr tablet TAKE 1 TAB BY MOUTH TWICE A DAY FOR HTN 60 tablet 0     metoprolol succinate ER (TOPROL-XL) 25 MG 24 hr tablet TAKE TWO TABLETS BY MOUTH TWICE DAILY  360 tablet 3     order for DME Equipment being ordered: Nebulizer 1 each 0     polyethylene glycol (MIRALAX/GLYCOLAX) Packet Take 17 g by mouth daily        probiotic CAPS Take 1 capsule by mouth every evening        ranitidine (ZANTAC) 150 MG tablet Take 1 tablet (150 mg) by mouth 2 times daily 60 tablet 11     sodium chloride (BRONCHO SALINE) 0.9 % areosol solution Inhale 5 mLs into the lungs 3 times daily Use after albuterol in nebulizer to thin secretions 480 mL 6     sodium chloride 1 GM tablet Take 1 tablet (1 g) by mouth 3 times daily 100 tablet 11     SYMBICORT 160-4.5 MCG/ACT Inhaler INHALE TWO PUFFS INTO THE LUNGS TWICE DAILY 10.2 g 2      warfarin (COUMADIN) 1 MG tablet 2 mg on Mon; 1 mg all other days or as directed by the Anticoagulation Clinic 96 tablet 3       Physical Exam:    GENL: NAD. In wheelchair.   ABD: Soft, non-tender, no masses.    EG: Poorly-estrogenized, no masses.    VAGINA: Poorly-estrogenized, no masses.    BN HYPERMOBILITY: None.    CYSTOCELE: None.    APICAL PROLAPSE: None.    RECTOCELE: Mild.    BIMANUAL: No mass or tenderness.    Cysto:    (Informed consent obtained. Pause for cause performed)   Sterile prep.    17 Fr scope inserted through urethra. Systematic examination w 70 degree lens.   PVR: 5 cc   MUCOSA: Normal without lesion; moderate trabeculation throughout   ORIFICES: Normal location and morphology   CAPACITY: 400 cc; no pain with filling   Scope withdrawn without untoward effect.    Valsalva:   Mild hypermobility, no leakage noted.    (Pt tolerated procedure without difficulty).        10/30/18 CT abd/pelvis: negative       1/16/18 UC: negative  2/6/18  UC: Enterococcus  5/2/18  UC: negative  7/27/18 UA: negative  8/2/18  UA:  negative  10/30/18 UC: Citrobacter    Serum sodium: 126 (133-144)    1/26/19 UC: negative  2/6/19  UC: negative  2/27/19 UC: Enterobacter  3/12/19 UA: negative  3/25/19 UC: Enterobacter  4/8/19  UC: negative      Results for orders placed or performed in visit on 04/16/19   UA reflex to Microscopic and Culture   Result Value Ref Range    Color Urine Yellow     Appearance Urine Clear     Glucose Urine Negative NEG^Negative mg/dL    Bilirubin Urine Negative NEG^Negative    Ketones Urine Negative NEG^Negative mg/dL    Specific Gravity Urine 1.020 1.003 - 1.035    Blood Urine Trace (A) NEG^Negative    pH Urine 7.0 5.0 - 7.0 pH    Protein Albumin Urine Negative NEG^Negative mg/dL    Urobilinogen Urine 0.2 0.2 - 1.0 EU/dL    Nitrite Urine Negative NEG^Negative    Leukocyte Esterase Urine Small (A) NEG^Negative    Source Midstream Urine    Urine Microscopic   Result Value Ref Range    WBC Urine  0 - 5 OTO5^0 - 5 /HPF    RBC Urine O - 2 OTO2^O - 2 /HPF    Squamous Epithelial /LPF Urine Few FEW^Few /LPF    Mucous Urine Present (A) NEG^Negative /LPF     *Note: Due to a large number of results and/or encounters for the requested time period, some results have not been displayed. A complete set of results can be found in Results Review.       IMP:  1. Hx few lower tract UTI's, clear today  2. Atrophic vaginitis  3. Hx chronic hyponatremia, other medical conditions, advanced maturity      PLAN:  1. Discussed situation and reviewed all above labs with patient in detail.  2. Stressed importance of UC prior to starting abx for presumed UTI  3. Consider topical HRT; discussed in detail rationale, mech of action, application, expectations, etc; pt elects trial  4. To PMD for eval if continues to feel ill  5. RTC to us only PRN  6. 60 minutes spent with patient, more than 50% in counseling and coordination of care for UTI, which did not include time spent for the procedure.

## 2019-04-16 NOTE — PATIENT INSTRUCTIONS
Per physician instructions      If you have questions or concerns on any instructions given to you by your provider today or if you need to schedule an appointment, you can reach us at [phone].

## 2019-04-16 NOTE — NURSING NOTE
Pt brought back to the procedure room for a cystoscopy per Dr. Ace  Informed consent obtained, pt prepped for procedure.    Scope Number: Robert Storz [id number] Rigid: [id number]       Jaswinder JOSHUA, NENITA

## 2019-04-16 NOTE — PROGRESS NOTES
Addendum: Pt started cipro for UTI on 19.    Lissy Beaulieu RN, BSN, PHN  2019    ANTICOAGULATION FOLLOW-UP CLINIC VISIT    Patient Name:  Ellie Leigh  Date:  2019  Contact Type:  Face to Face    SUBJECTIVE:     Patient Findings     Comments:   Pt has appt with Urology today to address frequent UTIs.   No changes in medications, diet, or activity. No concerns with bleeding or bruising. Took warfarin as prescribed.   Continue maintenance warfarin dose. Will recheck INR in 1 week.   Patient verbalizes understanding and agrees with plan. No further questions at this time.              OBJECTIVE    INR Protime   Date Value Ref Range Status   2019 1.6 (A) 0.86 - 1.14 Final       ASSESSMENT / PLAN  INR assessment SUB    Recheck INR In: 1 WEEK    INR Location Clinic      Anticoagulation Summary  As of 2019    INR goal:      TTR:   47.2 % (4.1 y)   Prior goal:   1.5-2.0   INR used for dosin.6 (2019)   Warfarin maintenance plan:   2 mg (1 mg x 2) every Mon; 1 mg (1 mg x 1) all other days   Full warfarin instructions:   : 2 mg; Otherwise 2 mg every Mon; 1 mg all other days   Weekly warfarin total:   8 mg   Plan last modified:   Gina Persaud RN (2019)   Next INR check:   2019   Priority:   INR   Target end date:   Indefinite    Indications    Atrial fibrillation with rapid ventricular response (H) (Resolved) [I48.91]  DVT (deep venous thrombosis) [I82.409]  Long term current use of anticoagulant therapy [Z79.01]             Anticoagulation Episode Summary     INR check location:       Preferred lab:       Send INR reminders to:   [facility]    Comments:   * Actual INR goal is 1.7-2.3  Taking Celebrex PRN         Anticoagulation Care Providers     Provider Role Specialty Phone number    Anjum Vidales MD [facility] [phone]            See the Encounter Report to view Anticoagulation Flowsheet and Dosing Calendar (Go to Encounters  tab in chart review, and find the Anticoagulation Therapy Visit)        Caroline Stewart RN

## 2019-04-22 ENCOUNTER — OFFICE VISIT (OUTPATIENT)
Dept: FAMILY MEDICINE | Facility: CLINIC | Age: 84
End: 2019-04-22
Payer: COMMERCIAL

## 2019-04-22 VITALS
DIASTOLIC BLOOD PRESSURE: 72 MMHG | HEART RATE: 79 BPM | WEIGHT: 124 LBS | HEIGHT: 58 IN | SYSTOLIC BLOOD PRESSURE: 130 MMHG | OXYGEN SATURATION: 98 % | RESPIRATION RATE: 24 BRPM | TEMPERATURE: 96.4 F | BODY MASS INDEX: 26.03 KG/M2

## 2019-04-22 DIAGNOSIS — R30.0 DYSURIA: ICD-10-CM

## 2019-04-22 DIAGNOSIS — R82.90 NONSPECIFIC FINDING ON EXAMINATION OF URINE: ICD-10-CM

## 2019-04-22 DIAGNOSIS — N30.00 ACUTE CYSTITIS WITHOUT HEMATURIA: Primary | ICD-10-CM

## 2019-04-22 LAB
ALBUMIN UR-MCNC: NEGATIVE MG/DL
APPEARANCE UR: ABNORMAL
BACTERIA #/AREA URNS HPF: ABNORMAL /HPF
BILIRUB UR QL STRIP: NEGATIVE
COLOR UR AUTO: YELLOW
GLUCOSE UR STRIP-MCNC: NEGATIVE MG/DL
HGB UR QL STRIP: ABNORMAL
KETONES UR STRIP-MCNC: NEGATIVE MG/DL
LEUKOCYTE ESTERASE UR QL STRIP: ABNORMAL
NITRATE UR QL: POSITIVE
NON-SQ EPI CELLS #/AREA URNS LPF: ABNORMAL /LPF
PH UR STRIP: 6.5 PH (ref 5–7)
RBC #/AREA URNS AUTO: ABNORMAL /HPF
SOURCE: ABNORMAL
SP GR UR STRIP: 1.02 (ref 1–1.03)
UROBILINOGEN UR STRIP-ACNC: 0.2 EU/DL (ref 0.2–1)
WBC #/AREA URNS AUTO: ABNORMAL /HPF
WBC CLUMPS #/AREA URNS HPF: PRESENT /HPF

## 2019-04-22 PROCEDURE — 99213 OFFICE O/P EST LOW 20 MIN: CPT | Performed by: PHYSICIAN ASSISTANT

## 2019-04-22 PROCEDURE — 87186 SC STD MICRODIL/AGAR DIL: CPT | Performed by: PHYSICIAN ASSISTANT

## 2019-04-22 PROCEDURE — 87086 URINE CULTURE/COLONY COUNT: CPT | Performed by: PHYSICIAN ASSISTANT

## 2019-04-22 PROCEDURE — 87088 URINE BACTERIA CULTURE: CPT | Performed by: PHYSICIAN ASSISTANT

## 2019-04-22 PROCEDURE — 81001 URINALYSIS AUTO W/SCOPE: CPT | Performed by: PHYSICIAN ASSISTANT

## 2019-04-22 RX ORDER — CIPROFLOXACIN 250 MG/1
250 TABLET, FILM COATED ORAL 2 TIMES DAILY
Qty: 10 TABLET | Refills: 0 | Status: SHIPPED | OUTPATIENT
Start: 2019-04-22 | End: 2019-04-30

## 2019-04-22 ASSESSMENT — ENCOUNTER SYMPTOMS
CHILLS: 0
ABDOMINAL PAIN: 0
FREQUENCY: 1
FEVER: 0
HEMATURIA: 0
SORE THROAT: 0
PALPITATIONS: 0
NAUSEA: 0
WHEEZING: 0
DIARRHEA: 0
DYSURIA: 1
FLANK PAIN: 0
COUGH: 0
SHORTNESS OF BREATH: 0
VOMITING: 0

## 2019-04-22 ASSESSMENT — MIFFLIN-ST. JEOR: SCORE: 882.21

## 2019-04-22 NOTE — PROGRESS NOTES
SUBJECTIVE:   Ellie Leigh is a 88 year old female who presents to clinic today for the following   health issues:      URINARY TRACT SYMPTOMS      Duration: over 1 week     Description  dysuria, frequency, urgency, odor and incontinence    Intensity:  moderate    Accompanying signs and symptoms:  Fever/chills: no   Flank pain no   Nausea and vomiting: no   Vaginal symptoms: odor  Abdominal/Pelvic Pain: YES    History  History of frequent UTI's: not until this winter   History of kidney stones: no   Sexually Active: no   Possibility of pregnancy: No    Precipitating or alleviating factors: None    Therapies tried and outcome: none          Additional history: as documented    Reviewed  and updated as needed this visit by clinical staff  Tobacco  Allergies  Meds  Problems  Med Hx  Surg Hx  Fam Hx  Soc Hx          Reviewed and updated as needed this visit by Provider  Tobacco  Allergies  Meds  Problems  Med Hx  Surg Hx  Fam Hx         Patient Active Problem List   Diagnosis     Sensorineural hearing loss, asymmetrical     Generalized osteoarthrosis, unspecified site     Disease of lung     PERSONAL HISTORY OF MALIGNANCY- BREAST     Esophageal reflux     Chronic allergic conjunctivitis     Chronic seasonal allergic rhinitis     Hyperlipidemia LDL goal <130     Chronic constipation     Osteoporosis     Health Care Home     Right arm weakness     Ulnar neuropathy     Headache     Mild major depression     DVT (deep venous thrombosis)     Anxiety     Cervical vertebral fracture (H)     Intermittent atrial fibrillation (H)     Squamous cell carcinoma in situ of skin of face     SK (seborrheic keratosis)     Hypothyroidism     Hyponatremia     Recurrent UTI     History of skin cancer     BPPV (benign paroxysmal positional vertigo)     Benign essential HTN     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)     Positive fecal occult blood test     COPD (chronic obstructive pulmonary disease) (H)      Infectious colitis, enteritis and gastroenteritis     Advance Care Planning     Syncope     Hemoptysis     Long term current use of anticoagulant therapy     Mild persistent asthma without complication     Irritable bowel syndrome without diarrhea     Nausea     Back pain     H/O TB (tuberculosis)     Chest pain     Past Surgical History:   Procedure Laterality Date     APPENDECTOMY       ARTHROSCOPY KNEE  4/15/2011    Procedure:ARTHROSCOPY KNEE; removal of loose body; Surgeon:LEY, JEFFREY DUANE; Location:[facility]     CHOLECYSTECTOMY       ESOPHAGOSCOPY, GASTROSCOPY, DUODENOSCOPY (EGD), COMBINED  6/9/2014    Procedure: COMBINED ESOPHAGOSCOPY, GASTROSCOPY, DUODENOSCOPY (EGD);  Surgeon: Gilberto Richard MD;  Location: [facility]     IMPLANT PACEMAKER  5/21/2013    Biotronik Moderl [id number] Serial#[id number]     INJECT EPIDURAL LUMBAR  3/23/2011    INJECT EPIDURAL LUMBAR performed by GENERIC ANESTHESIA PROVIDER at [facility]     JOINT REPLACEMENT, HIP RT/LT      Joint Replacement Hip Rt     MASTECTOMY, SIMPLE RT/LT/CAITLYN      Left breast - following breast ca     OTHER SURGICAL HISTORY      C1-C2 fusion after fx      SURGICAL HISTORY OF -   05/22/2001    Colonoscopy     TONSILLECTOMY         Social History     Tobacco Use     Smoking status: Passive Smoke Exposure - Never Smoker     Smokeless tobacco: Never Used   Substance Use Topics     Alcohol use: No     Family History   Problem Relation Age of Onset     Cerebrovascular Disease Mother      Heart Disease Mother         MI     Cerebrovascular Disease Father      Heart Disease Father         MI     Respiratory Maternal Grandfather         TB     Neurologic Disorder Brother         ALS     Heart Disease Brother      Cerebrovascular Disease Brother      Breast Cancer Daughter         age:49     Asthma Brother      Cancer Brother         brain and lung     Cancer Daughter         thyroid         Current Outpatient Medications   Medication Sig Dispense Refill     Acetaminophen (TYLENOL PO)  Take  mg by mouth every 8 hours as needed        albuterol (PROAIR HFA/PROVENTIL HFA/VENTOLIN HFA) 108 (90 Base) MCG/ACT Inhaler Inhale 2 puffs into the lungs every 6 hours as needed for shortness of breath / dyspnea 1 Inhaler 11     albuterol (PROVENTIL) (2.5 MG/3ML) 0.083% neb solution Take 1 vial (2.5 mg) by nebulization 3 times daily 360 mL 6     ciprofloxacin (CIPRO) 250 MG tablet Take 1 tablet (250 mg) by mouth 2 times daily for 5 days 10 tablet 0     CRANBERRY Take 475 mg by mouth 2 times daily.       estradiol (ESTRACE VAGINAL) 0.1 MG/GM vaginal cream Place 2 g vaginally three times a week 42 g 3     fluticasone (FLONASE) 50 MCG/ACT spray Spray 2 sprays into both nostrils daily 1 Bottle 11     ipratropium - albuterol 0.5 mg/2.5 mg/3 mL (DUONEB) 0.5-2.5 (3) MG/3ML neb solution TAKE 1 VIAL BY NEBULIZATION  EVERY SIX HOURS AS NEEDED FOR SHORTNESS OF BREATH/ DYSPNEA OR WHEEZING 180 mL 11     levothyroxine (SYNTHROID/LEVOTHROID) 50 MCG tablet TAKE ONE TABLET BY MOUTH ONE TIME DAILY  90 tablet 1     metoprolol succinate (TOPROL-XL) 50 MG 24 hr tablet TAKE 1 TAB BY MOUTH TWICE A DAY FOR HTN 60 tablet 0     order for DME Equipment being ordered: Nebulizer 1 each 0     polyethylene glycol (MIRALAX/GLYCOLAX) Packet Take 17 g by mouth daily        probiotic CAPS Take 1 capsule by mouth every evening        ranitidine (ZANTAC) 150 MG tablet Take 1 tablet (150 mg) by mouth 2 times daily 60 tablet 11     sodium chloride (BRONCHO SALINE) 0.9 % areosol solution Inhale 5 mLs into the lungs 3 times daily Use after albuterol in nebulizer to thin secretions 480 mL 6     sodium chloride 1 GM tablet Take 1 tablet (1 g) by mouth 3 times daily 100 tablet 11     SYMBICORT 160-4.5 MCG/ACT Inhaler INHALE TWO PUFFS INTO THE LUNGS TWICE DAILY 10.2 g 2     warfarin (COUMADIN) 1 MG tablet 2 mg on Mon; 1 mg all other days or as directed by the Anticoagulation Clinic 96 tablet 3     metoprolol succinate ER (TOPROL-XL) 25 MG 24 hr  "tablet TAKE TWO TABLETS BY MOUTH TWICE DAILY  (Patient not taking: Reported on 4/22/2019) 360 tablet 3     Allergies   Allergen Reactions     Cephalosporins Nausea     Cefzil     Doxycycline Nausea and Vomiting     Sulfa Drugs Nausea     Penicillins Rash     PCN     Labs reviewed in EPIC    ROS:  Review of Systems   Constitutional: Negative for chills, fever and malaise/fatigue.   HENT: Negative for congestion, ear pain and sore throat.    Respiratory: Negative for cough, shortness of breath and wheezing.    Cardiovascular: Negative for chest pain and palpitations.   Gastrointestinal: Negative for abdominal pain, diarrhea, nausea and vomiting.   Genitourinary: Positive for dysuria, frequency and urgency. Negative for flank pain and hematuria.   Skin: Negative for rash.         OBJECTIVE:     /72   Pulse 79   Temp 96.4  F (35.8  C) (Tympanic)   Resp 24   Ht 1.473 m (4' 10\")   Wt 56.2 kg (124 lb)   LMP 06/15/1985   SpO2 98%   BMI 25.92 kg/m    Body mass index is 25.92 kg/m .    Physical Exam   Constitutional: She appears well-developed and well-nourished. No distress.   Abdominal: There is no CVA tenderness.   Psychiatric: She has a normal mood and affect. Her behavior is normal.         Diagnostic Test Results:  Urinalysis - positive for UTI    ASSESSMENT/PLAN:     1. Acute cystitis without hematuria  Will treat with cipro two times daily x 5 days. Push fluids. Follow up with PCP if symptoms worsen or do not improve; otherwise follow up as needed.    - ciprofloxacin (CIPRO) 250 MG tablet; Take 1 tablet (250 mg) by mouth 2 times daily for 5 days  Dispense: 10 tablet; Refill: 0    2. Dysuria    - *UA reflex to Microscopic and Culture ([facility] (except [place] and [place])  - Urine Microscopic  - Urine Culture Aerobic Bacterial    3. Nonspecific finding on examination of urine      Brenda Easley PA-C  [facility]      "

## 2019-04-24 LAB
BACTERIA SPEC CULT: ABNORMAL
Lab: ABNORMAL
SPECIMEN SOURCE: ABNORMAL

## 2019-04-25 DIAGNOSIS — E87.1 HYPONATREMIA: ICD-10-CM

## 2019-04-25 RX ORDER — SODIUM CHLORIDE 1 G/1
TABLET ORAL
Qty: 100 TABLET | Refills: 10 | Status: SHIPPED | OUTPATIENT
Start: 2019-04-25 | End: 2020-05-26

## 2019-04-26 ENCOUNTER — ANTICOAGULATION THERAPY VISIT (OUTPATIENT)
Dept: ANTICOAGULATION | Facility: CLINIC | Age: 84
End: 2019-04-26
Payer: COMMERCIAL

## 2019-04-26 DIAGNOSIS — Z79.01 LONG TERM CURRENT USE OF ANTICOAGULANT THERAPY: ICD-10-CM

## 2019-04-26 LAB — INR POINT OF CARE: 1.8 (ref 0.86–1.14)

## 2019-04-26 PROCEDURE — 99207 ZZC NO CHARGE NURSE ONLY: CPT | Performed by: FAMILY MEDICINE

## 2019-04-26 PROCEDURE — 85610 PROTHROMBIN TIME: CPT | Mod: QW | Performed by: FAMILY MEDICINE

## 2019-04-26 PROCEDURE — 36416 COLLJ CAPILLARY BLOOD SPEC: CPT | Performed by: FAMILY MEDICINE

## 2019-04-26 NOTE — PROGRESS NOTES
ANTICOAGULATION FOLLOW-UP CLINIC VISIT    Patient Name:  Ellie Leigh  Date:  2019  Contact Type:  Face to Face    SUBJECTIVE:     Patient Findings     Positives:   Change in medications (on cipro for UTI. last dose is tomorrow)    Comments:   Patient states her UTI symptoms are improved but she is nauseous from the abx. This is a common side effect for her when on abx. She has tried taking with food and it does not help. INR still in range, despite being on cipro. Patient will continue current warfarin dose and would like to recheck on 19 as previously scheduled. No other changes or concerns.           OBJECTIVE    INR Protime   Date Value Ref Range Status   2019 1.8 (A) 0.86 - 1.14 Final       ASSESSMENT / PLAN  INR assessment THER    Recheck INR In: 4 WEEKS    INR Location Clinic      Anticoagulation Summary  As of 2019    INR goal:      TTR:   47.6 % (4.1 y)   Prior goal:   1.5-2.0   INR used for dosin.8 (2019)   Warfarin maintenance plan:   2 mg (1 mg x 2) every Mon; 1 mg (1 mg x 1) all other days   Full warfarin instructions:   2 mg every Mon; 1 mg all other days   Weekly warfarin total:   8 mg   No change documented:   Lissy Beaulieu RN   Plan last modified:   Gina Persaud RN (2019)   Next INR check:   2019   Priority:   INR   Target end date:   Indefinite    Indications    Atrial fibrillation with rapid ventricular response (H) (Resolved) [I48.91]  DVT (deep venous thrombosis) [I82.409]  Long term current use of anticoagulant therapy [Z79.01]             Anticoagulation Episode Summary     INR check location:       Preferred lab:       Send INR reminders to:   [facility]    Comments:   * Actual INR goal is 1.7-2.3  Taking Celebrex PRN         Anticoagulation Care Providers     Provider Role Specialty Phone number    Anjum Vidales MD [facility] [phone]            See the Encounter Report to view Anticoagulation  Flowsheet and Dosing Calendar (Go to Encounters tab in chart review, and find the Anticoagulation Therapy Visit)        Lissy Beaulieu RN

## 2019-04-27 NOTE — TELEPHONE ENCOUNTER
Reason for call:  Patient reporting a symptom    Symptom or request: Chest hurts, cough, back pain    Duration (how long have symptoms been present): 1 wk    Have you been treated for this before? Yes    Additional comments: Pt states Z pack and prednisone not helping. Pt reports headache,chest heaviness,back pain, no fever. Pt wondering if she has pneumonia.    Phone Number patient can be reached at:  Home number on file [phone] (home)    Best Time:  any    Can we leave a detailed message on this number:  YES    Call taken on 11/7/2017 at 8:13 AM by Kirsten Sanders     · 2 RN skin check complete with KIM Lopez.  · Devices in place ETT,right subclavian TL, cardiac leads, BP cuff, R radial art line, O2 probe, colón, peripheral IV x2, SCDs, bilateral restraints  · Skin assessed under devices yes.  · Confirmed pressure ulcers found on: N/A  · Skin abrasions on bilateral inner knees, skin tear on left 4 digit, flaky reddened skin on scalp  · The following interventions in place pillows, reposition medical devices, barrier cream, turn q2h

## 2019-04-29 NOTE — PATIENT INSTRUCTIONS
Thank you for choosing [facility].  You may be receiving a survey in the mail from [employer] regarding your visit today.  Please take a few minutes to complete and return the survey to let us know how we are doing.      If you have questions or concerns, please contact us via [employer] or you can contact your care team at [phone].    Our Clinic hours are:  Monday 6:40 am  to 7:00 pm  Tuesday -Friday 6:40 am to 5:00 pm    The [place] outpatient lab hours are:  Monday - Friday 6:10 am to 4:45 pm  Saturdays 7:00 am to 11:00 am  Appointments are required, call [phone]    If you have clinical questions after hours or would like to schedule an appointment,  call the clinic at [phone].      (J20.9) Acute bronchitis, unspecified organism  (primary encounter diagnosis)  Comment:   Plan: azithromycin (ZITHROMAX) 250 MG tablet        Take the med at 2 pills today with food and then one daily for the next 4 days. Call if any side effects.   Stay well hydrated. You may use the DM cough meds and elevate the head of the bed.     (J45.30) Mild persistent asthma without complication  Comment:   Plan: albuterol (PROAIR HFA/PROVENTIL HFA/VENTOLIN         HFA) 108 (90 Base) MCG/ACT Inhaler        Instructions given on diagnoses and identify and avoid triggers. The inhaler is refilled for one year.        Yes - the patient is able to be screened

## 2019-04-30 ENCOUNTER — OFFICE VISIT (OUTPATIENT)
Dept: FAMILY MEDICINE | Facility: CLINIC | Age: 84
End: 2019-04-30
Payer: COMMERCIAL

## 2019-04-30 VITALS
TEMPERATURE: 96.8 F | RESPIRATION RATE: 16 BRPM | BODY MASS INDEX: 25.46 KG/M2 | SYSTOLIC BLOOD PRESSURE: 138 MMHG | OXYGEN SATURATION: 95 % | WEIGHT: 121.8 LBS | DIASTOLIC BLOOD PRESSURE: 78 MMHG | HEART RATE: 80 BPM

## 2019-04-30 DIAGNOSIS — B37.31 YEAST INFECTION OF THE VAGINA: ICD-10-CM

## 2019-04-30 DIAGNOSIS — R30.0 DYSURIA: Primary | ICD-10-CM

## 2019-04-30 LAB
ALBUMIN UR-MCNC: NEGATIVE MG/DL
APPEARANCE UR: CLEAR
BILIRUB UR QL STRIP: NEGATIVE
COLOR UR AUTO: YELLOW
GLUCOSE UR STRIP-MCNC: NEGATIVE MG/DL
HGB UR QL STRIP: ABNORMAL
KETONES UR STRIP-MCNC: NEGATIVE MG/DL
LEUKOCYTE ESTERASE UR QL STRIP: NEGATIVE
NITRATE UR QL: NEGATIVE
NON-SQ EPI CELLS #/AREA URNS LPF: NORMAL /LPF
PH UR STRIP: 7 PH (ref 5–7)
RBC #/AREA URNS AUTO: NORMAL /HPF
SOURCE: ABNORMAL
SP GR UR STRIP: 1.02 (ref 1–1.03)
UROBILINOGEN UR STRIP-ACNC: 0.2 EU/DL (ref 0.2–1)
WBC #/AREA URNS AUTO: NORMAL /HPF

## 2019-04-30 PROCEDURE — 81001 URINALYSIS AUTO W/SCOPE: CPT | Performed by: FAMILY MEDICINE

## 2019-04-30 PROCEDURE — 99214 OFFICE O/P EST MOD 30 MIN: CPT | Performed by: FAMILY MEDICINE

## 2019-04-30 PROCEDURE — 87086 URINE CULTURE/COLONY COUNT: CPT | Performed by: FAMILY MEDICINE

## 2019-04-30 RX ORDER — FLUCONAZOLE 150 MG/1
150 TABLET ORAL ONCE
Qty: 1 TABLET | Refills: 0 | Status: SHIPPED | OUTPATIENT
Start: 2019-04-30 | End: 2019-05-20

## 2019-04-30 NOTE — PROGRESS NOTES
SUBJECTIVE:   Ellie Leigh is a 88 year old female who presents to clinic today for the following   health issues:      URINARY TRACT SYMPTOMS      Duration: yesterday    Description  dysuria, follow-up UTI and abdominal pain, nausea    Intensity:  moderate    Accompanying signs and symptoms:  Fever/chills: no   Flank pain no   Nausea and vomiting: YES- nausea  Vaginal symptoms: none  Abdominal/Pelvic Pain: YES    History  History of frequent UTI's: YES  History of kidney stones: no   Sexually Active: no   Possibility of pregnancy: No    Precipitating or alleviating factors: None    Therapies tried and outcome: course of antibiotics - Cipro   Outcome: improved x 2 days after completing antibiotics, now symptoms are back      Pt just started vaginal estrogen and was on antibiotic for UTI   Now with some burning after stream   Feels vaginal itching and burning at times    Feels like UTI is coming back       Additional history: as documented    Reviewed  and updated as needed this visit by clinical staff  Tobacco  Allergies  Meds  Problems  Med Hx  Surg Hx  Fam Hx  Soc Hx          Reviewed and updated as needed this visit by Provider  Tobacco  Allergies  Meds  Problems  Med Hx  Surg Hx  Fam Hx         Labs reviewed in EPIC    ROS:  Constitutional, HEENT, cardiovascular, pulmonary, gi and gu systems are negative, except as otherwise noted.    OBJECTIVE:                                                    /78 (BP Location: Right arm, Patient Position: Chair, Cuff Size: Adult Regular)   Pulse 80   Temp 96.8  F (36  C) (Tympanic)   Resp 16   Wt 55.2 kg (121 lb 12.8 oz)   LMP 06/15/1985   SpO2 95%   BMI 25.46 kg/m    Body mass index is 25.46 kg/m .  GENERAL APPEARANCE: healthy, alert and no distress  ABDOMEN: soft, nontender, without hepatosplenomegaly or masses and bowel sounds normal  PSYCH: mentation appears normal and affect normal/bright    UA neg UC pending      ASSESSMENT/PLAN:                                                     1. Dysuria  Suspect UC will be neg will not treat until UC is back   - *UA reflex to Microscopic and Culture ([facility] (except [place] and [place])  - Urine Microscopic    2. Yeast infection of the vagina  Suspect that this is yeast due to abx use   - fluconazole (DIFLUCAN) 150 MG tablet; Take 1 tablet (150 mg) by mouth once for 1 dose  Dispense: 1 tablet; Refill: 0      Patient Instructions   Will get urine culture and be sure that the infection is gone    You likely have yeast related to the antibiotic you took       You may hear from INR clinic regarding needing to get INR done sooner      Risks, benefits, side effects and rationale for treatment plan fully discussed with the patient and understanding expressed.     Josefina Gómez MD  [facility]

## 2019-04-30 NOTE — PATIENT INSTRUCTIONS
Will get urine culture and be sure that the infection is gone    You likely have yeast related to the antibiotic you took       You may hear from INR clinic regarding needing to get INR done sooner

## 2019-04-30 NOTE — Clinical Note
Pt given diflucan 150mg x1 dose today. Last INR 1.8 on 4/26 does she need to adjust or come in sooner for next INR?

## 2019-05-01 LAB
BACTERIA SPEC CULT: NORMAL
Lab: NORMAL
SPECIMEN SOURCE: NORMAL

## 2019-05-10 ENCOUNTER — OFFICE VISIT (OUTPATIENT)
Dept: FAMILY MEDICINE | Facility: CLINIC | Age: 84
End: 2019-05-10
Payer: COMMERCIAL

## 2019-05-10 VITALS
WEIGHT: 120 LBS | DIASTOLIC BLOOD PRESSURE: 56 MMHG | BODY MASS INDEX: 25.19 KG/M2 | HEIGHT: 58 IN | HEART RATE: 74 BPM | OXYGEN SATURATION: 97 % | TEMPERATURE: 96.8 F | SYSTOLIC BLOOD PRESSURE: 115 MMHG | RESPIRATION RATE: 24 BRPM

## 2019-05-10 DIAGNOSIS — R68.89 GENERAL ILL FEELING: ICD-10-CM

## 2019-05-10 DIAGNOSIS — R11.2 NAUSEA AND VOMITING, INTRACTABILITY OF VOMITING NOT SPECIFIED, UNSPECIFIED VOMITING TYPE: Primary | ICD-10-CM

## 2019-05-10 DIAGNOSIS — N30.00 ACUTE CYSTITIS WITHOUT HEMATURIA: ICD-10-CM

## 2019-05-10 LAB
ALBUMIN SERPL-MCNC: 3.6 G/DL (ref 3.4–5)
ALBUMIN UR-MCNC: ABNORMAL MG/DL
ALP SERPL-CCNC: 87 U/L (ref 40–150)
ALT SERPL W P-5'-P-CCNC: 26 U/L (ref 0–50)
ANION GAP SERPL CALCULATED.3IONS-SCNC: 4 MMOL/L (ref 3–14)
APPEARANCE UR: ABNORMAL
AST SERPL W P-5'-P-CCNC: 24 U/L (ref 0–45)
BACTERIA #/AREA URNS HPF: ABNORMAL /HPF
BASOPHILS # BLD AUTO: 0 10E9/L (ref 0–0.2)
BASOPHILS NFR BLD AUTO: 0.7 %
BILIRUB SERPL-MCNC: 0.3 MG/DL (ref 0.2–1.3)
BILIRUB UR QL STRIP: NEGATIVE
BUN SERPL-MCNC: 27 MG/DL (ref 7–30)
CALCIUM SERPL-MCNC: 9 MG/DL (ref 8.5–10.1)
CHLORIDE SERPL-SCNC: 96 MMOL/L (ref 94–109)
CO2 SERPL-SCNC: 28 MMOL/L (ref 20–32)
COLOR UR AUTO: YELLOW
CREAT SERPL-MCNC: 0.75 MG/DL (ref 0.52–1.04)
DIFFERENTIAL METHOD BLD: NORMAL
EOSINOPHIL # BLD AUTO: 0.1 10E9/L (ref 0–0.7)
EOSINOPHIL NFR BLD AUTO: 1.9 %
ERYTHROCYTE [DISTWIDTH] IN BLOOD BY AUTOMATED COUNT: 15 % (ref 10–15)
GFR SERPL CREATININE-BSD FRML MDRD: 71 ML/MIN/{1.73_M2}
GLUCOSE SERPL-MCNC: 99 MG/DL (ref 70–99)
GLUCOSE UR STRIP-MCNC: NEGATIVE MG/DL
HCT VFR BLD AUTO: 36.4 % (ref 35–47)
HGB BLD-MCNC: 12.1 G/DL (ref 11.7–15.7)
HGB UR QL STRIP: ABNORMAL
HYALINE CASTS #/AREA URNS LPF: ABNORMAL /LPF
KETONES UR STRIP-MCNC: NEGATIVE MG/DL
LEUKOCYTE ESTERASE UR QL STRIP: ABNORMAL
LYMPHOCYTES # BLD AUTO: 1.9 10E9/L (ref 0.8–5.3)
LYMPHOCYTES NFR BLD AUTO: 32.1 %
MCH RBC QN AUTO: 27.8 PG (ref 26.5–33)
MCHC RBC AUTO-ENTMCNC: 33.2 G/DL (ref 31.5–36.5)
MCV RBC AUTO: 84 FL (ref 78–100)
MONOCYTES # BLD AUTO: 0.8 10E9/L (ref 0–1.3)
MONOCYTES NFR BLD AUTO: 12.9 %
NEUTROPHILS # BLD AUTO: 3.1 10E9/L (ref 1.6–8.3)
NEUTROPHILS NFR BLD AUTO: 52.4 %
NITRATE UR QL: NEGATIVE
NON-SQ EPI CELLS #/AREA URNS LPF: ABNORMAL /LPF
PH UR STRIP: 7 PH (ref 5–7)
PLATELET # BLD AUTO: 271 10E9/L (ref 150–450)
POTASSIUM SERPL-SCNC: 4.3 MMOL/L (ref 3.4–5.3)
PROT SERPL-MCNC: 6.9 G/DL (ref 6.8–8.8)
RBC # BLD AUTO: 4.35 10E12/L (ref 3.8–5.2)
RBC #/AREA URNS AUTO: ABNORMAL /HPF
SODIUM SERPL-SCNC: 128 MMOL/L (ref 133–144)
SOURCE: ABNORMAL
SP GR UR STRIP: 1.02 (ref 1–1.03)
UROBILINOGEN UR STRIP-ACNC: 0.2 EU/DL (ref 0.2–1)
WBC # BLD AUTO: 5.9 10E9/L (ref 4–11)
WBC #/AREA URNS AUTO: ABNORMAL /HPF

## 2019-05-10 PROCEDURE — 87086 URINE CULTURE/COLONY COUNT: CPT | Performed by: PHYSICIAN ASSISTANT

## 2019-05-10 PROCEDURE — 85025 COMPLETE CBC W/AUTO DIFF WBC: CPT | Performed by: PHYSICIAN ASSISTANT

## 2019-05-10 PROCEDURE — 36415 COLL VENOUS BLD VENIPUNCTURE: CPT | Performed by: PHYSICIAN ASSISTANT

## 2019-05-10 PROCEDURE — 99214 OFFICE O/P EST MOD 30 MIN: CPT | Performed by: PHYSICIAN ASSISTANT

## 2019-05-10 PROCEDURE — 80053 COMPREHEN METABOLIC PANEL: CPT | Performed by: PHYSICIAN ASSISTANT

## 2019-05-10 PROCEDURE — 81001 URINALYSIS AUTO W/SCOPE: CPT | Performed by: PHYSICIAN ASSISTANT

## 2019-05-10 PROCEDURE — 93000 ELECTROCARDIOGRAM COMPLETE: CPT | Performed by: PHYSICIAN ASSISTANT

## 2019-05-10 RX ORDER — ONDANSETRON 4 MG/1
4 TABLET, ORALLY DISINTEGRATING ORAL EVERY 8 HOURS PRN
Qty: 20 TABLET | Refills: 1 | Status: SHIPPED | OUTPATIENT
Start: 2019-05-10 | End: 2020-10-02

## 2019-05-10 RX ORDER — CIPROFLOXACIN 250 MG/1
250 TABLET, FILM COATED ORAL 2 TIMES DAILY
Qty: 6 TABLET | Refills: 0 | Status: SHIPPED | OUTPATIENT
Start: 2019-05-10 | End: 2019-05-20

## 2019-05-10 ASSESSMENT — ENCOUNTER SYMPTOMS
NAUSEA: 1
MYALGIAS: 0
EYE DISCHARGE: 0
HEADACHES: 0
CHILLS: 0
SHORTNESS OF BREATH: 0
FEVER: 0
ABDOMINAL PAIN: 0
SORE THROAT: 0
EYE REDNESS: 0
PALPITATIONS: 0
COUGH: 0
DIARRHEA: 0
VOMITING: 0
BLURRED VISION: 0
WHEEZING: 0

## 2019-05-10 ASSESSMENT — MIFFLIN-ST. JEOR: SCORE: 864.07

## 2019-05-10 NOTE — PROGRESS NOTES
SUBJECTIVE:   Ellie Leigh is a 88 year old female who presents to clinic today for the following   health issues:        Nausea       Duration: 2 weeks     Description (location/character/radiation): Nausea     Intensity:  moderate    Accompanying signs and symptoms: fatigue, nausea, sometimes feels like she is going to vomit, does not know if something is wrong with her stomach. No diarrhea. No abdominal pain. Feels bloated sometimes. No gas. Sometimes has back pain. No dysuria. Patient has a hard time sleeping at night.     History (similar episodes/previous evaluation): just had UTI on 4/22/19 and yeast infection on 4/30/19     Precipitating or alleviating factors: None    Therapies tried and outcome: gingerale, 7up, bland foods     Daughter wonders if it related to taking levothyroxine on an empty stomach in the morning. Ellie has complained about that causing her nausea in the past. No pain anywhere. No URI symptoms. No UTI symptoms.       Additional history: as documented    Reviewed  and updated as needed this visit by clinical staff  Tobacco  Allergies  Meds  Problems  Med Hx  Surg Hx  Fam Hx  Soc Hx          Reviewed and updated as needed this visit by Provider  Tobacco  Allergies  Meds  Problems  Med Hx  Surg Hx  Fam Hx         Patient Active Problem List   Diagnosis     Sensorineural hearing loss, asymmetrical     Generalized osteoarthrosis, unspecified site     Disease of lung     PERSONAL HISTORY OF MALIGNANCY- BREAST     Esophageal reflux     Chronic allergic conjunctivitis     Chronic seasonal allergic rhinitis     Hyperlipidemia LDL goal <130     Chronic constipation     Osteoporosis     Health Care Home     Right arm weakness     Ulnar neuropathy     Headache     Mild major depression     DVT (deep venous thrombosis)     Anxiety     Cervical vertebral fracture (H)     Intermittent atrial fibrillation (H)     Squamous cell carcinoma in situ of skin of face     SK (seborrheic  keratosis)     Hypothyroidism     Hyponatremia     Recurrent UTI     History of skin cancer     BPPV (benign paroxysmal positional vertigo)     Benign essential HTN     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)     Positive fecal occult blood test     COPD (chronic obstructive pulmonary disease) (H)     Infectious colitis, enteritis and gastroenteritis     Advance Care Planning     Syncope     Hemoptysis     Long term current use of anticoagulant therapy     Mild persistent asthma without complication     Irritable bowel syndrome without diarrhea     Nausea     Back pain     H/O TB (tuberculosis)     Chest pain     Past Surgical History:   Procedure Laterality Date     APPENDECTOMY       ARTHROSCOPY KNEE  4/15/2011    Procedure:ARTHROSCOPY KNEE; removal of loose body; Surgeon:LEY, JEFFREY DUANE; Location:[facility]     CHOLECYSTECTOMY       ESOPHAGOSCOPY, GASTROSCOPY, DUODENOSCOPY (EGD), COMBINED  6/9/2014    Procedure: COMBINED ESOPHAGOSCOPY, GASTROSCOPY, DUODENOSCOPY (EGD);  Surgeon: Gilberto Richard MD;  Location: [facility]     IMPLANT PACEMAKER  5/21/2013    Biotronik Moderl [id number] Serial#[id number]     INJECT EPIDURAL LUMBAR  3/23/2011    INJECT EPIDURAL LUMBAR performed by GENERIC ANESTHESIA PROVIDER at [facility]     JOINT REPLACEMENT, HIP RT/LT      Joint Replacement Hip Rt     MASTECTOMY, SIMPLE RT/LT/CAITLYN      Left breast - following breast ca     OTHER SURGICAL HISTORY      C1-C2 fusion after fx      SURGICAL HISTORY OF -   05/22/2001    Colonoscopy     TONSILLECTOMY         Social History     Tobacco Use     Smoking status: Passive Smoke Exposure - Never Smoker     Smokeless tobacco: Never Used   Substance Use Topics     Alcohol use: No     Family History   Problem Relation Age of Onset     Cerebrovascular Disease Mother      Heart Disease Mother         MI     Cerebrovascular Disease Father      Heart Disease Father         MI     Respiratory Maternal Grandfather         TB     Neurologic Disorder Brother          ALS     Heart Disease Brother      Cerebrovascular Disease Brother      Breast Cancer Daughter         age:49     Asthma Brother      Cancer Brother         brain and lung     Cancer Daughter         thyroid         Current Outpatient Medications   Medication Sig Dispense Refill     Acetaminophen (TYLENOL PO) Take  mg by mouth every 8 hours as needed        albuterol (PROAIR HFA/PROVENTIL HFA/VENTOLIN HFA) 108 (90 Base) MCG/ACT Inhaler Inhale 2 puffs into the lungs every 6 hours as needed for shortness of breath / dyspnea 1 Inhaler 11     albuterol (PROVENTIL) (2.5 MG/3ML) 0.083% neb solution Take 1 vial (2.5 mg) by nebulization 3 times daily 360 mL 6     ciprofloxacin (CIPRO) 250 MG tablet Take 1 tablet (250 mg) by mouth 2 times daily for 3 days 6 tablet 0     CRANBERRY Take 475 mg by mouth 2 times daily.       estradiol (ESTRACE VAGINAL) 0.1 MG/GM vaginal cream Place 2 g vaginally three times a week 42 g 3     fluticasone (FLONASE) 50 MCG/ACT spray Spray 2 sprays into both nostrils daily 1 Bottle 11     ipratropium - albuterol 0.5 mg/2.5 mg/3 mL (DUONEB) 0.5-2.5 (3) MG/3ML neb solution TAKE 1 VIAL BY NEBULIZATION  EVERY SIX HOURS AS NEEDED FOR SHORTNESS OF BREATH/ DYSPNEA OR WHEEZING 180 mL 11     levothyroxine (SYNTHROID/LEVOTHROID) 50 MCG tablet TAKE ONE TABLET BY MOUTH ONE TIME DAILY  90 tablet 1     metoprolol succinate ER (TOPROL-XL) 25 MG 24 hr tablet TAKE TWO TABLETS BY MOUTH TWICE DAILY  360 tablet 3     ondansetron (ZOFRAN-ODT) 4 MG ODT tab Take 1 tablet (4 mg) by mouth every 8 hours as needed for nausea 20 tablet 1     order for DME Equipment being ordered: Nebulizer 1 each 0     polyethylene glycol (MIRALAX/GLYCOLAX) Packet Take 17 g by mouth daily        probiotic CAPS Take 1 capsule by mouth every evening        ranitidine (ZANTAC) 150 MG tablet Take 1 tablet (150 mg) by mouth 2 times daily 60 tablet 11     sodium chloride (BRONCHO SALINE) 0.9 % areosol solution Inhale 5 mLs into the lungs 3  "times daily Use after albuterol in nebulizer to thin secretions 480 mL 6     sodium chloride 1 GM tablet TAKE ONE TABLET BY MOUTH THREE TIMES DAILY  100 tablet 10     SYMBICORT 160-4.5 MCG/ACT Inhaler INHALE TWO PUFFS INTO THE LUNGS TWICE DAILY 10.2 g 2     warfarin (COUMADIN) 1 MG tablet 2 mg on Mon; 1 mg all other days or as directed by the Anticoagulation Clinic 96 tablet 3     Allergies   Allergen Reactions     Cephalosporins Nausea     Cefzil     Doxycycline Nausea and Vomiting     Sulfa Drugs Nausea     Penicillins Rash     PCN     Labs reviewed in EPIC    ROS:  Review of Systems   Constitutional: Negative for chills, fever and malaise/fatigue.        Just doesn't feel well   HENT: Negative for congestion, ear pain and sore throat.    Eyes: Negative for blurred vision, discharge and redness.   Respiratory: Negative for cough, shortness of breath and wheezing.    Cardiovascular: Negative for chest pain and palpitations.   Gastrointestinal: Positive for nausea. Negative for abdominal pain, diarrhea and vomiting.   Musculoskeletal: Negative for joint pain and myalgias.   Skin: Negative for rash.   Neurological: Negative for headaches.         OBJECTIVE:     /56   Pulse 74   Temp 96.8  F (36  C) (Tympanic)   Resp 24   Ht 1.473 m (4' 10\")   Wt 54.4 kg (120 lb)   LMP 06/15/1985   SpO2 97%   BMI 25.08 kg/m    Body mass index is 25.08 kg/m .    Physical Exam   Constitutional: She appears well-developed and well-nourished.   HENT:   Head: Normocephalic.   Right Ear: Tympanic membrane and ear canal normal.   Left Ear: Tympanic membrane and ear canal normal.   Mouth/Throat: Oropharynx is clear and moist.   Eyes: Pupils are equal, round, and reactive to light. Conjunctivae are normal.   Cardiovascular: Normal rate and normal heart sounds.   Pulmonary/Chest: Effort normal and breath sounds normal.   Abdominal: Soft. Normal appearance and bowel sounds are normal. There is no tenderness.   Skin: Skin is warm " and dry. No rash noted.   Psychiatric: She has a normal mood and affect. Her behavior is normal.         Diagnostic Test Results:  CBC - within normal limits   CMP- pending   UA- trace blood and small leukocyte esterase     ASSESSMENT/PLAN:         1. Nausea and vomiting, intractability of vomiting not specified, unspecified vomiting type  Can try taking levothyroxine with food to see if it helps. Prescribed zofran every 8-12hrs as needed. She has follow up appointment with primary care provider in 10 days. Return to clinic sooner if needed.     - CBC with platelets differential  - Comprehensive metabolic panel  - UA reflex to Microscopic and Culture  - EKG 12-lead complete w/read - Clinics  - Urine Microscopic  - ondansetron (ZOFRAN-ODT) 4 MG ODT tab; Take 1 tablet (4 mg) by mouth every 8 hours as needed for nausea  Dispense: 20 tablet; Refill: 1    2. Acute cystitis without hematuria  Culture pending. Prescribed cipro x 3 days that she can start if symptoms worsen or do not improve.     - ciprofloxacin (CIPRO) 250 MG tablet; Take 1 tablet (250 mg) by mouth 2 times daily for 3 days  Dispense: 6 tablet; Refill: 0  - Urine Culture Aerobic Bacterial    3. General ill feeling    - CBC with platelets differential  - Comprehensive metabolic panel  - UA reflex to Microscopic and Culture  - EKG 12-lead complete w/read - Clinics     Brenda Easley PA-C  [facility]

## 2019-05-11 LAB
BACTERIA SPEC CULT: NO GROWTH
Lab: NORMAL
SPECIMEN SOURCE: NORMAL

## 2019-05-20 ENCOUNTER — OFFICE VISIT (OUTPATIENT)
Dept: FAMILY MEDICINE | Facility: CLINIC | Age: 84
End: 2019-05-20
Payer: COMMERCIAL

## 2019-05-20 VITALS
HEIGHT: 58 IN | TEMPERATURE: 97.6 F | RESPIRATION RATE: 20 BRPM | OXYGEN SATURATION: 97 % | HEART RATE: 76 BPM | DIASTOLIC BLOOD PRESSURE: 70 MMHG | BODY MASS INDEX: 25.82 KG/M2 | SYSTOLIC BLOOD PRESSURE: 144 MMHG | WEIGHT: 123 LBS

## 2019-05-20 DIAGNOSIS — K21.9 GASTROESOPHAGEAL REFLUX DISEASE WITHOUT ESOPHAGITIS: ICD-10-CM

## 2019-05-20 DIAGNOSIS — E03.9 HYPOTHYROIDISM, UNSPECIFIED TYPE: Chronic | ICD-10-CM

## 2019-05-20 DIAGNOSIS — R11.0 NAUSEA: Primary | ICD-10-CM

## 2019-05-20 LAB — TSH SERPL DL<=0.005 MIU/L-ACNC: 2.47 MU/L (ref 0.4–4)

## 2019-05-20 PROCEDURE — 99207 C PAF COMPLETED  NO CHARGE: CPT | Performed by: FAMILY MEDICINE

## 2019-05-20 PROCEDURE — 36415 COLL VENOUS BLD VENIPUNCTURE: CPT | Performed by: FAMILY MEDICINE

## 2019-05-20 PROCEDURE — 99213 OFFICE O/P EST LOW 20 MIN: CPT | Performed by: FAMILY MEDICINE

## 2019-05-20 PROCEDURE — 84443 ASSAY THYROID STIM HORMONE: CPT | Performed by: FAMILY MEDICINE

## 2019-05-20 RX ORDER — OMEPRAZOLE 40 MG/1
40 CAPSULE, DELAYED RELEASE ORAL DAILY
Qty: 30 CAPSULE | Refills: 1 | Status: SHIPPED | OUTPATIENT
Start: 2019-05-20 | End: 2019-07-13

## 2019-05-20 ASSESSMENT — ASTHMA QUESTIONNAIRES
QUESTION_5 LAST FOUR WEEKS HOW WOULD YOU RATE YOUR ASTHMA CONTROL: WELL CONTROLLED
ACUTE_EXACERBATION_TODAY: NO
QUESTION_4 LAST FOUR WEEKS HOW OFTEN HAVE YOU USED YOUR RESCUE INHALER OR NEBULIZER MEDICATION (SUCH AS ALBUTEROL): ONE OR TWO TIMES PER DAY
QUESTION_1 LAST FOUR WEEKS HOW MUCH OF THE TIME DID YOUR ASTHMA KEEP YOU FROM GETTING AS MUCH DONE AT WORK, SCHOOL OR AT HOME: NONE OF THE TIME
QUESTION_3 LAST FOUR WEEKS HOW OFTEN DID YOUR ASTHMA SYMPTOMS (WHEEZING, COUGHING, SHORTNESS OF BREATH, CHEST TIGHTNESS OR PAIN) WAKE YOU UP AT NIGHT OR EARLIER THAN USUAL IN THE MORNING: NOT AT ALL
ACT_TOTALSCORE: 21
QUESTION_2 LAST FOUR WEEKS HOW OFTEN HAVE YOU HAD SHORTNESS OF BREATH: NOT AT ALL

## 2019-05-20 ASSESSMENT — MIFFLIN-ST. JEOR: SCORE: 877.67

## 2019-05-20 NOTE — PATIENT INSTRUCTIONS
ASSESSMENT:   (R11.0) Nausea  (primary encounter diagnosis)  Comment: uncertain cause.  Could be related to gastroesophageal reflux disease.  Currently on ranitidine.  Has had gastroscopy in 2014 abnormal.   Plan:   Stop the ranitidine.    Take omeprazole 40mg daily instead.   Take 30-60 minutes before eating in the AM.   See if you notice a benefit in the next couple weeks.  If not helping, we could order another gastroscopy or xray to check stomach.   It is OK to take the ondansetron if needed for nausea.  It could be taken up to three times daily as needed.  Let me know if you need more.     (K21.9) Gastroesophageal reflux disease without esophagitis  Comment:   Plan: omeprazole (PRILOSEC) 40 MG DR capsule        see above     (E03.9) Hypothyroidism, unspecified type  Comment: due for recheck  Plan: Blood test for thyroid today.

## 2019-05-20 NOTE — PROGRESS NOTES
Ellie Leigh is a 88 year old female who presents to clinic today for the following health issues:     Nausea and follow up on labs      Duration: years    Description (location/character/radiation): nausea comes and goes    Intensity:  mild, moderate, severe    Accompanying signs and symptoms: no vomiting    History (similar episodes/previous evaluation): yes    Precipitating or alleviating factors:     Therapies tried and outcome: Zofran and switching how she takes her medications.     She was seen in clinic on May 10 for nausea of couple weeks duration.  She also generally was just not feeling well.  She had a urinalysis with 5-10 WBCs but urine culture showed no growth.  A comprehensive medical panel showed sodium of 128.  It was otherwise unremarkable.  A CBC was normal.  Nausea most days.    Often feeling full.  Nausea seems worse if not eating much.   Appetite OK.     Currently having nausea for months.  She has had in the past but seems worse.  Makes her feel poorly when nauseated.  Does not bother her at night.   Most of the day, worse in the AM.   She takes ondansetron once daily.  Helps some.    No heartburn.  No waterbrash.    Eats three meals a day.    No emesis.    Bowels OK.  Takes polyethylene glycol (Miralax) daily.   On ranitidine twice daily.  Has not tried off of this medication.     Patient Active Problem List   Diagnosis     Sensorineural hearing loss, asymmetrical     Generalized osteoarthrosis, unspecified site     PERSONAL HISTORY OF MALIGNANCY- BREAST     Esophageal reflux     Chronic allergic conjunctivitis     Chronic seasonal allergic rhinitis     Hyperlipidemia LDL goal <130     Chronic constipation     Osteoporosis     Health Care Home     Right arm weakness     Ulnar neuropathy     Headache     Mild major depression     DVT (deep venous thrombosis)     Anxiety     Cervical vertebral fracture (H)     Intermittent atrial fibrillation (H)     Squamous cell carcinoma in situ of skin of  "face     SK (seborrheic keratosis)     Hypothyroidism     Hyponatremia     Recurrent UTI     History of skin cancer     BPPV (benign paroxysmal positional vertigo)     Benign essential HTN     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)     Positive fecal occult blood test     COPD (chronic obstructive pulmonary disease) (H)     Infectious colitis, enteritis and gastroenteritis     Advance Care Planning     Syncope     Long term current use of anticoagulant therapy     Mild persistent asthma without complication     Irritable bowel syndrome without diarrhea     Nausea     Back pain     H/O TB (tuberculosis)     Chest pain      ROS:General: POSITIVE for:, low energy.  Does not sleep well at times. Weight has not changed.  Up a few pounds in the past year.   Resp: POSITIVE for:, Hx COPD, seems to be doing well.  ON Duonebs three times daily and Symbicort 2 puffs twice daily.   CV: No chest pains or palpitations  GI: see above   : No urinary frequency or dysuria, bladder or kidney problems  Musculoskeletal: POSITIVE  for:, pain in both knees.  Sometimes pain in arms, right posterior shoulders.   Neurologic: POSITIVE for:, headaches once in a while   Psychiatric: No problems with anxiety, depression or mental health, \"not too bad\".  Denies depression.     OBJECTIVE:Blood pressure 144/70, pulse 76, temperature 97.6  F (36.4  C), temperature source Tympanic, resp. rate 20, height 1.473 m (4' 10\"), weight 55.8 kg (123 lb), last menstrual period 06/15/1985, SpO2 97 %, not currently breastfeeding. BMI=Body mass index is 25.71 kg/m .  GENERAL APPEARANCE ADULT: Alert, no acute distress, walks with a walker  HENT: Ears and TMs normal, oral mucosa and posterior oropharynx normal, several teeth missing  NECK: No adenopathy,masses or thyromegaly  RESP: lungs clear to auscultation   CV: normal rate, regular rhythm, no murmur or gallop  ABDOMEN: soft, no organomegaly, masses or tenderness  MS: extremities normal, no " peripheral edema     ASSESSMENT:   (R11.0) Nausea  (primary encounter diagnosis)  Comment: uncertain cause.  Could be related to gastroesophageal reflux disease.  Currently on ranitidine.  Has had gastroscopy in 2014 abnormal.   Plan:   Stop the ranitidine.    Take omeprazole 40mg daily instead.   Take 30-60 minutes before eating in the AM.   See if you notice a benefit in the next couple weeks.  If not helping, we could order another gastroscopy or xray to check stomach.   It is OK to take the ondansetron if needed for nausea.  It could be taken up to three times daily as needed.  Let me know if you need more.     (K21.9) Gastroesophageal reflux disease without esophagitis  Comment:   Plan: omeprazole (PRILOSEC) 40 MG DR capsule        see above     (E03.9) Hypothyroidism, unspecified type  Comment: due for recheck  Plan: Blood test for thyroid today.

## 2019-05-21 ASSESSMENT — ASTHMA QUESTIONNAIRES: ACT_TOTALSCORE: 21

## 2019-05-29 ENCOUNTER — ANTICOAGULATION THERAPY VISIT (OUTPATIENT)
Dept: ANTICOAGULATION | Facility: CLINIC | Age: 84
End: 2019-05-29
Payer: COMMERCIAL

## 2019-05-29 ENCOUNTER — TELEPHONE (OUTPATIENT)
Dept: ANTICOAGULATION | Facility: CLINIC | Age: 84
End: 2019-05-29

## 2019-05-29 DIAGNOSIS — I82.409 DVT (DEEP VENOUS THROMBOSIS) (H): ICD-10-CM

## 2019-05-29 DIAGNOSIS — Z79.01 LONG TERM CURRENT USE OF ANTICOAGULANT THERAPY: ICD-10-CM

## 2019-05-29 LAB — INR POINT OF CARE: 2.4 (ref 0.86–1.14)

## 2019-05-29 PROCEDURE — 85610 PROTHROMBIN TIME: CPT | Mod: QW

## 2019-05-29 PROCEDURE — 36416 COLLJ CAPILLARY BLOOD SPEC: CPT

## 2019-05-29 PROCEDURE — 99207 ZZC NO CHARGE NURSE ONLY: CPT

## 2019-05-29 NOTE — TELEPHONE ENCOUNTER
Dr Vidales,     Per the new [facility] protocol, patient's will need a goal range of 2.0-3.0 or 2.5-3.5 in order to be monitored by [facility]. Currently the patient's goal range is 1.7-2.3. This was decreased in January of 2015 due to the patient coughing up blood. Is it acceptable to change the INR goal range to 2.0-3.0?    Ruiz Meredith RN, BSN, PHN  Anticoagulation Clinic   [phone]

## 2019-05-29 NOTE — PROGRESS NOTES
Addendum: Goal range updated to 2-3 per PCP.    Lissy Beaulieu RN, BSN, PHN  2019  ANTICOAGULATION FOLLOW-UP CLINIC VISIT    Patient Name:  Ellie Leigh  Date:  2019  Contact Type:  Face to Face/Daughter    SUBJECTIVE:  Patient Findings     Comments:   No changes in medications, activity, health, or diet noted. No bleeding or increased bruising noted. Took warfarin as prescribed.  Patient will continue weekly maintenance dose. INR is therapeutic.   Recheck in 4 weeks.   Patient verbalizes understanding and agrees to plan. No further questions or concerns.          Clinical Outcomes     Negatives:   Major bleeding event, Thromboembolic event, Anticoagulation-related hospital admission, Anticoagulation-related ED visit, Anticoagulation-related fatality    Comments:   No changes in medications, activity, health, or diet noted. No bleeding or increased bruising noted. Took warfarin as prescribed.  Patient will continue weekly maintenance dose. INR is therapeutic.   Recheck in 4 weeks.   Patient verbalizes understanding and agrees to plan. No further questions or concerns.             OBJECTIVE    INR Protime   Date Value Ref Range Status   2019 2.4 (A) 0.86 - 1.14 Final       ASSESSMENT / PLAN  INR assessment THER    Recheck INR In: 4 WEEKS    INR Location Clinic      Anticoagulation Summary  As of 2019    INR goal:      TTR:   47.3 % (4.2 y)   Prior goal:   1.5-2.0   INR used for dosin.4! (2019)   Warfarin maintenance plan:   2 mg (1 mg x 2) every Mon; 1 mg (1 mg x 1) all other days   Full warfarin instructions:   2 mg every Mon; 1 mg all other days   Weekly warfarin total:   8 mg   No change documented:   Ruiz Meredith RN   Plan last modified:   Gina Persaud RN (2019)   Next INR check:   2019   Priority:   INR   Target end date:   Indefinite    Indications    Atrial fibrillation with rapid ventricular response (H) (Resolved) [I48.91]  DVT (deep venous  thrombosis) [I82.409]  Long term current use of anticoagulant therapy [Z79.01]             Anticoagulation Episode Summary     INR check location:       Preferred lab:       Send INR reminders to:   [facility] POOL    Comments:   * Actual INR goal is 1.7-2.3  Taking Celebrex PRN         Anticoagulation Care Providers     Provider Role Specialty Phone number    Anjum Vidales MD [facility] [phone]            See the Encounter Report to view Anticoagulation Flowsheet and Dosing Calendar (Go to Encounters tab in chart review, and find the Anticoagulation Therapy Visit)        Ruiz Meredith RN

## 2019-06-25 ENCOUNTER — ANTICOAGULATION THERAPY VISIT (OUTPATIENT)
Dept: ANTICOAGULATION | Facility: CLINIC | Age: 84
End: 2019-06-25
Payer: COMMERCIAL

## 2019-06-25 ENCOUNTER — HOSPITAL ENCOUNTER (OUTPATIENT)
Dept: CARDIOLOGY | Facility: CLINIC | Age: 84
Discharge: HOME | End: 2019-06-25
Attending: INTERNAL MEDICINE | Admitting: INTERNAL MEDICINE
Payer: COMMERCIAL

## 2019-06-25 DIAGNOSIS — Z79.01 LONG TERM CURRENT USE OF ANTICOAGULANT THERAPY: ICD-10-CM

## 2019-06-25 DIAGNOSIS — R55 SYNCOPE: ICD-10-CM

## 2019-06-25 DIAGNOSIS — I82.409 DVT (DEEP VENOUS THROMBOSIS) (H): ICD-10-CM

## 2019-06-25 LAB — INR POINT OF CARE: 1.7 (ref 0.86–1.14)

## 2019-06-25 PROCEDURE — 36416 COLLJ CAPILLARY BLOOD SPEC: CPT

## 2019-06-25 PROCEDURE — 93280 PM DEVICE PROGR EVAL DUAL: CPT | Mod: 26 | Performed by: INTERNAL MEDICINE

## 2019-06-25 PROCEDURE — 85610 PROTHROMBIN TIME: CPT | Mod: QW

## 2019-06-25 PROCEDURE — 99207 ZZC NO CHARGE NURSE ONLY: CPT

## 2019-06-25 PROCEDURE — 93280 PM DEVICE PROGR EVAL DUAL: CPT

## 2019-06-25 NOTE — PROGRESS NOTES
ANTICOAGULATION FOLLOW-UP CLINIC VISIT    Patient Name:  Ellie Leigh  Date:  2019  Contact Type:  Face to Face    SUBJECTIVE:  Patient Findings     Comments:   No changes in medications, activity, health, or diet noted. No bleeding or increased bruising noted. Took warfarin as prescribed.  Patient had 8 mg in the last 7 days. Writer increased dose to 9 mg.   Recheck the INR in 2 weeks.   Patient verbalizes understanding and agrees to plan. No further questions or concerns.  Denies S/S clotting.        Clinical Outcomes     Negatives:   Major bleeding event, Thromboembolic event, Anticoagulation-related hospital admission, Anticoagulation-related ED visit, Anticoagulation-related fatality    Comments:   No changes in medications, activity, health, or diet noted. No bleeding or increased bruising noted. Took warfarin as prescribed.  Patient had 8 mg in the last 7 days. Writer increased dose to 9 mg.   Recheck the INR in 2 weeks.   Patient verbalizes understanding and agrees to plan. No further questions or concerns.  Denies S/S clotting.           OBJECTIVE    INR Protime   Date Value Ref Range Status   2019 1.7 (A) 0.86 - 1.14 Final       ASSESSMENT / PLAN  INR assessment SUB    Recheck INR In: 2 WEEKS    INR Location Clinic      Anticoagulation Summary  As of 2019    INR goal:   2.0-3.0   TTR:   47.5 % (4.3 y)   INR used for dosin.7! (2019)   Warfarin maintenance plan:   2 mg (1 mg x 2) every Mon, Fri; 1 mg (1 mg x 1) all other days   Full warfarin instructions:   2 mg every Mon, Fri; 1 mg all other days   Weekly warfarin total:   9 mg   Plan last modified:   Ruiz Meredith RN (2019)   Next INR check:   2019   Priority:   INR   Target end date:   Indefinite    Indications    Atrial fibrillation with rapid ventricular response (H) (Resolved) [I48.91]  DVT (deep venous thrombosis) [I82.409]  Long term current use of anticoagulant therapy [Z79.01]              Anticoagulation Episode Summary     INR check location:       Preferred lab:       Send INR reminders to:   GARRY BARRY    Comments:   * Taking Celebrex PRN         Anticoagulation Care Providers     Provider Role Specialty Phone number    Anjum Vidales MD [facility] [phone]            See the Encounter Report to view Anticoagulation Flowsheet and Dosing Calendar (Go to Encounters tab in chart review, and find the Anticoagulation Therapy Visit)        Ruiz Meredith RN

## 2019-07-03 ENCOUNTER — OFFICE VISIT (OUTPATIENT)
Dept: FAMILY MEDICINE | Facility: CLINIC | Age: 84
End: 2019-07-03
Payer: COMMERCIAL

## 2019-07-03 VITALS
RESPIRATION RATE: 20 BRPM | OXYGEN SATURATION: 94 % | WEIGHT: 120 LBS | BODY MASS INDEX: 25.08 KG/M2 | TEMPERATURE: 98 F | HEART RATE: 65 BPM | SYSTOLIC BLOOD PRESSURE: 136 MMHG | DIASTOLIC BLOOD PRESSURE: 66 MMHG

## 2019-07-03 DIAGNOSIS — J45.30 MILD PERSISTENT ASTHMA WITHOUT COMPLICATION: ICD-10-CM

## 2019-07-03 DIAGNOSIS — N30.01 ACUTE CYSTITIS WITH HEMATURIA: Primary | ICD-10-CM

## 2019-07-03 DIAGNOSIS — R05.9 COUGH: ICD-10-CM

## 2019-07-03 DIAGNOSIS — R82.90 NONSPECIFIC FINDING ON EXAMINATION OF URINE: ICD-10-CM

## 2019-07-03 DIAGNOSIS — R35.0 URINARY FREQUENCY: ICD-10-CM

## 2019-07-03 LAB
ALBUMIN UR-MCNC: NEGATIVE MG/DL
APPEARANCE UR: ABNORMAL
BACTERIA #/AREA URNS HPF: ABNORMAL /HPF
BILIRUB UR QL STRIP: NEGATIVE
COLOR UR AUTO: YELLOW
GLUCOSE UR STRIP-MCNC: NEGATIVE MG/DL
HGB UR QL STRIP: ABNORMAL
KETONES UR STRIP-MCNC: NEGATIVE MG/DL
LEUKOCYTE ESTERASE UR QL STRIP: ABNORMAL
MUCOUS THREADS #/AREA URNS LPF: PRESENT /LPF
NITRATE UR QL: POSITIVE
NON-SQ EPI CELLS #/AREA URNS LPF: ABNORMAL /LPF
PH UR STRIP: 6 PH (ref 5–7)
RBC #/AREA URNS AUTO: ABNORMAL /HPF
SOURCE: ABNORMAL
SP GR UR STRIP: 1.02 (ref 1–1.03)
UROBILINOGEN UR STRIP-ACNC: 0.2 EU/DL (ref 0.2–1)
WBC #/AREA URNS AUTO: ABNORMAL /HPF

## 2019-07-03 PROCEDURE — 87088 URINE BACTERIA CULTURE: CPT | Performed by: NURSE PRACTITIONER

## 2019-07-03 PROCEDURE — 99213 OFFICE O/P EST LOW 20 MIN: CPT | Performed by: NURSE PRACTITIONER

## 2019-07-03 PROCEDURE — 81001 URINALYSIS AUTO W/SCOPE: CPT | Performed by: NURSE PRACTITIONER

## 2019-07-03 PROCEDURE — 87086 URINE CULTURE/COLONY COUNT: CPT | Performed by: NURSE PRACTITIONER

## 2019-07-03 PROCEDURE — 87186 SC STD MICRODIL/AGAR DIL: CPT | Performed by: NURSE PRACTITIONER

## 2019-07-03 RX ORDER — CEPHALEXIN 500 MG/1
500 CAPSULE ORAL 2 TIMES DAILY
Qty: 14 CAPSULE | Refills: 0 | Status: SHIPPED | OUTPATIENT
Start: 2019-07-03 | End: 2019-08-06

## 2019-07-03 RX ORDER — BUDESONIDE AND FORMOTEROL FUMARATE DIHYDRATE 160; 4.5 UG/1; UG/1
AEROSOL RESPIRATORY (INHALATION)
Qty: 10.2 G | Refills: 1 | Status: SHIPPED | OUTPATIENT
Start: 2019-07-03 | End: 2019-09-05

## 2019-07-03 NOTE — Clinical Note
VANDANA coumadin pt on keflex for 7 days for UTI. States her INR will be done in [place] Monday, July 8th. Thanks!

## 2019-07-03 NOTE — PROGRESS NOTES
Subjective     Ellie Leigh is a 88 year old female who presents to clinic today for the following health issues:    HPI   URINARY TRACT SYMPTOMS      Duration: this morning     Description  dysuria, frequency, urgency and odor    Intensity:  severe    Accompanying signs and symptoms:  Fever/chills: no   Flank pain no   Nausea and vomiting: no   Vaginal symptoms: none  Abdominal/Pelvic Pain: no     History  History of frequent UTI's: YES  History of kidney stones: no   Sexually Active: no   Possibility of pregnancy: No    Precipitating or alleviating factors: None    Therapies tried and outcome: increase fluid intake, cranberry supplements     Cough       Duration: 4-5 days     Description (location/character/radiation): Cough- comes and goes and is productive with green sputum     Intensity:  mild    Accompanying signs and symptoms: coughing, sneezing, congestion, shortness of breath, nausea once in awhile, no ear pain, no sore throat, no fever    History (similar episodes/previous evaluation): hx of pneumonia     Precipitating or alleviating factors: None    Therapies tried and outcome: None       Patient Active Problem List   Diagnosis     Sensorineural hearing loss, asymmetrical     Generalized osteoarthrosis, unspecified site     PERSONAL HISTORY OF MALIGNANCY- BREAST     Esophageal reflux     Chronic allergic conjunctivitis     Chronic seasonal allergic rhinitis     Hyperlipidemia LDL goal <130     Chronic constipation     Osteoporosis     Health Care Home     Right arm weakness     Ulnar neuropathy     Headache     Mild major depression     DVT (deep venous thrombosis)     Anxiety     Cervical vertebral fracture (H)     Intermittent atrial fibrillation (H)     Squamous cell carcinoma in situ of skin of face     SK (seborrheic keratosis)     Hypothyroidism     Hyponatremia     Recurrent UTI     History of skin cancer     BPPV (benign paroxysmal positional vertigo)     Benign essential HTN     SIADH (syndrome  of inappropriate ADH production) (H)     Positive fecal occult blood test     COPD (chronic obstructive pulmonary disease) (H)     Infectious colitis, enteritis and gastroenteritis     Advance Care Planning     Syncope     Long term current use of anticoagulant therapy     Mild persistent asthma without complication     Irritable bowel syndrome without diarrhea     Nausea     Back pain     H/O TB (tuberculosis)     Chest pain     Past Surgical History:   Procedure Laterality Date     APPENDECTOMY       ARTHROSCOPY KNEE  4/15/2011    Procedure:ARTHROSCOPY KNEE; removal of loose body; Surgeon:LEY, JEFFREY DUANE; Location:[facility]     CHOLECYSTECTOMY       ESOPHAGOSCOPY, GASTROSCOPY, DUODENOSCOPY (EGD), COMBINED  6/9/2014    Procedure: COMBINED ESOPHAGOSCOPY, GASTROSCOPY, DUODENOSCOPY (EGD);  Surgeon: Gilberto Richard MD;  Location: [facility]     IMPLANT PACEMAKER  5/21/2013    Biotronik Moderl [id number] Serial#[id number]     INJECT EPIDURAL LUMBAR  3/23/2011    INJECT EPIDURAL LUMBAR performed by GENERIC ANESTHESIA PROVIDER at [facility]     JOINT REPLACEMENT, HIP RT/LT      Joint Replacement Hip Rt     MASTECTOMY, SIMPLE RT/LT/CAITLYN      Left breast - following breast ca     OTHER SURGICAL HISTORY      C1-C2 fusion after fx      SURGICAL HISTORY OF -   05/22/2001    Colonoscopy     TONSILLECTOMY         Social History     Tobacco Use     Smoking status: Passive Smoke Exposure - Never Smoker     Smokeless tobacco: Never Used   Substance Use Topics     Alcohol use: No     Family History   Problem Relation Age of Onset     Cerebrovascular Disease Mother      Heart Disease Mother         MI     Cerebrovascular Disease Father      Heart Disease Father         MI     Respiratory Maternal Grandfather         TB     Neurologic Disorder Brother         ALS     Heart Disease Brother      Cerebrovascular Disease Brother      Breast Cancer Daughter         age:49     Asthma Brother      Cancer Brother         brain and lung     Cancer Daughter          thyroid         Current Outpatient Medications   Medication Sig Dispense Refill     Acetaminophen (TYLENOL PO) Take  mg by mouth every 8 hours as needed        albuterol (PROAIR HFA/PROVENTIL HFA/VENTOLIN HFA) 108 (90 Base) MCG/ACT Inhaler Inhale 2 puffs into the lungs every 6 hours as needed for shortness of breath / dyspnea 1 Inhaler 11     albuterol (PROVENTIL) (2.5 MG/3ML) 0.083% neb solution Take 1 vial (2.5 mg) by nebulization 3 times daily 360 mL 6     cephALEXin (KEFLEX) 500 MG capsule Take 1 capsule (500 mg) by mouth 2 times daily for 7 days 14 capsule 0     CRANBERRY Take 475 mg by mouth 2 times daily.       estradiol (ESTRACE VAGINAL) 0.1 MG/GM vaginal cream Place 2 g vaginally three times a week 42 g 3     fluticasone (FLONASE) 50 MCG/ACT spray Spray 2 sprays into both nostrils daily 1 Bottle 11     ipratropium - albuterol 0.5 mg/2.5 mg/3 mL (DUONEB) 0.5-2.5 (3) MG/3ML neb solution TAKE 1 VIAL BY NEBULIZATION  EVERY SIX HOURS AS NEEDED FOR SHORTNESS OF BREATH/ DYSPNEA OR WHEEZING 180 mL 11     levothyroxine (SYNTHROID/LEVOTHROID) 50 MCG tablet TAKE ONE TABLET BY MOUTH ONE TIME DAILY  90 tablet 1     metoprolol succinate ER (TOPROL-XL) 25 MG 24 hr tablet TAKE TWO TABLETS BY MOUTH TWICE DAILY  360 tablet 3     omeprazole (PRILOSEC) 40 MG DR capsule Take 1 capsule (40 mg) by mouth daily 30 capsule 1     ondansetron (ZOFRAN-ODT) 4 MG ODT tab Take 1 tablet (4 mg) by mouth every 8 hours as needed for nausea 20 tablet 1     order for DME Equipment being ordered: Nebulizer 1 each 0     polyethylene glycol (MIRALAX/GLYCOLAX) Packet Take 17 g by mouth daily        probiotic CAPS Take 1 capsule by mouth every evening        sodium chloride (BRONCHO SALINE) 0.9 % areosol solution Inhale 5 mLs into the lungs 3 times daily Use after albuterol in nebulizer to thin secretions 480 mL 6     sodium chloride 1 GM tablet TAKE ONE TABLET BY MOUTH THREE TIMES DAILY  100 tablet 10     SYMBICORT 160-4.5 MCG/ACT Inhaler  INHALE TWO PUFFS INTO THE LUNGS TWICE DAILY 10.2 g 1     warfarin (COUMADIN) 1 MG tablet 2 mg (1 mg x 2) every Mon, Fri; 1 mg (1 mg x 1) all other days directed by the Anticoagulation Clinic 96 tablet 3     Allergies   Allergen Reactions     Cephalosporins Nausea     Cefzil     Doxycycline Nausea and Vomiting     Sulfa Drugs Nausea     Penicillins Rash     PCN         Reviewed and updated as needed this visit by Provider  Tobacco  Allergies  Meds  Problems  Med Hx  Surg Hx  Fam Hx         Review of Systems   ROS COMP: Constitutional, HEENT, cardiovascular, pulmonary, GI, , musculoskeletal, neuro, skin, endocrine and psych systems are negative, except as otherwise noted.      Objective    /66   Pulse 65   Temp 98  F (36.7  C) (Tympanic)   Resp 20   Wt 54.4 kg (120 lb)   LMP 06/15/1985   SpO2 94%   BMI 25.08 kg/m    Body mass index is 25.08 kg/m .  Physical Exam   GENERAL: healthy, alert and no distress, nontoxic in appearance here with her daughter  EYES: Eyes grossly normal to inspection, PERRL and conjunctivae and sclerae normal  HENT: ear canals and TM's normal, nose and mouth without ulcers or lesions  NECK: no adenopathy, supple with full ROM  RESP: lungs clear to auscultation - no rales, rhonchi or wheezes  CV: regular rate and rhythm, normal S1 S2, no S3 or S4, no murmur, click or rub, no peripheral edema   ABDOMEN: soft, nontender, no hepatosplenomegaly, no masses and bowel sounds normal  MS: no gross musculoskeletal defects noted, no edema  Neg CVA tenderness   No rash    Diagnostic Test Results:  Labs reviewed in Epic  Results for orders placed or performed in visit on 07/03/19 (from the past 24 hour(s))   *UA reflex to Microscopic and Culture ([place] [facility] (except [place] and [place])   Result Value Ref Range    Color Urine Yellow     Appearance Urine Slightly Cloudy     Glucose Urine Negative NEG^Negative mg/dL    Bilirubin Urine Negative NEG^Negative    Ketones  "Urine Negative NEG^Negative mg/dL    Specific Gravity Urine 1.020 1.003 - 1.035    Blood Urine Trace (A) NEG^Negative    pH Urine 6.0 5.0 - 7.0 pH    Protein Albumin Urine Negative NEG^Negative mg/dL    Urobilinogen Urine 0.2 0.2 - 1.0 EU/dL    Nitrite Urine Positive (A) NEG^Negative    Leukocyte Esterase Urine Large (A) NEG^Negative    Source Midstream Urine    Urine Microscopic   Result Value Ref Range    WBC Urine  (A) OTO5^0 - 5 /HPF    RBC Urine 2-5 (A) OTO2^O - 2 /HPF    Squamous Epithelial /LPF Urine Few FEW^Few /LPF    Bacteria Urine Moderate (A) NEG^Negative /HPF    Mucous Urine Present (A) NEG^Negative /LPF     *Note: Due to a large number of results and/or encounters for the requested time period, some results have not been displayed. A complete set of results can be found in Results Review.           Assessment & Plan  discussed cxr and daughter does not think that is needed at this time. They will follow up if she worsens. Will treat UTI with keflex as she has had it in the past and tolerated it.  Problem List Items Addressed This Visit     None      Visit Diagnoses     Acute cystitis with hematuria    -  Primary    Relevant Medications    cephALEXin (KEFLEX) 500 MG capsule    Urinary frequency        Relevant Orders    *UA reflex to Microscopic and Culture ([place] [facility] (except [place] and [place]) (Completed)    Urine Microscopic (Completed)    Nonspecific finding on examination of urine        Relevant Orders    Urine Culture Aerobic Bacterial (Completed)    Cough        Relevant Orders    XR Chest 2 Views         INR clinic notified of need to monitor INR on antibiotics.    BMI:   Estimated body mass index is 25.08 kg/m  as calculated from the following:    Height as of 5/20/19: 1.473 m (4' 10\").    Weight as of this encounter: 54.4 kg (120 lb).           Patient Instructions   Antibiotics as directed   Urine culture is pending, will contact you if there is a change in " "treatment therapy.   Drink plenty of fluids. Prevention and treatment of UTI's discussed.   Signs and symptoms of pyelonephritis mentioned.   RTC if any worsening symptoms or if not improving as expected.   After completion of your antibiotic return for a repeat urinalysis.   You will just need to call the clinic to make a lab only appointment     Patient Education     Bladder Infection, Female (Adult)    Urine is normally doesn't have any bacteria in it. But bacteria can get into the urinary tract from the skin around the rectum. Or they can travel in the blood from elsewhere in the body. Once they are in your urinary tract, they can cause infection in the urethra (urethritis), the bladder (cystitis), or the kidneys (pyelonephritis).  The most common place for an infection is in the bladder. This is called a bladder infection. This is one of the most common infections in women. Most bladder infections are easily treated. They are not serious unless the infection spreads to the kidney.  The phrases \"bladder infection,\" \"UTI,\" and \"cystitis\" are often used to describe the same thing. But they are not always the same. Cystitis is an inflammation of the bladder. The most common cause of cystitis is an infection.  Symptoms  The infection causes inflammation in the urethra and bladder. This causes many of the symptoms. The most common symptoms of a bladder infection are:    Pain or burning when urinating    Having to urinate more often than usual    Urgent need to urinate    Only a small amount of urine comes out    Blood in urine    Abdominal discomfort. This is usually in the lower abdomen above the pubic bone.    Cloudy urine    Strong- or bad-smelling urine    Unable to urinate (urinary retention)    Unable to hold urine in (urinary incontinence)    Fever    Loss of appetite    Confusion (in older adults)  Causes  Bladder infections are not contagious. You can't get one from someone else, from a toilet seat, or from " sharing a bath.  The most common cause of bladder infections is bacteria from the bowels. The bacteria get onto the skin around the opening of the urethra. From there, they can get into the urine and travel up to the bladder, causing inflammation and infection. This usually happens because of:    Wiping improperly after urinating. Always wipe from front to back.    Bowel incontinence    Pregnancy    Procedures such as having a catheter inserted    Older age    Not emptying your bladder. This can allow bacteria a chance to grow in your urine.    Dehydration    Constipation    Sex    Use of a diaphragm for birth control   Treatment  Bladder infections are diagnosed by a urine test. They are treated with antibiotics and usually clear up quickly without complications. Treatment helps prevent a more serious kidney infection.  Medicines  Medicines can help in the treatment of a bladder infection:    Take antibiotics until they are used up, even if you feel better. It is important to finish them to make sure the infection has cleared.    You can use acetaminophen or ibuprofen for pain, fever, or discomfort, unless another medicine was prescribed. If you have chronic liver or kidney disease, talk with your healthcare provider before using these medicines. Also talk with your provider if you've ever had a stomach ulcer or gastrointestinal bleeding, or are taking blood-thinner medicines.    If you are given phenazopydridine to reduce burning with urination, it will cause your urine to become a bright orange color. This can stain clothing.  Care and prevention  These self-care steps can help prevent future infections:    Drink plenty of fluids to prevent dehydration and flush out your bladder. Do this unless you must restrict fluids for other health reasons, or your doctor told you not to.    Proper cleaning after going to the bathroom is important. Wipe from front to back after using the toilet to prevent the spread of  bacteria.    Urinate more often. Don't try to hold urine in for a long time.    Wear loose-fitting clothes and cotton underwear. Avoid tight-fitting pants.    Improve your diet and prevent constipation. Eat more fresh fruit and vegetables, and fiber, and less junk and fatty foods.    Avoid sex until your symptoms are gone.    Avoid caffeine, alcohol, and spicy foods. These can irritate your bladder.    Urinate right after intercourse to flush out your bladder.    If you use birth control pills and have frequent bladder infections, discuss it with your doctor.  Follow-up care  Call your healthcare provider if all symptoms are not gone after 3 days of treatment. This is especially important if you have repeat infections.  If a culture was done, you will be told if your treatment needs to be changed. If directed, you can call to find out the results.  If X-rays were done, you will be told if the results will affect your treatment.  Call 911  Call 911 if any of the following occur:    Trouble breathing    Hard to wake up or confusion    Fainting or loss of consciousness    Rapid heart rate  When to seek medical advice  Call your healthcare provider right away if any of these occur:    Fever of 100.4 F (38.0 C) or higher, or as directed by your healthcare provider    Symptoms are not better by the third day of treatment    Back or belly (abdominal) pain that gets worse    Repeated vomiting, or unable to keep medicine down    Weakness or dizziness    Vaginal discharge    Pain, redness, or swelling in the outer vaginal area (labia)  Date Last Reviewed: 10/1/2016    0792-1368 [employer]. [address] [place] [address], [place] [address]. All rights reserved. This information is not intended as a substitute for professional medical care. Always follow your healthcare professional's instructions.           Patient Education     Urinary Tract Infections in Women    Urinary tract infections (UTIs) are most often caused  by bacteria. These bacteria enter the urinary tract. The bacteria may come from outside the body. Or they may travel from the skin outside the rectum or vagina into the urethra. Female anatomy makes it easy for bacteria from the bowel to enter a woman s urinary tract, which is the most common source of UTI. This means women develop UTIs more often than men. Pain in or around the urinary tract is a common UTI symptom. But the only way to know for sure if you have a UTI for the healthcare provider to test your urine. The two tests that may be done are the urinalysis and urine culture.  Types of UTIs    Cystitis. A bladder infection (cystitis) is the most common UTI in women. You may have urgent or frequent urination. You may also have pain, burning when you urinate, and bloody urine.    Urethritis. This is an inflamed urethra, which is the tube that carries urine from the bladder to outside the body. You may have lower stomach or back pain. You may also have urgent or frequent urination.    Pyelonephritis. This is a kidney infection. If not treated, it can be serious and damage your kidneys. In severe cases, you may need to stay in the hospital. You may have a fever and lower back pain.  Medicines to treat a UTI  Most UTIs are treated with antibiotics. These kill the bacteria. The length of time you need to take them depends on the type of infection. It may be as short as 3 days. If you have repeated UTIs, you may need a low-dose antibiotic for several months. Take antibiotics exactly as directed. Don t stop taking them until all of the medicine is gone. If you stop taking the antibiotic too soon, the infection may not go away. You may also develop a resistance to the antibiotic. This can make it much harder to treat.  Lifestyle changes to treat and prevent UTIs  The lifestyle changes below will help get rid of your UTI. They may also help prevent future UTIs.    Drink plenty of fluids. This includes water, juice, or  other caffeine-free drinks. Fluids help flush bacteria out of your body.    Empty your bladder. Always empty your bladder when you feel the urge to urinate. And always urinate before going to sleep. Urine that stays in your bladder can lead to infection. Try to urinate before and after sex as well.    Practice good personal hygiene. Wipe yourself from front to back after using the toilet. This helps keep bacteria from getting into the urethra.    Use condoms during sex. These help prevent UTIs caused by sexually transmitted bacteria. Also don't use spermicides during sex. These can increase the risk for UTIs. Choose other forms of birth control instead. For women who tend to get UTIs after sex, a low-dose of a preventive antibiotic may be used. Be sure to discuss this option with your healthcare provider.    Follow up with your healthcare provider as directed. He or she may test to make sure the infection has cleared. If needed, more treatment may be started.  Date Last Reviewed: 1/1/2017 2000-2018 [employer]. [address] [place] [address]. All rights reserved. This information is not intended as a substitute for professional medical care. Always follow your healthcare professional's instructions.             Return in about 1 week (around 7/10/2019) for Follow up with your primary care provider.    [facility]

## 2019-07-03 NOTE — PATIENT INSTRUCTIONS
"Antibiotics as directed   Urine culture is pending, will contact you if there is a change in treatment therapy.   Drink plenty of fluids. Prevention and treatment of UTI's discussed.   Signs and symptoms of pyelonephritis mentioned.   RTC if any worsening symptoms or if not improving as expected.   After completion of your antibiotic return for a repeat urinalysis.   You will just need to call the clinic to make a lab only appointment     Patient Education     Bladder Infection, Female (Adult)    Urine is normally doesn't have any bacteria in it. But bacteria can get into the urinary tract from the skin around the rectum. Or they can travel in the blood from elsewhere in the body. Once they are in your urinary tract, they can cause infection in the urethra (urethritis), the bladder (cystitis), or the kidneys (pyelonephritis).  The most common place for an infection is in the bladder. This is called a bladder infection. This is one of the most common infections in women. Most bladder infections are easily treated. They are not serious unless the infection spreads to the kidney.  The phrases \"bladder infection,\" \"UTI,\" and \"cystitis\" are often used to describe the same thing. But they are not always the same. Cystitis is an inflammation of the bladder. The most common cause of cystitis is an infection.  Symptoms  The infection causes inflammation in the urethra and bladder. This causes many of the symptoms. The most common symptoms of a bladder infection are:    Pain or burning when urinating    Having to urinate more often than usual    Urgent need to urinate    Only a small amount of urine comes out    Blood in urine    Abdominal discomfort. This is usually in the lower abdomen above the pubic bone.    Cloudy urine    Strong- or bad-smelling urine    Unable to urinate (urinary retention)    Unable to hold urine in (urinary incontinence)    Fever    Loss of appetite    Confusion (in older adults)  Causes  Bladder " infections are not contagious. You can't get one from someone else, from a toilet seat, or from sharing a bath.  The most common cause of bladder infections is bacteria from the bowels. The bacteria get onto the skin around the opening of the urethra. From there, they can get into the urine and travel up to the bladder, causing inflammation and infection. This usually happens because of:    Wiping improperly after urinating. Always wipe from front to back.    Bowel incontinence    Pregnancy    Procedures such as having a catheter inserted    Older age    Not emptying your bladder. This can allow bacteria a chance to grow in your urine.    Dehydration    Constipation    Sex    Use of a diaphragm for birth control   Treatment  Bladder infections are diagnosed by a urine test. They are treated with antibiotics and usually clear up quickly without complications. Treatment helps prevent a more serious kidney infection.  Medicines  Medicines can help in the treatment of a bladder infection:    Take antibiotics until they are used up, even if you feel better. It is important to finish them to make sure the infection has cleared.    You can use acetaminophen or ibuprofen for pain, fever, or discomfort, unless another medicine was prescribed. If you have chronic liver or kidney disease, talk with your healthcare provider before using these medicines. Also talk with your provider if you've ever had a stomach ulcer or gastrointestinal bleeding, or are taking blood-thinner medicines.    If you are given phenazopydridine to reduce burning with urination, it will cause your urine to become a bright orange color. This can stain clothing.  Care and prevention  These self-care steps can help prevent future infections:    Drink plenty of fluids to prevent dehydration and flush out your bladder. Do this unless you must restrict fluids for other health reasons, or your doctor told you not to.    Proper cleaning after going to the  bathroom is important. Wipe from front to back after using the toilet to prevent the spread of bacteria.    Urinate more often. Don't try to hold urine in for a long time.    Wear loose-fitting clothes and cotton underwear. Avoid tight-fitting pants.    Improve your diet and prevent constipation. Eat more fresh fruit and vegetables, and fiber, and less junk and fatty foods.    Avoid sex until your symptoms are gone.    Avoid caffeine, alcohol, and spicy foods. These can irritate your bladder.    Urinate right after intercourse to flush out your bladder.    If you use birth control pills and have frequent bladder infections, discuss it with your doctor.  Follow-up care  Call your healthcare provider if all symptoms are not gone after 3 days of treatment. This is especially important if you have repeat infections.  If a culture was done, you will be told if your treatment needs to be changed. If directed, you can call to find out the results.  If X-rays were done, you will be told if the results will affect your treatment.  Call 911  Call 911 if any of the following occur:    Trouble breathing    Hard to wake up or confusion    Fainting or loss of consciousness    Rapid heart rate  When to seek medical advice  Call your healthcare provider right away if any of these occur:    Fever of 100.4 F (38.0 C) or higher, or as directed by your healthcare provider    Symptoms are not better by the third day of treatment    Back or belly (abdominal) pain that gets worse    Repeated vomiting, or unable to keep medicine down    Weakness or dizziness    Vaginal discharge    Pain, redness, or swelling in the outer vaginal area (labia)  Date Last Reviewed: 10/1/2016    2947-1549 [employer]. [address] [place] [address], [place] [address]. All rights reserved. This information is not intended as a substitute for professional medical care. Always follow your healthcare professional's instructions.           Patient Education      Urinary Tract Infections in Women    Urinary tract infections (UTIs) are most often caused by bacteria. These bacteria enter the urinary tract. The bacteria may come from outside the body. Or they may travel from the skin outside the rectum or vagina into the urethra. Female anatomy makes it easy for bacteria from the bowel to enter a woman s urinary tract, which is the most common source of UTI. This means women develop UTIs more often than men. Pain in or around the urinary tract is a common UTI symptom. But the only way to know for sure if you have a UTI for the healthcare provider to test your urine. The two tests that may be done are the urinalysis and urine culture.  Types of UTIs    Cystitis. A bladder infection (cystitis) is the most common UTI in women. You may have urgent or frequent urination. You may also have pain, burning when you urinate, and bloody urine.    Urethritis. This is an inflamed urethra, which is the tube that carries urine from the bladder to outside the body. You may have lower stomach or back pain. You may also have urgent or frequent urination.    Pyelonephritis. This is a kidney infection. If not treated, it can be serious and damage your kidneys. In severe cases, you may need to stay in the hospital. You may have a fever and lower back pain.  Medicines to treat a UTI  Most UTIs are treated with antibiotics. These kill the bacteria. The length of time you need to take them depends on the type of infection. It may be as short as 3 days. If you have repeated UTIs, you may need a low-dose antibiotic for several months. Take antibiotics exactly as directed. Don t stop taking them until all of the medicine is gone. If you stop taking the antibiotic too soon, the infection may not go away. You may also develop a resistance to the antibiotic. This can make it much harder to treat.  Lifestyle changes to treat and prevent UTIs  The lifestyle changes below will help get rid of your UTI.  They may also help prevent future UTIs.    Drink plenty of fluids. This includes water, juice, or other caffeine-free drinks. Fluids help flush bacteria out of your body.    Empty your bladder. Always empty your bladder when you feel the urge to urinate. And always urinate before going to sleep. Urine that stays in your bladder can lead to infection. Try to urinate before and after sex as well.    Practice good personal hygiene. Wipe yourself from front to back after using the toilet. This helps keep bacteria from getting into the urethra.    Use condoms during sex. These help prevent UTIs caused by sexually transmitted bacteria. Also don't use spermicides during sex. These can increase the risk for UTIs. Choose other forms of birth control instead. For women who tend to get UTIs after sex, a low-dose of a preventive antibiotic may be used. Be sure to discuss this option with your healthcare provider.    Follow up with your healthcare provider as directed. He or she may test to make sure the infection has cleared. If needed, more treatment may be started.  Date Last Reviewed: 1/1/2017 2000-2018 [employer]. [address] [place] [address]. All rights reserved. This information is not intended as a substitute for professional medical care. Always follow your healthcare professional's instructions.

## 2019-07-03 NOTE — TELEPHONE ENCOUNTER
"Requested Prescriptions   Pending Prescriptions Disp Refills     SYMBICORT 160-4.5 MCG/ACT Inhaler [Pharmacy Med Name: Symbicort Inhalation Aerosol 160-4.5 MCG/ACT] 10.2 g 1     Sig: INHALE TWO PUFFS INTO THE LUNGS TWICE DAILY       Inhaled Steroids Protocol Passed - 7/3/2019 10:07 AM        Passed - Patient is age 12 or older        Passed - Asthma control assessment score within normal limits in last 6 months     Please review ACT score.     ACT Total Scores 5/30/2018 2/6/2019 5/20/2019   ACT TOTAL SCORE - - -   ASTHMA ER VISITS - - -   ASTHMA HOSPITALIZATIONS - - -   ACT TOTAL SCORE (Goal Greater than or Equal to 20) 15 19 21   In the past 12 months, how many times did you visit the emergency room for your asthma without being admitted to the hospital? 0 0 0   In the past 12 months, how many times were you hospitalized overnight because of your asthma? 0 0 0               Passed - Medication is active on med list        Passed - Recent (6 mo) or future (30 days) visit within the authorizing provider's specialty     Patient had office visit in the last 6 months or has a visit in the next 30 days with authorizing provider or within the authorizing provider's specialty.  See \"Patient Info\" tab in inbasket, or \"Choose Columns\" in Meds & Orders section of the refill encounter.      Last Written Prescription Date:  4/1/19  Last Fill Quantity: 10.2,  # refills: 2   Last office visit: 5/20/2019 with prescribing provider:     Future Office Visit:   Next 5 appointments (look out 90 days)    Jul 03, 2019  3:40 PM CDT  Office Visit with LARRY SAME DAY PROVIDER  [facility] ([facility]) [address] [place] [id number] [phone]   Jul 11, 2019  2:40 PM CDT  SHORT with Anjum Vidales MD  [facility] ([facility]) [address] [place] [id number] [phone]                     "

## 2019-07-04 LAB
BACTERIA SPEC CULT: ABNORMAL
Lab: ABNORMAL
SPECIMEN SOURCE: ABNORMAL

## 2019-07-08 ENCOUNTER — TRANSFERRED RECORDS (OUTPATIENT)
Dept: HEALTH INFORMATION MANAGEMENT | Facility: CLINIC | Age: 84
End: 2019-07-08

## 2019-07-08 NOTE — PATIENT INSTRUCTIONS
Check labs today sodium and thyroid     Increase miralax to 1 cap plus 1TBSP or 1TSP daily and adjust up every three days to get bowels back on track     gatorade 1-2 small bottles a day, First in the am with food     Fluid should not exceed 32 ounces per day     Recheck in one week with me    Patient being discharged in stable condition. Written and verbal instructions given to patient and family, they voice no questions or concerns.

## 2019-07-09 ENCOUNTER — ANTICOAGULATION THERAPY VISIT (OUTPATIENT)
Dept: ANTICOAGULATION | Facility: CLINIC | Age: 84
End: 2019-07-09
Payer: COMMERCIAL

## 2019-07-09 DIAGNOSIS — Z79.01 LONG TERM CURRENT USE OF ANTICOAGULANT THERAPY: ICD-10-CM

## 2019-07-09 DIAGNOSIS — I82.409 DVT (DEEP VENOUS THROMBOSIS) (H): ICD-10-CM

## 2019-07-09 LAB — INR POINT OF CARE: 2 (ref 0.86–1.14)

## 2019-07-09 PROCEDURE — 85610 PROTHROMBIN TIME: CPT | Mod: QW

## 2019-07-09 PROCEDURE — 36416 COLLJ CAPILLARY BLOOD SPEC: CPT

## 2019-07-09 PROCEDURE — 99207 ZZC NO CHARGE NURSE ONLY: CPT

## 2019-07-09 NOTE — PROGRESS NOTES
ANTICOAGULATION FOLLOW-UP CLINIC VISIT    Patient Name:  Ellie Leigh  Date:  2019  Contact Type:  Face to Face/Daughter    SUBJECTIVE:  Patient Findings     Positives:   Change in health (UTI/Cough), Change in medications (Keflex)    Comments:   No changes in activity or diet noted. No bleeding or increased bruising noted. Took warfarin as prescribed.  Patient will continue weekly maintenance dose. INR is therapeutic.   Recheck in 4 weeks.   Patient verbalizes understanding and agrees to plan. No further questions or concerns.          Clinical Outcomes     Negatives:   Major bleeding event, Thromboembolic event, Anticoagulation-related hospital admission, Anticoagulation-related ED visit, Anticoagulation-related fatality    Comments:   No changes in activity or diet noted. No bleeding or increased bruising noted. Took warfarin as prescribed.  Patient will continue weekly maintenance dose. INR is therapeutic.   Recheck in 4 weeks.   Patient verbalizes understanding and agrees to plan. No further questions or concerns.             OBJECTIVE    INR Protime   Date Value Ref Range Status   2019 2.0 (A) 0.86 - 1.14 Final       ASSESSMENT / PLAN  INR assessment THER    Recheck INR In: 4 WEEKS    INR Location Clinic      Anticoagulation Summary  As of 2019    INR goal:   2.0-3.0   TTR:   47.0 % (4.3 y)   INR used for dosin.0 (2019)   Warfarin maintenance plan:   2 mg (1 mg x 2) every Mon, Fri; 1 mg (1 mg x 1) all other days   Full warfarin instructions:   2 mg every Mon, Fri; 1 mg all other days   Weekly warfarin total:   9 mg   No change documented:   Ruiz Meredith RN   Plan last modified:   Ruiz Meredith RN (2019)   Next INR check:   2019   Priority:   INR   Target end date:   Indefinite    Indications    Atrial fibrillation with rapid ventricular response (H) (Resolved) [I48.91]  DVT (deep venous thrombosis) [I82.409]  Long term current use of anticoagulant therapy  [Z79.01]             Anticoagulation Episode Summary     INR check location:       Preferred lab:       Send INR reminders to:   GARRY BARRY    Comments:   * Taking Celebrex PRN         Anticoagulation Care Providers     Provider Role Specialty Phone number    Anjum Vidales MD [facility] [phone]            See the Encounter Report to view Anticoagulation Flowsheet and Dosing Calendar (Go to Encounters tab in chart review, and find the Anticoagulation Therapy Visit)        Ruiz Meredith RN

## 2019-07-10 LAB
MDC_IDC_EPISODE_DTM: NORMAL
MDC_IDC_EPISODE_ID: 67
MDC_IDC_EPISODE_TYPE: NORMAL
MDC_IDC_LEAD_IMPLANT_DT: NORMAL
MDC_IDC_LEAD_IMPLANT_DT: NORMAL
MDC_IDC_LEAD_LOCATION: NORMAL
MDC_IDC_LEAD_LOCATION: NORMAL
MDC_IDC_LEAD_LOCATION_DETAIL_1: NORMAL
MDC_IDC_LEAD_LOCATION_DETAIL_1: NORMAL
MDC_IDC_LEAD_MFG: NORMAL
MDC_IDC_LEAD_MFG: NORMAL
MDC_IDC_LEAD_MODEL: NORMAL
MDC_IDC_LEAD_MODEL: NORMAL
MDC_IDC_LEAD_POLARITY_TYPE: NORMAL
MDC_IDC_LEAD_SERIAL: NORMAL
MDC_IDC_LEAD_SERIAL: NORMAL
MDC_IDC_MSMT_BATTERY_STATUS: NORMAL
MDC_IDC_MSMT_LEADCHNL_RA_IMPEDANCE_VALUE: 468 OHM
MDC_IDC_MSMT_LEADCHNL_RA_PACING_THRESHOLD_AMPLITUDE: 0.8 V
MDC_IDC_MSMT_LEADCHNL_RA_PACING_THRESHOLD_AMPLITUDE: 0.8 V
MDC_IDC_MSMT_LEADCHNL_RA_PACING_THRESHOLD_PULSEWIDTH: 0.4 MS
MDC_IDC_MSMT_LEADCHNL_RA_PACING_THRESHOLD_PULSEWIDTH: 0.4 MS
MDC_IDC_MSMT_LEADCHNL_RA_SENSING_INTR_AMPL: 7.5 MV
MDC_IDC_MSMT_LEADCHNL_RV_IMPEDANCE_VALUE: 507 OHM
MDC_IDC_MSMT_LEADCHNL_RV_PACING_THRESHOLD_AMPLITUDE: 0.5 V
MDC_IDC_MSMT_LEADCHNL_RV_PACING_THRESHOLD_AMPLITUDE: 0.5 V
MDC_IDC_MSMT_LEADCHNL_RV_PACING_THRESHOLD_PULSEWIDTH: 0.4 MS
MDC_IDC_MSMT_LEADCHNL_RV_PACING_THRESHOLD_PULSEWIDTH: 0.4 MS
MDC_IDC_MSMT_LEADCHNL_RV_SENSING_INTR_AMPL: 17.5 MV
MDC_IDC_MSMT_LEADCHNL_RV_SENSING_INTR_AMPL: 7.5 MV
MDC_IDC_MSMT_LEADCHNL_RV_SENSING_INTR_AMPL: 7.6 MV
MDC_IDC_PG_IMPLANT_DTM: NORMAL
MDC_IDC_PG_MFG: NORMAL
MDC_IDC_PG_MODEL: NORMAL
MDC_IDC_PG_SERIAL: NORMAL
MDC_IDC_PG_TYPE: NORMAL
MDC_IDC_SESS_CLINIC_NAME: NORMAL
MDC_IDC_SESS_DTM: NORMAL
MDC_IDC_SESS_REPROGRAMMED: NORMAL
MDC_IDC_SESS_TYPE: NORMAL
MDC_IDC_SET_BRADY_AT_MODE_SWITCH_MODE: NORMAL
MDC_IDC_SET_BRADY_AT_MODE_SWITCH_RATE: 160 {BEATS}/MIN
MDC_IDC_SET_BRADY_HYSTRATE: 60 {BEATS}/MIN
MDC_IDC_SET_BRADY_LOWRATE: 60 {BEATS}/MIN
MDC_IDC_SET_BRADY_MAX_SENSOR_RATE: 125 {BEATS}/MIN
MDC_IDC_SET_BRADY_MAX_TRACKING_RATE: 130 {BEATS}/MIN
MDC_IDC_SET_BRADY_MODE: NORMAL
MDC_IDC_SET_BRADY_NIGHT_RATE: 60 {BEATS}/MIN
MDC_IDC_SET_BRADY_PAV_DELAY_HIGH: 150 MS
MDC_IDC_SET_BRADY_PAV_DELAY_LOW: 180 MS
MDC_IDC_SET_BRADY_SAV_DELAY_HIGH: 105 MS
MDC_IDC_SET_BRADY_SAV_DELAY_LOW: 135 MS
MDC_IDC_SET_CRT_PACED_CHAMBERS: NORMAL
MDC_IDC_SET_LEADCHNL_LV_PACING_ANODE_ELECTRODE_1: NORMAL
MDC_IDC_SET_LEADCHNL_LV_PACING_ANODE_LOCATION_1: NORMAL
MDC_IDC_SET_LEADCHNL_LV_PACING_CATHODE_ELECTRODE_1: NORMAL
MDC_IDC_SET_LEADCHNL_LV_PACING_CATHODE_LOCATION_1: NORMAL
MDC_IDC_SET_LEADCHNL_LV_PACING_POLARITY: NORMAL
MDC_IDC_SET_LEADCHNL_LV_SENSING_ANODE_ELECTRODE_1: NORMAL
MDC_IDC_SET_LEADCHNL_LV_SENSING_ANODE_LOCATION_1: NORMAL
MDC_IDC_SET_LEADCHNL_LV_SENSING_CATHODE_ELECTRODE_1: NORMAL
MDC_IDC_SET_LEADCHNL_LV_SENSING_CATHODE_LOCATION_1: NORMAL
MDC_IDC_SET_LEADCHNL_LV_SENSING_POLARITY: NORMAL
MDC_IDC_SET_LEADCHNL_RA_PACING_AMPLITUDE: 1.8 V
MDC_IDC_SET_LEADCHNL_RA_PACING_POLARITY: NORMAL
MDC_IDC_SET_LEADCHNL_RA_PACING_PULSEWIDTH: 0.4 MS
MDC_IDC_SET_LEADCHNL_RA_SENSING_ADAPTATION_MODE: NORMAL
MDC_IDC_SET_LEADCHNL_RA_SENSING_POLARITY: NORMAL
MDC_IDC_SET_LEADCHNL_RA_SENSING_SENSITIVITY: NORMAL
MDC_IDC_SET_LEADCHNL_RV_PACING_AMPLITUDE: 1.5 V
MDC_IDC_SET_LEADCHNL_RV_PACING_POLARITY: NORMAL
MDC_IDC_SET_LEADCHNL_RV_PACING_PULSEWIDTH: 0.4 MS
MDC_IDC_SET_LEADCHNL_RV_SENSING_ADAPTATION_MODE: NORMAL
MDC_IDC_SET_LEADCHNL_RV_SENSING_POLARITY: NORMAL
MDC_IDC_SET_LEADCHNL_RV_SENSING_SENSITIVITY: NORMAL
MDC_IDC_STAT_BRADY_RA_PERCENT_PACED: 65 %
MDC_IDC_STAT_BRADY_RV_PERCENT_PACED: 3 %

## 2019-07-13 ENCOUNTER — APPOINTMENT (OUTPATIENT)
Dept: GENERAL RADIOLOGY | Facility: CLINIC | Age: 84
End: 2019-07-13
Attending: FAMILY MEDICINE
Payer: COMMERCIAL

## 2019-07-13 ENCOUNTER — HOSPITAL ENCOUNTER (EMERGENCY)
Facility: CLINIC | Age: 84
Discharge: HOME | End: 2019-07-13
Attending: FAMILY MEDICINE | Admitting: FAMILY MEDICINE
Payer: COMMERCIAL

## 2019-07-13 VITALS
TEMPERATURE: 98.4 F | RESPIRATION RATE: 20 BRPM | HEART RATE: 83 BPM | BODY MASS INDEX: 25.08 KG/M2 | OXYGEN SATURATION: 96 % | DIASTOLIC BLOOD PRESSURE: 70 MMHG | SYSTOLIC BLOOD PRESSURE: 118 MMHG | WEIGHT: 120 LBS

## 2019-07-13 DIAGNOSIS — K21.9 GASTROESOPHAGEAL REFLUX DISEASE WITHOUT ESOPHAGITIS: ICD-10-CM

## 2019-07-13 DIAGNOSIS — J44.9 CHRONIC OBSTRUCTIVE PULMONARY DISEASE, UNSPECIFIED COPD TYPE (H): ICD-10-CM

## 2019-07-13 DIAGNOSIS — J40 BRONCHITIS: ICD-10-CM

## 2019-07-13 LAB
ALBUMIN SERPL-MCNC: 3.3 G/DL (ref 3.4–5)
ALP SERPL-CCNC: 92 U/L (ref 40–150)
ALT SERPL W P-5'-P-CCNC: 25 U/L (ref 0–50)
ANION GAP SERPL CALCULATED.3IONS-SCNC: 6 MMOL/L (ref 3–14)
AST SERPL W P-5'-P-CCNC: 20 U/L (ref 0–45)
BASOPHILS # BLD AUTO: 0 10E9/L (ref 0–0.2)
BASOPHILS NFR BLD AUTO: 0.1 %
BILIRUB SERPL-MCNC: 0.2 MG/DL (ref 0.2–1.3)
BUN SERPL-MCNC: 25 MG/DL (ref 7–30)
CALCIUM SERPL-MCNC: 8.6 MG/DL (ref 8.5–10.1)
CHLORIDE SERPL-SCNC: 99 MMOL/L (ref 94–109)
CO2 SERPL-SCNC: 28 MMOL/L (ref 20–32)
CREAT SERPL-MCNC: 0.57 MG/DL (ref 0.52–1.04)
DIFFERENTIAL METHOD BLD: NORMAL
EOSINOPHIL # BLD AUTO: 0 10E9/L (ref 0–0.7)
EOSINOPHIL NFR BLD AUTO: 0.2 %
ERYTHROCYTE [DISTWIDTH] IN BLOOD BY AUTOMATED COUNT: 14.2 % (ref 10–15)
GFR SERPL CREATININE-BSD FRML MDRD: 82 ML/MIN/{1.73_M2}
GLUCOSE SERPL-MCNC: 100 MG/DL (ref 70–99)
HCT VFR BLD AUTO: 39.5 % (ref 35–47)
HGB BLD-MCNC: 12.9 G/DL (ref 11.7–15.7)
IMM GRANULOCYTES # BLD: 0 10E9/L (ref 0–0.4)
IMM GRANULOCYTES NFR BLD: 0.5 %
LYMPHOCYTES # BLD AUTO: 1.8 10E9/L (ref 0.8–5.3)
LYMPHOCYTES NFR BLD AUTO: 21.4 %
MCH RBC QN AUTO: 27.6 PG (ref 26.5–33)
MCHC RBC AUTO-ENTMCNC: 32.7 G/DL (ref 31.5–36.5)
MCV RBC AUTO: 84 FL (ref 78–100)
MONOCYTES # BLD AUTO: 0.9 10E9/L (ref 0–1.3)
MONOCYTES NFR BLD AUTO: 10.9 %
NEUTROPHILS # BLD AUTO: 5.6 10E9/L (ref 1.6–8.3)
NEUTROPHILS NFR BLD AUTO: 66.9 %
NRBC # BLD AUTO: 0 10*3/UL
NRBC BLD AUTO-RTO: 0 /100
NT-PROBNP SERPL-MCNC: 969 PG/ML (ref 0–1800)
PLATELET # BLD AUTO: 269 10E9/L (ref 150–450)
POTASSIUM SERPL-SCNC: 4.1 MMOL/L (ref 3.4–5.3)
PROT SERPL-MCNC: 6.6 G/DL (ref 6.8–8.8)
RBC # BLD AUTO: 4.68 10E12/L (ref 3.8–5.2)
SODIUM SERPL-SCNC: 133 MMOL/L (ref 133–144)
WBC # BLD AUTO: 8.4 10E9/L (ref 4–11)

## 2019-07-13 PROCEDURE — 83880 ASSAY OF NATRIURETIC PEPTIDE: CPT | Performed by: FAMILY MEDICINE

## 2019-07-13 PROCEDURE — 80053 COMPREHEN METABOLIC PANEL: CPT | Performed by: FAMILY MEDICINE

## 2019-07-13 PROCEDURE — 71046 X-RAY EXAM CHEST 2 VIEWS: CPT

## 2019-07-13 PROCEDURE — 99284 EMERGENCY DEPT VISIT MOD MDM: CPT | Mod: 25

## 2019-07-13 PROCEDURE — 99284 EMERGENCY DEPT VISIT MOD MDM: CPT | Mod: Z6 | Performed by: FAMILY MEDICINE

## 2019-07-13 PROCEDURE — 25000125 ZZHC RX 250: Performed by: FAMILY MEDICINE

## 2019-07-13 PROCEDURE — 85025 COMPLETE CBC W/AUTO DIFF WBC: CPT | Performed by: FAMILY MEDICINE

## 2019-07-13 RX ORDER — PREDNISONE 20 MG/1
TABLET ORAL
Qty: 12 TABLET | Refills: 0 | Status: SHIPPED | OUTPATIENT
Start: 2019-07-13 | End: 2019-08-06

## 2019-07-13 RX ORDER — AZITHROMYCIN 250 MG/1
TABLET, FILM COATED ORAL
Qty: 6 TABLET | Refills: 0 | Status: SHIPPED | OUTPATIENT
Start: 2019-07-13 | End: 2019-08-06

## 2019-07-13 RX ORDER — IPRATROPIUM BROMIDE AND ALBUTEROL SULFATE 2.5; .5 MG/3ML; MG/3ML
3 SOLUTION RESPIRATORY (INHALATION) ONCE
Status: COMPLETED | OUTPATIENT
Start: 2019-07-13 | End: 2019-07-13

## 2019-07-13 RX ORDER — SODIUM CHLORIDE FOR INHALATION 0.9 %
5 VIAL, NEBULIZER (ML) INHALATION 3 TIMES DAILY
Refills: 6 | COMMUNITY
Start: 2019-06-04 | End: 2019-07-13

## 2019-07-13 RX ADMIN — IPRATROPIUM BROMIDE AND ALBUTEROL SULFATE 3 ML: .5; 3 SOLUTION RESPIRATORY (INHALATION) at 14:48

## 2019-07-13 NOTE — ED AVS SNAPSHOT
[facility] Emergency Department  [address] [facility] [address]  Phone:  [phone]  Fax:  [phone]                                    Ellie Leigh   MRN: [id number]    Department:  [facility] Emergency Department   Date of Visit:  7/13/2019           After Visit Summary Signature Page    I have received my discharge instructions, and my questions have been answered. I have discussed any challenges I see with this plan with the nurse or doctor.    ..........................................................................................................................................  Patient/Patient Representative Signature      ..........................................................................................................................................  Patient Representative Print Name and Relationship to Patient    ..................................................               ................................................  Date                                   Time    ..........................................................................................................................................  Reviewed by Signature/Title    ...................................................              ..............................................  Date                                               Time          22EPIC Rev 08/18

## 2019-07-13 NOTE — ED PROVIDER NOTES
History     Chief Complaint   Patient presents with     Cough     cough for 4 weeks. has not seen primary care for it      HPI  Ellie Leigh is a 88 year old female with a history of chronic obstructive pulmonary disease, hypertension, deep vein thrombosis, intermittent atrial fibrillation, hypothyroid and hyponatremia who presents to the ED with a cough for 4 weeks that has gotten worse. Over the past 2 days the patient reports that her cough has gotten worse, and she's been coughing up green phlegm. The patients daughter reports that the patient is coughing more, and producing phlegm which until 2 days ago had not occurred. The patient denies, chest pain, fever, recent hospitalizations, edema in her legs, palpitations, diarrhea, vomiting and nausea.  The patient reports she uses a nebulizer three times per day, last used this morning. She also states that she recently finished antibiotics for a urinary tract infection.     Allergies:  Allergies   Allergen Reactions     Cephalosporins Nausea     Cefzil     Doxycycline Nausea and Vomiting     Sulfa Drugs Nausea     Penicillins Rash     PCN       Problem List:    Patient Active Problem List    Diagnosis Date Noted     Chest pain 04/14/2018     Priority: Medium     H/O TB (tuberculosis) 02/09/2018     Priority: Medium     Pt with chronic RUL infiltrate with pos AFB sputum  Full treatmetn for TB following ID consult in 2000's       Back pain 02/06/2018     Priority: Medium     Nausea 06/05/2017     Priority: Medium     Irritable bowel syndrome without diarrhea 05/02/2016     Priority: Medium     Mild persistent asthma without complication 12/01/2015     Priority: Medium     Long term current use of anticoagulant therapy 03/02/2015     Priority: Medium     Syncope 01/12/2015     Priority: Medium     Advance Care Planning 01/09/2015     Priority: Medium     Advance Care Planning 7/2/2015: Receipt of ACP document:  Received: Health Care Directive which was witnessed  or notarized on 1-9-15.  Document previously scanned on 2-27-15.  Validation form completed and sent to be scanned.  Code Status reflects choices in most recent ACP document.  Confirmed/documented designated decision maker(s).  Added by Verónica Green RN, System ACP Coordinator Honoring Choices   1/9/15 Copy to be scanned into chart Beulah Mathis CMA         COPD (chronic obstructive pulmonary disease) (H) 10/06/2014     Priority: Medium     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) 07/07/2014     Priority: Medium     Cause TBD.  Patient has had lung CT, will see Dr Gómez for consideration of further workup.  Also has pulmonology appt 8/18.  5/2/2018:Reviewing chart she has had hyponatremia since 2010.        Positive fecal occult blood test 07/07/2014     Priority: Medium     x2 -- first was in ED.  Patient expresses that she does not want colonoscopy.  She'll set appt to discuss with her PCP.       Benign essential HTN 02/11/2014     Priority: Medium     BPPV (benign paroxysmal positional vertigo) 11/05/2013     Priority: Medium     History of skin cancer 05/07/2013     Priority: Medium     Recurrent UTI 07/16/2012     Priority: Medium     recurrent UTI  Recent Urologic workup neg, 2005  Has Cipro at home for treatment as needed       Hypothyroidism 05/07/2012     Priority: Medium     Hyponatremia 05/07/2012     Priority: Medium     Squamous cell carcinoma in situ of skin of face 04/19/2012     Priority: Medium     SK (seborrheic keratosis) 04/19/2012     Priority: Medium     Intermittent atrial fibrillation (H) 12/01/2011     Priority: Medium     Cervical vertebral fracture (H) 11/20/2011     Priority: Medium     Anxiety 11/15/2011     Priority: Medium     DVT (deep venous thrombosis) 10/12/2011     Priority: Medium     H/o in past, on current coumadin therapy.       Mild major depression 08/23/2011     Priority: Medium     Headache 08/19/2011     Priority: Medium     Problem list name updated by  automated process. Provider to review       Right arm weakness 07/29/2011     Priority: Medium     Ulnar neuropathy 07/29/2011     Priority: Medium     Health Care Home 04/21/2011     Priority: Medium     Conchis Travis, [phone]  A / [facility]              DX V65.8 REPLACED WITH [id number] HEALTH CARE HOME (04/08/2013)       Chronic constipation 03/03/2011     Priority: Medium     Osteoporosis 03/03/2011     Priority: Medium     Hyperlipidemia LDL goal <130 10/31/2010     Priority: Medium     Infectious colitis, enteritis and gastroenteritis 07/10/2010     Priority: Medium     Chronic allergic conjunctivitis 11/18/2008     Priority: Medium     Chronic seasonal allergic rhinitis 11/18/2008     Priority: Medium     Esophageal reflux 11/29/2007     Priority: Medium     PERSONAL HISTORY OF MALIGNANCY- BREAST 06/13/2007     Priority: Medium     Sensorineural hearing loss, asymmetrical 06/20/2005     Priority: Medium     Generalized osteoarthrosis, unspecified site 06/20/2005     Priority: Medium     Right hip and knee are the most symptomatic          Past Medical History:    Past Medical History:   Diagnosis Date     Breast cancer (H)      COPD (chronic obstructive pulmonary disease) (H)      Dust allergy      DVT (deep vein thrombosis) in pregnancy (H)      HTN (hypertension)      Hyponatremia      Hypothyroid      Intermittent atrial fibrillation (H) 12/1/2011     Loose body in joint 4/15/2011     Neck fracture (H)      Syncope 5/12/2013       Past Surgical History:    Past Surgical History:   Procedure Laterality Date     APPENDECTOMY       ARTHROSCOPY KNEE  4/15/2011    Procedure:ARTHROSCOPY KNEE; removal of loose body; Surgeon:LEY, JEFFREY DUANE; Location:[facility]     CHOLECYSTECTOMY       ESOPHAGOSCOPY, GASTROSCOPY, DUODENOSCOPY (EGD), COMBINED  6/9/2014    Procedure: COMBINED ESOPHAGOSCOPY, GASTROSCOPY, DUODENOSCOPY (EGD);  Surgeon: Gilberto Richard MD;  Location: [facility]     IMPLANT  PACEMAKER  5/21/2013    Sarah Buttsanabell [id number] Serial#[id number]     INJECT EPIDURAL LUMBAR  3/23/2011    INJECT EPIDURAL LUMBAR performed by GENERIC ANESTHESIA PROVIDER at [facility]     JOINT REPLACEMENT, HIP RT/LT      Joint Replacement Hip Rt     MASTECTOMY, SIMPLE RT/LT/CAITLYN      Left breast - following breast ca     OTHER SURGICAL HISTORY      C1-C2 fusion after fx      SURGICAL HISTORY OF -   05/22/2001    Colonoscopy     TONSILLECTOMY         Family History:    Family History   Problem Relation Age of Onset     Cerebrovascular Disease Mother      Heart Disease Mother         MI     Cerebrovascular Disease Father      Heart Disease Father         MI     Respiratory Maternal Grandfather         TB     Neurologic Disorder Brother         ALS     Heart Disease Brother      Cerebrovascular Disease Brother      Breast Cancer Daughter         age:49     Asthma Brother      Cancer Brother         brain and lung     Cancer Daughter         thyroid       Social History:  Marital Status:   [5]  Social History     Tobacco Use     Smoking status: Passive Smoke Exposure - Never Smoker     Smokeless tobacco: Never Used   Substance Use Topics     Alcohol use: No     Drug use: No        Medications:      Acetaminophen (TYLENOL PO)   albuterol (PROAIR HFA/PROVENTIL HFA/VENTOLIN HFA) 108 (90 Base) MCG/ACT Inhaler   albuterol (PROVENTIL) (2.5 MG/3ML) 0.083% neb solution   azithromycin (ZITHROMAX Z-ADDY) 250 MG tablet   CRANBERRY   estradiol (ESTRACE VAGINAL) 0.1 MG/GM vaginal cream   fluticasone (FLONASE) 50 MCG/ACT spray   ipratropium - albuterol 0.5 mg/2.5 mg/3 mL (DUONEB) 0.5-2.5 (3) MG/3ML neb solution   levothyroxine (SYNTHROID/LEVOTHROID) 50 MCG tablet   metoprolol succinate ER (TOPROL-XL) 25 MG 24 hr tablet   ondansetron (ZOFRAN-ODT) 4 MG ODT tab   polyethylene glycol (MIRALAX/GLYCOLAX) Packet   predniSONE (DELTASONE) 20 MG tablet   probiotic CAPS   sodium chloride (BRONCHO SALINE) 0.9 % areosol solution   sodium chloride  1 GM tablet   SYMBICORT 160-4.5 MCG/ACT Inhaler   warfarin (COUMADIN) 1 MG tablet   omeprazole (PRILOSEC) 40 MG DR capsule   order for DME         Review of Systems   Constitutional: Negative for fever.   HENT: Positive for congestion. Negative for sore throat and trouble swallowing.    Eyes: Negative.    Respiratory: Positive for cough. Negative for wheezing.    Cardiovascular: Negative for chest pain and leg swelling.   Gastrointestinal: Negative for nausea.   Genitourinary: Negative.    Musculoskeletal: Negative.    Skin: Negative.    Neurological: Negative for dizziness and weakness.   Hematological: Negative.    Psychiatric/Behavioral: Negative.        Physical Exam   BP: 118/70  Pulse: 83  Temp: 98.4  F (36.9  C)  Resp: 20  Weight: 54.4 kg (120 lb)  SpO2: 96 %      Physical Exam   Constitutional: She appears well-developed. No distress.   HENT:   Head: Normocephalic.   Mouth/Throat: Oropharynx is clear and moist.   Eyes: Pupils are equal, round, and reactive to light.   Neck: No thyromegaly present.   Cardiovascular: Regular rhythm.   No murmur heard.  Pulmonary/Chest: No respiratory distress.   Few basilar rales bilaterally.  Occasional expiratory wheeze bilaterally.  Increased AP diameter of chest.   Abdominal: She exhibits no distension. There is no tenderness.   Musculoskeletal: She exhibits no edema or tenderness.   Neurological: Coordination normal.   Skin: No rash noted.   Psychiatric: She has a normal mood and affect.       ED Course        Procedures               Critical Care time:  none     Results for orders placed or performed during the hospital encounter of 07/13/19   XR Chest 2 Views    Narrative    CHEST TWO VIEWS 7/13/2019 3:52 PM     HISTORY: Worsening cough, history of COPD.    COMPARISON: October 30, 2018     FINDINGS: Right upper lobe opacity which may be postinfectious  scarring and fibrosis appears less prominent than previous. Remaining  lungs clear. Cardiac device stable. The cardiac  silhouette is not  enlarged. Pulmonary vasculature is unremarkable.      Impression    IMPRESSION: Probable biapical fibrosis right worse than left, no  definite acute appearing infiltrates.    RANJANA NAVARRETE MD   Comprehensive metabolic panel   Result Value Ref Range    Sodium 133 133 - 144 mmol/L    Potassium 4.1 3.4 - 5.3 mmol/L    Chloride 99 94 - 109 mmol/L    Carbon Dioxide 28 20 - 32 mmol/L    Anion Gap 6 3 - 14 mmol/L    Glucose 100 (H) 70 - 99 mg/dL    Urea Nitrogen 25 7 - 30 mg/dL    Creatinine 0.57 0.52 - 1.04 mg/dL    GFR Estimate 82 >60 mL/min/[1.73_m2]    GFR Estimate If Black >90 >60 mL/min/[1.73_m2]    Calcium 8.6 8.5 - 10.1 mg/dL    Bilirubin Total 0.2 0.2 - 1.3 mg/dL    Albumin 3.3 (L) 3.4 - 5.0 g/dL    Protein Total 6.6 (L) 6.8 - 8.8 g/dL    Alkaline Phosphatase 92 40 - 150 U/L    ALT 25 0 - 50 U/L    AST 20 0 - 45 U/L   CBC with platelets differential   Result Value Ref Range    WBC 8.4 4.0 - 11.0 10e9/L    RBC Count 4.68 3.8 - 5.2 10e12/L    Hemoglobin 12.9 11.7 - 15.7 g/dL    Hematocrit 39.5 35.0 - 47.0 %    MCV 84 78 - 100 fl    MCH 27.6 26.5 - 33.0 pg    MCHC 32.7 31.5 - 36.5 g/dL    RDW 14.2 10.0 - 15.0 %    Platelet Count 269 150 - 450 10e9/L    Diff Method Automated Method     % Neutrophils 66.9 %    % Lymphocytes 21.4 %    % Monocytes 10.9 %    % Eosinophils 0.2 %    % Basophils 0.1 %    % Immature Granulocytes 0.5 %    Nucleated RBCs 0 0 /100    Absolute Neutrophil 5.6 1.6 - 8.3 10e9/L    Absolute Lymphocytes 1.8 0.8 - 5.3 10e9/L    Absolute Monocytes 0.9 0.0 - 1.3 10e9/L    Absolute Eosinophils 0.0 0.0 - 0.7 10e9/L    Absolute Basophils 0.0 0.0 - 0.2 10e9/L    Abs Immature Granulocytes 0.0 0 - 0.4 10e9/L    Absolute Nucleated RBC 0.0    Nt probnp inpatient (BNP)   Result Value Ref Range    N-Terminal Pro BNP Inpatient 969 0 - 1,800 pg/mL     *Note: Due to a large number of results and/or encounters for the requested time period, some results have not been displayed. A complete set of  results can be found in Results Review.                 No results found. However, due to the size of the patient record, not all encounters were searched. Please check Results Review for a complete set of results.    Medications   ipratropium - albuterol 0.5 mg/2.5 mg/3 mL (DUONEB) neb solution 3 mL (3 mLs Nebulization Given 7/13/19 8504)     14:37 patient assessed. Course of care outlined.     Assessments & Plan (with Medical Decision Making)     This patient presented with cough and some mild wheezing.  She was getting some greenish sputum production.  She has a history of COPD.  She is not having fever.  Exam as documented above.  Her CBC and blood chemistries were basically normal.  BN P was normal.  Chest x-ray showed some fibrosis but no acute infiltrates.    She will be treated for bronchitis and COPD.  She can continue on her 3 times daily nebs at home.  Return is advised if she has worsening symptoms.          I have reviewed the nursing notes.    I have reviewed the findings, diagnosis, plan and need for follow up with the patient.          Medication List      Started    azithromycin 250 MG tablet  Commonly known as:  ZITHROMAX Z-ADDY  Two tablets on the first day, then one tablet daily for the next 4 days     predniSONE 20 MG tablet  Commonly known as:  DELTASONE  Take 2 daily for 4 days, then 1 daily for 4 days.            Final diagnoses:   Bronchitis   Chronic obstructive pulmonary disease, unspecified COPD type (H)     This document serves as a record of services personally performed by Slim Hanks MD. It was created on their behalf by Emily Lentz, a trained medical scribe. The creation of this record is based on the provider's personal observations and the statements of the patient. This document has been checked and approved by the attending provider.      7/13/2019   [facility] EMERGENCY DEPARTMENT     Slim Hanks MD  07/14/19 [id number]

## 2019-07-13 NOTE — DISCHARGE INSTRUCTIONS
Take the prescribed medications as directed.    Continue to use your nebulizer 3 times daily.    Have follow-up with your doctor in the next 2 to 3 days if not improving.    If you develop worsening shortness of breath, fever or other concerning symptoms, return to the emergency room.

## 2019-07-13 NOTE — ED NOTES
Pt here with cough for 4 weeks that has gotten worse over the past 2 days. Coughing up green phlegm, no fevers. Denies feeling SOA. Eating and drinking well. Hx of COPD, uses nebulizer three times per day, last used this morning.

## 2019-07-14 ASSESSMENT — ENCOUNTER SYMPTOMS
HEMATOLOGIC/LYMPHATIC NEGATIVE: 1
MUSCULOSKELETAL NEGATIVE: 1
WHEEZING: 0
TROUBLE SWALLOWING: 0
SORE THROAT: 0
NAUSEA: 0
EYES NEGATIVE: 1
FEVER: 0
WEAKNESS: 0
DIZZINESS: 0
PSYCHIATRIC NEGATIVE: 1
COUGH: 1

## 2019-07-15 RX ORDER — OMEPRAZOLE 40 MG/1
40 CAPSULE, DELAYED RELEASE ORAL DAILY
Qty: 30 CAPSULE | Refills: 0 | Status: SHIPPED | OUTPATIENT
Start: 2019-07-15 | End: 2019-08-11

## 2019-07-15 NOTE — TELEPHONE ENCOUNTER
"Requested Prescriptions   Pending Prescriptions Disp Refills     omeprazole (PRILOSEC) 40 MG DR capsule [Pharmacy Med Name: Omeprazole Oral Capsule Delayed Release 40 MG] 30 capsule 0     Sig: Take 1 capsule (40 mg) by mouth daily.       PPI Protocol Failed - 7/13/2019  9:44 AM        Failed - No diagnosis of osteoporosis on record        Passed - Not on Clopidogrel (unless Pantoprazole ordered)        Passed - Recent (12 mo) or future (30 days) visit within the authorizing provider's specialty     Patient had office visit in the last 12 months or has a visit in the next 30 days with authorizing provider or within the authorizing provider's specialty.  See \"Patient Info\" tab in inbasket, or \"Choose Columns\" in Meds & Orders section of the refill encounter.              Passed - Medication is active on med list        Passed - Patient is age 18 or older        Passed - No active pregnacy on record        Passed - No positive pregnancy test in past 12 months      omeprazole (PRILOSEC) 40 MG DR capsule  Last Written Prescription Date:  05/20/2019  Last Fill Quantity: 30 capsule,  # refills: 1   Last office visit: 7/3/2019 with prescribing provider:  TRES Quinn   Future Office Visit:      Cleo REYES (NOLBERTO) (M)      "

## 2019-08-05 ENCOUNTER — TELEPHONE (OUTPATIENT)
Dept: FAMILY MEDICINE | Facility: CLINIC | Age: 84
End: 2019-08-05

## 2019-08-05 NOTE — TELEPHONE ENCOUNTER
Pt called. States she is feeling tired and not well. States she had a nose bleed today and yesterday lasting about 5 minutes. Advised to push fluids. Has INR appointment tomorrow. Marium Richardson RN

## 2019-08-05 NOTE — TELEPHONE ENCOUNTER
Reason for call:  Patient reporting a symptom    Symptom or request: Ellie says she has had nosebleeds yesterday and today. She has been able to stop them on her own. She says she also has a burning in her chest. She says she takes Omeprazole. Please call to advise.     Duration (how long have symptoms been present): 2 days    Phone Number patient can be reached at:  Home number on file [phone] (home)    Best Time:  anytime    Can we leave a detailed message on this number:  YES    Call taken on 8/5/2019 at 12:26 PM by Ninoska Song

## 2019-08-06 ENCOUNTER — OFFICE VISIT (OUTPATIENT)
Dept: FAMILY MEDICINE | Facility: CLINIC | Age: 84
End: 2019-08-06
Payer: COMMERCIAL

## 2019-08-06 ENCOUNTER — ANTICOAGULATION THERAPY VISIT (OUTPATIENT)
Dept: ANTICOAGULATION | Facility: CLINIC | Age: 84
End: 2019-08-06
Payer: COMMERCIAL

## 2019-08-06 VITALS
OXYGEN SATURATION: 96 % | TEMPERATURE: 97.3 F | HEART RATE: 58 BPM | RESPIRATION RATE: 14 BRPM | WEIGHT: 118 LBS | DIASTOLIC BLOOD PRESSURE: 70 MMHG | SYSTOLIC BLOOD PRESSURE: 136 MMHG | HEIGHT: 58 IN | BODY MASS INDEX: 24.77 KG/M2

## 2019-08-06 DIAGNOSIS — R04.0 BLEEDING NOSE: Primary | ICD-10-CM

## 2019-08-06 DIAGNOSIS — I82.409 DVT (DEEP VENOUS THROMBOSIS) (H): ICD-10-CM

## 2019-08-06 DIAGNOSIS — R04.0 BLEEDING NOSE: ICD-10-CM

## 2019-08-06 DIAGNOSIS — Z79.01 CHRONIC ANTICOAGULATION: ICD-10-CM

## 2019-08-06 DIAGNOSIS — Z79.01 LONG TERM CURRENT USE OF ANTICOAGULANT THERAPY: ICD-10-CM

## 2019-08-06 LAB
BASOPHILS # BLD AUTO: 0 10E9/L (ref 0–0.2)
BASOPHILS NFR BLD AUTO: 0.7 %
DIFFERENTIAL METHOD BLD: NORMAL
EOSINOPHIL # BLD AUTO: 0.1 10E9/L (ref 0–0.7)
EOSINOPHIL NFR BLD AUTO: 1.7 %
ERYTHROCYTE [DISTWIDTH] IN BLOOD BY AUTOMATED COUNT: 14.6 % (ref 10–15)
HCT VFR BLD AUTO: 38.3 % (ref 35–47)
HGB BLD-MCNC: 12.9 G/DL (ref 11.7–15.7)
INR POINT OF CARE: 2 (ref 0.86–1.14)
LYMPHOCYTES # BLD AUTO: 1.6 10E9/L (ref 0.8–5.3)
LYMPHOCYTES NFR BLD AUTO: 38.8 %
MCH RBC QN AUTO: 28 PG (ref 26.5–33)
MCHC RBC AUTO-ENTMCNC: 33.7 G/DL (ref 31.5–36.5)
MCV RBC AUTO: 83 FL (ref 78–100)
MONOCYTES # BLD AUTO: 0.5 10E9/L (ref 0–1.3)
MONOCYTES NFR BLD AUTO: 11.9 %
NEUTROPHILS # BLD AUTO: 1.9 10E9/L (ref 1.6–8.3)
NEUTROPHILS NFR BLD AUTO: 46.9 %
PLATELET # BLD AUTO: 217 10E9/L (ref 150–450)
RBC # BLD AUTO: 4.6 10E12/L (ref 3.8–5.2)
WBC # BLD AUTO: 4.1 10E9/L (ref 4–11)

## 2019-08-06 PROCEDURE — 85025 COMPLETE CBC W/AUTO DIFF WBC: CPT | Performed by: NURSE PRACTITIONER

## 2019-08-06 PROCEDURE — 99207 ZZC NO CHARGE NURSE ONLY: CPT

## 2019-08-06 PROCEDURE — 85610 PROTHROMBIN TIME: CPT | Mod: QW

## 2019-08-06 PROCEDURE — 99213 OFFICE O/P EST LOW 20 MIN: CPT | Performed by: NURSE PRACTITIONER

## 2019-08-06 PROCEDURE — 36416 COLLJ CAPILLARY BLOOD SPEC: CPT

## 2019-08-06 ASSESSMENT — PAIN SCALES - GENERAL: PAINLEVEL: NO PAIN (0)

## 2019-08-06 ASSESSMENT — MIFFLIN-ST. JEOR: SCORE: 854.99

## 2019-08-06 NOTE — PATIENT INSTRUCTIONS
Patient Education     Nosebleed (Adult)    Bleeding from the nose most commonly occurs because of injury or drying and cracking of the inner lining of the nose. Most nosebleeds are because of dry air or nose-picking. They can occur during a common cold or an allergy attack. They can also occur on a very hot day, or from dry air in the winter.  If the bleeding site is found, it may be cauterized. This means it is treated to cause a blood clot to form. This may be done with a chemical, heat, or electricity. If the bleeding continues after the site is cauterized, or if the site cannot be found, packing may be put in your nose. This is to apply pressure and stop the bleeding. The packing may be made of gauze or sponge. A small balloon catheter is sometimes used. These must be removed by your healthcare provider. Some types of packing dissolve on their own. If you are taking blood thinning (anticoagulant) medicine, you may have a blood test.  Home care    If packing was put in your nose, unless told otherwise, do not pull on it or try to remove it yourself. You will be given an appointment to have it removed. You may also have been given antibiotics to prevent a sinus infection. If so, finish all of the medicine.    Don't blow your nose for 12 hours after the bleeding stops. This will allow a strong blood clot to form. Don't pick your nose. This may restart bleeding.    Don't drink alcohol or hot liquids for the next 2 days. Alcohol or hot liquids in your mouth can dilate blood vessels in your nose. This can cause bleeding to start again.    Don't take ibuprofen, naproxen, or medicines that contain aspirin. These thin the blood and may cause your nose to bleed. You may take acetaminophen for pain, unless another pain medicine was prescribed.    If the bleeding starts again, sit up and lean forward to prevent swallowing blood. Pinch your nose tightly on both sides, as shown above, for 10 to 15 minutes. Time yourself.  Don t release the pressure on your nose until 10 minutes is up. If bleeding does not stop, continue to pinch your nose and call your healthcare provider or return to this facility.    If you have a cold, allergies, or dry nasal membranes, lubricate the nasal passages. Apply a small amount of petroleum jelly inside the nose with a cotton swab twice a day (morning and night).    Don't overheat your home. This can dry the air and make your condition worse.    Put a humidifier in the room where you sleep. This will add moisture to the air. Clean the humidifier as advised by the .    Use a saline nasal spray to keep nasal passages moist.    Don't pick your nose. Keep fingernails trimmed to decrease risk of bleeds.    Don't smoke.  Follow-up care  Follow up with your healthcare provider, or as advised. Nasal packing should be rechecked or removed within 2 to 3 days.  When to seek medical advice  Call your healthcare provider right away if any of these occur.    You have another nosebleed that you cannot control    Dizziness, weakness, or fainting    You become tired or confused    Fever of 100.4 F (38 C) or higher, or as directed by your healthcare provider    Headache    Sinus or facial pain    Shortness of breath or trouble breathing  Date Last Reviewed: 11/1/2017 2000-2018 [employer]. [address] [place] [address], [place] [address]. All rights reserved. This information is not intended as a substitute for professional medical care. Always follow your healthcare professional's instructions.           Patient Education     Nosebleed (Adult)    Bleeding from the nose most commonly occurs because of injury or drying and cracking of the inner lining of the nose. Most nosebleeds are because of dry air or nose-picking. They can occur during a common cold or an allergy attack. They can also occur on a very hot day, or from dry air in the winter.  If the bleeding site is found, it may be cauterized. This  means it is treated to cause a blood clot to form. This may be done with a chemical, heat, or electricity. If the bleeding continues after the site is cauterized, or if the site cannot be found, packing may be put in your nose. This is to apply pressure and stop the bleeding. The packing may be made of gauze or sponge. A small balloon catheter is sometimes used. These must be removed by your healthcare provider. Some types of packing dissolve on their own. If you are taking blood thinning (anticoagulant) medicine, you may have a blood test.  Home care    If packing was put in your nose, unless told otherwise, do not pull on it or try to remove it yourself. You will be given an appointment to have it removed. You may also have been given antibiotics to prevent a sinus infection. If so, finish all of the medicine.    Don't blow your nose for 12 hours after the bleeding stops. This will allow a strong blood clot to form. Don't pick your nose. This may restart bleeding.    Don't drink alcohol or hot liquids for the next 2 days. Alcohol or hot liquids in your mouth can dilate blood vessels in your nose. This can cause bleeding to start again.    Don't take ibuprofen, naproxen, or medicines that contain aspirin. These thin the blood and may cause your nose to bleed. You may take acetaminophen for pain, unless another pain medicine was prescribed.    If the bleeding starts again, sit up and lean forward to prevent swallowing blood. Pinch your nose tightly on both sides, as shown above, for 10 to 15 minutes. Time yourself. Don t release the pressure on your nose until 10 minutes is up. If bleeding does not stop, continue to pinch your nose and call your healthcare provider or return to this facility.    If you have a cold, allergies, or dry nasal membranes, lubricate the nasal passages. Apply a small amount of petroleum jelly inside the nose with a cotton swab twice a day (morning and night).    Don't overheat your home.  This can dry the air and make your condition worse.    Put a humidifier in the room where you sleep. This will add moisture to the air. Clean the humidifier as advised by the .    Use a saline nasal spray to keep nasal passages moist.    Don't pick your nose. Keep fingernails trimmed to decrease risk of bleeds.    Don't smoke.  Follow-up care  Follow up with your healthcare provider, or as advised. Nasal packing should be rechecked or removed within 2 to 3 days.  When to seek medical advice  Call your healthcare provider right away if any of these occur.    You have another nosebleed that you cannot control    Dizziness, weakness, or fainting    You become tired or confused    Fever of 100.4 F (38 C) or higher, or as directed by your healthcare provider    Headache    Sinus or facial pain    Shortness of breath or trouble breathing  Date Last Reviewed: 11/1/2017 2000-2018 [employer]. [address] [place] [address]. All rights reserved. This information is not intended as a substitute for professional medical care. Always follow your healthcare professional's instructions.           Patient Education     Tips to Control Acid Reflux    To control acid reflux, you ll need to make some basic diet and lifestyle changes. The simple steps outlined below may be all you ll need to ease discomfort.  Watch what you eat    Avoid fatty foods and spicy foods.    Eat fewer acidic foods, such as citrus and tomato-based foods. These can increase symptoms.    Limit drinking alcohol, caffeine, and fizzy beverages. All increase acid reflux.    Try limiting chocolate, peppermint, and spearmint. These can worsen acid reflux in some people.  Watch when you eat    Avoid lying down for 3 hours after eating.    Do not snack before going to bed.  Raise your head  Raising your head and upper body by 4 to 6 inches helps limit reflux when you re lying down. Put blocks under the head of your bed frame to  raise it.  Other changes    Lose weight, if you need to    Don t exercise near bedtime    Avoid tight-fitting clothes    Limit aspirin and ibuprofen    Stop smoking   Date Last Reviewed: 7/1/2016 2000-2018 [employer]. [address] [place] [address], [place] [address]. All rights reserved. This information is not intended as a substitute for professional medical care. Always follow your healthcare professional's instructions.           Patient Education     Lifestyle Changes for Controlling GERD  When you have GERD, stomach acid feels as if it s backing up toward your mouth. Whether or not you take medicine to control your GERD, your symptoms can often be improved with lifestyle changes. Talk to your healthcare provider about the following suggestions. These suggestions may help you get relief from your symptoms.      Raise your head  Reflux is more likely to strike when you re lying down flat, because stomach fluid can flow backward more easily. Raising the head of your bed 4 to 6 inches can help. To do this:    Slide blocks or books under the legs at the head of your bed. Or, place a wedge under the mattress. [employer] can make a suitable wedge for you. The wedge should run from your waist to the top of your head.    Don t just prop your head on several pillows. This increases pressure on your stomach. It can make GERD worse.  Watch your eating habits  Certain foods may increase the acid in your stomach or relax the lower esophageal sphincter. This makes GERD more likely. It s best to avoid the following if they cause you symptoms:    Coffee, tea, and carbonated drinks (with and without caffeine)    Fatty, fried, or spicy food    Mint, chocolate, onions, and tomatoes    Peppermint    Any other foods that seem to irritate your stomach or cause you pain  Relieve the pressure  Tips include the following:    Eat smaller meals, even if you have to eat more often.    Don t lie down right after you eat.  Wait a few hours for your stomach to empty.    Avoid tight belts and tight-fitting clothes.    Lose excess weight.  Tobacco and alcohol  Avoid smoking tobacco and drinking alcohol. They can make GERD symptoms worse.  Date Last Reviewed: 7/1/2016 2000-2018 [employer]. [address] [place] [id number]. All rights reserved. This information is not intended as a substitute for professional medical care. Always follow your healthcare professional's instructions.

## 2019-08-06 NOTE — PROGRESS NOTES
ANTICOAGULATION FOLLOW-UP CLINIC VISIT    Patient Name:  Ellie Leigh  Date:  8/6/2019  Contact Type:  Face to Face    SUBJECTIVE:  Patient Findings     Positives:   Signs/symptoms of bleeding (nosebleed x 2 days, now has stopped)    Comments:   Patient saw a provider today due to having nosebleeds x 2 days. Provider note is not completed at this time, but patient and daughter state they discussed having an area that appears to be the source of the bleeding cauterized; but at this time it appears to have scabbed over so they will return if the bleeding is worse. Patient will also be going to lab after this appt to have her Hgb checked, per daughter's report. Patient reports no changes to diet, activity, or medications. Patient reports she has taken her warfarin as directed. INR is therapeutic today, will continue her maintenance dose and recheck INR in 3 weeks (pt and daughter aware to return sooner for INR if any concerns).         Clinical Outcomes     Comments:   Patient saw a provider today due to having nosebleeds x 2 days. Provider note is not completed at this time, but patient and daughter state they discussed having an area that appears to be the source of the bleeding cauterized; but at this time it appears to have scabbed over so they will return if the bleeding is worse. Patient will also be going to lab after this appt to have her Hgb checked, per daughter's report. Patient reports no changes to diet, activity, or medications. Patient reports she has taken her warfarin as directed. INR is therapeutic today, will continue her maintenance dose and recheck INR in 3 weeks (pt and daughter aware to return sooner for INR if any concerns).            OBJECTIVE    INR Protime   Date Value Ref Range Status   08/06/2019 2.0 (A) 0.86 - 1.14 Final       ASSESSMENT / PLAN  INR assessment THER    Recheck INR In: 3 WEEKS    INR Location Clinic      Anticoagulation Summary  As of 8/6/2019    INR goal:   2.0-3.0    TTR:   48.0 % (4.4 y)   INR used for dosin.0 (2019)   Warfarin maintenance plan:   2 mg (1 mg x 2) every Mon, Fri; 1 mg (1 mg x 1) all other days   Full warfarin instructions:   2 mg every Mon, Fri; 1 mg all other days   Weekly warfarin total:   9 mg   No change documented:   Gina Persaud RN   Plan last modified:   Ruiz Meredith RN (2019)   Next INR check:   2019   Priority:   INR   Target end date:   Indefinite    Indications    Atrial fibrillation with rapid ventricular response (H) (Resolved) [I48.91]  DVT (deep venous thrombosis) [I82.409]  Long term current use of anticoagulant therapy [Z79.01]             Anticoagulation Episode Summary     INR check location:       Preferred lab:       Send INR reminders to:   GARRY BARRY    Comments:   * Taking Celebrex PRN         Anticoagulation Care Providers     Provider Role Specialty Phone number    Anjum Vidales MD [facility] [phone]            See the Encounter Report to view Anticoagulation Flowsheet and Dosing Calendar (Go to Encounters tab in chart review, and find the Anticoagulation Therapy Visit)      Gina Persaud, RN

## 2019-08-06 NOTE — PROGRESS NOTES
Subjective     Ellie Leigh is a 88 year old female who presents to clinic today for the following health issues:    HPI   Nose bleeds       Duration: 2 days     Description (location/character/radiation): lasting 5 mins x2 yesterday    Intensity:  moderate    Accompanying signs and symptoms: Fatigue and chest burning     History (similar episodes/previous evaluation): INR     Precipitating or alleviating factors: None    Therapies tried and outcome: None      has a past medical history of Breast cancer (H), COPD (chronic obstructive pulmonary disease) (H), Dust allergy, DVT (deep vein thrombosis) in pregnancy (H), HTN (hypertension), Hyponatremia, Hypothyroid, Intermittent atrial fibrillation (H) (12/1/2011), Loose body in joint (4/15/2011), Neck fracture (H), and Syncope (5/12/2013).  Anticoagulation therapy appointment today.  Her INR goal is 2.0-3.0.  Current warfarin 2 mg Monday Friday 1 mg all other days no blood in the stools.  No bleeding gums.  No easy bruising.    -------------------------------------    Patient Active Problem List   Diagnosis     Sensorineural hearing loss, asymmetrical     Generalized osteoarthrosis, unspecified site     PERSONAL HISTORY OF MALIGNANCY- BREAST     Esophageal reflux     Chronic allergic conjunctivitis     Chronic seasonal allergic rhinitis     Hyperlipidemia LDL goal <130     Chronic constipation     Osteoporosis     Health Care Home     Right arm weakness     Ulnar neuropathy     Headache     Mild major depression     DVT (deep venous thrombosis)     Anxiety     Cervical vertebral fracture (H)     Intermittent atrial fibrillation (H)     Squamous cell carcinoma in situ of skin of face     SK (seborrheic keratosis)     Hypothyroidism     Hyponatremia     Recurrent UTI     History of skin cancer     BPPV (benign paroxysmal positional vertigo)     Benign essential HTN     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)     Positive fecal occult blood test     COPD  (chronic obstructive pulmonary disease) (H)     Infectious colitis, enteritis and gastroenteritis     Advance Care Planning     Syncope     Long term current use of anticoagulant therapy     Mild persistent asthma without complication     Irritable bowel syndrome without diarrhea     Nausea     Back pain     H/O TB (tuberculosis)     Chest pain     Past Surgical History:   Procedure Laterality Date     APPENDECTOMY       ARTHROSCOPY KNEE  4/15/2011    Procedure:ARTHROSCOPY KNEE; removal of loose body; Surgeon:LEY, JEFFREY DUANE; Location:[facility]     CHOLECYSTECTOMY       ESOPHAGOSCOPY, GASTROSCOPY, DUODENOSCOPY (EGD), COMBINED  6/9/2014    Procedure: COMBINED ESOPHAGOSCOPY, GASTROSCOPY, DUODENOSCOPY (EGD);  Surgeon: Gilberto Richard MD;  Location: [facility]     IMPLANT PACEMAKER  5/21/2013    [employer] [id number] Serial#[id number]     INJECT EPIDURAL LUMBAR  3/23/2011    INJECT EPIDURAL LUMBAR performed by GENERIC ANESTHESIA PROVIDER at [facility]     JOINT REPLACEMENT, HIP RT/LT      Joint Replacement Hip Rt     MASTECTOMY, SIMPLE RT/LT/CAITLYN      Left breast - following breast ca     OTHER SURGICAL HISTORY      C1-C2 fusion after fx      SURGICAL HISTORY OF -   05/22/2001    Colonoscopy     TONSILLECTOMY         Social History     Tobacco Use     Smoking status: Passive Smoke Exposure - Never Smoker     Smokeless tobacco: Never Used   Substance Use Topics     Alcohol use: No     Family History   Problem Relation Age of Onset     Cerebrovascular Disease Mother      Heart Disease Mother         MI     Cerebrovascular Disease Father      Heart Disease Father         MI     Respiratory Maternal Grandfather         TB     Neurologic Disorder Brother         ALS     Heart Disease Brother      Cerebrovascular Disease Brother      Breast Cancer Daughter         age:49     Asthma Brother      Cancer Brother         brain and lung     Cancer Daughter         thyroid           -------------------------------------  Reviewed and updated as  "needed this visit by Provider         Review of Systems    ROS: 10 point ROS neg other than the symptoms noted above in the HPI.        Objective    /70   Pulse 58   Temp 97.3  F (36.3  C) (Tympanic)   Resp 14   Ht 1.473 m (4' 10\")   Wt 53.5 kg (118 lb)   LMP 06/15/1985   SpO2 96%   BMI 24.66 kg/m    Body mass index is 24.66 kg/m .  Physical Exam   GENERAL: healthy, alert and no distress  EYES: Eyes grossly normal to inspection, PERRL and conjunctivae and sclerae normal  HENT: ear canals and TM's normal, nose pea-sized excoriation left nare.  No active bleeding.  Scab in place and mouth without ulcers or lesions  NECK: no adenopathy, no asymmetry, masses, or scars and thyroid normal to palpation  RESP: Distant breath sounds throughout  CV: regular rates and rhythm and no peripheral edema  ABDOMEN: soft, nontender, no hepatosplenomegaly, no masses and bowel sounds normal  MS: no gross musculoskeletal defects noted, no edema  SKIN: no suspicious lesions or rashes  NEURO: Normal strength and tone, mentation intact and speech normal  PSYCH: mentation appears normal, affect normal/bright    Diagnostic Test Results:  Labs reviewed in Epic  Results for orders placed or performed in visit on 08/06/19   INR point of care   Result Value Ref Range    INR Protime 2.0 (A) 0.86 - 1.14     *Note: Due to a large number of results and/or encounters for the requested time period, some results have not been displayed. A complete set of results can be found in Results Review.           Assessment & Plan     Ellie was seen today for nose bleeds and chest pain.    Diagnoses and all orders for this visit:    Bleeding nose  -     CBC with platelets and differential; Future    Chronic anticoagulation  -     CBC with platelets and differential; Future      Lab today  Anticoagulation visit today  Prevention strategies reviewed  Treatment strategies reviewed.  Come in for cauterization if repeat epistaxis follow-up with primary " care provider as previously directed      See Patient Instructions  Call or return to the clinic with any worsening of symptoms or no resolution. Patient/Parent verbalized understanding and is in agreement. Medication side effects reviewed.   Current Outpatient Medications   Medication Sig Dispense Refill     Acetaminophen (TYLENOL PO) Take  mg by mouth every 8 hours as needed        albuterol (PROAIR HFA/PROVENTIL HFA/VENTOLIN HFA) 108 (90 Base) MCG/ACT Inhaler Inhale 2 puffs into the lungs every 6 hours as needed for shortness of breath / dyspnea 1 Inhaler 11     albuterol (PROVENTIL) (2.5 MG/3ML) 0.083% neb solution Take 1 vial (2.5 mg) by nebulization 3 times daily 360 mL 6     CRANBERRY Take 475 mg by mouth 2 times daily.       estradiol (ESTRACE VAGINAL) 0.1 MG/GM vaginal cream Place 2 g vaginally three times a week 42 g 3     fluticasone (FLONASE) 50 MCG/ACT spray Spray 2 sprays into both nostrils daily 1 Bottle 11     ipratropium - albuterol 0.5 mg/2.5 mg/3 mL (DUONEB) 0.5-2.5 (3) MG/3ML neb solution TAKE 1 VIAL BY NEBULIZATION  EVERY SIX HOURS AS NEEDED FOR SHORTNESS OF BREATH/ DYSPNEA OR WHEEZING 180 mL 11     levothyroxine (SYNTHROID/LEVOTHROID) 50 MCG tablet TAKE ONE TABLET BY MOUTH ONE TIME DAILY  90 tablet 1     metoprolol succinate ER (TOPROL-XL) 25 MG 24 hr tablet TAKE TWO TABLETS BY MOUTH TWICE DAILY  360 tablet 3     omeprazole (PRILOSEC) 40 MG DR capsule Take 1 capsule (40 mg) by mouth daily. 30 capsule 0     ondansetron (ZOFRAN-ODT) 4 MG ODT tab Take 1 tablet (4 mg) by mouth every 8 hours as needed for nausea 20 tablet 1     order for DME Equipment being ordered: Nebulizer 1 each 0     polyethylene glycol (MIRALAX/GLYCOLAX) Packet Take 17 g by mouth daily        probiotic CAPS Take 1 capsule by mouth every evening        sodium chloride (BRONCHO SALINE) 0.9 % areosol solution Inhale 5 mLs into the lungs 3 times daily Use after albuterol in nebulizer to thin secretions 480 mL 6     sodium  chloride 1 GM tablet TAKE ONE TABLET BY MOUTH THREE TIMES DAILY  100 tablet 10     SYMBICORT 160-4.5 MCG/ACT Inhaler INHALE TWO PUFFS INTO THE LUNGS TWICE DAILY 10.2 g 1     warfarin (COUMADIN) 1 MG tablet 2 mg (1 mg x 2) every Mon, Fri; 1 mg (1 mg x 1) all other days directed by the Anticoagulation Clinic 96 tablet 3     Chart documentation with Dragon Voice recognition Software. Although reviewed after completion, some words and grammatical errors may remain.      Return in about 2 weeks (around 8/20/2019), or if symptoms worsen or fail to improve, for Follow-up PCP.    LACI Chu [facility]

## 2019-08-11 DIAGNOSIS — K21.9 GASTROESOPHAGEAL REFLUX DISEASE WITHOUT ESOPHAGITIS: ICD-10-CM

## 2019-08-12 RX ORDER — OMEPRAZOLE 40 MG/1
CAPSULE, DELAYED RELEASE ORAL
Qty: 30 CAPSULE | Refills: 1 | Status: SHIPPED | OUTPATIENT
Start: 2019-08-12 | End: 2019-09-27

## 2019-08-12 NOTE — TELEPHONE ENCOUNTER
"Requested Prescriptions   Pending Prescriptions Disp Refills     omeprazole (PRILOSEC) 40 MG DR capsule [Pharmacy Med Name: Omeprazole Oral Capsule Delayed Release 40 MG] 30 capsule 0     Sig: Take 1 capsule (40 mg) by mouth once daily.       PPI Protocol Failed - 8/11/2019  2:16 PM        Failed - No diagnosis of osteoporosis on record        Passed - Not on Clopidogrel (unless Pantoprazole ordered)        Passed - Recent (12 mo) or future (30 days) visit within the authorizing provider's specialty     Patient had office visit in the last 12 months or has a visit in the next 30 days with authorizing provider or within the authorizing provider's specialty.  See \"Patient Info\" tab in inbasket, or \"Choose Columns\" in Meds & Orders section of the refill encounter.              Passed - Medication is active on med list        Passed - Patient is age 18 or older        Passed - No active pregnacy on record        Passed - No positive pregnancy test in past 12 months        Last Written Prescription Date:  7/15/19  Last Fill Quantity: 30,  # refills: 0   Last office visit: 8/6/2019 with prescribing provider:     Future Office Visit:      "

## 2019-08-27 ENCOUNTER — ANTICOAGULATION THERAPY VISIT (OUTPATIENT)
Dept: ANTICOAGULATION | Facility: CLINIC | Age: 84
End: 2019-08-27
Payer: COMMERCIAL

## 2019-08-27 DIAGNOSIS — I82.409 DVT (DEEP VENOUS THROMBOSIS) (H): ICD-10-CM

## 2019-08-27 DIAGNOSIS — Z79.01 LONG TERM CURRENT USE OF ANTICOAGULANT THERAPY: ICD-10-CM

## 2019-08-27 LAB — INR POINT OF CARE: 1.9 (ref 0.86–1.14)

## 2019-08-27 PROCEDURE — 99207 ZZC NO CHARGE NURSE ONLY: CPT

## 2019-08-27 PROCEDURE — 36416 COLLJ CAPILLARY BLOOD SPEC: CPT

## 2019-08-27 PROCEDURE — 85610 PROTHROMBIN TIME: CPT | Mod: QW

## 2019-08-27 NOTE — PROGRESS NOTES
ANTICOAGULATION FOLLOW-UP CLINIC VISIT    Patient Name:  Ellie Leigh  Date:  2019  Contact Type:  Face to Face    SUBJECTIVE:  Patient Findings     Comments:   Patient reports no changes in medication, activity, or diet. Patient reports no changes in health. Patient reports has taken warfarin as instructed. Patient reports no increased bruising or bleeding and no signs or symptoms of a blood clot. No identifiable reason why INR is low today. Will plan to continue same dose, recheck INR in 3 weeks. If INR remains low, patient may need maintenance dose adjustment.           Clinical Outcomes     Negatives:   Major bleeding event, Thromboembolic event, Anticoagulation-related hospital admission, Anticoagulation-related ED visit, Anticoagulation-related fatality    Comments:   Patient reports no changes in medication, activity, or diet. Patient reports no changes in health. Patient reports has taken warfarin as instructed. Patient reports no increased bruising or bleeding and no signs or symptoms of a blood clot. No identifiable reason why INR is low today. Will plan to continue same dose, recheck INR in 3 weeks. If INR remains low, patient may need maintenance dose adjustment.              OBJECTIVE    INR Protime   Date Value Ref Range Status   2019 1.9 (A) 0.86 - 1.14 Final       ASSESSMENT / PLAN  INR assessment SUB    Recheck INR In: 3 WEEKS    INR Location Clinic      Anticoagulation Summary  As of 2019    INR goal:   2.0-3.0   TTR:   47.3 % (4.4 y)   INR used for dosin.9! (2019)   Warfarin maintenance plan:   2 mg (1 mg x 2) every Mon, Fri; 1 mg (1 mg x 1) all other days   Full warfarin instructions:   2 mg every Mon, Fri; 1 mg all other days   Weekly warfarin total:   9 mg   No change documented:   Gina Persaud RN   Plan last modified:   Ruiz Meredith RN (2019)   Next INR check:   2019   Priority:   INR   Target end date:   Indefinite    Indications     Atrial fibrillation with rapid ventricular response (H) (Resolved) [I48.91]  DVT (deep venous thrombosis) [I82.409]  Long term current use of anticoagulant therapy [Z79.01]             Anticoagulation Episode Summary     INR check location:       Preferred lab:       Send INR reminders to:   GARRY BARRY    Comments:   * Taking Celebrex PRN         Anticoagulation Care Providers     Provider Role Specialty Phone number    Anjum Vidales MD [facility] [phone]            See the Encounter Report to view Anticoagulation Flowsheet and Dosing Calendar (Go to Encounters tab in chart review, and find the Anticoagulation Therapy Visit)      Gina Persaud RN

## 2019-09-05 DIAGNOSIS — J45.30 MILD PERSISTENT ASTHMA WITHOUT COMPLICATION: ICD-10-CM

## 2019-09-05 RX ORDER — BUDESONIDE AND FORMOTEROL FUMARATE DIHYDRATE 160; 4.5 UG/1; UG/1
AEROSOL RESPIRATORY (INHALATION)
Qty: 10.2 G | Refills: 0 | Status: SHIPPED | OUTPATIENT
Start: 2019-09-05 | End: 2019-09-27

## 2019-09-11 NOTE — LETTER
[place] CARE COORDINATION  [place] [id number] [place] [address]  [phone]    April 17, 2018    Ellie Leigh  [address]  [facility] [address]      Dear Ellie,    I am a clinic care coordinator who works with Anjum Vidales MD at [facility]. I wanted to thank you for spending the time to talk with me.  I wanted to introduce myself and provide you with my contact information so that you can call me with questions or concerns about your health care. Below is a description of clinic care coordination and how I can further assist you.     The clinic care coordinator is a registered nurse and/or  who understand the health care system. The goal of clinic care coordination is to help you manage your health and improve access to the [facility] system in the most efficient manner. The registered nurse can assist you in meeting your health care goals by providing education, coordinating services, and strengthening the communication among your providers. The  can assist you with financial, behavioral, psychosocial, chemical dependency, counseling, and/or psychiatric resources.    Please feel free to contact me at [phone], with any questions or concerns. We at [facility] are focused on providing you with the highest-quality healthcare experience possible and that all starts with you.     Sincerely,     Steffanie Louis    Enclosed: I have enclosed a copy of a 24 Hour Access Plan. This has helpful phone numbers for you to call when needed. Please keep this in an easy to access place to use as needed.   Health Care Proxy (HCP) Health Care Proxy (HCP)/Medical Orders for Life-Sustaining Treatment (MOLST)

## 2019-09-17 ENCOUNTER — ANTICOAGULATION THERAPY VISIT (OUTPATIENT)
Dept: ANTICOAGULATION | Facility: CLINIC | Age: 84
End: 2019-09-17
Payer: COMMERCIAL

## 2019-09-17 DIAGNOSIS — I82.409 DVT (DEEP VENOUS THROMBOSIS) (H): ICD-10-CM

## 2019-09-17 DIAGNOSIS — Z79.01 LONG TERM CURRENT USE OF ANTICOAGULANT THERAPY: ICD-10-CM

## 2019-09-17 LAB — INR POINT OF CARE: 1.7 (ref 0.86–1.14)

## 2019-09-17 PROCEDURE — 99207 ZZC NO CHARGE NURSE ONLY: CPT

## 2019-09-17 PROCEDURE — 36416 COLLJ CAPILLARY BLOOD SPEC: CPT

## 2019-09-17 PROCEDURE — 85610 PROTHROMBIN TIME: CPT | Mod: QW

## 2019-09-17 NOTE — PROGRESS NOTES
ANTICOAGULATION FOLLOW-UP CLINIC VISIT    Patient Name:  Ellie Leigh  Date:  9/17/2019  Contact Type:  Face to Face with daughter    SUBJECTIVE:  Patient Findings     Comments:   Patient reports no changes in medication, activity, or diet. Patient reports no changes in health. Patient reports has taken warfarin as instructed. Patient reports no increased bruising or bleeding and no signs or symptoms of a blood clot.  Will increase dose today, then adjust maintenance dose to 9 mg weekly (11% increase) and recheck INR in 2 weeks. Patient has had this exact dose adjustment done in June to get her INR back within her goal range. Patient denies signs or symptoms of bleeding. Patient denies any signs or symptoms of a blood clot. Writer educated patient regarding increased clot risk and when to seek immediate medical attention. Patient verbalized understanding.          Clinical Outcomes     Negatives:   Major bleeding event, Thromboembolic event, Anticoagulation-related hospital admission, Anticoagulation-related ED visit, Anticoagulation-related fatality    Comments:   Patient reports no changes in medication, activity, or diet. Patient reports no changes in health. Patient reports has taken warfarin as instructed. Patient reports no increased bruising or bleeding and no signs or symptoms of a blood clot.  Will increase dose today, then adjust maintenance dose to 9 mg weekly (11% increase) and recheck INR in 2 weeks. Patient has had this exact dose adjustment done in June to get her INR back within her goal range. Patient denies signs or symptoms of bleeding. Patient denies any signs or symptoms of a blood clot. Writer educated patient regarding increased clot risk and when to seek immediate medical attention. Patient verbalized understanding.             OBJECTIVE    INR Protime   Date Value Ref Range Status   09/17/2019 1.7 (A) 0.86 - 1.14 Final       ASSESSMENT / PLAN  INR assessment SUB    Recheck INR In: 2 WEEKS     INR Location Clinic      Anticoagulation Summary  As of 2019    INR goal:   2.0-3.0   TTR:   46.7 % (4.5 y)   INR used for dosin.7! (2019)   Warfarin maintenance plan:   2 mg (1 mg x 2) every Mon, Wed, Fri; 1 mg (1 mg x 1) all other days   Full warfarin instructions:   : 2 mg; Otherwise 2 mg every Mon, Wed, Fri; 1 mg all other days   Weekly warfarin total:   10 mg   Plan last modified:   Gina Persaud RN (2019)   Next INR check:   10/1/2019   Priority:   INR   Target end date:   Indefinite    Indications    Atrial fibrillation with rapid ventricular response (H) (Resolved) [I48.91]  DVT (deep venous thrombosis) [I82.409]  Long term current use of anticoagulant therapy [Z79.01]             Anticoagulation Episode Summary     INR check location:       Preferred lab:       Send INR reminders to:   GARRY BARRY    Comments:   * Taking Celebrex PRN         Anticoagulation Care Providers     Provider Role Specialty Phone number    Anjum Vidales MD [facility] [phone]            See the Encounter Report to view Anticoagulation Flowsheet and Dosing Calendar (Go to Encounters tab in chart review, and find the Anticoagulation Therapy Visit)      Gina Persaud RN

## 2019-09-18 ENCOUNTER — OFFICE VISIT (OUTPATIENT)
Dept: FAMILY MEDICINE | Facility: CLINIC | Age: 84
End: 2019-09-18
Payer: COMMERCIAL

## 2019-09-18 VITALS
BODY MASS INDEX: 25.48 KG/M2 | WEIGHT: 121.4 LBS | SYSTOLIC BLOOD PRESSURE: 132 MMHG | HEART RATE: 72 BPM | RESPIRATION RATE: 16 BRPM | HEIGHT: 58 IN | DIASTOLIC BLOOD PRESSURE: 72 MMHG

## 2019-09-18 DIAGNOSIS — R11.0 CHRONIC NAUSEA: Primary | ICD-10-CM

## 2019-09-18 DIAGNOSIS — I48.0 INTERMITTENT ATRIAL FIBRILLATION (H): ICD-10-CM

## 2019-09-18 DIAGNOSIS — Z86.718 HISTORY OF DEEP VENOUS THROMBOSIS: ICD-10-CM

## 2019-09-18 PROCEDURE — 99214 OFFICE O/P EST MOD 30 MIN: CPT | Performed by: NURSE PRACTITIONER

## 2019-09-18 ASSESSMENT — MIFFLIN-ST. JEOR: SCORE: 865.42

## 2019-09-18 NOTE — NURSING NOTE
"Chief Complaint   Patient presents with     Nausea       Initial /72 (BP Location: Right arm, Patient Position: Chair, Cuff Size: Adult Regular)   Pulse 72   Resp 16   Ht 1.473 m (4' 10\")   Wt 55.1 kg (121 lb 6.4 oz)   LMP 06/15/1985   BMI 25.37 kg/m   Estimated body mass index is 25.37 kg/m  as calculated from the following:    Height as of this encounter: 1.473 m (4' 10\").    Weight as of this encounter: 55.1 kg (121 lb 6.4 oz).    Patient presents to the clinic using No DME    Health Maintenance that is potentially due pending provider review:  NONE        Is there anyone who you would like to be able to receive your results? No  If yes have patient fill out JOSUE      "

## 2019-09-18 NOTE — PROGRESS NOTES
"Subjective     Ellie Leigh is a 89 year old female who presents to clinic today for the following health issues:    HPI     This has been going since March   * Nausea- Ongoing.  Has been here multiple times for nausea.  Sometimes it goes away and sometimes it doesn't.  Nausea is worse in the morning and at times comes back in the evening.  Eating helps at times.    Never actually has an emesis   Some days a re very severe   Able to eat and keep food down     Taking omeprazole daily and zantac   Reports that this has been helpful for heart burn but not the nausea   Taking zofran as needed- reports that this does not seem to help, quit taking it as wasn't helpful   Taking pro biotic daily   miralax daily for bowels   Has tried dietary changes- gluten free, dairy free, FODMAPS    Saw PCP- Dr. Vidales in May for this .   Plan:   Stop the ranitidine.    Take omeprazole 40mg daily instead.   Take 30-60 minutes before eating in the AM.   See if you notice a benefit in the next couple weeks.  If not helping, we could order another gastroscopy or xray to check stomach.   It is OK to take the ondansetron if needed for nausea.  It could be taken up to three times daily as needed.  Let me know if you need more.       Reviewed and updated as needed this visit by Provider         Review of Systems   ROS COMP: Constitutional, HEENT, cardiovascular, pulmonary, gi and gu systems are negative, except as otherwise noted.      Objective    /72 (BP Location: Right arm, Patient Position: Chair, Cuff Size: Adult Regular)   Pulse 72   Resp 16   Ht 1.473 m (4' 10\")   Wt 55.1 kg (121 lb 6.4 oz)   LMP 06/15/1985   BMI 25.37 kg/m    Body mass index is 25.37 kg/m .  Physical Exam   GENERAL: healthy, alert and no distress  NECK: no adenopathy, no asymmetry, masses, or scars and thyroid normal to palpation  RESP: lungs clear to auscultation - no rales, rhonchi or wheezes  CV: regular rate and rhythm, normal S1 S2, no S3 or S4, no " murmur, click or rub, no peripheral edema and peripheral pulses strong  ABDOMEN: soft, nontender, no hepatosplenomegaly, no masses and bowel sounds normal  MS: no gross musculoskeletal defects noted, no edema    Diagnostic Test Results:  Labs reviewed in Epic  none         Assessment & Plan       ICD-10-CM    1. Chronic nausea R11.0 GASTROENTEROLOGY ADULT REF PROCEDURE ONLY   2. Intermittent atrial fibrillation (H) I48.0    3. History of deep venous thrombosis Z86.718         I have recommend that patient continue on current medication regimen and get an EGD as the next step of work up for this. She has a history of gastritis in the past on EGD in 2014. SHe has past history of DVT following being bedridden after fracturing her neck in 1996. She also has a history of a fib. No stroke. History. Should her EGD show ongoing gastritis would recommend further discussion with PCP regarding risk versus benefit of anticoagulation.     I called and spoke with both patient and her daughter the day following visit and they agree with this plan.     Patient Instructions   Let me review chart further and discuss next step   I will call you tomorrow         Steffanie Lentz NP  [facility]

## 2019-09-27 DIAGNOSIS — E03.9 HYPOTHYROIDISM, UNSPECIFIED TYPE: Chronic | ICD-10-CM

## 2019-09-27 DIAGNOSIS — K21.9 GASTROESOPHAGEAL REFLUX DISEASE WITHOUT ESOPHAGITIS: ICD-10-CM

## 2019-09-27 DIAGNOSIS — J45.30 MILD PERSISTENT ASTHMA WITHOUT COMPLICATION: ICD-10-CM

## 2019-09-27 RX ORDER — LEVOTHYROXINE SODIUM 50 UG/1
TABLET ORAL
Qty: 90 TABLET | Refills: 1 | Status: SHIPPED | OUTPATIENT
Start: 2019-09-27 | End: 2020-03-25

## 2019-09-27 RX ORDER — BUDESONIDE AND FORMOTEROL FUMARATE DIHYDRATE 160; 4.5 UG/1; UG/1
AEROSOL RESPIRATORY (INHALATION)
Qty: 10.2 G | Refills: 5 | Status: SHIPPED | OUTPATIENT
Start: 2019-09-27 | End: 2020-04-24

## 2019-09-27 RX ORDER — OMEPRAZOLE 40 MG/1
CAPSULE, DELAYED RELEASE ORAL
Qty: 30 CAPSULE | Refills: 5 | Status: SHIPPED | OUTPATIENT
Start: 2019-09-27 | End: 2020-04-07

## 2019-09-27 NOTE — TELEPHONE ENCOUNTER
"Symbicort Inhalation Aerosol 160-4.5 MCG/ACT  Last Written Prescription Date:  9/5/2019  Last Fill Quantity: 10.2 g,  # refills: 0   Last office visit: 9/18/2019 with prescribing provider:  LESIA   Future Office Visit:      -Omeprazole Oral Capsule Delayed Release 40 MG  Last Written Prescription Date:  8/12/2019  Last Fill Quantity: 30,  # refills: 1   Last office visit: 9/18/2019 with prescribing provider:  LESIA   Future Office Visit:      Levothyroxine  Last Written Prescription Date:  4/1/2019  Last Fill Quantity: 90,  # refills: 1   Last office visit: 9/18/2019 with prescribing provider:  LESIA   Future Office Visit:   Requested Prescriptions   Pending Prescriptions Disp Refills     levothyroxine (SYNTHROID/LEVOTHROID) 50 MCG tablet [Pharmacy Med Name: Levothyroxine Sodium Oral Tablet 50 MCG] 90 tablet 0     Sig: TAKE ONE TABLET BY MOUTH ONE TIME DAILY       Thyroid Protocol Passed - 9/27/2019 10:37 AM        Passed - Patient is 12 years or older        Passed - Recent (12 mo) or future (30 days) visit within the authorizing provider's specialty     Patient has had an office visit with the authorizing provider or a provider within the authorizing providers department within the previous 12 mos or has a future within next 30 days. See \"Patient Info\" tab in inbasket, or \"Choose Columns\" in Meds & Orders section of the refill encounter.              Passed - Medication is active on med list        Passed - Normal TSH on file in past 12 months     Recent Labs   Lab Test 05/20/19  1215   TSH 2.47              Passed - No active pregnancy on record     If patient is pregnant or has had a positive pregnancy test, please check TSH.          Passed - No positive pregnancy test in past 12 months     If patient is pregnant or has had a positive pregnancy test, please check TSH.          omeprazole (PRILOSEC) 40 MG DR capsule [Pharmacy Med Name: Omeprazole Oral Capsule Delayed Release 40 MG] 30 capsule 0     Sig: Take 1 capsule " "(40 mg) by mouth once daily.       PPI Protocol Failed - 9/27/2019 10:37 AM        Failed - No diagnosis of osteoporosis on record        Passed - Not on Clopidogrel (unless Pantoprazole ordered)        Passed - Recent (12 mo) or future (30 days) visit within the authorizing provider's specialty     Patient has had an office visit with the authorizing provider or a provider within the authorizing providers department within the previous 12 mos or has a future within next 30 days. See \"Patient Info\" tab in inbasket, or \"Choose Columns\" in Meds & Orders section of the refill encounter.              Passed - Medication is active on med list        Passed - Patient is age 18 or older        Passed - No active pregnacy on record        Passed - No positive pregnancy test in past 12 months        SYMBICORT 160-4.5 MCG/ACT Inhaler [Pharmacy Med Name: Symbicort Inhalation Aerosol 160-4.5 MCG/ACT] 10.2 g 0     Sig: INHALE TWO PUFFS INTO THE LUNGS TWICE DAILY       Inhaled Steroids Protocol Passed - 9/27/2019 10:37 AM        Passed - Patient is age 12 or older        Passed - Asthma control assessment score within normal limits in last 6 months     Please review ACT score.           Passed - Medication is active on med list        Passed - Recent (6 mo) or future (30 days) visit within the authorizing provider's specialty     Patient had office visit in the last 6 months or has a visit in the next 30 days with authorizing provider or within the authorizing provider's specialty.  See \"Patient Info\" tab in inbasket, or \"Choose Columns\" in Meds & Orders section of the refill encounter.                 "

## 2019-10-01 ENCOUNTER — TELEPHONE (OUTPATIENT)
Dept: ANTICOAGULATION | Facility: CLINIC | Age: 84
End: 2019-10-01

## 2019-10-01 ENCOUNTER — ANTICOAGULATION THERAPY VISIT (OUTPATIENT)
Dept: ANTICOAGULATION | Facility: CLINIC | Age: 84
End: 2019-10-01
Payer: COMMERCIAL

## 2019-10-01 DIAGNOSIS — Z79.01 LONG TERM CURRENT USE OF ANTICOAGULANT THERAPY: ICD-10-CM

## 2019-10-01 DIAGNOSIS — I82.409 DVT (DEEP VENOUS THROMBOSIS) (H): ICD-10-CM

## 2019-10-01 LAB — INR POINT OF CARE: 1.4 (ref 0.86–1.14)

## 2019-10-01 PROCEDURE — 36416 COLLJ CAPILLARY BLOOD SPEC: CPT

## 2019-10-01 PROCEDURE — 99207 ZZC NO CHARGE NURSE ONLY: CPT

## 2019-10-01 PROCEDURE — 85610 PROTHROMBIN TIME: CPT | Mod: QW

## 2019-10-01 NOTE — PROGRESS NOTES
ADDENDUM: Per telephone encounter with PCP, patient will not need to bridge with Lovenox for upcoming hold.      Gina Persaud RN 10/04/19 at 9:40 AM      10/02/19  Writer faxed INR order to [facility] at [phone].      Ruiz Meredith RN, BSN, PHN  Anticoagulation Clinic   [phone]        ANTICOAGULATION FOLLOW-UP CLINIC VISIT    Patient Name:  Ellie Leigh  Date:  10/1/2019  Contact Type:  Face to Face with daughter present    SUBJECTIVE:  Patient Findings     Positives:   Signs/symptoms of bleeding (states she coughed up blood on Wednesday ), Change in health (nausea, has upcoming EGD), Missed doses (took decreased dose on Friday)    Comments:     Patient accompanied by her daughter Suyapa today. Patient reports she has been feeling nauseated and on Wednesday she coughed up a small amount of blood so she was worried her INR was too high so she took a decreased dose on Friday. Patient's last INR was low as well. Patient has an upcoming EGD on 10/18 and will be holding warfarin for 5 days. Writer will send a telephone encounter to her PCP regarding this hold. Patient is leaving town to stay with her daughter Rosemary ( [phone] ) and will not be back in town until Oct 14th (after patient will start her 5 day hold for her EGD). Writer explained that her current maintenance dose is likely too low for her. Patient is very hesitant to increase her dose of warfarin; she states she thinks the coumadin is what is making her nauseated. Writer advised that we should increase her dose this week and recheck INR in one week at a lab in the town where her daughter, Rosemary, lives. Patient agrees to this. Patient does have a standing INR order. Patient denies signs or symptoms of bleeding. Patient denies any signs or symptoms of a blood clot. Writer educated patient regarding increased clot risk and when to seek immediate medical attention. Patient verbalized understanding.    Per note from office  "visit on 9/18/19:  \"I have recommend that patient continue on current medication regimen and get an EGD as the next step of work up for this. She has a history of gastritis in the past on EGD in 2014. SHe has past history of DVT following being bedridden after fracturing her neck in 1996. She also has a history of a fib. No stroke. History. Should her EGD show ongoing gastritis would recommend further discussion with PCP regarding risk versus benefit of anticoagulation.\"            Clinical Outcomes     Comments:     Patient accompanied by her daughter Suyapa today. Patient reports she has been feeling nauseated and on Wednesday she coughed up a small amount of blood so she was worried her INR was too high so she took a decreased dose on Friday. Patient's last INR was low as well. Patient has an upcoming EGD on 10/18 and will be holding warfarin for 5 days. Writer will send a telephone encounter to her PCP regarding this hold. Patient is leaving town to stay with her daughter Rosemary ( [phone] ) and will not be back in town until Oct 14th (after patient will start her 5 day hold for her EGD). Writer explained that her current maintenance dose is likely too low for her. Patient is very hesitant to increase her dose of warfarin; she states she thinks the coumadin is what is making her nauseated. Writer advised that we should increase her dose this week and recheck INR in one week at a lab in the town where her daughter, Rosemary, lives. Patient agrees to this. Patient does have a standing INR order. Patient denies signs or symptoms of bleeding. Patient denies any signs or symptoms of a blood clot. Writer educated patient regarding increased clot risk and when to seek immediate medical attention. Patient verbalized understanding.    Per note from office visit on 9/18/19:  \"I have recommend that patient continue on current medication regimen and get an EGD as the next step of work up for this. She has a history of " "gastritis in the past on EGD in . SHe has past history of DVT following being bedridden after fracturing her neck in . She also has a history of a fib. No stroke. History. Should her EGD show ongoing gastritis would recommend further discussion with PCP regarding risk versus benefit of anticoagulation.\"               OBJECTIVE    INR Protime   Date Value Ref Range Status   10/01/2019 1.4 (A) 0.86 - 1.14 Final       ASSESSMENT / PLAN  No question data found.  Anticoagulation Summary  As of 10/1/2019    INR goal:   2.0-3.0   TTR:   46.3 % (4.5 y)   INR used for dosin.4! (10/1/2019)   Warfarin maintenance plan:   2 mg (1 mg x 2) every Mon, Wed, Fri; 1 mg (1 mg x 1) all other days   Full warfarin instructions:   10/1: 2.5 mg; Otherwise 2 mg every Mon, Wed, Fri; 1 mg all other days   Weekly warfarin total:   10 mg   Plan last modified:   Gina Persaud RN (2019)   Next INR check:   10/8/2019   Priority:   INR   Target end date:   Indefinite    Indications    Atrial fibrillation with rapid ventricular response (H) (Resolved) [I48.91]  DVT (deep venous thrombosis) [I82.409]  Long term current use of anticoagulant therapy [Z79.01]             Anticoagulation Episode Summary     INR check location:       Preferred lab:       Send INR reminders to:   GARRY BARRY    Comments:   * Taking Celebrex PRN         Anticoagulation Care Providers     Provider Role Specialty Phone number    Anjum Vidales MD [facility] [phone]            See the Encounter Report to view Anticoagulation Flowsheet and Dosing Calendar (Go to Encounters tab in chart review, and find the Anticoagulation Therapy Visit)      Gina Persaud RN                 "

## 2019-10-01 NOTE — TELEPHONE ENCOUNTER
Ellie is scheduled to have an EGD completed on 10/18/19 and will need to hold warfarin for 5 days.   Patient is currently on warfarin for intermittent A fib, history of DVT in 1996 (she was bedridden after fracturing her neck). Patient reports she has held her warfarin for EGD in the past (the last time was approximately 5 years ago) and she did not need to bridge.   Perioperative Thrombotic Risk Stratification   High risk for clot    (Bridge) Medium risk for clot   (Maybe Bridge) Low risk for clot   (No Bridge)     Artificial Mitral valve    Artificial aortic valve if tilting disc or caged ball    Stroke, TIA within last 6 months    VTE within last 3 months    Severe thrombophilia (protein C or S deficiency, antithrombin, homozygous Factor V Leiden, antiphospholipid antibodies)  AFIB and    [id number] score 7-9    Stroke/TIA within last 3 months    Rheumatic valvular heart disease   Bileaflet aortic valve  Plus  AFib, prior stroke or TIA, HTN, DM, CHF, or over 75 years old    [id number] score 5-6    VTE within past 3 to 12 months    Non-severe thrombophilia (heterozygous factor V Leiden)    Recurrent VTE    Active cancer (or treated within last 6 months)     Bileaflet valve without Afib, no other risk factors    [id number] < 4 (with no history stroke or TIA)    VTE more than 12 months ago     While the patient is off warfarin, would you recommend the patient use enoxaparin injections as a bridge? If yes, would you like the prophylactic dose (40mg daily) or the therapeutic dose (1mg/kg twice daily)? Should the patient use enoxaparin both before and after the procedure or only afterwards?  Wt Readings from Last 2 Encounters:   09/18/19 55.1 kg (121 lb 6.4 oz)   08/06/19 53.5 kg (118 lb)     Estimated Creatinine Clearance: 58.2 mL/min (based on SCr of 0.57 mg/dL).   Therapeutic Enoxaparin if high risk for clot   CrCl >30ml/min, 1mg/kg/twice daily OR 1.5mg/kg/daily   CrCl >15ml/min, 1mg/kg/daily   CrCl <15mg/min  or on dialysis use heparin  Prophylactic if need protection after procedure (i.e. high clot risk procedure)   Anticoagulation Clinic Staff  Phone: [phone]   Fax: [phone]    Colon # [id number]

## 2019-10-02 DIAGNOSIS — I82.409 DVT (DEEP VENOUS THROMBOSIS) (H): Primary | ICD-10-CM

## 2019-10-02 NOTE — TELEPHONE ENCOUNTER
CHA2 DS2 VASc score=6.  PLAN: She can stop warfarin and does not need bridging.    Restart warfarin later in the day of the procedure.  MARCY DAUGHERTY MD

## 2019-10-08 ENCOUNTER — ANTICOAGULATION THERAPY VISIT (OUTPATIENT)
Dept: ANTICOAGULATION | Facility: CLINIC | Age: 84
End: 2019-10-08
Payer: COMMERCIAL

## 2019-10-08 DIAGNOSIS — Z79.01 LONG TERM CURRENT USE OF ANTICOAGULANT THERAPY: ICD-10-CM

## 2019-10-08 DIAGNOSIS — I82.409 DVT (DEEP VENOUS THROMBOSIS) (H): ICD-10-CM

## 2019-10-08 LAB — INR PPP: 2.3 (ref 0.8–1.1)

## 2019-10-08 PROCEDURE — 99207 ZZC NO CHARGE NURSE ONLY: CPT

## 2019-10-08 NOTE — ADDENDUM NOTE
Addended by: FELICIA GRAHAM on: 10/8/2019 05:11 PM     Modules accepted: Level of Service, SmartSet

## 2019-10-08 NOTE — PROGRESS NOTES
ANTICOAGULATION FOLLOW-UP CLINIC VISIT    Patient Name:  Ellie Leigh  Date:  10/8/2019  Contact Type:  Telephone/ Rosemary    SUBJECTIVE:  Patient Findings     Positives:   Upcoming invasive procedure, Extra doses    Comments:   No changes in medications, activity, health, or diet noted. No bleeding or increased bruising noted. Took warfarin as prescribed.  Patient will continue weekly maintenance dose. INR is therapeutic.   Recheck in 2 weeks.          Clinical Outcomes     Negatives:   Major bleeding event, Thromboembolic event, Anticoagulation-related hospital admission, Anticoagulation-related ED visit, Anticoagulation-related fatality    Comments:   No changes in medications, activity, health, or diet noted. No bleeding or increased bruising noted. Took warfarin as prescribed.  Patient will continue weekly maintenance dose. INR is therapeutic.   Recheck in 2 weeks.             OBJECTIVE    INR   Date Value Ref Range Status   10/08/2019 2.3 (A) 0.8 - 1.1 Final       ASSESSMENT / PLAN  INR assessment THER    Recheck INR In: 2 WEEKS    INR Location Outside lab      Anticoagulation Summary  As of 10/8/2019    INR goal:   2.0-3.0   TTR:   46.3 % (4.6 y)   INR used for dosin.3 (10/8/2019)   Warfarin maintenance plan:   2 mg (1 mg x 2) every Mon, Wed, Fri; 1 mg (1 mg x 1) all other days   Full warfarin instructions:   10/13: Hold; 10/14: Hold; 10/15: Hold; 10/16: Hold; 10/17: Hold; 10/23: 2 mg; Otherwise 2 mg every Mon, Wed, Fri; 1 mg all other days   Weekly warfarin total:   10 mg   Plan last modified:   Gnia Persaud RN (10/8/2019)   Next INR check:   10/25/2019   Priority:   INR   Target end date:   Indefinite    Indications    Atrial fibrillation with rapid ventricular response (H) (Resolved) [I48.91]  DVT (deep venous thrombosis) [I82.409]  Long term current use of anticoagulant therapy [Z79.01]             Anticoagulation Episode Summary     INR check location:       Preferred lab:       Send  INR reminders to:   [facility]    Comments:   * Taking Celebrex PRN         Anticoagulation Care Providers     Provider Role Specialty Phone number    Anjum Vidales MD [facility] [phone]            See the Encounter Report to view Anticoagulation Flowsheet and Dosing Calendar (Go to Encounters tab in chart review, and find the Anticoagulation Therapy Visit)        Ruiz Meredith RN

## 2019-10-08 NOTE — PROGRESS NOTES
Patient was given increased dosing due to a subtherapeutic INR one week ago. INR is therapeutic today. Patient will be holding her warfarin starting 10/13/19 for an EGD on 10/18/19. Since patient's INRs have been running low prior to this, would adjust her maintenance dose to 10 mg weekly (working around her hold) and recheck INR on 10/25/19 as previously planned. Writer attempted to call her daughter, Rosemary, with whom she is staying at this time. Writer left a message for Rosemary to return the call to [facility] when the message was received. No dosing instructions were given.    Gina Persaud, RN 10/08/19 at 3:17 PM

## 2019-10-13 ENCOUNTER — ANESTHESIA EVENT (OUTPATIENT)
Dept: GASTROENTEROLOGY | Facility: CLINIC | Age: 84
End: 2019-10-13
Payer: COMMERCIAL

## 2019-10-13 ASSESSMENT — ENCOUNTER SYMPTOMS: DYSRHYTHMIAS: 1

## 2019-10-13 ASSESSMENT — COPD QUESTIONNAIRES: COPD: 1

## 2019-10-13 NOTE — ANESTHESIA PREPROCEDURE EVALUATION
Anesthesia Pre-Procedure Evaluation    Patient: Ellie Leigh   MRN: [id number] : 1930          Preoperative Diagnosis: chronic nausea  diagnostic    Procedure(s):  ESOPHAGOGASTRODUODENOSCOPY (EGD)    Past Medical History:   Diagnosis Date     Breast cancer (H)      COPD (chronic obstructive pulmonary disease) (H)     ct  does show some bronchiectaisi RUL as well as fibronodular disease bilat upper lobes     Dust allergy      DVT (deep vein thrombosis) in pregnancy (H)     after neck fracture when in hospital     HTN (hypertension)      Hyponatremia     chronic     Hypothyroid      Intermittent atrial fibrillation (H) 2011    hx tachy-keri, has pacer, on chronic coumadin     Loose body in joint 4/15/2011     Neck fracture (H)     after a fall     Syncope 2013    multiple hospital visits, lates 3/2016, 2016, 2016 with no clear cause found     Past Surgical History:   Procedure Laterality Date     APPENDECTOMY       ARTHROSCOPY KNEE  4/15/2011    Procedure:ARTHROSCOPY KNEE; removal of loose body; Surgeon:LEY, JEFFREY DUANE; Location:[facility]     CHOLECYSTECTOMY       ESOPHAGOSCOPY, GASTROSCOPY, DUODENOSCOPY (EGD), COMBINED  2014    Procedure: COMBINED ESOPHAGOSCOPY, GASTROSCOPY, DUODENOSCOPY (EGD);  Surgeon: Gilberto Richard MD;  Location: [facility]     IMPLANT PACEMAKER  2013    Biotronik Moderl [id number] Serial#[id number]     INJECT EPIDURAL LUMBAR  3/23/2011    INJECT EPIDURAL LUMBAR performed by GENERIC ANESTHESIA PROVIDER at [facility]     JOINT REPLACEMENT, HIP RT/LT      Joint Replacement Hip Rt     MASTECTOMY, SIMPLE RT/LT/CAITLYN      Left breast - following breast ca     OTHER SURGICAL HISTORY      C1-C2 fusion after fx      SURGICAL HISTORY OF -   2001    Colonoscopy     TONSILLECTOMY         Anesthesia Evaluation     . Pt has had prior anesthetic.            ROS/MED HX    ENT/Pulmonary: Comment: Chronic allergic conjunctivitis  Chronic seasonal allergic rhinitis  H/O TB  (tuberculosis)    (+)Intermittent asthma COPD, , . .    Neurologic: Comment: Sensorineural hearing loss, asymmetrical  BPPV (benign paroxysmal positional vertigo)        Cardiovascular: Comment: Intermittent atrial fibrillation   Chest pain    (+) Dyslipidemia, hypertension----. Taking blood thinners : . . fainting (syncope). pacemaker :. dysrhythmias a-fib, . Previous cardiac testing Echodate:[id number]:Interpretation Summary     Hyperdynamic left ventricular function  The visual ejection fraction is estimated at >70%.  The left ventricle is normal in size.  There is mild concentric left ventricular hypertrophy.  The right ventricle is normal in structure, function and size.  There is a pacemaker lead in the right ventricle.  Right ventricular systolic pressure is elevated, consistent with mild  pulmonary hypertension.  There is trace aortic regurgitation.     No significant change since 6/10/2016. Occasional episodes of paced beats and  nonsustained SVT noted.Stress Testdate:12-7-15 results:Indication/Clinical History: Chest pain     Impression  1. Myocardial perfusion imaging using single isotope technique  demonstrated normal myocardial perfusion.   2. Gated images demonstrated normal wall motion and normal left  ventricular chamber size. The left ventricular systolic function is  normal, with the ejection fraction of 72%.  3. Compared to the prior study from February 12, 2014, there has been  no change .     Procedure  Pharmacologic stress testing was performed with Lexiscan at a rate of  0.08 mg/ml rapid bolus injection, for 15 seconds, 0.4 mg/5ml  intravenously. Low-level exercise was not performed along with the  vasodilator infusion. The heart rate was 75 at baseline and stormy to  114 beats per minute during the Lexiscan infusion. The rest blood  pressure was 120/70 mmHg and was 118/66 mm Hg during Lexiscan  infusion. The patient experienced nausea  during the test.     Myocardial perfusion imaging was  performed at rest, approximately 45  minutes after the injection intravenously of 8.7 mCi of Tc-99m  Myoview. At peak pharmacologic effect, 10-20 seconds after Lexiscan,   the patient was injected intravenously with 24.6 mCi of  Tc-99m  Myoview. The post-stress tomographic imaging was performed  approximately 60 minutes after stress.     EKG Findings  The resting EKG demonstrated normal sinus rhythm without abnormality.  The stress EKG demonstrated no significant changes.     Tomographic Findings  Overall, the study quality is excellent . On the stress images,  perfusion is normal. On the rest images,perfusion is normal . Gated  images demonstrated normal wall motion and normal left ventricular  chamber size. The left ventricular ejection fraction was calculated to  be 72%. TID was not present.ECG reviewed date:5-10-19 results:Sinus  Rhythm  -First degree A-V block   Luis F = 222  BORDERLINE RHYTHM date: results:          METS/Exercise Tolerance:     Hematologic:     (+) History of blood clots -      Musculoskeletal: Comment: Neck fracture  Loose body in joint  Generalized osteoarthrosis, unspecified site  ostoporosis  ht arm weakness  Ulnar neuropathy  Cervical vertebral fracture   Back pain        GI/Hepatic: Comment: chronic nausea  Chronic constipation  Positive fecal occult blood test  Infectious colitis, enteritis and gastroenteritis    (+) GERD Inflammatory bowel disease,       Renal/Genitourinary: Comment: Recurrent UTI        Endo: Comment: SIADH (syndrome of inappropriate ADH production)     (+) thyroid problem hypothyroidism, .      Psychiatric: Comment: Headache    (+) psychiatric history depression and anxiety      Infectious Disease:         Malignancy:   (+) Malignancy History of Breast and Skin          Other: Comment: Hyponatremia  Dust allergy  SK (seborrheic keratosis)     - neg other ROS                      Physical Exam  Normal systems: cardiovascular and dental    Airway   Mallampati: II  TM  "distance: >3 FB  Neck ROM: full    Dental     Cardiovascular       Pulmonary             Lab Results   Component Value Date    WBC 4.1 08/06/2019    HGB 12.9 08/06/2019    HCT 38.3 08/06/2019     08/06/2019    SED 60 (H) 10/07/2013     07/13/2019    POTASSIUM 4.1 07/13/2019    CHLORIDE 99 07/13/2019    CO2 28 07/13/2019    BUN 25 07/13/2019    CR 0.57 07/13/2019     (H) 07/13/2019    DOUG 8.6 07/13/2019    PHOS 3.3 08/21/2011    MAG 2.0 11/22/2011    ALBUMIN 3.3 (L) 07/13/2019    PROTTOTAL 6.6 (L) 07/13/2019    ALT 25 07/13/2019    AST 20 07/13/2019    ALKPHOS 92 07/13/2019    BILITOTAL 0.2 07/13/2019    LIPASE 260 10/30/2018    AMYLASE 54 06/27/2018    PTT 51 (H) 01/21/2015    INR 2.3 (A) 10/08/2019    TSH 2.47 05/20/2019    T4 1.33 09/25/2014       Preop Vitals  BP Readings from Last 3 Encounters:   09/18/19 132/72   08/06/19 136/70   07/13/19 118/70    Pulse Readings from Last 3 Encounters:   09/18/19 72   08/06/19 58   07/13/19 83      Resp Readings from Last 3 Encounters:   09/18/19 16   08/06/19 14   07/13/19 20    SpO2 Readings from Last 3 Encounters:   08/06/19 96%   07/13/19 96%   07/03/19 94%      Temp Readings from Last 1 Encounters:   08/06/19 36.3  C (97.3  F) (Tympanic)    Ht Readings from Last 1 Encounters:   09/18/19 1.473 m (4' 10\")      Wt Readings from Last 1 Encounters:   09/18/19 55.1 kg (121 lb 6.4 oz)    Estimated body mass index is 25.37 kg/m  as calculated from the following:    Height as of 9/18/19: 1.473 m (4' 10\").    Weight as of 9/18/19: 55.1 kg (121 lb 6.4 oz).       Anesthesia Plan      History & Physical Review  History and physical reviewed and following examination; no interval change.    ASA Status:  3 .    NPO Status:  > 4 hours    Plan for MAC with Intravenous and Propofol induction. Reason for MAC:  Other - see comments         Postoperative Care      Consents  Anesthetic plan, risks, benefits and alternatives discussed with:  Patient..           "       Rory Amos, CRNA, APRN CRNA

## 2019-10-16 ENCOUNTER — ANCILLARY PROCEDURE (OUTPATIENT)
Dept: CARDIOLOGY | Facility: CLINIC | Age: 84
End: 2019-10-16
Attending: INTERNAL MEDICINE
Payer: COMMERCIAL

## 2019-10-16 DIAGNOSIS — Z95.0 CARDIAC PACEMAKER IN SITU: ICD-10-CM

## 2019-10-16 LAB
MDC_IDC_LEAD_IMPLANT_DT: NORMAL
MDC_IDC_LEAD_IMPLANT_DT: NORMAL
MDC_IDC_LEAD_LOCATION: NORMAL
MDC_IDC_LEAD_LOCATION: NORMAL
MDC_IDC_LEAD_LOCATION_DETAIL_1: NORMAL
MDC_IDC_LEAD_LOCATION_DETAIL_1: NORMAL
MDC_IDC_LEAD_MFG: NORMAL
MDC_IDC_LEAD_MFG: NORMAL
MDC_IDC_LEAD_MODEL: NORMAL
MDC_IDC_LEAD_MODEL: NORMAL
MDC_IDC_LEAD_POLARITY_TYPE: NORMAL
MDC_IDC_LEAD_POLARITY_TYPE: NORMAL
MDC_IDC_LEAD_SERIAL: NORMAL
MDC_IDC_LEAD_SERIAL: NORMAL
MDC_IDC_MSMT_BATTERY_REMAINING_PERCENTAGE: 60 %
MDC_IDC_MSMT_BATTERY_STATUS: NORMAL
MDC_IDC_MSMT_LEADCHNL_RA_IMPEDANCE_VALUE: 439 OHM
MDC_IDC_MSMT_LEADCHNL_RA_LEAD_CHANNEL_STATUS: NORMAL
MDC_IDC_MSMT_LEADCHNL_RA_PACING_THRESHOLD_AMPLITUDE: 0.8 V
MDC_IDC_MSMT_LEADCHNL_RA_PACING_THRESHOLD_PULSEWIDTH: 0.4 MS
MDC_IDC_MSMT_LEADCHNL_RV_IMPEDANCE_VALUE: 497 OHM
MDC_IDC_MSMT_LEADCHNL_RV_LEAD_CHANNEL_STATUS: NORMAL
MDC_IDC_MSMT_LEADCHNL_RV_PACING_THRESHOLD_AMPLITUDE: 0.5 V
MDC_IDC_MSMT_LEADCHNL_RV_PACING_THRESHOLD_PULSEWIDTH: 0.4 MS
MDC_IDC_PG_IMPLANT_DTM: NORMAL
MDC_IDC_PG_MFG: NORMAL
MDC_IDC_PG_MODEL: NORMAL
MDC_IDC_PG_SERIAL: NORMAL
MDC_IDC_PG_TYPE: NORMAL
MDC_IDC_SESS_CLINIC_NAME: NORMAL
MDC_IDC_SESS_DTM: NORMAL
MDC_IDC_SESS_REPROGRAMMED: NO
MDC_IDC_SESS_TYPE: NORMAL
MDC_IDC_SET_BRADY_AT_MODE_SWITCH_MODE: NORMAL
MDC_IDC_SET_BRADY_AT_MODE_SWITCH_RATE: 160 {BEATS}/MIN
MDC_IDC_SET_BRADY_LOWRATE: 60 {BEATS}/MIN
MDC_IDC_SET_BRADY_MAX_SENSOR_RATE: 125 {BEATS}/MIN
MDC_IDC_SET_BRADY_MAX_TRACKING_RATE: 130 {BEATS}/MIN
MDC_IDC_SET_BRADY_MODE: NORMAL
MDC_IDC_SET_BRADY_PAV_DELAY_HIGH: 150 MS
MDC_IDC_SET_BRADY_PAV_DELAY_LOW: 180 MS
MDC_IDC_SET_BRADY_SAV_DELAY_HIGH: 105 MS
MDC_IDC_SET_BRADY_SAV_DELAY_LOW: 135 MS
MDC_IDC_SET_LEADCHNL_RA_PACING_POLARITY: NORMAL
MDC_IDC_SET_LEADCHNL_RA_PACING_PULSEWIDTH: 0.4 MS
MDC_IDC_SET_LEADCHNL_RA_SENSING_ADAPTATION_MODE: NORMAL
MDC_IDC_SET_LEADCHNL_RA_SENSING_POLARITY: NORMAL
MDC_IDC_SET_LEADCHNL_RV_PACING_POLARITY: NORMAL
MDC_IDC_SET_LEADCHNL_RV_PACING_PULSEWIDTH: 0.4 MS
MDC_IDC_SET_LEADCHNL_RV_SENSING_ADAPTATION_MODE: NORMAL
MDC_IDC_SET_LEADCHNL_RV_SENSING_POLARITY: NORMAL
MDC_IDC_STAT_AT_BURDEN_PERCENT: 0 %
MDC_IDC_STAT_AT_DTM_END: NORMAL
MDC_IDC_STAT_AT_DTM_START: NORMAL
MDC_IDC_STAT_AT_MODE_SW_COUNT_PER_DAY: 0
MDC_IDC_STAT_AT_MODE_SW_PERCENT_TIME_PER_DAY: 0 %
MDC_IDC_STAT_BRADY_AP_VP_PERCENT: 0 %
MDC_IDC_STAT_BRADY_AP_VS_PERCENT: 50 %
MDC_IDC_STAT_BRADY_AS_VP_PERCENT: 0 %
MDC_IDC_STAT_BRADY_AS_VS_PERCENT: 49 %
MDC_IDC_STAT_BRADY_DTM_END: NORMAL
MDC_IDC_STAT_BRADY_DTM_START: NORMAL
MDC_IDC_STAT_BRADY_RA_PERCENT_PACED: 50 %
MDC_IDC_STAT_BRADY_RV_PERCENT_PACED: 1 %
MDC_IDC_STAT_CRT_DTM_END: NORMAL
MDC_IDC_STAT_CRT_DTM_START: NORMAL

## 2019-10-16 PROCEDURE — 93294 REM INTERROG EVL PM/LDLS PM: CPT | Performed by: INTERNAL MEDICINE

## 2019-10-16 PROCEDURE — 93296 REM INTERROG EVL PM/IDS: CPT | Performed by: INTERNAL MEDICINE

## 2019-10-17 RX ORDER — AZITHROMYCIN 250 MG/1
TABLET, FILM COATED ORAL
Refills: 0 | COMMUNITY
Start: 2019-10-14 | End: 2019-10-31

## 2019-10-17 RX ORDER — PREDNISONE 20 MG/1
TABLET ORAL
COMMUNITY
Start: 2019-07-13 | End: 2019-10-31

## 2019-10-18 ENCOUNTER — HOSPITAL ENCOUNTER (OUTPATIENT)
Facility: CLINIC | Age: 84
Discharge: HOME | End: 2019-10-18
Attending: SURGERY | Admitting: SURGERY
Payer: COMMERCIAL

## 2019-10-18 ENCOUNTER — ANESTHESIA (OUTPATIENT)
Dept: GASTROENTEROLOGY | Facility: CLINIC | Age: 84
End: 2019-10-18
Payer: COMMERCIAL

## 2019-10-18 VITALS
HEIGHT: 58 IN | WEIGHT: 121 LBS | BODY MASS INDEX: 25.4 KG/M2 | TEMPERATURE: 98.1 F | DIASTOLIC BLOOD PRESSURE: 98 MMHG | HEART RATE: 88 BPM | OXYGEN SATURATION: 99 % | RESPIRATION RATE: 20 BRPM | SYSTOLIC BLOOD PRESSURE: 136 MMHG

## 2019-10-18 LAB — UPPER GI ENDOSCOPY: NORMAL

## 2019-10-18 PROCEDURE — 25000125 ZZHC RX 250: Performed by: NURSE ANESTHETIST, CERTIFIED REGISTERED

## 2019-10-18 PROCEDURE — 25000125 ZZHC RX 250: Performed by: SURGERY

## 2019-10-18 PROCEDURE — 37000008 ZZH ANESTHESIA TECHNICAL FEE, 1ST 30 MIN: Performed by: SURGERY

## 2019-10-18 PROCEDURE — 25800030 ZZH RX IP 258 OP 636: Performed by: NURSE ANESTHETIST, CERTIFIED REGISTERED

## 2019-10-18 PROCEDURE — 88305 TISSUE EXAM BY PATHOLOGIST: CPT | Mod: 26 | Performed by: SURGERY

## 2019-10-18 PROCEDURE — 43239 EGD BIOPSY SINGLE/MULTIPLE: CPT | Performed by: SURGERY

## 2019-10-18 PROCEDURE — 88305 TISSUE EXAM BY PATHOLOGIST: CPT | Performed by: SURGERY

## 2019-10-18 PROCEDURE — 43235 EGD DIAGNOSTIC BRUSH WASH: CPT | Performed by: SURGERY

## 2019-10-18 PROCEDURE — 25000128 H RX IP 250 OP 636: Performed by: NURSE ANESTHETIST, CERTIFIED REGISTERED

## 2019-10-18 RX ORDER — FLUMAZENIL 0.1 MG/ML
0.2 INJECTION, SOLUTION INTRAVENOUS
Status: DISCONTINUED | OUTPATIENT
Start: 2019-10-18 | End: 2019-10-18 | Stop reason: HOSPADM

## 2019-10-18 RX ORDER — SODIUM CHLORIDE, SODIUM LACTATE, POTASSIUM CHLORIDE, CALCIUM CHLORIDE 600; 310; 30; 20 MG/100ML; MG/100ML; MG/100ML; MG/100ML
INJECTION, SOLUTION INTRAVENOUS CONTINUOUS
Status: DISCONTINUED | OUTPATIENT
Start: 2019-10-18 | End: 2019-10-18 | Stop reason: HOSPADM

## 2019-10-18 RX ORDER — LIDOCAINE 40 MG/G
CREAM TOPICAL
Status: DISCONTINUED | OUTPATIENT
Start: 2019-10-18 | End: 2019-10-18 | Stop reason: HOSPADM

## 2019-10-18 RX ORDER — PROPOFOL 10 MG/ML
INJECTION, EMULSION INTRAVENOUS CONTINUOUS PRN
Status: DISCONTINUED | OUTPATIENT
Start: 2019-10-18 | End: 2019-10-18

## 2019-10-18 RX ORDER — GLYCOPYRROLATE 0.2 MG/ML
INJECTION, SOLUTION INTRAMUSCULAR; INTRAVENOUS PRN
Status: DISCONTINUED | OUTPATIENT
Start: 2019-10-18 | End: 2019-10-18

## 2019-10-18 RX ORDER — NALOXONE HYDROCHLORIDE 0.4 MG/ML
.1-.4 INJECTION, SOLUTION INTRAMUSCULAR; INTRAVENOUS; SUBCUTANEOUS
Status: DISCONTINUED | OUTPATIENT
Start: 2019-10-18 | End: 2019-10-18 | Stop reason: HOSPADM

## 2019-10-18 RX ORDER — PROPOFOL 10 MG/ML
INJECTION, EMULSION INTRAVENOUS PRN
Status: DISCONTINUED | OUTPATIENT
Start: 2019-10-18 | End: 2019-10-18

## 2019-10-18 RX ORDER — ONDANSETRON 2 MG/ML
4 INJECTION INTRAMUSCULAR; INTRAVENOUS
Status: DISCONTINUED | OUTPATIENT
Start: 2019-10-18 | End: 2019-10-18 | Stop reason: HOSPADM

## 2019-10-18 RX ADMIN — PROPOFOL 50 MG: 10 INJECTION, EMULSION INTRAVENOUS at 09:34

## 2019-10-18 RX ADMIN — LIDOCAINE HYDROCHLORIDE 1 ML: 10 INJECTION, SOLUTION EPIDURAL; INFILTRATION; INTRACAUDAL; PERINEURAL at 09:06

## 2019-10-18 RX ADMIN — PROPOFOL 25 MCG/KG/MIN: 10 INJECTION, EMULSION INTRAVENOUS at 09:34

## 2019-10-18 RX ADMIN — LIDOCAINE HYDROCHLORIDE 50 ML: 10 INJECTION, SOLUTION EPIDURAL; INFILTRATION; INTRACAUDAL; PERINEURAL at 09:34

## 2019-10-18 RX ADMIN — GLYCOPYRROLATE 200 MCG: 0.2 INJECTION, SOLUTION INTRAMUSCULAR; INTRAVENOUS at 09:36

## 2019-10-18 RX ADMIN — SODIUM CHLORIDE, POTASSIUM CHLORIDE, SODIUM LACTATE AND CALCIUM CHLORIDE: 600; 310; 30; 20 INJECTION, SOLUTION INTRAVENOUS at 09:06

## 2019-10-18 RX ADMIN — SODIUM CHLORIDE, POTASSIUM CHLORIDE, SODIUM LACTATE AND CALCIUM CHLORIDE: 600; 310; 30; 20 INJECTION, SOLUTION INTRAVENOUS at 09:33

## 2019-10-18 ASSESSMENT — MIFFLIN-ST. JEOR: SCORE: 863.6

## 2019-10-18 NOTE — ANESTHESIA CARE TRANSFER NOTE
Patient: Ellie Leigh    Procedure(s):  ESOPHAGOGASTRODUODENOSCOPY (EGD)    Diagnosis: chronic nausea  diagnostic  Diagnosis Additional Information: No value filed.    Anesthesia Type:   MAC     Note:  Airway :Nasal Cannula  Patient transferred to:Phase II  Handoff Report: Identifed the Patient, Identified the Reponsible Provider, Reviewed the pertinent medical history, Discussed the surgical course, Reviewed Intra-OP anesthesia mangement and issues during anesthesia, Set expectations for post-procedure period and Allowed opportunity for questions and acknowledgement of understanding      Vitals: (Last set prior to Anesthesia Care Transfer)    CRNA VITALS  10/18/2019 0913 - 10/18/2019 0943      10/18/2019             Pulse:  79    Ht Rate:  79    SpO2:  96 %                Electronically Signed By: LACI Jha CRNA  October 18, 2019  9:43 AM

## 2019-10-18 NOTE — H&P
89 year old year old female here for upper endoscopy for nausea.  This has been ongoing for many years.  Denies dysphagia, emesis, melena, hematemesis, early satiety, weight loss.  She has had upper endoscopy in the past which was negative.        Patient Active Problem List   Diagnosis     Sensorineural hearing loss, asymmetrical     Generalized osteoarthrosis, unspecified site     PERSONAL HISTORY OF MALIGNANCY- BREAST     Esophageal reflux     Chronic allergic conjunctivitis     Chronic seasonal allergic rhinitis     Hyperlipidemia LDL goal <130     Chronic constipation     Osteoporosis     Health Care Home     Right arm weakness     Ulnar neuropathy     Headache     Mild major depression     DVT (deep venous thrombosis)     Anxiety     Cervical vertebral fracture (H)     Intermittent atrial fibrillation (H)     Squamous cell carcinoma in situ of skin of face     SK (seborrheic keratosis)     Hypothyroidism     Hyponatremia     Recurrent UTI     History of skin cancer     BPPV (benign paroxysmal positional vertigo)     Benign essential HTN     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)     Positive fecal occult blood test     COPD (chronic obstructive pulmonary disease) (H)     Infectious colitis, enteritis and gastroenteritis     Advance Care Planning     Syncope     Long term current use of anticoagulant therapy     Mild persistent asthma without complication     Irritable bowel syndrome without diarrhea     Nausea     Back pain     H/O TB (tuberculosis)     Chest pain       Past Medical History:   Diagnosis Date     Breast cancer (H)      COPD (chronic obstructive pulmonary disease) (H)     ct 2014 does show some bronchiectaisi RUL as well as fibronodular disease bilat upper lobes     Dust allergy      DVT (deep vein thrombosis) in pregnancy     after neck fracture when in hospital     HTN (hypertension)      Hyponatremia     chronic     Hypothyroid      Intermittent atrial fibrillation (H) 12/1/2011     hx tachy-keri, has pacer, on chronic coumadin     Loose body in joint 4/15/2011     Neck fracture (H)     after a fall     Syncope 5/12/2013    multiple hospital visits, lates 3/2016, 6/2016, 7/2016 with no clear cause found       Past Surgical History:   Procedure Laterality Date     APPENDECTOMY       ARTHROSCOPY KNEE  4/15/2011    Procedure:ARTHROSCOPY KNEE; removal of loose body; Surgeon:LEY, JEFFREY DUANE; Location:[facility]     CHOLECYSTECTOMY       ESOPHAGOSCOPY, GASTROSCOPY, DUODENOSCOPY (EGD), COMBINED  6/9/2014    Procedure: COMBINED ESOPHAGOSCOPY, GASTROSCOPY, DUODENOSCOPY (EGD);  Surgeon: Gilberto Richard MD;  Location: [facility]     IMPLANT PACEMAKER  5/21/2013    Biotronik Moderl [id number] Serial#[id number]     INJECT EPIDURAL LUMBAR  3/23/2011    INJECT EPIDURAL LUMBAR performed by GENERIC ANESTHESIA PROVIDER at [facility]     JOINT REPLACEMENT, HIP RT/LT      Joint Replacement Hip Rt     MASTECTOMY, SIMPLE RT/LT/CAITLYN      Left breast - following breast ca     OTHER SURGICAL HISTORY      C1-C2 fusion after fx      SURGICAL HISTORY OF -   05/22/2001    Colonoscopy     TONSILLECTOMY         Family History   Problem Relation Age of Onset     Cerebrovascular Disease Mother      Heart Disease Mother         MI     Cerebrovascular Disease Father      Heart Disease Father         MI     Respiratory Maternal Grandfather         TB     Neurologic Disorder Brother         ALS     Heart Disease Brother      Cerebrovascular Disease Brother      Breast Cancer Daughter         age:49     Asthma Brother      Cancer Brother         brain and lung     Cancer Daughter         thyroid       No current outpatient medications on file.       Allergies   Allergen Reactions     Cephalosporins Nausea     Cefzil     Doxycycline Nausea and Vomiting     Sulfa Drugs Nausea     Penicillins Rash     PCN       Pt reports that she is a non-smoker but has been exposed to tobacco smoke. She has been exposed to 0.00 packs per day. She has never used  smokeless tobacco. She reports that she does not drink alcohol or use drugs.    Exam:    Awake, Alert OX3  Lungs - CTA bilaterally  CV - RRR, no murmurs, distal pulses intact  Abd - soft, non-distended, non-tender, +BS  Extr - No cyanosis or edema    A/P 89 year old year old female in need of upper endoscopy for nausea. Risks, benefits, alternatives, and complications were discussed including the possibility of perforation and the patient agreed to proceed.    Noe Sams, DO on 10/18/2019 at 9:30 AM

## 2019-10-18 NOTE — ANESTHESIA POSTPROCEDURE EVALUATION
Patient: Ellie Leigh    Procedure(s):  ESOPHAGOGASTRODUODENOSCOPY (EGD)    Diagnosis:chronic nausea  diagnostic  Diagnosis Additional Information: No value filed.    Anesthesia Type:  MAC    Note:  Anesthesia Post Evaluation    Patient location during evaluation: Bedside  Patient participation: Able to fully participate in evaluation  Level of consciousness: awake and alert  Pain management: adequate  Airway patency: patent  Cardiovascular status: acceptable  Respiratory status: acceptable  Hydration status: acceptable  PONV: none     Anesthetic complications: None          Last vitals:  Vitals:    10/18/19 0836   BP: (!) 167/76   Pulse: 67   Resp: 16   Temp: 36.7  C (98.1  F)   SpO2: 95%         Electronically Signed By: LACI Jha CRNA  October 18, 2019  9:44 AM

## 2019-10-20 NOTE — TELEPHONE ENCOUNTER
Form signed faxed and sent to scanning.  Dariana [facility]  [facility] [place]   I personally performed the service described in the documentation recorded by the scribe in my presence, and it accurately and completely records my words and actions.

## 2019-10-21 ENCOUNTER — TELEPHONE (OUTPATIENT)
Dept: FAMILY MEDICINE | Facility: CLINIC | Age: 84
End: 2019-10-21

## 2019-10-21 NOTE — TELEPHONE ENCOUNTER
Reason for call:  Patient reporting a symptom    Symptom or request: Ellie says she had a gastroscopy on Friday and she has been having stomach pain and not feeling good since then. She would like to talk to Dr Vidales's nurse.       Phone Number patient can be reached at:  Home number on file [phone] (home)    Best Time:  Anytime    Call taken on 10/21/2019 at 9:04 AM by Ninoska Song

## 2019-10-21 NOTE — TELEPHONE ENCOUNTER
S-(situation): Thalia stiles had gastroscopy on 10/18/19.  Asking if should have had an antibiotic before the the procedure.  No fever.  No stomach today    B-(background): gastroscopy 10/18/19 and had stomach ache after.    A-(assessment): just asking questions about antibiotic.    R-(recommendations): advised did not need antibitoic.  Celena Laurent RN

## 2019-10-22 LAB — COPATH REPORT: NORMAL

## 2019-10-24 ENCOUNTER — TELEPHONE (OUTPATIENT)
Dept: FAMILY MEDICINE | Facility: CLINIC | Age: 84
End: 2019-10-24

## 2019-10-24 NOTE — TELEPHONE ENCOUNTER
Please call.  Gastrosopy showed some polyps in stomach.   Hiatal hernia which is common and has been there a long time.    No sign of cancer or anything bad.   Test for helicobacter germ was negative.   Biopsies and polyps were not cancer.  Biopsies did show some stomach irritation.     I don't have an idea about cause for the stomach upset or metallic taste.     PLAN: Continue the omeprazole 40mg daily.   I can see her if continuing symptoms in the next couple weeks.   MARCY DAUGHERTY MD

## 2019-10-24 NOTE — TELEPHONE ENCOUNTER
Pt called. States since she had scope on 10/18 pt has been having stomach cramps and pain. States she also had a metallic taste in her mouth. Pt able to eat and drink normally. States usually after dinner that abdomen hurts. Also wanting results of scope. Any recommendation? Marium Richardson RN

## 2019-10-24 NOTE — TELEPHONE ENCOUNTER
Reason for call:  Patient reporting a symptom    Symptom or request: Nauseated, Stomach Pains especially after meals. Pt feels like there is metal tasting in her mouth.  Pt said this started after she had Gastro Scope done 10/18/19  Pt said she had this done as she has been Nauseated all the time.  Please Advise    Phone Number patient can be reached at:  Home number on file [phone] (home)    Best Time:  Any Time      Can we leave a detailed message on this number:  YES    Call taken on 10/24/2019 at 10:16 AM by Luz Maria Liang

## 2019-10-24 NOTE — TELEPHONE ENCOUNTER
Pt has been taking Omeprazole 40 mg since May 2019. Has taken zantac and Protonix in the past as well. F/u appointment made. Marium Richardson RN

## 2019-10-25 ENCOUNTER — ANTICOAGULATION THERAPY VISIT (OUTPATIENT)
Dept: ANTICOAGULATION | Facility: CLINIC | Age: 84
End: 2019-10-25
Payer: COMMERCIAL

## 2019-10-25 DIAGNOSIS — Z79.01 LONG TERM CURRENT USE OF ANTICOAGULANT THERAPY: ICD-10-CM

## 2019-10-25 DIAGNOSIS — I82.409 DVT (DEEP VENOUS THROMBOSIS) (H): ICD-10-CM

## 2019-10-25 LAB — INR POINT OF CARE: 1.3 (ref 0.86–1.14)

## 2019-10-25 PROCEDURE — 99207 ZZC NO CHARGE NURSE ONLY: CPT

## 2019-10-25 PROCEDURE — 85610 PROTHROMBIN TIME: CPT | Mod: QW

## 2019-10-25 PROCEDURE — 36416 COLLJ CAPILLARY BLOOD SPEC: CPT

## 2019-10-25 NOTE — PROGRESS NOTES
ANTICOAGULATION FOLLOW-UP CLINIC VISIT    Patient Name:  Ellie Leigh  Date:  10/25/2019  Contact Type:  Face to Face    SUBJECTIVE:  Patient Findings     Positives:   Missed doses    Comments:   Patient had an EGD done on 10-18 and held her warfarin for 5 days prior. She states she did not realize she was supposed to resume her warfarin that evening and therefore did not resume her warfarin until at least Monday (she is not totally clear on the date). The EGD did not shed light on her continuing nausea. Per patient's daughter, they think it may be medication related and are seeing Dr. Vidales on 10-31 to further discuss this. They are wondering if warfarin could be causing the nausea since she has had this for years. Writer advised to speak with PCP about this but the patient has tried eliquis and this also caused nausea. She could try coumadin only with a MONSTER prescription and see if this helps? No other med, diet or activity changes. Will advise a booster dose today and tomorrow. Recheck INR prior to PCP visit next week. No clot or stroke signs.        Clinical Outcomes     Comments:   Patient had an EGD done on 10-18 and held her warfarin for 5 days prior. She states she did not realize she was supposed to resume her warfarin that evening and therefore did not resume her warfarin until at least Monday (she is not totally clear on the date). The EGD did not shed light on her continuing nausea. Per patient's daughter, they think it may be medication related and are seeing Dr. Vidales on 10-31 to further discuss this. They are wondering if warfarin could be causing the nausea since she has had this for years. Writer advised to speak with PCP about this but the patient has tried eliquis and this also caused nausea. She could try coumadin only with a MONSTER prescription and see if this helps? No other med, diet or activity changes. Will advise a booster dose today and tomorrow. Recheck INR prior to PCP visit next week.  No clot or stroke signs.           OBJECTIVE    INR Protime   Date Value Ref Range Status   10/25/2019 1.3 (A) 0.86 - 1.14 Final       ASSESSMENT / PLAN  INR assessment SUB    Recheck INR In: 6 DAYS    INR Location Clinic      Anticoagulation Summary  As of 10/25/2019    INR goal:   2.0-3.0   TTR:   46.1 % (4.6 y)   INR used for dosin.3! (10/25/2019)   Warfarin maintenance plan:   2 mg (1 mg x 2) every Mon, Wed, Fri; 1 mg (1 mg x 1) all other days   Full warfarin instructions:   10/25: 3 mg; 10/26: 3 mg; Otherwise 2 mg every Mon, Wed, Fri; 1 mg all other days   Weekly warfarin total:   10 mg   Plan last modified:   Gina Persaud RN (10/8/2019)   Next INR check:   10/31/2019   Priority:   INR   Target end date:   Indefinite    Indications    Atrial fibrillation with rapid ventricular response (H) (Resolved) [I48.91]  DVT (deep venous thrombosis) [I82.409]  Long term current use of anticoagulant therapy [Z79.01]             Anticoagulation Episode Summary     INR check location:       Preferred lab:       Send INR reminders to:   [facility]    Comments:   * Taking Celebrex PRN         Anticoagulation Care Providers     Provider Role Specialty Phone number    Anjum Vidales MD [facility] [phone]            See the Encounter Report to view Anticoagulation Flowsheet and Dosing Calendar (Go to Encounters tab in chart review, and find the Anticoagulation Therapy Visit)        Lissy Beaulieu RN

## 2019-10-25 NOTE — PATIENT INSTRUCTIONS
Take 3 tablets when you get home today. Take 3 tablets at normal time tomorrow. Then on Sunday, resume usual dose.    Some signs and symptoms of clots include: pain or tenderness in arm or leg, swelling in arm or leg, changes in skin color, or area is warm to touch, shortness or breath, trouble breathing.  Numbness or weakness especially on 1 side of the body, sudden trouble speaking or swallowing, sudden trouble seeing, sudden confusion, dizzy spells or headache.  If you have these please call 911 or seek medical care immediately.

## 2019-10-31 ENCOUNTER — OFFICE VISIT (OUTPATIENT)
Dept: FAMILY MEDICINE | Facility: CLINIC | Age: 84
End: 2019-10-31
Payer: COMMERCIAL

## 2019-10-31 ENCOUNTER — ANTICOAGULATION THERAPY VISIT (OUTPATIENT)
Dept: ANTICOAGULATION | Facility: CLINIC | Age: 84
End: 2019-10-31
Payer: COMMERCIAL

## 2019-10-31 VITALS
HEIGHT: 58 IN | HEART RATE: 68 BPM | DIASTOLIC BLOOD PRESSURE: 66 MMHG | WEIGHT: 121 LBS | BODY MASS INDEX: 25.4 KG/M2 | TEMPERATURE: 97.6 F | RESPIRATION RATE: 20 BRPM | OXYGEN SATURATION: 96 % | SYSTOLIC BLOOD PRESSURE: 122 MMHG

## 2019-10-31 DIAGNOSIS — R11.0 NAUSEA: Primary | ICD-10-CM

## 2019-10-31 DIAGNOSIS — I82.409 DVT (DEEP VENOUS THROMBOSIS) (H): ICD-10-CM

## 2019-10-31 DIAGNOSIS — Z23 NEED FOR PROPHYLACTIC VACCINATION AND INOCULATION AGAINST INFLUENZA: ICD-10-CM

## 2019-10-31 DIAGNOSIS — Z79.01 LONG TERM CURRENT USE OF ANTICOAGULANT THERAPY: ICD-10-CM

## 2019-10-31 LAB — INR POINT OF CARE: 2.7 (ref 0.86–1.14)

## 2019-10-31 PROCEDURE — 90662 IIV NO PRSV INCREASED AG IM: CPT | Performed by: FAMILY MEDICINE

## 2019-10-31 PROCEDURE — 85610 PROTHROMBIN TIME: CPT | Mod: QW

## 2019-10-31 PROCEDURE — G0008 ADMIN INFLUENZA VIRUS VAC: HCPCS | Performed by: FAMILY MEDICINE

## 2019-10-31 PROCEDURE — 99214 OFFICE O/P EST MOD 30 MIN: CPT | Mod: 25 | Performed by: FAMILY MEDICINE

## 2019-10-31 PROCEDURE — 36416 COLLJ CAPILLARY BLOOD SPEC: CPT

## 2019-10-31 PROCEDURE — 99207 ZZC NO CHARGE NURSE ONLY: CPT

## 2019-10-31 ASSESSMENT — MIFFLIN-ST. JEOR: SCORE: 863.6

## 2019-10-31 NOTE — PATIENT INSTRUCTIONS
ASSESSMENT:  (R11.0) Nausea  (primary encounter diagnosis)  Comment: We still do not know the cause.  It has been chronic.  Has had gastroscopy, CT a year ago and GI consult in the past.    Stopping warfarin did seem to help the nausea.  Did have nausea on apixaban (Eliquis).  Plan:   Try off the warfarin for another 5 days.    If nausea persists, it makes the warfarin a less likely cause.   If nausea disappears, we could try the other blood thinner, rivaroxaban (Xarelto).  We can see if insurance will cover.    You could stop the warfarin but stroke risk about 5% per year off anticoagulation medication.     metoclopramide might be another option for nausea.     Eat smaller meals more often.   OK for the ondansetron.      (Z23) Need for prophylactic vaccination and inoculation against influenza  Comment:   Plan: INFLUENZA (HIGH DOSE) 3 VALENT VACCINE [11341],        ADMIN INFLUENZA (For MEDICARE Patients ONLY)         []        Flu shot.

## 2019-10-31 NOTE — PROGRESS NOTES
SUBJECTIVE: Ellie Leigh is a 89 year old female  Chief Complaint   Patient presents with     GI Problem     Imm/Inj     Flu Shot      Nausea      Duration: months    Description (location/character/radiation): nausea in the morning better in afternoon    Intensity:  mild, moderate, severe    Accompanying signs and symptoms: nausea     History (similar episodes/previous evaluation):     Precipitating or alleviating factors: seems to help to eat    Therapies tried and outcome: Zofran helps at times.     I had seen her on May 20 for persistent nausea for several weeks to months.  She had a sensation of just not feeling well.  She often had a full feeling, she felt worse if not eating much.  Nausea had been worse in the morning and lasting most of the day.  Did not mention any heartburn or water brash.  She had been taking ranitidine.  I recommended stopping that and taking omeprazole 40 mg daily instead.  She was taking some as needed ondansetron.  9/18/2019: She return for clinic visit complaining of continuing ongoing nausea somewhat intermittent.  No vomiting.  Zofran was not helping.  She tried various dietary changes gluten dairy FODMAPS nothing had helped.  She was scheduled for a gastroscopy.  October 18 gastroscopy completed.  She had a few gastric polyps.  Her GE valve is grade 4 open.  She has a small hiatal hernia.  She had biopsies taken for Helicobacter.  See report below.  Pathology showed no H pylori.  Gastric mucosa with changes suggestive of reactive/erosive gastropathy.  She has had abdominal pelvic CT scan October 30, 2018.  See report below.    Nausea 3-5 in a row, then a day off.  Continuing unchanged since March.  Can last all day.  Often better after eating.  Never had emesis.   Some nausea problems back as far as 2015 when she had GI consult.   Eating three meals a day.  Little snacking.   Triggering factors:foods do not seem to make a difference.    Usually worse in the AM.    Has  "tried ondansetron.  Can help sometimes.  Not reliably helpful.   Does not seem related to inhalers.   Not related to asthma symptoms.   Asthma has been \"pretty good\".   Using albuteral nebs about three times daily.  Seldom uses albuteral inhaler.  Not using Duonebs.  Symbicort 2 puffs twice daily.  Flonasae once in a while.   Bowels have been good.  No constipation or diarrhea.  Regular stools.   On omeprazole 40mg daily.  Takes in AM before eating.  Taking ranitidine 150mg once daily in evening.     Some sore throat and cough a couple weeks ago.  Treated with azithromycin.  Recalls a week with no symptoms.     Tried several diets as above and less acid.     She wonders if it could be warfarin.      She has seen [facility] pharmacy and had medication reviewed for nausea.   Saw GI 1/23/2015 for nausea.        She has tried sucralfate in the past-no benefit  Tried pantoprazole.  No benefit.   Was off warfarin prior to gastroscopy.  Did not have nausea that week.   Eliquis caused nausea.     CHA2 DS2 VASc score=4.  Risk of stroke=4.8     Patient Active Problem List   Diagnosis     Sensorineural hearing loss, asymmetrical     Generalized osteoarthrosis, unspecified site     PERSONAL HISTORY OF MALIGNANCY- BREAST     Esophageal reflux     Chronic allergic conjunctivitis     Chronic seasonal allergic rhinitis     Hyperlipidemia LDL goal <130     Chronic constipation     Osteoporosis     Health Care Home     Right arm weakness     Ulnar neuropathy     Headache     Mild major depression     DVT (deep venous thrombosis)     Anxiety     Cervical vertebral fracture (H)     Intermittent atrial fibrillation (H)     Squamous cell carcinoma in situ of skin of face     SK (seborrheic keratosis)     Hypothyroidism     Hyponatremia     Recurrent UTI     History of skin cancer     BPPV (benign paroxysmal positional vertigo)     Benign essential HTN     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)     Positive fecal occult blood test " "    COPD (chronic obstructive pulmonary disease) (H)     Infectious colitis, enteritis and gastroenteritis     Advance Care Planning     Syncope     Long term current use of anticoagulant therapy     Mild persistent asthma without complication     Irritable bowel syndrome without diarrhea     Nausea     Back pain     H/O TB (tuberculosis)     Chest pain      ROS:General: No change in weight, sleep or appetite.  Normal energy.  No fever or chills  CV: No chest pains or palpitations  : No urinary frequency or dysuria, bladder or kidney problems  Musculoskeletal: POSITIVE  for:, pain shoulders, upper back and knees.   Neurologic: No headaches, numbness, tingling, weakness, problems with balance or coordination  Psychiatric: No problems with anxiety, depression or mental health    OBJECTIVE:Blood pressure 122/66, pulse 68, temperature 97.6  F (36.4  C), temperature source Tympanic, resp. rate 20, height 1.473 m (4' 10\"), weight 54.9 kg (121 lb), last menstrual period 06/15/1985, SpO2 96 %, not currently breastfeeding. BMI=Body mass index is 25.29 kg/m .  GENERAL APPEARANCE ADULT: Alert, no acute distress, walks with a walker  HENT: oral mucous membranes moist, oropharynx clear  NECK: No adenopathy,masses or thyromegaly  RESP: lungs clear to auscultation   CV: normal rate, regular rhythm, no murmur or gallop  ABDOMEN: soft, no organomegaly, masses or tenderness  MS: extremities normal, no peripheral edema     ASSESSMENT:  (R11.0) Nausea  (primary encounter diagnosis)  Comment: We still do not know the cause.  It has been chronic.  Has had gastroscopy, CT a year ago and GI consult in the past.   Several blood tests within the past 6 months.  No need for additional labs.   Stopping warfarin did seem to help the nausea.  Did have nausea on apixaban (Eliquis).  Plan:   Try off the warfarin for another 5 days.    If nausea persists, it makes the warfarin a less likely cause.   If nausea disappears, we could try the other " blood thinner, rivaroxaban (Xarelto).  We can see if insurance will cover.    You could stop the warfarin but stroke risk about 5% per year off anticoagulation medication.     metoclopramide might be another option for nausea.     Eat smaller meals more often.   OK for the ondansetron.      (Z23) Need for prophylactic vaccination and inoculation against influenza  Comment:   Plan: INFLUENZA (HIGH DOSE) 3 VALENT VACCINE [92164],        ADMIN INFLUENZA (For MEDICARE Patients ONLY)         []        Flu shot.                 ===============================  10/18 gastroscopy:  Findings:        The examined duodenum was normal.        A few 2 to 4 mm pedunculated polyps with no bleeding and no stigmata of        recent bleeding were found in the gastric body. The polyp was removed        with a cold biopsy forceps. Resection and retrieval were complete.        Verification of patient identification for the specimen was done by the        nurse and technician using the patient's name and birth date. Estimated        blood loss was minimal.        Biopsies were taken with a cold forceps in the gastric antrum for        Helicobacter pylori testing. Verification of patient identification for        the specimen was done by the nurse and technician using the patient's        name and birth date. Estimated blood loss was minimal.        The gastroesophageal flap valve was visualized endoscopically and        classified as Hill Grade IV (no fold, wide open lumen, hiatal hernia        present).        A 3 cm hiatal hernia was present.        The Z-line was variable and was found 38 cm from the incisors.                                                                                     Impression:          - Normal examined duodenum.                        - A few gastric polyps. Resected and retrieved.                        - Gastroesophageal flap valve classified as Hill Grade                        IV (no fold, wide  open lumen, hiatal hernia present).                        - 3 cm hiatal hernia.                        - Z-line variable, 38 cm from the incisors.                        - Biopsies were taken with a cold forceps for                        Helicobacter pylori testing.   Recommendation:      - Discharge patient to home (ambulatory).                        - Resume previous diet.                        - Continue present medications.                        - Await pathology results.    Pathology:  FINAL DIAGNOSIS:   A. Stomach, antrum, biopsy   - Gastric antral-type mucosa with changes suggestive of reactive/erosive   gastropathy   - No evidence of inflammation, intestinal metaplasia, dysplasia or   malignancy   - No Helicobacter pylori identified.     B. Stomach polyp:   - Fundic gland polyp.   - No evidence of inflammation, intestinal metaplasia, dysplasia or   malignancy   - No Helicobacter pylori identified.     CT abdomen/pelvis 10/30/2019:  IMPRESSION:  1. There is new or more prominent right base atelectasis or  infiltrate.  2. Multiple peripelvic renal cysts on the left.  3. Broad-based low midline abdominal hernia. No evidence of bowel  obstruction.  4. Multiple lumbar compression fractures with mild loss of vertebral  body height, many of which appear new since the prior study but still  have a chronic appearance.

## 2019-10-31 NOTE — NURSING NOTE
"Chief Complaint   Patient presents with     GI Problem     Imm/Inj     Flu Shot       Initial /66   Pulse 68   Temp 97.6  F (36.4  C) (Tympanic)   Resp 20   Ht 1.473 m (4' 10\")   Wt 54.9 kg (121 lb)   LMP 06/15/1985   SpO2 96%   BMI 25.29 kg/m   Estimated body mass index is 25.29 kg/m  as calculated from the following:    Height as of this encounter: 1.473 m (4' 10\").    Weight as of this encounter: 54.9 kg (121 lb).    Patient presents to the clinic using     Health Maintenance that is potentially due pending provider review:          Is there anyone who you would like to be able to receive your results?   If yes have patient fill out JOSUE    "

## 2019-11-03 ENCOUNTER — HEALTH MAINTENANCE LETTER (OUTPATIENT)
Age: 84
End: 2019-11-03

## 2019-11-15 ENCOUNTER — ANTICOAGULATION THERAPY VISIT (OUTPATIENT)
Dept: ANTICOAGULATION | Facility: CLINIC | Age: 84
End: 2019-11-15
Payer: COMMERCIAL

## 2019-11-15 DIAGNOSIS — Z79.01 LONG TERM CURRENT USE OF ANTICOAGULANT THERAPY: ICD-10-CM

## 2019-11-15 DIAGNOSIS — I82.409 DVT (DEEP VENOUS THROMBOSIS) (H): ICD-10-CM

## 2019-11-15 LAB — INR POINT OF CARE: 1.9 (ref 0.86–1.14)

## 2019-11-15 PROCEDURE — 85610 PROTHROMBIN TIME: CPT | Mod: QW

## 2019-11-15 PROCEDURE — 36416 COLLJ CAPILLARY BLOOD SPEC: CPT

## 2019-11-15 PROCEDURE — 99207 ZZC NO CHARGE NURSE ONLY: CPT

## 2019-11-15 NOTE — PROGRESS NOTES
ANTICOAGULATION FOLLOW-UP CLINIC VISIT    Patient Name:  Ellie Leigh  Date:  11/15/2019  Contact Type:  Face to Face/Daughter    SUBJECTIVE:  Patient Findings     Positives:   Change in health (Nausea)    Comments:   No changes in medications, activity, or diet noted. No bleeding or increased bruising noted. Took warfarin as prescribed.  Per Dr. Vidales, patient held Coumadin x 5 days to determine if nausea was from the medication. It was determined it was not Coumadin. Patient still continues to be nauseated.   Patient will continue weekly maintenance dose.   Recheck in 2 weeks.   Once INR is stable for a few weeks, patient would like to try CBD pills to help with nausea.          Clinical Outcomes     Negatives:   Major bleeding event, Thromboembolic event, Anticoagulation-related hospital admission, Anticoagulation-related ED visit, Anticoagulation-related fatality    Comments:   No changes in medications, activity, or diet noted. No bleeding or increased bruising noted. Took warfarin as prescribed.  Per Dr. Vidales, patient held Coumadin x 5 days to determine if nausea was from the medication. It was determined it was not Coumadin. Patient still continues to be nauseated.   Patient will continue weekly maintenance dose.   Recheck in 2 weeks.   Once INR is stable for a few weeks, patient would like to try CBD pills to help with nausea.             OBJECTIVE    INR Protime   Date Value Ref Range Status   11/15/2019 1.9 (A) 0.86 - 1.14 Final       ASSESSMENT / PLAN  INR assessment SUB    Recheck INR In: 2 WEEKS    INR Location Clinic      Anticoagulation Summary  As of 11/15/2019    INR goal:   2.0-3.0   TTR:   46.5 % (4.7 y)   INR used for dosin.9! (11/15/2019)   Warfarin maintenance plan:   2 mg (1 mg x 2) every Mon, Wed, Fri; 1 mg (1 mg x 1) all other days   Full warfarin instructions:   2 mg every Mon, Wed, Fri; 1 mg all other days   Weekly warfarin total:   10 mg   No change documented:   Viri  Ruiz Crowe RN   Plan last modified:   Gina Persaud RN (10/8/2019)   Next INR check:   11/29/2019   Priority:   INR   Target end date:   Indefinite    Indications    Atrial fibrillation with rapid ventricular response (H) (Resolved) [I48.91]  DVT (deep venous thrombosis) [I82.409]  Long term current use of anticoagulant therapy [Z79.01]             Anticoagulation Episode Summary     INR check location:       Preferred lab:       Send INR reminders to:   GARRY BARRY    Comments:   * Taking Celebrex PRN         Anticoagulation Care Providers     Provider Role Specialty Phone number    Anjum Vidales MD [facility] [phone]            See the Encounter Report to view Anticoagulation Flowsheet and Dosing Calendar (Go to Encounters tab in chart review, and find the Anticoagulation Therapy Visit)        Ruiz Meredith RN

## 2019-11-29 ENCOUNTER — ANTICOAGULATION THERAPY VISIT (OUTPATIENT)
Dept: ANTICOAGULATION | Facility: CLINIC | Age: 84
End: 2019-11-29
Payer: COMMERCIAL

## 2019-11-29 DIAGNOSIS — I82.409 DVT (DEEP VENOUS THROMBOSIS) (H): ICD-10-CM

## 2019-11-29 DIAGNOSIS — Z79.01 LONG TERM CURRENT USE OF ANTICOAGULANT THERAPY: ICD-10-CM

## 2019-11-29 LAB — INR POINT OF CARE: 2.5 (ref 0.86–1.14)

## 2019-11-29 PROCEDURE — 36416 COLLJ CAPILLARY BLOOD SPEC: CPT

## 2019-11-29 PROCEDURE — 85610 PROTHROMBIN TIME: CPT | Mod: QW

## 2019-11-29 PROCEDURE — 99207 ZZC NO CHARGE NURSE ONLY: CPT

## 2019-11-29 NOTE — PROGRESS NOTES
Anticoag chart review after hospitalization or ED visit:    Visit date(s): 12/25/19    Reason for visit: cough - viral URI w/ cough    89-year-old female who presents with cough.  Temperature is 98.1  F, heart 85, SPO2 is 94% on room air.  EKG shows sinus rhythm with some paced beats mixed in, no signs of acute ischemia.  She is given a DuoNeb for symptoms.  She feels as if this is helped some.  Chest x-ray obtained, images reviewed independently as well as radiology read reviewed, no signs of infiltrate to suggest pneumonia, no signs of heart failure or pneumothorax.  Troponin is normal making ACS unlikely.  Influenza swab is negative.  Electrolytes are within normal limits.  INR is therapeutic.  She is safe to discharge at this point given her stability and her ability ambulate while maintaining oxygen saturations above 90%.  She will be started on prednisone and azithromycin for treatment of likely COPD exacerbation secondary to a viral illness.  She is told to return if she has worsening symptoms or other concerns, otherwise follow-up in clinic.  The patient and her family are in agreement with this plan.    New medications: zithromax Z laura    Next INR check date: 12/27   This recheck date is an appropriate timeframe given the changes noted during the ED/hospitalization.      ANTICOAGULATION FOLLOW-UP CLINIC VISIT    Patient Name:  Ellie Leigh  Date:  11/29/2019  Contact Type:  Face to Face/Daughter    SUBJECTIVE:  Patient Findings     Comments:   No changes in medications, activity, health, or diet noted. No bleeding or increased bruising noted. Took warfarin as prescribed.  Patient will continue weekly maintenance dose. INR is therapeutic.   Recheck in 4 weeks.   Patient verbalizes understanding and agrees to plan. No further questions or concerns.          Clinical Outcomes     Negatives:   Major bleeding event, Thromboembolic event, Anticoagulation-related hospital admission,  Anticoagulation-related ED visit, Anticoagulation-related fatality    Comments:   No changes in medications, activity, health, or diet noted. No bleeding or increased bruising noted. Took warfarin as prescribed.  Patient will continue weekly maintenance dose. INR is therapeutic.   Recheck in 4 weeks.   Patient verbalizes understanding and agrees to plan. No further questions or concerns.             OBJECTIVE    INR Protime   Date Value Ref Range Status   2019 2.5 (A) 0.86 - 1.14 Final       ASSESSMENT / PLAN  INR assessment THER    Recheck INR In: 4 WEEKS    INR Location Clinic      Anticoagulation Summary  As of 2019    INR goal:   2.0-3.0   TTR:   47.5 % (1 y)   INR used for dosin.5 (2019)   Warfarin maintenance plan:   2 mg (1 mg x 2) every Mon, Wed, Fri; 1 mg (1 mg x 1) all other days   Full warfarin instructions:   2 mg every Mon, Wed, Fri; 1 mg all other days   Weekly warfarin total:   10 mg   No change documented:   Ruiz Meredith RN   Plan last modified:   Gina Persaud RN (10/8/2019)   Next INR check:   2019   Priority:   INR   Target end date:   Indefinite    Indications    Atrial fibrillation with rapid ventricular response (H) (Resolved) [I48.91]  DVT (deep venous thrombosis) [I82.409]  Long term current use of anticoagulant therapy [Z79.01]             Anticoagulation Episode Summary     INR check location:       Preferred lab:       Send INR reminders to:   GARRY BARRY    Comments:   * Taking Celebrex PRN         Anticoagulation Care Providers     Provider Role Specialty Phone number    Anjum Vidales MD [facility] [phone]            See the Encounter Report to view Anticoagulation Flowsheet and Dosing Calendar (Go to Encounters tab in chart review, and find the Anticoagulation Therapy Visit)        Ruiz Meredith, RN

## 2019-12-19 ENCOUNTER — TRANSFERRED RECORDS (OUTPATIENT)
Dept: HEALTH INFORMATION MANAGEMENT | Facility: CLINIC | Age: 84
End: 2019-12-19

## 2019-12-25 ENCOUNTER — APPOINTMENT (OUTPATIENT)
Dept: GENERAL RADIOLOGY | Facility: CLINIC | Age: 84
End: 2019-12-25
Attending: EMERGENCY MEDICINE
Payer: COMMERCIAL

## 2019-12-25 ENCOUNTER — HOSPITAL ENCOUNTER (EMERGENCY)
Facility: CLINIC | Age: 84
Discharge: HOME | End: 2019-12-25
Attending: EMERGENCY MEDICINE | Admitting: EMERGENCY MEDICINE
Payer: COMMERCIAL

## 2019-12-25 VITALS
TEMPERATURE: 98.1 F | SYSTOLIC BLOOD PRESSURE: 132 MMHG | WEIGHT: 122 LBS | HEART RATE: 75 BPM | DIASTOLIC BLOOD PRESSURE: 66 MMHG | BODY MASS INDEX: 25.5 KG/M2 | OXYGEN SATURATION: 95 %

## 2019-12-25 DIAGNOSIS — J06.9 VIRAL URI WITH COUGH: ICD-10-CM

## 2019-12-25 DIAGNOSIS — J44.1 COPD EXACERBATION (H): ICD-10-CM

## 2019-12-25 LAB
ALBUMIN SERPL-MCNC: 2.8 G/DL (ref 3.4–5)
ALP SERPL-CCNC: 79 U/L (ref 40–150)
ALT SERPL W P-5'-P-CCNC: 23 U/L (ref 0–50)
ANION GAP SERPL CALCULATED.3IONS-SCNC: 6 MMOL/L (ref 3–14)
AST SERPL W P-5'-P-CCNC: 16 U/L (ref 0–45)
BASOPHILS # BLD AUTO: 0 10E9/L (ref 0–0.2)
BASOPHILS NFR BLD AUTO: 0.1 %
BILIRUB SERPL-MCNC: 0.3 MG/DL (ref 0.2–1.3)
BUN SERPL-MCNC: 20 MG/DL (ref 7–30)
CALCIUM SERPL-MCNC: 7.9 MG/DL (ref 8.5–10.1)
CHLORIDE SERPL-SCNC: 98 MMOL/L (ref 94–109)
CO2 SERPL-SCNC: 27 MMOL/L (ref 20–32)
CREAT SERPL-MCNC: 0.5 MG/DL (ref 0.52–1.04)
DIFFERENTIAL METHOD BLD: ABNORMAL
EOSINOPHIL # BLD AUTO: 0.2 10E9/L (ref 0–0.7)
EOSINOPHIL NFR BLD AUTO: 2.1 %
ERYTHROCYTE [DISTWIDTH] IN BLOOD BY AUTOMATED COUNT: 14.3 % (ref 10–15)
FLUAV+FLUBV AG SPEC QL: NEGATIVE
FLUAV+FLUBV AG SPEC QL: NEGATIVE
GFR SERPL CREATININE-BSD FRML MDRD: 86 ML/MIN/{1.73_M2}
GLUCOSE SERPL-MCNC: 126 MG/DL (ref 70–99)
HCT VFR BLD AUTO: 34.6 % (ref 35–47)
HGB BLD-MCNC: 11.4 G/DL (ref 11.7–15.7)
IMM GRANULOCYTES # BLD: 0.1 10E9/L (ref 0–0.4)
IMM GRANULOCYTES NFR BLD: 0.5 %
INR PPP: 3.39 (ref 0.86–1.14)
LYMPHOCYTES # BLD AUTO: 1.7 10E9/L (ref 0.8–5.3)
LYMPHOCYTES NFR BLD AUTO: 17.8 %
MCH RBC QN AUTO: 27.7 PG (ref 26.5–33)
MCHC RBC AUTO-ENTMCNC: 32.9 G/DL (ref 31.5–36.5)
MCV RBC AUTO: 84 FL (ref 78–100)
MONOCYTES # BLD AUTO: 1.2 10E9/L (ref 0–1.3)
MONOCYTES NFR BLD AUTO: 11.9 %
NEUTROPHILS # BLD AUTO: 6.6 10E9/L (ref 1.6–8.3)
NEUTROPHILS NFR BLD AUTO: 67.6 %
NRBC # BLD AUTO: 0 10*3/UL
NRBC BLD AUTO-RTO: 0 /100
NT-PROBNP SERPL-MCNC: 881 PG/ML (ref 0–1800)
PLATELET # BLD AUTO: 258 10E9/L (ref 150–450)
POTASSIUM SERPL-SCNC: 3.9 MMOL/L (ref 3.4–5.3)
PROT SERPL-MCNC: 6 G/DL (ref 6.8–8.8)
RBC # BLD AUTO: 4.12 10E12/L (ref 3.8–5.2)
SODIUM SERPL-SCNC: 131 MMOL/L (ref 133–144)
SPECIMEN SOURCE: NORMAL
TROPONIN I SERPL-MCNC: <0.015 UG/L (ref 0–0.04)
WBC # BLD AUTO: 9.8 10E9/L (ref 4–11)

## 2019-12-25 PROCEDURE — 93005 ELECTROCARDIOGRAM TRACING: CPT

## 2019-12-25 PROCEDURE — 71046 X-RAY EXAM CHEST 2 VIEWS: CPT

## 2019-12-25 PROCEDURE — 99285 EMERGENCY DEPT VISIT HI MDM: CPT | Mod: 25

## 2019-12-25 PROCEDURE — 87804 INFLUENZA ASSAY W/OPTIC: CPT | Performed by: EMERGENCY MEDICINE

## 2019-12-25 PROCEDURE — 94640 AIRWAY INHALATION TREATMENT: CPT

## 2019-12-25 PROCEDURE — 93010 ELECTROCARDIOGRAM REPORT: CPT | Mod: Z6 | Performed by: EMERGENCY MEDICINE

## 2019-12-25 PROCEDURE — 80053 COMPREHEN METABOLIC PANEL: CPT | Performed by: EMERGENCY MEDICINE

## 2019-12-25 PROCEDURE — 83880 ASSAY OF NATRIURETIC PEPTIDE: CPT | Performed by: EMERGENCY MEDICINE

## 2019-12-25 PROCEDURE — 85610 PROTHROMBIN TIME: CPT | Performed by: EMERGENCY MEDICINE

## 2019-12-25 PROCEDURE — 84484 ASSAY OF TROPONIN QUANT: CPT | Performed by: EMERGENCY MEDICINE

## 2019-12-25 PROCEDURE — 25000125 ZZHC RX 250: Performed by: EMERGENCY MEDICINE

## 2019-12-25 PROCEDURE — 85025 COMPLETE CBC W/AUTO DIFF WBC: CPT | Performed by: EMERGENCY MEDICINE

## 2019-12-25 PROCEDURE — 99285 EMERGENCY DEPT VISIT HI MDM: CPT | Mod: 25 | Performed by: EMERGENCY MEDICINE

## 2019-12-25 RX ORDER — AZITHROMYCIN 250 MG/1
TABLET, FILM COATED ORAL
Qty: 6 TABLET | Refills: 0 | Status: SHIPPED | OUTPATIENT
Start: 2019-12-25 | End: 2020-06-10

## 2019-12-25 RX ORDER — PREDNISONE 20 MG/1
TABLET ORAL
Qty: 10 TABLET | Refills: 0 | Status: SHIPPED | OUTPATIENT
Start: 2019-12-25 | End: 2020-05-15

## 2019-12-25 RX ORDER — IPRATROPIUM BROMIDE AND ALBUTEROL SULFATE 2.5; .5 MG/3ML; MG/3ML
3 SOLUTION RESPIRATORY (INHALATION) ONCE
Status: COMPLETED | OUTPATIENT
Start: 2019-12-25 | End: 2019-12-25

## 2019-12-25 RX ADMIN — IPRATROPIUM BROMIDE AND ALBUTEROL SULFATE 3 ML: .5; 3 SOLUTION RESPIRATORY (INHALATION) at 16:19

## 2019-12-25 NOTE — ED NOTES
Ambulated Patient, O2 sats stayed at 90 % - 91 % while walking.  
Patient states she has been sick for 4 days with cough and congestion. Has a doctor appointment on Friday but just feels so sick.  
yes...

## 2019-12-25 NOTE — DISCHARGE INSTRUCTIONS
Take the prednisone and azithromycin as prescribed.  Return to the emergency department if you have worsening symptoms, repeated vomiting, lightheadedness, or other concerns.  Otherwise follow-up in clinic if not feeling better over the next 3 to 4 days.

## 2019-12-25 NOTE — ED PROVIDER NOTES
History     Chief Complaint   Patient presents with     Cough     4 days, has doctor appointment on friday     HPI  Ellie Leigh is a 89 year old female who presents for cough and the past 4 days.  Cough is productive of white phlegm.  No fevers or chills.  She has mild chest pressure, nonradiating.  She feels short of breath.  Mild sore throat, no ear pain or headache.  No abdominal pain, nausea, vomiting rash.  She has been using her albuterol with only some improvement.    Allergies:  Allergies   Allergen Reactions     Cephalosporins Nausea     Cefzil     Doxycycline Nausea and Vomiting     Sulfa Drugs Nausea     Penicillins Rash     PCN       Problem List:    Patient Active Problem List    Diagnosis Date Noted     Chest pain 04/14/2018     Priority: Medium     H/O TB (tuberculosis) 02/09/2018     Priority: Medium     Pt with chronic RUL infiltrate with pos AFB sputum  Full treatmetn for TB following ID consult in 2000's       Back pain 02/06/2018     Priority: Medium     Nausea 06/05/2017     Priority: Medium     Irritable bowel syndrome without diarrhea 05/02/2016     Priority: Medium     Mild persistent asthma without complication 12/01/2015     Priority: Medium     Long term current use of anticoagulant therapy 03/02/2015     Priority: Medium     Syncope 01/12/2015     Priority: Medium     Advance Care Planning 01/09/2015     Priority: Medium     Advance Care Planning 7/2/2015: Receipt of ACP document:  Received: Health Care Directive which was witnessed or notarized on 1-9-15.  Document previously scanned on 2-27-15.  Validation form completed and sent to be scanned.  Code Status reflects choices in most recent ACP document.  Confirmed/documented designated decision maker(s).  Added by Verónica Green RN, System ACP Coordinator Honoring Choices   1/9/15 Copy to be scanned into chart Beulah Mathis CMA         COPD (chronic obstructive pulmonary disease) (H) 10/06/2014     Priority: Medium     SIADH  (syndrome of inappropriate ADH production) (H) 07/07/2014     Priority: Medium     Cause TBD.  Patient has had lung CT, will see Dr Gómez for consideration of further workup.  Also has pulmonology appt 8/18.  5/2/2018:Reviewing chart she has had hyponatremia since 2010.        Positive fecal occult blood test 07/07/2014     Priority: Medium     x2 -- first was in ED.  Patient expresses that she does not want colonoscopy.  She'll set appt to discuss with her PCP.       Benign essential HTN 02/11/2014     Priority: Medium     BPPV (benign paroxysmal positional vertigo) 11/05/2013     Priority: Medium     History of skin cancer 05/07/2013     Priority: Medium     Recurrent UTI 07/16/2012     Priority: Medium     recurrent UTI  Recent Urologic workup neg, 2005  Has Cipro at home for treatment as needed       Hypothyroidism 05/07/2012     Priority: Medium     Hyponatremia 05/07/2012     Priority: Medium     Squamous cell carcinoma in situ of skin of face 04/19/2012     Priority: Medium     SK (seborrheic keratosis) 04/19/2012     Priority: Medium     Intermittent atrial fibrillation (H) 12/01/2011     Priority: Medium     Cervical vertebral fracture (H) 11/20/2011     Priority: Medium     Anxiety 11/15/2011     Priority: Medium     DVT (deep venous thrombosis) 10/12/2011     Priority: Medium     H/o in past, on current coumadin therapy.       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 Provider to review       Right arm weakness 07/29/2011     Priority: Medium     Ulnar neuropathy 07/29/2011     Priority: Medium     Health Care Home 04/21/2011     Priority: Medium     Conchis Robles [phone]  FPA / [facility]              DX V65.8 REPLACED WITH [id number] HEALTH CARE HOME (04/08/2013)       Chronic constipation 03/03/2011     Priority: Medium     Osteoporosis 03/03/2011     Priority:  Medium     Hyperlipidemia LDL goal <130 10/31/2010     Priority: Medium     Infectious colitis, enteritis and gastroenteritis 07/10/2010     Priority: Medium     Chronic allergic conjunctivitis 11/18/2008     Priority: Medium     Chronic seasonal allergic rhinitis 11/18/2008     Priority: Medium     Esophageal reflux 11/29/2007     Priority: Medium     PERSONAL HISTORY OF MALIGNANCY- BREAST 06/13/2007     Priority: Medium     Sensorineural hearing loss, asymmetrical 06/20/2005     Priority: Medium     Generalized osteoarthrosis, unspecified site 06/20/2005     Priority: Medium     Right hip and knee are the most symptomatic          Past Medical History:    Past Medical History:   Diagnosis Date     Breast cancer (H)      COPD (chronic obstructive pulmonary disease) (H)      Dust allergy      DVT (deep vein thrombosis) in pregnancy      HTN (hypertension)      Hyponatremia      Hypothyroid      Intermittent atrial fibrillation (H) 12/1/2011     Loose body in joint 4/15/2011     Neck fracture (H)      Syncope 5/12/2013       Past Surgical History:    Past Surgical History:   Procedure Laterality Date     APPENDECTOMY       ARTHROSCOPY KNEE  4/15/2011    Procedure:ARTHROSCOPY KNEE; removal of loose body; Surgeon:LEY, JEFFREY DUANE; Location:[facility]     CHOLECYSTECTOMY       ESOPHAGOSCOPY, GASTROSCOPY, DUODENOSCOPY (EGD), COMBINED  6/9/2014    Procedure: COMBINED ESOPHAGOSCOPY, GASTROSCOPY, DUODENOSCOPY (EGD);  Surgeon: Gilberto Richard MD;  Location: [facility]     ESOPHAGOSCOPY, GASTROSCOPY, DUODENOSCOPY (EGD), COMBINED N/A 10/18/2019    Procedure: ESOPHAGOGASTRODUODENOSCOPY (EGD);  Surgeon: Noe Sams DO;  Location: [facility]     IMPLANT PACEMAKER  5/21/2013    [facility] [id number] Serial#[id number]     INJECT EPIDURAL LUMBAR  3/23/2011    INJECT EPIDURAL LUMBAR performed by GENERIC ANESTHESIA PROVIDER at [facility] OR     JOINT REPLACEMENT, HIP RT/LT      Joint Replacement Hip Rt     MASTECTOMY, SIMPLE RT/LT/CAITLYN       Left breast - following breast ca     OTHER SURGICAL HISTORY      C1-C2 fusion after fx      SURGICAL HISTORY OF -   05/22/2001    Colonoscopy     TONSILLECTOMY         Family History:    Family History   Problem Relation Age of Onset     Cerebrovascular Disease Mother      Heart Disease Mother         MI     Cerebrovascular Disease Father      Heart Disease Father         MI     Respiratory Maternal Grandfather         TB     Neurologic Disorder Brother         ALS     Heart Disease Brother      Cerebrovascular Disease Brother      Breast Cancer Daughter         age:49     Asthma Brother      Cancer Brother         brain and lung     Cancer Daughter         thyroid       Social History:  Marital Status:   [5]  Social History     Tobacco Use     Smoking status: Passive Smoke Exposure - Never Smoker     Smokeless tobacco: Never Used   Substance Use Topics     Alcohol use: No     Drug use: No        Medications:    azithromycin (ZITHROMAX Z-ADDY) 250 MG tablet  predniSONE (DELTASONE) 20 MG tablet  Acetaminophen (TYLENOL PO)  albuterol (PROAIR HFA/PROVENTIL HFA/VENTOLIN HFA) 108 (90 Base) MCG/ACT Inhaler  albuterol (PROVENTIL) (2.5 MG/3ML) 0.083% neb solution  CRANBERRY  estradiol (ESTRACE VAGINAL) 0.1 MG/GM vaginal cream  fluticasone (FLONASE) 50 MCG/ACT spray  ipratropium - albuterol 0.5 mg/2.5 mg/3 mL (DUONEB) 0.5-2.5 (3) MG/3ML neb solution  levothyroxine (SYNTHROID/LEVOTHROID) 50 MCG tablet  metoprolol succinate ER (TOPROL-XL) 25 MG 24 hr tablet  omeprazole (PRILOSEC) 40 MG DR capsule  ondansetron (ZOFRAN-ODT) 4 MG ODT tab  order for DME  polyethylene glycol (MIRALAX/GLYCOLAX) Packet  probiotic CAPS  sodium chloride (BRONCHO SALINE) 0.9 % areosol solution  sodium chloride 1 GM tablet  SYMBICORT 160-4.5 MCG/ACT Inhaler  warfarin ANTICOAGULANT (COUMADIN) 1 MG tablet          Review of Systems  Pertinent positives and negatives listed in the HPI, all other systems reviewed and are negative.    Physical Exam    BP: 107/64  Pulse: 75  Heart Rate: 85  Temp: 98.1  F (36.7  C)  Weight: 55.3 kg (122 lb)  SpO2: 94 %      Physical Exam  Vitals signs and nursing note reviewed.   Constitutional:       General: She is in acute distress.      Appearance: She is well-developed. She is not diaphoretic.   HENT:      Head: Normocephalic and atraumatic.      Right Ear: External ear normal.      Left Ear: External ear normal.      Nose: Nose normal.   Eyes:      General: No scleral icterus.     Conjunctiva/sclera: Conjunctivae normal.   Neck:      Musculoskeletal: Normal range of motion.   Cardiovascular:      Rate and Rhythm: Normal rate and regular rhythm.   Pulmonary:      Effort: Pulmonary effort is normal. No respiratory distress.      Breath sounds: No stridor. Wheezing present.   Abdominal:      General: There is no distension.      Palpations: Abdomen is soft.   Skin:     General: Skin is warm and dry.   Neurological:      Mental Status: She is alert and oriented to person, place, and time.   Psychiatric:         Behavior: Behavior normal.         ED Course        Procedures               EKG Interpretation:      Interpreted by Angelo Robles MD  Time reviewed: 1610  Symptoms at time of EKG: Dyspnea   Rhythm: sinus and paced  Rate: 79  Axis: Normal  Ectopy: none  Conduction: normal  ST Segments/ T Waves: No acute ischemic changes  Q Waves: v1  Comparison to prior: Unchanged    Clinical Impression: no acute changes      Critical Care time:  none               Results for orders placed or performed during the hospital encounter of 12/25/19 (from the past 24 hour(s))   INR   Result Value Ref Range    INR 3.39 (H) 0.86 - 1.14   CBC with platelets differential   Result Value Ref Range    WBC 9.8 4.0 - 11.0 10e9/L    RBC Count 4.12 3.8 - 5.2 10e12/L    Hemoglobin 11.4 (L) 11.7 - 15.7 g/dL    Hematocrit 34.6 (L) 35.0 - 47.0 %    MCV 84 78 - 100 fl    MCH 27.7 26.5 - 33.0 pg    MCHC 32.9 31.5 - 36.5 g/dL    RDW 14.3 10.0 - 15.0 %     Platelet Count 258 150 - 450 10e9/L    Diff Method Automated Method     % Neutrophils 67.6 %    % Lymphocytes 17.8 %    % Monocytes 11.9 %    % Eosinophils 2.1 %    % Basophils 0.1 %    % Immature Granulocytes 0.5 %    Nucleated RBCs 0 0 /100    Absolute Neutrophil 6.6 1.6 - 8.3 10e9/L    Absolute Lymphocytes 1.7 0.8 - 5.3 10e9/L    Absolute Monocytes 1.2 0.0 - 1.3 10e9/L    Absolute Eosinophils 0.2 0.0 - 0.7 10e9/L    Absolute Basophils 0.0 0.0 - 0.2 10e9/L    Abs Immature Granulocytes 0.1 0 - 0.4 10e9/L    Absolute Nucleated RBC 0.0    Comprehensive metabolic panel   Result Value Ref Range    Sodium 131 (L) 133 - 144 mmol/L    Potassium 3.9 3.4 - 5.3 mmol/L    Chloride 98 94 - 109 mmol/L    Carbon Dioxide 27 20 - 32 mmol/L    Anion Gap 6 3 - 14 mmol/L    Glucose 126 (H) 70 - 99 mg/dL    Urea Nitrogen 20 7 - 30 mg/dL    Creatinine 0.50 (L) 0.52 - 1.04 mg/dL    GFR Estimate 86 >60 mL/min/[1.73_m2]    GFR Estimate If Black >90 >60 mL/min/[1.73_m2]    Calcium 7.9 (L) 8.5 - 10.1 mg/dL    Bilirubin Total 0.3 0.2 - 1.3 mg/dL    Albumin 2.8 (L) 3.4 - 5.0 g/dL    Protein Total 6.0 (L) 6.8 - 8.8 g/dL    Alkaline Phosphatase 79 40 - 150 U/L    ALT 23 0 - 50 U/L    AST 16 0 - 45 U/L   Troponin I   Result Value Ref Range    Troponin I ES <0.015 0.000 - 0.045 ug/L   Nt probnp inpatient   Result Value Ref Range    N-Terminal Pro BNP Inpatient 881 0 - 1,800 pg/mL   Influenza A/B antigen   Result Value Ref Range    Influenza A/B Agn Specimen Nasopharyngeal     Influenza A Negative NEG^Negative    Influenza B Negative NEG^Negative   XR Chest 2 Views    Narrative    CHEST TWO VIEWS      12/25/2019 4:45 PM     HISTORY: Cough.    COMPARISON: Chest x-ray on 7/13/2019.      Impression    IMPRESSION: Two views of the chest were obtained. Stable  cardiomediastinal silhouette. Left chest wall implantable cardiac  defibrillator and leads are in stable position. High lung volumes,  likely due to underlying COPD changes. Biapical, right  greater than  left, bronchiectatic changes and apical scarring. No significant  pleural effusion or pneumothorax.     GILLIAN SOUZA MD     *Note: Due to a large number of results and/or encounters for the requested time period, some results have not been displayed. A complete set of results can be found in Results Review.       Medications   ipratropium - albuterol 0.5 mg/2.5 mg/3 mL (DUONEB) neb solution 3 mL (3 mLs Nebulization Given 12/25/19 1619)       Assessments & Plan (with Medical Decision Making)   89-year-old female who presents with cough.  Temperature is 98.1  F, heart 85, SPO2 is 94% on room air.  EKG shows sinus rhythm with some paced beats mixed in, no signs of acute ischemia.  She is given a DuoNeb for symptoms.  She feels as if this is helped some.  Chest x-ray obtained, images reviewed independently as well as radiology read reviewed, no signs of infiltrate to suggest pneumonia, no signs of heart failure or pneumothorax.  Troponin is normal making ACS unlikely.  Influenza swab is negative.  Electrolytes are within normal limits.  INR is therapeutic.  She is safe to discharge at this point given her stability and her ability ambulate while maintaining oxygen saturations above 90%.  She will be started on prednisone and azithromycin for treatment of likely COPD exacerbation secondary to a viral illness.  She is told to return if she has worsening symptoms or other concerns, otherwise follow-up in clinic.  The patient and her family are in agreement with this plan.    I have reviewed the nursing notes.    I have reviewed the findings, diagnosis, plan and need for follow up with the patient.       New Prescriptions    AZITHROMYCIN (ZITHROMAX Z-ADDY) 250 MG TABLET    Two tablets on the first day, then one tablet daily for the next 4 days    PREDNISONE (DELTASONE) 20 MG TABLET    Take two tablets (= 40mg) each day for 5 (five) days       Final diagnoses:   Viral URI with cough   COPD exacerbation (H)        12/25/2019   [facility] EMERGENCY DEPARTMENT     Angelo Robles MD  12/25/19 3798

## 2019-12-25 NOTE — ED AVS SNAPSHOT
[facility] Emergency Department  [address] [address]  Phone:  [phone]  Fax:  [phone]                                    Ellie Leigh   MRN: [id number]    Department:  [facility] Emergency Department   Date of Visit:  12/25/2019           After Visit Summary Signature Page    I have received my discharge instructions, and my questions have been answered. I have discussed any challenges I see with this plan with the nurse or doctor.    ..........................................................................................................................................  Patient/Patient Representative Signature      ..........................................................................................................................................  Patient Representative Print Name and Relationship to Patient    ..................................................               ................................................  Date                                   Time    ..........................................................................................................................................  Reviewed by Signature/Title    ...................................................              ..............................................  Date                                               Time          22EPIC Rev 08/18

## 2019-12-26 DIAGNOSIS — J47.9 BRONCHIECTASIS WITHOUT COMPLICATION (H): ICD-10-CM

## 2019-12-26 DIAGNOSIS — K21.9 GASTROESOPHAGEAL REFLUX DISEASE WITHOUT ESOPHAGITIS: ICD-10-CM

## 2019-12-26 RX ORDER — SODIUM CHLORIDE FOR INHALATION 0.9 %
VIAL, NEBULIZER (ML) INHALATION
Qty: 450 ML | Refills: 5 | Status: SHIPPED | OUTPATIENT
Start: 2019-12-26 | End: 2020-10-23

## 2019-12-26 NOTE — TELEPHONE ENCOUNTER
"Requested Prescriptions   Pending Prescriptions Disp Refills     ranitidine (ZANTAC) 150 MG tablet [Pharmacy Med Name: raNITIdine HCl Oral Tablet 150 MG] 60 tablet 10     Sig: Take 1 tablet (150 mg) by mouth 2 times daily.       H2 Blockers Protocol Failed - 12/26/2019 11:12 AM        Failed - Medication is active on med list        Passed - Patient is age 12 or older        Passed - Recent (12 mo) or future (30 days) visit within the authorizing provider's specialty     Patient has had an office visit with the authorizing provider or a provider within the authorizing providers department within the previous 12 mos or has a future within next 30 days. See \"Patient Info\" tab in inbasket, or \"Choose Columns\" in Meds & Orders section of the refill encounter.              Name from pharmacy: raNITIdine HCl Oral Tablet 150 MG          Will file in chart as: ranitidine (ZANTAC) 150 MG tablet    The source prescription was discontinued on 5/20/2019 by Anjum Vidales MD.         Sig: Take 1 tablet (150 mg) by mouth 2 times daily    Original sig: Take 1 tablet (150 mg) by mouth 2 times daily.    Disp:  60 tablet    Refills:  10    Start: 12/26/2019    Class: E-Prescribe    For: Gastroesophageal reflux disease without esophagitis       "

## 2019-12-27 ENCOUNTER — ANTICOAGULATION THERAPY VISIT (OUTPATIENT)
Dept: ANTICOAGULATION | Facility: CLINIC | Age: 84
End: 2019-12-27
Payer: COMMERCIAL

## 2019-12-27 DIAGNOSIS — I82.409 DVT (DEEP VENOUS THROMBOSIS) (H): ICD-10-CM

## 2019-12-27 DIAGNOSIS — Z79.01 LONG TERM CURRENT USE OF ANTICOAGULANT THERAPY: ICD-10-CM

## 2019-12-27 LAB — INR POINT OF CARE: 3.2 (ref 0.86–1.14)

## 2019-12-27 PROCEDURE — 99207 ZZC NO CHARGE NURSE ONLY: CPT

## 2019-12-27 PROCEDURE — 85610 PROTHROMBIN TIME: CPT | Mod: QW

## 2019-12-27 PROCEDURE — 36416 COLLJ CAPILLARY BLOOD SPEC: CPT

## 2019-12-27 NOTE — PROGRESS NOTES
ANTICOAGULATION FOLLOW-UP CLINIC VISIT    Patient Name:  Ellie Leigh  Date:  12/27/2019  Contact Type:  Face to Face    SUBJECTIVE:  Patient Findings     Positives:   Change in health (viral URI/COPD), Change in medications (40 mg prednisone for 5 days and zpak started 12-25)    Comments:   Patient started zpak Wed evening and prednisone Thurs morning. She is starting to feel better but still has a cough. INR slightly elevated but it was 2 days ago at ED also. zpak will affect INR the most around day 5-6 so will recheck INR again at lab on 12-31-19 (ACC full). Reduced warfarin until then. No other changes or concerns.         Clinical Outcomes     Comments:   Patient started zpak Wed evening and prednisone Thurs morning. She is starting to feel better but still has a cough. INR slightly elevated but it was 2 days ago at ED also. zpak will affect INR the most around day 5-6 so will recheck INR again at lab on 12-31-19 (ACC full). Reduced warfarin until then. No other changes or concerns.            OBJECTIVE    INR Protime   Date Value Ref Range Status   12/27/2019 3.2 (A) 0.86 - 1.14 Final       ASSESSMENT / PLAN  INR assessment SUPRA    Recheck INR In: 4 DAYS    INR Location Clinic      Anticoagulation Summary  As of 12/27/2019    INR goal:   2.0-3.0   TTR:   49.6 % (1 y)   INR used for dosing:   3.2! (12/27/2019)   Warfarin maintenance plan:   2 mg (1 mg x 2) every Mon, Wed, Fri; 1 mg (1 mg x 1) all other days   Full warfarin instructions:   12/27: 1.5 mg; 12/30: 1 mg; Otherwise 2 mg every Mon, Wed, Fri; 1 mg all other days   Weekly warfarin total:   10 mg   Plan last modified:   Gina Persaud RN (10/8/2019)   Next INR check:   12/31/2019   Priority:   INR   Target end date:   Indefinite    Indications    Atrial fibrillation with rapid ventricular response (H) (Resolved) [I48.91]  DVT (deep venous thrombosis) [I82.409]  Long term current use of anticoagulant therapy [Z79.01]              Anticoagulation Episode Summary     INR check location:       Preferred lab:       Send INR reminders to:   [facility] PHONE ANTICOAG POOL    Comments:   * Taking Celebrex PRN         Anticoagulation Care Providers     Provider Role Specialty Phone number    Anjum Vidales MD [facility] [phone]            See the Encounter Report to view Anticoagulation Flowsheet and Dosing Calendar (Go to Encounters tab in chart review, and find the Anticoagulation Therapy Visit)        Lissy Beaulieu RN

## 2019-12-27 NOTE — TELEPHONE ENCOUNTER
Refill not appropriate.  ranitidine (ZANTAC) 150 MG tablet (Discontinued)  This Order Has Been Discontinued     Order Status Reason By On   Discontinued None Anjum Vidales MD 5/20/19 5090     Jessica SANTO RN

## 2019-12-31 ENCOUNTER — ANTICOAGULATION THERAPY VISIT (OUTPATIENT)
Dept: ANTICOAGULATION | Facility: CLINIC | Age: 84
End: 2019-12-31

## 2019-12-31 DIAGNOSIS — I82.409 DVT (DEEP VENOUS THROMBOSIS) (H): ICD-10-CM

## 2019-12-31 DIAGNOSIS — Z79.01 LONG TERM CURRENT USE OF ANTICOAGULANT THERAPY: ICD-10-CM

## 2019-12-31 LAB — INR PPP: 2.72 (ref 0.86–1.14)

## 2019-12-31 PROCEDURE — 85610 PROTHROMBIN TIME: CPT | Performed by: FAMILY MEDICINE

## 2019-12-31 PROCEDURE — 99207 ZZC NO CHARGE NURSE ONLY: CPT

## 2019-12-31 PROCEDURE — 36415 COLL VENOUS BLD VENIPUNCTURE: CPT | Performed by: FAMILY MEDICINE

## 2019-12-31 NOTE — PROGRESS NOTES
ANTICOAGULATION FOLLOW-UP CLINIC VISIT    Patient Name:  Ellie Leigh  Date:  12/31/2019  Contact Type:  Telephone/ Spoke to daughter - ok per chart    SUBJECTIVE:  Patient Findings     Positives:   Signs/symptoms of bleeding (Nose bleed on Sunday/Mon - daughter confirmed it was minimal on the kleenex. it was not draining from her nose), Change in medications (Stopped prednisone due to slight nasal bleeding - may resume it. did finish course of Zpak)    Comments:   Spoke to rose mary baca per demographics to discuss with daughter.   No changes in activity or diet noted. No other concerns with clotting, bleeding, or increased bruising noted. Took warfarin as prescribed.  Also advised that pt may try a humidifier or nasal saline for nose due to winter weather.   Patient had 8.5 mg in the last 7 days, will adjust dose to 9mg by next INR check in one week (~6% increase - previous maintenance dose 10 mg/wk)  Patient's daughter educated on the increased risk for bleeding, precautions to take, and when to seek medical attention.  Patient's daughter verbalizes understanding and agrees to plan. No further questions or concerns.        Clinical Outcomes     Negatives:   Major bleeding event, Thromboembolic event, Anticoagulation-related hospital admission, Anticoagulation-related ED visit, Anticoagulation-related fatality    Comments:   Spoke to rose mary baca per demographics to discuss with daughter.   No changes in activity or diet noted. No other concerns with clotting, bleeding, or increased bruising noted. Took warfarin as prescribed.  Also advised that pt may try a humidifier or nasal saline for nose due to winter weather.   Patient had 8.5 mg in the last 7 days, will adjust dose to 9mg by next INR check in one week (~6% increase - previous maintenance dose 10 mg/wk)  Patient's daughter educated on the increased risk for bleeding, precautions to take, and when to seek medical attention.  Patient's daughter verbalizes  understanding and agrees to plan. No further questions or concerns.           OBJECTIVE    INR   Date Value Ref Range Status   2019 2.72 (H) 0.86 - 1.14 Final       ASSESSMENT / PLAN  INR assessment THER    Recheck INR In: 1 WEEK    INR Location Outside lab      Anticoagulation Summary  As of 2019    INR goal:   2.0-3.0   TTR:   50.2 % (1 y)   INR used for dosin.72 (2019)   Warfarin maintenance plan:   2 mg (1 mg x 2) every Mon, Wed, Fri; 1 mg (1 mg x 1) all other days   Full warfarin instructions:   : 1 mg; Otherwise 2 mg every Mon, Wed, Fri; 1 mg all other days   Weekly warfarin total:   10 mg   Plan last modified:   Gina Persaud RN (10/8/2019)   Next INR check:   2020   Priority:   High   Target end date:   Indefinite    Indications    Atrial fibrillation with rapid ventricular response (H) (Resolved) [I48.91]  DVT (deep venous thrombosis) [I82.409]  Long term current use of anticoagulant therapy [Z79.01]             Anticoagulation Episode Summary     INR check location:       Preferred lab:       Send INR reminders to:   [facility] POOL    Comments:   * Taking Celebrex PRN         Anticoagulation Care Providers     Provider Role Specialty Phone number    Anjum Vidales MD [facility] [phone]            See the Encounter Report to view Anticoagulation Flowsheet and Dosing Calendar (Go to Encounters tab in chart review, and find the Anticoagulation Therapy Visit)        Kylah Dorsey RN

## 2020-01-07 ENCOUNTER — ANTICOAGULATION THERAPY VISIT (OUTPATIENT)
Dept: ANTICOAGULATION | Facility: CLINIC | Age: 85
End: 2020-01-07
Payer: COMMERCIAL

## 2020-01-07 DIAGNOSIS — I82.409 DVT (DEEP VENOUS THROMBOSIS) (H): ICD-10-CM

## 2020-01-07 DIAGNOSIS — Z79.01 LONG TERM CURRENT USE OF ANTICOAGULANT THERAPY: ICD-10-CM

## 2020-01-07 LAB — INR POINT OF CARE: 1.9 (ref 0.86–1.14)

## 2020-01-07 PROCEDURE — 36416 COLLJ CAPILLARY BLOOD SPEC: CPT

## 2020-01-07 PROCEDURE — 99207 ZZC NO CHARGE NURSE ONLY: CPT

## 2020-01-07 PROCEDURE — 85610 PROTHROMBIN TIME: CPT | Mod: QW

## 2020-01-07 NOTE — PROGRESS NOTES
ANTICOAGULATION FOLLOW-UP CLINIC VISIT    Patient Name:  Ellie Leigh  Date:  2020  Contact Type:  Face to Face/Daughter    SUBJECTIVE:  Patient Findings     Comments:   No changes in medications, activity, health, or diet noted. No bleeding or increased bruising noted. Took warfarin as prescribed.  Patient took a lower dose last week. Writer will have the patient resume the maintenance dose this week.   Recheck in 2 weeks.   Patient verbalizes understanding and agrees to plan. No further questions or concerns.          Clinical Outcomes     Negatives:   Major bleeding event, Thromboembolic event, Anticoagulation-related hospital admission, Anticoagulation-related ED visit, Anticoagulation-related fatality    Comments:   No changes in medications, activity, health, or diet noted. No bleeding or increased bruising noted. Took warfarin as prescribed.  Patient took a lower dose last week. Writer will have the patient resume the maintenance dose this week.   Recheck in 2 weeks.   Patient verbalizes understanding and agrees to plan. No further questions or concerns.             OBJECTIVE    INR Protime   Date Value Ref Range Status   2020 1.9 (A) 0.86 - 1.14 Final       ASSESSMENT / PLAN  INR assessment SUB    Recheck INR In: 2 WEEKS    INR Location Clinic      Anticoagulation Summary  As of 2020    INR goal:   2.0-3.0   TTR:   49.9 % (1 y)   INR used for dosin.9! (2020)   Warfarin maintenance plan:   2 mg (1 mg x 2) every Mon, Wed, Fri; 1 mg (1 mg x 1) all other days   Full warfarin instructions:   2 mg every Mon, Wed, Fri; 1 mg all other days   Weekly warfarin total:   10 mg   No change documented:   Ruiz Meredith RN   Plan last modified:   Gina Persaud RN (10/8/2019)   Next INR check:   2020   Priority:   High   Target end date:   Indefinite    Indications    Atrial fibrillation with rapid ventricular response (H) (Resolved) [I48.91]  DVT (deep venous thrombosis)  [I82.409]  Long term current use of anticoagulant therapy [Z79.01]             Anticoagulation Episode Summary     INR check location:       Preferred lab:       Send INR reminders to:   GARRY BARRY    Comments:   * Taking Celebrex PRN         Anticoagulation Care Providers     Provider Role Specialty Phone number    Anjum Vidales MD [facility] [phone]            See the Encounter Report to view Anticoagulation Flowsheet and Dosing Calendar (Go to Encounters tab in chart review, and find the Anticoagulation Therapy Visit)        Ruiz Meredith RN

## 2020-01-21 ENCOUNTER — ANTICOAGULATION THERAPY VISIT (OUTPATIENT)
Dept: ANTICOAGULATION | Facility: CLINIC | Age: 85
End: 2020-01-21
Payer: COMMERCIAL

## 2020-01-21 DIAGNOSIS — I82.409 DVT (DEEP VENOUS THROMBOSIS) (H): ICD-10-CM

## 2020-01-21 DIAGNOSIS — Z79.01 LONG TERM CURRENT USE OF ANTICOAGULANT THERAPY: ICD-10-CM

## 2020-01-21 LAB — INR POINT OF CARE: 2.4 (ref 0.86–1.14)

## 2020-01-21 PROCEDURE — 85610 PROTHROMBIN TIME: CPT | Mod: QW

## 2020-01-21 PROCEDURE — 36416 COLLJ CAPILLARY BLOOD SPEC: CPT

## 2020-01-21 PROCEDURE — 99207 ZZC NO CHARGE NURSE ONLY: CPT

## 2020-01-21 NOTE — PROGRESS NOTES
ANTICOAGULATION FOLLOW-UP CLINIC VISIT    Patient Name:  Ellie Leigh  Date:  2020  Contact Type:  Face to Face/Daughter    SUBJECTIVE:  Patient Findings     Comments:   No changes in medications, activity, health, or diet noted. No bleeding or increased bruising noted. Took warfarin as prescribed.  Patient will continue weekly maintenance dose. INR is therapeutic.   Recheck in 4 weeks.   Patient verbalizes understanding and agrees to plan. No further questions or concerns.          Clinical Outcomes     Negatives:   Major bleeding event, Thromboembolic event, Anticoagulation-related hospital admission, Anticoagulation-related ED visit, Anticoagulation-related fatality    Comments:   No changes in medications, activity, health, or diet noted. No bleeding or increased bruising noted. Took warfarin as prescribed.  Patient will continue weekly maintenance dose. INR is therapeutic.   Recheck in 4 weeks.   Patient verbalizes understanding and agrees to plan. No further questions or concerns.             OBJECTIVE    INR Protime   Date Value Ref Range Status   2020 2.4 (A) 0.86 - 1.14 Final       ASSESSMENT / PLAN  INR assessment THER    Recheck INR In: 4 WEEKS    INR Location Clinic      Anticoagulation Summary  As of 2020    INR goal:   2.0-3.0   TTR:   50.1 % (1 y)   INR used for dosin.4 (2020)   Warfarin maintenance plan:   2 mg (1 mg x 2) every Mon, Wed, Fri; 1 mg (1 mg x 1) all other days   Full warfarin instructions:   2 mg every Mon, Wed, Fri; 1 mg all other days   Weekly warfarin total:   10 mg   No change documented:   Ruiz Meredith RN   Plan last modified:   Gina Persaud RN (10/8/2019)   Next INR check:   2020   Priority:   Maintenance   Target end date:   Indefinite    Indications    Atrial fibrillation with rapid ventricular response (H) (Resolved) [I48.91]  DVT (deep venous thrombosis) [I82.409]  Long term current use of anticoagulant therapy  [Z79.01]             Anticoagulation Episode Summary     INR check location:       Preferred lab:       Send INR reminders to:   GARRY BARRY    Comments:   * Taking Celebrex PRN         Anticoagulation Care Providers     Provider Role Specialty Phone number    Anjum Vidales MD [facility] [phone]            See the Encounter Report to view Anticoagulation Flowsheet and Dosing Calendar (Go to Encounters tab in chart review, and find the Anticoagulation Therapy Visit)        Ruiz Meredith RN

## 2020-02-03 ENCOUNTER — ANCILLARY PROCEDURE (OUTPATIENT)
Dept: CARDIOLOGY | Facility: CLINIC | Age: 85
End: 2020-02-03
Attending: INTERNAL MEDICINE
Payer: COMMERCIAL

## 2020-02-03 DIAGNOSIS — Z95.0 CARDIAC PACEMAKER IN SITU: ICD-10-CM

## 2020-02-03 PROCEDURE — 93296 REM INTERROG EVL PM/IDS: CPT | Performed by: INTERNAL MEDICINE

## 2020-02-03 PROCEDURE — 93294 REM INTERROG EVL PM/LDLS PM: CPT | Performed by: INTERNAL MEDICINE

## 2020-02-04 LAB
MDC_IDC_LEAD_IMPLANT_DT: NORMAL
MDC_IDC_LEAD_IMPLANT_DT: NORMAL
MDC_IDC_LEAD_LOCATION: NORMAL
MDC_IDC_LEAD_LOCATION: NORMAL
MDC_IDC_LEAD_LOCATION_DETAIL_1: NORMAL
MDC_IDC_LEAD_LOCATION_DETAIL_1: NORMAL
MDC_IDC_LEAD_MFG: NORMAL
MDC_IDC_LEAD_MFG: NORMAL
MDC_IDC_LEAD_MODEL: NORMAL
MDC_IDC_LEAD_MODEL: NORMAL
MDC_IDC_LEAD_POLARITY_TYPE: NORMAL
MDC_IDC_LEAD_POLARITY_TYPE: NORMAL
MDC_IDC_LEAD_SERIAL: NORMAL
MDC_IDC_LEAD_SERIAL: NORMAL
MDC_IDC_MSMT_BATTERY_REMAINING_PERCENTAGE: 60 %
MDC_IDC_MSMT_BATTERY_STATUS: NORMAL
MDC_IDC_MSMT_LEADCHNL_RA_IMPEDANCE_VALUE: 439 OHM
MDC_IDC_MSMT_LEADCHNL_RA_LEAD_CHANNEL_STATUS: NORMAL
MDC_IDC_MSMT_LEADCHNL_RA_PACING_THRESHOLD_AMPLITUDE: 0.7 V
MDC_IDC_MSMT_LEADCHNL_RA_PACING_THRESHOLD_PULSEWIDTH: 0.4 MS
MDC_IDC_MSMT_LEADCHNL_RV_IMPEDANCE_VALUE: 498 OHM
MDC_IDC_MSMT_LEADCHNL_RV_LEAD_CHANNEL_STATUS: NORMAL
MDC_IDC_MSMT_LEADCHNL_RV_PACING_THRESHOLD_AMPLITUDE: 0.5 V
MDC_IDC_MSMT_LEADCHNL_RV_PACING_THRESHOLD_PULSEWIDTH: 0.4 MS
MDC_IDC_PG_IMPLANT_DTM: NORMAL
MDC_IDC_PG_MFG: NORMAL
MDC_IDC_PG_MODEL: NORMAL
MDC_IDC_PG_SERIAL: NORMAL
MDC_IDC_PG_TYPE: NORMAL
MDC_IDC_SESS_CLINIC_NAME: NORMAL
MDC_IDC_SESS_DTM: NORMAL
MDC_IDC_SESS_REPROGRAMMED: NO
MDC_IDC_SESS_TYPE: NORMAL
MDC_IDC_SET_BRADY_AT_MODE_SWITCH_MODE: NORMAL
MDC_IDC_SET_BRADY_AT_MODE_SWITCH_RATE: 160 {BEATS}/MIN
MDC_IDC_SET_BRADY_LOWRATE: 60 {BEATS}/MIN
MDC_IDC_SET_BRADY_MAX_SENSOR_RATE: 125 {BEATS}/MIN
MDC_IDC_SET_BRADY_MAX_TRACKING_RATE: 130 {BEATS}/MIN
MDC_IDC_SET_BRADY_MODE: NORMAL
MDC_IDC_SET_BRADY_PAV_DELAY_HIGH: 150 MS
MDC_IDC_SET_BRADY_PAV_DELAY_LOW: 180 MS
MDC_IDC_SET_BRADY_SAV_DELAY_HIGH: 105 MS
MDC_IDC_SET_BRADY_SAV_DELAY_LOW: 135 MS
MDC_IDC_SET_LEADCHNL_RA_PACING_POLARITY: NORMAL
MDC_IDC_SET_LEADCHNL_RA_PACING_PULSEWIDTH: 0.4 MS
MDC_IDC_SET_LEADCHNL_RA_SENSING_ADAPTATION_MODE: NORMAL
MDC_IDC_SET_LEADCHNL_RA_SENSING_POLARITY: NORMAL
MDC_IDC_SET_LEADCHNL_RV_PACING_POLARITY: NORMAL
MDC_IDC_SET_LEADCHNL_RV_PACING_PULSEWIDTH: 0.4 MS
MDC_IDC_SET_LEADCHNL_RV_SENSING_ADAPTATION_MODE: NORMAL
MDC_IDC_SET_LEADCHNL_RV_SENSING_POLARITY: NORMAL
MDC_IDC_STAT_AT_BURDEN_PERCENT: 0 %
MDC_IDC_STAT_AT_DTM_END: NORMAL
MDC_IDC_STAT_AT_DTM_START: NORMAL
MDC_IDC_STAT_AT_MODE_SW_COUNT_PER_DAY: 0
MDC_IDC_STAT_AT_MODE_SW_PERCENT_TIME_PER_DAY: 0 %
MDC_IDC_STAT_BRADY_AP_VP_PERCENT: 0 %
MDC_IDC_STAT_BRADY_AP_VS_PERCENT: 58 %
MDC_IDC_STAT_BRADY_AS_VP_PERCENT: 0 %
MDC_IDC_STAT_BRADY_AS_VS_PERCENT: 42 %
MDC_IDC_STAT_BRADY_DTM_END: NORMAL
MDC_IDC_STAT_BRADY_DTM_START: NORMAL
MDC_IDC_STAT_BRADY_RA_PERCENT_PACED: 58 %
MDC_IDC_STAT_BRADY_RV_PERCENT_PACED: 0 %
MDC_IDC_STAT_CRT_DTM_END: NORMAL
MDC_IDC_STAT_CRT_DTM_START: NORMAL

## 2020-02-10 ENCOUNTER — HEALTH MAINTENANCE LETTER (OUTPATIENT)
Age: 85
End: 2020-02-10

## 2020-02-10 ENCOUNTER — TELEPHONE (OUTPATIENT)
Dept: FAMILY MEDICINE | Facility: CLINIC | Age: 85
End: 2020-02-10

## 2020-02-10 NOTE — TELEPHONE ENCOUNTER
Reason for Call:  Medication or medication refill:    Do you use a [facility]?  Name of the pharmacy and phone number for the current request: [facility]    Name of the medication requested: Pt is taking Tylenol Arthritis. Pt is currently on Coumadin and goes to  coumadin Clinic. Wondering how long she can stay on this medication.  Pt takes for  knee Pain per Orthopedic Provider. They told her to call Primary provider regarding Coumadin.  Pt said it is helping.    Can we leave a detailed message on this number? YES    Phone number patient can be reached at: Home number on file [phone] (home)    Best Time: Any Time      Call taken on 2/10/2020 at 10:28 AM by Luz Maria Liang

## 2020-02-18 ENCOUNTER — ANTICOAGULATION THERAPY VISIT (OUTPATIENT)
Dept: ANTICOAGULATION | Facility: CLINIC | Age: 85
End: 2020-02-18
Payer: COMMERCIAL

## 2020-02-18 DIAGNOSIS — I48.0 INTERMITTENT ATRIAL FIBRILLATION (H): ICD-10-CM

## 2020-02-18 DIAGNOSIS — Z79.01 LONG TERM CURRENT USE OF ANTICOAGULANT THERAPY: ICD-10-CM

## 2020-02-18 DIAGNOSIS — I82.409 DVT (DEEP VENOUS THROMBOSIS) (H): ICD-10-CM

## 2020-02-18 LAB — INR POINT OF CARE: 3.4 (ref 0.86–1.14)

## 2020-02-18 PROCEDURE — 99207 ZZC NO CHARGE NURSE ONLY: CPT

## 2020-02-18 PROCEDURE — 85610 PROTHROMBIN TIME: CPT | Mod: QW

## 2020-02-18 PROCEDURE — 36416 COLLJ CAPILLARY BLOOD SPEC: CPT

## 2020-02-18 RX ORDER — WARFARIN SODIUM 1 MG/1
TABLET ORAL
Qty: 125 TABLET | Refills: 0 | COMMUNITY
Start: 2020-02-18 | End: 2020-04-08

## 2020-02-18 NOTE — PROGRESS NOTES
ANTICOAGULATION FOLLOW-UP CLINIC VISIT    Patient Name:  Ellie Leigh  Date:  2/18/2020  Contact Type:  Face to Face/Daughter    SUBJECTIVE:  Patient Findings     Positives:   Change in health (Knee arthritis.), Change in medications (Increase in Tylenol)    Comments:   No changes in activity, health, or diet noted. No bleeding or increased bruising noted. Took warfarin as prescribed.  Patient has knee arthritis. Patient was getting injections but they are no longer working. She is taking Tylenol TID. She would like to try either Osteo Bi-Flex/Turneric/or Tramadol. All have the risk of increasing the INR.  Patient was taking 10 mg weekly. Writer reduced the dose to 9 mg weekly. Patient will use Tylenol for pain for the next 2 weeks. If it does not help the pain, [facility] will address one of the other options to try.   Recheck in 2 weeks.  Patient verbalizes understanding and agrees to plan. No further questions or concerns.  Patient denies signs or symptoms of bleeding. Writer educated patient regarding increased bleed risk and when to seek immediate medical attention. Patient verbalized understanding.            Clinical Outcomes     Negatives:   Major bleeding event, Thromboembolic event, Anticoagulation-related hospital admission, Anticoagulation-related ED visit, Anticoagulation-related fatality    Comments:   No changes in activity, health, or diet noted. No bleeding or increased bruising noted. Took warfarin as prescribed.  Patient has knee arthritis. Patient was getting injections but they are no longer working. She is taking Tylenol TID. She would like to try either Osteo Bi-Flex/Turneric/or Tramadol. All have the risk of increasing the INR.  Patient was taking 10 mg weekly. Writer reduced the dose to 9 mg weekly. Patient will use Tylenol for pain for the next 2 weeks. If it does not help the pain, [facility] will address one of the other options to try.   Recheck in 2 weeks.  Patient verbalizes understanding  and agrees to plan. No further questions or concerns.  Patient denies signs or symptoms of bleeding. Writer educated patient regarding increased bleed risk and when to seek immediate medical attention. Patient verbalized understanding.               OBJECTIVE    INR Protime   Date Value Ref Range Status   02/18/2020 3.4 (A) 0.86 - 1.14 Final       ASSESSMENT / PLAN  INR assessment SUPRA    Recheck INR In: 2 WEEKS    INR Location Clinic      Anticoagulation Summary  As of 2/18/2020    INR goal:   2.0-3.0   TTR:   51.9 % (1 y)   INR used for dosing:   3.4! (2/18/2020)   Warfarin maintenance plan:   2 mg (1 mg x 2) every Mon, Fri; 1 mg (1 mg x 1) all other days   Full warfarin instructions:   2 mg every Mon, Fri; 1 mg all other days   Weekly warfarin total:   9 mg   Plan last modified:   Ruiz Meredith RN (2/18/2020)   Next INR check:   3/3/2020   Priority:   High   Target end date:   Indefinite    Indications    Atrial fibrillation with rapid ventricular response (H) (Resolved) [I48.91]  DVT (deep venous thrombosis) [I82.409]  Long term current use of anticoagulant therapy [Z79.01]             Anticoagulation Episode Summary     INR check location:       Preferred lab:       Send INR reminders to:   GARRY BARRY    Comments:   * Taking Celebrex PRN         Anticoagulation Care Providers     Provider Role Specialty Phone number    Anjum Vidales MD [facility] [phone]            See the Encounter Report to view Anticoagulation Flowsheet and Dosing Calendar (Go to Encounters tab in chart review, and find the Anticoagulation Therapy Visit)        Ruiz Meredith RN

## 2020-03-03 ENCOUNTER — ANTICOAGULATION THERAPY VISIT (OUTPATIENT)
Dept: ANTICOAGULATION | Facility: CLINIC | Age: 85
End: 2020-03-03
Payer: COMMERCIAL

## 2020-03-03 DIAGNOSIS — Z79.01 LONG TERM CURRENT USE OF ANTICOAGULANT THERAPY: ICD-10-CM

## 2020-03-03 DIAGNOSIS — I82.409 DVT (DEEP VENOUS THROMBOSIS) (H): ICD-10-CM

## 2020-03-03 LAB — INR POINT OF CARE: 2.5 (ref 0.86–1.14)

## 2020-03-03 PROCEDURE — 85610 PROTHROMBIN TIME: CPT | Mod: QW

## 2020-03-03 PROCEDURE — 36416 COLLJ CAPILLARY BLOOD SPEC: CPT

## 2020-03-03 PROCEDURE — 99207 ZZC NO CHARGE NURSE ONLY: CPT

## 2020-03-03 NOTE — PROGRESS NOTES
ANTICOAGULATION FOLLOW-UP CLINIC VISIT    Patient Name:  Ellie Leigh  Date:  3/3/2020  Contact Type:  Face to Face    SUBJECTIVE:  Patient Findings     Positives:   Change in medications (starting osteo-bi flex)    Comments:   Patient reports she has taken her warfarin as directed. Maintenance dose was recently adjusted due to patient having knee pain and taking Tylenol, which she reports she continues to take as her knee pain is unchanged. She is reporting having allergies this week, but she has had them before and is not taking anything new. However, she plans to start taking osteo-bi flex this week. She will continue current maintenance dose, recheck INR in 2 weeks when she returns to the clinic for a mammogram. No concerns of bleeding or increased bruising reported.         Clinical Outcomes     Comments:   Patient reports she has taken her warfarin as directed. Maintenance dose was recently adjusted due to patient having knee pain and taking Tylenol, which she reports she continues to take as her knee pain is unchanged. She is reporting having allergies this week, but she has had them before and is not taking anything new. However, she plans to start taking osteo-bi flex this week. She will continue current maintenance dose, recheck INR in 2 weeks when she returns to the clinic for a mammogram. No concerns of bleeding or increased bruising reported.            OBJECTIVE    INR Protime   Date Value Ref Range Status   2020 2.5 (A) 0.86 - 1.14 Final       ASSESSMENT / PLAN  INR assessment THER    Recheck INR In: 2 WEEKS    INR Location Clinic      Anticoagulation Summary  As of 3/3/2020    INR goal:   2.0-3.0   TTR:   53.4 % (1 y)   INR used for dosin.5 (3/3/2020)   Warfarin maintenance plan:   2 mg (1 mg x 2) every Mon, Fri; 1 mg (1 mg x 1) all other days   Full warfarin instructions:   2 mg every Mon, Fri; 1 mg all other days   Weekly warfarin total:   9 mg   No change documented:    Gina Persaud RN   Plan last modified:   Ruiz Meredith RN (2/18/2020)   Next INR check:   3/17/2020   Priority:   High   Target end date:   Indefinite    Indications    Atrial fibrillation with rapid ventricular response (H) (Resolved) [I48.91]  DVT (deep venous thrombosis) [I82.409]  Long term current use of anticoagulant therapy [Z79.01]             Anticoagulation Episode Summary     INR check location:       Preferred lab:       Send INR reminders to:   GARRY BARRY    Comments:   * Taking Celebrex PRN         Anticoagulation Care Providers     Provider Role Specialty Phone number    Anjum Vidales MD [facility] [phone]            See the Encounter Report to view Anticoagulation Flowsheet and Dosing Calendar (Go to Encounters tab in chart review, and find the Anticoagulation Therapy Visit)      Gina Persaud RN

## 2020-03-05 NOTE — PROGRESS NOTES
ANTICOAGULATION FOLLOW-UP CLINIC VISIT    Patient Name:  Ellie Leigh  Date:  1/2/2018  Contact Type:  Face to Face/accompanied by daughter    SUBJECTIVE:     Patient Findings     Positives Inflammation (Pt having back pain for past week, is seeing chiropractor 2-3x's/wk)    Comments Pt and daughter deny other changes in diet or meds, report pt is taking warfarin as instructed. Writer kept pt at 8 mg/wk as this dose put her back in range.           OBJECTIVE    INR Protime   Date Value Ref Range Status   01/02/2018 2.4 (A) 0.86 - 1.14 Final       ASSESSMENT / PLAN  INR assessment THER    Recheck INR In: 2 WEEKS    INR Location Clinic      Anticoagulation Summary as of 1/2/2018     INR goal 1.5-2.0   Today's INR 2.4!   Maintenance plan 1.5 mg (1 mg x 1.5) on Mon, Fri; 1 mg (1 mg x 1) all other days   Full instructions 1/2: 1 mg; 1/3: 1 mg; 1/4: 1 mg; 1/5: 1.5 mg; 1/6: 1 mg; 1/7: 1 mg; 1/8: 1.5 mg; Otherwise 1.5 mg on Mon, Fri; 1 mg all other days   Weekly total 8 mg   Plan last modified Angelia Bo RN (1/2/2018)   Next INR check 1/16/2018   Priority INR   Target end date Indefinite    Indications   Atrial fibrillation with rapid ventricular response (H) (Resolved) [I48.91]  DVT (deep venous thrombosis) [I82.409]  Long term current use of anticoagulant therapy [Z79.01]         Anticoagulation Episode Summary     INR check location     Preferred lab     Send INR reminders to [facility]    Comments * Actual INR goal is 1.7-2.3  please follow Dec 2015 INR referral (June 2016 goal range is an error)      Anticoagulation Care Providers     Provider Role Specialty Phone number    Josefina Gómez MD [facility] [phone]            See the Encounter Report to view Anticoagulation Flowsheet and Dosing Calendar (Go to Encounters tab in chart review, and find the Anticoagulation Therapy Visit)        Angelia Bo, RN                Statement Selected

## 2020-03-17 ENCOUNTER — TELEPHONE (OUTPATIENT)
Dept: FAMILY MEDICINE | Facility: CLINIC | Age: 85
End: 2020-03-17

## 2020-03-17 ENCOUNTER — ANTICOAGULATION THERAPY VISIT (OUTPATIENT)
Dept: ANTICOAGULATION | Facility: CLINIC | Age: 85
End: 2020-03-17
Payer: COMMERCIAL

## 2020-03-17 ENCOUNTER — HOSPITAL ENCOUNTER (OUTPATIENT)
Dept: MAMMOGRAPHY | Facility: CLINIC | Age: 85
Discharge: HOME | End: 2020-03-17
Attending: FAMILY MEDICINE | Admitting: FAMILY MEDICINE
Payer: COMMERCIAL

## 2020-03-17 DIAGNOSIS — Z12.31 VISIT FOR SCREENING MAMMOGRAM: ICD-10-CM

## 2020-03-17 DIAGNOSIS — I82.409 DVT (DEEP VENOUS THROMBOSIS) (H): ICD-10-CM

## 2020-03-17 DIAGNOSIS — Z79.01 LONG TERM CURRENT USE OF ANTICOAGULANT THERAPY: ICD-10-CM

## 2020-03-17 LAB — INR POINT OF CARE: 2.1 (ref 0.86–1.14)

## 2020-03-17 PROCEDURE — 85610 PROTHROMBIN TIME: CPT | Mod: QW

## 2020-03-17 PROCEDURE — 36416 COLLJ CAPILLARY BLOOD SPEC: CPT

## 2020-03-17 PROCEDURE — 99207 ZZC NO CHARGE NURSE ONLY: CPT

## 2020-03-17 PROCEDURE — 77067 SCR MAMMO BI INCL CAD: CPT | Mod: 52

## 2020-03-17 NOTE — TELEPHONE ENCOUNTER
Daughter calling due to Ellie has appointment today for INR check. Asking if they should come.  She is due for an INR and she had medication changes at last INR. Explained may want to get INR done but be safe. Can ask for a mask for her prior to bringing into clinic. With current health situation everything is uncertain and may get worse. Daughter decided she will bring her in today.

## 2020-03-17 NOTE — PROGRESS NOTES
ANTICOAGULATION FOLLOW-UP CLINIC VISIT    Patient Name:  Ellie Leigh  Date:  3/17/2020  Contact Type:  Face to Face    SUBJECTIVE:  Patient Findings     Comments:   Patient reports no changes in medication, activity, or diet. Patient reports no changes in health. Patient reports has taken warfarin as instructed. Patient reports no increased bruising or bleeding and no signs or symptoms of a blood clot.   Will plan to continue maintenance dose and recheck INR in 4 weeks, patient aware that [facility] will call her to schedule the appointment as we are not able to schedule this at this time. Daughter was updated at the car, as no visitors permitted at this time. Both patient and daughter verbalized understanding and will notify [facility] of any changes or concerns.         Clinical Outcomes     Negatives:   Major bleeding event, Thromboembolic event, Anticoagulation-related hospital admission, Anticoagulation-related ED visit, Anticoagulation-related fatality    Comments:   Patient reports no changes in medication, activity, or diet. Patient reports no changes in health. Patient reports has taken warfarin as instructed. Patient reports no increased bruising or bleeding and no signs or symptoms of a blood clot.   Will plan to continue maintenance dose and recheck INR in 4 weeks, patient aware that [facility] will call her to schedule the appointment as we are not able to schedule this at this time. Daughter was updated at the car, as no visitors permitted at this time. Both patient and daughter verbalized understanding and will notify [facility] of any changes or concerns.            OBJECTIVE    INR Protime   Date Value Ref Range Status   2020 2.1 (A) 0.86 - 1.14 Final       ASSESSMENT / PLAN  INR assessment THER    Recheck INR In: 4 WEEKS    INR Location Clinic      Anticoagulation Summary  As of 3/17/2020    INR goal:   2.0-3.0   TTR:   55.3 % (1 y)   INR used for dosin.1 (3/17/2020)   Warfarin maintenance plan:   2 mg (1  mg x 2) every Mon, Fri; 1 mg (1 mg x 1) all other days   Full warfarin instructions:   2 mg every Mon, Fri; 1 mg all other days   Weekly warfarin total:   9 mg   No change documented:   Gina Persaud RN   Plan last modified:   Ruiz Meredith RN (2/18/2020)   Next INR check:   4/14/2020   Priority:   Maintenance   Target end date:   Indefinite    Indications    Atrial fibrillation with rapid ventricular response (H) (Resolved) [I48.91]  DVT (deep venous thrombosis) [I82.409]  Long term current use of anticoagulant therapy [Z79.01]             Anticoagulation Episode Summary     INR check location:       Preferred lab:       Send INR reminders to:   GARRY BARRY    Comments:   * Taking Celebrex PRN         Anticoagulation Care Providers     Provider Role Specialty Phone number    Anjum Vidales MD [facility] [phone]            See the Encounter Report to view Anticoagulation Flowsheet and Dosing Calendar (Go to Encounters tab in chart review, and find the Anticoagulation Therapy Visit)      Gina Persaud RN

## 2020-03-23 ENCOUNTER — VIRTUAL VISIT (OUTPATIENT)
Dept: FAMILY MEDICINE | Facility: OTHER | Age: 85
End: 2020-03-23
Payer: COMMERCIAL

## 2020-03-23 PROCEDURE — 99421 OL DIG E/M SVC 5-10 MIN: CPT | Performed by: FAMILY MEDICINE

## 2020-03-23 NOTE — PROGRESS NOTES
"Date: 2020 09:24:52  Clinician: Orville Page  Clinician NPI: [id number]  Patient: Ellie Leigh  Patient : 1930  Patient Address: [address] [place], [place] [address]  Patient Phone: [phone]  Visit Protocol: Allergic rhinitis  Patient Summary:  Ellie is a 89 year old ( : 1930 ) female who initiated a Visit for evaluation of seasonal allergies.  When asked the question \"Please sign me up to receive news, health information and promotions from [employer].\", Ellie responded \"Yes\".    She is currently experiencing allergy symptoms. She has been treated by a health care provider in the past for these symptoms. She notes fatigue, plugged ears, runny nose, sneezing, itchy eyes, and coughing with cough as the most bothersome.  These symptoms have been present for several years.   She also notes dry eyes. Ellie is experiencing mild difficulty breathing with activities but can speak normally in full sentences. Her mucus is clear or white.  She rarely sneezes (a few times per day). Mold, dust, and dust mites aggravate these symptoms.   Ellie has not received allergy shots. The patient's allergy symptoms have been confirmed by skin allergy testing. Other family members have a history of asthma and allergies.   Ellie denies tooth pain, feeling feverish, facial pressure, scratchy throat, headache, having nasal ulcers, a septum deviation or other sinus wounds, and wheezing.   She denies smoking or using smokeless tobacco.   PREVIOUS ALLERGY MEDICATIONS:   She has not tried any medications to treat her allergy symptoms.   Additional information as reported by the patient (free text): Burning in chest and jittery.     MEDICATIONS: Tylenol Arthritis Pain oral, Symbicort inhalation, sodium-chloride-citrate oral, Bronchial Mist inhalation, Probiotic oral, omeprazole-sodium bicarbonate oral, metoprolol succinate oral, ipratropium-albuterol inhalation, warfarin oral, levothyroxine oral, ALLERGIES: " Penicillanic Sulfone BL Beta-Lactamase Inhibitors  Clinician Response:  Dear Ellie,   It seems like your symptoms are from allergies due to your history but due to current coronavirus outbreak I suggest following below mentioned guidelines along with your allergy treatment.&nbsp;  --------------------------------  The coronavirus causes mild to severe respiratory illness with the most common symptoms including fever, cough and difficulty breathing. Unfortunately, many viruses cause similar symptoms and it can be difficult to distinguish between viruses, especially in mild cases, so we are presuming that anyone with cough or fever has coronavirus at this time.  Coronavirus/COVID-19 has reached the point of community spread in [place], meaning that we are finding the virus in people with no known exposure risk for angus the virus. Given the increasing commonness of coronavirus in the community we are no longer testing patients who are not critically ill.  If you are a health care worker, you should refer to your employee health office for instructions about testing and returning to work.  For everyone else who has cough or fever, you should assume you are infected with coronavirus. Since you will not be tested but have symptoms that may be consistent with coronavirus, the CDC recommends you stay in self-isolation until these three things have happened:    You have had no fever for at least 72 hours (that is three full days of no fever without the use of medicine that reduces fevers)    AND   Other symptoms have improved (for example, when your cough or shortness of breath have improved)   AND   At least 7 days have passed since your symptoms first appeared.   How to Isolate:    Isolate yourself at home.   Do Not allow any visitors  Do Not go to work or school  Do Not go to [place],  centers, shopping, or other public places.  Do Not shake hands.  Avoid close contact with others (hugging, kissing).    Protect Others:    Cover Your Mouth and Nose with a mask, disposable tissue or wash cloth to avoid spreading germs to others.  Wash your hands and face frequently with soap and water.   Managing Symptoms:    At this time, we primarily recommend Tylenol (Acetaminophen) for fever or pain. If you have liver or kidney problems, contact your primary care provider for instructions on use of tylenol. Adults can take 650 mg (two 325 mg pills) by mouth every 4-6 hours as needed OR 1,000 mg (two 500 mg pills) every 8 hours as needed. MAXIMUM DAILY DOSE: 3,000mg. For children, refer to dosing on bottle based on age or weight.   If you develop significant shortness of breath that prevents you from doing normal activities, please call 911 or proceed to the nearest emergency room and alert them immediately that you have been in self-isolation for possible coronavirus.   For more information about COVID19 and options for caring for yourself at home, please visit the CDC website at https://www.cdc.gov/coronavirus/2019-ncov/about/steps-when-sick.htmlFor more options for care at [facility] [place], please visit our website at [email or web address]        Diagnosis: Other seasonal allergic rhinitis  Diagnosis ICD: J30.2

## 2020-03-25 DIAGNOSIS — I48.91 A-FIB (H): Primary | ICD-10-CM

## 2020-03-27 ENCOUNTER — TELEPHONE (OUTPATIENT)
Dept: FAMILY MEDICINE | Facility: CLINIC | Age: 85
End: 2020-03-27

## 2020-03-27 ENCOUNTER — VIRTUAL VISIT (OUTPATIENT)
Dept: URGENT CARE | Facility: CLINIC | Age: 85
End: 2020-03-27
Payer: COMMERCIAL

## 2020-03-27 DIAGNOSIS — J44.1 COPD EXACERBATION (H): Primary | ICD-10-CM

## 2020-03-27 DIAGNOSIS — Z20.822 SUSPECTED 2019 NOVEL CORONAVIRUS INFECTION: ICD-10-CM

## 2020-03-27 PROCEDURE — 99442 ZZC PHYSICIAN TELEPHONE EVALUATION 11-20 MIN: CPT | Performed by: STUDENT IN AN ORGANIZED HEALTH CARE EDUCATION/TRAINING PROGRAM

## 2020-03-27 RX ORDER — PREDNISONE 20 MG/1
TABLET ORAL
Qty: 7 TABLET | Refills: 0 | Status: SHIPPED | OUTPATIENT
Start: 2020-03-27 | End: 2020-06-10

## 2020-03-27 RX ORDER — AZITHROMYCIN 250 MG/1
TABLET, FILM COATED ORAL
Qty: 6 TABLET | Refills: 0 | Status: SHIPPED | OUTPATIENT
Start: 2020-03-27 | End: 2020-06-10

## 2020-03-27 NOTE — PATIENT INSTRUCTIONS
- you likely have COPD exacerbation possibly triggered from viral infection (such as coronavirus, see below for more information)  - take antibiotic and prednisone as prescribed  - referral to [facility] placed to monitor your symptoms  - if have worsening shortness of breath, please go to urgent care to get evaluated (call [phone] and explicitly tell them you were referred from a provider from [facility] or [facility] to be seen IN PERSON in Urgent Care)  - if severe shortness of breath you should go to the emergency room     Instructions for Patients  Your symptoms could be due to COVID-19, but it also could be due to a number of other respiratory illnesses.  We are not doing testing at this time for COVID-19 unless symptoms are severe enough to require hospitalization.  It is recommended that you stay home to prevent the spread of your illness.  To do this follow these instructions:    Regardless of if you have been tested or not:  Patient who have symptoms (cough, fever, or shortness of breath), need to isolate for 7 days from when symptoms started AND 72 hours after fever resolves (without fever reducing medications) AND improvement of respiratory symptoms (whichever is longer).      Isolate yourself at home (in own room/own bathroom if possible)    Do Not allow any visitors    Do Not go to work or school    Do Not go to [employer],  centers, shopping, or other public places.    Do Not shake hands.    Avoid close and intimate contact with others (hugging, kissing).    Follow CDC recommendations for household cleaning of frequently touched services.     After the initial 7 days, continue to isolate yourself from household members as much as possible. To continue decrease the risk of community spread and exposure, you and any members of your household should limit activities in public for 14 days after starting home isolation.     You can reference the following CDC link for helpful home  isolation/care tips:  https://www.cdc.gov/coronavirus/2019-ncov/downloads/10Things.pdf    Protect Others:    Cover your mouth and nose with a mask, disposable tissue or wash cloth to avoid spreading germs to others.    Wash your hands and face frequently with soap and water.    Fever Medicines:    For fever relief, take acetaminophen or ibuprofen.    Treat fevers above 101  F (38.3  C) to lower fevers and make you more comfortable.     Acetaminophen (e.g., Tylenol): Take 650 mg (two 325 mg pills) by mouth every 4-6 hours as needed of regular strength Tylenol or 1,000 mg (two 500 mg pills) every 8 hours as needed of Extra Strength Tylenol.     Ibuprofen (e.g., Motrin, Advil): Take 400 mg (two 200 mg pills) by mouth every 6 hours as needed.     Acetaminophen is thought to be safer than ibuprofen or naproxen for people over 65 years old. Acetaminophen is in many OTC and prescription medicines. It might be in more than one medicine that you are taking. You need to be careful and not take an overdose. Before taking any medicine, read all the instructions on the package.    Caution - NSAIDs (e.g., ibuprofen, naproxen): Do not take nonsteroidal anti-inflammatory drugs (NSAIDs) if you have stomach problems, kidney disease, heart failure, or other contraindications to using this type of medicine. Do not take NSAID medicines for over 7 days without consulting your PCP. Do not take NSAID medicines if you are pregnant. Do not take NSAID medicines if you are also taking blood thinners.     Call Back If: Breathing difficulty develops or you become worse.    Thank you for limiting contact with others, wearing a simple mask to cover your cough, practice good hand hygiene habits and accessing our virtual services where possible to limit the spread of this virus.    For more information about COVID19 and options for caring for yourself at home, please visit the CDC website at  https://www.cdc.gov/coronavirus/2019-ncov/about/steps-when-sick.html  For more options for care at [facility], please visit our website at [email or web address]

## 2020-03-27 NOTE — PROGRESS NOTES
Preceptor Attestation:   Patient discussed with the resident. Assessment and plan reviewed with resident and agreed upon.   Supervising Physician:  Neal Anderson MD  [facility]  Department of Family Medicine and [facility]

## 2020-03-27 NOTE — PROGRESS NOTES
"The patient has been notified of following:     \"This telephone visit will be conducted via a call between you and your physician/provider. We have found that certain health care needs can be provided without the need for a physical exam.  This service lets us provide the care you need with a short phone conversation.  If a prescription is necessary we can send it directly to your pharmacy.  If lab work is needed we can place an order for that and you can then stop by our lab to have the test done at a later time.    If during the course of the call the physician/provider feels a telephone visit is not appropriate, you will not be charged for this service.\"     Subjective     CC: Ellie Leigh  is a 89 year old female who presents to clinic today for the following health issues:   Chief Complaint   Patient presents with     Suspected Covid      History:    Reports chronic cough, worse in last 5 days intermittent green phlegm.  Gets tired and short of breath with exertion.     Sometimes have some burning in chest for the past 2 days, feels like acid reflux.      No fevers. Has been checking temperature.  Highest has been 97.6.  No chills or sweats.      Has stuffy nose. Has some sinus pressure.  Denies headaches.      No loss of sensation of taste or small. No muscle aches.  Eating and drinking normally.     No sick contacts.     No travel out of the state.     Lives with her daughter and son in law.     Uses duoneb 4 times a day, before was using it 2-3 times a day.  Uses rescue inhaler seldom.  Hasn't missed dose of daily symbicort.     Patient Active Problem List   Diagnosis     Sensorineural hearing loss, asymmetrical     Generalized osteoarthrosis, unspecified site     PERSONAL HISTORY OF MALIGNANCY- BREAST     Esophageal reflux     Chronic allergic conjunctivitis     Chronic seasonal allergic rhinitis     Hyperlipidemia LDL goal <130     Chronic constipation     Osteoporosis     Health Care Home     " Right arm weakness     Ulnar neuropathy     Headache     Mild major depression     DVT (deep venous thrombosis)     Anxiety     Cervical vertebral fracture (H)     Intermittent atrial fibrillation (H)     Squamous cell carcinoma in situ of skin of face     SK (seborrheic keratosis)     Hypothyroidism     Hyponatremia     Recurrent UTI     History of skin cancer     BPPV (benign paroxysmal positional vertigo)     Benign essential HTN     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)     Positive fecal occult blood test     COPD (chronic obstructive pulmonary disease) (H)     Infectious colitis, enteritis and gastroenteritis     Advance Care Planning     Syncope     Long term current use of anticoagulant therapy     Mild persistent asthma without complication     Irritable bowel syndrome without diarrhea     Nausea     Back pain     H/O TB (tuberculosis)     Chest pain     Past Surgical History:   Procedure Laterality Date     APPENDECTOMY       ARTHROSCOPY KNEE  4/15/2011    Procedure:ARTHROSCOPY KNEE; removal of loose body; Surgeon:LEY, JEFFREY DUANE; Location:[facility]     CHOLECYSTECTOMY       ESOPHAGOSCOPY, GASTROSCOPY, DUODENOSCOPY (EGD), COMBINED  6/9/2014    Procedure: COMBINED ESOPHAGOSCOPY, GASTROSCOPY, DUODENOSCOPY (EGD);  Surgeon: Gilberto Richard MD;  Location: [facility]     ESOPHAGOSCOPY, GASTROSCOPY, DUODENOSCOPY (EGD), COMBINED N/A 10/18/2019    Procedure: ESOPHAGOGASTRODUODENOSCOPY (EGD);  Surgeon: Noe Sams DO;  Location: [facility]     IMPLANT PACEMAKER  5/21/2013    Biotronik Moderl [id number] Serial#[id number]     INJECT EPIDURAL LUMBAR  3/23/2011    INJECT EPIDURAL LUMBAR performed by GENERIC ANESTHESIA PROVIDER at [facility]     JOINT REPLACEMENT, HIP RT/LT      Joint Replacement Hip Rt     MASTECTOMY, SIMPLE RT/LT/CAITLYN      Left breast - following breast ca     OTHER SURGICAL HISTORY      C1-C2 fusion after fx      SURGICAL HISTORY OF -   05/22/2001    Colonoscopy     TONSILLECTOMY         Social  History     Tobacco Use     Smoking status: Passive Smoke Exposure - Never Smoker     Smokeless tobacco: Never Used   Substance Use Topics     Alcohol use: No     Family History   Problem Relation Age of Onset     Cerebrovascular Disease Mother      Heart Disease Mother         MI     Cerebrovascular Disease Father      Heart Disease Father         MI     Respiratory Maternal Grandfather         TB     Neurologic Disorder Brother         ALS     Heart Disease Brother      Cerebrovascular Disease Brother      Breast Cancer Daughter         age:49     Asthma Brother      Cancer Brother         brain and lung     Cancer Daughter         thyroid         Current Outpatient Medications   Medication Sig Dispense Refill     azithromycin (ZITHROMAX) 250 MG tablet Take 2 tablets (500 mg) by mouth daily for 1 day, THEN 1 tablet (250 mg) daily for 4 days. 6 tablet 0     predniSONE (DELTASONE) 20 MG tablet Take 2 tablets (40 mg) by mouth daily for 2 days, THEN 1 tablet (20 mg) daily for 3 days. 7 tablet 0     Acetaminophen (TYLENOL PO) Take  mg by mouth every 8 hours as needed        albuterol (PROAIR HFA/PROVENTIL HFA/VENTOLIN HFA) 108 (90 Base) MCG/ACT Inhaler Inhale 2 puffs into the lungs every 6 hours as needed for shortness of breath / dyspnea 1 Inhaler 11     albuterol (PROVENTIL) (2.5 MG/3ML) 0.083% neb solution Take 1 vial (2.5 mg) by nebulization 3 times daily 360 mL 6     CRANBERRY Take 475 mg by mouth 2 times daily.       estradiol (ESTRACE VAGINAL) 0.1 MG/GM vaginal cream Place 2 g vaginally three times a week 42 g 3     fluticasone (FLONASE) 50 MCG/ACT spray Spray 2 sprays into both nostrils daily 1 Bottle 11     ipratropium - albuterol 0.5 mg/2.5 mg/3 mL (DUONEB) 0.5-2.5 (3) MG/3ML neb solution TAKE 1 VIAL BY NEBULIZATION  EVERY SIX HOURS AS NEEDED FOR SHORTNESS OF BREATH/ DYSPNEA OR WHEEZING. 180 mL 10     levothyroxine (SYNTHROID/LEVOTHROID) 50 MCG tablet TAKE ONE TABLET BY MOUTH ONE TIME DAILY 90 tablet 1      metoprolol succinate ER (TOPROL-XL) 25 MG 24 hr tablet TAKE TWO TABLETS BY MOUTH TWICE DAILY  360 tablet 3     omeprazole (PRILOSEC) 40 MG DR capsule Take 1 capsule (40 mg) by mouth once daily. 30 capsule 5     ondansetron (ZOFRAN-ODT) 4 MG ODT tab Take 1 tablet (4 mg) by mouth every 8 hours as needed for nausea 20 tablet 1     order for DME Equipment being ordered: Nebulizer 1 each 0     polyethylene glycol (MIRALAX/GLYCOLAX) Packet Take 17 g by mouth daily        predniSONE (DELTASONE) 20 MG tablet Take two tablets (= 40mg) each day for 5 (five) days 10 tablet 0     probiotic CAPS Take 1 capsule by mouth every evening        sodium chloride 0.9 % neb solution Inhale 5 mLs into the lungs 3 times daily Use after albuterol in nebulizer to thin secretions. 450 mL 5     sodium chloride 1 GM tablet TAKE ONE TABLET BY MOUTH THREE TIMES DAILY  100 tablet 10     SYMBICORT 160-4.5 MCG/ACT Inhaler INHALE TWO PUFFS INTO THE LUNGS TWICE DAILY 10.2 g 5     warfarin ANTICOAGULANT 1 MG PO tablet 2 mg (1 mg x 2) every Mon, Fri; 1 mg (1 mg x 1) all other days or as directed by the Anticoagul 125 tablet 0       Review of Systems   ROS COMP: Constitutional, HEENT, cardiovascular, pulmonary, GI, , musculoskeletal, neuro, skin, endocrine and psych systems are negative, except as otherwise noted.      Objective    Gen: Patient is alert, oriented  Resp: is speaking full sentences, with cough,  without audible shortness of breath, without wheezing    Assessment/Plan:  1. COPD exacerbation (H)  2. Suspected 2019 Novel Coronavirus Infection  Patient with history of COPD reports increased cough, sputum production, and shortness of breath concerning for COPD exacerbation. Trigger could be URI such as coronavirus.  Will send treatment for COPD exacerbation (prednisone, azithromycin) to local pharmacy.  Counseled patient on management of symptoms, self-isolation, hygiene.  Directed patient to go to urgent care if shortness of breath  worsens.    - azithromycin (ZITHROMAX) 250 MG tablet; Take 2 tablets (500 mg) by mouth daily for 1 day, THEN 1 tablet (250 mg) daily for 4 days.  Dispense: 6 tablet; Refill: 0  - predniSONE (DELTASONE) 20 MG tablet; Take 2 tablets (40 mg) by mouth daily for 2 days, THEN 1 tablet (20 mg) daily for 3 days.  Dispense: 7 tablet; Refill: 0    Phone call duration:  20 minutes    Plan of care discussed with Dr. Anderson.     Frank Saenz MD  Internal Medicine, PGY-3

## 2020-03-27 NOTE — TELEPHONE ENCOUNTER
"S-(situation): Thalia says \" I've got bronchitis\".  Says been coughing a lot and coughs up green phlegm.  States is short of breath.  Short of breath when walking with walker. This is not normal.   Says \"I don't feel good.\"  Feels tired she says and has to lay down and rest all the time.  Eyes hurt she says.  No fever  Has had these symptoms for about a week.    Denies nausea, vomiting or diarrhea     B-(background): [facility] visit on 3/23/20.      A-(assessment): cough, no fever.  Short of breath with exertion    R-(recommendations): virtual visit done  Celena Laurent RN      "

## 2020-03-27 NOTE — TELEPHONE ENCOUNTER
Reason for Call:  Other appointment    Detailed comments: Pt is calling with the following symptoms:  Cough, in chest area  SOB  Wheezing  For the past 4-5- days and stating she gets bronchitis a lot, but does not have a fever.  Please advise    Phone Number Patient can be reached at: Home number on file [phone] (home)    Best Time: any    Can we leave a detailed message on this number? YES    Call taken on 3/27/2020 at 3:07 PM by Carmel Causey

## 2020-04-06 ENCOUNTER — TELEPHONE (OUTPATIENT)
Dept: FAMILY MEDICINE | Facility: CLINIC | Age: 85
End: 2020-04-06

## 2020-04-06 NOTE — TELEPHONE ENCOUNTER
Reason for call:  Patient reporting a symptom    Symptom or request: cough, Green Phlegm, weak, Tired.  Pt was into see UC 3/27/20.  Pt completed Azithromycin and Prednisone. Pt said it did not help.   Please Advise next step.     Phone Number patient can be reached at:  Home number on file [phone] (home)    Best Time:  Any Time      Can we leave a detailed message on this number:  YES    Call taken on 4/6/2020 at 1:34 PM by Luz Maria Liang

## 2020-04-06 NOTE — TELEPHONE ENCOUNTER
I will call daughter back in a few minutes.  Time is 1406 now.  I talked with daughter Suyapa and she will continue to observe how Thalia is doing.  Thalia lives with daughter.  She will call if further questions.  Celena Laurent RN

## 2020-04-07 DIAGNOSIS — K21.9 GASTROESOPHAGEAL REFLUX DISEASE WITHOUT ESOPHAGITIS: ICD-10-CM

## 2020-04-07 RX ORDER — OMEPRAZOLE 40 MG/1
CAPSULE, DELAYED RELEASE ORAL
Qty: 30 CAPSULE | Refills: 0 | Status: SHIPPED | OUTPATIENT
Start: 2020-04-07 | End: 2020-05-11

## 2020-04-08 DIAGNOSIS — I48.0 INTERMITTENT ATRIAL FIBRILLATION (H): ICD-10-CM

## 2020-04-08 DIAGNOSIS — I10 ESSENTIAL HYPERTENSION, BENIGN: ICD-10-CM

## 2020-04-08 RX ORDER — METOPROLOL SUCCINATE 25 MG/1
TABLET, EXTENDED RELEASE ORAL
Qty: 360 TABLET | Refills: 2 | Status: SHIPPED | OUTPATIENT
Start: 2020-04-08 | End: 2021-01-11

## 2020-04-08 RX ORDER — WARFARIN SODIUM 1 MG/1
TABLET ORAL
Qty: 125 TABLET | Refills: 0 | Status: SHIPPED | OUTPATIENT
Start: 2020-04-08 | End: 2020-04-21

## 2020-04-21 ENCOUNTER — ANTICOAGULATION THERAPY VISIT (OUTPATIENT)
Dept: ANTICOAGULATION | Facility: CLINIC | Age: 85
End: 2020-04-21
Payer: COMMERCIAL

## 2020-04-21 DIAGNOSIS — I48.0 INTERMITTENT ATRIAL FIBRILLATION (H): Primary | ICD-10-CM

## 2020-04-21 DIAGNOSIS — I82.409 DVT (DEEP VENOUS THROMBOSIS) (H): ICD-10-CM

## 2020-04-21 DIAGNOSIS — I48.0 INTERMITTENT ATRIAL FIBRILLATION (H): ICD-10-CM

## 2020-04-21 DIAGNOSIS — Z79.01 LONG TERM CURRENT USE OF ANTICOAGULANT THERAPY: ICD-10-CM

## 2020-04-21 LAB
CAPILLARY BLOOD COLLECTION: NORMAL
INR PPP: 3.4 (ref 0.86–1.14)

## 2020-04-21 PROCEDURE — 36416 COLLJ CAPILLARY BLOOD SPEC: CPT | Performed by: FAMILY MEDICINE

## 2020-04-21 PROCEDURE — 99207 ZZC NO CHARGE NURSE ONLY: CPT

## 2020-04-21 PROCEDURE — 85610 PROTHROMBIN TIME: CPT | Performed by: FAMILY MEDICINE

## 2020-04-21 RX ORDER — WARFARIN SODIUM 1 MG/1
TABLET ORAL
Qty: 125 TABLET | Refills: 0
Start: 2020-04-21 | End: 2020-07-27

## 2020-04-21 NOTE — PROGRESS NOTES
ANTICOAGULATION FOLLOW-UP CLINIC VISIT    Patient Name:  Ellie Leigh  Date:  4/21/2020  Contact Type:  Telephone    SUBJECTIVE:  Patient Findings     Positives:   Change in diet/appetite (less greens than usual)    Comments:   Patient was taking Azithromycin and Prednisone, finished 4/6/20. Per daughter, Suyapa, symptoms are improved. Patient's daughter states they have not been eating as many greens as usual but they do plan to resume that in her diet. No concerns of bleeding, increased bruising or signs/symptoms of a blood clot reported. Advised that if patient is started on an antibiotic in the future to please notify [facility] of this so her INR can be checked, [facility] phone number given to Suyapa to call with any questions. Will hold her dose of warfarin today, then resume maintenance dose and recheck INR in 2 weeks.        Clinical Outcomes     Comments:   Patient was taking Azithromycin and Prednisone, finished 4/6/20. Per daughter, Suyapa, symptoms are improved. Patient's daughter states they have not been eating as many greens as usual but they do plan to resume that in her diet. No concerns of bleeding, increased bruising or signs/symptoms of a blood clot reported. Advised that if patient is started on an antibiotic in the future to please notify [facility] of this so her INR can be checked, [facility] phone number given to Suyapa to call with any questions. Will hold her dose of warfarin today, then resume maintenance dose and recheck INR in 2 weeks.           OBJECTIVE    INR   Date Value Ref Range Status   04/21/2020 3.40 (H) 0.86 - 1.14 Final     Comment:     This test is intended for monitoring Coumadin therapy.  Results are not   accurate in patients with prolonged INR due to factor deficiency.         ASSESSMENT / PLAN  INR assessment SUPRA    Recheck INR In: 2 WEEKS    INR Location Outside lab      Anticoagulation Summary  As of 4/21/2020    INR goal:   2.0-3.0   TTR:   52.3 % (1 y)   INR used for dosing:   3.40!  (4/21/2020)   Warfarin maintenance plan:   2 mg (1 mg x 2) every Mon, Fri; 1 mg (1 mg x 1) all other days   Full warfarin instructions:   4/21: Hold; Otherwise 2 mg every Mon, Fri; 1 mg all other days   Weekly warfarin total:   9 mg   Plan last modified:   Ruiz Meredith RN (2/18/2020)   Next INR check:   5/5/2020   Priority:   Maintenance   Target end date:   Indefinite    Indications    Atrial fibrillation with rapid ventricular response (H) (Resolved) [I48.91]  DVT (deep venous thrombosis) [I82.409]  Long term current use of anticoagulant therapy [Z79.01]             Anticoagulation Episode Summary     INR check location:       Preferred lab:       Send INR reminders to:   GARRY BARRY    Comments:   * Taking Celebrex PRN         Anticoagulation Care Providers     Provider Role Specialty Phone number    Anjum Vidales MD Referring [facility] [phone]            See the Encounter Report to view Anticoagulation Flowsheet and Dosing Calendar (Go to Encounters tab in chart review, and find the Anticoagulation Therapy Visit)      Gina Persaud RN

## 2020-04-23 DIAGNOSIS — J45.30 MILD PERSISTENT ASTHMA WITHOUT COMPLICATION: ICD-10-CM

## 2020-04-24 RX ORDER — BUDESONIDE AND FORMOTEROL FUMARATE DIHYDRATE 160; 4.5 UG/1; UG/1
AEROSOL RESPIRATORY (INHALATION)
Qty: 10.2 G | Refills: 0 | Status: SHIPPED | OUTPATIENT
Start: 2020-04-24 | End: 2020-05-29

## 2020-05-04 ENCOUNTER — ANCILLARY PROCEDURE (OUTPATIENT)
Dept: CARDIOLOGY | Facility: CLINIC | Age: 85
End: 2020-05-04
Attending: INTERNAL MEDICINE
Payer: COMMERCIAL

## 2020-05-04 DIAGNOSIS — Z95.0 CARDIAC PACEMAKER IN SITU: ICD-10-CM

## 2020-05-04 PROCEDURE — 93294 REM INTERROG EVL PM/LDLS PM: CPT | Performed by: INTERNAL MEDICINE

## 2020-05-04 PROCEDURE — 93296 REM INTERROG EVL PM/IDS: CPT | Performed by: INTERNAL MEDICINE

## 2020-05-05 ENCOUNTER — ANTICOAGULATION THERAPY VISIT (OUTPATIENT)
Dept: ANTICOAGULATION | Facility: CLINIC | Age: 85
End: 2020-05-05
Payer: COMMERCIAL

## 2020-05-05 DIAGNOSIS — Z79.01 LONG TERM CURRENT USE OF ANTICOAGULANT THERAPY: ICD-10-CM

## 2020-05-05 DIAGNOSIS — I48.0 INTERMITTENT ATRIAL FIBRILLATION (H): ICD-10-CM

## 2020-05-05 DIAGNOSIS — I82.409 DVT (DEEP VENOUS THROMBOSIS) (H): ICD-10-CM

## 2020-05-05 LAB
CAPILLARY BLOOD COLLECTION: NORMAL
INR PPP: 2.1 (ref 0.86–1.14)

## 2020-05-05 PROCEDURE — 99207 ZZC NO CHARGE NURSE ONLY: CPT

## 2020-05-05 PROCEDURE — 85610 PROTHROMBIN TIME: CPT | Performed by: FAMILY MEDICINE

## 2020-05-05 PROCEDURE — 36416 COLLJ CAPILLARY BLOOD SPEC: CPT | Performed by: FAMILY MEDICINE

## 2020-05-05 NOTE — PROGRESS NOTES
ANTICOAGULATION FOLLOW-UP CLINIC VISIT    Patient Name:  Ellie Leigh  Date:  2020  Contact Type:  Telephone    SUBJECTIVE:  Patient Findings     Comments:   No changes in medications, activity, or diet noted. No concerns with clotting, bleeding, or increased bruising noted. Took warfarin as prescribed.  Patient is to continue maintenance warfarin plan, and check INR in 4 weeks.  Daughter states that pt may try CBD - advised that she may want to wait till a week prior to her INR appt - informed it could affect her INR or increase risk of bleeding. Education provided.  Patient's daughter verbalizes understanding and agrees to plan. No further questions or concerns.        Clinical Outcomes     Negatives:   Major bleeding event, Thromboembolic event, Anticoagulation-related hospital admission, Anticoagulation-related ED visit, Anticoagulation-related fatality    Comments:   No changes in medications, activity, or diet noted. No concerns with clotting, bleeding, or increased bruising noted. Took warfarin as prescribed.  Patient is to continue maintenance warfarin plan, and check INR in 4 weeks.  Daughter states that pt may try CBD - advised that she may want to wait till a week prior to her INR appt - informed it could affect her INR or increase risk of bleeding. Education provided.  Patient's daughter verbalizes understanding and agrees to plan. No further questions or concerns.           OBJECTIVE    INR   Date Value Ref Range Status   2020 2.10 (H) 0.86 - 1.14 Final     Comment:     This test is intended for monitoring Coumadin therapy.  Results are not   accurate in patients with prolonged INR due to factor deficiency.         ASSESSMENT / PLAN  INR assessment THER    Recheck INR In: 4 WEEKS    INR Location Clinic      Anticoagulation Summary  As of 2020    INR goal:   2.0-3.0   TTR:   51.2 % (1 y)   INR used for dosin.10 (2020)   Warfarin maintenance plan:   2 mg (1 mg x 2) every  Mon, Fri; 1 mg (1 mg x 1) all other days   Full warfarin instructions:   2 mg every Mon, Fri; 1 mg all other days   Weekly warfarin total:   9 mg   No change documented:   Kylah Dorsey RN   Plan last modified:   Ruiz Meredith RN (2/18/2020)   Next INR check:   6/2/2020   Priority:   Maintenance   Target end date:   Indefinite    Indications    Atrial fibrillation with rapid ventricular response (H) (Resolved) [I48.91]  DVT (deep venous thrombosis) [I82.409]  Long term current use of anticoagulant therapy [Z79.01]             Anticoagulation Episode Summary     INR check location:       Preferred lab:       Send INR reminders to:   GARRY BARRY    Comments:   * Taking Celebrex PRN         Anticoagulation Care Providers     Provider Role Specialty Phone number    Anjum Vidales MD Referring [facility] [phone]            See the Encounter Report to view Anticoagulation Flowsheet and Dosing Calendar (Go to Encounters tab in chart review, and find the Anticoagulation Therapy Visit)        Kylah Dorsey RN

## 2020-05-09 DIAGNOSIS — K21.9 GASTROESOPHAGEAL REFLUX DISEASE WITHOUT ESOPHAGITIS: ICD-10-CM

## 2020-05-11 RX ORDER — OMEPRAZOLE 40 MG/1
CAPSULE, DELAYED RELEASE ORAL
Qty: 30 CAPSULE | Refills: 3 | Status: SHIPPED | OUTPATIENT
Start: 2020-05-11 | End: 2020-09-08

## 2020-05-12 LAB
MDC_IDC_LEAD_IMPLANT_DT: NORMAL
MDC_IDC_LEAD_IMPLANT_DT: NORMAL
MDC_IDC_LEAD_LOCATION: NORMAL
MDC_IDC_LEAD_LOCATION: NORMAL
MDC_IDC_LEAD_LOCATION_DETAIL_1: NORMAL
MDC_IDC_LEAD_LOCATION_DETAIL_1: NORMAL
MDC_IDC_LEAD_MFG: NORMAL
MDC_IDC_LEAD_MFG: NORMAL
MDC_IDC_LEAD_MODEL: NORMAL
MDC_IDC_LEAD_MODEL: NORMAL
MDC_IDC_LEAD_POLARITY_TYPE: NORMAL
MDC_IDC_LEAD_POLARITY_TYPE: NORMAL
MDC_IDC_LEAD_SERIAL: NORMAL
MDC_IDC_LEAD_SERIAL: NORMAL
MDC_IDC_MSMT_BATTERY_REMAINING_PERCENTAGE: 55 %
MDC_IDC_MSMT_BATTERY_STATUS: NORMAL
MDC_IDC_MSMT_LEADCHNL_RA_IMPEDANCE_VALUE: 434 OHM
MDC_IDC_MSMT_LEADCHNL_RA_LEAD_CHANNEL_STATUS: NORMAL
MDC_IDC_MSMT_LEADCHNL_RA_PACING_THRESHOLD_AMPLITUDE: 0.7 V
MDC_IDC_MSMT_LEADCHNL_RA_PACING_THRESHOLD_PULSEWIDTH: 0.4 MS
MDC_IDC_MSMT_LEADCHNL_RV_IMPEDANCE_VALUE: 496 OHM
MDC_IDC_MSMT_LEADCHNL_RV_LEAD_CHANNEL_STATUS: NORMAL
MDC_IDC_MSMT_LEADCHNL_RV_PACING_THRESHOLD_AMPLITUDE: 0.5 V
MDC_IDC_MSMT_LEADCHNL_RV_PACING_THRESHOLD_PULSEWIDTH: 0.4 MS
MDC_IDC_PG_IMPLANT_DTM: NORMAL
MDC_IDC_PG_MFG: NORMAL
MDC_IDC_PG_MODEL: NORMAL
MDC_IDC_PG_SERIAL: NORMAL
MDC_IDC_PG_TYPE: NORMAL
MDC_IDC_SESS_CLINIC_NAME: NORMAL
MDC_IDC_SESS_DTM: NORMAL
MDC_IDC_SESS_REPROGRAMMED: NO
MDC_IDC_SESS_TYPE: NORMAL
MDC_IDC_SET_BRADY_AT_MODE_SWITCH_MODE: NORMAL
MDC_IDC_SET_BRADY_AT_MODE_SWITCH_RATE: 160 {BEATS}/MIN
MDC_IDC_SET_BRADY_LOWRATE: 60 {BEATS}/MIN
MDC_IDC_SET_BRADY_MAX_SENSOR_RATE: 125 {BEATS}/MIN
MDC_IDC_SET_BRADY_MAX_TRACKING_RATE: 130 {BEATS}/MIN
MDC_IDC_SET_BRADY_MODE: NORMAL
MDC_IDC_SET_BRADY_PAV_DELAY_HIGH: 150 MS
MDC_IDC_SET_BRADY_PAV_DELAY_LOW: 180 MS
MDC_IDC_SET_BRADY_SAV_DELAY_HIGH: 105 MS
MDC_IDC_SET_BRADY_SAV_DELAY_LOW: 135 MS
MDC_IDC_SET_LEADCHNL_RA_PACING_POLARITY: NORMAL
MDC_IDC_SET_LEADCHNL_RA_PACING_PULSEWIDTH: 0.4 MS
MDC_IDC_SET_LEADCHNL_RA_SENSING_ADAPTATION_MODE: NORMAL
MDC_IDC_SET_LEADCHNL_RA_SENSING_POLARITY: NORMAL
MDC_IDC_SET_LEADCHNL_RV_PACING_POLARITY: NORMAL
MDC_IDC_SET_LEADCHNL_RV_PACING_PULSEWIDTH: 0.4 MS
MDC_IDC_SET_LEADCHNL_RV_SENSING_ADAPTATION_MODE: NORMAL
MDC_IDC_SET_LEADCHNL_RV_SENSING_POLARITY: NORMAL
MDC_IDC_STAT_AT_BURDEN_PERCENT: 0 %
MDC_IDC_STAT_AT_DTM_END: NORMAL
MDC_IDC_STAT_AT_DTM_START: NORMAL
MDC_IDC_STAT_AT_MODE_SW_COUNT_PER_DAY: 0
MDC_IDC_STAT_AT_MODE_SW_PERCENT_TIME_PER_DAY: 0 %
MDC_IDC_STAT_BRADY_AP_VP_PERCENT: 0 %
MDC_IDC_STAT_BRADY_AP_VS_PERCENT: 60 %
MDC_IDC_STAT_BRADY_AS_VP_PERCENT: 0 %
MDC_IDC_STAT_BRADY_AS_VS_PERCENT: 39 %
MDC_IDC_STAT_BRADY_DTM_END: NORMAL
MDC_IDC_STAT_BRADY_DTM_START: NORMAL
MDC_IDC_STAT_BRADY_RA_PERCENT_PACED: 60 %
MDC_IDC_STAT_BRADY_RV_PERCENT_PACED: 0 %
MDC_IDC_STAT_CRT_DTM_END: NORMAL
MDC_IDC_STAT_CRT_DTM_START: NORMAL

## 2020-05-15 ENCOUNTER — APPOINTMENT (OUTPATIENT)
Dept: GENERAL RADIOLOGY | Facility: CLINIC | Age: 85
End: 2020-05-15
Attending: PHYSICIAN ASSISTANT
Payer: COMMERCIAL

## 2020-05-15 ENCOUNTER — TELEPHONE (OUTPATIENT)
Dept: FAMILY MEDICINE | Facility: CLINIC | Age: 85
End: 2020-05-15

## 2020-05-15 ENCOUNTER — HOSPITAL ENCOUNTER (EMERGENCY)
Facility: CLINIC | Age: 85
Discharge: HOME | End: 2020-05-15
Attending: PHYSICIAN ASSISTANT | Admitting: PHYSICIAN ASSISTANT
Payer: COMMERCIAL

## 2020-05-15 VITALS
SYSTOLIC BLOOD PRESSURE: 153 MMHG | TEMPERATURE: 97 F | BODY MASS INDEX: 26.03 KG/M2 | OXYGEN SATURATION: 97 % | RESPIRATION RATE: 20 BRPM | HEART RATE: 88 BPM | HEIGHT: 58 IN | WEIGHT: 124 LBS | DIASTOLIC BLOOD PRESSURE: 73 MMHG

## 2020-05-15 DIAGNOSIS — J44.1 COPD EXACERBATION (H): ICD-10-CM

## 2020-05-15 DIAGNOSIS — N39.0 URINARY TRACT INFECTION: ICD-10-CM

## 2020-05-15 LAB
ALBUMIN UR-MCNC: NEGATIVE MG/DL
ANION GAP SERPL CALCULATED.3IONS-SCNC: 4 MMOL/L (ref 3–14)
APPEARANCE UR: ABNORMAL
BACTERIA #/AREA URNS HPF: ABNORMAL /HPF
BASOPHILS # BLD AUTO: 0 10E9/L (ref 0–0.2)
BASOPHILS NFR BLD AUTO: 0.8 %
BILIRUB UR QL STRIP: NEGATIVE
BUN SERPL-MCNC: 23 MG/DL (ref 7–30)
CALCIUM SERPL-MCNC: 8.3 MG/DL (ref 8.5–10.1)
CHLORIDE SERPL-SCNC: 99 MMOL/L (ref 94–109)
CO2 SERPL-SCNC: 27 MMOL/L (ref 20–32)
COLOR UR AUTO: YELLOW
CREAT SERPL-MCNC: 0.58 MG/DL (ref 0.52–1.04)
DIFFERENTIAL METHOD BLD: ABNORMAL
EOSINOPHIL # BLD AUTO: 0.1 10E9/L (ref 0–0.7)
EOSINOPHIL NFR BLD AUTO: 1.3 %
ERYTHROCYTE [DISTWIDTH] IN BLOOD BY AUTOMATED COUNT: 14.2 % (ref 10–15)
GFR SERPL CREATININE-BSD FRML MDRD: 81 ML/MIN/{1.73_M2}
GLUCOSE SERPL-MCNC: 98 MG/DL (ref 70–99)
GLUCOSE UR STRIP-MCNC: NEGATIVE MG/DL
HCT VFR BLD AUTO: 34.6 % (ref 35–47)
HGB BLD-MCNC: 11.2 G/DL (ref 11.7–15.7)
HGB UR QL STRIP: NEGATIVE
IMM GRANULOCYTES # BLD: 0 10E9/L (ref 0–0.4)
IMM GRANULOCYTES NFR BLD: 0.4 %
INR PPP: 2.5 (ref 0.86–1.14)
KETONES UR STRIP-MCNC: NEGATIVE MG/DL
LEUKOCYTE ESTERASE UR QL STRIP: ABNORMAL
LYMPHOCYTES # BLD AUTO: 1.6 10E9/L (ref 0.8–5.3)
LYMPHOCYTES NFR BLD AUTO: 34.2 %
MCH RBC QN AUTO: 28.1 PG (ref 26.5–33)
MCHC RBC AUTO-ENTMCNC: 32.4 G/DL (ref 31.5–36.5)
MCV RBC AUTO: 87 FL (ref 78–100)
MONOCYTES # BLD AUTO: 0.5 10E9/L (ref 0–1.3)
MONOCYTES NFR BLD AUTO: 11.1 %
MUCOUS THREADS #/AREA URNS LPF: PRESENT /LPF
NEUTROPHILS # BLD AUTO: 2.5 10E9/L (ref 1.6–8.3)
NEUTROPHILS NFR BLD AUTO: 52.2 %
NITRATE UR QL: NEGATIVE
NRBC # BLD AUTO: 0 10*3/UL
NRBC BLD AUTO-RTO: 0 /100
NT-PROBNP SERPL-MCNC: 469 PG/ML (ref 0–1800)
PH UR STRIP: 6 PH (ref 5–7)
PLATELET # BLD AUTO: 231 10E9/L (ref 150–450)
POTASSIUM SERPL-SCNC: 4.4 MMOL/L (ref 3.4–5.3)
RBC # BLD AUTO: 3.98 10E12/L (ref 3.8–5.2)
RBC #/AREA URNS AUTO: 4 /HPF (ref 0–2)
SODIUM SERPL-SCNC: 130 MMOL/L (ref 133–144)
SOURCE: ABNORMAL
SP GR UR STRIP: 1.02 (ref 1–1.03)
SQUAMOUS #/AREA URNS AUTO: 3 /HPF (ref 0–1)
TROPONIN I SERPL-MCNC: <0.015 UG/L (ref 0–0.04)
UROBILINOGEN UR STRIP-MCNC: 0 MG/DL (ref 0–2)
WBC # BLD AUTO: 4.8 10E9/L (ref 4–11)
WBC #/AREA URNS AUTO: 58 /HPF (ref 0–5)

## 2020-05-15 PROCEDURE — 84484 ASSAY OF TROPONIN QUANT: CPT | Performed by: PHYSICIAN ASSISTANT

## 2020-05-15 PROCEDURE — 87086 URINE CULTURE/COLONY COUNT: CPT | Performed by: PHYSICIAN ASSISTANT

## 2020-05-15 PROCEDURE — 25000132 ZZH RX MED GY IP 250 OP 250 PS 637: Performed by: PHYSICIAN ASSISTANT

## 2020-05-15 PROCEDURE — 99285 EMERGENCY DEPT VISIT HI MDM: CPT | Mod: 25 | Performed by: PHYSICIAN ASSISTANT

## 2020-05-15 PROCEDURE — 83880 ASSAY OF NATRIURETIC PEPTIDE: CPT | Performed by: PHYSICIAN ASSISTANT

## 2020-05-15 PROCEDURE — 93005 ELECTROCARDIOGRAM TRACING: CPT | Performed by: PHYSICIAN ASSISTANT

## 2020-05-15 PROCEDURE — 85610 PROTHROMBIN TIME: CPT | Performed by: PHYSICIAN ASSISTANT

## 2020-05-15 PROCEDURE — 81001 URINALYSIS AUTO W/SCOPE: CPT | Performed by: PHYSICIAN ASSISTANT

## 2020-05-15 PROCEDURE — 93010 ELECTROCARDIOGRAM REPORT: CPT | Mod: Z6 | Performed by: EMERGENCY MEDICINE

## 2020-05-15 PROCEDURE — 99284 EMERGENCY DEPT VISIT MOD MDM: CPT | Mod: 25 | Performed by: PHYSICIAN ASSISTANT

## 2020-05-15 PROCEDURE — 87635 SARS-COV-2 COVID-19 AMP PRB: CPT | Performed by: PHYSICIAN ASSISTANT

## 2020-05-15 PROCEDURE — 94640 AIRWAY INHALATION TREATMENT: CPT | Performed by: PHYSICIAN ASSISTANT

## 2020-05-15 PROCEDURE — 71045 X-RAY EXAM CHEST 1 VIEW: CPT

## 2020-05-15 PROCEDURE — 80048 BASIC METABOLIC PNL TOTAL CA: CPT | Performed by: PHYSICIAN ASSISTANT

## 2020-05-15 PROCEDURE — 85025 COMPLETE CBC W/AUTO DIFF WBC: CPT | Performed by: PHYSICIAN ASSISTANT

## 2020-05-15 RX ORDER — PREDNISONE 10 MG/1
TABLET ORAL
Qty: 32 TABLET | Refills: 0 | Status: SHIPPED | OUTPATIENT
Start: 2020-05-15 | End: 2020-06-10

## 2020-05-15 RX ORDER — AZITHROMYCIN 250 MG/1
TABLET, FILM COATED ORAL
Qty: 6 TABLET | Refills: 0 | Status: SHIPPED | OUTPATIENT
Start: 2020-05-15 | End: 2020-06-10

## 2020-05-15 RX ORDER — ALBUTEROL SULFATE 90 UG/1
6 AEROSOL, METERED RESPIRATORY (INHALATION) EVERY 6 HOURS PRN
Status: DISCONTINUED | OUTPATIENT
Start: 2020-05-15 | End: 2020-05-15 | Stop reason: HOSPADM

## 2020-05-15 RX ORDER — CEPHALEXIN 500 MG/1
500 CAPSULE ORAL 2 TIMES DAILY
Qty: 14 CAPSULE | Refills: 0 | Status: SHIPPED | OUTPATIENT
Start: 2020-05-15 | End: 2020-06-10

## 2020-05-15 RX ADMIN — ALBUTEROL SULFATE 6 PUFF: 90 AEROSOL, METERED RESPIRATORY (INHALATION) at 15:00

## 2020-05-15 ASSESSMENT — MIFFLIN-ST. JEOR: SCORE: 877.21

## 2020-05-15 ASSESSMENT — ENCOUNTER SYMPTOMS
EYE ITCHING: 0
FREQUENCY: 1
COUGH: 1
COLOR CHANGE: 0
SORE THROAT: 0
VOMITING: 0
CONSTIPATION: 0
DYSURIA: 0
LIGHT-HEADEDNESS: 0
EYE PAIN: 0
WOUND: 0
EYE DISCHARGE: 0
WHEEZING: 0
SHORTNESS OF BREATH: 0
CHILLS: 0
PALPITATIONS: 0
HEMATURIA: 0
NAUSEA: 1
FEVER: 0
DIZZINESS: 0
ABDOMINAL PAIN: 1

## 2020-05-15 NOTE — TELEPHONE ENCOUNTER
Reason for Call:  Other     Detailed comments: Patient is not feeling well at all and has a cough and chest pain at a 7 -     Phone Number Patient can be reached at: Home number on file [phone] (home)    Best Time:     Can we leave a detailed message on this number? YES    Call taken on 5/15/2020 at 1:03 PM by Marilyn Harrison

## 2020-05-15 NOTE — ED PROVIDER NOTES
History     Chief Complaint   Patient presents with     Cough     bronchitis a couple months back, given z pack, minimal improvement; cough, chest pain     HPI  Ellie Leigh is a 89 year old female with past medical history significant for hypertension, persistent asthma, COPD, atrial fibrillation - status post pacemaker placement, hypothyroidism, IBS, SIADH, DDD, depression, history of tuberculosis, on long-term anticoagulants who presents to the emergency department with concern over cough and chest pain.  Patient reports she has ongoing chronic cough that she attributes to her history of asthma and COPD.  Her cough is worsened over the last several days accompanied by increased sputum production as well as development of chest pain at the last 24 hours.  She describes pain in the inferior sternal region described as a burning sensation.  She also complains of some intermittent epigastric abdominal pain, nausea, loose stools and sensation of urinary urgency.  She denies any fever, chills, myalgias, nasal congestion, dyspnea, wheezing, palpitations, vomiting, melena, hematochezia, dysuria, hematuria.  She has not had any dizziness or lightheadedness.  Her chest pain improves when she goes outside and seems to be worse when she rests inside her house per her report.  She has not attempted any treatment specifically for chest pain however has been using her nebulizers 3 times daily as previously scheduled.  She is a non-smoker.      Allergies:  Allergies   Allergen Reactions     Cephalosporins Nausea     Cefzil     Doxycycline Nausea and Vomiting     Sulfa Drugs Nausea     Penicillins Rash     PCN     Problem List:    Patient Active Problem List    Diagnosis Date Noted     Chest pain 04/14/2018     Priority: Medium     H/O TB (tuberculosis) 02/09/2018     Priority: Medium     Pt with chronic RUL infiltrate with pos AFB sputum  Full treatmetn for TB following ID consult in 2000's       Back pain 02/06/2018      Priority: Medium     Nausea 06/05/2017     Priority: Medium     Irritable bowel syndrome without diarrhea 05/02/2016     Priority: Medium     Mild persistent asthma without complication 12/01/2015     Priority: Medium     Long term current use of anticoagulant therapy 03/02/2015     Priority: Medium     Syncope 01/12/2015     Priority: Medium     Advance Care Planning 01/09/2015     Priority: Medium     Advance Care Planning 7/2/2015: Receipt of ACP document:  Received: Health Care Directive which was witnessed or notarized on 1-9-15.  Document previously scanned on 2-27-15.  Validation form completed and sent to be scanned.  Code Status reflects choices in most recent ACP document.  Confirmed/documented designated decision maker(s).  Added by Verónica Green RN, System ACP Coordinator Honoring Choices   1/9/15 Copy to be scanned into chart Beulah Mathis CMA         COPD (chronic obstructive pulmonary disease) (H) 10/06/2014     Priority: Medium     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) 07/07/2014     Priority: Medium     Cause TBD.  Patient has had lung CT, will see Dr Gómez for consideration of further workup.  Also has pulmonology appt 8/18.  5/2/2018:Reviewing chart she has had hyponatremia since 2010.        Positive fecal occult blood test 07/07/2014     Priority: Medium     x2 -- first was in ED.  Patient expresses that she does not want colonoscopy.  She'll set appt to discuss with her PCP.       Benign essential HTN 02/11/2014     Priority: Medium     BPPV (benign paroxysmal positional vertigo) 11/05/2013     Priority: Medium     History of skin cancer 05/07/2013     Priority: Medium     Recurrent UTI 07/16/2012     Priority: Medium     recurrent UTI  Recent Urologic workup neg, 2005  Has Cipro at home for treatment as needed       Hypothyroidism 05/07/2012     Priority: Medium     Hyponatremia 05/07/2012     Priority: Medium     Squamous cell carcinoma in situ of skin of face 04/19/2012      Priority: Medium     SK (seborrheic keratosis) 04/19/2012     Priority: Medium     Intermittent atrial fibrillation (H) 12/01/2011     Priority: Medium     Cervical vertebral fracture (H) 11/20/2011     Priority: Medium     Anxiety 11/15/2011     Priority: Medium     DVT (deep venous thrombosis) 10/12/2011     Priority: Medium     H/o in past, on current coumadin therapy.       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 Provider to review       Right arm weakness 07/29/2011     Priority: Medium     Ulnar neuropathy 07/29/2011     Priority: Medium     Health Care Home 04/21/2011     Priority: Medium     Conchis Robles [phone]  A / [facility]              DX V65.8 REPLACED WITH [id number] HEALTH CARE HOME (04/08/2013)       Chronic constipation 03/03/2011     Priority: Medium     Osteoporosis 03/03/2011     Priority: Medium     Hyperlipidemia LDL goal <130 10/31/2010     Priority: Medium     Infectious colitis, enteritis and gastroenteritis 07/10/2010     Priority: Medium     Chronic allergic conjunctivitis 11/18/2008     Priority: Medium     Chronic seasonal allergic rhinitis 11/18/2008     Priority: Medium     Esophageal reflux 11/29/2007     Priority: Medium     PERSONAL HISTORY OF MALIGNANCY- BREAST 06/13/2007     Priority: Medium     Sensorineural hearing loss, asymmetrical 06/20/2005     Priority: Medium     Generalized osteoarthrosis, unspecified site 06/20/2005     Priority: Medium     Right hip and knee are the most symptomatic        Past Medical History:    Past Medical History:   Diagnosis Date     Breast cancer (H)      COPD (chronic obstructive pulmonary disease) (H)      Dust allergy      DVT (deep vein thrombosis) in pregnancy      HTN (hypertension)      Hyponatremia      Hypothyroid      Intermittent atrial fibrillation (H) 12/1/2011     Loose body in joint  4/15/2011     Neck fracture (H)      Syncope 5/12/2013     Past Surgical History:    Past Surgical History:   Procedure Laterality Date     APPENDECTOMY       ARTHROSCOPY KNEE  4/15/2011    Procedure:ARTHROSCOPY KNEE; removal of loose body; Surgeon:LEY, JEFFREY DUANE; Location:[facility]     CHOLECYSTECTOMY       ESOPHAGOSCOPY, GASTROSCOPY, DUODENOSCOPY (EGD), COMBINED  6/9/2014    Procedure: COMBINED ESOPHAGOSCOPY, GASTROSCOPY, DUODENOSCOPY (EGD);  Surgeon: Gilberto Richard MD;  Location: [facility]     ESOPHAGOSCOPY, GASTROSCOPY, DUODENOSCOPY (EGD), COMBINED N/A 10/18/2019    Procedure: ESOPHAGOGASTRODUODENOSCOPY (EGD);  Surgeon: Noe Sams DO;  Location: [facility]     IMPLANT PACEMAKER  5/21/2013    Biotronik Moderl [id number] Serial#[id number]     INJECT EPIDURAL LUMBAR  3/23/2011    INJECT EPIDURAL LUMBAR performed by GENERIC ANESTHESIA PROVIDER at [facility] OR     JOINT REPLACEMENT, HIP RT/LT      Joint Replacement Hip Rt     MASTECTOMY, SIMPLE RT/LT/CAITLYN      Left breast - following breast ca     OTHER SURGICAL HISTORY      C1-C2 fusion after fx      SURGICAL HISTORY OF -   05/22/2001    Colonoscopy     TONSILLECTOMY       Family History:    Family History   Problem Relation Age of Onset     Cerebrovascular Disease Mother      Heart Disease Mother         MI     Cerebrovascular Disease Father      Heart Disease Father         MI     Respiratory Maternal Grandfather         TB     Neurologic Disorder Brother         ALS     Heart Disease Brother      Cerebrovascular Disease Brother      Breast Cancer Daughter         age:49     Asthma Brother      Cancer Brother         brain and lung     Cancer Daughter         thyroid     Social History:  Marital Status:   [5]  Social History     Tobacco Use     Smoking status: Passive Smoke Exposure - Never Smoker     Smokeless tobacco: Never Used   Substance Use Topics     Alcohol use: No     Drug use: No      Medications:    Acetaminophen (TYLENOL PO)  albuterol (PROAIR  "HFA/PROVENTIL HFA/VENTOLIN HFA) 108 (90 Base) MCG/ACT Inhaler  albuterol (PROVENTIL) (2.5 MG/3ML) 0.083% neb solution  CRANBERRY  estradiol (ESTRACE VAGINAL) 0.1 MG/GM vaginal cream  fluticasone (FLONASE) 50 MCG/ACT spray  ipratropium - albuterol 0.5 mg/2.5 mg/3 mL (DUONEB) 0.5-2.5 (3) MG/3ML neb solution  levothyroxine (SYNTHROID/LEVOTHROID) 50 MCG tablet  metoprolol succinate ER (TOPROL-XL) 25 MG 24 hr tablet  omeprazole (PRILOSEC) 40 MG DR capsule  ondansetron (ZOFRAN-ODT) 4 MG ODT tab  order for DME  polyethylene glycol (MIRALAX/GLYCOLAX) Packet  predniSONE (DELTASONE) 20 MG tablet  probiotic CAPS  sodium chloride 0.9 % neb solution  sodium chloride 1 GM tablet  SYMBICORT 160-4.5 MCG/ACT Inhaler  warfarin ANTICOAGULANT (COUMADIN) 1 MG tablet      Review of Systems   Constitutional: Negative for chills and fever.   HENT: Negative for congestion, ear pain and sore throat.    Eyes: Negative for pain, discharge, itching and visual disturbance.   Respiratory: Positive for cough. Negative for shortness of breath and wheezing.    Cardiovascular: Positive for chest pain. Negative for palpitations.   Gastrointestinal: Positive for abdominal pain (epigastric) and nausea. Negative for constipation and vomiting.   Genitourinary: Positive for frequency and urgency. Negative for dysuria and hematuria.   Skin: Negative for color change, rash and wound.   Neurological: Negative for dizziness and light-headedness.     Physical Exam   BP: 130/59  Heart Rate: 70  Temp: 96  F (35.6  C)  Resp: 18  Height: 147.3 cm (4' 10\")  Weight: 56.2 kg (124 lb)  SpO2: 97 %  Physical Exam  GENERAL APPEARANCE:alert, pleasant, and no distress  EYES: EOMI,  PERRL, conjunctiva clear  HENT: ear canals and TM's normal.  Nose and mouth without ulcers, erythema or lesions  NECK: supple, nontender, no lymphadenopathy  RESP: normal respiratory effort, patient has end expiratory wheezing at bases bilaterally  CV: regular rates and rhythm, normal S1 " S2  ABDOMEN:  soft, nontender, no HSM or masses and bowel sounds normal  NEURO: Normal strength and tone, normal speech and mentation  SKIN: no suspicious lesions or rashes  ED Course        Procedures               EKG Interpretation:      Interpreted by Joanna Cardona PA-C and Dr. Ross [place]  Time reviewed: 15:08  Symptoms at time of EKG: cough, chest pain    Rhythm: sinus and paced   Rate: 70  Axis: Normal  Ectopy: none  Conduction: normal  ST Segments/ T Waves: No acute ischemic changes  Q Waves: none  Comparison to prior: Unchanged from 12/25/2019    Clinical Impression: no acute changes    Critical Care time:  none          Results for orders placed or performed during the hospital encounter of 05/15/20 (from the past 24 hour(s))   CBC with platelets differential   Result Value Ref Range    WBC 4.8 4.0 - 11.0 10e9/L    RBC Count 3.98 3.8 - 5.2 10e12/L    Hemoglobin 11.2 (L) 11.7 - 15.7 g/dL    Hematocrit 34.6 (L) 35.0 - 47.0 %    MCV 87 78 - 100 fl    MCH 28.1 26.5 - 33.0 pg    MCHC 32.4 31.5 - 36.5 g/dL    RDW 14.2 10.0 - 15.0 %    Platelet Count 231 150 - 450 10e9/L    Diff Method Automated Method     % Neutrophils 52.2 %    % Lymphocytes 34.2 %    % Monocytes 11.1 %    % Eosinophils 1.3 %    % Basophils 0.8 %    % Immature Granulocytes 0.4 %    Nucleated RBCs 0 0 /100    Absolute Neutrophil 2.5 1.6 - 8.3 10e9/L    Absolute Lymphocytes 1.6 0.8 - 5.3 10e9/L    Absolute Monocytes 0.5 0.0 - 1.3 10e9/L    Absolute Eosinophils 0.1 0.0 - 0.7 10e9/L    Absolute Basophils 0.0 0.0 - 0.2 10e9/L    Abs Immature Granulocytes 0.0 0 - 0.4 10e9/L    Absolute Nucleated RBC 0.0    Basic metabolic panel   Result Value Ref Range    Sodium 130 (L) 133 - 144 mmol/L    Potassium 4.4 3.4 - 5.3 mmol/L    Chloride 99 94 - 109 mmol/L    Carbon Dioxide 27 20 - 32 mmol/L    Anion Gap 4 3 - 14 mmol/L    Glucose 98 70 - 99 mg/dL    Urea Nitrogen 23 7 - 30 mg/dL    Creatinine 0.58 0.52 - 1.04 mg/dL    GFR Estimate 81 >60 mL/min/[1.73_m2]     GFR Estimate If Black >90 >60 mL/min/[1.73_m2]    Calcium 8.3 (L) 8.5 - 10.1 mg/dL   Nt probnp inpatient (BNP)   Result Value Ref Range    N-Terminal Pro BNP Inpatient 469 0 - 1,800 pg/mL   Troponin I   Result Value Ref Range    Troponin I ES <0.015 0.000 - 0.045 ug/L   INR   Result Value Ref Range    INR 2.50 (H) 0.86 - 1.14   XR Chest Port 1 View    Narrative    CHEST ONE VIEW  5/15/2020 3:26 PM     HISTORY: Cough, chest pain.    COMPARISON: December 25, 2019      Impression    IMPRESSION: Bilateral upper lobe fibrosis, right worse than left,  similar to previous. Stable cardiac device. No new infiltrates.    RANJANA NAVARRETE MD   UA with Microscopic reflex to Culture    Specimen: Midstream Urine   Result Value Ref Range    Color Urine Yellow     Appearance Urine Slightly Cloudy     Glucose Urine Negative NEG^Negative mg/dL    Bilirubin Urine Negative NEG^Negative    Ketones Urine Negative NEG^Negative mg/dL    Specific Gravity Urine 1.018 1.003 - 1.035    Blood Urine Negative NEG^Negative    pH Urine 6.0 5.0 - 7.0 pH    Protein Albumin Urine Negative NEG^Negative mg/dL    Urobilinogen mg/dL 0.0 0.0 - 2.0 mg/dL    Nitrite Urine Negative NEG^Negative    Leukocyte Esterase Urine Moderate (A) NEG^Negative    Source Midstream Urine     WBC Urine 58 (H) 0 - 5 /HPF    RBC Urine 4 (H) 0 - 2 /HPF    Bacteria Urine Few (A) NEG^Negative /HPF    Squamous Epithelial /HPF Urine 3 (H) 0 - 1 /HPF    Mucous Urine Present (A) NEG^Negative /LPF     *Note: Due to a large number of results and/or encounters for the requested time period, some results have not been displayed. A complete set of results can be found in Results Review.     Medications - No data to display    Assessments & Plan (with Medical Decision Making)     I have reviewed the nursing notes.  I have reviewed the findings, diagnosis, plan and need for follow up with the patient.       New Prescriptions    AZITHROMYCIN (ZITHROMAX Z-ADDY) 250 MG TABLET    Two tablets on  the first day, then one tablet daily for the next 4 days    CEPHALEXIN (KEFLEX) 500 MG CAPSULE    Take 1 capsule (500 mg) by mouth 2 times daily for 7 days    PREDNISONE (DELTASONE) 10 MG TABLET    Take 4 tablets daily for 5 days,  take 2 tablets daily for 3 days, take 1 tablet daily for 3 days, take half a tablet for 3 days.     Final diagnoses:   COPD exacerbation (H)   Urinary tract infection     89-year-old female presents to the emergency department with concern over worsening of her chronic cough and development of chest pain over the last 48 hours.  She had stable vital signs upon arrival.  Physical exam findings as described above were significant for mild end expiratory wheezing at the bases bilaterally.  As part of evaluation patient did have extensive laboratory testing including CBC significant for chronic anemia, BMP, negative BNP, troponin.  INR was therapeutic at 2.5.  She did have urinalysis which was suspicious for infection and given concerns for urinary frequency, urgency will initiate antibiotics pending urine culture.  Low suspicion for COVID-19, however testing was done and was pending at time of discharge.  She did have chest x-ray which was negative for acute infiltrate, pneumothorax or other acute abnormality.  Cough appears most consistent with COPD exacerbation.  Given evaluation here I do not suspect MI/ACS, PE, aortic aneurysm.  Would consider possibility of gastritis, GERD.  She was discharged home stable prescription for prednisone, azithromycin for her respiratory symptoms which has been most successful in the past as well as Keflex to cover for suspected urinary tract infection. Follow up if no improvement in 3-5 days.  Signs of respiratory distress, worrisome reasons to return to ER/UC sooner discussed.     Disclaimer: This note consists of symbols derived from keyboarding, dictation, and/or voice recognition software. As a result, there may be errors in the script that have gone  undetected.  Please consider this when interpreting information found in the chart.    5/15/2020   [facility] EMERGENCY DEPARTMENT     Joanna Cardona PA-C  05/15/20 [id number]

## 2020-05-15 NOTE — TELEPHONE ENCOUNTER
Pt states she has had a cough and chest pain the last several days. Worsening and coughing up yellow/bloody sputum. NO fever. There are no openings available in [place] or [place].  Pt advised can go to [place] ER or wait for [facility] urgent care. Marium Richardson RN

## 2020-05-15 NOTE — ED AVS SNAPSHOT
[facility] Emergency Department  [address] [address]  Phone:  [phone]  Fax:  [phone]                                    Ellie Leigh   MRN: [id number]    Department:  [facility] Emergency Department   Date of Visit:  5/15/2020           After Visit Summary Signature Page    I have received my discharge instructions, and my questions have been answered. I have discussed any challenges I see with this plan with the nurse or doctor.    ..........................................................................................................................................  Patient/Patient Representative Signature      ..........................................................................................................................................  Patient Representative Print Name and Relationship to Patient    ..................................................               ................................................  Date                                   Time    ..........................................................................................................................................  Reviewed by Signature/Title    ...................................................              ..............................................  Date                                               Time          22EPIC Rev 08/18

## 2020-05-16 LAB
SARS-COV-2 RNA SPEC QL NAA+PROBE: NOT DETECTED
SPECIMEN SOURCE: NORMAL

## 2020-05-17 LAB
BACTERIA SPEC CULT: NORMAL
BACTERIA SPEC CULT: NORMAL
Lab: NORMAL
SPECIMEN SOURCE: NORMAL

## 2020-05-18 ENCOUNTER — TELEPHONE (OUTPATIENT)
Dept: ANTICOAGULATION | Facility: CLINIC | Age: 85
End: 2020-05-18

## 2020-05-18 DIAGNOSIS — I82.409 DVT (DEEP VENOUS THROMBOSIS) (H): ICD-10-CM

## 2020-05-18 DIAGNOSIS — Z79.01 LONG TERM CURRENT USE OF ANTICOAGULANT THERAPY: ICD-10-CM

## 2020-05-18 NOTE — TELEPHONE ENCOUNTER
ANTICOAGULATION  MANAGEMENT: Discharge Review    Ellie Leigh chart reviewed for anticoagulation continuity of care    Emergency room visit on 5/15 for COPD exacerbation / UTI.    Discharge disposition: Home    Results:    Recent labs: (last 7 days)     05/15/20  1457   INR 2.50*     Anticoagulation inpatient management:     not applicable     Anticoagulation discharge instructions:     Warfarin dosing: home regimen continued   Bridging: No   INR goal change: No      Medication changes affecting anticoagulation: Yes: azithromycin for 5 days, keflex for 7 days, prednisone burst (Take 4 tablets daily for 5 days,  take 2 tablets daily for 3 days, take 1 tablet daily for 3 days, take half a tablet for 3 days)    Additional factors affecting anticoagulation: Yes: UTI    Plan     Recommend to check INR on Tuesday, 5/18 due to new medications    Spoke with Suyapa, patient's daughter    Anticoagulation Calendar updated    Katia Landrum RN CACP

## 2020-05-19 ENCOUNTER — ANTICOAGULATION THERAPY VISIT (OUTPATIENT)
Dept: FAMILY MEDICINE | Facility: CLINIC | Age: 85
End: 2020-05-19

## 2020-05-19 DIAGNOSIS — Z79.01 LONG TERM CURRENT USE OF ANTICOAGULANT THERAPY: ICD-10-CM

## 2020-05-19 DIAGNOSIS — I48.0 INTERMITTENT ATRIAL FIBRILLATION (H): ICD-10-CM

## 2020-05-19 DIAGNOSIS — I82.409 DVT (DEEP VENOUS THROMBOSIS) (H): ICD-10-CM

## 2020-05-19 LAB
CAPILLARY BLOOD COLLECTION: NORMAL
INR PPP: 3.1 (ref 0.86–1.14)

## 2020-05-19 PROCEDURE — 85610 PROTHROMBIN TIME: CPT | Performed by: FAMILY MEDICINE

## 2020-05-19 PROCEDURE — 99207 ZZC NO CHARGE NURSE ONLY: CPT | Performed by: FAMILY MEDICINE

## 2020-05-19 PROCEDURE — 36416 COLLJ CAPILLARY BLOOD SPEC: CPT | Performed by: FAMILY MEDICINE

## 2020-05-19 NOTE — PROGRESS NOTES
ANTICOAGULATION FOLLOW-UP CLINIC VISIT    Patient Name:  Ellie Leigh  Date:  5/19/2020  Contact Type:  Telephone/ Suyapa-daughter    SUBJECTIVE:  Patient Findings     Positives:   Change in health (COPD/UTI), Change in medications (ABX/Prednisone)    Comments:   No changes in activity or diet noted. No bleeding or increased bruising noted. Took warfarin as prescribed.  Writer reduced the patient's dose to 1 mg daily for the next week.  Recheck in 1 week.            Clinical Outcomes     Negatives:   Major bleeding event, Thromboembolic event, Anticoagulation-related hospital admission, Anticoagulation-related ED visit, Anticoagulation-related fatality    Comments:   No changes in activity or diet noted. No bleeding or increased bruising noted. Took warfarin as prescribed.  Writer reduced the patient's dose to 1 mg daily for the next week.  Recheck in 1 week.               OBJECTIVE    Recent labs: (last 7 days)     05/19/20  1506   INR 3.10*       ASSESSMENT / PLAN  INR assessment SUPRA    Recheck INR In: 1 WEEK    INR Location Outside lab      Anticoagulation Summary  As of 5/19/2020    INR goal:   2.0-3.0   TTR:   54.5 % (1 y)   INR used for dosing:   3.10! (5/19/2020)   Warfarin maintenance plan:   2 mg (1 mg x 2) every Mon, Fri; 1 mg (1 mg x 1) all other days   Full warfarin instructions:   5/22: 1 mg; 5/25: 1 mg; Otherwise 2 mg every Mon, Fri; 1 mg all other days   Weekly warfarin total:   9 mg   Plan last modified:   Ruiz Meredith RN (2/18/2020)   Next INR check:   5/26/2020   Priority:   High   Target end date:   Indefinite    Indications    Atrial fibrillation with rapid ventricular response (H) (Resolved) [I48.91]  DVT (deep venous thrombosis) [I82.409]  Long term current use of anticoagulant therapy [Z79.01]             Anticoagulation Episode Summary     INR check location:       Preferred lab:       Send INR reminders to:   GARRY BARRY    Comments:   * Taking Celebrex PRN          Anticoagulation Care Providers     Provider Role Specialty Phone number    Anjum Vidales MD Referring [facility] [phone]            See the Encounter Report to view Anticoagulation Flowsheet and Dosing Calendar (Go to Encounters tab in chart review, and find the Anticoagulation Therapy Visit)        Ruiz Meredith RN

## 2020-05-24 DIAGNOSIS — E87.1 HYPONATREMIA: ICD-10-CM

## 2020-05-24 DIAGNOSIS — J45.30 MILD PERSISTENT ASTHMA WITHOUT COMPLICATION: ICD-10-CM

## 2020-05-26 ENCOUNTER — ANTICOAGULATION THERAPY VISIT (OUTPATIENT)
Dept: ANTICOAGULATION | Facility: CLINIC | Age: 85
End: 2020-05-26
Payer: COMMERCIAL

## 2020-05-26 DIAGNOSIS — I48.0 INTERMITTENT ATRIAL FIBRILLATION (H): ICD-10-CM

## 2020-05-26 DIAGNOSIS — I82.409 DVT (DEEP VENOUS THROMBOSIS) (H): ICD-10-CM

## 2020-05-26 DIAGNOSIS — Z79.01 LONG TERM CURRENT USE OF ANTICOAGULANT THERAPY: ICD-10-CM

## 2020-05-26 LAB
CAPILLARY BLOOD COLLECTION: NORMAL
INR PPP: 2.3 (ref 0.86–1.14)

## 2020-05-26 PROCEDURE — 85610 PROTHROMBIN TIME: CPT | Performed by: FAMILY MEDICINE

## 2020-05-26 PROCEDURE — 99207 ZZC NO CHARGE NURSE ONLY: CPT

## 2020-05-26 PROCEDURE — 36416 COLLJ CAPILLARY BLOOD SPEC: CPT | Performed by: FAMILY MEDICINE

## 2020-05-26 RX ORDER — SODIUM CHLORIDE 1 G/1
TABLET ORAL
Qty: 100 TABLET | Refills: 5 | Status: SHIPPED | OUTPATIENT
Start: 2020-05-26 | End: 2021-03-24

## 2020-05-26 NOTE — PROGRESS NOTES
Reviewed chart with ACC RN.  Azithromycin should have peaked effect on INR (3-5 days after completion of Zpak), and finished prednisone 05/25/2020.  Dose adjustment will need to be assessed against patient information given.  If feeling better, could resume regular dosing now, since drug interaction waning.  If still not feeling well, consider ~10% dose decrease, (or 1mg/week, would have been protocol from previous INR 3.1 on 05/19/2020).  Could do 1.5mg twice weekly to spread dose decrease more evenly.      Would encourage recheck Friday if resuming 2mg today (previous regimen) and concerned about supra therapeutic levels going forward.    Edelmira Mathis, PharmD BCACP  Anticoagulation Clinical Pharmacist

## 2020-05-26 NOTE — PROGRESS NOTES
ANTICOAGULATION FOLLOW-UP CLINIC VISIT    Patient Name:  Ellie Leigh  Date:  2020  Contact Type:  Telephone/ patient and her daughter    SUBJECTIVE:  Patient Findings     Positives:   Change in medications (Stopped azithromycin, prednisone, cephelexin)    Comments:   Writer spoke with patient and her daughter on speaker phone. Confirmed patient took 1mg daily over the last week. Patient states she is feeling much better today. Will have patient take 2mg today, then 1mg Wed,Thurs. Patient agreed to recheck the INR again on Friday. Depending on where the INR is, patient may need a maintenance dose adjustment.         Clinical Outcomes     Comments:   Writer spoke with patient and her daughter on speaker phone. Confirmed patient took 1mg daily over the last week. Patient states she is feeling much better today. Will have patient take 2mg today, then 1mg Wed,Thurs. Patient agreed to recheck the INR again on Friday. Depending on where the INR is, patient may need a maintenance dose adjustment.            OBJECTIVE    Recent labs: (last 7 days)     20  1325   INR 2.30*       ASSESSMENT / PLAN  No question data found.  Anticoagulation Summary  As of 2020    INR goal:   2.0-3.0   TTR:   56.2 % (1 y)   INR used for dosin.30 (2020)   Warfarin maintenance plan:   2 mg (1 mg x 2) every Mon, Fri; 1 mg (1 mg x 1) all other days   Full warfarin instructions:   : 2 mg; Otherwise 2 mg every Mon, Fri; 1 mg all other days   Weekly warfarin total:   9 mg   Plan last modified:   Ruiz Meredith RN (2020)   Next INR check:   2020   Priority:   High   Target end date:   Indefinite    Indications    Atrial fibrillation with rapid ventricular response (H) (Resolved) [I48.91]  DVT (deep venous thrombosis) [I82.409]  Long term current use of anticoagulant therapy [Z79.01]             Anticoagulation Episode Summary     INR check location:       Preferred lab:       Send INR reminders to:    GARRY BARRY    Comments:   * Taking Celebrex PRN         Anticoagulation Care Providers     Provider Role Specialty Phone number    Anjum Vidales MD Referring [facility] [phone]            See the Encounter Report to view Anticoagulation Flowsheet and Dosing Calendar (Go to Encounters tab in chart review, and find the Anticoagulation Therapy Visit)        Katia Landrum RN [facility]

## 2020-05-27 RX ORDER — ALBUTEROL SULFATE 90 UG/1
AEROSOL, METERED RESPIRATORY (INHALATION)
Qty: 8.5 G | Refills: 11 | Status: SHIPPED | OUTPATIENT
Start: 2020-05-27 | End: 2021-10-08

## 2020-05-27 NOTE — TELEPHONE ENCOUNTER
"Routing to Dr. Vidales.      Requested Prescriptions   Pending Prescriptions Disp Refills     PROAIR  (90 Base) MCG/ACT inhaler [Pharmacy Med Name: ProAir HFA Inhalation Aerosol Solution 108 (90 Base) MCG/ACT] 8.5 g 0     Sig: Inhale 2 puffs into the lungs every 6 hours as needed for shortness of breath / dyspnea       Asthma Maintenance Inhalers - Anticholinergics Failed - 5/24/2020  2:07 PM        Failed - Asthma control assessment score within normal limits in last 6 months     Please review ACT score.           Passed - Patient is age 12 years or older        Passed - Medication is active on med list        Passed - Recent (6 mo) or future (30 days) visit within the authorizing provider's specialty     Patient had office visit in the last 6 months or has a visit in the next 30 days with authorizing provider or within the authorizing provider's specialty.  See \"Patient Info\" tab in inbasket, or \"Choose Columns\" in Meds & Orders section of the refill encounter.           Short-Acting Beta Agonist Inhalers Protocol  Failed - 5/24/2020  2:07 PM        Failed - Asthma control assessment score within normal limits in last 6 months     Please review ACT score.           Passed - Patient is age 12 or older        Passed - Medication is active on med list        Passed - Recent (6 mo) or future (30 days) visit within the authorizing provider's specialty     Patient had office visit in the last 6 months or has a visit in the next 30 days with authorizing provider or within the authorizing provider's specialty.  See \"Patient Info\" tab in inbasket, or \"Choose Columns\" in Meds & Orders section of the refill encounter.                 "

## 2020-05-29 ENCOUNTER — ANTICOAGULATION THERAPY VISIT (OUTPATIENT)
Dept: FAMILY MEDICINE | Facility: CLINIC | Age: 85
End: 2020-05-29

## 2020-05-29 DIAGNOSIS — I82.409 DVT (DEEP VENOUS THROMBOSIS) (H): ICD-10-CM

## 2020-05-29 DIAGNOSIS — Z79.01 LONG TERM CURRENT USE OF ANTICOAGULANT THERAPY: ICD-10-CM

## 2020-05-29 DIAGNOSIS — I48.0 INTERMITTENT ATRIAL FIBRILLATION (H): ICD-10-CM

## 2020-05-29 LAB
CAPILLARY BLOOD COLLECTION: NORMAL
INR PPP: 2.6 (ref 0.86–1.14)

## 2020-05-29 PROCEDURE — 36416 COLLJ CAPILLARY BLOOD SPEC: CPT | Performed by: FAMILY MEDICINE

## 2020-05-29 PROCEDURE — 99207 ZZC NO CHARGE NURSE ONLY: CPT | Performed by: FAMILY MEDICINE

## 2020-05-29 PROCEDURE — 85610 PROTHROMBIN TIME: CPT | Performed by: FAMILY MEDICINE

## 2020-05-29 NOTE — PROGRESS NOTES
ANTICOAGULATION FOLLOW-UP CLINIC VISIT    Patient Name:  Ellie Leigh  Date:  2020  Contact Type:  Telephone    SUBJECTIVE:  Patient Findings     Comments:   Finished with steroid and abx will resume regular dosing and recheck in 2 weeks.  Assessed for S/S bleeding, clotting, medication, diet, health, activity and alcohol changes          Clinical Outcomes     Negatives:   Major bleeding event, Thromboembolic event, Anticoagulation-related hospital admission, Anticoagulation-related ED visit, Anticoagulation-related fatality    Comments:   Finished with steroid and abx will resume regular dosing and recheck in 2 weeks.  Assessed for S/S bleeding, clotting, medication, diet, health, activity and alcohol changes             OBJECTIVE    Recent labs: (last 7 days)     20  1326   INR 2.60*       ASSESSMENT / PLAN  INR assessment THER    Recheck INR In: 2 WEEKS    INR Location Clinic      Anticoagulation Summary  As of 2020    INR goal:   2.0-3.0   TTR:   56.8 % (1 y)   INR used for dosin.60 (2020)   Warfarin maintenance plan:   2 mg (1 mg x 2) every Mon, Fri; 1 mg (1 mg x 1) all other days   Full warfarin instructions:   2 mg every Mon, Fri; 1 mg all other days   Weekly warfarin total:   9 mg   Plan last modified:   Ruiz Meredith RN (2020)   Next INR check:   2020   Priority:   High   Target end date:   Indefinite    Indications    Atrial fibrillation with rapid ventricular response (H) (Resolved) [I48.91]  DVT (deep venous thrombosis) [I82.409]  Long term current use of anticoagulant therapy [Z79.01]             Anticoagulation Episode Summary     INR check location:       Preferred lab:       Send INR reminders to:   GARRY BARRY    Comments:   * Taking Celebrex PRN         Anticoagulation Care Providers     Provider Role Specialty Phone number    Anjum Vidales MD Referring [facility] [phone]            See the Encounter Report to view  Anticoagulation Flowsheet and Dosing Calendar (Go to Encounters tab in chart review, and find the Anticoagulation Therapy Visit)        Kori West RN

## 2020-06-10 ENCOUNTER — OFFICE VISIT (OUTPATIENT)
Dept: FAMILY MEDICINE | Facility: CLINIC | Age: 85
End: 2020-06-10
Payer: COMMERCIAL

## 2020-06-10 VITALS
WEIGHT: 124 LBS | RESPIRATION RATE: 18 BRPM | OXYGEN SATURATION: 96 % | SYSTOLIC BLOOD PRESSURE: 120 MMHG | HEART RATE: 84 BPM | HEIGHT: 58 IN | DIASTOLIC BLOOD PRESSURE: 64 MMHG | TEMPERATURE: 97.8 F | BODY MASS INDEX: 26.03 KG/M2

## 2020-06-10 DIAGNOSIS — J45.30 MILD PERSISTENT ASTHMA WITHOUT COMPLICATION: ICD-10-CM

## 2020-06-10 DIAGNOSIS — K21.9 GASTROESOPHAGEAL REFLUX DISEASE WITHOUT ESOPHAGITIS: Primary | ICD-10-CM

## 2020-06-10 DIAGNOSIS — R10.13 DYSPEPSIA: ICD-10-CM

## 2020-06-10 PROCEDURE — 99213 OFFICE O/P EST LOW 20 MIN: CPT | Performed by: FAMILY MEDICINE

## 2020-06-10 RX ORDER — BUDESONIDE AND FORMOTEROL FUMARATE DIHYDRATE 160; 4.5 UG/1; UG/1
AEROSOL RESPIRATORY (INHALATION)
Qty: 10.2 G | Refills: 11 | Status: SHIPPED | OUTPATIENT
Start: 2020-06-10 | End: 2020-06-26

## 2020-06-10 RX ORDER — SUCRALFATE ORAL 1 G/10ML
1 SUSPENSION ORAL 4 TIMES DAILY
Qty: 420 ML | Refills: 5 | Status: SHIPPED | OUTPATIENT
Start: 2020-06-10 | End: 2020-10-02

## 2020-06-10 ASSESSMENT — ANXIETY QUESTIONNAIRES
6. BECOMING EASILY ANNOYED OR IRRITABLE: NOT AT ALL
3. WORRYING TOO MUCH ABOUT DIFFERENT THINGS: NOT AT ALL
GAD7 TOTAL SCORE: 0
5. BEING SO RESTLESS THAT IT IS HARD TO SIT STILL: NOT AT ALL
1. FEELING NERVOUS, ANXIOUS, OR ON EDGE: NOT AT ALL
2. NOT BEING ABLE TO STOP OR CONTROL WORRYING: NOT AT ALL
7. FEELING AFRAID AS IF SOMETHING AWFUL MIGHT HAPPEN: NOT AT ALL

## 2020-06-10 ASSESSMENT — PATIENT HEALTH QUESTIONNAIRE - PHQ9
SUM OF ALL RESPONSES TO PHQ QUESTIONS 1-9: 3
5. POOR APPETITE OR OVEREATING: NOT AT ALL

## 2020-06-10 ASSESSMENT — MIFFLIN-ST. JEOR: SCORE: 877.21

## 2020-06-10 NOTE — NURSING NOTE
"Chief Complaint   Patient presents with     Gastrointestinal Problem       Initial /64   Pulse 84   Temp 97.8  F (36.6  C) (Tympanic)   Resp 18   Ht 1.473 m (4' 10\")   Wt 56.2 kg (124 lb)   LMP 06/15/1985   SpO2 96%   BMI 25.92 kg/m   Estimated body mass index is 25.92 kg/m  as calculated from the following:    Height as of this encounter: 1.473 m (4' 10\").    Weight as of this encounter: 56.2 kg (124 lb).    Patient presents to the clinic using     Health Maintenance that is potentially due pending provider review:          Is there anyone who you would like to be able to receive your results? f yes have patient fill out JOSUE    "

## 2020-06-10 NOTE — PATIENT INSTRUCTIONS
ASSESSMENT:       ICD-10-CM    1. Gastroesophageal reflux disease without esophagitis  K21.9 sucralfate (CARAFATE) 1 GM/10ML suspension   2. Dyspepsia  R10.13 sucralfate (CARAFATE) 1 GM/10ML suspension      Epigastric abdominal pain which has been chronic.  Some nausea.  Has had gastroscopy October 2019 and CT abdomen pelvis October 2018.  She has seen GI in the past.  On omeprazole in a good dose (40mg daily).     PLAN: Continue the omeprazole 40mg daily in AM.   Add sucralfate (CARAFATE) four times daily 10ml.      Let us know if not helping.   Eating smaller meals more often could help.     OK for acetaminophen as needed.      Acetaminophen (Tylenol) may be taken as needed for pain and fever.  The maximum doses are listed below, around 3000mg a day.    Extended release pills are 650mg each.  The maximum dose is two pills (1300mg) every 8 hours as needed up to 3900mg a day.     Watch out for acetaminophen in other over the counter or prescription medications.      Too much acetaminophen can lead to serious liver damage.

## 2020-06-10 NOTE — PROGRESS NOTES
"SUBJECTIVE: Ellie Leigh is a 89 year old female  Chief Complaint   Patient presents with     Gastrointestinal Problem      Gastrointestinal symptoms      Duration: several months    Description:           ABDOMINAL PAIN - epigastric area  .   Pain is described as gnawing and nausea.    Intensity:  mild, moderate, severe    Accompanying signs and symptoms:  nausea    History  Previous {similar problem: YES  Previous evaluation:      Aggravating factors: ??? hunger    Alleviating factors: food helps at times    Other Therapies tried:     Stomach hurts at times.   Sometimes nausea.  Daily nausea.  Emesis only once a couple weeks ago.  Onset of symptoms: a couple months.   She has had burning in sternal area.   She tried quitting spicy foods.  Later pain seem to go more to epigastrium.   Now daily pain.  Worse some days.  It is like a \"feeling\" there, not a pain.   Quantity/severity: 4/10 but can be up to 8/10.  Triggering factors: when hungry.  Better often after eating.  No specific foods bother her.   Does not bother her at night but when up in AM.    No waterbrash.   Takes omeprazole 40mg daily.  Takes in AM.  A couple hours before eating.   Seldom caffeine.   No alcohol.   No aspirin, ibuprofen or Aleve.   Eats three meals.  Size of meal does not matter.   Tied ondansetron.  Did not help.   Gets full feeling.   CT abdomen and pelvis 10/30/2018.  No specific source for abdominal pain identified.     She had gastroscopy 10/18/2019:  Impression:          - Normal examined duodenum.                        - A few gastric polyps. Resected and retrieved.                        - Gastroesophageal flap valve classified as Hill Grade                        IV (no fold, wide open lumen, hiatal hernia present).                        - 3 cm hiatal hernia.                        - Z-line variable, 38 cm from the incisors.                        - Biopsies were taken with a cold forceps for                       "  Helicobacter pylori testing. Impression:          - Normal examined duodenum.                        - A few gastric polyps. Resected and retrieved.                        - Gastroesophageal flap valve classified as Hill Grade                        IV (no fold, wide open lumen, hiatal hernia present).                        - 3 cm hiatal hernia.                        - Z-line variable, 38 cm from the incisors.                        - Biopsies were taken with a cold forceps for                        Helicobacter pylori testing.   Pathology:  FINAL DIAGNOSIS:   A. Stomach, antrum, biopsy   - Gastric antral-type mucosa with changes suggestive of reactive/erosive   gastropathy   - No evidence of inflammation, intestinal metaplasia, dysplasia or   malignancy   - No Helicobacter pylori identified.       She has been having problems with arthritis in both knees, upper back and lower sternal area.   Taking acetaminophen 1300mg twice daily.     Patient Active Problem List   Diagnosis     Sensorineural hearing loss, asymmetrical     Generalized osteoarthrosis, unspecified site     PERSONAL HISTORY OF MALIGNANCY- BREAST     Esophageal reflux     Chronic allergic conjunctivitis     Chronic seasonal allergic rhinitis     Hyperlipidemia LDL goal <130     Chronic constipation     Osteoporosis     Health Care Home     Right arm weakness     Ulnar neuropathy     Headache     Mild major depression     DVT (deep venous thrombosis)     Anxiety     Cervical vertebral fracture (H)     Intermittent atrial fibrillation (H)     Squamous cell carcinoma in situ of skin of face     SK (seborrheic keratosis)     Hypothyroidism     Hyponatremia     Recurrent UTI     History of skin cancer     BPPV (benign paroxysmal positional vertigo)     Benign essential HTN     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)     Positive fecal occult blood test     COPD (chronic obstructive pulmonary disease) (H)     Infectious colitis, enteritis  "and gastroenteritis     Advance Care Planning     Syncope     Long term current use of anticoagulant therapy     Mild persistent asthma without complication     Irritable bowel syndrome without diarrhea     Nausea     Back pain     H/O TB (tuberculosis)     Chest pain      ROS:General: No change in weight, sleep or appetite.  Normal energy.  No fever or chills  Resp: POSITIVE for:, cough, chronic and unchanged.   CV: Negative for palpitations, chest pain  GI: Negative for change in bowel habits, constipation, diarrhea  : No urinary frequency or dysuria, bladder or kidney problems  Musculoskeletal: No significant muscle or joint pains  Some headaches off and on.   Psychiatric: No problems with anxiety, depression or mental health, PHQ9 score today= 0, generalized anxiety disorder scale score today= 0.  Not feeling stressed.     OBJECTIVE:Blood pressure 120/64, pulse 84, temperature 97.8  F (36.6  C), temperature source Tympanic, resp. rate 18, height 1.473 m (4' 10\"), weight 56.2 kg (124 lb), last menstrual period 06/15/1985, SpO2 96 %, not currently breastfeeding. BMI=Body mass index is 25.92 kg/m .  GENERAL APPEARANCE ADULT: Alert, no acute distress, walks with walker  NECK: No adenopathy,masses or thyromegaly  RESP: lungs clear to auscultation   CV: normal rate, regular rhythm, no murmur or gallop  ABDOMEN: soft, no organomegaly, masses or tenderness  MS: extremities normal, no peripheral edema     ASSESSMENT:       ICD-10-CM    1. Gastroesophageal reflux disease without esophagitis  K21.9 sucralfate (CARAFATE) 1 GM/10ML suspension   2. Dyspepsia  R10.13 sucralfate (CARAFATE) 1 GM/10ML suspension      Epigastric abdominal pain which has been chronic.  Some nausea.  Has had gastroscopy October 2019 and CT abdomen pelvis October 2018.  She has seen GI in the past.  On omeprazole in a good dose (40mg daily).     PLAN: Continue the omeprazole 40mg daily in AM.   Add sucralfate (CARAFATE) four times daily 10ml.  "     Let us know if not helping.   Eating smaller meals more often could help.     OK for acetaminophen as needed.      Acetaminophen (Tylenol) may be taken as needed for pain and fever.  The maximum doses are listed below, around 3000mg a day.    Extended release pills are 650mg each.  The maximum dose is two pills (1300mg) every 8 hours as needed up to 3900mg a day.     Watch out for acetaminophen in other over the counter or prescription medications.      Too much acetaminophen can lead to serious liver damage.

## 2020-06-11 ASSESSMENT — ANXIETY QUESTIONNAIRES: GAD7 TOTAL SCORE: 0

## 2020-06-11 ASSESSMENT — ASTHMA QUESTIONNAIRES: ACT_TOTALSCORE: 19

## 2020-06-12 ENCOUNTER — ANTICOAGULATION THERAPY VISIT (OUTPATIENT)
Dept: FAMILY MEDICINE | Facility: CLINIC | Age: 85
End: 2020-06-12

## 2020-06-12 DIAGNOSIS — Z79.01 LONG TERM CURRENT USE OF ANTICOAGULANT THERAPY: ICD-10-CM

## 2020-06-12 DIAGNOSIS — I48.0 INTERMITTENT ATRIAL FIBRILLATION (H): ICD-10-CM

## 2020-06-12 DIAGNOSIS — I82.409 DVT (DEEP VENOUS THROMBOSIS) (H): ICD-10-CM

## 2020-06-12 LAB
CAPILLARY BLOOD COLLECTION: NORMAL
INR PPP: 2.2 (ref 0.86–1.14)

## 2020-06-12 PROCEDURE — 85610 PROTHROMBIN TIME: CPT | Performed by: FAMILY MEDICINE

## 2020-06-12 PROCEDURE — 99207 ZZC NO CHARGE NURSE ONLY: CPT | Performed by: FAMILY MEDICINE

## 2020-06-12 PROCEDURE — 36416 COLLJ CAPILLARY BLOOD SPEC: CPT | Performed by: FAMILY MEDICINE

## 2020-06-12 NOTE — PROGRESS NOTES
ANTICOAGULATION FOLLOW-UP CLINIC VISIT    Patient Name:  Ellie Leigh  Date:  2020  Contact Type:  Telephone/ Suyapa    SUBJECTIVE:  Patient Findings     Positives:   Change in medications (Carafate)    Comments:   No changes in activity, health, or diet noted. No bleeding or increased bruising noted. Took warfarin as prescribed.  Patient was started on Carafate. Per micro-medics, Carafate may reduce the effects of the INR.  Patient will continue weekly maintenance dose. INR is therapeutic.   Recheck in 2 weeks.   Patient's daughter verbalizes understanding and agrees to plan. No further questions or concerns.          Clinical Outcomes     Negatives:   Major bleeding event, Thromboembolic event, Anticoagulation-related hospital admission, Anticoagulation-related ED visit, Anticoagulation-related fatality    Comments:   No changes in activity, health, or diet noted. No bleeding or increased bruising noted. Took warfarin as prescribed.  Patient was started on Carafate. Per micro-medics, Carafate may reduce the effects of the INR.  Patient will continue weekly maintenance dose. INR is therapeutic.   Recheck in 2 weeks.   Patient's daughter verbalizes understanding and agrees to plan. No further questions or concerns.             OBJECTIVE    Recent labs: (last 7 days)     20  1304   INR 2.20*       ASSESSMENT / PLAN  INR assessment THER    Recheck INR In: 2 WEEKS    INR Location Outside lab      Anticoagulation Summary  As of 2020    INR goal:   2.0-3.0   TTR:   56.8 % (1 y)   INR used for dosin.20 (2020)   Warfarin maintenance plan:   2 mg (1 mg x 2) every Mon, Fri; 1 mg (1 mg x 1) all other days   Full warfarin instructions:   2 mg every Mon, Fri; 1 mg all other days   Weekly warfarin total:   9 mg   Plan last modified:   Ruiz Meredith RN (2020)   Next INR check:   2020   Priority:   Maintenance   Target end date:   Indefinite    Indications    Atrial fibrillation  with rapid ventricular response (H) (Resolved) [I48.91]  DVT (deep venous thrombosis) [I82.409]  Long term current use of anticoagulant therapy [Z79.01]             Anticoagulation Episode Summary     INR check location:       Preferred lab:       Send INR reminders to:   GARRY BARRY    Comments:   * Taking Celebrex PRN         Anticoagulation Care Providers     Provider Role Specialty Phone number    Anjum Vidales MD Referring [facility] [phone]            See the Encounter Report to view Anticoagulation Flowsheet and Dosing Calendar (Go to Encounters tab in chart review, and find the Anticoagulation Therapy Visit)        Ruiz Meredith RN

## 2020-06-26 ENCOUNTER — ANTICOAGULATION THERAPY VISIT (OUTPATIENT)
Dept: ANTICOAGULATION | Facility: CLINIC | Age: 85
End: 2020-06-26

## 2020-06-26 ENCOUNTER — TELEPHONE (OUTPATIENT)
Dept: FAMILY MEDICINE | Facility: CLINIC | Age: 85
End: 2020-06-26

## 2020-06-26 DIAGNOSIS — I82.409 DVT (DEEP VENOUS THROMBOSIS) (H): ICD-10-CM

## 2020-06-26 DIAGNOSIS — I48.0 INTERMITTENT ATRIAL FIBRILLATION (H): ICD-10-CM

## 2020-06-26 DIAGNOSIS — J45.30 MILD PERSISTENT ASTHMA WITHOUT COMPLICATION: Primary | ICD-10-CM

## 2020-06-26 DIAGNOSIS — Z79.01 LONG TERM CURRENT USE OF ANTICOAGULANT THERAPY: ICD-10-CM

## 2020-06-26 LAB
CAPILLARY BLOOD COLLECTION: NORMAL
INR PPP: 2.4 (ref 0.86–1.14)

## 2020-06-26 PROCEDURE — 36416 COLLJ CAPILLARY BLOOD SPEC: CPT | Performed by: FAMILY MEDICINE

## 2020-06-26 PROCEDURE — 85610 PROTHROMBIN TIME: CPT | Performed by: FAMILY MEDICINE

## 2020-06-26 NOTE — PROGRESS NOTES
ANTICOAGULATION MANAGEMENT     Patient Name:  Ellie Leigh  Date:  2020    ASSESSMENT /SUBJECTIVE:    Today's INR result of 2.40 is therapeutic. Goal INR of 2.0-3.0      Warfarin dose taken: Warfarin taken as previously instructed    Diet: No new diet changes affecting INR    Medication changes/ interactions: No new medications/supplements affecting INR  Patient has not been taking Carafate (last dose take 6/15)-- discussed with daughter the impact of this medication while taking warfarin. If she were to restart patient needs to space the 2 medications apart to avoid absorption inhibition.    Previous INR: Therapeutic 2.20    S/S of bleeding or thromboembolism: No    New injury or illness: No    Upcoming surgery, procedure or cardioversion: No    Additional findings: None      PLAN:    Spoke with Suyapa, patient's daughter regarding INR result and instructed:     Warfarin Dosing Instructions: Continue your current warfarin dose 2mg Mon,Fri; 1mg all other days    Instructed patient to follow up no later than: 3 weeks  Lab visit scheduled    Education provided: Potential interaction between warfarin and carafate and Importance of notifying clinic for changes in medications      Suyapa verbalizes understanding and agrees to warfarin dosing plan.    Instructed to call the Anticoagulation Clinic for any changes, questions or concerns. (#[phone])        OBJECTIVE:  INR   Date Value Ref Range Status   2020 2.40 (H) 0.86 - 1.14 Final     Comment:     This test is intended for monitoring Coumadin therapy.  Results are not   accurate in patients with prolonged INR due to factor deficiency.           INR assessment THER    Recheck INR In: 3 WEEKS    INR Location Clinic      Anticoagulation Summary  As of 2020    INR goal:   2.0-3.0   TTR:   60.5 % (1 y)   INR used for dosin.40 (2020)   Warfarin maintenance plan:   2 mg (1 mg x 2) every Mon, Fri; 1 mg (1 mg x 1) all other days   Full  warfarin instructions:   2 mg every Mon, Fri; 1 mg all other days   Weekly warfarin total:   9 mg   No change documented:   Katia Landrum RN   Plan last modified:   Ruiz Meredith RN (2/18/2020)   Next INR check:   7/17/2020   Priority:   Maintenance   Target end date:   Indefinite    Indications    Atrial fibrillation with rapid ventricular response (H) (Resolved) [I48.91]  DVT (deep venous thrombosis) [I82.409]  Long term current use of anticoagulant therapy [Z79.01]             Anticoagulation Episode Summary     INR check location:       Preferred lab:       Send INR reminders to:   GARRY BARRY    Comments:   * Taking Celebrex PRN         Anticoagulation Care Providers     Provider Role Specialty Phone number    Anjum Vidales MD Referring [facility] Practice [phone]

## 2020-06-26 NOTE — TELEPHONE ENCOUNTER
Reason for Call:  Other Information    Detailed comments: Ellie calling back stating that per [facility]  there isn't a generic for Symbicort they suggested to try a generic for Advair    Phone Number Patient can be reached at: Home number on file [phone] (home)    Best Time: anytime    Can we leave a detailed message on this number? Not Applicable    Call taken on 6/26/2020 at 2:09 PM by Irene Silva

## 2020-07-17 ENCOUNTER — ANTICOAGULATION THERAPY VISIT (OUTPATIENT)
Dept: ANTICOAGULATION | Facility: CLINIC | Age: 85
End: 2020-07-17

## 2020-07-17 DIAGNOSIS — I82.409 DVT (DEEP VENOUS THROMBOSIS) (H): ICD-10-CM

## 2020-07-17 DIAGNOSIS — I48.0 INTERMITTENT ATRIAL FIBRILLATION (H): ICD-10-CM

## 2020-07-17 DIAGNOSIS — Z79.01 LONG TERM CURRENT USE OF ANTICOAGULANT THERAPY: ICD-10-CM

## 2020-07-17 LAB — INR PPP: 2.4 (ref 0.86–1.14)

## 2020-07-17 PROCEDURE — 36416 COLLJ CAPILLARY BLOOD SPEC: CPT | Performed by: FAMILY MEDICINE

## 2020-07-17 PROCEDURE — 99207 ZZC NO CHARGE NURSE ONLY: CPT

## 2020-07-17 PROCEDURE — 85610 PROTHROMBIN TIME: CPT | Performed by: FAMILY MEDICINE

## 2020-07-17 NOTE — PROGRESS NOTES
ANTICOAGULATION FOLLOW-UP CLINIC VISIT    Patient Name:  Ellie Leigh  Date:  2020  Contact Type:  Telephone    SUBJECTIVE:  Patient Findings     Comments:   No changes in medications, activity, or diet noted. No concerns with clotting, bleeding, or increased bruising noted. Took warfarin as prescribed.  Patient is to continue maintenance warfarin plan, and check INR in 4 weeks.  Patient verbalizes understanding and agrees to plan. No further questions or concerns.        Clinical Outcomes     Negatives:   Major bleeding event, Thromboembolic event, Anticoagulation-related hospital admission, Anticoagulation-related ED visit, Anticoagulation-related fatality    Comments:   No changes in medications, activity, or diet noted. No concerns with clotting, bleeding, or increased bruising noted. Took warfarin as prescribed.  Patient is to continue maintenance warfarin plan, and check INR in 4 weeks.  Patient verbalizes understanding and agrees to plan. No further questions or concerns.           OBJECTIVE    Recent labs: (last 7 days)     20  1413   INR 2.40*       ASSESSMENT / PLAN  INR assessment THER    Recheck INR In: 4 WEEKS    INR Location Clinic      Anticoagulation Summary  As of 2020    INR goal:   2.0-3.0   TTR:   63.8 % (1 y)   INR used for dosin.40 (2020)   Warfarin maintenance plan:   2 mg (1 mg x 2) every Mon, Fri; 1 mg (1 mg x 1) all other days   Full warfarin instructions:   2 mg every Mon, Fri; 1 mg all other days   Weekly warfarin total:   9 mg   No change documented:   Kylah Dorsey, RN   Plan last modified:   Ruiz Meredith RN (2020)   Next INR check:   2020   Priority:   Maintenance   Target end date:   Indefinite    Indications    Atrial fibrillation with rapid ventricular response (H) (Resolved) [I48.91]  DVT (deep venous thrombosis) [I82.409]  Long term current use of anticoagulant therapy [Z79.01]             Anticoagulation Episode Summary      INR check location:       Preferred lab:       Send INR reminders to:   GARRY BARRY    Comments:   * Taking Celebrex PRN         Anticoagulation Care Providers     Provider Role Specialty Phone number    Anjum Vidales MD Referring [facility] [phone]            See the Encounter Report to view Anticoagulation Flowsheet and Dosing Calendar (Go to Encounters tab in chart review, and find the Anticoagulation Therapy Visit)        Kylah Dorsey RN

## 2020-08-04 ENCOUNTER — VIRTUAL VISIT (OUTPATIENT)
Dept: CARDIOLOGY | Facility: CLINIC | Age: 85
End: 2020-08-04
Payer: COMMERCIAL

## 2020-08-04 ENCOUNTER — HOSPITAL ENCOUNTER (OUTPATIENT)
Dept: CARDIOLOGY | Facility: CLINIC | Age: 85
Discharge: HOME | End: 2020-08-04
Attending: INTERNAL MEDICINE | Admitting: INTERNAL MEDICINE
Payer: COMMERCIAL

## 2020-08-04 DIAGNOSIS — Z95.0 CARDIAC PACEMAKER IN SITU: Primary | ICD-10-CM

## 2020-08-04 DIAGNOSIS — Z95.0 CARDIAC PACEMAKER IN SITU: ICD-10-CM

## 2020-08-04 PROCEDURE — 99213 OFFICE O/P EST LOW 20 MIN: CPT | Mod: 95 | Performed by: INTERNAL MEDICINE

## 2020-08-04 PROCEDURE — 93280 PM DEVICE PROGR EVAL DUAL: CPT | Mod: 26 | Performed by: INTERNAL MEDICINE

## 2020-08-04 PROCEDURE — 93280 PM DEVICE PROGR EVAL DUAL: CPT

## 2020-08-04 NOTE — PROGRESS NOTES
"Ellie Leigh is a 89 year old female who is being evaluated via a billable video visit.      The patient has been notified of following:     \"This video visit will be conducted via a call between you and your physician/provider. We have found that certain health care needs can be provided without the need for an in-person physical exam.  This service lets us provide the care you need with a video conversation.  If a prescription is necessary we can send it directly to your pharmacy.  If lab work is needed we can place an order for that and you can then stop by our lab to have the test done at a later time.    Video visits are billed at different rates depending on your insurance coverage.  Please reach out to your insurance provider with any questions.    If during the course of the call the physician/provider feels a video visit is not appropriate, you will not be charged for this service.\"    Patient has given verbal consent for Video visit? Yes  How would you like to obtain your AVS? MyChart  If you are dropped from the video visit, the video invite should be resent to: Text to cell phone: [phone]  Will anyone else be joining your video visit? No        Video-Visit Details    Type of service:  Video Visit    Video Start Time: 9:30 AM  Video End Time: 9:35 AM    Originating Location (pt. Location): Home    Distant Location (provider location):  [facility]     Platform used for Video Visit: Doximity   General:  no apparent distress, normal body habitus, sitting upright.  ENT/Mouth:  membranes moist, no nasal discharge.  Normal head shape, no apparent injury or laceration.  Eyes:  no scleral icterus, normal conjunctivae.  No observed jaundice.  Neck:  no apparent neck swelling.   Chest/Lungs:  No breathing difficulty while speaking.  No audible wheezing.  No cough during conversation.  Cardiovascular:  No obviously elevated jugular venous pressure.  No apparent " edema bilaterally in LE.   Abdomen:  no obvious abdominal distention.   Extremities:  no apparent cyanosis.  Skin:  no xanthelasma.  No facial lacerations.  Neurologic:  Normal arm motion bilateral, no tremors.    Psychiatric:  Alert and oriented x3, calm demeanor    The rest of the comprehensive physical examination is deferred due to public health emergency video visit restrictions.    Thank you for allowing me to participate in the care of this delightful patient.  As you know, Ellie is an 89-year-old female with a history of syncope and had evidence of tachybrady syndrome, status post pacemaker implantation about 6 years ago.  Since then she has not had any syncope.  Her most recent device interrogation shows a few episodes of atrial fibrillation for which she was asymptomatic but otherwise pretty unremarkable.  She has been on warfarin without any problem. She is getting tired of this pandemic but does realize that she has to keep social distancing which she has. Patient lives with her daughter and does manage rather well without feeling sob or orthopnea. Arthritis is her limiting factor. Device check a few months ago showed appropriate function. Encouraged pt to continue to practice social distancing during this pandemic and RCT in 2 years.    Philippe Luna MD

## 2020-08-04 NOTE — LETTER
8/4/2020    Anjum Vidales MD  [address] [place] [id number]    RE: Ellie Leigh       Dear Colleague,    I had the pleasure of seeing Ellie Leigh in the [facility].    Ellie Leigh is a 89 year old female who is being evaluated via a billable video visit.        General:  no apparent distress, normal body habitus, sitting upright.  ENT/Mouth:  membranes moist, no nasal discharge.  Normal head shape, no apparent injury or laceration.  Eyes:  no scleral icterus, normal conjunctivae.  No observed jaundice.  Neck:  no apparent neck swelling.   Chest/Lungs:  No breathing difficulty while speaking.  No audible wheezing.  No cough during conversation.  Cardiovascular:  No obviously elevated jugular venous pressure.  No apparent edema bilaterally in LE.   Abdomen:  no obvious abdominal distention.   Extremities:  no apparent cyanosis.  Skin:  no xanthelasma.  No facial lacerations.  Neurologic:  Normal arm motion bilateral, no tremors.    Psychiatric:  Alert and oriented x3, calm demeanor    The rest of the comprehensive physical examination is deferred due to public health emergency video visit restrictions.    Thank you for allowing me to participate in the care of this delightful patient.  As you know, Ellie is an 89-year-old female with a history of syncope and had evidence of tachybrady syndrome, status post pacemaker implantation about 6 years ago.  Since then she has not had any syncope.  Her most recent device interrogation shows a few episodes of atrial fibrillation for which she was asymptomatic but otherwise pretty unremarkable.  She has been on warfarin without any problem. She is getting tired of this pandemic but does realize that she has to keep social distancing which she has. Patient lives with her daughter and does manage rather well without feeling sob or orthopnea. Arthritis is her limiting factor. Device check a few months ago showed  appropriate function. Encouraged pt to continue to practice social distancing during this pandemic and RCT in 2 years.      Thank you for allowing me to participate in the care of your patient.    Sincerely,     Philippe Luna MD     [facility]

## 2020-08-05 LAB
MDC_IDC_LEAD_IMPLANT_DT: NORMAL
MDC_IDC_LEAD_IMPLANT_DT: NORMAL
MDC_IDC_LEAD_LOCATION: NORMAL
MDC_IDC_LEAD_LOCATION: NORMAL
MDC_IDC_LEAD_LOCATION_DETAIL_1: NORMAL
MDC_IDC_LEAD_LOCATION_DETAIL_1: NORMAL
MDC_IDC_LEAD_MFG: NORMAL
MDC_IDC_LEAD_MFG: NORMAL
MDC_IDC_LEAD_MODEL: NORMAL
MDC_IDC_LEAD_MODEL: NORMAL
MDC_IDC_LEAD_POLARITY_TYPE: NORMAL
MDC_IDC_LEAD_POLARITY_TYPE: NORMAL
MDC_IDC_LEAD_SERIAL: NORMAL
MDC_IDC_LEAD_SERIAL: NORMAL
MDC_IDC_MSMT_BATTERY_STATUS: NORMAL
MDC_IDC_MSMT_LEADCHNL_RA_IMPEDANCE_VALUE: 409 OHM
MDC_IDC_MSMT_LEADCHNL_RA_PACING_THRESHOLD_AMPLITUDE: 0.7 V
MDC_IDC_MSMT_LEADCHNL_RA_PACING_THRESHOLD_AMPLITUDE: 0.7 V
MDC_IDC_MSMT_LEADCHNL_RA_PACING_THRESHOLD_PULSEWIDTH: 0.4 MS
MDC_IDC_MSMT_LEADCHNL_RA_PACING_THRESHOLD_PULSEWIDTH: 0.4 MS
MDC_IDC_MSMT_LEADCHNL_RA_SENSING_INTR_AMPL: 7.7 MV
MDC_IDC_MSMT_LEADCHNL_RV_IMPEDANCE_VALUE: 487 OHM
MDC_IDC_MSMT_LEADCHNL_RV_PACING_THRESHOLD_AMPLITUDE: 0.5 V
MDC_IDC_MSMT_LEADCHNL_RV_PACING_THRESHOLD_AMPLITUDE: 0.5 V
MDC_IDC_MSMT_LEADCHNL_RV_PACING_THRESHOLD_PULSEWIDTH: 0.4 MS
MDC_IDC_MSMT_LEADCHNL_RV_PACING_THRESHOLD_PULSEWIDTH: 0.4 MS
MDC_IDC_MSMT_LEADCHNL_RV_SENSING_INTR_AMPL: 17 MV
MDC_IDC_MSMT_LEADCHNL_RV_SENSING_INTR_AMPL: 7.7 MV
MDC_IDC_PG_IMPLANT_DTM: NORMAL
MDC_IDC_PG_MFG: NORMAL
MDC_IDC_PG_MODEL: NORMAL
MDC_IDC_PG_SERIAL: NORMAL
MDC_IDC_PG_TYPE: NORMAL
MDC_IDC_SESS_CLINIC_NAME: NORMAL
MDC_IDC_SESS_DTM: NORMAL
MDC_IDC_SESS_REPROGRAMMED: NORMAL
MDC_IDC_SESS_TYPE: NORMAL
MDC_IDC_SET_BRADY_AT_MODE_SWITCH_MODE: NORMAL
MDC_IDC_SET_BRADY_AT_MODE_SWITCH_RATE: 160 {BEATS}/MIN
MDC_IDC_SET_BRADY_HYSTRATE: 60 {BEATS}/MIN
MDC_IDC_SET_BRADY_LOWRATE: 60 {BEATS}/MIN
MDC_IDC_SET_BRADY_MAX_SENSOR_RATE: 125 {BEATS}/MIN
MDC_IDC_SET_BRADY_MAX_TRACKING_RATE: 130 {BEATS}/MIN
MDC_IDC_SET_BRADY_MODE: NORMAL
MDC_IDC_SET_BRADY_NIGHT_RATE: 60 {BEATS}/MIN
MDC_IDC_SET_BRADY_PAV_DELAY_HIGH: 150 MS
MDC_IDC_SET_BRADY_PAV_DELAY_LOW: 180 MS
MDC_IDC_SET_BRADY_SAV_DELAY_HIGH: 105 MS
MDC_IDC_SET_BRADY_SAV_DELAY_LOW: 135 MS
MDC_IDC_SET_CRT_PACED_CHAMBERS: NORMAL
MDC_IDC_SET_LEADCHNL_LV_PACING_CATHODE_ELECTRODE_1: NORMAL
MDC_IDC_SET_LEADCHNL_LV_PACING_CATHODE_LOCATION_1: NORMAL
MDC_IDC_SET_LEADCHNL_LV_PACING_POLARITY: NORMAL
MDC_IDC_SET_LEADCHNL_LV_SENSING_CATHODE_ELECTRODE_1: NORMAL
MDC_IDC_SET_LEADCHNL_LV_SENSING_CATHODE_LOCATION_1: NORMAL
MDC_IDC_SET_LEADCHNL_LV_SENSING_POLARITY: NORMAL
MDC_IDC_SET_LEADCHNL_RA_PACING_AMPLITUDE: 1.7 V
MDC_IDC_SET_LEADCHNL_RA_PACING_POLARITY: NORMAL
MDC_IDC_SET_LEADCHNL_RA_PACING_PULSEWIDTH: 0.4 MS
MDC_IDC_SET_LEADCHNL_RA_SENSING_ADAPTATION_MODE: NORMAL
MDC_IDC_SET_LEADCHNL_RA_SENSING_POLARITY: NORMAL
MDC_IDC_SET_LEADCHNL_RA_SENSING_SENSITIVITY: NORMAL
MDC_IDC_SET_LEADCHNL_RV_PACING_AMPLITUDE: 1.5 V
MDC_IDC_SET_LEADCHNL_RV_PACING_POLARITY: NORMAL
MDC_IDC_SET_LEADCHNL_RV_PACING_PULSEWIDTH: 0.4 MS
MDC_IDC_SET_LEADCHNL_RV_SENSING_ADAPTATION_MODE: NORMAL
MDC_IDC_SET_LEADCHNL_RV_SENSING_POLARITY: NORMAL
MDC_IDC_SET_LEADCHNL_RV_SENSING_SENSITIVITY: NORMAL

## 2020-08-14 ENCOUNTER — ANTICOAGULATION THERAPY VISIT (OUTPATIENT)
Dept: ANTICOAGULATION | Facility: CLINIC | Age: 85
End: 2020-08-14
Payer: COMMERCIAL

## 2020-08-14 DIAGNOSIS — I82.409 DVT (DEEP VENOUS THROMBOSIS) (H): ICD-10-CM

## 2020-08-14 DIAGNOSIS — Z79.01 LONG TERM CURRENT USE OF ANTICOAGULANT THERAPY: ICD-10-CM

## 2020-08-14 DIAGNOSIS — I48.0 INTERMITTENT ATRIAL FIBRILLATION (H): ICD-10-CM

## 2020-08-14 LAB
CAPILLARY BLOOD COLLECTION: NORMAL
INR PPP: 2 (ref 0.86–1.14)

## 2020-08-14 PROCEDURE — 36416 COLLJ CAPILLARY BLOOD SPEC: CPT | Performed by: FAMILY MEDICINE

## 2020-08-14 PROCEDURE — 99207 ZZC NO CHARGE NURSE ONLY: CPT

## 2020-08-14 PROCEDURE — 85610 PROTHROMBIN TIME: CPT | Performed by: FAMILY MEDICINE

## 2020-08-14 NOTE — PROGRESS NOTES
ANTICOAGULATION FOLLOW-UP CLINIC VISIT    Patient Name:  Ellie Leigh  Date:  2020  Contact Type:  Telephone    SUBJECTIVE:  Patient Findings     Comments:   No changes in medications, activity, or diet noted. No concerns with clotting, bleeding, or increased bruising noted. Took warfarin as prescribed.  Patient is to continue maintenance warfarin plan, and check INR in 5 weeks.  Suyapa verbalizes understanding and agrees to plan. No further questions or concerns.        Clinical Outcomes     Negatives:   Major bleeding event, Thromboembolic event, Anticoagulation-related hospital admission, Anticoagulation-related ED visit, Anticoagulation-related fatality    Comments:   No changes in medications, activity, or diet noted. No concerns with clotting, bleeding, or increased bruising noted. Took warfarin as prescribed.  Patient is to continue maintenance warfarin plan, and check INR in 5 weeks.  Suyapa verbalizes understanding and agrees to plan. No further questions or concerns.           OBJECTIVE    Recent labs: (last 7 days)     20  1354   INR 2.00*       ASSESSMENT / PLAN  INR assessment THER    Recheck INR In: 5 WEEKS    INR Location Clinic      Anticoagulation Summary  As of 2020    INR goal:   2.0-3.0   TTR:   66.2 % (1 y)   INR used for dosin.00 (2020)   Warfarin maintenance plan:   2 mg (1 mg x 2) every Mon, Fri; 1 mg (1 mg x 1) all other days   Full warfarin instructions:   2 mg every Mon, Fri; 1 mg all other days   Weekly warfarin total:   9 mg   No change documented:   Kylah Dorsey, RN   Plan last modified:   Ruiz Meredith RN (2020)   Next INR check:   2020   Priority:   Maintenance   Target end date:   Indefinite    Indications    Atrial fibrillation with rapid ventricular response (H) (Resolved) [I48.91]  DVT (deep venous thrombosis) [I82.409]  Long term current use of anticoagulant therapy [Z79.01]             Anticoagulation Episode Summary     INR  check location:       Preferred lab:       Send INR reminders to:   GARRY BARRY    Comments:   * Taking Celebrex PRN         Anticoagulation Care Providers     Provider Role Specialty Phone number    Anjum Vidales MD Referring [facility] [phone]            See the Encounter Report to view Anticoagulation Flowsheet and Dosing Calendar (Go to Encounters tab in chart review, and find the Anticoagulation Therapy Visit)        Kylah Dorsey RN

## 2020-08-26 ENCOUNTER — TELEPHONE (OUTPATIENT)
Dept: FAMILY MEDICINE | Facility: CLINIC | Age: 85
End: 2020-08-26

## 2020-08-26 DIAGNOSIS — R11.0 NAUSEA: ICD-10-CM

## 2020-08-26 DIAGNOSIS — K58.9 IRRITABLE BOWEL SYNDROME WITHOUT DIARRHEA: Primary | ICD-10-CM

## 2020-08-26 RX ORDER — MIRTAZAPINE 7.5 MG/1
7.5 TABLET, FILM COATED ORAL AT BEDTIME
Qty: 30 TABLET | Refills: 1 | Status: SHIPPED | OUTPATIENT
Start: 2020-08-26 | End: 2020-10-02

## 2020-08-26 NOTE — TELEPHONE ENCOUNTER
Reason for Call:  Other     Detailed comments: Pt is calling with concerns as she is tired, nauseated and cant sleep all the time.  Stating that the Carafate is not working.  [place]ar is [place] in [place]    Phone Number Patient can be reached at: Home number on file [phone] (home)    Best Time: any    Can we leave a detailed message on this number? YES    Call taken on 8/26/2020 at 9:29 AM by Carmel Causey

## 2020-08-26 NOTE — TELEPHONE ENCOUNTER
Please call.  I suggest adding mirtazapine in small dose at bedtime-7.5mg.  This should help sleep and may help nausea.   If not helping, I recommend she make a visit.   MARCY DAUGHERTY MD

## 2020-08-26 NOTE — TELEPHONE ENCOUNTER
Dr. Vidales,    Spoke to the patient who is reporting that her nausea is worse since being on the Carafate, able to keep food and fluids down but is reporting the medication is helping the stomach burning she was seen for but the nausea is too much for her, especially when she eats. Also reporting difficulty getting to sleep most nights, says she often needs to sleep during the day, denies that her stomach acid is the reason for it, just can not fall and stay asleep. Currently taking Tylenol arthritis and Melatonin for this but says this is not working.     Please advise.    AYLIN Lombardi

## 2020-08-27 NOTE — TELEPHONE ENCOUNTER
Message below relayed to pt. She expressed understanding and agreement with plan.    Jessica SANTO RN, BSN

## 2020-09-04 DIAGNOSIS — K21.9 GASTROESOPHAGEAL REFLUX DISEASE WITHOUT ESOPHAGITIS: ICD-10-CM

## 2020-09-04 DIAGNOSIS — J44.9 CHRONIC OBSTRUCTIVE PULMONARY DISEASE, UNSPECIFIED COPD TYPE (H): ICD-10-CM

## 2020-09-08 RX ORDER — OMEPRAZOLE 40 MG/1
CAPSULE, DELAYED RELEASE ORAL
Qty: 30 CAPSULE | Refills: 11 | Status: SHIPPED | OUTPATIENT
Start: 2020-09-08 | End: 2020-11-06

## 2020-09-08 RX ORDER — IPRATROPIUM BROMIDE AND ALBUTEROL SULFATE 2.5; .5 MG/3ML; MG/3ML
SOLUTION RESPIRATORY (INHALATION)
Qty: 180 ML | Refills: 11 | Status: SHIPPED | OUTPATIENT
Start: 2020-09-08 | End: 2020-11-06

## 2020-09-08 NOTE — TELEPHONE ENCOUNTER
"Routing refill requests to provider for review/approval because:  Last ACT < 19  Has dx osteoporosis    Requested Prescriptions   Pending Prescriptions Disp Refills     ipratropium - albuterol 0.5 mg/2.5 mg/3 mL (DUONEB) 0.5-2.5 (3) MG/3ML neb solution [Pharmacy Med Name: Ipratropium-Albuterol Inhalation Solution 0.5-2.5 (3) MG/3ML] 180 mL 0     Sig: TAKE 1 VIAL BY NEBULIZATION  EVERY SIX HOURS AS NEEDED FOR SHORTNESS OF BREATH/ DYSPNEA OR WHEEZING.       Short-Acting Beta Agonist Inhalers Protocol  Failed - 9/4/2020 10:21 AM        Failed - Asthma control assessment score within normal limits in last 6 months     Please review ACT score.           Passed - Patient is age 12 or older        Passed - Medication is active on med list        Passed - Recent (6 mo) or future (30 days) visit within the authorizing provider's specialty     Patient had office visit in the last 6 months or has a visit in the next 30 days with authorizing provider or within the authorizing provider's specialty.  See \"Patient Info\" tab in inbasket, or \"Choose Columns\" in Meds & Orders section of the refill encounter.           Asthma Nebs Protocol Failed - 9/4/2020 10:21 AM        Failed - Asthma control assessment score within normal limits in last 6 months     Please review ACT score.           Passed - Patient is age 4 years or older        Passed - Medication is active on med list        Passed - Recent (6 mo) or future (30 days) visit within the authorizing provider's specialty     Patient had office visit in the last 6 months or has a visit in the next 30 days with authorizing provider or within the authorizing provider's specialty.  See \"Patient Info\" tab in inbasket, or \"Choose Columns\" in Meds & Orders section of the refill encounter.               omeprazole (PRILOSEC) 40 MG DR capsule [Pharmacy Med Name: Omeprazole Oral Capsule Delayed Release 40 MG] 30 capsule 0     Sig: Take 1 capsule (40 mg) by mouth once daily.       PPI " "Protocol Failed - 9/4/2020 10:21 AM        Failed - No diagnosis of osteoporosis on record        Passed - Not on Clopidogrel (unless Pantoprazole ordered)        Passed - Recent (12 mo) or future (30 days) visit within the authorizing provider's specialty     Patient has had an office visit with the authorizing provider or a provider within the authorizing providers department within the previous 12 mos or has a future within next 30 days. See \"Patient Info\" tab in inbasket, or \"Choose Columns\" in Meds & Orders section of the refill encounter.              Passed - Medication is active on med list        Passed - Patient is age 18 or older        Passed - No active pregnacy on record        Passed - No positive pregnancy test in past 12 months           ACT Total Scores 2/6/2019 5/20/2019 6/10/2020   ACT TOTAL SCORE - - -   ASTHMA ER VISITS - - -   ASTHMA HOSPITALIZATIONS - - -   ACT TOTAL SCORE (Goal Greater than or Equal to 20) 19 21 19   In the past 12 months, how many times did you visit the emergency room for your asthma without being admitted to the hospital? 0 0 0   In the past 12 months, how many times were you hospitalized overnight because of your asthma? 0 0 0       "

## 2020-09-16 ENCOUNTER — TELEPHONE (OUTPATIENT)
Dept: FAMILY MEDICINE | Facility: CLINIC | Age: 85
End: 2020-09-16

## 2020-09-16 NOTE — TELEPHONE ENCOUNTER
"S-(situation): \"I'm not feeling good at all\" she says.  Feels like passing out.  Has not fallen.  Denies fever, chills, nausea or vomiting.  Has productive cough, coughs up brownish green sputum.    B-(background): patient is 89 years old.      A-(assessment): productive cough    R-(recommendations): appointment (virtual) made.  Celena Laurent RN      "

## 2020-09-16 NOTE — TELEPHONE ENCOUNTER
Reason for Call:  Other     Detailed comments: Patient is not feeling well    Phone Number Patient can be reached at: Home number on file [phone] (home)    Best Time:     Can we leave a detailed message on this number? YES    Call taken on 9/16/2020 at 9:42 AM by Marilyn Harrison

## 2020-09-18 ENCOUNTER — ANTICOAGULATION THERAPY VISIT (OUTPATIENT)
Dept: ANTICOAGULATION | Facility: CLINIC | Age: 85
End: 2020-09-18
Payer: COMMERCIAL

## 2020-09-18 DIAGNOSIS — Z79.01 LONG TERM CURRENT USE OF ANTICOAGULANT THERAPY: ICD-10-CM

## 2020-09-18 DIAGNOSIS — I48.0 INTERMITTENT ATRIAL FIBRILLATION (H): ICD-10-CM

## 2020-09-18 DIAGNOSIS — I82.409 DVT (DEEP VENOUS THROMBOSIS) (H): ICD-10-CM

## 2020-09-18 LAB
CAPILLARY BLOOD COLLECTION: NORMAL
INR PPP: 1.6 (ref 0.86–1.14)

## 2020-09-18 PROCEDURE — 36416 COLLJ CAPILLARY BLOOD SPEC: CPT | Performed by: FAMILY MEDICINE

## 2020-09-18 PROCEDURE — 99207 ZZC NO CHARGE NURSE ONLY: CPT

## 2020-09-18 PROCEDURE — 85610 PROTHROMBIN TIME: CPT | Performed by: FAMILY MEDICINE

## 2020-09-18 NOTE — PROGRESS NOTES
ANTICOAGULATION FOLLOW-UP CLINIC VISIT    Patient Name:  Ellie Leigh  Date:  9/18/2020  Contact Type:  Telephone/ Suyapa    SUBJECTIVE:  Patient Findings     Comments:   Writer spoke with Suyapa. No known missed doses or changes that may have altered the INR.  Patient will take 3 mg today, then resume the maintenance dose.  Recheck in 1 week.  Suyapa verbalizes understanding and agrees to plan. No further questions or concerns.  Some signs and symptoms of clots include: pain or tenderness in arm or leg, swelling in arm or leg, changes in skin color, or area is warm to touch, shortness or breath, trouble breathing.  Numbness or weakness especially on 1 side of the body, sudden trouble speaking or swallowing, sudden trouble seeing, sudden confusion, dizzy spells or headache.  If you have these please call 911 or seek medical care immediately.            Clinical Outcomes     Negatives:   Major bleeding event, Thromboembolic event, Anticoagulation-related hospital admission, Anticoagulation-related ED visit, Anticoagulation-related fatality    Comments:   Writer spoke with Suyapa. No known missed doses or changes that may have altered the INR.  Patient will take 3 mg today, then resume the maintenance dose.  Recheck in 1 week.  Suyapa verbalizes understanding and agrees to plan. No further questions or concerns.  Some signs and symptoms of clots include: pain or tenderness in arm or leg, swelling in arm or leg, changes in skin color, or area is warm to touch, shortness or breath, trouble breathing.  Numbness or weakness especially on 1 side of the body, sudden trouble speaking or swallowing, sudden trouble seeing, sudden confusion, dizzy spells or headache.  If you have these please call 911 or seek medical care immediately.               OBJECTIVE    Recent labs: (last 7 days)     09/18/20  1128   INR 1.60*       ASSESSMENT / PLAN  INR assessment SUB    Recheck INR In: 1 WEEK    INR Location Outside lab       Anticoagulation Summary  As of 2020    INR goal:   2.0-3.0   TTR:   66.2 % (1 y)   INR used for dosin.60! (2020)   Warfarin maintenance plan:   2 mg (1 mg x 2) every Mon, Fri; 1 mg (1 mg x 1) all other days   Full warfarin instructions:   : 3 mg; Otherwise 2 mg every Mon, Fri; 1 mg all other days   Weekly warfarin total:   9 mg   Plan last modified:   Ruiz Meredith RN (2020)   Next INR check:   10/2/2020   Priority:   High   Target end date:   Indefinite    Indications    Atrial fibrillation with rapid ventricular response (H) (Resolved) [I48.91]  DVT (deep venous thrombosis) [I82.409]  Long term current use of anticoagulant therapy [Z79.01]             Anticoagulation Episode Summary     INR check location:       Preferred lab:       Send INR reminders to:   GARRY BARRY    Comments:   * Taking Celebrex PRN         Anticoagulation Care Providers     Provider Role Specialty Phone number    Anjum Vidales MD Referring [facility] [phone]            See the Encounter Report to view Anticoagulation Flowsheet and Dosing Calendar (Go to Encounters tab in chart review, and find the Anticoagulation Therapy Visit)        Ruiz Meredith RN

## 2020-09-21 DIAGNOSIS — E03.9 HYPOTHYROIDISM, UNSPECIFIED TYPE: Chronic | ICD-10-CM

## 2020-09-22 RX ORDER — LEVOTHYROXINE SODIUM 50 UG/1
TABLET ORAL
Qty: 30 TABLET | Refills: 0 | Status: SHIPPED | OUTPATIENT
Start: 2020-09-22 | End: 2020-10-19

## 2020-09-25 ENCOUNTER — ANTICOAGULATION THERAPY VISIT (OUTPATIENT)
Dept: ANTICOAGULATION | Facility: CLINIC | Age: 85
End: 2020-09-25

## 2020-09-25 DIAGNOSIS — Z79.01 LONG TERM CURRENT USE OF ANTICOAGULANT THERAPY: ICD-10-CM

## 2020-09-25 DIAGNOSIS — I82.409 DVT (DEEP VENOUS THROMBOSIS) (H): ICD-10-CM

## 2020-09-25 DIAGNOSIS — I48.0 INTERMITTENT ATRIAL FIBRILLATION (H): ICD-10-CM

## 2020-09-25 LAB
CAPILLARY BLOOD COLLECTION: NORMAL
INR PPP: 2.3 (ref 0.86–1.14)

## 2020-09-25 PROCEDURE — 85610 PROTHROMBIN TIME: CPT | Performed by: FAMILY MEDICINE

## 2020-09-25 PROCEDURE — 36416 COLLJ CAPILLARY BLOOD SPEC: CPT | Performed by: FAMILY MEDICINE

## 2020-09-25 NOTE — PROGRESS NOTES
ANTICOAGULATION MANAGEMENT     Patient Name:  Ellie Leigh  Date:  2020    ASSESSMENT /SUBJECTIVE:    Today's INR result of 2.30 is therapeutic. Goal INR of 2.0-3.0      Warfarin dose taken: Warfarin taken as instructed    Diet: No new diet changes affecting INR    Medication changes/ interactions: No new medications/supplements affecting INR    Previous INR: Subtherapeutic 1.60    S/S of bleeding or thromboembolism: No    New injury or illness: No    Upcoming surgery, procedure or cardioversion: No    Additional findings: None      PLAN:    Telephone call with  Suyapa, patient's daughter regarding INR result and instructed:     Warfarin Dosing Instructions: Continue your current warfarin dose 2mg Mon/Fri; 1mg all other days    Instructed patient to follow up no later than: 2 weeks  Lab visit scheduled    Education provided: Contact the anticoagulation clinic with any changes, questions or concerns at #[phone]       Suyapa verbalizes understanding and agrees to warfarin dosing plan.    Instructed to call the Anticoagulation Clinic for any changes, questions or concerns. (#[phone])        Katia Landrum RN CACP       OBJECTIVE:  Recent labs: (last 7 days)     20  1227   INR 2.30*         INR assessment THER    Recheck INR In: 2 WEEKS    INR Location Clinic      Anticoagulation Summary  As of 2020    INR goal:   2.0-3.0   TTR:   67.1 % (1 y)   INR used for dosin.30 (2020)   Warfarin maintenance plan:   2 mg (1 mg x 2) every Mon, Fri; 1 mg (1 mg x 1) all other days   Full warfarin instructions:   2 mg every Mon, Fri; 1 mg all other days   Weekly warfarin total:   9 mg   No change documented:   Katia Landrum RN   Plan last modified:   Ruiz Meredith RN (2020)   Next INR check:   10/9/2020   Priority:   High   Target end date:   Indefinite    Indications    Atrial fibrillation with rapid ventricular response (H) (Resolved) [I48.91]  DVT (deep venous  thrombosis) [I82.409]  Long term current use of anticoagulant therapy [Z79.01]             Anticoagulation Episode Summary     INR check location:       Preferred lab:       Send INR reminders to:   GARRY BARRY    Comments:   * Taking Celebrex PRN         Anticoagulation Care Providers     Provider Role Specialty Phone number    Anjum Vidales MD [facility] [phone]

## 2020-10-02 ENCOUNTER — ANCILLARY PROCEDURE (OUTPATIENT)
Dept: GENERAL RADIOLOGY | Facility: CLINIC | Age: 85
End: 2020-10-02
Attending: FAMILY MEDICINE
Payer: COMMERCIAL

## 2020-10-02 ENCOUNTER — OFFICE VISIT (OUTPATIENT)
Dept: FAMILY MEDICINE | Facility: CLINIC | Age: 85
End: 2020-10-02
Payer: COMMERCIAL

## 2020-10-02 VITALS
SYSTOLIC BLOOD PRESSURE: 148 MMHG | DIASTOLIC BLOOD PRESSURE: 64 MMHG | RESPIRATION RATE: 20 BRPM | WEIGHT: 117 LBS | TEMPERATURE: 97.5 F | BODY MASS INDEX: 24.45 KG/M2 | OXYGEN SATURATION: 92 % | HEART RATE: 88 BPM

## 2020-10-02 DIAGNOSIS — E03.9 HYPOTHYROIDISM, UNSPECIFIED TYPE: Chronic | ICD-10-CM

## 2020-10-02 DIAGNOSIS — J44.9 CHRONIC OBSTRUCTIVE PULMONARY DISEASE, UNSPECIFIED COPD TYPE (H): ICD-10-CM

## 2020-10-02 DIAGNOSIS — F41.9 ANXIETY: ICD-10-CM

## 2020-10-02 DIAGNOSIS — E22.2 SIADH (SYNDROME OF INAPPROPRIATE ADH PRODUCTION) (H): ICD-10-CM

## 2020-10-02 DIAGNOSIS — R11.0 NAUSEA: Primary | ICD-10-CM

## 2020-10-02 DIAGNOSIS — Z23 NEED FOR PROPHYLACTIC VACCINATION AND INOCULATION AGAINST INFLUENZA: ICD-10-CM

## 2020-10-02 DIAGNOSIS — K21.9 GASTROESOPHAGEAL REFLUX DISEASE WITHOUT ESOPHAGITIS: ICD-10-CM

## 2020-10-02 DIAGNOSIS — E87.1 HYPONATREMIA: ICD-10-CM

## 2020-10-02 LAB
ALBUMIN SERPL-MCNC: 3.5 G/DL (ref 3.4–5)
ALP SERPL-CCNC: 90 U/L (ref 40–150)
ALT SERPL W P-5'-P-CCNC: 18 U/L (ref 0–50)
ANION GAP SERPL CALCULATED.3IONS-SCNC: 2 MMOL/L (ref 3–14)
AST SERPL W P-5'-P-CCNC: 25 U/L (ref 0–45)
BILIRUB SERPL-MCNC: 0.4 MG/DL (ref 0.2–1.3)
BUN SERPL-MCNC: 17 MG/DL (ref 7–30)
CALCIUM SERPL-MCNC: 8.8 MG/DL (ref 8.5–10.1)
CHLORIDE SERPL-SCNC: 99 MMOL/L (ref 94–109)
CO2 SERPL-SCNC: 31 MMOL/L (ref 20–32)
CREAT SERPL-MCNC: 0.56 MG/DL (ref 0.52–1.04)
CRP SERPL-MCNC: 9 MG/L (ref 0–8)
ERYTHROCYTE [DISTWIDTH] IN BLOOD BY AUTOMATED COUNT: 14.7 % (ref 10–15)
ERYTHROCYTE [SEDIMENTATION RATE] IN BLOOD BY WESTERGREN METHOD: 42 MM/H (ref 0–30)
GFR SERPL CREATININE-BSD FRML MDRD: 82 ML/MIN/{1.73_M2}
GLUCOSE SERPL-MCNC: 78 MG/DL (ref 70–99)
HCT VFR BLD AUTO: 35.9 % (ref 35–47)
HGB BLD-MCNC: 11.7 G/DL (ref 11.7–15.7)
MCH RBC QN AUTO: 28.5 PG (ref 26.5–33)
MCHC RBC AUTO-ENTMCNC: 32.6 G/DL (ref 31.5–36.5)
MCV RBC AUTO: 87 FL (ref 78–100)
PLATELET # BLD AUTO: 265 10E9/L (ref 150–450)
POTASSIUM SERPL-SCNC: 4.2 MMOL/L (ref 3.4–5.3)
PROT SERPL-MCNC: 7.3 G/DL (ref 6.8–8.8)
RBC # BLD AUTO: 4.11 10E12/L (ref 3.8–5.2)
SODIUM SERPL-SCNC: 132 MMOL/L (ref 133–144)
TSH SERPL DL<=0.005 MIU/L-ACNC: 2.19 MU/L (ref 0.4–4)
WBC # BLD AUTO: 6.6 10E9/L (ref 4–11)

## 2020-10-02 PROCEDURE — 90662 IIV NO PRSV INCREASED AG IM: CPT | Performed by: FAMILY MEDICINE

## 2020-10-02 PROCEDURE — 99214 OFFICE O/P EST MOD 30 MIN: CPT | Mod: 25 | Performed by: FAMILY MEDICINE

## 2020-10-02 PROCEDURE — 84443 ASSAY THYROID STIM HORMONE: CPT | Performed by: FAMILY MEDICINE

## 2020-10-02 PROCEDURE — G0008 ADMIN INFLUENZA VIRUS VAC: HCPCS | Performed by: FAMILY MEDICINE

## 2020-10-02 PROCEDURE — 85027 COMPLETE CBC AUTOMATED: CPT | Performed by: FAMILY MEDICINE

## 2020-10-02 PROCEDURE — 86140 C-REACTIVE PROTEIN: CPT | Performed by: FAMILY MEDICINE

## 2020-10-02 PROCEDURE — 36415 COLL VENOUS BLD VENIPUNCTURE: CPT | Performed by: FAMILY MEDICINE

## 2020-10-02 PROCEDURE — 71046 X-RAY EXAM CHEST 2 VIEWS: CPT | Performed by: RADIOLOGY

## 2020-10-02 PROCEDURE — 85652 RBC SED RATE AUTOMATED: CPT | Performed by: FAMILY MEDICINE

## 2020-10-02 PROCEDURE — 80053 COMPREHEN METABOLIC PANEL: CPT | Performed by: FAMILY MEDICINE

## 2020-10-02 RX ORDER — MIRTAZAPINE 15 MG/1
15 TABLET, FILM COATED ORAL AT BEDTIME
Qty: 30 TABLET | Refills: 1 | Status: SHIPPED | OUTPATIENT
Start: 2020-10-02 | End: 2020-11-06

## 2020-10-02 ASSESSMENT — PAIN SCALES - GENERAL: PAINLEVEL: NO PAIN (0)

## 2020-10-02 NOTE — PROGRESS NOTES
SUBJECTIVE: Ellie Leigh is a 89 year old female  Chief Complaint   Patient presents with     Nausea     still having nausea- started in April     COPD     sometimes will have increase shortness of breath on the day she is nausea.      Last clinic visit: 6/10/2020 for epigastric abdominal pain and nausea.     She tried Carafate.  Did not help.  Taking omeprazole 40mg daily.    Nausea for years.  Had eval a couple years ago.  Tried FODMAP diet, gluten free and no dairy diets which did not help.     Seems worse since April.  Nausea seemed worse.     Thinks there could be a lung problem.  Coughs some days.  Some brownish phlegm.   Some days she feels well.  Other days feels ill. Feels ill at least half the days, other days feels fine.   On ill days, no energy.    No abdominal pain.    Triggering factors: ?  Does not seem related to certain foods.  Not related to activities.      She has dyspnea on exertion on days she is feeling ill.  OK on other days.     Passed out sitting on the deck a couple times this summer.  Those were days she was feeling ill.     No emesis.  Has been years since emesis.   Nausea can be all day.  Some days only in AM.  Some days off and on.   Alleviating factors:  Sometimes eating something helps.   ondansetron did not help.     Concern - Nausea  Onset: April  Description: Intermittent nausea without vo,iting or diarrhea  Intensity: severe intermittent  Progression of Symptoms:  same  Accompanying Signs & Symptoms: weight loss.   Wt Readings from Last 4 Encounters:   10/02/20 53.1 kg (117 lb)   06/10/20 56.2 kg (124 lb)   05/15/20 56.2 kg (124 lb)   12/25/19 55.3 kg (122 lb)       Previous history of similar problem: no  Precipitating factors:        Worsened by: wondering COPD if coughing  Alleviating factors:        Improved by: sometimes improved with eating  Therapies tried and outcome: Omeprazole, Zofran, Carafate      COPD Follow-Up    Overall, how are your COPD symptoms since  your last clinic visit?  No change    How much fatigue or shortness of breath do you have when you are walking?  Same as usual    How much shortness of breath do you have when you are resting?  None    How often do you cough? Sometimes    Have you noticed any change in your sputum/phlegm?  Yes- more brown    Have you experienced a recent fever? No    Please describe how far you can walk without stopping to rest:  The length of 1-2 rooms    How many flights of stairs are you able to walk up without stopping?  None    Have you had any Emergency Room Visits, Urgent Care Visits, or Hospital Admissions because of your COPD since your last office visit?  No    History   Smoking Status     Passive Smoke Exposure - Never Smoker     Packs/day: 0.00   Smokeless Tobacco     Never Used     No results found for: FEV1, ZKX1OSL      How many servings of fruits and vegetables do you eat daily?  2-3    On average, how many sweetened beverages do you drink each day (Examples: soda, juice, sweet tea, etc.  Do NOT count diet or artificially sweetened beverages)?   0    How many days per week do you exercise enough to make your heart beat faster? 3 or less    How many minutes a day do you exercise enough to make your heart beat faster? 9 or less    How many days per week do you miss taking your medication? 0    Patient Active Problem List   Diagnosis     Sensorineural hearing loss, asymmetrical     Generalized osteoarthrosis, unspecified site     PERSONAL HISTORY OF MALIGNANCY- BREAST     Esophageal reflux     Chronic allergic conjunctivitis     Chronic seasonal allergic rhinitis     Hyperlipidemia LDL goal <130     Chronic constipation     Osteoporosis     Health Care Home     Right arm weakness     Ulnar neuropathy     Headache     Mild major depression     DVT (deep venous thrombosis)     Anxiety     Cervical vertebral fracture (H)     Intermittent atrial fibrillation (H)     Squamous cell carcinoma in situ of skin of face     SK  (seborrheic keratosis)     Hypothyroidism     Hyponatremia     Recurrent UTI     History of skin cancer     BPPV (benign paroxysmal positional vertigo)     Benign essential HTN     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)     Positive fecal occult blood test     COPD (chronic obstructive pulmonary disease) (H)     Infectious colitis, enteritis and gastroenteritis     Advance Care Planning     Syncope     Long term current use of anticoagulant therapy     Mild persistent asthma without complication     Irritable bowel syndrome without diarrhea     Nausea     Back pain     H/O TB (tuberculosis)     Chest pain      ROS:General: POSITIVE for:, low energy on ill days.  Poor sleep often.  Not sure if this contributes to the ill feeling.   Appetite OK.  Weight down 7# since June.  Down 4# in the past year.   Wt Readings from Last 5 Encounters:   10/02/20 53.1 kg (117 lb)   06/10/20 56.2 kg (124 lb)   05/15/20 56.2 kg (124 lb)   12/25/19 55.3 kg (122 lb)   10/31/19 54.9 kg (121 lb)      Eyes: Negative for vision changes or eye problems  ENT: No problems with ears, nose or throat.  No difficulty swallowing.  Resp: see above.  Takes Advair twice daily. Nebs three times daily on a regular basis. Not using albuteral inhaler.   CV: No chest pains or palpitations  GI: Negative for change in bowel habits, constipation, diarrhea.  No waterbrash.  Does not seem like she has heartburn.   : No urinary frequency or dysuria, bladder or kidney problems  Musculoskeletal: POSITIVE  for:, pain both knees.  Other joints OK.  Some back aches.   Neurologic: POSITIVE for:, fingers tingly since broke neck 9 years ago.  headaches off and on right parietal.  Perhaps once weekly.   Psychiatric: POSITIVE for:, depressed mood, when feeling ill.   She stopped mirtazapine.   Skin: No rashes,worrisome lesions or skin problems    OBJECTIVE:Blood pressure (!) 164/70, pulse 88, temperature 97.5  F (36.4  C), temperature source Tympanic, resp.  rate 20, weight 53.1 kg (117 lb), last menstrual period 06/15/1985, SpO2 92 %, not currently breastfeeding. BMI=Body mass index is 24.45 kg/m .  GENERAL APPEARANCE ADULT: Alert, no acute distress, walks with a walker  EYES: PERRL, EOM normal, conjunctiva and lids normal  HENT: oral mucous membranes moist, oropharynx clear  NECK: No adenopathy,masses or thyromegaly  RESP: lungs clear to auscultation   BREAST: normal to inspection and palpation-right.  Left breast absent.   CV: normal rate, regular rhythm, no murmur or gallop  ABDOMEN: soft, no organomegaly, masses or tenderness  MS: extremities normal, no peripheral edema    ASSESSMENT:     ICD-10-CM    1. Nausea  R11.0 CBC with platelets     ESR: Erythrocyte sedimentation rate     CRP, inflammation     mirtazapine (REMERON) 15 MG tablet   2. Hyponatremia  E87.1 Comprehensive metabolic panel (BMP + Alb, Alk Phos, ALT, AST, Total. Bili, TP)   3. Gastroesophageal reflux disease without esophagitis  K21.9    4. 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)  E22.2    5. Anxiety  F41.9 mirtazapine (REMERON) 15 MG tablet   6. Chronic obstructive pulmonary disease, unspecified COPD type (H)  J44.9 XR Chest 2 Views   7. Hypothyroidism, unspecified type  E03.9 TSH      Not sure what the nausea is caused from.  Does not appear to be from medication.  Previous CT and gastroscopy did not find a cause.  Possible delayed gastric emptying.  Maybe connected to anxiety or irritable bowel.      PLAN: Check blood tests for liver, kidneys, sodium, thyroid and inflammatory conditions.   chest x-ray   Restart the mirtazapine at 15mg daily dose.  Take at bedtime.  It helped some last time but you were taking it intermittently.  Take every day.  This is a stronger dose.  follow-up in a month.  If not improving, we could consult GI, repeat CT scan.   Possibly trial metoclopramide?    Time spent during visit=30 minutes with coordination of care.     ============================  6/10  "clinic notes:  Chief Complaint   Patient presents with     Gastrointestinal Problem      Gastrointestinal symptoms       Duration: several months    Description:           ABDOMINAL PAIN - epigastric area  .   Pain is described as gnawing and nausea.    Intensity:  mild, moderate, severe    Accompanying signs and symptoms:  nausea    History  Previous {similar problem: YES  Previous evaluation:      Aggravating factors: ??? hunger    Alleviating factors: food helps at times    Other Therapies tried:      Stomach hurts at times.   Sometimes nausea.  Daily nausea.  Emesis only once a couple weeks ago.  Onset of symptoms: a couple months.   She has had burning in sternal area.   She tried quitting spicy foods.  Later pain seem to go more to epigastrium.   Now daily pain.  Worse some days.  It is like a \"feeling\" there, not a pain.   Quantity/severity: 4/10 but can be up to 8/10.  Triggering factors: when hungry.  Better often after eating.  No specific foods bother her.   Does not bother her at night but when up in AM.    No waterbrash.   Takes omeprazole 40mg daily.  Takes in AM.  A couple hours before eating.   Seldom caffeine.   No alcohol.   No aspirin, ibuprofen or Aleve.   Eats three meals.  Size of meal does not matter.   Tied ondansetron.  Did not help.   Gets full feeling.   CT abdomen and pelvis 10/30/2018.  No specific source for abdominal pain identified.      She had gastroscopy 10/18/2019:  Impression:          - Normal examined duodenum.                        - A few gastric polyps. Resected and retrieved.                        - Gastroesophageal flap valve classified as Hill Grade                        IV (no fold, wide open lumen, hiatal hernia present).                        - 3 cm hiatal hernia.                        - Z-line variable, 38 cm from the incisors.                        - Biopsies were taken with a cold forceps for                        Helicobacter pylori testing. Impression:     "      - Normal examined duodenum.                        - A few gastric polyps. Resected and retrieved.                        - Gastroesophageal flap valve classified as Hill Grade                        IV (no fold, wide open lumen, hiatal hernia present).                        - 3 cm hiatal hernia.                        - Z-line variable, 38 cm from the incisors.                        - Biopsies were taken with a cold forceps for                        Helicobacter pylori testing.   Pathology:  FINAL DIAGNOSIS:   A. Stomach, antrum, biopsy   - Gastric antral-type mucosa with changes suggestive of reactive/erosive   gastropathy   - No evidence of inflammation, intestinal metaplasia, dysplasia or   malignancy   - No Helicobacter pylori identified.         She has been having problems with arthritis in both knees, upper back and lower sternal area.   Taking acetaminophen 1300mg twice daily.     ROS:General: No change in weight, sleep or appetite.  Normal energy.  No fever or chills  Resp: POSITIVE for:, cough, chronic and unchanged.   CV: Negative for palpitations, chest pain  GI: Negative for change in bowel habits, constipation, diarrhea  : No urinary frequency or dysuria, bladder or kidney problems  Musculoskeletal: No significant muscle or joint pains  Some headaches off and on.   Psychiatric: No problems with anxiety, depression or mental health, PHQ9 score today= 0, generalized anxiety disorder scale score today= 0.  Not feeling stressed.       ASSESSMENT:         ICD-10-CM     1. Gastroesophageal reflux disease without esophagitis  K21.9 sucralfate (CARAFATE) 1 GM/10ML suspension   2. Dyspepsia  R10.13 sucralfate (CARAFATE) 1 GM/10ML suspension      Epigastric abdominal pain which has been chronic.  Some nausea.  Has had gastroscopy October 2019 and CT abdomen pelvis October 2018.  She has seen GI in the past.  On omeprazole in a good dose (40mg daily).      PLAN: Continue the omeprazole 40mg daily in  AM.   Add sucralfate (CARAFATE) four times daily 10ml.       Let us know if not helping.   Eating smaller meals more often could help.      OK for acetaminophen as needed.

## 2020-10-02 NOTE — RESULT ENCOUNTER NOTE
Ellie Perry's chest x-ray showed some scarring and emphysema but no sign of infection. It has not changed.   TSH is normal indicating that the level of thyroid hormone is in the normal range.   Sodium slightly low-better than it has been in the past. The blood chemistries (Basic metabolic panel) are all normal including electrolytes (salt balances in the blood), blood glucose, liver and kidney tests.   CRP and sed rate tests for inflammatory condition are both mildly increased.   Your blood counts including the white blood cells, red blood cells and platelets are all normal.     PLAN: Try the mirtazapine as planned and recheck in a month. .   MARCY DAUGHERTY MD

## 2020-10-02 NOTE — PATIENT INSTRUCTIONS
ASSESSMENT:     ICD-10-CM    1. Nausea  R11.0 CBC with platelets     ESR: Erythrocyte sedimentation rate     CRP, inflammation     mirtazapine (REMERON) 15 MG tablet   2. Hyponatremia  E87.1 Comprehensive metabolic panel (BMP + Alb, Alk Phos, ALT, AST, Total. Bili, TP)   3. Gastroesophageal reflux disease without esophagitis  K21.9    4. 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)  E22.2    5. Anxiety  F41.9 mirtazapine (REMERON) 15 MG tablet   6. Chronic obstructive pulmonary disease, unspecified COPD type (H)  J44.9 XR Chest 2 Views   7. Hypothyroidism, unspecified type  E03.9 TSH      Not sure what the nausea is caused from.  Does not appear to be from medication.  Previous CT and gastroscopy did not find a cause.  Possible delayed gastric emptying.  Maybe connected to anxiety or irritable bowel.      PLAN: Check blood tests for liver, kidneys, sodium, thyroid and inflammatory conditions.   chest x-ray   Restart the mirtazapine at 15mg daily dose.  Take at bedtime.  It helped some last time but you were taking it intermittently.  Take every day.  This is a stronger dose.  follow-up in a month.  If not improving, we could consult GI, repeat CT scan.   Possibly trial metoclopramide?

## 2020-10-02 NOTE — NURSING NOTE
"Chief Complaint   Patient presents with     Nausea     still having nausea- started in April     COPD     sometimes will have increase shortness of breath on the day she is nausea.       Initial BP (!) 164/70 (BP Location: Right arm, Patient Position: Chair, Cuff Size: Adult Regular)   Pulse 88   Temp 97.5  F (36.4  C) (Tympanic)   Resp 20   Wt 53.1 kg (117 lb)   LMP 06/15/1985   SpO2 92%   BMI 24.45 kg/m   Estimated body mass index is 24.45 kg/m  as calculated from the following:    Height as of 6/10/20: 1.473 m (4' 10\").    Weight as of this encounter: 53.1 kg (117 lb).    Patient presents to the clinic using Walker    Health Maintenance that is potentially due pending provider review:  Flu shot    Possibly completing today per provider review.    Is there anyone who you would like to be able to receive your results? Yes Suyapa  If yes have patient fill out JOSUE    "

## 2020-10-09 ENCOUNTER — ANTICOAGULATION THERAPY VISIT (OUTPATIENT)
Dept: ANTICOAGULATION | Facility: CLINIC | Age: 85
End: 2020-10-09

## 2020-10-09 DIAGNOSIS — I48.0 INTERMITTENT ATRIAL FIBRILLATION (H): ICD-10-CM

## 2020-10-09 DIAGNOSIS — Z79.01 LONG TERM CURRENT USE OF ANTICOAGULANT THERAPY: ICD-10-CM

## 2020-10-09 DIAGNOSIS — I82.409 DVT (DEEP VENOUS THROMBOSIS) (H): ICD-10-CM

## 2020-10-09 LAB
CAPILLARY BLOOD COLLECTION: NORMAL
INR PPP: 3 (ref 0.86–1.14)

## 2020-10-09 PROCEDURE — 85610 PROTHROMBIN TIME: CPT | Performed by: FAMILY MEDICINE

## 2020-10-09 PROCEDURE — 36416 COLLJ CAPILLARY BLOOD SPEC: CPT | Performed by: FAMILY MEDICINE

## 2020-10-09 NOTE — PROGRESS NOTES
ANTICOAGULATION MANAGEMENT     Patient Name:  Ellie Leigh  Date:  10/9/2020    ASSESSMENT /SUBJECTIVE:    Today's INR result of 3.00 is therapeutic. Goal INR of 2.0-3.0      Warfarin dose taken: Warfarin taken as instructed    Diet: No new diet changes affecting INR    Medication changes/ interactions: Potential interaction between Mirtazapine and warfarin which may affect subsequent INRs (Mirtazapine can increase the INR per [employer])    Previous INR: Therapeutic 2.30    S/S of bleeding or thromboembolism: No    New injury or illness: No    Upcoming surgery, procedure or cardioversion: No    Additional findings: None      PLAN:    Telephone call with  Suyapa, patient's daughter regarding INR result and instructed:     Warfarin Dosing Instructions: Continue your current warfarin dose 2mg Mon/Fri; 1mg all other days    Instructed patient to follow up no later than: 1 week  Lab visit scheduled    Education provided: No interaction anticipated between warfarin and Mirtazapine and Contact the anticoagulation clinic with any changes, questions or concerns at #[phone]       Suyapa verbalizes understanding and agrees to warfarin dosing plan.    Instructed to call the Anticoagulation Clinic for any changes, questions or concerns. (#[phone])        Katia Landrum RN CACP       OBJECTIVE:  Recent labs: (last 7 days)     10/09/20  1223   INR 3.00*         INR assessment THER    Recheck INR In: 1 WEEK    INR Location Clinic      Anticoagulation Summary  As of 10/9/2020    INR goal:  2.0-3.0   TTR:  69.7 % (1 y)   INR used for dosing:  3.00 (10/9/2020)   Warfarin maintenance plan:  2 mg (1 mg x 2) every Mon, Fri; 1 mg (1 mg x 1) all other days   Full warfarin instructions:  2 mg every Mon, Fri; 1 mg all other days   Weekly warfarin total:  9 mg   No change documented:  Katia Landrum RN   Plan last modified:  Ruiz Meredith RN (2/18/2020)   Next INR check:  10/16/2020   Priority:  High    Target end date:  Indefinite    Indications    Atrial fibrillation with rapid ventricular response (H) (Resolved) [I48.91]  DVT (deep venous thrombosis) [I82.409]  Long term current use of anticoagulant therapy [Z79.01]             Anticoagulation Episode Summary     INR check location:      Preferred lab:      Send INR reminders to:  GARRY BARRY    Comments:  * Taking Celebrex PRN         Anticoagulation Care Providers     Provider Role Specialty Phone number    Anjum Vidales MD [facility] [phone]

## 2020-10-16 ENCOUNTER — ANTICOAGULATION THERAPY VISIT (OUTPATIENT)
Dept: ANTICOAGULATION | Facility: CLINIC | Age: 85
End: 2020-10-16

## 2020-10-16 DIAGNOSIS — Z79.01 LONG TERM CURRENT USE OF ANTICOAGULANT THERAPY: ICD-10-CM

## 2020-10-16 DIAGNOSIS — I82.409 DVT (DEEP VENOUS THROMBOSIS) (H): ICD-10-CM

## 2020-10-16 DIAGNOSIS — I48.0 INTERMITTENT ATRIAL FIBRILLATION (H): ICD-10-CM

## 2020-10-16 LAB
CAPILLARY BLOOD COLLECTION: NORMAL
INR PPP: 3 (ref 0.86–1.14)

## 2020-10-16 PROCEDURE — 99207 PR NO CHARGE NURSE ONLY: CPT

## 2020-10-16 PROCEDURE — 36416 COLLJ CAPILLARY BLOOD SPEC: CPT | Performed by: FAMILY MEDICINE

## 2020-10-16 PROCEDURE — 85610 PROTHROMBIN TIME: CPT | Performed by: FAMILY MEDICINE

## 2020-10-16 NOTE — PROGRESS NOTES
ANTICOAGULATION FOLLOW-UP CLINIC VISIT    Patient Name:  Ellie Leigh  Date:  10/16/2020  Contact Type:  Telephone/ Suyapa    SUBJECTIVE:  Patient Findings     Comments:  No changes in medications, activity, health, or diet noted. No bleeding or increased bruising noted. Took warfarin as prescribed.  Patient will continue weekly maintenance dose. INR is therapeutic.   Recheck in 3 weeks.   Suyapa verbalizes understanding and agrees to plan. No further questions or concerns.          Clinical Outcomes     Negatives:  Major bleeding event, Thromboembolic event, Anticoagulation-related hospital admission, Anticoagulation-related ED visit, Anticoagulation-related fatality    Comments:  No changes in medications, activity, health, or diet noted. No bleeding or increased bruising noted. Took warfarin as prescribed.  Patient will continue weekly maintenance dose. INR is therapeutic.   Recheck in 3 weeks.   Suyapa verbalizes understanding and agrees to plan. No further questions or concerns.             OBJECTIVE    Recent labs: (last 7 days)     10/16/20  1248   INR 3.00*       ASSESSMENT / PLAN  INR assessment THER    Recheck INR In: 3 WEEKS    INR Location Outside lab      Anticoagulation Summary  As of 10/16/2020    INR goal:  2.0-3.0   TTR:  70.8 % (1 y)   INR used for dosing:  3.00 (10/16/2020)   Warfarin maintenance plan:  2 mg (1 mg x 2) every Mon, Fri; 1 mg (1 mg x 1) all other days   Full warfarin instructions:  2 mg every Mon, Fri; 1 mg all other days   Weekly warfarin total:  9 mg   Plan last modified:  Ruiz Meredith RN (2/18/2020)   Next INR check:  11/6/2020   Priority:  High   Target end date:  Indefinite    Indications    Atrial fibrillation with rapid ventricular response (H) (Resolved) [I48.91]  DVT (deep venous thrombosis) [I82.409]  Long term current use of anticoagulant therapy [Z79.01]             Anticoagulation Episode Summary     INR check location:      Preferred lab:      Send INR  reminders to:  GARRY BARRY    Comments:  * Taking Celebrex PRN         Anticoagulation Care Providers     Provider Role Specialty Phone number    Anjum Vidales MD Referring [facility] [phone]            See the Encounter Report to view Anticoagulation Flowsheet and Dosing Calendar (Go to Encounters tab in chart review, and find the Anticoagulation Therapy Visit)        Ruiz Meredith RN

## 2020-10-17 DIAGNOSIS — E03.9 HYPOTHYROIDISM, UNSPECIFIED TYPE: Chronic | ICD-10-CM

## 2020-10-19 RX ORDER — LEVOTHYROXINE SODIUM 50 UG/1
TABLET ORAL
Qty: 30 TABLET | Refills: 0 | Status: SHIPPED | OUTPATIENT
Start: 2020-10-19 | End: 2020-11-20

## 2020-10-21 DIAGNOSIS — I48.0 INTERMITTENT ATRIAL FIBRILLATION (H): ICD-10-CM

## 2020-10-21 RX ORDER — WARFARIN SODIUM 1 MG/1
TABLET ORAL
Qty: 115 TABLET | Refills: 0 | Status: SHIPPED | OUTPATIENT
Start: 2020-10-21 | End: 2021-01-21

## 2020-10-21 NOTE — TELEPHONE ENCOUNTER
Signed Prescriptions:                        Disp   Refills    warfarin ANTICOAGULANT (COUMADIN) 1 MG tab*115 ta*0        Sig: take 2 tablets (1 mg x 2) every Mon, Fri; 1 tablet (1           mg x 1) all other days or as directed by the INR           clinic.  Authorizing Provider: MARCY DAUGHERTY  Ordering User: DEAN BEAULIEU RN refilled medication per [facility] refill protocol.  Dean Beaulieu RN

## 2020-10-23 DIAGNOSIS — J47.9 BRONCHIECTASIS WITHOUT COMPLICATION (H): ICD-10-CM

## 2020-10-23 RX ORDER — SODIUM CHLORIDE FOR INHALATION 0.9 %
VIAL, NEBULIZER (ML) INHALATION
Qty: 450 ML | Refills: 5 | Status: SHIPPED | OUTPATIENT
Start: 2020-10-23 | End: 2021-09-08

## 2020-10-28 DIAGNOSIS — J45.30 MILD PERSISTENT ASTHMA WITHOUT COMPLICATION: ICD-10-CM

## 2020-10-28 NOTE — TELEPHONE ENCOUNTER
"Requested Prescriptions   Pending Prescriptions Disp Refills     fluticasone-salmeterol (ADVAIR) 250-50 MCG/DOSE inhaler [Pharmacy Med Name: Fluticasone-Salmeterol Inhalation Aerosol Powder Breath Activated 250-50 MCG/DOSE]  0     Sig: Inhale 1 puff into the lungs every 12 hours       Inhaled Steroids Protocol Failed - 10/28/2020 10:37 AM        Failed - Asthma control assessment score within normal limits in last 6 months     Please review ACT score.           Passed - Patient is age 12 or older        Passed - Medication is active on med list        Passed - Recent (6 mo) or future (30 days) visit within the authorizing provider's specialty     Patient had office visit in the last 6 months or has a visit in the next 30 days with authorizing provider or within the authorizing provider's specialty.  See \"Patient Info\" tab in inbasket, or \"Choose Columns\" in Meds & Orders section of the refill encounter.           Long-Acting Beta Agonist Inhalers Protocol  Failed - 10/28/2020 10:37 AM        Failed - Asthma control assessment score within normal limits in last 6 months     Please review ACT score.           Failed - Order for Serevent, Striverdi, or Foradil and pt has steroid inhaler        Passed - Patient is age 12 or older        Passed - Medication is active on med list        Passed - Recent (6 mo) or future (30 days) visit within the authorizing provider's specialty     Patient had office visit in the last 6 months or has a visit in the next 30 days with authorizing provider or within the authorizing provider's specialty.  See \"Patient Info\" tab in inbasket, or \"Choose Columns\" in Meds & Orders section of the refill encounter.                 "

## 2020-10-29 ENCOUNTER — ANCILLARY PROCEDURE (OUTPATIENT)
Dept: CARDIOLOGY | Facility: CLINIC | Age: 85
End: 2020-10-29
Attending: INTERNAL MEDICINE
Payer: COMMERCIAL

## 2020-10-29 DIAGNOSIS — Z95.0 CARDIAC PACEMAKER IN SITU: ICD-10-CM

## 2020-10-29 PROCEDURE — 93294 REM INTERROG EVL PM/LDLS PM: CPT | Performed by: INTERNAL MEDICINE

## 2020-10-29 PROCEDURE — 93296 REM INTERROG EVL PM/IDS: CPT | Performed by: INTERNAL MEDICINE

## 2020-11-01 LAB
MDC_IDC_LEAD_IMPLANT_DT: NORMAL
MDC_IDC_LEAD_IMPLANT_DT: NORMAL
MDC_IDC_LEAD_LOCATION: NORMAL
MDC_IDC_LEAD_LOCATION: NORMAL
MDC_IDC_LEAD_LOCATION_DETAIL_1: NORMAL
MDC_IDC_LEAD_LOCATION_DETAIL_1: NORMAL
MDC_IDC_LEAD_MFG: NORMAL
MDC_IDC_LEAD_MFG: NORMAL
MDC_IDC_LEAD_MODEL: NORMAL
MDC_IDC_LEAD_MODEL: NORMAL
MDC_IDC_LEAD_POLARITY_TYPE: NORMAL
MDC_IDC_LEAD_POLARITY_TYPE: NORMAL
MDC_IDC_LEAD_SERIAL: NORMAL
MDC_IDC_LEAD_SERIAL: NORMAL
MDC_IDC_MSMT_BATTERY_REMAINING_PERCENTAGE: 55 %
MDC_IDC_MSMT_BATTERY_STATUS: NORMAL
MDC_IDC_MSMT_LEADCHNL_RA_IMPEDANCE_VALUE: 382 OHM
MDC_IDC_MSMT_LEADCHNL_RA_LEAD_CHANNEL_STATUS: NORMAL
MDC_IDC_MSMT_LEADCHNL_RA_PACING_THRESHOLD_AMPLITUDE: 0.8 V
MDC_IDC_MSMT_LEADCHNL_RA_PACING_THRESHOLD_PULSEWIDTH: 0.4 MS
MDC_IDC_MSMT_LEADCHNL_RV_IMPEDANCE_VALUE: 451 OHM
MDC_IDC_MSMT_LEADCHNL_RV_LEAD_CHANNEL_STATUS: NORMAL
MDC_IDC_MSMT_LEADCHNL_RV_PACING_THRESHOLD_AMPLITUDE: 0.6 V
MDC_IDC_MSMT_LEADCHNL_RV_PACING_THRESHOLD_PULSEWIDTH: 0.4 MS
MDC_IDC_PG_IMPLANT_DTM: NORMAL
MDC_IDC_PG_MFG: NORMAL
MDC_IDC_PG_MODEL: NORMAL
MDC_IDC_PG_SERIAL: NORMAL
MDC_IDC_PG_TYPE: NORMAL
MDC_IDC_SESS_CLINIC_NAME: NORMAL
MDC_IDC_SESS_DTM: NORMAL
MDC_IDC_SESS_REPROGRAMMED: NO
MDC_IDC_SESS_TYPE: NORMAL
MDC_IDC_SET_BRADY_AT_MODE_SWITCH_MODE: NORMAL
MDC_IDC_SET_BRADY_AT_MODE_SWITCH_RATE: 160 {BEATS}/MIN
MDC_IDC_SET_BRADY_LOWRATE: 60 {BEATS}/MIN
MDC_IDC_SET_BRADY_MAX_SENSOR_RATE: 125 {BEATS}/MIN
MDC_IDC_SET_BRADY_MAX_TRACKING_RATE: 130 {BEATS}/MIN
MDC_IDC_SET_BRADY_MODE: NORMAL
MDC_IDC_SET_BRADY_PAV_DELAY_HIGH: 150 MS
MDC_IDC_SET_BRADY_PAV_DELAY_LOW: 180 MS
MDC_IDC_SET_BRADY_SAV_DELAY_HIGH: 105 MS
MDC_IDC_SET_BRADY_SAV_DELAY_LOW: 135 MS
MDC_IDC_SET_LEADCHNL_RA_PACING_POLARITY: NORMAL
MDC_IDC_SET_LEADCHNL_RA_PACING_PULSEWIDTH: 0.4 MS
MDC_IDC_SET_LEADCHNL_RA_SENSING_ADAPTATION_MODE: NORMAL
MDC_IDC_SET_LEADCHNL_RA_SENSING_POLARITY: NORMAL
MDC_IDC_SET_LEADCHNL_RV_PACING_POLARITY: NORMAL
MDC_IDC_SET_LEADCHNL_RV_PACING_PULSEWIDTH: 0.4 MS
MDC_IDC_SET_LEADCHNL_RV_SENSING_ADAPTATION_MODE: NORMAL
MDC_IDC_SET_LEADCHNL_RV_SENSING_POLARITY: NORMAL
MDC_IDC_STAT_AT_BURDEN_PERCENT: 0 %
MDC_IDC_STAT_AT_DTM_END: NORMAL
MDC_IDC_STAT_AT_DTM_START: NORMAL
MDC_IDC_STAT_AT_MODE_SW_COUNT_PER_DAY: 0
MDC_IDC_STAT_AT_MODE_SW_PERCENT_TIME_PER_DAY: 0 %
MDC_IDC_STAT_BRADY_AP_VP_PERCENT: 0 %
MDC_IDC_STAT_BRADY_AP_VS_PERCENT: 67 %
MDC_IDC_STAT_BRADY_AS_VP_PERCENT: 0 %
MDC_IDC_STAT_BRADY_AS_VS_PERCENT: 33 %
MDC_IDC_STAT_BRADY_DTM_END: NORMAL
MDC_IDC_STAT_BRADY_DTM_START: NORMAL
MDC_IDC_STAT_BRADY_RA_PERCENT_PACED: 67 %
MDC_IDC_STAT_BRADY_RV_PERCENT_PACED: 0 %
MDC_IDC_STAT_CRT_DTM_END: NORMAL
MDC_IDC_STAT_CRT_DTM_START: NORMAL

## 2020-11-06 ENCOUNTER — ANTICOAGULATION THERAPY VISIT (OUTPATIENT)
Dept: ANTICOAGULATION | Facility: CLINIC | Age: 85
End: 2020-11-06

## 2020-11-06 ENCOUNTER — OFFICE VISIT (OUTPATIENT)
Dept: FAMILY MEDICINE | Facility: CLINIC | Age: 85
End: 2020-11-06
Payer: COMMERCIAL

## 2020-11-06 VITALS
BODY MASS INDEX: 26.66 KG/M2 | DIASTOLIC BLOOD PRESSURE: 82 MMHG | SYSTOLIC BLOOD PRESSURE: 140 MMHG | WEIGHT: 127 LBS | RESPIRATION RATE: 18 BRPM | HEART RATE: 71 BPM | OXYGEN SATURATION: 96 % | HEIGHT: 58 IN

## 2020-11-06 DIAGNOSIS — I82.409 DVT (DEEP VENOUS THROMBOSIS) (H): ICD-10-CM

## 2020-11-06 DIAGNOSIS — R39.9 UTI SYMPTOMS: Primary | ICD-10-CM

## 2020-11-06 DIAGNOSIS — Z79.01 LONG TERM CURRENT USE OF ANTICOAGULANT THERAPY: ICD-10-CM

## 2020-11-06 DIAGNOSIS — F41.9 ANXIETY: ICD-10-CM

## 2020-11-06 DIAGNOSIS — I48.0 INTERMITTENT ATRIAL FIBRILLATION (H): ICD-10-CM

## 2020-11-06 DIAGNOSIS — R82.90 NONSPECIFIC FINDING ON EXAMINATION OF URINE: ICD-10-CM

## 2020-11-06 DIAGNOSIS — J44.9 CHRONIC OBSTRUCTIVE PULMONARY DISEASE, UNSPECIFIED COPD TYPE (H): ICD-10-CM

## 2020-11-06 DIAGNOSIS — K21.9 GASTROESOPHAGEAL REFLUX DISEASE WITHOUT ESOPHAGITIS: ICD-10-CM

## 2020-11-06 DIAGNOSIS — R11.0 NAUSEA: ICD-10-CM

## 2020-11-06 LAB
ALBUMIN UR-MCNC: NEGATIVE MG/DL
APPEARANCE UR: ABNORMAL
BACTERIA #/AREA URNS HPF: ABNORMAL /HPF
BILIRUB UR QL STRIP: NEGATIVE
CAPILLARY BLOOD COLLECTION: NORMAL
COLOR UR AUTO: YELLOW
GLUCOSE UR STRIP-MCNC: NEGATIVE MG/DL
HGB UR QL STRIP: ABNORMAL
INR PPP: 2.8 (ref 0.86–1.14)
KETONES UR STRIP-MCNC: NEGATIVE MG/DL
LEUKOCYTE ESTERASE UR QL STRIP: ABNORMAL
NITRATE UR QL: POSITIVE
NON-SQ EPI CELLS #/AREA URNS LPF: ABNORMAL /LPF
PH UR STRIP: 7 PH (ref 5–7)
RBC #/AREA URNS AUTO: ABNORMAL /HPF
SOURCE: ABNORMAL
SP GR UR STRIP: 1.02 (ref 1–1.03)
UROBILINOGEN UR STRIP-ACNC: 0.2 EU/DL (ref 0.2–1)
WBC #/AREA URNS AUTO: >100 /HPF

## 2020-11-06 PROCEDURE — 99207 PR NO CHARGE NURSE ONLY: CPT

## 2020-11-06 PROCEDURE — 87186 SC STD MICRODIL/AGAR DIL: CPT | Performed by: FAMILY MEDICINE

## 2020-11-06 PROCEDURE — 81001 URINALYSIS AUTO W/SCOPE: CPT | Performed by: FAMILY MEDICINE

## 2020-11-06 PROCEDURE — 99213 OFFICE O/P EST LOW 20 MIN: CPT | Performed by: FAMILY MEDICINE

## 2020-11-06 PROCEDURE — 87088 URINE BACTERIA CULTURE: CPT | Performed by: FAMILY MEDICINE

## 2020-11-06 PROCEDURE — 85610 PROTHROMBIN TIME: CPT | Performed by: FAMILY MEDICINE

## 2020-11-06 PROCEDURE — 87086 URINE CULTURE/COLONY COUNT: CPT | Performed by: FAMILY MEDICINE

## 2020-11-06 PROCEDURE — 36416 COLLJ CAPILLARY BLOOD SPEC: CPT | Performed by: FAMILY MEDICINE

## 2020-11-06 RX ORDER — MIRTAZAPINE 15 MG/1
15 TABLET, FILM COATED ORAL AT BEDTIME
Qty: 90 TABLET | Refills: 3 | Status: SHIPPED | OUTPATIENT
Start: 2020-11-06 | End: 2021-12-22

## 2020-11-06 RX ORDER — IPRATROPIUM BROMIDE AND ALBUTEROL SULFATE 2.5; .5 MG/3ML; MG/3ML
SOLUTION RESPIRATORY (INHALATION)
Qty: 270 VIAL | Refills: 3 | Status: SHIPPED | OUTPATIENT
Start: 2020-11-06 | End: 2021-10-08

## 2020-11-06 RX ORDER — OMEPRAZOLE 40 MG/1
CAPSULE, DELAYED RELEASE ORAL
Qty: 90 CAPSULE | Refills: 3 | Status: SHIPPED | OUTPATIENT
Start: 2020-11-06 | End: 2021-11-10

## 2020-11-06 RX ORDER — NITROFURANTOIN 25; 75 MG/1; MG/1
100 CAPSULE ORAL 2 TIMES DAILY
Qty: 14 CAPSULE | Refills: 0 | Status: SHIPPED | OUTPATIENT
Start: 2020-11-06 | End: 2020-11-13

## 2020-11-06 ASSESSMENT — MIFFLIN-ST. JEOR: SCORE: 885.82

## 2020-11-06 NOTE — PROGRESS NOTES
ANTICOAGULATION FOLLOW-UP CLINIC VISIT    Patient Name:  Ellie Leigh  Date:  2020  Contact Type:  Telephone/ Suyapa    SUBJECTIVE:  Patient Findings     Positives:  Change in health (UTI), Change in medications (Macrobid)    Comments:  Patient will continue on maintenance dose.   Recheck in 5 days  Suyapa verbalizes understanding and agrees to plan. No further questions or concerns.          Clinical Outcomes     Negatives:  Major bleeding event, Thromboembolic event, Anticoagulation-related hospital admission, Anticoagulation-related ED visit, Anticoagulation-related fatality    Comments:  Patient will continue on maintenance dose.   Recheck in 5 days  Suyapa verbalizes understanding and agrees to plan. No further questions or concerns.             OBJECTIVE    Recent labs: (last 7 days)     20  1215   INR 2.80*       ASSESSMENT / PLAN  INR assessment THER    Recheck INR In: 5 DAYS    INR Location Outside lab      Anticoagulation Summary  As of 2020    INR goal:  2.0-3.0   TTR:  73.8 % (1 y)   INR used for dosin.80 (2020)   Warfarin maintenance plan:  2 mg (1 mg x 2) every Mon, Fri; 1 mg (1 mg x 1) all other days   Full warfarin instructions:  2 mg every Mon, Fri; 1 mg all other days   Weekly warfarin total:  9 mg   Plan last modified:  Ruiz Meredith RN (2020)   Next INR check:  2020   Priority:  High   Target end date:  Indefinite    Indications    Atrial fibrillation with rapid ventricular response (H) (Resolved) [I48.91]  DVT (deep venous thrombosis) [I82.409]  Long term current use of anticoagulant therapy [Z79.01]             Anticoagulation Episode Summary     INR check location:      Preferred lab:      Send INR reminders to:  GARRY BARRY    Comments:  * Taking Celebrex PRN         Anticoagulation Care Providers     Provider Role Specialty Phone number    Anjum Vidales MD Referring Family Medicine [phone]            See the Encounter Report to  view Anticoagulation Flowsheet and Dosing Calendar (Go to Encounters tab in chart review, and find the Anticoagulation Therapy Visit)        Ruiz Meredith RN

## 2020-11-06 NOTE — PROGRESS NOTES
Subjective     Ellie Leigh is a 89 year old female who presents to clinic today for the following health issues:  Chief Complaint   Patient presents with     Nausea     follow up from 10/2/2020     UTI      Last clinic visit: 10/2/2020 for nausea and cough/COPD.  She was started on mirtazapine for the nausea.  CXR was normal.     Has not had any nausea since seen on 10/2/2020.  She has been feeling well.   No side effects related to the mirtazapine 15mg daily.  Sleeping well.  Mood has been good.  Appetite has been oK.       Breathing has been good.  No shortness of breath.  Cough about the same.  Some days no cough.      Genitourinary - Female  Onset/Duration: today  Description:   Painful urination (Dysuria): YES           Frequency: YES  Blood in urine (Hematuria): no  Delay in urine (Hesitency): YES  Intensity: moderate  Progression of Symptoms:  same  Accompanying Signs & Symptoms:  Fever/chills: no  Flank pain: no  Nausea and vomiting: no  Vaginal symptoms: none  Abdominal/Pelvic Pain: no  History:   History of frequent UTI s: YES- gets them off/on  History of kidney stones: no  Sexually Active: no  Possibility of pregnancy: No  Precipitating or alleviating factors: None  Therapies tried and outcome:  none   She noted not feeling as well this AM.  Some burning feeling in vaginal area.    Feels like previous UTI.  Some urinary frequency.  Has had UTI in the past but not recently.   occasional feeling of ache in suprapubic area.     Patient Active Problem List   Diagnosis     Sensorineural hearing loss, asymmetrical     Generalized osteoarthrosis, unspecified site     PERSONAL HISTORY OF MALIGNANCY- BREAST     Esophageal reflux     Chronic allergic conjunctivitis     Chronic seasonal allergic rhinitis     Hyperlipidemia LDL goal <130     Chronic constipation     Osteoporosis     Health Care Home     Right arm weakness     Ulnar neuropathy     Headache     Mild major depression     DVT (deep venous  "thrombosis)     Anxiety     Cervical vertebral fracture (H)     Intermittent atrial fibrillation (H)     Squamous cell carcinoma in situ of skin of face     SK (seborrheic keratosis)     Hypothyroidism     Recurrent UTI     History of skin cancer     BPPV (benign paroxysmal positional vertigo)     Benign essential HTN     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)     Positive fecal occult blood test     COPD (chronic obstructive pulmonary disease) (H)     Infectious colitis, enteritis and gastroenteritis     Advance Care Planning     Syncope     Long term current use of anticoagulant therapy     Mild persistent asthma without complication     Irritable bowel syndrome without diarrhea     Nausea     Back pain     H/O TB (tuberculosis)     Chest pain      OBJECTIVE:Blood pressure (!) 140/82, pulse 71, resp. rate 18, height 1.473 m (4' 10\"), weight 57.6 kg (127 lb), last menstrual period 06/15/1985, SpO2 96 %, not currently breastfeeding. BMI=Body mass index is 26.54 kg/m .  GENERAL APPEARANCE ADULT: Alert, no acute distress, walks with a walker.   ABDOMEN: soft, no organomegaly, masses or tenderness, no CVA tenderness  UA with >100 WBC    ASSESSMENT:   (R39.9) UTI symptoms  (primary encounter diagnosis)  Comment: Looks like urinary tract infection   Plan: *UA reflex to Microscopic and Culture ([place]         and [facility] (except [place] and         [place]), Urine Culture Aerobic Bacterial,         Urine Microscopic, nitroFURantoin         macrocrystal-monohydrate (MACROBID) 100 MG         capsule         We will have final urine culture report back in 2-3 days.    Take Macrobid twice daily for 7 days for the urinary tract infection.  Recheck UA-urine test a few days after completing antibiotics to make sure the infection has cleared.    Expect improvement in the next few days.    Recheck if getting worse or other problems like fever, nausea, flank or abdional pain which could indicate infection " spreading to kidneys.  Increased fluid intake may help.       (R82.90) Nonspecific finding on examination of urine  Comment:   Plan: Urine Culture Aerobic Bacterial          (R11.0) Nausea  Comment: improved  Plan: mirtazapine (REMERON) 15 MG tablet        Continue the mirtazapine 15mg  At bedtime every night.     (F41.9) Anxiety  Comment: doing OK  Plan: mirtazapine (REMERON) 15 MG tablet        No change in current treatment plan.  Refills.     (J44.9) Chronic obstructive pulmonary disease, unspecified COPD type (H)  Comment: stable  Plan: ipratropium - albuterol 0.5 mg/2.5 mg/3 mL         (DUONEB) 0.5-2.5 (3) MG/3ML neb solution        Refills.  Using three times daily on a regular basis and can use an additional time per day if needed.     (K21.9) Gastroesophageal reflux disease without esophagitis  Comment: doing OK at this time.   Plan: omeprazole (PRILOSEC) 40 MG DR capsule        REfills.

## 2020-11-06 NOTE — NURSING NOTE
"Chief Complaint   Patient presents with     Nausea     follow up from 10/2/2020     UTI       Initial BP (!) 140/82 (BP Location: Right arm, Patient Position: Chair, Cuff Size: Adult Regular)   Pulse 71   Resp 18   Ht 1.473 m (4' 10\")   Wt 57.6 kg (127 lb)   LMP 06/15/1985   SpO2 96%   BMI 26.54 kg/m   Estimated body mass index is 26.54 kg/m  as calculated from the following:    Height as of this encounter: 1.473 m (4' 10\").    Weight as of this encounter: 57.6 kg (127 lb).    Patient presents to the clinic using [employer] Maintenance that is potentially due pending provider review:  NONE        Is there anyone who you would like to be able to receive your results? No  If yes have patient fill out JOSUE      "

## 2020-11-06 NOTE — PATIENT INSTRUCTIONS
ASSESSMENT:   (R39.9) UTI symptoms  (primary encounter diagnosis)  Comment: Looks like urinary tract infection   Plan: *UA reflex to Microscopic and Culture ([place]         and [facility] (except [place] and         [place]), Urine Culture Aerobic Bacterial,         Urine Microscopic, nitroFURantoin         macrocrystal-monohydrate (MACROBID) 100 MG         capsule         We will have final urine culture report back in 2-3 days.    Take Macrobid twice daily for 7 days for the urinary tract infection.  Recheck UA-urine test a few days after completing antibiotics to make sure the infection has cleared.    Expect improvement in the next few days.    Recheck if getting worse or other problems like fever, nausea, flank or abdional pain which could indicate infection spreading to kidneys.  Increased fluid intake may help.       (R82.90) Nonspecific finding on examination of urine  Comment:   Plan: Urine Culture Aerobic Bacterial          (R11.0) Nausea  Comment: improved  Plan: mirtazapine (REMERON) 15 MG tablet        Continue the mirtazapine 15mg  At bedtime every night.     (F41.9) Anxiety  Comment: doing OK  Plan: mirtazapine (REMERON) 15 MG tablet        No change in current treatment plan.  Refills.     (J44.9) Chronic obstructive pulmonary disease, unspecified COPD type (H)  Comment: stable  Plan: ipratropium - albuterol 0.5 mg/2.5 mg/3 mL         (DUONEB) 0.5-2.5 (3) MG/3ML neb solution        Refills.  Using three times daily on a regular basis and can use an additional time per day if needed.     (K21.9) Gastroesophageal reflux disease without esophagitis  Comment: doing OK at this time.   Plan: omeprazole (PRILOSEC) 40 MG DR capsule        REfills.

## 2020-11-08 LAB
BACTERIA SPEC CULT: ABNORMAL
Lab: ABNORMAL
SPECIMEN SOURCE: ABNORMAL

## 2020-11-09 NOTE — RESULT ENCOUNTER NOTE
Please arrange for orders. Future UA reflex micro and urine culture.    Orlando Argueta and Suyapa,  Urine culture shows a urinary tract infection.  PLAN:Continue antibiotic, Macrobid for the full 7 days.   Recheck a urinalysis a few days after completing the antibiotic to make sure infection has resolved.   MARCY DAUGHERTY MD

## 2020-11-11 ENCOUNTER — ANTICOAGULATION THERAPY VISIT (OUTPATIENT)
Dept: ANTICOAGULATION | Facility: CLINIC | Age: 85
End: 2020-11-11

## 2020-11-11 DIAGNOSIS — Z79.01 LONG TERM CURRENT USE OF ANTICOAGULANT THERAPY: ICD-10-CM

## 2020-11-11 DIAGNOSIS — I82.409 DVT (DEEP VENOUS THROMBOSIS) (H): ICD-10-CM

## 2020-11-11 DIAGNOSIS — I48.0 INTERMITTENT ATRIAL FIBRILLATION (H): ICD-10-CM

## 2020-11-11 DIAGNOSIS — R39.9 UTI SYMPTOMS: ICD-10-CM

## 2020-11-11 LAB
CAPILLARY BLOOD COLLECTION: NORMAL
INR PPP: 2.9 (ref 0.86–1.14)

## 2020-11-11 PROCEDURE — 85610 PROTHROMBIN TIME: CPT | Performed by: FAMILY MEDICINE

## 2020-11-11 PROCEDURE — 99207 PR NO CHARGE NURSE ONLY: CPT

## 2020-11-11 PROCEDURE — 36416 COLLJ CAPILLARY BLOOD SPEC: CPT | Performed by: FAMILY MEDICINE

## 2020-11-11 NOTE — PROGRESS NOTES
ANTICOAGULATION FOLLOW-UP CLINIC VISIT    Patient Name:  Ellie Leigh  Date:  2020  Contact Type:  Telephone/ Suyapa, daughter    SUBJECTIVE:  Patient Findings     Positives:  Change in medications (macrobid)    Comments:  Patient has 2 days left of macrobid for UTI. She states her UTI symptoms are cleared up. No other changes. Will recheck INR in 4 weeks unless she has changes or concerns before that.         Clinical Outcomes     Comments:  Patient has 2 days left of macrobid for UTI. She states her UTI symptoms are cleared up. No other changes. Will recheck INR in 4 weeks unless she has changes or concerns before that.            OBJECTIVE    Recent labs: (last 7 days)     20  1226   INR 2.90*       ASSESSMENT / PLAN  INR assessment THER    Recheck INR In: 4 WEEKS    INR Location Clinic      Anticoagulation Summary  As of 2020    INR goal:  2.0-3.0   TTR:  73.8 % (1 y)   INR used for dosin.90 (2020)   Warfarin maintenance plan:  2 mg (1 mg x 2) every Mon, Fri; 1 mg (1 mg x 1) all other days   Full warfarin instructions:  2 mg every Mon, Fri; 1 mg all other days   Weekly warfarin total:  9 mg   No change documented:  Lissy Beaulieu RN   Plan last modified:  Ruiz Meredith RN (2020)   Next INR check:  2020   Priority:  Maintenance   Target end date:  Indefinite    Indications    Atrial fibrillation with rapid ventricular response (H) (Resolved) [I48.91]  DVT (deep venous thrombosis) [I82.409]  Long term current use of anticoagulant therapy [Z79.01]             Anticoagulation Episode Summary     INR check location:      Preferred lab:      Send INR reminders to:  GARRY BARRY    Comments:  * Taking Celebrex PRN         Anticoagulation Care Providers     Provider Role Specialty Phone number    Anjum Vidales MD Referring Family Medicine [phone]            See the Encounter Report to view Anticoagulation Flowsheet and Dosing Calendar (Go to  Encounters tab in chart review, and find the Anticoagulation Therapy Visit)        Lissy Beaulieu RN

## 2020-11-18 DIAGNOSIS — R39.9 UTI SYMPTOMS: ICD-10-CM

## 2020-11-18 LAB

## 2020-11-18 PROCEDURE — 81001 URINALYSIS AUTO W/SCOPE: CPT | Performed by: FAMILY MEDICINE

## 2020-11-18 NOTE — RESULT ENCOUNTER NOTE
Please call.  Urine much improved.  Still a few wbc. Any symptoms?  PLAN: Please have lab do urine culture on today's urine.

## 2020-11-20 DIAGNOSIS — E03.9 HYPOTHYROIDISM, UNSPECIFIED TYPE: Chronic | ICD-10-CM

## 2020-11-20 RX ORDER — LEVOTHYROXINE SODIUM 50 UG/1
TABLET ORAL
Qty: 30 TABLET | Refills: 0 | Status: SHIPPED | OUTPATIENT
Start: 2020-11-20 | End: 2020-12-14

## 2020-11-27 DIAGNOSIS — J45.30 MILD PERSISTENT ASTHMA WITHOUT COMPLICATION: ICD-10-CM

## 2020-12-09 ENCOUNTER — OFFICE VISIT (OUTPATIENT)
Dept: FAMILY MEDICINE | Facility: CLINIC | Age: 85
End: 2020-12-09
Payer: COMMERCIAL

## 2020-12-09 ENCOUNTER — ANTICOAGULATION THERAPY VISIT (OUTPATIENT)
Dept: ANTICOAGULATION | Facility: CLINIC | Age: 85
End: 2020-12-09

## 2020-12-09 VITALS
TEMPERATURE: 97.3 F | DIASTOLIC BLOOD PRESSURE: 78 MMHG | BODY MASS INDEX: 26.87 KG/M2 | OXYGEN SATURATION: 95 % | RESPIRATION RATE: 18 BRPM | WEIGHT: 128 LBS | HEIGHT: 58 IN | HEART RATE: 68 BPM | SYSTOLIC BLOOD PRESSURE: 138 MMHG

## 2020-12-09 DIAGNOSIS — R23.2 HOT FLASHES: ICD-10-CM

## 2020-12-09 DIAGNOSIS — Z79.01 LONG TERM CURRENT USE OF ANTICOAGULANT THERAPY: ICD-10-CM

## 2020-12-09 DIAGNOSIS — I82.409 DVT (DEEP VENOUS THROMBOSIS) (H): ICD-10-CM

## 2020-12-09 DIAGNOSIS — J40 BRONCHITIS: ICD-10-CM

## 2020-12-09 DIAGNOSIS — I48.0 INTERMITTENT ATRIAL FIBRILLATION (H): ICD-10-CM

## 2020-12-09 DIAGNOSIS — R11.0 NAUSEA: Primary | ICD-10-CM

## 2020-12-09 LAB
ANION GAP SERPL CALCULATED.3IONS-SCNC: 3 MMOL/L (ref 3–14)
BUN SERPL-MCNC: 31 MG/DL (ref 7–30)
CALCIUM SERPL-MCNC: 8.9 MG/DL (ref 8.5–10.1)
CAPILLARY BLOOD COLLECTION: NORMAL
CHLORIDE SERPL-SCNC: 100 MMOL/L (ref 94–109)
CO2 SERPL-SCNC: 29 MMOL/L (ref 20–32)
CREAT SERPL-MCNC: 0.6 MG/DL (ref 0.52–1.04)
CRP SERPL-MCNC: 2.9 MG/L (ref 0–8)
ERYTHROCYTE [DISTWIDTH] IN BLOOD BY AUTOMATED COUNT: 14.1 % (ref 10–15)
ERYTHROCYTE [SEDIMENTATION RATE] IN BLOOD BY WESTERGREN METHOD: 42 MM/H (ref 0–30)
GFR SERPL CREATININE-BSD FRML MDRD: 80 ML/MIN/{1.73_M2}
GLUCOSE SERPL-MCNC: 102 MG/DL (ref 70–99)
HCT VFR BLD AUTO: 34.9 % (ref 35–47)
HGB BLD-MCNC: 11.1 G/DL (ref 11.7–15.7)
INR PPP: 2.3 (ref 0.86–1.14)
MCH RBC QN AUTO: 29 PG (ref 26.5–33)
MCHC RBC AUTO-ENTMCNC: 31.8 G/DL (ref 31.5–36.5)
MCV RBC AUTO: 91 FL (ref 78–100)
PLATELET # BLD AUTO: 243 10E9/L (ref 150–450)
POTASSIUM SERPL-SCNC: 4.8 MMOL/L (ref 3.4–5.3)
RBC # BLD AUTO: 3.83 10E12/L (ref 3.8–5.2)
SODIUM SERPL-SCNC: 132 MMOL/L (ref 133–144)
WBC # BLD AUTO: 6.1 10E9/L (ref 4–11)

## 2020-12-09 PROCEDURE — 99207 PR NO CHARGE NURSE ONLY: CPT

## 2020-12-09 PROCEDURE — 84165 PROTEIN E-PHORESIS SERUM: CPT | Performed by: PATHOLOGY

## 2020-12-09 PROCEDURE — 99N1036 PR STATISTIC TOTAL PROTEIN: Performed by: FAMILY MEDICINE

## 2020-12-09 PROCEDURE — 86140 C-REACTIVE PROTEIN: CPT | Performed by: FAMILY MEDICINE

## 2020-12-09 PROCEDURE — 85610 PROTHROMBIN TIME: CPT | Performed by: FAMILY MEDICINE

## 2020-12-09 PROCEDURE — 99214 OFFICE O/P EST MOD 30 MIN: CPT | Performed by: FAMILY MEDICINE

## 2020-12-09 PROCEDURE — 85027 COMPLETE CBC AUTOMATED: CPT | Performed by: FAMILY MEDICINE

## 2020-12-09 PROCEDURE — 85652 RBC SED RATE AUTOMATED: CPT | Performed by: FAMILY MEDICINE

## 2020-12-09 PROCEDURE — 36415 COLL VENOUS BLD VENIPUNCTURE: CPT | Performed by: FAMILY MEDICINE

## 2020-12-09 PROCEDURE — 80048 BASIC METABOLIC PNL TOTAL CA: CPT | Performed by: FAMILY MEDICINE

## 2020-12-09 RX ORDER — AZITHROMYCIN 250 MG/1
TABLET, FILM COATED ORAL
Qty: 6 TABLET | Refills: 0 | Status: SHIPPED | OUTPATIENT
Start: 2020-12-09 | End: 2021-01-08

## 2020-12-09 ASSESSMENT — ASTHMA QUESTIONNAIRES
QUESTION_2 LAST FOUR WEEKS HOW OFTEN HAVE YOU HAD SHORTNESS OF BREATH: MORE THAN ONCE A DAY
QUESTION_4 LAST FOUR WEEKS HOW OFTEN HAVE YOU USED YOUR RESCUE INHALER OR NEBULIZER MEDICATION (SUCH AS ALBUTEROL): ONCE A WEEK OR LESS
QUESTION_5 LAST FOUR WEEKS HOW WOULD YOU RATE YOUR ASTHMA CONTROL: WELL CONTROLLED
QUESTION_1 LAST FOUR WEEKS HOW MUCH OF THE TIME DID YOUR ASTHMA KEEP YOU FROM GETTING AS MUCH DONE AT WORK, SCHOOL OR AT HOME: SOME OF THE TIME
ACT_TOTALSCORE: 17
QUESTION_3 LAST FOUR WEEKS HOW OFTEN DID YOUR ASTHMA SYMPTOMS (WHEEZING, COUGHING, SHORTNESS OF BREATH, CHEST TIGHTNESS OR PAIN) WAKE YOU UP AT NIGHT OR EARLIER THAN USUAL IN THE MORNING: NOT AT ALL

## 2020-12-09 ASSESSMENT — MIFFLIN-ST. JEOR: SCORE: 890.35

## 2020-12-09 NOTE — PROGRESS NOTES
ANTICOAGULATION FOLLOW-UP CLINIC VISIT    Patient Name:  Ellie Leigh  Date:  2020  Contact Type:  Telephone/ Suyapa    SUBJECTIVE:  Patient Findings     Positives:  Change in health (Bronchitis), Change in medications (Azithromycin)    Comments:  Patient will continue weekly maintenance dose. INR is therapeutic.   Recheck in 6 days.  Suyapa verbalizes understanding and agrees to plan. No further questions or concerns.          Clinical Outcomes     Negatives:  Major bleeding event, Thromboembolic event, Anticoagulation-related hospital admission, Anticoagulation-related ED visit, Anticoagulation-related fatality    Comments:  Patient will continue weekly maintenance dose. INR is therapeutic.   Recheck in 6 days.  Suyapa verbalizes understanding and agrees to plan. No further questions or concerns.             OBJECTIVE    Recent labs: (last 7 days)     20  1351   INR 2.30*       ASSESSMENT / PLAN  INR assessment THER    Recheck INR In: 6 DAYS    INR Location Outside lab      Anticoagulation Summary  As of 2020    INR goal:  2.0-3.0   TTR:  74.9 % (1 y)   INR used for dosin.30 (2020)   Warfarin maintenance plan:  2 mg (1 mg x 2) every Mon, Fri; 1 mg (1 mg x 1) all other days   Full warfarin instructions:  2 mg every Mon, Fri; 1 mg all other days   Weekly warfarin total:  9 mg   Plan last modified:  Ruiz Meredith RN (2020)   Next INR check:  12/15/2020   Priority:  Maintenance   Target end date:  Indefinite    Indications    Atrial fibrillation with rapid ventricular response (H) (Resolved) [I48.91]  DVT (deep venous thrombosis) [I82.409]  Long term current use of anticoagulant therapy [Z79.01]             Anticoagulation Episode Summary     INR check location:      Preferred lab:      Send INR reminders to:  GARRY BARRY    Comments:  * Taking Celebrex PRN         Anticoagulation Care Providers     Provider Role Specialty Phone number    Anjum Vidales MD Referring  Family Medicine [phone]            See the Encounter Report to view Anticoagulation Flowsheet and Dosing Calendar (Go to Encounters tab in chart review, and find the Anticoagulation Therapy Visit)        Ruiz Meredith RN

## 2020-12-09 NOTE — PATIENT INSTRUCTIONS
ASSESSMENT:   (R11.0) Nausea  (primary encounter diagnosis)  Comment: Chronic but now back again without certain cause.   Plan: Basic metabolic panel, ESR: Erythrocyte         sedimentation rate, CRP, inflammation, Protein         electrophoresis, CBC with platelets          Treat the bronchitis first.   If nausea persists, let me know and we can try increasing the mirtazapine from 15 to 30mg at bedtime.   Check additional blood tests for inflammatory condition.     (R23.2) Hot flashes  Comment: uncertain cause.  No specific underlying illness identified at this time.   Plan: see above     (J40) Bronchitis  Comment: change in phlegm  Plan: azithromycin (ZITHROMAX) 250 MG tablet        Azithromycin 250mg, 2 pills day #1, then 1 pill daily for 4 more days.

## 2020-12-09 NOTE — NURSING NOTE
"Chief Complaint   Patient presents with     Nausea       Initial /78   Pulse 68   Temp 97.3  F (36.3  C) (Tympanic)   Resp 18   Ht 1.473 m (4' 10\")   Wt 58.1 kg (128 lb)   LMP 06/15/1985   SpO2 95%   BMI 26.75 kg/m   Estimated body mass index is 26.75 kg/m  as calculated from the following:    Height as of this encounter: 1.473 m (4' 10\").    Weight as of this encounter: 58.1 kg (128 lb).    Patient presents to the clinic using     Health Maintenance that is potentially due pending provider review:          Is there anyone who you would like to be able to receive your results?   If yes have patient fill out JOSUE    "

## 2020-12-09 NOTE — PROGRESS NOTES
Subjective     Ellie Leigh is a 89 year old female who presents to clinic today for the following health issues:  Chief Complaint   Patient presents with     Nausea      Accompanied today by daughter Suyapa.      Last clinic visit: 11/6 for nausea follow-up.  She had been doing well at that time with addition of mirtazapine daily at bedtime.   She has had chronic nausea.  She has been on omeprazole  40mg daily for years.  Tried sucralfate (CARAFATE) which did not help.  Was on FODMAP diet, gluten free and no dairy diets.  Evaluation with GI in the past in 1/23/2015.  She has had gastroscopy several times.  Nothing has helped much.   Tried meclizine.  Gastric emptying normal.     Treated for a urinary tract infection at that visit.   follow-up UA was OK.     Nausea seemed good for 3-4 weeks.  It is not as bad now as it has been in the past.   Hot flashes bother her more than the nausea at this time.   Started having problems later November.   Nausea in the AM.   No vomiting.   Hot flashes around Noon.  Feels like her whole body is on fire until med to late afternoon.  This is cyclical daily.   Feels shortness of breath with the hot flashes.    Seems the hot flashes are more now.   Appetite has been good.   Sleeping OK.   mirtazapine helps.   No fever.     Coughs some brown phlegm.   Always has had a cough.  Phlegm changed in the past couple weeks. Sometimes wheezes.   Breathing seems OK.   Takes Advair twice daily. DuoNebs three times daily.  NOt needing Proair.     No chest pains or palpitations.     ROS:  : No urinary frequency or dysuria, bladder or kidney problems   GI: POSITIVE for:, nausea, Negative for change in bowel habits, constipation, diarrhea.  No gas or bloating.   Musculoskeletal: POSITIVE  for:, pain in knees and legs.   occasional neck pain.  Had fracture in the past.  No pain in hips or thighs.   Neurologic: No headaches, numbness, tingling, weakness, problems with balance or coordination,  "POSITIVE for:, balance problems at times.   Psychiatric: No problems with anxiety, depression or mental health    Takes acetaminophen 650 two pills once daily.   Taking sodium three times daily.   Wt Readings from Last 5 Encounters:   12/09/20 58.1 kg (128 lb)   11/06/20 57.6 kg (127 lb)   10/02/20 53.1 kg (117 lb)   06/10/20 56.2 kg (124 lb)   05/15/20 56.2 kg (124 lb)          Concern - Nausea  Onset: 2-3 weeks  Description: nausea not feeling well   Feels very warm \"like hot flash\" lasted for hours  Intensity: mild, moderate  Progression of Symptoms:  improving  Accompanying Signs & Symptoms: see above  Previous history of similar problem:   Precipitating factors:        Worsened by:   Alleviating factors:        Improved by:   Therapies tried and outcome:  none     Patient Active Problem List   Diagnosis     Sensorineural hearing loss, asymmetrical     Generalized osteoarthrosis, unspecified site     PERSONAL HISTORY OF MALIGNANCY- BREAST     Esophageal reflux     Chronic allergic conjunctivitis     Chronic seasonal allergic rhinitis     Hyperlipidemia LDL goal <130     Chronic constipation     Osteoporosis     Health Care Home     Right arm weakness     Ulnar neuropathy     Headache     Mild major depression     DVT (deep venous thrombosis)     Anxiety     Cervical vertebral fracture (H)     Intermittent atrial fibrillation (H)     Squamous cell carcinoma in situ of skin of face     SK (seborrheic keratosis)     Hypothyroidism     Recurrent UTI     History of skin cancer     BPPV (benign paroxysmal positional vertigo)     Benign essential HTN     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)     Positive fecal occult blood test     COPD (chronic obstructive pulmonary disease) (H)     Infectious colitis, enteritis and gastroenteritis     Advance Care Planning     Syncope     Long term current use of anticoagulant therapy     Mild persistent asthma without complication     Irritable bowel syndrome without " "diarrhea     Nausea     Back pain     H/O TB (tuberculosis)     Chest pain      OBJECTIVE:Blood pressure 138/78, pulse 68, temperature 97.3  F (36.3  C), temperature source Tympanic, resp. rate 18, height 1.473 m (4' 10\"), weight 58.1 kg (128 lb), last menstrual period 06/15/1985, SpO2 95 %, not currently breastfeeding. BMI=Body mass index is 26.75 kg/m .  GENERAL APPEARANCE ADULT: Alert, no acute distress  NECK: No adenopathy,masses or thyromegaly  RESP: lungs clear to auscultation   CV: normal rate, regular rhythm, no murmur or gallop  ABDOMEN: soft, no organomegaly, masses or tenderness, examined seated.   MS: extremities normal, no peripheral edema     ASSESSMENT:   (R11.0) Nausea  (primary encounter diagnosis)  Comment: Chronic but now back again without certain cause.   Plan: Basic metabolic panel, ESR: Erythrocyte         sedimentation rate, CRP, inflammation, Protein         electrophoresis, CBC with platelets          Treat the bronchitis first.   If nausea persists, let me know and we can try increasing the mirtazapine from 15 to 30mg at bedtime.   Check additional blood tests for inflammatory condition.     (R23.2) Hot flashes  Comment: uncertain cause.  No specific underlying illness identified at this time.   Plan: see above     (J40) Bronchitis  Comment: change in phlegm  Plan: azithromycin (ZITHROMAX) 250 MG tablet        Azithromycin 250mg, 2 pills day #1, then 1 pill daily for 4 more days.        "

## 2020-12-10 LAB
ALBUMIN SERPL ELPH-MCNC: 3.8 G/DL (ref 3.7–5.1)
ALPHA1 GLOB SERPL ELPH-MCNC: 0.3 G/DL (ref 0.2–0.4)
ALPHA2 GLOB SERPL ELPH-MCNC: 0.8 G/DL (ref 0.5–0.9)
B-GLOBULIN SERPL ELPH-MCNC: 0.7 G/DL (ref 0.6–1)
GAMMA GLOB SERPL ELPH-MCNC: 1 G/DL (ref 0.7–1.6)
M PROTEIN SERPL ELPH-MCNC: 0.1 G/DL
PROT PATTERN SERPL ELPH-IMP: ABNORMAL

## 2020-12-10 ASSESSMENT — ASTHMA QUESTIONNAIRES: ACT_TOTALSCORE: 17

## 2020-12-10 NOTE — RESULT ENCOUNTER NOTE
Orlando Argueta,  There are minor changes in chemistries;glucose slightly elevated.  Sodium slightly decreased.   One test for inflammatory condition the sed rate is a little high, the other test, CRP is normal.    You have mild anemia, other blood counts are normal.   PLAN: There is nothing worrisome with blood tests.   PLAN: No new changes in treatment recommended..   MARCY DAUGHERTY MD

## 2020-12-13 DIAGNOSIS — E03.9 HYPOTHYROIDISM, UNSPECIFIED TYPE: Chronic | ICD-10-CM

## 2020-12-13 NOTE — RESULT ENCOUNTER NOTE
Questionable presence of abnormal protein, likely not of significance or contributing to any symptoms.

## 2020-12-14 RX ORDER — LEVOTHYROXINE SODIUM 50 UG/1
TABLET ORAL
Qty: 90 TABLET | Refills: 2 | Status: SHIPPED | OUTPATIENT
Start: 2020-12-14 | End: 2021-09-20

## 2020-12-15 ENCOUNTER — ANTICOAGULATION THERAPY VISIT (OUTPATIENT)
Dept: ANTICOAGULATION | Facility: CLINIC | Age: 85
End: 2020-12-15

## 2020-12-15 DIAGNOSIS — I48.0 INTERMITTENT ATRIAL FIBRILLATION (H): ICD-10-CM

## 2020-12-15 DIAGNOSIS — I82.409 DVT (DEEP VENOUS THROMBOSIS) (H): ICD-10-CM

## 2020-12-15 DIAGNOSIS — Z79.01 LONG TERM CURRENT USE OF ANTICOAGULANT THERAPY: ICD-10-CM

## 2020-12-15 LAB
CAPILLARY BLOOD COLLECTION: NORMAL
INR PPP: 2.3 (ref 0.86–1.14)

## 2020-12-15 PROCEDURE — 36416 COLLJ CAPILLARY BLOOD SPEC: CPT | Performed by: FAMILY MEDICINE

## 2020-12-15 PROCEDURE — 99207 PR NO CHARGE NURSE ONLY: CPT

## 2020-12-15 PROCEDURE — 85610 PROTHROMBIN TIME: CPT | Performed by: FAMILY MEDICINE

## 2020-12-15 NOTE — PROGRESS NOTES
ANTICOAGULATION FOLLOW-UP CLINIC VISIT    Patient Name:  Ellie Leigh  Date:  12/15/2020  Contact Type:  Telephone/ Suyapa    SUBJECTIVE:  Patient Findings     Comments:  Abx completed.   Patient will continue weekly maintenance dose. INR is therapeutic.   Recheck in 4 weeks.   Suyapa verbalizes understanding and agrees to plan. No further questions or concerns.          Clinical Outcomes     Negatives:  Major bleeding event, Thromboembolic event, Anticoagulation-related hospital admission, Anticoagulation-related ED visit, Anticoagulation-related fatality    Comments:  Abx completed.   Patient will continue weekly maintenance dose. INR is therapeutic.   Recheck in 4 weeks.   Suyapa verbalizes understanding and agrees to plan. No further questions or concerns.             OBJECTIVE    Recent labs: (last 7 days)     12/15/20  1151   INR 2.30*       ASSESSMENT / PLAN  INR assessment THER    Recheck INR In: 4 WEEKS    INR Location Outside lab      Anticoagulation Summary  As of 12/15/2020    INR goal:  2.0-3.0   TTR:  75.5 % (1 y)   INR used for dosin.30 (12/15/2020)   Warfarin maintenance plan:  2 mg (1 mg x 2) every Mon, Fri; 1 mg (1 mg x 1) all other days   Full warfarin instructions:  2 mg every Mon, Fri; 1 mg all other days   Weekly warfarin total:  9 mg   No change documented:  Ruiz Meredith RN   Plan last modified:  Ruiz Meredith RN (2020)   Next INR check:  2021   Priority:  Maintenance   Target end date:  Indefinite    Indications    Atrial fibrillation with rapid ventricular response (H) (Resolved) [I48.91]  DVT (deep venous thrombosis) [I82.409]  Long term current use of anticoagulant therapy [Z79.01]             Anticoagulation Episode Summary     INR check location:      Preferred lab:      Send INR reminders to:  GARRY BARRY    Comments:  * Taking Celebrex PRN         Anticoagulation Care Providers     Provider Role Specialty Phone number    Anjum Vidales MD  Referring Family Medicine [phone]            See the Encounter Report to view Anticoagulation Flowsheet and Dosing Calendar (Go to Encounters tab in chart review, and find the Anticoagulation Therapy Visit)        Ruiz Meredith RN

## 2021-01-08 ENCOUNTER — OFFICE VISIT (OUTPATIENT)
Dept: FAMILY MEDICINE | Facility: CLINIC | Age: 86
End: 2021-01-08
Payer: COMMERCIAL

## 2021-01-08 VITALS
OXYGEN SATURATION: 95 % | WEIGHT: 124 LBS | RESPIRATION RATE: 16 BRPM | HEIGHT: 58 IN | DIASTOLIC BLOOD PRESSURE: 78 MMHG | SYSTOLIC BLOOD PRESSURE: 124 MMHG | HEART RATE: 82 BPM | BODY MASS INDEX: 26.03 KG/M2 | TEMPERATURE: 97.3 F

## 2021-01-08 DIAGNOSIS — N30.00 ACUTE CYSTITIS WITHOUT HEMATURIA: Primary | ICD-10-CM

## 2021-01-08 DIAGNOSIS — R35.0 URINARY FREQUENCY: ICD-10-CM

## 2021-01-08 LAB
ALBUMIN UR-MCNC: NEGATIVE MG/DL
AMORPH CRY #/AREA URNS HPF: ABNORMAL /HPF
APPEARANCE UR: CLEAR
BACTERIA #/AREA URNS HPF: ABNORMAL /HPF
BILIRUB UR QL STRIP: NEGATIVE
COLOR UR AUTO: YELLOW
GLUCOSE UR STRIP-MCNC: NEGATIVE MG/DL
HGB UR QL STRIP: ABNORMAL
KETONES UR STRIP-MCNC: NEGATIVE MG/DL
LEUKOCYTE ESTERASE UR QL STRIP: ABNORMAL
NITRATE UR QL: POSITIVE
NON-SQ EPI CELLS #/AREA URNS LPF: ABNORMAL /LPF
PH UR STRIP: 7 PH (ref 5–7)
RBC #/AREA URNS AUTO: ABNORMAL /HPF
SOURCE: ABNORMAL
SP GR UR STRIP: 1.02 (ref 1–1.03)
UROBILINOGEN UR STRIP-ACNC: 0.2 EU/DL (ref 0.2–1)
WBC #/AREA URNS AUTO: ABNORMAL /HPF

## 2021-01-08 PROCEDURE — 99213 OFFICE O/P EST LOW 20 MIN: CPT | Performed by: FAMILY MEDICINE

## 2021-01-08 PROCEDURE — 87086 URINE CULTURE/COLONY COUNT: CPT | Performed by: FAMILY MEDICINE

## 2021-01-08 PROCEDURE — 81001 URINALYSIS AUTO W/SCOPE: CPT | Performed by: FAMILY MEDICINE

## 2021-01-08 RX ORDER — NITROFURANTOIN 25; 75 MG/1; MG/1
100 CAPSULE ORAL 2 TIMES DAILY
Qty: 14 CAPSULE | Refills: 0 | Status: SHIPPED | OUTPATIENT
Start: 2021-01-08 | End: 2021-01-15

## 2021-01-08 ASSESSMENT — ANXIETY QUESTIONNAIRES
2. NOT BEING ABLE TO STOP OR CONTROL WORRYING: NOT AT ALL
GAD7 TOTAL SCORE: 0
3. WORRYING TOO MUCH ABOUT DIFFERENT THINGS: NOT AT ALL
5. BEING SO RESTLESS THAT IT IS HARD TO SIT STILL: NOT AT ALL
IF YOU CHECKED OFF ANY PROBLEMS ON THIS QUESTIONNAIRE, HOW DIFFICULT HAVE THESE PROBLEMS MADE IT FOR YOU TO DO YOUR WORK, TAKE CARE OF THINGS AT HOME, OR GET ALONG WITH OTHER PEOPLE: NOT DIFFICULT AT ALL
1. FEELING NERVOUS, ANXIOUS, OR ON EDGE: NOT AT ALL
7. FEELING AFRAID AS IF SOMETHING AWFUL MIGHT HAPPEN: NOT AT ALL
6. BECOMING EASILY ANNOYED OR IRRITABLE: NOT AT ALL

## 2021-01-08 ASSESSMENT — MIFFLIN-ST. JEOR: SCORE: 872.21

## 2021-01-08 ASSESSMENT — PATIENT HEALTH QUESTIONNAIRE - PHQ9
5. POOR APPETITE OR OVEREATING: NOT AT ALL
SUM OF ALL RESPONSES TO PHQ QUESTIONS 1-9: 0

## 2021-01-08 NOTE — PATIENT INSTRUCTIONS

## 2021-01-08 NOTE — PROGRESS NOTES
Assessment & Plan     Acute cystitis without hematuria  Symptoms are likely secondary to acute cystitis.  UA findings and previous urine culture results reviewed.  Macrobid prescribed, common side effect discussed.  Suggested to continue well hydration.  Urine culture sent.  Written information provided.  All questions answered.  - nitroFURantoin macrocrystal-monohydrate (MACROBID) 100 MG capsule; Take 1 capsule (100 mg) by mouth 2 times daily for 7 days    Urinary frequency  - *UA reflex to Microscopic and Culture ([place] and [facility] (except [place] and [place])  - Urine Culture Aerobic Bacterial  - Urine Microscopic        20 minutes spent on the date of the encounter doing chart review, review of test results, interpretation of tests, patient visit and documentation           Patient Instructions     Patient Education     Bladder Infection, Female (Adult)     Urine normally doesn't have any germs (bacteria) in it. But bacteria can get into the urinary tract from the skin around the rectum. Or they can travel in the blood from other parts of the body. Once they are in your urinary tract, they can cause infection in these areas:    The urethra (urethritis)    The bladder (cystitis)    The kidneys (pyelonephritis)  The most common place for an infection is in the bladder. This is called a bladder infection. This is one of the most common infections in women. Most bladder infections are easily treated. They are not serious unless the infection spreads to the kidney.  The terms bladder infection, UTI, and cystitis are often used to describe the same thing. But they are not always the same. Cystitis is an inflammation of the bladder. The most common cause of cystitis is an infection.  Symptoms  The infection causes inflammation in the urethra and bladder. This causes many of the symptoms. The most common symptoms of a bladder infection are:    Pain or burning when urinating    Having to urinate more often  than normal    Urgent need to urinate    Only a small amount of urine comes out    Blood in urine    Belly (abdominal) discomfort. This is often in the lower belly above the pubic bone.    Cloudy urine    Strong- or bad-smelling urine    Unable to urinate (urinary retention)    Unable to hold urine in (urinary incontinence)    Fever    Loss of appetite    Confusion (in older adults)  Causes  Bladder infections are not contagious. You can't get one from someone else, from a toilet seat, or from sharing a bath.  The most common cause of bladder infections is bacteria from the bowels. The bacteria get onto the skin around the opening of the urethra. From there, they can get into the urine. Then they travel up to the bladder, causing inflammation and infection. This often happens because of:    Wiping incorrectly after urinating. Always wipe from front to back.    Bowel incontinence    Pregnancy    Procedures such as having a catheter put in    Older age    Not emptying your bladder. This can give bacteria a chance to grow in your urine.    Fluid loss (dehydration)    Constipation    Having sex    Using a diaphragm for birth control   Treatment  Bladder infections are diagnosed by a urine test and urine culture. They are treated with antibiotics. They often clear up quickly without problems. Treatment helps prevent a more serious kidney infection.  Medicines  Medicines can help in the treatment of a bladder infection:    Take antibiotics until they are used up, even if you feel better. It's important to finish them to make sure the infection has cleared.    You can use acetaminophen or ibuprofen for pain, fever, or discomfort, unless another medicine was prescribed. If you have long-term (chronic) liver or kidney disease, talk with your healthcare provider before using these medicines. Also talk with your provider if you've ever had a stomach ulcer or GI (gastrointestinal) bleeding, or are taking blood-thinner  medicines.    If you are given phenazopydridine to reduce burning with urination, it will make your urine a bright orange color. This can stain clothing.  Care and prevention  These self-care steps can help prevent future infections:    Drink plenty of fluids. This helps to prevent dehydration and flush out your bladder. Do this unless you must restrict fluids for other health reasons, or your healthcare provider told you not to.    Clean yourself correctly after going to the bathroom. Wipe from front to back after using the toilet. This helps prevent the spread of bacteria.    Urinate more often. Don't try to hold urine in for a long time.    Wear loose-fitting clothes and cotton underwear. Don't wear tight-fitting pants.    Improve your diet and prevent constipation. Eat more fresh fruits and vegetables, and fiber. Eat less junk foods and fatty foods.    Don't have sex until your symptoms are gone.    Don't have caffeine, alcohol, and spicy foods. These can irritate your bladder.    Urinate right after you have sex to flush out your bladder.    If you use birth control pills and have frequent bladder infections, discuss it with your healthcare provider.  Follow-up care  Call your healthcare provider if all symptoms are not gone after 3 days of treatment. This is especially important if you have repeat infections.  If a culture was done, you will be told if your treatment needs to be changed. If directed, you can call to find out the results.  If X-rays were done, you will be told if the results will affect your treatment.  Call 911  Call 911 if any of the following occur:    Trouble breathing    Hard to wake up or confusion    Fainting (loss of consciousness)    Fast heart rate  When to get medical advice  Call your healthcare provider right away if any of these occur:    Fever of 100.4 F (38.0 C) or higher, or as directed by your healthcare provider    Symptoms are not better after 3 days of treatment    Back or  "belly pain that gets worse    Repeated vomiting, or unable to keep medicine down    Weakness or dizziness    Vaginal discharge    Pain, redness, or swelling in the outer vaginal area (labia)  [employer] last reviewed this educational content on 11/1/2019 2000-2020 [employer]. [address] [place] [address]. All rights reserved. This information is not intended as a substitute for professional medical care. Always follow your healthcare professional's instructions.               Tera Jones MD  [facility]    Renay Argueta is a 89 year old who presents to clinic today for the following health issues  accompanied by her daughter:    HPI       Genitourinary - Female  Onset/Duration: few days   Description:   Painful urination (Dysuria): no           Frequency: YES  Blood in urine (Hematuria): no  Delay in urine (Hesitency): no  Intensity: moderate  Progression of Symptoms:  same  Accompanying Signs & Symptoms:  Fever/chills: no  Flank pain: no  Nausea and vomiting: no  Vaginal symptoms: none  Abdominal/Pelvic Pain: no  History:   History of frequent UTI s: YES  History of kidney stones: no  Sexually Active: no  Possibility of pregnancy: No  Precipitating or alleviating factors: None  Therapies tried and outcome: none          Review of Systems   Constitutional, HEENT, cardiovascular, pulmonary, gi and gu systems are negative, except as otherwise noted.      Objective    /78   Pulse 82   Temp 97.3  F (36.3  C) (Tympanic)   Resp 16   Ht 1.473 m (4' 10\")   Wt 56.2 kg (124 lb)   LMP 06/15/1985   SpO2 95%   BMI 25.92 kg/m    Body mass index is 25.92 kg/m .  Physical Exam   GENERAL: alert and no distress  NECK: no adenopathy, no asymmetry, masses, or scars and thyroid normal to palpation  RESP: lungs clear to auscultation - no rales, rhonchi or wheezes  ABDOMEN: soft, nontender  MS: no gross musculoskeletal defects noted, no edema    Results for " orders placed or performed in visit on 01/08/21   *UA reflex to Microscopic and Culture ([place] and [facility] (except [place] and [place])     Status: Abnormal    Specimen: Midstream Urine   Result Value Ref Range    Color Urine Yellow     Appearance Urine Clear     Glucose Urine Negative NEG^Negative mg/dL    Bilirubin Urine Negative NEG^Negative    Ketones Urine Negative NEG^Negative mg/dL    Specific Gravity Urine 1.020 1.003 - 1.035    Blood Urine Trace (A) NEG^Negative    pH Urine 7.0 5.0 - 7.0 pH    Protein Albumin Urine Negative NEG^Negative mg/dL    Urobilinogen Urine 0.2 0.2 - 1.0 EU/dL    Nitrite Urine Positive (A) NEG^Negative    Leukocyte Esterase Urine Moderate (A) NEG^Negative    Source Midstream Urine    Urine Microscopic     Status: Abnormal   Result Value Ref Range    WBC Urine 25-50 (A) OTO5^0 - 5 /HPF    RBC Urine 2-5 (A) OTO2^O - 2 /HPF    Squamous Epithelial /LPF Urine Few FEW^Few /LPF    Bacteria Urine Many (A) NEG^Negative /HPF    Amorphous Crystals Few (A) NEG^Negative /HPF       ----- Ambulatory Services Attestations for Billing on Time -----

## 2021-01-09 DIAGNOSIS — N30.00 ACUTE CYSTITIS WITHOUT HEMATURIA: ICD-10-CM

## 2021-01-09 DIAGNOSIS — I10 ESSENTIAL HYPERTENSION, BENIGN: ICD-10-CM

## 2021-01-09 LAB
BACTERIA SPEC CULT: NORMAL
Lab: NORMAL
SPECIMEN SOURCE: NORMAL

## 2021-01-09 ASSESSMENT — ASTHMA QUESTIONNAIRES: ACT_TOTALSCORE: 20

## 2021-01-09 ASSESSMENT — ANXIETY QUESTIONNAIRES: GAD7 TOTAL SCORE: 0

## 2021-01-11 RX ORDER — METOPROLOL SUCCINATE 25 MG/1
TABLET, EXTENDED RELEASE ORAL
Qty: 360 TABLET | Refills: 1 | Status: SHIPPED | OUTPATIENT
Start: 2021-01-11 | End: 2021-07-06

## 2021-01-11 RX ORDER — NITROFURANTOIN 25; 75 MG/1; MG/1
100 CAPSULE ORAL 2 TIMES DAILY
Qty: 14 CAPSULE | Refills: 0 | OUTPATIENT
Start: 2021-01-11 | End: 2021-01-18

## 2021-01-12 ENCOUNTER — ANTICOAGULATION THERAPY VISIT (OUTPATIENT)
Dept: ANTICOAGULATION | Facility: CLINIC | Age: 86
End: 2021-01-12

## 2021-01-12 DIAGNOSIS — I48.0 INTERMITTENT ATRIAL FIBRILLATION (H): ICD-10-CM

## 2021-01-12 DIAGNOSIS — Z79.01 LONG TERM CURRENT USE OF ANTICOAGULANT THERAPY: ICD-10-CM

## 2021-01-12 DIAGNOSIS — I82.409 DVT (DEEP VENOUS THROMBOSIS) (H): ICD-10-CM

## 2021-01-12 LAB
CAPILLARY BLOOD COLLECTION: NORMAL
INR PPP: 2.2 (ref 0.86–1.14)

## 2021-01-12 PROCEDURE — 36416 COLLJ CAPILLARY BLOOD SPEC: CPT | Performed by: FAMILY MEDICINE

## 2021-01-12 PROCEDURE — 85610 PROTHROMBIN TIME: CPT | Performed by: FAMILY MEDICINE

## 2021-01-12 NOTE — PROGRESS NOTES
ANTICOAGULATION MANAGEMENT     Patient Name:  Ellie Leigh  Date:  2021    ASSESSMENT /SUBJECTIVE:    Today's INR result of 2.20 is therapeutic. Goal INR of 2.0-3.0      Warfarin dose taken: Warfarin taken as instructed    Diet: No new diet changes affecting INR    Medication changes/ interactions: Interaction between Macrobid started on 21 and warfarin may be affecting INR-patient has 3 days remaining, states UTI symptoms are resolving     Previous INR: Therapeutic     S/S of bleeding or thromboembolism: No    New injury or illness: No    Upcoming surgery, procedure or cardioversion: No    Additional findings: Patient does NOT want to return for an INR any sooner than 4 weeks. Patient has taken Macrobid for her UTI in the past with no effect on INR or dose adjustment needed (most recently in 2020). Advised to monitor for bleeding concerns, notify [facility] if any change in medications.       PLAN:    Telephone call with Jennifer regarding INR result and instructed:     Warfarin Dosing Instructions: Continue your current warfarin dose 2 mg every Mon, Fri; 1 mg all other days    Instructed patient to follow up no later than: 2 weeks  Patient elected to schedule next visit in 4 weeks on 21    Education provided: Target INR goal and significance of current INR result, Monitoring for bleeding signs and symptoms, When to seek medical attention/emergency care and Importance of notifying clinic for changes in medications; a sooner lab recheck maybe needed.      Jennifer verbalizes understanding and agrees to warfarin dosing plan.    Instructed to call the Anticoagulation Clinic for any changes, questions or concerns. (#[phone])        Gina Persaud RN      OBJECTIVE:  Recent labs: (last 7 days)     21  1102   INR 2.20*         No question data found.  Anticoagulation Summary  As of 2021    INR goal:  2.0-3.0   TTR:  80.0 % (1 y)   INR used for dosin.20  (1/12/2021)   Warfarin maintenance plan:  2 mg (1 mg x 2) every Mon, Fri; 1 mg (1 mg x 1) all other days   Full warfarin instructions:  2 mg every Mon, Fri; 1 mg all other days   Weekly warfarin total:  9 mg   No change documented:  Gina Persaud RN   Plan last modified:  Ruiz Meredith RN (2/18/2020)   Next INR check:  2/9/2021   Priority:  Maintenance   Target end date:  Indefinite    Indications    Atrial fibrillation with rapid ventricular response (H) (Resolved) [I48.91]  DVT (deep venous thrombosis) [I82.409]  Long term current use of anticoagulant therapy [Z79.01]             Anticoagulation Episode Summary     INR check location:      Preferred lab:      Send INR reminders to:  GARRY BARRY    Comments:  * Taking Celebrex PRN         Anticoagulation Care Providers     Provider Role Specialty Phone number    Anjum Vidales MD Referring Family Medicine [phone]

## 2021-01-14 ENCOUNTER — TELEPHONE (OUTPATIENT)
Dept: FAMILY MEDICINE | Facility: CLINIC | Age: 86
End: 2021-01-14

## 2021-01-14 NOTE — TELEPHONE ENCOUNTER
S-(situation): I talked with Ellie   States C/O some burning, at times when urinates.  Still some frequency, but better.  Denies fever, chills, nausea or diarrhea.  Drinking 6 cups of water daily.  This is last day of the antibiotic.   Overall, she says symptoms are better.  Denies blood is urine    B-(background): saw Dr Jones on 1/8/21 for urinary frequency and dysuria.  Started on Macrobid BID for 7 days    A-(assessment): symptoms associated with UTI better    R-(recommendations): advised maybe increase fluid intake.  Needs to be seen if worse.  Celena Laurent RN

## 2021-01-27 ENCOUNTER — ANCILLARY PROCEDURE (OUTPATIENT)
Dept: CARDIOLOGY | Facility: CLINIC | Age: 86
End: 2021-01-27
Attending: INTERNAL MEDICINE
Payer: COMMERCIAL

## 2021-01-27 DIAGNOSIS — Z95.0 CARDIAC PACEMAKER IN SITU: ICD-10-CM

## 2021-01-27 PROCEDURE — 93294 REM INTERROG EVL PM/LDLS PM: CPT | Performed by: INTERNAL MEDICINE

## 2021-01-27 PROCEDURE — 93296 REM INTERROG EVL PM/IDS: CPT | Performed by: INTERNAL MEDICINE

## 2021-02-02 LAB
MDC_IDC_LEAD_IMPLANT_DT: NORMAL
MDC_IDC_LEAD_IMPLANT_DT: NORMAL
MDC_IDC_LEAD_LOCATION: NORMAL
MDC_IDC_LEAD_LOCATION: NORMAL
MDC_IDC_LEAD_LOCATION_DETAIL_1: NORMAL
MDC_IDC_LEAD_LOCATION_DETAIL_1: NORMAL
MDC_IDC_LEAD_MFG: NORMAL
MDC_IDC_LEAD_MFG: NORMAL
MDC_IDC_LEAD_MODEL: NORMAL
MDC_IDC_LEAD_MODEL: NORMAL
MDC_IDC_LEAD_POLARITY_TYPE: NORMAL
MDC_IDC_LEAD_POLARITY_TYPE: NORMAL
MDC_IDC_LEAD_SERIAL: NORMAL
MDC_IDC_LEAD_SERIAL: NORMAL
MDC_IDC_MSMT_BATTERY_REMAINING_PERCENTAGE: 50 %
MDC_IDC_MSMT_BATTERY_STATUS: NORMAL
MDC_IDC_MSMT_LEADCHNL_RA_IMPEDANCE_VALUE: 381 OHM
MDC_IDC_MSMT_LEADCHNL_RA_LEAD_CHANNEL_STATUS: NORMAL
MDC_IDC_MSMT_LEADCHNL_RA_PACING_THRESHOLD_AMPLITUDE: 0.8 V
MDC_IDC_MSMT_LEADCHNL_RA_PACING_THRESHOLD_PULSEWIDTH: 0.4 MS
MDC_IDC_MSMT_LEADCHNL_RV_IMPEDANCE_VALUE: 455 OHM
MDC_IDC_MSMT_LEADCHNL_RV_LEAD_CHANNEL_STATUS: NORMAL
MDC_IDC_MSMT_LEADCHNL_RV_PACING_THRESHOLD_AMPLITUDE: 0.6 V
MDC_IDC_MSMT_LEADCHNL_RV_PACING_THRESHOLD_PULSEWIDTH: 0.4 MS
MDC_IDC_PG_IMPLANT_DTM: NORMAL
MDC_IDC_PG_MFG: NORMAL
MDC_IDC_PG_MODEL: NORMAL
MDC_IDC_PG_SERIAL: NORMAL
MDC_IDC_PG_TYPE: NORMAL
MDC_IDC_SESS_CLINIC_NAME: NORMAL
MDC_IDC_SESS_DTM: NORMAL
MDC_IDC_SESS_REPROGRAMMED: NO
MDC_IDC_SESS_TYPE: NORMAL
MDC_IDC_SET_BRADY_AT_MODE_SWITCH_MODE: NORMAL
MDC_IDC_SET_BRADY_AT_MODE_SWITCH_RATE: 160 {BEATS}/MIN
MDC_IDC_SET_BRADY_LOWRATE: 60 {BEATS}/MIN
MDC_IDC_SET_BRADY_MAX_SENSOR_RATE: 125 {BEATS}/MIN
MDC_IDC_SET_BRADY_MAX_TRACKING_RATE: 130 {BEATS}/MIN
MDC_IDC_SET_BRADY_MODE: NORMAL
MDC_IDC_SET_BRADY_PAV_DELAY_HIGH: 150 MS
MDC_IDC_SET_BRADY_PAV_DELAY_LOW: 180 MS
MDC_IDC_SET_BRADY_SAV_DELAY_HIGH: 105 MS
MDC_IDC_SET_BRADY_SAV_DELAY_LOW: 135 MS
MDC_IDC_SET_LEADCHNL_RA_PACING_POLARITY: NORMAL
MDC_IDC_SET_LEADCHNL_RA_PACING_PULSEWIDTH: 0.4 MS
MDC_IDC_SET_LEADCHNL_RA_SENSING_ADAPTATION_MODE: NORMAL
MDC_IDC_SET_LEADCHNL_RA_SENSING_POLARITY: NORMAL
MDC_IDC_SET_LEADCHNL_RV_PACING_POLARITY: NORMAL
MDC_IDC_SET_LEADCHNL_RV_PACING_PULSEWIDTH: 0.4 MS
MDC_IDC_SET_LEADCHNL_RV_SENSING_ADAPTATION_MODE: NORMAL
MDC_IDC_SET_LEADCHNL_RV_SENSING_POLARITY: NORMAL
MDC_IDC_STAT_AT_BURDEN_PERCENT: 0 %
MDC_IDC_STAT_AT_DTM_END: NORMAL
MDC_IDC_STAT_AT_DTM_START: NORMAL
MDC_IDC_STAT_AT_MODE_SW_COUNT_PER_DAY: 0
MDC_IDC_STAT_AT_MODE_SW_PERCENT_TIME_PER_DAY: 0 %
MDC_IDC_STAT_BRADY_AP_VP_PERCENT: 0 %
MDC_IDC_STAT_BRADY_AP_VS_PERCENT: 72 %
MDC_IDC_STAT_BRADY_AS_VP_PERCENT: 0 %
MDC_IDC_STAT_BRADY_AS_VS_PERCENT: 27 %
MDC_IDC_STAT_BRADY_DTM_END: NORMAL
MDC_IDC_STAT_BRADY_DTM_START: NORMAL
MDC_IDC_STAT_BRADY_RA_PERCENT_PACED: 73 %
MDC_IDC_STAT_BRADY_RV_PERCENT_PACED: 1 %
MDC_IDC_STAT_CRT_DTM_END: NORMAL
MDC_IDC_STAT_CRT_DTM_START: NORMAL

## 2021-02-09 ENCOUNTER — ANTICOAGULATION THERAPY VISIT (OUTPATIENT)
Dept: FAMILY MEDICINE | Facility: CLINIC | Age: 86
End: 2021-02-09

## 2021-02-09 DIAGNOSIS — Z79.01 LONG TERM CURRENT USE OF ANTICOAGULANT THERAPY: ICD-10-CM

## 2021-02-09 DIAGNOSIS — I48.0 INTERMITTENT ATRIAL FIBRILLATION (H): ICD-10-CM

## 2021-02-09 DIAGNOSIS — I82.409 DVT (DEEP VENOUS THROMBOSIS) (H): ICD-10-CM

## 2021-02-09 LAB
CAPILLARY BLOOD COLLECTION: NORMAL
INR PPP: 1.9 (ref 0.86–1.14)

## 2021-02-09 PROCEDURE — 85610 PROTHROMBIN TIME: CPT | Performed by: FAMILY MEDICINE

## 2021-02-09 PROCEDURE — 36416 COLLJ CAPILLARY BLOOD SPEC: CPT | Performed by: FAMILY MEDICINE

## 2021-02-09 PROCEDURE — 99207 PR NO CHARGE NURSE ONLY: CPT | Performed by: FAMILY MEDICINE

## 2021-02-09 NOTE — PROGRESS NOTES
ANTICOAGULATION FOLLOW-UP CLINIC VISIT    Patient Name:  Ellie Leigh  Date:  2021  Contact Type:  Telephone  Spoke to Patients daughter dosing reviewed.  SUBJECTIVE:  Patient Findings     Comments:    Assessed for S/S bleeding, clotting, medication, diet, health, activity and alcohol changes.        Clinical Outcomes     Negatives:  Major bleeding event, Thromboembolic event, Anticoagulation-related hospital admission, Anticoagulation-related ED visit, Anticoagulation-related fatality    Comments:    Assessed for S/S bleeding, clotting, medication, diet, health, activity and alcohol changes.           OBJECTIVE    Recent labs: (last 7 days)     21  1147   INR 1.90*       ASSESSMENT / PLAN  INR assessment THER    Recheck INR In: 2 WEEKS    INR Location Clinic      Anticoagulation Summary  As of 2021    INR goal:  2.0-3.0   TTR:  78.3 % (1 y)   INR used for dosin.90 (2021)   Warfarin maintenance plan:  2 mg (1 mg x 2) every Mon, Fri; 1 mg (1 mg x 1) all other days   Full warfarin instructions:  2 mg every Mon, Fri; 1 mg all other days   Weekly warfarin total:  9 mg   No change documented:  Kori West, RN   Plan last modified:  Ruiz Meredith RN (2020)   Next INR check:  2021   Priority:  Maintenance   Target end date:  Indefinite    Indications    Atrial fibrillation with rapid ventricular response (H) (Resolved) [I48.91]  DVT (deep venous thrombosis) [I82.409]  Long term current use of anticoagulant therapy [Z79.01]             Anticoagulation Episode Summary     INR check location:      Preferred lab:      Send INR reminders to:  GARRY BARRY    Comments:  * Taking Celebrex PRN         Anticoagulation Care Providers     Provider Role Specialty Phone number    Anjum Vidales MD Referring Family Medicine [phone]            See the Encounter Report to view Anticoagulation Flowsheet and Dosing Calendar (Go to Encounters tab in chart review, and find  the Anticoagulation Therapy Visit)        Kori West RN

## 2021-02-23 ENCOUNTER — ANTICOAGULATION THERAPY VISIT (OUTPATIENT)
Dept: ANTICOAGULATION | Facility: CLINIC | Age: 86
End: 2021-02-23

## 2021-02-23 DIAGNOSIS — Z79.01 LONG TERM CURRENT USE OF ANTICOAGULANT THERAPY: ICD-10-CM

## 2021-02-23 DIAGNOSIS — I48.0 INTERMITTENT ATRIAL FIBRILLATION (H): ICD-10-CM

## 2021-02-23 DIAGNOSIS — I82.409 DVT (DEEP VENOUS THROMBOSIS) (H): ICD-10-CM

## 2021-02-23 LAB
CAPILLARY BLOOD COLLECTION: NORMAL
INR PPP: 1.9 (ref 0.86–1.14)

## 2021-02-23 PROCEDURE — 36416 COLLJ CAPILLARY BLOOD SPEC: CPT | Performed by: FAMILY MEDICINE

## 2021-02-23 PROCEDURE — 99207 PR NO CHARGE NURSE ONLY: CPT

## 2021-02-23 PROCEDURE — 85610 PROTHROMBIN TIME: CPT | Performed by: FAMILY MEDICINE

## 2021-02-23 RX ORDER — WARFARIN SODIUM 1 MG/1
TABLET ORAL
Qty: 115 TABLET | Refills: 0 | COMMUNITY
Start: 2021-02-23 | End: 2021-04-08

## 2021-02-23 NOTE — PROGRESS NOTES
ANTICOAGULATION FOLLOW-UP CLINIC VISIT    Patient Name:  Ellie Leigh  Date:  2021  Contact Type:  Telephone/ Suyapa    SUBJECTIVE:  Patient Findings     Comments:  Patient has cut back on her green intake.  Writer increased maintenance dose to 10 mg for an 11% increase.   Recheck in 2 week.   Suyapa verbalizes understanding and agrees to plan. No further questions or concerns.          Clinical Outcomes     Negatives:  Major bleeding event, Thromboembolic event, Anticoagulation-related hospital admission, Anticoagulation-related ED visit, Anticoagulation-related fatality    Comments:  Patient has cut back on her green intake.  Writer increased maintenance dose to 10 mg for an 11% increase.   Recheck in 2 week.   Suyapa verbalizes understanding and agrees to plan. No further questions or concerns.             OBJECTIVE    Recent labs: (last 7 days)     21  1223   INR 1.90*       ASSESSMENT / PLAN  INR assessment SUB    Recheck INR In: 2 WEEKS    INR Location Outside lab      Anticoagulation Summary  As of 2021    INR goal:  2.0-3.0   TTR:  78.3 % (1 y)   INR used for dosin.90 (2021)   Warfarin maintenance plan:  2 mg (1 mg x 2) every Mon, Wed, Fri; 1 mg (1 mg x 1) all other days   Full warfarin instructions:  2 mg every Mon, Wed, Fri; 1 mg all other days   Weekly warfarin total:  10 mg   Plan last modified:  Ruiz Meredith RN (2021)   Next INR check:  3/9/2021   Priority:  High   Target end date:  Indefinite    Indications    Atrial fibrillation with rapid ventricular response (H) (Resolved) [I48.91]  DVT (deep venous thrombosis) [I82.409]  Long term current use of anticoagulant therapy [Z79.01]             Anticoagulation Episode Summary     INR check location:      Preferred lab:      Send INR reminders to:  GARRY BARRY    Comments:  * Taking Celebrex PRN         Anticoagulation Care Providers     Provider Role Specialty Phone number    Anjum Vidales MD  Referring Family Medicine [phone]            See the Encounter Report to view Anticoagulation Flowsheet and Dosing Calendar (Go to Encounters tab in chart review, and find the Anticoagulation Therapy Visit)        Ruiz Meredith RN

## 2021-03-10 ENCOUNTER — TELEPHONE (OUTPATIENT)
Dept: ANTICOAGULATION | Facility: CLINIC | Age: 86
End: 2021-03-10

## 2021-03-10 ENCOUNTER — ANTICOAGULATION THERAPY VISIT (OUTPATIENT)
Dept: ANTICOAGULATION | Facility: CLINIC | Age: 86
End: 2021-03-10

## 2021-03-10 DIAGNOSIS — Z79.01 LONG TERM CURRENT USE OF ANTICOAGULANT THERAPY: ICD-10-CM

## 2021-03-10 DIAGNOSIS — I82.409 DVT (DEEP VENOUS THROMBOSIS) (H): ICD-10-CM

## 2021-03-10 DIAGNOSIS — I82.4Y9 DEEP VEIN THROMBOSIS (DVT) OF PROXIMAL LOWER EXTREMITY, UNSPECIFIED CHRONICITY, UNSPECIFIED LATERALITY (H): Primary | Chronic | ICD-10-CM

## 2021-03-10 DIAGNOSIS — I48.91 A-FIB (H): Primary | ICD-10-CM

## 2021-03-10 DIAGNOSIS — I48.0 INTERMITTENT ATRIAL FIBRILLATION (H): ICD-10-CM

## 2021-03-10 LAB
CAPILLARY BLOOD COLLECTION: NORMAL
INR PPP: 2.4 (ref 0.86–1.14)

## 2021-03-10 PROCEDURE — 36416 COLLJ CAPILLARY BLOOD SPEC: CPT | Performed by: FAMILY MEDICINE

## 2021-03-10 PROCEDURE — 85610 PROTHROMBIN TIME: CPT | Performed by: FAMILY MEDICINE

## 2021-03-10 NOTE — PROGRESS NOTES
ANTICOAGULATION FOLLOW-UP CLINIC VISIT    Patient Name:  Ellie Leigh  Date:  3/10/2021  Contact Type:  Telephone/ Suyapa    SUBJECTIVE:  Patient Findings     Comments:  Patient will continue weekly maintenance dose. INR is therapeutic.   Recheck in 2 weeks.   Patient verbalizes understanding and agrees to plan. No further questions or concerns.          Clinical Outcomes     Negatives:  Major bleeding event, Thromboembolic event, Anticoagulation-related hospital admission, Anticoagulation-related ED visit, Anticoagulation-related fatality    Comments:  Patient will continue weekly maintenance dose. INR is therapeutic.   Recheck in 2 weeks.   Patient verbalizes understanding and agrees to plan. No further questions or concerns.             OBJECTIVE    Recent labs: (last 7 days)     03/10/21  1316   INR 2.40*       ASSESSMENT / PLAN  INR assessment THER    Recheck INR In: 2 WEEKS    INR Location Outside lab      Anticoagulation Summary  As of 3/10/2021    INR goal:  2.0-3.0   TTR:  77.5 % (1 y)   INR used for dosin.40 (3/10/2021)   Warfarin maintenance plan:  2 mg (1 mg x 2) every Mon, Wed, Fri; 1 mg (1 mg x 1) all other days   Full warfarin instructions:  2 mg every Mon, Wed, Fri; 1 mg all other days   Weekly warfarin total:  10 mg   Plan last modified:  Ruiz Meredith RN (2021)   Next INR check:  3/24/2021   Priority:  Maintenance   Target end date:  Indefinite    Indications    Atrial fibrillation with rapid ventricular response (H) (Resolved) [I48.91]  DVT (deep venous thrombosis) [I82.409]  Long term current use of anticoagulant therapy [Z79.01]             Anticoagulation Episode Summary     INR check location:      Preferred lab:      Send INR reminders to:  GARRY BARRY    Comments:  * Taking Celebrex PRN         Anticoagulation Care Providers     Provider Role Specialty Phone number    Anjum Vidales MD Referring Family Medicine [phone]            See the Encounter  Report to view Anticoagulation Flowsheet and Dosing Calendar (Go to Encounters tab in chart review, and find the Anticoagulation Therapy Visit)        Ruiz Meredith RN

## 2021-03-10 NOTE — TELEPHONE ENCOUNTER
ANTICOAGULATION MANAGEMENT      Ellie Limaanna Lu due for annual renewal of referral to anticoagulation monitoring. Order pended for your review and signature.      ANTICOAGULATION SUMMARY      Warfarin indication(s)     Atrial fibrillation  DVT    Heart valve present?  NO       Current goal range   INR: 2.0-3.0     Goal appropriate for indication? Yes, INR 2-3 appropriate for hx of DVT, PE, hypercoagulable state, Afib, LVAD, or bileaflet AVR without risk factors     Current duration of therapy Indefinite/long term therapy   Time in Therapeutic Range (TTR)  (Goal > 60%) 78.3 %       Office visit with referring provider's group within last year yes on 1/8/21       Ruiz Meredith RN

## 2021-03-23 DIAGNOSIS — E87.1 HYPONATREMIA: ICD-10-CM

## 2021-03-24 ENCOUNTER — ANTICOAGULATION THERAPY VISIT (OUTPATIENT)
Dept: ANTICOAGULATION | Facility: CLINIC | Age: 86
End: 2021-03-24

## 2021-03-24 ENCOUNTER — HOSPITAL ENCOUNTER (OUTPATIENT)
Dept: MAMMOGRAPHY | Facility: CLINIC | Age: 86
Discharge: HOME | End: 2021-03-24
Attending: FAMILY MEDICINE | Admitting: FAMILY MEDICINE
Payer: COMMERCIAL

## 2021-03-24 DIAGNOSIS — I48.0 INTERMITTENT ATRIAL FIBRILLATION (H): ICD-10-CM

## 2021-03-24 DIAGNOSIS — I82.4Y9 DEEP VEIN THROMBOSIS (DVT) OF PROXIMAL LOWER EXTREMITY, UNSPECIFIED CHRONICITY, UNSPECIFIED LATERALITY (H): ICD-10-CM

## 2021-03-24 DIAGNOSIS — I48.91 A-FIB (H): ICD-10-CM

## 2021-03-24 DIAGNOSIS — Z79.01 LONG TERM CURRENT USE OF ANTICOAGULANT THERAPY: ICD-10-CM

## 2021-03-24 DIAGNOSIS — Z12.31 VISIT FOR SCREENING MAMMOGRAM: ICD-10-CM

## 2021-03-24 LAB
CAPILLARY BLOOD COLLECTION: NORMAL
INR PPP: 3.1 (ref 0.86–1.14)

## 2021-03-24 PROCEDURE — 77067 SCR MAMMO BI INCL CAD: CPT | Mod: 52

## 2021-03-24 PROCEDURE — 85610 PROTHROMBIN TIME: CPT | Performed by: FAMILY MEDICINE

## 2021-03-24 PROCEDURE — 36416 COLLJ CAPILLARY BLOOD SPEC: CPT | Performed by: FAMILY MEDICINE

## 2021-03-24 RX ORDER — SODIUM CHLORIDE 1 G/1
TABLET ORAL
Qty: 100 TABLET | Refills: 0 | Status: SHIPPED | OUTPATIENT
Start: 2021-03-24 | End: 2021-05-06

## 2021-03-24 NOTE — PROGRESS NOTES
ANTICOAGULATION MANAGEMENT     Patient Name:  Ellie Leigh  Date:  3/24/2021    ASSESSMENT /SUBJECTIVE:    Today's INR result of 3.10 is supratherapeutic. Goal INR of 2.0-3.0      Warfarin dose taken: Warfarin taken as instructed    Diet: No new diet changes affecting INR    Medication changes/ interactions: No new medications/supplements affecting INR    Previous INR: Therapeutic     S/S of bleeding or thromboembolism: No    New injury or illness: Yes: Nausea, sweats    Upcoming surgery, procedure or cardioversion: No    Additional findings: Patient will see DR. Vidales on 3/29. Next INR reminder set for this date to see if any new medication changes or antibiotics are ordered. Next INR will be scheduled at this time.      PLAN:    Telephone call with  Suyapa regarding INR result and instructed:     Warfarin Dosing Instructions: Continue your current warfarin dose 2 mg Mon, Wed, Fri; and 1 mg ROW.    Instructed patient to follow up no later than: 5 days  Check at provider office visit    Education provided: Monitoring for bleeding signs and symptoms      Suyapa verbalizes understanding and agrees to warfarin dosing plan.    Instructed to call the Anticoagulation Clinic for any changes, questions or concerns. (#[phone])        Ruiz Meredith RN      OBJECTIVE:  Recent labs: (last 7 days)     03/24/21  1315   INR 3.10*         No question data found.  Anticoagulation Summary  As of 3/24/2021    INR goal:  2.0-3.0   TTR:  77.0 % (1 y)   INR used for dosing:  3.10 (3/24/2021)   Warfarin maintenance plan:  2 mg (1 mg x 2) every Mon, Wed, Fri; 1 mg (1 mg x 1) all other days   Full warfarin instructions:  2 mg every Mon, Wed, Fri; 1 mg all other days   Weekly warfarin total:  10 mg   No change documented:  Ruiz Meredith, RN   Plan last modified:  Ruiz Meredith RN (2/23/2021)   Next INR check:  3/29/2021   Priority:  Critical   Target end date:  Indefinite    Indications    Atrial fibrillation  with rapid ventricular response (H) (Resolved) [I48.91]  DVT (deep venous thrombosis) [I82.409]  Long term current use of anticoagulant therapy [Z79.01]  Intermittent atrial fibrillation (H) [I48.0]  Deep vein thrombosis (DVT) of proximal lower extremity  unspecified chronicity  unspecified laterality (H) [I82.4Y9]             Anticoagulation Episode Summary     INR check location:      Preferred lab:      Send INR reminders to:  GARRY BARRY    Comments:  * Taking Celebrex PRN         Anticoagulation Care Providers     Provider Role Specialty Phone number    Anjum Vidales MD Referring Family Medicine [phone]

## 2021-03-24 NOTE — TELEPHONE ENCOUNTER
Requested Prescriptions   Pending Prescriptions Disp Refills     sodium chloride 1 GM tablet [Pharmacy Med Name: Sodium Chloride Oral Tablet 1 GM] 100 tablet 0     Sig: TAKE ONE TABLET BY MOUTH THREE TIMES DAILY       There is no refill protocol information for this order

## 2021-03-29 ENCOUNTER — OFFICE VISIT (OUTPATIENT)
Dept: FAMILY MEDICINE | Facility: CLINIC | Age: 86
End: 2021-03-29
Payer: COMMERCIAL

## 2021-03-29 VITALS
RESPIRATION RATE: 20 BRPM | OXYGEN SATURATION: 95 % | WEIGHT: 130 LBS | HEART RATE: 92 BPM | HEIGHT: 58 IN | DIASTOLIC BLOOD PRESSURE: 70 MMHG | BODY MASS INDEX: 27.29 KG/M2 | TEMPERATURE: 97.6 F | SYSTOLIC BLOOD PRESSURE: 120 MMHG

## 2021-03-29 DIAGNOSIS — R11.0 NAUSEA: Primary | ICD-10-CM

## 2021-03-29 DIAGNOSIS — J45.30 MILD PERSISTENT ASTHMA WITHOUT COMPLICATION: ICD-10-CM

## 2021-03-29 PROCEDURE — 99213 OFFICE O/P EST LOW 20 MIN: CPT | Performed by: FAMILY MEDICINE

## 2021-03-29 RX ORDER — PROMETHAZINE HYDROCHLORIDE 25 MG/1
25 TABLET ORAL EVERY 6 HOURS PRN
Qty: 15 TABLET | Refills: 1 | Status: ON HOLD | OUTPATIENT
Start: 2021-03-29 | End: 2021-07-09

## 2021-03-29 ASSESSMENT — MIFFLIN-ST. JEOR: SCORE: 899.43

## 2021-03-29 NOTE — PROGRESS NOTES
ASSESSMENT:   (R11.0) Nausea  (primary encounter diagnosis)  Comment: chronic nausea.  No emesis.  No weight loss.  No known cause.   Plan: promethazine (PHENERGAN) 25 MG tablet        Trial Phenergan for the nausea.   You can take up to every 6 hours if needed.    Let me know if not helping.   If helping, I can do refills.     Subjective   Ellie is a 89 year old who presents for the following health issues   Chief Complaint   Patient presents with     Nausea      Accompanied today by daughter Suyapa.    Last clinic visit: 1/8 for UTI.   Last clinic visit with me on 12/9/2020 for chronic nausea.  NOtes from that visit below.     Feels well about 4 days a month.  Most days not feeling well.   Nausea daily.  Starts upon awakening.  Can go all day or quit by afternoon.  Sweats start after Noon.  They last a couple hours to bedtime.   No sweats at night or nausea.    Sleeps well.   Not taking medication for the nausea.  Nothing has helped.    Hot flashes-nothing has helped.     No pains.   Knees with some osteoarthritis right side.  Other joints OK.  Not much in fingers.      Takes levothyroxine daily.   Duonebs three times daily.   Helps her cough.     Concern - nausea in am and afternoon sweating SOB  Onset: on and off for a long time  Description: SOB  Intensity: Mild to severe  Progression of Symptoms:  same  Accompanying Signs & Symptoms: Nausea but no vomiting   Previous history of similar problem: yes  Precipitating factors:        Worsened by: ??  Alleviating factors:        Improved by: ??  Therapies tried and outcome: nothing    Patient Active Problem List   Diagnosis     Sensorineural hearing loss, asymmetrical     Generalized osteoarthrosis, unspecified site     PERSONAL HISTORY OF MALIGNANCY- BREAST     Esophageal reflux     Chronic allergic conjunctivitis     Chronic seasonal allergic rhinitis     Hyperlipidemia LDL goal <130     Chronic constipation     Osteoporosis     Health Care Home     Right arm  "weakness     Ulnar neuropathy     Headache     Mild major depression     DVT (deep venous thrombosis)     Anxiety     Cervical vertebral fracture (H)     Intermittent atrial fibrillation (H)     Squamous cell carcinoma in situ of skin of face     SK (seborrheic keratosis)     Hypothyroidism     Recurrent UTI     History of skin cancer     BPPV (benign paroxysmal positional vertigo)     Benign essential HTN     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)     Positive fecal occult blood test     COPD (chronic obstructive pulmonary disease) (H)     Infectious colitis, enteritis and gastroenteritis     Advance Care Planning     Syncope     Long term current use of anticoagulant therapy     Mild persistent asthma without complication     Irritable bowel syndrome without diarrhea     Nausea     Back pain     H/O TB (tuberculosis)     Chest pain     Deep vein thrombosis (DVT) of proximal lower extremity, unspecified chronicity, unspecified laterality (H)      ROS:  Bowels doing well.  Miralax helps.    Rare albuteral inhaler use.  Uses Advair twice daily.   Salt tabs three times daily.   Takes warfarin and checks INR  Takes acetaminophen for knee pain.  NO weight loss despite the nausea.  Weight up 6# in the past year.   Limited in activity due to knee pain.     OBJECTIVE:Blood pressure 120/70, pulse 92, temperature 97.6  F (36.4  C), temperature source Tympanic, resp. rate 20, height 1.473 m (4' 10\"), weight 59 kg (130 lb), last menstrual period 06/15/1985, SpO2 95 %, not currently breastfeeding. BMI=Body mass index is 27.17 kg/m .  GENERAL APPEARANCE ADULT: Alert, no acute distress  NECK: No adenopathy,masses or thyromegaly  RESP: lungs clear to auscultation   CV: normal rate, regular rhythm, no murmur or gallop  ABDOMEN: soft, no organomegaly, masses or tenderness, bowel sounds normal, examined seated  MS: extremities normal, no peripheral edema        =========================  12/9/2020 notes:  Subjective    "      Ellie Leigh is a 89 year old female who presents to clinic today for the following health issues:      Chief Complaint   Patient presents with     Nausea      Accompanied today by daughter Suyapa.       Last clinic visit: 11/6 for nausea follow-up.  She had been doing well at that time with addition of mirtazapine daily at bedtime.   She has had chronic nausea.  She has been on omeprazole  40mg daily for years.  Tried sucralfate (CARAFATE) which did not help.  Was on FODMAP diet, gluten free and no dairy diets.  Evaluation with GI in the past in 1/23/2015.  She has had gastroscopy several times.  Nothing has helped much.   Tried meclizine.  Gastric emptying normal.      Treated for a urinary tract infection at that visit.   follow-up UA was OK.      Nausea seemed good for 3-4 weeks.  It is not as bad now as it has been in the past.   Hot flashes bother her more than the nausea at this time.   Started having problems later November.   Nausea in the AM.   No vomiting.   Hot flashes around Noon.  Feels like her whole body is on fire until med to late afternoon.  This is cyclical daily.   Feels shortness of breath with the hot flashes.    Seems the hot flashes are more now.   Appetite has been good.   Sleeping OK.   mirtazapine helps.   No fever.      Coughs some brown phlegm.   Always has had a cough.  Phlegm changed in the past couple weeks. Sometimes wheezes.   Breathing seems OK.   Takes Advair twice daily. DuoNebs three times daily.  NOt needing Proair.      No chest pains or palpitations.      ROS:  : No urinary frequency or dysuria, bladder or kidney problems   GI: POSITIVE for:, nausea, Negative for change in bowel habits, constipation, diarrhea.  No gas or bloating.   Musculoskeletal: POSITIVE  for:, pain in knees and legs.   occasional neck pain.  Had fracture in the past.  No pain in hips or thighs.   Neurologic: No headaches, numbness, tingling, weakness, problems with balance or coordination,  POSITIVE for:, balance problems at times.   Psychiatric: No problems with anxiety, depression or mental health    Takes acetaminophen 650 two pills once daily.   Taking sodium three times daily.     ASSESSMENT:   (R11.0) Nausea  (primary encounter diagnosis)  Comment: Chronic but now back again without certain cause.   Plan: Basic metabolic panel, ESR: Erythrocyte         sedimentation rate, CRP, inflammation, Protein         electrophoresis, CBC with platelets          Treat the bronchitis first.   If nausea persists, let me know and we can try increasing the mirtazapine from 15 to 30mg at bedtime.   Check additional blood tests for inflammatory condition.      (R23.2) Hot flashes  Comment: uncertain cause.  No specific underlying illness identified at this time.   Plan: see above      (J40) Bronchitis  Comment: change in phlegm  Plan: azithromycin (ZITHROMAX) 250 MG tablet        Azithromycin 250mg, 2 pills day #1, then 1 pill daily for 4 more days.

## 2021-03-29 NOTE — NURSING NOTE
"Chief Complaint   Patient presents with     Nausea       Initial /70   Pulse 92   Temp 97.6  F (36.4  C) (Tympanic)   Resp 20   Ht 1.473 m (4' 10\")   Wt 59 kg (130 lb)   LMP 06/15/1985   SpO2 95%   BMI 27.17 kg/m   Estimated body mass index is 27.17 kg/m  as calculated from the following:    Height as of this encounter: 1.473 m (4' 10\").    Weight as of this encounter: 59 kg (130 lb).    Patient presents to the clinic using     Health Maintenance that is potentially due pending provider review:          Is there anyone who you would like to be able to receive your results?   If yes have patient fill out JOSUE    "

## 2021-03-29 NOTE — PATIENT INSTRUCTIONS
ASSESSMENT:   (R11.0) Nausea  (primary encounter diagnosis)  Comment: chronic nausea.  No emesis.  No weight loss.  No known cause.   Plan: promethazine (PHENERGAN) 25 MG tablet        Trial Phenergan for the nausea.   You can take up to every 6 hours if needed.    Let me know if not helping.   If helping, I can do refills.

## 2021-04-03 ENCOUNTER — HEALTH MAINTENANCE LETTER (OUTPATIENT)
Age: 86
End: 2021-04-03

## 2021-04-07 ENCOUNTER — ANTICOAGULATION THERAPY VISIT (OUTPATIENT)
Dept: ANTICOAGULATION | Facility: CLINIC | Age: 86
End: 2021-04-07

## 2021-04-07 DIAGNOSIS — I82.4Y9 DEEP VEIN THROMBOSIS (DVT) OF PROXIMAL LOWER EXTREMITY, UNSPECIFIED CHRONICITY, UNSPECIFIED LATERALITY (H): ICD-10-CM

## 2021-04-07 DIAGNOSIS — I48.0 INTERMITTENT ATRIAL FIBRILLATION (H): ICD-10-CM

## 2021-04-07 DIAGNOSIS — Z79.01 LONG TERM CURRENT USE OF ANTICOAGULANT THERAPY: ICD-10-CM

## 2021-04-07 DIAGNOSIS — I48.91 A-FIB (H): ICD-10-CM

## 2021-04-07 LAB
CAPILLARY BLOOD COLLECTION: NORMAL
INR PPP: 3.5 (ref 0.86–1.14)

## 2021-04-07 PROCEDURE — 85610 PROTHROMBIN TIME: CPT | Performed by: FAMILY MEDICINE

## 2021-04-07 PROCEDURE — 36416 COLLJ CAPILLARY BLOOD SPEC: CPT | Performed by: FAMILY MEDICINE

## 2021-04-07 NOTE — PROGRESS NOTES
ANTICOAGULATION MANAGEMENT     Patient Name:  Ellie Leigh  Date:  4/7/2021    ASSESSMENT /SUBJECTIVE:    Today's INR result of 3.50 is supratherapeutic. Goal INR of 2.0-3.0      Warfarin dose taken: Warfarin taken as instructed    Diet: No new diet changes affecting INR    Medication changes/ interactions: No new medications/supplements affecting INR    Previous INR: Supratherapeutic     S/S of bleeding or thromboembolism: No    New injury or illness: No    Upcoming surgery, procedure or cardioversion: No    Additional findings: None      PLAN:    Telephone call with  Suyapa regarding INR result and instructed:     Warfarin Dosing Instructions: Hold today then change your warfarin dose to 2 mg Mon,Fri; 1 mg ROW  . (10 % change)    Instructed patient to follow up no later than: 2 weeks  Lab visit scheduled    Education provided: Monitoring for bleeding signs and symptoms and Contact [facility] Anticoagulation: [phone]  with any changes, questions or concerns.       Suyapa verbalizes understanding and agrees to warfarin dosing plan.    Instructed to call the Anticoagulation Clinic for any changes, questions or concerns. (#[phone])        Ruiz Meredith RN      OBJECTIVE:  Recent labs: (last 7 days)     04/07/21  1327   INR 3.50*         No question data found.  Anticoagulation Summary  As of 4/7/2021    INR goal:  2.0-3.0   TTR:  73.2 % (1 y)   INR used for dosing:  3.50 (4/7/2021)   Warfarin maintenance plan:  2 mg (1 mg x 2) every Mon, Fri; 1 mg (1 mg x 1) all other days   Full warfarin instructions:  4/7: Hold; Otherwise 2 mg every Mon, Fri; 1 mg all other days   Weekly warfarin total:  9 mg   Plan last modified:  Ruiz Meredith, RN (4/7/2021)   Next INR check:  4/21/2021   Priority:  High   Target end date:  Indefinite    Indications    Atrial fibrillation with rapid ventricular response (H) (Resolved) [I48.91]  DVT (deep venous thrombosis) [I82.409]  Long term current  use of anticoagulant therapy [Z79.01]  Intermittent atrial fibrillation (H) [I48.0]  Deep vein thrombosis (DVT) of proximal lower extremity  unspecified chronicity  unspecified laterality (H) [I82.4Y9]             Anticoagulation Episode Summary     INR check location:      Preferred lab:      Send INR reminders to:  GARRY BARRY    Comments:  * Taking Celebrex PRN         Anticoagulation Care Providers     Provider Role Specialty Phone number    Anjum Vidales MD Referring Family Medicine [phone]

## 2021-04-21 ENCOUNTER — ANTICOAGULATION THERAPY VISIT (OUTPATIENT)
Dept: ANTICOAGULATION | Facility: CLINIC | Age: 86
End: 2021-04-21

## 2021-04-21 DIAGNOSIS — I48.0 INTERMITTENT ATRIAL FIBRILLATION (H): ICD-10-CM

## 2021-04-21 DIAGNOSIS — I82.4Y9 DEEP VEIN THROMBOSIS (DVT) OF PROXIMAL LOWER EXTREMITY, UNSPECIFIED CHRONICITY, UNSPECIFIED LATERALITY (H): ICD-10-CM

## 2021-04-21 DIAGNOSIS — Z79.01 LONG TERM CURRENT USE OF ANTICOAGULANT THERAPY: ICD-10-CM

## 2021-04-21 DIAGNOSIS — I48.91 A-FIB (H): ICD-10-CM

## 2021-04-21 LAB
CAPILLARY BLOOD COLLECTION: NORMAL
INR PPP: 2.8 (ref 0.86–1.14)

## 2021-04-21 PROCEDURE — 36416 COLLJ CAPILLARY BLOOD SPEC: CPT | Performed by: FAMILY MEDICINE

## 2021-04-21 PROCEDURE — 85610 PROTHROMBIN TIME: CPT | Performed by: FAMILY MEDICINE

## 2021-04-21 NOTE — PROGRESS NOTES
ANTICOAGULATION FOLLOW-UP CLINIC VISIT    Patient Name:  Ellie Leigh  Date:  2021  Contact Type:  Telephone    SUBJECTIVE:  Patient Findings     Comments:  No changes in medications, activity, or diet noted. No concerns with clotting, bleeding, or increased bruising noted. Took warfarin as prescribed.  Patient is to continue maintenance warfarin plan, and check INR in 3 weeks.  Daughter verbalizes understanding and agrees to plan. No further questions or concerns.        Clinical Outcomes     Negatives:  Major bleeding event, Thromboembolic event, Anticoagulation-related hospital admission, Anticoagulation-related ED visit, Anticoagulation-related fatality    Comments:  No changes in medications, activity, or diet noted. No concerns with clotting, bleeding, or increased bruising noted. Took warfarin as prescribed.  Patient is to continue maintenance warfarin plan, and check INR in 3 weeks.  Daughter verbalizes understanding and agrees to plan. No further questions or concerns.           OBJECTIVE    Recent labs: (last 7 days)     21  1333   INR 2.80*       ASSESSMENT / PLAN  INR assessment THER    Recheck INR In: 3 WEEKS    INR Location Clinic      Anticoagulation Summary  As of 2021    INR goal:  2.0-3.0   TTR:  73.4 % (1 y)   INR used for dosin.80 (2021)   Warfarin maintenance plan:  2 mg (1 mg x 2) every Mon, Fri; 1 mg (1 mg x 1) all other days   Full warfarin instructions:  2 mg every Mon, Fri; 1 mg all other days   Weekly warfarin total:  9 mg   No change documented:  Kylah Dorsey, RN   Plan last modified:  Ruiz Meredith RN (2021)   Next INR check:  2021   Priority:  Maintenance   Target end date:  Indefinite    Indications    Atrial fibrillation with rapid ventricular response (H) (Resolved) [I48.91]  DVT (deep venous thrombosis) [I82.409]  Long term current use of anticoagulant therapy [Z79.01]  Intermittent atrial fibrillation (H) [I48.0]  Deep vein  thrombosis (DVT) of proximal lower extremity  unspecified chronicity  unspecified laterality (H) [I82.4Y9]             Anticoagulation Episode Summary     INR check location:      Preferred lab:      Send INR reminders to:  GARRY BARRY    Comments:  * Taking Celebrex PRN         Anticoagulation Care Providers     Provider Role Specialty Phone number    Anjum Vidales MD Referring Family Medicine [phone]            See the Encounter Report to view Anticoagulation Flowsheet and Dosing Calendar (Go to Encounters tab in chart review, and find the Anticoagulation Therapy Visit)        Kylah Dorsey RN

## 2021-04-27 ENCOUNTER — ANCILLARY PROCEDURE (OUTPATIENT)
Dept: CARDIOLOGY | Facility: CLINIC | Age: 86
End: 2021-04-27
Attending: INTERNAL MEDICINE
Payer: COMMERCIAL

## 2021-04-27 DIAGNOSIS — Z95.0 CARDIAC PACEMAKER IN SITU: ICD-10-CM

## 2021-04-27 PROCEDURE — 93294 REM INTERROG EVL PM/LDLS PM: CPT | Performed by: INTERNAL MEDICINE

## 2021-04-27 PROCEDURE — 93296 REM INTERROG EVL PM/IDS: CPT | Performed by: INTERNAL MEDICINE

## 2021-05-03 LAB
MDC_IDC_LEAD_IMPLANT_DT: NORMAL
MDC_IDC_LEAD_IMPLANT_DT: NORMAL
MDC_IDC_LEAD_LOCATION: NORMAL
MDC_IDC_LEAD_LOCATION: NORMAL
MDC_IDC_LEAD_LOCATION_DETAIL_1: NORMAL
MDC_IDC_LEAD_LOCATION_DETAIL_1: NORMAL
MDC_IDC_LEAD_MFG: NORMAL
MDC_IDC_LEAD_MFG: NORMAL
MDC_IDC_LEAD_MODEL: NORMAL
MDC_IDC_LEAD_MODEL: NORMAL
MDC_IDC_LEAD_POLARITY_TYPE: NORMAL
MDC_IDC_LEAD_POLARITY_TYPE: NORMAL
MDC_IDC_LEAD_SERIAL: NORMAL
MDC_IDC_LEAD_SERIAL: NORMAL
MDC_IDC_MSMT_BATTERY_REMAINING_PERCENTAGE: 50 %
MDC_IDC_MSMT_BATTERY_STATUS: NORMAL
MDC_IDC_MSMT_LEADCHNL_RA_IMPEDANCE_VALUE: 381 OHM
MDC_IDC_MSMT_LEADCHNL_RA_LEAD_CHANNEL_STATUS: NORMAL
MDC_IDC_MSMT_LEADCHNL_RA_PACING_THRESHOLD_AMPLITUDE: 0.8 V
MDC_IDC_MSMT_LEADCHNL_RA_PACING_THRESHOLD_PULSEWIDTH: 0.4 MS
MDC_IDC_MSMT_LEADCHNL_RV_IMPEDANCE_VALUE: 456 OHM
MDC_IDC_MSMT_LEADCHNL_RV_LEAD_CHANNEL_STATUS: NORMAL
MDC_IDC_MSMT_LEADCHNL_RV_PACING_THRESHOLD_AMPLITUDE: 0.6 V
MDC_IDC_MSMT_LEADCHNL_RV_PACING_THRESHOLD_PULSEWIDTH: 0.4 MS
MDC_IDC_PG_IMPLANT_DTM: NORMAL
MDC_IDC_PG_MFG: NORMAL
MDC_IDC_PG_MODEL: NORMAL
MDC_IDC_PG_SERIAL: NORMAL
MDC_IDC_PG_TYPE: NORMAL
MDC_IDC_SESS_CLINIC_NAME: NORMAL
MDC_IDC_SESS_DTM: NORMAL
MDC_IDC_SESS_REPROGRAMMED: NO
MDC_IDC_SESS_TYPE: NORMAL
MDC_IDC_SET_BRADY_AT_MODE_SWITCH_MODE: NORMAL
MDC_IDC_SET_BRADY_AT_MODE_SWITCH_RATE: 160 {BEATS}/MIN
MDC_IDC_SET_BRADY_LOWRATE: 60 {BEATS}/MIN
MDC_IDC_SET_BRADY_MAX_SENSOR_RATE: 125 {BEATS}/MIN
MDC_IDC_SET_BRADY_MAX_TRACKING_RATE: 130 {BEATS}/MIN
MDC_IDC_SET_BRADY_MODE: NORMAL
MDC_IDC_SET_BRADY_PAV_DELAY_HIGH: 150 MS
MDC_IDC_SET_BRADY_PAV_DELAY_LOW: 180 MS
MDC_IDC_SET_BRADY_SAV_DELAY_HIGH: 105 MS
MDC_IDC_SET_BRADY_SAV_DELAY_LOW: 135 MS
MDC_IDC_SET_LEADCHNL_RA_PACING_POLARITY: NORMAL
MDC_IDC_SET_LEADCHNL_RA_PACING_PULSEWIDTH: 0.4 MS
MDC_IDC_SET_LEADCHNL_RA_SENSING_ADAPTATION_MODE: NORMAL
MDC_IDC_SET_LEADCHNL_RA_SENSING_POLARITY: NORMAL
MDC_IDC_SET_LEADCHNL_RV_PACING_POLARITY: NORMAL
MDC_IDC_SET_LEADCHNL_RV_PACING_PULSEWIDTH: 0.4 MS
MDC_IDC_SET_LEADCHNL_RV_SENSING_ADAPTATION_MODE: NORMAL
MDC_IDC_SET_LEADCHNL_RV_SENSING_POLARITY: NORMAL
MDC_IDC_STAT_AT_BURDEN_PERCENT: 0 %
MDC_IDC_STAT_AT_DTM_END: NORMAL
MDC_IDC_STAT_AT_DTM_START: NORMAL
MDC_IDC_STAT_AT_MODE_SW_COUNT_PER_DAY: 0
MDC_IDC_STAT_AT_MODE_SW_PERCENT_TIME_PER_DAY: 0 %
MDC_IDC_STAT_BRADY_AP_VP_PERCENT: 0 %
MDC_IDC_STAT_BRADY_AP_VS_PERCENT: 71 %
MDC_IDC_STAT_BRADY_AS_VP_PERCENT: 0 %
MDC_IDC_STAT_BRADY_AS_VS_PERCENT: 28 %
MDC_IDC_STAT_BRADY_DTM_END: NORMAL
MDC_IDC_STAT_BRADY_DTM_START: NORMAL
MDC_IDC_STAT_BRADY_RA_PERCENT_PACED: 72 %
MDC_IDC_STAT_BRADY_RV_PERCENT_PACED: 0 %
MDC_IDC_STAT_CRT_DTM_END: NORMAL
MDC_IDC_STAT_CRT_DTM_START: NORMAL

## 2021-05-06 DIAGNOSIS — E87.1 HYPONATREMIA: ICD-10-CM

## 2021-05-06 RX ORDER — SODIUM CHLORIDE 1 G/1
TABLET ORAL
Qty: 100 TABLET | Refills: 5 | Status: SHIPPED | OUTPATIENT
Start: 2021-05-06 | End: 2022-02-28

## 2021-05-06 NOTE — TELEPHONE ENCOUNTER
Routing refill request to provider for review/approval, please advise ongoing refill, lab F/U.  MICHAEL Howe RN       Component      Latest Ref Rng & Units 12/9/2020   Sodium      133 - 144 mmol/L 132 (L)   Potassium      3.4 - 5.3 mmol/L 4.8   Chloride      94 - 109 mmol/L 100   Carbon Dioxide      20 - 32 mmol/L 29   Anion Gap      3 - 14 mmol/L 3   Glucose      70 - 99 mg/dL 102 (H)   Urea Nitrogen      7 - 30 mg/dL 31 (H)   Creatinine      0.52 - 1.04 mg/dL 0.60   GFR Estimate      >60 mL/min/1.73:m2 80   GFR Estimate If Black      >60 mL/min/1.73:m2 >90   Calcium      8.5 - 10.1 mg/dL 8.9     MICHAEL Howe RN

## 2021-05-12 ENCOUNTER — ANTICOAGULATION THERAPY VISIT (OUTPATIENT)
Dept: ANTICOAGULATION | Facility: CLINIC | Age: 86
End: 2021-05-12

## 2021-05-12 DIAGNOSIS — I48.91 A-FIB (H): ICD-10-CM

## 2021-05-12 DIAGNOSIS — Z79.01 LONG TERM CURRENT USE OF ANTICOAGULANT THERAPY: ICD-10-CM

## 2021-05-12 DIAGNOSIS — I48.0 INTERMITTENT ATRIAL FIBRILLATION (H): ICD-10-CM

## 2021-05-12 DIAGNOSIS — I82.4Y9 DEEP VEIN THROMBOSIS (DVT) OF PROXIMAL LOWER EXTREMITY, UNSPECIFIED CHRONICITY, UNSPECIFIED LATERALITY (H): ICD-10-CM

## 2021-05-12 LAB
CAPILLARY BLOOD COLLECTION: NORMAL
INR PPP: 3.2 (ref 0.86–1.14)

## 2021-05-12 PROCEDURE — 85610 PROTHROMBIN TIME: CPT | Performed by: FAMILY MEDICINE

## 2021-05-12 PROCEDURE — 36416 COLLJ CAPILLARY BLOOD SPEC: CPT | Performed by: FAMILY MEDICINE

## 2021-05-12 NOTE — PROGRESS NOTES
ANTICOAGULATION MANAGEMENT     Patient Name:  Ellie Leigh  Date:  5/12/2021    ASSESSMENT /SUBJECTIVE:    Today's INR result of 3.20 is supratherapeutic. Goal INR of 2.0-3.0      Warfarin dose taken: Warfarin taken as instructed    Diet: Decreased greens/vitamin K in diet; plans to resume previous intake    Medication changes/ interactions: No new medications/supplements affecting INR    Previous INR: Therapeutic     S/S of bleeding or thromboembolism: No    New injury or illness: No    Upcoming surgery, procedure or cardioversion: No    Additional findings: Keep greens in diet consistent.      PLAN:    Telephone call with  Suyapa regarding INR result and instructed:     Warfarin Dosing Instructions: Continue your current warfarin dose 2 mg Mon,Fri; 1 mg ROW.    Instructed patient to follow up no later than: 2 weeks  Lab visit scheduled    Education provided: Importance of consistent vitamin K intake, Monitoring for bleeding signs and symptoms and Contact [facility] Anticoagulation: [phone]  with any changes, questions or concerns.       Suyapa verbalizes understanding and agrees to warfarin dosing plan.    Instructed to call the Anticoagulation Clinic for any changes, questions or concerns. (#[phone])        Ruiz Meredith, AYLIN      OBJECTIVE:  Recent labs: (last 7 days)     05/12/21  1327   INR 3.20*         No question data found.  Anticoagulation Summary  As of 5/12/2021    INR goal:  2.0-3.0   TTR:  71.7 % (1 y)   INR used for dosing:  3.20 (5/12/2021)   Warfarin maintenance plan:  2 mg (1 mg x 2) every Mon, Fri; 1 mg (1 mg x 1) all other days   Full warfarin instructions:  2 mg every Mon, Fri; 1 mg all other days   Weekly warfarin total:  9 mg   Plan last modified:  Ruiz Meredith, RN (4/7/2021)   Next INR check:  5/26/2021   Priority:  High   Target end date:  Indefinite    Indications    Atrial fibrillation with rapid ventricular response (H) (Resolved) [I48.91]  DVT  (deep venous thrombosis) [I82.409]  Long term current use of anticoagulant therapy [Z79.01]  Intermittent atrial fibrillation (H) [I48.0]  Deep vein thrombosis (DVT) of proximal lower extremity  unspecified chronicity  unspecified laterality (H) [I82.4Y9]             Anticoagulation Episode Summary     INR check location:      Preferred lab:      Send INR reminders to:  GARRY BARRY    Comments:  * Taking Celebrex PRN         Anticoagulation Care Providers     Provider Role Specialty Phone number    Anjum Vidales MD Referring Family Medicine [phone]

## 2021-05-26 ENCOUNTER — ANTICOAGULATION THERAPY VISIT (OUTPATIENT)
Dept: ANTICOAGULATION | Facility: CLINIC | Age: 86
End: 2021-05-26

## 2021-05-26 DIAGNOSIS — I48.91 A-FIB (H): ICD-10-CM

## 2021-05-26 DIAGNOSIS — Z95.0 CARDIAC PACEMAKER IN SITU: ICD-10-CM

## 2021-05-26 DIAGNOSIS — Z79.01 LONG TERM CURRENT USE OF ANTICOAGULANT THERAPY: ICD-10-CM

## 2021-05-26 DIAGNOSIS — I82.4Y9 DEEP VEIN THROMBOSIS (DVT) OF PROXIMAL LOWER EXTREMITY, UNSPECIFIED CHRONICITY, UNSPECIFIED LATERALITY (H): ICD-10-CM

## 2021-05-26 DIAGNOSIS — I48.0 INTERMITTENT ATRIAL FIBRILLATION (H): ICD-10-CM

## 2021-05-26 LAB
CAPILLARY BLOOD COLLECTION: NORMAL
INR PPP: 3 (ref 0.86–1.14)

## 2021-05-26 PROCEDURE — 85610 PROTHROMBIN TIME: CPT | Performed by: FAMILY MEDICINE

## 2021-05-26 PROCEDURE — 36416 COLLJ CAPILLARY BLOOD SPEC: CPT | Performed by: FAMILY MEDICINE

## 2021-05-26 NOTE — PROGRESS NOTES
ANTICOAGULATION MANAGEMENT     Patient Name:  Ellie Leigh  Date:  5/26/2021    ASSESSMENT /SUBJECTIVE:    Today's INR result of 3.00 is therapeutic. Goal INR of 2.0-3.0      Warfarin dose taken: Warfarin taken as instructed    Diet: No new diet changes affecting INR    Medication changes/ interactions: No new medications/supplements affecting INR    Previous INR: Supratherapeutic     S/S of bleeding or thromboembolism: No    New injury or illness: No    Upcoming surgery, procedure or cardioversion: No    Additional findings: None      PLAN:    Telephone call with  Suyapa regarding INR result and instructed:     Warfarin Dosing Instructions: Continue your current warfarin dose 2 mg every Mon, Fri; 1 mg all other days    Instructed patient to follow up no later than: 3 weeks  Lab visit scheduled    Education provided: Please call back if any changes to your diet, medications or how you've been taking warfarin      Suyapa verbalizes understanding and agrees to warfarin dosing plan.    Instructed to call the Anticoagulation Clinic for any changes, questions or concerns. (#[phone])        Kylah Dorsey RN      OBJECTIVE:  Recent labs: (last 7 days)     05/26/21  1316   INR 3.00*         No question data found.  Anticoagulation Summary  As of 5/26/2021    INR goal:  2.0-3.0   TTR:  68.3 % (1 y)   INR used for dosing:  3.00 (5/26/2021)   Warfarin maintenance plan:  2 mg (1 mg x 2) every Mon, Fri; 1 mg (1 mg x 1) all other days   Full warfarin instructions:  2 mg every Mon, Fri; 1 mg all other days   Weekly warfarin total:  9 mg   Plan last modified:  Ruiz Meredith RN (4/7/2021)   Next INR check:     Priority:  High   Target end date:  Indefinite    Indications    Atrial fibrillation with rapid ventricular response (H) (Resolved) [I48.91]  DVT (deep venous thrombosis) [I82.409]  Long term current use of anticoagulant therapy [Z79.01]  Intermittent atrial fibrillation (H) [I48.0]  Deep vein thrombosis  (DVT) of proximal lower extremity  unspecified chronicity  unspecified laterality (H) [I82.4Y9]             Anticoagulation Episode Summary     INR check location:      Preferred lab:      Send INR reminders to:  GARRY BARRY    Comments:  * Taking Celebrex PRN         Anticoagulation Care Providers     Provider Role Specialty Phone number    Anjum Vidales MD Referring Family Medicine [phone]

## 2021-05-27 ENCOUNTER — TELEPHONE (OUTPATIENT)
Dept: FAMILY MEDICINE | Facility: CLINIC | Age: 86
End: 2021-05-27

## 2021-05-27 NOTE — TELEPHONE ENCOUNTER
S-(situation): Call placed to daughter Suyapa regarding her request for letter for sister.     B-(background): Patient does not know Suyapa is asking for this letter.    A-(assessment): No consent to communicate for daughter Olga. Patient last seen 3/29/21, may need evaluation to determine frail state.    R-(recommendations): Daughter is aware no consent to communicate  for daughter Olga and need for visit to determine frail state. She is asked to schedule appointment with PCP.

## 2021-05-27 NOTE — TELEPHONE ENCOUNTER
Reason for Call:  Pt's Daughter is calling regarding her Mothers Daughter from [place].   Pt Daughter Olga Carrasco currently lives in [place] and wants to come to the [place] to see her Mother due to the Board[place] is closed. Daughter would like to Apply for a [place] to be able to come home if something happens to her Mother.  She is unable to cross boarder unless it's Emergency. Letter needs to say Ellie King is 89 years of age and is of [facility].  Address to [employer] to whom it may concern. Subject line would be immediate need for [employer] for Olga Carrasco  Please Call Suyapa [phone] if needing further information or  the letter.  Please Advise.     Phone Number Patient can be reached at: Other phone number:  Suyapa Mittal *    Best Time: Any Time      Can we leave a detailed message on this number? YES    Call taken on 5/27/2021 at 1:31 PM by Luz Maria Liang

## 2021-05-28 DIAGNOSIS — J45.30 MILD PERSISTENT ASTHMA WITHOUT COMPLICATION: ICD-10-CM

## 2021-06-02 ENCOUNTER — TELEPHONE (OUTPATIENT)
Dept: FAMILY MEDICINE | Facility: CLINIC | Age: 86
End: 2021-06-02

## 2021-06-02 NOTE — TELEPHONE ENCOUNTER
Reason for Call:  Other     Detailed comments: Ellie says her dentist told her to check with Dr Vidales to see if she still needs antibiotic prior to dental work. He hip replacement ws 17 years ago .    Phone Number Patient can be reached at: Home number on file [phone] (home)    Best Time: anytime    Can we leave a detailed message on this number? YES    Call taken on 6/2/2021 at 10:24 AM by Ninoska Song

## 2021-06-02 NOTE — TELEPHONE ENCOUNTER
I talked with Thalia.  Does not need antidotic for dentist office appointment  Celena Laurent RN

## 2021-06-10 ENCOUNTER — ANTICOAGULATION THERAPY VISIT (OUTPATIENT)
Dept: ANTICOAGULATION | Facility: CLINIC | Age: 86
End: 2021-06-10

## 2021-06-10 DIAGNOSIS — I48.91 A-FIB (H): ICD-10-CM

## 2021-06-10 DIAGNOSIS — Z79.01 LONG TERM CURRENT USE OF ANTICOAGULANT THERAPY: ICD-10-CM

## 2021-06-10 DIAGNOSIS — I82.4Y9 DEEP VEIN THROMBOSIS (DVT) OF PROXIMAL LOWER EXTREMITY, UNSPECIFIED CHRONICITY, UNSPECIFIED LATERALITY (H): ICD-10-CM

## 2021-06-10 DIAGNOSIS — I48.0 INTERMITTENT ATRIAL FIBRILLATION (H): ICD-10-CM

## 2021-06-10 LAB — INR PPP: 2.9 (ref 0.86–1.14)

## 2021-06-10 PROCEDURE — 85610 PROTHROMBIN TIME: CPT | Performed by: FAMILY MEDICINE

## 2021-06-10 PROCEDURE — 36416 COLLJ CAPILLARY BLOOD SPEC: CPT | Performed by: FAMILY MEDICINE

## 2021-06-10 NOTE — PROGRESS NOTES
ANTICOAGULATION MANAGEMENT     Patient Name:  Ellie Leigh  Date:  6/10/2021    ASSESSMENT /SUBJECTIVE:    Today's INR result of 2.9 is therapeutic. Goal INR of 2.0-3.0      Warfarin dose taken: Warfarin taken as instructed    Diet: No new diet changes affecting INR    Medication changes/ interactions: No new medications/supplements affecting INR    Previous INR: Therapeutic     S/S of bleeding or thromboembolism: No    New injury or illness: No    Upcoming surgery, procedure or cardioversion: No    Additional findings: None      PLAN:    Warfarin Dosing Instructions: Continue your current warfarin dose 2 mg every Mon, Fri; 1 mg all other days    Instructed patient to follow up no later than: 3 weeks  Lab visit scheduled    Education provided: Please call back if any changes to your diet, medications or how you've been taking warfarin    Telephone call with  daughterSuyapa whom verbalizes understanding and agrees to plan    Instructed to call the Anticoagulation Clinic for any changes, questions or concerns. (#[phone])        Kylah Dorsey RN      OBJECTIVE:  Recent labs: (last 7 days)     06/10/21  1315   INR 2.90*         No question data found.  Anticoagulation Summary  As of 6/10/2021    INR goal:  2.0-3.0   TTR:  68.3 % (1 y)   INR used for dosin.90 (6/10/2021)   Warfarin maintenance plan:  2 mg (1 mg x 2) every Mon, Fri; 1 mg (1 mg x 1) all other days   Full warfarin instructions:  2 mg every Mon, Fri; 1 mg all other days   Weekly warfarin total:  9 mg   No change documented:  Kylah Dorsey RN   Plan last modified:  Ruiz Meredith RN (2021)   Next INR check:  2021   Priority:  Maintenance   Target end date:  Indefinite    Indications    Atrial fibrillation with rapid ventricular response (H) (Resolved) [I48.91]  DVT (deep venous thrombosis) [I82.409]  Long term current use of anticoagulant therapy [Z79.01]  Intermittent atrial fibrillation (H) [I48.0]  Deep vein thrombosis  (DVT) of proximal lower extremity  unspecified chronicity  unspecified laterality (H) [I82.4Y9]             Anticoagulation Episode Summary     INR check location:      Preferred lab:      Send INR reminders to:  GARRY BARRY    Comments:  * Taking Celebrex PRN         Anticoagulation Care Providers     Provider Role Specialty Phone number    Anjum Vidales MD Referring Family Medicine [phone]

## 2021-06-28 DIAGNOSIS — J45.30 MILD PERSISTENT ASTHMA WITHOUT COMPLICATION: ICD-10-CM

## 2021-07-01 ENCOUNTER — ANTICOAGULATION THERAPY VISIT (OUTPATIENT)
Dept: ANTICOAGULATION | Facility: CLINIC | Age: 86
End: 2021-07-01

## 2021-07-01 DIAGNOSIS — I48.91 A-FIB (H): ICD-10-CM

## 2021-07-01 DIAGNOSIS — I48.0 INTERMITTENT ATRIAL FIBRILLATION (H): ICD-10-CM

## 2021-07-01 DIAGNOSIS — Z79.01 LONG TERM CURRENT USE OF ANTICOAGULANT THERAPY: ICD-10-CM

## 2021-07-01 DIAGNOSIS — I82.4Y9 DEEP VEIN THROMBOSIS (DVT) OF PROXIMAL LOWER EXTREMITY, UNSPECIFIED CHRONICITY, UNSPECIFIED LATERALITY (H): ICD-10-CM

## 2021-07-01 LAB
CAPILLARY BLOOD COLLECTION: NORMAL
INR PPP: 3.3 (ref 0.86–1.14)

## 2021-07-01 PROCEDURE — 36416 COLLJ CAPILLARY BLOOD SPEC: CPT | Performed by: FAMILY MEDICINE

## 2021-07-01 PROCEDURE — 85610 PROTHROMBIN TIME: CPT | Performed by: FAMILY MEDICINE

## 2021-07-01 NOTE — PROGRESS NOTES
ANTICOAGULATION MANAGEMENT     Ellie Leigh 89 year old female is on warfarin with supratherapeutic INR result. (Goal INR 2.0-3.0)    Recent labs: (last 7 days)     07/01/21  1300   INR 3.30*       ASSESSMENT     Source(s): Patient/Caregiver Call       Warfarin doses taken: Warfarin taken as instructed    Diet: No new diet changes identified    New illness, injury, or hospitalization: No    Medication/supplement changes: None noted    Signs or symptoms of bleeding or clotting: No    Previous INR: Therapeutic last 2(+) visits    Additional findings: None     PLAN     Recommended plan for no diet, medication or health factor changes affecting INR     Dosing Instructions: Continue your current warfarin dose with next INR in 2 weeks       Summary  As of 7/1/2021    Full warfarin instructions:  2 mg every Mon, Fri; 1 mg all other days   Next INR check:               Telephone call with  Suyapa whom verbalizes understanding and agrees to plan    Lab visit scheduled    Education provided: Contact [phone]  with any changes, questions or concerns.     Plan made per [facility] anticoagulation protocol    Gina Persaud RN  Anticoagulation Clinic  7/1/2021    _______________________________________________________________________     Anticoagulation Episode Summary     Current INR goal:  2.0-3.0   TTR:  64.0 % (1 y)   Target end date:  Indefinite   Send INR reminders to:  [facility]    Indications    Atrial fibrillation with rapid ventricular response (H) (Resolved) [I48.91]  DVT (deep venous thrombosis) [I82.409]  Long term current use of anticoagulant therapy [Z79.01]  Intermittent atrial fibrillation (H) [I48.0]  Deep vein thrombosis (DVT) of proximal lower extremity  unspecified chronicity  unspecified laterality (H) [I82.4Y9]           Comments:  * Taking Celebrex PRN            Anticoagulation Care Providers     Provider Role Specialty Phone number    Anjum Vidales MD Referring Family Medicine  [phone]

## 2021-07-02 ENCOUNTER — OFFICE VISIT (OUTPATIENT)
Dept: FAMILY MEDICINE | Facility: CLINIC | Age: 86
End: 2021-07-02
Payer: COMMERCIAL

## 2021-07-02 VITALS
HEART RATE: 70 BPM | SYSTOLIC BLOOD PRESSURE: 127 MMHG | WEIGHT: 130 LBS | OXYGEN SATURATION: 98 % | DIASTOLIC BLOOD PRESSURE: 67 MMHG | BODY MASS INDEX: 27.17 KG/M2 | TEMPERATURE: 97.5 F

## 2021-07-02 DIAGNOSIS — R05.9 COUGH: Primary | ICD-10-CM

## 2021-07-02 DIAGNOSIS — J44.9 CHRONIC OBSTRUCTIVE PULMONARY DISEASE, UNSPECIFIED COPD TYPE (H): ICD-10-CM

## 2021-07-02 DIAGNOSIS — L98.9 SKIN LESION: ICD-10-CM

## 2021-07-02 PROCEDURE — 99213 OFFICE O/P EST LOW 20 MIN: CPT | Mod: 25 | Performed by: FAMILY MEDICINE

## 2021-07-02 PROCEDURE — 11102 TANGNTL BX SKIN SINGLE LES: CPT | Performed by: FAMILY MEDICINE

## 2021-07-02 PROCEDURE — 88305 TISSUE EXAM BY PATHOLOGIST: CPT | Performed by: PATHOLOGY

## 2021-07-02 RX ORDER — PREDNISONE 20 MG/1
40 TABLET ORAL DAILY
Qty: 10 TABLET | Refills: 0 | Status: ON HOLD | OUTPATIENT
Start: 2021-07-02 | End: 2021-07-09

## 2021-07-02 NOTE — NURSING NOTE
"Initial /67   Pulse 70   Temp 97.5  F (36.4  C) (Tympanic)   Wt 59 kg (130 lb)   LMP 06/15/1985   SpO2 98%   Breastfeeding No   BMI 27.17 kg/m   Estimated body mass index is 27.17 kg/m  as calculated from the following:    Height as of 3/29/21: 1.473 m (4' 10\").    Weight as of this encounter: 59 kg (130 lb). .      "

## 2021-07-02 NOTE — PATIENT INSTRUCTIONS
ASSESSMENT:     ICD-10-CM    1. Cough  R05 predniSONE (DELTASONE) 20 MG tablet   2. Chronic obstructive pulmonary disease, unspecified COPD type (H)  J44.9    3. Skin lesion  L98.9       Take prednisone 40mg daily for 5 days. This is an anti-inflammatory for the cough.   Increase the Duonebs to 3-4 times a day.   Continue the Advair twice daily.   Let me know if you have a fever or more shortness of breath.  Also, if phlegm is increasing.     Informed consent was obtained.    Skin cleansing with alcohol    1% Lidocaine with epinephrine for local anesthetic.     With sterile technique, shave excision was performed.   Cautery to wound  Dressing bandaid.     Wound care instructions:     Recheck with any signs of wound infection or problems like redness, tenderness or a lot of swelling.  The procedure was well tolerated without complications.  Follow up: The specimen is labelled and sent to pathology for evaluation., Return as needed for concerns about the wound.

## 2021-07-02 NOTE — PROGRESS NOTES
ASSESSMENT:     ICD-10-CM    1. Cough  R05 predniSONE (DELTASONE) 20 MG tablet   2. Chronic obstructive pulmonary disease, unspecified COPD type (H)  J44.9    3. Skin lesion  L98.9       Take prednisone 40mg daily for 5 days. This is an anti-inflammatory for the cough.   Increase the Duonebs to 3-4 times a day.   Continue the Advair twice daily.   Let me know if you have a fever or more shortness of breath.  Also, if phlegm is increasing.     Informed consent was obtained.    Skin cleansing with alcohol    1% Lidocaine with epinephrine for local anesthetic.     With sterile technique, shave excision was performed.   Cautery to wound  Dressing bandaid.     Wound care instructions:     Recheck with any signs of wound infection or problems like redness, tenderness or a lot of swelling.  The procedure was well tolerated without complications.  Follow up: The specimen is labelled and sent to pathology for evaluation., Return as needed for concerns about the wound.          Subjective   Ellie is a 89 year old who presents for the following health issues   Chief Complaint   Patient presents with     Cough        * sore on forehead    Problem 2: some phlegm can be greenish or brown for months.  Course of symptoms over time: same.   She has been on Advair twice daily.  Duonebs twice daily.  Seems to help get phlegm up.   NO fever.   Breathng oK.  Some days more shortness of breath.  Acute Illness  Acute illness concerns: cough  Onset/Duration: 3 months   Symptoms:  Fever: no  Chills/Sweats: YES- sweats  Headache (location?): no  Sinus Pressure: no  Conjunctivitis:  no  Ear Pain: no  Rhinorrhea: YES  Congestion: no  Sore Throat: no  Cough: YES  Wheeze: YES  Decreased Appetite: no  Nausea: YES  Vomiting: no  Diarrhea: no  Dysuria/Freq.: no  Dysuria or Hematuria: no  Fatigue/Achiness: no  Sick/Strep Exposure: no  Therapies tried and outcome: tylenol    Patient Active Problem List   Diagnosis     Sensorineural hearing loss,  asymmetrical     Generalized osteoarthrosis, unspecified site     PERSONAL HISTORY OF MALIGNANCY- BREAST     Esophageal reflux     Chronic allergic conjunctivitis     Chronic seasonal allergic rhinitis     Hyperlipidemia LDL goal <130     Chronic constipation     Osteoporosis     Health Care Home     Right arm weakness     Ulnar neuropathy     Headache     Mild major depression     DVT (deep venous thrombosis)     Anxiety     Cervical vertebral fracture (H)     Intermittent atrial fibrillation (H)     Squamous cell carcinoma in situ of skin of face     SK (seborrheic keratosis)     Hypothyroidism     Recurrent UTI     History of skin cancer     BPPV (benign paroxysmal positional vertigo)     Benign essential HTN     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  Tried phenergan which was not that helpful.   No chest pain.   Last chest x-ray October, 2020:  IMPRESSION: Two views of the chest. Lungs appear hyperinflated  consistent with underlying emphysema. Scarring and architectural  distortion medial right upper lung is stable. Sequela from previous  infection and/or radiation treatment are possible etiologies. No new  pulmonary infiltrate or lung consolidation is evident. No pneumothorax  or significant left pleural fluid. Trace amount of right pleural fluid  is suspected, new since prior exam. Implantable cardiac device and two  leads are unchanged in position. Degenerative spine changes are  present. Mild compression deformity noted in the lower thoracic and  upper lumbar  spine.    OBJECTIVE:Blood pressure 127/67, pulse 70, temperature 97.5  F (36.4  C), temperature source Tympanic, weight 59 kg (130 lb), last menstrual period 06/15/1985, SpO2 98 %, not currently breastfeeding. BMI=Body mass index is 27.17 kg/m .  GENERAL APPEARANCE ADULT: Alert, no acute distress, petite, walks with a walker.   NECK: No adenopathy,masses or thyromegaly  RESP: lungs clear to auscultation   CV: irregular rhythm-irregularly irregular, rate-normal, no murmur  MS: extremities normal, no peripheral edema  SKIN: She has a 5 mm raw spot reddened on central forehead possible skin cancer.

## 2021-07-06 DIAGNOSIS — I10 ESSENTIAL HYPERTENSION, BENIGN: ICD-10-CM

## 2021-07-06 DIAGNOSIS — I48.0 INTERMITTENT ATRIAL FIBRILLATION (H): ICD-10-CM

## 2021-07-06 DIAGNOSIS — C44.300 MALIGNANT NEOPLASM OF SKIN OF FACE: Primary | ICD-10-CM

## 2021-07-06 LAB — COPATH REPORT: NORMAL

## 2021-07-06 RX ORDER — WARFARIN SODIUM 1 MG/1
TABLET ORAL
Qty: 115 TABLET | Refills: 0 | Status: ON HOLD | OUTPATIENT
Start: 2021-07-06 | End: 2021-07-14

## 2021-07-06 RX ORDER — METOPROLOL SUCCINATE 25 MG/1
TABLET, EXTENDED RELEASE ORAL
Qty: 360 TABLET | Refills: 1 | Status: SHIPPED | OUTPATIENT
Start: 2021-07-06 | End: 2021-12-31

## 2021-07-06 NOTE — TELEPHONE ENCOUNTER
"Prescription for metoprolol approved per [facility] Refill Protocol.      Requested Prescriptions   Pending Prescriptions Disp Refills     metoprolol succinate ER (TOPROL-XL) 25 MG 24 hr tablet [Pharmacy Med Name: Metoprolol Succinate ER Oral Tablet Extended Release 24 Hour 25 MG] 360 tablet 0     Sig: TAKE TWO TABLETS BY MOUTH TWICE DAILY       Beta-Blockers Protocol Passed - 7/6/2021 10:55 AM        Passed - Blood pressure under 140/90 in past 12 months     BP Readings from Last 3 Encounters:   07/02/21 127/67   03/29/21 120/70   01/08/21 124/78                 Passed - Patient is age 6 or older        Passed - Recent (12 mo) or future (30 days) visit within the authorizing provider's specialty     Patient has had an office visit with the authorizing provider or a provider within the authorizing providers department within the previous 12 mos or has a future within next 30 days. See \"Patient Info\" tab in inbasket, or \"Choose Columns\" in Meds & Orders section of the refill encounter.              Passed - Medication is active on med list           warfarin ANTICOAGULANT (COUMADIN) 1 MG tablet [Pharmacy Med Name: Warfarin Sodium Oral Tablet 1 MG] 115 tablet 0     Sig: take 2 tablets (2 mg) by mouth every Mon, Fri, then take 1 tablet (1 mg) all other days or as directed by the INR clinic.       Vitamin K Antagonists Failed - 7/6/2021 10:55 AM        Failed - INR is within goal in the past 6 weeks     Confirm INR is within goal in the past 6 weeks.     Recent Labs   Lab Test 07/01/21  1300   INR 3.30*                       Passed - Recent (12 mo) or future (30 days) visit within the authorizing provider's specialty     Patient has had an office visit with the authorizing provider or a provider within the authorizing providers department within the previous 12 mos or has a future within next 30 days. See \"Patient Info\" tab in inbasket, or \"Choose Columns\" in Meds & Orders section of the refill encounter.              Passed " - Medication is active on med list        Passed - Patient is 18 years of age or older        Passed - Patient is not pregnant        Passed - No positive pregnancy on file in past 12 months

## 2021-07-06 NOTE — RESULT ENCOUNTER NOTE
Please call daughter Suyapa.  There is skin cancer on the biopsy.   PLAN: Schedule an appointment with Dermatology for complete removal and mohs surgery.   Please arrange for orders.  Dermatology referral.

## 2021-07-07 DIAGNOSIS — Z79.01 LONG TERM CURRENT USE OF ANTICOAGULANT THERAPY: ICD-10-CM

## 2021-07-07 DIAGNOSIS — I82.4Y9 DEEP VEIN THROMBOSIS (DVT) OF PROXIMAL LOWER EXTREMITY, UNSPECIFIED CHRONICITY, UNSPECIFIED LATERALITY (H): Chronic | ICD-10-CM

## 2021-07-07 DIAGNOSIS — I48.0 INTERMITTENT ATRIAL FIBRILLATION (H): Primary | ICD-10-CM

## 2021-07-08 ENCOUNTER — TELEPHONE (OUTPATIENT)
Dept: DERMATOLOGY | Facility: CLINIC | Age: 86
End: 2021-07-08
Payer: COMMERCIAL

## 2021-07-08 NOTE — TELEPHONE ENCOUNTER
[facility] Call Center    Phone Message    May a detailed message be left on voicemail: yes     Reason for Call: Appointment Intake    Referring Provider Name:   Anjum Vidales MD in [facility]    Diagnosis and/or Symptoms:   Skin Cancer confirmed by Bx  skin cancer-mohs, Malignant neoplasm of skin of face     Action Taken: Other: [facility]    Travel Screening: Not Applicable             Pt's daughter calling to schedule Pt for Mohs per referral. States that cancer has been confirmed by Bx. Please return call to schedule. Pt wants to be seen by Dr Ramos

## 2021-07-09 ENCOUNTER — HOSPITAL ENCOUNTER (INPATIENT)
Facility: CLINIC | Age: 86
LOS: 5 days | Discharge: HOME | DRG: 190 | End: 2021-07-14
Attending: FAMILY MEDICINE | Admitting: FAMILY MEDICINE
Payer: COMMERCIAL

## 2021-07-09 ENCOUNTER — APPOINTMENT (OUTPATIENT)
Dept: GENERAL RADIOLOGY | Facility: CLINIC | Age: 86
DRG: 190 | End: 2021-07-09
Attending: FAMILY MEDICINE
Payer: COMMERCIAL

## 2021-07-09 ENCOUNTER — APPOINTMENT (OUTPATIENT)
Dept: CT IMAGING | Facility: CLINIC | Age: 86
DRG: 190 | End: 2021-07-09
Attending: FAMILY MEDICINE
Payer: COMMERCIAL

## 2021-07-09 DIAGNOSIS — J44.1 COPD EXACERBATION (H): ICD-10-CM

## 2021-07-09 DIAGNOSIS — R09.02 HYPOXIA: ICD-10-CM

## 2021-07-09 DIAGNOSIS — J47.0 BRONCHIECTASIS WITH ACUTE LOWER RESPIRATORY INFECTION (H): Primary | ICD-10-CM

## 2021-07-09 DIAGNOSIS — Z11.52 ENCOUNTER FOR SCREENING LABORATORY TESTING FOR COVID-19 VIRUS: ICD-10-CM

## 2021-07-09 DIAGNOSIS — I48.0 INTERMITTENT ATRIAL FIBRILLATION (H): ICD-10-CM

## 2021-07-09 DIAGNOSIS — Z77.22 TOBACCO SMOKE EXPOSURE: ICD-10-CM

## 2021-07-09 PROBLEM — R07.9 CHEST PAIN: Status: RESOLVED | Noted: 2018-04-14 | Resolved: 2021-07-09

## 2021-07-09 LAB
ALBUMIN SERPL-MCNC: 3.1 G/DL (ref 3.4–5)
ALBUMIN UR-MCNC: NEGATIVE MG/DL
ALP SERPL-CCNC: 73 U/L (ref 40–150)
ALT SERPL W P-5'-P-CCNC: 18 U/L (ref 0–50)
ANION GAP SERPL CALCULATED.3IONS-SCNC: 5 MMOL/L (ref 3–14)
APPEARANCE UR: CLEAR
AST SERPL W P-5'-P-CCNC: 16 U/L (ref 0–45)
BACTERIA #/AREA URNS HPF: ABNORMAL /HPF
BASOPHILS # BLD AUTO: 0 10E9/L (ref 0–0.2)
BASOPHILS NFR BLD AUTO: 0.2 %
BILIRUB SERPL-MCNC: 0.4 MG/DL (ref 0.2–1.3)
BILIRUB UR QL STRIP: NEGATIVE
BUN SERPL-MCNC: 25 MG/DL (ref 7–30)
CALCIUM SERPL-MCNC: 8.3 MG/DL (ref 8.5–10.1)
CHLORIDE SERPL-SCNC: 97 MMOL/L (ref 94–109)
CO2 SERPL-SCNC: 30 MMOL/L (ref 20–32)
COLOR UR AUTO: ABNORMAL
CREAT SERPL-MCNC: 0.61 MG/DL (ref 0.52–1.04)
D DIMER PPP FEU-MCNC: <0.3 UG/ML FEU (ref 0–0.5)
DIFFERENTIAL METHOD BLD: ABNORMAL
EOSINOPHIL # BLD AUTO: 0 10E9/L (ref 0–0.7)
EOSINOPHIL NFR BLD AUTO: 0.2 %
ERYTHROCYTE [DISTWIDTH] IN BLOOD BY AUTOMATED COUNT: 14.1 % (ref 10–15)
GFR SERPL CREATININE-BSD FRML MDRD: 79 ML/MIN/{1.73_M2}
GLUCOSE SERPL-MCNC: 114 MG/DL (ref 70–99)
GLUCOSE UR STRIP-MCNC: 50 MG/DL
HCT VFR BLD AUTO: 35.1 % (ref 35–47)
HGB BLD-MCNC: 11.4 G/DL (ref 11.7–15.7)
HGB UR QL STRIP: NEGATIVE
IMM GRANULOCYTES # BLD: 0.1 10E9/L (ref 0–0.4)
IMM GRANULOCYTES NFR BLD: 0.4 %
INR PPP: 4.41 (ref 0.86–1.14)
KETONES UR STRIP-MCNC: NEGATIVE MG/DL
LABORATORY COMMENT REPORT: NORMAL
LACTATE BLD-SCNC: 1.9 MMOL/L (ref 0.7–2)
LEUKOCYTE ESTERASE UR QL STRIP: NEGATIVE
LYMPHOCYTES # BLD AUTO: 1.4 10E9/L (ref 0.8–5.3)
LYMPHOCYTES NFR BLD AUTO: 9.8 %
MCH RBC QN AUTO: 29.7 PG (ref 26.5–33)
MCHC RBC AUTO-ENTMCNC: 32.5 G/DL (ref 31.5–36.5)
MCV RBC AUTO: 91 FL (ref 78–100)
MONOCYTES # BLD AUTO: 1.2 10E9/L (ref 0–1.3)
MONOCYTES NFR BLD AUTO: 8.5 %
NEUTROPHILS # BLD AUTO: 11.6 10E9/L (ref 1.6–8.3)
NEUTROPHILS NFR BLD AUTO: 80.9 %
NITRATE UR QL: NEGATIVE
NRBC # BLD AUTO: 0 10*3/UL
NRBC BLD AUTO-RTO: 0 /100
NT-PROBNP SERPL-MCNC: 774 PG/ML (ref 0–1800)
PH UR STRIP: 7 PH (ref 5–7)
PLATELET # BLD AUTO: 248 10E9/L (ref 150–450)
POTASSIUM SERPL-SCNC: 3.8 MMOL/L (ref 3.4–5.3)
PROT SERPL-MCNC: 6.4 G/DL (ref 6.8–8.8)
RBC # BLD AUTO: 3.84 10E12/L (ref 3.8–5.2)
RBC #/AREA URNS AUTO: 3 /HPF (ref 0–2)
SARS-COV-2 RNA RESP QL NAA+PROBE: NEGATIVE
SODIUM SERPL-SCNC: 132 MMOL/L (ref 133–144)
SOURCE: ABNORMAL
SP GR UR STRIP: 1.01 (ref 1–1.03)
SPECIMEN SOURCE: NORMAL
TROPONIN I SERPL-MCNC: 0.01 UG/L (ref 0–0.04)
UROBILINOGEN UR STRIP-MCNC: 0 MG/DL (ref 0–2)
WBC # BLD AUTO: 14.4 10E9/L (ref 4–11)
WBC #/AREA URNS AUTO: 1 /HPF (ref 0–5)

## 2021-07-09 PROCEDURE — 85610 PROTHROMBIN TIME: CPT | Performed by: FAMILY MEDICINE

## 2021-07-09 PROCEDURE — 80053 COMPREHEN METABOLIC PANEL: CPT | Performed by: FAMILY MEDICINE

## 2021-07-09 PROCEDURE — 96374 THER/PROPH/DIAG INJ IV PUSH: CPT | Performed by: FAMILY MEDICINE

## 2021-07-09 PROCEDURE — 87581 M.PNEUMON DNA AMP PROBE: CPT | Performed by: PHYSICIAN ASSISTANT

## 2021-07-09 PROCEDURE — 93010 ELECTROCARDIOGRAM REPORT: CPT | Performed by: FAMILY MEDICINE

## 2021-07-09 PROCEDURE — 250N000013 HC RX MED GY IP 250 OP 250 PS 637: Performed by: FAMILY MEDICINE

## 2021-07-09 PROCEDURE — 99285 EMERGENCY DEPT VISIT HI MDM: CPT | Mod: 25 | Performed by: FAMILY MEDICINE

## 2021-07-09 PROCEDURE — 84484 ASSAY OF TROPONIN QUANT: CPT | Performed by: FAMILY MEDICINE

## 2021-07-09 PROCEDURE — 99223 1ST HOSP IP/OBS HIGH 75: CPT | Mod: AI | Performed by: FAMILY MEDICINE

## 2021-07-09 PROCEDURE — 120N000001 HC R&B MED SURG/OB

## 2021-07-09 PROCEDURE — 36415 COLL VENOUS BLD VENIPUNCTURE: CPT | Performed by: FAMILY MEDICINE

## 2021-07-09 PROCEDURE — 250N000009 HC RX 250: Performed by: FAMILY MEDICINE

## 2021-07-09 PROCEDURE — 87486 CHLMYD PNEUM DNA AMP PROBE: CPT | Performed by: PHYSICIAN ASSISTANT

## 2021-07-09 PROCEDURE — 250N000011 HC RX IP 250 OP 636: Performed by: PHYSICIAN ASSISTANT

## 2021-07-09 PROCEDURE — 93005 ELECTROCARDIOGRAM TRACING: CPT | Performed by: FAMILY MEDICINE

## 2021-07-09 PROCEDURE — 94640 AIRWAY INHALATION TREATMENT: CPT | Performed by: FAMILY MEDICINE

## 2021-07-09 PROCEDURE — 83605 ASSAY OF LACTIC ACID: CPT | Performed by: FAMILY MEDICINE

## 2021-07-09 PROCEDURE — C9803 HOPD COVID-19 SPEC COLLECT: HCPCS | Performed by: FAMILY MEDICINE

## 2021-07-09 PROCEDURE — 71250 CT THORAX DX C-: CPT

## 2021-07-09 PROCEDURE — 250N000013 HC RX MED GY IP 250 OP 250 PS 637: Performed by: PHYSICIAN ASSISTANT

## 2021-07-09 PROCEDURE — 85379 FIBRIN DEGRADATION QUANT: CPT | Performed by: FAMILY MEDICINE

## 2021-07-09 PROCEDURE — 83880 ASSAY OF NATRIURETIC PEPTIDE: CPT | Performed by: FAMILY MEDICINE

## 2021-07-09 PROCEDURE — 87633 RESP VIRUS 12-25 TARGETS: CPT | Performed by: PHYSICIAN ASSISTANT

## 2021-07-09 PROCEDURE — 87635 SARS-COV-2 COVID-19 AMP PRB: CPT | Performed by: FAMILY MEDICINE

## 2021-07-09 PROCEDURE — 85025 COMPLETE CBC W/AUTO DIFF WBC: CPT | Performed by: FAMILY MEDICINE

## 2021-07-09 PROCEDURE — 250N000009 HC RX 250: Performed by: PHYSICIAN ASSISTANT

## 2021-07-09 PROCEDURE — 71045 X-RAY EXAM CHEST 1 VIEW: CPT

## 2021-07-09 PROCEDURE — 250N000011 HC RX IP 250 OP 636: Performed by: FAMILY MEDICINE

## 2021-07-09 PROCEDURE — 81001 URINALYSIS AUTO W/SCOPE: CPT | Performed by: PHYSICIAN ASSISTANT

## 2021-07-09 RX ORDER — SODIUM CHLORIDE 1 G/1
1 TABLET ORAL 3 TIMES DAILY
Status: DISCONTINUED | OUTPATIENT
Start: 2021-07-09 | End: 2021-07-14 | Stop reason: HOSPADM

## 2021-07-09 RX ORDER — LIDOCAINE 40 MG/G
CREAM TOPICAL
Status: DISCONTINUED | OUTPATIENT
Start: 2021-07-09 | End: 2021-07-14 | Stop reason: HOSPADM

## 2021-07-09 RX ORDER — LEVOTHYROXINE SODIUM 50 UG/1
50 TABLET ORAL DAILY
Status: DISCONTINUED | OUTPATIENT
Start: 2021-07-10 | End: 2021-07-14 | Stop reason: HOSPADM

## 2021-07-09 RX ORDER — PROCHLORPERAZINE MALEATE 5 MG
5 TABLET ORAL EVERY 6 HOURS PRN
Status: DISCONTINUED | OUTPATIENT
Start: 2021-07-09 | End: 2021-07-14 | Stop reason: HOSPADM

## 2021-07-09 RX ORDER — METHYLPREDNISOLONE SODIUM SUCCINATE 125 MG/2ML
60 INJECTION, POWDER, LYOPHILIZED, FOR SOLUTION INTRAMUSCULAR; INTRAVENOUS ONCE
Status: COMPLETED | OUTPATIENT
Start: 2021-07-09 | End: 2021-07-09

## 2021-07-09 RX ORDER — AMOXICILLIN 250 MG
1 CAPSULE ORAL 2 TIMES DAILY PRN
Status: DISCONTINUED | OUTPATIENT
Start: 2021-07-09 | End: 2021-07-14 | Stop reason: HOSPADM

## 2021-07-09 RX ORDER — ACETAMINOPHEN 650 MG/1
650 SUPPOSITORY RECTAL EVERY 4 HOURS PRN
Status: DISCONTINUED | OUTPATIENT
Start: 2021-07-09 | End: 2021-07-14 | Stop reason: HOSPADM

## 2021-07-09 RX ORDER — ACETAMINOPHEN 325 MG/1
650 TABLET ORAL EVERY 4 HOURS PRN
Status: DISCONTINUED | OUTPATIENT
Start: 2021-07-09 | End: 2021-07-09

## 2021-07-09 RX ORDER — IPRATROPIUM BROMIDE AND ALBUTEROL SULFATE 2.5; .5 MG/3ML; MG/3ML
3 SOLUTION RESPIRATORY (INHALATION)
Status: DISCONTINUED | OUTPATIENT
Start: 2021-07-09 | End: 2021-07-14 | Stop reason: HOSPADM

## 2021-07-09 RX ORDER — PROCHLORPERAZINE 25 MG
12.5 SUPPOSITORY, RECTAL RECTAL EVERY 12 HOURS PRN
Status: DISCONTINUED | OUTPATIENT
Start: 2021-07-09 | End: 2021-07-14 | Stop reason: HOSPADM

## 2021-07-09 RX ORDER — POLYETHYLENE GLYCOL 3350 17 G/17G
17 POWDER, FOR SOLUTION ORAL DAILY
Status: DISCONTINUED | OUTPATIENT
Start: 2021-07-09 | End: 2021-07-14 | Stop reason: HOSPADM

## 2021-07-09 RX ORDER — AMOXICILLIN 250 MG
2 CAPSULE ORAL 2 TIMES DAILY PRN
Status: DISCONTINUED | OUTPATIENT
Start: 2021-07-09 | End: 2021-07-14 | Stop reason: HOSPADM

## 2021-07-09 RX ORDER — ONDANSETRON 4 MG/1
4 TABLET, ORALLY DISINTEGRATING ORAL EVERY 6 HOURS PRN
Status: DISCONTINUED | OUTPATIENT
Start: 2021-07-09 | End: 2021-07-14 | Stop reason: HOSPADM

## 2021-07-09 RX ORDER — METOPROLOL SUCCINATE 50 MG/1
50 TABLET, EXTENDED RELEASE ORAL 2 TIMES DAILY
Status: DISCONTINUED | OUTPATIENT
Start: 2021-07-09 | End: 2021-07-14 | Stop reason: HOSPADM

## 2021-07-09 RX ORDER — ACETAMINOPHEN 325 MG/1
325-650 TABLET ORAL EVERY 8 HOURS PRN
Status: DISCONTINUED | OUTPATIENT
Start: 2021-07-09 | End: 2021-07-14 | Stop reason: HOSPADM

## 2021-07-09 RX ORDER — PREDNISONE 20 MG/1
40 TABLET ORAL DAILY
Status: DISCONTINUED | OUTPATIENT
Start: 2021-07-10 | End: 2021-07-14 | Stop reason: HOSPADM

## 2021-07-09 RX ORDER — DOXYCYCLINE 100 MG/1
100 CAPSULE ORAL EVERY 12 HOURS SCHEDULED
Status: DISCONTINUED | OUTPATIENT
Start: 2021-07-09 | End: 2021-07-09

## 2021-07-09 RX ORDER — POLYETHYLENE GLYCOL 3350 17 G/17G
17 POWDER, FOR SOLUTION ORAL DAILY PRN
Status: DISCONTINUED | OUTPATIENT
Start: 2021-07-09 | End: 2021-07-14 | Stop reason: HOSPADM

## 2021-07-09 RX ORDER — PANTOPRAZOLE SODIUM 20 MG/1
40 TABLET, DELAYED RELEASE ORAL 2 TIMES DAILY
Status: DISCONTINUED | OUTPATIENT
Start: 2021-07-10 | End: 2021-07-14 | Stop reason: HOSPADM

## 2021-07-09 RX ORDER — ALBUTEROL SULFATE 0.83 MG/ML
2.5 SOLUTION RESPIRATORY (INHALATION) 3 TIMES DAILY
Status: DISCONTINUED | OUTPATIENT
Start: 2021-07-09 | End: 2021-07-10

## 2021-07-09 RX ORDER — LEVOFLOXACIN 5 MG/ML
500 INJECTION, SOLUTION INTRAVENOUS EVERY 24 HOURS
Status: DISCONTINUED | OUTPATIENT
Start: 2021-07-09 | End: 2021-07-13

## 2021-07-09 RX ORDER — ONDANSETRON 2 MG/ML
4 INJECTION INTRAMUSCULAR; INTRAVENOUS EVERY 6 HOURS PRN
Status: DISCONTINUED | OUTPATIENT
Start: 2021-07-09 | End: 2021-07-14 | Stop reason: HOSPADM

## 2021-07-09 RX ORDER — IPRATROPIUM BROMIDE AND ALBUTEROL SULFATE 2.5; .5 MG/3ML; MG/3ML
3 SOLUTION RESPIRATORY (INHALATION) ONCE
Status: COMPLETED | OUTPATIENT
Start: 2021-07-09 | End: 2021-07-09

## 2021-07-09 RX ORDER — MIRTAZAPINE 15 MG/1
15 TABLET, FILM COATED ORAL AT BEDTIME
Status: DISCONTINUED | OUTPATIENT
Start: 2021-07-09 | End: 2021-07-14 | Stop reason: HOSPADM

## 2021-07-09 RX ADMIN — POLYETHYLENE GLYCOL 3350 17 G: 17 POWDER, FOR SOLUTION ORAL at 16:46

## 2021-07-09 RX ADMIN — LEVOFLOXACIN 500 MG: 5 INJECTION, SOLUTION INTRAVENOUS at 16:48

## 2021-07-09 RX ADMIN — SODIUM CHLORIDE TAB 1 GM 1 G: 1 TAB at 21:19

## 2021-07-09 RX ADMIN — MIRTAZAPINE 15 MG: 15 TABLET, FILM COATED ORAL at 21:17

## 2021-07-09 RX ADMIN — ALBUTEROL SULFATE 2.5 MG: 2.5 SOLUTION RESPIRATORY (INHALATION) at 16:49

## 2021-07-09 RX ADMIN — IPRATROPIUM BROMIDE AND ALBUTEROL SULFATE 3 ML: .5; 3 SOLUTION RESPIRATORY (INHALATION) at 18:10

## 2021-07-09 RX ADMIN — SODIUM CHLORIDE TAB 1 GM 1 G: 1 TAB at 16:51

## 2021-07-09 RX ADMIN — METOPROLOL SUCCINATE 50 MG: 50 TABLET, EXTENDED RELEASE ORAL at 21:17

## 2021-07-09 RX ADMIN — DOXYCYCLINE HYCLATE 100 MG: 100 CAPSULE ORAL at 11:53

## 2021-07-09 RX ADMIN — METHYLPREDNISOLONE SODIUM SUCCINATE 62.5 MG: 125 INJECTION, POWDER, FOR SOLUTION INTRAMUSCULAR; INTRAVENOUS at 11:50

## 2021-07-09 RX ADMIN — IPRATROPIUM BROMIDE AND ALBUTEROL SULFATE 3 ML: .5; 3 SOLUTION RESPIRATORY (INHALATION) at 11:54

## 2021-07-09 ASSESSMENT — ACTIVITIES OF DAILY LIVING (ADL)
ADLS_ACUITY_SCORE: 17
ADLS_ACUITY_SCORE: 17

## 2021-07-09 ASSESSMENT — ENCOUNTER SYMPTOMS
FEVER: 0
FREQUENCY: 0
DIARRHEA: 0
SORE THROAT: 0
VOMITING: 0
NAUSEA: 0
COUGH: 1
DYSURIA: 0
ABDOMINAL PAIN: 0
WHEEZING: 0
FATIGUE: 1
PALPITATIONS: 0
CONSTIPATION: 0
CHILLS: 0
RHINORRHEA: 1
SHORTNESS OF BREATH: 1
DIAPHORESIS: 0
BLOOD IN STOOL: 0
SINUS PRESSURE: 0
HEADACHES: 0

## 2021-07-09 ASSESSMENT — MIFFLIN-ST. JEOR: SCORE: 866.75

## 2021-07-09 NOTE — PHARMACY-ANTICOAGULATION SERVICE
Clinical Pharmacy - Warfarin Dosing Consult     Pharmacy has been consulted to manage this patient s warfarin therapy.  Indication: Atrial Fibrillation;DVT/PE Prophylaxis  Therapy Goal: INR 2-3  Provider/Team: [facility]  OP Anticoag Clinic: [facility]  Warfarin Prior to Admission: Yes  Warfarin PTA Regimen: 2 mg MF, 1 mg ROW  Significant drug interactions: Levofloxacin  Recent documented change in oral intake/nutrition: Unknown  Dose Comments: No dose for INR = 4.41    INR   Date Value Ref Range Status   07/09/2021 4.41 (H) 0.86 - 1.14 Final   07/01/2021 3.30 (H) 0.86 - 1.14 Final     Comment:     This test is intended for monitoring Coumadin therapy.  Results are not   accurate in patients with prolonged INR due to factor deficiency.         Recommend no warfarin dose for INR = 4.41 today.  Pharmacy will monitor Ellie Leigh daily and order warfarin doses to achieve specified goal.      Please contact pharmacy as soon as possible if the warfarin needs to be held for a procedure or if the warfarin goals change.      Anna Myers, Pharm.D.

## 2021-07-09 NOTE — ED TRIAGE NOTES
Dealing with productive cough for months, recent prednisone, coughed up blood X2 last night, C/O chest pain and back pain thinks from coughing

## 2021-07-09 NOTE — ED NOTES
Admission delayed due to hospitalist needing to do admit prior to transfer. PA and MD here to evaluate.

## 2021-07-09 NOTE — PROGRESS NOTES
2:54 PM  This patient was seen and examined. She has a history of chronic anticoagulation, A. fib, previous DVT, previous full treatment for tuberculosis around the year 2000 (positive acid-fast bacteria in sputum, persistent right upper lobe infiltrate), SIADH and COPD.    She has been a non-smoker. Pulmonary function tests showed mild obstruction.    She presents with 3 weeks of cough, increasing shortness of breath and hemoptysis x2 overnight prior to admission.    On exam she had diffuse wheezing and hypoxia to the mid 80s with exertion. She has sammie sputum.    CT shows-  Volume loss and bronchiectasis in the right upper lobe is  again evident and unchanged likely chronic. Areas of bronchiectasis  and mucous plugging seen bilaterally right worse than left. Minimal  volume loss in the left apex. Scattered atelectasis and/or fibrosis.  No effusions.     We will treat empirically for COPD with steroids. Due to the bronchiectasis will use Levaquin for empiric antibiotic treatment. Will obtain sputum culture and respiratory virus panel. Sputum for acid-fast bacteria stain and culture was ordered by the ER. I did place a call to the ID advice line to get their recommendations regarding a work-up to rule out tuberculosis.  I suspect that a series of acid-fast bacteria cultures will need to be obtained.    Patient likely will be hospitalized for 2 nights.    3:28 PM   Have not heard from ID  Will tenatively plan on getting two more sputums for AF[employer]

## 2021-07-09 NOTE — ED NOTES
Per ERT, patient walked in hallway with walker and became more short of breath - sats 85% on RA - returned to room and placed on 2L NC for comfort - sats improved with rest

## 2021-07-09 NOTE — PLAN OF CARE
"[facility] [facility] ADMISSION NOTE    Patient admitted to room 2311 at approximately 1430 via cart from emergency room. Patient was accompanied by transport tech.     Verbal SBAR report received from jermaine prior to patient arrival.     Patient ambulated to bed with stand-by assist. Patient alert and oriented X 3. The patient is not having any pain.  . Admission vital signs: Blood pressure (!) 150/74, pulse 72, temperature 98.4  F (36.9  C), temperature source Oral, resp. rate 20, height 1.473 m (4' 10\"), weight 55.7 kg (122 lb 12.7 oz), last menstrual period 06/15/1985, SpO2 95 %, not currently breastfeeding. Patient was oriented to plan of care, call light, bed controls, tv, telephone, bathroom, and visiting hours.     Risk Assessment    The following safety risks were identified during admission: none. Yellow risk band applied: NO.     Skin Initial Assessment    This writer admitted this patient and completed a full skin assessment and Jose Daniel score in the Adult PCS flowsheet. Appropriate interventions initiated as needed.     Secondary skin check completed by judah.    Jose Daniel Risk Assessment  Sensory Perception: 4-->no impairment  Moisture: 4-->rarely moist  Activity: 4-->walks frequently  Mobility: 3-->slightly limited  Nutrition: 4-->excellent  Friction and Shear: 3-->no apparent problem  Jose Daniel Score: 22  Mattress: Standard Hospital Mattress (Foam)  Bed Frame: Standard width and length    Education    Patient has a [place] to Observation order: No  Observation education completed and documented: No      David Mijares RN      "

## 2021-07-09 NOTE — H&P
[facility]    History and Physical - Hospitalist Service       Date of Admission:  7/9/2021    Assessment & Plan      Ellie Leigh is a 89 year old female admitted on 7/9/2021. She presented to the emergency department for evaluation of dyspnea on exertion and hypoxia with exertion and was found to have a COPD exacerbation for which she is being admitted for further evaluation and treatment.    Acute respiratory failure with hypoxia  Presents with O2 sats 85% with exertion, but recovers to low 90's on room air. Not on supplemental O2 at baseline. Chest x-ray with upper lobe fibrosis and volume loss right more than left, no new infiltrates. Chest x-ray with chronic changes of volume loss and bronchiectasis in right upper lobe, scattered areas of bronchial wall thickening and bronchiectasis and mucous plugging compatible with chronic airway infectious or inflammatory process. Afebrile but leukocytosis present. BNP normal. Wheezes on exam. Clinically most consistent with COPD exacerbation.  - Continue supplemental O2 to maintain sats > 92%  - Treat COPD below    COPD exacerbation   Bronchiectasis   Known COPD / mild airway obstruction on prior PFTs. Managed prior to admission with Advair 250/50 bid, DuoNebs, scheduled albuterol nebs, and normal saline nebs. She recently was treated for COPD with 5 day prednisone course with improvement. Significant wheezes and coarse lung sounds on exam.  - SoluMedrol 62.5 given 7/9/2021 in the emergency department   - Prednisone 40 mg daily starting 7/10  - Scheduled DuoNebs q 4 hours  - Prn albuterol nebs  - IV Levaquin 500 mg daily to cover risk for pseudomonas with bronchiectasis   - Respiratory virus panel pending  - Sputum culture and gram stain pending    Bloody sputum  H/O TB (tuberculosis)  Diagnosed in the 2000's, is s/p treatment. Reports recent bloody sputum, need to rule out active TB.   - AFB sputum stain and culture pending  - Airborne  isolation until active TB ruled out    Recent syncope / near syncope  History tachy-keri syndrome  Pacemaker in situ  Reported syncopal event on 7/3/21 while sitting at a table. Told family she felt unwell, then has amnesia of subsequent events and was unresponsive to family for a few minutes. Improved once she was laid on the floor, no subsequent syncopal events since. Admit EKG normal sinus rhythm with first degree AV block.  - Attempt to interrogate patient's pacemaker - [employer] Evia  - Monitor for recurrence    Leukocytosis with left shift   Admit WBC 14.4, ANC 11.6. Afebrile. Chest x-ray without evidence of infection.   - UA pending  - CBC in am     Paroxysmal atrial fibrillation  History of DVT (deep venous thrombosis)  Long term current use of anticoagulant therapy  Admit EKG shows normal sinus rhythm. Rate controlled prior to admission with metoprolol XL 50 mg bid. Anticoagulated with coumadin, admit INR 4.41.   - Continue metoprolol with holding parameters  - Pharmacy to manage coumadin    SIADH (syndrome of inappropriate ADH production)  Previously diagnosed. Managed prior to admission with NaCl tablets 1 g tid. Admit sodium 132.   - Continue NaCl tablets  - BMP in am     Hypothyroidism  Managed prior to admission with levothyroxine 50 mcg daily, continue.     Esophageal reflux  Managed prior to admission with omeprazole 40 mg daily, continue.     Mild major depression  Anxiety  Managed prior to admission with Remeron 15 mg q hs, continue.     COVID status  COVID PCR negative 7/9/2021.  Patient has not had a COVID vaccine.       Diet: Combination Diet Regular Diet Adult    DVT Prophylaxis: Warfarin  Gl[place] Catheter: Not present  Central Lines: None  Code Status: Full Code  - discussed with patient and her daughter at time of admission. She is not sure if she would want to be intubated, will default to full code for now.      Disposition Plan   Expected discharge: 2 - 3 days, recommended to prior living  "arrangement once work-up completed, improvement of respiratory status and stable on room air, treatment plan in place.     The patient's care was discussed with the Attending Physician, Dr. Mickey Carson, Patient and Patient's Family.    Mary Foreman PA-C  [facility]  Securely message with the Vocera Web Console (learn more here)  Text page via [facility] Paging/Directory      ______________________________________________________________________    Chief Complaint   \"I was feeling better and now I've been feeling worse the past 2 days.\"    History is obtained from the patient and her daughter, review of EMR, and emergency department sign out from Dr. Steve Gaming.    History of Present Illness   Ellie Leigh is a 89 year old female who presented to the emergency department for evaluation of coughing and shortness of breath.    Patient has had a cough for the past 3 weeks.  She went to her PCP on 7/2/21 for evaluation of her cough and was prescribed prednisone 40 mg daily x 5 days. She was also instructed to increase her nebs to 3-4 times daily.  Patient was feeling significantly improved while on the prednisone, but now worse again the past 2 days after completing the prednisone course.   She reports fatigue, increased cough, and dyspnea on exertion as her most significant symptoms.   She has noticed bloody sputum the past 2 days and has a history of tuberculosis in the past.   She presented to the emergency department today for evaluation because she was getting worse now that her treatment course is done.    She has worsening dyspnea on exertion but feels fairly comfortable breathing at rest.   She feels wheezy today.  She denies any associated fevers or chills.   No recent illness or cold symptoms.  Reports some recent edema, does not know if she has orthopnea (she usually doesn't ever try sleeping flat on her back).    She reports a syncopal episode 6 days ago (7/3/21).  She " was at home sitting at the table doing a puzzle when she started to feel unwell.  She told her family she didn't feel good, and then does not remember anything after that.  Her daughter helped her to the floor and laid her down, but she was not responding to questions or commands at that point, and she does not remember being helped to the floor.   No injury or trauma.  She started to feel well after a while and was able to get up and go about her day.  She did not seek care or evaluation at that time, and there have been no further episodes since.    On review of systems she notes some eye pain this morning, described as a burning sensation but not pruritic or allergy-like sensation.   She had some burning in her chest this morning, now resolved.   She had a slight headache earlier today.  She did not sleep well last night due to her cough.  The remainder review of systems is negative.     Review of Systems    The 10 point Review of Systems is negative other than noted in the HPI or here.     Past Medical History    I have reviewed this patient's medical history and updated it with pertinent information if needed.   Past Medical History:   Diagnosis Date     Breast cancer (H)      COPD (chronic obstructive pulmonary disease) (H)     ct 2014 does show some bronchiectaisi RUL as well as fibronodular disease bilat upper lobes     Dust allergy      DVT (deep vein thrombosis) in pregnancy     after neck fracture when in hospital     HTN (hypertension)      Hyponatremia     chronic     Hypothyroid      Intermittent atrial fibrillation (H) 12/1/2011    hx tachy-keri, has pacer, on chronic coumadin     Loose body in joint 4/15/2011     Neck fracture (H)     after a fall     Syncope 5/12/2013    multiple hospital visits, lates 3/2016, 6/2016, 7/2016 with no clear cause found       Past Surgical History   I have reviewed this patient's surgical history and updated it with pertinent information if needed.  Past Surgical  History:   Procedure Laterality Date     APPENDECTOMY       ARTHROSCOPY KNEE  4/15/2011    Procedure:ARTHROSCOPY KNEE; removal of loose body; Surgeon:LEY, JEFFREY DUANE; Location:[facility]     CHOLECYSTECTOMY       ESOPHAGOSCOPY, GASTROSCOPY, DUODENOSCOPY (EGD), COMBINED  6/9/2014    Procedure: COMBINED ESOPHAGOSCOPY, GASTROSCOPY, DUODENOSCOPY (EGD);  Surgeon: Gilberto Richard MD;  Location: [facility]     ESOPHAGOSCOPY, GASTROSCOPY, DUODENOSCOPY (EGD), COMBINED N/A 10/18/2019    Procedure: ESOPHAGOGASTRODUODENOSCOPY (EGD);  Surgeon: Noe Sams DO;  Location: [facility]     IMPLANT PACEMAKER  5/21/2013    Biotronik Moderl [id number] Serial#[id number]     INJECT EPIDURAL LUMBAR  3/23/2011    INJECT EPIDURAL LUMBAR performed by GENERIC ANESTHESIA PROVIDER at [facility] OR     JOINT REPLACEMENT, HIP RT/LT      Joint Replacement Hip Rt     MASTECTOMY, SIMPLE RT/LT/CAITLYN      Left breast - following breast ca     OTHER SURGICAL HISTORY      C1-C2 fusion after fx      SURGICAL HISTORY OF -   05/22/2001    Colonoscopy     TONSILLECTOMY         Social History   I have reviewed this patient's social history and updated it with pertinent information if needed.  Social History     Tobacco Use     Smoking status: Passive Smoke Exposure - Never Smoker     Smokeless tobacco: Never Used   Substance Use Topics     Alcohol use: No     Drug use: No       Family History   I have reviewed this patient's family history and updated it with pertinent information if needed.  Family History   Problem Relation Age of Onset     Cerebrovascular Disease Mother      Heart Disease Mother         MI     Cerebrovascular Disease Father      Heart Disease Father         MI     Respiratory Maternal Grandfather         TB     Neurologic Disorder Brother         ALS     Heart Disease Brother      Cerebrovascular Disease Brother      Breast Cancer Daughter         age:49     Asthma Brother      Cancer Brother         brain and lung     Cancer Daughter         thyroid        Prior to Admission Medications   Prior to Admission Medications   Prescriptions Last Dose Informant Patient Reported? Taking?   Acetaminophen (TYLENOL PO) 7/8/2021 at 1800 Self Yes Yes   Sig: Take  mg by mouth every 8 hours as needed    CRANBERRY 7/8/2021 at 1800 Self Yes Yes   Sig: Take 475 mg by mouth 2 times daily.   PROAIR  (90 Base) MCG/ACT inhaler More than a month at Unknown time  No No   Sig: Inhale 2 puffs into the lungs every 6 hours as needed for shortness of breath / dyspnea   albuterol (PROVENTIL) (2.5 MG/3ML) 0.083% neb solution 7/8/2021 at 2130 Self No Yes   Sig: Take 1 vial (2.5 mg) by nebulization 3 times daily   fluticasone-salmeterol (ADVAIR) 250-50 MCG/DOSE inhaler 7/8/2021 at 1900  No Yes   Sig: Inhale 1 puff into the lungs every 12 hours.    ipratropium - albuterol 0.5 mg/2.5 mg/3 mL (DUONEB) 0.5-2.5 (3) MG/3ML neb solution 7/8/2021 at 2100  No Yes   Sig: One neb three times a day routine and once extra if needed.   levothyroxine (SYNTHROID/LEVOTHROID) 50 MCG tablet 7/9/2021 at 0600  No Yes   Sig: TAKE ONE TABLET BY MOUTH ONE TIME DAILY    metoprolol succinate ER (TOPROL-XL) 25 MG 24 hr tablet 7/9/2021 at 0730  No Yes   Sig: TAKE TWO TABLETS BY MOUTH TWICE DAILY    mirtazapine (REMERON) 15 MG tablet 7/8/2021 at 2200  No Yes   Sig: Take 1 tablet (15 mg) by mouth At Bedtime   omeprazole (PRILOSEC) 40 MG DR capsule 7/9/2021 at 0700  No Yes   Sig: Take 1 capsule (40 mg) by mouth once daily.   order for DME 7/8/2021 at Unknown time Self No Yes   Sig: Equipment being ordered: Nebulizer   polyethylene glycol (MIRALAX/GLYCOLAX) Packet 7/8/2021 at 0900 Self Yes Yes   Sig: Take 17 g by mouth daily    probiotic CAPS 7/8/2021 at 1800 Self Yes Yes   Sig: Take 1 capsule by mouth every evening    sodium chloride 0.9 % neb solution 7/8/2021 at 2130  No Yes   Sig: Inhale 5 mLs into the lungs 3 times daily Use after albuterol in nebulizer to thin secretions.   sodium chloride 1 GM tablet  7/8/2021 at 0600  No Yes   Sig: TAKE ONE TABLET BY MOUTH THREE TIMES DAILY   warfarin ANTICOAGULANT (COUMADIN) 1 MG tablet 7/8/2021 at 1800  No Yes   Sig: take 2 tablets (2 mg) by mouth every Mon, Fri, then take 1 tablet (1 mg) all other days or as directed by the INR clinic.      Facility-Administered Medications: None     Allergies   Allergies   Allergen Reactions     Cephalosporins Nausea     Cefzil     Doxycycline Nausea and Vomiting     Sulfa Drugs Nausea     Penicillins Rash     PCN       Physical Exam   Vital Signs: Temp: 98.4  F (36.9  C) Temp src: Oral BP: (!) 150/74 Pulse: 72   Resp: 20 SpO2: 95 % O2 Device: Nasal cannula Oxygen Delivery: 2 LPM  Weight: 122 lbs 12.74 oz    Constitutional: Alert, oriented, cooperative. No apparent distress, appears nontoxic, speaking in full sentences.     Eyes: Eyes are clear, pupils are reactive. No scleral icterus.    HEENT: Oropharynx is clear and moist, no lesions. Normocephalic, no evidence of cranial trauma.      Cardiovascular: Regular rhythm and rate, normal S1 and S2. No murmur, rubs, or gallops. Peripheral pulses intact bilaterally. No lower extremity edema.    Respiratory: Coarse lung sounds with rhonchi and expiratory wheezing, prolonged expiratory phase. No crackles appreciated.     GI: Soft, non-distended. Non-tender, no rebound or guarding. No hepatosplenomegaly or masses appreciated. Normal bowel sounds.     Musculoskeletal: Kyphosis and some scoliotic curvature of the spine. Otherwise without obvious deformity, normal range of motion.  Distal CMS intact.      Skin: Warm and dry, no rashes or ecchymoses. No mottling of skin.      Neurologic: Patient moves all extremities. Gross strength and sensation are equal bilaterally.    Genitourinary: Deferred      Data   Data reviewed today: I reviewed all medications, new labs and imaging results over the last 24 hours. I personally reviewed the EKG tracing showing normal sinus rhythm with first degree AV block and  the chest CT image(s) showing right upper lobe fibrosis and bilateral scattered bronchiectasis.    Recent Labs   Lab 07/09/21  0925   WBC 14.4*   HGB 11.4*   MCV 91      INR 4.41*   *   POTASSIUM 3.8   CHLORIDE 97   CO2 30   BUN 25   CR 0.61   ANIONGAP 5   DOUG 8.3*   *   ALBUMIN 3.1*   PROTTOTAL 6.4*   BILITOTAL 0.4   ALKPHOS 73   ALT 18   AST 16   TROPI 0.015     Recent Results (from the past 24 hour(s))   XR Chest Port 1 View    Narrative    CHEST ONE VIEW  7/9/2021 10:02 AM     HISTORY: Cough, shortness of breath.    COMPARISON: October 2, 2020      Impression    IMPRESSION: Upper lobe fibrosis and volume loss right worse than left  again evident. No new infiltrates.    RANJANA NAVARRETE MD         SYSTEM ID:  [id number]   Chest CT w/o contrast    Narrative    CT CHEST WITHOUT CONTRAST 7/9/2021 11:42 AM     HISTORY: Pneumonia, effusion or abscess suspected, x-ray done; COPD,  prior TB.    COMPARISON: None.    TECHNIQUE: Volumetric helical acquisition of CT images of the chest  from the clavicles to the kidneys were acquired without IV contrast.  Radiation dose for this scan was reduced using automated exposure  control, adjustment of the mA and/or kV according to patient size, or  iterative reconstruction technique.    FINDINGS:  Volume loss and bronchiectasis in the right upper lobe is  again evident and unchanged likely chronic. Areas of bronchiectasis  and mucous plugging seen bilaterally right worse than left. Minimal  volume loss in the left apex. Scattered atelectasis and/or fibrosis.  No effusions. There are extensive coronary vascular calcifications  and/or stents consistent with coronary artery disease. There are  moderate atherosclerotic changes of the visualized aorta and its  branches. There is no evidence of aortic aneurysm. Normal heart size.  No acute findings in the visualized upper abdomen. No frankly  destructive bony lesions.      Impression    IMPRESSION:  1. Chronic changes of  volume loss and bronchiectasis in the right  upper lobe are stable since 2018.  2. Scattered areas of bronchial wall thickening with bronchiectasis  and mucous plugging compatible with chronic airways infectious or  inflammatory process.    RANJANA NAVARRETE MD         SYSTEM ID:  [id number]

## 2021-07-09 NOTE — ED PROVIDER NOTES
History     Chief Complaint   Patient presents with     Cough     for 3-4 months, coughing up blood last night     HPI  Ellie Leigh is a 89 year old female who presents with history of long-term COPD.  Seen by Dr. Vidales 7/2/2021 [place] for COPD exacerbation.  Is been on prednisone and duo nebs.  Also using Advair.  Increased productive sputum.    That time she continues to feel dyspneic with persistent cough.  A burning sensation anterior chest.  She has dyspnea on exertion.  There is no leg edema.  No orthopnea.  She has chronic nasal drainage for the last several months.  No associated fever.  She feels a general sense of fatigue.  Did cough blood last evening.  History of a chronic right upper lobe infiltrate and positive AFB sputum and status post full treatment of TB in 2000.      Allergies:  Allergies   Allergen Reactions     Cephalosporins Nausea     Cefzil     Doxycycline Nausea and Vomiting     Sulfa Drugs Nausea     Penicillins Rash     PCN       Problem List:    Patient Active Problem List    Diagnosis Date Noted     Chest pain 04/14/2018     Priority: Medium     H/O TB (tuberculosis) 02/09/2018     Priority: Medium     Pt with chronic RUL infiltrate with pos AFB sputum  Full treatmetn for TB following ID consult in 2000's       Back pain 02/06/2018     Priority: Medium     Nausea 06/05/2017     Priority: Medium     3/29/2021: She has had chronic nausea.  She has been on omeprazole  40mg daily for years.  Tried sucralfate (CARAFATE) which did not help.  Was on FODMAP diet, gluten free and no dairy diets.  Evaluation with GI in the past in 1/23/2015.  She has had gastroscopy several times.  10/30/2018 CT abdomen and pelvis unremarkable.   Gastric emptying normal.    Nausea in the AM.   No vomiting.   Hot flashes around Noon.  Feels like her whole body is on fire until med to late afternoon.  This is cyclical daily.   Feels shortness of breath with the hot flashes.    Nothing has helped  much. Tried meclizine and Dramamine.   She has tried various liz preparations.   She has tried ondansetron.   Mirtazapine seemed to help for a while, does help sleep.  She has tried compazine in the past.   Today will try phenergan.         Irritable bowel syndrome without diarrhea 05/02/2016     Priority: Medium     Mild persistent asthma without complication 12/01/2015     Priority: Medium     Long term current use of anticoagulant therapy 03/02/2015     Priority: Medium     Syncope 01/12/2015     Priority: Medium     Advance Care Planning 01/09/2015     Priority: Medium     Advance Care Planning 7/2/2015: Receipt of ACP document:  Received: Health Care Directive which was witnessed or notarized on 1-9-15.  Document previously scanned on 2-27-15.  Validation form completed and sent to be scanned.  Code Status reflects choices in most recent ACP document.  Confirmed/documented designated decision maker(s).  Added by Verónica Green RN, System ACP Coordinator Honoring Choices   1/9/15 Copy to be scanned into chart Beulah Mathis CMA         COPD (chronic obstructive pulmonary disease) (H) 10/06/2014     Priority: Medium     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) 07/07/2014     Priority: Medium     Cause TBD.  Patient has had lung CT, will see Dr Gómez for consideration of further workup.  Also has pulmonology appt 8/18.  5/2/2018:Reviewing chart she has had hyponatremia since 2010.        Positive fecal occult blood test 07/07/2014     Priority: Medium     x2 -- first was in ED.  Patient expresses that she does not want colonoscopy.  She'll set appt to discuss with her PCP.       Benign essential HTN 02/11/2014     Priority: Medium     BPPV (benign paroxysmal positional vertigo) 11/05/2013     Priority: Medium     History of skin cancer 05/07/2013     Priority: Medium     Recurrent UTI 07/16/2012     Priority: Medium     recurrent UTI  Recent Urologic workup neg, 2005  Has Cipro at home for treatment  as needed       Hypothyroidism 05/07/2012     Priority: Medium     Squamous cell carcinoma in situ of skin of face 04/19/2012     Priority: Medium     SK (seborrheic keratosis) 04/19/2012     Priority: Medium     Intermittent atrial fibrillation (H) 12/01/2011     Priority: Medium     Cervical vertebral fracture (H) 11/20/2011     Priority: Medium     Anxiety 11/15/2011     Priority: Medium     DVT (deep venous thrombosis) 10/12/2011     Priority: Medium     H/o in past, on current coumadin therapy.       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 Provider to review       Right arm weakness 07/29/2011     Priority: Medium     Ulnar neuropathy 07/29/2011     Priority: Medium     Health Care Home 04/21/2011     Priority: Medium     Conchis Robles [phone]  A / [facility]              DX V65.8 REPLACED WITH [id number] HEALTH CARE HOME (04/08/2013)       Chronic constipation 03/03/2011     Priority: Medium     Osteoporosis 03/03/2011     Priority: Medium     Hyperlipidemia LDL goal <130 10/31/2010     Priority: Medium     Infectious colitis, enteritis and gastroenteritis 07/10/2010     Priority: Medium     Chronic allergic conjunctivitis 11/18/2008     Priority: Medium     Chronic seasonal allergic rhinitis 11/18/2008     Priority: Medium     Esophageal reflux 11/29/2007     Priority: Medium     PERSONAL HISTORY OF MALIGNANCY- BREAST 06/13/2007     Priority: Medium     Sensorineural hearing loss, asymmetrical 06/20/2005     Priority: Medium     Generalized osteoarthrosis, unspecified site 06/20/2005     Priority: Medium     Right hip and knee are the most symptomatic          Past Medical History:    Past Medical History:   Diagnosis Date     Breast cancer (H)      COPD (chronic obstructive pulmonary disease) (H)      Dust allergy      DVT (deep vein thrombosis) in pregnancy      HTN  (hypertension)      Hyponatremia      Hypothyroid      Intermittent atrial fibrillation (H) 12/1/2011     Loose body in joint 4/15/2011     Neck fracture (H)      Syncope 5/12/2013       Past Surgical History:    Past Surgical History:   Procedure Laterality Date     APPENDECTOMY       ARTHROSCOPY KNEE  4/15/2011    Procedure:ARTHROSCOPY KNEE; removal of loose body; Surgeon:LEY, JEFFREY DUANE; Location:[facility]     CHOLECYSTECTOMY       ESOPHAGOSCOPY, GASTROSCOPY, DUODENOSCOPY (EGD), COMBINED  6/9/2014    Procedure: COMBINED ESOPHAGOSCOPY, GASTROSCOPY, DUODENOSCOPY (EGD);  Surgeon: Gilberto Richard MD;  Location: [facility]     ESOPHAGOSCOPY, GASTROSCOPY, DUODENOSCOPY (EGD), COMBINED N/A 10/18/2019    Procedure: ESOPHAGOGASTRODUODENOSCOPY (EGD);  Surgeon: Noe Sams DO;  Location: [facility]     IMPLANT PACEMAKER  5/21/2013    Biotronik Moderl [id number] Serial#[id number]     INJECT EPIDURAL LUMBAR  3/23/2011    INJECT EPIDURAL LUMBAR performed by GENERIC ANESTHESIA PROVIDER at [facility] OR     JOINT REPLACEMENT, HIP RT/LT      Joint Replacement Hip Rt     MASTECTOMY, SIMPLE RT/LT/CAITLYN      Left breast - following breast ca     OTHER SURGICAL HISTORY      C1-C2 fusion after fx      SURGICAL HISTORY OF -   05/22/2001    Colonoscopy     TONSILLECTOMY         Family History:    Family History   Problem Relation Age of Onset     Cerebrovascular Disease Mother      Heart Disease Mother         MI     Cerebrovascular Disease Father      Heart Disease Father         MI     Respiratory Maternal Grandfather         TB     Neurologic Disorder Brother         ALS     Heart Disease Brother      Cerebrovascular Disease Brother      Breast Cancer Daughter         age:49     Asthma Brother      Cancer Brother         brain and lung     Cancer Daughter         thyroid       Social History:  Marital Status:   [5]  Social History     Tobacco Use     Smoking status: Passive Smoke Exposure - Never Smoker     Smokeless tobacco: Never Used    Substance Use Topics     Alcohol use: No     Drug use: No        Medications:    Acetaminophen (TYLENOL PO)  albuterol (PROVENTIL) (2.5 MG/3ML) 0.083% neb solution  CRANBERRY  fluticasone-salmeterol (ADVAIR) 250-50 MCG/DOSE inhaler  ipratropium - albuterol 0.5 mg/2.5 mg/3 mL (DUONEB) 0.5-2.5 (3) MG/3ML neb solution  levothyroxine (SYNTHROID/LEVOTHROID) 50 MCG tablet  metoprolol succinate ER (TOPROL-XL) 25 MG 24 hr tablet  mirtazapine (REMERON) 15 MG tablet  omeprazole (PRILOSEC) 40 MG DR capsule  order for DME  polyethylene glycol (MIRALAX/GLYCOLAX) Packet  predniSONE (DELTASONE) 20 MG tablet  PROAIR  (90 Base) MCG/ACT inhaler  probiotic CAPS  promethazine (PHENERGAN) 25 MG tablet  sodium chloride 0.9 % neb solution  sodium chloride 1 GM tablet  warfarin ANTICOAGULANT (COUMADIN) 1 MG tablet        Review of Systems   Constitutional: Positive for fatigue. Negative for chills, diaphoresis and fever.   HENT: Positive for rhinorrhea. Negative for ear pain, sinus pressure and sore throat.    Eyes: Negative for visual disturbance.   Respiratory: Positive for cough and shortness of breath. Negative for wheezing.    Cardiovascular: Negative for chest pain and palpitations.   Gastrointestinal: Negative for abdominal pain, blood in stool, constipation, diarrhea, nausea and vomiting.   Genitourinary: Negative for dysuria, frequency and urgency.   Skin: Negative for rash.   Neurological: Negative for headaches.   All other systems reviewed and are negative.         Physical Exam   BP: 139/60  Pulse: 87  Temp: 99.3  F (37.4  C)  Resp: 20  SpO2: 95 %      Physical Exam  Constitutional:       General: She is in acute distress.      Appearance: She is not diaphoretic.   HENT:      Head: Atraumatic.   Eyes:      Conjunctiva/sclera: Conjunctivae normal.   Neck:      Musculoskeletal: Neck supple.   Cardiovascular:      Rate and Rhythm: Normal rate and regular rhythm.      Heart sounds: No murmur.   Pulmonary:      Effort:  Pulmonary effort is normal. No respiratory distress.      Breath sounds: No stridor.   Musculoskeletal:      Right lower leg: No edema.      Left lower leg: No edema.   Skin:     Coloration: Skin is not pale.      Findings: No rash.   Neurological:      General: No focal deficit present.      Mental Status: She is alert.      Motor: No weakness.         ED Course        Procedures                   EKG Interpretation:      Interpreted by Steve Gaming MD  EKG done at 0945 hrs. demonstrates a sinus rhythm 74 bpm normal axis.  No ST change.  No T wave changes poor R progression and no Q waves.  Normal intervals.  This with exception of a CT of 220.  No ectopy.  Impression sinus rhythm 74 bpm no other change.      Critical Care time:  none               Results for orders placed or performed during the hospital encounter of 07/09/21 (from the past 24 hour(s))   INR   Result Value Ref Range    INR 4.41 (H) 0.86 - 1.14   Symptomatic SARS-CoV-2 COVID-19 Virus (Coronavirus) by PCR    Specimen: Nasopharyngeal   Result Value Ref Range    SARS-CoV-2 Virus Specimen Source Nasopharyngeal     SARS-CoV-2 PCR Result NEGATIVE     SARS-CoV-2 PCR Comment (Note)    CBC with platelets differential   Result Value Ref Range    WBC 14.4 (H) 4.0 - 11.0 10e9/L    RBC Count 3.84 3.8 - 5.2 10e12/L    Hemoglobin 11.4 (L) 11.7 - 15.7 g/dL    Hematocrit 35.1 35.0 - 47.0 %    MCV 91 78 - 100 fl    MCH 29.7 26.5 - 33.0 pg    MCHC 32.5 31.5 - 36.5 g/dL    RDW 14.1 10.0 - 15.0 %    Platelet Count 248 150 - 450 10e9/L    Diff Method Automated Method     % Neutrophils 80.9 %    % Lymphocytes 9.8 %    % Monocytes 8.5 %    % Eosinophils 0.2 %    % Basophils 0.2 %    % Immature Granulocytes 0.4 %    Nucleated RBCs 0 0 /100    Absolute Neutrophil 11.6 (H) 1.6 - 8.3 10e9/L    Absolute Lymphocytes 1.4 0.8 - 5.3 10e9/L    Absolute Monocytes 1.2 0.0 - 1.3 10e9/L    Absolute Eosinophils 0.0 0.0 - 0.7 10e9/L    Absolute Basophils 0.0 0.0 - 0.2 10e9/L    Abs  Immature Granulocytes 0.1 0 - 0.4 10e9/L    Absolute Nucleated RBC 0.0    Comprehensive metabolic panel   Result Value Ref Range    Sodium 132 (L) 133 - 144 mmol/L    Potassium 3.8 3.4 - 5.3 mmol/L    Chloride 97 94 - 109 mmol/L    Carbon Dioxide 30 20 - 32 mmol/L    Anion Gap 5 3 - 14 mmol/L    Glucose 114 (H) 70 - 99 mg/dL    Urea Nitrogen 25 7 - 30 mg/dL    Creatinine 0.61 0.52 - 1.04 mg/dL    GFR Estimate 79 >60 mL/min/[1.73_m2]    GFR Estimate If Black >90 >60 mL/min/[1.73_m2]    Calcium 8.3 (L) 8.5 - 10.1 mg/dL    Bilirubin Total 0.4 0.2 - 1.3 mg/dL    Albumin 3.1 (L) 3.4 - 5.0 g/dL    Protein Total 6.4 (L) 6.8 - 8.8 g/dL    Alkaline Phosphatase 73 40 - 150 U/L    ALT 18 0 - 50 U/L    AST 16 0 - 45 U/L   Troponin I   Result Value Ref Range    Troponin I ES 0.015 0.000 - 0.045 ug/L   Nt probnp inpatient (BNP)   Result Value Ref Range    N-Terminal Pro BNP Inpatient 774 0 - 1,800 pg/mL   D dimer quantitative   Result Value Ref Range    D Dimer <0.3 0.0 - 0.50 ug/ml FEU   XR Chest Port 1 View    Narrative    CHEST ONE VIEW  7/9/2021 10:02 AM     HISTORY: Cough, shortness of breath.    COMPARISON: October 2, 2020      Impression    IMPRESSION: Upper lobe fibrosis and volume loss right worse than left  again evident. No new infiltrates.    RANJANA NAVARRETE MD         SYSTEM ID:  [id number]   Chest CT w/o contrast    Narrative    CT CHEST WITHOUT CONTRAST 7/9/2021 11:42 AM     HISTORY: Pneumonia, effusion or abscess suspected, x-ray done; COPD,  prior TB.    COMPARISON: None.    TECHNIQUE: Volumetric helical acquisition of CT images of the chest  from the clavicles to the kidneys were acquired without IV contrast.  Radiation dose for this scan was reduced using automated exposure  control, adjustment of the mA and/or kV according to patient size, or  iterative reconstruction technique.    FINDINGS:  Volume loss and bronchiectasis in the right upper lobe is  again evident and unchanged likely chronic. Areas of  bronchiectasis  and mucous plugging seen bilaterally right worse than left. Minimal  volume loss in the left apex. Scattered atelectasis and/or fibrosis.  No effusions. There are extensive coronary vascular calcifications  and/or stents consistent with coronary artery disease. There are  moderate atherosclerotic changes of the visualized aorta and its  branches. There is no evidence of aortic aneurysm. Normal heart size.  No acute findings in the visualized upper abdomen. No frankly  destructive bony lesions.      Impression    IMPRESSION:  1. Chronic changes of volume loss and bronchiectasis in the right  upper lobe are stable since 2018.  2. Scattered areas of bronchial wall thickening with bronchiectasis  and mucous plugging compatible with chronic airways infectious or  inflammatory process.    RANJANA NAVARRETE MD         SYSTEM ID:  [id number]     *Note: Due to a large number of results and/or encounters for the requested time period, some results have not been displayed. A complete set of results can be found in Results Review.       Medications   doxycycline hyclate (VIBRAMYCIN) capsule 100 mg (100 mg Oral Given 7/9/21 1153)   predniSONE (DELTASONE) tablet 40 mg (has no administration in time range)   methylPREDNISolone sodium succinate (solu-MEDROL) injection 62.5 mg (62.5 mg Intravenous Given 7/9/21 1150)   ipratropium - albuterol 0.5 mg/2.5 mg/3 mL (DUONEB) neb solution 3 mL (3 mLs Nebulization Given 7/9/21 1154)       Assessments & Plan (with Medical Decision Making)     MDM: Ellie Leigh is a 89 year old female presented with a history of COPD, and distant history of treatment for TB in the 2000's, SIADH, hypothyroidism, esophageal reflux -recent treatment with corticosteroids for dyspnea and suspected COPD exacerbation.  No current antibiotic's but does have multiple antibiotic sensitivities.  Presented here with dyspnea on exertion that had increased in hypoxia on exertion as well when tested in the  room.  She had increased work of breathing with this as well.  Chest x-ray demonstrated chronic fibrotic changes and there is chronic increased change in the right upper lobe.  A CT subsequently demonstrated bronchiectasis.  She is also had a distant history of TB a cough that was bloody a couple of days ago supratherapeutic INR but again the history of TB in the past.  Will obtain AFBs.  We will keep her in a negative airflow room.    She was started on corticosteroids in the emergency department and also on doxycycline orally twice daily.  Nebs will be used.  Patient on oxygen.    Discussed with Dr. Esquivel hospitalist, who will accept for admission for hypoxia COPD exacerbation.  Did recommend that we obtain TB AFB.  Keep the patient in negative airflow room.   Discussed with the patient regarding management.  She agrees to stay.    I have reviewed the nursing notes.    I have reviewed the findings, diagnosis, plan and need for follow up with the patient.       New Prescriptions    No medications on file       Final diagnoses:   COPD exacerbation (H)   Hypoxia       7/9/2021   [facility] EMERGENCY DEPT     Steve Gaming MD  07/09/21 [id number]

## 2021-07-10 LAB
ALBUMIN SERPL-MCNC: 2.8 G/DL (ref 3.4–5)
ALP SERPL-CCNC: 68 U/L (ref 40–150)
ALT SERPL W P-5'-P-CCNC: 20 U/L (ref 0–50)
ANION GAP SERPL CALCULATED.3IONS-SCNC: 3 MMOL/L (ref 3–14)
AST SERPL W P-5'-P-CCNC: 14 U/L (ref 0–45)
BASOPHILS # BLD AUTO: 0 10E9/L (ref 0–0.2)
BASOPHILS NFR BLD AUTO: 0.1 %
BILIRUB SERPL-MCNC: 0.4 MG/DL (ref 0.2–1.3)
BUN SERPL-MCNC: 23 MG/DL (ref 7–30)
C PNEUM DNA SPEC QL NAA+PROBE: NOT DETECTED
CALCIUM SERPL-MCNC: 8.8 MG/DL (ref 8.5–10.1)
CHLORIDE SERPL-SCNC: 101 MMOL/L (ref 94–109)
CO2 SERPL-SCNC: 29 MMOL/L (ref 20–32)
CREAT SERPL-MCNC: 0.52 MG/DL (ref 0.52–1.04)
DIFFERENTIAL METHOD BLD: ABNORMAL
EOSINOPHIL # BLD AUTO: 0 10E9/L (ref 0–0.7)
EOSINOPHIL NFR BLD AUTO: 0 %
ERYTHROCYTE [DISTWIDTH] IN BLOOD BY AUTOMATED COUNT: 14 % (ref 10–15)
FLUAV H1 2009 PAND RNA SPEC QL NAA+PROBE: NOT DETECTED
FLUAV H1 RNA SPEC QL NAA+PROBE: NOT DETECTED
FLUAV H3 RNA SPEC QL NAA+PROBE: NOT DETECTED
FLUAV RNA SPEC QL NAA+PROBE: NOT DETECTED
FLUBV RNA SPEC QL NAA+PROBE: NOT DETECTED
GFR SERPL CREATININE-BSD FRML MDRD: 84 ML/MIN/{1.73_M2}
GLUCOSE SERPL-MCNC: 134 MG/DL (ref 70–99)
GRAM STN SPEC: NORMAL
HADV DNA SPEC QL NAA+PROBE: NOT DETECTED
HCOV PNL SPEC NAA+PROBE: NOT DETECTED
HCT VFR BLD AUTO: 32.8 % (ref 35–47)
HGB BLD-MCNC: 10.8 G/DL (ref 11.7–15.7)
HMPV RNA SPEC QL NAA+PROBE: NOT DETECTED
HPIV1 RNA SPEC QL NAA+PROBE: NOT DETECTED
HPIV2 RNA SPEC QL NAA+PROBE: NOT DETECTED
HPIV3 RNA SPEC QL NAA+PROBE: NOT DETECTED
HPIV4 RNA SPEC QL NAA+PROBE: NOT DETECTED
IMM GRANULOCYTES # BLD: 0.1 10E9/L (ref 0–0.4)
IMM GRANULOCYTES NFR BLD: 1 %
INR PPP: 3.8 (ref 0.86–1.14)
LYMPHOCYTES # BLD AUTO: 1.6 10E9/L (ref 0.8–5.3)
LYMPHOCYTES NFR BLD AUTO: 11.5 %
Lab: NORMAL
M PNEUMO DNA SPEC QL NAA+PROBE: NOT DETECTED
MCH RBC QN AUTO: 29.5 PG (ref 26.5–33)
MCHC RBC AUTO-ENTMCNC: 32.9 G/DL (ref 31.5–36.5)
MCV RBC AUTO: 90 FL (ref 78–100)
MICROBIOLOGIST REVIEW: NORMAL
MONOCYTES # BLD AUTO: 0.4 10E9/L (ref 0–1.3)
MONOCYTES NFR BLD AUTO: 3.2 %
NEUTROPHILS # BLD AUTO: 11.5 10E9/L (ref 1.6–8.3)
NEUTROPHILS NFR BLD AUTO: 84.2 %
NRBC # BLD AUTO: 0 10*3/UL
NRBC BLD AUTO-RTO: 0 /100
PLATELET # BLD AUTO: 248 10E9/L (ref 150–450)
POTASSIUM SERPL-SCNC: 4.7 MMOL/L (ref 3.4–5.3)
PROT SERPL-MCNC: 6.2 G/DL (ref 6.8–8.8)
RBC # BLD AUTO: 3.66 10E12/L (ref 3.8–5.2)
RSV RNA SPEC QL NAA+PROBE: NOT DETECTED
RSV RNA SPEC QL NAA+PROBE: NOT DETECTED
RV+EV RNA SPEC QL NAA+PROBE: NOT DETECTED
SODIUM SERPL-SCNC: 133 MMOL/L (ref 133–144)
SPECIMEN SOURCE: NORMAL
WBC # BLD AUTO: 13.6 10E9/L (ref 4–11)

## 2021-07-10 PROCEDURE — 87206 SMEAR FLUORESCENT/ACID STAI: CPT | Performed by: FAMILY MEDICINE

## 2021-07-10 PROCEDURE — 85025 COMPLETE CBC W/AUTO DIFF WBC: CPT | Performed by: PHYSICIAN ASSISTANT

## 2021-07-10 PROCEDURE — 120N000001 HC R&B MED SURG/OB

## 2021-07-10 PROCEDURE — 87186 SC STD MICRODIL/AGAR DIL: CPT | Performed by: PHYSICIAN ASSISTANT

## 2021-07-10 PROCEDURE — 87070 CULTURE OTHR SPECIMN AEROBIC: CPT | Performed by: PHYSICIAN ASSISTANT

## 2021-07-10 PROCEDURE — 999N000097 HC STATISTIC MECHANICAL IN-EXSUFFLATION TREATMENT

## 2021-07-10 PROCEDURE — 250N000009 HC RX 250: Performed by: FAMILY MEDICINE

## 2021-07-10 PROCEDURE — 999N000157 HC STATISTIC RCP TIME EA 10 MIN

## 2021-07-10 PROCEDURE — 999N000105 HC STATISTIC NO DOCUMENTATION TO SUPPORT CHARGE

## 2021-07-10 PROCEDURE — 36415 COLL VENOUS BLD VENIPUNCTURE: CPT | Performed by: PHYSICIAN ASSISTANT

## 2021-07-10 PROCEDURE — 87077 CULTURE AEROBIC IDENTIFY: CPT | Performed by: PHYSICIAN ASSISTANT

## 2021-07-10 PROCEDURE — 85610 PROTHROMBIN TIME: CPT | Performed by: PHYSICIAN ASSISTANT

## 2021-07-10 PROCEDURE — 94640 AIRWAY INHALATION TREATMENT: CPT

## 2021-07-10 PROCEDURE — 250N000011 HC RX IP 250 OP 636: Performed by: PHYSICIAN ASSISTANT

## 2021-07-10 PROCEDURE — 87205 SMEAR GRAM STAIN: CPT | Performed by: PHYSICIAN ASSISTANT

## 2021-07-10 PROCEDURE — 250N000012 HC RX MED GY IP 250 OP 636 PS 637: Performed by: PHYSICIAN ASSISTANT

## 2021-07-10 PROCEDURE — 80053 COMPREHEN METABOLIC PANEL: CPT | Performed by: PHYSICIAN ASSISTANT

## 2021-07-10 PROCEDURE — 999N000123 HC STATISTIC OXYGEN O2DAILY TECH TIME

## 2021-07-10 PROCEDURE — 250N000009 HC RX 250: Performed by: PHYSICIAN ASSISTANT

## 2021-07-10 PROCEDURE — 94640 AIRWAY INHALATION TREATMENT: CPT | Mod: 76

## 2021-07-10 PROCEDURE — 250N000013 HC RX MED GY IP 250 OP 250 PS 637: Performed by: PHYSICIAN ASSISTANT

## 2021-07-10 PROCEDURE — 99233 SBSQ HOSP IP/OBS HIGH 50: CPT | Performed by: FAMILY MEDICINE

## 2021-07-10 PROCEDURE — 87116 MYCOBACTERIA CULTURE: CPT | Performed by: FAMILY MEDICINE

## 2021-07-10 PROCEDURE — 87015 SPECIMEN INFECT AGNT CONCNTJ: CPT | Performed by: FAMILY MEDICINE

## 2021-07-10 RX ORDER — SODIUM CHLORIDE FOR INHALATION 3 %
3 VIAL, NEBULIZER (ML) INHALATION
Status: DISCONTINUED | OUTPATIENT
Start: 2021-07-10 | End: 2021-07-14 | Stop reason: HOSPADM

## 2021-07-10 RX ADMIN — SODIUM CHLORIDE SOLN NEBU 3% 3 ML: 3 NEBU SOLN at 21:58

## 2021-07-10 RX ADMIN — LEVOFLOXACIN 500 MG: 5 INJECTION, SOLUTION INTRAVENOUS at 15:54

## 2021-07-10 RX ADMIN — IPRATROPIUM BROMIDE AND ALBUTEROL SULFATE 3 ML: .5; 3 SOLUTION RESPIRATORY (INHALATION) at 19:37

## 2021-07-10 RX ADMIN — MIRTAZAPINE 15 MG: 15 TABLET, FILM COATED ORAL at 21:03

## 2021-07-10 RX ADMIN — POLYETHYLENE GLYCOL 3350 17 G: 17 POWDER, FOR SOLUTION ORAL at 07:41

## 2021-07-10 RX ADMIN — SODIUM CHLORIDE TAB 1 GM 1 G: 1 TAB at 14:31

## 2021-07-10 RX ADMIN — PREDNISONE 40 MG: 20 TABLET ORAL at 07:41

## 2021-07-10 RX ADMIN — IPRATROPIUM BROMIDE AND ALBUTEROL SULFATE 3 ML: .5; 3 SOLUTION RESPIRATORY (INHALATION) at 16:42

## 2021-07-10 RX ADMIN — LEVOTHYROXINE SODIUM 50 MCG: 50 TABLET ORAL at 06:15

## 2021-07-10 RX ADMIN — IPRATROPIUM BROMIDE AND ALBUTEROL SULFATE 3 ML: .5; 3 SOLUTION RESPIRATORY (INHALATION) at 08:43

## 2021-07-10 RX ADMIN — METOPROLOL SUCCINATE 50 MG: 50 TABLET, EXTENDED RELEASE ORAL at 07:40

## 2021-07-10 RX ADMIN — SODIUM CHLORIDE TAB 1 GM 1 G: 1 TAB at 07:42

## 2021-07-10 RX ADMIN — ALBUTEROL SULFATE 2.5 MG: 2.5 SOLUTION RESPIRATORY (INHALATION) at 05:40

## 2021-07-10 RX ADMIN — SODIUM CHLORIDE TAB 1 GM 1 G: 1 TAB at 20:16

## 2021-07-10 RX ADMIN — METOPROLOL SUCCINATE 50 MG: 50 TABLET, EXTENDED RELEASE ORAL at 20:10

## 2021-07-10 RX ADMIN — PANTOPRAZOLE SODIUM 40 MG: 20 TABLET, DELAYED RELEASE ORAL at 20:10

## 2021-07-10 RX ADMIN — IPRATROPIUM BROMIDE AND ALBUTEROL SULFATE 3 ML: .5; 3 SOLUTION RESPIRATORY (INHALATION) at 11:57

## 2021-07-10 RX ADMIN — PANTOPRAZOLE SODIUM 40 MG: 20 TABLET, DELAYED RELEASE ORAL at 07:41

## 2021-07-10 ASSESSMENT — ACTIVITIES OF DAILY LIVING (ADL)
ADLS_ACUITY_SCORE: 17

## 2021-07-10 ASSESSMENT — MIFFLIN-ST. JEOR: SCORE: 877.75

## 2021-07-10 NOTE — PLAN OF CARE
Pt on 2L oxygen, sats @ 95%. Lungs were clear, diminished last night; this am pt awoke with severe coughing jag, productive of large amount bloody sputum. Pt feels SOA after episode, expiratory wheezes noted; RT called to administer a neb tx. Sputum sent to lab.

## 2021-07-10 NOTE — PLAN OF CARE
Patient remains on 2 liters at 95% lung sounds  with expiratory wheezes no complains of pain working with patient to produce a sputum sample

## 2021-07-10 NOTE — PROGRESS NOTES
[facility]      Subjective:  She feels better.  She did cough up a large clot of blood.  Breathing seems somewhat better.  No fever or chills.    Review of Systems:  CONSTITUTIONAL: NEGATIVE for fever, chills, change in weight  INTEGUMENTARY/SKIN: NEGATIVE for worrisome rashes, moles or lesions  EYES: NEGATIVE for vision changes or irritation  ENT/MOUTH: NEGATIVE for ear, mouth and throat problems  RESP: Above  BREAST: NEGATIVE for masses, tenderness or discharge  CV: NEGATIVE for chest pain, palpitations or peripheral edema  GI: NEGATIVE for nausea, abdominal pain, heartburn, or change in bowel habits  : NEGATIVE for frequency, dysuria, or hematuria  MUSCULOSKELETAL: NEGATIVE for significant arthralgias or myalgia  NEURO: NEGATIVE for weakness, dizziness or paresthesias  ENDOCRINE: NEGATIVE for temperature intolerance, skin/hair changes  HEME: NEGATIVE for bleeding problems  PSYCHIATRIC: NEGATIVE for changes in mood or affect    Physical Exam:  Vitals Were Reviewed    Patient Vitals for the past 16 hrs:   BP Temp Temp src Pulse Resp SpO2 Weight   07/10/21 0700 (!) 144/77 98.6  F (37  C) Oral 70 18 95 % --   07/10/21 0534 (!) 158/83 97.3  F (36.3  C) Oral 72 18 93 % 56.8 kg (125 lb 3.5 oz)   07/10/21 0137 (!) 150/77 98.1  F (36.7  C) Oral 75 19 95 % --   07/09/21 2242 137/69 97.8  F (36.6  C) Oral 73 20 95 % --   07/09/21 2114 117/60 98.3  F (36.8  C) Oral 72 20 96 % --         Intake/Output Summary (Last 24 hours) at 7/10/2021 1137  Last data filed at 7/10/2021 1028  Gross per 24 hour   Intake --   Output 1350 ml   Net -1350 ml       GENERAL APPEARANCE: healthy, alert and no distress  EYES: conjunctiva clear, eyes grossly normal  RESP: Some diffuse wheeze, no distress  CV: regular rate and rhythm, normal S1 S2, no S3 or S4 and no murmur, click or rub   ABDOMEN: soft, nontender, no HSM or masses and bowel sounds normal  MS: no clubbing, cyanosis; no edema  SKIN: clear without significant  rashes or lesions    Lab:  Recent Labs   Lab Test 07/10/21  0505 07/09/21  0925    132*   POTASSIUM 4.7 3.8   CHLORIDE 101 97   CO2 29 30   ANIONGAP 3 5   * 114*   BUN 23 25   CR 0.52 0.61   DOUG 8.8 8.3*     CBC RESULTS:   Recent Labs   Lab Test 07/10/21  0505 07/09/21  0925   WBC 13.6* 14.4*   RBC 3.66* 3.84   HGB 10.8* 11.4*   HCT 32.8* 35.1    248       Results for orders placed or performed during the hospital encounter of 07/09/21 (from the past 24 hour(s))   Chest CT w/o contrast    Narrative    CT CHEST WITHOUT CONTRAST 7/9/2021 11:42 AM     HISTORY: Pneumonia, effusion or abscess suspected, x-ray done; COPD,  prior TB.    COMPARISON: None.    TECHNIQUE: Volumetric helical acquisition of CT images of the chest  from the clavicles to the kidneys were acquired without IV contrast.  Radiation dose for this scan was reduced using automated exposure  control, adjustment of the mA and/or kV according to patient size, or  iterative reconstruction technique.    FINDINGS:  Volume loss and bronchiectasis in the right upper lobe is  again evident and unchanged likely chronic. Areas of bronchiectasis  and mucous plugging seen bilaterally right worse than left. Minimal  volume loss in the left apex. Scattered atelectasis and/or fibrosis.  No effusions. There are extensive coronary vascular calcifications  and/or stents consistent with coronary artery disease. There are  moderate atherosclerotic changes of the visualized aorta and its  branches. There is no evidence of aortic aneurysm. Normal heart size.  No acute findings in the visualized upper abdomen. No frankly  destructive bony lesions.      Impression    IMPRESSION:  1. Chronic changes of volume loss and bronchiectasis in the right  upper lobe are stable since 2018.  2. Scattered areas of bronchial wall thickening with bronchiectasis  and mucous plugging compatible with chronic airways infectious or  inflammatory process.    RANJANA NAVARRETE MD          SYSTEM ID:  [id number]   UA with Microscopic reflex to Culture    Specimen: Urine clean catch; Midstream Urine   Result Value Ref Range    Color Urine Straw     Appearance Urine Clear     Glucose Urine 50 (A) NEG^Negative mg/dL    Bilirubin Urine Negative NEG^Negative    Ketones Urine Negative NEG^Negative mg/dL    Specific Gravity Urine 1.010 1.003 - 1.035    Blood Urine Negative NEG^Negative    pH Urine 7.0 5.0 - 7.0 pH    Protein Albumin Urine Negative NEG^Negative mg/dL    Urobilinogen mg/dL 0.0 0.0 - 2.0 mg/dL    Nitrite Urine Negative NEG^Negative    Leukocyte Esterase Urine Negative NEG^Negative    Source Midstream Urine     WBC Urine 1 0 - 5 /HPF    RBC Urine 3 (H) 0 - 2 /HPF    Bacteria Urine Few (A) NEG^Negative /HPF   Lactic acid level STAT   Result Value Ref Range    Lactate for Sepsis Protocol 1.9 0.7 - 2.0 mmol/L   Respiratory Panel PCR - NP Swab    Specimen: Nasopharyngeal swab   Result Value Ref Range    Adenovirus Not Detected NDET^Not Detected    Coronavirus Not Detected NDET^Not Detected    Human Metapneumovirus Not Detected NDET^Not Detected    Human Rhinovirus/Enterovirus Not Detected NDET^Not Detected    Influenza A Not Detected NDET^Not Detected    Influenza A, H1 Not Detected NDET^Not Detected    Influenza A 2009 H1N1 Not Detected NDET^Not Detected    Influenza A, H3 Not Detected NDET^Not Detected    Influenza B Not Detected NDET^Not Detected    Parainfluenza Virus 1 Not Detected NDET^Not Detected    Parainfluenza Virus 2 Not Detected NDET^Not Detected    Parainfluenza Virus 3 Not Detected NDET^Not Detected    Parainfluenza Virus 4 Not Detected NDET^Not Detected    Respiratory Syncytial Virus A Not Detected NDET^Not Detected    Respiratory Syncytial Virus B Not Detected NDET^Not Detected    Chlamydia pneumoniae Not Detected NDET^Not Detected    Mycoplasma pneumoniae Not Detected NDET^Not Detected    Respiratory Virus Comment See comment below    INR   Result Value Ref Range    INR 3.80 (H)  0.86 - 1.14   Comprehensive metabolic panel   Result Value Ref Range    Sodium 133 133 - 144 mmol/L    Potassium 4.7 3.4 - 5.3 mmol/L    Chloride 101 94 - 109 mmol/L    Carbon Dioxide 29 20 - 32 mmol/L    Anion Gap 3 3 - 14 mmol/L    Glucose 134 (H) 70 - 99 mg/dL    Urea Nitrogen 23 7 - 30 mg/dL    Creatinine 0.52 0.52 - 1.04 mg/dL    GFR Estimate 84 >60 mL/min/[1.73_m2]    GFR Estimate If Black >90 >60 mL/min/[1.73_m2]    Calcium 8.8 8.5 - 10.1 mg/dL    Bilirubin Total 0.4 0.2 - 1.3 mg/dL    Albumin 2.8 (L) 3.4 - 5.0 g/dL    Protein Total 6.2 (L) 6.8 - 8.8 g/dL    Alkaline Phosphatase 68 40 - 150 U/L    ALT 20 0 - 50 U/L    AST 14 0 - 45 U/L   CBC with platelets differential   Result Value Ref Range    WBC 13.6 (H) 4.0 - 11.0 10e9/L    RBC Count 3.66 (L) 3.8 - 5.2 10e12/L    Hemoglobin 10.8 (L) 11.7 - 15.7 g/dL    Hematocrit 32.8 (L) 35.0 - 47.0 %    MCV 90 78 - 100 fl    MCH 29.5 26.5 - 33.0 pg    MCHC 32.9 31.5 - 36.5 g/dL    RDW 14.0 10.0 - 15.0 %    Platelet Count 248 150 - 450 10e9/L    Diff Method Automated Method     % Neutrophils 84.2 %    % Lymphocytes 11.5 %    % Monocytes 3.2 %    % Eosinophils 0.0 %    % Basophils 0.1 %    % Immature Granulocytes 1.0 %    Nucleated RBCs 0 0 /100    Absolute Neutrophil 11.5 (H) 1.6 - 8.3 10e9/L    Absolute Lymphocytes 1.6 0.8 - 5.3 10e9/L    Absolute Monocytes 0.4 0.0 - 1.3 10e9/L    Absolute Eosinophils 0.0 0.0 - 0.7 10e9/L    Absolute Basophils 0.0 0.0 - 0.2 10e9/L    Abs Immature Granulocytes 0.1 0 - 0.4 10e9/L    Absolute Nucleated RBC 0.0      *Note: Due to a large number of results and/or encounters for the requested time period, some results have not been displayed. A complete set of results can be found in Results Review.       Assessment and Plan:    Ellie Leigh is a 89 year old female admitted on 7/9/2021. She presented to the emergency department for evaluation of dyspnea on exertion and hypoxia with exertion and was found to have a COPD exacerbation  for which she is being admitted for further evaluation and treatment.     Acute respiratory failure with hypoxia  Presents with O2 sats 85% with exertion upon admit but recovered to low 90's on room air. Not on supplemental O2 at baseline. Chest x-ray with upper lobe fibrosis and volume loss right more than left, no new infiltrates. Chest x-ray with chronic changes of volume loss and bronchiectasis in right upper lobe, scattered areas of bronchial wall thickening and bronchiectasis and mucous plugging compatible with chronic airway infectious or inflammatory process. Afebrile upon admit. but leukocytosis present. BNP normal. Wheezes on exam. Clinically most consistent with COPD exacerbation.    Currently on 2 L at 95%     COPD exacerbation   Bronchiectasis   Hemoptysis  Known COPD-no history of smoking/ mild airway obstruction on prior PFTs. Managed prior to admission with Advair 250/50 bid, DuoNebs, scheduled albuterol nebs, and normal saline nebs. She recently was treated for COPD with 5 day prednisone course with improvement. Significant wheezes and coarse lung sounds on exam.  SoluMedrol 62.5 given 7/9/2021 in the emergency department   Prednisone 40 mg daily starting 7/10  Scheduled DuoNebs q 4 hours  Prn albuterol nebs  IV Levaquin 500 mg daily to cover risk for pseudomonas with bronchiectasis   Respiratory virus panel negative  Sputum culture sent but pending.     Bloody sputum  H/O TB (tuberculosis)  Diagnosed in the 2000's, is s/p treatment. Reports recent bloody sputum, need to rule out active TB.   - AFB sputum stain and culture ordered but not collected.  Will order 3 samples spaced out by at least 8 hours.  - Airborne isolation until active TB ruled out     Recent syncope / near syncope  History tachy-keri syndrome  Pacemaker in situ  Reported syncopal event on 7/3/21 while sitting at a table. Told family she felt unwell, then has amnesia of subsequent events and was unresponsive to family for a few  minutes. Improved once she was laid on the floor, no subsequent syncopal events since. Admit EKG normal sinus rhythm with first degree AV block.    Pacer was interogated without etiology for syncope being found     Leukocytosis with left shift   Admit WBC 14.4, ANC 11.6. Afebrile. Chest x-ray without evidence of infection.  UA was unremarkable.  WBC 14.4--13.6 (now on steroids)    Paroxysmal atrial fibrillation  History of DVT (deep venous thrombosis)  Long term current use of anticoagulant therapy  Admit EKG shows normal sinus rhythm. Rate controlled prior to admission with metoprolol XL 50 mg bid. Anticoagulated with coumadin, admit INR 4.41.   - Continue metoprolol with holding parameters  - Pharmacy to manage coumadin     SIADH (syndrome of inappropriate ADH production)  Previously diagnosed. Managed prior to admission with NaCl tablets 1 g tid. Admit sodium 132.     Sodium 132--133     Hypothyroidism  Managed prior to admission with levothyroxine 50 mcg daily, continue.      Esophageal reflux  Managed prior to admission with omeprazole 40 mg daily, continue.      Mild major depression  Anxiety  Managed prior to admission with Remeron 15 mg q hs, continue.      COVID status  COVID PCR negative 7/9/2021.  Patient has not had a COVID vaccine.           Diet: Combination Diet Regular Diet Adult    DVT Prophylaxis: Warfarin  Gl[place] Catheter: Not present  Central Lines: None  Code Status: Full Code  - discussed with patient and her daughter at time of admission. She is not sure if she would want to be intubated, will default to full code for now.    Plan: Patient presented with cough for 3 weeks and dyspnea.  She is diffuse wheezing and this seems most consistent with COPD possibly infection due to bronchiectasis..  She has a history of COPD but does not have a history of smoking.  Is remote history of tuberculosis.  I think reactivation is unlikely but we are attempting to obtain acid-fast bacteria cultures and  stains

## 2021-07-11 LAB — INR PPP: 2.85 (ref 0.86–1.14)

## 2021-07-11 PROCEDURE — 250N000013 HC RX MED GY IP 250 OP 250 PS 637: Performed by: FAMILY MEDICINE

## 2021-07-11 PROCEDURE — 99233 SBSQ HOSP IP/OBS HIGH 50: CPT | Performed by: FAMILY MEDICINE

## 2021-07-11 PROCEDURE — 94640 AIRWAY INHALATION TREATMENT: CPT | Mod: 76

## 2021-07-11 PROCEDURE — 250N000013 HC RX MED GY IP 250 OP 250 PS 637: Performed by: PHYSICIAN ASSISTANT

## 2021-07-11 PROCEDURE — 250N000009 HC RX 250: Performed by: PHYSICIAN ASSISTANT

## 2021-07-11 PROCEDURE — 999N000157 HC STATISTIC RCP TIME EA 10 MIN

## 2021-07-11 PROCEDURE — 85610 PROTHROMBIN TIME: CPT | Performed by: PHYSICIAN ASSISTANT

## 2021-07-11 PROCEDURE — 999N000097 HC STATISTIC MECHANICAL IN-EXSUFFLATION TREATMENT

## 2021-07-11 PROCEDURE — 94640 AIRWAY INHALATION TREATMENT: CPT

## 2021-07-11 PROCEDURE — 250N000011 HC RX IP 250 OP 636: Performed by: PHYSICIAN ASSISTANT

## 2021-07-11 PROCEDURE — 120N000001 HC R&B MED SURG/OB

## 2021-07-11 PROCEDURE — 250N000012 HC RX MED GY IP 250 OP 636 PS 637: Performed by: PHYSICIAN ASSISTANT

## 2021-07-11 PROCEDURE — 36415 COLL VENOUS BLD VENIPUNCTURE: CPT | Performed by: PHYSICIAN ASSISTANT

## 2021-07-11 RX ORDER — WARFARIN SODIUM 1 MG/1
1 TABLET ORAL
Status: COMPLETED | OUTPATIENT
Start: 2021-07-11 | End: 2021-07-11

## 2021-07-11 RX ADMIN — POLYETHYLENE GLYCOL 3350 17 G: 17 POWDER, FOR SOLUTION ORAL at 08:05

## 2021-07-11 RX ADMIN — IPRATROPIUM BROMIDE AND ALBUTEROL SULFATE 3 ML: .5; 3 SOLUTION RESPIRATORY (INHALATION) at 08:23

## 2021-07-11 RX ADMIN — PANTOPRAZOLE SODIUM 40 MG: 20 TABLET, DELAYED RELEASE ORAL at 20:04

## 2021-07-11 RX ADMIN — METOPROLOL SUCCINATE 50 MG: 50 TABLET, EXTENDED RELEASE ORAL at 20:04

## 2021-07-11 RX ADMIN — SODIUM CHLORIDE TAB 1 GM 1 G: 1 TAB at 13:53

## 2021-07-11 RX ADMIN — METOPROLOL SUCCINATE 50 MG: 50 TABLET, EXTENDED RELEASE ORAL at 08:04

## 2021-07-11 RX ADMIN — IPRATROPIUM BROMIDE AND ALBUTEROL SULFATE 3 ML: .5; 3 SOLUTION RESPIRATORY (INHALATION) at 19:32

## 2021-07-11 RX ADMIN — SODIUM CHLORIDE TAB 1 GM 1 G: 1 TAB at 08:07

## 2021-07-11 RX ADMIN — WARFARIN SODIUM 1 MG: 1 TABLET ORAL at 17:34

## 2021-07-11 RX ADMIN — PANTOPRAZOLE SODIUM 40 MG: 20 TABLET, DELAYED RELEASE ORAL at 08:04

## 2021-07-11 RX ADMIN — PREDNISONE 40 MG: 20 TABLET ORAL at 08:05

## 2021-07-11 RX ADMIN — SODIUM CHLORIDE TAB 1 GM 1 G: 1 TAB at 20:04

## 2021-07-11 RX ADMIN — LEVOTHYROXINE SODIUM 50 MCG: 50 TABLET ORAL at 08:04

## 2021-07-11 RX ADMIN — IPRATROPIUM BROMIDE AND ALBUTEROL SULFATE 3 ML: .5; 3 SOLUTION RESPIRATORY (INHALATION) at 15:04

## 2021-07-11 RX ADMIN — LEVOFLOXACIN 500 MG: 5 INJECTION, SOLUTION INTRAVENOUS at 15:37

## 2021-07-11 RX ADMIN — MIRTAZAPINE 15 MG: 15 TABLET, FILM COATED ORAL at 21:33

## 2021-07-11 ASSESSMENT — MIFFLIN-ST. JEOR: SCORE: 891.75

## 2021-07-11 ASSESSMENT — ACTIVITIES OF DAILY LIVING (ADL)
ADLS_ACUITY_SCORE: 17
ADLS_ACUITY_SCORE: 19
ADLS_ACUITY_SCORE: 19
ADLS_ACUITY_SCORE: 17
ADLS_ACUITY_SCORE: 19
ADLS_ACUITY_SCORE: 19

## 2021-07-11 NOTE — PLAN OF CARE
Patients lung sounds this morning are course in the bases oxygen saturations remain in the mid 90's on 1 liter no complains of shortness of breath

## 2021-07-11 NOTE — PROGRESS NOTES
[facility]      Subjective:  She feels better  Some continued hemoptysis    Review of Systems:  CONSTITUTIONAL: NEGATIVE for fever, chills, change in weight  INTEGUMENTARY/SKIN: NEGATIVE for worrisome rashes, moles or lesions  EYES: NEGATIVE for vision changes or irritation  ENT/MOUTH: NEGATIVE for ear, mouth and throat problems  RESP:above  BREAST: NEGATIVE for masses, tenderness or discharge  CV: NEGATIVE for chest pain, palpitations or peripheral edema  GI: NEGATIVE for nausea, abdominal pain, heartburn, or change in bowel habits  : NEGATIVE for frequency, dysuria, or hematuria  MUSCULOSKELETAL: NEGATIVE for significant arthralgias or myalgia  NEURO: NEGATIVE for weakness, dizziness or paresthesias  ENDOCRINE: NEGATIVE for temperature intolerance, skin/hair changes  HEME: NEGATIVE for bleeding problems  PSYCHIATRIC: NEGATIVE for changes in mood or affect    Physical Exam:  Vitals Were Reviewed    Patient Vitals for the past 16 hrs:   BP Temp Temp src Pulse Resp SpO2 Weight   07/11/21 1045 127/69 97  F (36.1  C) Oral 84 16 95 % --   07/11/21 0801 (!) 148/73 98.3  F (36.8  C) Oral 74 16 97 % --   07/11/21 0055 (!) 154/65 96.8  F (36  C) Oral 79 17 -- 58.2 kg (128 lb 4.9 oz)   07/10/21 2251 (!) 147/81 97.8  F (36.6  C) Oral 72 16 97 % --   07/10/21 2200 -- -- -- -- -- 96 % --         Intake/Output Summary (Last 24 hours) at 7/11/2021 1243  Last data filed at 7/11/2021 1045  Gross per 24 hour   Intake 236 ml   Output 2100 ml   Net -1864 ml       GENERAL APPEARANCE: healthy, alert and no distress  EYES: conjunctiva clear, eyes grossly normal  RESP: some diffuse exp wheeze but no distress  CV: regular rate and rhythm, normal S1 S2, no S3 or S4 and no murmur, click or rub   ABDOMEN: soft, nontender, no HSM or masses and bowel sounds normal  MS: no clubbing, cyanosis; no edema  SKIN: clear without significant rashes or lesions    Lab:  Recent Labs   Lab Test 07/10/21  0505 07/09/21  0925   NA  133 132*   POTASSIUM 4.7 3.8   CHLORIDE 101 97   CO2 29 30   ANIONGAP 3 5   * 114*   BUN 23 25   CR 0.52 0.61   DOUG 8.8 8.3*     CBC RESULTS:   Recent Labs   Lab Test 07/10/21  0505 07/09/21  0925   WBC 13.6* 14.4*   RBC 3.66* 3.84   HGB 10.8* 11.4*   HCT 32.8* 35.1    248       Results for orders placed or performed during the hospital encounter of 07/09/21 (from the past 24 hour(s))   INR   Result Value Ref Range    INR 2.85 (H) 0.86 - 1.14     *Note: Due to a large number of results and/or encounters for the requested time period, some results have not been displayed. A complete set of results can be found in Results Review.       Assessment and Plan:    Ellie Leigh is a 89 year old female admitted on 7/9/2021. She presented to the emergency department for evaluation of dyspnea on exertion and hypoxia with exertion and was found to have a COPD exacerbation for which she is being admitted for further evaluation and treatment.     Acute respiratory failure with hypoxia  Presents with O2 sats 85% with exertion upon admit but recovered to low 90's on room air. Not on supplemental O2 at baseline. Chest x-ray with upper lobe fibrosis and volume loss right more than left, no new infiltrates. Chest x-ray with chronic changes of volume loss and bronchiectasis in right upper lobe, scattered areas of bronchial wall thickening and bronchiectasis and mucous plugging compatible with chronic airway infectious or inflammatory process. Afebrile upon admit. but leukocytosis present. BNP normal. Wheezes on exam. Clinically most consistent with COPD exacerbation.     Currently on 2 L at 95%       COPD exacerbation   Bronchiectasis   Hemoptysis  Known COPD-no history of smoking/ mild airway obstruction on prior PFTs. Managed prior to admission with Advair 250/50 bid, DuoNebs, scheduled albuterol nebs, and normal saline nebs. She recently was treated for COPD with 5 day prednisone course with improvement.  Significant wheezes and coarse lung sounds on exam.  SoluMedrol 62.5 given 7/9/2021 in the emergency department   Prednisone 40 mg daily starting 7/10  Scheduled DuoNebs q 4 hours  Prn albuterol nebs  IV Levaquin 500 mg daily to cover risk for pseudomonas with bronchiectasis   Respiratory virus panel negative  Sputum culture sent but pending.  afb culture and stain times three sent  She continues to wheeze and have occasional hemoptysis.     hemoptysis  H/O TB (tuberculosis)  Diagnosed in the 2000's, is s/p treatment. Reports recent bloody sputum, need to rule out active TB.   - AFB sputum stain and culture ordered but not collected.  Will order 3 samples spaced out by at least 8 hours.  - Airborne isolation until active TB ruled out    As above afb's are pending     Recent syncope / near syncope  History tachy-keri syndrome  Pacemaker in situ  Reported syncopal event on 7/3/21 while sitting at a table. Told family she felt unwell, then has amnesia of subsequent events and was unresponsive to family for a few minutes. Improved once she was laid on the floor, no subsequent syncopal events since. Admit EKG normal sinus rhythm with first degree AV block.     Pacer was interogated without etiology for syncope being found     Leukocytosis with left shift   Admit WBC 14.4, ANC 11.6. Afebrile. Chest x-ray without evidence of infection.  UA was unremarkable.  WBC 14.4--13.6 (now on steroids)--will recheck 7/12/21      Paroxysmal atrial fibrillation  History of DVT (deep venous thrombosis)  Long term current use of anticoagulant therapy  Admit EKG shows normal sinus rhythm. Rate controlled prior to admission with metoprolol XL 50 mg bid. Anticoagulated with coumadin, admit INR 4.41.   - Continue metoprolol with holding parameters  - Pharmacy to manage coumadin     SIADH (syndrome of inappropriate ADH production)  Previously diagnosed. Managed prior to admission with NaCl tablets 1 g tid. Admit sodium 132.      Sodium  132--133     Hypothyroidism  Managed prior to admission with levothyroxine 50 mcg daily, continue.      Esophageal reflux  Managed prior to admission with omeprazole 40 mg daily, continue.      Mild major depression  Anxiety  Managed prior to admission with Remeron 15 mg q hs, continue.      COVID status  COVID PCR negative 7/9/2021.  Patient has not had a COVID vaccine.     Diet: Combination Diet Regular Diet Adult    DVT Prophylaxis: Warfarin  [place] Catheter: Not present  Central Lines: None  Code Status: Full Code  - discussed with patient and her daughter at time of admission. She is not sure if she would want to be intubated, will default to full code for now.     Plan: Patient presented with cough for 3 weeks and dyspnea.  She has diffuse wheezing and this seems most consistent with COPD possibly infection due to bronchiectasis..  She has a history of COPD but does not have a history of smoking.  Is remote history of tuberculosis.  I think reactivation is unlikely but we have seen three sets of AFB sputum cutures and stains. Continue prednisone, nebs and steroids. Awaiting regular sputum culture. 1-2 more days.    1:06 PM  Updated daughter

## 2021-07-11 NOTE — PLAN OF CARE
Patient using oxygen 2 L via NC tonight. After patient returned from the restroom, she started to cough up a moderate amount of bloody sputum.  Diminished, clear lung sounds. She denied any difficulty breathing. Denied any pain.

## 2021-07-12 LAB
ANION GAP SERPL CALCULATED.3IONS-SCNC: 5 MMOL/L (ref 3–14)
BASE EXCESS BLDV CALC-SCNC: 5.5 MMOL/L (ref -7.7–1.9)
BUN SERPL-MCNC: 25 MG/DL (ref 7–30)
CALCIUM SERPL-MCNC: 8.7 MG/DL (ref 8.5–10.1)
CHLORIDE BLD-SCNC: 101 MMOL/L (ref 94–109)
CO2 SERPL-SCNC: 29 MMOL/L (ref 20–32)
CREAT SERPL-MCNC: 0.57 MG/DL (ref 0.52–1.04)
ERYTHROCYTE [DISTWIDTH] IN BLOOD BY AUTOMATED COUNT: 14.1 % (ref 10–15)
GFR SERPL CREATININE-BSD FRML MDRD: 82 ML/MIN/1.73M2
GLUCOSE BLD-MCNC: 95 MG/DL (ref 70–99)
HCO3 BLDV-SCNC: 31 MMOL/L (ref 21–28)
HCT VFR BLD AUTO: 33.9 % (ref 35–47)
HGB BLD-MCNC: 11.2 G/DL (ref 11.7–15.7)
INR PPP: 2.06 (ref 0.85–1.15)
MCH RBC QN AUTO: 29.9 PG (ref 26.5–33)
MCHC RBC AUTO-ENTMCNC: 33 G/DL (ref 31.5–36.5)
MCV RBC AUTO: 90 FL (ref 78–100)
O2/TOTAL GAS SETTING VFR VENT: 24 %
PCO2 BLDV: 47 MM HG (ref 40–50)
PH BLDV: 7.43 [PH] (ref 7.32–7.43)
PLATELET # BLD AUTO: 258 10E3/UL (ref 150–450)
PO2 BLDV: 66 MM HG (ref 25–47)
POTASSIUM BLD-SCNC: 4.4 MMOL/L (ref 3.4–5.3)
RBC # BLD AUTO: 3.75 10E6/UL (ref 3.8–5.2)
SODIUM SERPL-SCNC: 135 MMOL/L (ref 133–144)
WBC # BLD AUTO: 11.1 10E3/UL (ref 4–11)

## 2021-07-12 PROCEDURE — 36415 COLL VENOUS BLD VENIPUNCTURE: CPT | Performed by: PHYSICIAN ASSISTANT

## 2021-07-12 PROCEDURE — 250N000012 HC RX MED GY IP 250 OP 636 PS 637: Performed by: PHYSICIAN ASSISTANT

## 2021-07-12 PROCEDURE — 250N000013 HC RX MED GY IP 250 OP 250 PS 637: Performed by: PHYSICIAN ASSISTANT

## 2021-07-12 PROCEDURE — 85014 HEMATOCRIT: CPT | Performed by: FAMILY MEDICINE

## 2021-07-12 PROCEDURE — 85610 PROTHROMBIN TIME: CPT | Performed by: PHYSICIAN ASSISTANT

## 2021-07-12 PROCEDURE — 999N000097 HC STATISTIC MECHANICAL IN-EXSUFFLATION TREATMENT

## 2021-07-12 PROCEDURE — 250N000011 HC RX IP 250 OP 636: Performed by: PHYSICIAN ASSISTANT

## 2021-07-12 PROCEDURE — 80048 BASIC METABOLIC PNL TOTAL CA: CPT | Performed by: FAMILY MEDICINE

## 2021-07-12 PROCEDURE — 99232 SBSQ HOSP IP/OBS MODERATE 35: CPT | Performed by: INTERNAL MEDICINE

## 2021-07-12 PROCEDURE — 120N000001 HC R&B MED SURG/OB

## 2021-07-12 PROCEDURE — 94640 AIRWAY INHALATION TREATMENT: CPT

## 2021-07-12 PROCEDURE — 250N000013 HC RX MED GY IP 250 OP 250 PS 637: Performed by: INTERNAL MEDICINE

## 2021-07-12 PROCEDURE — 94640 AIRWAY INHALATION TREATMENT: CPT | Mod: 76

## 2021-07-12 PROCEDURE — 82803 BLOOD GASES ANY COMBINATION: CPT | Performed by: FAMILY MEDICINE

## 2021-07-12 PROCEDURE — 999N000157 HC STATISTIC RCP TIME EA 10 MIN

## 2021-07-12 PROCEDURE — 250N000009 HC RX 250: Performed by: PHYSICIAN ASSISTANT

## 2021-07-12 RX ORDER — WARFARIN SODIUM 2 MG/1
2 TABLET ORAL
Status: COMPLETED | OUTPATIENT
Start: 2021-07-12 | End: 2021-07-12

## 2021-07-12 RX ADMIN — LEVOTHYROXINE SODIUM 50 MCG: 50 TABLET ORAL at 07:33

## 2021-07-12 RX ADMIN — PREDNISONE 40 MG: 20 TABLET ORAL at 07:34

## 2021-07-12 RX ADMIN — IPRATROPIUM BROMIDE AND ALBUTEROL SULFATE 3 ML: .5; 3 SOLUTION RESPIRATORY (INHALATION) at 16:11

## 2021-07-12 RX ADMIN — WARFARIN SODIUM 2 MG: 2 TABLET ORAL at 18:08

## 2021-07-12 RX ADMIN — METOPROLOL SUCCINATE 50 MG: 50 TABLET, EXTENDED RELEASE ORAL at 20:50

## 2021-07-12 RX ADMIN — SODIUM CHLORIDE TAB 1 GM 1 G: 1 TAB at 13:03

## 2021-07-12 RX ADMIN — PANTOPRAZOLE SODIUM 40 MG: 20 TABLET, DELAYED RELEASE ORAL at 20:50

## 2021-07-12 RX ADMIN — SODIUM CHLORIDE TAB 1 GM 1 G: 1 TAB at 07:33

## 2021-07-12 RX ADMIN — METOPROLOL SUCCINATE 50 MG: 50 TABLET, EXTENDED RELEASE ORAL at 07:34

## 2021-07-12 RX ADMIN — POLYETHYLENE GLYCOL 3350 17 G: 17 POWDER, FOR SOLUTION ORAL at 07:33

## 2021-07-12 RX ADMIN — LEVOFLOXACIN 500 MG: 5 INJECTION, SOLUTION INTRAVENOUS at 15:50

## 2021-07-12 RX ADMIN — IPRATROPIUM BROMIDE AND ALBUTEROL SULFATE 3 ML: .5; 3 SOLUTION RESPIRATORY (INHALATION) at 21:50

## 2021-07-12 RX ADMIN — PANTOPRAZOLE SODIUM 40 MG: 20 TABLET, DELAYED RELEASE ORAL at 07:34

## 2021-07-12 RX ADMIN — IPRATROPIUM BROMIDE AND ALBUTEROL SULFATE 3 ML: .5; 3 SOLUTION RESPIRATORY (INHALATION) at 07:54

## 2021-07-12 RX ADMIN — MIRTAZAPINE 15 MG: 15 TABLET, FILM COATED ORAL at 20:54

## 2021-07-12 RX ADMIN — SODIUM CHLORIDE TAB 1 GM 1 G: 1 TAB at 20:50

## 2021-07-12 ASSESSMENT — ACTIVITIES OF DAILY LIVING (ADL)
ADLS_ACUITY_SCORE: 19

## 2021-07-12 ASSESSMENT — MIFFLIN-ST. JEOR: SCORE: 902.75

## 2021-07-12 NOTE — PROGRESS NOTES
"[facility]  [facility] - Med/Surg    Referral Source: Pt request on admission    - I provided a visit for pt, Ellie.  She stated she was doing better, hoping to go home soon, maybe today.  I commented that I remembered, from a previous visit, that she was a member of [facility]'s [employer].  She said she was and had just finished watching mass on TV.    - I provided supportive listening and then offered prayer.  Ellie stated she would appreciate that.  We concluded with the \"Our Father.\"    Plan:  will remain available for any ongoing support needs during LOS.    Aaron Aly M.A., [facility]  Lead   [facility]  Office: [phone]    "

## 2021-07-12 NOTE — PLAN OF CARE
Patient up in chair, alert and oriented. Denies pain, denies SOB. Oxygen weaned off sats 93-95% on room air. Denies any further hemoptysis, non productive intermittent cough noted. Reports feeling better every day.

## 2021-07-12 NOTE — PLAN OF CARE
"Patient alert/oriented. SBA with walker.  Patient requesting to ambulate in room. Done. 94% on RA after ambulation.  Patient denies SOB, lightheadedness or weakness. Expiratory wheezes noted in bilateral anterior lobes.  States her cough is sometimes productive--brownish sputum per patient. Patient voiding well.  Large BM today.  /76 (BP Location: Right arm)   Pulse 79   Temp 97.9  F (36.6  C) (Oral)   Resp 18   Ht 1.473 m (4' 10\")   Wt 59.3 kg (130 lb 11.7 oz)   LMP 06/15/1985   SpO2 93%   BMI 27.32 kg/m    Caroline Red RN on 7/12/2021 at 1:20 PM    "

## 2021-07-12 NOTE — PLAN OF CARE
Patient had a nonproductive cough tonight. She denies any shortness of breath. She continues to use 1 L oxygen to keep sats above 92%.

## 2021-07-12 NOTE — PROGRESS NOTES
[facility]    Hospitalist Progress Note    Date of Service (when I saw the patient): 07/12/2021    Assessment & Plan   Ellie Leigh is a 89 year old female admitted on 7/9/2021. She presented to the emergency department for evaluation of dyspnea on exertion and hypoxia with exertion and was found to have a COPD exacerbation for which she is being admitted for further evaluation and treatment.     Acute respiratory failure with hypoxia  Presents with O2 sats 85% with exertion upon admit but recovered to low 90's on room air. Not on supplemental O2 at baseline. Chest x-ray with upper lobe fibrosis and volume loss right more than left, no new infiltrates. Chest x-ray with chronic changes of volume loss and bronchiectasis in right upper lobe, scattered areas of bronchial wall thickening and bronchiectasis and mucous plugging compatible with chronic airway infectious or inflammatory process. Afebrile upon admit. but leukocytosis present. BNP normal. Wheezes on exam. Clinically most consistent with COPD exacerbation.  -Now only requiring 1 L oxygen with exertion     COPD exacerbation   Bronchiectasis   Hemoptysis  Known COPD-no history of smoking/ mild airway obstruction on prior PFTs. Managed prior to admission with Advair 250/50 bid, DuoNebs, scheduled albuterol nebs, and normal saline nebs. She recently was treated for COPD with 5 day prednisone course with improvement. Significant wheezes and coarse lung sounds on exam.  -SoluMedrol 62.5 given 7/9/2021 in emergency department, changed to Prednisone 40 mg daily starting 7/10  -Scheduled DuoNebs q 4 hours with prn albuterol nebs  -IV Levaquin 500 mg daily for pseudomonas with bronchiectasis   -Respiratory virus panel negative  -Sputum culture growing Pseudomonas aeruginosa  -AFB culture and stain times three sent  -Wheezing and hemoptysis have improved    Hemoptysis  H/O TB (tuberculosis)  Diagnosed in the 2000's, is s/p treatment.  Reports recent bloody sputum, need to rule out active TB.   - AFB sputum stain and culture ordered but not collected.  Will order 3 samples spaced out by at least 8 hours.  - Airborne isolation until active TB ruled out     Recent syncope / near syncope  History tachy-keri syndrome  Pacemaker in situ  Reported syncopal event on 7/3/21 while sitting at a table. Told family she felt unwell, then has amnesia of subsequent events and was unresponsive to family for a few minutes. Improved once she was laid on the floor, no subsequent syncopal events since. Admit EKG normal sinus rhythm with first degree AV block.  -Pacer was interogated without etiology for syncope being found     Leukocytosis with left shift   Admit WBC 14.4, ANC 11.6. Afebrile. Chest x-ray without evidence of infection.  UA was unremarkable.  WBC 14.4--13.6 (now on steroids)->11.1     Paroxysmal atrial fibrillation  History of DVT (deep venous thrombosis)  Long term current use of anticoagulant therapy  Admit EKG shows normal sinus rhythm. Rate controlled prior to admission with metoprolol XL 50 mg bid. Anticoagulated with coumadin, admit INR 4.41.   - Continue metoprolol with holding parameters  - Pharmacy to manage coumadin     SIADH (syndrome of inappropriate ADH production)  Previously diagnosed. Managed prior to admission with NaCl tablets 1 g tid. Admit sodium 132.   - Sodium 132->133->135     Hypothyroidism  Managed prior to admission with levothyroxine 50 mcg daily, continue.      Esophageal reflux  Managed prior to admission with omeprazole 40 mg daily, continue.      Mild major depression  Anxiety  Managed prior to admission with Remeron 15 mg q hs, continue.      COVID status  COVID PCR negative 7/9/2021.  Patient has not had a COVID vaccine.     Diet: Combination Diet Regular Diet Adult    DVT Prophylaxis: Warfarin  Glasgow Catheter: Not present  Central Lines: None  Code Status: Full Code  - discussed with patient and her daughter at time  of admission. She is not sure if she would want to be intubated, will default to full code for now.     Plan: Patient presented with cough for 3 weeks and dyspnea.  She has diffuse wheezing and this seems most consistent with COPD possibly infection due to bronchiectasis..  She has a history of COPD but does not have a history of smoking.  Is remote history of tuberculosis.  I think reactivation is unlikely but we have seen three sets of AFB sputum cutures and stains. Continue prednisone, nebs and steroids. Awaiting regular sputum culture. 1-2 more days.    Aaron Martinez    Interval History   The patient is sitting comfortably in a chair.  Dyspnea has improved.    -Data reviewed today: I reviewed all new labs and imaging results over the last 24 hours. I personally reviewed no images or EKG's today.    Physical Exam   Temp: 97.9  F (36.6  C) Temp src: Oral BP: 137/76 Pulse: 79   Resp: 18 SpO2: 94 % O2 Device: None (Room air) (after ambulation) Oxygen Delivery: 1 LPM  Vitals:    07/10/21 0534 07/11/21 0055 07/12/21 0526   Weight: 56.8 kg (125 lb 3.5 oz) 58.2 kg (128 lb 4.9 oz) 59.3 kg (130 lb 11.7 oz)     Vital Signs with Ranges  Temp:  [97.5  F (36.4  C)-98  F (36.7  C)] 97.9  F (36.6  C)  Pulse:  [69-79] 79  Resp:  [16-18] 18  BP: (137-151)/(68-76) 137/76  SpO2:  [90 %-96 %] 94 %  I/O last 3 completed shifts:  In: 340 [P.O.:340]  Out: 2350 [Urine:2350]    Gen: Kyphotic elderly female, alert and oriented x 3, no acute distressed  HEENT: Atraumatic, normocephalic; sclera non-injected, anicterric; oral mucosa moist, no lesion, no exudate  Lungs: Prolonged expiration, no wheezes, no rhonchi, no rales  Heart: Regular rate, regular rhythm, no gallops, no rubs, no murmurs  GI: Bowel sound normal, no hepatosplenomegaly, no masses, non-tender, non-distended, no guarding, no rebound tenderness  Lymph: No lymphadenopathy, no edema  Skin: No rashes, no chronic venous stasis     Medications     - MEDICATION INSTRUCTIONS -        Warfarin Therapy Reminder         ipratropium - albuterol 0.5 mg/2.5 mg/3 mL  3 mL Nebulization Q4H While awake     levofloxacin  500 mg Intravenous Q24H     levothyroxine  50 mcg Oral Daily     metoprolol succinate ER  50 mg Oral BID     mirtazapine  15 mg Oral At Bedtime     pantoprazole  40 mg Oral BID     polyethylene glycol  17 g Oral Daily     predniSONE  40 mg Oral Daily     sodium chloride (PF)  3 mL Intracatheter Q8H     sodium chloride  1 g Oral TID     warfarin ANTICOAGULANT  2 mg Oral ONCE at 18:00       Data   Recent Labs   Lab 07/12/21  0542 07/11/21  0517 07/10/21  0505 07/09/21  0925   WBC 11.1*  --  13.6* 14.4*   HGB 11.2*  --  10.8* 11.4*   MCV 90  --  90 91     --  248 248   INR 2.06* 2.85* 3.80* 4.41*     --  133 132*   POTASSIUM 4.4  --  4.7 3.8   CHLORIDE 101  --  101 97   CO2 29  --  29 30   BUN 25  --  23 25   CR 0.57  --  0.52 0.61   ANIONGAP 5  --  3 5   DOUG 8.7  --  8.8 8.3*   GLC 95  --  134* 114*   ALBUMIN  --   --  2.8* 3.1*   PROTTOTAL  --   --  6.2* 6.4*   BILITOTAL  --   --  0.4 0.4   ALKPHOS  --   --  68 73   ALT  --   --  20 18   AST  --   --  14 16   TROPI  --   --   --  0.015       No results found for this or any previous visit (from the past 24 hour(s)).

## 2021-07-13 LAB
ANION GAP SERPL CALCULATED.3IONS-SCNC: 5 MMOL/L (ref 3–14)
BACTERIA SPEC CULT: ABNORMAL
BACTERIA SPEC CULT: ABNORMAL
BUN SERPL-MCNC: 22 MG/DL (ref 7–30)
CALCIUM SERPL-MCNC: 8.4 MG/DL (ref 8.5–10.1)
CHLORIDE BLD-SCNC: 102 MMOL/L (ref 94–109)
CO2 SERPL-SCNC: 28 MMOL/L (ref 20–32)
CREAT SERPL-MCNC: 0.69 MG/DL (ref 0.52–1.04)
ERYTHROCYTE [DISTWIDTH] IN BLOOD BY AUTOMATED COUNT: 13.8 % (ref 10–15)
GFR SERPL CREATININE-BSD FRML MDRD: 77 ML/MIN/1.73M2
GLUCOSE BLD-MCNC: 94 MG/DL (ref 70–99)
HCT VFR BLD AUTO: 31.5 % (ref 35–47)
HGB BLD-MCNC: 10.4 G/DL (ref 11.7–15.7)
INR PPP: 2.09 (ref 0.85–1.15)
MCH RBC QN AUTO: 29.3 PG (ref 26.5–33)
MCHC RBC AUTO-ENTMCNC: 33 G/DL (ref 31.5–36.5)
MCV RBC AUTO: 89 FL (ref 78–100)
PLATELET # BLD AUTO: 249 10E3/UL (ref 150–450)
POTASSIUM BLD-SCNC: 4.1 MMOL/L (ref 3.4–5.3)
RBC # BLD AUTO: 3.55 10E6/UL (ref 3.8–5.2)
SODIUM SERPL-SCNC: 135 MMOL/L (ref 133–144)
SPECIMEN SOURCE: ABNORMAL
WBC # BLD AUTO: 9.8 10E3/UL (ref 4–11)

## 2021-07-13 PROCEDURE — 85610 PROTHROMBIN TIME: CPT | Performed by: PHYSICIAN ASSISTANT

## 2021-07-13 PROCEDURE — 94640 AIRWAY INHALATION TREATMENT: CPT | Mod: 76

## 2021-07-13 PROCEDURE — 94640 AIRWAY INHALATION TREATMENT: CPT

## 2021-07-13 PROCEDURE — 999N000157 HC STATISTIC RCP TIME EA 10 MIN

## 2021-07-13 PROCEDURE — 120N000001 HC R&B MED SURG/OB

## 2021-07-13 PROCEDURE — 250N000012 HC RX MED GY IP 250 OP 636 PS 637: Performed by: PHYSICIAN ASSISTANT

## 2021-07-13 PROCEDURE — 36415 COLL VENOUS BLD VENIPUNCTURE: CPT | Performed by: FAMILY MEDICINE

## 2021-07-13 PROCEDURE — 250N000013 HC RX MED GY IP 250 OP 250 PS 637: Performed by: PHYSICIAN ASSISTANT

## 2021-07-13 PROCEDURE — 80048 BASIC METABOLIC PNL TOTAL CA: CPT | Performed by: FAMILY MEDICINE

## 2021-07-13 PROCEDURE — 250N000013 HC RX MED GY IP 250 OP 250 PS 637: Performed by: INTERNAL MEDICINE

## 2021-07-13 PROCEDURE — 85027 COMPLETE CBC AUTOMATED: CPT | Performed by: FAMILY MEDICINE

## 2021-07-13 PROCEDURE — 250N000009 HC RX 250: Performed by: PHYSICIAN ASSISTANT

## 2021-07-13 PROCEDURE — 999N000097 HC STATISTIC MECHANICAL IN-EXSUFFLATION TREATMENT

## 2021-07-13 PROCEDURE — 99232 SBSQ HOSP IP/OBS MODERATE 35: CPT | Performed by: INTERNAL MEDICINE

## 2021-07-13 RX ORDER — WARFARIN SODIUM 2 MG/1
2 TABLET ORAL
Status: COMPLETED | OUTPATIENT
Start: 2021-07-13 | End: 2021-07-13

## 2021-07-13 RX ORDER — LEVOFLOXACIN 500 MG/1
500 TABLET, FILM COATED ORAL EVERY 24 HOURS
Status: DISCONTINUED | OUTPATIENT
Start: 2021-07-13 | End: 2021-07-14 | Stop reason: HOSPADM

## 2021-07-13 RX ADMIN — WARFARIN SODIUM 2 MG: 2 TABLET ORAL at 17:38

## 2021-07-13 RX ADMIN — METOPROLOL SUCCINATE 50 MG: 50 TABLET, EXTENDED RELEASE ORAL at 08:51

## 2021-07-13 RX ADMIN — LEVOFLOXACIN 500 MG: 500 TABLET, FILM COATED ORAL at 16:29

## 2021-07-13 RX ADMIN — MIRTAZAPINE 15 MG: 15 TABLET, FILM COATED ORAL at 20:46

## 2021-07-13 RX ADMIN — SODIUM CHLORIDE TAB 1 GM 1 G: 1 TAB at 08:51

## 2021-07-13 RX ADMIN — PANTOPRAZOLE SODIUM 40 MG: 20 TABLET, DELAYED RELEASE ORAL at 08:51

## 2021-07-13 RX ADMIN — SODIUM CHLORIDE TAB 1 GM 1 G: 1 TAB at 20:45

## 2021-07-13 RX ADMIN — IPRATROPIUM BROMIDE AND ALBUTEROL SULFATE 3 ML: .5; 3 SOLUTION RESPIRATORY (INHALATION) at 19:42

## 2021-07-13 RX ADMIN — IPRATROPIUM BROMIDE AND ALBUTEROL SULFATE 3 ML: .5; 3 SOLUTION RESPIRATORY (INHALATION) at 15:56

## 2021-07-13 RX ADMIN — PANTOPRAZOLE SODIUM 40 MG: 20 TABLET, DELAYED RELEASE ORAL at 20:46

## 2021-07-13 RX ADMIN — METOPROLOL SUCCINATE 50 MG: 50 TABLET, EXTENDED RELEASE ORAL at 20:46

## 2021-07-13 RX ADMIN — PREDNISONE 40 MG: 20 TABLET ORAL at 08:51

## 2021-07-13 RX ADMIN — POLYETHYLENE GLYCOL 3350 17 G: 17 POWDER, FOR SOLUTION ORAL at 08:48

## 2021-07-13 RX ADMIN — IPRATROPIUM BROMIDE AND ALBUTEROL SULFATE 3 ML: .5; 3 SOLUTION RESPIRATORY (INHALATION) at 07:31

## 2021-07-13 RX ADMIN — IPRATROPIUM BROMIDE AND ALBUTEROL SULFATE 3 ML: .5; 3 SOLUTION RESPIRATORY (INHALATION) at 11:44

## 2021-07-13 RX ADMIN — LEVOTHYROXINE SODIUM 50 MCG: 50 TABLET ORAL at 06:05

## 2021-07-13 RX ADMIN — SODIUM CHLORIDE TAB 1 GM 1 G: 1 TAB at 14:17

## 2021-07-13 ASSESSMENT — ACTIVITIES OF DAILY LIVING (ADL)
ADLS_ACUITY_SCORE: 19

## 2021-07-13 ASSESSMENT — MIFFLIN-ST. JEOR: SCORE: 880.75

## 2021-07-13 NOTE — PLAN OF CARE
Patient alert and oriented x4, pleasant and cooperative with staff. Ambulates to rest room with SBA. Denies pain during NOC. Infrequent cough noted with brown sputum in tissue. LS consistent with diagnosis.   Temp: 98.5  F (36.9  C) Temp src: Oral BP: (!) 158/78 Pulse: 74   Resp: 16 SpO2: 93 % O2 Device: None (Room air) Oxygen Delivery: 1 LPM

## 2021-07-13 NOTE — PROGRESS NOTES
[facility]    Hospitalist Progress Note    Date of Service (when I saw the patient): 07/13/2021    Assessment & Plan   Ellie Leigh is a 89 year old female admitted on 7/9/2021. She presented to the emergency department for evaluation of dyspnea on exertion and hypoxia with exertion and was found to have a COPD exacerbation for which she is being admitted for further evaluation and treatment.     Acute respiratory failure with hypoxia  Presents with O2 sats 85% with exertion upon admit but recovered to low 90's on room air. Not on supplemental O2 at baseline. Chest x-ray with upper lobe fibrosis and volume loss right more than left, no new infiltrates. Chest x-ray with chronic changes of volume loss and bronchiectasis in right upper lobe, scattered areas of bronchial wall thickening and bronchiectasis and mucous plugging compatible with chronic airway infectious or inflammatory process. Afebrile upon admit. but leukocytosis present. BNP normal. Wheezes on exam. Clinically most consistent with COPD exacerbation.  -Now weaned off oxygen and oxygenating well on room air     COPD exacerbation   Bronchiectasis   Hemoptysis  Known COPD-no history of smoking/ mild airway obstruction on prior PFTs. Managed prior to admission with Advair 250/50 bid, DuoNebs, scheduled albuterol nebs, and normal saline nebs. She recently was treated for COPD with 5 day prednisone course with improvement. Significant wheezes and coarse lung sounds on exam.  -SoluMedrol 62.5 given 7/9/2021 in emergency department, changed to Prednisone 40 mg daily starting 7/10  -Scheduled DuoNebs q 4 hours with prn albuterol nebs  -IV Levaquin 500 mg daily for pseudomonas with bronchiectasis   -Respiratory virus panel negative  -Sputum culture growing Pseudomonas aeruginosa; sensitivities pending  -AFB culture and stain times three sent and pending  -Wheezing and hemoptysis have improved, hypoxia resolved  -Continues to have  productive cough    Hemoptysis  H/O TB (tuberculosis)  Diagnosed in the 2000's, is s/p treatment. Reports recent bloody sputum, need to rule out active TB.   - AFB sputum stain and culture ordered but not collected.  Will order 3 samples spaced out by at least 8 hours.  - Airborne isolation until active TB ruled out     Recent syncope / near syncope  History tachy-keri syndrome  Pacemaker in situ  Reported syncopal event on 7/3/21 while sitting at a table. Told family she felt unwell, then has amnesia of subsequent events and was unresponsive to family for a few minutes. Improved once she was laid on the floor, no subsequent syncopal events since. Admit EKG normal sinus rhythm with first degree AV block.  -Pacer was interogated without etiology for syncope being found     Leukocytosis with left shift   Admit WBC 14.4, ANC 11.6. Afebrile. Chest x-ray without evidence of infection.  UA was unremarkable.  WBC 14.4--13.6 (now on steroids)->11.1     Paroxysmal atrial fibrillation  History of DVT (deep venous thrombosis)  Long term current use of anticoagulant therapy  Admit EKG shows normal sinus rhythm. Rate controlled prior to admission with metoprolol XL 50 mg bid. Anticoagulated with coumadin, admit INR 4.41.   - Continue metoprolol with holding parameters  - Pharmacy to manage coumadin     SIADH (syndrome of inappropriate ADH production)  Previously diagnosed. Managed prior to admission with NaCl tablets 1 g tid. Admit sodium 132.   - Sodium 132->133->135-> 135     Hypothyroidism  Managed prior to admission with levothyroxine 50 mcg daily, continue.      Esophageal reflux  Managed prior to admission with omeprazole 40 mg daily, continue.      Mild major depression  Anxiety  Managed prior to admission with Remeron 15 mg q hs, continue.      COVID status  COVID PCR negative 7/9/2021.  Patient has not had a COVID vaccine.     Diet: Combination Diet Regular Diet Adult    DVT Prophylaxis: Warfarin  Glasgow Catheter: Not  present  Central Lines: None  Code Status: Full Code  - discussed with patient and her daughter at time of admission. She is not sure if she would want to be intubated, will default to full code for now.     Plan: Awaiting three sets of AFB sputum cutures and stains to rule out TB, as well as sensitivities for Pseudomonas in sputum for antibiotic selection.  Continue prednisone, nebs and steroids.     Aaron Corky Michelle    Interval History   The patient is sitting comfortably in a chair.  She has no acute complaints and feels much better.  Eating and drinking normally.    -Data reviewed today: I reviewed all new labs and imaging results over the last 24 hours. I personally reviewed no images or EKG's today.    Physical Exam   Temp: 98  F (36.7  C) Temp src: Oral BP: (!) 145/72 Pulse: 83   Resp: 18 SpO2: 93 % O2 Device: None (Room air)    Vitals:    07/11/21 0055 07/12/21 0526 07/13/21 0542   Weight: 58.2 kg (128 lb 4.9 oz) 59.3 kg (130 lb 11.7 oz) 57.1 kg (125 lb 14.1 oz)     Vital Signs with Ranges  Temp:  [97.8  F (36.6  C)-98.5  F (36.9  C)] 98  F (36.7  C)  Pulse:  [66-83] 83  Resp:  [16-18] 18  BP: (126-158)/(67-78) 145/72  SpO2:  [92 %-95 %] 93 %  I/O last 3 completed shifts:  In: 800 [P.O.:800]  Out: 3000 [Urine:3000]    Gen: Kyphotic elderly female, alert and oriented x 3, no acute distressed  HEENT: Atraumatic, normocephalic; sclera non-injected, anicterric; oral mucosa moist, no lesion, no exudate  Lungs: Prolonged expiration, no wheezes, no rhonchi, no rales  Heart: Regular rate, regular rhythm, no gallops, no rubs, no murmurs  GI: Bowel sound normal, no hepatosplenomegaly, no masses, non-tender, non-distended, no guarding, no rebound tenderness  Lymph: No lymphadenopathy, no edema  Skin: No rashes, no chronic venous stasis     Medications     - MEDICATION INSTRUCTIONS -       Warfarin Therapy Reminder         ipratropium - albuterol 0.5 mg/2.5 mg/3 mL  3 mL Nebulization Q4H While awake     levofloxacin   500 mg Intravenous Q24H     levothyroxine  50 mcg Oral Daily     metoprolol succinate ER  50 mg Oral BID     mirtazapine  15 mg Oral At Bedtime     pantoprazole  40 mg Oral BID     polyethylene glycol  17 g Oral Daily     predniSONE  40 mg Oral Daily     sodium chloride (PF)  3 mL Intracatheter Q8H     sodium chloride  1 g Oral TID       Data   Recent Labs   Lab 07/13/21  0530 07/12/21  0542 07/11/21  0517 07/10/21  0505 07/09/21  0925   WBC 9.8 11.1*  --  13.6* 14.4*   HGB 10.4* 11.2*  --  10.8* 11.4*   MCV 89 90  --  90 91    258  --  248 248   INR 2.09* 2.06* 2.85* 3.80* 4.41*    135  --  133 132*   POTASSIUM 4.1 4.4  --  4.7 3.8   CHLORIDE 102 101  --  101 97   CO2 28 29  --  29 30   BUN 22 25  --  23 25   CR 0.69 0.57  --  0.52 0.61   ANIONGAP 5 5  --  3 5   DOUG 8.4* 8.7  --  8.8 8.3*   GLC 94 95  --  134* 114*   ALBUMIN  --   --   --  2.8* 3.1*   PROTTOTAL  --   --   --  6.2* 6.4*   BILITOTAL  --   --   --  0.4 0.4   ALKPHOS  --   --   --  68 73   ALT  --   --   --  20 18   AST  --   --   --  14 16   TROPI  --   --   --   --  0.015       No results found for this or any previous visit (from the past 24 hour(s)).

## 2021-07-13 NOTE — PLAN OF CARE
"A& O x 4. SBA with walker. Patient on RA, SpO2 94%. TB sputum culture pending. Saline locked.       /70   Pulse 73   Temp 97.8  F (36.6  C) (Oral)   Resp 16   Ht 1.473 m (4' 10\")   Wt 59.3 kg (130 lb 11.7 oz)   LMP 06/15/1985   SpO2 94%   BMI 27.32 kg/m      "

## 2021-07-13 NOTE — PROGRESS NOTES
S:  Patient inpatient at [facility]    B:  Patient admitted for COPD exacerbation    A:  Vitals stable on room air  Awake and Oriented x 4  Stand By Assist with walker  Continent of urine and bowel  Good appetite  Droplet precautions are maintained    R:  Patient remains in room    Ortiz Hazel RN

## 2021-07-14 ENCOUNTER — TELEPHONE (OUTPATIENT)
Dept: FAMILY MEDICINE | Facility: CLINIC | Age: 86
End: 2021-07-14

## 2021-07-14 VITALS
OXYGEN SATURATION: 94 % | HEIGHT: 58 IN | SYSTOLIC BLOOD PRESSURE: 154 MMHG | BODY MASS INDEX: 27.67 KG/M2 | HEART RATE: 66 BPM | DIASTOLIC BLOOD PRESSURE: 79 MMHG | TEMPERATURE: 98 F | RESPIRATION RATE: 24 BRPM | WEIGHT: 131.84 LBS

## 2021-07-14 DIAGNOSIS — Z79.01 LONG TERM CURRENT USE OF ANTICOAGULANT THERAPY: ICD-10-CM

## 2021-07-14 DIAGNOSIS — I48.0 INTERMITTENT ATRIAL FIBRILLATION (H): ICD-10-CM

## 2021-07-14 DIAGNOSIS — I82.409 DVT (DEEP VENOUS THROMBOSIS) (H): Primary | ICD-10-CM

## 2021-07-14 LAB
ANION GAP SERPL CALCULATED.3IONS-SCNC: 5 MMOL/L (ref 3–14)
BUN SERPL-MCNC: 28 MG/DL (ref 7–30)
CALCIUM SERPL-MCNC: 8.4 MG/DL (ref 8.5–10.1)
CHLORIDE BLD-SCNC: 100 MMOL/L (ref 94–109)
CO2 SERPL-SCNC: 30 MMOL/L (ref 20–32)
CREAT SERPL-MCNC: 0.66 MG/DL (ref 0.52–1.04)
ERYTHROCYTE [DISTWIDTH] IN BLOOD BY AUTOMATED COUNT: 14 % (ref 10–15)
GFR SERPL CREATININE-BSD FRML MDRD: 78 ML/MIN/1.73M2
GLUCOSE BLD-MCNC: 100 MG/DL (ref 70–99)
HCT VFR BLD AUTO: 32.4 % (ref 35–47)
HGB BLD-MCNC: 10.6 G/DL (ref 11.7–15.7)
INR PPP: 2.53 (ref 0.85–1.15)
MCH RBC QN AUTO: 29.4 PG (ref 26.5–33)
MCHC RBC AUTO-ENTMCNC: 32.7 G/DL (ref 31.5–36.5)
MCV RBC AUTO: 90 FL (ref 78–100)
PLATELET # BLD AUTO: 248 10E3/UL (ref 150–450)
POTASSIUM BLD-SCNC: 4.5 MMOL/L (ref 3.4–5.3)
RBC # BLD AUTO: 3.6 10E6/UL (ref 3.8–5.2)
SODIUM SERPL-SCNC: 135 MMOL/L (ref 133–144)
WBC # BLD AUTO: 10 10E3/UL (ref 4–11)

## 2021-07-14 PROCEDURE — 85027 COMPLETE CBC AUTOMATED: CPT | Performed by: FAMILY MEDICINE

## 2021-07-14 PROCEDURE — 250N000013 HC RX MED GY IP 250 OP 250 PS 637: Performed by: PHYSICIAN ASSISTANT

## 2021-07-14 PROCEDURE — 250N000009 HC RX 250: Performed by: PHYSICIAN ASSISTANT

## 2021-07-14 PROCEDURE — 85610 PROTHROMBIN TIME: CPT | Performed by: PHYSICIAN ASSISTANT

## 2021-07-14 PROCEDURE — 80048 BASIC METABOLIC PNL TOTAL CA: CPT | Performed by: FAMILY MEDICINE

## 2021-07-14 PROCEDURE — 36415 COLL VENOUS BLD VENIPUNCTURE: CPT | Performed by: FAMILY MEDICINE

## 2021-07-14 PROCEDURE — 250N000013 HC RX MED GY IP 250 OP 250 PS 637: Performed by: HOSPITALIST

## 2021-07-14 PROCEDURE — 250N000012 HC RX MED GY IP 250 OP 636 PS 637: Performed by: PHYSICIAN ASSISTANT

## 2021-07-14 PROCEDURE — 99239 HOSP IP/OBS DSCHRG MGMT >30: CPT | Performed by: INTERNAL MEDICINE

## 2021-07-14 PROCEDURE — 94640 AIRWAY INHALATION TREATMENT: CPT

## 2021-07-14 RX ORDER — WARFARIN SODIUM 1 MG/1
TABLET ORAL
Qty: 115 TABLET | Refills: 0 | COMMUNITY
Start: 2021-07-14 | End: 2021-07-14

## 2021-07-14 RX ORDER — WARFARIN SODIUM 1 MG/1
TABLET ORAL
Qty: 115 TABLET | Refills: 0 | COMMUNITY
Start: 2021-07-14 | End: 2021-10-21

## 2021-07-14 RX ORDER — LEVOFLOXACIN 500 MG/1
500 TABLET, FILM COATED ORAL EVERY 24 HOURS
Qty: 10 TABLET | Refills: 0 | Status: SHIPPED | OUTPATIENT
Start: 2021-07-14 | End: 2021-09-29

## 2021-07-14 RX ORDER — CALCIUM CARBONATE 500 MG/1
500 TABLET, CHEWABLE ORAL
Status: COMPLETED | OUTPATIENT
Start: 2021-07-14 | End: 2021-07-14

## 2021-07-14 RX ADMIN — IPRATROPIUM BROMIDE AND ALBUTEROL SULFATE 3 ML: .5; 3 SOLUTION RESPIRATORY (INHALATION) at 08:00

## 2021-07-14 RX ADMIN — SODIUM CHLORIDE TAB 1 GM 1 G: 1 TAB at 08:47

## 2021-07-14 RX ADMIN — CALCIUM CARBONATE (ANTACID) CHEW TAB 500 MG 500 MG: 500 CHEW TAB at 00:13

## 2021-07-14 RX ADMIN — PREDNISONE 40 MG: 20 TABLET ORAL at 08:47

## 2021-07-14 RX ADMIN — PANTOPRAZOLE SODIUM 40 MG: 20 TABLET, DELAYED RELEASE ORAL at 08:47

## 2021-07-14 RX ADMIN — POLYETHYLENE GLYCOL 3350 17 G: 17 POWDER, FOR SOLUTION ORAL at 08:46

## 2021-07-14 RX ADMIN — LEVOTHYROXINE SODIUM 50 MCG: 50 TABLET ORAL at 06:35

## 2021-07-14 RX ADMIN — METOPROLOL SUCCINATE 50 MG: 50 TABLET, EXTENDED RELEASE ORAL at 08:47

## 2021-07-14 ASSESSMENT — ACTIVITIES OF DAILY LIVING (ADL)
ADLS_ACUITY_SCORE: 19
DEPENDENT_IADLS:: TRANSPORTATION

## 2021-07-14 ASSESSMENT — MIFFLIN-ST. JEOR: SCORE: 907.75

## 2021-07-14 NOTE — PLAN OF CARE
[facility] DISCHARGE NOTE    Patient discharged to home at 12:32 PM via wheel chair. Accompanied by daughter and staff. Discharge instructions reviewed with patient and daughter, opportunity offered to ask questions. Prescriptions sent to patients preferred pharmacy. All belongings sent with patient.    Erica Garcia RN

## 2021-07-14 NOTE — PLAN OF CARE
Patient complained of some heartburn tonight. Requested something for it - 1 x order for Tums given with relief.

## 2021-07-14 NOTE — PLAN OF CARE
Vitals stable; denies pain. Alert and oriented. Tolerating regular diet. Up with stand by assist. Plan of care reviewed with patient.

## 2021-07-14 NOTE — PHARMACY-ANTICOAGULATION SERVICE
Clinical Pharmacy- Warfarin Discharge Note  This patient is currently on warfarin for the treatment of Atrial fibrillation.  INR Goal= 2-3  Expected length of therapy lifetime.    Warfarin PTA Regimen: 2 mg MF, 1 mg ROW      Anticoagulation Dose History     Recent Dosing and Labs Latest Ref Rng & Units 6/10/2021 7/1/2021 7/9/2021 7/10/2021 7/11/2021 7/12/2021 7/13/2021    Warfarin 1 mg - - - - - 1 mg - -    Warfarin 2 mg - - - - - - 2 mg 2 mg    INR 0.86 - 1.14 2.90(H) 3.30(H) 4.41(H) 3.80(H) 2.85(H) - -    INR 0.8 - 1.1 - - - - - - -          Vitamin K doses administered during the last 7 days: none  FFP administered during the last 7 days: none  Recommend discharging the patient on a warfarin regimen of 1 mg with a prescription for warfarin 1mg tablets.      The patient should have an INR checked in 2 days.

## 2021-07-14 NOTE — PROGRESS NOTES
Care Management Initial Consult    General Information  Assessment completed with: Patient and daughter- Suyapa  Type of CM/SW Visit: Initial Assessment    Primary Care Provider verified and updated as needed: Yes   Readmission within the last 30 days: no previous admission in last 30 days      Reason for Consult: discharge planning  Advance Care Planning: Advance Care Planning Reviewed: present on chart          Communication Assessment  Patient's communication style: spoken language (English or Bilingual)    Hearing Difficulty or Deaf: yes   Wear Glasses or Blind: yes    Cognitive  Cognitive/Neuro/Behavioral: WDL                      Living Environment:   People in home: child(grupo), adult  Suyapa & Adolfo  Current living Arrangements: house      Able to return to prior arrangements: yes     Family/Social Support:  Care provided by: self, child(grupo)  Provides care for: no one, unable/limited ability to care for self  Marital Status:   Children          Description of Support System: Supportive, Involved    Support Assessment: Adequate family and caregiver support, Adequate social supports    Current Resources:   Patient receiving home care services: No  Community Resources: None  Equipment currently used at home: shower chair, walker, rolling  Supplies currently used at home: None    Employment/Financial:  Employment Status: retired        Financial Concerns: No concerns identified   Referral to Financial Counselor: No    Lifestyle & Psychosocial Needs:  Social Determinants of Health     Tobacco Use: Medium Risk     Smoking Tobacco Use: Passive Smoke Exposure - Never Smoker     Smokeless Tobacco Use: Never Used   Alcohol Use:      Frequency of Alcohol Consumption:      Average Number of Drinks:      Frequency of Binge Drinking:    Financial Resource Strain:      Difficulty of Paying Living Expenses:    Food Insecurity:      Worried About Running Out of Food in the Last Year:      Ran Out of Food in the Last  Year:    Transportation Needs:      Lack of Transportation (Medical):      Lack of Transportation (Non-Medical):    Physical Activity:      Days of Exercise per Week:      Minutes of Exercise per Session:    Stress:      Feeling of Stress :    Social Connections:      Frequency of Communication with Friends and Family:      Frequency of Social Gatherings with Friends and Family:      Attends Sikh Services:      Active Member of Clubs or Organizations:      Attends Club or Organization Meetings:      Marital Status:    Intimate Partner Violence:      Fear of Current or Ex-Partner:      Emotionally Abused:      Physically Abused:      Sexually Abused:    Depression: Not at risk     PHQ-2 Score: 0   Housing Stability:      Unable to Pay for Housing in the Last Year:      Number of Places Lived in the Last Year:      Unstable Housing in the Last Year:      Functional Status:  Prior to admission patient needed assistance:   Dependent ADLs:: Ambulation-walker  Dependent IADLs:: Transportation     Mental Health Status:  Mental Health Status: No Current Concerns       Chemical Dependency Status:  Chemical Dependency Status: No Current Concerns         Values/Beliefs:  Spiritual, Cultural Beliefs, Sikh Practices, Values that affect care: no             Additional Information:  Care Management self referral due to high BEVERLY score.  SHABNAM placed call to pt to introduce self/role, perform assessment, and discuss resources.  Pt shared she lives with daughter & SUKH and does not feel she needs any support upon discharge, but requested I call her daughter, Suyapa, to be sure.    SHABNAM placed call to Suyapa, introduced self, title & role.  Suyapa shared that the pt uses a walker in the home & out in public, but will eventually need a wheelchair.  SHABNAM educated on Medicare.gov and how to request a prescription from PCP, etc.   SHABNAM also educated on [employer] assessment for care needs of pt such as PCA services, CADI waiver, etc.  SHABNAM  placed [employer] Assessment information in pt's AVS for Suyapa's review.    Pt will discharge to home today with Suyapa & son in law, Adolfo; both Suyapa & pt have declined home care services.   SW did discuss a referral to the clinic care coordination team & this was agreeable.    CHRISTOPHER Gonzalez

## 2021-07-14 NOTE — TELEPHONE ENCOUNTER
Reason for Call:  Other appointment    Detailed comments: Pt's daughter-Suyapa is calling asking if the appt for tomorrow 07/15/21 is still needed since the pt just discharged from the hospital. Also pls inform of dosage if appt is not needed.    Phone Number Patient can be reached at: Home number on file [phone] (home)    Best Time: any    Can we leave a detailed message on this number? YES    Call taken on 7/14/2021 at 1:34 PM by Sandra Collins

## 2021-07-14 NOTE — TELEPHONE ENCOUNTER
Per discharge note INR is to be checked within 2 days. Recommended keeping appt as there are no lab appts for Friday.  Suyapa verbalizes understanding and agrees to plan. No further questions or concerns.  Kylah Dorsey RN on 7/14/2021 at 2:03 PM

## 2021-07-14 NOTE — DISCHARGE INSTRUCTIONS
Regarding WARFARIN dosing: Take 1 tablet (1 mg) 7/14 and recheck INR on 7/15 as previously scheduled. Then dose as directed by the INR clinic.        What is MnCHOICES?  MnCHOICES is a single, comprehensive assessment and support planning web-based application used by professionals who the [facility] has certified as assessors and support planners for long-term services and supports. It uses a person-centered planning approach to help people make decisions about long-term services and supports.    What does MnCHOICES do?  1.  Promotes timely consideration of support options reimbursed through Medical Assistance long-term service programs.  2. Provides greater consistency in how eligibility is determined for publicly funded long-term services and supports across [place].  3. Implements a single comprehensive assessment to determine needs and support planning.  4. Determines eligibility for publicly funded programs and services for all ages and disabilities including:    Alternative Care Program    Brain Injury Waiver    Community Alternative Care Waiver    Community Access for Disability Inclusion Waiver    Consumer Support Pedro Luis    Developmental Disability Waiver    Elderly Waiver    Essential Community Supports for Seniors    Moving Home [place]    Personal care assistance    Case management    Semi-independent living services    Who will benefit?  [employer] benefits people of all ages and disabilities who need long-term services and supports. It also benefits lead agencies (counties, tribes and managed care organizations) responsible for completing assessments and support planning.      It helps the person better understand his or her support needs and how to get services to meet those needs.    These services can help the person stay at home or move home from a hospital, nursing home or other institution.    If a person qualifies for publicly funded programs, it helps them to access programs such as Medical  Assistance home and community-based waiver services, personal care assistance and other long-term services and supports.    If the person does not qualify for publicly funded programs, it helps them learn about and have help accessing other support options.    What are other benefits of a MnCHOICES assessment?    Supports matching services to an individual s strengths, preferences and needs    Promotes equal access across populations and geographic areas    Replaces multiple assessments for different programs    Reduces paperwork    Helps with support planning    Promotes statewide quality measurement.    How does a person get help?  A person of any age with a disability or in need of long-term services and supports can ask for a MnCHOICES assessment by contacting the Senior LinkAge Line at 1-474.543.1633, the Disability Linkage Line at 1-952.278.3810 or the county or [facility] where they live.  You can also call [facility] at [phone] to begin the process.

## 2021-07-14 NOTE — PLAN OF CARE
States that she feels much better than she did on admission. Very infrequent cough. On room air. Good appetite. Vitals are stable. Up in room with stand by assist of 1. Will monitor.

## 2021-07-14 NOTE — DISCHARGE SUMMARY
[facility]  Hospitalist Discharge Summary       Date of Admission:  7/9/2021  Date of Discharge:  7/14/2021 12:19 PM  Discharging Provider: Aaron Martinez MD      Discharge Diagnoses     Acute respiratory failure with hypoxia  COPD exacerbation   Bronchiectasis   Hemoptysis  Hemoptysis  H/O TB (tuberculosis)   Recent syncope or near syncope  History tachy-keri syndrome  Pacemaker in situ  Leukocytosis with left shift   Paroxysmal atrial fibrillation  History of DVT (deep venous thrombosis)  Long term current use of anticoagulant therapy  SIADH (syndrome of inappropriate ADH production)   Hypothyroidism   Esophageal reflux  Mild major depression  Anxiety  COVID-19 negative    Follow-ups Needed After Discharge   Follow-up Appointments     Follow-up and recommended labs and tests      Follow up with primary care provider, Anjum Vidales, within 7 days for   hospital follow- up.  The following labs/tests are recommended: BMP and   CBC.           Unresulted Labs Ordered in the Past 30 Days of this Admission     Date and Time Order Name Status Description    7/10/2021  3:00 PM AFB Stain Non Blood In process     7/10/2021  3:00 PM AFB Culture Non Blood In process     7/10/2021  1:00 PM AFB Stain Non Blood In process     7/10/2021  1:00 PM AFB Culture Non Blood In process     7/10/2021  1:00 PM AFB Stain Non Blood In process     7/10/2021  1:00 PM AFB Culture Non Blood In process       These results will be followed up by Aaron Martinez or PCP    Discharge Disposition   Discharged to home  Condition at discharge: Stable    Hospital Course   Ellie Leigh is a 89 year old female admitted on 7/9/2021. She presented to the emergency department for evaluation of dyspnea on exertion and hypoxia with exertion and was found to have a COPD exacerbation for which she is being admitted for further evaluation and treatment.     Acute respiratory failure with hypoxia  Presents with O2 sats 85%  with exertion upon admit but recovered to low 90's on room air. Not on supplemental O2 at baseline. Chest x-ray with upper lobe fibrosis and volume loss right more than left, no new infiltrates. Chest x-ray with chronic changes of volume loss and bronchiectasis in right upper lobe, scattered areas of bronchial wall thickening and bronchiectasis and mucous plugging compatible with chronic airway infectious or inflammatory process. Afebrile upon admit. but leukocytosis present. BNP normal. Wheezes on exam. Clinically most consistent with COPD exacerbation.  -Now weaned off oxygen and oxygenating well on room air     COPD exacerbation   Bronchiectasis   Hemoptysis  Known COPD-no history of smoking/ mild airway obstruction on prior PFTs. Managed prior to admission with Advair 250/50 bid, DuoNebs, scheduled albuterol nebs, and normal saline nebs. She recently was treated for COPD with 5 day prednisone course with improvement. Significant wheezes and coarse lung sounds on exam.  -SoluMedrol 62.5 given 7/9/2021 in emergency department, changed to Prednisone 40 mg daily starting 7/10  -Scheduled DuoNebs q 4 hours with prn albuterol nebs  -IV Levaquin 500 mg daily for pseudomonas with bronchiectasis   -Respiratory virus panel negative  -Sputum culture growing Pseudomonas aeruginosa; sensitivities pending  -AFB culture and stain times three sent and pending  -Wheezing and hemoptysis have improved, hypoxia resolved  -Continues to have productive cough, but dyspnea improved  -Pseudomonas aeruginosa growing from sputum culture, sensitive to Levaquin   -Patient discharged on 10 day course Levaquin    Hemoptysis  H/O TB (tuberculosis)  Diagnosed in the 2000's, is s/p treatment. Reports recent bloody sputum, need to rule out active TB.   - AFB sputum stain and culture ordered.   Collected 3 samples spaced out by at least 8 hours.  - Follow up on stain and culture results     Recent syncope or near syncope  History tachy-keri  syndrome  Pacemaker in situ  Reported syncopal event on 7/3/21 while sitting at a table. Told family she felt unwell, then has amnesia of subsequent events and was unresponsive to family for a few minutes. Improved once she was laid on the floor, no subsequent syncopal events since. Admit EKG normal sinus rhythm with first degree AV block.  -Pacer was interogated without etiology for syncope being found     Leukocytosis with left shift   Admit WBC 14.4, ANC 11.6. Afebrile. Chest x-ray without evidence of infection.  UA was unremarkable.  WBC 14.4--13.6 (now on steroids)->11.1-> 9.8 -> 10.0     Paroxysmal atrial fibrillation  History of DVT (deep venous thrombosis)  Long term current use of anticoagulant therapy  Admit EKG shows normal sinus rhythm. Rate controlled prior to admission with metoprolol XL 50 mg bid. Anticoagulated with coumadin, admit INR 4.41.   - Continue metoprolol with holding parameters  - Pharmacy to manage coumadin     SIADH (syndrome of inappropriate ADH production)  Previously diagnosed. Managed prior to admission with NaCl tablets 1 g tid. Admit sodium 132.   - Sodium 132->133->135-> 135     Hypothyroidism  Managed prior to admission with levothyroxine 50 mcg daily, continue.      Esophageal reflux  Managed prior to admission with omeprazole 40 mg daily, continue.      Mild major depression  Anxiety  Managed prior to admission with Remeron 15 mg q hs, continue.      COVID-19 negative  COVID PCR negative 7/9/2021.  Patient has not had a COVID vaccine.    Consultations This Hospital Stay   PHARMACY TO DOSE WARFARIN  SPIRITUAL HEALTH SERVICES IP CONSULT    Code Status   Full Code    Time Spent on this Encounter   I, Aaron Martinez MD, personally saw the patient today and spent greater than 30 minutes discharging this patient.       Aaron Martinez MD  [facility]  ______________________________________________________________________    Physical Exam   Vital  Signs: Temp: 98  F (36.7  C) Temp src: Oral BP: (!) 154/79 Pulse: 66   Resp: 24 SpO2: 94 % O2 Device: None (Room air)    Weight: 131 lbs 13.36 oz       Gen: Kyphotic elderly female, alert and oriented x 3, no acute distressed  HEENT: Atraumatic, normocephalic; sclera non-injected, anicterric; oral mucosa moist, no lesion, no exudate  Lungs: Prolonged expiration, no wheezes, no rhonchi, no rales  Heart: Regular rate, regular rhythm, no gallops, no rubs, no murmurs  GI: Bowel sound normal, no hepatosplenomegaly, no masses, non-tender, non-distended, no guarding, no rebound tenderness  Lymph: No lymphadenopathy, no edema  Skin: No rashes, no chronic venous stasis     Primary Care Physician   Anjum Vidales    Discharge Orders      Reason for your hospital stay    This is a 89 year old female admitted with bronchiectasis and a COPD exacerbation.     Follow-up and recommended labs and tests    Follow up with primary care provider, Anjum Vidales, within 7 days for hospital follow- up.  The following labs/tests are recommended: BMP and CBC.     Activity    Your activity upon discharge: activity as tolerated     Diet    Follow this diet upon discharge: Orders Placed This Encounter      Combination Diet Regular Diet Adult         Significant Results and Procedures   Most Recent 3 CBC's:Recent Labs   Lab Test 07/14/21  0513 07/13/21  0530 07/12/21  0542   WBC 10.0 9.8 11.1*   HGB 10.6* 10.4* 11.2*   MCV 90 89 90    249 258     Most Recent 3 BMP's:Recent Labs   Lab Test 07/14/21  0513 07/13/21  0530 07/12/21  0542    135 135   POTASSIUM 4.5 4.1 4.4   CHLORIDE 100 102 101   CO2 30 28 29   BUN 28 22 25   CR 0.66 0.69 0.57   ANIONGAP 5 5 5   DOUG 8.4* 8.4* 8.7   * 94 95     Most Recent 6 Bacteria Isolates From Any Culture (See EPIC Reports for Culture Details):Recent Labs   Lab Test 07/10/21  0515 01/08/21  1331 11/06/20  1152 05/15/20  1555 07/03/19  1549 05/10/19  1337   CULT Moderate growth  Normal  lexis    Moderate growth  Pseudomonas aeruginosa, mucoid strain  * 10,000 to 50,000 colonies/mL  mixed urogenital lexis  Susceptibility testing not routinely done   >100,000 colonies/mL  Klebsiella pneumoniae  * >100,000 colonies/mL  mixed urogenital lexis  Susceptibility testing not routinely done    GNRF PREVIOUSLY REPORTED IS PART OF MIXURO >100,000 colonies/mL  Klebsiella pneumoniae  * No growth   ,   Results for orders placed or performed during the hospital encounter of 07/09/21   XR Chest Port 1 View    Narrative    CHEST ONE VIEW  7/9/2021 10:02 AM     HISTORY: Cough, shortness of breath.    COMPARISON: October 2, 2020      Impression    IMPRESSION: Upper lobe fibrosis and volume loss right worse than left  again evident. No new infiltrates.    RANJANA NAVARRETE MD         SYSTEM ID:  [id number]   Chest CT w/o contrast    Narrative    CT CHEST WITHOUT CONTRAST 7/9/2021 11:42 AM     HISTORY: Pneumonia, effusion or abscess suspected, x-ray done; COPD,  prior TB.    COMPARISON: None.    TECHNIQUE: Volumetric helical acquisition of CT images of the chest  from the clavicles to the kidneys were acquired without IV contrast.  Radiation dose for this scan was reduced using automated exposure  control, adjustment of the mA and/or kV according to patient size, or  iterative reconstruction technique.    FINDINGS:  Volume loss and bronchiectasis in the right upper lobe is  again evident and unchanged likely chronic. Areas of bronchiectasis  and mucous plugging seen bilaterally right worse than left. Minimal  volume loss in the left apex. Scattered atelectasis and/or fibrosis.  No effusions. There are extensive coronary vascular calcifications  and/or stents consistent with coronary artery disease. There are  moderate atherosclerotic changes of the visualized aorta and its  branches. There is no evidence of aortic aneurysm. Normal heart size.  No acute findings in the visualized upper abdomen. No frankly  destructive bony  lesions.      Impression    IMPRESSION:  1. Chronic changes of volume loss and bronchiectasis in the right  upper lobe are stable since 2018.  2. Scattered areas of bronchial wall thickening with bronchiectasis  and mucous plugging compatible with chronic airways infectious or  inflammatory process.    RANJANA NAVARRETE MD         SYSTEM ID:  [id number]     *Note: Due to a large number of results and/or encounters for the requested time period, some results have not been displayed. A complete set of results can be found in Results Review.       Discharge Medications   Current Discharge Medication List      START taking these medications    Details   levofloxacin (LEVAQUIN) 500 MG tablet Take 1 tablet (500 mg) by mouth every 24 hours  Qty: 10 tablet, Refills: 0    Associated Diagnoses: Bronchiectasis with acute lower respiratory infection (H)         CONTINUE these medications which have NOT CHANGED    Details   Acetaminophen (TYLENOL PO) Take  mg by mouth every 8 hours as needed       albuterol (PROVENTIL) (2.5 MG/3ML) 0.083% neb solution Take 1 vial (2.5 mg) by nebulization 3 times daily  Qty: 360 mL, Refills: 6    Associated Diagnoses: Bronchiectasis without complication (H)      CRANBERRY Take 475 mg by mouth 2 times daily.      fluticasone-salmeterol (ADVAIR) 250-50 MCG/DOSE inhaler Inhale 1 puff into the lungs every 12 hours.   Qty: 60 each, Refills: 3    Associated Diagnoses: Mild persistent asthma without complication      ipratropium - albuterol 0.5 mg/2.5 mg/3 mL (DUONEB) 0.5-2.5 (3) MG/3ML neb solution One neb three times a day routine and once extra if needed.  Qty: 270 vial, Refills: 3    Associated Diagnoses: Chronic obstructive pulmonary disease, unspecified COPD type (H)      levothyroxine (SYNTHROID/LEVOTHROID) 50 MCG tablet TAKE ONE TABLET BY MOUTH ONE TIME DAILY   Qty: 90 tablet, Refills: 2    Associated Diagnoses: Hypothyroidism, unspecified type      metoprolol succinate ER (TOPROL-XL) 25 MG 24  hr tablet TAKE TWO TABLETS BY MOUTH TWICE DAILY   Qty: 360 tablet, Refills: 1    Associated Diagnoses: Essential hypertension, benign      mirtazapine (REMERON) 15 MG tablet Take 1 tablet (15 mg) by mouth At Bedtime  Qty: 90 tablet, Refills: 3    Associated Diagnoses: Nausea; Anxiety      omeprazole (PRILOSEC) 40 MG DR capsule Take 1 capsule (40 mg) by mouth once daily.  Qty: 90 capsule, Refills: 3    Associated Diagnoses: Gastroesophageal reflux disease without esophagitis      order for DME Equipment being ordered: Nebulizer  Qty: 1 each, Refills: 0    Associated Diagnoses: Chronic obstructive pulmonary disease, unspecified COPD type (H)      polyethylene glycol (MIRALAX/GLYCOLAX) Packet Take 17 g by mouth daily       probiotic CAPS Take 1 capsule by mouth every evening       sodium chloride 0.9 % neb solution Inhale 5 mLs into the lungs 3 times daily Use after albuterol in nebulizer to thin secretions.  Qty: 450 mL, Refills: 5    Associated Diagnoses: Bronchiectasis without complication (H)      sodium chloride 1 GM tablet TAKE ONE TABLET BY MOUTH THREE TIMES DAILY  Qty: 100 tablet, Refills: 5    Associated Diagnoses: Hyponatremia      warfarin ANTICOAGULANT (COUMADIN) 1 MG tablet take 2 tablets (2 mg) by mouth every Mon, Fri, then take 1 tablet (1 mg) all other days or as directed by the INR clinic.  Qty: 115 tablet, Refills: 0    Associated Diagnoses: Intermittent atrial fibrillation (H)      PROAIR  (90 Base) MCG/ACT inhaler Inhale 2 puffs into the lungs every 6 hours as needed for shortness of breath / dyspnea  Qty: 8.5 g, Refills: 11    Associated Diagnoses: Mild persistent asthma without complication           Allergies   Allergies   Allergen Reactions     Cephalosporins Nausea     Cefzil     Doxycycline Nausea and Vomiting     Sulfa Drugs Nausea     Penicillins Rash     PCN

## 2021-07-14 NOTE — TELEPHONE ENCOUNTER
ANTICOAGULATION  MANAGEMENT: Discharge Review    Ellie Leigh chart reviewed for anticoagulation continuity of care    Hospital Admission on 7/9 for bronchiectasis and a COPD exacerbation.    Discharge disposition: Home    Results:    Recent labs: (last 7 days)     07/09/21  0925 07/10/21  0505 07/11/21  0517 07/12/21  0542 07/13/21  0530 07/14/21  0513   INR 4.41* 3.80* 2.85* 2.06* 2.09* 2.53*     Anticoagulation inpatient management:     less warfarin administered than maintenance regimen    Anticoagulation discharge instructions:     Warfarin dosing: home regimen continued   Bridging: No   INR goal change: No      Medication changes affecting anticoagulation: Yes: levofloxacin    Additional factors affecting anticoagulation: Yes: COPD    Plan     No adjustment to anticoagulation plan needed    Patient not contacted - lab appt 7/15    Anticoagulation Calendar updated    Kylah Dorsey RN

## 2021-07-15 ENCOUNTER — LAB (OUTPATIENT)
Dept: LAB | Facility: CLINIC | Age: 86
End: 2021-07-15
Payer: COMMERCIAL

## 2021-07-15 ENCOUNTER — PATIENT OUTREACH (OUTPATIENT)
Dept: NURSING | Facility: CLINIC | Age: 86
End: 2021-07-15
Payer: COMMERCIAL

## 2021-07-15 ENCOUNTER — ANTICOAGULATION THERAPY VISIT (OUTPATIENT)
Dept: ANTICOAGULATION | Facility: CLINIC | Age: 86
End: 2021-07-15

## 2021-07-15 DIAGNOSIS — Z79.01 LONG TERM CURRENT USE OF ANTICOAGULANT THERAPY: ICD-10-CM

## 2021-07-15 DIAGNOSIS — I82.4Y9 DEEP VEIN THROMBOSIS (DVT) OF PROXIMAL LOWER EXTREMITY, UNSPECIFIED CHRONICITY, UNSPECIFIED LATERALITY (H): Chronic | ICD-10-CM

## 2021-07-15 DIAGNOSIS — I82.409 DVT (DEEP VENOUS THROMBOSIS) (H): Primary | ICD-10-CM

## 2021-07-15 DIAGNOSIS — I48.0 INTERMITTENT ATRIAL FIBRILLATION (H): ICD-10-CM

## 2021-07-15 LAB — INR BLD: 3.2 (ref 0.9–1.1)

## 2021-07-15 PROCEDURE — 85610 PROTHROMBIN TIME: CPT

## 2021-07-15 PROCEDURE — 36416 COLLJ CAPILLARY BLOOD SPEC: CPT

## 2021-07-15 NOTE — LETTER
[facility] CARE COORDINATION  [address] [facility] [address]    July 15, 2021    Ellie Leigh  [address]  [facility] [address]      Dear Ellie,    I am a clinic community health worker who works with Anjum Vidales MD at [facility]. I wanted to thank you for spending the time to talk with me.  Below is a description of clinic care coordination and how I can further assist you.      The clinic care coordination team is made up of a registered nurse,  and community health worker who understand the health care system. The goal of clinic care coordination is to help you manage your health and improve access to the health care system in the most efficient manner. The team can assist you in meeting your health care goals by providing education, coordinating services, strengthening the communication among your providers and supporting you with any resource needs.    Please feel free to contact me and the Community Health Worker at [phone] with any questions or concerns. We are focused on providing you with the highest-quality healthcare experience possible and that all starts with you.     Sincerely,   Sandra MORGAN, Community Health Worker  Clinic Care Coordination  [facility] : [place], [place] & [place]  Phone: [phone]

## 2021-07-15 NOTE — PROGRESS NOTES
Clinic Care Coordination Contact  Community Health Worker Initial Outreach       CHW Additional Questions  If ED/Hospital discharge, follow-up appointment scheduled as recommended?: Yes  Medication changes made following ED/Hospital discharge?: No  MyChart active?: Yes  Patient sent Social Determinants of Health questionnaire?: No    Patient accepts CC: No, Patietns daughter declined CCC. She stated mom was doing good.. Patient will be sent Care Coordination introduction letter for future reference.     Sandra MORGAN Community Health Worker  Clinic Care Coordination  [facility] : [place], [place] & [place]  Phone: [phone]    Inpatient to outpatient

## 2021-07-15 NOTE — PROGRESS NOTES
ANTICOAGULATION MANAGEMENT     Ellie Leigh 89 year old female is on warfarin with supratherapeutic INR result. (Goal INR 2.0-3.0)    Recent labs: (last 7 days)     07/15/21  1059   INR 3.2*       ASSESSMENT     Source(s): Chart Review and Patient/Caregiver Call       Warfarin doses taken: Warfarin taken as instructed    Diet: No new diet changes identified    New illness, injury, or hospitalization: Yes: patient was discharged from the hospital yesterday with     Medication/supplement changes: Levaquin 10 day course (dates: 7/14-7/24) which has potential for interaction; increasing INR    Signs or symptoms of bleeding or clotting: Yes: during hospital stay patient was having hemoptysis    Previous INR: Supratherapeutic    Additional findings: Patient's INR was also elevated prior to her hospitalization so likely will need a decrease in her maintenance dose once she is finished with her course of antibiotics.      PLAN     Recommended plan for temporary change(s) affecting INR     Dosing Instructions: Hold dose today, take 1 mg Friday, and 0.5 mg Sat and Sun with next INR in 4 days       Summary  As of 7/15/2021    Full warfarin instructions:  7/15: Hold; 7/16: 1 mg; 7/17: 0.5 mg; 7/18: 0.5 mg; Otherwise 2 mg every Mon, Fri; 1 mg all other days   Next INR check:  7/19/2021             Telephone call with  Suyapa who verbalizes understanding and agrees to plan    Check at provider office visit    Education provided: Importance of following up for INR monitoring at instructed interval and Potential interaction between warfarin and Levaquin    Plan made with [facility] Pharmacist Edelmira Persaud RN  Anticoagulation Clinic  7/15/2021    _______________________________________________________________________     Anticoagulation Episode Summary     Current INR goal:  2.0-3.0   TTR:  61.2 % (12 mo)   Target end date:  Indefinite   Send INR reminders to:  GARRY Sood    Atrial  fibrillation with rapid ventricular response (H) (Resolved) [I48.91]  DVT (deep venous thrombosis) [I82.409]  Long term current use of anticoagulant therapy [Z79.01]  Intermittent atrial fibrillation (H) [I48.0]           Comments:  * Taking Celebrex PRN            Anticoagulation Care Providers     Provider Role Specialty Phone number    Anjmu Vidales MD Referring Family Medicine [phone]

## 2021-07-19 ENCOUNTER — ANTICOAGULATION THERAPY VISIT (OUTPATIENT)
Dept: ANTICOAGULATION | Facility: CLINIC | Age: 86
End: 2021-07-19

## 2021-07-19 ENCOUNTER — OFFICE VISIT (OUTPATIENT)
Dept: FAMILY MEDICINE | Facility: CLINIC | Age: 86
End: 2021-07-19
Payer: COMMERCIAL

## 2021-07-19 VITALS
BODY MASS INDEX: 28.01 KG/M2 | DIASTOLIC BLOOD PRESSURE: 82 MMHG | OXYGEN SATURATION: 96 % | WEIGHT: 134 LBS | HEART RATE: 74 BPM | SYSTOLIC BLOOD PRESSURE: 136 MMHG

## 2021-07-19 DIAGNOSIS — E78.5 HYPERLIPIDEMIA LDL GOAL <130: ICD-10-CM

## 2021-07-19 DIAGNOSIS — Z79.01 LONG TERM CURRENT USE OF ANTICOAGULANT THERAPY: ICD-10-CM

## 2021-07-19 DIAGNOSIS — J44.9 CHRONIC OBSTRUCTIVE PULMONARY DISEASE, UNSPECIFIED COPD TYPE (H): ICD-10-CM

## 2021-07-19 DIAGNOSIS — J96.01 ACUTE RESPIRATORY FAILURE WITH HYPOXIA (H): Primary | ICD-10-CM

## 2021-07-19 DIAGNOSIS — J44.1 COPD EXACERBATION (H): ICD-10-CM

## 2021-07-19 DIAGNOSIS — I82.409 DVT (DEEP VENOUS THROMBOSIS) (H): Primary | ICD-10-CM

## 2021-07-19 DIAGNOSIS — I82.4Y9 DEEP VEIN THROMBOSIS (DVT) OF PROXIMAL LOWER EXTREMITY, UNSPECIFIED CHRONICITY, UNSPECIFIED LATERALITY (H): Chronic | ICD-10-CM

## 2021-07-19 DIAGNOSIS — I48.0 INTERMITTENT ATRIAL FIBRILLATION (H): ICD-10-CM

## 2021-07-19 LAB
ANION GAP SERPL CALCULATED.3IONS-SCNC: 4 MMOL/L (ref 3–14)
BUN SERPL-MCNC: 18 MG/DL (ref 7–30)
CALCIUM SERPL-MCNC: 8.3 MG/DL (ref 8.5–10.1)
CHLORIDE BLD-SCNC: 101 MMOL/L (ref 94–109)
CHOLEST SERPL-MCNC: 167 MG/DL
CO2 SERPL-SCNC: 29 MMOL/L (ref 20–32)
CREAT SERPL-MCNC: 0.65 MG/DL (ref 0.52–1.04)
ERYTHROCYTE [DISTWIDTH] IN BLOOD BY AUTOMATED COUNT: 14.5 % (ref 10–15)
GFR SERPL CREATININE-BSD FRML MDRD: 78 ML/MIN/1.73M2
GLUCOSE BLD-MCNC: 88 MG/DL (ref 70–99)
HCT VFR BLD AUTO: 31.2 % (ref 35–47)
HDLC SERPL-MCNC: 66 MG/DL
HGB BLD-MCNC: 10.1 G/DL (ref 11.7–15.7)
HOLD SPECIMEN: NORMAL
INR BLD: 2.2 (ref 0.9–1.1)
LDLC SERPL CALC-MCNC: 75 MG/DL
MCH RBC QN AUTO: 29.8 PG (ref 26.5–33)
MCHC RBC AUTO-ENTMCNC: 32.4 G/DL (ref 31.5–36.5)
MCV RBC AUTO: 92 FL (ref 78–100)
NONHDLC SERPL-MCNC: 101 MG/DL
PLATELET # BLD AUTO: 252 10E3/UL (ref 150–450)
POTASSIUM BLD-SCNC: 4.5 MMOL/L (ref 3.4–5.3)
RBC # BLD AUTO: 3.39 10E6/UL (ref 3.8–5.2)
SODIUM SERPL-SCNC: 134 MMOL/L (ref 133–144)
TRIGL SERPL-MCNC: 132 MG/DL
WBC # BLD AUTO: 5.1 10E3/UL (ref 4–11)

## 2021-07-19 PROCEDURE — 85027 COMPLETE CBC AUTOMATED: CPT | Performed by: FAMILY MEDICINE

## 2021-07-19 PROCEDURE — 80048 BASIC METABOLIC PNL TOTAL CA: CPT | Performed by: FAMILY MEDICINE

## 2021-07-19 PROCEDURE — 85610 PROTHROMBIN TIME: CPT | Performed by: FAMILY MEDICINE

## 2021-07-19 PROCEDURE — 36415 COLL VENOUS BLD VENIPUNCTURE: CPT | Performed by: FAMILY MEDICINE

## 2021-07-19 PROCEDURE — 99495 TRANSJ CARE MGMT MOD F2F 14D: CPT | Performed by: FAMILY MEDICINE

## 2021-07-19 PROCEDURE — 80061 LIPID PANEL: CPT | Performed by: FAMILY MEDICINE

## 2021-07-19 ASSESSMENT — PATIENT HEALTH QUESTIONNAIRE - PHQ9
SUM OF ALL RESPONSES TO PHQ QUESTIONS 1-9: 2
5. POOR APPETITE OR OVEREATING: NOT AT ALL

## 2021-07-19 ASSESSMENT — ANXIETY QUESTIONNAIRES
3. WORRYING TOO MUCH ABOUT DIFFERENT THINGS: NOT AT ALL
2. NOT BEING ABLE TO STOP OR CONTROL WORRYING: NOT AT ALL
GAD7 TOTAL SCORE: 0
6. BECOMING EASILY ANNOYED OR IRRITABLE: NOT AT ALL
5. BEING SO RESTLESS THAT IT IS HARD TO SIT STILL: NOT AT ALL
1. FEELING NERVOUS, ANXIOUS, OR ON EDGE: NOT AT ALL
7. FEELING AFRAID AS IF SOMETHING AWFUL MIGHT HAPPEN: NOT AT ALL

## 2021-07-19 NOTE — PROGRESS NOTES
ASSESSMENT:     ICD-10-CM    1. Acute respiratory failure with hypoxia (H)  J96.01    2. COPD exacerbation (H)  J44.1    3. Chronic obstructive pulmonary disease, unspecified COPD type (H)  J44.9 CBC with platelets     Basic metabolic panel  (Ca, Cl, CO2, Creat, Gluc, K, Na, BUN)     Basic metabolic panel  (Ca, Cl, CO2, Creat, Gluc, K, Na, BUN)     CBC with platelets   4. Hyperlipidemia LDL goal <130  E78.5 Lipid panel reflex to direct LDL Fasting     CANCELED: Cholesterol   5. Intermittent atrial fibrillation (H)  I48.0 INR point of care   6. Deep vein thrombosis (DVT) of proximal lower extremity, unspecified chronicity, unspecified laterality (H)  I82.4Y9 INR point of care   7. Long term current use of anticoagulant therapy  Z79.01 INR point of care      Doing OK after hospital stay.  Some stomach irritation from the antibiotics.  Still has 5 days left on antibiotics. Sputum culture reviewed.  No other non-IV options.     PLAN: Continue the Advair twice daily  Continue the Duonebs at least three times daily.   You can use addition albuteral inhaler or Duonebs every 4 hours if needed.  Complete the 10 days on antibiotic.   REcheck CBC and chem 8    Recheck with worse breathing, fever.     (Z79.01) Long term current use of anticoagulant therapy  Comment:   Plan: INR today.      She has requested cholesterol check today.  Last one in 2011.    At age 89 questionable benefit of cholesterol lowering medication. We may elect not to treat even if high.         Subjective   Ellie is a 89 year old who presents for the following health issues   Chief Complaint   Patient presents with     Hospital F/U      Hospital Follow-up Visit:    Hospital/Nursing Home/IP Rehab Facility: [facility]  Date of Admission:7/9  Date of Discharge: 7/14  Reason(s) for Admission:     dyspnea on exertion and hypoxia with exertion and was found to have a COPD exacerbation for which she is being admitted for further  evaluation and treatment.  Needs recheck of CBC and BMP  Was your hospitalization related to COVID-19? No   Problems taking medications regularly:  None  Medication changes since discharge:   levofloxacin (LEVAQUIN) 500 MG tablet Take 1 tablet (500 mg) by mouth every 24 hours  Qty: 10 tablet, Refills: 0     Associated Diagnoses: Bronchiectasis with acute lower respiratory infection (H)       Problems adhering to non-medication therapy:  None    Summary of hospitalization:  [facility] hospital discharge summary reviewed  See abstracted notes from recent hospital stay below.   Diagnostic Tests/Treatments reviewed.  Follow up needed: chem8 and cbc  Other Healthcare Providers Involved in Patient s Care:         None  Update since discharge: improved. Post Discharge Medication Reconciliation: discharge medications reconciled, continue medications without change.  Plan of care communicated with patient and daughter       She was hospitalized recently for acute respiratory failure exacerbation of COPD.  I seen her in clinic on July 2 for cough.  I had treated her with a course of prednisone and increased her DuoNeb's.  On July 9 she came to the emergency room with increased shortness of breath and was admitted to the hospital.  She also had hemoptysis.    Today she is in for hospital follow-up.    See abstracted notes from recent hospital stay below.     She reports feeling better.   Cough imprroved some.  Breathing about the same.   NO further hemoptysis.   No fevers.    No chest pains.   Some dyspnea on exertion walking 20'  She had side effects from levofloxacin-upset stomach.  No emesis or diarrhea.    Uses Advair twice daily.  Doing Duonebs three times daily.  albuteral inhaler prn.     Strength decreased, energy OK.   Not back to normal yet.      Wanted cholesterol checked today.   Patient Active Problem List   Diagnosis     Sensorineural hearing loss, asymmetrical     Generalized osteoarthrosis, unspecified  site     PERSONAL HISTORY OF MALIGNANCY- BREAST     Esophageal reflux     Chronic allergic conjunctivitis     Chronic seasonal allergic rhinitis     Hyperlipidemia LDL goal <130     Chronic constipation     Osteoporosis     Health Care Home     Right arm weakness     Ulnar neuropathy     Headache     Mild major depression     DVT (deep venous thrombosis)     Anxiety     Cervical vertebral fracture (H)     Intermittent atrial fibrillation (H)     Squamous cell carcinoma in situ of skin of face     SK (seborrheic keratosis)     Hypothyroidism     Recurrent UTI     History of skin cancer     BPPV (benign paroxysmal positional vertigo)     Benign essential HTN     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)     Positive fecal occult blood test     COPD (chronic obstructive pulmonary disease) (H)     Infectious colitis, enteritis and gastroenteritis     Advance Care Planning     Syncope     Long term current use of anticoagulant therapy     Mild persistent asthma without complication     Irritable bowel syndrome without diarrhea     Nausea     Back pain     H/O TB (tuberculosis)     Acute respiratory failure with hypoxia (H)     COPD exacerbation (H)     Cardiac pacemaker in situ      ROS:: No urinary frequency or dysuria, bladder or kidney problems  Musculoskeletal: No significant muscle or joint pains   PHQ9 score today= 2  generalized anxiety disorder scale score today= 0    OBJECTIVE:Blood pressure 136/82, pulse 74, weight 60.8 kg (134 lb), last menstrual period 06/15/1985, SpO2 96 %, not currently breastfeeding. BMI=Body mass index is 28.01 kg/m .  GENERAL APPEARANCE ADULT: Alert, no acute distress.  Walks with a walker  NECK: No adenopathy,masses or thyromegaly  RESP: Wheezy rales clearing with deep breaths.   CV: normal rate, regular rhythm, no murmur or gallop  MS: extremities normal, no peripheral edema       =====================================================  See abstracted notes from recent  hospital stay [facility]  Hospitalist Discharge Summary       Date of Admission:  7/9/2021  Date of Discharge:  7/14/2021 12:19 PM    Discharge Diagnoses        Acute respiratory failure with hypoxia  COPD exacerbation   Bronchiectasis   Hemoptysis  Hemoptysis  H/O TB (tuberculosis)   Recent syncope or near syncope  History tachy-keri syndrome  Pacemaker in situ  Leukocytosis with left shift   Paroxysmal atrial fibrillation  History of DVT (deep venous thrombosis)  Long term current use of anticoagulant therapy  SIADH (syndrome of inappropriate ADH production)   Hypothyroidism   Esophageal reflux  Mild major depression  Anxiety  COVID-19 negative    Follow-ups Needed After Discharge     Follow-up Appointments     Follow-up and recommended labs and tests      Follow up with primary care provider, Anjum Vidales, within 7 days for   hospital follow- up.  The following labs/tests are recommended: BMP and   CBC.       Hospital Course     Ellie Leigh is a 89 year old female admitted on 7/9/2021. She presented to the emergency department for evaluation of dyspnea on exertion and hypoxia with exertion and was found to have a COPD exacerbation for which she is being admitted for further evaluation and treatment.     Acute respiratory failure with hypoxia  Presents with O2 sats 85% with exertion upon admit but recovered to low 90's on room air. Not on supplemental O2 at baseline. Chest x-ray with upper lobe fibrosis and volume loss right more than left, no new infiltrates. Chest x-ray with chronic changes of volume loss and bronchiectasis in right upper lobe, scattered areas of bronchial wall thickening and bronchiectasis and mucous plugging compatible with chronic airway infectious or inflammatory process. Afebrile upon admit. but leukocytosis present. BNP normal. Wheezes on exam. Clinically most consistent with COPD exacerbation.  -Now weaned off oxygen and oxygenating  well on room air     COPD exacerbation   Bronchiectasis   Hemoptysis  Known COPD-no history of smoking/ mild airway obstruction on prior PFTs. Managed prior to admission with Advair 250/50 bid, DuoNebs, scheduled albuterol nebs, and normal saline nebs. She recently was treated for COPD with 5 day prednisone course with improvement. Significant wheezes and coarse lung sounds on exam.  -SoluMedrol 62.5 given 7/9/2021 in emergency department, changed to Prednisone 40 mg daily starting 7/10  -Scheduled DuoNebs q 4 hours with prn albuterol nebs  -IV Levaquin 500 mg daily for pseudomonas with bronchiectasis   -Respiratory virus panel negative  -Sputum culture growing Pseudomonas aeruginosa; sensitivities pending  -AFB culture and stain times three sent and pending  -Wheezing and hemoptysis have improved, hypoxia resolved  -Continues to have productive cough, but dyspnea improved  -Pseudomonas aeruginosa growing from sputum culture, sensitive to Levaquin   -Patient discharged on 10 day course Levaquin     Hemoptysis  H/O TB (tuberculosis)  Diagnosed in the 2000's, is s/p treatment. Reports recent bloody sputum, need to rule out active TB.   - AFB sputum stain and culture ordered.   Collected 3 samples spaced out by at least 8 hours.  - Follow up on stain and culture results     Recent syncope or near syncope  History tachy-keri syndrome  Pacemaker in situ  Reported syncopal event on 7/3/21 while sitting at a table. Told family she felt unwell, then has amnesia of subsequent events and was unresponsive to family for a few minutes. Improved once she was laid on the floor, no subsequent syncopal events since. Admit EKG normal sinus rhythm with first degree AV block.  -Pacer was interogated without etiology for syncope being found     Leukocytosis with left shift   Admit WBC 14.4, ANC 11.6. Afebrile. Chest x-ray without evidence of infection.  UA was unremarkable.  WBC 14.4--13.6 (now on steroids)->11.1-> 9.8 ->  10.0     Paroxysmal atrial fibrillation  History of DVT (deep venous thrombosis)  Long term current use of anticoagulant therapy  Admit EKG shows normal sinus rhythm. Rate controlled prior to admission with metoprolol XL 50 mg bid. Anticoagulated with coumadin, admit INR 4.41.   - Continue metoprolol with holding parameters  - Pharmacy to manage coumadin     SIADH (syndrome of inappropriate ADH production)  Previously diagnosed. Managed prior to admission with NaCl tablets 1 g tid. Admit sodium 132.   - Sodium 132->133->135-> 135     Hypothyroidism  Managed prior to admission with levothyroxine 50 mcg daily, continue.      Esophageal reflux  Managed prior to admission with omeprazole 40 mg daily, continue.      Mild major depression  Anxiety  Managed prior to admission with Remeron 15 mg q hs, continue.      COVID-19 negative  COVID PCR negative 7/9/2021.  Patient has not had a COVID vaccine.

## 2021-07-19 NOTE — PROGRESS NOTES
ADDENDUM:  left with [place] lab to add patient to schedule on Fri 7/23 afternoon to recheck INR. Awaiting call back from lab. Gina Persaud RN 07/19/21 at 10:20 AM      Spoke with Cande in [place] to add patient to schedule in afternoon close to another INR patient. Left  for Suyapa that writer put patient on schedule at 2:00 PM and to call back to confirm message received.    Gina Persaud RN 07/19/21 at 12:11 PM      ANTICOAGULATION MANAGEMENT     Ellie Leigh 89 year old female is on warfarin with therapeutic INR result. (Goal INR 2.0-3.0)    Recent labs: (last 7 days)     07/19/21  0800   INR 2.2*       ASSESSMENT     Source(s): Patient/Caregiver Call       Warfarin doses taken: Warfarin taken as instructed    Diet: No new diet changes identified    New illness, injury, or hospitalization: No    Medication/supplement changes: Patient continues on her 10 day course of Levaquin, started on 7/14    Signs or symptoms of bleeding or clotting: No    Previous INR: Supratherapeutic    Additional findings: None     PLAN     Recommended plan for temporary change(s) affecting INR     Dosing Instructions: Take 1 mg today, 1 mg Tues, 0.5 mg Wed, 1 mg Thursday with next INR in 4 days       Summary  As of 7/19/2021    Full warfarin instructions:  7/19: 1 mg; 7/21: 0.5 mg; Otherwise 2 mg every Mon, Fri; 1 mg all other days   Next INR check:  7/23/2021             Telephone call with  Suyapa who verbalizes understanding and agrees to plan    Lab visit scheduled    Education provided: Potential interaction between warfarin and levaquin    Plan made with [facility] Pharmacist Edelmira Persaud, RN  Anticoagulation Clinic  7/19/2021    _______________________________________________________________________     Anticoagulation Episode Summary     Current INR goal:  2.0-3.0   TTR:  61.0 % (12 mo)   Target end date:  Indefinite   Send INR reminders to:  GARRY Sood    Atrial  fibrillation with rapid ventricular response (H) (Resolved) [I48.91]  DVT (deep venous thrombosis) [I82.409]  Long term current use of anticoagulant therapy [Z79.01]  Intermittent atrial fibrillation (H) [I48.0]           Comments:  * Taking Celebrex PRN            Anticoagulation Care Providers     Provider Role Specialty Phone number    Anjum Vidales MD Referring Family Medicine [phone]

## 2021-07-19 NOTE — PATIENT INSTRUCTIONS
SESSMENT:     ICD-10-CM    1. Acute respiratory failure with hypoxia (H)  J96.01    2. COPD exacerbation (H)  J44.1    3. Chronic obstructive pulmonary disease, unspecified COPD type (H)  J44.9 CBC with platelets     Basic metabolic panel  (Ca, Cl, CO2, Creat, Gluc, K, Na, BUN)     Basic metabolic panel  (Ca, Cl, CO2, Creat, Gluc, K, Na, BUN)     CBC with platelets   4. Hyperlipidemia LDL goal <130  E78.5 Lipid panel reflex to direct LDL Fasting     CANCELED: Cholesterol   5. Intermittent atrial fibrillation (H)  I48.0 INR point of care   6. Deep vein thrombosis (DVT) of proximal lower extremity, unspecified chronicity, unspecified laterality (H)  I82.4Y9 INR point of care   7. Long term current use of anticoagulant therapy  Z79.01 INR point of care      Doing OK after hospital stay.  Some stomach irritation from the antibiotics.  Still has 5 days left on antibiotics. Sputum culture reviewed.  No other non-IV options.     PLAN: Continue the Advair twice daily  Continue the Duonebs at least three times daily.   You can use addition albuteral inhaler or Duonebs every 4 hours if needed.  Complete the 10 days on antibiotic.   REcheck CBC and chem 8    Recheck with worse breathing, fever.     (Z79.01) Long term current use of anticoagulant therapy  Comment:   Plan: INR today.      She has requested cholesterol check today.  Last one in 2011.    At age 89 questionable benefit of cholesterol lowering medication. We may elect not to treat even if high.

## 2021-07-19 NOTE — RESULT ENCOUNTER NOTE
Ellie Perry's lipid readings are all good.  The blood chemistries (Basic metabolic panel) are all normal including electrolytes (salt balances in the blood), blood glucose and kidney tests.   She has anemia similar to past readings, slightly worse.   PLAN: No new changes in treatment recommended.  MARCY DAUGHERTY MD

## 2021-07-20 LAB — HOLD SPECIMEN: NORMAL

## 2021-07-20 ASSESSMENT — ANXIETY QUESTIONNAIRES: GAD7 TOTAL SCORE: 0

## 2021-07-21 ENCOUNTER — OFFICE VISIT (OUTPATIENT)
Dept: DERMATOLOGY | Facility: CLINIC | Age: 86
End: 2021-07-21
Payer: COMMERCIAL

## 2021-07-21 VITALS — HEART RATE: 75 BPM | OXYGEN SATURATION: 95 % | BODY MASS INDEX: 28.01 KG/M2 | WEIGHT: 134 LBS

## 2021-07-21 DIAGNOSIS — L82.1 SEBORRHEIC KERATOSIS: Primary | ICD-10-CM

## 2021-07-21 DIAGNOSIS — D23.9 DERMAL NEVUS: ICD-10-CM

## 2021-07-21 DIAGNOSIS — C44.319 BASAL CELL CARCINOMA (BCC) OF FOREHEAD: ICD-10-CM

## 2021-07-21 DIAGNOSIS — L81.4 LENTIGO: ICD-10-CM

## 2021-07-21 DIAGNOSIS — D18.01 ANGIOMA OF SKIN: ICD-10-CM

## 2021-07-21 PROCEDURE — 99203 OFFICE O/P NEW LOW 30 MIN: CPT | Mod: 25 | Performed by: DERMATOLOGY

## 2021-07-21 PROCEDURE — 13132 CMPLX RPR F/C/C/M/N/AX/G/H/F: CPT | Performed by: DERMATOLOGY

## 2021-07-21 PROCEDURE — 17311 MOHS 1 STAGE H/N/HF/G: CPT | Performed by: DERMATOLOGY

## 2021-07-21 NOTE — PROGRESS NOTES
Ellie Leigh , a 89 year old year old female patient, I was asked to see by Dr. Vidales for basal cell carcinoma on mid forehead.  Patient states this has been present for sometime.  Patient reports the following symptoms:  Not healing .  Patient reports the following previous treatments none.  Patient reports the following modifying factors none.  Associated symptoms: none.  Patient has no other skin complaints today.  Remainder of the HPI, Meds, PMH, Allergies, FH, and SH was reviewed in chart.      Past Medical History:   Diagnosis Date     Basal cell carcinoma      Breast cancer (H)      COPD (chronic obstructive pulmonary disease) (H)     ct 2014 does show some bronchiectaisi RUL as well as fibronodular disease bilat upper lobes     Dust allergy      DVT (deep vein thrombosis) in pregnancy     after neck fracture when in hospital     HTN (hypertension)      Hyperlipidemia LDL goal <130 10/31/2010     Hyponatremia     chronic     Hypothyroid      Intermittent atrial fibrillation (H) 12/1/2011    hx tachy-keri, has pacer, on chronic coumadin     Loose body in joint 4/15/2011     Neck fracture (H)     after a fall     Syncope 5/12/2013    multiple hospital visits, lates 3/2016, 6/2016, 7/2016 with no clear cause found       Past Surgical History:   Procedure Laterality Date     APPENDECTOMY       ARTHROSCOPY KNEE  4/15/2011    Procedure:ARTHROSCOPY KNEE; removal of loose body; Surgeon:LEY, JEFFREY DUANE; Location:[facility]     CHOLECYSTECTOMY       ESOPHAGOSCOPY, GASTROSCOPY, DUODENOSCOPY (EGD), COMBINED  6/9/2014    Procedure: COMBINED ESOPHAGOSCOPY, GASTROSCOPY, DUODENOSCOPY (EGD);  Surgeon: Gilberto Richard MD;  Location: [facility]     ESOPHAGOSCOPY, GASTROSCOPY, DUODENOSCOPY (EGD), COMBINED N/A 10/18/2019    Procedure: ESOPHAGOGASTRODUODENOSCOPY (EGD);  Surgeon: Noe Sams DO;  Location: [facility]     IMPLANT PACEMAKER  5/21/2013    Biotronik Moder [id number] Serial#[id number]     INJECT EPIDURAL LUMBAR   3/23/2011    INJECT EPIDURAL LUMBAR performed by GENERIC ANESTHESIA PROVIDER at [facility]     JOINT REPLACEMENT, HIP RT/LT      Joint Replacement Hip Rt     MASTECTOMY, SIMPLE RT/LT/CAITLYN      Left breast - following breast ca     OTHER SURGICAL HISTORY      C1-C2 fusion after fx      SURGICAL HISTORY OF -   05/22/2001    Colonoscopy     TONSILLECTOMY          Family History   Problem Relation Age of Onset     Cerebrovascular Disease Mother      Heart Disease Mother         MI     Cerebrovascular Disease Father      Heart Disease Father         MI     Respiratory Maternal Grandfather         TB     Neurologic Disorder Brother         ALS     Heart Disease Brother      Cerebrovascular Disease Brother      Breast Cancer Daughter         age:49     Asthma Brother      Cancer Brother         brain and lung     Cancer Daughter         thyroid       Social History     Socioeconomic History     Marital status:      Spouse name: Not on file     Number of children: Not on file     Years of education: Not on file     Highest education level: Not on file   Occupational History     Employer: RETIRED   Tobacco Use     Smoking status: Passive Smoke Exposure - Never Smoker     Smokeless tobacco: Never Used   Substance and Sexual Activity     Alcohol use: No     Drug use: No     Sexual activity: Not Currently   Other Topics Concern     Parent/sibling w/ CABG, MI or angioplasty before 65F 55M? No   Social History Narrative    Lives in [facility].      Daughters helping since her accident, getting home physical therapy.      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      - 2000    5 daughters, 11 grandchildren, 3 great granchildren     Social Determinants of Health     Financial Resource Strain:      Difficulty of Paying Living Expenses:    Food Insecurity:      Worried About Running Out of Food in the Last Year:      Ran Out of Food in the Last Year:    Transportation Needs:      Lack of  Transportation (Medical):      Lack of Transportation (Non-Medical):    Physical Activity:      Days of Exercise per Week:      Minutes of Exercise per Session:    Stress:      Feeling of Stress :    Social Connections:      Frequency of Communication with Friends and Family:      Frequency of Social Gatherings with Friends and Family:      Attends Yazdanism Services:      Active Member of Clubs or Organizations:      Attends Club or Organization Meetings:      Marital Status:    Intimate Partner Violence:      Fear of Current or Ex-Partner:      Emotionally Abused:      Physically Abused:      Sexually Abused:        Outpatient Encounter Medications as of 7/21/2021   Medication Sig Dispense Refill     Acetaminophen (TYLENOL PO) Take  mg by mouth every 8 hours as needed        albuterol (PROVENTIL) (2.5 MG/3ML) 0.083% neb solution Take 1 vial (2.5 mg) by nebulization 3 times daily 360 mL 6     CRANBERRY Take 475 mg by mouth 2 times daily.       fluticasone-salmeterol (ADVAIR) 250-50 MCG/DOSE inhaler Inhale 1 puff into the lungs every 12 hours.  60 each 3     ipratropium - albuterol 0.5 mg/2.5 mg/3 mL (DUONEB) 0.5-2.5 (3) MG/3ML neb solution One neb three times a day routine and once extra if needed. 270 vial 3     levofloxacin (LEVAQUIN) 500 MG tablet Take 1 tablet (500 mg) by mouth every 24 hours 10 tablet 0     levothyroxine (SYNTHROID/LEVOTHROID) 50 MCG tablet TAKE ONE TABLET BY MOUTH ONE TIME DAILY  90 tablet 2     metoprolol succinate ER (TOPROL-XL) 25 MG 24 hr tablet TAKE TWO TABLETS BY MOUTH TWICE DAILY  360 tablet 1     mirtazapine (REMERON) 15 MG tablet Take 1 tablet (15 mg) by mouth At Bedtime 90 tablet 3     omeprazole (PRILOSEC) 40 MG DR capsule Take 1 capsule (40 mg) by mouth once daily. 90 capsule 3     order for DME Equipment being ordered: Nebulizer 1 each 0     polyethylene glycol (MIRALAX/GLYCOLAX) Packet Take 17 g by mouth daily        PROAIR  (90 Base) MCG/ACT inhaler Inhale 2 puffs  into the lungs every 6 hours as needed for shortness of breath / dyspnea 8.5 g 11     probiotic CAPS Take 1 capsule by mouth every evening        sodium chloride 0.9 % neb solution Inhale 5 mLs into the lungs 3 times daily Use after albuterol in nebulizer to thin secretions. 450 mL 5     sodium chloride 1 GM tablet TAKE ONE TABLET BY MOUTH THREE TIMES DAILY 100 tablet 5     warfarin ANTICOAGULANT (COUMADIN) 1 MG tablet Take 1 tablet (1 mg) 7/14 and recheck INR on 7/15 as previously scheduled. Then dose as directed by the INR clinic. 115 tablet 0     No facility-administered encounter medications on file as of 7/21/2021.             Review Of Systems  Skin: As above  Eyes: negative  Ears/Nose/Throat: negative  Respiratory: No shortness of breath, dyspnea on exertion, cough, or hemoptysis  Cardiovascular: negative  Gastrointestinal: negative  Genitourinary: negative  Musculoskeletal: negative  Neurologic: negative  Psychiatric: negative  Hematologic/Lymphatic/Immunologic: negative  Endocrine: negative      O:   NAD, WDWN, Alert & Oriented, Mood & Affect wnl, Vitals stable   Here today alone   Pulse 75   Wt 60.8 kg (134 lb)   LMP 06/15/1985   SpO2 95%   BMI 28.01 kg/m     General appearance grisel ii   Vitals stable   Alert, oriented and in no acute distress     Mid lower forehead/glabella  5mm scaly papule    Stuck on papules and brown macules on face and ext    Red papules on trunk   Flesh colored papules on face     The remainder of expanded problem focused exam was normal; the following areas were examined:  scalp/hair, conjunctiva/lids, face, neck, lips, chest, digits/nails, RUE, LUE.      Eyes: Conjunctivae/lids:Normal     ENT: Lips, buccal mucosa, tongue: normal    MSK:Normal    Cardiovascular: peripheral edema slight     Pulm: Breathing Normal    Lymph Nodes: No Head and Neck Lymphadenopathy     Neuro/Psych: Orientation:Normal; Mood/Affect:Normal      A/P:  1. Seborrheic keratosis, lentigo, angioma, dermal  nevus  2. Basal cell carcinoma forehead/glabella   It was a pleasure speaking to Ellie Leigh today.  Previous clinic  notes and pertinent laboratory tests were reviewed prior to Ellie Leal Lu's visit.  Signs and Symptoms of skin cancer discussed with patient.  Patient encouraged to perform monthly skin exams.  UV precautions reviewed with patient.  Risks of non-melanoma skin cancer discussed with patient   Return to clinic 6 months  PROCEDURE NOTE  Mid forehead/glabella  basal cell carcinoma   MOHS:   Location    The rationale for Mohs surgery was discussed with the patient and consent was obtained.  The risks and benefits as well as alternatives to therapy were discussed, in detail.  Specifically, the risks of infection, scarring, bleeding, prolonged wound healing, incomplete removal, allergy to anesthesia, nerve injury and recurrence were addressed.  Indication for Mohs was Location. Prior to the procedure, the treatment site was clearly identified and, if available, confirmed with previous photos and confirmed by the patient   All components of the Universal Protocol/PAUSE rule were completed.  The Mohs surgeon operated in two distinct and integrated capacities as the surgeon and pathologist.      The area was prepped with Betasept.  A rim of normal appearing skin was marked circumferentially around the lesion.  The area was infiltrated with local anesthesia.  The tumor was first debulked to remove all clinically apparent tumor.  An incision following the standard Mohs approach was done and the specimen was oriented,mapped and placed in 1 block(s).  Each specimen was then chromacoded and processed in the Mohs laboratory using standard Mohs technique and submitted for frozen section histology.  Frozen section analysis showed no residual tumor but CLEAR MARGINS.      The tumor was excised using standard Mohs technique in 1 stages(s).  CLEAR MARGINS OBTAINED and Final defect size was 1 cm.     We  discussed the options for wound management in full with the patient including risks/benefits/ possible outcomes.        REPAIR COMPLEX: Because of the tightness of the surrounding skin and Because of the size and full thickness nature of the defect and Because of the proximity to the lid, a complex closure was planned. After LEC anesthesia and prep, Burow's triangles were excised in the relaxed skin tension lines. The wound edges were widely undermined greater than width of the defect on both sides (1 cm) by dissection in the subcutaneous plane until adequate tissue mobility was obtained. Hemostasis was obtained. The wound edges were closed in a layered fashion using Vicryl and Fast Absorbing Plain Gut sutures. Postoperative length was 3 cm.   EBL minimal; complications none; wound care routine.  The patient was discharged in good condition and will return in one week for wound evaluation.

## 2021-07-21 NOTE — PATIENT INSTRUCTIONS
Sutured Wound Care     [facility]: [phone]    [facility]: [phone]    forehead      ? No strenuous activity for 48 hours. Resume moderate activity in 48 hours. No heavy exercising until you are seen for follow up in one week.     ? Take Tylenol as needed for discomfort.                         ? Do not drink alcoholic beverages for 48 hours.     ? Keep the pressure bandage in place for 24 hours. If the bandage becomes blood tinged or loose, reinforce it with gauze and tape.        (Refer to the reverse side of this page for management of bleeding).    ? Remove pressure bandage in 24 hours     ? Leave the flat bandage in place until your follow up appointment.    ? Keep the bandage dry. Wash around it carefully.    ? If the tape becomes soiled or starts to come off, reinforce it with additional paper tape.    ? Do not smoke for 3 weeks; smoking is detrimental to wound healing.    ? It is normal to have swelling and bruising around the surgical site. The bruising will fade in approximately 10-14 days. Elevate the area to reduce swelling.    ? Numbness, itchiness and sensitivity to temperature changes can occur after surgery and may take up to 18 months to normalize.      POSSIBLE COMPLICATIONS    BLEEDIN. Leave the bandage in place.  2. Use tightly rolled up gauze or a cloth to apply direct pressure over the bandage for 20   minutes.  3. Reapply pressure for an additional 20 minutes if necessary  4. Call the office or go to the nearest emergency room if pressure fails to stop the bleeding.  5. Use additional gauze and tape to maintain pressure once the bleeding has stopped.        PAIN:    1. Post operative pain should slowly get better, never worse.  2. A severe increase in pain may indicate a problem. Call the office if this occurs.    In case of emergency phone:Dr Ramos [phone]

## 2021-07-21 NOTE — NURSING NOTE
Chief Complaint   Patient presents with     Derm Problem     mohs       Vitals:    07/21/21 0731   Pulse: 75   SpO2: 95%   Weight: 60.8 kg (134 lb)     Wt Readings from Last 1 Encounters:   07/21/21 60.8 kg (134 lb)       Nimo Jaimes LPN.................7/21/2021

## 2021-07-21 NOTE — LETTER
7/21/2021         RE: Ellie Leigh  [address]  [facility] [address]        Dear Colleague,    Thank you for referring your patient, Ellie Leigh, to the [facility]. Please see a copy of my visit note below.    Surgical Office Location :   [facility]  [address] [place] [address]      Ellie Leigh , a 89 year old year old female patient, I was asked to see by Dr. Vidales for basal cell carcinoma on mid forehead.  Patient states this has been present for sometime.  Patient reports the following symptoms:  Not healing .  Patient reports the following previous treatments none.  Patient reports the following modifying factors none.  Associated symptoms: none.  Patient has no other skin complaints today.  Remainder of the HPI, Meds, PMH, Allergies, FH, and SH was reviewed in chart.      Past Medical History:   Diagnosis Date     Basal cell carcinoma      Breast cancer (H)      COPD (chronic obstructive pulmonary disease) (H)     ct 2014 does show some bronchiectaisi RUL as well as fibronodular disease bilat upper lobes     Dust allergy      DVT (deep vein thrombosis) in pregnancy     after neck fracture when in hospital     HTN (hypertension)      Hyperlipidemia LDL goal <130 10/31/2010     Hyponatremia     chronic     Hypothyroid      Intermittent atrial fibrillation (H) 12/1/2011    hx tachy-keri, has pacer, on chronic coumadin     Loose body in joint 4/15/2011     Neck fracture (H)     after a fall     Syncope 5/12/2013    multiple hospital visits, lates 3/2016, 6/2016, 7/2016 with no clear cause found       Past Surgical History:   Procedure Laterality Date     APPENDECTOMY       ARTHROSCOPY KNEE  4/15/2011    Procedure:ARTHROSCOPY KNEE; removal of loose body; Surgeon:LEY, JEFFREY DUANE; Location:[facility]     CHOLECYSTECTOMY       ESOPHAGOSCOPY, GASTROSCOPY, DUODENOSCOPY (EGD), COMBINED  6/9/2014    Procedure: COMBINED ESOPHAGOSCOPY,  GASTROSCOPY, DUODENOSCOPY (EGD);  Surgeon: Gilberto Richard MD;  Location: [facility]     ESOPHAGOSCOPY, GASTROSCOPY, DUODENOSCOPY (EGD), COMBINED N/A 10/18/2019    Procedure: ESOPHAGOGASTRODUODENOSCOPY (EGD);  Surgeon: Noe Sams DO;  Location: [facility]     IMPLANT PACEMAKER  5/21/2013    Biotronik Moderl [id number] Serial#[id number]     INJECT EPIDURAL LUMBAR  3/23/2011    INJECT EPIDURAL LUMBAR performed by GENERIC ANESTHESIA PROVIDER at [facility]     JOINT REPLACEMENT, HIP RT/LT      Joint Replacement Hip Rt     MASTECTOMY, SIMPLE RT/LT/CAITLYN      Left breast - following breast ca     OTHER SURGICAL HISTORY      C1-C2 fusion after fx      SURGICAL HISTORY OF -   05/22/2001    Colonoscopy     TONSILLECTOMY          Family History   Problem Relation Age of Onset     Cerebrovascular Disease Mother      Heart Disease Mother         MI     Cerebrovascular Disease Father      Heart Disease Father         MI     Respiratory Maternal Grandfather         TB     Neurologic Disorder Brother         ALS     Heart Disease Brother      Cerebrovascular Disease Brother      Breast Cancer Daughter         age:49     Asthma Brother      Cancer Brother         brain and lung     Cancer Daughter         thyroid       Social History     Socioeconomic History     Marital status:      Spouse name: Not on file     Number of children: Not on file     Years of education: Not on file     Highest education level: Not on file   Occupational History     Employer: RETIRED   Tobacco Use     Smoking status: Passive Smoke Exposure - Never Smoker     Smokeless tobacco: Never Used   Substance and Sexual Activity     Alcohol use: No     Drug use: No     Sexual activity: Not Currently   Other Topics Concern     Parent/sibling w/ CABG, MI or angioplasty before 65F 55M? No   Social History Narrative    Lives in [facility].      Daughters helping since her accident, getting home physical therapy.      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      - 2000    5 daughters, 11 grandchildren, 3 great granchildren     Social Determinants of Health     Financial Resource Strain:      Difficulty of Paying Living Expenses:    Food Insecurity:      Worried About Running Out of Food in the Last Year:      Ran Out of Food in the Last Year:    Transportation Needs:      Lack of Transportation (Medical):      Lack of Transportation (Non-Medical):    Physical Activity:      Days of Exercise per Week:      Minutes of Exercise per Session:    Stress:      Feeling of Stress :    Social Connections:      Frequency of Communication with Friends and Family:      Frequency of Social Gatherings with Friends and Family:      Attends Sikhism Services:      Active Member of Clubs or Organizations:      Attends Club or Organization Meetings:      Marital Status:    Intimate Partner Violence:      Fear of Current or Ex-Partner:      Emotionally Abused:      Physically Abused:      Sexually Abused:        Outpatient Encounter Medications as of 7/21/2021   Medication Sig Dispense Refill     Acetaminophen (TYLENOL PO) Take  mg by mouth every 8 hours as needed        albuterol (PROVENTIL) (2.5 MG/3ML) 0.083% neb solution Take 1 vial (2.5 mg) by nebulization 3 times daily 360 mL 6     CRANBERRY Take 475 mg by mouth 2 times daily.       fluticasone-salmeterol (ADVAIR) 250-50 MCG/DOSE inhaler Inhale 1 puff into the lungs every 12 hours.  60 each 3     ipratropium - albuterol 0.5 mg/2.5 mg/3 mL (DUONEB) 0.5-2.5 (3) MG/3ML neb solution One neb three times a day routine and once extra if needed. 270 vial 3     levofloxacin (LEVAQUIN) 500 MG tablet Take 1 tablet (500 mg) by mouth every 24 hours 10 tablet 0     levothyroxine (SYNTHROID/LEVOTHROID) 50 MCG tablet TAKE ONE TABLET BY MOUTH ONE TIME DAILY  90 tablet 2     metoprolol succinate ER (TOPROL-XL) 25 MG 24 hr tablet TAKE TWO TABLETS BY MOUTH TWICE DAILY  360 tablet 1     mirtazapine (REMERON) 15  MG tablet Take 1 tablet (15 mg) by mouth At Bedtime 90 tablet 3     omeprazole (PRILOSEC) 40 MG DR capsule Take 1 capsule (40 mg) by mouth once daily. 90 capsule 3     order for DME Equipment being ordered: Nebulizer 1 each 0     polyethylene glycol (MIRALAX/GLYCOLAX) Packet Take 17 g by mouth daily        PROAIR  (90 Base) MCG/ACT inhaler Inhale 2 puffs into the lungs every 6 hours as needed for shortness of breath / dyspnea 8.5 g 11     probiotic CAPS Take 1 capsule by mouth every evening        sodium chloride 0.9 % neb solution Inhale 5 mLs into the lungs 3 times daily Use after albuterol in nebulizer to thin secretions. 450 mL 5     sodium chloride 1 GM tablet TAKE ONE TABLET BY MOUTH THREE TIMES DAILY 100 tablet 5     warfarin ANTICOAGULANT (COUMADIN) 1 MG tablet Take 1 tablet (1 mg) 7/14 and recheck INR on 7/15 as previously scheduled. Then dose as directed by the INR clinic. 115 tablet 0     No facility-administered encounter medications on file as of 7/21/2021.             Review Of Systems  Skin: As above  Eyes: negative  Ears/Nose/Throat: negative  Respiratory: No shortness of breath, dyspnea on exertion, cough, or hemoptysis  Cardiovascular: negative  Gastrointestinal: negative  Genitourinary: negative  Musculoskeletal: negative  Neurologic: negative  Psychiatric: negative  Hematologic/Lymphatic/Immunologic: negative  Endocrine: negative      O:   NAD, WDWN, Alert & Oriented, Mood & Affect wnl, Vitals stable   Here today alone   Pulse 75   Wt 60.8 kg (134 lb)   LMP 06/15/1985   SpO2 95%   BMI 28.01 kg/m     General appearance grisel ii   Vitals stable   Alert, oriented and in no acute distress     Mid lower forehead/glabella  5mm scaly papule    Stuck on papules and brown macules on face and ext    Red papules on trunk   Flesh colored papules on face     The remainder of expanded problem focused exam was normal; the following areas were examined:  scalp/hair, conjunctiva/lids, face, neck, lips,  chest, digits/nails, RUE, LUE.      Eyes: Conjunctivae/lids:Normal     ENT: Lips, buccal mucosa, tongue: normal    MSK:Normal    Cardiovascular: peripheral edema slight     Pulm: Breathing Normal    Lymph Nodes: No Head and Neck Lymphadenopathy     Neuro/Psych: Orientation:Normal; Mood/Affect:Normal      A/P:  1. Seborrheic keratosis, lentigo, angioma, dermal nevus  2. Basal cell carcinoma forehead/glabella   It was a pleasure speaking to Ellie Leal Loushivani today.  Previous clinic  notes and pertinent laboratory tests were reviewed prior to Ellie Leigh's visit.  Signs and Symptoms of skin cancer discussed with patient.  Patient encouraged to perform monthly skin exams.  UV precautions reviewed with patient.  Risks of non-melanoma skin cancer discussed with patient   Return to clinic 6 months  PROCEDURE NOTE  Mid forehead/glabella  basal cell carcinoma   MOHS:   Location    The rationale for Mohs surgery was discussed with the patient and consent was obtained.  The risks and benefits as well as alternatives to therapy were discussed, in detail.  Specifically, the risks of infection, scarring, bleeding, prolonged wound healing, incomplete removal, allergy to anesthesia, nerve injury and recurrence were addressed.  Indication for Mohs was Location. Prior to the procedure, the treatment site was clearly identified and, if available, confirmed with previous photos and confirmed by the patient   All components of the Universal Protocol/PAUSE rule were completed.  The Mohs surgeon operated in two distinct and integrated capacities as the surgeon and pathologist.      The area was prepped with Betasept.  A rim of normal appearing skin was marked circumferentially around the lesion.  The area was infiltrated with local anesthesia.  The tumor was first debulked to remove all clinically apparent tumor.  An incision following the standard Mohs approach was done and the specimen was oriented,mapped and placed in 1  block(s).  Each specimen was then chromacoded and processed in the Mohs laboratory using standard Mohs technique and submitted for frozen section histology.  Frozen section analysis showed no residual tumor but CLEAR MARGINS.      The tumor was excised using standard Mohs technique in 1 stages(s).  CLEAR MARGINS OBTAINED and Final defect size was 1 cm.     We discussed the options for wound management in full with the patient including risks/benefits/ possible outcomes.        REPAIR COMPLEX: Because of the tightness of the surrounding skin and Because of the size and full thickness nature of the defect and Because of the proximity to the lid, a complex closure was planned. After LEC anesthesia and prep, Burow's triangles were excised in the relaxed skin tension lines. The wound edges were widely undermined greater than width of the defect on both sides (1 cm) by dissection in the subcutaneous plane until adequate tissue mobility was obtained. Hemostasis was obtained. The wound edges were closed in a layered fashion using Vicryl and Fast Absorbing Plain Gut sutures. Postoperative length was 3 cm.   EBL minimal; complications none; wound care routine.  The patient was discharged in good condition and will return in one week for wound evaluation.        Again, thank you for allowing me to participate in the care of your patient.        Sincerely,        Rory Ramos MD

## 2021-07-23 ENCOUNTER — ANTICOAGULATION THERAPY VISIT (OUTPATIENT)
Dept: ANTICOAGULATION | Facility: CLINIC | Age: 86
End: 2021-07-23

## 2021-07-23 ENCOUNTER — LAB (OUTPATIENT)
Dept: LAB | Facility: CLINIC | Age: 86
End: 2021-07-23
Payer: COMMERCIAL

## 2021-07-23 DIAGNOSIS — Z79.01 LONG TERM CURRENT USE OF ANTICOAGULANT THERAPY: ICD-10-CM

## 2021-07-23 DIAGNOSIS — I48.0 INTERMITTENT ATRIAL FIBRILLATION (H): ICD-10-CM

## 2021-07-23 DIAGNOSIS — I82.409 DVT (DEEP VENOUS THROMBOSIS) (H): Primary | ICD-10-CM

## 2021-07-23 DIAGNOSIS — I82.4Y9 DEEP VEIN THROMBOSIS (DVT) OF PROXIMAL LOWER EXTREMITY, UNSPECIFIED CHRONICITY, UNSPECIFIED LATERALITY (H): Chronic | ICD-10-CM

## 2021-07-23 LAB — INR BLD: 1.7 (ref 0.9–1.1)

## 2021-07-23 PROCEDURE — 85610 PROTHROMBIN TIME: CPT

## 2021-07-23 PROCEDURE — 36416 COLLJ CAPILLARY BLOOD SPEC: CPT

## 2021-07-23 NOTE — PROGRESS NOTES
ANTICOAGULATION MANAGEMENT     Ellie Leigh 89 year old female is on warfarin with subtherapeutic INR result. (Goal INR 2.0-3.0)    Recent labs: (last 7 days)     07/23/21  1357   INR 1.7*       ASSESSMENT     Source(s): Patient/Caregiver Call       Warfarin doses taken: Less warfarin taken than planned which may be affecting INR    Diet: No new diet changes identified    New illness, injury, or hospitalization: No    Medication/supplement changes: Levaquin. Today is the last dose.    Signs or symptoms of bleeding or clotting: No    Previous INR: Supratherapeutic    Additional findings: None     PLAN     Recommended plan for no diet, medication or health factor changes affecting INR     Dosing Instructions: Continue your current warfarin dose with next INR in 5 days       Summary  As of 7/23/2021    Full warfarin instructions:  2 mg every Mon, Fri; 1 mg all other days   Next INR check:  7/28/2021             Telephone call with  Suyapa who verbalizes understanding and agrees to plan    Lab visit scheduled    Education provided: Please call back if any changes to your diet, medications or how you've been taking warfarin, Monitoring for clotting signs and symptoms, When to seek medical attention/emergency care and Contact [phone]  with any changes, questions or concerns.     Plan made per [facility] anticoagulation protocol    Ruiz Meredith RN  Anticoagulation Clinic  7/23/2021    _______________________________________________________________________     Anticoagulation Episode Summary     Current INR goal:  2.0-3.0   TTR:  60.3 % (12 mo)   Target end date:  Indefinite   Send INR reminders to:  GARRY BARRY    Indications    Atrial fibrillation with rapid ventricular response (H) (Resolved) [I48.91]  DVT (deep venous thrombosis) [I82.409]  Long term current use of anticoagulant therapy [Z79.01]  Intermittent atrial fibrillation (H) [I48.0]           Comments:  * Taking Celebrex PRN             Anticoagulation Care Providers     Provider Role Specialty Phone number    Anjum Vidales MD Referring Family Medicine [phone]

## 2021-07-28 ENCOUNTER — OFFICE VISIT (OUTPATIENT)
Dept: DERMATOLOGY | Facility: CLINIC | Age: 86
End: 2021-07-28
Payer: COMMERCIAL

## 2021-07-28 ENCOUNTER — LAB (OUTPATIENT)
Dept: LAB | Facility: CLINIC | Age: 86
End: 2021-07-28

## 2021-07-28 ENCOUNTER — ANTICOAGULATION THERAPY VISIT (OUTPATIENT)
Dept: ANTICOAGULATION | Facility: CLINIC | Age: 86
End: 2021-07-28

## 2021-07-28 DIAGNOSIS — Z79.01 LONG TERM CURRENT USE OF ANTICOAGULANT THERAPY: ICD-10-CM

## 2021-07-28 DIAGNOSIS — I82.4Y9 DEEP VEIN THROMBOSIS (DVT) OF PROXIMAL LOWER EXTREMITY, UNSPECIFIED CHRONICITY, UNSPECIFIED LATERALITY (H): Chronic | ICD-10-CM

## 2021-07-28 DIAGNOSIS — I48.0 INTERMITTENT ATRIAL FIBRILLATION (H): ICD-10-CM

## 2021-07-28 DIAGNOSIS — Z48.01 ENCOUNTER FOR CHANGE OR REMOVAL OF SURGICAL WOUND DRESSING: Primary | ICD-10-CM

## 2021-07-28 DIAGNOSIS — I82.409 DVT (DEEP VENOUS THROMBOSIS) (H): Primary | ICD-10-CM

## 2021-07-28 LAB — INR BLD: 2.9 (ref 0.9–1.1)

## 2021-07-28 PROCEDURE — 99207 PR NO CHARGE NURSE ONLY: CPT

## 2021-07-28 PROCEDURE — 85610 PROTHROMBIN TIME: CPT

## 2021-07-28 PROCEDURE — 36415 COLL VENOUS BLD VENIPUNCTURE: CPT

## 2021-07-28 NOTE — PATIENT INSTRUCTIONS
WOUND CARE INSTRUCTIONS  for  ONE WEEK AFTER SURGERY         Forehead    1) Leave flat bandage on your skin for one week after today s bandage change.  2) In one week when you remove the bandage, you may resume your regular skin care routine, including washing with mild soap and water, applying moisturizer, make-up and sunscreen.    3) If there are any open or bleeding areas at the incision/graft site you should begin to cover the area with a bandage daily as follows:    1) Clean and dry the area with plain tap water using a Q-tip or sterile gauze pad.  2) Apply Polysporin or Bacitracin ointment to the open area.  3) Cover the wound with a band-aid or a sterile non-stick gauze pad and micropore paper tape.         SIGNS OF INFECTION  - If you notice any of these signs of infection, call your doctor right away: expanding redness around the wound.  - Yellow or greenish-colored pus or cloudy wound drainage.    - Red streaking spreading from the wound.  - Increased swelling, tenderness, or pain around the wound.   - Fever.    Please remember that yellow and clear drainage from a wound can be normal and related to normal wound healing.  Isolated drainage from a wound without a combination of the above features does not indicate infection.       *Once the bandages are removed, the scar will be red and firm (especially in the lip/chin area). This is normal and will fade in time. It might take 6-12 months for this to happen.     *Massaging the area will help the scar soften and fade quicker. Begin to massage the area one month after the bandages have been removed. To massage apply pressure directly and firmly over the scar with the fingertips and move in a circular motion. Massage the area for a few minutes several times a day. Continue to massage the site for several months.    *Approximately 6-8 weeks after surgery it is not uncommon to see the formation of  tender pimple-like  bump along the scar. This is normal. As the  scar continues to mature and the stitches underneath the skin begin to dissolve, this might occur. Do not pick or squeeze, this will resolve on it s own. Should one break open producing a small amount of drainage, apply Polysporin or Bacitracin ointment a few times a day until the wound is completely healed.    *Numbness in the surgical area is expected. It might take 12-18 months for the feeling to return to normal. During this time sensations of itchiness, tingling and occasional sharp pains might be noted. These feelings are normal and will subside once the nerves have completely healed.         IN CASE OF EMERGENCY: Dr Ramos [phone]   If you were seen in [place] call: [phone]  If you were seen in [place] call: [phone]

## 2021-07-28 NOTE — PROGRESS NOTES
ANTICOAGULATION MANAGEMENT     Ellie Leigh 89 year old female is on warfarin with therapeutic INR result. (Goal INR 2.0-3.0)    Recent labs: (last 7 days)     07/28/21  1316   INR 2.9*       ASSESSMENT     Source(s): Chart Review and Patient/Caregiver Call       Warfarin doses taken: Warfarin taken as instructed    Diet: No new diet changes identified    New illness, injury, or hospitalization: No    Medication/supplement changes: None noted    Signs or symptoms of bleeding or clotting: No    Previous INR: Subtherapeutic    Additional findings: None     PLAN     Recommended plan for no diet, medication or health factor changes affecting INR     Dosing Instructions: Continue your current warfarin dose with next INR in 1 week       Summary  As of 7/28/2021    Full warfarin instructions:  2 mg every Mon, Fri; 1 mg all other days   Next INR check:  8/5/2021             Telephone call with Ellie who verbalizes understanding and agrees to plan    Lab visit scheduled    Education provided: Please call back if any changes to your diet, medications or how you've been taking warfarin    Plan made per [facility] anticoagulation protocol    Kylah Dorsey RN  Anticoagulation Clinic  7/28/2021    _______________________________________________________________________     Anticoagulation Episode Summary     Current INR goal:  2.0-3.0   TTR:  60.0 % (12 mo)   Target end date:  Indefinite   Send INR reminders to:  [facility]    Indications    Atrial fibrillation with rapid ventricular response (H) (Resolved) [I48.91]  DVT (deep venous thrombosis) [I82.409]  Long term current use of anticoagulant therapy [Z79.01]  Intermittent atrial fibrillation (H) [I48.0]           Comments:  * Taking Celebrex PRN            Anticoagulation Care Providers     Provider Role Specialty Phone number    Anjum Vidales MD Referring Family Medicine [phone]

## 2021-07-28 NOTE — PROGRESS NOTES
Pt returned to clinic for post surgery 1 week follow up bandage change. Pt has no complaints, denies pain. Bandage removed from forehead, area cleansed with normal saline. Site is healing and wound edges approximating well. Reapplied new steri strips and paper tape.    Advised to watch for signs/sx of infection; spreading redness, drainage, odor, fever. Call or report promptly to clinic. Pt given written instructions and informed to rtc as needed. Patient verbalized understanding.     Jolene Green LPN

## 2021-07-29 LAB
ACID FAST STN SPEC QL: NORMAL
SPECIMEN SOURCE: NORMAL

## 2021-08-03 ENCOUNTER — LAB (OUTPATIENT)
Dept: LAB | Facility: CLINIC | Age: 86
End: 2021-08-03
Payer: COMMERCIAL

## 2021-08-03 ENCOUNTER — HOSPITAL ENCOUNTER (OUTPATIENT)
Dept: CARDIOLOGY | Facility: CLINIC | Age: 86
Discharge: HOME | End: 2021-08-03
Attending: INTERNAL MEDICINE | Admitting: INTERNAL MEDICINE
Payer: COMMERCIAL

## 2021-08-03 ENCOUNTER — ANTICOAGULATION THERAPY VISIT (OUTPATIENT)
Dept: ANTICOAGULATION | Facility: CLINIC | Age: 86
End: 2021-08-03

## 2021-08-03 ENCOUNTER — TRANSFERRED RECORDS (OUTPATIENT)
Dept: HEALTH INFORMATION MANAGEMENT | Facility: CLINIC | Age: 86
End: 2021-08-03

## 2021-08-03 DIAGNOSIS — I82.4Y9 DEEP VEIN THROMBOSIS (DVT) OF PROXIMAL LOWER EXTREMITY, UNSPECIFIED CHRONICITY, UNSPECIFIED LATERALITY (H): Chronic | ICD-10-CM

## 2021-08-03 DIAGNOSIS — R55 SYNCOPE: Primary | ICD-10-CM

## 2021-08-03 DIAGNOSIS — Z95.0 CARDIAC PACEMAKER IN SITU: ICD-10-CM

## 2021-08-03 DIAGNOSIS — I48.0 INTERMITTENT ATRIAL FIBRILLATION (H): ICD-10-CM

## 2021-08-03 DIAGNOSIS — I49.5 TACHY-BRADY SYNDROME (H): ICD-10-CM

## 2021-08-03 DIAGNOSIS — I82.409 DVT (DEEP VENOUS THROMBOSIS) (H): Primary | ICD-10-CM

## 2021-08-03 DIAGNOSIS — Z79.01 LONG TERM CURRENT USE OF ANTICOAGULANT THERAPY: ICD-10-CM

## 2021-08-03 LAB — INR BLD: 3.3 (ref 0.9–1.1)

## 2021-08-03 PROCEDURE — 36416 COLLJ CAPILLARY BLOOD SPEC: CPT

## 2021-08-03 PROCEDURE — 93280 PM DEVICE PROGR EVAL DUAL: CPT | Mod: 26 | Performed by: INTERNAL MEDICINE

## 2021-08-03 PROCEDURE — 85610 PROTHROMBIN TIME: CPT

## 2021-08-03 PROCEDURE — 93280 PM DEVICE PROGR EVAL DUAL: CPT

## 2021-08-03 NOTE — PROGRESS NOTES
ANTICOAGULATION MANAGEMENT     Ellie Leigh 89 year old female is on warfarin with supratherapeutic INR result. (Goal INR 2.0-3.0)    Recent labs: (last 7 days)     08/03/21  1125   INR 3.3*       ASSESSMENT     Source(s): Patient/Caregiver Call       Warfarin doses taken: Warfarin taken as instructed    Diet: No new diet changes identified    New illness, injury, or hospitalization: No    Medication/supplement changes: None noted    Signs or symptoms of bleeding or clotting: No    Previous INR: Therapeutic last visit; previously outside of goal range    Additional findings: None     PLAN     Recommended plan for no diet, medication or health factor changes affecting INR     Dosing Instructions:  Decrease your warfarin dose (5.6% change) with next INR in 10 days       Summary  As of 8/3/2021    Full warfarin instructions:  1.5 mg every Mon, Wed, Fri; 1 mg all other days   Next INR check:  8/13/2021             Telephone call with  Suyapa who verbalizes understanding and agrees to plan    Lab visit scheduled    Education provided: Please call back if any changes to your diet, medications or how you've been taking warfarin, Monitoring for bleeding signs and symptoms, When to seek medical attention/emergency care and Contact [phone]  with any changes, questions or concerns.     Plan made per [facility] anticoagulation protocol    Ruiz Meredith RN  Anticoagulation Clinic  8/3/2021    _______________________________________________________________________     Anticoagulation Episode Summary     Current INR goal:  2.0-3.0   TTR:  58.7 % (12 mo)   Target end date:  Indefinite   Send INR reminders to:  GARRY BARRY    Indications    Atrial fibrillation with rapid ventricular response (H) (Resolved) [I48.91]  DVT (deep venous thrombosis) [I82.409]  Long term current use of anticoagulant therapy [Z79.01]  Intermittent atrial fibrillation (H) [I48.0]           Comments:  * Taking Celebrex PRN             Anticoagulation Care Providers     Provider Role Specialty Phone number    Anjum Vidales MD Referring Family Medicine [phone]

## 2021-08-05 LAB
MDC_IDC_LEAD_IMPLANT_DT: NORMAL
MDC_IDC_LEAD_IMPLANT_DT: NORMAL
MDC_IDC_LEAD_LOCATION: NORMAL
MDC_IDC_LEAD_LOCATION: NORMAL
MDC_IDC_LEAD_LOCATION_DETAIL_1: NORMAL
MDC_IDC_LEAD_LOCATION_DETAIL_1: NORMAL
MDC_IDC_LEAD_MFG: NORMAL
MDC_IDC_LEAD_MFG: NORMAL
MDC_IDC_LEAD_MODEL: NORMAL
MDC_IDC_LEAD_MODEL: NORMAL
MDC_IDC_LEAD_POLARITY_TYPE: NORMAL
MDC_IDC_LEAD_POLARITY_TYPE: NORMAL
MDC_IDC_LEAD_SERIAL: NORMAL
MDC_IDC_LEAD_SERIAL: NORMAL
MDC_IDC_MSMT_BATTERY_STATUS: NORMAL
MDC_IDC_MSMT_LEADCHNL_RA_IMPEDANCE_VALUE: 390 OHM
MDC_IDC_MSMT_LEADCHNL_RA_PACING_THRESHOLD_AMPLITUDE: 0.7 V
MDC_IDC_MSMT_LEADCHNL_RA_PACING_THRESHOLD_AMPLITUDE: 0.7 V
MDC_IDC_MSMT_LEADCHNL_RA_PACING_THRESHOLD_PULSEWIDTH: 0.4 MS
MDC_IDC_MSMT_LEADCHNL_RA_PACING_THRESHOLD_PULSEWIDTH: 0.4 MS
MDC_IDC_MSMT_LEADCHNL_RA_SENSING_INTR_AMPL: 6 MV
MDC_IDC_MSMT_LEADCHNL_RV_IMPEDANCE_VALUE: 487 OHM
MDC_IDC_MSMT_LEADCHNL_RV_PACING_THRESHOLD_AMPLITUDE: 0.6 V
MDC_IDC_MSMT_LEADCHNL_RV_PACING_THRESHOLD_AMPLITUDE: 0.6 V
MDC_IDC_MSMT_LEADCHNL_RV_PACING_THRESHOLD_PULSEWIDTH: 0.4 MS
MDC_IDC_MSMT_LEADCHNL_RV_PACING_THRESHOLD_PULSEWIDTH: 0.4 MS
MDC_IDC_MSMT_LEADCHNL_RV_SENSING_INTR_AMPL: 17 MV
MDC_IDC_MSMT_LEADCHNL_RV_SENSING_INTR_AMPL: 6 MV
MDC_IDC_PG_IMPLANT_DTM: NORMAL
MDC_IDC_PG_MFG: NORMAL
MDC_IDC_PG_MODEL: NORMAL
MDC_IDC_PG_SERIAL: NORMAL
MDC_IDC_PG_TYPE: NORMAL
MDC_IDC_SESS_CLINIC_NAME: NORMAL
MDC_IDC_SESS_DTM: NORMAL
MDC_IDC_SESS_REPROGRAMMED: NORMAL
MDC_IDC_SESS_TYPE: NORMAL
MDC_IDC_SET_BRADY_AT_MODE_SWITCH_MODE: NORMAL
MDC_IDC_SET_BRADY_AT_MODE_SWITCH_RATE: 160 {BEATS}/MIN
MDC_IDC_SET_BRADY_HYSTRATE: 60 {BEATS}/MIN
MDC_IDC_SET_BRADY_LOWRATE: 60 {BEATS}/MIN
MDC_IDC_SET_BRADY_MAX_SENSOR_RATE: 125 {BEATS}/MIN
MDC_IDC_SET_BRADY_MAX_TRACKING_RATE: 130 {BEATS}/MIN
MDC_IDC_SET_BRADY_MODE: NORMAL
MDC_IDC_SET_BRADY_NIGHT_RATE: 60 {BEATS}/MIN
MDC_IDC_SET_BRADY_PAV_DELAY_HIGH: 150 MS
MDC_IDC_SET_BRADY_PAV_DELAY_LOW: 180 MS
MDC_IDC_SET_BRADY_SAV_DELAY_HIGH: 105 MS
MDC_IDC_SET_BRADY_SAV_DELAY_LOW: 135 MS
MDC_IDC_SET_CRT_PACED_CHAMBERS: NORMAL
MDC_IDC_SET_LEADCHNL_LV_PACING_CATHODE_ELECTRODE_1: NORMAL
MDC_IDC_SET_LEADCHNL_LV_PACING_CATHODE_LOCATION_1: NORMAL
MDC_IDC_SET_LEADCHNL_LV_PACING_POLARITY: NORMAL
MDC_IDC_SET_LEADCHNL_LV_SENSING_CATHODE_ELECTRODE_1: NORMAL
MDC_IDC_SET_LEADCHNL_LV_SENSING_CATHODE_LOCATION_1: NORMAL
MDC_IDC_SET_LEADCHNL_LV_SENSING_POLARITY: NORMAL
MDC_IDC_SET_LEADCHNL_RA_PACING_AMPLITUDE: 1.8 V
MDC_IDC_SET_LEADCHNL_RA_PACING_POLARITY: NORMAL
MDC_IDC_SET_LEADCHNL_RA_PACING_PULSEWIDTH: 0.4 MS
MDC_IDC_SET_LEADCHNL_RA_SENSING_ADAPTATION_MODE: NORMAL
MDC_IDC_SET_LEADCHNL_RA_SENSING_POLARITY: NORMAL
MDC_IDC_SET_LEADCHNL_RA_SENSING_SENSITIVITY: NORMAL
MDC_IDC_SET_LEADCHNL_RV_PACING_AMPLITUDE: 1.6 V
MDC_IDC_SET_LEADCHNL_RV_PACING_POLARITY: NORMAL
MDC_IDC_SET_LEADCHNL_RV_PACING_PULSEWIDTH: 0.4 MS
MDC_IDC_SET_LEADCHNL_RV_SENSING_ADAPTATION_MODE: NORMAL
MDC_IDC_SET_LEADCHNL_RV_SENSING_POLARITY: NORMAL
MDC_IDC_SET_LEADCHNL_RV_SENSING_SENSITIVITY: NORMAL

## 2021-08-13 ENCOUNTER — LAB (OUTPATIENT)
Dept: LAB | Facility: CLINIC | Age: 86
End: 2021-08-13
Payer: COMMERCIAL

## 2021-08-13 ENCOUNTER — ANTICOAGULATION THERAPY VISIT (OUTPATIENT)
Dept: ANTICOAGULATION | Facility: CLINIC | Age: 86
End: 2021-08-13

## 2021-08-13 DIAGNOSIS — I48.0 INTERMITTENT ATRIAL FIBRILLATION (H): ICD-10-CM

## 2021-08-13 DIAGNOSIS — I82.409 DVT (DEEP VENOUS THROMBOSIS) (H): Primary | ICD-10-CM

## 2021-08-13 DIAGNOSIS — I82.4Y9 DEEP VEIN THROMBOSIS (DVT) OF PROXIMAL LOWER EXTREMITY, UNSPECIFIED CHRONICITY, UNSPECIFIED LATERALITY (H): Chronic | ICD-10-CM

## 2021-08-13 DIAGNOSIS — Z79.01 LONG TERM CURRENT USE OF ANTICOAGULANT THERAPY: ICD-10-CM

## 2021-08-13 LAB — INR BLD: 2.7 (ref 0.9–1.1)

## 2021-08-13 PROCEDURE — 85610 PROTHROMBIN TIME: CPT

## 2021-08-13 PROCEDURE — 36416 COLLJ CAPILLARY BLOOD SPEC: CPT

## 2021-08-13 NOTE — PROGRESS NOTES
ANTICOAGULATION MANAGEMENT     Ellie Leigh 89 year old female is on warfarin with therapeutic INR result. (Goal INR 2.0-3.0)    Recent labs: (last 7 days)     08/13/21  1149   INR 2.7*       ASSESSMENT     Source(s): Patient/Caregiver Call       Warfarin doses taken: Warfarin taken as instructed    Diet: No new diet changes identified    New illness, injury, or hospitalization: No    Medication/supplement changes: None noted    Signs or symptoms of bleeding or clotting: No    Previous INR: Supratherapeutic    Additional findings: None     PLAN     Recommended plan for no diet, medication or health factor changes affecting INR     Dosing Instructions: Continue your current warfarin dose with next INR in 2 weeks       Summary  As of 8/13/2021    Full warfarin instructions:  1.5 mg every Mon, Wed, Fri; 1 mg all other days   Next INR check:  8/27/2021             Telephone call with  Suyapa who verbalizes understanding and agrees to plan    Lab visit scheduled    Education provided: Please call back if any changes to your diet, medications or how you've been taking warfarin and Contact [phone]  with any changes, questions or concerns.     Plan made per [facility] anticoagulation protocol    Ruiz Meredith RN  Anticoagulation Clinic  8/13/2021    _______________________________________________________________________     Anticoagulation Episode Summary     Current INR goal:  2.0-3.0   TTR:  57.3 % (12 mo)   Target end date:  Indefinite   [facility]    Indications    Atrial fibrillation with rapid ventricular response (H) (Resolved) [I48.91]  DVT (deep venous thrombosis) [I82.409]  Long term current use of anticoagulant therapy [Z79.01]  Intermittent atrial fibrillation (H) [I48.0]           Comments:  * Taking Celebrex PRN            Anticoagulation Care Providers     Provider Role Specialty Phone number    Anjum Vidales MD Referring Family Medicine [phone]

## 2021-08-27 ENCOUNTER — LAB (OUTPATIENT)
Dept: LAB | Facility: CLINIC | Age: 86
End: 2021-08-27
Payer: COMMERCIAL

## 2021-08-27 ENCOUNTER — ANTICOAGULATION THERAPY VISIT (OUTPATIENT)
Dept: ANTICOAGULATION | Facility: CLINIC | Age: 86
End: 2021-08-27

## 2021-08-27 DIAGNOSIS — I82.409 DVT (DEEP VENOUS THROMBOSIS) (H): Primary | ICD-10-CM

## 2021-08-27 DIAGNOSIS — Z79.01 LONG TERM CURRENT USE OF ANTICOAGULANT THERAPY: ICD-10-CM

## 2021-08-27 DIAGNOSIS — I48.0 INTERMITTENT ATRIAL FIBRILLATION (H): ICD-10-CM

## 2021-08-27 DIAGNOSIS — I82.4Y9 DEEP VEIN THROMBOSIS (DVT) OF PROXIMAL LOWER EXTREMITY, UNSPECIFIED CHRONICITY, UNSPECIFIED LATERALITY (H): Chronic | ICD-10-CM

## 2021-08-27 LAB — INR BLD: 2.4 (ref 0.9–1.1)

## 2021-08-27 PROCEDURE — 85610 PROTHROMBIN TIME: CPT

## 2021-08-27 PROCEDURE — 36416 COLLJ CAPILLARY BLOOD SPEC: CPT

## 2021-08-27 NOTE — PROGRESS NOTES
ANTICOAGULATION MANAGEMENT     Ellie Leigh 89 year old female is on warfarin with therapeutic INR result. (Goal INR 2.0-3.0)    Recent labs: (last 7 days)     08/27/21  1335   INR 2.4*       ASSESSMENT     Source(s): Patient/Caregiver Call       Warfarin doses taken: Warfarin taken as instructed    Diet: No new diet changes identified    New illness, injury, or hospitalization: No    Medication/supplement changes: None noted    Signs or symptoms of bleeding or clotting: No    Previous INR: Therapeutic last visit; previously outside of goal range    Additional findings: None     PLAN     Recommended plan for no diet, medication or health factor changes affecting INR     Dosing Instructions: Continue your current warfarin dose with next INR in 4 weeks       Summary  As of 8/27/2021    Full warfarin instructions:  1.5 mg every Mon, Wed, Fri; 1 mg all other days   Next INR check:  9/24/2021             Telephone call with  Suyapa who verbalizes understanding and agrees to plan    Lab visit scheduled    Education provided: Please call back if any changes to your diet, medications or how you've been taking warfarin and Contact [phone]  with any changes, questions or concerns.     Plan made per [facility] anticoagulation protocol    Ruiz Meredith RN  Anticoagulation Clinic  8/27/2021    _______________________________________________________________________     Anticoagulation Episode Summary     Current INR goal:  2.0-3.0   TTR:  60.8 % (12 mo)   Target end date:  Indefinite   Send INR reminders to:  [facility]    Indications    Atrial fibrillation with rapid ventricular response (H) (Resolved) [I48.91]  DVT (deep venous thrombosis) [I82.409]  Long term current use of anticoagulant therapy [Z79.01]  Intermittent atrial fibrillation (H) [I48.0]           Comments:  * Taking Celebrex PRN            Anticoagulation Care Providers     Provider Role Specialty Phone number    Anjum Vidales MD Referring  Family Medicine [phone]

## 2021-09-08 DIAGNOSIS — J47.9 BRONCHIECTASIS WITHOUT COMPLICATION (H): ICD-10-CM

## 2021-09-08 RX ORDER — SODIUM CHLORIDE FOR INHALATION 0.9 %
VIAL, NEBULIZER (ML) INHALATION
Qty: 450 ML | Refills: 5 | Status: SHIPPED | OUTPATIENT
Start: 2021-09-08 | End: 2022-07-19

## 2021-09-09 ENCOUNTER — PATIENT OUTREACH (OUTPATIENT)
Dept: PULMONOLOGY | Facility: CLINIC | Age: 86
End: 2021-09-09

## 2021-09-09 NOTE — PROGRESS NOTES
Called and spoke to Ellie and her daughter on speaker.  Provided her with the phone number to set up an appointment with either Dr. Karimi or Dr. Lomax and advised her she would need an appointment with either of them for future refills.  They voiced understanding.

## 2021-09-18 ENCOUNTER — HEALTH MAINTENANCE LETTER (OUTPATIENT)
Age: 86
End: 2021-09-18

## 2021-09-19 DIAGNOSIS — E03.9 HYPOTHYROIDISM, UNSPECIFIED TYPE: Chronic | ICD-10-CM

## 2021-09-20 RX ORDER — LEVOTHYROXINE SODIUM 50 UG/1
TABLET ORAL
Qty: 90 TABLET | Refills: 0 | Status: SHIPPED | OUTPATIENT
Start: 2021-09-20 | End: 2021-12-22

## 2021-09-24 ENCOUNTER — ANTICOAGULATION THERAPY VISIT (OUTPATIENT)
Dept: ANTICOAGULATION | Facility: CLINIC | Age: 86
End: 2021-09-24

## 2021-09-24 ENCOUNTER — LAB (OUTPATIENT)
Dept: LAB | Facility: CLINIC | Age: 86
End: 2021-09-24
Payer: COMMERCIAL

## 2021-09-24 DIAGNOSIS — I48.0 INTERMITTENT ATRIAL FIBRILLATION (H): ICD-10-CM

## 2021-09-24 DIAGNOSIS — I82.409 DVT (DEEP VENOUS THROMBOSIS) (H): Primary | ICD-10-CM

## 2021-09-24 DIAGNOSIS — I82.4Y9 DEEP VEIN THROMBOSIS (DVT) OF PROXIMAL LOWER EXTREMITY, UNSPECIFIED CHRONICITY, UNSPECIFIED LATERALITY (H): Chronic | ICD-10-CM

## 2021-09-24 DIAGNOSIS — Z79.01 LONG TERM CURRENT USE OF ANTICOAGULANT THERAPY: ICD-10-CM

## 2021-09-24 LAB — INR BLD: 2.3 (ref 0.9–1.1)

## 2021-09-24 PROCEDURE — 36416 COLLJ CAPILLARY BLOOD SPEC: CPT

## 2021-09-24 PROCEDURE — 85610 PROTHROMBIN TIME: CPT

## 2021-09-24 NOTE — PROGRESS NOTES
ANTICOAGULATION MANAGEMENT     Ellie Leigh 89 year old female is on warfarin with therapeutic INR result. (Goal INR 2.0-3.0)    Recent labs: (last 7 days)     09/24/21  1331   INR 2.3*       ASSESSMENT     Source(s): Chart Review and Patient/Caregiver Call       Warfarin doses taken: reviewed in chart    Diet:     New illness, injury, or hospitalization:     Medication/supplement changes:     Signs or symptoms of bleeding or clotting:     Previous INR: Therapeutic last 2(+) visits    Additional findings: DVM left for Suyapa     PLAN     Recommended plan for no diet, medication or health factor changes affecting INR     Dosing Instructions: Continue your current warfarin dose with next INR in 5 weeks       Summary  As of 9/24/2021    Full warfarin instructions:  1.5 mg every Mon, Wed, Fri; 1 mg all other days   Next INR check:  10/29/2021             Detailed voice message left for  Suyapa with dosing instructions and follow up date.     Contact [phone]  to schedule and with any changes, questions or concerns.     Education provided: Please call back if any changes to your diet, medications or how you've been taking warfarin, Importance of notifying clinic for changes in medications; a sooner lab recheck maybe needed., Importance of notifying clinic for diarrhea, nausea/vomiting, reduced intake, and/or illness; a sooner lab recheck maybe needed. and Contact [phone]  with any changes, questions or concerns.     Plan made per [facility] anticoagulation protocol    Lissy Beaulieu RN  Anticoagulation Clinic  9/24/2021    _______________________________________________________________________     Anticoagulation Episode Summary     Current INR goal:  2.0-3.0   TTR:  68.2 % (12 mo)   Target end date:  Indefinite   Send INR reminders to:  GARRY BARRY    Indications    Atrial fibrillation with rapid ventricular response (H) (Resolved) [I48.91]  DVT (deep venous thrombosis) [I82.409]  Long term current use of  anticoagulant therapy [Z79.01]  Intermittent atrial fibrillation (H) [I48.0]           Comments:  * Taking Celebrex PRN            Anticoagulation Care Providers     Provider Role Specialty Phone number    Anjum Vidales MD Referring Family Medicine [phone]

## 2021-09-28 ENCOUNTER — HOSPITAL ENCOUNTER (EMERGENCY)
Facility: CLINIC | Age: 86
Discharge: HOME | End: 2021-09-29
Attending: EMERGENCY MEDICINE
Payer: COMMERCIAL

## 2021-09-28 VITALS
DIASTOLIC BLOOD PRESSURE: 101 MMHG | BODY MASS INDEX: 27.71 KG/M2 | HEIGHT: 58 IN | TEMPERATURE: 97.6 F | HEART RATE: 75 BPM | WEIGHT: 132 LBS | OXYGEN SATURATION: 93 % | SYSTOLIC BLOOD PRESSURE: 189 MMHG | RESPIRATION RATE: 18 BRPM

## 2021-09-28 DIAGNOSIS — J47.1 BRONCHIECTASIS WITH ACUTE EXACERBATION (H): ICD-10-CM

## 2021-09-28 LAB
ALBUMIN SERPL-MCNC: 3.8 G/DL (ref 3.4–5)
ALP SERPL-CCNC: 87 U/L (ref 40–150)
ALT SERPL W P-5'-P-CCNC: 21 U/L (ref 0–50)
ANION GAP SERPL CALCULATED.3IONS-SCNC: 5 MMOL/L (ref 3–14)
AST SERPL W P-5'-P-CCNC: 24 U/L (ref 0–45)
BASOPHILS # BLD AUTO: 0.1 10E3/UL (ref 0–0.2)
BASOPHILS NFR BLD AUTO: 1 %
BILIRUB SERPL-MCNC: 0.3 MG/DL (ref 0.2–1.3)
BUN SERPL-MCNC: 21 MG/DL (ref 7–30)
CALCIUM SERPL-MCNC: 8.7 MG/DL (ref 8.5–10.1)
CHLORIDE BLD-SCNC: 101 MMOL/L (ref 94–109)
CO2 SERPL-SCNC: 27 MMOL/L (ref 20–32)
CREAT SERPL-MCNC: 0.66 MG/DL (ref 0.52–1.04)
EOSINOPHIL # BLD AUTO: 0.1 10E3/UL (ref 0–0.7)
EOSINOPHIL NFR BLD AUTO: 1 %
ERYTHROCYTE [DISTWIDTH] IN BLOOD BY AUTOMATED COUNT: 13.3 % (ref 10–15)
GFR SERPL CREATININE-BSD FRML MDRD: 77 ML/MIN/1.73M2
GLUCOSE BLD-MCNC: 110 MG/DL (ref 70–99)
HCT VFR BLD AUTO: 39.3 % (ref 35–47)
HGB BLD-MCNC: 12.4 G/DL (ref 11.7–15.7)
IMM GRANULOCYTES # BLD: 0 10E3/UL
IMM GRANULOCYTES NFR BLD: 0 %
INR PPP: 2.02 (ref 0.85–1.15)
LYMPHOCYTES # BLD AUTO: 2.2 10E3/UL (ref 0.8–5.3)
LYMPHOCYTES NFR BLD AUTO: 30 %
MCH RBC QN AUTO: 28.6 PG (ref 26.5–33)
MCHC RBC AUTO-ENTMCNC: 31.6 G/DL (ref 31.5–36.5)
MCV RBC AUTO: 91 FL (ref 78–100)
MONOCYTES # BLD AUTO: 0.6 10E3/UL (ref 0–1.3)
MONOCYTES NFR BLD AUTO: 9 %
NEUTROPHILS # BLD AUTO: 4.3 10E3/UL (ref 1.6–8.3)
NEUTROPHILS NFR BLD AUTO: 59 %
NRBC # BLD AUTO: 0 10E3/UL
NRBC BLD AUTO-RTO: 0 /100
PLATELET # BLD AUTO: 289 10E3/UL (ref 150–450)
POTASSIUM BLD-SCNC: 4.5 MMOL/L (ref 3.4–5.3)
PROT SERPL-MCNC: 8.1 G/DL (ref 6.8–8.8)
RBC # BLD AUTO: 4.34 10E6/UL (ref 3.8–5.2)
SODIUM SERPL-SCNC: 133 MMOL/L (ref 133–144)
WBC # BLD AUTO: 7.3 10E3/UL (ref 4–11)

## 2021-09-28 PROCEDURE — 80053 COMPREHEN METABOLIC PANEL: CPT | Performed by: EMERGENCY MEDICINE

## 2021-09-28 PROCEDURE — 99285 EMERGENCY DEPT VISIT HI MDM: CPT | Performed by: EMERGENCY MEDICINE

## 2021-09-28 PROCEDURE — 85610 PROTHROMBIN TIME: CPT | Performed by: EMERGENCY MEDICINE

## 2021-09-28 PROCEDURE — 85025 COMPLETE CBC W/AUTO DIFF WBC: CPT | Performed by: EMERGENCY MEDICINE

## 2021-09-28 PROCEDURE — 36415 COLL VENOUS BLD VENIPUNCTURE: CPT | Performed by: EMERGENCY MEDICINE

## 2021-09-28 PROCEDURE — 99285 EMERGENCY DEPT VISIT HI MDM: CPT | Mod: 25 | Performed by: EMERGENCY MEDICINE

## 2021-09-28 RX ORDER — IOPAMIDOL 755 MG/ML
66 INJECTION, SOLUTION INTRAVASCULAR ONCE
Status: COMPLETED | OUTPATIENT
Start: 2021-09-29 | End: 2021-09-29

## 2021-09-28 ASSESSMENT — ENCOUNTER SYMPTOMS
ABDOMINAL PAIN: 0
RHINORRHEA: 0
NAUSEA: 0
CHILLS: 0
NUMBNESS: 0
APPETITE CHANGE: 0
FATIGUE: 0
DYSPHORIC MOOD: 0
COUGH: 1
SORE THROAT: 0
VOMITING: 0
WEAKNESS: 0
BACK PAIN: 0
DIARRHEA: 0
CHEST TIGHTNESS: 0
PALPITATIONS: 0
FEVER: 0
LIGHT-HEADEDNESS: 0
DIAPHORESIS: 0
SHORTNESS OF BREATH: 0

## 2021-09-28 ASSESSMENT — MIFFLIN-ST. JEOR: SCORE: 903.5

## 2021-09-29 ENCOUNTER — TELEPHONE (OUTPATIENT)
Dept: FAMILY MEDICINE | Facility: CLINIC | Age: 86
End: 2021-09-29

## 2021-09-29 ENCOUNTER — APPOINTMENT (OUTPATIENT)
Dept: CT IMAGING | Facility: CLINIC | Age: 86
End: 2021-09-29
Attending: EMERGENCY MEDICINE
Payer: COMMERCIAL

## 2021-09-29 PROCEDURE — 71275 CT ANGIOGRAPHY CHEST: CPT

## 2021-09-29 PROCEDURE — 250N000013 HC RX MED GY IP 250 OP 250 PS 637: Performed by: EMERGENCY MEDICINE

## 2021-09-29 PROCEDURE — 250N000009 HC RX 250: Performed by: EMERGENCY MEDICINE

## 2021-09-29 PROCEDURE — 250N000011 HC RX IP 250 OP 636: Performed by: EMERGENCY MEDICINE

## 2021-09-29 RX ORDER — LEVOFLOXACIN 500 MG/1
500 TABLET, FILM COATED ORAL DAILY
Qty: 13 TABLET | Refills: 0 | Status: SHIPPED | OUTPATIENT
Start: 2021-09-30 | End: 2021-10-14

## 2021-09-29 RX ORDER — LEVOFLOXACIN 500 MG/1
500 TABLET, FILM COATED ORAL ONCE
Status: COMPLETED | OUTPATIENT
Start: 2021-09-29 | End: 2021-09-29

## 2021-09-29 RX ADMIN — SODIUM CHLORIDE 94 ML: 9 INJECTION, SOLUTION INTRAVENOUS at 00:46

## 2021-09-29 RX ADMIN — IOPAMIDOL 66 ML: 755 INJECTION, SOLUTION INTRAVENOUS at 00:46

## 2021-09-29 RX ADMIN — LEVOFLOXACIN 500 MG: 500 TABLET, FILM COATED ORAL at 02:23

## 2021-09-29 NOTE — DISCHARGE INSTRUCTIONS
Be sure to have your Coumadin level checked in a few days and more frequently throughout the course of antibiotics as the antibiotics may affect your Coumadin level

## 2021-09-29 NOTE — TELEPHONE ENCOUNTER
ANTICOAGULATION  MANAGEMENT: Discharge Review    Ellie Leigh chart reviewed for anticoagulation continuity of care    Emergency room visit on 9/28 for bronchiectasis w/ actue exacerbation.    Discharge disposition: Home    Results:    Recent labs: (last 7 days)     09/24/21  1331 09/28/21  2239   INR 2.3* 2.02*     Anticoagulation inpatient management:     not applicable     Anticoagulation discharge instructions:     Warfarin dosing: home regimen continued   Bridging: No   INR goal change: No      Medication changes affecting anticoagulation: Yes: levofloxacin    Additional factors affecting anticoagulation: Yes: hemoptysis    Plan     Agree with discharge plan for follow up on 5 days after ED visit    No adjustment to Anticoagulation Calendar was required    Kylah Dorsey RN    ANTICOAGULATION  MANAGEMENT     Interacting Medication Review    Interacting medication(s): Levofloxacin (Levaquin) with warfarin.    Duration: 14 days  (9/30 to 10/14)    Indication: bronchiectasis with acute exacerbation     New medication?: Yes, interaction may increase INR and risk of bleeding     PLAN     Called pt and daughter and informed them to have an INR by 10/4 per ER doctor. They agreed and will call to schedule INR with FP appt.    No adjustment to Anticoagulation Calendar was required    Kylah Dorsey RN

## 2021-09-29 NOTE — ED PROVIDER NOTES
History     Chief Complaint   Patient presents with     Hemoptysis     coughing up blood today, chest congestion     HPI  Ellie Leigh is a 89 year old female with history of COPD and tuberculosis in the past status post treatment presenting for evaluation of several weeks of cough and tonight with single episode of hemoptysis.  Patient reports she has been having ongoing cough with some mild chest tightness was prominent in the mornings and evenings.  Coughing has been relatively chronic but slightly worse over the past week or 2.  No fevers or chills.  Generally cough is nonproductive.  Denies associated chest pain, abdominal pain, nausea, or vomiting.  No fevers or chills.  No known sick contacts.  Patient denies associated difficulty breathing.  Denies history of cancer.  No recent weight loss or weight gain.  Denies history of blood clots.    ==================================================================    CHART REVIEW:      Doing OK after hospital stay. Some stomach irritation from the antibiotics. Still has 5 days left on antibiotics. Sputum culture reviewed. No other non-IV options.     PLAN: Continue the Advair twice daily     IV Levaquin 500 mg daily for pseudomonas with bronchiectasis   -Respiratory virus panel negative  -Sputum culture growing Pseudomonas aeruginosa; sensitivities pending  -AFB culture and stain times three sent and pending  -Wheezing and hemoptysis have improved, hypoxia resolved  -Continues to have productive cough, but dyspnea improved  -Pseudomonas aeruginosa growing from sputum culture, sensitive to Levaquin   -Patient discharged on 10 day course Levaquin     Hemoptysis      END CHART REVIEW  ==================================================================      Allergies:  Allergies   Allergen Reactions     Cephalosporins Nausea     Cefzil     Doxycycline Nausea and Vomiting     Sulfa Drugs Nausea     Penicillins Rash     PCN       Problem List:    Patient Active  Problem List    Diagnosis Date Noted     Acute respiratory failure with hypoxia (H) 07/09/2021     Priority: Medium     COPD exacerbation (H) 07/09/2021     Priority: Medium     Cardiac pacemaker in situ 07/09/2021     Priority: Medium     H/O TB (tuberculosis) 02/09/2018     Priority: Medium     Pt with chronic RUL infiltrate with pos AFB sputum  Full treatmetn for TB following ID consult in 2000's       Back pain 02/06/2018     Priority: Medium     Nausea 06/05/2017     Priority: Medium     3/29/2021: She has had chronic nausea.  She has been on omeprazole  40mg daily for years.  Tried sucralfate (CARAFATE) which did not help.  Was on FODMAP diet, gluten free and no dairy diets.  Evaluation with GI in the past in 1/23/2015.  She has had gastroscopy several times.  10/30/2018 CT abdomen and pelvis unremarkable.   Gastric emptying normal.    Nausea in the AM.   No vomiting.   Hot flashes around Noon.  Feels like her whole body is on fire until med to late afternoon.  This is cyclical daily.   Feels shortness of breath with the hot flashes.    Nothing has helped much. Tried meclizine and Dramamine.   She has tried various liz preparations.   She has tried ondansetron.   Mirtazapine seemed to help for a while, does help sleep.  She has tried compazine in the past.   Today will try phenergan.         Irritable bowel syndrome without diarrhea 05/02/2016     Priority: Medium     Mild persistent asthma without complication 12/01/2015     Priority: Medium     Long term current use of anticoagulant therapy 03/02/2015     Priority: Medium     Syncope 01/12/2015     Priority: Medium     Advance Care Planning 01/09/2015     Priority: Medium     Advance Care Planning 7/2/2015: Receipt of ACP document:  Received: Health Care Directive which was witnessed or notarized on 1-9-15.  Document previously scanned on 2-27-15.  Validation form completed and sent to be scanned.  Code Status reflects choices in most recent ACP document.   Confirmed/documented designated decision maker(s).  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  Patient has had lung CT, will see Dr Gómez for consideration of further workup.  Also has pulmonology appt 8/18.  5/2/2018:Reviewing chart she has had hyponatremia since 2010.        Positive fecal occult blood test 07/07/2014     Priority: Medium     x2 -- first was in ED.  Patient expresses that she does not want colonoscopy.  She'll set appt to discuss with her PCP.       Benign essential HTN 02/11/2014     Priority: Medium     BPPV (benign paroxysmal positional vertigo) 11/05/2013     Priority: Medium     History of skin cancer 05/07/2013     Priority: Medium     Recurrent UTI 07/16/2012     Priority: Medium     recurrent UTI  Recent Urologic workup neg, 2005  Has Cipro at home for treatment as needed       Hypothyroidism 05/07/2012     Priority: Medium     Squamous cell carcinoma in situ of skin of face 04/19/2012     Priority: Medium     SK (seborrheic keratosis) 04/19/2012     Priority: Medium     Intermittent atrial fibrillation (H) 12/01/2011     Priority: Medium     Cervical vertebral fracture (H) 11/20/2011     Priority: Medium     Anxiety 11/15/2011     Priority: Medium     DVT (deep venous thrombosis) 10/12/2011     Priority: Medium     H/o in past, on current coumadin therapy.       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 Provider to review       Right arm weakness 07/29/2011     Priority: Medium     Ulnar neuropathy 07/29/2011     Priority: Medium     Health Care Home 04/21/2011     Priority: Medium     Conchis Robles RN  [phone]  [facility] / [facility]              DX V65.8 REPLACED WITH [id number] HEALTH CARE HOME (04/08/2013)       Chronic constipation 03/03/2011     Priority: Medium     Osteoporosis 03/03/2011     Priority: Medium     Hyperlipidemia LDL goal <130 10/31/2010     Priority: Medium     Infectious colitis, enteritis and gastroenteritis 07/10/2010     Priority: Medium     Chronic allergic conjunctivitis 11/18/2008     Priority: Medium     Chronic seasonal allergic rhinitis 11/18/2008     Priority: Medium     Esophageal reflux 11/29/2007     Priority: Medium     PERSONAL HISTORY OF MALIGNANCY- BREAST 06/13/2007     Priority: Medium     Sensorineural hearing loss, asymmetrical 06/20/2005     Priority: Medium     Generalized osteoarthrosis, unspecified site 06/20/2005     Priority: Medium     Right hip and knee are the most symptomatic          Past Medical History:    Past Medical History:   Diagnosis Date     Basal cell carcinoma      Breast cancer (H)      COPD (chronic obstructive pulmonary disease) (H)      Dust allergy      DVT (deep vein thrombosis) in pregnancy      HTN (hypertension)      Hyperlipidemia LDL goal <130 10/31/2010     Hyponatremia      Hypothyroid      Intermittent atrial fibrillation (H) 12/1/2011     Loose body in joint 4/15/2011     Neck fracture (H)      Syncope 5/12/2013       Past Surgical History:    Past Surgical History:   Procedure Laterality Date     APPENDECTOMY       ARTHROSCOPY KNEE  4/15/2011    Procedure:ARTHROSCOPY KNEE; removal of loose body; Surgeon:LEY, JEFFREY DUANE; Location:[facility]     CHOLECYSTECTOMY       ESOPHAGOSCOPY, GASTROSCOPY, DUODENOSCOPY (EGD), COMBINED  6/9/2014    Procedure: COMBINED ESOPHAGOSCOPY, GASTROSCOPY, DUODENOSCOPY (EGD);  Surgeon: Gilberto Richard MD;  Location: [facility]     ESOPHAGOSCOPY, GASTROSCOPY, DUODENOSCOPY (EGD), COMBINED N/A 10/18/2019    Procedure: ESOPHAGOGASTRODUODENOSCOPY (EGD);  Surgeon: Noe Sams DO;  Location: [facility]  GI     IMPLANT PACEMAKER  5/21/2013    Sarah Reyna [id number] Serial#[id number]     INJECT EPIDURAL LUMBAR  3/23/2011    INJECT EPIDURAL LUMBAR performed by GENERIC ANESTHESIA PROVIDER at [facility] OR     JOINT REPLACEMENT, HIP RT/LT      Joint Replacement Hip Rt     MASTECTOMY, SIMPLE RT/LT/CAITLYN      Left breast - following breast ca     OTHER SURGICAL HISTORY      C1-C2 fusion after fx      SURGICAL HISTORY OF -   05/22/2001    Colonoscopy     TONSILLECTOMY         Family History:    Family History   Problem Relation Age of Onset     Cerebrovascular Disease Mother      Heart Disease Mother         MI     Cerebrovascular Disease Father      Heart Disease Father         MI     Respiratory Maternal Grandfather         TB     Neurologic Disorder Brother         ALS     Heart Disease Brother      Cerebrovascular Disease Brother      Breast Cancer Daughter         age:49     Asthma Brother      Cancer Brother         brain and lung     Cancer Daughter         thyroid       Social History:  Marital Status:   [5]  Social History     Tobacco Use     Smoking status: Passive Smoke Exposure - Never Smoker     Smokeless tobacco: Never Used   Substance Use Topics     Alcohol use: No     Drug use: No        Medications:    [START ON 9/30/2021] levofloxacin (LEVAQUIN) 500 MG tablet  Acetaminophen (TYLENOL PO)  albuterol (PROVENTIL) (2.5 MG/3ML) 0.083% neb solution  CRANBERRY  fluticasone-salmeterol (ADVAIR) 250-50 MCG/DOSE inhaler  ipratropium - albuterol 0.5 mg/2.5 mg/3 mL (DUONEB) 0.5-2.5 (3) MG/3ML neb solution  levothyroxine (SYNTHROID/LEVOTHROID) 50 MCG tablet  metoprolol succinate ER (TOPROL-XL) 25 MG 24 hr tablet  mirtazapine (REMERON) 15 MG tablet  omeprazole (PRILOSEC) 40 MG DR capsule  order for DME  polyethylene glycol (MIRALAX/GLYCOLAX) Packet  PROAIR  (90 Base) MCG/ACT inhaler  probiotic CAPS  sodium chloride 0.9 % neb solution  sodium chloride 1 GM tablet  warfarin ANTICOAGULANT (COUMADIN) 1 MG  "tablet          Review of Systems   Constitutional: Negative for appetite change, chills, diaphoresis, fatigue and fever.   HENT: Negative for congestion, rhinorrhea and sore throat.    Respiratory: Positive for cough. Negative for chest tightness and shortness of breath.         Single episode of hemoptysis.  Estimated 1 tablespoon of blood   Cardiovascular: Negative for chest pain, palpitations and leg swelling.   Gastrointestinal: Negative for abdominal pain, diarrhea, nausea and vomiting.   Genitourinary: Negative for decreased urine volume.   Musculoskeletal: Negative for back pain.   Skin: Negative for rash.   Neurological: Negative for weakness, light-headedness and numbness.   Psychiatric/Behavioral: Negative for dysphoric mood.   All other systems reviewed and are negative.      Physical Exam   BP: (!) 189/101  Pulse: 75  Temp: 97.6  F (36.4  C)  Resp: 18  Height: 147.3 cm (4' 10\")  Weight: 59.9 kg (132 lb)  SpO2: 93 %      Physical Exam  Vitals and nursing note reviewed.   Constitutional:       General: She is awake.      Appearance: Normal appearance. She is not ill-appearing or diaphoretic.      Comments: Sitting semireclined in bed awake and alert in no distress.  Able provide a full history with no conversational dyspnea.  No coughing while having history or physical performed.  Elderly but nonill appearing   HENT:      Head: Atraumatic.      Nose: Nose normal.      Mouth/Throat:      Mouth: Mucous membranes are moist.   Eyes:      Conjunctiva/sclera: Conjunctivae normal.   Cardiovascular:      Rate and Rhythm: Normal rate and regular rhythm.      Pulses: Normal pulses.   Pulmonary:      Effort: Pulmonary effort is normal.      Breath sounds: Normal breath sounds. No wheezing or rhonchi.   Abdominal:      General: There is no distension.      Palpations: Abdomen is soft.      Tenderness: There is no abdominal tenderness. There is no guarding.      Comments: Protuberant   Musculoskeletal:         " General: Normal range of motion.      Cervical back: Normal range of motion.      Right lower leg: No edema.      Left lower leg: No edema.      Comments: No calf tenderness   Skin:     General: Skin is warm and dry.      Capillary Refill: Capillary refill takes less than 2 seconds.   Neurological:      Mental Status: She is alert and oriented to person, place, and time.   Psychiatric:         Mood and Affect: Mood normal.         ED Course        Procedures                  Results for orders placed or performed during the hospital encounter of 09/28/21 (from the past 24 hour(s))   CBC with platelets differential    Narrative    The following orders were created for panel order CBC with platelets differential.  Procedure                               Abnormality         Status                     ---------                               -----------         ------                     CBC with platelets and d...[[id number]]                      Final result                 Please view results for these tests on the individual orders.   Comprehensive metabolic panel   Result Value Ref Range    Sodium 133 133 - 144 mmol/L    Potassium 4.5 3.4 - 5.3 mmol/L    Chloride 101 94 - 109 mmol/L    Carbon Dioxide (CO2) 27 20 - 32 mmol/L    Anion Gap 5 3 - 14 mmol/L    Urea Nitrogen 21 7 - 30 mg/dL    Creatinine 0.66 0.52 - 1.04 mg/dL    Calcium 8.7 8.5 - 10.1 mg/dL    Glucose 110 (H) 70 - 99 mg/dL    Alkaline Phosphatase 87 40 - 150 U/L    AST 24 0 - 45 U/L    ALT 21 0 - 50 U/L    Protein Total 8.1 6.8 - 8.8 g/dL    Albumin 3.8 3.4 - 5.0 g/dL    Bilirubin Total 0.3 0.2 - 1.3 mg/dL    GFR Estimate 77 >60 mL/min/1.73m2   ABO/Rh type and screen    Narrative    The following orders were created for panel order ABO/Rh type and screen.  Procedure                               Abnormality         Status                     ---------                               -----------         ------                     Adult Type and  Screen[[id number]]                            In process                   Please view results for these tests on the individual orders.   INR   Result Value Ref Range    INR 2.02 (H) 0.85 - 1.15   CBC with platelets and differential   Result Value Ref Range    WBC Count 7.3 4.0 - 11.0 10e3/uL    RBC Count 4.34 3.80 - 5.20 10e6/uL    Hemoglobin 12.4 11.7 - 15.7 g/dL    Hematocrit 39.3 35.0 - 47.0 %    MCV 91 78 - 100 fL    MCH 28.6 26.5 - 33.0 pg    MCHC 31.6 31.5 - 36.5 g/dL    RDW 13.3 10.0 - 15.0 %    Platelet Count 289 150 - 450 10e3/uL    % Neutrophils 59 %    % Lymphocytes 30 %    % Monocytes 9 %    % Eosinophils 1 %    % Basophils 1 %    % Immature Granulocytes 0 %    NRBCs per 100 WBC 0 <1 /100    Absolute Neutrophils 4.3 1.6 - 8.3 10e3/uL    Absolute Lymphocytes 2.2 0.8 - 5.3 10e3/uL    Absolute Monocytes 0.6 0.0 - 1.3 10e3/uL    Absolute Eosinophils 0.1 0.0 - 0.7 10e3/uL    Absolute Basophils 0.1 0.0 - 0.2 10e3/uL    Absolute Immature Granulocytes 0.0 <=0.0 10e3/uL    Absolute NRBCs 0.0 10e3/uL   CT Chest Pulmonary Embolism w Contrast    Narrative    EXAM: CT CHEST PULMONARY EMBOLISM W CONTRAST  LOCATION: [facility]  DATE/TIME: 9/29/2021 12:46 AM    INDICATION: PE suspected, high prob; COPD, tuberculosis status post treatment, cough and hemoptysis. Chest tightness.  COMPARISON: 07/09/2021  TECHNIQUE: CT chest pulmonary angiogram during arterial phase injection of IV contrast. Multiplanar reformats and MIP reconstructions were performed. Dose reduction techniques were used.   CONTRAST: 66 mL Isovue 370    FINDINGS:  ANGIOGRAM CHEST: Pulmonary arteries are normal caliber and negative for pulmonary emboli. Thoracic aorta is not well opacified and is  indeterminate for dissection. No CT evidence of right heart strain.    LUNGS AND PLEURA: Chronic bilateral bronchiectasis and parenchymal opacities, right greater than left. Associated bronchial wall thickening predominantly in the  upper lobes is likely unchanged. Several small mucous plugs are again several nodular areas   of parenchymal opacity with central cavitation and/or bronchiectasis are unchanged, including right upper lobe, image 39, 1.2 cm, and right upper lobe image 33, 1.3 cm, unchanged. There is a new irregular parenchymal opacity in the right lower lobe more   dense centrally, measuring approximately 2 cm on image 84 with surrounding groundglass opacity. Scattered areas of air trapping are noted bilaterally. Tree-in-bud nodularity within the lower lobes redemonstrated bilaterally, greatest centered about image   48 in the left lower lobe, likely unchanged. No pneumothorax.    MEDIASTINUM/AXILLAE: Scattered subcentimeter mediastinal nodes, likely unchanged. Cardiac pacemaker.    CORONARY ARTERY CALCIFICATION: Severe.    UPPER ABDOMEN: Unremarkable    MUSCULOSKELETAL: No acute bony abnormalities. Multifocal degenerative change. Thoracic kyphosis. L1 50% compression fracture, unchanged. Left mastectomy.      Impression    IMPRESSION:  1.  No evidence of pulmonary embolus.  2.  New right lower lobe peripheral parenchymal opacity favored to represent developing pneumonitis. Given the nodular central component, short-term follow-up posttherapy is recommended to document resolution.  3.  Chronic changes in both lungs, greatest in the right upper lobe compatible with prior/chronic infection including bronchial wall thickening, bronchiectasis, mucous plugging, and tree-in-bud nodularity.     *Note: Due to a large number of results and/or encounters for the requested time period, some results have not been displayed. A complete set of results can be found in Results Review.       Medications   levofloxacin (LEVAQUIN) tablet 500 mg (has no administration in time range)   iopamidol (ISOVUE-370) solution 66 mL (66 mLs Intravenous Given 9/29/21 0046)   sodium chloride 0.9 % bag 500mL for CT scan flush use (94 mLs Intravenous Given 9/29/21  0046)       2:00 AM Patient re-assessed: Patient resting comfortably.  No difficulty breathing.  No further episodes of hemoptysis.  Advised her of CT findings and plan for treatment with Levaquin as this was successfully treated her before with positive culture previously.      Assessments & Plan (with Medical Decision Making)  31-year-old female with history of TB and previous episodes of bronchiectasis presenting for evaluation of recurrent cough and hemoptysis x1.  Well-appearing in the ED in no distress with no respiratory difficulty.  CT did show evidence of recurrent bronchiectasis.  No white count or left shift.  INR therapeutic.  No hypoxia or difficulty breathing.  Recommended restarting Levaquin which was successful in treating her symptoms previously.  Recommended close follow-up for Coumadin check as well as primary care follow-up to assure resolution of her symptoms.  Patient advised that if symptoms worsen or new symptoms develop, to return to the ED for repeat evaluation     I have reviewed the nursing notes.    I have reviewed the findings, diagnosis, plan and need for follow up with the patient.       New Prescriptions    LEVOFLOXACIN (LEVAQUIN) 500 MG TABLET    Take 1 tablet (500 mg) by mouth daily for 14 days       Final diagnoses:   Bronchiectasis with acute exacerbation (H)       9/28/2021   [facility] EMERGENCY DEPT     Benedict, Toni Macias MD  09/29/21 [id number]

## 2021-10-01 ENCOUNTER — OFFICE VISIT (OUTPATIENT)
Dept: FAMILY MEDICINE | Facility: CLINIC | Age: 86
End: 2021-10-01
Payer: COMMERCIAL

## 2021-10-01 ENCOUNTER — ANTICOAGULATION THERAPY VISIT (OUTPATIENT)
Dept: ANTICOAGULATION | Facility: CLINIC | Age: 86
End: 2021-10-01

## 2021-10-01 VITALS
DIASTOLIC BLOOD PRESSURE: 72 MMHG | OXYGEN SATURATION: 92 % | HEIGHT: 58 IN | BODY MASS INDEX: 27.5 KG/M2 | WEIGHT: 131 LBS | SYSTOLIC BLOOD PRESSURE: 128 MMHG | RESPIRATION RATE: 16 BRPM | HEART RATE: 88 BPM | TEMPERATURE: 97.7 F

## 2021-10-01 DIAGNOSIS — I48.0 INTERMITTENT ATRIAL FIBRILLATION (H): ICD-10-CM

## 2021-10-01 DIAGNOSIS — Z23 NEED FOR PROPHYLACTIC VACCINATION AND INOCULATION AGAINST INFLUENZA: ICD-10-CM

## 2021-10-01 DIAGNOSIS — J18.9 PNEUMONIA OF RIGHT LOWER LOBE DUE TO INFECTIOUS ORGANISM: Primary | ICD-10-CM

## 2021-10-01 DIAGNOSIS — I48.11 LONGSTANDING PERSISTENT ATRIAL FIBRILLATION (H): ICD-10-CM

## 2021-10-01 DIAGNOSIS — I82.409 DVT (DEEP VENOUS THROMBOSIS) (H): Primary | ICD-10-CM

## 2021-10-01 DIAGNOSIS — Z79.01 LONG TERM CURRENT USE OF ANTICOAGULANT THERAPY: ICD-10-CM

## 2021-10-01 LAB — INR BLD: 2.5 (ref 0.9–1.1)

## 2021-10-01 PROCEDURE — 90662 IIV NO PRSV INCREASED AG IM: CPT | Performed by: NURSE PRACTITIONER

## 2021-10-01 PROCEDURE — 36416 COLLJ CAPILLARY BLOOD SPEC: CPT | Performed by: NURSE PRACTITIONER

## 2021-10-01 PROCEDURE — 99214 OFFICE O/P EST MOD 30 MIN: CPT | Mod: 25 | Performed by: NURSE PRACTITIONER

## 2021-10-01 PROCEDURE — 85610 PROTHROMBIN TIME: CPT | Performed by: NURSE PRACTITIONER

## 2021-10-01 PROCEDURE — G0008 ADMIN INFLUENZA VIRUS VAC: HCPCS | Performed by: NURSE PRACTITIONER

## 2021-10-01 ASSESSMENT — MIFFLIN-ST. JEOR: SCORE: 898.96

## 2021-10-01 NOTE — PROGRESS NOTES
ANTICOAGULATION MANAGEMENT     Ellie Leigh 89 year old female is on warfarin with therapeutic INR result. (Goal INR 2.0-3.0)    Recent labs: (last 7 days)     10/01/21  1038   INR 2.5*       ASSESSMENT     Source(s): Chart Review and Patient/Caregiver Call       Warfarin doses taken: Warfarin taken as instructed    Diet: No new diet changes identified    New illness, injury, or hospitalization: No    Medication/supplement changes: None noted    Signs or symptoms of bleeding or clotting: No    Previous INR: Therapeutic last 2(+) visits    Additional [facility]     PLAN     Recommended plan for no diet, medication or health factor changes affecting INR     Dosing Instructions: Continue your current warfarin dose with next INR in 3 days       Summary  As of 10/1/2021    Full warfarin instructions:  1.5 mg every Mon, Wed, Fri; 1 mg all other days   Next INR check:               Telephone call with  daughter who verbalizes understanding and agrees to plan    Lab visit scheduled    Education provided: Please call back if any changes to your diet, medications or how you've been taking warfarin    Plan made per [facility] anticoagulation protocol    Kori West, RN  Anticoagulation Clinic  10/1/2021    _______________________________________________________________________     Anticoagulation Episode Summary     Current INR goal:  2.0-3.0   TTR:  68.1 % (12 mo)   Target end date:  Indefinite   Send INR reminders to:  [facility]    Indications    Atrial fibrillation with rapid ventricular response (H) (Resolved) [I48.91]  DVT (deep venous thrombosis) [I82.409]  Long term current use of anticoagulant therapy [Z79.01]  Intermittent atrial fibrillation (H) [I48.0]           Comments:  * Taking Celebrex PRN            Anticoagulation Care Providers     Provider Role Specialty Phone number    Anjum Vidales MD Referring Family Medicine [phone]

## 2021-10-01 NOTE — PROGRESS NOTES
"    Assessment & Plan     (J18.9) Pneumonia of right lower lobe due to infectious organism  (primary encounter diagnosis)  Comment: Recommend repeat x-ray but not obtained today has only been 3 to 4 days since she is been on treatment have a repeat x-ray when she sees her pulmonologist or she can return to the clinic have that done in 1 to 2 weeks post treatment patient has not had any fevers no change in her symptoms improving would not do an x-ray today  Plan: XR Chest 2 Views      (I48.11) Longstanding persistent atrial fibrillation (H)  Comment: Has follow-up with INR clinic  Plan: INR, INR point of care, Interfaced Result      (Z23) Need for prophylactic vaccination and inoculation against influenza  Comment:   Plan: INFLUENZA, QUAD, HIGH DOSE, PF, 65YR + (FLUZONE        HD)          956}     BMI:   Estimated body mass index is 27.38 kg/m  as calculated from the following:    Height as of this encounter: 1.473 m (4' 10\").    Weight as of this encounter: 59.4 kg (131 lb).   Weight management plan: Discussed healthy diet and exercise guidelines    See Patient Instructions    No follow-ups on file.    LACI Ventura CNP  [facility]    Renay Argueta is a 89 year old who presents for the following health issues  accompanied by her Daughter :    HPI     ED/UC Followup:    Facility:  [facility]  Date of visit: 9/28/21  Reason for visit: Bronchiectasis with acute exacerbation (H)  Current Status: Patient states that she is feeling a little better.           Review of Systems   Constitutional, HEENT, cardiovascular, pulmonary, gi and gu systems are negative, except as otherwise noted.      Objective    LMP 06/15/1985   There is no height or weight on file to calculate BMI.   /72 (BP Location: Right arm, Cuff Size: Adult Regular)   Pulse 88   Temp 97.7  F (36.5  C) (Tympanic)   Resp 16   Ht 1.473 m (4' 10\")   Wt 59.4 kg (131 lb)   LMP " 06/15/1985   SpO2 92%   BMI 27.38 kg/m      Physical Exam   GENERAL: healthy, alert and no distress  EYES: Eyes grossly normal to inspection, PERRL and conjunctivae and sclerae normal  HENT: ear canals and TM's normal, nose and mouth without ulcers or lesions  NECK: no adenopathy, no asymmetry, masses, or scars and thyroid normal to palpation  RESP: lungs clear to auscultation - no rales, rhonchi or wheezes  CV: regular rate and rhythm, normal S1 S2, no S3 or S4, no murmur, click or rub, no peripheral edema and peripheral pulses strong  ABDOMEN: soft, nontender, no hepatosplenomegaly, no masses and bowel sounds normal  MS: no gross musculoskeletal defects noted, no edema  SKIN: no suspicious lesions or rashes  NEURO: Normal strength and tone, mentation intact and speech normal  PSYCH: mentation appears normal, affect normal/bright    Results for orders placed or performed in visit on 10/01/21   INR point of care, Interfaced Result     Status: Abnormal   Result Value Ref Range    INR 2.5 (H) 0.9 - 1.1    Narrative    This test is intended for monitoring Coumadin therapy. Results are not accurate in patients with prolonged INR due to factor deficiency.

## 2021-10-04 ENCOUNTER — LAB (OUTPATIENT)
Dept: LAB | Facility: CLINIC | Age: 86
End: 2021-10-04
Payer: COMMERCIAL

## 2021-10-04 ENCOUNTER — ANTICOAGULATION THERAPY VISIT (OUTPATIENT)
Dept: FAMILY MEDICINE | Facility: CLINIC | Age: 86
End: 2021-10-04

## 2021-10-04 DIAGNOSIS — I48.0 INTERMITTENT ATRIAL FIBRILLATION (H): ICD-10-CM

## 2021-10-04 DIAGNOSIS — Z79.01 LONG TERM CURRENT USE OF ANTICOAGULANT THERAPY: ICD-10-CM

## 2021-10-04 DIAGNOSIS — I82.409 DVT (DEEP VENOUS THROMBOSIS) (H): Primary | ICD-10-CM

## 2021-10-04 DIAGNOSIS — I82.4Y9 DEEP VEIN THROMBOSIS (DVT) OF PROXIMAL LOWER EXTREMITY, UNSPECIFIED CHRONICITY, UNSPECIFIED LATERALITY (H): Chronic | ICD-10-CM

## 2021-10-04 LAB — INR BLD: 2.7 (ref 0.9–1.1)

## 2021-10-04 PROCEDURE — 85610 PROTHROMBIN TIME: CPT

## 2021-10-04 PROCEDURE — 36416 COLLJ CAPILLARY BLOOD SPEC: CPT

## 2021-10-04 NOTE — PROGRESS NOTES
ANTICOAGULATION MANAGEMENT     Ellie Leigh 89 year old female is on warfarin with therapeutic INR result. (Goal INR 2.0-3.0)    Recent labs: (last 7 days)     10/04/21  1453   INR 2.7*       ASSESSMENT     Source(s): Chart Review and Patient/Caregiver Call       Warfarin doses taken: Warfarin taken as instructed    Diet: No new diet changes identified    New illness, injury, or hospitalization: No    Medication/supplement changes: None noted that are new - still taking levaquin, 7 days left. Pt is feeling better, improving.     Signs or symptoms of bleeding or clotting: No    Previous INR: Therapeutic last 2(+) visits    Additional findings: None     PLAN     Recommended plan for no diet, medication or health factor changes affecting INR     Dosing Instructions: Continue your current warfarin dose with next INR in 4 days       Summary  As of 10/4/2021    Full warfarin instructions:  1.5 mg every Mon, Wed, Fri; 1 mg all other days   Next INR check:  10/8/2021             Telephone call with Ellie and daughter Suyapa who verbalizes understanding and agrees to plan    Lab visit scheduled    Education provided: Please call back if any changes to your diet, medications or how you've been taking warfarin    Plan made per [facility] anticoagulation protocol    Kylah Dorsey RN  Anticoagulation Clinic  10/4/2021    _______________________________________________________________________     Anticoagulation Episode Summary     Current INR goal:  2.0-3.0   TTR:  68.2 % (12 mo)   Target end date:  Indefinite   Send INR reminders to:  [facility]    Indications    Atrial fibrillation with rapid ventricular response (H) (Resolved) [I48.91]  DVT (deep venous thrombosis) [I82.409]  Long term current use of anticoagulant therapy [Z79.01]  Intermittent atrial fibrillation (H) [I48.0]           Comments:  * Taking Celebrex PRN            Anticoagulation Care Providers     Provider Role Specialty Phone number    Anjum Vidales  MD [facility] [phone]

## 2021-10-08 ENCOUNTER — LAB (OUTPATIENT)
Dept: LAB | Facility: CLINIC | Age: 86
End: 2021-10-08
Payer: COMMERCIAL

## 2021-10-08 ENCOUNTER — OFFICE VISIT (OUTPATIENT)
Dept: PULMONOLOGY | Facility: CLINIC | Age: 86
End: 2021-10-08
Payer: COMMERCIAL

## 2021-10-08 ENCOUNTER — ANTICOAGULATION THERAPY VISIT (OUTPATIENT)
Dept: ANTICOAGULATION | Facility: CLINIC | Age: 86
End: 2021-10-08

## 2021-10-08 VITALS
TEMPERATURE: 97.5 F | OXYGEN SATURATION: 93 % | BODY MASS INDEX: 27.79 KG/M2 | HEIGHT: 58 IN | RESPIRATION RATE: 86 BRPM | SYSTOLIC BLOOD PRESSURE: 126 MMHG | DIASTOLIC BLOOD PRESSURE: 80 MMHG | WEIGHT: 132.4 LBS

## 2021-10-08 DIAGNOSIS — Z79.01 LONG TERM CURRENT USE OF ANTICOAGULANT THERAPY: ICD-10-CM

## 2021-10-08 DIAGNOSIS — I48.0 INTERMITTENT ATRIAL FIBRILLATION (H): ICD-10-CM

## 2021-10-08 DIAGNOSIS — I82.409 DVT (DEEP VENOUS THROMBOSIS) (H): Primary | ICD-10-CM

## 2021-10-08 DIAGNOSIS — J44.9 CHRONIC OBSTRUCTIVE PULMONARY DISEASE, UNSPECIFIED COPD TYPE (H): ICD-10-CM

## 2021-10-08 DIAGNOSIS — J45.30 MILD PERSISTENT ASTHMA WITHOUT COMPLICATION: ICD-10-CM

## 2021-10-08 DIAGNOSIS — I82.4Y9 DEEP VEIN THROMBOSIS (DVT) OF PROXIMAL LOWER EXTREMITY, UNSPECIFIED CHRONICITY, UNSPECIFIED LATERALITY (H): Chronic | ICD-10-CM

## 2021-10-08 LAB — INR BLD: 2.3 (ref 0.9–1.1)

## 2021-10-08 PROCEDURE — 36415 COLL VENOUS BLD VENIPUNCTURE: CPT

## 2021-10-08 PROCEDURE — 99207 PR CDG-CODE CATEGORY CHANGED: CPT | Performed by: INTERNAL MEDICINE

## 2021-10-08 PROCEDURE — 85610 PROTHROMBIN TIME: CPT

## 2021-10-08 PROCEDURE — 99214 OFFICE O/P EST MOD 30 MIN: CPT | Performed by: INTERNAL MEDICINE

## 2021-10-08 RX ORDER — IPRATROPIUM BROMIDE AND ALBUTEROL SULFATE 2.5; .5 MG/3ML; MG/3ML
SOLUTION RESPIRATORY (INHALATION)
Qty: 270 ML | Refills: 11 | Status: SHIPPED | OUTPATIENT
Start: 2021-10-08 | End: 2022-10-19

## 2021-10-08 RX ORDER — ALBUTEROL SULFATE 90 UG/1
AEROSOL, METERED RESPIRATORY (INHALATION)
Qty: 8.5 G | Refills: 3 | Status: SHIPPED | OUTPATIENT
Start: 2021-10-08 | End: 2022-11-10

## 2021-10-08 ASSESSMENT — MIFFLIN-ST. JEOR: SCORE: 905.31

## 2021-10-08 NOTE — PROGRESS NOTES
Ms Elise is a 89-year-old female with bronchiectasis possibly related to prior pneumonias who is here accompanied by her daughter and they are here for a pulmonary checkup as she needs refills on her inhaler medications.  She saw Dr. Rodriguez, the other [place] pulmonary doctor about 2 years ago and I did review her note showing chronic Pseudomonas colonization, exacerbations requiring antibiotic couple times per year but no need for home oxygen and no hospitalizations.  At that time she was on Advair, DuoNeb and as needed albuterol.    Patient had recent exacerbations with increased cough, greenish sputum but also small amount of hemoptysis.  Of note patient is on warfarin therapy for atrial fibrillation.  No other bleeding elsewhere.  Patient was seen in clinic then emergency room and CT scan showed the previously identified bronchiectasis with right upper lobe architectural distortion and small opacity in right lower lobe.  She was prescribed 10 days of levofloxacin and is almost done with that.  No significant side effects from the medication.  She is eating well.  Weight is up about 10 pounds in the last year.  Oxygen saturation in clinic and in the emergency room above 90%.  She is had no further hemoptysis.    She is living with her daughter who helps her with her activities of daily living.  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  Does not go outside very much.  She is up-to-date on her Covid vaccination and influenza vaccination.  When she is feeling well she has morning cough with sputum production and then before she goes to bed but not that much during the day.  No adverse effects from the Advair.  Uses her DuoNeb 3 times per day.    Past Medical History:   Diagnosis Date     Basal cell carcinoma      Breast cancer (H)      COPD (chronic obstructive pulmonary disease) (H)     ct 2014 does show some bronchiectaisi RUL as well as fibronodular disease  bilat upper lobes     Dust allergy      DVT (deep vein thrombosis) in pregnancy     after neck fracture when in hospital     HTN (hypertension)      Hyperlipidemia LDL goal <130 10/31/2010     Hyponatremia     chronic     Hypothyroid      Intermittent atrial fibrillation (H) 12/1/2011    hx tachy-keri, has pacer, on chronic coumadin     Loose body in joint 4/15/2011     Neck fracture (H)     after a fall     Syncope 5/12/2013    multiple hospital visits, lates 3/2016, 6/2016, 7/2016 with no clear cause found     Current Outpatient Medications   Medication Sig Dispense Refill     Acetaminophen (TYLENOL PO) Take  mg by mouth every 8 hours as needed        albuterol (PROAIR HFA) 108 (90 Base) MCG/ACT inhaler Inhale 2 puffs into the lungs every 6 hours as needed for shortness of breath / dyspnea 8.5 g 3     albuterol (PROVENTIL) (2.5 MG/3ML) 0.083% neb solution Take 1 vial (2.5 mg) by nebulization 3 times daily 360 mL 6     CRANBERRY Take 475 mg by mouth 2 times daily.       fluticasone-salmeterol (ADVAIR) 250-50 MCG/DOSE inhaler Inhale 1 puff into the lungs every 12 hours 60 each 11     ipratropium - albuterol 0.5 mg/2.5 mg/3 mL (DUONEB) 0.5-2.5 (3) MG/3ML neb solution One neb three times a day routine and once extra if needed. 270 mL 11     levofloxacin (LEVAQUIN) 500 MG tablet Take 1 tablet (500 mg) by mouth daily for 14 days 13 tablet 0     levothyroxine (SYNTHROID/LEVOTHROID) 50 MCG tablet TAKE ONE TABLET BY MOUTH ONE TIME DAILY  90 tablet 0     metoprolol succinate ER (TOPROL-XL) 25 MG 24 hr tablet TAKE TWO TABLETS BY MOUTH TWICE DAILY  360 tablet 1     mirtazapine (REMERON) 15 MG tablet Take 1 tablet (15 mg) by mouth At Bedtime 90 tablet 3     omeprazole (PRILOSEC) 40 MG DR capsule Take 1 capsule (40 mg) by mouth once daily. 90 capsule 3     order for DME Equipment being ordered: Nebulizer 1 each 0     polyethylene glycol (MIRALAX/GLYCOLAX) Packet Take 17 g by mouth daily        probiotic CAPS Take 1  capsule by mouth every evening        sodium chloride 0.9 % neb solution Inhale 5 mLs into the lungs 3 times daily Use after albuterol in nebulizer to thin secretions. 450 mL 5     sodium chloride 1 GM tablet TAKE ONE TABLET BY MOUTH THREE TIMES DAILY 100 tablet 5     warfarin ANTICOAGULANT (COUMADIN) 1 MG tablet Take 1.5 mg every Mon, Wed, Fri; 1 mg all other days or As directed by Anticoagulation clinic 115 tablet 0     Family History   Problem Relation Age of Onset     Cerebrovascular Disease Mother      Heart Disease Mother         MI     Cerebrovascular Disease Father      Heart Disease Father         MI     Respiratory Maternal Grandfather         TB     Neurologic Disorder Brother         ALS     Heart Disease Brother      Cerebrovascular Disease Brother      Breast Cancer Daughter         age:49     Asthma Brother      Cancer Brother         brain and lung     Cancer Daughter         thyroid     Social History     Socioeconomic History     Marital status:      Spouse name: Not on file     Number of children: Not on file     Years of education: Not on file     Highest education level: Not on file   Occupational History     Employer: RETIRED   Tobacco Use     Smoking status: Passive Smoke Exposure - Never Smoker     Smokeless tobacco: Never Used   Vaping Use     Vaping Use: Never used   Substance and Sexual Activity     Alcohol use: No     Drug use: No     Sexual activity: Not Currently   Other Topics Concern     Parent/sibling w/ CABG, MI or angioplasty before 65F 55M? No   Social History Narrative    Lives in [facility].      Daughters helping since her accident, getting home physical therapy.      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      - 2000    5 daughters, 11 grandchildren, 3 great granchildren     Social Determinants of Health     Financial Resource Strain:      Difficulty of Paying Living Expenses:    Food Insecurity:      Worried About Running Out of  "Food in the Last Year:      Ran Out of Food in the Last Year:    Transportation Needs:      Lack of Transportation (Medical):      Lack of Transportation (Non-Medical):    Physical Activity:      Days of Exercise per Week:      Minutes of Exercise per Session:    Stress:      Feeling of Stress :    Social Connections:      Frequency of Communication with Friends and Family:      Frequency of Social Gatherings with Friends and Family:      Attends Sabianism Services:      Active Member of Clubs or Organizations:      Attends Club or Organization Meetings:      Marital Status:    Intimate Partner Violence:      Fear of Current or Ex-Partner:      Emotionally Abused:      Physically Abused:      Sexually Abused:      Constitutional: NEGATIVE, fatigue, fever but weight gain.   Eyes: NEGATIVE for vision changes or irritation and redness.  ENT/Mouth: NEGATIVE for hoarseness and sore throat  CV: NEGATIVE for chest pain, palpitations or chest pressure, lower extremity edema and syncope or near-syncope  Respiratory: daily  cough and SOB, resolved hemoptysis, excessive sleepiness  GI: NEGATIVE for nausea, abdominal pain, heartburn, or change in bowel habits  Musculoskeletal: knee arthralgias w/o joint swelling  Integumentary/Skin: NEGATIVE for rash  Neurological:  NEGATIVE for numbness or tingling and focal weakness  Hemotologic/Lymphatic: NEGATIVE for bleeding disorder and swollen nodes  Allergic/Immunologic: No rhinitis or hives  RESPIRATORY EXAM:  /80   Temp 97.5  F (36.4  C) (Temporal)   Resp (!) 86   Ht 1.473 m (4' 10\")   Wt 60.1 kg (132 lb 6.4 oz)   LMP 06/15/1985   SpO2 93%   BMI 27.67 kg/m    Patient is elderly and frail using walker but alert and conversant.   Examined in chair  voice quality normal  Nares have no obstruction or d/c and normal mucosa.    No neck masses or thyromegaly or jugular venous distention   Symmetrical chest, kyphotic configuration, without accessory muscle use or tenderness on " palpation. Rare squeaks in LLL.  No stridor.   Normal S1 and S2 heart sounds without murmur rub or gallop. No limb edema.  No abdominal distension  No neck or supraclavicular adenopathy.   Age related muscle atrophy..  No tremor.  No digit clubbing.  No skin rash.   Affect is normal; cognition is normal.       September 28 hemoglobin  12.4 which is higher than has been in the past.  July venous blood gas with normal PCO2.  Sputum microbiology from July shows Pseudomonas, mucoid strain but pansensitive.    CT scan from September 29 shows previously seen volume loss and architectural distortion and bronchiectasis in the right upper lobe.  Small patchy opacity in the right lower lobe.  Scattered bronchial thickening and enlargement consistent with bronchiectasis although this is not diffuse.  No pleural effusions.    Assessment: Elderly female, limited functional status with chronic bronchiectasis and Pseudomonas colonization.  Also on warfarin therapy which would predispose to hemoptysis in the setting of bronchiectasis.  This is never been life-threatening or large amounts and seems to be associated with worsening of Pseudomonas infection.  Seems to improved with current antibiotics as Pseudomonas is sensitive to levofloxacin.  Patient and her daughter had lots of questions regarding whether they need to go to the emergency room or get a CT scan every time she has a small amount of hemoptysis.  I said as long as the hemoptysis is not a large amount, associated with severe shortness of breath and seems to be associated with increased sputum production I do not think she needs repeat imaging and I would treat empirically with additional courses of antibiotics, levofloxacin, for 10 days to see if there is improvement.  If she is feeling more wheezy or short of breath or has chest tightness then adding low-dose prednisone 20 mg would be an adequate dose in this elderly patient, for 5 to 7 days.  I do not think she needs  automatic repeat imaging as her imaging test will always be abnormal. I don't think she'd benefit from vest therapy.     On a chronic basis she seems to be doing well with Advair, DuoNeb and rare use of inhaler albuterol and I have renewed all these prescriptions for 1 year.  She is up-to-date on her immunizations.  There is no need for oxygen therapy.  She can return to pulmonary clinic as needed.    Negro Lomax MD, MPH  Associate Professor of Medicine

## 2021-10-08 NOTE — PROGRESS NOTES
ANTICOAGULATION MANAGEMENT     Ellie Leigh 89 year old female is on warfarin with therapeutic INR result. (Goal INR 2.0-3.0)    Recent labs: (last 7 days)     10/08/21  1117   INR 2.3*       ASSESSMENT     Source(s): Chart Review and Patient/Caregiver Call       Warfarin doses taken: Warfarin taken as instructed    Diet: No new diet changes identified    New illness, injury, or hospitalization: No    Medication/supplement changes: 3 days of levaquin left. Has been on since 9-30 and no effect on INR    Signs or symptoms of bleeding or clotting: No    Previous INR: Therapeutic last 2(+) visits    Additional findings: None     PLAN     Recommended plan for no diet, medication or health factor changes affecting INR     Dosing Instructions: Continue your current warfarin dose with next INR in 2 weeks       Summary  As of 10/8/2021    Full warfarin instructions:  1.5 mg every Mon, Wed, Fri; 1 mg all other days   Next INR check:  10/22/2021             Telephone call with  Verito who verbalizes understanding and agrees to plan    Lab visit scheduled    Education provided: Please call back if any changes to your diet, medications or how you've been taking warfarin, Importance of notifying clinic for changes in medications; a sooner lab recheck maybe needed., Importance of notifying clinic for diarrhea, nausea/vomiting, reduced intake, and/or illness; a sooner lab recheck maybe needed. and Contact [phone]  with any changes, questions or concerns.     Plan made per [facility] anticoagulation protocol    Lissy Beaulieu RN  Anticoagulation Clinic  10/8/2021    _______________________________________________________________________     Anticoagulation Episode Summary     Current INR goal:  2.0-3.0   TTR:  68.1 % (12 mo)   Target end date:  Indefinite   Send INR reminders to:  [facility]    Indications    Atrial fibrillation with rapid ventricular response (H) (Resolved) [I48.91]  DVT (deep venous thrombosis)  [I82.409]  Long term current use of anticoagulant therapy [Z79.01]  Intermittent atrial fibrillation (H) [I48.0]           Comments:  * Taking Celebrex PRN            Anticoagulation Care Providers     Provider Role Specialty Phone number    Anjum Vidales MD Referring Family Medicine [phone]

## 2021-10-14 LAB
MYCOBACTERIUM SPEC CULT: NORMAL
SPECIMEN SOURCE: NORMAL
SPECIMEN SOURCE: NORMAL

## 2021-10-21 DIAGNOSIS — I48.0 INTERMITTENT ATRIAL FIBRILLATION (H): ICD-10-CM

## 2021-10-21 LAB
MYCOBACTERIUM SPEC CULT: NORMAL
MYCOBACTERIUM SPEC CULT: NORMAL
SPECIMEN SOURCE: NORMAL

## 2021-10-21 RX ORDER — WARFARIN SODIUM 1 MG/1
TABLET ORAL
Qty: 110 TABLET | Refills: 0 | Status: SHIPPED | OUTPATIENT
Start: 2021-10-21 | End: 2022-02-01

## 2021-10-21 NOTE — TELEPHONE ENCOUNTER
Signed Prescriptions:                        Disp   Refills    warfarin ANTICOAGULANT (COUMADIN) 1 MG tab*110 ta*0        Sig: take 1.5 mg every Mon, Wed, Fri, then take 1 tablet           (1 mg) all other days or as directed by the INR           clinic.  Authorizing Provider: MARCY DAUGHERTY  Ordering User: DEAN BEAULIEU RN refilled medication per [facility] refill protocol.  Dean Beaulieu RN

## 2021-10-22 ENCOUNTER — LAB (OUTPATIENT)
Dept: LAB | Facility: CLINIC | Age: 86
End: 2021-10-22
Payer: COMMERCIAL

## 2021-10-22 ENCOUNTER — ANTICOAGULATION THERAPY VISIT (OUTPATIENT)
Dept: ANTICOAGULATION | Facility: CLINIC | Age: 86
End: 2021-10-22

## 2021-10-22 DIAGNOSIS — Z79.01 LONG TERM CURRENT USE OF ANTICOAGULANT THERAPY: ICD-10-CM

## 2021-10-22 DIAGNOSIS — I82.4Y9 DEEP VEIN THROMBOSIS (DVT) OF PROXIMAL LOWER EXTREMITY, UNSPECIFIED CHRONICITY, UNSPECIFIED LATERALITY (H): Chronic | ICD-10-CM

## 2021-10-22 DIAGNOSIS — I48.0 INTERMITTENT ATRIAL FIBRILLATION (H): ICD-10-CM

## 2021-10-22 DIAGNOSIS — I82.409 DVT (DEEP VENOUS THROMBOSIS) (H): Primary | ICD-10-CM

## 2021-10-22 LAB — INR BLD: 2.7 (ref 0.9–1.1)

## 2021-10-22 PROCEDURE — 36415 COLL VENOUS BLD VENIPUNCTURE: CPT

## 2021-10-22 PROCEDURE — 85610 PROTHROMBIN TIME: CPT

## 2021-10-22 NOTE — PROGRESS NOTES
ANTICOAGULATION MANAGEMENT     Ellie Leigh 89 year old female is on warfarin with therapeutic INR result. (Goal INR 2.0-3.0)    Recent labs: (last 7 days)     10/22/21  1325   INR 2.7*       ASSESSMENT     Source(s): Chart Review and Patient/Caregiver Call       Warfarin doses taken: Warfarin taken as instructed    Diet: No new diet changes identified    New illness, injury, or hospitalization: No    Medication/supplement changes: None noted    Signs or symptoms of bleeding or clotting: No    Previous INR: Therapeutic last 2(+) visits    Additional findings: None     PLAN     Recommended plan for no diet, medication or health factor changes affecting INR     Dosing Instructions: Continue your current warfarin dose with next INR in 4 weeks       Summary  As of 10/22/2021    Full warfarin instructions:  1.5 mg every Mon, Wed, Fri; 1 mg all other days   Next INR check:  11/19/2021             Telephone call with Suyapa who verbalizes understanding and agrees to plan    Lab visit scheduled    Education provided: Please call back if any changes to your diet, medications or how you've been taking warfarin    Plan made per [facility] anticoagulation protocol    Kirsten Zarate, RN  Anticoagulation Clinic  10/22/2021    _______________________________________________________________________     Anticoagulation Episode Summary     Current INR goal:  2.0-3.0   TTR:  68.2 % (12 mo)   Target end date:  Indefinite   Send INR reminders to:  [facility]    Indications    Atrial fibrillation with rapid ventricular response (H) (Resolved) [I48.91]  DVT (deep venous thrombosis) [I82.409]  Long term current use of anticoagulant therapy [Z79.01]  Intermittent atrial fibrillation (H) [I48.0]           Comments:  * Taking Celebrex PRN            Anticoagulation Care Providers     Provider Role Specialty Phone number    Anjum Vidales MD Referring Family Medicine [phone]

## 2021-11-01 ENCOUNTER — OFFICE VISIT (OUTPATIENT)
Dept: URGENT CARE | Facility: URGENT CARE | Age: 86
End: 2021-11-01
Payer: COMMERCIAL

## 2021-11-01 ENCOUNTER — TELEPHONE (OUTPATIENT)
Dept: FAMILY MEDICINE | Facility: CLINIC | Age: 86
End: 2021-11-01

## 2021-11-01 VITALS
BODY MASS INDEX: 27.69 KG/M2 | DIASTOLIC BLOOD PRESSURE: 76 MMHG | RESPIRATION RATE: 16 BRPM | TEMPERATURE: 97.6 F | WEIGHT: 132.5 LBS | HEART RATE: 76 BPM | SYSTOLIC BLOOD PRESSURE: 128 MMHG | OXYGEN SATURATION: 97 %

## 2021-11-01 DIAGNOSIS — R06.2 WHEEZING: ICD-10-CM

## 2021-11-01 DIAGNOSIS — Z87.09 HISTORY OF BRONCHIECTASIS: ICD-10-CM

## 2021-11-01 DIAGNOSIS — R05.9 COUGH: Primary | ICD-10-CM

## 2021-11-01 PROCEDURE — 99215 OFFICE O/P EST HI 40 MIN: CPT | Performed by: PHYSICIAN ASSISTANT

## 2021-11-01 RX ORDER — LEVOFLOXACIN 500 MG/1
500 TABLET, FILM COATED ORAL DAILY
Qty: 10 TABLET | Refills: 0 | Status: SHIPPED | OUTPATIENT
Start: 2021-11-01 | End: 2022-06-16

## 2021-11-01 RX ORDER — PREDNISONE 20 MG/1
20 TABLET ORAL DAILY
Qty: 7 TABLET | Refills: 0 | Status: SHIPPED | OUTPATIENT
Start: 2021-11-01 | End: 2021-11-08

## 2021-11-01 NOTE — PROGRESS NOTES
Assessment & Plan     Cough  Patient has mild cough and slight wheeze on exam. Would recommend trial of prednisone 20mg x 5-7 days per pulmonology recommendations at last visit. Patient has had hemoptysis with both prior infections and she does not have this at this time. Gave written Rx for levoquin that she can fill if she were to develop hemoptysis in the future. She and daughter agree with plan.       - predniSONE (DELTASONE) 20 MG tablet; Take 1 tablet (20 mg) by mouth daily for 7 days    Wheezing    - predniSONE (DELTASONE) 20 MG tablet; Take 1 tablet (20 mg) by mouth daily for 7 days    History of bronchiectasis    - levofloxacin (LEVAQUIN) 500 MG tablet; Take 1 tablet (500 mg) by mouth daily for 10 days      40 minutes spent on the date of the encounter doing chart review, history and exam, documentation and further activities per the note           Return in about 1 week (around 11/8/2021), or if symptoms worsen or fail to improve.    Brenda Easley PA-C  [facility]          Subjective   Chief Complaint   Patient presents with     Respiratory Problems     follow up 10/8 from pulmonary, symptoms isn't getting better coughing, sob and wheezing on and off       HPI     Cough     Onset of symptoms was 1 week(s) ago.  Course of illness is same.    Severity mild/mod  Current and Associated symptoms: cough, wheezing, shortness of breath   Treatment measures tried include advair, and duoneb  Predisposing factors include HX of bronchiectasis.    Patient has a history of on going cough and hemoptysis. She was hospitalized in July and then seen again in September. Sputum cultures in July were positive for pseudomonas and she was treated Levaquin. She was then treated again in September for 14 days. She was diagnosed with bronchiectasis and is concerned about ongoing infection. She started coughing again this past week she also has some wheezing. No hemoptysis. She wonders if she  needs to be treated with another course of an antibiotic.               Review of Systems   Constitutional, HEENT, cardiovascular, pulmonary, gi and gu systems are negative, except as otherwise noted.      Objective    /76 (BP Location: Right arm, Patient Position: Sitting, Cuff Size: Adult Regular)   Pulse 76   Temp 97.6  F (36.4  C) (Tympanic)   Resp 16   Wt 60.1 kg (132 lb 7.9 oz)   LMP 06/15/1985   SpO2 97%   BMI 27.69 kg/m    Body mass index is 27.69 kg/m .  Physical Exam  Constitutional:       Appearance: She is well-developed.   HENT:      Head: Normocephalic.      Right Ear: Tympanic membrane and ear canal normal.      Left Ear: Tympanic membrane and ear canal normal.   Eyes:      Conjunctiva/sclera: Conjunctivae normal.   Cardiovascular:      Rate and Rhythm: Normal rate.      Heart sounds: Normal heart sounds.   Pulmonary:      Effort: Pulmonary effort is normal.      Comments: Slight diffuse wheeze, otherwise clear  Skin:     General: Skin is warm and dry.      Findings: No rash.   Psychiatric:         Behavior: Behavior normal.

## 2021-11-01 NOTE — TELEPHONE ENCOUNTER
I talked with Thalia and told her she needs to make an appointment.  C/O pain with urination , but gone now.    Celena Laurent RN

## 2021-11-01 NOTE — TELEPHONE ENCOUNTER
Patient has bronchitis and she thinks she has a bladder infection also. Can she get meds called in for this? Ok to leave a detailed message.

## 2021-11-02 ENCOUNTER — DOCUMENTATION ONLY (OUTPATIENT)
Dept: ANTICOAGULATION | Facility: CLINIC | Age: 86
End: 2021-11-02

## 2021-11-02 ENCOUNTER — ANCILLARY PROCEDURE (OUTPATIENT)
Dept: CARDIOLOGY | Facility: CLINIC | Age: 86
End: 2021-11-02
Attending: INTERNAL MEDICINE
Payer: COMMERCIAL

## 2021-11-02 DIAGNOSIS — Z95.0 CARDIAC PACEMAKER IN SITU: ICD-10-CM

## 2021-11-02 DIAGNOSIS — R55 SYNCOPE: ICD-10-CM

## 2021-11-02 DIAGNOSIS — I49.5 TACHY-BRADY SYNDROME (H): ICD-10-CM

## 2021-11-02 PROCEDURE — 93296 REM INTERROG EVL PM/IDS: CPT | Performed by: INTERNAL MEDICINE

## 2021-11-02 PROCEDURE — 93294 REM INTERROG EVL PM/LDLS PM: CPT | Performed by: INTERNAL MEDICINE

## 2021-11-02 NOTE — PROGRESS NOTES
ANTICOAGULATION  MANAGEMENT     Interacting Medication Review    Interacting medication(s): Levofloxacin (Levaquin) with warfarin.    Duration: 10 days  (1/1 to 11/11)    Indication: Pneumonia    New medication?: Yes, interaction may increase INR and risk of bleeding       PLAN     Continue current warfarin dose. Recommend to check INR on 11/5    Spoke with Suyapa. Per UC patient is supposed to start Prednisone x 5 days and if she does not feel better, then to start the Levaquin.    No adjustment to Anticoagulation Calendar was required    Ruiz Meredith RN

## 2021-11-05 ENCOUNTER — ANTICOAGULATION THERAPY VISIT (OUTPATIENT)
Dept: ANTICOAGULATION | Facility: CLINIC | Age: 86
End: 2021-11-05

## 2021-11-05 ENCOUNTER — LAB (OUTPATIENT)
Dept: LAB | Facility: CLINIC | Age: 86
End: 2021-11-05
Payer: COMMERCIAL

## 2021-11-05 DIAGNOSIS — I48.0 INTERMITTENT ATRIAL FIBRILLATION (H): ICD-10-CM

## 2021-11-05 DIAGNOSIS — I82.409 DVT (DEEP VENOUS THROMBOSIS) (H): Primary | ICD-10-CM

## 2021-11-05 DIAGNOSIS — Z79.01 LONG TERM CURRENT USE OF ANTICOAGULANT THERAPY: ICD-10-CM

## 2021-11-05 DIAGNOSIS — I82.4Y9 DEEP VEIN THROMBOSIS (DVT) OF PROXIMAL LOWER EXTREMITY, UNSPECIFIED CHRONICITY, UNSPECIFIED LATERALITY (H): Chronic | ICD-10-CM

## 2021-11-05 LAB — INR BLD: 3.5 (ref 0.9–1.1)

## 2021-11-05 PROCEDURE — 36416 COLLJ CAPILLARY BLOOD SPEC: CPT

## 2021-11-05 PROCEDURE — 85610 PROTHROMBIN TIME: CPT

## 2021-11-05 NOTE — PROGRESS NOTES
ANTICOAGULATION MANAGEMENT     Ellie Leigh 89 year old female is on warfarin with supratherapeutic INR result. (Goal INR 2.0-3.0)    Recent labs: (last 7 days)     11/05/21  1340   INR 3.5*       ASSESSMENT     Source(s): Patient/Caregiver Call       Warfarin doses taken: Warfarin taken as instructed    Diet: No new diet changes identified    New illness, injury, or hospitalization: No    Medication/supplement changes: Prednisone started on 11/1/21 which has potential for interaction; increasing INR    Signs or symptoms of bleeding or clotting: No    Previous INR: Therapeutic last 2(+) visits    Additional findings: Did not  Levaquin prescription     PLAN     Recommended plan for no diet, medication or health factor changes affecting INR     Dosing Instructions: Hold dose then continue your current warfarin dose with next INR in 1 week       Summary  As of 11/5/2021    Full warfarin instructions:  11/5: Hold; Otherwise 1.5 mg every Mon, Wed, Fri; 1 mg all other days   Next INR check:  11/12/2021             Telephone call with  Suyapa who verbalizes understanding and agrees to plan    Lab visit scheduled    Education provided: Please call back if any changes to your diet, medications or how you've been taking warfarin, Potential interaction between warfarin and Prednisone, Monitoring for bleeding signs and symptoms, When to seek medical attention/emergency care and Contact [phone]  with any changes, questions or concerns.     Plan made per [facility] anticoagulation protocol    Ruiz Meredith RN  Anticoagulation Clinic  11/5/2021    _______________________________________________________________________     Anticoagulation Episode Summary     Current INR goal:  2.0-3.0   TTR:  65.7 % (12 mo)   Target end date:  Indefinite   Send INR reminders to:  [facility]    Indications    Atrial fibrillation with rapid ventricular response (H) (Resolved) [I48.91]  DVT (deep venous thrombosis)  [I82.409]  Long term current use of anticoagulant therapy [Z79.01]  Intermittent atrial fibrillation (H) [I48.0]           Comments:  * Taking Celebrex PRN            Anticoagulation Care Providers     Provider Role Specialty Phone number    Anjum Vidales MD Referring Family Medicine [phone]

## 2021-11-07 DIAGNOSIS — K21.9 GASTROESOPHAGEAL REFLUX DISEASE WITHOUT ESOPHAGITIS: ICD-10-CM

## 2021-11-10 LAB
MDC_IDC_EPISODE_DTM: NORMAL
MDC_IDC_EPISODE_ID: 98
MDC_IDC_EPISODE_TYPE: NORMAL
MDC_IDC_LEAD_IMPLANT_DT: NORMAL
MDC_IDC_LEAD_IMPLANT_DT: NORMAL
MDC_IDC_LEAD_LOCATION: NORMAL
MDC_IDC_LEAD_LOCATION: NORMAL
MDC_IDC_LEAD_LOCATION_DETAIL_1: NORMAL
MDC_IDC_LEAD_LOCATION_DETAIL_1: NORMAL
MDC_IDC_LEAD_MFG: NORMAL
MDC_IDC_LEAD_MFG: NORMAL
MDC_IDC_LEAD_MODEL: NORMAL
MDC_IDC_LEAD_MODEL: NORMAL
MDC_IDC_LEAD_POLARITY_TYPE: NORMAL
MDC_IDC_LEAD_POLARITY_TYPE: NORMAL
MDC_IDC_LEAD_SERIAL: NORMAL
MDC_IDC_LEAD_SERIAL: NORMAL
MDC_IDC_MSMT_BATTERY_REMAINING_PERCENTAGE: 50 %
MDC_IDC_MSMT_BATTERY_STATUS: NORMAL
MDC_IDC_MSMT_LEADCHNL_RA_IMPEDANCE_VALUE: 375 OHM
MDC_IDC_MSMT_LEADCHNL_RA_LEAD_CHANNEL_STATUS: NORMAL
MDC_IDC_MSMT_LEADCHNL_RA_PACING_THRESHOLD_AMPLITUDE: 0.8 V
MDC_IDC_MSMT_LEADCHNL_RA_PACING_THRESHOLD_PULSEWIDTH: 0.4 MS
MDC_IDC_MSMT_LEADCHNL_RV_IMPEDANCE_VALUE: 454 OHM
MDC_IDC_MSMT_LEADCHNL_RV_LEAD_CHANNEL_STATUS: NORMAL
MDC_IDC_MSMT_LEADCHNL_RV_PACING_THRESHOLD_AMPLITUDE: 0.6 V
MDC_IDC_MSMT_LEADCHNL_RV_PACING_THRESHOLD_PULSEWIDTH: 0.4 MS
MDC_IDC_PG_IMPLANT_DTM: NORMAL
MDC_IDC_PG_MFG: NORMAL
MDC_IDC_PG_MODEL: NORMAL
MDC_IDC_PG_SERIAL: NORMAL
MDC_IDC_PG_TYPE: NORMAL
MDC_IDC_SESS_CLINIC_NAME: NORMAL
MDC_IDC_SESS_DTM: NORMAL
MDC_IDC_SESS_REPROGRAMMED: NO
MDC_IDC_SESS_TYPE: NORMAL
MDC_IDC_SET_BRADY_AT_MODE_SWITCH_MODE: NORMAL
MDC_IDC_SET_BRADY_AT_MODE_SWITCH_RATE: 160 {BEATS}/MIN
MDC_IDC_SET_BRADY_LOWRATE: 60 {BEATS}/MIN
MDC_IDC_SET_BRADY_MAX_SENSOR_RATE: 125 {BEATS}/MIN
MDC_IDC_SET_BRADY_MAX_TRACKING_RATE: 130 {BEATS}/MIN
MDC_IDC_SET_BRADY_MODE: NORMAL
MDC_IDC_SET_BRADY_PAV_DELAY_HIGH: 150 MS
MDC_IDC_SET_BRADY_PAV_DELAY_LOW: 180 MS
MDC_IDC_SET_BRADY_SAV_DELAY_HIGH: 105 MS
MDC_IDC_SET_BRADY_SAV_DELAY_LOW: 135 MS
MDC_IDC_SET_LEADCHNL_RA_PACING_POLARITY: NORMAL
MDC_IDC_SET_LEADCHNL_RA_PACING_PULSEWIDTH: 0.4 MS
MDC_IDC_SET_LEADCHNL_RA_SENSING_ADAPTATION_MODE: NORMAL
MDC_IDC_SET_LEADCHNL_RA_SENSING_POLARITY: NORMAL
MDC_IDC_SET_LEADCHNL_RV_PACING_POLARITY: NORMAL
MDC_IDC_SET_LEADCHNL_RV_PACING_PULSEWIDTH: 0.4 MS
MDC_IDC_SET_LEADCHNL_RV_SENSING_ADAPTATION_MODE: NORMAL
MDC_IDC_SET_LEADCHNL_RV_SENSING_POLARITY: NORMAL
MDC_IDC_STAT_AT_BURDEN_PERCENT: 0 %
MDC_IDC_STAT_AT_DTM_END: NORMAL
MDC_IDC_STAT_AT_DTM_START: NORMAL
MDC_IDC_STAT_AT_MODE_SW_COUNT_PER_DAY: 0
MDC_IDC_STAT_AT_MODE_SW_PERCENT_TIME_PER_DAY: 0 %
MDC_IDC_STAT_BRADY_AP_VP_PERCENT: 1 %
MDC_IDC_STAT_BRADY_AP_VS_PERCENT: 70 %
MDC_IDC_STAT_BRADY_AS_VP_PERCENT: 1 %
MDC_IDC_STAT_BRADY_AS_VS_PERCENT: 27 %
MDC_IDC_STAT_BRADY_DTM_END: NORMAL
MDC_IDC_STAT_BRADY_DTM_START: NORMAL
MDC_IDC_STAT_BRADY_RA_PERCENT_PACED: 71 %
MDC_IDC_STAT_BRADY_RV_PERCENT_PACED: 2 %
MDC_IDC_STAT_CRT_DTM_END: NORMAL
MDC_IDC_STAT_CRT_DTM_START: NORMAL

## 2021-11-10 RX ORDER — OMEPRAZOLE 40 MG/1
CAPSULE, DELAYED RELEASE ORAL
Qty: 90 CAPSULE | Refills: 0 | Status: SHIPPED | OUTPATIENT
Start: 2021-11-10 | End: 2022-02-07

## 2021-11-11 ENCOUNTER — LAB (OUTPATIENT)
Dept: LAB | Facility: CLINIC | Age: 86
End: 2021-11-11
Payer: COMMERCIAL

## 2021-11-11 ENCOUNTER — ANTICOAGULATION THERAPY VISIT (OUTPATIENT)
Dept: ANTICOAGULATION | Facility: CLINIC | Age: 86
End: 2021-11-11

## 2021-11-11 DIAGNOSIS — I48.0 INTERMITTENT ATRIAL FIBRILLATION (H): ICD-10-CM

## 2021-11-11 DIAGNOSIS — I82.4Y9 DEEP VEIN THROMBOSIS (DVT) OF PROXIMAL LOWER EXTREMITY, UNSPECIFIED CHRONICITY, UNSPECIFIED LATERALITY (H): Chronic | ICD-10-CM

## 2021-11-11 DIAGNOSIS — I82.409 DVT (DEEP VENOUS THROMBOSIS) (H): Primary | ICD-10-CM

## 2021-11-11 DIAGNOSIS — Z79.01 LONG TERM CURRENT USE OF ANTICOAGULANT THERAPY: ICD-10-CM

## 2021-11-11 LAB — INR BLD: 2.3 (ref 0.9–1.1)

## 2021-11-11 PROCEDURE — 36415 COLL VENOUS BLD VENIPUNCTURE: CPT

## 2021-11-11 PROCEDURE — 85610 PROTHROMBIN TIME: CPT

## 2021-11-11 NOTE — PROGRESS NOTES
ANTICOAGULATION MANAGEMENT     Ellie Leigh 89 year old female is on warfarin with therapeutic INR result. (Goal INR 2.0-3.0)    Recent labs: (last 7 days)     11/11/21  1153   INR 2.3*       ASSESSMENT     Source(s): Chart Review and Patient/Caregiver Call       Warfarin doses taken: Warfarin taken as instructed    Diet: No new diet changes identified    New illness, injury, or hospitalization: No    Medication/supplement changes: Has been off prednisone for 5 days and did not take levaquin     Signs or symptoms of bleeding or clotting: No    Previous INR: Supratherapeutic    Additional findings: None     PLAN     Recommended plan for no diet, medication or health factor changes affecting INR     Dosing Instructions: Continue your current warfarin dose with next INR in 2 weeks       Summary  As of 11/11/2021    Full warfarin instructions:  1.5 mg every Mon, Wed, Fri; 1 mg all other days   Next INR check:  11/26/2021             Telephone call w/ Suyapa and pt who verbalizes understanding and agrees to plan    Lab visit scheduled    Education provided: Please call back if any changes to your diet, medications or how you've been taking warfarin    Plan made per [facility] anticoagulation protocol    Kylah Dorsey RN  Anticoagulation Clinic  11/11/2021    _______________________________________________________________________     Anticoagulation Episode Summary     Current INR goal:  2.0-3.0   TTR:  65.0 % (12 mo)   Target end date:  Indefinite   Send INR reminders to:  [facility]    Indications    Atrial fibrillation with rapid ventricular response (H) (Resolved) [I48.91]  DVT (deep venous thrombosis) [I82.409]  Long term current use of anticoagulant therapy [Z79.01]  Intermittent atrial fibrillation (H) [I48.0]           Comments:  Taking Celebrex PRN         Anticoagulation Care Providers     Provider Role Specialty Phone number    Anjum Vidales MD Referring Family Medicine [phone]

## 2021-11-26 ENCOUNTER — ANTICOAGULATION THERAPY VISIT (OUTPATIENT)
Dept: ANTICOAGULATION | Facility: CLINIC | Age: 86
End: 2021-11-26

## 2021-11-26 ENCOUNTER — LAB (OUTPATIENT)
Dept: LAB | Facility: CLINIC | Age: 86
End: 2021-11-26
Payer: COMMERCIAL

## 2021-11-26 DIAGNOSIS — Z79.01 LONG TERM CURRENT USE OF ANTICOAGULANT THERAPY: ICD-10-CM

## 2021-11-26 DIAGNOSIS — I48.0 INTERMITTENT ATRIAL FIBRILLATION (H): ICD-10-CM

## 2021-11-26 DIAGNOSIS — I82.409 DVT (DEEP VENOUS THROMBOSIS) (H): Primary | ICD-10-CM

## 2021-11-26 DIAGNOSIS — I82.4Y9 DEEP VEIN THROMBOSIS (DVT) OF PROXIMAL LOWER EXTREMITY, UNSPECIFIED CHRONICITY, UNSPECIFIED LATERALITY (H): Chronic | ICD-10-CM

## 2021-11-26 LAB — INR BLD: 2.8 (ref 0.9–1.1)

## 2021-11-26 PROCEDURE — 36416 COLLJ CAPILLARY BLOOD SPEC: CPT

## 2021-11-26 PROCEDURE — 85610 PROTHROMBIN TIME: CPT

## 2021-11-26 NOTE — PROGRESS NOTES
ANTICOAGULATION MANAGEMENT     Ellie Leigh 89 year old female is on warfarin with therapeutic INR result. (Goal INR 2.0-3.0)    Recent labs: (last 7 days)     11/26/21  1328   INR 2.8*       ASSESSMENT     Source(s): Chart Review and Patient/Caregiver Call - Suypaa      Warfarin doses taken: Warfarin taken as instructed    Diet: No new diet changes identified    New illness, injury, or hospitalization: No    Medication/supplement changes: None noted    Signs or symptoms of bleeding or clotting: No    Previous INR: Therapeutic last visit; previously outside of goal range    Additional findings: None     PLAN     Recommended plan for no diet, medication or health factor changes affecting INR     Dosing Instructions: Continue your current warfarin dose with next INR in 3 weeks       Summary  As of 11/26/2021    Full warfarin instructions:  1.5 mg every Mon, Wed, Fri; 1 mg all other days   Next INR check:  12/17/2021             Telephone call with  Suyapa who verbalizes understanding and agrees to plan    Lab visit scheduled    Education provided: Please call back if any changes to your diet, medications or how you've been taking warfarin    Plan made per [facility] anticoagulation protocol    Kylah Dorsey RN  Anticoagulation Clinic  11/26/2021    _______________________________________________________________________     Anticoagulation Episode Summary     Current INR goal:  2.0-3.0   TTR:  65.0 % (12 mo)   Target end date:  Indefinite   Send INR reminders to:  [facility]    Indications    Atrial fibrillation with rapid ventricular response (H) (Resolved) [I48.91]  DVT (deep venous thrombosis) [I82.409]  Long term current use of anticoagulant therapy [Z79.01]  Intermittent atrial fibrillation (H) [I48.0]           Comments:  Taking Celebrex PRN         Anticoagulation Care Providers     Provider Role Specialty Phone number    Anjum Vidales MD Referring Family Medicine [phone]

## 2021-11-26 NOTE — PROGRESS NOTES
VM left for pt to return call to [facility] [phone]. Dosing instructions not given to pt.  Tentative plan: recheck INR in 3 weeks.  Kylah Dorsey RN on 11/26/2021 at 2:15 PM

## 2021-12-17 ENCOUNTER — LAB (OUTPATIENT)
Dept: LAB | Facility: CLINIC | Age: 86
End: 2021-12-17
Payer: COMMERCIAL

## 2021-12-17 ENCOUNTER — ANTICOAGULATION THERAPY VISIT (OUTPATIENT)
Dept: ANTICOAGULATION | Facility: CLINIC | Age: 86
End: 2021-12-17

## 2021-12-17 DIAGNOSIS — I48.0 INTERMITTENT ATRIAL FIBRILLATION (H): ICD-10-CM

## 2021-12-17 DIAGNOSIS — I82.4Y9 DEEP VEIN THROMBOSIS (DVT) OF PROXIMAL LOWER EXTREMITY, UNSPECIFIED CHRONICITY, UNSPECIFIED LATERALITY (H): Chronic | ICD-10-CM

## 2021-12-17 DIAGNOSIS — Z79.01 LONG TERM CURRENT USE OF ANTICOAGULANT THERAPY: ICD-10-CM

## 2021-12-17 DIAGNOSIS — I82.409 DVT (DEEP VENOUS THROMBOSIS) (H): Primary | ICD-10-CM

## 2021-12-17 LAB — INR BLD: 2.7 (ref 0.9–1.1)

## 2021-12-17 PROCEDURE — 85610 PROTHROMBIN TIME: CPT

## 2021-12-17 PROCEDURE — 36416 COLLJ CAPILLARY BLOOD SPEC: CPT

## 2021-12-17 NOTE — PROGRESS NOTES
ANTICOAGULATION MANAGEMENT     Ellie Leigh 89 year old female is on warfarin with therapeutic INR result. (Goal INR 2.0-3.0)    Recent labs: (last 7 days)     12/17/21  1342   INR 2.7*       ASSESSMENT     Source(s): Patient/Caregiver Call       Warfarin doses taken: Warfarin taken as instructed    Diet: No new diet changes identified    New illness, injury, or hospitalization: No    Medication/supplement changes: None noted    Signs or symptoms of bleeding or clotting: No    Previous INR: Therapeutic last visit; previously outside of goal range    Additional findings: None     PLAN     Recommended plan for no diet, medication or health factor changes affecting INR     Dosing Instructions: Continue your current warfarin dose with next INR in 4 weeks       Summary  As of 12/17/2021    Full warfarin instructions:  1.5 mg every Mon, Wed, Fri; 1 mg all other days   Next INR check:  1/14/2022             Telephone call with  Suyapa who verbalizes understanding and agrees to plan    Lab visit scheduled    Education provided: Please call back if any changes to your diet, medications or how you've been taking warfarin and Contact [phone]  with any changes, questions or concerns.     Plan made per [facility] anticoagulation protocol    Ruiz Meredith RN  Anticoagulation Clinic  12/17/2021    _______________________________________________________________________     Anticoagulation Episode Summary     Current INR goal:  2.0-3.0   TTR:  65.0 % (12 mo)   Target end date:  Indefinite   Send INR reminders to:  [facility]    Indications    Atrial fibrillation with rapid ventricular response (H) (Resolved) [I48.91]  DVT (deep venous thrombosis) [I82.409]  Long term current use of anticoagulant therapy [Z79.01]  Intermittent atrial fibrillation (H) [I48.0]           Comments:  Taking Celebrex PRN         Anticoagulation Care Providers     Provider Role Specialty Phone number    Anjum Vidales MD Referring  Family Medicine [phone]

## 2021-12-20 DIAGNOSIS — E03.9 HYPOTHYROIDISM, UNSPECIFIED TYPE: Chronic | ICD-10-CM

## 2021-12-20 DIAGNOSIS — F41.9 ANXIETY: ICD-10-CM

## 2021-12-20 DIAGNOSIS — R11.0 NAUSEA: ICD-10-CM

## 2021-12-22 RX ORDER — MIRTAZAPINE 15 MG/1
15 TABLET, FILM COATED ORAL AT BEDTIME
Qty: 90 TABLET | Refills: 0 | Status: SHIPPED | OUTPATIENT
Start: 2021-12-22 | End: 2022-03-23

## 2021-12-22 RX ORDER — LEVOTHYROXINE SODIUM 50 UG/1
TABLET ORAL
Qty: 90 TABLET | Refills: 0 | Status: SHIPPED | OUTPATIENT
Start: 2021-12-22 | End: 2022-03-23

## 2022-01-01 DIAGNOSIS — J47.9 BRONCHIECTASIS WITHOUT COMPLICATION (H): ICD-10-CM

## 2022-01-01 RX ORDER — SODIUM CHLORIDE FOR INHALATION 0.9 %
VIAL, NEBULIZER (ML) INHALATION
Qty: 450 ML | Refills: 3 | Status: SHIPPED | OUTPATIENT
Start: 2022-01-01 | End: 2023-01-01

## 2022-01-14 ENCOUNTER — LAB (OUTPATIENT)
Dept: LAB | Facility: CLINIC | Age: 87
End: 2022-01-14
Payer: COMMERCIAL

## 2022-01-14 ENCOUNTER — ANTICOAGULATION THERAPY VISIT (OUTPATIENT)
Dept: ANTICOAGULATION | Facility: CLINIC | Age: 87
End: 2022-01-14

## 2022-01-14 DIAGNOSIS — Z79.01 LONG TERM CURRENT USE OF ANTICOAGULANT THERAPY: ICD-10-CM

## 2022-01-14 DIAGNOSIS — I48.0 INTERMITTENT ATRIAL FIBRILLATION (H): ICD-10-CM

## 2022-01-14 DIAGNOSIS — I82.409 DVT (DEEP VENOUS THROMBOSIS) (H): Primary | ICD-10-CM

## 2022-01-14 DIAGNOSIS — I82.4Y9 DEEP VEIN THROMBOSIS (DVT) OF PROXIMAL LOWER EXTREMITY, UNSPECIFIED CHRONICITY, UNSPECIFIED LATERALITY (H): Chronic | ICD-10-CM

## 2022-01-14 LAB — INR BLD: 2.2 (ref 0.9–1.1)

## 2022-01-14 PROCEDURE — 36416 COLLJ CAPILLARY BLOOD SPEC: CPT

## 2022-01-14 PROCEDURE — 85610 PROTHROMBIN TIME: CPT

## 2022-01-14 NOTE — PROGRESS NOTES
ANTICOAGULATION MANAGEMENT     Ellie Leigh 89 year old female is on warfarin with therapeutic INR result. (Goal INR 2.0-3.0)    Recent labs: (last 7 days)     01/14/22  1336   INR 2.2*       ASSESSMENT     Source(s): Patient/Caregiver Call       Warfarin doses taken: Warfarin taken as instructed    Diet: No new diet changes identified    New illness, injury, or hospitalization: No    Medication/supplement changes: None noted    Signs or symptoms of bleeding or clotting: No    Previous INR: Therapeutic last 2(+) visits    Additional findings: None     PLAN     Recommended plan for no diet, medication or health factor changes affecting INR     Dosing Instructions: Continue your current warfarin dose with next INR in 5 weeks       Summary  As of 1/14/2022    Full warfarin instructions:  1.5 mg every Mon, Wed, Fri; 1 mg all other days   Next INR check:  2/18/2022             Telephone call with Jennifer who verbalizes understanding and agrees to plan    Lab visit scheduled    Education provided: Please call back if any changes to your diet, medications or how you've been taking warfarin and Contact [phone]  with any changes, questions or concerns.     Plan made per [facility] anticoagulation protocol    Ruiz Meredith RN  Anticoagulation Clinic  1/14/2022    _______________________________________________________________________     Anticoagulation Episode Summary     Current INR goal:  2.0-3.0   TTR:  65.0 % (12 mo)   Target end date:  Indefinite   Send INR reminders to:  [facility]    Indications    Atrial fibrillation with rapid ventricular response (H) (Resolved) [I48.91]  DVT (deep venous thrombosis) [I82.409]  Long term current use of anticoagulant therapy [Z79.01]  Intermittent atrial fibrillation (H) [I48.0]           Comments:  Taking Celebrex PRN         Anticoagulation Care Providers     Provider Role Specialty Phone number    Anjum Vidales MD Referring Family Medicine  [phone]

## 2022-01-31 ENCOUNTER — ANCILLARY PROCEDURE (OUTPATIENT)
Dept: CARDIOLOGY | Facility: CLINIC | Age: 87
End: 2022-01-31
Attending: INTERNAL MEDICINE
Payer: COMMERCIAL

## 2022-01-31 DIAGNOSIS — Z95.0 CARDIAC PACEMAKER IN SITU: ICD-10-CM

## 2022-01-31 DIAGNOSIS — I48.0 INTERMITTENT ATRIAL FIBRILLATION (H): ICD-10-CM

## 2022-01-31 LAB
MDC_IDC_LEAD_IMPLANT_DT: NORMAL
MDC_IDC_LEAD_IMPLANT_DT: NORMAL
MDC_IDC_LEAD_LOCATION: NORMAL
MDC_IDC_LEAD_LOCATION: NORMAL
MDC_IDC_LEAD_LOCATION_DETAIL_1: NORMAL
MDC_IDC_LEAD_LOCATION_DETAIL_1: NORMAL
MDC_IDC_LEAD_MFG: NORMAL
MDC_IDC_LEAD_MFG: NORMAL
MDC_IDC_LEAD_MODEL: NORMAL
MDC_IDC_LEAD_MODEL: NORMAL
MDC_IDC_LEAD_POLARITY_TYPE: NORMAL
MDC_IDC_LEAD_POLARITY_TYPE: NORMAL
MDC_IDC_LEAD_SERIAL: NORMAL
MDC_IDC_LEAD_SERIAL: NORMAL
MDC_IDC_MSMT_BATTERY_REMAINING_PERCENTAGE: 45 %
MDC_IDC_MSMT_BATTERY_STATUS: NORMAL
MDC_IDC_MSMT_LEADCHNL_RA_IMPEDANCE_VALUE: 375 OHM
MDC_IDC_MSMT_LEADCHNL_RA_LEAD_CHANNEL_STATUS: NORMAL
MDC_IDC_MSMT_LEADCHNL_RA_PACING_THRESHOLD_AMPLITUDE: 0.8 V
MDC_IDC_MSMT_LEADCHNL_RA_PACING_THRESHOLD_PULSEWIDTH: 0.4 MS
MDC_IDC_MSMT_LEADCHNL_RV_IMPEDANCE_VALUE: 456 OHM
MDC_IDC_MSMT_LEADCHNL_RV_LEAD_CHANNEL_STATUS: NORMAL
MDC_IDC_MSMT_LEADCHNL_RV_PACING_THRESHOLD_AMPLITUDE: 0.6 V
MDC_IDC_MSMT_LEADCHNL_RV_PACING_THRESHOLD_PULSEWIDTH: 0.4 MS
MDC_IDC_PG_IMPLANT_DTM: NORMAL
MDC_IDC_PG_MFG: NORMAL
MDC_IDC_PG_MODEL: NORMAL
MDC_IDC_PG_SERIAL: NORMAL
MDC_IDC_PG_TYPE: NORMAL
MDC_IDC_SESS_CLINIC_NAME: NORMAL
MDC_IDC_SESS_DTM: NORMAL
MDC_IDC_SESS_REPROGRAMMED: NO
MDC_IDC_SESS_TYPE: NORMAL
MDC_IDC_SET_BRADY_AT_MODE_SWITCH_MODE: NORMAL
MDC_IDC_SET_BRADY_AT_MODE_SWITCH_RATE: 160 {BEATS}/MIN
MDC_IDC_SET_BRADY_LOWRATE: 60 {BEATS}/MIN
MDC_IDC_SET_BRADY_MAX_SENSOR_RATE: 125 {BEATS}/MIN
MDC_IDC_SET_BRADY_MAX_TRACKING_RATE: 130 {BEATS}/MIN
MDC_IDC_SET_BRADY_MODE: NORMAL
MDC_IDC_SET_BRADY_PAV_DELAY_HIGH: 150 MS
MDC_IDC_SET_BRADY_PAV_DELAY_LOW: 180 MS
MDC_IDC_SET_BRADY_SAV_DELAY_HIGH: 105 MS
MDC_IDC_SET_BRADY_SAV_DELAY_LOW: 135 MS
MDC_IDC_SET_LEADCHNL_RA_PACING_POLARITY: NORMAL
MDC_IDC_SET_LEADCHNL_RA_PACING_PULSEWIDTH: 0.4 MS
MDC_IDC_SET_LEADCHNL_RA_SENSING_ADAPTATION_MODE: NORMAL
MDC_IDC_SET_LEADCHNL_RA_SENSING_POLARITY: NORMAL
MDC_IDC_SET_LEADCHNL_RV_PACING_POLARITY: NORMAL
MDC_IDC_SET_LEADCHNL_RV_PACING_PULSEWIDTH: 0.4 MS
MDC_IDC_SET_LEADCHNL_RV_SENSING_ADAPTATION_MODE: NORMAL
MDC_IDC_SET_LEADCHNL_RV_SENSING_POLARITY: NORMAL
MDC_IDC_STAT_AT_BURDEN_PERCENT: 0 %
MDC_IDC_STAT_AT_DTM_END: NORMAL
MDC_IDC_STAT_AT_DTM_START: NORMAL
MDC_IDC_STAT_AT_MODE_SW_COUNT_PER_DAY: 1
MDC_IDC_STAT_AT_MODE_SW_PERCENT_TIME_PER_DAY: 0 %
MDC_IDC_STAT_BRADY_AP_VP_PERCENT: 0 %
MDC_IDC_STAT_BRADY_AP_VS_PERCENT: 76 %
MDC_IDC_STAT_BRADY_AS_VP_PERCENT: 0 %
MDC_IDC_STAT_BRADY_AS_VS_PERCENT: 24 %
MDC_IDC_STAT_BRADY_DTM_END: NORMAL
MDC_IDC_STAT_BRADY_DTM_START: NORMAL
MDC_IDC_STAT_BRADY_RA_PERCENT_PACED: 76 %
MDC_IDC_STAT_BRADY_RV_PERCENT_PACED: 0 %
MDC_IDC_STAT_CRT_DTM_END: NORMAL
MDC_IDC_STAT_CRT_DTM_START: NORMAL

## 2022-01-31 PROCEDURE — 93294 REM INTERROG EVL PM/LDLS PM: CPT | Performed by: INTERNAL MEDICINE

## 2022-01-31 PROCEDURE — 93296 REM INTERROG EVL PM/IDS: CPT | Performed by: INTERNAL MEDICINE

## 2022-02-01 RX ORDER — WARFARIN SODIUM 1 MG/1
TABLET ORAL
Qty: 110 TABLET | Refills: 0 | Status: SHIPPED | OUTPATIENT
Start: 2022-02-01 | End: 2022-04-29

## 2022-02-07 DIAGNOSIS — K21.9 GASTROESOPHAGEAL REFLUX DISEASE WITHOUT ESOPHAGITIS: ICD-10-CM

## 2022-02-07 RX ORDER — OMEPRAZOLE 40 MG/1
CAPSULE, DELAYED RELEASE ORAL
Qty: 90 CAPSULE | Refills: 1 | Status: SHIPPED | OUTPATIENT
Start: 2022-02-07 | End: 2022-08-04

## 2022-02-07 NOTE — TELEPHONE ENCOUNTER
"Requested Prescriptions   Pending Prescriptions Disp Refills     omeprazole (PRILOSEC) 40 MG DR capsule [Pharmacy Med Name: Omeprazole Oral Capsule Delayed Release 40 MG] 90 capsule 0     Sig: Take 1 capsule (40 mg) by mouth once daily.       PPI Protocol Failed - 2/7/2022  9:09 AM        Failed - No diagnosis of osteoporosis on record        Passed - Not on Clopidogrel (unless Pantoprazole ordered)        Passed - Recent (12 mo) or future (30 days) visit within the authorizing provider's specialty     Patient has had an office visit with the authorizing provider or a provider within the authorizing providers department within the previous 12 mos or has a future within next 30 days. See \"Patient Info\" tab in inbasket, or \"Choose Columns\" in Meds & Orders section of the refill encounter.              Passed - Medication is active on med list        Passed - Patient is age 18 or older        Passed - No active pregnacy on record        Passed - No positive pregnancy test in past 12 months             "

## 2022-02-18 ENCOUNTER — TELEPHONE (OUTPATIENT)
Dept: ANTICOAGULATION | Facility: CLINIC | Age: 87
End: 2022-02-18

## 2022-02-18 ENCOUNTER — LAB (OUTPATIENT)
Dept: LAB | Facility: CLINIC | Age: 87
End: 2022-02-18
Payer: COMMERCIAL

## 2022-02-18 ENCOUNTER — ANTICOAGULATION THERAPY VISIT (OUTPATIENT)
Dept: ANTICOAGULATION | Facility: CLINIC | Age: 87
End: 2022-02-18

## 2022-02-18 DIAGNOSIS — Z79.01 LONG TERM CURRENT USE OF ANTICOAGULANT THERAPY: ICD-10-CM

## 2022-02-18 DIAGNOSIS — I48.0 INTERMITTENT ATRIAL FIBRILLATION (H): ICD-10-CM

## 2022-02-18 DIAGNOSIS — I82.4Y9 DEEP VEIN THROMBOSIS (DVT) OF PROXIMAL LOWER EXTREMITY, UNSPECIFIED CHRONICITY, UNSPECIFIED LATERALITY (H): Primary | ICD-10-CM

## 2022-02-18 DIAGNOSIS — I82.409 DVT (DEEP VENOUS THROMBOSIS) (H): Primary | ICD-10-CM

## 2022-02-18 DIAGNOSIS — I82.4Y9 DEEP VEIN THROMBOSIS (DVT) OF PROXIMAL LOWER EXTREMITY, UNSPECIFIED CHRONICITY, UNSPECIFIED LATERALITY (H): Chronic | ICD-10-CM

## 2022-02-18 LAB — INR BLD: 2.9 (ref 0.9–1.1)

## 2022-02-18 PROCEDURE — 36416 COLLJ CAPILLARY BLOOD SPEC: CPT

## 2022-02-18 PROCEDURE — 85610 PROTHROMBIN TIME: CPT

## 2022-02-18 NOTE — TELEPHONE ENCOUNTER
ANTICOAGULATION MANAGEMENT      Ellie Limaanna Lu due for annual renewal of referral to anticoagulation monitoring. Order pended for your review and signature.      ANTICOAGULATION SUMMARY      Warfarin indication(s)     Atrial fibrillation  DVT    Heart valve present?  NO       Current goal range   INR: 2.0-3.0     Goal appropriate for indication? Yes, INR 2-3 appropriate for hx of DVT, PE, hypercoagulable state, Afib, LVAD, or bileaflet AVR without risk factors     Current duration of therapy Indefinite/long term therapy   Time in Therapeutic Range (TTR)  (Goal > 60%) 65%       Office visit with referring provider's group within last year yes on 10/1/21       Ruiz Meredith RN

## 2022-02-18 NOTE — PROGRESS NOTES
ANTICOAGULATION MANAGEMENT     Ellie Leigh 89 year old female is on warfarin with therapeutic INR result. (Goal INR 2.0-3.0)    Recent labs: (last 7 days)     02/18/22  1341   INR 2.9*       ASSESSMENT     Source(s): Patient/Caregiver Call       Warfarin doses taken: Warfarin taken as instructed    Diet: No new diet changes identified    New illness, injury, or hospitalization: No    Medication/supplement changes: None noted    Signs or symptoms of bleeding or clotting: No    Previous INR: Therapeutic last 2(+) visits    Additional findings: None     PLAN     Recommended plan for no diet, medication or health factor changes affecting INR     Dosing Instructions: Continue your current warfarin dose with next INR in 6 weeks       Summary  As of 2/18/2022    Full warfarin instructions:  1.5 mg every Mon, Wed, Fri; 1 mg all other days   Next INR check:  4/1/2022             Telephone call with Jennifer who verbalizes understanding and agrees to plan    Lab visit scheduled    Education provided: Please call back if any changes to your diet, medications or how you've been taking warfarin and Contact [phone]  with any changes, questions or concerns.     Plan made per [facility] anticoagulation protocol    Ruiz Meredith RN  Anticoagulation Clinic  2/18/2022    _______________________________________________________________________     Anticoagulation Episode Summary     Current INR goal:  2.0-3.0   TTR:  70.0 % (12 mo)   Target end date:  Indefinite   [facility]    Indications    Atrial fibrillation with rapid ventricular response (H) (Resolved) [I48.91]  DVT (deep venous thrombosis) [I82.409]  Long term current use of anticoagulant therapy [Z79.01]  Intermittent atrial fibrillation (H) [I48.0]           Comments:  Taking Celebrex PRN         Anticoagulation Care Providers     Provider Role Specialty Phone number    Anjum Vidales MD Referring Family Medicine  [phone]

## 2022-02-25 DIAGNOSIS — E87.1 HYPONATREMIA: ICD-10-CM

## 2022-02-28 RX ORDER — SODIUM CHLORIDE 1 G/1
TABLET ORAL
Qty: 100 TABLET | Refills: 11 | Status: SHIPPED | OUTPATIENT
Start: 2022-02-28 | End: 2022-11-10

## 2022-03-03 ENCOUNTER — HOSPITAL ENCOUNTER (EMERGENCY)
Facility: CLINIC | Age: 87
Discharge: HOME | End: 2022-03-03
Attending: EMERGENCY MEDICINE | Admitting: EMERGENCY MEDICINE
Payer: COMMERCIAL

## 2022-03-03 ENCOUNTER — OFFICE VISIT (OUTPATIENT)
Dept: URGENT CARE | Facility: URGENT CARE | Age: 87
End: 2022-03-03
Payer: COMMERCIAL

## 2022-03-03 ENCOUNTER — ANCILLARY PROCEDURE (OUTPATIENT)
Dept: GENERAL RADIOLOGY | Facility: CLINIC | Age: 87
End: 2022-03-03
Attending: STUDENT IN AN ORGANIZED HEALTH CARE EDUCATION/TRAINING PROGRAM
Payer: COMMERCIAL

## 2022-03-03 ENCOUNTER — APPOINTMENT (OUTPATIENT)
Dept: CT IMAGING | Facility: CLINIC | Age: 87
End: 2022-03-03
Attending: EMERGENCY MEDICINE
Payer: COMMERCIAL

## 2022-03-03 VITALS
BODY MASS INDEX: 27.59 KG/M2 | WEIGHT: 132 LBS | DIASTOLIC BLOOD PRESSURE: 78 MMHG | SYSTOLIC BLOOD PRESSURE: 122 MMHG | HEART RATE: 71 BPM | OXYGEN SATURATION: 94 % | TEMPERATURE: 97.8 F | RESPIRATION RATE: 20 BRPM

## 2022-03-03 VITALS
WEIGHT: 132 LBS | HEART RATE: 68 BPM | BODY MASS INDEX: 27.59 KG/M2 | RESPIRATION RATE: 18 BRPM | OXYGEN SATURATION: 95 % | DIASTOLIC BLOOD PRESSURE: 84 MMHG | SYSTOLIC BLOOD PRESSURE: 183 MMHG | TEMPERATURE: 97.9 F

## 2022-03-03 DIAGNOSIS — R05.9 COUGH: Primary | ICD-10-CM

## 2022-03-03 DIAGNOSIS — J18.9 COMMUNITY ACQUIRED PNEUMONIA, UNSPECIFIED LATERALITY: ICD-10-CM

## 2022-03-03 DIAGNOSIS — R53.83 FATIGUE, UNSPECIFIED TYPE: ICD-10-CM

## 2022-03-03 LAB
ALBUMIN SERPL-MCNC: 3.4 G/DL (ref 3.4–5)
ALP SERPL-CCNC: 80 U/L (ref 40–150)
ALT SERPL W P-5'-P-CCNC: 17 U/L (ref 0–50)
ANION GAP SERPL CALCULATED.3IONS-SCNC: 5 MMOL/L (ref 3–14)
AST SERPL W P-5'-P-CCNC: 19 U/L (ref 0–45)
BASOPHILS # BLD AUTO: 0.1 10E3/UL (ref 0–0.2)
BASOPHILS NFR BLD AUTO: 1 %
BILIRUB SERPL-MCNC: 0.3 MG/DL (ref 0.2–1.3)
BUN SERPL-MCNC: 20 MG/DL (ref 7–30)
CALCIUM SERPL-MCNC: 9 MG/DL (ref 8.5–10.1)
CHLORIDE BLD-SCNC: 103 MMOL/L (ref 94–109)
CO2 SERPL-SCNC: 27 MMOL/L (ref 20–32)
CREAT SERPL-MCNC: 0.58 MG/DL (ref 0.52–1.04)
D DIMER PPP FEU-MCNC: <0.27 UG/ML FEU (ref 0–0.5)
EOSINOPHIL # BLD AUTO: 0.1 10E3/UL (ref 0–0.7)
EOSINOPHIL NFR BLD AUTO: 1 %
ERYTHROCYTE [DISTWIDTH] IN BLOOD BY AUTOMATED COUNT: 13.9 % (ref 10–15)
GFR SERPL CREATININE-BSD FRML MDRD: 85 ML/MIN/1.73M2
GLUCOSE BLD-MCNC: 130 MG/DL (ref 70–99)
HCT VFR BLD AUTO: 34.6 % (ref 35–47)
HGB BLD-MCNC: 11.3 G/DL (ref 11.7–15.7)
HOLD SPECIMEN: NORMAL
IMM GRANULOCYTES # BLD: 0 10E3/UL
IMM GRANULOCYTES NFR BLD: 0 %
INR PPP: 2.46 (ref 0.85–1.15)
LYMPHOCYTES # BLD AUTO: 2 10E3/UL (ref 0.8–5.3)
LYMPHOCYTES NFR BLD AUTO: 29 %
MCH RBC QN AUTO: 30.1 PG (ref 26.5–33)
MCHC RBC AUTO-ENTMCNC: 32.7 G/DL (ref 31.5–36.5)
MCV RBC AUTO: 92 FL (ref 78–100)
MONOCYTES # BLD AUTO: 0.5 10E3/UL (ref 0–1.3)
MONOCYTES NFR BLD AUTO: 7 %
NEUTROPHILS # BLD AUTO: 4.4 10E3/UL (ref 1.6–8.3)
NEUTROPHILS NFR BLD AUTO: 62 %
NRBC # BLD AUTO: 0 10E3/UL
NRBC BLD AUTO-RTO: 0 /100
NT-PROBNP SERPL-MCNC: 573 PG/ML (ref 0–1800)
PLATELET # BLD AUTO: 254 10E3/UL (ref 150–450)
POTASSIUM BLD-SCNC: 4 MMOL/L (ref 3.4–5.3)
PROT SERPL-MCNC: 7.2 G/DL (ref 6.8–8.8)
RBC # BLD AUTO: 3.75 10E6/UL (ref 3.8–5.2)
SODIUM SERPL-SCNC: 135 MMOL/L (ref 133–144)
TROPONIN I SERPL HS-MCNC: 62 NG/L
WBC # BLD AUTO: 7.1 10E3/UL (ref 4–11)

## 2022-03-03 PROCEDURE — 71046 X-RAY EXAM CHEST 2 VIEWS: CPT | Performed by: RADIOLOGY

## 2022-03-03 PROCEDURE — 71260 CT THORAX DX C+: CPT

## 2022-03-03 PROCEDURE — 99284 EMERGENCY DEPT VISIT MOD MDM: CPT | Mod: 25 | Performed by: EMERGENCY MEDICINE

## 2022-03-03 PROCEDURE — 85379 FIBRIN DEGRADATION QUANT: CPT | Performed by: EMERGENCY MEDICINE

## 2022-03-03 PROCEDURE — 82040 ASSAY OF SERUM ALBUMIN: CPT | Performed by: EMERGENCY MEDICINE

## 2022-03-03 PROCEDURE — 80053 COMPREHEN METABOLIC PANEL: CPT | Performed by: EMERGENCY MEDICINE

## 2022-03-03 PROCEDURE — 250N000011 HC RX IP 250 OP 636: Performed by: EMERGENCY MEDICINE

## 2022-03-03 PROCEDURE — 85025 COMPLETE CBC W/AUTO DIFF WBC: CPT | Performed by: EMERGENCY MEDICINE

## 2022-03-03 PROCEDURE — 93000 ELECTROCARDIOGRAM COMPLETE: CPT | Performed by: STUDENT IN AN ORGANIZED HEALTH CARE EDUCATION/TRAINING PROGRAM

## 2022-03-03 PROCEDURE — 93005 ELECTROCARDIOGRAM TRACING: CPT | Performed by: EMERGENCY MEDICINE

## 2022-03-03 PROCEDURE — 99285 EMERGENCY DEPT VISIT HI MDM: CPT | Mod: 25 | Performed by: EMERGENCY MEDICINE

## 2022-03-03 PROCEDURE — 83880 ASSAY OF NATRIURETIC PEPTIDE: CPT | Performed by: EMERGENCY MEDICINE

## 2022-03-03 PROCEDURE — 85610 PROTHROMBIN TIME: CPT | Performed by: EMERGENCY MEDICINE

## 2022-03-03 PROCEDURE — 250N000009 HC RX 250: Performed by: EMERGENCY MEDICINE

## 2022-03-03 PROCEDURE — 84484 ASSAY OF TROPONIN QUANT: CPT | Performed by: EMERGENCY MEDICINE

## 2022-03-03 PROCEDURE — 36415 COLL VENOUS BLD VENIPUNCTURE: CPT | Performed by: EMERGENCY MEDICINE

## 2022-03-03 PROCEDURE — 93010 ELECTROCARDIOGRAM REPORT: CPT | Performed by: EMERGENCY MEDICINE

## 2022-03-03 PROCEDURE — 99214 OFFICE O/P EST MOD 30 MIN: CPT | Performed by: STUDENT IN AN ORGANIZED HEALTH CARE EDUCATION/TRAINING PROGRAM

## 2022-03-03 RX ORDER — AZITHROMYCIN 250 MG/1
TABLET, FILM COATED ORAL
Qty: 6 TABLET | Refills: 0 | Status: SHIPPED | OUTPATIENT
Start: 2022-03-03 | End: 2022-03-16

## 2022-03-03 RX ORDER — IOPAMIDOL 755 MG/ML
64 INJECTION, SOLUTION INTRAVASCULAR ONCE
Status: COMPLETED | OUTPATIENT
Start: 2022-03-03 | End: 2022-03-03

## 2022-03-03 RX ORDER — CEFDINIR 300 MG/1
300 CAPSULE ORAL 2 TIMES DAILY
Qty: 14 CAPSULE | Refills: 0 | Status: SHIPPED | OUTPATIENT
Start: 2022-03-03 | End: 2023-01-01

## 2022-03-03 RX ORDER — ONDANSETRON 4 MG/1
4 TABLET, ORALLY DISINTEGRATING ORAL EVERY 8 HOURS PRN
Qty: 10 TABLET | Refills: 1 | Status: SHIPPED | OUTPATIENT
Start: 2022-03-03 | End: 2022-11-10

## 2022-03-03 RX ADMIN — IOPAMIDOL 64 ML: 755 INJECTION, SOLUTION INTRAVENOUS at 16:32

## 2022-03-03 RX ADMIN — SODIUM CHLORIDE 56 ML: 9 INJECTION, SOLUTION INTRAVENOUS at 16:32

## 2022-03-03 NOTE — ED NOTES
"Pt presents to ED after being seen at [facility]. Pt states she has been feeling weak/fatigued for past 4-5 days. Pt also reports cough and hemoptysis x 1. Pt daughter at bedside and states CXR completed at [facility] and that did not show PNA. Daughter states provider stated they heard a \"squeak\" when listening to her heart and sent her to ED to be evaluated.   "

## 2022-03-03 NOTE — ED TRIAGE NOTES
Pt being sent to ER from [facility].  Pt needs cardiac evaluation.  Pt has had episode of coughing up blood a few days ago.  Pt is not having any chest pain. A&Ox4.

## 2022-03-03 NOTE — PROGRESS NOTES
Assessment & Plan     Cough  Fatigue, unspecified type  Reviewed patient past medical history and recent ER visits, EKG done at clinic today showed a possible small ST elevation, that combined with her SOB and fatigue makes this patient in need of higher level of care. Findings discussed with her daughter and patient. Daughter agreed to take patient by care to [facility] to be assessed today from clinic.   - EKG 12-lead complete w/read - Clinics  - XR Chest 2 Views         30 minutes spent on the date of the encounter doing chart review, review of test results, interpretation of tests, patient visit, documentation and discussion with family         No follow-ups on file.    Kenisha Perez, [facility]    Subjective     Ellie is a 89 year old female who presents to clinic today for the following health issues:  Patient states she has been feeling very weak, SOB and has had a cough for at least 4 days. She endorses coughing up blood 4 days ago as well along with on and off chest discomfort. Denies chest pain on assessment.  Chief Complaint   Patient presents with     Cough     on going cough, started coughed up some blood 4 days ago. chest discomfort.       HPI      Cardiac Event    Symptom Onset: 4 day(s) ago.  Timing of illness: gradual onset and worsening.  Current and Associated symptoms: chest pressure/discomfort and fatigue.  Character: n/a.  Severity moderate.  Location: left chest.  Radiation: none.  Associated Symptoms: SOB.  Exacerbated by: exertion and movement.  Relieved by: nothing.  Cardiac risk factors: known cardiac disease.        Review of Systems  Constitutional, HEENT, cardiovascular, pulmonary, gi and gu systems are negative, except as otherwise noted.      Objective    /78 (BP Location: Right arm, Patient Position: Sitting, Cuff Size: Adult Regular)   Pulse 71   Temp 97.8  F (36.6  C) (Tympanic)   Resp 20   Wt 59.9 kg (132 lb)   LMP  06/15/1985   SpO2 94%   BMI 27.59 kg/m    Physical Exam   GENERAL: healthy, alert and no distress  NECK: no adenopathy, no asymmetry, masses, or scars and thyroid normal to palpation  RESP: lungs clear to auscultation - no rales, rhonchi or wheezes  CV: regular rate and rhythm, normal S1 S2, no S3 or S4, no murmur, click or rub, no peripheral edema and peripheral pulses strong  ABDOMEN: soft, nontender, no hepatosplenomegaly, no masses and bowel sounds normal  MS: no gross musculoskeletal defects noted, no edema    CXR - Reviewed and interpreted by me Normal- no infiltrates, effusions, pneumothoraces, cardiomegaly or masses  EKG - Reviewed and interpreted by me appears normal, NSR, ST elevation in V2

## 2022-03-03 NOTE — ED PROVIDER NOTES
"  History     Chief Complaint   Patient presents with     Cough     \"burning in lung\", cough up blood x 1     Generalized Weakness     4-5 days of fatigue     History per patient, her daughter and review of EMR.    HPI  Ellie Leigh is a 89 year old female with history of DVT and chronically anticoagulated Coumadin, and history of pulmonary TB which was fully treated in the early 2000s who presents with several days of URI symptoms with productive cough, nasal congestion and rhinorrhea and a single episode of hemoptysis on day of symptom onset 3 days ago who presents after she was seen in clinic with concern that she needed further work-up and evaluation in the ED. She has no shortness of breath, chest pain, fever or chills.  No leg pain, leg swelling, orthopnea or PND.  She has generalized weakness and malaise. No recent travel or known infectious exposures. She has been vaccinated versus Covid-19 (2 doses) and Influenza (last fall).  She has no other acute complaints or concerns and reports a history of similar symptoms treated effectively with antibiotic therapy last fall when a CT of the chest PE protocol showed no evidence of PE but a right lower lobe parenchymal opacity felt to represent developing pneumonitis. She has a history of pulmonary TB with right upper lobe infiltrate and positive AFB sputum, status full treatment many years ago in early 2000's following ID consultation.  I could not find no further details of her prior TB therapy and review of the EMR.    Previous Records Reviewed:  9/29/2021 CT CHEST PULMONARY EMBOLISM W CONTRAST    INDICATION: PE suspected, high prob; COPD, tuberculosis status post treatment, cough and hemoptysis. Chest tightness.  COMPARISON: 07/09/2021    FINDINGS:  ANGIOGRAM CHEST: Pulmonary arteries are normal caliber and negative for pulmonary emboli. Thoracic aorta is not well opacified and is  indeterminate for dissection. No CT evidence of right heart " strain.     LUNGS AND PLEURA: Chronic bilateral bronchiectasis and parenchymal opacities, right greater than left. Associated bronchial wall thickening predominantly in the upper lobes is likely unchanged. Several small mucous plugs are again several nodular areas   of parenchymal opacity with central cavitation and/or bronchiectasis are unchanged, including right upper lobe, image 39, 1.2 cm, and right upper lobe image 33, 1.3 cm, unchanged. There is a new irregular parenchymal opacity in the right lower lobe more dense centrally, measuring approximately 2 cm on image 84 with surrounding groundglass opacity. Scattered areas of air trapping are noted bilaterally. Tree-in-bud nodularity within the lower lobes redemonstrated bilaterally, greatest centered about image 48 in the left lower lobe, likely unchanged. No pneumothorax.     MEDIASTINUM/AXILLAE: Scattered subcentimeter mediastinal nodes, likely unchanged. Cardiac pacemaker.     CORONARY ARTERY CALCIFICATION: Severe.     UPPER ABDOMEN: Unremarkable     MUSCULOSKELETAL: No acute bony abnormalities. Multifocal degenerative change. Thoracic kyphosis. L1 50% compression fracture, unchanged. Left mastectomy.                                                                      IMPRESSION:  1.  No evidence of pulmonary embolus.  2.  New right lower lobe peripheral parenchymal opacity favored to represent developing pneumonitis. Given the nodular central component, short-term follow-up posttherapy is recommended to document resolution.  3.  Chronic changes in both lungs, greatest in the right upper lobe compatible with prior/chronic infection including bronchial wall thickening, bronchiectasis, mucous plugging, and tree-in-bud nodularity.    Allergies:  Allergies   Allergen Reactions     Cephalosporins Nausea     Cefzil     Doxycycline Nausea and Vomiting     Sulfa Drugs Nausea     Penicillins Rash     PCN       Problem List:    Patient Active Problem List    Diagnosis  Date Noted     Deep vein thrombosis (DVT) of proximal lower extremity, unspecified chronicity, unspecified laterality (H) 02/18/2022     Priority: Medium     Acute respiratory failure with hypoxia (H) 07/09/2021     Priority: Medium     COPD exacerbation (H) 07/09/2021     Priority: Medium     Cardiac pacemaker in situ 07/09/2021     Priority: Medium     H/O TB (tuberculosis) 02/09/2018     Priority: Medium     Pt with chronic RUL infiltrate with pos AFB sputum  Full treatmetn for TB following ID consult in 2000's       Back pain 02/06/2018     Priority: Medium     Nausea 06/05/2017     Priority: Medium     3/29/2021: She has had chronic nausea.  She has been on omeprazole  40mg daily for years.  Tried sucralfate (CARAFATE) which did not help.  Was on FODMAP diet, gluten free and no dairy diets.  Evaluation with GI in the past in 1/23/2015.  She has had gastroscopy several times.  10/30/2018 CT abdomen and pelvis unremarkable.   Gastric emptying normal.    Nausea in the AM.   No vomiting.   Hot flashes around Noon.  Feels like her whole body is on fire until med to late afternoon.  This is cyclical daily.   Feels shortness of breath with the hot flashes.    Nothing has helped much. Tried meclizine and Dramamine.   She has tried various liz preparations.   She has tried ondansetron.   Mirtazapine seemed to help for a while, does help sleep.  She has tried compazine in the past.   Today will try phenergan.         Irritable bowel syndrome without diarrhea 05/02/2016     Priority: Medium     Mild persistent asthma without complication 12/01/2015     Priority: Medium     Long term current use of anticoagulant therapy 03/02/2015     Priority: Medium     Syncope 01/12/2015     Priority: Medium     Advance Care Planning 01/09/2015     Priority: Medium     Advance Care Planning 7/2/2015: Receipt of ACP document:  Received: Health Care Directive which was witnessed or notarized on 1-9-15.  Document previously scanned on  2-27-15.  Validation form completed and sent to be scanned.  Code Status reflects choices in most recent ACP document.  Confirmed/documented designated decision maker(s).  Added by Verónica Green RN, System ACP Coordinator Honoring Choices   1/9/15 Copy to be scanned into chart Beulah Mathis CMA         COPD (chronic obstructive pulmonary disease) (H) 10/06/2014     Priority: Medium     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) 07/07/2014     Priority: Medium     Cause TBD.  Patient has had lung CT, will see Dr Gómez for consideration of further workup.  Also has pulmonology appt 8/18.  5/2/2018:Reviewing chart she has had hyponatremia since 2010.        Positive fecal occult blood test 07/07/2014     Priority: Medium     x2 -- first was in ED.  Patient expresses that she does not want colonoscopy.  She'll set appt to discuss with her PCP.       Benign essential HTN 02/11/2014     Priority: Medium     BPPV (benign paroxysmal positional vertigo) 11/05/2013     Priority: Medium     History of skin cancer 05/07/2013     Priority: Medium     Recurrent UTI 07/16/2012     Priority: Medium     recurrent UTI  Recent Urologic workup neg, 2005  Has Cipro at home for treatment as needed       Hypothyroidism 05/07/2012     Priority: Medium     Squamous cell carcinoma in situ of skin of face 04/19/2012     Priority: Medium     SK (seborrheic keratosis) 04/19/2012     Priority: Medium     Intermittent atrial fibrillation (H) 12/01/2011     Priority: Medium     Cervical vertebral fracture (H) 11/20/2011     Priority: Medium     Anxiety 11/15/2011     Priority: Medium     DVT (deep venous thrombosis) 10/12/2011     Priority: Medium     H/o in past, on current coumadin therapy.       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 Provider to review       Right arm weakness 07/29/2011     Priority: Medium     Ulnar  neuropathy 07/29/2011     Priority: Medium     Health Care Home 04/21/2011     Priority: Medium     Conchis Robles, [phone]  [facility]              DX V65.8 REPLACED WITH 71520 [facility]ALTH CARE HOME (04/08/2013)       Chronic constipation 03/03/2011     Priority: Medium     Osteoporosis 03/03/2011     Priority: Medium     Hyperlipidemia LDL goal <130 10/31/2010     Priority: Medium     Infectious colitis, enteritis and gastroenteritis 07/10/2010     Priority: Medium     Chronic allergic conjunctivitis 11/18/2008     Priority: Medium     Chronic seasonal allergic rhinitis 11/18/2008     Priority: Medium     Esophageal reflux 11/29/2007     Priority: Medium     PERSONAL HISTORY OF MALIGNANCY- BREAST 06/13/2007     Priority: Medium     Sensorineural hearing loss, asymmetrical 06/20/2005     Priority: Medium     Generalized osteoarthrosis, unspecified site 06/20/2005     Priority: Medium     Right hip and knee are the most symptomatic          Past Medical History:    Past Medical History:   Diagnosis Date     Basal cell carcinoma      Breast cancer (H)      COPD (chronic obstructive pulmonary disease) (H)      Dust allergy      DVT (deep vein thrombosis) in pregnancy      HTN (hypertension)      Hyperlipidemia LDL goal <130 10/31/2010     Hyponatremia      Hypothyroid      Intermittent atrial fibrillation (H) 12/1/2011     Loose body in joint 4/15/2011     Neck fracture (H)      Syncope 5/12/2013       Past Surgical History:    Past Surgical History:   Procedure Laterality Date     APPENDECTOMY       ARTHROSCOPY KNEE  4/15/2011    Procedure:ARTHROSCOPY KNEE; removal of loose body; Surgeon:LEY, JEFFREY DUANE; Location:[facility]     CHOLECYSTECTOMY       ESOPHAGOSCOPY, GASTROSCOPY, DUODENOSCOPY (EGD), COMBINED  6/9/2014    Procedure: COMBINED ESOPHAGOSCOPY, GASTROSCOPY, DUODENOSCOPY (EGD);  Surgeon: Gilberto Richard MD;  Location: [facility]     ESOPHAGOSCOPY, GASTROSCOPY, DUODENOSCOPY (EGD),  COMBINED N/A 10/18/2019    Procedure: ESOPHAGOGASTRODUODENOSCOPY (EGD);  Surgeon: Noe Sams DO;  Location: [facility]     IMPLANT PACEMAKER  5/21/2013    Sarah Buttsl [id number] Serial#[id number]     INJECT EPIDURAL LUMBAR  3/23/2011    INJECT EPIDURAL LUMBAR performed by GENERIC ANESTHESIA PROVIDER at [facility]     JOINT REPLACEMENT, HIP RT/LT      Joint Replacement Hip Rt     MASTECTOMY, SIMPLE RT/LT/CAITLYN      Left breast - following breast ca     OTHER SURGICAL HISTORY      C1-C2 fusion after fx      SURGICAL HISTORY OF -   05/22/2001    Colonoscopy     TONSILLECTOMY         Family History:    Family History   Problem Relation Age of Onset     Cerebrovascular Disease Mother      Heart Disease Mother         MI     Cerebrovascular Disease Father      Heart Disease Father         MI     Respiratory Maternal Grandfather         TB     Neurologic Disorder Brother         ALS     Heart Disease Brother      Cerebrovascular Disease Brother      Breast Cancer Daughter         age:49     Asthma Brother      Cancer Brother         brain and lung     Cancer Daughter         thyroid       Social History:  Marital Status:   [5]  Social History     Tobacco Use     Smoking status: Passive Smoke Exposure - Never Smoker     Smokeless tobacco: Never Used   Vaping Use     Vaping Use: Never used   Substance Use Topics     Alcohol use: No     Drug use: No        Medications:    azithromycin (ZITHROMAX Z-ADDY) 250 MG tablet  cefdinir (OMNICEF) 300 MG capsule  Acetaminophen (TYLENOL PO)  albuterol (PROAIR HFA) 108 (90 Base) MCG/ACT inhaler  albuterol (PROVENTIL) (2.5 MG/3ML) 0.083% neb solution  CRANBERRY  fluticasone-salmeterol (ADVAIR) 250-50 MCG/DOSE inhaler  ipratropium - albuterol 0.5 mg/2.5 mg/3 mL (DUONEB) 0.5-2.5 (3) MG/3ML neb solution  levothyroxine (SYNTHROID/LEVOTHROID) 50 MCG tablet  metoprolol succinate ER (TOPROL-XL) 25 MG 24 hr tablet  mirtazapine (REMERON) 15 MG tablet  omeprazole (PRILOSEC) 40 MG   capsule  order for DME  polyethylene glycol (MIRALAX/GLYCOLAX) Packet  probiotic CAPS  sodium chloride 0.9 % neb solution  sodium chloride 1 GM tablet  warfarin ANTICOAGULANT (COUMADIN) 1 MG tablet      Review of Systems  As mentioned above in the history present illness.  All other systems were reviewed and are negative.    Physical Exam   BP: (!) 176/119  Pulse: 94  Temp: 97.9  F (36.6  C)  Resp: 18  Weight: 59.9 kg (132 lb)  SpO2: 91 %      Physical Exam  Vitals and nursing note reviewed.   Constitutional:       General: She is not in acute distress.     Appearance: Normal appearance. She is well-developed. She is not ill-appearing or diaphoretic.   HENT:      Head: Normocephalic and atraumatic.      Right Ear: External ear normal.      Left Ear: External ear normal.      Nose: Nose normal.      Mouth/Throat:      Mouth: Mucous membranes are moist.   Eyes:      General: No scleral icterus.     Extraocular Movements: Extraocular movements intact.      Conjunctiva/sclera: Conjunctivae normal.   Neck:      Trachea: No tracheal deviation.   Cardiovascular:      Rate and Rhythm: Normal rate and regular rhythm.      Heart sounds: Normal heart sounds. No murmur heard.    No friction rub. No gallop.   Pulmonary:      Effort: Pulmonary effort is normal. No tachypnea, accessory muscle usage, prolonged expiration, respiratory distress or retractions.      Breath sounds: No stridor or decreased air movement. Examination of the right-lower field reveals rales. Examination of the left-lower field reveals rales. Rales (faint bibasilar crackles, right greater than left) present. No decreased breath sounds, wheezing or rhonchi.      Comments: O2 saturation 96% on room air at time of my exam.  Abdominal:      General: There is no distension.      Palpations: Abdomen is soft.      Tenderness: There is no abdominal tenderness.   Musculoskeletal:         General: No swelling or tenderness. Normal range of motion.      Cervical back:  Normal range of motion and neck supple.      Right lower leg: No edema.      Left lower leg: No edema.   Skin:     General: Skin is warm and dry.      Coloration: Skin is not pale.      Findings: No erythema or rash.   Neurological:      General: No focal deficit present.      Mental Status: She is alert and oriented to person, place, and time.   Psychiatric:         Mood and Affect: Mood normal.         Behavior: Behavior normal.         ED Course           Procedures         3/3/22 Clinic EKG Interpretation performed earlier today at 12:10 PM:      Interpreted by Sekou Phillip MD  Time reviewed: 3:04 PM  Symptoms at time of EKG: URI symptoms  Rhythm: Atrial paced rhyhtm  Rate: 70  Axis: normal  Ectopy: none  Conduction: normal  ST Segments/ T Waves: No acute ST-T wave changes  Q Waves: none  Comparison to prior: Unchanged from 5/15/20, last EKG 7/9/2021 appears to have been sinus rhythm with first-degree AV block.  There are no acute ST-T wave changes from previous EKGs.  Clinical Impression: Atrial pacing otherwise normal EKG         Results for orders placed or performed during the hospital encounter of 03/03/22 (from the past 24 hour(s))   Comprehensive metabolic panel   Result Value Ref Range    Sodium 135 133 - 144 mmol/L    Potassium 4.0 3.4 - 5.3 mmol/L    Chloride 103 94 - 109 mmol/L    Carbon Dioxide (CO2) 27 20 - 32 mmol/L    Anion Gap 5 3 - 14 mmol/L    Urea Nitrogen 20 7 - 30 mg/dL    Creatinine 0.58 0.52 - 1.04 mg/dL    Calcium 9.0 8.5 - 10.1 mg/dL    Glucose 130 (H) 70 - 99 mg/dL    Alkaline Phosphatase 80 40 - 150 U/L    AST 19 0 - 45 U/L    ALT 17 0 - 50 U/L    Protein Total 7.2 6.8 - 8.8 g/dL    Albumin 3.4 3.4 - 5.0 g/dL    Bilirubin Total 0.3 0.2 - 1.3 mg/dL    GFR Estimate 85 >60 mL/min/1.73m2   CBC with platelets differential    Narrative    The following orders were created for panel order CBC with platelets differential.  Procedure                               Abnormality         Status                      ---------                               -----------         ------                     CBC with platelets and d...[[id number]]  Abnormal            Final result                 Please view results for these tests on the individual orders.   Nt probnp inpatient (BNP)   Result Value Ref Range    N terminal Pro BNP Inpatient 573 0-1,800 pg/mL   Troponin I   Result Value Ref Range    Troponin I High Sensitivity 62 (H) <54 ng/L   CBC with platelets and differential   Result Value Ref Range    WBC Count 7.1 4.0 - 11.0 10e3/uL    RBC Count 3.75 (L) 3.80 - 5.20 10e6/uL    Hemoglobin 11.3 (L) 11.7 - 15.7 g/dL    Hematocrit 34.6 (L) 35.0 - 47.0 %    MCV 92 78 - 100 fL    MCH 30.1 26.5 - 33.0 pg    MCHC 32.7 31.5 - 36.5 g/dL    RDW 13.9 10.0 - 15.0 %    Platelet Count 254 150 - 450 10e3/uL    % Neutrophils 62 %    % Lymphocytes 29 %    % Monocytes 7 %    % Eosinophils 1 %    % Basophils 1 %    % Immature Granulocytes 0 %    NRBCs per 100 WBC 0 <1 /100    Absolute Neutrophils 4.4 1.6 - 8.3 10e3/uL    Absolute Lymphocytes 2.0 0.8 - 5.3 10e3/uL    Absolute Monocytes 0.5 0.0 - 1.3 10e3/uL    Absolute Eosinophils 0.1 0.0 - 0.7 10e3/uL    Absolute Basophils 0.1 0.0 - 0.2 10e3/uL    Absolute Immature Granulocytes 0.0 <=0.4 10e3/uL    Absolute NRBCs 0.0 10e3/uL   INR   Result Value Ref Range    INR 2.46 (H) 0.85 - 1.15   D dimer quantitative   Result Value Ref Range    D-Dimer Quantitative <0.27 0.00 - 0.50 ug/mL FEU    Narrative    This D-dimer assay is intended for use in conjunction with a clinical pretest probability assessment model to exclude pulmonary embolism (PE) and deep venous thrombosis (DVT) in outpatients suspected of PE or DVT. The cut-off value is 0.50 ug/mL FEU.   Chest CT - IV contrast only - TRAUMA / DISSECTION    Narrative    CT CHEST WITH CONTRAST 3/3/2022 4:44 PM    CLINICAL HISTORY: Hemoptysis. Cough. Distant history of treated  pulmonary TB.    TECHNIQUE: CT chest with IV contrast. Multiplanar  reformats were  obtained. Dose reduction techniques were used.    CONTRAST: 64 mL Isovue 370    COMPARISON: 9/29/2021    FINDINGS:   LUNGS AND PLEURA: Chronic scarring and complete collapse of the right  upper lobe, with bronchiectasis. There is bronchiectasis with  peribronchial thickening and mucous plugging throughout both lungs,  similar to previous. A cavitary nodule or focal area of bronchiectasis  and bronchial wall thickening in the right lung on image 52 is  unchanged. Mild peribronchial nodular infiltrates also similar to  previous.    MEDIASTINUM/AXILLAE: No lymphadenopathy. Cardiac pacemaker leads in  the right atrium and right ventricle. Severe coronary artery  calcification.    UPPER ABDOMEN: No significant finding.    MUSCULOSKELETAL: Unremarkable.      Impression    IMPRESSION:   1.  Bronchiectasis with bronchial wall thickening and peribronchial  infiltrates consistent with small airways type infection, similar to  9/29/2021.  2.  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    MAAME AVILES MD         SYSTEM ID:  [id number]     *Note: Due to a large number of results and/or encounters for the requested time period, some results have not been displayed. A complete set of results can be found in Results Review.       Medications   iopamidol (ISOVUE-370) solution 64 mL (64 mLs Intravenous Given 3/3/22 1632)   sodium chloride 0.9 % bag 500mL for CT scan flush use (56 mLs Intravenous Given 3/3/22 1632)       Assessments & Plan (with Medical Decision Making)   89 year old female with history of DVT, chronically anticoagulated Coumadin, and history of pulmonary TB which was fully treated in the early 2000s who presents with several days of URI symptoms with productive cough, nasal congestion and rhinorrhea and a single episode of hemoptysis on day of symptom onset 3 days ago. She reports a history of similar symptoms treated effectively with antibiotic therapy last fall (Sept. 2021) when a CT of  the chest PE protocol showed no evidence of PE but a right lower lobe parenchymal opacity felt to represent developing pneumonitis. She has a history of pulmonary TB with right upper lobe infiltrate and positive AFB sputum, status full treatment many years ago in early 2000's following ID consultation.  She does not appear ill or uncomfortable and has no respiratory distress or hypoxia. O2 saturation at the time my examination is 96% on room air. No elevation of WBC or fever.  COVID-19 and influenza testing negative.  EKG shows no acute changes, Troponin minimally elevated 62 and felt to be a benign nature as she has no symptoms of ACS, no evidence of CHF and normal BNP.  This could represent benign demand strain from her URI/community-acquired pneumonia seen on CT.  INR therapeutic at 2.46 and D-dimer is normal.  Doubt PE/DVT.  Do not feel that further evaluation or admission is required for an isolated mildly elevated troponin level without other evidence of ACS or MI and this can be rechecked in clinic follow-up.  No prior high-sensitivity troponin results are available for comparison in review of her EMR.  Her CT chest with IV contrast today shows bronchiectasis, bronchial wall thickening and peribronchial infiltrates consistent with small airways infection similar to that which she had on CT scan in September 2021. She reports these are similar symptoms to that that she had in September 2021 which were effectively treated with outpatient oral antibiotic therapy and she is comfortable with discharge home and oral antibiotic therapy. She has multiple antibiotic allergies and adverse reactions limiting what can be prescribed for her. She gets a rash to penicillins, nausea and vomiting to doxycycline, and nausea with cephalosporins and sulfa drugs. She reports that antiemetic therapy has not helped with the side effects of these antibiotics. I feel that the best treatment option for her is a Cephalosporin  beta-lactam, Omnicef, and Azithromycin which is not listed as having an adverse reaction to.  Another option would be Levaquin, but I do not feel that fluoroquinolone therapy is the best option due to its side effects and complications.  I recommended clinic recheck in the next several days and that she return immediately for new or worsening symptoms, fever, shortness of breath, or new problems or concerns. I will prescribe Zofran to try if needed for stomach upset from antibiotic therapy.  She and her daughter were provided instructions for supportive care and will return as needed for worsened condition or worsening symptoms, or new problems or concerns.        I have reviewed the nursing notes.    I have reviewed the findings, diagnosis, plan and need for follow up with the patient and her daughter.    New Prescriptions    AZITHROMYCIN (ZITHROMAX Z-ADDY) 250 MG TABLET    Take two tablets on day one. Take one tablet daily for 4 days.    CEFDINIR (OMNICEF) 300 MG CAPSULE    Take 1 capsule (300 mg) by mouth 2 times daily for 7 days       Final diagnoses:   Community acquired pneumonia, unspecified laterality       3/3/2022   [facility] EMERGENCY DEPT     Sekou Phillip MD  03/03/22 0577

## 2022-03-04 ENCOUNTER — DOCUMENTATION ONLY (OUTPATIENT)
Dept: ANTICOAGULATION | Facility: CLINIC | Age: 87
End: 2022-03-04
Payer: COMMERCIAL

## 2022-03-04 NOTE — PROGRESS NOTES
ANTICOAGULATION  MANAGEMENT     Interacting Medication Review    Interacting medication(s): Azithromycin (Zithromax, Z-laura) with warfarin.    Duration: 5 days (3/3 to 3/8)    Indication: Pneumonia    New medication?: Yes, interaction may increase INR and risk of bleeding       PLAN     Continue current warfarin dose. Recommend to check INR on 3/7 or 3/8    Spoke with Suyapa    No adjustment to Anticoagulation Calendar was required    Ruiz Meredith RN

## 2022-03-04 NOTE — PROGRESS NOTES
ANTICOAGULATION  MANAGEMENT: Discharge Review    Ellie Leigh chart reviewed for anticoagulation continuity of care    Emergency room visit on 3/3/22 for CAP.    Discharge disposition: Home    Results:    Recent labs: (last 7 days)     03/03/22  1443   INR 2.46*     Anticoagulation inpatient management:     not applicable     Anticoagulation discharge instructions:     Warfarin dosing: home regimen continued   Bridging: No   INR goal change: No      Medication changes affecting anticoagulation: Yes: Azithromycin/Omnicef    Additional factors affecting anticoagulation: Yes: CAP    Plan     No adjustment to anticoagulation plan needed. Recommend to recheck the INR early next week.    Spoke with Suyapa    No adjustment to Anticoagulation Calendar was required    Ruiz Meredith RN

## 2022-03-08 ENCOUNTER — ANTICOAGULATION THERAPY VISIT (OUTPATIENT)
Dept: ANTICOAGULATION | Facility: CLINIC | Age: 87
End: 2022-03-08

## 2022-03-08 ENCOUNTER — LAB (OUTPATIENT)
Dept: LAB | Facility: CLINIC | Age: 87
End: 2022-03-08
Payer: COMMERCIAL

## 2022-03-08 DIAGNOSIS — Z79.01 LONG TERM CURRENT USE OF ANTICOAGULANT THERAPY: ICD-10-CM

## 2022-03-08 DIAGNOSIS — I82.409 DVT (DEEP VENOUS THROMBOSIS) (H): Primary | ICD-10-CM

## 2022-03-08 DIAGNOSIS — I82.4Y9 DEEP VEIN THROMBOSIS (DVT) OF PROXIMAL LOWER EXTREMITY, UNSPECIFIED CHRONICITY, UNSPECIFIED LATERALITY (H): Chronic | ICD-10-CM

## 2022-03-08 DIAGNOSIS — I48.0 INTERMITTENT ATRIAL FIBRILLATION (H): ICD-10-CM

## 2022-03-08 DIAGNOSIS — I82.4Y9 DEEP VEIN THROMBOSIS (DVT) OF PROXIMAL LOWER EXTREMITY, UNSPECIFIED CHRONICITY, UNSPECIFIED LATERALITY (H): ICD-10-CM

## 2022-03-08 LAB — INR BLD: 2.3 (ref 0.9–1.1)

## 2022-03-08 PROCEDURE — 85610 PROTHROMBIN TIME: CPT

## 2022-03-08 PROCEDURE — 36416 COLLJ CAPILLARY BLOOD SPEC: CPT

## 2022-03-08 NOTE — PROGRESS NOTES
ANTICOAGULATION MANAGEMENT     Ellie Leigh 89 year old female is on warfarin with therapeutic INR result. (Goal INR 2.0-3.0)    Recent labs: (last 7 days)     03/08/22  1350   INR 2.3*       ASSESSMENT       Source(s): Patient/Caregiver Call       Warfarin doses taken: Warfarin taken as instructed    Diet: No new diet changes identified    New illness, injury, or hospitalization: Yes: ED visit 3/3/22 for CAP    Medication/supplement changes: Azithromycin stopped on 3/8/22 which has potential for interaction; increasing INR    Signs or symptoms of bleeding or clotting: No    Previous INR: Therapeutic last 2(+) visits    Additional findings: None       PLAN     Recommended plan for no diet, medication or health factor changes affecting INR     Dosing Instructions: Continue your current warfarin dose with next INR in 2 weeks       Summary  As of 3/8/2022    Full warfarin instructions:  1.5 mg every Mon, Wed, Fri; 1 mg all other days   Next INR check:  3/22/2022             Telephone call with Jennifer who verbalizes understanding and agrees to plan    Lab visit scheduled    Education provided: Please call back if any changes to your diet, medications or how you've been taking warfarin, Potential interaction between warfarin and Azithromycin and Contact [phone]  with any changes, questions or concerns.     Plan made per [facility] anticoagulation protocol    Ruiz Meredith RN  Anticoagulation Clinic  3/8/2022    _______________________________________________________________________     Anticoagulation Episode Summary     Current INR goal:  2.0-3.0   TTR:  72.4 % (12 mo)   Target end date:  Indefinite   Send INR reminders to:  [facility] [place]    Indications    Atrial fibrillation with rapid ventricular response (H) (Resolved) [I48.91]  DVT (deep venous thrombosis) [I82.409]  Long term current use of anticoagulant therapy [Z79.01]  Intermittent atrial fibrillation (H) [I48.0]  Deep vein thrombosis  (DVT) of proximal lower extremity  unspecified chronicity  unspecified laterality (H) [I82.4Y9]           Comments:  Taking Celebrex PRN         Anticoagulation Care Providers     Provider Role Specialty Phone number    Anjum Vidales MD Referring Family Medicine [phone]

## 2022-03-16 ENCOUNTER — OFFICE VISIT (OUTPATIENT)
Dept: FAMILY MEDICINE | Facility: CLINIC | Age: 87
End: 2022-03-16
Payer: COMMERCIAL

## 2022-03-16 VITALS
RESPIRATION RATE: 18 BRPM | HEIGHT: 58 IN | HEART RATE: 70 BPM | WEIGHT: 130 LBS | TEMPERATURE: 97.7 F | OXYGEN SATURATION: 95 % | BODY MASS INDEX: 27.29 KG/M2 | SYSTOLIC BLOOD PRESSURE: 130 MMHG | DIASTOLIC BLOOD PRESSURE: 64 MMHG

## 2022-03-16 DIAGNOSIS — J45.30 MILD PERSISTENT ASTHMA WITHOUT COMPLICATION: ICD-10-CM

## 2022-03-16 DIAGNOSIS — J18.9 PNEUMONIA DUE TO INFECTIOUS ORGANISM, UNSPECIFIED LATERALITY, UNSPECIFIED PART OF LUNG: Primary | ICD-10-CM

## 2022-03-16 PROCEDURE — 99213 OFFICE O/P EST LOW 20 MIN: CPT | Performed by: FAMILY MEDICINE

## 2022-03-16 ASSESSMENT — PAIN SCALES - GENERAL: PAINLEVEL: NO PAIN (0)

## 2022-03-16 NOTE — NURSING NOTE
"Chief Complaint   Patient presents with     ER F/U       Initial /64   Pulse 70   Temp 97.7  F (36.5  C) (Tympanic)   Resp 18   Ht 1.473 m (4' 10\")   Wt 59 kg (130 lb)   LMP 06/15/1985   SpO2 95%   BMI 27.17 kg/m   Estimated body mass index is 27.17 kg/m  as calculated from the following:    Height as of this encounter: 1.473 m (4' 10\").    Weight as of this encounter: 59 kg (130 lb).    Patient presents to the clinic using     Health Maintenance that is potentially due pending provider review:          Is there anyone who you would like to be able to receive your results?   If yes have patient fill out JOSUE    "

## 2022-03-16 NOTE — PROGRESS NOTES
"ASSESSMENT:     ICD-10-CM    1. Pneumonia due to infectious organism, unspecified laterality, unspecified part of lung  J18.9    2. Mild persistent asthma without complication  J45.30       Doing better after recent upper respiratory infection.   Has finished  Antibiotics.    Breathing seems back to normal.     PLAN: Continue the Advair twice daily.   Continue the Duonebs three times daily as you have been.   albuteral by neb or inhaler every 4 hours if needed.       Subjective   Ellie is a 89 year old who presents for the following health issues   Chief Complaint   Patient presents with     ER F/U      Accompanied today by daughter Suyapa    ED/UC Followup:    Facility:  [facility] [place]  Date of visit: 03/03/2022  Reason for visit: Cough  Current Status: doing well     She has been feeling a lot better.   Some cough-not much but some green phlegm.   She has had some chronic cough.  Typically coughs a few times in the AM, sometimes phlegm which is green.  Now a little more cough than normal.   Breathing has been OK.  Can get dyspnea on exertion with walking.  Can walk 20'.  She feels breathing is back to normal.  No chest pains.   No fever.   Energy \"not bad\"  Strength OK.   She has finished the antibiotics.   Using Advair twice daily.   Uses Duonebs three times daily on a regular basis.   Sometimes extra albuteral inhaler if needed.      Overall feels she is better.    Sometimes pain under arms. Pain off and on.  Triggering factors:?  Can get back aches with standing.  Cannot stand too long.     Patient Active Problem List   Diagnosis     Sensorineural hearing loss, asymmetrical     Generalized osteoarthrosis, unspecified site     PERSONAL HISTORY OF MALIGNANCY- BREAST     Esophageal reflux     Chronic allergic conjunctivitis     Chronic seasonal allergic rhinitis     Hyperlipidemia LDL goal <130     Chronic constipation     Osteoporosis     Health Care Home     Right arm weakness     Ulnar neuropathy     Headache     Mild " "major depression     DVT (deep venous thrombosis)     Anxiety     Cervical vertebral fracture (H)     Intermittent atrial fibrillation (H)     Squamous cell carcinoma in situ of skin of face     SK (seborrheic keratosis)     Hypothyroidism     Recurrent UTI     History of skin cancer     BPPV (benign paroxysmal positional vertigo)     Benign essential HTN     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)     Positive fecal occult blood test     COPD (chronic obstructive pulmonary disease) (H)     Infectious colitis, enteritis and gastroenteritis     Advance Care Planning     Syncope     Long term current use of anticoagulant therapy     Mild persistent asthma without complication     Irritable bowel syndrome without diarrhea     Nausea     Back pain     H/O TB (tuberculosis)     Acute respiratory failure with hypoxia (H)     COPD exacerbation (H)     Cardiac pacemaker in situ     Deep vein thrombosis (DVT) of proximal lower extremity, unspecified chronicity, unspecified laterality (H)      ROS:No other health concerns today.   Stomach is doing well.    No abdominal pain.    Good appetite.     OBJECTIVE:Blood pressure 130/64, pulse 70, temperature 97.7  F (36.5  C), temperature source Tympanic, resp. rate 18, height 1.473 m (4' 10\"), weight 59 kg (130 lb), last menstrual period 06/15/1985, SpO2 95 %, not currently breastfeeding. BMI=Body mass index is 27.17 kg/m .  GENERAL APPEARANCE ADULT: Alert, no acute distress, short stature, walks with walker  HENT: oral mucous membranes moist, oropharynx clear  NECK: No adenopathy,masses or thyromegaly  RESP: NO respiratory distress.  Few rhonchi left lung.  No rales.   CV: normal rate, regular rhythm, no murmur or gallop  MS: extremities normal, no peripheral edema     ===============================  ED notes:  Chief Complaint   Patient presents with     Cough       \"burning in lung\", cough up blood x 1     Generalized Weakness       4-5 days of fatigue      History " per patient, her daughter and review of EMR.     HPI  Ellie Leigh is a 89 year old female with history of DVT and chronically anticoagulated Coumadin, and history of pulmonary TB which was fully treated in the early 2000s who presents with several days of URI symptoms with productive cough, nasal congestion and rhinorrhea and a single episode of hemoptysis on day of symptom onset 3 days ago who presents after she was seen in clinic with concern that she needed further work-up and evaluation in the ED. She has no shortness of breath, chest pain, fever or chills.  No leg pain, leg swelling, orthopnea or PND.  She has generalized weakness and malaise. No recent travel or known infectious exposures. She has been vaccinated versus Covid-19 (2 doses) and Influenza (last fall).  She has no other acute complaints or concerns and reports a history of similar symptoms treated effectively with antibiotic therapy last fall when a CT of the chest PE protocol showed no evidence of PE but a right lower lobe parenchymal opacity felt to represent developing pneumonitis. She has a history of pulmonary TB with right upper lobe infiltrate and positive AFB sputum, status full treatment many years ago in early 2000's following ID consultation.  I could not find no further details of her prior TB therapy and review of the EMR.    CT chest:  IMPRESSION:   1.  Bronchiectasis with bronchial wall thickening and peribronchial  infiltrates consistent with small airways type infection, similar to  9/29/2021.  2.  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    Assessments & Plan (with Medical Decision Making)   89 year old female with history of DVT, chronically anticoagulated Coumadin, and history of pulmonary TB which was fully treated in the early 2000s who presents with several days of URI symptoms with productive cough, nasal congestion and rhinorrhea and a single episode of hemoptysis on day of symptom onset 3 days ago. She  reports a history of similar symptoms treated effectively with antibiotic therapy last fall (Sept. 2021) when a CT of the chest PE protocol showed no evidence of PE but a right lower lobe parenchymal opacity felt to represent developing pneumonitis. She has a history of pulmonary TB with right upper lobe infiltrate and positive AFB sputum, status full treatment many years ago in early 2000's following ID consultation.  She does not appear ill or uncomfortable and has no respiratory distress or hypoxia. O2 saturation at the time my examination is 96% on room air. No elevation of WBC or fever.  COVID-19 and influenza testing negative.  EKG shows no acute changes, Troponin minimally elevated 62 and felt to be a benign nature as she has no symptoms of ACS, no evidence of CHF and normal BNP.  This could represent benign demand strain from her URI/community-acquired pneumonia seen on CT.  INR therapeutic at 2.46 and D-dimer is normal.  Doubt PE/DVT.  Do not feel that further evaluation or admission is required for an isolated mildly elevated troponin level without other evidence of ACS or MI and this can be rechecked in clinic follow-up.  No prior high-sensitivity troponin results are available for comparison in review of her EMR.  Her CT chest with IV contrast today shows bronchiectasis, bronchial wall thickening and peribronchial infiltrates consistent with small airways infection similar to that which she had on CT scan in September 2021. She reports these are similar symptoms to that that she had in September 2021 which were effectively treated with outpatient oral antibiotic therapy and she is comfortable with discharge home and oral antibiotic therapy. She has multiple antibiotic allergies and adverse reactions limiting what can be prescribed for her. She gets a rash to penicillins, nausea and vomiting to doxycycline, and nausea with cephalosporins and sulfa drugs. She reports that antiemetic therapy has not helped  with the side effects of these antibiotics. I feel that the best treatment option for her is a Cephalosporin beta-lactam, Omnicef, and Azithromycin which is not listed as having an adverse reaction to.  Another option would be Levaquin, but I do not feel that fluoroquinolone therapy is the best option due to its side effects and complications.  I recommended clinic recheck in the next several days and that she return immediately for new or worsening symptoms, fever, shortness of breath, or new problems or concerns. I will prescribe Zofran to try if needed for stomach upset from antibiotic therapy.  She and her daughter were provided instructions for supportive care and will return as needed for worsened condition or worsening symptoms, or new problems or concerns.

## 2022-03-16 NOTE — PATIENT INSTRUCTIONS
ASSESSMENT:     ICD-10-CM    1. Pneumonia due to infectious organism, unspecified laterality, unspecified part of lung  J18.9    2. Mild persistent asthma without complication  J45.30       Doing better after recent upper respiratory infection.   Has finished  Antibiotics.    Breathing seems back to normal.     PLAN: Continue the Advair twice daily.   Continue the Duonebs three times daily as you have been.   albuteral by neb or inhaler every 4 hours if needed.

## 2022-03-22 ENCOUNTER — ANTICOAGULATION THERAPY VISIT (OUTPATIENT)
Dept: ANTICOAGULATION | Facility: CLINIC | Age: 87
End: 2022-03-22

## 2022-03-22 ENCOUNTER — LAB (OUTPATIENT)
Dept: LAB | Facility: CLINIC | Age: 87
End: 2022-03-22
Payer: COMMERCIAL

## 2022-03-22 DIAGNOSIS — I48.0 INTERMITTENT ATRIAL FIBRILLATION (H): ICD-10-CM

## 2022-03-22 DIAGNOSIS — Z79.01 LONG TERM CURRENT USE OF ANTICOAGULANT THERAPY: ICD-10-CM

## 2022-03-22 DIAGNOSIS — I82.4Y9 DEEP VEIN THROMBOSIS (DVT) OF PROXIMAL LOWER EXTREMITY, UNSPECIFIED CHRONICITY, UNSPECIFIED LATERALITY (H): ICD-10-CM

## 2022-03-22 DIAGNOSIS — E03.9 HYPOTHYROIDISM, UNSPECIFIED TYPE: Chronic | ICD-10-CM

## 2022-03-22 DIAGNOSIS — R11.0 NAUSEA: ICD-10-CM

## 2022-03-22 DIAGNOSIS — I82.409 DVT (DEEP VENOUS THROMBOSIS) (H): Primary | ICD-10-CM

## 2022-03-22 DIAGNOSIS — F41.9 ANXIETY: ICD-10-CM

## 2022-03-22 LAB — INR BLD: 2.4 (ref 0.9–1.1)

## 2022-03-22 PROCEDURE — 36416 COLLJ CAPILLARY BLOOD SPEC: CPT

## 2022-03-22 PROCEDURE — 85610 PROTHROMBIN TIME: CPT

## 2022-03-22 NOTE — PROGRESS NOTES
ANTICOAGULATION MANAGEMENT     Ellie Leigh 89 year old female is on warfarin with therapeutic INR result. (Goal INR 2.0-3.0)    Recent labs: (last 7 days)     03/22/22  1113   INR 2.4*       ASSESSMENT       Source(s): Patient/Caregiver Call       Warfarin doses taken: Warfarin taken as instructed    Diet: No new diet changes identified    New illness, injury, or hospitalization: No    Medication/supplement changes: None noted    Signs or symptoms of bleeding or clotting: No    Previous INR: Therapeutic last 2(+) visits    Additional findings: None       PLAN     Recommended plan for no diet, medication or health factor changes affecting INR     Dosing Instructions: Continue your current warfarin dose with next INR in 4 weeks       Summary  As of 3/22/2022    Full warfarin instructions:  1.5 mg every Mon, Wed, Fri; 1 mg all other days   Next INR check:  4/19/2022             Telephone call with Jennifer who verbalizes understanding and agrees to plan    Lab visit scheduled    Education provided: Please call back if any changes to your diet, medications or how you've been taking warfarin and Contact [phone]  with any changes, questions or concerns.     Plan made per [facility] anticoagulation protocol    Ruiz Meredith RN  Anticoagulation Clinic  3/22/2022    _______________________________________________________________________     Anticoagulation Episode Summary     Current INR goal:  2.0-3.0   TTR:  72.4 % (12 mo)   Target end date:  Indefinite   Send INR reminders to:  [facility]    Indications    Atrial fibrillation with rapid ventricular response (H) (Resolved) [I48.91]  DVT (deep venous thrombosis) [I82.409]  Long term current use of anticoagulant therapy [Z79.01]  Intermittent atrial fibrillation (H) [I48.0]  Deep vein thrombosis (DVT) of proximal lower extremity  unspecified chronicity  unspecified laterality (H) [I82.4Y9]           Comments:  Taking Celebrex PRN          Anticoagulation Care Providers     Provider Role Specialty Phone number    Anjum Vidales MD Referring Family Medicine [phone]

## 2022-03-23 RX ORDER — LEVOTHYROXINE SODIUM 50 UG/1
TABLET ORAL
Qty: 90 TABLET | Refills: 0 | Status: SHIPPED | OUTPATIENT
Start: 2022-03-23 | End: 2022-06-21

## 2022-03-23 RX ORDER — MIRTAZAPINE 15 MG/1
15 TABLET, FILM COATED ORAL AT BEDTIME
Qty: 90 TABLET | Refills: 0 | Status: SHIPPED | OUTPATIENT
Start: 2022-03-23 | End: 2022-06-21

## 2022-03-23 NOTE — TELEPHONE ENCOUNTER
"Requested Prescriptions   Pending Prescriptions Disp Refills     levothyroxine (SYNTHROID/LEVOTHROID) 50 MCG tablet [Pharmacy Med Name: Levothyroxine Sodium Oral Tablet 50 MCG] 90 tablet 0     Sig: TAKE ONE TABLET BY MOUTH ONE TIME DAILY *due for thyroid labs for more refills*       Thyroid Protocol Failed - 3/22/2022 12:42 PM        Failed - Normal TSH on file in past 12 months     Recent Labs   Lab Test 10/02/20  1037   TSH 2.19              Passed - Patient is 12 years or older        Passed - Recent (12 mo) or future (30 days) visit within the authorizing provider's specialty     Patient has had an office visit with the authorizing provider or a provider within the authorizing providers department within the previous 12 mos or has a future within next 30 days. See \"Patient Info\" tab in inbasket, or \"Choose Columns\" in Meds & Orders section of the refill encounter.              Passed - Medication is active on med list        Passed - No active pregnancy on record     If patient is pregnant or has had a positive pregnancy test, please check TSH.          Passed - No positive pregnancy test in past 12 months     If patient is pregnant or has had a positive pregnancy test, please check TSH.             mirtazapine (REMERON) 15 MG tablet [Pharmacy Med Name: Mirtazapine Oral Tablet 15 MG] 90 tablet 0     Sig: Take 1 tablet (15 mg) by mouth At Bedtime       Atypical Antidepressants Protocol Passed - 3/22/2022 12:42 PM        Passed - Recent (12 mo) or future (30 days) visit within the authorizing provider's specialty     Patient has had an office visit with the authorizing provider or a provider within the authorizing providers department within the previous 12 mos or has a future within next 30 days. See \"Patient Info\" tab in inbasket, or \"Choose Columns\" in Meds & Orders section of the refill encounter.              Passed - Medication active on med list        Passed - Patient is age 18 or older        Passed - No " active pregnancy on record        Passed - No positive pregnancy test in past 12 mos

## 2022-03-24 ENCOUNTER — TELEPHONE (OUTPATIENT)
Dept: FAMILY MEDICINE | Facility: CLINIC | Age: 87
End: 2022-03-24

## 2022-03-24 ENCOUNTER — ANCILLARY PROCEDURE (OUTPATIENT)
Dept: GENERAL RADIOLOGY | Facility: CLINIC | Age: 87
End: 2022-03-24
Attending: NURSE PRACTITIONER
Payer: COMMERCIAL

## 2022-03-24 ENCOUNTER — MYC MEDICAL ADVICE (OUTPATIENT)
Dept: FAMILY MEDICINE | Facility: CLINIC | Age: 87
End: 2022-03-24

## 2022-03-24 ENCOUNTER — OFFICE VISIT (OUTPATIENT)
Dept: FAMILY MEDICINE | Facility: CLINIC | Age: 87
End: 2022-03-24
Payer: COMMERCIAL

## 2022-03-24 VITALS
HEIGHT: 58 IN | OXYGEN SATURATION: 94 % | RESPIRATION RATE: 14 BRPM | TEMPERATURE: 98.3 F | HEART RATE: 73 BPM | DIASTOLIC BLOOD PRESSURE: 58 MMHG | WEIGHT: 131.7 LBS | BODY MASS INDEX: 27.65 KG/M2 | SYSTOLIC BLOOD PRESSURE: 116 MMHG

## 2022-03-24 DIAGNOSIS — J18.9 PNEUMONIA DUE TO INFECTIOUS ORGANISM, UNSPECIFIED LATERALITY, UNSPECIFIED PART OF LUNG: ICD-10-CM

## 2022-03-24 DIAGNOSIS — J44.9 CHRONIC OBSTRUCTIVE PULMONARY DISEASE, UNSPECIFIED COPD TYPE (H): Primary | ICD-10-CM

## 2022-03-24 DIAGNOSIS — J44.1 COPD EXACERBATION (H): ICD-10-CM

## 2022-03-24 DIAGNOSIS — E03.9 HYPOTHYROIDISM, UNSPECIFIED TYPE: Primary | ICD-10-CM

## 2022-03-24 PROCEDURE — 71046 X-RAY EXAM CHEST 2 VIEWS: CPT | Performed by: RADIOLOGY

## 2022-03-24 PROCEDURE — 99214 OFFICE O/P EST MOD 30 MIN: CPT | Performed by: NURSE PRACTITIONER

## 2022-03-24 RX ORDER — AZITHROMYCIN 250 MG/1
TABLET, FILM COATED ORAL
Qty: 6 TABLET | Refills: 0 | Status: CANCELLED | OUTPATIENT
Start: 2022-03-24 | End: 2022-03-29

## 2022-03-24 RX ORDER — IPRATROPIUM BROMIDE AND ALBUTEROL SULFATE 2.5; .5 MG/3ML; MG/3ML
SOLUTION RESPIRATORY (INHALATION)
Qty: 270 ML | Refills: 11 | Status: CANCELLED | OUTPATIENT
Start: 2022-03-24

## 2022-03-24 RX ORDER — LEVOFLOXACIN 500 MG/1
500 TABLET, FILM COATED ORAL DAILY
Qty: 7 TABLET | Refills: 0 | Status: SHIPPED | OUTPATIENT
Start: 2022-03-24 | End: 2022-04-29

## 2022-03-24 ASSESSMENT — PAIN SCALES - GENERAL: PAINLEVEL: NO PAIN (0)

## 2022-03-24 NOTE — TELEPHONE ENCOUNTER
ANTICOAGULATION  MANAGEMENT     Interacting Medication Review    Interacting medication(s): Levofloxacin (Levaquin) with warfarin.    Duration: 7 days (3/24/22 to 3/31/22)    Indication: Pneumonia    New medication?: Yes, interaction may increase INR and risk of bleeding       PLAN     Continue current warfarin dose. Recommend to check INR on 3/28/22, Suyapa requested Tuesday 3/29/22. Advised to monitor for any increased bruising or bleeding concerns and be seen/checked sooner if any issues.    Spoke with Suyapa    No adjustment to Anticoagulation Calendar was required    Gina Persaud RN

## 2022-03-24 NOTE — PATIENT INSTRUCTIONS
Patient Education       Pneumonia (Adult)  Pneumonia is an infection deep in the lungs. It is in the small air sacs (alveoli). It may be caused by a virus, fungus, or bacteria. Pneumonia caused by bacteria is often treated with an antibiotic. Severe cases may need to be treated in the hospital. Milder cases can be treated at home. Pneumonia symptoms are a lot like flu symptoms. They include fever, cough (dry or with phlegm), headache, muscle weakness, and pain. These symptoms often get worse in the first 2 days. But they often start to get better in the first week of treatment.    Home care  Follow these guidelines when caring for yourself at home:    Get plenty of rest. Don t let yourself get overly tired when you go back to your activities. Participate in activities as directed by your healthcare provider.    Stop smoking. This is the most important step you can take to help treat pneumonia. If you need help stopping smoking, talk with your healthcare provider.    Stay away from smoke and other irritants. Stay away from secondhand smoke. Don t let anyone smoke in your home.    Prevent lung infections. Ask your healthcare provider about the flu and pneumonia vaccines. Take steps to prevent colds and other lung infections.    Practice correct handwashing. Wash your hands often with soap and water. Use hand  when you can t wash your hands. Stay away from crowds during cold and flu season.    Use pain medicine as directed. You may use acetaminophen or ibuprofen to control fever or pain, unless another medicine was prescribed. If you have chronic liver or kidney disease, talk with your healthcare provider before using these medicines. Also talk with your provider if you ve had a stomach ulcer or GI (gastrointestinal) bleeding. Don t give aspirin to a child younger than age 19 unless directed by the provider. Taking aspirin can put a child at risk for Reye syndrome. This is a rare but very serious disorder.  It most often affects the brain and the liver.    Eat a light diet as needed. You may not feel like eating, so a light diet is fine. Follow the treatment plan as advised by your healthcare provider.    Drink plenty of water and fluids. This can make mucus thinner and easier to cough up. Ask your healthcare provider how much water you should drink. For many people, 6 to 8 glasses (8 ounces each) a day is a good goal. Other fluids include sport drinks, sodas without caffeine, juices, tea, or soup. If you also have heart or kidney disease, check with your provider before you drink extra fluids.    Finish all prescription medicine. Take antibiotic or antiviral medicine as prescribed by your healthcare provider, even if you are feeling better after a few days. Take the medicine until it is all gone.    Try to stay away from air pollution. If you live in an area with air pollution, track the Air Quality Index (AQI) reports and plan your outdoor activities with the AQI recommendations in mind  Follow-up care  Follow up with your healthcare provider in the next 2 to 3 days, or as advised. This is to be sure the medicine is helping you get better.  If you are 65 or older, you should get a pneumococcal vaccine and a yearly flu (influenza) shot. You should also get these vaccines if you have chronic lung disease such as asthma, emphysema, or COPD. A second type of pneumonia vaccine is also available for people over age 65 and those younger than 65 with certain health conditions. Talk with your healthcare provider about which pneumococcal vaccine is best for you.  Call 911  Call 911 if any of these occur:    Unable to speak or swallow    Lips or skin looks blue, purple, or gray    Feeling dizzy or faint    Unable to wake up or loss of consciousness    Feeling of doom    Trouble breathing or wheezing    Shortness of breath gets worse or doesn't get better with treatment    Rapid breathing (more than 25 breaths per  minute)    Coughing up blood    Chest pain gets worse with breathing or doesn't get better with treatment  When to get medical advice  Call your healthcare provider right away if any of these occur:    You don t get better in the first 2 days of treatment    Fever of 100.4 F (38 C) or higher, or as directed by your healthcare provider    Shaking chills    Cough with phlegm that doesn't get better, or get worse    Shortness of breath with activities    Weakness, dizziness, or fainting that gets worse    Thirst or dry mouth that gets worse    Sinus pain, headache, or a stiff neck    Chest pain with breathing or coughing    Symptoms that get worse or not improving  [employer] last reviewed this educational content on 11/1/2019 2000-2021 [employer]. All rights reserved. This information is not intended as a substitute for professional medical care. Always follow your healthcare professional's instructions.

## 2022-03-24 NOTE — PROGRESS NOTES
"  Assessment & Plan     Chronic obstructive pulmonary disease, unspecified COPD type (H)  Oxygen at 94%  Recent pneumonia treated with Azith - symptoms returned after completion of antibiotics.  High risk patient given history, age and COPD.    Pneumonia due to infectious organism, unspecified laterality, unspecified part of lung   Chest xray reviewed today - no new or changing infiltrates.  Treat with Levofloxacin given COPD and worsening symptoms.  Monitor and follow up in 1-2 days.  Follow-up with PCP in 1-2 weeks.    - XR Chest 2 Views  - Nebulizer and Supplies Order for DME - ONLY FOR DME  - levofloxacin (LEVAQUIN) 500 MG tablet  Dispense: 7 tablet; Refill: 0    COPD exacerbation (H)   Continue duo neb every 6-8 hours.    Reminded patient she is due for her TSH - can do with her INR.    Monitor for bleeding as she is on Warfarin.          BMI:   Estimated body mass index is 27.53 kg/m  as calculated from the following:    Height as of this encounter: 1.473 m (4' 10\").    Weight as of this encounter: 59.7 kg (131 lb 11.2 oz).       See Patient Instructions    No follow-ups on file.    Lissy Cavazos NP  [facility]    Renay Argueta is a 89 year old who presents for the following health issues  accompanied by her daughter Suyapa LARA     Acute Illness  Acute illness concerns: recurring pneumonia, was in the ER on 3/3, cough has got worse the past 3-4 days, chest has burning sensation  Onset/Duration: ongoing for weeks  Symptoms:  Fever: no  Chills/Sweats: YES sweats  Headache (location?): YES  Sinus Pressure: no  Conjunctivitis:  no  Ear Pain: no  Rhinorrhea: YES   Congestion: YES  Sore Throat: no  Cough: YES-productive of green/brown sputum, worsening over time  Wheeze: no  Decreased Appetite: no  Nausea: no  Vomiting: no  Diarrhea: no  Dysuria/Freq.: no  Dysuria or Hematuria: no  Fatigue/Achiness: YES fatigue  Sick/Strep Exposure: no  Therapies tried and outcome: Tylenol, " "Inhaler, Nebulizer  Diagnosed with community acquired pneumonia in ED 3/3  Note regarding treatment options from ED visit from that day were as follows:  She gets a rash to penicillins, nausea and vomiting to doxycycline, and nausea with cephalosporins and sulfa drugs. She reports that antiemetic therapy has not helped with the side effects of these antibiotics. I feel that the best treatment option for her is a Cephalosporin beta-lactam, Omnicef, and Azithromycin which is not listed as having an adverse reaction to.  Another option would be Levaquin, but I do not feel that fluoroquinolone therapy is the best option due to its side effects and complications.     Review of Systems   Constitutional, HEENT, cardiovascular, pulmonary, GI, , musculoskeletal, neuro, skin, endocrine and psych systems are negative, except as otherwise noted.      Objective    /58 (BP Location: Right arm, Patient Position: Chair, Cuff Size: Adult Regular)   Pulse 73   Temp 98.3  F (36.8  C) (Tympanic)   Resp 14   Ht 1.473 m (4' 10\")   Wt 59.7 kg (131 lb 11.2 oz)   LMP 06/15/1985 (LMP Unknown)   SpO2 94%   Breastfeeding No   BMI 27.53 kg/m    Body mass index is 27.53 kg/m .  Physical Exam   GENERAL: alert, no distress, frail and elderly  NECK: no adenopathy, no asymmetry, masses, or scars and thyroid normal to palpation  RESP: no rales , no wheezes, rhonchi R mid posterior and R lower posterior and decreased breath sounds bibasilar, dyspnea on exertion, oxygen at 94% at rest.  CV: regular rate and rhythm, normal S1 S2, no S3 or S4, no murmur, click or rub, no peripheral edema and peripheral pulses strong  ABDOMEN: soft, nontender, no hepatosplenomegaly, no masses and bowel sounds normal  MS: no gross musculoskeletal defects noted, no edema    CT done 3/3 shows:  Narrative & Impression   CT CHEST WITH CONTRAST 3/3/2022 4:44 PM     CLINICAL HISTORY: Hemoptysis. Cough. Distant history of treated  pulmonary TB.     TECHNIQUE: CT " chest with IV contrast. Multiplanar reformats were  obtained. Dose reduction techniques were used.     CONTRAST: 64 mL Isovue 370     COMPARISON: 9/29/2021     FINDINGS:   LUNGS AND PLEURA: Chronic scarring and complete collapse of the right  upper lobe, with bronchiectasis. There is bronchiectasis with  peribronchial thickening and mucous plugging throughout both lungs,  similar to previous. A cavitary nodule or focal area of bronchiectasis  and bronchial wall thickening in the right lung on image 52 is  unchanged. Mild peribronchial nodular infiltrates also similar to  previous.     MEDIASTINUM/AXILLAE: No lymphadenopathy. Cardiac pacemaker leads in  the right atrium and right ventricle. Severe coronary artery  calcification.     UPPER ABDOMEN: No significant finding.     MUSCULOSKELETAL: Unremarkable.                                                                      IMPRESSION:   1.  Bronchiectasis with bronchial wall thickening and peribronchial  infiltrates consistent with small airways type infection, similar to  9/29/2021.  2.  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

## 2022-03-29 ENCOUNTER — ANTICOAGULATION THERAPY VISIT (OUTPATIENT)
Dept: ANTICOAGULATION | Facility: CLINIC | Age: 87
End: 2022-03-29

## 2022-03-29 ENCOUNTER — LAB (OUTPATIENT)
Dept: LAB | Facility: CLINIC | Age: 87
End: 2022-03-29
Payer: COMMERCIAL

## 2022-03-29 DIAGNOSIS — I82.409 DVT (DEEP VENOUS THROMBOSIS) (H): Primary | ICD-10-CM

## 2022-03-29 DIAGNOSIS — E03.9 HYPOTHYROIDISM, UNSPECIFIED TYPE: ICD-10-CM

## 2022-03-29 DIAGNOSIS — I48.0 INTERMITTENT ATRIAL FIBRILLATION (H): ICD-10-CM

## 2022-03-29 DIAGNOSIS — I82.4Y9 DEEP VEIN THROMBOSIS (DVT) OF PROXIMAL LOWER EXTREMITY, UNSPECIFIED CHRONICITY, UNSPECIFIED LATERALITY (H): ICD-10-CM

## 2022-03-29 DIAGNOSIS — Z79.01 LONG TERM CURRENT USE OF ANTICOAGULANT THERAPY: ICD-10-CM

## 2022-03-29 LAB
INR BLD: 2.8 (ref 0.9–1.1)
TSH SERPL DL<=0.005 MIU/L-ACNC: 2.16 MU/L (ref 0.4–4)

## 2022-03-29 PROCEDURE — 36415 COLL VENOUS BLD VENIPUNCTURE: CPT

## 2022-03-29 PROCEDURE — 85610 PROTHROMBIN TIME: CPT

## 2022-03-29 PROCEDURE — 84443 ASSAY THYROID STIM HORMONE: CPT

## 2022-03-29 PROCEDURE — 36416 COLLJ CAPILLARY BLOOD SPEC: CPT

## 2022-03-29 NOTE — PROGRESS NOTES
ANTICOAGULATION MANAGEMENT     Ellie Leigh 89 year old female is on warfarin with therapeutic INR result. (Goal INR 2.0-3.0)    Recent labs: (last 7 days)     03/29/22  1409   INR 2.8*       ASSESSMENT       Source(s): Patient/Caregiver Call       Warfarin doses taken: Warfarin taken as instructed    Diet: No new diet changes identified    New illness, injury, or hospitalization: No    Medication/supplement changes: Levaquin until 3/31/22.    Signs or symptoms of bleeding or clotting: No    Previous INR: Therapeutic last 2(+) visits    Additional findings: Per history, patient has not had elevated INR's when on Levaquin.          PLAN     Recommended plan for temporary changes affecting INR     Dosing Instructions: Continue your current warfarin dose with next INR in 3 weeks       Summary  As of 3/29/2022    Full warfarin instructions:  1.5 mg every Mon, Wed, Fri; 1 mg all other days   Next INR check:  4/26/2022             Telephone call with Jennifer who verbalizes understanding and agrees to plan    Lab visit scheduled    Education provided: Please call back if any changes to your diet, medications or how you've been taking warfarin, Potential interaction between warfarin and Levaquin, Monitoring for bleeding signs and symptoms, Monitoring for clotting signs and symptoms, When to seek medical attention/emergency care and Contact [phone]  with any changes, questions or concerns.     Plan made per [facility] anticoagulation protocol    Ruiz Meredith RN  Anticoagulation Clinic  3/29/2022    _______________________________________________________________________     Anticoagulation Episode Summary     Current INR goal:  2.0-3.0   TTR:  74.2 % (12 mo)   Target end date:  Indefinite   Send INR reminders to:  GARRY BARRY    Indications    Atrial fibrillation with rapid ventricular response (H) (Resolved) [I48.91]  DVT (deep venous thrombosis) [I82.409]  Long term current use of anticoagulant  therapy [Z79.01]  Intermittent atrial fibrillation (H) [I48.0]  Deep vein thrombosis (DVT) of proximal lower extremity  unspecified chronicity  unspecified laterality (H) [I82.4Y9]           Comments:  Taking Celebrex PRN         Anticoagulation Care Providers     Provider Role Specialty Phone number    Anjum Vidales MD Referring Family Medicine [phone]

## 2022-03-30 NOTE — RESULT ENCOUNTER NOTE
Hi,  TSH is normal indicating that the level of thyroid hormone is in the normal range.   MARCY DAUGHERTY MD

## 2022-04-05 DIAGNOSIS — I10 ESSENTIAL HYPERTENSION, BENIGN: ICD-10-CM

## 2022-04-05 RX ORDER — METOPROLOL SUCCINATE 25 MG/1
TABLET, EXTENDED RELEASE ORAL
Qty: 360 TABLET | Refills: 3 | Status: SHIPPED | OUTPATIENT
Start: 2022-04-05 | End: 2022-11-10

## 2022-04-19 ENCOUNTER — ANTICOAGULATION THERAPY VISIT (OUTPATIENT)
Dept: ANTICOAGULATION | Facility: CLINIC | Age: 87
End: 2022-04-19

## 2022-04-19 ENCOUNTER — LAB (OUTPATIENT)
Dept: LAB | Facility: CLINIC | Age: 87
End: 2022-04-19
Payer: COMMERCIAL

## 2022-04-19 DIAGNOSIS — I82.4Y9 DEEP VEIN THROMBOSIS (DVT) OF PROXIMAL LOWER EXTREMITY, UNSPECIFIED CHRONICITY, UNSPECIFIED LATERALITY (H): ICD-10-CM

## 2022-04-19 DIAGNOSIS — Z79.01 LONG TERM CURRENT USE OF ANTICOAGULANT THERAPY: ICD-10-CM

## 2022-04-19 DIAGNOSIS — I48.0 INTERMITTENT ATRIAL FIBRILLATION (H): ICD-10-CM

## 2022-04-19 DIAGNOSIS — I82.409 DVT (DEEP VENOUS THROMBOSIS) (H): Primary | ICD-10-CM

## 2022-04-19 LAB — INR BLD: 2.8 (ref 0.9–1.1)

## 2022-04-19 PROCEDURE — 36416 COLLJ CAPILLARY BLOOD SPEC: CPT

## 2022-04-19 PROCEDURE — 85610 PROTHROMBIN TIME: CPT

## 2022-04-19 NOTE — PROGRESS NOTES
ANTICOAGULATION MANAGEMENT     Ellie Leigh 89 year old female is on warfarin with therapeutic INR result. (Goal INR 2.0-3.0)    Recent labs: (last 7 days)     04/19/22  1314   INR 2.8*       ASSESSMENT       Source(s): Chart Review and Patient/Caregiver Call       Warfarin doses taken: Warfarin taken as instructed    Diet: No new diet changes identified    New illness, injury, or hospitalization: No    Medication/supplement changes: None noted    Signs or symptoms of bleeding or clotting: No    Previous INR: Therapeutic last 2(+) visits    Additional findings: None       PLAN     Recommended plan for no diet, medication or health factor changes affecting INR     Dosing Instructions: continue your current warfarin dose with next INR in 5 weeks       Summary  As of 4/19/2022    Full warfarin instructions:  1.5 mg every Mon, Wed, Fri; 1 mg all other days   Next INR check:  5/24/2022             Telephone call with  Suyapa who verbalizes understanding and agrees to plan    Lab visit scheduled    Education provided: Goal range and significance of current result and Importance of therapeutic range    Plan made per [facility] anticoagulation protocol    Mian Judge RN  Anticoagulation Clinic  4/19/2022    _______________________________________________________________________     Anticoagulation Episode Summary     Current INR goal:  2.0-3.0   TTR:  79.6 % (12 mo)   Target end date:  Indefinite   Send INR reminders to:  [facility]    Indications    Atrial fibrillation with rapid ventricular response (H) (Resolved) [I48.91]  DVT (deep venous thrombosis) [I82.409]  Long term current use of anticoagulant therapy [Z79.01]  Intermittent atrial fibrillation (H) [I48.0]  Deep vein thrombosis (DVT) of proximal lower extremity  unspecified chronicity  unspecified laterality (H) [I82.4Y9]           Comments:  Taking Celebrex PRN         Anticoagulation Care Providers     Provider Role Specialty Phone number    Anjum Vidales  MD [facility] Medicine [phone]

## 2022-04-21 ENCOUNTER — HOSPITAL ENCOUNTER (EMERGENCY)
Facility: CLINIC | Age: 87
Discharge: HOME | End: 2022-04-21
Attending: PHYSICIAN ASSISTANT | Admitting: PHYSICIAN ASSISTANT
Payer: COMMERCIAL

## 2022-04-21 ENCOUNTER — APPOINTMENT (OUTPATIENT)
Dept: GENERAL RADIOLOGY | Facility: CLINIC | Age: 87
End: 2022-04-21
Attending: PHYSICIAN ASSISTANT
Payer: COMMERCIAL

## 2022-04-21 VITALS
DIASTOLIC BLOOD PRESSURE: 70 MMHG | WEIGHT: 130 LBS | SYSTOLIC BLOOD PRESSURE: 191 MMHG | HEART RATE: 79 BPM | TEMPERATURE: 98.2 F | RESPIRATION RATE: 20 BRPM | HEIGHT: 58 IN | BODY MASS INDEX: 27.29 KG/M2 | OXYGEN SATURATION: 93 %

## 2022-04-21 DIAGNOSIS — J20.9 ACUTE BRONCHITIS: ICD-10-CM

## 2022-04-21 PROCEDURE — 71046 X-RAY EXAM CHEST 2 VIEWS: CPT

## 2022-04-21 PROCEDURE — 99214 OFFICE O/P EST MOD 30 MIN: CPT | Performed by: PHYSICIAN ASSISTANT

## 2022-04-21 PROCEDURE — G0463 HOSPITAL OUTPT CLINIC VISIT: HCPCS | Mod: 25 | Performed by: PHYSICIAN ASSISTANT

## 2022-04-21 RX ORDER — PREDNISONE 20 MG/1
20 TABLET ORAL DAILY
Qty: 5 TABLET | Refills: 0 | Status: SHIPPED | OUTPATIENT
Start: 2022-04-21 | End: 2022-04-26

## 2022-04-21 RX ORDER — AZITHROMYCIN 250 MG/1
TABLET, FILM COATED ORAL
Qty: 6 TABLET | Refills: 0 | Status: SHIPPED | OUTPATIENT
Start: 2022-04-21 | End: 2023-01-01

## 2022-04-21 RX ORDER — CEFDINIR 300 MG/1
300 CAPSULE ORAL 2 TIMES DAILY
Qty: 14 CAPSULE | Refills: 0 | Status: SHIPPED | OUTPATIENT
Start: 2022-04-21 | End: 2022-04-29

## 2022-04-21 ASSESSMENT — ENCOUNTER SYMPTOMS
COUGH: 1
FEVER: 0
SHORTNESS OF BREATH: 0
CARDIOVASCULAR NEGATIVE: 1
CONSTITUTIONAL NEGATIVE: 1

## 2022-04-22 ENCOUNTER — DOCUMENTATION ONLY (OUTPATIENT)
Dept: ANTICOAGULATION | Facility: CLINIC | Age: 87
End: 2022-04-22
Payer: COMMERCIAL

## 2022-04-22 NOTE — ED PROVIDER NOTES
"  History     Chief Complaint   Patient presents with     Cough     Congested and states \" always have bronchitis\". Symptoms 1 week/ green sputum     HPI  Ellie Leigh is a 89 year old female with history of DVT (on long-term anticoagulant therapy), atrial fibrillation, hypertension, SIADH, COPD, hypothyroidism who presents with complaints of cough and congestion for the past week.  Patient states she has a history of bronchitis and COPD and these symptoms feel similar.  She denies any associated shortness of breath or chest pain.  Denies fevers.  Patient denies any known ill contacts.  She is vaccinated against COVID-19 x2.  She states she typically gets antibiotics and improves on this.  Patient has been using her inhaler and nebulizer as prescribed.  Denies headache, sore throat, sinus pressure, nasal congestion, nausea, vomiting, diarrhea, or abdominal pain.      Allergies:  Allergies   Allergen Reactions     Cephalosporins Nausea     Cefzil     Doxycycline Nausea and Vomiting     Sulfa Drugs Nausea     Penicillins Rash     PCN       Problem List:    Patient Active Problem List    Diagnosis Date Noted     Deep vein thrombosis (DVT) of proximal lower extremity, unspecified chronicity, unspecified laterality (H) 02/18/2022     Priority: Medium     Acute respiratory failure with hypoxia (H) 07/09/2021     Priority: Medium     COPD exacerbation (H) 07/09/2021     Priority: Medium     Cardiac pacemaker in situ 07/09/2021     Priority: Medium     H/O TB (tuberculosis) 02/09/2018     Priority: Medium     Pt with chronic RUL infiltrate with pos AFB sputum  Full treatmetn for TB following ID consult in 2000's       Back pain 02/06/2018     Priority: Medium     Nausea 06/05/2017     Priority: Medium     3/29/2021: She has had chronic nausea.  She has been on omeprazole  40mg daily for years.  Tried sucralfate (CARAFATE) which did not help.  Was on FODMAP diet, gluten free and no dairy diets.  Evaluation with GI in " the past in 1/23/2015.  She has had gastroscopy several times.  10/30/2018 CT abdomen and pelvis unremarkable.   Gastric emptying normal.    Nausea in the AM.   No vomiting.   Hot flashes around Noon.  Feels like her whole body is on fire until med to late afternoon.  This is cyclical daily.   Feels shortness of breath with the hot flashes.    Nothing has helped much. Tried meclizine and Dramamine.   She has tried various liz preparations.   She has tried ondansetron.   Mirtazapine seemed to help for a while, does help sleep.  She has tried compazine in the past.   Today will try phenergan.         Irritable bowel syndrome without diarrhea 05/02/2016     Priority: Medium     Mild persistent asthma without complication 12/01/2015     Priority: Medium     Long term current use of anticoagulant therapy 03/02/2015     Priority: Medium     Syncope 01/12/2015     Priority: Medium     Advance Care Planning 01/09/2015     Priority: Medium     Advance Care Planning 7/2/2015: Receipt of ACP document:  Received: Health Care Directive which was witnessed or notarized on 1-9-15.  Document previously scanned on 2-27-15.  Validation form completed and sent to be scanned.  Code Status reflects choices in most recent ACP document.  Confirmed/documented designated decision maker(s).  Added by Verónica Green RN, System ACP Coordinator Honoring Choices   1/9/15 Copy to be scanned into chart Beulah Mathis CMA         COPD (chronic obstructive pulmonary disease) (H) 10/06/2014     Priority: Medium     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) 07/07/2014     Priority: Medium     Cause TBD.  Patient has had lung CT, will see Dr Gómez for consideration of further workup.  Also has pulmonology appt 8/18.  5/2/2018:Reviewing chart she has had hyponatremia since 2010.        Positive fecal occult blood test 07/07/2014     Priority: Medium     x2 -- first was in ED.  Patient expresses that she does not want colonoscopy.  She'll set  appt to discuss with her PCP.       Benign essential HTN 02/11/2014     Priority: Medium     BPPV (benign paroxysmal positional vertigo) 11/05/2013     Priority: Medium     History of skin cancer 05/07/2013     Priority: Medium     Recurrent UTI 07/16/2012     Priority: Medium     recurrent UTI  Recent Urologic workup neg, 2005  Has Cipro at home for treatment as needed       Hypothyroidism 05/07/2012     Priority: Medium     Squamous cell carcinoma in situ of skin of face 04/19/2012     Priority: Medium     SK (seborrheic keratosis) 04/19/2012     Priority: Medium     Intermittent atrial fibrillation (H) 12/01/2011     Priority: Medium     Cervical vertebral fracture (H) 11/20/2011     Priority: Medium     Anxiety 11/15/2011     Priority: Medium     DVT (deep venous thrombosis) 10/12/2011     Priority: Medium     H/o in past, on current coumadin therapy.       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 Provider to review       Right arm weakness 07/29/2011     Priority: Medium     Ulnar neuropathy 07/29/2011     Priority: Medium     Health Care Home 04/21/2011     Priority: Medium     Conchis Robles [phone]  [facility]              DX V65.8 REPLACED WITH [id number] HEALTH CARE HOME (04/08/2013)       Chronic constipation 03/03/2011     Priority: Medium     Osteoporosis 03/03/2011     Priority: Medium     Hyperlipidemia LDL goal <130 10/31/2010     Priority: Medium     Infectious colitis, enteritis and gastroenteritis 07/10/2010     Priority: Medium     Chronic allergic conjunctivitis 11/18/2008     Priority: Medium     Chronic seasonal allergic rhinitis 11/18/2008     Priority: Medium     Esophageal reflux 11/29/2007     Priority: Medium     PERSONAL HISTORY OF MALIGNANCY- BREAST 06/13/2007     Priority: Medium     Sensorineural hearing loss, asymmetrical 06/20/2005      Priority: Medium     Generalized osteoarthrosis, unspecified site 06/20/2005     Priority: Medium     Right hip and knee are the most symptomatic          Past Medical History:    Past Medical History:   Diagnosis Date     Basal cell carcinoma      Breast cancer (H)      COPD (chronic obstructive pulmonary disease) (H)      Dust allergy      DVT (deep vein thrombosis) in pregnancy      HTN (hypertension)      Hyperlipidemia LDL goal <130 10/31/2010     Hyponatremia      Hypothyroid      Intermittent atrial fibrillation (H) 12/1/2011     Loose body in joint 4/15/2011     Neck fracture (H)      Syncope 5/12/2013       Past Surgical History:    Past Surgical History:   Procedure Laterality Date     APPENDECTOMY       ARTHROSCOPY KNEE  4/15/2011    Procedure:ARTHROSCOPY KNEE; removal of loose body; Surgeon:LEY, JEFFREY DUANE; Location:[facility]     CHOLECYSTECTOMY       ESOPHAGOSCOPY, GASTROSCOPY, DUODENOSCOPY (EGD), COMBINED  6/9/2014    Procedure: COMBINED ESOPHAGOSCOPY, GASTROSCOPY, DUODENOSCOPY (EGD);  Surgeon: Gilberto Richard MD;  Location: [facility]     ESOPHAGOSCOPY, GASTROSCOPY, DUODENOSCOPY (EGD), COMBINED N/A 10/18/2019    Procedure: ESOPHAGOGASTRODUODENOSCOPY (EGD);  Surgeon: Noe Sams DO;  Location: [facility]     IMPLANT PACEMAKER  5/21/2013    Biotronik Moderl [id number] Serial#[id number]     INJECT EPIDURAL LUMBAR  3/23/2011    INJECT EPIDURAL LUMBAR performed by GENERIC ANESTHESIA PROVIDER at [facility] OR     JOINT REPLACEMENT, HIP RT/LT      Joint Replacement Hip Rt     MASTECTOMY, SIMPLE RT/LT/CAITLYN      Left breast - following breast ca     OTHER SURGICAL HISTORY      C1-C2 fusion after fx      SURGICAL HISTORY OF -   05/22/2001    Colonoscopy     TONSILLECTOMY         Family History:    Family History   Problem Relation Age of Onset     Cerebrovascular Disease Mother      Heart Disease Mother         MI     Cerebrovascular Disease Father      Heart Disease Father         MI     Respiratory Maternal Grandfather      "    TB     Neurologic Disorder Brother         ALS     Heart Disease Brother      Cerebrovascular Disease Brother      Breast Cancer Daughter         age:49     Asthma Brother      Cancer Brother         brain and lung     Cancer Daughter         thyroid       Social History:  Marital Status:   [5]  Social History     Tobacco Use     Smoking status: Passive Smoke Exposure - Never Smoker     Smokeless tobacco: Never Used   Vaping Use     Vaping Use: Never used   Substance Use Topics     Alcohol use: No     Drug use: No        Medications:    azithromycin (ZITHROMAX Z-ADDY) 250 MG tablet  cefdinir (OMNICEF) 300 MG capsule  predniSONE (DELTASONE) 20 MG tablet  Acetaminophen (TYLENOL PO)  albuterol (PROAIR HFA) 108 (90 Base) MCG/ACT inhaler  albuterol (PROVENTIL) (2.5 MG/3ML) 0.083% neb solution  CRANBERRY  fluticasone-salmeterol (ADVAIR) 250-50 MCG/DOSE inhaler  ipratropium - albuterol 0.5 mg/2.5 mg/3 mL (DUONEB) 0.5-2.5 (3) MG/3ML neb solution  levofloxacin (LEVAQUIN) 500 MG tablet  levothyroxine (SYNTHROID/LEVOTHROID) 50 MCG tablet  metoprolol succinate ER (TOPROL-XL) 25 MG 24 hr tablet  mirtazapine (REMERON) 15 MG tablet  omeprazole (PRILOSEC) 40 MG DR capsule  ondansetron (ZOFRAN ODT) 4 MG ODT tab  order for DME  polyethylene glycol (MIRALAX/GLYCOLAX) Packet  probiotic CAPS  sodium chloride 0.9 % neb solution  sodium chloride 1 GM tablet  warfarin ANTICOAGULANT (COUMADIN) 1 MG tablet          Review of Systems   Constitutional: Negative.  Negative for fever.   HENT: Positive for congestion.    Respiratory: Positive for cough. Negative for shortness of breath.    Cardiovascular: Negative.  Negative for chest pain.   All other systems reviewed and are negative.      Physical Exam   BP: (!) 191/70  Pulse: 79  Temp: 98.2  F (36.8  C)  Resp: 20  Height: 147.3 cm (4' 10\")  Weight: 59 kg (130 lb)  SpO2: 93 %      Physical Exam  Constitutional:       General: She is not in acute distress.     Appearance: Normal " appearance. She is well-developed. She is not ill-appearing, toxic-appearing or diaphoretic.   HENT:      Head: Normocephalic and atraumatic.      Right Ear: Tympanic membrane, ear canal and external ear normal.      Left Ear: Tympanic membrane, ear canal and external ear normal.      Nose: Congestion present. No rhinorrhea.      Mouth/Throat:      Lips: Pink.      Mouth: Mucous membranes are moist.      Pharynx: Oropharynx is clear. Uvula midline. No pharyngeal swelling, oropharyngeal exudate, posterior oropharyngeal erythema or uvula swelling.      Tonsils: No tonsillar exudate or tonsillar abscesses.   Eyes:      Extraocular Movements: Extraocular movements intact.      Conjunctiva/sclera: Conjunctivae normal.      Pupils: Pupils are equal, round, and reactive to light.   Cardiovascular:      Rate and Rhythm: Normal rate and regular rhythm.      Heart sounds: Normal heart sounds.   Pulmonary:      Effort: Pulmonary effort is normal. No respiratory distress.      Breath sounds: No stridor. Wheezing (Mild diffuse expiratory wheezing throughout lung fields) present. No rhonchi or rales.   Musculoskeletal:         General: Normal range of motion.      Cervical back: Full passive range of motion without pain, normal range of motion and neck supple. No rigidity. Normal range of motion.   Lymphadenopathy:      Cervical: No cervical adenopathy.   Skin:     General: Skin is warm and dry.   Neurological:      General: No focal deficit present.      Mental Status: She is alert and oriented to person, place, and time.   Psychiatric:         Behavior: Behavior is cooperative.         ED Course                 Procedures    Results for orders placed or performed during the hospital encounter of 04/21/22 (from the past 24 hour(s))   XR Chest 2 Views    Narrative    EXAM: XR CHEST 2 VW  LOCATION: [facility]  DATE/TIME: 4/21/2022 7:09 PM    INDICATION: Cough.  COMPARISON: 2 view chest 3/24/2022 and CT  chest 3/3/2022      Impression    IMPRESSION: Left subclavian dual-chamber pacer unchanged. Heart size and vascularity are normal. Chronic right upper lobe volume loss with bronchiectasis redemonstrated. Scarring medial left upper lobe unchanged. No new focal consolidation nor pleural   effusion. Generalized osseous demineralization redemonstrated.     *Note: Due to a large number of results and/or encounters for the requested time period, some results have not been displayed. A complete set of results can be found in Results Review.       Medications - No data to display    Assessments & Plan (with Medical Decision Making)     Pt is a 89 year old female with history of DVT (on long-term anticoagulant therapy), atrial fibrillation, hypertension, SIADH, COPD, hypothyroidism who presents with complaints of cough and congestion for the past week.  Patient states she has a history of bronchitis and COPD and these symptoms feel similar.  She denies any associated shortness of breath or chest pain.  Denies fevers.  Patient denies any known ill contacts.  She is vaccinated against COVID-19 x2.  She states she typically gets antibiotics and improves on this.  Patient has been using her inhaler and nebulizer as prescribed.      Pt is afebrile on arrival.  Exam as above.  O2 sats of 93% on room air.  Patient is in no respiratory distress.  Chest x-ray appears unchanged with no new focal consolidation noted.  Chronic right upper lobe bronchiectasis redemonstrated.  Discussed results with patient and her daughter.  Return precautions were reviewed.  Hand-outs were provided.    Patient was sent with Azithromycin, Cefdinir (discussed bleeding risk with antibiotic use and Warfarin; she was instructed to follow her INR closely), Prednisone and was instructed to follow-up with PCP in 3-5 days for continued care and management or sooner if new or worsening symptoms.  She is to return to the ED for persistent and/or worsening  symptoms.  Patient expressed understanding of the diagnosis and plan and was discharged home in good condition.    I have reviewed the nursing notes.    I have reviewed the findings, diagnosis, plan and need for follow up with the patient.    Discharge Medication List as of 4/21/2022  8:01 PM      START taking these medications    Details   azithromycin (ZITHROMAX Z-ADDY) 250 MG tablet Two tablets on the first day, then one tablet daily for the next 4 days, Disp-6 tablet, R-0, E-Prescribe      cefdinir (OMNICEF) 300 MG capsule Take 1 capsule (300 mg) by mouth 2 times daily for 7 days, Disp-14 capsule, R-0, E-Prescribe      predniSONE (DELTASONE) 20 MG tablet Take 1 tablet (20 mg) by mouth daily for 5 days, Disp-5 tablet, R-0, E-Prescribe             Final diagnoses:   Acute bronchitis       4/21/2022   [facility] EMERGENCY DEPT      Disclaimer:  This note consists of symbols derived from keyboarding, dictation and/or voice recognition software.  As a result, there may be errors in the script that have gone undetected.  Please consider this when interpreting information found in this chart.     Kristina Perez PA-C  04/21/22 2040

## 2022-04-22 NOTE — PROGRESS NOTES
ANTICOAGULATION  MANAGEMENT: Discharge Review    Ellie Leigh chart reviewed for anticoagulation continuity of care    Emergency room visit on 4/21/22 for Bronchitis.    Discharge disposition: Home    Results:    Recent labs: (last 7 days)     04/19/22  1314   INR 2.8*     Anticoagulation inpatient management:     not applicable     Anticoagulation discharge instructions:     Warfarin dosing: home regimen continued   Bridging: No   INR goal change: No      Medication changes affecting anticoagulation: Yes: Azithromycin, Prednisone, Omnicef    Additional factors affecting anticoagulation: Yes: Bronchitis    Plan     No adjustment to anticoagulation plan needed    Left a detailed message for Joy    No adjustment to Anticoagulation Calendar was required    Ruiz Meredith RN

## 2022-04-24 ENCOUNTER — HEALTH MAINTENANCE LETTER (OUTPATIENT)
Age: 87
End: 2022-04-24

## 2022-04-27 ENCOUNTER — LAB (OUTPATIENT)
Dept: LAB | Facility: CLINIC | Age: 87
End: 2022-04-27
Payer: COMMERCIAL

## 2022-04-27 ENCOUNTER — ANTICOAGULATION THERAPY VISIT (OUTPATIENT)
Dept: ANTICOAGULATION | Facility: CLINIC | Age: 87
End: 2022-04-27

## 2022-04-27 ENCOUNTER — TELEPHONE (OUTPATIENT)
Dept: FAMILY MEDICINE | Facility: CLINIC | Age: 87
End: 2022-04-27

## 2022-04-27 DIAGNOSIS — I82.4Y9 DEEP VEIN THROMBOSIS (DVT) OF PROXIMAL LOWER EXTREMITY, UNSPECIFIED CHRONICITY, UNSPECIFIED LATERALITY (H): ICD-10-CM

## 2022-04-27 DIAGNOSIS — Z79.01 LONG TERM CURRENT USE OF ANTICOAGULANT THERAPY: ICD-10-CM

## 2022-04-27 DIAGNOSIS — I48.0 INTERMITTENT ATRIAL FIBRILLATION (H): ICD-10-CM

## 2022-04-27 DIAGNOSIS — I82.409 DVT (DEEP VENOUS THROMBOSIS) (H): Primary | ICD-10-CM

## 2022-04-27 LAB — INR BLD: 2.7 (ref 0.9–1.1)

## 2022-04-27 PROCEDURE — 36416 COLLJ CAPILLARY BLOOD SPEC: CPT

## 2022-04-27 PROCEDURE — 85610 PROTHROMBIN TIME: CPT

## 2022-04-27 NOTE — PROGRESS NOTES
ANTICOAGULATION MANAGEMENT     Ellie Leigh 89 year old female is on warfarin with therapeutic INR result. (Goal INR 2.0-3.0)    Recent labs: (last 7 days)     04/27/22  1435   INR 2.7*       ASSESSMENT       Source(s): Patient/Caregiver Call       Warfarin doses taken: Warfarin taken as instructed    Diet: No new diet changes identified    New illness, injury, or hospitalization: Yes: Still not feeling well.    Medication/supplement changes: None noted    Signs or symptoms of bleeding or clotting: No    Previous INR: Therapeutic last 2(+) visits    Additional findings: Completed abx and steroid on 4/26/22. Patient still does not feel well. She will see her PCP on 4/29/22. Writer placed reminder for this date to review if new medications are prescribed.       PLAN     Recommended plan for ongoing change(s) affecting INR     Dosing Instructions: continue your current warfarin dose with next INR in 2 days       Summary  As of 4/27/2022    Full warfarin instructions:  1.5 mg every Mon, Wed, Fri; 1 mg all other days   Next INR check:  4/29/2022             Telephone call with  Suyapa who verbalizes understanding and agrees to plan    Follow up after PCP visit on 4/29/22 to hermes next INR>    Education provided: Please call back if any changes to your diet, medications or how you've been taking warfarin, Importance of notifying clinic for changes in medications; a sooner lab recheck maybe needed., Importance of notifying clinic for diarrhea, nausea/vomiting, reduced intake, and/or illness; a sooner lab recheck maybe needed. and Contact [phone]  with any changes, questions or concerns.     Plan made per [facility] anticoagulation protocol    Ruiz Meredith RN  Anticoagulation Clinic  4/27/2022    _______________________________________________________________________     Anticoagulation Episode Summary     Current INR goal:  2.0-3.0   TTR:  79.5 % (11.9 mo)   Target end date:  Indefinite   Send INR reminders  to:  [facility]    Indications    Atrial fibrillation with rapid ventricular response (H) (Resolved) [I48.91]  DVT (deep venous thrombosis) [I82.409]  Long term current use of anticoagulant therapy [Z79.01]  Intermittent atrial fibrillation (H) [I48.0]  Deep vein thrombosis (DVT) of proximal lower extremity  unspecified chronicity  unspecified laterality (H) [I82.4Y9]           Comments:  Taking Celebrex PRN         Anticoagulation Care Providers     Provider Role Specialty Phone number    Anjum Vidales MD Referring Family Medicine [phone]

## 2022-04-27 NOTE — TELEPHONE ENCOUNTER
Reason for Call:  Other call back    Detailed comments: Patient called back as the wrong phone number was left when she called the first time, she prefers to be called on her cell phone [phone] or try [phone]     Phone Number Patient can be reached at: Cell number on file:    Telephone Information:   Mobile [phone]       Best Time: anytime - patient will be gone from 2:30-2:45    Can we leave a detailed message on this number? no voicemail set up    Call taken on 4/27/2022 at 10:50 AM by Patricia Schilling

## 2022-04-27 NOTE — TELEPHONE ENCOUNTER
Reason for Call:  Other appointment    Detailed comments: patient is not feeling better after urgent care visit-patient would like to be seen sooner than first available next week- wants primary care, not urgent care if possible    Phone Number Patient can be reached at: Other phone number:  [phone]    Best Time: any    Can we leave a detailed message on this number? NO VM AVAILABLE     Call taken on 4/27/2022 at 10:29 AM by Ruth Ann Crane

## 2022-04-28 NOTE — PROGRESS NOTES
ASSESSMENT:   (R05.9) Cough  (primary encounter diagnosis)  Comment: persistent cough.   Plan: levofloxacin (LEVAQUIN) 500 MG tablet        Take the .levofloxacin 500mg daily for 10 days.   Continue the Advair twice daily on a regular basis.   Continue the Duonebs three times daily at minimum every day.  OK to use every 4 hours if needed for cough, wheezing or shortness of breath.   Recheck if breathing worse.      (I48.0) Intermittent atrial fibrillation (H)  Comment: on chronic warfarin.   Plan: warfarin ANTICOAGULANT (COUMADIN) 1 MG tablet        levofloxaxin can alter INR>    Let anticoagulation clinic know about the new antibiotic to see if dosage adjustment is needed or earlier INR.         Subjective   Ellie is a 89 year old who presents for the following health issues  accompanied by her daughter.  Chief Complaint   Patient presents with     ER F/U      Last clinic visit: 3/16/22 for follow-up on pneumonia.  She had been seen in the emergency room treated with Omnicef and azithromycin.  3/24: Another clinic visit for respiratory symptoms and COPD exacerbation.  4/21: Emergency department visit for cough for 1 week with greenish phlegm.  She was treated with azithromycin and cefdinir.  Also prednisone.    She was seen by pulmonary with her last visit on October 8.  She had chronic bronchiectasis    ED/UC Followup:    Facility:  [facility] ED  Date of visit: 4/21/2022  Reason for visit: Acute Bronchitis  Current Status: Symptoms worsening, cough, shortness of breath with ADLs, sore throat started today, mild headaches, fatigue   She seemed to get better after infection in March.  Seemed to be doing OK for a few weeks.    She notes symptoms for the past couple weeks.   She coughs a lot.  She has brown phlegm.  Cough mostly disappeared between infections.   Has shortness of breath.  Worse than baseline.   Has had sore throat.   No fever in the past couple weeks.   She has finished azithromycin  and cefdinir.  Was on prednisone for 5 days-did not help much.     No better after ED visit.     She takes Advair on a regular basis twice daily.    Duonebs three times daily   Saline nebs three times daily.  Does not use albuteral inhaler much.      Patient Active Problem List   Diagnosis     Sensorineural hearing loss, asymmetrical     Generalized osteoarthrosis, unspecified site     PERSONAL HISTORY OF MALIGNANCY- BREAST     Esophageal reflux     Chronic allergic conjunctivitis     Chronic seasonal allergic rhinitis     Hyperlipidemia LDL goal <130     Chronic constipation     Osteoporosis     Health Care Home     Right arm weakness     Ulnar neuropathy     Headache     Mild major depression     DVT (deep venous thrombosis)     Anxiety     Cervical vertebral fracture (H)     Intermittent atrial fibrillation (H)     Squamous cell carcinoma in situ of skin of face     SK (seborrheic keratosis)     Hypothyroidism     Recurrent UTI     History of skin cancer     BPPV (benign paroxysmal positional vertigo)     Benign essential HTN     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)     Positive fecal occult blood test     COPD (chronic obstructive pulmonary disease) (H)     Infectious colitis, enteritis and gastroenteritis     Advance Care Planning     Syncope     Long term current use of anticoagulant therapy     Mild persistent asthma without complication     Irritable bowel syndrome without diarrhea     Nausea     Back pain     H/O TB (tuberculosis)     Acute respiratory failure with hypoxia (H)     COPD exacerbation (H)     Cardiac pacemaker in situ     Deep vein thrombosis (DVT) of proximal lower extremity, unspecified chronicity, unspecified laterality (H)      ROS:  Chest pain this AM in throat to chest for 5 minutes.   Some stomach upset.   No other health concerns today.   Some chronic rhinorrhea and congestion.   No ear pain.     OBJECTIVE:Blood pressure 116/58, pulse 56, temperature 98.2  F (36.8  C),  "temperature source Tympanic, resp. rate 16, height 1.473 m (4' 10\"), weight 59.9 kg (132 lb), last menstrual period 06/15/1985, SpO2 95 %, not currently breastfeeding. BMI=Body mass index is 27.59 kg/m .  GENERAL APPEARANCE ADULT: Alert, no acute distress, walks with a walker  EYES: PERRL, EOM normal, conjunctiva and lids normal  HENT: Ears and TMs normal, oral mucosa and posterior oropharynx normal  NECK: No adenopathy,masses or thyromegaly  RESP: lungs clear to auscultation   CV: normal rate, regular rhythm, no murmur or gallop, grade 2/6 systolic murmur heard best over the LSB     "

## 2022-04-29 ENCOUNTER — DOCUMENTATION ONLY (OUTPATIENT)
Dept: ANTICOAGULATION | Facility: CLINIC | Age: 87
End: 2022-04-29

## 2022-04-29 ENCOUNTER — OFFICE VISIT (OUTPATIENT)
Dept: FAMILY MEDICINE | Facility: CLINIC | Age: 87
End: 2022-04-29
Payer: COMMERCIAL

## 2022-04-29 ENCOUNTER — TELEPHONE (OUTPATIENT)
Dept: FAMILY MEDICINE | Facility: CLINIC | Age: 87
End: 2022-04-29

## 2022-04-29 VITALS
BODY MASS INDEX: 27.71 KG/M2 | RESPIRATION RATE: 16 BRPM | OXYGEN SATURATION: 95 % | HEART RATE: 56 BPM | DIASTOLIC BLOOD PRESSURE: 58 MMHG | HEIGHT: 58 IN | WEIGHT: 132 LBS | SYSTOLIC BLOOD PRESSURE: 116 MMHG | TEMPERATURE: 98.2 F

## 2022-04-29 DIAGNOSIS — I48.0 INTERMITTENT ATRIAL FIBRILLATION (H): ICD-10-CM

## 2022-04-29 DIAGNOSIS — R05.9 COUGH: Primary | ICD-10-CM

## 2022-04-29 PROCEDURE — 99213 OFFICE O/P EST LOW 20 MIN: CPT | Performed by: FAMILY MEDICINE

## 2022-04-29 RX ORDER — WARFARIN SODIUM 1 MG/1
TABLET ORAL
Qty: 110 TABLET | Refills: 3 | Status: SHIPPED | OUTPATIENT
Start: 2022-04-29 | End: 2023-01-01

## 2022-04-29 RX ORDER — LEVOFLOXACIN 500 MG/1
500 TABLET, FILM COATED ORAL DAILY
Qty: 10 TABLET | Refills: 0 | Status: SHIPPED | OUTPATIENT
Start: 2022-04-29 | End: 2022-05-09

## 2022-04-29 ASSESSMENT — ANXIETY QUESTIONNAIRES
2. NOT BEING ABLE TO STOP OR CONTROL WORRYING: NOT AT ALL
GAD7 TOTAL SCORE: 0
7. FEELING AFRAID AS IF SOMETHING AWFUL MIGHT HAPPEN: NOT AT ALL
7. FEELING AFRAID AS IF SOMETHING AWFUL MIGHT HAPPEN: NOT AT ALL
6. BECOMING EASILY ANNOYED OR IRRITABLE: NOT AT ALL
GAD7 TOTAL SCORE: 0
4. TROUBLE RELAXING: NOT AT ALL
3. WORRYING TOO MUCH ABOUT DIFFERENT THINGS: NOT AT ALL
1. FEELING NERVOUS, ANXIOUS, OR ON EDGE: NOT AT ALL
5. BEING SO RESTLESS THAT IT IS HARD TO SIT STILL: NOT AT ALL
GAD7 TOTAL SCORE: 0

## 2022-04-29 ASSESSMENT — PAIN SCALES - GENERAL: PAINLEVEL: NO PAIN (0)

## 2022-04-29 ASSESSMENT — PATIENT HEALTH QUESTIONNAIRE - PHQ9
SUM OF ALL RESPONSES TO PHQ QUESTIONS 1-9: 3
SUM OF ALL RESPONSES TO PHQ QUESTIONS 1-9: 3
10. IF YOU CHECKED OFF ANY PROBLEMS, HOW DIFFICULT HAVE THESE PROBLEMS MADE IT FOR YOU TO DO YOUR WORK, TAKE CARE OF THINGS AT HOME, OR GET ALONG WITH OTHER PEOPLE: NOT DIFFICULT AT ALL

## 2022-04-29 NOTE — TELEPHONE ENCOUNTER
Reason for Call:  Other    Detailed comments: [facility] calling because they received prescription refill request for Warfarin for pt that was sent today, but no directions were included on request. Please call to verify directions.    Phone Number can be reached at: Other phone number:  [phone]  [id number] - [place], [place]     Best Time: anytime    Can we leave a detailed message on this number? YES    Call taken on 4/29/2022 at 12:23 PM by Valencia Pena

## 2022-04-29 NOTE — PATIENT INSTRUCTIONS
ASSESSMENT:   (R05.9) Cough  (primary encounter diagnosis)  Comment: persistent cough.   Plan: levofloxacin (LEVAQUIN) 500 MG tablet        Take the .levofloxacin 500mg daily for 10 days.   Continue the Advair twice daily on a regular basis.   Continue the Duonebs three times daily at minimum every day.  OK to use every 4 hours if needed for cough, wheezing or shortness of breath.   Recheck if breathing worse.      (I48.0) Intermittent atrial fibrillation (H)  Comment: on chronic warfarin.   Plan: warfarin ANTICOAGULANT (COUMADIN) 1 MG tablet        levofloxaxin can alter INR>    Let anticoagulation clinic know about the new antibiotic to see if dosage adjustment is needed or earlier INR.

## 2022-04-29 NOTE — PROGRESS NOTES
ANTICOAGULATION  MANAGEMENT     Interacting Medication Review    Interacting medication(s): Levofloxacin (Levaquin) with warfarin.    Duration: 10 days  (4/29 to 5/9)    Indication: Acute bronchitis.    New medication?: Yes, interaction may increase INR and risk of bleeding       PLAN     Continue current warfarin dose. Recommend to check INR on 5/6/22    Spoke with Suyapa    No adjustment to Anticoagulation Calendar was required    Ruiz Meredith RN

## 2022-04-29 NOTE — TELEPHONE ENCOUNTER
Noted. Thank you.    Ruiz Meredith RN, BSN, PHN  Anticoagulation Clinic   [place] # [id number]  [phone]

## 2022-04-29 NOTE — TELEPHONE ENCOUNTER
Called [facility] pharmacy and talked to Pharmacist Shelby, and verified Warfarin: instructions1.5 mg every Mon, Wed, Fri; 1 mg all other days. They stated that they just wanted to make sure it wasn't changing due to being on antibiotics.

## 2022-04-30 ASSESSMENT — ANXIETY QUESTIONNAIRES: GAD7 TOTAL SCORE: 0

## 2022-05-01 ENCOUNTER — ANCILLARY PROCEDURE (OUTPATIENT)
Dept: CARDIOLOGY | Facility: CLINIC | Age: 87
End: 2022-05-01
Attending: INTERNAL MEDICINE
Payer: COMMERCIAL

## 2022-05-01 DIAGNOSIS — I49.5 TACHY-BRADY SYNDROME (H): ICD-10-CM

## 2022-05-01 DIAGNOSIS — R55 SYNCOPE: ICD-10-CM

## 2022-05-01 DIAGNOSIS — Z95.0 CARDIAC PACEMAKER IN SITU: ICD-10-CM

## 2022-05-01 PROCEDURE — 93294 REM INTERROG EVL PM/LDLS PM: CPT | Performed by: INTERNAL MEDICINE

## 2022-05-01 PROCEDURE — 93296 REM INTERROG EVL PM/IDS: CPT | Performed by: INTERNAL MEDICINE

## 2022-05-05 LAB
MDC_IDC_LEAD_IMPLANT_DT: NORMAL
MDC_IDC_LEAD_IMPLANT_DT: NORMAL
MDC_IDC_LEAD_LOCATION: NORMAL
MDC_IDC_LEAD_LOCATION: NORMAL
MDC_IDC_LEAD_LOCATION_DETAIL_1: NORMAL
MDC_IDC_LEAD_LOCATION_DETAIL_1: NORMAL
MDC_IDC_LEAD_MFG: NORMAL
MDC_IDC_LEAD_MFG: NORMAL
MDC_IDC_LEAD_MODEL: NORMAL
MDC_IDC_LEAD_MODEL: NORMAL
MDC_IDC_LEAD_POLARITY_TYPE: NORMAL
MDC_IDC_LEAD_POLARITY_TYPE: NORMAL
MDC_IDC_LEAD_SERIAL: NORMAL
MDC_IDC_LEAD_SERIAL: NORMAL
MDC_IDC_MSMT_BATTERY_REMAINING_PERCENTAGE: 45 %
MDC_IDC_MSMT_BATTERY_STATUS: NORMAL
MDC_IDC_MSMT_LEADCHNL_RA_IMPEDANCE_VALUE: 375 OHM
MDC_IDC_MSMT_LEADCHNL_RA_LEAD_CHANNEL_STATUS: NORMAL
MDC_IDC_MSMT_LEADCHNL_RA_PACING_THRESHOLD_AMPLITUDE: 0.8 V
MDC_IDC_MSMT_LEADCHNL_RA_PACING_THRESHOLD_PULSEWIDTH: 0.4 MS
MDC_IDC_MSMT_LEADCHNL_RV_IMPEDANCE_VALUE: 456 OHM
MDC_IDC_MSMT_LEADCHNL_RV_LEAD_CHANNEL_STATUS: NORMAL
MDC_IDC_MSMT_LEADCHNL_RV_PACING_THRESHOLD_AMPLITUDE: 0.6 V
MDC_IDC_MSMT_LEADCHNL_RV_PACING_THRESHOLD_PULSEWIDTH: 0.4 MS
MDC_IDC_PG_IMPLANT_DTM: NORMAL
MDC_IDC_PG_MFG: NORMAL
MDC_IDC_PG_MODEL: NORMAL
MDC_IDC_PG_SERIAL: NORMAL
MDC_IDC_PG_TYPE: NORMAL
MDC_IDC_SESS_CLINIC_NAME: NORMAL
MDC_IDC_SESS_DTM: NORMAL
MDC_IDC_SESS_REPROGRAMMED: NO
MDC_IDC_SESS_TYPE: NORMAL
MDC_IDC_SET_BRADY_AT_MODE_SWITCH_MODE: NORMAL
MDC_IDC_SET_BRADY_AT_MODE_SWITCH_RATE: 160 {BEATS}/MIN
MDC_IDC_SET_BRADY_LOWRATE: 60 {BEATS}/MIN
MDC_IDC_SET_BRADY_MAX_SENSOR_RATE: 125 {BEATS}/MIN
MDC_IDC_SET_BRADY_MAX_TRACKING_RATE: 130 {BEATS}/MIN
MDC_IDC_SET_BRADY_MODE: NORMAL
MDC_IDC_SET_BRADY_PAV_DELAY_HIGH: 150 MS
MDC_IDC_SET_BRADY_PAV_DELAY_LOW: 180 MS
MDC_IDC_SET_BRADY_SAV_DELAY_HIGH: 105 MS
MDC_IDC_SET_BRADY_SAV_DELAY_LOW: 135 MS
MDC_IDC_SET_LEADCHNL_RA_PACING_POLARITY: NORMAL
MDC_IDC_SET_LEADCHNL_RA_PACING_PULSEWIDTH: 0.4 MS
MDC_IDC_SET_LEADCHNL_RA_SENSING_ADAPTATION_MODE: NORMAL
MDC_IDC_SET_LEADCHNL_RA_SENSING_POLARITY: NORMAL
MDC_IDC_SET_LEADCHNL_RV_PACING_POLARITY: NORMAL
MDC_IDC_SET_LEADCHNL_RV_PACING_PULSEWIDTH: 0.4 MS
MDC_IDC_SET_LEADCHNL_RV_SENSING_ADAPTATION_MODE: NORMAL
MDC_IDC_SET_LEADCHNL_RV_SENSING_POLARITY: NORMAL
MDC_IDC_STAT_AT_BURDEN_PERCENT: 0 %
MDC_IDC_STAT_AT_DTM_END: NORMAL
MDC_IDC_STAT_AT_DTM_START: NORMAL
MDC_IDC_STAT_AT_MODE_SW_COUNT_PER_DAY: 0
MDC_IDC_STAT_AT_MODE_SW_PERCENT_TIME_PER_DAY: 0 %
MDC_IDC_STAT_BRADY_AP_VP_PERCENT: 0 %
MDC_IDC_STAT_BRADY_AP_VS_PERCENT: 43 %
MDC_IDC_STAT_BRADY_AS_VP_PERCENT: 0 %
MDC_IDC_STAT_BRADY_AS_VS_PERCENT: 57 %
MDC_IDC_STAT_BRADY_DTM_END: NORMAL
MDC_IDC_STAT_BRADY_DTM_START: NORMAL
MDC_IDC_STAT_BRADY_RA_PERCENT_PACED: 43 %
MDC_IDC_STAT_BRADY_RV_PERCENT_PACED: 0 %
MDC_IDC_STAT_CRT_DTM_END: NORMAL
MDC_IDC_STAT_CRT_DTM_START: NORMAL

## 2022-05-06 ENCOUNTER — LAB (OUTPATIENT)
Dept: LAB | Facility: CLINIC | Age: 87
End: 2022-05-06
Payer: COMMERCIAL

## 2022-05-06 ENCOUNTER — ANTICOAGULATION THERAPY VISIT (OUTPATIENT)
Dept: ANTICOAGULATION | Facility: CLINIC | Age: 87
End: 2022-05-06

## 2022-05-06 DIAGNOSIS — I48.0 INTERMITTENT ATRIAL FIBRILLATION (H): ICD-10-CM

## 2022-05-06 DIAGNOSIS — Z79.01 LONG TERM CURRENT USE OF ANTICOAGULANT THERAPY: ICD-10-CM

## 2022-05-06 DIAGNOSIS — I82.409 DVT (DEEP VENOUS THROMBOSIS) (H): Primary | ICD-10-CM

## 2022-05-06 DIAGNOSIS — I82.4Y9 DEEP VEIN THROMBOSIS (DVT) OF PROXIMAL LOWER EXTREMITY, UNSPECIFIED CHRONICITY, UNSPECIFIED LATERALITY (H): ICD-10-CM

## 2022-05-06 LAB — INR BLD: 2.7 (ref 0.9–1.1)

## 2022-05-06 PROCEDURE — 36416 COLLJ CAPILLARY BLOOD SPEC: CPT

## 2022-05-06 PROCEDURE — 85610 PROTHROMBIN TIME: CPT

## 2022-05-06 NOTE — PROGRESS NOTES
ANTICOAGULATION MANAGEMENT     Ellie Leigh 89 year old female is on warfarin with therapeutic INR result. (Goal INR 2.0-3.0)    Recent labs: (last 7 days)     05/06/22  1418   INR 2.7*       ASSESSMENT       Source(s): Patient/Caregiver Call       Warfarin doses taken: Warfarin taken as instructed    Diet: No new diet changes identified    New illness, injury, or hospitalization: No    Medication/supplement changes: Levaquin stopped on 5/9/22 has not had any interaction on the patient's INR.    Signs or symptoms of bleeding or clotting: No    Previous INR: Therapeutic last 2(+) visits    Additional findings: None       PLAN     Recommended plan for temporary change(s) affecting INR     Dosing Instructions: continue your current warfarin dose with next INR in 2 weeks       Summary  As of 5/6/2022    Full warfarin instructions:  1.5 mg every Mon, Wed, Fri; 1 mg all other days   Next INR check:  5/19/2022             Telephone call with Jennifer who verbalizes understanding and agrees to plan    Lab visit scheduled    Education provided: Please call back if any changes to your diet, medications or how you've been taking warfarin, Importance of notifying clinic for changes in medications; a sooner lab recheck maybe needed., Importance of notifying clinic for diarrhea, nausea/vomiting, reduced intake, and/or illness; a sooner lab recheck maybe needed. and Contact [phone]  with any changes, questions or concerns.     Plan made per [facility] anticoagulation protocol    Ruiz Meredith RN  Anticoagulation Clinic  5/6/2022    _______________________________________________________________________     Anticoagulation Episode Summary     Current INR goal:  2.0-3.0   TTR:  80.8 % (11.9 mo)   Target end date:  Indefinite   Send INR reminders to:  GARRY BARRY    Indications    Atrial fibrillation with rapid ventricular response (H) (Resolved) [I48.91]  DVT (deep venous thrombosis) [I82.409]  Long term  current use of anticoagulant therapy [Z79.01]  Intermittent atrial fibrillation (H) [I48.0]  Deep vein thrombosis (DVT) of proximal lower extremity  unspecified chronicity  unspecified laterality (H) [I82.4Y9]           Comments:  Taking Celebrex PRN         Anticoagulation Care Providers     Provider Role Specialty Phone number    Anjum Vidales MD Referring Family Medicine [phone]

## 2022-05-17 NOTE — TELEPHONE ENCOUNTER
Reason for Call: Request for an order or referral:    Order or referral being requested: Daughter, Luz Maria says she was with her mom when she saw Dr Vidales last week and they talked about changing her Coumadin to Eloquis. She says her mom would like to to that so would like that called in.      Has the patient been seen by the PCP for this problem? YES    Additional comments: WIDIP    Phone number Patient can be reached at:  Luz Maria  [phone]    Best Time:  Anytime    Can we leave a detailed message on this number?  YES    Call taken on 3/19/2018 at 8:12 AM by Ninoska Song     Cardiac

## 2022-05-19 ENCOUNTER — ANTICOAGULATION THERAPY VISIT (OUTPATIENT)
Dept: ANTICOAGULATION | Facility: CLINIC | Age: 87
End: 2022-05-19

## 2022-05-19 ENCOUNTER — LAB (OUTPATIENT)
Dept: LAB | Facility: CLINIC | Age: 87
End: 2022-05-19
Payer: COMMERCIAL

## 2022-05-19 DIAGNOSIS — I48.0 INTERMITTENT ATRIAL FIBRILLATION (H): ICD-10-CM

## 2022-05-19 DIAGNOSIS — I82.4Y9 DEEP VEIN THROMBOSIS (DVT) OF PROXIMAL LOWER EXTREMITY, UNSPECIFIED CHRONICITY, UNSPECIFIED LATERALITY (H): ICD-10-CM

## 2022-05-19 DIAGNOSIS — I82.409 DVT (DEEP VENOUS THROMBOSIS) (H): Primary | ICD-10-CM

## 2022-05-19 DIAGNOSIS — Z79.01 LONG TERM CURRENT USE OF ANTICOAGULANT THERAPY: ICD-10-CM

## 2022-05-19 LAB — INR BLD: 3.4 (ref 0.9–1.1)

## 2022-05-19 PROCEDURE — 36416 COLLJ CAPILLARY BLOOD SPEC: CPT

## 2022-05-19 PROCEDURE — 85610 PROTHROMBIN TIME: CPT

## 2022-05-19 NOTE — PROGRESS NOTES
ANTICOAGULATION MANAGEMENT     Ellie Leigh 89 year old female is on warfarin with supratherapeutic INR result. (Goal INR 2.0-3.0)    Recent labs: (last 7 days)     05/19/22  1124   INR 3.4*       ASSESSMENT       Source(s): Chart Review and Patient/Caregiver Call       Warfarin doses taken: Warfarin taken as instructed    Diet: Decreased greens/vitamin K in diet; plans to resume previous intake    New illness, injury, or hospitalization: No    Medication/supplement changes: None noted    Signs or symptoms of bleeding or clotting: No    Previous INR: Therapeutic last 2(+) visits    Additional findings: None     PLAN     Recommended plan for temporary change(s) affecting INR     Dosing Instructions: hold dose then continue your current warfarin dose with next INR in 2 weeks       Summary  As of 5/19/2022    Full warfarin instructions:  5/19: Hold; Otherwise 1.5 mg every Mon, Wed, Fri; 1 mg all other days   Next INR check:  6/2/2022             Telephone call with  Suyapa who verbalizes understanding and agrees to plan    Lab visit scheduled    Education provided: Please call back if any changes to your diet, medications or how you've been taking warfarin and Monitoring for bleeding signs and symptoms    Plan made per [facility] anticoagulation protocol    Kylah Dorsey RN  Anticoagulation Clinic  5/19/2022    _______________________________________________________________________     Anticoagulation Episode Summary     Current INR goal:  2.0-3.0   TTR:  82.3 % (11.9 mo)   Target end date:  Indefinite   Send INR reminders to:  [facility]    Indications    Atrial fibrillation with rapid ventricular response (H) (Resolved) [I48.91]  DVT (deep venous thrombosis) [I82.409]  Long term current use of anticoagulant therapy [Z79.01]  Intermittent atrial fibrillation (H) [I48.0]  Deep vein thrombosis (DVT) of proximal lower extremity  unspecified chronicity  unspecified laterality (H) [I82.4Y9]           Comments:   Taking Celebrex PRN         Anticoagulation Care Providers     Provider Role Specialty Phone number    Anjum Vidales MD Referring Family Medicine [phone]

## 2022-05-27 ENCOUNTER — TRANSFERRED RECORDS (OUTPATIENT)
Dept: HEALTH INFORMATION MANAGEMENT | Facility: CLINIC | Age: 87
End: 2022-05-27

## 2022-06-02 ENCOUNTER — LAB (OUTPATIENT)
Dept: LAB | Facility: CLINIC | Age: 87
End: 2022-06-02
Payer: COMMERCIAL

## 2022-06-02 ENCOUNTER — ANTICOAGULATION THERAPY VISIT (OUTPATIENT)
Dept: ANTICOAGULATION | Facility: CLINIC | Age: 87
End: 2022-06-02

## 2022-06-02 DIAGNOSIS — I48.0 INTERMITTENT ATRIAL FIBRILLATION (H): ICD-10-CM

## 2022-06-02 DIAGNOSIS — I82.409 DVT (DEEP VENOUS THROMBOSIS) (H): Primary | ICD-10-CM

## 2022-06-02 DIAGNOSIS — Z79.01 LONG TERM CURRENT USE OF ANTICOAGULANT THERAPY: ICD-10-CM

## 2022-06-02 DIAGNOSIS — I82.4Y9 DEEP VEIN THROMBOSIS (DVT) OF PROXIMAL LOWER EXTREMITY, UNSPECIFIED CHRONICITY, UNSPECIFIED LATERALITY (H): ICD-10-CM

## 2022-06-02 LAB — INR BLD: 3.2 (ref 0.9–1.1)

## 2022-06-02 PROCEDURE — 85610 PROTHROMBIN TIME: CPT

## 2022-06-02 PROCEDURE — 36416 COLLJ CAPILLARY BLOOD SPEC: CPT

## 2022-06-02 NOTE — PROGRESS NOTES
ANTICOAGULATION MANAGEMENT     Ellie Leigh 89 year old female is on warfarin with supratherapeutic INR result. (Goal INR 2.0-3.0)    Recent labs: (last 7 days)     06/02/22  1510   INR 3.2*       ASSESSMENT       Source(s): Chart Review and Patient/Caregiver Call       Warfarin doses taken: Warfarin taken as instructed    Diet: No new diet changes identified    New illness, injury, or hospitalization: No    Medication/supplement changes: None noted    Signs or symptoms of bleeding or clotting: No    Previous INR: Supratherapeutic    Additional findings: None     PLAN     Recommended plan for no diet, medication or health factor changes affecting INR     Dosing Instructions: decrease your warfarin dose (5.9% change) with next INR in 2 weeks       Summary  As of 6/2/2022    Full warfarin instructions:  1.5 mg every Mon, Fri; 1 mg all other days   Next INR check:  6/10/2022             Telephone call with  Suyapa who verbalizes understanding and agrees to plan    Lab visit scheduled    Education provided: Please call back if any changes to your diet, medications or how you've been taking warfarin and Monitoring for bleeding signs and symptoms    Plan made per [facility] anticoagulation protocol    Kylah Dorsey RN  Anticoagulation Clinic  6/2/2022    _______________________________________________________________________     Anticoagulation Episode Summary     Current INR goal:  2.0-3.0   TTR:  80.4 % (11.9 mo)   Target end date:  Indefinite   Send INR reminders to:  [facility]    Indications    Atrial fibrillation with rapid ventricular response (H) (Resolved) [I48.91]  DVT (deep venous thrombosis) [I82.409]  Long term current use of anticoagulant therapy [Z79.01]  Intermittent atrial fibrillation (H) [I48.0]  Deep vein thrombosis (DVT) of proximal lower extremity  unspecified chronicity  unspecified laterality (H) [I82.4Y9]           Comments:  Taking Celebrex PRN         Anticoagulation Care Providers      Provider Role Specialty Phone number    Anjum Vidales MD Referring Family Medicine [phone]

## 2022-06-08 ENCOUNTER — HOSPITAL ENCOUNTER (EMERGENCY)
Facility: CLINIC | Age: 87
Discharge: HOME | End: 2022-06-08
Attending: FAMILY MEDICINE | Admitting: FAMILY MEDICINE
Payer: COMMERCIAL

## 2022-06-08 ENCOUNTER — APPOINTMENT (OUTPATIENT)
Dept: GENERAL RADIOLOGY | Facility: CLINIC | Age: 87
End: 2022-06-08
Attending: FAMILY MEDICINE
Payer: COMMERCIAL

## 2022-06-08 ENCOUNTER — DOCUMENTATION ONLY (OUTPATIENT)
Dept: ANTICOAGULATION | Facility: CLINIC | Age: 87
End: 2022-06-08

## 2022-06-08 VITALS
HEART RATE: 72 BPM | OXYGEN SATURATION: 92 % | DIASTOLIC BLOOD PRESSURE: 49 MMHG | BODY MASS INDEX: 27.71 KG/M2 | WEIGHT: 132 LBS | HEIGHT: 58 IN | TEMPERATURE: 98.5 F | RESPIRATION RATE: 23 BRPM | SYSTOLIC BLOOD PRESSURE: 107 MMHG

## 2022-06-08 DIAGNOSIS — U07.1 INFECTION DUE TO 2019 NOVEL CORONAVIRUS: ICD-10-CM

## 2022-06-08 LAB
ALBUMIN SERPL-MCNC: 3.2 G/DL (ref 3.4–5)
ALP SERPL-CCNC: 67 U/L (ref 40–150)
ALT SERPL W P-5'-P-CCNC: 17 U/L (ref 0–50)
ANION GAP SERPL CALCULATED.3IONS-SCNC: 7 MMOL/L (ref 3–14)
AST SERPL W P-5'-P-CCNC: 24 U/L (ref 0–45)
BASOPHILS # BLD AUTO: 0 10E3/UL (ref 0–0.2)
BASOPHILS NFR BLD AUTO: 1 %
BILIRUB SERPL-MCNC: 0.2 MG/DL (ref 0.2–1.3)
BUN SERPL-MCNC: 23 MG/DL (ref 7–30)
CALCIUM SERPL-MCNC: 8.3 MG/DL (ref 8.5–10.1)
CHLORIDE BLD-SCNC: 103 MMOL/L (ref 94–109)
CO2 SERPL-SCNC: 27 MMOL/L (ref 20–32)
CREAT SERPL-MCNC: 0.82 MG/DL (ref 0.52–1.04)
EOSINOPHIL # BLD AUTO: 0 10E3/UL (ref 0–0.7)
EOSINOPHIL NFR BLD AUTO: 0 %
ERYTHROCYTE [DISTWIDTH] IN BLOOD BY AUTOMATED COUNT: 14 % (ref 10–15)
FLUAV RNA SPEC QL NAA+PROBE: NEGATIVE
FLUBV RNA RESP QL NAA+PROBE: NEGATIVE
GFR SERPL CREATININE-BSD FRML MDRD: 67 ML/MIN/1.73M2
GLUCOSE BLD-MCNC: 124 MG/DL (ref 70–99)
GLUCOSE BLDC GLUCOMTR-MCNC: 122 MG/DL (ref 70–99)
HCT VFR BLD AUTO: 33.6 % (ref 35–47)
HGB BLD-MCNC: 10.6 G/DL (ref 11.7–15.7)
HOLD SPECIMEN: NORMAL
IMM GRANULOCYTES # BLD: 0 10E3/UL
IMM GRANULOCYTES NFR BLD: 0 %
LYMPHOCYTES # BLD AUTO: 1.3 10E3/UL (ref 0.8–5.3)
LYMPHOCYTES NFR BLD AUTO: 24 %
MCH RBC QN AUTO: 29.3 PG (ref 26.5–33)
MCHC RBC AUTO-ENTMCNC: 31.5 G/DL (ref 31.5–36.5)
MCV RBC AUTO: 93 FL (ref 78–100)
MONOCYTES # BLD AUTO: 0.8 10E3/UL (ref 0–1.3)
MONOCYTES NFR BLD AUTO: 15 %
NEUTROPHILS # BLD AUTO: 3.3 10E3/UL (ref 1.6–8.3)
NEUTROPHILS NFR BLD AUTO: 60 %
NRBC # BLD AUTO: 0 10E3/UL
NRBC BLD AUTO-RTO: 0 /100
PLATELET # BLD AUTO: 177 10E3/UL (ref 150–450)
POTASSIUM BLD-SCNC: 4.3 MMOL/L (ref 3.4–5.3)
PROT SERPL-MCNC: 6.4 G/DL (ref 6.8–8.8)
RBC # BLD AUTO: 3.62 10E6/UL (ref 3.8–5.2)
SARS-COV-2 RNA RESP QL NAA+PROBE: POSITIVE
SODIUM SERPL-SCNC: 137 MMOL/L (ref 133–144)
TROPONIN I SERPL HS-MCNC: 61 NG/L
WBC # BLD AUTO: 5.5 10E3/UL (ref 4–11)

## 2022-06-08 PROCEDURE — 71045 X-RAY EXAM CHEST 1 VIEW: CPT

## 2022-06-08 PROCEDURE — 93005 ELECTROCARDIOGRAM TRACING: CPT

## 2022-06-08 PROCEDURE — 99284 EMERGENCY DEPT VISIT MOD MDM: CPT | Mod: CS | Performed by: FAMILY MEDICINE

## 2022-06-08 PROCEDURE — 84484 ASSAY OF TROPONIN QUANT: CPT | Performed by: FAMILY MEDICINE

## 2022-06-08 PROCEDURE — 87636 SARSCOV2 & INF A&B AMP PRB: CPT | Performed by: FAMILY MEDICINE

## 2022-06-08 PROCEDURE — 99285 EMERGENCY DEPT VISIT HI MDM: CPT | Mod: CS,25

## 2022-06-08 PROCEDURE — 80053 COMPREHEN METABOLIC PANEL: CPT | Performed by: FAMILY MEDICINE

## 2022-06-08 PROCEDURE — 36415 COLL VENOUS BLD VENIPUNCTURE: CPT | Performed by: FAMILY MEDICINE

## 2022-06-08 PROCEDURE — 93010 ELECTROCARDIOGRAM REPORT: CPT | Performed by: FAMILY MEDICINE

## 2022-06-08 PROCEDURE — 85025 COMPLETE CBC W/AUTO DIFF WBC: CPT | Performed by: FAMILY MEDICINE

## 2022-06-08 PROCEDURE — C9803 HOPD COVID-19 SPEC COLLECT: HCPCS

## 2022-06-08 RX ORDER — PREDNISONE 20 MG/1
20 TABLET ORAL 2 TIMES DAILY
Qty: 6 TABLET | Refills: 0 | Status: SHIPPED | OUTPATIENT
Start: 2022-06-08 | End: 2022-06-11

## 2022-06-08 NOTE — DISCHARGE INSTRUCTIONS
"Return to the Emergency Room if the following occurs:     Worsened breathing, dehydration, or for any concern at anytime.    Medications:  Paxlovid.  Monitor for any bleeding as there is a small risk that the Paxlovid can increase the INR.  Also, hold your Remeron while on the Paxlovid.      Tylenol for comfort, as needed.    Prednisone burst for three days.    Follow up:  Consider calling the [facility] to discuss available medications like ivermectin and hydroxychloroquine, etc.  See phone number and website below.   [phone]   [email or web address]  2. Consider calling the [place] Department of Health to discuss other treatment options.  See the web site below for contact information and education.   [email or web address]/[email or web address]    Discharge Instructions for COVID-19 Patients  You have--or may have--COVID-19. Please follow the instructions listed below.   If you have a weakened immune system, discuss with your doctor any other actions you need to take.  How can I protect others?  If you have symptoms (fever, cough, body aches or trouble breathing):  Stay home and away from others (self-isolate) until:  At least 10 days have passed since your symptoms started, And   You've had no fever--and no medicine that reduces fever--for 1 full day (24 hours), And    Your other symptoms have resolved (gotten better).  If you don't show symptoms, but testing showed that you have COVID-19:  Stay home and away from others (self-isolate). Follow the tips under \"How do I self-isolate?\" below for 10 days (20 days if you have a weak immune system).  You don't need to be retested for COVID-19 before going back to school or work. As long as you're fever-free and feeling better, you can go back to school, work and other activities after waiting the 10 or 20 days.   How do I self-isolate?  Stay in your own room, even for meals. Use your own bathroom if you can.  Stay away from " "others in your home. No hugging, kissing or shaking hands. No visitors.  Don't go to work, school or anywhere else.  Clean \"high touch\" surfaces often (doorknobs, counters, handles). Use household cleaning spray or wipes. You'll find a full list of  on the EPA website: www.epa.gov/pesticide-registration/list-n-disinfectants-use-against-sars-cov-2.  Cover your mouth and nose with a mask or other face covering to avoid spreading germs.  Wash your hands and face often. Use soap and water.  Caregivers in these groups are at risk for severe illness due to COVID-19:  People 65 years and older  People who live in a nursing home or long-term care facility  People with chronic disease (lung, heart, cancer, diabetes, kidney, liver, immunologic)  People who have a weakened immune system, including those who:  Are in cancer treatment  Take medicine that weakens the immune system, such as corticosteroids  Had a bone marrow or organ transplant  Have an immune deficiency  Have poorly controlled HIV or AIDS  Are obese (body mass index of 40 or higher)  Smoke regularly  Caregivers should wear gloves while washing dishes, handling laundry and cleaning bedrooms and bathrooms.  Use caution when washing and drying laundry: Don't shake dirty laundry and use the warmest water setting that you can.  For more tips on managing your health at home, go to www.cdc.gov/coronavirus/2019-ncov/downloads/10Things.pdf.  How can I take care of myself at home?  Get lots of rest. Drink extra fluids (unless a doctor has told you not to).    Take Tylenol (acetaminophen) for fever or pain. If you have liver or kidney problems, ask your family doctor if it's okay to take Tylenol.     Adults can take either:  650 mg (two 325 mg pills) every 4 to 6 hours, or   1,000 mg (two 500 mg pills) every 8 hours as needed.  Note: Don't take more than 3,000 mg in one day. Acetaminophen is found in many medicines (both prescribed and over-the-counter medicines). " Read all labels to be sure you don't take too much.   For children, check the Tylenol bottle for the right dose. The dose is based on the child's age or weight.  If you have other health problems (like cancer, heart failure, an organ transplant or severe kidney disease): Call your specialty clinic if you don't feel better in the next 2 days.    Know when to call 911. Emergency warning signs include:  Trouble breathing or shortness of breath  Pain or pressure in the chest that doesn't go away  Feeling confused like you haven't felt before, or not being able to wake up  Bluish-colored lips or face    Your doctor may have prescribed a blood thinner medicine. Follow their instructions.  Where can I get more information?  [facility] - About COVID-19: [email or web address]  CDC - What to Do If You're Sick: www.cdc.gov/coronavirus/2019-ncov/about/steps-when-sick.html  CDC - Ending Home Isolation: www.cdc.gov/coronavirus/2019-ncov/hcp/disposition-in-home-patients.html  CDC - Caring for Someone: www.cdc.gov/coronavirus/2019-ncov/if-you-are-sick/care-for-someone.html  [facility] - Interim Guidance for Hospital Discharge to Home: www.health.[facility].[place]/diseases/coronavirus/hcp/hospdischarge.pdf  [facility] clinical trials (COVID-19 research studies): [email or web address].[facility]-clinical-trials  Below are the COVID-19 hotlines at the [facility] of Health ([facility]). Interpreters are available.  For health questions: Call 659-758-5864 or 1-932.613.3471 (7 a.m. to 7 p.m.)  For questions about schools and childcare: Call 170-173-5885 or 1-860.992.5136 (7 a.m. to 7 p.m.)    For informational purposes only. Not to replace the advice of your health care provider. Clinically reviewed by the Infection Prevention Team. Copyright   2020 [employer]. All rights reserved. [employer] 435232 - REV 08/04/20.

## 2022-06-08 NOTE — PROGRESS NOTES
ANTICOAGULATION  MANAGEMENT: Discharge Review    Ellie Leigh chart reviewed for anticoagulation continuity of care    Emergency room visit on 6/8/22 for Covid.    Discharge disposition: Home. Patient was offered to be admiitted or go to a TCU but patient declined.    Results:    Recent labs: (last 7 days)     06/02/22  1510   INR 3.2*     Anticoagulation inpatient management:     not applicable     Anticoagulation discharge instructions:     Warfarin dosing: home regimen continued   Bridging: No   INR goal change: No      Medication changes affecting anticoagulation: Yes: Paxlovid and Prednisone    Additional factors affecting anticoagulation: Yes: Covid     PLAN     No adjustment to anticoagulation plan needed    Spoke with Suyapa. Patient has INR on 6/10/22. If patient is not feeling ok to come in, writer received approval from Edelmira XIANG, that she may extend the INR to 6/13/22. The Paxlovid may decrease the INR but the Prednisone and Covid may raise the INR.    No adjustment to Anticoagulation Calendar was required    Ruiz Meredith RN

## 2022-06-08 NOTE — ED PROVIDER NOTES
"  HPI   The patient is a 89-year-old female presenting by private car with her daughter for generalized weakness and syncope along with cough and congestion.  She describes feeling sick since at least Monday, 2 days ago.  She has had a cough with congestion.  She has had a headache.  She has had a sore throat.  She has myalgia and extreme weakness and fatigue.  She denies shortness of breath or chest pain.  Her cough is occasionally productive.  No leg pain or swelling.  She denies having a fever.  She says, \"I think it is my bronchitis.  I get it once a month.  I usually get treated with a really strong antibiotic and sometimes some prednisone.\"  She has been lightheaded when getting up.  She appeared to faint this morning as her daughter came home.  Then again on the way here she was found by our staff to be unresponsive but with a pulse in the car in our turn about where she was getting dropped off.  No vomiting.  No diarrhea.  No hematochezia or melena.  She denies dysuria, urgency, or frequency of urination.  Currently she feels very tired.        Allergies:  Allergies   Allergen Reactions     Cephalosporins Nausea     Cefzil     Doxycycline Nausea and Vomiting     Sulfa Drugs Nausea     Penicillins Rash     PCN     Problem List:    Patient Active Problem List    Diagnosis Date Noted     Deep vein thrombosis (DVT) of proximal lower extremity, unspecified chronicity, unspecified laterality (H) 02/18/2022     Priority: Medium     Acute respiratory failure with hypoxia (H) 07/09/2021     Priority: Medium     COPD exacerbation (H) 07/09/2021     Priority: Medium     Cardiac pacemaker in situ 07/09/2021     Priority: Medium     H/O TB (tuberculosis) 02/09/2018     Priority: Medium     Pt with chronic RUL infiltrate with pos AFB sputum  Full treatmetn for TB following ID consult in 2000's       Back pain 02/06/2018     Priority: Medium     Nausea 06/05/2017     Priority: Medium     3/29/2021: She has had chronic " nausea.  She has been on omeprazole  40mg daily for years.  Tried sucralfate (CARAFATE) which did not help.  Was on FODMAP diet, gluten free and no dairy diets.  Evaluation with GI in the past in 1/23/2015.  She has had gastroscopy several times.  10/30/2018 CT abdomen and pelvis unremarkable.   Gastric emptying normal.    Nausea in the AM.   No vomiting.   Hot flashes around Noon.  Feels like her whole body is on fire until med to late afternoon.  This is cyclical daily.   Feels shortness of breath with the hot flashes.    Nothing has helped much. Tried meclizine and Dramamine.   She has tried various liz preparations.   She has tried ondansetron.   Mirtazapine seemed to help for a while, does help sleep.  She has tried compazine in the past.   Today will try phenergan.         Irritable bowel syndrome without diarrhea 05/02/2016     Priority: Medium     Mild persistent asthma without complication 12/01/2015     Priority: Medium     Long term current use of anticoagulant therapy 03/02/2015     Priority: Medium     Syncope 01/12/2015     Priority: Medium     Advance Care Planning 01/09/2015     Priority: Medium     Advance Care Planning 7/2/2015: Receipt of ACP document:  Received: Health Care Directive which was witnessed or notarized on 1-9-15.  Document previously scanned on 2-27-15.  Validation form completed and sent to be scanned.  Code Status reflects choices in most recent ACP document.  Confirmed/documented designated decision maker(s).  Added by Verónica Green RN, System ACP Coordinator Honoring Choices   1/9/15 Copy to be scanned into chart Beulah Mathis CMA         COPD (chronic obstructive pulmonary disease) (H) 10/06/2014     Priority: Medium     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) 07/07/2014     Priority: Medium     Cause TBD.  Patient has had lung CT, will see Dr Gómez for consideration of further workup.  Also has pulmonology appt 8/18.  5/2/2018:Reviewing chart she has had  hyponatremia since 2010.        Positive fecal occult blood test 07/07/2014     Priority: Medium     x2 -- first was in ED.  Patient expresses that she does not want colonoscopy.  She'll set appt to discuss with her PCP.       Benign essential HTN 02/11/2014     Priority: Medium     BPPV (benign paroxysmal positional vertigo) 11/05/2013     Priority: Medium     History of skin cancer 05/07/2013     Priority: Medium     Recurrent UTI 07/16/2012     Priority: Medium     recurrent UTI  Recent Urologic workup neg, 2005  Has Cipro at home for treatment as needed       Hypothyroidism 05/07/2012     Priority: Medium     Squamous cell carcinoma in situ of skin of face 04/19/2012     Priority: Medium     SK (seborrheic keratosis) 04/19/2012     Priority: Medium     Intermittent atrial fibrillation (H) 12/01/2011     Priority: Medium     Cervical vertebral fracture (H) 11/20/2011     Priority: Medium     Anxiety 11/15/2011     Priority: Medium     DVT (deep venous thrombosis) 10/12/2011     Priority: Medium     H/o in past, on current coumadin therapy.       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 Provider to review       Right arm weakness 07/29/2011     Priority: Medium     Ulnar neuropathy 07/29/2011     Priority: Medium     Health Care Home 04/21/2011     Priority: Medium     Conchis Robles [phone]  A / [facility]              DX V65.8 REPLACED WITH [id number] HEALTH CARE HOME (04/08/2013)       Chronic constipation 03/03/2011     Priority: Medium     Osteoporosis 03/03/2011     Priority: Medium     Hyperlipidemia LDL goal <130 10/31/2010     Priority: Medium     Infectious colitis, enteritis and gastroenteritis 07/10/2010     Priority: Medium     Chronic allergic conjunctivitis 11/18/2008     Priority: Medium     Chronic seasonal allergic rhinitis 11/18/2008     Priority: Medium      Esophageal reflux 11/29/2007     Priority: Medium     PERSONAL HISTORY OF MALIGNANCY- BREAST 06/13/2007     Priority: Medium     Sensorineural hearing loss, asymmetrical 06/20/2005     Priority: Medium     Generalized osteoarthrosis, unspecified site 06/20/2005     Priority: Medium     Right hip and knee are the most symptomatic        Past Medical History:    Past Medical History:   Diagnosis Date     Basal cell carcinoma      Breast cancer (H)      COPD (chronic obstructive pulmonary disease) (H)      Dust allergy      DVT (deep vein thrombosis) in pregnancy      HTN (hypertension)      Hyperlipidemia LDL goal <130 10/31/2010     Hyponatremia      Hypothyroid      Intermittent atrial fibrillation (H) 12/1/2011     Loose body in joint 4/15/2011     Neck fracture (H)      Syncope 5/12/2013     Past Surgical History:    Past Surgical History:   Procedure Laterality Date     APPENDECTOMY       ARTHROSCOPY KNEE  4/15/2011    Procedure:ARTHROSCOPY KNEE; removal of loose body; Surgeon:LEY, JEFFREY DUANE; Location:[facility]     CHOLECYSTECTOMY       ESOPHAGOSCOPY, GASTROSCOPY, DUODENOSCOPY (EGD), COMBINED  6/9/2014    Procedure: COMBINED ESOPHAGOSCOPY, GASTROSCOPY, DUODENOSCOPY (EGD);  Surgeon: Gilberto Richard MD;  Location: [facility]     ESOPHAGOSCOPY, GASTROSCOPY, DUODENOSCOPY (EGD), COMBINED N/A 10/18/2019    Procedure: ESOPHAGOGASTRODUODENOSCOPY (EGD);  Surgeon: Noe Sams DO;  Location: [facility]     IMPLANT PACEMAKER  5/21/2013    Biotronik Moderl [id number] Serial#[id number]     INJECT EPIDURAL LUMBAR  3/23/2011    INJECT EPIDURAL LUMBAR performed by GENERIC ANESTHESIA PROVIDER at [facility]     JOINT REPLACEMENT, HIP RT/LT      Joint Replacement Hip Rt     MASTECTOMY, SIMPLE RT/LT/CAITLYN      Left breast - following breast ca     OTHER SURGICAL HISTORY      C1-C2 fusion after fx      SURGICAL HISTORY OF -   05/22/2001    Colonoscopy     TONSILLECTOMY       Family History:    Family History   Problem Relation Age of Onset      "Cerebrovascular Disease Mother      Heart Disease Mother         MI     Cerebrovascular Disease Father      Heart Disease Father         MI     Respiratory Maternal Grandfather         TB     Neurologic Disorder Brother         ALS     Heart Disease Brother      Cerebrovascular Disease Brother      Breast Cancer Daughter         age:49     Asthma Brother      Cancer Brother         brain and lung     Cancer Daughter         thyroid     Social History:  Marital Status:   [5]  Social History     Tobacco Use     Smoking status: Passive Smoke Exposure - Never Smoker     Smokeless tobacco: Never Used   Vaping Use     Vaping Use: Never used   Substance Use Topics     Alcohol use: No     Drug use: No      Medications:    nirmatrelvir and ritonavir (PAXLOVID) therapy pack  predniSONE (DELTASONE) 20 MG tablet  Acetaminophen (TYLENOL PO)  albuterol (PROAIR HFA) 108 (90 Base) MCG/ACT inhaler  albuterol (PROVENTIL) (2.5 MG/3ML) 0.083% neb solution  CRANBERRY  fluticasone-salmeterol (ADVAIR) 250-50 MCG/DOSE inhaler  ipratropium - albuterol 0.5 mg/2.5 mg/3 mL (DUONEB) 0.5-2.5 (3) MG/3ML neb solution  levothyroxine (SYNTHROID/LEVOTHROID) 50 MCG tablet  metoprolol succinate ER (TOPROL-XL) 25 MG 24 hr tablet  mirtazapine (REMERON) 15 MG tablet  omeprazole (PRILOSEC) 40 MG DR capsule  ondansetron (ZOFRAN ODT) 4 MG ODT tab  order for DME  polyethylene glycol (MIRALAX/GLYCOLAX) Packet  probiotic CAPS  sodium chloride 0.9 % neb solution  sodium chloride 1 GM tablet  warfarin ANTICOAGULANT (COUMADIN) 1 MG tablet      Review of Systems   All other systems reviewed and are negative.      PE   BP: 111/59  Pulse: 77  Temp: 98.5  F (36.9  C)  Resp: 15  Height: 147.3 cm (4' 10\")  Weight: 59.9 kg (132 lb)  SpO2: 92 %  Physical Exam  Vitals reviewed.   Constitutional:       General: She is not in acute distress.     Appearance: She is well-developed.      Comments: Tired appearing.  Cooperative and answering questions well.   HENT:      " Head: Normocephalic and atraumatic.      Right Ear: External ear normal.      Left Ear: External ear normal.      Nose: Nose normal.      Mouth/Throat:      Mouth: Mucous membranes are moist.      Pharynx: Oropharynx is clear.   Eyes:      Extraocular Movements: Extraocular movements intact.      Conjunctiva/sclera: Conjunctivae normal.      Pupils: Pupils are equal, round, and reactive to light.   Cardiovascular:      Rate and Rhythm: Normal rate and regular rhythm.      Heart sounds: Normal heart sounds.   Pulmonary:      Effort: Pulmonary effort is normal.      Comments: Decreased on the left compared to the right?  No wheezing.  Mild rhonchi.  Abdominal:      Palpations: Abdomen is soft.      Tenderness: There is no abdominal tenderness.   Musculoskeletal:         General: Normal range of motion.      Cervical back: Normal range of motion.   Skin:     General: Skin is warm and dry.   Neurological:      Mental Status: She is alert and oriented to person, place, and time.   Psychiatric:         Behavior: Behavior normal.         ED COURSE and MDM   1210.  The patient has upper respiratory symptoms along with generalized weakness, myalgia, and fatigue.  It sounds like she fainted a couple of times today, including on arrival here to the ED.  Her EKG shows an atrial paced rhythm that is similar to previous.  Lab values pending.  Chest x-ray.  COVID swab, influenza swab.    1308.  The patient is found to have COVID-19.  No hypoxemia.  No shortness of breath.  No chest pain.  This diagnosis is consistent with her history and presentation.  I did recommend that the patient be admitted for further cares and perhaps transfer to a TCU when able.  However, the patient is refusing.  She is demanding to go home.  She lives with her daughter and her daughter is in the room during this discussion.  Her daughter agrees with the patient and would like to take her home.  I did recommend that they come here for any worsened  condition or concern.  I did review the patient's history for use of Paxlovid.  She has drug interactions with Remeron and warfarin.  She will hold the Remeron while on the medicine.  She will monitor for any new bleeding while on the medicine.  Formal ED COVID discharge information provided.  O2 saturation monitor provided.     EKG  (6425)   Interpretation performed by me.  Rate: 77     Rhythm: atrial paced     Axis: nl  Intervals: ME (12-2) 214, QRS (<12) 80, QTc (>5) 421  P wave: -     QRS complex: Poor R wave progression.   ST segment / T-wave: J-point elevation.  Conclusion: Atrial paced rhythm with poor R wave progression and J-point elevation diffusely.  This is all similar to previous.    LABS  Labs Ordered and Resulted from Time of ED Arrival to Time of ED Departure   INFLUENZA A/B & SARS-COV2 PCR MULTIPLEX - Abnormal       Result Value    Influenza A PCR Negative      Influenza B PCR Negative      SARS CoV2 PCR Positive (*)    GLUCOSE BY METER - Abnormal    GLUCOSE BY METER POCT 122 (*)    COMPREHENSIVE METABOLIC PANEL - Abnormal    Sodium 137      Potassium 4.3      Chloride 103      Carbon Dioxide (CO2) 27      Anion Gap 7      Urea Nitrogen 23      Creatinine 0.82      Calcium 8.3 (*)     Glucose 124 (*)     Alkaline Phosphatase 67      AST 24      ALT 17      Protein Total 6.4 (*)     Albumin 3.2 (*)     Bilirubin Total 0.2      GFR Estimate 67     TROPONIN I - Abnormal    Troponin I High Sensitivity 61 (*)    CBC WITH PLATELETS AND DIFFERENTIAL - Abnormal    WBC Count 5.5      RBC Count 3.62 (*)     Hemoglobin 10.6 (*)     Hematocrit 33.6 (*)     MCV 93      MCH 29.3      MCHC 31.5      RDW 14.0      Platelet Count 177      % Neutrophils 60      % Lymphocytes 24      % Monocytes 15      % Eosinophils 0      % Basophils 1      % Immature Granulocytes 0      NRBCs per 100 WBC 0      Absolute Neutrophils 3.3      Absolute Lymphocytes 1.3      Absolute Monocytes 0.8      Absolute Eosinophils 0.0       Absolute Basophils 0.0      Absolute Immature Granulocytes 0.0      Absolute NRBCs 0.0         IMAGING  Images reviewed by me.  Radiology report also reviewed.  XR Chest Port 1 View    (Results Pending)       Procedures    Medications - No data to display      IMPRESSION       ICD-10-CM    1. Infection due to 2019 novel coronavirus  U07.1             Medication List      Started    Paxlovid therapy pack  Generic drug: nirmatrelvir and ritonavir  2 tablets, Oral, 2 TIMES DAILY     predniSONE 20 MG tablet  Commonly known as: DELTASONE  20 mg, Oral, 2 TIMES DAILY                        Steve Ugaled MD  06/08/22 6480

## 2022-06-09 ENCOUNTER — PATIENT OUTREACH (OUTPATIENT)
Dept: CARE COORDINATION | Facility: CLINIC | Age: 87
End: 2022-06-09
Payer: COMMERCIAL

## 2022-06-09 DIAGNOSIS — Z71.89 OTHER SPECIFIED COUNSELING: ICD-10-CM

## 2022-06-09 NOTE — PROGRESS NOTES
"Clinic Care Coordination Contact  [facility]: Post-Discharge Note  SITUATION                                                      Admission:    Admission Date: 06/08/22   Reason for Admission: Generalized Weakness  Discharge:   Discharge Date: 06/08/22  Discharge Diagnosis: Generalized Weakness    BACKGROUND                                                      Per hospital discharge summary and inpatient provider notes:    The patient is a 89-year-old female presenting by private car with her daughter for generalized weakness and syncope along with cough and congestion.  She describes feeling sick since at least Monday, 2 days ago.  She has had a cough with congestion.  She has had a headache.  She has had a sore throat.  She has myalgia and extreme weakness and fatigue.  She denies shortness of breath or chest pain.  Her cough is occasionally productive.  No leg pain or swelling.  She denies having a fever.  She says, \"I think it is my bronchitis.  I get it once a month.  I usually get treated with a really strong antibiotic and sometimes some prednisone.\"  She has been lightheaded when getting up.  She appeared to faint this morning as her daughter came home.  Then again on the way here she was found by our staff to be unresponsive but with a pulse in the car in our turn about where she was getting dropped off.  No vomiting.  No diarrhea.  No hematochezia or melena.  She denies dysuria, urgency, or frequency of urination.  Currently she feels very tired.    ASSESSMENT      Enrollment  Primary Care Care Coordination Status: Declined    Discharge Assessment  How are you doing now that you are home?: I have been resting a lot.  How are your symptoms? (Red Flag symptoms escalate to triage hotline per guidelines): Improved  Do you feel your condition is stable enough to be safe at home until your provider visit?: Yes  Does the patient have their discharge instructions? : Yes  Does the patient have questions " regarding their discharge instructions? : No  Were you started on any new medications or were there changes to any of your previous medications? : Yes  Does the patient have all of their medications?: Yes  Do you have questions regarding any of your medications? : No  Discharge follow-up appointment scheduled within 14 calendar days? : No (Patient would like to wait a few days to see how her symptoms are doing.)  Is patient agreeable to assistance with scheduling? : No                  PLAN                                                      Outpatient Plan: Schedule an appointment with your primary care clinic as soon as possible for a visit in 2  days (around 6/10/2022)    Future Appointments   Date Time Provider Department Center   6/10/2022 11:45 AM [facility]   7/19/2022  2:20 PM [facility] DEVICE RN [facility]   7/19/2022  3:30 PM Philippe Orozco MD [facility]         For any urgent concerns, please contact our 24 hour nurse triage line: [phone] ([phone])         JOAQUIN Castro  [phone]  Connected Care Resource [facility]

## 2022-06-10 ENCOUNTER — LAB (OUTPATIENT)
Dept: LAB | Facility: CLINIC | Age: 87
End: 2022-06-10
Payer: COMMERCIAL

## 2022-06-10 ENCOUNTER — ANTICOAGULATION THERAPY VISIT (OUTPATIENT)
Dept: ANTICOAGULATION | Facility: CLINIC | Age: 87
End: 2022-06-10

## 2022-06-10 DIAGNOSIS — I82.4Y9 DEEP VEIN THROMBOSIS (DVT) OF PROXIMAL LOWER EXTREMITY, UNSPECIFIED CHRONICITY, UNSPECIFIED LATERALITY (H): ICD-10-CM

## 2022-06-10 DIAGNOSIS — Z79.01 LONG TERM CURRENT USE OF ANTICOAGULANT THERAPY: ICD-10-CM

## 2022-06-10 DIAGNOSIS — I48.0 INTERMITTENT ATRIAL FIBRILLATION (H): ICD-10-CM

## 2022-06-10 DIAGNOSIS — I82.409 DVT (DEEP VENOUS THROMBOSIS) (H): Primary | ICD-10-CM

## 2022-06-10 DIAGNOSIS — I82.409 DEEP VEIN THROMBOSIS (H): ICD-10-CM

## 2022-06-10 DIAGNOSIS — I82.409 DEEP VEIN THROMBOSIS (H): Primary | ICD-10-CM

## 2022-06-10 LAB — INR PPP: 2.32 (ref 0.85–1.15)

## 2022-06-10 PROCEDURE — 36415 COLL VENOUS BLD VENIPUNCTURE: CPT

## 2022-06-10 PROCEDURE — 85610 PROTHROMBIN TIME: CPT

## 2022-06-10 NOTE — PROGRESS NOTES
ANTICOAGULATION MANAGEMENT     Ellie Leigh 89 year old female is on warfarin with therapeutic INR result. (Goal INR 2.0-3.0)    Recent labs: (last 7 days)     06/10/22  1214   INR 2.32*       ASSESSMENT       Source(s): Chart Review    Previous INR was Supratherapeutic    Medication, diet, health changes since last INR : Covid positive on 6/8/22. Placed on Prednisone and Paxlovid.           PLAN     Recommended plan for temporary change(s) affecting INR     Dosing Instructions: continue your current warfarin dose with next INR in 1 week       Summary  As of 6/10/2022    Full warfarin instructions:  1.5 mg every Mon, Fri; 1 mg all other days   Next INR check:  6/17/2022             Detailed voice message left for  Suyapa with dosing instructions and follow up date.     Contact [phone]  to schedule and with any changes, questions or concerns.     Education provided: Please call back if any changes to your diet, medications or how you've been taking warfarin, Monitoring for bleeding signs and symptoms, Monitoring for clotting signs and symptoms, When to seek medical attention/emergency care and Contact [phone]  with any changes, questions or concerns.     Plan made per [facility] anticoagulation protocol    Ruiz Meredith RN  Anticoagulation Clinic  6/10/2022    _______________________________________________________________________     Anticoagulation Episode Summary     Current INR goal:  2.0-3.0   TTR:  79.8 % (11.9 mo)   Target end date:  Indefinite   [facility]    Indications    Atrial fibrillation with rapid ventricular response (H) (Resolved) [I48.91]  DVT (deep venous thrombosis) [I82.409]  Long term current use of anticoagulant therapy [Z79.01]  Intermittent atrial fibrillation (H) [I48.0]  Deep vein thrombosis (DVT) of proximal lower extremity  unspecified chronicity  unspecified laterality (H) [I82.4Y9]           Comments:  Taking Celebrex PRN          Anticoagulation Care Providers     Provider Role Specialty Phone number    Anjum Vidales MD Referring Family Medicine [phone]

## 2022-06-14 ENCOUNTER — TELEPHONE (OUTPATIENT)
Dept: FAMILY MEDICINE | Facility: CLINIC | Age: 87
End: 2022-06-14

## 2022-06-14 NOTE — TELEPHONE ENCOUNTER
Reason for Call:  Other appointment    Detailed comments: FYI: PATIENT HAS SCHEDULED  AN APPOINTMENT WITH ALTERNATE PROVIDER IN [place],[facility], DUE TO PCP, MARCY DAUGHERTY NOT BEING AVAILABLE WITHIN PATIENT'S WINDOW. REASON FOR APPOINTMENT: FOLLOW UP ER VISIT; 6.8.2022; COVID POSITIVE/TREATMENT; INQUIRY: BRONCHITIS     Phone Number Patient can be reached at: Home number on file [phone] (home)    Best Time: NA    Can we leave a detailed message on this number? Not Applicable    Call taken on 6/14/2022 at 8:55 AM by Go Mae

## 2022-06-16 ENCOUNTER — OFFICE VISIT (OUTPATIENT)
Dept: FAMILY MEDICINE | Facility: CLINIC | Age: 87
End: 2022-06-16
Payer: COMMERCIAL

## 2022-06-16 ENCOUNTER — TELEPHONE (OUTPATIENT)
Dept: ANTICOAGULATION | Facility: CLINIC | Age: 87
End: 2022-06-16

## 2022-06-16 VITALS
HEART RATE: 93 BPM | OXYGEN SATURATION: 91 % | DIASTOLIC BLOOD PRESSURE: 55 MMHG | WEIGHT: 130 LBS | HEIGHT: 58 IN | SYSTOLIC BLOOD PRESSURE: 104 MMHG | TEMPERATURE: 100.2 F | BODY MASS INDEX: 27.29 KG/M2

## 2022-06-16 DIAGNOSIS — Z79.01 LONG TERM CURRENT USE OF ANTICOAGULANT THERAPY: ICD-10-CM

## 2022-06-16 DIAGNOSIS — I82.409 DVT (DEEP VENOUS THROMBOSIS) (H): Primary | ICD-10-CM

## 2022-06-16 DIAGNOSIS — I48.0 INTERMITTENT ATRIAL FIBRILLATION (H): ICD-10-CM

## 2022-06-16 DIAGNOSIS — Z87.09 HISTORY OF BRONCHIECTASIS: ICD-10-CM

## 2022-06-16 DIAGNOSIS — R05.9 COUGH: Primary | ICD-10-CM

## 2022-06-16 DIAGNOSIS — I82.4Y9 DEEP VEIN THROMBOSIS (DVT) OF PROXIMAL LOWER EXTREMITY, UNSPECIFIED CHRONICITY, UNSPECIFIED LATERALITY (H): ICD-10-CM

## 2022-06-16 LAB
BASOPHILS # BLD AUTO: 0 10E3/UL (ref 0–0.2)
BASOPHILS NFR BLD AUTO: 0 %
EOSINOPHIL # BLD AUTO: 0 10E3/UL (ref 0–0.7)
EOSINOPHIL NFR BLD AUTO: 0 %
ERYTHROCYTE [DISTWIDTH] IN BLOOD BY AUTOMATED COUNT: 13.5 % (ref 10–15)
HCT VFR BLD AUTO: 31.5 % (ref 35–47)
HGB BLD-MCNC: 10.1 G/DL (ref 11.7–15.7)
LYMPHOCYTES # BLD AUTO: 1.2 10E3/UL (ref 0.8–5.3)
LYMPHOCYTES NFR BLD AUTO: 9 %
MCH RBC QN AUTO: 28.9 PG (ref 26.5–33)
MCHC RBC AUTO-ENTMCNC: 32.1 G/DL (ref 31.5–36.5)
MCV RBC AUTO: 90 FL (ref 78–100)
MONOCYTES # BLD AUTO: 1.9 10E3/UL (ref 0–1.3)
MONOCYTES NFR BLD AUTO: 14 %
NEUTROPHILS # BLD AUTO: 10.7 10E3/UL (ref 1.6–8.3)
NEUTROPHILS NFR BLD AUTO: 78 %
PLATELET # BLD AUTO: 270 10E3/UL (ref 150–450)
RBC # BLD AUTO: 3.49 10E6/UL (ref 3.8–5.2)
WBC # BLD AUTO: 13.9 10E3/UL (ref 4–11)

## 2022-06-16 PROCEDURE — 99213 OFFICE O/P EST LOW 20 MIN: CPT | Performed by: FAMILY MEDICINE

## 2022-06-16 PROCEDURE — 36415 COLL VENOUS BLD VENIPUNCTURE: CPT | Performed by: FAMILY MEDICINE

## 2022-06-16 PROCEDURE — 85025 COMPLETE CBC W/AUTO DIFF WBC: CPT | Performed by: FAMILY MEDICINE

## 2022-06-16 RX ORDER — LEVOFLOXACIN 500 MG/1
500 TABLET, FILM COATED ORAL DAILY
Qty: 10 TABLET | Refills: 0 | Status: SHIPPED | OUTPATIENT
Start: 2022-06-16 | End: 2022-11-10

## 2022-06-16 ASSESSMENT — PAIN SCALES - GENERAL: PAINLEVEL: WORST PAIN (10)

## 2022-06-16 NOTE — PROGRESS NOTES
SUBJECTIVE:                                                    Ellie Leigh is a 89 year old female who presents to clinic today for the following health issues:    ED/UC Followup:    Facility:  [facility]  Date of visit: 06/08/2022  Reason for visit: Covid 19  Current Status: She says she is not feeling better. She still has a phlegmy cough and today complains of back pain.      Problem list and histories reviewed & adjusted, as indicated.  Additional history:     Patient Active Problem List   Diagnosis     Sensorineural hearing loss, asymmetrical     Generalized osteoarthrosis, unspecified site     PERSONAL HISTORY OF MALIGNANCY- BREAST     Esophageal reflux     Chronic allergic conjunctivitis     Chronic seasonal allergic rhinitis     Hyperlipidemia LDL goal <130     Chronic constipation     Osteoporosis     Health Care Home     Right arm weakness     Ulnar neuropathy     Headache     Mild major depression     DVT (deep venous thrombosis)     Anxiety     Cervical vertebral fracture (H)     Intermittent atrial fibrillation (H)     Squamous cell carcinoma in situ of skin of face     SK (seborrheic keratosis)     Hypothyroidism     Recurrent UTI     History of skin cancer     BPPV (benign paroxysmal positional vertigo)     Benign essential HTN     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)     Positive fecal occult blood test     COPD (chronic obstructive pulmonary disease) (H)     Infectious colitis, enteritis and gastroenteritis     Advance Care Planning     Syncope     Long term current use of anticoagulant therapy     Mild persistent asthma without complication     Irritable bowel syndrome without diarrhea     Nausea     Back pain     H/O TB (tuberculosis)     Acute respiratory failure with hypoxia (H)     COPD exacerbation (H)     Cardiac pacemaker in situ     Deep vein thrombosis (DVT) of proximal lower extremity, unspecified chronicity, unspecified laterality (H)      Past Surgical History:   Procedure Laterality Date     APPENDECTOMY       ARTHROSCOPY KNEE  4/15/2011    Procedure:ARTHROSCOPY KNEE; removal of loose body; Surgeon:LEY, JEFFREY DUANE; Location:[facility]     CHOLECYSTECTOMY       ESOPHAGOSCOPY, GASTROSCOPY, DUODENOSCOPY (EGD), COMBINED  6/9/2014    Procedure: COMBINED ESOPHAGOSCOPY, GASTROSCOPY, DUODENOSCOPY (EGD);  Surgeon: Gilberto Richard MD;  Location: [facility]     ESOPHAGOSCOPY, GASTROSCOPY, DUODENOSCOPY (EGD), COMBINED N/A 10/18/2019    Procedure: ESOPHAGOGASTRODUODENOSCOPY (EGD);  Surgeon: Noe Sams DO;  Location: [facility]     IMPLANT PACEMAKER  5/21/2013    Biotronik Moderl [id number] Serial#[id number]     INJECT EPIDURAL LUMBAR  3/23/2011    INJECT EPIDURAL LUMBAR performed by GENERIC ANESTHESIA PROVIDER at [facility]     JOINT REPLACEMENT, HIP RT/LT      Joint Replacement Hip Rt     MASTECTOMY, SIMPLE RT/LT/CAITLYN      Left breast - following breast ca     OTHER SURGICAL HISTORY      C1-C2 fusion after fx      SURGICAL HISTORY OF -   05/22/2001    Colonoscopy     TONSILLECTOMY         Social History     Tobacco Use     Smoking status: Passive Smoke Exposure - Never Smoker     Smokeless tobacco: Never Used   Substance Use Topics     Alcohol use: No     Family History   Problem Relation Age of Onset     Cerebrovascular Disease Mother      Heart Disease Mother         MI     Cerebrovascular Disease Father      Heart Disease Father         MI     Respiratory Maternal Grandfather         TB     Neurologic Disorder Brother         ALS     Heart Disease Brother      Cerebrovascular Disease Brother      Breast Cancer Daughter         age:49     Asthma Brother      Cancer Brother         brain and lung     Cancer Daughter         thyroid         Current Outpatient Medications   Medication Sig Dispense Refill     Acetaminophen (TYLENOL PO) Take  mg by mouth every 8 hours as needed       albuterol (PROAIR HFA) 108 (90 Base) MCG/ACT inhaler Inhale 2 puffs into the lungs  every 6 hours as needed for shortness of breath / dyspnea 8.5 g 3     albuterol (PROVENTIL) (2.5 MG/3ML) 0.083% neb solution Take 1 vial (2.5 mg) by nebulization 3 times daily 360 mL 6     CRANBERRY Take 475 mg by mouth 2 times daily.       fluticasone-salmeterol (ADVAIR) 250-50 MCG/DOSE inhaler Inhale 1 puff into the lungs every 12 hours 60 each 11     ipratropium - albuterol 0.5 mg/2.5 mg/3 mL (DUONEB) 0.5-2.5 (3) MG/3ML neb solution One neb three times a day routine and once extra if needed. 270 mL 11     levofloxacin (LEVAQUIN) 500 MG tablet Take 1 tablet (500 mg) by mouth daily 10 tablet 0     metoprolol succinate ER (TOPROL-XL) 25 MG 24 hr tablet TAKE TWO TABLETS BY MOUTH TWICE DAILY. 360 tablet 3     omeprazole (PRILOSEC) 40 MG DR capsule Take 1 capsule (40 mg) by mouth once daily. 90 capsule 1     order for DME Equipment being ordered: Nebulizer 1 each 0     polyethylene glycol (MIRALAX/GLYCOLAX) Packet Take 17 g by mouth daily        probiotic CAPS Take 1 capsule by mouth every evening        sodium chloride 0.9 % neb solution Inhale 5 mLs into the lungs 3 times daily Use after albuterol in nebulizer to thin secretions. 450 mL 5     sodium chloride 1 GM tablet TAKE ONE TABLET BY MOUTH THREE TIMES DAILY 100 tablet 11     warfarin ANTICOAGULANT (COUMADIN) 1 MG tablet Per anticoag clinic 110 tablet 3     levothyroxine (SYNTHROID/LEVOTHROID) 50 MCG tablet TAKE ONE TABLET BY MOUTH ONE TIME DAILY *due for thyroid labs for more refills* 90 tablet 0     mirtazapine (REMERON) 15 MG tablet Take 1 tablet (15 mg) by mouth At Bedtime 90 tablet 0     ondansetron (ZOFRAN ODT) 4 MG ODT tab Take 1 tablet (4 mg) by mouth every 8 hours as needed for nausea or vomiting (Patient not taking: No sig reported) 10 tablet 1     Allergies   Allergen Reactions     Cephalosporins Nausea     Cefzil     Doxycycline Nausea and Vomiting     Sulfa Drugs Nausea     Penicillins Rash     PCN       ROS:  Constitutional, HEENT, cardiovascular,  "pulmonary, gi and gu systems are negative, except as otherwise noted.    OBJECTIVE:                                                    /55   Pulse 93   Temp 100.2  F (37.9  C) (Tympanic)   Ht 1.473 m (4' 10\")   Wt 59 kg (130 lb)   LMP 06/15/1985 (LMP Unknown)   SpO2 91%   BMI 27.17 kg/m   Body mass index is 27.17 kg/m .   GENERAL: healthy, alert, well nourished, well hydrated, no distress  HENT: ear canals- normal; TMs- normal; Nose- normal; Mouth- no ulcers, no lesions  NECK: no tenderness, no adenopathy, no asymmetry, no masses, no stiffness; thyroid- normal to palpation  RESP: lungs clear to auscultation - no rales, no rhonchi, no wheezes  CV: regular rates and rhythm, normal S1 S2, no S3 or S4 and no murmur, no click or rub -  ABDOMEN: soft, no tenderness, no  hepatosplenomegaly, no masses, normal bowel sounds       ASSESSMENT/PLAN:                                                      (R05.9) Cough  (primary encounter diagnosis)  Comment: nio wheezing   Plan: XR Chest 2 Views, CBC with platelets and         differential     bronchitis  Plan: levofloxacin (LEVAQUIN) 500 MG tablet     return to clinic or call if unimproved in 3-4 days sooner if worse.       reports that she is a non-smoker but has been exposed to tobacco smoke. She has been exposed to 0.00 packs per day. She has never used smokeless tobacco.      Weight management plan: Discussed healthy diet and exercise guidelines      [facility]    "

## 2022-06-16 NOTE — TELEPHONE ENCOUNTER
ANTICOAGULATION  MANAGEMENT     Interacting Medication Review    Interacting medication(s): Levofloxacin (Levaquin) with warfarin.    Duration: 10 days  (6/16 to 6/25)    Indication: COVID 6/8/22 and cough    New medication?: Yes, interaction may increase INR and risk of bleeding     PLAN     Continue current warfarin dose. Recommend to check INR on 6/17.    Spoke with Suyapa - pt prefers to wait until Monday for INR. Reviewed S&S of bleeding, interaction w/ levaquin , and to be seen soon if Sx worsen/don't improve.     Anticoagulation Calendar updated    Kylah Dorsey RN

## 2022-06-19 DIAGNOSIS — E03.9 HYPOTHYROIDISM, UNSPECIFIED TYPE: Chronic | ICD-10-CM

## 2022-06-19 DIAGNOSIS — F41.9 ANXIETY: ICD-10-CM

## 2022-06-19 DIAGNOSIS — R11.0 NAUSEA: ICD-10-CM

## 2022-06-20 ENCOUNTER — LAB (OUTPATIENT)
Dept: LAB | Facility: CLINIC | Age: 87
End: 2022-06-20
Payer: COMMERCIAL

## 2022-06-20 ENCOUNTER — ANTICOAGULATION THERAPY VISIT (OUTPATIENT)
Dept: ANTICOAGULATION | Facility: CLINIC | Age: 87
End: 2022-06-20

## 2022-06-20 DIAGNOSIS — I82.4Y9 DEEP VEIN THROMBOSIS (DVT) OF PROXIMAL LOWER EXTREMITY, UNSPECIFIED CHRONICITY, UNSPECIFIED LATERALITY (H): ICD-10-CM

## 2022-06-20 DIAGNOSIS — I48.0 INTERMITTENT ATRIAL FIBRILLATION (H): ICD-10-CM

## 2022-06-20 DIAGNOSIS — Z79.01 LONG TERM CURRENT USE OF ANTICOAGULANT THERAPY: Primary | ICD-10-CM

## 2022-06-20 LAB — INR BLD: 2.9 (ref 0.9–1.1)

## 2022-06-20 PROCEDURE — 36416 COLLJ CAPILLARY BLOOD SPEC: CPT

## 2022-06-20 PROCEDURE — 85610 PROTHROMBIN TIME: CPT

## 2022-06-20 NOTE — PROGRESS NOTES
ANTICOAGULATION MANAGEMENT     Ellie Leigh 89 year old female is on warfarin with therapeutic INR result. (Goal INR 2.0-3.0)    Recent labs: (last 7 days)     06/20/22  1415   INR 2.9*       ASSESSMENT       Source(s): Chart Review and Patient/Caregiver Call       Warfarin doses taken: Warfarin taken as instructed    Diet: No new diet changes identified    New illness, injury, or hospitalization: Yes: cough seen by PCP and given an antibiotic, also s/p Covid infection    Medication/supplement changes: Levaquin  10 day course (dates: 6/16) which has potential for interaction; increasing INR history of no interaction on INR with Levaquin     Signs or symptoms of bleeding or clotting: No    Previous INR: Therapeutic last 2(+) visits    Additional findings: will eat a serving of greens this evening        PLAN     Recommended plan for temporary change(s) affecting INR     Dosing Instructions: continue your current warfarin dose with next INR in 2 weeks       Summary  As of 6/20/2022    Full warfarin instructions:  1.5 mg every Mon, Fri; 1 mg all other days   Next INR check:  7/8/2022             Telephone call with  Suyapa who verbalizes understanding and agrees to plan    Lab visit scheduled    Education provided: Monitoring for bleeding signs and symptoms and Monitoring for clotting signs and symptoms    Plan made per [facility] anticoagulation protocol    Kristina Champagne RN  Anticoagulation Clinic  6/20/2022    _______________________________________________________________________     Anticoagulation Episode Summary     Current INR goal:  2.0-3.0   TTR:  81.1 % (11.9 mo)   Target end date:  Indefinite   Send INR reminders to:  [facility] [place]    Indications    Atrial fibrillation with rapid ventricular response (H) (Resolved) [I48.91]  DVT (deep venous thrombosis) [I82.409]  Long term current use of anticoagulant therapy [Z79.01]  Intermittent atrial fibrillation (H) [I48.0]  Deep vein thrombosis (DVT) of  proximal lower extremity  unspecified chronicity  unspecified laterality (H) [I82.4Y9]           Comments:  Taking Celebrex PRN         Anticoagulation Care Providers     Provider Role Specialty Phone number    Anjum Vidales MD Referring Family Medicine [phone]

## 2022-06-21 RX ORDER — LEVOTHYROXINE SODIUM 50 UG/1
TABLET ORAL
Qty: 90 TABLET | Refills: 0 | Status: SHIPPED | OUTPATIENT
Start: 2022-06-21 | End: 2022-09-20

## 2022-06-21 RX ORDER — MIRTAZAPINE 15 MG/1
15 TABLET, FILM COATED ORAL AT BEDTIME
Qty: 90 TABLET | Refills: 0 | Status: SHIPPED | OUTPATIENT
Start: 2022-06-21 | End: 2022-09-20

## 2022-07-05 ENCOUNTER — LAB (OUTPATIENT)
Dept: LAB | Facility: CLINIC | Age: 87
End: 2022-07-05
Payer: COMMERCIAL

## 2022-07-05 ENCOUNTER — ANTICOAGULATION THERAPY VISIT (OUTPATIENT)
Dept: ANTICOAGULATION | Facility: CLINIC | Age: 87
End: 2022-07-05

## 2022-07-05 DIAGNOSIS — I48.0 INTERMITTENT ATRIAL FIBRILLATION (H): ICD-10-CM

## 2022-07-05 DIAGNOSIS — I82.409 DVT (DEEP VENOUS THROMBOSIS) (H): Primary | ICD-10-CM

## 2022-07-05 DIAGNOSIS — I82.4Y9 DEEP VEIN THROMBOSIS (DVT) OF PROXIMAL LOWER EXTREMITY, UNSPECIFIED CHRONICITY, UNSPECIFIED LATERALITY (H): ICD-10-CM

## 2022-07-05 DIAGNOSIS — Z79.01 LONG TERM CURRENT USE OF ANTICOAGULANT THERAPY: ICD-10-CM

## 2022-07-05 LAB — INR BLD: 2.7 (ref 0.9–1.1)

## 2022-07-05 PROCEDURE — 36416 COLLJ CAPILLARY BLOOD SPEC: CPT

## 2022-07-05 PROCEDURE — 85610 PROTHROMBIN TIME: CPT

## 2022-07-05 NOTE — PROGRESS NOTES
ANTICOAGULATION MANAGEMENT     Ellie Leigh 89 year old female is on warfarin with therapeutic INR result. (Goal INR 2.0-3.0)    Recent labs: (last 7 days)     07/05/22  1330   INR 2.7*       ASSESSMENT       Source(s): Chart Review    Previous INR was Therapeutic last 2(+) visits    Medication, diet, health changes since last INR chart reviewed; none identified           PLAN     Recommended plan for no diet, medication or health factor changes affecting INR     Dosing Instructions: continue your current warfarin dose with next INR in 3 weeks       Summary  As of 7/5/2022    Full warfarin instructions:  1.5 mg every Mon, Fri; 1 mg all other days   Next INR check:  7/26/2022             Detailed voice message left for  Suyapa with dosing instructions and follow up date.     Contact [phone]  to schedule and with any changes, questions or concerns.     Education provided: Please call back if any changes to your diet, medications or how you've been taking warfarin, Importance of notifying clinic for changes in medications; a sooner lab recheck maybe needed., Importance of notifying clinic for diarrhea, nausea/vomiting, reduced intake, and/or illness; a sooner lab recheck maybe needed. and Contact [phone]  with any changes, questions or concerns.     Plan made per [facility] anticoagulation protocol    Ruiz Meredith RN  Anticoagulation Clinic  7/5/2022    _______________________________________________________________________     Anticoagulation Episode Summary     Current INR goal:  2.0-3.0   TTR:  85.3 % (11.9 mo)   Target end date:  Indefinite   Send INR reminders to:  [facility]    Indications    Atrial fibrillation with rapid ventricular response (H) (Resolved) [I48.91]  DVT (deep venous thrombosis) [I82.409]  Long term current use of anticoagulant therapy [Z79.01]  Intermittent atrial fibrillation (H) [I48.0]  Deep vein thrombosis (DVT) of proximal lower extremity  unspecified  chronicity  unspecified laterality (H) [I82.4Y9]           Comments:           Anticoagulation Care Providers     Provider Role Specialty Phone number    Anjum Vidales MD Referring Family Medicine [phone]

## 2022-07-14 ENCOUNTER — OFFICE VISIT (OUTPATIENT)
Dept: URGENT CARE | Facility: URGENT CARE | Age: 87
End: 2022-07-14
Payer: COMMERCIAL

## 2022-07-14 ENCOUNTER — ANTICOAGULATION THERAPY VISIT (OUTPATIENT)
Dept: ANTICOAGULATION | Facility: CLINIC | Age: 87
End: 2022-07-14

## 2022-07-14 VITALS
SYSTOLIC BLOOD PRESSURE: 134 MMHG | BODY MASS INDEX: 27.17 KG/M2 | TEMPERATURE: 97.7 F | DIASTOLIC BLOOD PRESSURE: 68 MMHG | HEART RATE: 92 BPM | WEIGHT: 130 LBS | OXYGEN SATURATION: 95 %

## 2022-07-14 DIAGNOSIS — R30.0 DYSURIA: ICD-10-CM

## 2022-07-14 DIAGNOSIS — I48.0 INTERMITTENT ATRIAL FIBRILLATION (H): ICD-10-CM

## 2022-07-14 DIAGNOSIS — E03.9 HYPOTHYROIDISM, UNSPECIFIED TYPE: ICD-10-CM

## 2022-07-14 DIAGNOSIS — N30.00 ACUTE CYSTITIS WITHOUT HEMATURIA: Primary | ICD-10-CM

## 2022-07-14 DIAGNOSIS — I82.4Y9 DEEP VEIN THROMBOSIS (DVT) OF PROXIMAL LOWER EXTREMITY, UNSPECIFIED CHRONICITY, UNSPECIFIED LATERALITY (H): ICD-10-CM

## 2022-07-14 DIAGNOSIS — I82.409 DVT (DEEP VENOUS THROMBOSIS) (H): Primary | ICD-10-CM

## 2022-07-14 DIAGNOSIS — Z79.01 LONG TERM CURRENT USE OF ANTICOAGULANT THERAPY: ICD-10-CM

## 2022-07-14 LAB
ALBUMIN UR-MCNC: ABNORMAL MG/DL
APPEARANCE UR: ABNORMAL
BACTERIA #/AREA URNS HPF: ABNORMAL /HPF
BILIRUB UR QL STRIP: NEGATIVE
COLOR UR AUTO: YELLOW
GLUCOSE UR STRIP-MCNC: NEGATIVE MG/DL
HGB UR QL STRIP: ABNORMAL
INR BLD: 2.7 (ref 0.9–1.1)
KETONES UR STRIP-MCNC: ABNORMAL MG/DL
LEUKOCYTE ESTERASE UR QL STRIP: ABNORMAL
NITRATE UR QL: NEGATIVE
PH UR STRIP: 6 [PH] (ref 5–7)
RBC #/AREA URNS AUTO: ABNORMAL /HPF
SP GR UR STRIP: 1.01 (ref 1–1.03)
SQUAMOUS #/AREA URNS AUTO: ABNORMAL /LPF
TSH SERPL DL<=0.005 MIU/L-ACNC: 2.3 MU/L (ref 0.4–4)
UROBILINOGEN UR STRIP-ACNC: 0.2 E.U./DL
WBC #/AREA URNS AUTO: ABNORMAL /HPF

## 2022-07-14 PROCEDURE — 85610 PROTHROMBIN TIME: CPT | Performed by: PHYSICIAN ASSISTANT

## 2022-07-14 PROCEDURE — 87086 URINE CULTURE/COLONY COUNT: CPT | Performed by: PHYSICIAN ASSISTANT

## 2022-07-14 PROCEDURE — 99214 OFFICE O/P EST MOD 30 MIN: CPT | Performed by: PHYSICIAN ASSISTANT

## 2022-07-14 PROCEDURE — 84443 ASSAY THYROID STIM HORMONE: CPT | Performed by: PHYSICIAN ASSISTANT

## 2022-07-14 PROCEDURE — 81001 URINALYSIS AUTO W/SCOPE: CPT | Performed by: PHYSICIAN ASSISTANT

## 2022-07-14 PROCEDURE — 36415 COLL VENOUS BLD VENIPUNCTURE: CPT | Performed by: PHYSICIAN ASSISTANT

## 2022-07-14 RX ORDER — CEPHALEXIN 500 MG/1
500 CAPSULE ORAL 2 TIMES DAILY
Qty: 14 CAPSULE | Refills: 0 | Status: SHIPPED | OUTPATIENT
Start: 2022-07-14 | End: 2022-07-21

## 2022-07-14 ASSESSMENT — ENCOUNTER SYMPTOMS
MUSCULOSKELETAL NEGATIVE: 1
RHINORRHEA: 0
CONSTITUTIONAL NEGATIVE: 1
DYSURIA: 1
FATIGUE: 0
SORE THROAT: 0
PALPITATIONS: 0
RESPIRATORY NEGATIVE: 1
NECK STIFFNESS: 0
COUGH: 0
CARDIOVASCULAR NEGATIVE: 1
HEMATURIA: 0
CHILLS: 0
DIZZINESS: 0
ABDOMINAL PAIN: 0
ADENOPATHY: 0
FREQUENCY: 1
FEVER: 0
VOMITING: 0
NEUROLOGICAL NEGATIVE: 1
HEADACHES: 0
FLANK PAIN: 0
ENDOCRINE NEGATIVE: 1
NAUSEA: 0
LIGHT-HEADEDNESS: 0
MYALGIAS: 0
SHORTNESS OF BREATH: 0
WEAKNESS: 0
DIARRHEA: 0
ACTIVITY CHANGE: 0
GASTROINTESTINAL NEGATIVE: 1
NECK PAIN: 0
POLYDIPSIA: 0
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
## 2022-07-14 NOTE — PROGRESS NOTES
ANTICOAGULATION MANAGEMENT     Ellie Leigh 89 year old female is on warfarin with therapeutic INR result. (Goal INR 2.0-3.0)    Recent labs: (last 7 days)     07/14/22  1118   INR 2.7*       ASSESSMENT       Source(s): Chart Review and Patient/Caregiver Call       Warfarin doses taken: Warfarin taken as instructed    Diet: No new diet changes identified    New illness, injury, or hospitalization: Yes: UTI    Medication/supplement changes: Keflex 500 mg PO BID for 7 days for UTI ([facility] visit today)    Signs or symptoms of bleeding or clotting: No    Previous INR: Therapeutic last 2(+) visits    Additional findings: None     PLAN     Recommended plan for no diet, medication or health factor changes affecting INR     Dosing Instructions: continue your current warfarin dose with next INR in 5 days       Summary  As of 7/14/2022    Full warfarin instructions:  1.5 mg every Mon, Fri; 1 mg all other days   Next INR check:  7/19/2022             Telephone call with  rose mary who verbalizes understanding and agrees to plan    Lab visit scheduled    Education provided: Please call back if any changes to your diet, medications or how you've been taking warfarin, Potential interaction between warfarin and keflex and Monitoring for bleeding signs and symptoms    Plan made per [facility] anticoagulation protocol    Kylah Dorsey RN  Anticoagulation Clinic  7/14/2022    _______________________________________________________________________     Anticoagulation Episode Summary     Current INR goal:  2.0-3.0   TTR:  86.7 % (1 y)   Target end date:  Indefinite   Send INR reminders to:  [facility]    Indications    Atrial fibrillation with rapid ventricular response (H) (Resolved) [I48.91]  DVT (deep venous thrombosis) [I82.409]  Long term current use of anticoagulant therapy [Z79.01]  Intermittent atrial fibrillation (H) [I48.0]  Deep vein thrombosis (DVT) of proximal lower extremity  unspecified chronicity  unspecified laterality  (H) [I82.4Y9]           Comments:           Anticoagulation Care Providers     Provider Role Specialty Phone number    Anjum Vidales MD Referring Family Medicine [phone]

## 2022-07-15 NOTE — RESULT ENCOUNTER NOTE
[facility] Emergency Dept discharge antibiotic (if prescribed): Cephalexin (Keflex) 500 mg capsule, 1 capsule (500 mg) by mouth 2 times daily for 7 days.   Date of Rx (if applicable):  7/14/22  No changes in treatment per [facility] ED Lab Result Urine culture protocol.

## 2022-07-16 LAB — BACTERIA UR CULT: NORMAL

## 2022-07-17 DIAGNOSIS — J47.9 BRONCHIECTASIS WITHOUT COMPLICATION (H): ICD-10-CM

## 2022-07-18 NOTE — TELEPHONE ENCOUNTER
Requested Prescriptions   Pending Prescriptions Disp Refills     sodium chloride 0.9 % neb solution [Pharmacy Med Name: Sodium Chloride Inhalation Nebulization Solution 0.9 %] 450 mL 0     Sig: Inhale 5 mLs into the lungs by nebulization 3 times daily. Use after albuterol in nebulizer to thin secretions.       There is no refill protocol information for this order          Unable to refill by RN as no protocol is in place for this medication. Forwarding to prescribing provider.       Kori CAMPO, Lead RN, BSN. . .  7/18/2022, 8:55 AM

## 2022-07-19 ENCOUNTER — LAB (OUTPATIENT)
Dept: LAB | Facility: CLINIC | Age: 87
End: 2022-07-19
Payer: COMMERCIAL

## 2022-07-19 ENCOUNTER — ANTICOAGULATION THERAPY VISIT (OUTPATIENT)
Dept: ANTICOAGULATION | Facility: CLINIC | Age: 87
End: 2022-07-19

## 2022-07-19 ENCOUNTER — HOSPITAL ENCOUNTER (OUTPATIENT)
Dept: CARDIOLOGY | Facility: CLINIC | Age: 87
Discharge: HOME | End: 2022-07-19
Attending: INTERNAL MEDICINE | Admitting: INTERNAL MEDICINE
Payer: COMMERCIAL

## 2022-07-19 ENCOUNTER — OFFICE VISIT (OUTPATIENT)
Dept: CARDIOLOGY | Facility: CLINIC | Age: 87
End: 2022-07-19
Payer: COMMERCIAL

## 2022-07-19 VITALS
BODY MASS INDEX: 27.34 KG/M2 | DIASTOLIC BLOOD PRESSURE: 65 MMHG | SYSTOLIC BLOOD PRESSURE: 134 MMHG | HEART RATE: 69 BPM | OXYGEN SATURATION: 94 % | WEIGHT: 130.8 LBS

## 2022-07-19 DIAGNOSIS — R55 SYNCOPE: ICD-10-CM

## 2022-07-19 DIAGNOSIS — I48.0 PAROXYSMAL ATRIAL FIBRILLATION (H): Primary | ICD-10-CM

## 2022-07-19 DIAGNOSIS — I82.4Y9 DEEP VEIN THROMBOSIS (DVT) OF PROXIMAL LOWER EXTREMITY, UNSPECIFIED CHRONICITY, UNSPECIFIED LATERALITY (H): ICD-10-CM

## 2022-07-19 DIAGNOSIS — Z79.01 LONG TERM CURRENT USE OF ANTICOAGULANT THERAPY: ICD-10-CM

## 2022-07-19 DIAGNOSIS — I48.0 INTERMITTENT ATRIAL FIBRILLATION (H): ICD-10-CM

## 2022-07-19 DIAGNOSIS — I82.409 DVT (DEEP VENOUS THROMBOSIS) (H): Primary | ICD-10-CM

## 2022-07-19 DIAGNOSIS — Z95.0 CARDIAC PACEMAKER IN SITU: ICD-10-CM

## 2022-07-19 DIAGNOSIS — I49.5 TACHY-BRADY SYNDROME (H): ICD-10-CM

## 2022-07-19 LAB — INR BLD: 2.7 (ref 0.9–1.1)

## 2022-07-19 PROCEDURE — 99213 OFFICE O/P EST LOW 20 MIN: CPT | Performed by: INTERNAL MEDICINE

## 2022-07-19 PROCEDURE — 93280 PM DEVICE PROGR EVAL DUAL: CPT

## 2022-07-19 PROCEDURE — 85610 PROTHROMBIN TIME: CPT

## 2022-07-19 PROCEDURE — 36416 COLLJ CAPILLARY BLOOD SPEC: CPT

## 2022-07-19 PROCEDURE — 93280 PM DEVICE PROGR EVAL DUAL: CPT | Mod: 26 | Performed by: INTERNAL MEDICINE

## 2022-07-19 RX ORDER — SODIUM CHLORIDE FOR INHALATION 0.9 %
VIAL, NEBULIZER (ML) INHALATION
Qty: 450 ML | Refills: 0 | Status: SHIPPED | OUTPATIENT
Start: 2022-07-19 | End: 2022-08-29

## 2022-07-19 NOTE — PROGRESS NOTES
Service Date: 2022    Thank you for allowing me to participate in the care of your delightful patient.  As you know, Thalia is a 89-year-old female with a history of tachybrady syndrome, noted to have occasional episodes of atrial fibrillation on her device for which she has remained asymptomatic and has been on warfarin because of it, whom I last saw about 2 years ago.  Over the interim period, the patient was doing quite well for the most part until this past  when she was seen in ED for COVID-19 infection.  She felt quite horrible around that time and had a couple episodes of near syncope.  Her device was checked today showing no evidence of any arrhythmias around that time, suggesting that it was not arrhythmia related, but perhaps it could be from hypotension.    She has recovered from that and is doing quite well.  She lives with her daughter, who has been taking excellent care of her.  She otherwise has no questions or concerns on today's visit.  Her vitals remain stable.  I congratulated the patient on her relatively good state of health and I will see her back in a couple years' time.    Philippe Orozco MD        D: 2022   T: 2022   MT: aadl    Name:     EARNESTINE JOSHI  MRN:      [id number]        Account:      [id number]   :      1930           Service Date: 2022       Document: [id number]
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
## 2022-07-19 NOTE — PROGRESS NOTES
ANTICOAGULATION MANAGEMENT     Ellie Leigh 89 year old female is on warfarin with therapeutic INR result. (Goal INR 2.0-3.0)    Recent labs: (last 7 days)     07/19/22  1511   INR 2.7*       ASSESSMENT       Source(s): Chart Review    Previous INR was Therapeutic last 2(+) visits    Medication, diet, health changes since last INR: On Keflex for 3 more days. INR has remained stable.           PLAN     Recommended plan for temporary change(s) affecting INR     Dosing Instructions: continue your current warfarin dose with next INR in 2 weeks. Patient may keep INR appointment next week or go out 2 weeks as INR has been stable. However if any new abx are prescribed, patient will need to come in at a sooner date.       Summary  As of 7/19/2022    Full warfarin instructions:  1.5 mg every Mon, Fri; 1 mg all other days   Next INR check:  8/2/2022             Detailed voice message left for  Suyapa with dosing instructions and follow up date.     Contact [phone]  to schedule and with any changes, questions or concerns.     Education provided: Please call back if any changes to your diet, medications or how you've been taking warfarin, Importance of notifying clinic for changes in medications; a sooner lab recheck maybe needed. and Contact [phone]  with any changes, questions or concerns.     Plan made per [facility] anticoagulation protocol    Ruiz Meredith RN  Anticoagulation Clinic  7/19/2022    _______________________________________________________________________     Anticoagulation Episode Summary     Current INR goal:  2.0-3.0   TTR:  87.1 % (1 y)   Target end date:  Indefinite   Send INR reminders to:  [facility]    Indications    Atrial fibrillation with rapid ventricular response (H) (Resolved) [I48.91]  DVT (deep venous thrombosis) [I82.409]  Long term current use of anticoagulant therapy [Z79.01]  Intermittent atrial fibrillation (H) [I48.0]  Deep vein thrombosis (DVT) of proximal lower  extremity  unspecified chronicity  unspecified laterality (H) [I82.4Y9]           Comments:           Anticoagulation Care Providers     Provider Role Specialty Phone number    Anjum Vidales MD Referring Family Medicine [phone]

## 2022-07-29 LAB
MDC_IDC_LEAD_IMPLANT_DT: NORMAL
MDC_IDC_LEAD_IMPLANT_DT: NORMAL
MDC_IDC_LEAD_LOCATION: NORMAL
MDC_IDC_LEAD_LOCATION: NORMAL
MDC_IDC_LEAD_LOCATION_DETAIL_1: NORMAL
MDC_IDC_LEAD_LOCATION_DETAIL_1: NORMAL
MDC_IDC_LEAD_MFG: NORMAL
MDC_IDC_LEAD_MFG: NORMAL
MDC_IDC_LEAD_MODEL: NORMAL
MDC_IDC_LEAD_MODEL: NORMAL
MDC_IDC_LEAD_POLARITY_TYPE: NORMAL
MDC_IDC_LEAD_POLARITY_TYPE: NORMAL
MDC_IDC_LEAD_SERIAL: NORMAL
MDC_IDC_LEAD_SERIAL: NORMAL
MDC_IDC_MSMT_BATTERY_STATUS: NORMAL
MDC_IDC_MSMT_LEADCHNL_RA_IMPEDANCE_VALUE: 390 OHM
MDC_IDC_MSMT_LEADCHNL_RA_PACING_THRESHOLD_AMPLITUDE: 0.7 V
MDC_IDC_MSMT_LEADCHNL_RA_PACING_THRESHOLD_AMPLITUDE: 0.7 V
MDC_IDC_MSMT_LEADCHNL_RA_PACING_THRESHOLD_PULSEWIDTH: 0.4 MS
MDC_IDC_MSMT_LEADCHNL_RA_PACING_THRESHOLD_PULSEWIDTH: 0.4 MS
MDC_IDC_MSMT_LEADCHNL_RA_SENSING_INTR_AMPL: 6.5 MV
MDC_IDC_MSMT_LEADCHNL_RV_IMPEDANCE_VALUE: 468 OHM
MDC_IDC_MSMT_LEADCHNL_RV_PACING_THRESHOLD_AMPLITUDE: 0.6 V
MDC_IDC_MSMT_LEADCHNL_RV_PACING_THRESHOLD_AMPLITUDE: 0.6 V
MDC_IDC_MSMT_LEADCHNL_RV_PACING_THRESHOLD_PULSEWIDTH: 0.4 MS
MDC_IDC_MSMT_LEADCHNL_RV_PACING_THRESHOLD_PULSEWIDTH: 0.4 MS
MDC_IDC_MSMT_LEADCHNL_RV_SENSING_INTR_AMPL: 15.7 MV
MDC_IDC_MSMT_LEADCHNL_RV_SENSING_INTR_AMPL: 6.5 MV
MDC_IDC_PG_IMPLANT_DTM: NORMAL
MDC_IDC_PG_MFG: NORMAL
MDC_IDC_PG_MODEL: NORMAL
MDC_IDC_PG_SERIAL: NORMAL
MDC_IDC_PG_TYPE: NORMAL
MDC_IDC_SESS_CLINIC_NAME: NORMAL
MDC_IDC_SESS_DTM: NORMAL
MDC_IDC_SESS_REPROGRAMMED: NORMAL
MDC_IDC_SESS_TYPE: NORMAL
MDC_IDC_SET_BRADY_AT_MODE_SWITCH_MODE: NORMAL
MDC_IDC_SET_BRADY_AT_MODE_SWITCH_RATE: 160 {BEATS}/MIN
MDC_IDC_SET_BRADY_HYSTRATE: 60 {BEATS}/MIN
MDC_IDC_SET_BRADY_LOWRATE: 60 {BEATS}/MIN
MDC_IDC_SET_BRADY_MAX_SENSOR_RATE: 125 {BEATS}/MIN
MDC_IDC_SET_BRADY_MAX_TRACKING_RATE: 130 {BEATS}/MIN
MDC_IDC_SET_BRADY_MODE: NORMAL
MDC_IDC_SET_BRADY_NIGHT_RATE: 60 {BEATS}/MIN
MDC_IDC_SET_BRADY_PAV_DELAY_HIGH: 150 MS
MDC_IDC_SET_BRADY_PAV_DELAY_LOW: 180 MS
MDC_IDC_SET_BRADY_SAV_DELAY_HIGH: 105 MS
MDC_IDC_SET_BRADY_SAV_DELAY_LOW: 135 MS
MDC_IDC_SET_CRT_PACED_CHAMBERS: NORMAL
MDC_IDC_SET_LEADCHNL_LV_PACING_CATHODE_ELECTRODE_1: NORMAL
MDC_IDC_SET_LEADCHNL_LV_PACING_CATHODE_LOCATION_1: NORMAL
MDC_IDC_SET_LEADCHNL_LV_PACING_POLARITY: NORMAL
MDC_IDC_SET_LEADCHNL_LV_SENSING_CATHODE_ELECTRODE_1: NORMAL
MDC_IDC_SET_LEADCHNL_LV_SENSING_CATHODE_LOCATION_1: NORMAL
MDC_IDC_SET_LEADCHNL_LV_SENSING_POLARITY: NORMAL
MDC_IDC_SET_LEADCHNL_RA_PACING_AMPLITUDE: 1.8 V
MDC_IDC_SET_LEADCHNL_RA_PACING_POLARITY: NORMAL
MDC_IDC_SET_LEADCHNL_RA_PACING_PULSEWIDTH: 0.4 MS
MDC_IDC_SET_LEADCHNL_RA_SENSING_ADAPTATION_MODE: NORMAL
MDC_IDC_SET_LEADCHNL_RA_SENSING_POLARITY: NORMAL
MDC_IDC_SET_LEADCHNL_RA_SENSING_SENSITIVITY: NORMAL
MDC_IDC_SET_LEADCHNL_RV_PACING_AMPLITUDE: 1.6 V
MDC_IDC_SET_LEADCHNL_RV_PACING_POLARITY: NORMAL
MDC_IDC_SET_LEADCHNL_RV_PACING_PULSEWIDTH: 0.4 MS
MDC_IDC_SET_LEADCHNL_RV_SENSING_ADAPTATION_MODE: NORMAL
MDC_IDC_SET_LEADCHNL_RV_SENSING_POLARITY: NORMAL
MDC_IDC_SET_LEADCHNL_RV_SENSING_SENSITIVITY: NORMAL

## 2022-08-02 ENCOUNTER — ANTICOAGULATION THERAPY VISIT (OUTPATIENT)
Dept: ANTICOAGULATION | Facility: CLINIC | Age: 87
End: 2022-08-02

## 2022-08-02 ENCOUNTER — LAB (OUTPATIENT)
Dept: LAB | Facility: CLINIC | Age: 87
End: 2022-08-02
Payer: COMMERCIAL

## 2022-08-02 DIAGNOSIS — I82.4Y9 DEEP VEIN THROMBOSIS (DVT) OF PROXIMAL LOWER EXTREMITY, UNSPECIFIED CHRONICITY, UNSPECIFIED LATERALITY (H): ICD-10-CM

## 2022-08-02 DIAGNOSIS — I48.0 INTERMITTENT ATRIAL FIBRILLATION (H): ICD-10-CM

## 2022-08-02 DIAGNOSIS — Z79.01 LONG TERM CURRENT USE OF ANTICOAGULANT THERAPY: ICD-10-CM

## 2022-08-02 DIAGNOSIS — I82.409 DVT (DEEP VENOUS THROMBOSIS) (H): Primary | ICD-10-CM

## 2022-08-02 LAB — INR BLD: 2.2 (ref 0.9–1.1)

## 2022-08-02 PROCEDURE — 85610 PROTHROMBIN TIME: CPT

## 2022-08-02 PROCEDURE — 36416 COLLJ CAPILLARY BLOOD SPEC: CPT

## 2022-08-02 NOTE — PROGRESS NOTES
ANTICOAGULATION MANAGEMENT     Ellie Leigh 89 year old female is on warfarin with therapeutic INR result. (Goal INR 2.0-3.0)    Recent labs: (last 7 days)     08/02/22  1537   INR 2.2*       ASSESSMENT       Source(s): Chart Review and Patient/Caregiver Call       Warfarin doses taken: Warfarin taken as instructed    Diet: No new diet changes identified    New illness, injury, or hospitalization: No    Medication/supplement changes: None noted    Signs or symptoms of bleeding or clotting: No    Previous INR: Therapeutic last 2(+) visits    Additional findings: None       PLAN     Recommended plan for no diet, medication or health factor changes affecting INR     Dosing Instructions: Continue your current warfarin dose with next INR in 4 weeks       Summary  As of 8/2/2022    Full warfarin instructions:  1.5 mg every Mon, Fri; 1 mg all other days   Next INR check:  8/30/2022             Telephone call with  Chris (on speaker) who verbalizes understanding and agrees to plan    Lab visit scheduled    Education provided: Importance of notifying clinic for changes in medications; a sooner lab recheck maybe needed., Importance of notifying clinic for diarrhea, nausea/vomiting, reduced intake, and/or illness; a sooner lab recheck maybe needed. and Contact [phone]  with any changes, questions or concerns.     Plan made per [facility] anticoagulation protocol    Gina Persaud, RN  Anticoagulation Clinic  8/2/2022    _______________________________________________________________________     Anticoagulation Episode Summary     Current INR goal:  2.0-3.0   TTR:  88.9 % (1 y)   Target end date:  Indefinite   Send INR reminders to:  [facility]    Indications    Atrial fibrillation with rapid ventricular response (H) (Resolved) [I48.91]  DVT (deep venous thrombosis) [I82.409]  Long term current use of anticoagulant therapy [Z79.01]  Intermittent atrial fibrillation (H) [I48.0]  Deep vein thrombosis  (DVT) of proximal lower extremity  unspecified chronicity  unspecified laterality (H) [I82.4Y9]           Comments:           Anticoagulation Care Providers     Provider Role Specialty Phone number    Anjum Vidales MD Referring Family Medicine [phone]

## 2022-08-03 DIAGNOSIS — K21.9 GASTROESOPHAGEAL REFLUX DISEASE WITHOUT ESOPHAGITIS: ICD-10-CM

## 2022-08-04 RX ORDER — OMEPRAZOLE 40 MG/1
CAPSULE, DELAYED RELEASE ORAL
Qty: 90 CAPSULE | Refills: 0 | Status: SHIPPED | OUTPATIENT
Start: 2022-08-04 | End: 2022-11-07

## 2022-08-04 NOTE — TELEPHONE ENCOUNTER
"Requested Prescriptions   Pending Prescriptions Disp Refills     omeprazole (PRILOSEC) 40 MG DR capsule [Pharmacy Med Name: Omeprazole Oral Capsule Delayed Release 40 MG] 90 capsule 0     Sig: Take 1 capsule (40 mg) by mouth once daily.       PPI Protocol Failed - 8/3/2022  2:01 AM        Failed - No diagnosis of osteoporosis on record        Passed - Not on Clopidogrel (unless Pantoprazole ordered)        Passed - Recent (12 mo) or future (30 days) visit within the authorizing provider's specialty     Patient has had an office visit with the authorizing provider or a provider within the authorizing providers department within the previous 12 mos or has a future within next 30 days. See \"Patient Info\" tab in inbasket, or \"Choose Columns\" in Meds & Orders section of the refill encounter.              Passed - Medication is active on med list        Passed - Patient is age 18 or older        Passed - No active pregnacy on record        Passed - No positive pregnancy test in past 12 months           Last Written Prescription Date:  2/7/21  Last Fill Quantity: 90,  # refills: 1   Last office visit: 4/29/2022 with prescribing provider:     Future Office Visit:            "

## 2022-08-06 DIAGNOSIS — K21.9 GASTROESOPHAGEAL REFLUX DISEASE WITHOUT ESOPHAGITIS: ICD-10-CM

## 2022-08-08 RX ORDER — OMEPRAZOLE 40 MG/1
CAPSULE, DELAYED RELEASE ORAL
Qty: 90 CAPSULE | Refills: 0 | OUTPATIENT
Start: 2022-08-08

## 2022-08-08 NOTE — TELEPHONE ENCOUNTER
Spoke with [facility], they do have the refill for omeprazole so can cancel this request.  Rosemary SANTO RN

## 2022-08-29 DIAGNOSIS — J47.9 BRONCHIECTASIS WITHOUT COMPLICATION (H): ICD-10-CM

## 2022-08-29 RX ORDER — SODIUM CHLORIDE FOR INHALATION 0.9 %
VIAL, NEBULIZER (ML) INHALATION
Qty: 450 ML | Refills: 0 | Status: SHIPPED | OUTPATIENT
Start: 2022-08-29 | End: 2022-10-19

## 2022-08-29 NOTE — TELEPHONE ENCOUNTER
Future Office Visit:      Requested Prescriptions   Pending Prescriptions Disp Refills     sodium chloride 0.9 % neb solution 450 mL 0     Sig: Inhale 5 mLs into the lungs by nebulization 3 times daily. Use after albuterol in nebulizer to thin secretions.       There is no refill protocol information for this order          Unable to fill per RN protocol, will forward to provider for review.     Karla Fuchs RN on 8/29/2022 at 8:58 AM

## 2022-08-30 ENCOUNTER — ANTICOAGULATION THERAPY VISIT (OUTPATIENT)
Dept: ANTICOAGULATION | Facility: CLINIC | Age: 87
End: 2022-08-30

## 2022-08-30 ENCOUNTER — LAB (OUTPATIENT)
Dept: LAB | Facility: CLINIC | Age: 87
End: 2022-08-30
Payer: COMMERCIAL

## 2022-08-30 DIAGNOSIS — I48.0 INTERMITTENT ATRIAL FIBRILLATION (H): ICD-10-CM

## 2022-08-30 DIAGNOSIS — I82.4Y9 DEEP VEIN THROMBOSIS (DVT) OF PROXIMAL LOWER EXTREMITY, UNSPECIFIED CHRONICITY, UNSPECIFIED LATERALITY (H): ICD-10-CM

## 2022-08-30 DIAGNOSIS — I82.409 DVT (DEEP VENOUS THROMBOSIS) (H): Primary | ICD-10-CM

## 2022-08-30 DIAGNOSIS — Z79.01 LONG TERM CURRENT USE OF ANTICOAGULANT THERAPY: ICD-10-CM

## 2022-08-30 LAB — INR BLD: 2.5 (ref 0.9–1.1)

## 2022-08-30 PROCEDURE — 85610 PROTHROMBIN TIME: CPT

## 2022-08-30 PROCEDURE — 36416 COLLJ CAPILLARY BLOOD SPEC: CPT

## 2022-08-30 NOTE — PROGRESS NOTES
ANTICOAGULATION MANAGEMENT     Ellie Leigh 89 year old female is on warfarin with therapeutic INR result. (Goal INR 2.0-3.0)    Recent labs: (last 7 days)     08/30/22  1341   INR 2.5*       ASSESSMENT       Source(s): Chart Review and Patient/Caregiver Call       Warfarin doses taken: Warfarin taken as instructed    Diet: No new diet changes identified    New illness, injury, or hospitalization: No    Medication/supplement changes: None noted    Signs or symptoms of bleeding or clotting: No    Previous INR: Therapeutic last 2(+) visits    Additional findings: None       PLAN     Recommended plan for no diet, medication or health factor changes affecting INR     Dosing Instructions: Continue your current warfarin dose with next INR in 5 weeks       Summary  As of 8/30/2022    Full warfarin instructions:  1.5 mg every Mon, Fri; 1 mg all other days   Next INR check:  10/4/2022             Telephone call with Ellie who verbalizes understanding and agrees to plan    Lab visit scheduled    Education provided: Contact [phone]  with any changes, questions or concerns.     Plan made per [facility] anticoagulation protocol    Gina Persaud RN  Anticoagulation Clinic  8/30/2022    _______________________________________________________________________     Anticoagulation Episode Summary     Current INR goal:  2.0-3.0   TTR:  90.5 % (1 y)   Target end date:  Indefinite   Send INR reminders to:  [facility]    Indications    Atrial fibrillation with rapid ventricular response (H) (Resolved) [I48.91]  DVT (deep venous thrombosis) [I82.409]  Long term current use of anticoagulant therapy [Z79.01]  Intermittent atrial fibrillation (H) [I48.0]  Deep vein thrombosis (DVT) of proximal lower extremity  unspecified chronicity  unspecified laterality (H) [I82.4Y9]           Comments:           Anticoagulation Care Providers     Provider Role Specialty Phone number    Anjum Vidales MD Referring Family Medicine  [phone]

## 2022-09-20 DIAGNOSIS — E03.9 HYPOTHYROIDISM, UNSPECIFIED TYPE: Chronic | ICD-10-CM

## 2022-09-20 DIAGNOSIS — R11.0 NAUSEA: ICD-10-CM

## 2022-09-20 DIAGNOSIS — F41.9 ANXIETY: ICD-10-CM

## 2022-09-20 RX ORDER — LEVOTHYROXINE SODIUM 50 UG/1
50 TABLET ORAL DAILY
Qty: 90 TABLET | Refills: 3 | Status: SHIPPED | OUTPATIENT
Start: 2022-09-20

## 2022-09-20 RX ORDER — MIRTAZAPINE 15 MG/1
15 TABLET, FILM COATED ORAL AT BEDTIME
Qty: 90 TABLET | Refills: 1 | Status: SHIPPED | OUTPATIENT
Start: 2022-09-20 | End: 2022-11-10

## 2022-10-04 ENCOUNTER — ANTICOAGULATION THERAPY VISIT (OUTPATIENT)
Dept: ANTICOAGULATION | Facility: CLINIC | Age: 87
End: 2022-10-04

## 2022-10-04 ENCOUNTER — LAB (OUTPATIENT)
Dept: LAB | Facility: CLINIC | Age: 87
End: 2022-10-04
Payer: COMMERCIAL

## 2022-10-04 DIAGNOSIS — Z79.01 LONG TERM CURRENT USE OF ANTICOAGULANT THERAPY: ICD-10-CM

## 2022-10-04 DIAGNOSIS — I82.409 DVT (DEEP VENOUS THROMBOSIS) (H): Primary | ICD-10-CM

## 2022-10-04 DIAGNOSIS — I82.4Y9 DEEP VEIN THROMBOSIS (DVT) OF PROXIMAL LOWER EXTREMITY, UNSPECIFIED CHRONICITY, UNSPECIFIED LATERALITY (H): ICD-10-CM

## 2022-10-04 DIAGNOSIS — I48.0 INTERMITTENT ATRIAL FIBRILLATION (H): ICD-10-CM

## 2022-10-04 LAB — INR BLD: 2.6 (ref 0.9–1.1)

## 2022-10-04 PROCEDURE — 85610 PROTHROMBIN TIME: CPT

## 2022-10-04 PROCEDURE — 36416 COLLJ CAPILLARY BLOOD SPEC: CPT

## 2022-10-04 NOTE — PROGRESS NOTES
ANTICOAGULATION MANAGEMENT     Ellie Leigh 89 year old female is on warfarin with therapeutic INR result. (Goal INR 2.0-3.0)    Recent labs: (last 7 days)     10/04/22  1502   INR 2.6*       ASSESSMENT       Source(s): Chart Review    Previous INR was Therapeutic last 2(+) visits    Medication, diet, health changes since last INR chart reviewed; none identified           PLAN     Recommended plan for no diet, medication or health factor changes affecting INR     Dosing Instructions: Continue your current warfarin dose with next INR in 6 weeks       Summary  As of 10/4/2022    Full warfarin instructions:  1.5 mg every Mon, Fri; 1 mg all other days   Next INR check:  11/15/2022             Detailed voice message left for  Suyapa with dosing instructions and follow up date. [employer] also sent.    Contact [phone]  to schedule and with any changes, questions or concerns.     Education provided: Please call back if any changes to your diet, medications or how you've been taking warfarin    Plan made per [facility] anticoagulation protocol    Gina Persaud RN  Anticoagulation Clinic  10/4/2022    _______________________________________________________________________     Anticoagulation Episode Summary     Current INR goal:  2.0-3.0   TTR:  90.5 % (1 y)   Target end date:  Indefinite   Send INR reminders to:  [facility]    Indications    Atrial fibrillation with rapid ventricular response (H) (Resolved) [I48.91]  DVT (deep venous thrombosis) [I82.409]  Long term current use of anticoagulant therapy [Z79.01]  Intermittent atrial fibrillation (H) [I48.0]  Deep vein thrombosis (DVT) of proximal lower extremity  unspecified chronicity  unspecified laterality (H) [I82.4Y9]           Comments:           Anticoagulation Care Providers     Provider Role Specialty Phone number    Anjum Vidales MD Referring Family Medicine [phone]

## 2022-10-11 DIAGNOSIS — J47.9 BRONCHIECTASIS WITHOUT COMPLICATION (H): Primary | ICD-10-CM

## 2022-10-11 RX ORDER — FLUTICASONE PROPIONATE AND SALMETEROL 250; 50 UG/1; UG/1
1 POWDER RESPIRATORY (INHALATION) EVERY 12 HOURS
Qty: 60 EACH | Refills: 1 | Status: SHIPPED | OUTPATIENT
Start: 2022-10-11 | End: 2022-11-10

## 2022-10-11 NOTE — TELEPHONE ENCOUNTER
"ACT sent to patient via [employer].     Routing refill request to provider for review/approval because:  ACT out of date. Has not been seen in over a year.     ACT Total Scores 6/10/2020 12/9/2020 1/8/2021   ACT TOTAL SCORE - - -   ASTHMA ER VISITS - - -   ASTHMA HOSPITALIZATIONS - - -   ACT TOTAL SCORE (Goal Greater than or Equal to 20) 19 17 20   In the past 12 months, how many times did you visit the emergency room for your asthma without being admitted to the hospital? 0 0 0   In the past 12 months, how many times were you hospitalized overnight because of your asthma? 0 0 0       Requested Prescriptions   Pending Prescriptions Disp Refills     fluticasone-salmeterol (ADVAIR) 250-50 MCG/ACT inhaler 60 each 1     Sig: Inhale 1 puff into the lungs every 12 hours       Inhaled Steroids Protocol Failed - 10/11/2022  8:47 AM        Failed - Asthma control assessment score within normal limits in last 6 months     Please review ACT score.           Failed - Recent (6 mo) or future (30 days) visit within the authorizing provider's specialty     Patient had office visit in the last 6 months or has a visit in the next 30 days with authorizing provider or within the authorizing provider's specialty.  See \"Patient Info\" tab in inbasket, or \"Choose Columns\" in Meds & Orders section of the refill encounter.            Passed - Patient is age 12 or older        Passed - Medication is active on med list       Long-Acting Beta Agonist Inhalers Protocol  Failed - 10/11/2022  8:47 AM        Failed - Asthma control assessment score within normal limits in last 6 months     Please review ACT score.           Failed - Recent (6 mo) or future (30 days) visit within the authorizing provider's specialty     Patient had office visit in the last 6 months or has a visit in the next 30 days with authorizing provider or within the authorizing provider's specialty.  See \"Patient Info\" tab in inbasket, or \"Choose Columns\" in Meds & Orders " section of the refill encounter.            Passed - Patient is age 12 or older        Passed - Medication is active on med list             Angy Castanon RN   [facility]

## 2022-10-17 ENCOUNTER — ANCILLARY PROCEDURE (OUTPATIENT)
Dept: CARDIOLOGY | Facility: CLINIC | Age: 87
End: 2022-10-17
Attending: INTERNAL MEDICINE
Payer: COMMERCIAL

## 2022-10-17 DIAGNOSIS — Z95.0 CARDIAC PACEMAKER IN SITU: ICD-10-CM

## 2022-10-17 DIAGNOSIS — I49.5 TACHY-BRADY SYNDROME (H): ICD-10-CM

## 2022-10-17 DIAGNOSIS — R55 SYNCOPE: ICD-10-CM

## 2022-10-17 PROCEDURE — 93296 REM INTERROG EVL PM/IDS: CPT | Performed by: INTERNAL MEDICINE

## 2022-10-17 PROCEDURE — 93294 REM INTERROG EVL PM/LDLS PM: CPT | Performed by: INTERNAL MEDICINE

## 2022-10-19 DIAGNOSIS — J44.9 CHRONIC OBSTRUCTIVE PULMONARY DISEASE, UNSPECIFIED COPD TYPE (H): ICD-10-CM

## 2022-10-19 DIAGNOSIS — J47.9 BRONCHIECTASIS WITHOUT COMPLICATION (H): ICD-10-CM

## 2022-10-19 RX ORDER — SODIUM CHLORIDE FOR INHALATION 0.9 %
VIAL, NEBULIZER (ML) INHALATION
Qty: 450 ML | Refills: 0 | Status: SHIPPED | OUTPATIENT
Start: 2022-10-19 | End: 2022-11-10

## 2022-10-19 RX ORDER — IPRATROPIUM BROMIDE AND ALBUTEROL SULFATE 2.5; .5 MG/3ML; MG/3ML
SOLUTION RESPIRATORY (INHALATION)
Qty: 270 ML | Refills: 11 | Status: SHIPPED | OUTPATIENT
Start: 2022-10-19 | End: 2023-01-01

## 2022-10-19 NOTE — TELEPHONE ENCOUNTER
Sodium chloride inhalation:   Last office visit: 10/8/2021  Last refill: 8/29/2022 450ml    Ipratropium-albuterol inhalation:  Last refill: 10/8/2021 270ml abdoulaye Gibbons/CHERY

## 2022-10-19 NOTE — TELEPHONE ENCOUNTER
Called patient to request a follow-up for an asthma assessment. Patient would prefer to follow-up with her PCP and will call her PCP's clinic to make an appointment.     Routing refill to her PCP, Dr. Vidales, to address refill request. Patient has about 10 days left of her medications and will need a refill before her appointment as she is not able to get scheduled with Dr. Vidales before medication runs out.     Suha Haley RN on 10/19/2022 at 12:24 PM

## 2022-10-28 LAB
MDC_IDC_LEAD_IMPLANT_DT: NORMAL
MDC_IDC_LEAD_IMPLANT_DT: NORMAL
MDC_IDC_LEAD_LOCATION: NORMAL
MDC_IDC_LEAD_LOCATION: NORMAL
MDC_IDC_LEAD_LOCATION_DETAIL_1: NORMAL
MDC_IDC_LEAD_LOCATION_DETAIL_1: NORMAL
MDC_IDC_LEAD_MFG: NORMAL
MDC_IDC_LEAD_MFG: NORMAL
MDC_IDC_LEAD_MODEL: NORMAL
MDC_IDC_LEAD_MODEL: NORMAL
MDC_IDC_LEAD_POLARITY_TYPE: NORMAL
MDC_IDC_LEAD_POLARITY_TYPE: NORMAL
MDC_IDC_LEAD_SERIAL: NORMAL
MDC_IDC_LEAD_SERIAL: NORMAL
MDC_IDC_MSMT_BATTERY_REMAINING_PERCENTAGE: 40 %
MDC_IDC_MSMT_BATTERY_STATUS: NORMAL
MDC_IDC_MSMT_LEADCHNL_RA_IMPEDANCE_VALUE: 371 OHM
MDC_IDC_MSMT_LEADCHNL_RA_LEAD_CHANNEL_STATUS: NORMAL
MDC_IDC_MSMT_LEADCHNL_RA_PACING_THRESHOLD_AMPLITUDE: 0.8 V
MDC_IDC_MSMT_LEADCHNL_RA_PACING_THRESHOLD_PULSEWIDTH: 0.4 MS
MDC_IDC_MSMT_LEADCHNL_RV_IMPEDANCE_VALUE: 448 OHM
MDC_IDC_MSMT_LEADCHNL_RV_LEAD_CHANNEL_STATUS: NORMAL
MDC_IDC_MSMT_LEADCHNL_RV_PACING_THRESHOLD_AMPLITUDE: 0.6 V
MDC_IDC_MSMT_LEADCHNL_RV_PACING_THRESHOLD_PULSEWIDTH: 0.4 MS
MDC_IDC_PG_IMPLANT_DTM: NORMAL
MDC_IDC_PG_MFG: NORMAL
MDC_IDC_PG_MODEL: NORMAL
MDC_IDC_PG_SERIAL: NORMAL
MDC_IDC_PG_TYPE: NORMAL
MDC_IDC_SESS_CLINIC_NAME: NORMAL
MDC_IDC_SESS_DTM: NORMAL
MDC_IDC_SESS_REPROGRAMMED: NO
MDC_IDC_SESS_TYPE: NORMAL
MDC_IDC_SET_BRADY_AT_MODE_SWITCH_MODE: NORMAL
MDC_IDC_SET_BRADY_AT_MODE_SWITCH_RATE: 160 {BEATS}/MIN
MDC_IDC_SET_BRADY_LOWRATE: 60 {BEATS}/MIN
MDC_IDC_SET_BRADY_MAX_SENSOR_RATE: 125 {BEATS}/MIN
MDC_IDC_SET_BRADY_MAX_TRACKING_RATE: 130 {BEATS}/MIN
MDC_IDC_SET_BRADY_MODE: NORMAL
MDC_IDC_SET_BRADY_PAV_DELAY_HIGH: 150 MS
MDC_IDC_SET_BRADY_PAV_DELAY_LOW: 180 MS
MDC_IDC_SET_BRADY_SAV_DELAY_HIGH: 105 MS
MDC_IDC_SET_BRADY_SAV_DELAY_LOW: 135 MS
MDC_IDC_SET_LEADCHNL_RA_PACING_POLARITY: NORMAL
MDC_IDC_SET_LEADCHNL_RA_PACING_PULSEWIDTH: 0.4 MS
MDC_IDC_SET_LEADCHNL_RA_SENSING_ADAPTATION_MODE: NORMAL
MDC_IDC_SET_LEADCHNL_RA_SENSING_POLARITY: NORMAL
MDC_IDC_SET_LEADCHNL_RV_PACING_POLARITY: NORMAL
MDC_IDC_SET_LEADCHNL_RV_PACING_PULSEWIDTH: 0.4 MS
MDC_IDC_SET_LEADCHNL_RV_SENSING_ADAPTATION_MODE: NORMAL
MDC_IDC_SET_LEADCHNL_RV_SENSING_POLARITY: NORMAL
MDC_IDC_STAT_AT_BURDEN_PERCENT: 0 %
MDC_IDC_STAT_AT_DTM_END: NORMAL
MDC_IDC_STAT_AT_DTM_START: NORMAL
MDC_IDC_STAT_AT_MODE_SW_COUNT_PER_DAY: 0
MDC_IDC_STAT_AT_MODE_SW_PERCENT_TIME_PER_DAY: 0 %
MDC_IDC_STAT_BRADY_AP_VP_PERCENT: 0 %
MDC_IDC_STAT_BRADY_AP_VS_PERCENT: 82 %
MDC_IDC_STAT_BRADY_AS_VP_PERCENT: 0 %
MDC_IDC_STAT_BRADY_AS_VS_PERCENT: 17 %
MDC_IDC_STAT_BRADY_DTM_END: NORMAL
MDC_IDC_STAT_BRADY_DTM_START: NORMAL
MDC_IDC_STAT_BRADY_RA_PERCENT_PACED: 83 %
MDC_IDC_STAT_BRADY_RV_PERCENT_PACED: 0 %
MDC_IDC_STAT_CRT_DTM_END: NORMAL
MDC_IDC_STAT_CRT_DTM_START: NORMAL

## 2022-11-04 DIAGNOSIS — K21.9 GASTROESOPHAGEAL REFLUX DISEASE WITHOUT ESOPHAGITIS: ICD-10-CM

## 2022-11-07 DIAGNOSIS — K21.9 GASTROESOPHAGEAL REFLUX DISEASE WITHOUT ESOPHAGITIS: ICD-10-CM

## 2022-11-07 RX ORDER — OMEPRAZOLE 40 MG/1
CAPSULE, DELAYED RELEASE ORAL
Qty: 90 CAPSULE | Refills: 0 | Status: SHIPPED | OUTPATIENT
Start: 2022-11-07 | End: 2022-11-10

## 2022-11-07 NOTE — TELEPHONE ENCOUNTER
"Requested Prescriptions   Pending Prescriptions Disp Refills    omeprazole (PRILOSEC) 40 MG DR capsule 90 capsule 0       PPI Protocol Failed - 11/4/2022 12:26 PM        Failed - No diagnosis of osteoporosis on record        Passed - Not on Clopidogrel (unless Pantoprazole ordered)        Passed - Recent (12 mo) or future (30 days) visit within the authorizing provider's specialty     Patient has had an office visit with the authorizing provider or a provider within the authorizing providers department within the previous 12 mos or has a future within next 30 days. See \"Patient Info\" tab in inbasket, or \"Choose Columns\" in Meds & Orders section of the refill encounter.              Passed - Medication is active on med list        Passed - Patient is age 18 or older        Passed - No active pregnacy on record        Passed - No positive pregnancy test in past 12 months             "

## 2022-11-08 RX ORDER — OMEPRAZOLE 40 MG/1
CAPSULE, DELAYED RELEASE ORAL
Qty: 90 CAPSULE | Refills: 0 | OUTPATIENT
Start: 2022-11-08

## 2022-11-10 ENCOUNTER — OFFICE VISIT (OUTPATIENT)
Dept: FAMILY MEDICINE | Facility: CLINIC | Age: 87
End: 2022-11-10
Payer: COMMERCIAL

## 2022-11-10 VITALS
RESPIRATION RATE: 18 BRPM | TEMPERATURE: 97.7 F | HEART RATE: 70 BPM | OXYGEN SATURATION: 96 % | WEIGHT: 132 LBS | BODY MASS INDEX: 27.71 KG/M2 | SYSTOLIC BLOOD PRESSURE: 130 MMHG | DIASTOLIC BLOOD PRESSURE: 66 MMHG | HEIGHT: 58 IN

## 2022-11-10 DIAGNOSIS — R11.0 NAUSEA: ICD-10-CM

## 2022-11-10 DIAGNOSIS — Z23 NEED FOR PROPHYLACTIC VACCINATION AND INOCULATION AGAINST INFLUENZA: ICD-10-CM

## 2022-11-10 DIAGNOSIS — F41.9 ANXIETY: ICD-10-CM

## 2022-11-10 DIAGNOSIS — J47.9 BRONCHIECTASIS WITHOUT COMPLICATION (H): Primary | ICD-10-CM

## 2022-11-10 DIAGNOSIS — E87.1 HYPONATREMIA: ICD-10-CM

## 2022-11-10 DIAGNOSIS — I10 ESSENTIAL HYPERTENSION, BENIGN: ICD-10-CM

## 2022-11-10 DIAGNOSIS — Z23 HIGH PRIORITY FOR 2019-NCOV VACCINE: ICD-10-CM

## 2022-11-10 DIAGNOSIS — K21.9 GASTROESOPHAGEAL REFLUX DISEASE WITHOUT ESOPHAGITIS: ICD-10-CM

## 2022-11-10 PROCEDURE — 91312 COVID-19,PF,PFIZER BOOSTER BIVALENT: CPT | Performed by: FAMILY MEDICINE

## 2022-11-10 PROCEDURE — G0008 ADMIN INFLUENZA VIRUS VAC: HCPCS | Mod: 59 | Performed by: FAMILY MEDICINE

## 2022-11-10 PROCEDURE — 0124A COVID-19,PF,PFIZER BOOSTER BIVALENT: CPT | Performed by: FAMILY MEDICINE

## 2022-11-10 PROCEDURE — 90662 IIV NO PRSV INCREASED AG IM: CPT | Performed by: FAMILY MEDICINE

## 2022-11-10 PROCEDURE — 99214 OFFICE O/P EST MOD 30 MIN: CPT | Mod: 25 | Performed by: FAMILY MEDICINE

## 2022-11-10 RX ORDER — METOPROLOL SUCCINATE 50 MG/1
50 TABLET, EXTENDED RELEASE ORAL DAILY
Qty: 90 TABLET | Refills: 3 | Status: SHIPPED | OUTPATIENT
Start: 2022-11-10 | End: 2023-01-01

## 2022-11-10 RX ORDER — ALBUTEROL SULFATE 90 UG/1
AEROSOL, METERED RESPIRATORY (INHALATION)
Qty: 8.5 G | Refills: 3 | Status: SHIPPED | OUTPATIENT
Start: 2022-11-10

## 2022-11-10 RX ORDER — FAMOTIDINE 20 MG/1
20 TABLET, FILM COATED ORAL 2 TIMES DAILY
Qty: 60 TABLET | Refills: 11 | Status: SHIPPED | OUTPATIENT
Start: 2022-11-10 | End: 2023-01-01

## 2022-11-10 RX ORDER — FLUTICASONE PROPIONATE AND SALMETEROL 250; 50 UG/1; UG/1
1 POWDER RESPIRATORY (INHALATION) EVERY 12 HOURS
Qty: 60 EACH | Refills: 11 | Status: SHIPPED | OUTPATIENT
Start: 2022-11-10 | End: 2023-01-01

## 2022-11-10 RX ORDER — ALBUTEROL SULFATE 0.83 MG/ML
2.5 SOLUTION RESPIRATORY (INHALATION) 3 TIMES DAILY
Qty: 360 ML | Refills: 6 | Status: CANCELLED | OUTPATIENT
Start: 2022-11-10

## 2022-11-10 RX ORDER — SODIUM CHLORIDE FOR INHALATION 0.9 %
VIAL, NEBULIZER (ML) INHALATION
Qty: 450 ML | Refills: 0 | Status: SHIPPED | OUTPATIENT
Start: 2022-11-10 | End: 2022-01-01

## 2022-11-10 RX ORDER — SODIUM CHLORIDE 1 G/1
1 TABLET ORAL 2 TIMES DAILY
Qty: 180 TABLET | Refills: 3 | Status: ON HOLD | OUTPATIENT
Start: 2022-11-10 | End: 2023-01-01

## 2022-11-10 RX ORDER — MIRTAZAPINE 15 MG/1
15 TABLET, FILM COATED ORAL AT BEDTIME
Qty: 90 TABLET | Refills: 1 | Status: SHIPPED | OUTPATIENT
Start: 2022-11-10 | End: 2023-01-01

## 2022-11-10 ASSESSMENT — PATIENT HEALTH QUESTIONNAIRE - PHQ9
SUM OF ALL RESPONSES TO PHQ QUESTIONS 1-9: 1
SUM OF ALL RESPONSES TO PHQ QUESTIONS 1-9: 1
10. IF YOU CHECKED OFF ANY PROBLEMS, HOW DIFFICULT HAVE THESE PROBLEMS MADE IT FOR YOU TO DO YOUR WORK, TAKE CARE OF THINGS AT HOME, OR GET ALONG WITH OTHER PEOPLE: NOT DIFFICULT AT ALL

## 2022-11-10 ASSESSMENT — PAIN SCALES - GENERAL: PAINLEVEL: NO PAIN (0)

## 2022-11-10 NOTE — PATIENT INSTRUCTIONS
ASSESSMENT:   (J47.9) Bronchiectasis without complication (H)  (primary encounter diagnosis)  Comment: doing well at this time  Plan: albuterol (PROAIR HFA) 108 (90 Base) MCG/ACT         inhaler, fluticasone-salmeterol (ADVAIR) 250-50        MCG/ACT inhaler, sodium chloride 0.9 % neb         solution        No change in current treatment plan.  REfills.     (I10) Essential hypertension, benign  Comment: doing well  Plan: metoprolol succinate ER (TOPROL XL) 50 MG 24 hr        tablet        Change metoprolol to 50mg in stead of two 25mg pills once daily.    REfills.      (R11.0) Nausea  Comment: occasional nausea.  Not using ondansetron   Plan: mirtazapine (REMERON) 15 MG tablet        REfills.     (F41.9) Anxiety  Comment: doing well  Plan: mirtazapine (REMERON) 15 MG tablet        No change in current treatment plan.  Refills.     (E87.1) Hyponatremia  Comment: has been doing well on sodium supplement.   Plan: sodium chloride 1 GM tablet        No change in current treatment plan.  Refills.     (Z23) Need for prophylactic vaccination and inoculation against influenza  Comment:   Plan: INFLUENZA, QUAD, HIGH DOSE, PF, 65YR + (FLUZONE        HD), ADMIN INFLUENZA (For MEDICARE Patients         ONLY) []        Flu shot today.      (K21.9) Gastroesophageal reflux disease without esophagitis  Comment: doing well on omeprazole 40mg daily.  May not need this medication.     Plan: famotidine (PEPCID) 20 MG tablet        Try cutting back on the .moep 40mg pills to every other day for a few weeks, then every third day.  Can stop if not having heartburn, nausea or more stomach problems.    If you need this medication, insurance coverage will make it more expensive.  You can take famotidine 20mg twice daily as an alternative.     Covid vaccine booster today.

## 2022-11-10 NOTE — PROGRESS NOTES
ASSESSMENT:   (J47.9) Bronchiectasis without complication (H)  (primary encounter diagnosis)  Comment: doing well at this time  Plan: albuterol (PROAIR HFA) 108 (90 Base) MCG/ACT         inhaler, fluticasone-salmeterol (ADVAIR) 250-50        MCG/ACT inhaler, sodium chloride 0.9 % neb         solution        No change in current treatment plan.  REfills.     (I10) Essential hypertension, benign  Comment: doing well  Plan: metoprolol succinate ER (TOPROL XL) 50 MG 24 hr        tablet        Change metoprolol to 50mg in stead of two 25mg pills once daily.    REfills.      (R11.0) Nausea  Comment: occasional nausea.  Not using ondansetron   Plan: mirtazapine (REMERON) 15 MG tablet        REfills.     (F41.9) Anxiety  Comment: doing well  Plan: mirtazapine (REMERON) 15 MG tablet        No change in current treatment plan.  Refills.     (E87.1) Hyponatremia  Comment: has been doing well on sodium supplement.   Plan: sodium chloride 1 GM tablet        No change in current treatment plan.  Refills.     (Z23) Need for prophylactic vaccination and inoculation against influenza  Comment:   Plan: INFLUENZA, QUAD, HIGH DOSE, PF, 65YR + (FLUZONE        HD), ADMIN INFLUENZA (For MEDICARE Patients         ONLY) []        Flu shot today.      (K21.9) Gastroesophageal reflux disease without esophagitis  Comment: doing well on omeprazole 40mg daily.  May not need this medication.     Plan: famotidine (PEPCID) 20 MG tablet        Try cutting back on the .moep 40mg pills to every other day for a few weeks, then every third day.  Can stop if not having heartburn, nausea or more stomach problems.    If you need this medication, insurance coverage will make it more expensive.  You can take famotidine 20mg twice daily as an alternative.     Covid vaccine booster today.          Subjective   Ellie is a 89 year old, presenting for the following health issues:  Chief Complaint   Patient presents with     COPD     Hypertension     Imm/Inj      Flu Shot      Needs refills.  Was told she needed visit.     Doing OK  Breathing at baseline.  S?ome dyspnea on exertion.  Can walk around the house but can get some shortness of breath.  Can walk 30'  Some days with cough.  Variable.  Brown phlegm.   Uses Duonebs three times daily. Does not use hand held albuteral inhaler much.   Advair twice daily.   occasional nausea.  Triggering factors: ?  No heartburn taking omeprazole 40mg daily.  Not covered as well by insurance-famotidine suggested.    Hypertension Follow-up      Do you check your blood pressure regularly outside of the clinic? No     Are you following a low salt diet? No    Are your blood pressures ever more than 140 on the top number (systolic) OR more   than 90 on the bottom number (diastolic), for example 140/90? No    COPD Follow-Up    Overall, how are your COPD symptoms since your last clinic visit?  No change    How much fatigue or shortness of breath do you have when you are walking?  Same as usual    How much shortness of breath do you have when you are resting?  None    How often do you cough? Sometimes    Have you noticed any change in your sputum/phlegm?  No    Have you experienced a recent fever? No    Please describe how far you can walk without stopping to rest:  Less than 1 block    How many flights of stairs are you able to walk up without stopping?  None    Have you had any Emergency Room Visits, Urgent Care Visits, or Hospital Admissions because of your COPD since your last office visit?  No    History   Smoking Status     Never   Smokeless Tobacco     Never     No results found for: FEV1, [id number]    Patient Active Problem List   Diagnosis     Sensorineural hearing loss, asymmetrical     Generalized osteoarthrosis, unspecified site     PERSONAL HISTORY OF MALIGNANCY- BREAST     Esophageal reflux     Chronic allergic conjunctivitis     Chronic seasonal allergic rhinitis     Hyperlipidemia LDL goal <130     Chronic constipation      "Osteoporosis     Health Care Home     Right arm weakness     Ulnar neuropathy     Headache     Mild major depression     DVT (deep venous thrombosis)     Anxiety     Cervical vertebral fracture (H)     Intermittent atrial fibrillation (H)     Squamous cell carcinoma in situ of skin of face     SK (seborrheic keratosis)     Hypothyroidism     Recurrent UTI     History of skin cancer     BPPV (benign paroxysmal positional vertigo)     Benign essential HTN     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)     Positive fecal occult blood test     Infectious colitis, enteritis and gastroenteritis     Advance Care Planning     Syncope     Long term current use of anticoagulant therapy     Mild persistent asthma without complication     Irritable bowel syndrome without diarrhea     Nausea     Back pain     H/O TB (tuberculosis)     Acute respiratory failure with hypoxia (H)     Cardiac pacemaker in situ     Deep vein thrombosis (DVT) of proximal lower extremity, unspecified chronicity, unspecified laterality (H)     Bronchiectasis (H)      ROS:General: No change in weight, sleep or appetite.  Normal energy.  No fever or chills  CV: No chest pains or palpitations  GI: No nausea, vomiting,  heartburn, abdominal pain, diarrhea, constipation or change in bowel habits  : No urinary frequency or dysuria, bladder or kidney problems  Musculoskeletal: POSITIVE  for:, pain in knees and hips.  Living with it.   Psychiatric: No problems with anxiety, depression or mental health    OBJECTIVE:Blood pressure 130/66, pulse 70, temperature 97.7  F (36.5  C), temperature source Tympanic, resp. rate 18, height 1.473 m (4' 10\"), weight 59.9 kg (132 lb), last menstrual period 06/15/1985, SpO2 96 %, not currently breastfeeding. BMI=Body mass index is 27.59 kg/m .  GENERAL APPEARANCE ADULT: Alert, no acute distress  NECK: No adenopathy,masses or thyromegaly  RESP: lungs clear to auscultation   CV: normal rate, regular rhythm, no murmur " or gallop  MS: extremities normal, no peripheral edema         Answers for HPI/ROS submitted by the patient on 11/10/2022  If you checked off any problems, how difficult have these problems made it for you to do your work, take care of things at home, or get along with other people?: Not difficult at all  PHQ9 TOTAL SCORE: 1  Do you have a cough?: Yes  Are you experiencing any wheezing in your chest?: No  Do you have any shortness of breath?: No  Use of rescue inhaler:: 2-3 times per day  Taking Asthma medication as prescribed:: 0  Asthma triggers:: dust mites, humidity  Have you had any Emergency Room visits, Urgent Care visits, or Hospital Admissions since your last office visit?: No  Current status of COPD symptom:: better  Status of fatigue and dyspnea with ambulation:: same as usual  Status of dyspnea:: none  Increase or change in cough or sputum:: sometimes  Have you noticed a change in your sputum/phlegm?: No  Have you experienced a recent fever?: No  Baseline ambulation without stopping to rest:: less than 1 block  Number of flights of stairs without resting:: None  Have you had any Emergency Room, Urgent Care visits or a Hospital admission because of your COPD since your last office visit?: No  How many servings of fruits and vegetables do you eat daily?: 2-3  On average, how many sweetened beverages do you drink each day (Examples: soda, juice, sweet tea, etc.  Do NOT count diet or artificially sweetened beverages)?: 0  How many minutes a day do you exercise enough to make your heart beat faster?: 9 or less  How many days a week do you exercise enough to make your heart beat faster?: 3 or less  How many days per week do you miss taking your medication?: 0

## 2022-11-10 NOTE — NURSING NOTE
"Chief Complaint   Patient presents with     COPD     Hypertension     Imm/Inj     Flu Shot       Initial /66   Pulse 70   Temp 97.7  F (36.5  C) (Tympanic)   Resp 18   Ht 1.473 m (4' 10\")   Wt 59.9 kg (132 lb)   LMP 06/15/1985 (LMP Unknown)   SpO2 96%   BMI 27.59 kg/m   Estimated body mass index is 27.59 kg/m  as calculated from the following:    Height as of this encounter: 1.473 m (4' 10\").    Weight as of this encounter: 59.9 kg (132 lb).    Patient presents to the clinic using Walker    Is there anyone who you would like to be able to receive your results?   If yes have patient fill out JOSUE    "

## 2022-11-15 ENCOUNTER — LAB (OUTPATIENT)
Dept: LAB | Facility: CLINIC | Age: 87
End: 2022-11-15
Payer: COMMERCIAL

## 2022-11-15 ENCOUNTER — ANTICOAGULATION THERAPY VISIT (OUTPATIENT)
Dept: ANTICOAGULATION | Facility: CLINIC | Age: 87
End: 2022-11-15

## 2022-11-15 DIAGNOSIS — Z79.01 LONG TERM CURRENT USE OF ANTICOAGULANT THERAPY: ICD-10-CM

## 2022-11-15 DIAGNOSIS — I82.409 DVT (DEEP VENOUS THROMBOSIS) (H): Primary | Chronic | ICD-10-CM

## 2022-11-15 DIAGNOSIS — I82.4Y9 DEEP VEIN THROMBOSIS (DVT) OF PROXIMAL LOWER EXTREMITY, UNSPECIFIED CHRONICITY, UNSPECIFIED LATERALITY (H): ICD-10-CM

## 2022-11-15 DIAGNOSIS — I48.0 INTERMITTENT ATRIAL FIBRILLATION (H): ICD-10-CM

## 2022-11-15 LAB — INR BLD: 2.1 (ref 0.9–1.1)

## 2022-11-15 PROCEDURE — 85610 PROTHROMBIN TIME: CPT

## 2022-11-15 PROCEDURE — 36416 COLLJ CAPILLARY BLOOD SPEC: CPT

## 2022-11-15 NOTE — PROGRESS NOTES
Stopping omeprazole and use pepcid if needed. toprol xl increased so could go down. Tapering down on     ANTICOAGULATION MANAGEMENT     Ellie Leigh 89 year old female is on warfarin with therapeutic INR result. (Goal INR 2.0-3.0)    Recent labs: (last 7 days)     11/15/22  1334   INR 2.1*       ASSESSMENT       Source(s): Chart Review and Patient/Caregiver Call       Warfarin doses taken: Warfarin taken as instructed    Diet: No new diet changes identified    New illness, injury, or hospitalization: No    Medication/supplement changes: omeprazole dose decreased on 11-10 which may be decreasing INR today    Signs or symptoms of bleeding or clotting: No    Previous INR: Therapeutic last 2(+) visits    Additional findings: will need to monitor as tapering down on omeprazole       PLAN     Recommended plan for ongoing change(s) affecting INR     Dosing Instructions: Continue your current warfarin dose with next INR in 1 week       Summary  As of 11/15/2022    Full warfarin instructions:  1.5 mg every Mon, Fri; 1 mg all other days; Starting 11/15/2022   Next INR check:  11/22/2022             Telephone call with  Suyapa who verbalizes understanding and agrees to plan    Lab visit scheduled    Education provided:     Please call back if any changes to your diet, medications or how you've been taking warfarin    Interaction IS anticipated between warfarin and omeprazole    Contact [phone]  with any changes, questions or concerns.     Plan made per [facility] anticoagulation protocol    Lissy Beaulieu RN  Anticoagulation Clinic  11/15/2022    _______________________________________________________________________     Anticoagulation Episode Summary     Current INR goal:  2.0-3.0   TTR:  93.6 % (1 y)   Target end date:  Indefinite   Send INR reminders to:  GARR[facility] B[facility]    Indications    Atrial fibrillation with rapid ventricular response (H) (Resolved) [I48.91]  DVT (deep venous thrombosis) [I82.409]  Long term  current use of anticoagulant therapy [Z79.01]  Intermittent atrial fibrillation (H) [I48.0]  Deep vein thrombosis (DVT) of proximal lower extremity  unspecified chronicity  unspecified laterality (H) [I82.4Y9]           Comments:           Anticoagulation Care Providers     Provider Role Specialty Phone number    Anjum Vidales MD Referring Family Medicine [phone]

## 2022-11-22 ENCOUNTER — LAB (OUTPATIENT)
Dept: LAB | Facility: CLINIC | Age: 87
End: 2022-11-22
Payer: COMMERCIAL

## 2022-11-22 ENCOUNTER — ANTICOAGULATION THERAPY VISIT (OUTPATIENT)
Dept: ANTICOAGULATION | Facility: CLINIC | Age: 87
End: 2022-11-22

## 2022-11-22 DIAGNOSIS — Z79.01 LONG TERM CURRENT USE OF ANTICOAGULANT THERAPY: ICD-10-CM

## 2022-11-22 DIAGNOSIS — I82.4Y9 DEEP VEIN THROMBOSIS (DVT) OF PROXIMAL LOWER EXTREMITY, UNSPECIFIED CHRONICITY, UNSPECIFIED LATERALITY (H): ICD-10-CM

## 2022-11-22 DIAGNOSIS — I48.0 INTERMITTENT ATRIAL FIBRILLATION (H): ICD-10-CM

## 2022-11-22 DIAGNOSIS — I82.4Y9 DEEP VEIN THROMBOSIS (DVT) OF PROXIMAL LOWER EXTREMITY, UNSPECIFIED CHRONICITY, UNSPECIFIED LATERALITY (H): Primary | Chronic | ICD-10-CM

## 2022-11-22 LAB — INR BLD: 2.2 (ref 0.9–1.1)

## 2022-11-22 PROCEDURE — 85610 PROTHROMBIN TIME: CPT

## 2022-11-22 PROCEDURE — 36416 COLLJ CAPILLARY BLOOD SPEC: CPT

## 2022-11-22 NOTE — PROGRESS NOTES
ANTICOAGULATION MANAGEMENT     Ellie Leigh 89 year old female is on warfarin with therapeutic INR result. (Goal INR 2.0-3.0)    Recent labs: (last 7 days)     11/22/22  1349   INR 2.2*       ASSESSMENT       Source(s): Chart Review and Patient/Caregiver Call       Warfarin doses taken: Warfarin taken as instructed    Diet: No new diet changes identified    New illness, injury, or hospitalization: No    Medication/supplement changes: Patient continues to taper off omeprazole, currently taking every other day for 3 weeks, then will take every 3rd day, etc.    Signs or symptoms of bleeding or clotting: No    Previous INR: Therapeutic last 2(+) visits    Additional findings: None       PLAN     Recommended plan for ongoing change(s) affecting INR     Dosing Instructions: Continue your current warfarin dose with next INR in 3 weeks       Summary  As of 11/22/2022    Full warfarin instructions:  1.5 mg every Mon, Fri; 1 mg all other days; Starting 11/22/2022   Next INR check:  12/13/2022             Telephone call with  Suyapa and Ellie who verbalizes understanding and agrees to plan    Lab visit scheduled    Education provided:     Importance of notifying anticoagulation clinic for: changes in medications; a sooner lab recheck maybe needed and diarrhea, nausea/vomiting, reduced intake, cold/flu, and/or infections; a sooner lab recheck maybe needed    Contact [phone]  with any changes, questions or concerns.     Plan made per [facility] anticoagulation protocol    Gina Persaud RN  Anticoagulation Clinic  11/22/2022    _______________________________________________________________________     Anticoagulation Episode Summary     Current INR goal:  2.0-3.0   TTR:  93.6 % (1 y)   Target end date:  Indefinite   Send INR reminders to:  [facility]    Indications    Atrial fibrillation with rapid ventricular response (H) (Resolved) [I48.91]  DVT (deep venous thrombosis) [I82.409]  Long term current use of  anticoagulant therapy [Z79.01]  Intermittent atrial fibrillation (H) [I48.0]  Deep vein thrombosis (DVT) of proximal lower extremity  unspecified chronicity  unspecified laterality (H) [I82.4Y9]           Comments:           Anticoagulation Care Providers     Provider Role Specialty Phone number    Anjum Vidales MD Referring Family Medicine [phone]

## 2022-12-07 NOTE — MR AVS SNAPSHOT
After Visit Summary   8/3/2017    Ellie Leigh    MRN: [id number]           Patient Information     Date Of Birth          9/12/1930        Visit Information        Provider Department      8/3/2017 4:30 PM RUEDA DCR2 [facility]        Today's Diagnoses     Cardiac pacemaker in situ    -  1    SSS (sick sinus syndrome) (H)           Follow-ups after your visit        Your next 10 appointments already scheduled     Aug 03, 2017  4:30 PM CDT   Courtesy Device Check with RUEDA DCR2   [facility] ([facility])    [address]  [place] [address]   [phone]            Sep 01, 2017  4:00 PM CDT   Office Visit with Josefina Gómez MD   [facility] ([facility])    [address] [place] [address] [phone]           Bring a current list of meds and any records pertaining to this visit. For Physicals, please bring immunization records and any forms needing to be filled out. Please arrive 10 minutes early to complete paperwork.            Oct 11, 2017  3:00 PM CDT   Remote PPM Check with RUEDA TECH1   [facility] ([facility])    [address]  [place] [address]   [phone]           This appointment is for a remote check of your pacemaker.  This is not an appointment at the office.            Jan 03, 2018  2:00 PM CST   LAB with [facility] ([facility])    [address] [address] [phone]           Patient must bring picture ID. Patient should be prepared to give a urine specimen  Please do not eat 10-12 hours before your appointment if you are coming in fasting for labs on lipids, cholesterol, or glucose (sugar). Pregnant women should follow their Care Team instructions. Water with medications is okay. Do  Patient scheduled for lab appointment on 12/14/22 ordered by Dr. Rivera. Orders are missing. Please enter lab orders prior to the appointment noted above.     Thank you   "not drink coffee or other fluids. If you have concerns about taking  your medications, please ask at office or if scheduling via [employer], send a message by clicking on Secure Messaging, Message Your Care Team.            Jan 03, 2018  2:45 PM CST   MA SCREENING DIGITAL RIGHT with [facility]   [facility] ([facility])    [address]  [facility] [address]   [phone]           Do not use any powder, lotion or deodorant under your arms or on your breast. If you do, we will ask you to remove it before your exam.  Wear comfortable, two-piece clothing.  If you have any allergies, tell your care team.  Bring any previous mammograms from other facilities or have them mailed to the breast center. Three-dimensional (3D) mammograms are available at [place] locations in [facility], and [place]. [facility] locations include [place] and [facility] in [place]. Benefits of 3D mammograms include: - Improved rate of cancer detection - Decreases your chance of having to go back for more tests, which means fewer: - \"False-positive\" results (This means that there is an abnormal area but it isn't cancer.) - Invasive testing procedures, such as a biopsy or surgery - Can provide clearer images of the breast if you have dense breast tissue. 3D mammography is an optional exam that anyone can have with a 2D mammogram. It doesn't replace or take the place of a 2D mammogram. 2D mammograms remain an effective screening test for all women.  Not all insurance companies cover the cost of a 3D mammogram. Check with your insurance.            Jan 11, 2018 11:00 AM CST   Return Visit with Gayle Nieves MD   [facility] ([facility])    [facility]  [address]  [facility] [address] [phone]              Who to contact     If you have questions or need follow up information about today's " "clinic visit or your schedule please contact [facility] [place] directly at [phone].  Normal or non-critical lab and imaging results will be communicated to you by MyChart, letter or phone within 4 business days after the clinic has received the results. If you do not hear from us within 7 days, please contact the clinic through Outlistenhart or phone. If you have a critical or abnormal lab result, we will notify you by phone as soon as possible.  Submit refill requests through [employer] or call your pharmacy and they will forward the refill request to us. Please allow 3 business days for your refill to be completed.          Additional Information About Your Visit        MyChart Information     [employer] lets you send messages to your doctor, view your test results, renew your prescriptions, schedule appointments and more. To sign up, go to [email or web address] . Click on \"Log in\" on the left side of the screen, which will take you to the Welcome page. Then click on \"Sign up Now\" on the right side of the page.     You will be asked to enter the access code listed below, as well as some personal information. Please follow the directions to create your username and password.     Your access code is: [email or web address]  Expires: 2017  4:24 PM     Your access code will  in 90 days. If you need help or a new code, please call your [facility] clinic or [phone].        Care EveryWhere ID     This is your Care EveryWhere ID. This could be used by other organizations to access your [facility] medical records  [id number]        Your Vitals Were     Last Period                   06/15/1985            Blood Pressure from Last 3 Encounters:   17 139/72   17 119/60   17 122/70    Weight from Last 3 Encounters:   17 51.7 kg (114 lb)   17 52.4 kg (115 lb 9.6 oz)   17 54.3 kg (119 lb 9.6 oz)              Today, you had the following     No orders found for " display       Primary Care Provider Office Phone # Fax #    Josefina Gómez [phone]       [facility] [address] [facility] [address]        Equal Access to Services     BROOSK JOSÉ : Hadselin daniel dumont foziao Somaurilio, waaxda luqadaha, qaybta kaalmada adesukhdeepyada, verónica kerr elke estrada. So [facility] [phone].    ATENCIÓN: Si habla español, tiene a sahni disposición servicios gratuitos de asistencia lingüística. Llame al [phone].    We comply with applicable federal civil rights laws and [place] laws. We do not discriminate on the basis of race, color, national origin, age, disability sex, sexual orientation or gender identity.            Thank you!     Thank you for choosing [facility] [place]  for your care. Our goal is always to provide you with excellent care. Hearing back from our patients is one way we can continue to improve our services. Please take a few minutes to complete the written survey that you may receive in the mail after your visit with us. Thank you!             Your Updated Medication List - Protect others around you: Learn how to safely use, store and throw away your medicines at www.disposemymeds.org.          This list is accurate as of: 8/3/17 11:26 AM.  Always use your most recent med list.                   Brand Name Dispense Instructions for use Diagnosis    albuterol 108 (90 BASE) MCG/ACT Inhaler    PROAIR HFA/PROVENTIL HFA/VENTOLIN HFA    3 Inhaler    Inhale 2 puffs into the lungs every 6 hours as needed for shortness of breath / dyspnea    Mild persistent asthma without complication       CRANBERRY      Take 475 mg by mouth 2 times daily.        diclofenac 1 % Gel topical gel    VOLTAREN    100 g    APPLY 4 GRAMS TO KNEES OR 2 GRAMS TO HANDS FOUR TIMES DAILY USING ENCLOSED DOSING CARD.    Primary osteoarthritis involving multiple joints       ipratropium - albuterol 0.5 mg/2.5 mg/3 mL  0.5-2.5 (3) MG/3ML neb solution    DUONEB    180 mL    TAKE 1 VIAL (3 MLS) BY NEBULIZATION EVERY 6 HOURS AS NEEDED FOR SHORTNESS OF BREATH / DYSPNEA OR WHEEZING    Chronic obstructive pulmonary disease, unspecified COPD type (H)       levothyroxine 50 MCG tablet    SYNTHROID/LEVOTHROID    90 tablet    Take 1 tablet (50 mcg) by mouth daily    Hypothyroidism, unspecified type       metoprolol 25 MG 24 hr tablet    TOPROL-XL    180 tablet    TAKE TWO TABLETS BY MOUTH TWICE DAILY    Essential hypertension, benign       polyethylene glycol powder    MIRALAX    510 g    Take 17 g by mouth daily as needed    Constipation       probiotic Caps      Take 1 capsule by mouth every evening        ranitidine 150 MG tablet    ZANTAC    60 tablet    Take 1 tablet (150 mg) by mouth 2 times daily    Gastroesophageal reflux disease without esophagitis       SYMBICORT 160-4.5 MCG/ACT Inhaler   Generic drug:  budesonide-formoterol     10.2 g    INHALE 2 PUFFS INTO THE LUNGS 2 TIMES DAILY    Mild persistent asthma without complication

## 2022-12-13 ENCOUNTER — ANTICOAGULATION THERAPY VISIT (OUTPATIENT)
Dept: ANTICOAGULATION | Facility: CLINIC | Age: 87
End: 2022-12-13

## 2022-12-13 ENCOUNTER — LAB (OUTPATIENT)
Dept: LAB | Facility: CLINIC | Age: 87
End: 2022-12-13
Payer: COMMERCIAL

## 2022-12-13 DIAGNOSIS — I48.0 INTERMITTENT ATRIAL FIBRILLATION (H): ICD-10-CM

## 2022-12-13 DIAGNOSIS — Z79.01 LONG TERM CURRENT USE OF ANTICOAGULANT THERAPY: ICD-10-CM

## 2022-12-13 DIAGNOSIS — I82.4Y9 DEEP VEIN THROMBOSIS (DVT) OF PROXIMAL LOWER EXTREMITY, UNSPECIFIED CHRONICITY, UNSPECIFIED LATERALITY (H): ICD-10-CM

## 2022-12-13 DIAGNOSIS — I82.409 DVT (DEEP VENOUS THROMBOSIS) (H): Primary | Chronic | ICD-10-CM

## 2022-12-13 LAB — INR BLD: 2.5 (ref 0.9–1.1)

## 2022-12-13 PROCEDURE — 85610 PROTHROMBIN TIME: CPT

## 2022-12-13 PROCEDURE — 36416 COLLJ CAPILLARY BLOOD SPEC: CPT

## 2022-12-13 NOTE — PROGRESS NOTES
ANTICOAGULATION MANAGEMENT     Ellie Leigh 89 year old female is on warfarin with therapeutic INR result. (Goal INR 2.0-3.0)    Recent labs: (last 7 days)     12/13/22  1341   INR 2.5*       ASSESSMENT       Source(s): Chart Review and Patient/Caregiver Call       Warfarin doses taken: Warfarin taken as instructed    Diet: No new diet changes identified    New illness, injury, or hospitalization: No    Medication/supplement changes: None noted    Signs or symptoms of bleeding or clotting: No    Previous INR: Therapeutic last 2(+) visits    Additional findings: None       PLAN     Recommended plan for no diet, medication or health factor changes affecting INR     Dosing Instructions: Continue your current warfarin dose with next INR in 4 weeks       Summary  As of 12/13/2022    Full warfarin instructions:  1.5 mg every Mon, Fri; 1 mg all other days; Starting 12/13/2022   Next INR check:  1/10/2023             Telephone call with Ellie who verbalizes understanding and agrees to plan    Lab visit scheduled    Education provided:     Please call back if any changes to your diet, medications or how you've been taking warfarin    Contact [phone]  with any changes, questions or concerns.     Plan made per [facility] anticoagulation protocol    Lissy Beaulieu RN  Anticoagulation Clinic  12/13/2022    _______________________________________________________________________     Anticoagulation Episode Summary     Current INR goal:  2.0-3.0   TTR:  93.6 % (1 y)   Target end date:  Indefinite   Send INR reminders to:  [facility]    Indications    Atrial fibrillation with rapid ventricular response (H) (Resolved) [I48.91]  DVT (deep venous thrombosis) [I82.409]  Long term current use of anticoagulant therapy [Z79.01]  Intermittent atrial fibrillation (H) [I48.0]  Deep vein thrombosis (DVT) of proximal lower extremity  unspecified chronicity  unspecified laterality (H) [I82.4Y9]           Comments:            Anticoagulation Care Providers     Provider Role Specialty Phone number    Anjum Vidales MD Referring Family Medicine [phone]

## 2022-12-27 NOTE — TELEPHONE ENCOUNTER
Reason for Call:  Other prescription    Detailed comments: Per Pharmacy Requesting Sodium Chloride Inhalation Neb Sol 0.9%   Qty Disp 500 with refills  Suggested NDC = [address]-74 ( 5 ml vials)  X 100 ct.  Total Qty = 500 (in ml)     Per [facility]    Call taken on 12/27/2022 at 10:10 AM by Luz Maria Liang

## 2022-12-27 NOTE — TELEPHONE ENCOUNTER
Requested Prescriptions   Pending Prescriptions Disp Refills    sodium chloride 0.9 % neb solution 450 mL 0     Sig: Inhale 5 mLs into the lungs by nebulization 3 times daily. Use after albuterol in nebulizer to thin secretions.       There is no refill protocol information for this order

## 2023-01-01 ENCOUNTER — DOCUMENTATION ONLY (OUTPATIENT)
Dept: ANTICOAGULATION | Facility: CLINIC | Age: 88
End: 2023-01-01
Payer: COMMERCIAL

## 2023-01-01 ENCOUNTER — LAB (OUTPATIENT)
Dept: LAB | Facility: CLINIC | Age: 88
End: 2023-01-01
Payer: COMMERCIAL

## 2023-01-01 ENCOUNTER — ANCILLARY PROCEDURE (OUTPATIENT)
Dept: CARDIOLOGY | Facility: CLINIC | Age: 88
End: 2023-01-01
Attending: INTERNAL MEDICINE
Payer: COMMERCIAL

## 2023-01-01 ENCOUNTER — ANCILLARY PROCEDURE (OUTPATIENT)
Dept: GENERAL RADIOLOGY | Facility: CLINIC | Age: 88
End: 2023-01-01
Attending: FAMILY MEDICINE
Payer: COMMERCIAL

## 2023-01-01 ENCOUNTER — TELEPHONE (OUTPATIENT)
Dept: FAMILY MEDICINE | Facility: CLINIC | Age: 88
End: 2023-01-01

## 2023-01-01 ENCOUNTER — ANTICOAGULATION THERAPY VISIT (OUTPATIENT)
Dept: ANTICOAGULATION | Facility: CLINIC | Age: 88
End: 2023-01-01

## 2023-01-01 ENCOUNTER — ANCILLARY PROCEDURE (OUTPATIENT)
Dept: GENERAL RADIOLOGY | Facility: CLINIC | Age: 88
End: 2023-01-01
Attending: NURSE PRACTITIONER
Payer: COMMERCIAL

## 2023-01-01 ENCOUNTER — APPOINTMENT (OUTPATIENT)
Dept: GENERAL RADIOLOGY | Facility: CLINIC | Age: 88
DRG: 177 | End: 2023-01-01
Attending: PHYSICIAN ASSISTANT
Payer: COMMERCIAL

## 2023-01-01 ENCOUNTER — HOSPITAL ENCOUNTER (INPATIENT)
Facility: CLINIC | Age: 88
LOS: 10 days | Discharge: HOSPICE | DRG: 177 | End: 2023-07-04
Attending: PHYSICIAN ASSISTANT | Admitting: FAMILY MEDICINE
Payer: COMMERCIAL

## 2023-01-01 ENCOUNTER — HEALTH MAINTENANCE LETTER (OUTPATIENT)
Age: 88
End: 2023-01-01

## 2023-01-01 ENCOUNTER — NURSE TRIAGE (OUTPATIENT)
Dept: NURSING | Facility: CLINIC | Age: 88
End: 2023-01-01
Payer: COMMERCIAL

## 2023-01-01 ENCOUNTER — E-VISIT (OUTPATIENT)
Dept: FAMILY MEDICINE | Facility: CLINIC | Age: 88
End: 2023-01-01
Payer: COMMERCIAL

## 2023-01-01 ENCOUNTER — MYC MEDICAL ADVICE (OUTPATIENT)
Dept: NURSING | Facility: CLINIC | Age: 88
End: 2023-01-01
Payer: COMMERCIAL

## 2023-01-01 ENCOUNTER — TELEPHONE (OUTPATIENT)
Dept: FAMILY MEDICINE | Facility: CLINIC | Age: 88
End: 2023-01-01
Payer: COMMERCIAL

## 2023-01-01 ENCOUNTER — VIRTUAL VISIT (OUTPATIENT)
Dept: FAMILY MEDICINE | Facility: CLINIC | Age: 88
End: 2023-01-01
Payer: MEDICARE

## 2023-01-01 ENCOUNTER — OFFICE VISIT (OUTPATIENT)
Dept: FAMILY MEDICINE | Facility: CLINIC | Age: 88
End: 2023-01-01
Payer: COMMERCIAL

## 2023-01-01 ENCOUNTER — APPOINTMENT (OUTPATIENT)
Dept: CT IMAGING | Facility: CLINIC | Age: 88
DRG: 177 | End: 2023-01-01
Attending: EMERGENCY MEDICINE
Payer: COMMERCIAL

## 2023-01-01 ENCOUNTER — ANCILLARY PROCEDURE (OUTPATIENT)
Dept: CARDIOLOGY | Facility: CLINIC | Age: 88
End: 2023-01-01
Attending: INTERNAL MEDICINE
Payer: OTHER MISCELLANEOUS

## 2023-01-01 ENCOUNTER — TELEPHONE (OUTPATIENT)
Dept: ANTICOAGULATION | Facility: CLINIC | Age: 88
End: 2023-01-01

## 2023-01-01 ENCOUNTER — APPOINTMENT (OUTPATIENT)
Dept: PHYSICAL THERAPY | Facility: CLINIC | Age: 88
DRG: 177 | End: 2023-01-01
Payer: COMMERCIAL

## 2023-01-01 ENCOUNTER — HOSPITAL ENCOUNTER (OUTPATIENT)
Dept: RESPIRATORY THERAPY | Facility: CLINIC | Age: 88
Discharge: HOME | End: 2023-06-15
Attending: FAMILY MEDICINE | Admitting: FAMILY MEDICINE
Payer: COMMERCIAL

## 2023-01-01 ENCOUNTER — APPOINTMENT (OUTPATIENT)
Dept: OCCUPATIONAL THERAPY | Facility: CLINIC | Age: 88
DRG: 177 | End: 2023-01-01
Payer: COMMERCIAL

## 2023-01-01 ENCOUNTER — APPOINTMENT (OUTPATIENT)
Dept: GENERAL RADIOLOGY | Facility: CLINIC | Age: 88
DRG: 177 | End: 2023-01-01
Attending: INTERNAL MEDICINE
Payer: COMMERCIAL

## 2023-01-01 ENCOUNTER — PATIENT OUTREACH (OUTPATIENT)
Dept: CARE COORDINATION | Facility: CLINIC | Age: 88
End: 2023-01-01
Payer: COMMERCIAL

## 2023-01-01 ENCOUNTER — TELEPHONE (OUTPATIENT)
Dept: HOME HEALTH SERVICES | Facility: CLINIC | Age: 88
End: 2023-01-01
Payer: COMMERCIAL

## 2023-01-01 ENCOUNTER — HOSPITAL ENCOUNTER (OUTPATIENT)
Dept: CT IMAGING | Facility: CLINIC | Age: 88
Discharge: HOME | End: 2023-04-22
Attending: NURSE PRACTITIONER | Admitting: NURSE PRACTITIONER
Payer: COMMERCIAL

## 2023-01-01 ENCOUNTER — MEDICAL CORRESPONDENCE (OUTPATIENT)
Dept: HEALTH INFORMATION MANAGEMENT | Facility: CLINIC | Age: 88
End: 2023-01-01
Payer: COMMERCIAL

## 2023-01-01 ENCOUNTER — DOCUMENTATION ONLY (OUTPATIENT)
Dept: ANTICOAGULATION | Facility: CLINIC | Age: 88
End: 2023-01-01

## 2023-01-01 ENCOUNTER — NURSE TRIAGE (OUTPATIENT)
Dept: NURSING | Facility: CLINIC | Age: 88
End: 2023-01-01

## 2023-01-01 VITALS
TEMPERATURE: 97 F | RESPIRATION RATE: 16 BRPM | WEIGHT: 132.2 LBS | OXYGEN SATURATION: 95 % | DIASTOLIC BLOOD PRESSURE: 62 MMHG | HEART RATE: 71 BPM | BODY MASS INDEX: 27.63 KG/M2 | SYSTOLIC BLOOD PRESSURE: 142 MMHG

## 2023-01-01 VITALS
DIASTOLIC BLOOD PRESSURE: 60 MMHG | WEIGHT: 132 LBS | TEMPERATURE: 97.4 F | RESPIRATION RATE: 16 BRPM | SYSTOLIC BLOOD PRESSURE: 138 MMHG | HEART RATE: 68 BPM | HEIGHT: 58 IN | BODY MASS INDEX: 27.71 KG/M2 | OXYGEN SATURATION: 94 %

## 2023-01-01 VITALS
RESPIRATION RATE: 16 BRPM | SYSTOLIC BLOOD PRESSURE: 138 MMHG | DIASTOLIC BLOOD PRESSURE: 56 MMHG | HEART RATE: 64 BPM | TEMPERATURE: 97.5 F | OXYGEN SATURATION: 95 %

## 2023-01-01 VITALS
HEART RATE: 80 BPM | BODY MASS INDEX: 27.5 KG/M2 | TEMPERATURE: 98.2 F | OXYGEN SATURATION: 97 % | DIASTOLIC BLOOD PRESSURE: 58 MMHG | HEIGHT: 58 IN | SYSTOLIC BLOOD PRESSURE: 122 MMHG | WEIGHT: 131 LBS | RESPIRATION RATE: 24 BRPM

## 2023-01-01 VITALS
HEART RATE: 73 BPM | RESPIRATION RATE: 20 BRPM | DIASTOLIC BLOOD PRESSURE: 66 MMHG | TEMPERATURE: 97.8 F | SYSTOLIC BLOOD PRESSURE: 146 MMHG | OXYGEN SATURATION: 96 %

## 2023-01-01 VITALS
HEIGHT: 58 IN | WEIGHT: 131 LBS | RESPIRATION RATE: 20 BRPM | SYSTOLIC BLOOD PRESSURE: 120 MMHG | TEMPERATURE: 97.9 F | DIASTOLIC BLOOD PRESSURE: 70 MMHG | HEART RATE: 70 BPM | OXYGEN SATURATION: 95 % | BODY MASS INDEX: 27.5 KG/M2

## 2023-01-01 VITALS
OXYGEN SATURATION: 95 % | DIASTOLIC BLOOD PRESSURE: 68 MMHG | WEIGHT: 133 LBS | TEMPERATURE: 99.1 F | HEIGHT: 58 IN | SYSTOLIC BLOOD PRESSURE: 130 MMHG | BODY MASS INDEX: 27.92 KG/M2 | RESPIRATION RATE: 20 BRPM | HEART RATE: 79 BPM

## 2023-01-01 VITALS
SYSTOLIC BLOOD PRESSURE: 110 MMHG | HEART RATE: 74 BPM | RESPIRATION RATE: 20 BRPM | OXYGEN SATURATION: 97 % | DIASTOLIC BLOOD PRESSURE: 60 MMHG | TEMPERATURE: 98.4 F

## 2023-01-01 VITALS
SYSTOLIC BLOOD PRESSURE: 169 MMHG | RESPIRATION RATE: 18 BRPM | HEART RATE: 73 BPM | WEIGHT: 135.58 LBS | HEIGHT: 58 IN | BODY MASS INDEX: 28.46 KG/M2 | TEMPERATURE: 97.6 F | OXYGEN SATURATION: 95 % | DIASTOLIC BLOOD PRESSURE: 73 MMHG

## 2023-01-01 VITALS
TEMPERATURE: 97.8 F | HEART RATE: 73 BPM | DIASTOLIC BLOOD PRESSURE: 64 MMHG | RESPIRATION RATE: 18 BRPM | SYSTOLIC BLOOD PRESSURE: 132 MMHG | OXYGEN SATURATION: 97 %

## 2023-01-01 DIAGNOSIS — Z79.01 LONG TERM CURRENT USE OF ANTICOAGULANT THERAPY: ICD-10-CM

## 2023-01-01 DIAGNOSIS — R07.9 CHEST PAIN, UNSPECIFIED TYPE: ICD-10-CM

## 2023-01-01 DIAGNOSIS — G47.00 INSOMNIA, UNSPECIFIED TYPE: Primary | ICD-10-CM

## 2023-01-01 DIAGNOSIS — I48.0 INTERMITTENT ATRIAL FIBRILLATION (H): ICD-10-CM

## 2023-01-01 DIAGNOSIS — I82.4Y9 DEEP VEIN THROMBOSIS (DVT) OF PROXIMAL LOWER EXTREMITY, UNSPECIFIED CHRONICITY, UNSPECIFIED LATERALITY (H): ICD-10-CM

## 2023-01-01 DIAGNOSIS — R05.2 SUBACUTE COUGH: ICD-10-CM

## 2023-01-01 DIAGNOSIS — M54.2 ACUTE NECK PAIN: ICD-10-CM

## 2023-01-01 DIAGNOSIS — J96.01 ACUTE RESPIRATORY FAILURE WITH HYPOXIA (H): ICD-10-CM

## 2023-01-01 DIAGNOSIS — I10 ESSENTIAL HYPERTENSION, BENIGN: ICD-10-CM

## 2023-01-01 DIAGNOSIS — N76.0 BACTERIAL VAGINOSIS: Primary | ICD-10-CM

## 2023-01-01 DIAGNOSIS — Z95.0 CARDIAC PACEMAKER IN SITU: ICD-10-CM

## 2023-01-01 DIAGNOSIS — J44.1 ACUTE EXACERBATION OF COPD WITH ASTHMA (H): ICD-10-CM

## 2023-01-01 DIAGNOSIS — G47.00 INSOMNIA, UNSPECIFIED TYPE: ICD-10-CM

## 2023-01-01 DIAGNOSIS — I82.4Y9 DEEP VEIN THROMBOSIS (DVT) OF PROXIMAL LOWER EXTREMITY, UNSPECIFIED CHRONICITY, UNSPECIFIED LATERALITY (H): Primary | Chronic | ICD-10-CM

## 2023-01-01 DIAGNOSIS — J44.9 CHRONIC OBSTRUCTIVE PULMONARY DISEASE, UNSPECIFIED COPD TYPE (H): ICD-10-CM

## 2023-01-01 DIAGNOSIS — J44.1 COPD EXACERBATION (H): Primary | ICD-10-CM

## 2023-01-01 DIAGNOSIS — J47.9 BRONCHIECTASIS WITHOUT ACUTE EXACERBATION (H): ICD-10-CM

## 2023-01-01 DIAGNOSIS — R10.2 VAGINAL PAIN: Primary | ICD-10-CM

## 2023-01-01 DIAGNOSIS — R55 SYNCOPE: ICD-10-CM

## 2023-01-01 DIAGNOSIS — I49.5 TACHY-BRADY SYNDROME (H): ICD-10-CM

## 2023-01-01 DIAGNOSIS — J44.1 COPD EXACERBATION (H): ICD-10-CM

## 2023-01-01 DIAGNOSIS — I48.0 PAROXYSMAL ATRIAL FIBRILLATION (H): ICD-10-CM

## 2023-01-01 DIAGNOSIS — B34.8 RHINOVIRUS INFECTION: ICD-10-CM

## 2023-01-01 DIAGNOSIS — I82.409 DVT (DEEP VENOUS THROMBOSIS) (H): Primary | Chronic | ICD-10-CM

## 2023-01-01 DIAGNOSIS — J47.9 BRONCHIECTASIS WITHOUT COMPLICATION (H): ICD-10-CM

## 2023-01-01 DIAGNOSIS — J47.1 BRONCHIECTASIS WITH ACUTE EXACERBATION (H): ICD-10-CM

## 2023-01-01 DIAGNOSIS — R05.9 COUGH, UNSPECIFIED TYPE: ICD-10-CM

## 2023-01-01 DIAGNOSIS — R21 RASH: Primary | ICD-10-CM

## 2023-01-01 DIAGNOSIS — B96.89 BACTERIAL VAGINOSIS: Primary | ICD-10-CM

## 2023-01-01 DIAGNOSIS — J47.1 BRONCHIECTASIS WITH ACUTE EXACERBATION (H): Primary | ICD-10-CM

## 2023-01-01 DIAGNOSIS — R91.8 PULMONARY NODULES: Primary | ICD-10-CM

## 2023-01-01 DIAGNOSIS — J47.9 BRONCHIECTASIS WITHOUT COMPLICATION (H): Primary | ICD-10-CM

## 2023-01-01 DIAGNOSIS — E03.9 HYPOTHYROIDISM: Primary | ICD-10-CM

## 2023-01-01 DIAGNOSIS — I48.0 INTERMITTENT ATRIAL FIBRILLATION (H): Primary | ICD-10-CM

## 2023-01-01 DIAGNOSIS — E22.2 SIADH (SYNDROME OF INAPPROPRIATE ADH PRODUCTION) (H): Primary | ICD-10-CM

## 2023-01-01 DIAGNOSIS — Z79.01 LONG TERM CURRENT USE OF ANTICOAGULANT THERAPY: Primary | ICD-10-CM

## 2023-01-01 DIAGNOSIS — R05.9 COUGH, UNSPECIFIED TYPE: Primary | ICD-10-CM

## 2023-01-01 DIAGNOSIS — E22.2 SIADH (SYNDROME OF INAPPROPRIATE ADH PRODUCTION) (H): ICD-10-CM

## 2023-01-01 DIAGNOSIS — J40 BRONCHITIS: ICD-10-CM

## 2023-01-01 DIAGNOSIS — T14.8XXA BRUISING: Primary | ICD-10-CM

## 2023-01-01 DIAGNOSIS — R10.2 VAGINAL PAIN: ICD-10-CM

## 2023-01-01 LAB
1,3 BETA GLUCAN SER-MCNC: <31 PG/ML
ACID FAST STAIN (ARUP): NORMAL
ALBUMIN SERPL BCG-MCNC: 3.5 G/DL (ref 3.5–5.2)
ALBUMIN SERPL BCG-MCNC: 3.5 G/DL (ref 3.5–5.2)
ALP SERPL-CCNC: 58 U/L (ref 35–104)
ALP SERPL-CCNC: 63 U/L (ref 35–104)
ALT SERPL W P-5'-P-CCNC: 10 U/L (ref 0–50)
ALT SERPL W P-5'-P-CCNC: 9 U/L (ref 0–50)
ANION GAP SERPL CALCULATED.3IONS-SCNC: 10 MMOL/L (ref 7–15)
ANION GAP SERPL CALCULATED.3IONS-SCNC: 11 MMOL/L (ref 7–15)
ANION GAP SERPL CALCULATED.3IONS-SCNC: 3 MMOL/L (ref 7–15)
ANION GAP SERPL CALCULATED.3IONS-SCNC: 5 MMOL/L (ref 7–15)
ANION GAP SERPL CALCULATED.3IONS-SCNC: 5 MMOL/L (ref 7–15)
ANION GAP SERPL CALCULATED.3IONS-SCNC: 8 MMOL/L (ref 7–15)
ANION GAP SERPL CALCULATED.3IONS-SCNC: 9 MMOL/L (ref 7–15)
ANION GAP SERPL CALCULATED.3IONS-SCNC: 9 MMOL/L (ref 7–15)
ASPERGILLUS AB TITR SER CF: NORMAL {TITER}
AST SERPL W P-5'-P-CCNC: 21 U/L (ref 0–45)
AST SERPL W P-5'-P-CCNC: 23 U/L (ref 0–45)
B DERMAT AB SER-ACNC: 0.4 IV
BACTERIA SPT CULT: ABNORMAL
BASE EXCESS BLDV CALC-SCNC: 1.5 MMOL/L (ref -7.7–1.9)
BASE EXCESS BLDV CALC-SCNC: 4.3 MMOL/L (ref -7.7–1.9)
BASOPHILS # BLD AUTO: 0 10E3/UL (ref 0–0.2)
BASOPHILS NFR BLD AUTO: 0 %
BILIRUB DIRECT SERPL-MCNC: <0.2 MG/DL (ref 0–0.3)
BILIRUB SERPL-MCNC: 0.2 MG/DL
BILIRUB SERPL-MCNC: 0.3 MG/DL
BUN SERPL-MCNC: 12.4 MG/DL (ref 8–23)
BUN SERPL-MCNC: 12.5 MG/DL (ref 8–23)
BUN SERPL-MCNC: 13.6 MG/DL (ref 8–23)
BUN SERPL-MCNC: 14.8 MG/DL (ref 8–23)
BUN SERPL-MCNC: 15.5 MG/DL (ref 8–23)
BUN SERPL-MCNC: 15.5 MG/DL (ref 8–23)
BUN SERPL-MCNC: 16.8 MG/DL (ref 8–23)
BUN SERPL-MCNC: 18 MG/DL (ref 8–23)
C PNEUM DNA SPEC QL NAA+PROBE: NOT DETECTED
CALCIUM SERPL-MCNC: 7.9 MG/DL (ref 8.2–9.6)
CALCIUM SERPL-MCNC: 8.2 MG/DL (ref 8.2–9.6)
CALCIUM SERPL-MCNC: 8.5 MG/DL (ref 8.2–9.6)
CALCIUM SERPL-MCNC: 8.6 MG/DL (ref 8.2–9.6)
CALCIUM SERPL-MCNC: 8.6 MG/DL (ref 8.2–9.6)
CALCIUM SERPL-MCNC: 8.7 MG/DL (ref 8.2–9.6)
CALCIUM SERPL-MCNC: 8.7 MG/DL (ref 8.2–9.6)
CALCIUM SERPL-MCNC: 8.9 MG/DL (ref 8.2–9.6)
CHLORIDE SERPL-SCNC: 89 MMOL/L (ref 98–107)
CHLORIDE SERPL-SCNC: 90 MMOL/L (ref 98–107)
CHLORIDE SERPL-SCNC: 92 MMOL/L (ref 98–107)
CHLORIDE SERPL-SCNC: 92 MMOL/L (ref 98–107)
CHLORIDE SERPL-SCNC: 93 MMOL/L (ref 98–107)
CHLORIDE SERPL-SCNC: 97 MMOL/L (ref 98–107)
CLUE CELLS: PRESENT
COCCIDIOIDES AB TITR SER CF: NORMAL {TITER}
CREAT SERPL-MCNC: 0.39 MG/DL (ref 0.51–0.95)
CREAT SERPL-MCNC: 0.44 MG/DL (ref 0.51–0.95)
CREAT SERPL-MCNC: 0.44 MG/DL (ref 0.51–0.95)
CREAT SERPL-MCNC: 0.47 MG/DL (ref 0.51–0.95)
CREAT SERPL-MCNC: 0.48 MG/DL (ref 0.51–0.95)
CREAT SERPL-MCNC: 0.49 MG/DL (ref 0.51–0.95)
CREAT SERPL-MCNC: 0.5 MG/DL (ref 0.51–0.95)
CREAT SERPL-MCNC: 0.54 MG/DL (ref 0.51–0.95)
CRP SERPL-MCNC: 54.31 MG/L
DEPRECATED HCO3 PLAS-SCNC: 23 MMOL/L (ref 22–29)
DEPRECATED HCO3 PLAS-SCNC: 26 MMOL/L (ref 22–29)
DEPRECATED HCO3 PLAS-SCNC: 27 MMOL/L (ref 22–29)
DEPRECATED HCO3 PLAS-SCNC: 27 MMOL/L (ref 22–29)
DEPRECATED HCO3 PLAS-SCNC: 29 MMOL/L (ref 22–29)
DEPRECATED HCO3 PLAS-SCNC: 30 MMOL/L (ref 22–29)
DEPRECATED HCO3 PLAS-SCNC: 31 MMOL/L (ref 22–29)
DEPRECATED HCO3 PLAS-SCNC: 31 MMOL/L (ref 22–29)
DLCOCOR-%PRED-PRE: 59 %
DLCOCOR-PRE: 9.01 ML/MIN/MMHG
DLCOUNC-%PRED-PRE: 53 %
DLCOUNC-PRE: 8.13 ML/MIN/MMHG
DLCOUNC-PRED: 15.17 ML/MIN/MMHG
EOSINOPHIL # BLD AUTO: 0 10E3/UL (ref 0–0.7)
EOSINOPHIL NFR BLD AUTO: 0 %
ERV-%PRED-PRE: 38 %
ERV-PRE: 0.12 L
ERV-PRED: 0.3 L
ERYTHROCYTE [DISTWIDTH] IN BLOOD BY AUTOMATED COUNT: 13.3 % (ref 10–15)
ERYTHROCYTE [DISTWIDTH] IN BLOOD BY AUTOMATED COUNT: 13.4 % (ref 10–15)
ERYTHROCYTE [DISTWIDTH] IN BLOOD BY AUTOMATED COUNT: 13.4 % (ref 10–15)
ERYTHROCYTE [DISTWIDTH] IN BLOOD BY AUTOMATED COUNT: 13.6 % (ref 10–15)
ERYTHROCYTE [DISTWIDTH] IN BLOOD BY AUTOMATED COUNT: 13.7 % (ref 10–15)
ERYTHROCYTE [DISTWIDTH] IN BLOOD BY AUTOMATED COUNT: 13.7 % (ref 10–15)
ERYTHROCYTE [DISTWIDTH] IN BLOOD BY AUTOMATED COUNT: 13.9 % (ref 10–15)
ERYTHROCYTE [DISTWIDTH] IN BLOOD BY AUTOMATED COUNT: 14.7 % (ref 10–15)
EXPTIME-PRE: 7.3 SEC
FEF2575-%PRED-POST: 20 %
FEF2575-%PRED-PRE: 22 %
FEF2575-POST: 0.23 L/SEC
FEF2575-PRE: 0.25 L/SEC
FEF2575-PRED: 1.1 L/SEC
FEFMAX-%PRED-PRE: 69 %
FEFMAX-PRE: 2.07 L/SEC
FEFMAX-PRED: 2.97 L/SEC
FEV1-%PRED-PRE: 44 %
FEV1-PRE: 0.58 L
FEV1FEV6-PRE: 63 %
FEV1FEV6-PRED: 76 %
FEV1FVC-PRE: 62 %
FEV1FVC-PRED: 78 %
FEV1SVC-PRE: 55 %
FEV1SVC-PRED: 71 %
FIFMAX-PRE: 1.69 L/SEC
FLUAV H1 2009 PAND RNA SPEC QL NAA+PROBE: NOT DETECTED
FLUAV H1 RNA SPEC QL NAA+PROBE: NOT DETECTED
FLUAV H3 RNA SPEC QL NAA+PROBE: NOT DETECTED
FLUAV RNA SPEC QL NAA+PROBE: NEGATIVE
FLUAV RNA SPEC QL NAA+PROBE: NOT DETECTED
FLUBV RNA RESP QL NAA+PROBE: NEGATIVE
FLUBV RNA SPEC QL NAA+PROBE: NOT DETECTED
FRCPLETH-%PRED-PRE: 130 %
FRCPLETH-PRE: 3.12 L
FRCPLETH-PRED: 2.39 L
FVC-%PRED-PRE: 55 %
FVC-PRE: 0.94 L
FVC-PRED: 1.7 L
GALACTOMANNAN AG SERPL QL IA: NEGATIVE
GALACTOMANNAN AG SPEC IA-ACNC: 0.08
GAMMA INTERFERON BACKGROUND BLD IA-ACNC: 0.01 IU/ML
GFR SERPL CREATININE-BSD FRML MDRD: 86 ML/MIN/1.73M2
GFR SERPL CREATININE-BSD FRML MDRD: 88 ML/MIN/1.73M2
GFR SERPL CREATININE-BSD FRML MDRD: 89 ML/MIN/1.73M2
GFR SERPL CREATININE-BSD FRML MDRD: 90 ML/MIN/1.73M2
GFR SERPL CREATININE-BSD FRML MDRD: 90 ML/MIN/1.73M2
GFR SERPL CREATININE-BSD FRML MDRD: >90 ML/MIN/1.73M2
GLUCOSE BLDC GLUCOMTR-MCNC: 109 MG/DL (ref 70–99)
GLUCOSE BLDC GLUCOMTR-MCNC: 116 MG/DL (ref 70–99)
GLUCOSE BLDC GLUCOMTR-MCNC: 135 MG/DL (ref 70–99)
GLUCOSE BLDC GLUCOMTR-MCNC: 143 MG/DL (ref 70–99)
GLUCOSE BLDC GLUCOMTR-MCNC: 152 MG/DL (ref 70–99)
GLUCOSE BLDC GLUCOMTR-MCNC: 166 MG/DL (ref 70–99)
GLUCOSE BLDC GLUCOMTR-MCNC: 173 MG/DL (ref 70–99)
GLUCOSE BLDC GLUCOMTR-MCNC: 179 MG/DL (ref 70–99)
GLUCOSE BLDC GLUCOMTR-MCNC: 270 MG/DL (ref 70–99)
GLUCOSE SERPL-MCNC: 104 MG/DL (ref 70–99)
GLUCOSE SERPL-MCNC: 106 MG/DL (ref 70–99)
GLUCOSE SERPL-MCNC: 107 MG/DL (ref 70–99)
GLUCOSE SERPL-MCNC: 108 MG/DL (ref 70–99)
GLUCOSE SERPL-MCNC: 121 MG/DL (ref 70–99)
GLUCOSE SERPL-MCNC: 98 MG/DL (ref 70–99)
GRAM STAIN RESULT: ABNORMAL
GROUP A STREP BY PCR: NOT DETECTED
H CAPSUL MYC AB TITR SER CF: NORMAL {TITER}
H CAPSUL YST AB TITR SER CF: NORMAL {TITER}
HADV DNA SPEC QL NAA+PROBE: NOT DETECTED
HCO3 BLDV-SCNC: 27 MMOL/L (ref 21–28)
HCO3 BLDV-SCNC: 31 MMOL/L (ref 21–28)
HCOV PNL SPEC NAA+PROBE: NOT DETECTED
HCT VFR BLD AUTO: 29.2 % (ref 35–47)
HCT VFR BLD AUTO: 30 % (ref 35–47)
HCT VFR BLD AUTO: 31 % (ref 35–47)
HCT VFR BLD AUTO: 33.2 % (ref 35–47)
HCT VFR BLD AUTO: 33.3 % (ref 35–47)
HCT VFR BLD AUTO: 33.8 % (ref 35–47)
HCT VFR BLD AUTO: 34.1 % (ref 35–47)
HCT VFR BLD AUTO: 34.8 % (ref 35–47)
HGB BLD-MCNC: 10.5 G/DL (ref 11.7–15.7)
HGB BLD-MCNC: 10.6 G/DL (ref 11.7–15.7)
HGB BLD-MCNC: 10.9 G/DL (ref 11.7–15.7)
HGB BLD-MCNC: 11 G/DL (ref 11.7–15.7)
HGB BLD-MCNC: 11.3 G/DL (ref 11.7–15.7)
HGB BLD-MCNC: 11.4 G/DL (ref 11.7–15.7)
HGB BLD-MCNC: 9.7 G/DL (ref 11.7–15.7)
HGB BLD-MCNC: 9.8 G/DL (ref 11.7–15.7)
HMPV RNA SPEC QL NAA+PROBE: NOT DETECTED
HOLD SPECIMEN: NORMAL
HPIV1 RNA SPEC QL NAA+PROBE: NOT DETECTED
HPIV2 RNA SPEC QL NAA+PROBE: NOT DETECTED
HPIV3 RNA SPEC QL NAA+PROBE: NOT DETECTED
HPIV4 RNA SPEC QL NAA+PROBE: NOT DETECTED
IC-%PRED-PRE: 60 %
IC-PRE: 0.94 L
IC-PRED: 1.54 L
IMM GRANULOCYTES # BLD: 0 10E3/UL
IMM GRANULOCYTES NFR BLD: 0 %
INR BLD: 2 (ref 0.9–1.1)
INR BLD: 2 (ref 0.9–1.1)
INR BLD: 2.1 (ref 0.9–1.1)
INR BLD: 2.3 (ref 0.9–1.1)
INR BLD: 2.3 (ref 0.9–1.1)
INR BLD: 2.4 (ref 0.9–1.1)
INR BLD: 2.4 (ref 0.9–1.1)
INR BLD: 3.2 (ref 0.9–1.1)
INR BLD: 3.4 (ref 0.9–1.1)
INR BLD: 3.8 (ref 0.9–1.1)
INR BLD: 4.5 (ref 0.9–1.1)
INR PPP: 1.76 (ref 0.85–1.15)
INR PPP: 1.86 (ref 0.85–1.15)
INR PPP: 1.95 (ref 0.85–1.15)
INR PPP: 2.05 (ref 0.85–1.15)
INR PPP: 2.1 (ref 0.85–1.15)
INR PPP: 2.12 (ref 0.85–1.15)
INR PPP: 2.2 (ref 0.85–1.15)
INR PPP: 2.23 (ref 0.85–1.15)
INR PPP: 2.36 (ref 0.85–1.15)
INR PPP: 2.38 (ref 0.85–1.15)
INR PPP: 2.67 (ref 0.85–1.15)
INR PPP: 2.74 (ref 0.85–1.15)
LACTATE SERPL-SCNC: 1.5 MMOL/L (ref 0.7–2)
LYMPHOCYTES # BLD AUTO: 1.6 10E3/UL (ref 0.8–5.3)
LYMPHOCYTES NFR BLD AUTO: 20 %
M PNEUMO DNA SPEC QL NAA+PROBE: NOT DETECTED
M TB IFN-G BLD-IMP: NEGATIVE
M TB IFN-G CD4+ BCKGRND COR BLD-ACNC: 2.31 IU/ML
MAGNESIUM SERPL-MCNC: 1.6 MG/DL (ref 1.7–2.3)
MAGNESIUM SERPL-MCNC: 2.1 MG/DL (ref 1.7–2.3)
MCH RBC QN AUTO: 28.9 PG (ref 26.5–33)
MCH RBC QN AUTO: 29 PG (ref 26.5–33)
MCH RBC QN AUTO: 29.4 PG (ref 26.5–33)
MCH RBC QN AUTO: 29.4 PG (ref 26.5–33)
MCH RBC QN AUTO: 29.6 PG (ref 26.5–33)
MCH RBC QN AUTO: 29.7 PG (ref 26.5–33)
MCH RBC QN AUTO: 29.7 PG (ref 26.5–33)
MCH RBC QN AUTO: 29.9 PG (ref 26.5–33)
MCHC RBC AUTO-ENTMCNC: 31.9 G/DL (ref 31.5–36.5)
MCHC RBC AUTO-ENTMCNC: 32.3 G/DL (ref 31.5–36.5)
MCHC RBC AUTO-ENTMCNC: 32.3 G/DL (ref 31.5–36.5)
MCHC RBC AUTO-ENTMCNC: 32.5 G/DL (ref 31.5–36.5)
MCHC RBC AUTO-ENTMCNC: 32.7 G/DL (ref 31.5–36.5)
MCHC RBC AUTO-ENTMCNC: 33.6 G/DL (ref 31.5–36.5)
MCHC RBC AUTO-ENTMCNC: 33.7 G/DL (ref 31.5–36.5)
MCHC RBC AUTO-ENTMCNC: 33.9 G/DL (ref 31.5–36.5)
MCV RBC AUTO: 87 FL (ref 78–100)
MCV RBC AUTO: 89 FL (ref 78–100)
MCV RBC AUTO: 89 FL (ref 78–100)
MCV RBC AUTO: 90 FL (ref 78–100)
MCV RBC AUTO: 90 FL (ref 78–100)
MCV RBC AUTO: 91 FL (ref 78–100)
MCV RBC AUTO: 91 FL (ref 78–100)
MCV RBC AUTO: 92 FL (ref 78–100)
MDC_IDC_LEAD_CONNECTION_STATUS: NORMAL
MDC_IDC_LEAD_CONNECTION_STATUS: NORMAL
MDC_IDC_LEAD_IMPLANT_DT: NORMAL
MDC_IDC_LEAD_LOCATION: NORMAL
MDC_IDC_LEAD_LOCATION_DETAIL_1: NORMAL
MDC_IDC_LEAD_MFG: NORMAL
MDC_IDC_LEAD_MODEL: NORMAL
MDC_IDC_LEAD_POLARITY_TYPE: NORMAL
MDC_IDC_LEAD_SERIAL: NORMAL
MDC_IDC_MSMT_BATTERY_REMAINING_PERCENTAGE: 40 %
MDC_IDC_MSMT_BATTERY_STATUS: NORMAL
MDC_IDC_MSMT_LEADCHNL_RA_IMPEDANCE_VALUE: 372 OHM
MDC_IDC_MSMT_LEADCHNL_RA_IMPEDANCE_VALUE: 374 OHM
MDC_IDC_MSMT_LEADCHNL_RA_IMPEDANCE_VALUE: 376 OHM
MDC_IDC_MSMT_LEADCHNL_RA_IMPEDANCE_VALUE: 376 OHM
MDC_IDC_MSMT_LEADCHNL_RA_LEAD_CHANNEL_STATUS: NORMAL
MDC_IDC_MSMT_LEADCHNL_RA_PACING_THRESHOLD_AMPLITUDE: 0.8 V
MDC_IDC_MSMT_LEADCHNL_RA_PACING_THRESHOLD_PULSEWIDTH: 0.4 MS
MDC_IDC_MSMT_LEADCHNL_RV_IMPEDANCE_VALUE: 451 OHM
MDC_IDC_MSMT_LEADCHNL_RV_IMPEDANCE_VALUE: 454 OHM
MDC_IDC_MSMT_LEADCHNL_RV_IMPEDANCE_VALUE: 455 OHM
MDC_IDC_MSMT_LEADCHNL_RV_IMPEDANCE_VALUE: 456 OHM
MDC_IDC_MSMT_LEADCHNL_RV_LEAD_CHANNEL_STATUS: NORMAL
MDC_IDC_MSMT_LEADCHNL_RV_PACING_THRESHOLD_AMPLITUDE: 0.6 V
MDC_IDC_MSMT_LEADCHNL_RV_PACING_THRESHOLD_PULSEWIDTH: 0.4 MS
MDC_IDC_PG_IMPLANT_DTM: NORMAL
MDC_IDC_PG_MFG: NORMAL
MDC_IDC_PG_MODEL: NORMAL
MDC_IDC_PG_SERIAL: NORMAL
MDC_IDC_PG_TYPE: NORMAL
MDC_IDC_SESS_CLINIC_NAME: NORMAL
MDC_IDC_SESS_DTM: NORMAL
MDC_IDC_SESS_REPROGRAMMED: NO
MDC_IDC_SESS_TYPE: NORMAL
MDC_IDC_SET_BRADY_AT_MODE_SWITCH_MODE: NORMAL
MDC_IDC_SET_BRADY_AT_MODE_SWITCH_RATE: 160 {BEATS}/MIN
MDC_IDC_SET_BRADY_LOWRATE: 60 {BEATS}/MIN
MDC_IDC_SET_BRADY_MAX_SENSOR_RATE: 125 {BEATS}/MIN
MDC_IDC_SET_BRADY_MAX_TRACKING_RATE: 130 {BEATS}/MIN
MDC_IDC_SET_BRADY_MODE: NORMAL
MDC_IDC_SET_BRADY_PAV_DELAY_HIGH: 150 MS
MDC_IDC_SET_BRADY_PAV_DELAY_LOW: 180 MS
MDC_IDC_SET_BRADY_SAV_DELAY_HIGH: 105 MS
MDC_IDC_SET_BRADY_SAV_DELAY_LOW: 135 MS
MDC_IDC_SET_LEADCHNL_RA_PACING_POLARITY: NORMAL
MDC_IDC_SET_LEADCHNL_RA_PACING_PULSEWIDTH: 0.4 MS
MDC_IDC_SET_LEADCHNL_RA_SENSING_ADAPTATION_MODE: NORMAL
MDC_IDC_SET_LEADCHNL_RA_SENSING_POLARITY: NORMAL
MDC_IDC_SET_LEADCHNL_RV_PACING_POLARITY: NORMAL
MDC_IDC_SET_LEADCHNL_RV_PACING_PULSEWIDTH: 0.4 MS
MDC_IDC_SET_LEADCHNL_RV_SENSING_ADAPTATION_MODE: NORMAL
MDC_IDC_SET_LEADCHNL_RV_SENSING_POLARITY: NORMAL
MDC_IDC_STAT_AT_BURDEN_PERCENT: 0 %
MDC_IDC_STAT_AT_DTM_END: NORMAL
MDC_IDC_STAT_AT_DTM_START: NORMAL
MDC_IDC_STAT_AT_MODE_SW_COUNT_PER_DAY: 0
MDC_IDC_STAT_AT_MODE_SW_PERCENT_TIME_PER_DAY: 0 %
MDC_IDC_STAT_BRADY_AP_VP_PERCENT: 1 %
MDC_IDC_STAT_BRADY_AP_VP_PERCENT: 1 %
MDC_IDC_STAT_BRADY_AP_VP_PERCENT: 2 %
MDC_IDC_STAT_BRADY_AP_VP_PERCENT: 6 %
MDC_IDC_STAT_BRADY_AP_VS_PERCENT: 32 %
MDC_IDC_STAT_BRADY_AP_VS_PERCENT: 42 %
MDC_IDC_STAT_BRADY_AP_VS_PERCENT: 77 %
MDC_IDC_STAT_BRADY_AP_VS_PERCENT: 85 %
MDC_IDC_STAT_BRADY_AS_VP_PERCENT: 0 %
MDC_IDC_STAT_BRADY_AS_VP_PERCENT: 0 %
MDC_IDC_STAT_BRADY_AS_VP_PERCENT: 1 %
MDC_IDC_STAT_BRADY_AS_VP_PERCENT: 4 %
MDC_IDC_STAT_BRADY_AS_VS_PERCENT: 13 %
MDC_IDC_STAT_BRADY_AS_VS_PERCENT: 20 %
MDC_IDC_STAT_BRADY_AS_VS_PERCENT: 42 %
MDC_IDC_STAT_BRADY_AS_VS_PERCENT: 66 %
MDC_IDC_STAT_BRADY_DTM_END: NORMAL
MDC_IDC_STAT_BRADY_DTM_START: NORMAL
MDC_IDC_STAT_BRADY_RA_PERCENT_PACED: 33 %
MDC_IDC_STAT_BRADY_RA_PERCENT_PACED: 49 %
MDC_IDC_STAT_BRADY_RA_PERCENT_PACED: 79 %
MDC_IDC_STAT_BRADY_RA_PERCENT_PACED: 87 %
MDC_IDC_STAT_BRADY_RV_PERCENT_PACED: 1 %
MDC_IDC_STAT_BRADY_RV_PERCENT_PACED: 11 %
MDC_IDC_STAT_BRADY_RV_PERCENT_PACED: 2 %
MDC_IDC_STAT_BRADY_RV_PERCENT_PACED: 3 %
MDC_IDC_STAT_CRT_DTM_END: NORMAL
MDC_IDC_STAT_CRT_DTM_START: NORMAL
MITOGEN IGNF BCKGRD COR BLD-ACNC: -0.01 IU/ML
MITOGEN IGNF BCKGRD COR BLD-ACNC: 0 IU/ML
MONOCYTES # BLD AUTO: 0.7 10E3/UL (ref 0–1.3)
MONOCYTES NFR BLD AUTO: 8 %
MRSA DNA SPEC QL NAA+PROBE: NEGATIVE
NEUTROPHILS # BLD AUTO: 5.7 10E3/UL (ref 1.6–8.3)
NEUTROPHILS NFR BLD AUTO: 72 %
NRBC # BLD AUTO: 0 10E3/UL
NRBC BLD AUTO-RTO: 0 /100
NT-PROBNP SERPL-MCNC: 602 PG/ML (ref 0–1800)
O+P STL MICRO: NEGATIVE
O2/TOTAL GAS SETTING VFR VENT: 14 %
O2/TOTAL GAS SETTING VFR VENT: 40 %
OBSERVATION IMP: NEGATIVE
OSMOLALITY UR: 539 MMOL/KG (ref 100–1200)
PCO2 BLDV: 47 MM HG (ref 40–50)
PCO2 BLDV: 58 MM HG (ref 40–50)
PH BLDV: 7.34 [PH] (ref 7.32–7.43)
PH BLDV: 7.37 [PH] (ref 7.32–7.43)
PHOSPHATE SERPL-MCNC: 3.1 MG/DL (ref 2.5–4.5)
PLATELET # BLD AUTO: 198 10E3/UL (ref 150–450)
PLATELET # BLD AUTO: 198 10E3/UL (ref 150–450)
PLATELET # BLD AUTO: 205 10E3/UL (ref 150–450)
PLATELET # BLD AUTO: 227 10E3/UL (ref 150–450)
PLATELET # BLD AUTO: 232 10E3/UL (ref 150–450)
PLATELET # BLD AUTO: 247 10E3/UL (ref 150–450)
PLATELET # BLD AUTO: 288 10E3/UL (ref 150–450)
PLATELET # BLD AUTO: 306 10E3/UL (ref 150–450)
PO2 BLDV: 21 MM HG (ref 25–47)
PO2 BLDV: 29 MM HG (ref 25–47)
POTASSIUM SERPL-SCNC: 4 MMOL/L (ref 3.4–5.3)
POTASSIUM SERPL-SCNC: 4 MMOL/L (ref 3.4–5.3)
POTASSIUM SERPL-SCNC: 4.1 MMOL/L (ref 3.4–5.3)
POTASSIUM SERPL-SCNC: 4.3 MMOL/L (ref 3.4–5.3)
POTASSIUM SERPL-SCNC: 4.4 MMOL/L (ref 3.4–5.3)
POTASSIUM SERPL-SCNC: 4.9 MMOL/L (ref 3.4–5.3)
POTASSIUM SERPL-SCNC: 5.1 MMOL/L (ref 3.4–5.3)
POTASSIUM SERPL-SCNC: 5.2 MMOL/L (ref 3.4–5.3)
PROCALCITONIN SERPL IA-MCNC: 0.06 NG/ML
PROCALCITONIN SERPL IA-MCNC: 0.07 NG/ML
PROT SERPL-MCNC: 6 G/DL (ref 6.4–8.3)
PROT SERPL-MCNC: 6.2 G/DL (ref 6.4–8.3)
QUANTIFERON MITOGEN: 2.32 IU/ML
QUANTIFERON NIL TUBE: 0.01 IU/ML
QUANTIFERON TB1 TUBE: 0.01 IU/ML
QUANTIFERON TB2 TUBE: 0
RBC # BLD AUTO: 3.3 10E6/UL (ref 3.8–5.2)
RBC # BLD AUTO: 3.35 10E6/UL (ref 3.8–5.2)
RBC # BLD AUTO: 3.57 10E6/UL (ref 3.8–5.2)
RBC # BLD AUTO: 3.61 10E6/UL (ref 3.8–5.2)
RBC # BLD AUTO: 3.67 10E6/UL (ref 3.8–5.2)
RBC # BLD AUTO: 3.8 10E6/UL (ref 3.8–5.2)
RBC # BLD AUTO: 3.81 10E6/UL (ref 3.8–5.2)
RBC # BLD AUTO: 3.82 10E6/UL (ref 3.8–5.2)
RSV RNA SPEC NAA+PROBE: NEGATIVE
RSV RNA SPEC QL NAA+PROBE: NOT DETECTED
RSV RNA SPEC QL NAA+PROBE: NOT DETECTED
RV+EV RNA SPEC QL NAA+PROBE: DETECTED
RVPLETH-%PRED-PRE: 143 %
RVPLETH-PRE: 3 L
RVPLETH-PRED: 2.09 L
SA TARGET DNA: NEGATIVE
SARS-COV-2 RNA RESP QL NAA+PROBE: NEGATIVE
SARS-COV-2 RNA RESP QL NAA+PROBE: NEGATIVE
SCANNED LAB RESULT: NORMAL
SODIUM SERPL-SCNC: 125 MMOL/L (ref 136–145)
SODIUM SERPL-SCNC: 126 MMOL/L (ref 136–145)
SODIUM SERPL-SCNC: 127 MMOL/L (ref 136–145)
SODIUM SERPL-SCNC: 128 MMOL/L (ref 136–145)
SODIUM SERPL-SCNC: 128 MMOL/L (ref 136–145)
SODIUM SERPL-SCNC: 129 MMOL/L (ref 136–145)
SODIUM SERPL-SCNC: 130 MMOL/L (ref 136–145)
SODIUM SERPL-SCNC: 130 MMOL/L (ref 136–145)
SODIUM UR-SCNC: 43 MMOL/L
TLCPLETH-%PRED-PRE: 103 %
TLCPLETH-PRE: 4.05 L
TLCPLETH-PRED: 3.93 L
TRI STN SPEC: NORMAL
TRICHOMONAS, WET PREP: ABNORMAL
TROPONIN T SERPL HS-MCNC: 14 NG/L
TSH SERPL DL<=0.005 MIU/L-ACNC: 1.95 UIU/ML (ref 0.3–4.2)
VA-%PRED-PRE: 44 %
VA-PRE: 1.57 L
VC-%PRED-PRE: 57 %
VC-PRE: 1.05 L
VC-PRED: 1.84 L
WBC # BLD AUTO: 10.5 10E3/UL (ref 4–11)
WBC # BLD AUTO: 12.6 10E3/UL (ref 4–11)
WBC # BLD AUTO: 14.3 10E3/UL (ref 4–11)
WBC # BLD AUTO: 14.4 10E3/UL (ref 4–11)
WBC # BLD AUTO: 15.1 10E3/UL (ref 4–11)
WBC # BLD AUTO: 4.1 10E3/UL (ref 4–11)
WBC # BLD AUTO: 8.1 10E3/UL (ref 4–11)
WBC # BLD AUTO: 8.5 10E3/UL (ref 4–11)
WBC'S/HIGH POWER FIELD, WET PREP: ABNORMAL
YEAST, WET PREP: ABNORMAL

## 2023-01-01 PROCEDURE — 36416 COLLJ CAPILLARY BLOOD SPEC: CPT

## 2023-01-01 PROCEDURE — 250N000009 HC RX 250: Performed by: INTERNAL MEDICINE

## 2023-01-01 PROCEDURE — 83605 ASSAY OF LACTIC ACID: CPT | Performed by: INTERNAL MEDICINE

## 2023-01-01 PROCEDURE — 93296 REM INTERROG EVL PM/IDS: CPT | Performed by: INTERNAL MEDICINE

## 2023-01-01 PROCEDURE — 99223 1ST HOSP IP/OBS HIGH 75: CPT | Performed by: NURSE PRACTITIONER

## 2023-01-01 PROCEDURE — 5A09357 ASSISTANCE WITH RESPIRATORY VENTILATION, LESS THAN 24 CONSECUTIVE HOURS, CONTINUOUS POSITIVE AIRWAY PRESSURE: ICD-10-PCS | Performed by: INTERNAL MEDICINE

## 2023-01-01 PROCEDURE — 85610 PROTHROMBIN TIME: CPT | Performed by: INTERNAL MEDICINE

## 2023-01-01 PROCEDURE — 80053 COMPREHEN METABOLIC PANEL: CPT | Performed by: EMERGENCY MEDICINE

## 2023-01-01 PROCEDURE — 85610 PROTHROMBIN TIME: CPT

## 2023-01-01 PROCEDURE — 250N000012 HC RX MED GY IP 250 OP 636 PS 637: Performed by: INTERNAL MEDICINE

## 2023-01-01 PROCEDURE — 200N000001 HC R&B ICU

## 2023-01-01 PROCEDURE — 85025 COMPLETE CBC W/AUTO DIFF WBC: CPT | Performed by: EMERGENCY MEDICINE

## 2023-01-01 PROCEDURE — 94640 AIRWAY INHALATION TREATMENT: CPT | Mod: 76

## 2023-01-01 PROCEDURE — 36415 COLL VENOUS BLD VENIPUNCTURE: CPT | Performed by: INTERNAL MEDICINE

## 2023-01-01 PROCEDURE — 120N000001 HC R&B MED SURG/OB

## 2023-01-01 PROCEDURE — 250N000009 HC RX 250: Performed by: FAMILY MEDICINE

## 2023-01-01 PROCEDURE — 87116 MYCOBACTERIA CULTURE: CPT | Performed by: INTERNAL MEDICINE

## 2023-01-01 PROCEDURE — 999N000157 HC STATISTIC RCP TIME EA 10 MIN

## 2023-01-01 PROCEDURE — 84484 ASSAY OF TROPONIN QUANT: CPT | Performed by: EMERGENCY MEDICINE

## 2023-01-01 PROCEDURE — 83880 ASSAY OF NATRIURETIC PEPTIDE: CPT | Performed by: EMERGENCY MEDICINE

## 2023-01-01 PROCEDURE — 250N000013 HC RX MED GY IP 250 OP 250 PS 637: Performed by: INTERNAL MEDICINE

## 2023-01-01 PROCEDURE — 87449 NOS EACH ORGANISM AG IA: CPT | Performed by: INTERNAL MEDICINE

## 2023-01-01 PROCEDURE — U0003 INFECTIOUS AGENT DETECTION BY NUCLEIC ACID (DNA OR RNA); SEVERE ACUTE RESPIRATORY SYNDROME CORONAVIRUS 2 (SARS-COV-2) (CORONAVIRUS DISEASE [COVID-19]), AMPLIFIED PROBE TECHNIQUE, MAKING USE OF HIGH THROUGHPUT TECHNOLOGIES AS DESCRIBED BY CMS-2020-01-R: HCPCS | Performed by: NURSE PRACTITIONER

## 2023-01-01 PROCEDURE — 250N000009 HC RX 250

## 2023-01-01 PROCEDURE — 87305 ASPERGILLUS AG IA: CPT | Performed by: INTERNAL MEDICINE

## 2023-01-01 PROCEDURE — 36415 COLL VENOUS BLD VENIPUNCTURE: CPT | Performed by: EMERGENCY MEDICINE

## 2023-01-01 PROCEDURE — 99213 OFFICE O/P EST LOW 20 MIN: CPT | Performed by: FAMILY MEDICINE

## 2023-01-01 PROCEDURE — 250N000011 HC RX IP 250 OP 636: Mod: JZ | Performed by: INTERNAL MEDICINE

## 2023-01-01 PROCEDURE — 99232 SBSQ HOSP IP/OBS MODERATE 35: CPT | Performed by: INTERNAL MEDICINE

## 2023-01-01 PROCEDURE — 99223 1ST HOSP IP/OBS HIGH 75: CPT | Mod: AI | Performed by: FAMILY MEDICINE

## 2023-01-01 PROCEDURE — 80048 BASIC METABOLIC PNL TOTAL CA: CPT | Performed by: INTERNAL MEDICINE

## 2023-01-01 PROCEDURE — 71250 CT THORAX DX C-: CPT

## 2023-01-01 PROCEDURE — 85027 COMPLETE CBC AUTOMATED: CPT | Performed by: INTERNAL MEDICINE

## 2023-01-01 PROCEDURE — 97165 OT EVAL LOW COMPLEX 30 MIN: CPT | Mod: GO

## 2023-01-01 PROCEDURE — 87205 SMEAR GRAM STAIN: CPT | Performed by: EMERGENCY MEDICINE

## 2023-01-01 PROCEDURE — 250N000009 HC RX 250: Performed by: HOSPITALIST

## 2023-01-01 PROCEDURE — 85610 PROTHROMBIN TIME: CPT | Performed by: FAMILY MEDICINE

## 2023-01-01 PROCEDURE — 250N000012 HC RX MED GY IP 250 OP 636 PS 637: Performed by: FAMILY MEDICINE

## 2023-01-01 PROCEDURE — 71046 X-RAY EXAM CHEST 2 VIEWS: CPT | Mod: TC | Performed by: RADIOLOGY

## 2023-01-01 PROCEDURE — 97161 PT EVAL LOW COMPLEX 20 MIN: CPT | Mod: GP

## 2023-01-01 PROCEDURE — 94729 DIFFUSING CAPACITY: CPT

## 2023-01-01 PROCEDURE — 99214 OFFICE O/P EST MOD 30 MIN: CPT | Performed by: FAMILY MEDICINE

## 2023-01-01 PROCEDURE — 94660 CPAP INITIATION&MGMT: CPT

## 2023-01-01 PROCEDURE — 250N000013 HC RX MED GY IP 250 OP 250 PS 637: Performed by: NURSE PRACTITIONER

## 2023-01-01 PROCEDURE — 93294 REM INTERROG EVL PM/LDLS PM: CPT | Performed by: INTERNAL MEDICINE

## 2023-01-01 PROCEDURE — 99233 SBSQ HOSP IP/OBS HIGH 50: CPT | Performed by: INTERNAL MEDICINE

## 2023-01-01 PROCEDURE — 99213 OFFICE O/P EST LOW 20 MIN: CPT | Mod: VID | Performed by: FAMILY MEDICINE

## 2023-01-01 PROCEDURE — 94726 PLETHYSMOGRAPHY LUNG VOLUMES: CPT

## 2023-01-01 PROCEDURE — 99232 SBSQ HOSP IP/OBS MODERATE 35: CPT | Mod: 25 | Performed by: NURSE PRACTITIONER

## 2023-01-01 PROCEDURE — 87205 SMEAR GRAM STAIN: CPT

## 2023-01-01 PROCEDURE — 99239 HOSP IP/OBS DSCHRG MGMT >30: CPT | Performed by: INTERNAL MEDICINE

## 2023-01-01 PROCEDURE — 93000 ELECTROCARDIOGRAM COMPLETE: CPT | Performed by: NURSE PRACTITIONER

## 2023-01-01 PROCEDURE — 94640 AIRWAY INHALATION TREATMENT: CPT

## 2023-01-01 PROCEDURE — 97530 THERAPEUTIC ACTIVITIES: CPT | Mod: GP | Performed by: PHYSICAL THERAPIST

## 2023-01-01 PROCEDURE — 99213 OFFICE O/P EST LOW 20 MIN: CPT | Performed by: NURSE PRACTITIONER

## 2023-01-01 PROCEDURE — 71045 X-RAY EXAM CHEST 1 VIEW: CPT

## 2023-01-01 PROCEDURE — 271N000002 HC RX 271: Performed by: NURSE PRACTITIONER

## 2023-01-01 PROCEDURE — 999N000123 HC STATISTIC OXYGEN O2DAILY TECH TIME

## 2023-01-01 PROCEDURE — 85014 HEMATOCRIT: CPT | Performed by: INTERNAL MEDICINE

## 2023-01-01 PROCEDURE — 99285 EMERGENCY DEPT VISIT HI MDM: CPT | Performed by: PHYSICIAN ASSISTANT

## 2023-01-01 PROCEDURE — 82310 ASSAY OF CALCIUM: CPT | Performed by: INTERNAL MEDICINE

## 2023-01-01 PROCEDURE — 83935 ASSAY OF URINE OSMOLALITY: CPT | Performed by: INTERNAL MEDICINE

## 2023-01-01 PROCEDURE — 36415 COLL VENOUS BLD VENIPUNCTURE: CPT

## 2023-01-01 PROCEDURE — 999N000178 HC STATISTIC SUCTION SPUTUM

## 2023-01-01 PROCEDURE — 86606 ASPERGILLUS ANTIBODY: CPT | Performed by: INTERNAL MEDICINE

## 2023-01-01 PROCEDURE — 82803 BLOOD GASES ANY COMBINATION: CPT | Performed by: INTERNAL MEDICINE

## 2023-01-01 PROCEDURE — 250N000009 HC RX 250: Performed by: PHYSICIAN ASSISTANT

## 2023-01-01 PROCEDURE — 99214 OFFICE O/P EST MOD 30 MIN: CPT | Performed by: NURSE PRACTITIONER

## 2023-01-01 PROCEDURE — 250N000013 HC RX MED GY IP 250 OP 250 PS 637: Performed by: HOSPITALIST

## 2023-01-01 PROCEDURE — 99285 EMERGENCY DEPT VISIT HI MDM: CPT | Mod: 25 | Performed by: PHYSICIAN ASSISTANT

## 2023-01-01 PROCEDURE — 99214 OFFICE O/P EST MOD 30 MIN: CPT | Mod: CS | Performed by: NURSE PRACTITIONER

## 2023-01-01 PROCEDURE — 250N000013 HC RX MED GY IP 250 OP 250 PS 637: Performed by: FAMILY MEDICINE

## 2023-01-01 PROCEDURE — 84145 PROCALCITONIN (PCT): CPT | Performed by: FAMILY MEDICINE

## 2023-01-01 PROCEDURE — 99207 PR NON-BILLABLE SERV PER CHARTING: CPT | Performed by: NURSE PRACTITIONER

## 2023-01-01 PROCEDURE — 80053 COMPREHEN METABOLIC PANEL: CPT | Performed by: HOSPITALIST

## 2023-01-01 PROCEDURE — 84443 ASSAY THYROID STIM HORMONE: CPT

## 2023-01-01 PROCEDURE — 84300 ASSAY OF URINE SODIUM: CPT | Performed by: INTERNAL MEDICINE

## 2023-01-01 PROCEDURE — 94060 EVALUATION OF WHEEZING: CPT

## 2023-01-01 PROCEDURE — 86140 C-REACTIVE PROTEIN: CPT | Performed by: INTERNAL MEDICINE

## 2023-01-01 PROCEDURE — 36415 COLL VENOUS BLD VENIPUNCTURE: CPT | Performed by: FAMILY MEDICINE

## 2023-01-01 PROCEDURE — 84100 ASSAY OF PHOSPHORUS: CPT | Performed by: HOSPITALIST

## 2023-01-01 PROCEDURE — 87077 CULTURE AEROBIC IDENTIFY: CPT | Performed by: EMERGENCY MEDICINE

## 2023-01-01 PROCEDURE — 85027 COMPLETE CBC AUTOMATED: CPT | Performed by: HOSPITALIST

## 2023-01-01 PROCEDURE — 250N000011 HC RX IP 250 OP 636: Mod: JZ | Performed by: EMERGENCY MEDICINE

## 2023-01-01 PROCEDURE — 97110 THERAPEUTIC EXERCISES: CPT | Mod: GO

## 2023-01-01 PROCEDURE — 87385 HISTOPLASMA CAPSUL AG IA: CPT | Performed by: INTERNAL MEDICINE

## 2023-01-01 PROCEDURE — 85610 PROTHROMBIN TIME: CPT | Performed by: EMERGENCY MEDICINE

## 2023-01-01 PROCEDURE — 87077 CULTURE AEROBIC IDENTIFY: CPT

## 2023-01-01 PROCEDURE — 72040 X-RAY EXAM NECK SPINE 2-3 VW: CPT | Mod: TC | Performed by: RADIOLOGY

## 2023-01-01 PROCEDURE — 87641 MR-STAPH DNA AMP PROBE: CPT | Performed by: INTERNAL MEDICINE

## 2023-01-01 PROCEDURE — 71046 X-RAY EXAM CHEST 2 VIEWS: CPT

## 2023-01-01 PROCEDURE — 999N000215 HC STATISTIC HFNC ADULT NON-CPAP

## 2023-01-01 PROCEDURE — 99418 PROLNG IP/OBS E/M EA 15 MIN: CPT | Performed by: INTERNAL MEDICINE

## 2023-01-01 PROCEDURE — 250N000012 HC RX MED GY IP 250 OP 636 PS 637: Performed by: EMERGENCY MEDICINE

## 2023-01-01 PROCEDURE — 87651 STREP A DNA AMP PROBE: CPT | Performed by: PHYSICIAN ASSISTANT

## 2023-01-01 PROCEDURE — 86481 TB AG RESPONSE T-CELL SUSP: CPT | Performed by: INTERNAL MEDICINE

## 2023-01-01 PROCEDURE — 83735 ASSAY OF MAGNESIUM: CPT | Performed by: INTERNAL MEDICINE

## 2023-01-01 PROCEDURE — U0005 INFEC AGEN DETEC AMPLI PROBE: HCPCS | Performed by: NURSE PRACTITIONER

## 2023-01-01 PROCEDURE — 99233 SBSQ HOSP IP/OBS HIGH 50: CPT | Performed by: HOSPITALIST

## 2023-01-01 PROCEDURE — 87210 SMEAR WET MOUNT SALINE/INK: CPT

## 2023-01-01 PROCEDURE — 87637 SARSCOV2&INF A&B&RSV AMP PRB: CPT | Performed by: PHYSICIAN ASSISTANT

## 2023-01-01 PROCEDURE — 36415 COLL VENOUS BLD VENIPUNCTURE: CPT | Performed by: HOSPITALIST

## 2023-01-01 PROCEDURE — 85027 COMPLETE CBC AUTOMATED: CPT | Performed by: FAMILY MEDICINE

## 2023-01-01 PROCEDURE — 82248 BILIRUBIN DIRECT: CPT | Performed by: HOSPITALIST

## 2023-01-01 PROCEDURE — 82803 BLOOD GASES ANY COMBINATION: CPT | Performed by: EMERGENCY MEDICINE

## 2023-01-01 PROCEDURE — 87206 SMEAR FLUORESCENT/ACID STAI: CPT | Performed by: INTERNAL MEDICINE

## 2023-01-01 PROCEDURE — 83735 ASSAY OF MAGNESIUM: CPT | Performed by: HOSPITALIST

## 2023-01-01 PROCEDURE — 97535 SELF CARE MNGMENT TRAINING: CPT | Mod: GO

## 2023-01-01 PROCEDURE — 87209 SMEAR COMPLEX STAIN: CPT | Performed by: INTERNAL MEDICINE

## 2023-01-01 PROCEDURE — 99497 ADVNCD CARE PLAN 30 MIN: CPT | Performed by: NURSE PRACTITIONER

## 2023-01-01 PROCEDURE — 250N000013 HC RX MED GY IP 250 OP 250 PS 637: Performed by: EMERGENCY MEDICINE

## 2023-01-01 PROCEDURE — 84145 PROCALCITONIN (PCT): CPT | Performed by: INTERNAL MEDICINE

## 2023-01-01 PROCEDURE — 87633 RESP VIRUS 12-25 TARGETS: CPT | Performed by: EMERGENCY MEDICINE

## 2023-01-01 RX ORDER — SODIUM CHLORIDE FOR INHALATION 0.9 %
VIAL, NEBULIZER (ML) INHALATION
Qty: 450 ML | Refills: 3 | Status: SHIPPED | OUTPATIENT
Start: 2023-01-01

## 2023-01-01 RX ORDER — METRONIDAZOLE 500 MG/1
500 TABLET ORAL 2 TIMES DAILY
Qty: 14 TABLET | Refills: 0 | Status: SHIPPED | OUTPATIENT
Start: 2023-01-01 | End: 2023-01-01

## 2023-01-01 RX ORDER — FLUTICASONE PROPIONATE AND SALMETEROL 250; 50 UG/1; UG/1
2 POWDER RESPIRATORY (INHALATION) EVERY 12 HOURS
Qty: 60 EACH | Refills: 11
Start: 2023-01-01 | End: 2023-01-01

## 2023-01-01 RX ORDER — SENNOSIDES 8.6 MG
1 CAPSULE ORAL 2 TIMES DAILY
COMMUNITY

## 2023-01-01 RX ORDER — FLUTICASONE PROPIONATE AND SALMETEROL 250; 50 UG/1; UG/1
1 POWDER RESPIRATORY (INHALATION) EVERY 12 HOURS
Qty: 60 EACH | Refills: 11 | Status: SHIPPED | OUTPATIENT
Start: 2023-01-01

## 2023-01-01 RX ORDER — CEFEPIME HYDROCHLORIDE 2 G/1
2 INJECTION, POWDER, FOR SOLUTION INTRAVENOUS EVERY 8 HOURS
Status: COMPLETED | OUTPATIENT
Start: 2023-01-01 | End: 2023-01-01

## 2023-01-01 RX ORDER — IPRATROPIUM BROMIDE AND ALBUTEROL SULFATE 2.5; .5 MG/3ML; MG/3ML
SOLUTION RESPIRATORY (INHALATION)
Qty: 270 ML | Refills: 0 | Status: ON HOLD | OUTPATIENT
Start: 2023-01-01 | End: 2023-01-01

## 2023-01-01 RX ORDER — METOPROLOL SUCCINATE 25 MG/1
TABLET, EXTENDED RELEASE ORAL
Qty: 360 TABLET | Refills: 0 | Status: SHIPPED | OUTPATIENT
Start: 2023-01-01

## 2023-01-01 RX ORDER — LEVOFLOXACIN 5 MG/ML
750 INJECTION, SOLUTION INTRAVENOUS EVERY 24 HOURS
Status: DISCONTINUED | OUTPATIENT
Start: 2023-01-01 | End: 2023-01-01

## 2023-01-01 RX ORDER — METRONIDAZOLE 500 MG/100ML
500 INJECTION, SOLUTION INTRAVENOUS EVERY 8 HOURS
Status: DISCONTINUED | OUTPATIENT
Start: 2023-01-01 | End: 2023-01-01 | Stop reason: HOSPADM

## 2023-01-01 RX ORDER — SODIUM CHLORIDE FOR INHALATION 0.9 %
5 VIAL, NEBULIZER (ML) INHALATION 3 TIMES DAILY
Status: DISCONTINUED | OUTPATIENT
Start: 2023-01-01 | End: 2023-01-01

## 2023-01-01 RX ORDER — ALBUTEROL SULFATE 0.83 MG/ML
2.5 SOLUTION RESPIRATORY (INHALATION) EVERY 6 HOURS PRN
Qty: 90 ML | Refills: 1 | Status: SHIPPED | OUTPATIENT
Start: 2023-01-01 | End: 2023-01-01

## 2023-01-01 RX ORDER — METRONIDAZOLE 500 MG/1
500 TABLET ORAL 3 TIMES DAILY
Qty: 12 TABLET | Refills: 0 | Status: SHIPPED | OUTPATIENT
Start: 2023-01-01 | End: 2023-01-01

## 2023-01-01 RX ORDER — AZITHROMYCIN 250 MG/1
TABLET, FILM COATED ORAL
Qty: 6 TABLET | Refills: 0 | Status: SHIPPED | OUTPATIENT
Start: 2023-01-01 | End: 2023-01-01

## 2023-01-01 RX ORDER — PROCHLORPERAZINE MALEATE 5 MG
5 TABLET ORAL EVERY 6 HOURS PRN
Status: DISCONTINUED | OUTPATIENT
Start: 2023-01-01 | End: 2023-01-01 | Stop reason: HOSPADM

## 2023-01-01 RX ORDER — CEFDINIR 300 MG/1
300 CAPSULE ORAL 2 TIMES DAILY
Qty: 14 CAPSULE | Refills: 0 | Status: SHIPPED | OUTPATIENT
Start: 2023-01-01 | End: 2023-01-01

## 2023-01-01 RX ORDER — BENZONATATE 100 MG/1
100 CAPSULE ORAL 3 TIMES DAILY PRN
Qty: 30 CAPSULE | Refills: 0 | Status: SHIPPED | OUTPATIENT
Start: 2023-01-01 | End: 2023-01-01

## 2023-01-01 RX ORDER — TRAZODONE HYDROCHLORIDE 50 MG/1
50 TABLET, FILM COATED ORAL AT BEDTIME
Qty: 30 TABLET | Refills: 1 | Status: SHIPPED | OUTPATIENT
Start: 2023-01-01

## 2023-01-01 RX ORDER — PREDNISONE 20 MG/1
40 TABLET ORAL DAILY
Status: COMPLETED | OUTPATIENT
Start: 2023-01-01 | End: 2023-01-01

## 2023-01-01 RX ORDER — MAGNESIUM SULFATE HEPTAHYDRATE 40 MG/ML
4 INJECTION, SOLUTION INTRAVENOUS ONCE
Status: COMPLETED | OUTPATIENT
Start: 2023-01-01 | End: 2023-01-01

## 2023-01-01 RX ORDER — LORAZEPAM 2 MG/ML
1 CONCENTRATE ORAL EVERY 8 HOURS PRN
Qty: 30 ML | Refills: 0 | Status: SHIPPED | OUTPATIENT
Start: 2023-01-01

## 2023-01-01 RX ORDER — POLYETHYLENE GLYCOL 3350 17 G/17G
17 POWDER, FOR SOLUTION ORAL DAILY
Status: DISCONTINUED | OUTPATIENT
Start: 2023-01-01 | End: 2023-01-01 | Stop reason: HOSPADM

## 2023-01-01 RX ORDER — SODIUM CHLORIDE FOR INHALATION 3 %
3 VIAL, NEBULIZER (ML) INHALATION 3 TIMES DAILY
Status: DISCONTINUED | OUTPATIENT
Start: 2023-01-01 | End: 2023-01-01

## 2023-01-01 RX ORDER — SODIUM CHLORIDE 1 G/1
1 TABLET ORAL
Status: DISCONTINUED | OUTPATIENT
Start: 2023-01-01 | End: 2023-01-01 | Stop reason: HOSPADM

## 2023-01-01 RX ORDER — IPRATROPIUM BROMIDE AND ALBUTEROL SULFATE 2.5; .5 MG/3ML; MG/3ML
3 SOLUTION RESPIRATORY (INHALATION) ONCE
Status: COMPLETED | OUTPATIENT
Start: 2023-01-01 | End: 2023-01-01

## 2023-01-01 RX ORDER — IPRATROPIUM BROMIDE AND ALBUTEROL SULFATE 2.5; .5 MG/3ML; MG/3ML
SOLUTION RESPIRATORY (INHALATION)
Qty: 270 ML | Refills: 0 | Status: SHIPPED | OUTPATIENT
Start: 2023-01-01

## 2023-01-01 RX ORDER — PREDNISONE 20 MG/1
20 TABLET ORAL DAILY
Qty: 5 TABLET | Refills: 0 | Status: SHIPPED | OUTPATIENT
Start: 2023-01-01 | End: 2023-01-01

## 2023-01-01 RX ORDER — IPRATROPIUM BROMIDE AND ALBUTEROL SULFATE 2.5; .5 MG/3ML; MG/3ML
1 SOLUTION RESPIRATORY (INHALATION) 4 TIMES DAILY
Qty: 270 ML | Refills: 0 | COMMUNITY
Start: 2023-01-01 | End: 2023-01-01

## 2023-01-01 RX ORDER — WARFARIN SODIUM 1 MG/1
1 TABLET ORAL
Status: COMPLETED | OUTPATIENT
Start: 2023-01-01 | End: 2023-01-01

## 2023-01-01 RX ORDER — SODIUM CHLORIDE 1 G/1
1 TABLET ORAL 2 TIMES DAILY
Status: DISCONTINUED | OUTPATIENT
Start: 2023-01-01 | End: 2023-01-01

## 2023-01-01 RX ORDER — ONDANSETRON 2 MG/ML
4 INJECTION INTRAMUSCULAR; INTRAVENOUS EVERY 6 HOURS PRN
Status: DISCONTINUED | OUTPATIENT
Start: 2023-01-01 | End: 2023-01-01 | Stop reason: HOSPADM

## 2023-01-01 RX ORDER — MORPHINE SULFATE 100 MG/5ML
2.5 SOLUTION ORAL
Qty: 30 ML | Refills: 0 | Status: SHIPPED | OUTPATIENT
Start: 2023-01-01 | End: 2023-01-01

## 2023-01-01 RX ORDER — TRAZODONE HYDROCHLORIDE 50 MG/1
50 TABLET, FILM COATED ORAL AT BEDTIME
Status: DISCONTINUED | OUTPATIENT
Start: 2023-01-01 | End: 2023-01-01 | Stop reason: HOSPADM

## 2023-01-01 RX ORDER — ACETYLCYSTEINE 200 MG/ML
2 SOLUTION ORAL; RESPIRATORY (INHALATION) 4 TIMES DAILY
Status: DISCONTINUED | OUTPATIENT
Start: 2023-01-01 | End: 2023-01-01 | Stop reason: HOSPADM

## 2023-01-01 RX ORDER — INHALER, ASSIST DEVICES
1 SPACER (EA) MISCELLANEOUS ONCE
Status: COMPLETED | OUTPATIENT
Start: 2023-01-01 | End: 2023-01-01

## 2023-01-01 RX ORDER — LEVOTHYROXINE SODIUM 50 UG/1
50 TABLET ORAL DAILY
Status: DISCONTINUED | OUTPATIENT
Start: 2023-01-01 | End: 2023-01-01 | Stop reason: HOSPADM

## 2023-01-01 RX ORDER — IPRATROPIUM BROMIDE AND ALBUTEROL SULFATE 2.5; .5 MG/3ML; MG/3ML
SOLUTION RESPIRATORY (INHALATION)
Qty: 270 ML | Refills: 0 | Status: SHIPPED | OUTPATIENT
Start: 2023-01-01 | End: 2023-01-01

## 2023-01-01 RX ORDER — LIDOCAINE 50 MG/G
OINTMENT TOPICAL PRN
Qty: 30 G | Refills: 3 | Status: SHIPPED | OUTPATIENT
Start: 2023-01-01

## 2023-01-01 RX ORDER — CARBOXYMETHYLCELLULOSE SODIUM 5 MG/ML
1 SOLUTION/ DROPS OPHTHALMIC
Status: DISCONTINUED | OUTPATIENT
Start: 2023-01-01 | End: 2023-01-01 | Stop reason: HOSPADM

## 2023-01-01 RX ORDER — WARFARIN SODIUM 1 MG/1
TABLET ORAL
Qty: 90 TABLET | Refills: 1 | Status: SHIPPED | OUTPATIENT
Start: 2023-01-01

## 2023-01-01 RX ORDER — SODIUM CHLORIDE FOR INHALATION 3 %
3 VIAL, NEBULIZER (ML) INHALATION 3 TIMES DAILY
Status: DISCONTINUED | OUTPATIENT
Start: 2023-01-01 | End: 2023-01-01 | Stop reason: HOSPADM

## 2023-01-01 RX ORDER — PREDNISONE 20 MG/1
40 TABLET ORAL DAILY
Qty: 10 TABLET | Refills: 0 | Status: SHIPPED | OUTPATIENT
Start: 2023-01-01 | End: 2023-01-01

## 2023-01-01 RX ORDER — LEVOFLOXACIN 750 MG/1
750 TABLET, FILM COATED ORAL DAILY
Qty: 4 TABLET | Refills: 0 | Status: SHIPPED | OUTPATIENT
Start: 2023-01-01 | End: 2023-01-01

## 2023-01-01 RX ORDER — FLUTICASONE FUROATE AND VILANTEROL 100; 25 UG/1; UG/1
1 POWDER RESPIRATORY (INHALATION) DAILY
Status: DISCONTINUED | OUTPATIENT
Start: 2023-01-01 | End: 2023-01-01 | Stop reason: HOSPADM

## 2023-01-01 RX ORDER — SENNOSIDES 8.6 MG
2 TABLET ORAL ONCE
Status: COMPLETED | OUTPATIENT
Start: 2023-01-01 | End: 2023-01-01

## 2023-01-01 RX ORDER — BISACODYL 10 MG
10 SUPPOSITORY, RECTAL RECTAL ONCE
Status: COMPLETED | OUTPATIENT
Start: 2023-01-01 | End: 2023-01-01

## 2023-01-01 RX ORDER — ALBUTEROL SULFATE 0.83 MG/ML
2.5 SOLUTION RESPIRATORY (INHALATION)
Status: DISCONTINUED | OUTPATIENT
Start: 2023-01-01 | End: 2023-01-01 | Stop reason: HOSPADM

## 2023-01-01 RX ORDER — GUAIFENESIN 600 MG/1
1200 TABLET, EXTENDED RELEASE ORAL 2 TIMES DAILY
Qty: 60 TABLET | Refills: 1 | Status: ON HOLD | OUTPATIENT
Start: 2023-01-01 | End: 2023-01-01

## 2023-01-01 RX ORDER — GUAIFENESIN 600 MG/1
600 TABLET, EXTENDED RELEASE ORAL 2 TIMES DAILY
Status: DISCONTINUED | OUTPATIENT
Start: 2023-01-01 | End: 2023-01-01

## 2023-01-01 RX ORDER — LIDOCAINE 40 MG/G
CREAM TOPICAL
Status: DISCONTINUED | OUTPATIENT
Start: 2023-01-01 | End: 2023-01-01 | Stop reason: HOSPADM

## 2023-01-01 RX ORDER — IPRATROPIUM BROMIDE AND ALBUTEROL SULFATE 2.5; .5 MG/3ML; MG/3ML
3 SOLUTION RESPIRATORY (INHALATION)
Status: DISCONTINUED | OUTPATIENT
Start: 2023-01-01 | End: 2023-01-01 | Stop reason: HOSPADM

## 2023-01-01 RX ORDER — AMOXICILLIN 250 MG
1 CAPSULE ORAL 2 TIMES DAILY PRN
Qty: 30 TABLET | Refills: 0 | Status: SHIPPED | OUTPATIENT
Start: 2023-01-01

## 2023-01-01 RX ORDER — CEFEPIME HYDROCHLORIDE 2 G/1
2 INJECTION, POWDER, FOR SOLUTION INTRAVENOUS EVERY 12 HOURS
Status: DISCONTINUED | OUTPATIENT
Start: 2023-01-01 | End: 2023-01-01

## 2023-01-01 RX ORDER — GUAIFENESIN 200 MG/10ML
200 LIQUID ORAL EVERY 4 HOURS PRN
Status: DISCONTINUED | OUTPATIENT
Start: 2023-01-01 | End: 2023-01-01

## 2023-01-01 RX ORDER — CIPROFLOXACIN 2 MG/ML
400 INJECTION, SOLUTION INTRAVENOUS EVERY 12 HOURS
Status: DISCONTINUED | OUTPATIENT
Start: 2023-01-01 | End: 2023-01-01

## 2023-01-01 RX ORDER — LEVOFLOXACIN 500 MG/1
500 TABLET, FILM COATED ORAL DAILY
Qty: 10 TABLET | Refills: 0 | Status: SHIPPED | OUTPATIENT
Start: 2023-01-01 | End: 2023-01-01

## 2023-01-01 RX ORDER — PROCHLORPERAZINE 25 MG
12.5 SUPPOSITORY, RECTAL RECTAL EVERY 12 HOURS PRN
Status: DISCONTINUED | OUTPATIENT
Start: 2023-01-01 | End: 2023-01-01 | Stop reason: HOSPADM

## 2023-01-01 RX ORDER — MORPHINE SULFATE 100 MG/5ML
2.5 SOLUTION ORAL
Qty: 30 ML | Refills: 0 | Status: SHIPPED | OUTPATIENT
Start: 2023-01-01

## 2023-01-01 RX ORDER — ONDANSETRON 4 MG/1
4 TABLET, ORALLY DISINTEGRATING ORAL EVERY 8 HOURS PRN
Qty: 20 TABLET | Refills: 0 | Status: SHIPPED | OUTPATIENT
Start: 2023-01-01

## 2023-01-01 RX ORDER — TIOTROPIUM BROMIDE 18 UG/1
18 CAPSULE ORAL; RESPIRATORY (INHALATION) DAILY
Qty: 90 CAPSULE | Refills: 1 | Status: SHIPPED | OUTPATIENT
Start: 2023-01-01 | End: 2023-01-01

## 2023-01-01 RX ORDER — TRAZODONE HYDROCHLORIDE 50 MG/1
50 TABLET, FILM COATED ORAL AT BEDTIME
Qty: 30 TABLET | Refills: 1 | Status: SHIPPED | OUTPATIENT
Start: 2023-01-01 | End: 2023-01-01

## 2023-01-01 RX ORDER — METOPROLOL SUCCINATE 25 MG/1
TABLET, EXTENDED RELEASE ORAL
Qty: 360 TABLET | Refills: 0 | Status: ON HOLD | OUTPATIENT
Start: 2023-01-01 | End: 2023-01-01

## 2023-01-01 RX ORDER — ALBUTEROL SULFATE 0.83 MG/ML
SOLUTION RESPIRATORY (INHALATION)
Qty: 75 ML | Refills: 4 | Status: SHIPPED | OUTPATIENT
Start: 2023-01-01

## 2023-01-01 RX ORDER — IPRATROPIUM BROMIDE AND ALBUTEROL SULFATE 2.5; .5 MG/3ML; MG/3ML
SOLUTION RESPIRATORY (INHALATION)
Status: COMPLETED
Start: 2023-01-01 | End: 2023-01-01

## 2023-01-01 RX ORDER — METOPROLOL SUCCINATE 50 MG/1
50 TABLET, EXTENDED RELEASE ORAL 2 TIMES DAILY
Status: DISCONTINUED | OUTPATIENT
Start: 2023-01-01 | End: 2023-01-01 | Stop reason: HOSPADM

## 2023-01-01 RX ORDER — ACETAMINOPHEN 325 MG/1
650 TABLET ORAL EVERY 8 HOURS PRN
Status: DISCONTINUED | OUTPATIENT
Start: 2023-01-01 | End: 2023-01-01

## 2023-01-01 RX ORDER — SODIUM CHLORIDE 1 G/1
1 TABLET ORAL
Qty: 90 TABLET | Refills: 0 | Status: SHIPPED | OUTPATIENT
Start: 2023-01-01 | End: 2023-01-01

## 2023-01-01 RX ORDER — PREDNISONE 20 MG/1
40 TABLET ORAL ONCE
Status: COMPLETED | OUTPATIENT
Start: 2023-01-01 | End: 2023-01-01

## 2023-01-01 RX ORDER — ACETAMINOPHEN 325 MG/1
650 TABLET ORAL EVERY 8 HOURS PRN
Status: DISCONTINUED | OUTPATIENT
Start: 2023-01-01 | End: 2023-01-01 | Stop reason: HOSPADM

## 2023-01-01 RX ADMIN — METRONIDAZOLE 500 MG: 500 INJECTION, SOLUTION INTRAVENOUS at 14:26

## 2023-01-01 RX ADMIN — METOPROLOL SUCCINATE 50 MG: 50 TABLET, EXTENDED RELEASE ORAL at 20:19

## 2023-01-01 RX ADMIN — SODIUM CHLORIDE SOLN NEBU 3% 3 ML: 3 NEBU SOLN at 21:15

## 2023-01-01 RX ADMIN — IPRATROPIUM BROMIDE AND ALBUTEROL SULFATE 3 ML: 2.5; .5 SOLUTION RESPIRATORY (INHALATION) at 13:01

## 2023-01-01 RX ADMIN — SODIUM CHLORIDE TAB 1 GM 1 G: 1 TAB at 12:12

## 2023-01-01 RX ADMIN — IPRATROPIUM BROMIDE AND ALBUTEROL SULFATE 3 ML: 2.5; .5 SOLUTION RESPIRATORY (INHALATION) at 17:01

## 2023-01-01 RX ADMIN — WARFARIN SODIUM 1 MG: 1 TABLET ORAL at 17:59

## 2023-01-01 RX ADMIN — SODIUM CHLORIDE TAB 1 GM 1 G: 1 TAB at 12:08

## 2023-01-01 RX ADMIN — BISACODYL 10 MG: 10 SUPPOSITORY RECTAL at 07:48

## 2023-01-01 RX ADMIN — SODIUM CHLORIDE TAB 1 GM 1 G: 1 TAB at 20:20

## 2023-01-01 RX ADMIN — METOPROLOL SUCCINATE 50 MG: 50 TABLET, EXTENDED RELEASE ORAL at 20:07

## 2023-01-01 RX ADMIN — IPRATROPIUM BROMIDE AND ALBUTEROL SULFATE 3 ML: 2.5; .5 SOLUTION RESPIRATORY (INHALATION) at 21:05

## 2023-01-01 RX ADMIN — PREDNISONE 40 MG: 20 TABLET ORAL at 17:37

## 2023-01-01 RX ADMIN — ACETYLCYSTEINE 2 ML: 200 SOLUTION ORAL; RESPIRATORY (INHALATION) at 17:02

## 2023-01-01 RX ADMIN — METRONIDAZOLE 500 MG: 500 INJECTION, SOLUTION INTRAVENOUS at 14:41

## 2023-01-01 RX ADMIN — LEVOTHYROXINE SODIUM 50 MCG: 0.05 TABLET ORAL at 06:05

## 2023-01-01 RX ADMIN — GUAIFENESIN 200 MG: 200 SOLUTION ORAL at 02:56

## 2023-01-01 RX ADMIN — IPRATROPIUM BROMIDE AND ALBUTEROL SULFATE 3 ML: 2.5; .5 SOLUTION RESPIRATORY (INHALATION) at 16:33

## 2023-01-01 RX ADMIN — ACETYLCYSTEINE 2 ML: 200 SOLUTION ORAL; RESPIRATORY (INHALATION) at 08:07

## 2023-01-01 RX ADMIN — LEVOTHYROXINE SODIUM 50 MCG: 0.05 TABLET ORAL at 06:13

## 2023-01-01 RX ADMIN — TRAZODONE HYDROCHLORIDE 50 MG: 50 TABLET ORAL at 21:10

## 2023-01-01 RX ADMIN — SODIUM CHLORIDE TAB 1 GM 1 G: 1 TAB at 07:55

## 2023-01-01 RX ADMIN — IPRATROPIUM BROMIDE AND ALBUTEROL SULFATE 3 ML: 2.5; .5 SOLUTION RESPIRATORY (INHALATION) at 08:12

## 2023-01-01 RX ADMIN — WARFARIN SODIUM 1 MG: 1 TABLET ORAL at 17:39

## 2023-01-01 RX ADMIN — POLYETHYLENE GLYCOL 3350 17 G: 17 POWDER, FOR SOLUTION ORAL at 08:25

## 2023-01-01 RX ADMIN — GUAIFENESIN 600 MG: 600 TABLET ORAL at 09:25

## 2023-01-01 RX ADMIN — SODIUM CHLORIDE TAB 1 GM 1 G: 1 TAB at 17:39

## 2023-01-01 RX ADMIN — SODIUM CHLORIDE SOLN NEBU 3% 3 ML: 3 NEBU SOLN at 19:57

## 2023-01-01 RX ADMIN — WARFARIN SODIUM 1 MG: 1 TABLET ORAL at 17:28

## 2023-01-01 RX ADMIN — IPRATROPIUM BROMIDE AND ALBUTEROL SULFATE 3 ML: .5; 3 SOLUTION RESPIRATORY (INHALATION) at 14:10

## 2023-01-01 RX ADMIN — IPRATROPIUM BROMIDE AND ALBUTEROL SULFATE 3 ML: 2.5; .5 SOLUTION RESPIRATORY (INHALATION) at 08:09

## 2023-01-01 RX ADMIN — METOPROLOL SUCCINATE 50 MG: 50 TABLET, EXTENDED RELEASE ORAL at 19:59

## 2023-01-01 RX ADMIN — SODIUM CHLORIDE SOLN NEBU 3% 3 ML: 3 NEBU SOLN at 11:28

## 2023-01-01 RX ADMIN — CEFEPIME 2 G: 2 INJECTION, POWDER, FOR SOLUTION INTRAVENOUS at 03:48

## 2023-01-01 RX ADMIN — IPRATROPIUM BROMIDE AND ALBUTEROL SULFATE 3 ML: 2.5; .5 SOLUTION RESPIRATORY (INHALATION) at 08:37

## 2023-01-01 RX ADMIN — GUAIFENESIN 200 MG: 200 SOLUTION ORAL at 22:35

## 2023-01-01 RX ADMIN — CEFEPIME 2 G: 2 INJECTION, POWDER, FOR SOLUTION INTRAVENOUS at 02:18

## 2023-01-01 RX ADMIN — SODIUM CHLORIDE SOLN NEBU 3% 3 ML: 3 NEBU SOLN at 15:48

## 2023-01-01 RX ADMIN — METRONIDAZOLE 500 MG: 500 INJECTION, SOLUTION INTRAVENOUS at 06:19

## 2023-01-01 RX ADMIN — IPRATROPIUM BROMIDE AND ALBUTEROL SULFATE 3 ML: 2.5; .5 SOLUTION RESPIRATORY (INHALATION) at 15:51

## 2023-01-01 RX ADMIN — ACETAMINOPHEN 650 MG: 325 TABLET, FILM COATED ORAL at 08:54

## 2023-01-01 RX ADMIN — GUAIFENESIN 600 MG: 600 TABLET ORAL at 20:19

## 2023-01-01 RX ADMIN — LEVOTHYROXINE SODIUM 50 MCG: 0.05 TABLET ORAL at 06:19

## 2023-01-01 RX ADMIN — POLYETHYLENE GLYCOL 3350 17 G: 17 POWDER, FOR SOLUTION ORAL at 08:09

## 2023-01-01 RX ADMIN — METOPROLOL SUCCINATE 50 MG: 50 TABLET, EXTENDED RELEASE ORAL at 20:41

## 2023-01-01 RX ADMIN — METRONIDAZOLE 500 MG: 500 INJECTION, SOLUTION INTRAVENOUS at 05:53

## 2023-01-01 RX ADMIN — SODIUM CHLORIDE SOLN NEBU 3% 3 ML: 3 NEBU SOLN at 08:15

## 2023-01-01 RX ADMIN — METOPROLOL SUCCINATE 50 MG: 50 TABLET, EXTENDED RELEASE ORAL at 21:11

## 2023-01-01 RX ADMIN — ACETAMINOPHEN 650 MG: 325 TABLET, FILM COATED ORAL at 09:00

## 2023-01-01 RX ADMIN — CEFEPIME 2 G: 2 INJECTION, POWDER, FOR SOLUTION INTRAVENOUS at 12:12

## 2023-01-01 RX ADMIN — SODIUM CHLORIDE SOLN NEBU 3% 3 ML: 3 NEBU SOLN at 17:02

## 2023-01-01 RX ADMIN — TRAZODONE HYDROCHLORIDE 50 MG: 50 TABLET ORAL at 21:39

## 2023-01-01 RX ADMIN — ACETAMINOPHEN 650 MG: 325 TABLET, FILM COATED ORAL at 06:19

## 2023-01-01 RX ADMIN — FLUTICASONE FUROATE AND VILANTEROL TRIFENATATE 1 PUFF: 100; 25 POWDER RESPIRATORY (INHALATION) at 21:34

## 2023-01-01 RX ADMIN — METOPROLOL SUCCINATE 50 MG: 50 TABLET, EXTENDED RELEASE ORAL at 20:35

## 2023-01-01 RX ADMIN — IPRATROPIUM BROMIDE AND ALBUTEROL SULFATE 3 ML: 2.5; .5 SOLUTION RESPIRATORY (INHALATION) at 17:02

## 2023-01-01 RX ADMIN — CEFEPIME 2 G: 2 INJECTION, POWDER, FOR SOLUTION INTRAVENOUS at 01:14

## 2023-01-01 RX ADMIN — METOPROLOL SUCCINATE 50 MG: 50 TABLET, EXTENDED RELEASE ORAL at 20:45

## 2023-01-01 RX ADMIN — IPRATROPIUM BROMIDE AND ALBUTEROL SULFATE 3 ML: 2.5; .5 SOLUTION RESPIRATORY (INHALATION) at 13:14

## 2023-01-01 RX ADMIN — SODIUM CHLORIDE TAB 1 GM 1 G: 1 TAB at 08:43

## 2023-01-01 RX ADMIN — CEFEPIME 2 G: 2 INJECTION, POWDER, FOR SOLUTION INTRAVENOUS at 14:26

## 2023-01-01 RX ADMIN — WARFARIN SODIUM 1 MG: 1 TABLET ORAL at 17:42

## 2023-01-01 RX ADMIN — METRONIDAZOLE 500 MG: 500 INJECTION, SOLUTION INTRAVENOUS at 14:50

## 2023-01-01 RX ADMIN — CEFEPIME 2 G: 2 INJECTION, POWDER, FOR SOLUTION INTRAVENOUS at 14:08

## 2023-01-01 RX ADMIN — SENNOSIDES 2 TABLET: 8.6 TABLET, FILM COATED ORAL at 13:18

## 2023-01-01 RX ADMIN — IPRATROPIUM BROMIDE AND ALBUTEROL SULFATE 3 ML: 2.5; .5 SOLUTION RESPIRATORY (INHALATION) at 09:10

## 2023-01-01 RX ADMIN — CEFEPIME 2 G: 2 INJECTION, POWDER, FOR SOLUTION INTRAVENOUS at 15:15

## 2023-01-01 RX ADMIN — ACETYLCYSTEINE 2 ML: 200 SOLUTION ORAL; RESPIRATORY (INHALATION) at 11:42

## 2023-01-01 RX ADMIN — IPRATROPIUM BROMIDE AND ALBUTEROL SULFATE 3 ML: 2.5; .5 SOLUTION RESPIRATORY (INHALATION) at 19:50

## 2023-01-01 RX ADMIN — CEFEPIME 2 G: 2 INJECTION, POWDER, FOR SOLUTION INTRAVENOUS at 01:49

## 2023-01-01 RX ADMIN — SODIUM CHLORIDE SOLN NEBU 3% 3 ML: 3 NEBU SOLN at 19:04

## 2023-01-01 RX ADMIN — SODIUM CHLORIDE TAB 1 GM 1 G: 1 TAB at 08:09

## 2023-01-01 RX ADMIN — CIPROFLOXACIN 400 MG: 2 INJECTION, SOLUTION INTRAVENOUS at 16:08

## 2023-01-01 RX ADMIN — GUAIFENESIN 600 MG: 600 TABLET ORAL at 08:38

## 2023-01-01 RX ADMIN — IPRATROPIUM BROMIDE AND ALBUTEROL SULFATE 3 ML: 2.5; .5 SOLUTION RESPIRATORY (INHALATION) at 20:29

## 2023-01-01 RX ADMIN — ACETAMINOPHEN 650 MG: 325 TABLET, FILM COATED ORAL at 20:19

## 2023-01-01 RX ADMIN — SODIUM CHLORIDE SOLN NEBU 3% 3 ML: 3 NEBU SOLN at 08:07

## 2023-01-01 RX ADMIN — METOPROLOL SUCCINATE 50 MG: 50 TABLET, EXTENDED RELEASE ORAL at 21:26

## 2023-01-01 RX ADMIN — SODIUM CHLORIDE TAB 1 GM 1 G: 1 TAB at 07:58

## 2023-01-01 RX ADMIN — METRONIDAZOLE 500 MG: 500 INJECTION, SOLUTION INTRAVENOUS at 13:48

## 2023-01-01 RX ADMIN — IPRATROPIUM BROMIDE AND ALBUTEROL SULFATE 3 ML: 2.5; .5 SOLUTION RESPIRATORY (INHALATION) at 20:01

## 2023-01-01 RX ADMIN — ACETAMINOPHEN 650 MG: 325 TABLET, FILM COATED ORAL at 20:41

## 2023-01-01 RX ADMIN — ACETYLCYSTEINE 2 ML: 200 SOLUTION ORAL; RESPIRATORY (INHALATION) at 19:58

## 2023-01-01 RX ADMIN — CARBOXYMETHYLCELLULOSE SODIUM 1 DROP: 0.5 SOLUTION/ DROPS OPHTHALMIC at 14:44

## 2023-01-01 RX ADMIN — CEFEPIME 2 G: 2 INJECTION, POWDER, FOR SOLUTION INTRAVENOUS at 13:17

## 2023-01-01 RX ADMIN — ACETAMINOPHEN 650 MG: 325 TABLET, FILM COATED ORAL at 11:05

## 2023-01-01 RX ADMIN — SODIUM CHLORIDE SOLN NEBU 3% 3 ML: 3 NEBU SOLN at 09:26

## 2023-01-01 RX ADMIN — ACETYLCYSTEINE 2 ML: 200 SOLUTION ORAL; RESPIRATORY (INHALATION) at 13:09

## 2023-01-01 RX ADMIN — METRONIDAZOLE 500 MG: 500 INJECTION, SOLUTION INTRAVENOUS at 13:24

## 2023-01-01 RX ADMIN — SODIUM CHLORIDE SOLN NEBU 3% 3 ML: 3 NEBU SOLN at 13:09

## 2023-01-01 RX ADMIN — ACETAMINOPHEN 650 MG: 325 TABLET, FILM COATED ORAL at 23:52

## 2023-01-01 RX ADMIN — CIPROFLOXACIN 400 MG: 2 INJECTION, SOLUTION INTRAVENOUS at 02:31

## 2023-01-01 RX ADMIN — SODIUM CHLORIDE SOLN NEBU 3% 3 ML: 3 NEBU SOLN at 15:36

## 2023-01-01 RX ADMIN — GUAIFENESIN 600 MG: 600 TABLET ORAL at 07:58

## 2023-01-01 RX ADMIN — METRONIDAZOLE 500 MG: 500 INJECTION, SOLUTION INTRAVENOUS at 21:10

## 2023-01-01 RX ADMIN — IPRATROPIUM BROMIDE AND ALBUTEROL SULFATE 3 ML: 2.5; .5 SOLUTION RESPIRATORY (INHALATION) at 19:57

## 2023-01-01 RX ADMIN — GUAIFENESIN 200 MG: 200 SOLUTION ORAL at 12:36

## 2023-01-01 RX ADMIN — ONDANSETRON 4 MG: 2 INJECTION INTRAMUSCULAR; INTRAVENOUS at 06:13

## 2023-01-01 RX ADMIN — SODIUM CHLORIDE TAB 1 GM 1 G: 1 TAB at 17:49

## 2023-01-01 RX ADMIN — TRAZODONE HYDROCHLORIDE 50 MG: 50 TABLET ORAL at 20:41

## 2023-01-01 RX ADMIN — METOPROLOL SUCCINATE 50 MG: 50 TABLET, EXTENDED RELEASE ORAL at 07:48

## 2023-01-01 RX ADMIN — IPRATROPIUM BROMIDE AND ALBUTEROL SULFATE 3 ML: 2.5; .5 SOLUTION RESPIRATORY (INHALATION) at 13:04

## 2023-01-01 RX ADMIN — LEVOTHYROXINE SODIUM 50 MCG: 0.05 TABLET ORAL at 06:07

## 2023-01-01 RX ADMIN — CEFEPIME 2 G: 2 INJECTION, POWDER, FOR SOLUTION INTRAVENOUS at 01:18

## 2023-01-01 RX ADMIN — SODIUM CHLORIDE SOLN NEBU 3% 3 ML: 3 NEBU SOLN at 20:32

## 2023-01-01 RX ADMIN — SODIUM CHLORIDE TAB 1 GM 1 G: 1 TAB at 07:51

## 2023-01-01 RX ADMIN — TRAZODONE HYDROCHLORIDE 50 MG: 50 TABLET ORAL at 21:18

## 2023-01-01 RX ADMIN — ONDANSETRON 4 MG: 2 INJECTION INTRAMUSCULAR; INTRAVENOUS at 12:02

## 2023-01-01 RX ADMIN — WARFARIN SODIUM 1 MG: 1 TABLET ORAL at 18:54

## 2023-01-01 RX ADMIN — METOPROLOL SUCCINATE 50 MG: 50 TABLET, EXTENDED RELEASE ORAL at 08:06

## 2023-01-01 RX ADMIN — WARFARIN SODIUM 0.5 MG: 1 TABLET ORAL at 18:50

## 2023-01-01 RX ADMIN — TRAZODONE HYDROCHLORIDE 50 MG: 50 TABLET ORAL at 20:19

## 2023-01-01 RX ADMIN — METOPROLOL SUCCINATE 50 MG: 50 TABLET, EXTENDED RELEASE ORAL at 08:38

## 2023-01-01 RX ADMIN — TRAZODONE HYDROCHLORIDE 50 MG: 50 TABLET ORAL at 21:34

## 2023-01-01 RX ADMIN — IPRATROPIUM BROMIDE AND ALBUTEROL SULFATE 3 ML: 2.5; .5 SOLUTION RESPIRATORY (INHALATION) at 07:28

## 2023-01-01 RX ADMIN — METOPROLOL SUCCINATE 50 MG: 50 TABLET, EXTENDED RELEASE ORAL at 07:45

## 2023-01-01 RX ADMIN — IPRATROPIUM BROMIDE AND ALBUTEROL SULFATE 3 ML: 2.5; .5 SOLUTION RESPIRATORY (INHALATION) at 08:06

## 2023-01-01 RX ADMIN — SODIUM CHLORIDE SOLN NEBU 3% 3 ML: 3 NEBU SOLN at 09:05

## 2023-01-01 RX ADMIN — SODIUM CHLORIDE TAB 1 GM 1 G: 1 TAB at 21:26

## 2023-01-01 RX ADMIN — SODIUM CHLORIDE SOLN NEBU 3% 3 ML: 3 NEBU SOLN at 21:04

## 2023-01-01 RX ADMIN — GUAIFENESIN 200 MG: 200 SOLUTION ORAL at 23:27

## 2023-01-01 RX ADMIN — POLYETHYLENE GLYCOL 3350 17 G: 17 POWDER, FOR SOLUTION ORAL at 09:09

## 2023-01-01 RX ADMIN — METRONIDAZOLE 500 MG: 500 INJECTION, SOLUTION INTRAVENOUS at 05:17

## 2023-01-01 RX ADMIN — ACETAMINOPHEN 650 MG: 325 TABLET, FILM COATED ORAL at 20:45

## 2023-01-01 RX ADMIN — METOPROLOL SUCCINATE 50 MG: 50 TABLET, EXTENDED RELEASE ORAL at 08:24

## 2023-01-01 RX ADMIN — TRAZODONE HYDROCHLORIDE 50 MG: 50 TABLET ORAL at 22:10

## 2023-01-01 RX ADMIN — SENNOSIDES 2 TABLET: 8.6 TABLET, FILM COATED ORAL at 14:43

## 2023-01-01 RX ADMIN — CEFEPIME 2 G: 2 INJECTION, POWDER, FOR SOLUTION INTRAVENOUS at 20:35

## 2023-01-01 RX ADMIN — IPRATROPIUM BROMIDE AND ALBUTEROL SULFATE 3 ML: 2.5; .5 SOLUTION RESPIRATORY (INHALATION) at 19:41

## 2023-01-01 RX ADMIN — Medication 1 MG: at 22:38

## 2023-01-01 RX ADMIN — METOPROLOL SUCCINATE 50 MG: 50 TABLET, EXTENDED RELEASE ORAL at 21:34

## 2023-01-01 RX ADMIN — METRONIDAZOLE 500 MG: 500 INJECTION, SOLUTION INTRAVENOUS at 05:42

## 2023-01-01 RX ADMIN — IPRATROPIUM BROMIDE AND ALBUTEROL SULFATE 3 ML: 2.5; .5 SOLUTION RESPIRATORY (INHALATION) at 15:48

## 2023-01-01 RX ADMIN — SODIUM CHLORIDE TAB 1 GM 1 G: 1 TAB at 07:48

## 2023-01-01 RX ADMIN — SODIUM CHLORIDE SOLN NEBU 3% 3 ML: 3 NEBU SOLN at 08:05

## 2023-01-01 RX ADMIN — SODIUM CHLORIDE TAB 1 GM 1 G: 1 TAB at 21:11

## 2023-01-01 RX ADMIN — TRAZODONE HYDROCHLORIDE 50 MG: 50 TABLET ORAL at 21:26

## 2023-01-01 RX ADMIN — SODIUM CHLORIDE SOLN NEBU 3% 3 ML: 3 NEBU SOLN at 08:06

## 2023-01-01 RX ADMIN — ACETAMINOPHEN 650 MG: 325 TABLET, FILM COATED ORAL at 19:55

## 2023-01-01 RX ADMIN — IPRATROPIUM BROMIDE AND ALBUTEROL SULFATE 3 ML: 2.5; .5 SOLUTION RESPIRATORY (INHALATION) at 19:26

## 2023-01-01 RX ADMIN — SODIUM CHLORIDE SOLN NEBU 3% 3 ML: 3 NEBU SOLN at 22:29

## 2023-01-01 RX ADMIN — METOPROLOL SUCCINATE 50 MG: 50 TABLET, EXTENDED RELEASE ORAL at 09:09

## 2023-01-01 RX ADMIN — POLYETHYLENE GLYCOL 3350 17 G: 17 POWDER, FOR SOLUTION ORAL at 07:48

## 2023-01-01 RX ADMIN — LEVOTHYROXINE SODIUM 50 MCG: 0.05 TABLET ORAL at 05:25

## 2023-01-01 RX ADMIN — IPRATROPIUM BROMIDE AND ALBUTEROL SULFATE 3 ML: 2.5; .5 SOLUTION RESPIRATORY (INHALATION) at 12:13

## 2023-01-01 RX ADMIN — SODIUM CHLORIDE TAB 1 GM 1 G: 1 TAB at 08:25

## 2023-01-01 RX ADMIN — ACETAMINOPHEN 650 MG: 325 TABLET, FILM COATED ORAL at 01:23

## 2023-01-01 RX ADMIN — ACETYLCYSTEINE 2 ML: 200 SOLUTION ORAL; RESPIRATORY (INHALATION) at 09:10

## 2023-01-01 RX ADMIN — ACETYLCYSTEINE 2 ML: 200 SOLUTION ORAL; RESPIRATORY (INHALATION) at 08:04

## 2023-01-01 RX ADMIN — LEVOFLOXACIN 750 MG: 5 INJECTION, SOLUTION INTRAVENOUS at 18:30

## 2023-01-01 RX ADMIN — METOPROLOL SUCCINATE 50 MG: 50 TABLET, EXTENDED RELEASE ORAL at 07:58

## 2023-01-01 RX ADMIN — METRONIDAZOLE 500 MG: 500 INJECTION, SOLUTION INTRAVENOUS at 21:27

## 2023-01-01 RX ADMIN — PREDNISONE 40 MG: 20 TABLET ORAL at 07:58

## 2023-01-01 RX ADMIN — SODIUM CHLORIDE TAB 1 GM 1 G: 1 TAB at 11:05

## 2023-01-01 RX ADMIN — IPRATROPIUM BROMIDE AND ALBUTEROL SULFATE 3 ML: 2.5; .5 SOLUTION RESPIRATORY (INHALATION) at 11:41

## 2023-01-01 RX ADMIN — IPRATROPIUM BROMIDE AND ALBUTEROL SULFATE 3 ML: 2.5; .5 SOLUTION RESPIRATORY (INHALATION) at 15:45

## 2023-01-01 RX ADMIN — PREDNISONE 40 MG: 20 TABLET ORAL at 07:46

## 2023-01-01 RX ADMIN — SODIUM CHLORIDE SOLN NEBU 3% 3 ML: 3 NEBU SOLN at 11:41

## 2023-01-01 RX ADMIN — SODIUM CHLORIDE SOLN NEBU 3% 3 ML: 3 NEBU SOLN at 09:10

## 2023-01-01 RX ADMIN — ACETYLCYSTEINE 2 ML: 200 SOLUTION ORAL; RESPIRATORY (INHALATION) at 15:49

## 2023-01-01 RX ADMIN — CEFEPIME 2 G: 2 INJECTION, POWDER, FOR SOLUTION INTRAVENOUS at 01:43

## 2023-01-01 RX ADMIN — GUAIFENESIN 600 MG: 600 TABLET ORAL at 20:45

## 2023-01-01 RX ADMIN — SODIUM CHLORIDE TAB 1 GM 1 G: 1 TAB at 09:09

## 2023-01-01 RX ADMIN — Medication 1 EACH: at 13:20

## 2023-01-01 RX ADMIN — WARFARIN SODIUM 0.5 MG: 1 TABLET ORAL at 18:22

## 2023-01-01 RX ADMIN — CEFEPIME 2 G: 2 INJECTION, POWDER, FOR SOLUTION INTRAVENOUS at 14:43

## 2023-01-01 RX ADMIN — METRONIDAZOLE 500 MG: 500 INJECTION, SOLUTION INTRAVENOUS at 21:36

## 2023-01-01 RX ADMIN — PREDNISONE 40 MG: 20 TABLET ORAL at 08:38

## 2023-01-01 RX ADMIN — IPRATROPIUM BROMIDE AND ALBUTEROL SULFATE 3 ML: 2.5; .5 SOLUTION RESPIRATORY (INHALATION) at 15:39

## 2023-01-01 RX ADMIN — IPRATROPIUM BROMIDE AND ALBUTEROL SULFATE 3 ML: 2.5; .5 SOLUTION RESPIRATORY (INHALATION) at 08:16

## 2023-01-01 RX ADMIN — METRONIDAZOLE 500 MG: 500 INJECTION, SOLUTION INTRAVENOUS at 14:21

## 2023-01-01 RX ADMIN — ACETYLCYSTEINE 2 ML: 200 SOLUTION ORAL; RESPIRATORY (INHALATION) at 13:14

## 2023-01-01 RX ADMIN — LEVOTHYROXINE SODIUM 50 MCG: 0.05 TABLET ORAL at 06:50

## 2023-01-01 RX ADMIN — IPRATROPIUM BROMIDE AND ALBUTEROL SULFATE 3 ML: 2.5; .5 SOLUTION RESPIRATORY (INHALATION) at 21:12

## 2023-01-01 RX ADMIN — TRAZODONE HYDROCHLORIDE 50 MG: 50 TABLET ORAL at 22:08

## 2023-01-01 RX ADMIN — IPRATROPIUM BROMIDE AND ALBUTEROL SULFATE 3 ML: 2.5; .5 SOLUTION RESPIRATORY (INHALATION) at 08:07

## 2023-01-01 RX ADMIN — METRONIDAZOLE 500 MG: 500 INJECTION, SOLUTION INTRAVENOUS at 22:19

## 2023-01-01 RX ADMIN — TRAZODONE HYDROCHLORIDE 50 MG: 50 TABLET ORAL at 21:28

## 2023-01-01 RX ADMIN — LEVOTHYROXINE SODIUM 50 MCG: 0.05 TABLET ORAL at 06:08

## 2023-01-01 RX ADMIN — LEVOTHYROXINE SODIUM 50 MCG: 0.05 TABLET ORAL at 05:37

## 2023-01-01 RX ADMIN — METOPROLOL SUCCINATE 50 MG: 50 TABLET, EXTENDED RELEASE ORAL at 20:22

## 2023-01-01 RX ADMIN — WARFARIN SODIUM 0.5 MG: 1 TABLET ORAL at 17:55

## 2023-01-01 RX ADMIN — SODIUM CHLORIDE SOLN NEBU 3% 3 ML: 3 NEBU SOLN at 13:18

## 2023-01-01 RX ADMIN — MAGNESIUM SULFATE HEPTAHYDRATE 4 G: 40 INJECTION, SOLUTION INTRAVENOUS at 15:15

## 2023-01-01 RX ADMIN — METOPROLOL SUCCINATE 50 MG: 50 TABLET, EXTENDED RELEASE ORAL at 08:09

## 2023-01-01 RX ADMIN — GUAIFENESIN 600 MG: 600 TABLET ORAL at 21:10

## 2023-01-01 RX ADMIN — SODIUM CHLORIDE TAB 1 GM 1 G: 1 TAB at 17:55

## 2023-01-01 RX ADMIN — MINERAL OIL 226 ML: 1000 SOLUTION ORAL at 10:50

## 2023-01-01 RX ADMIN — ACETAMINOPHEN 650 MG: 325 TABLET, FILM COATED ORAL at 22:35

## 2023-01-01 RX ADMIN — IPRATROPIUM BROMIDE AND ALBUTEROL SULFATE 3 ML: 2.5; .5 SOLUTION RESPIRATORY (INHALATION) at 09:04

## 2023-01-01 RX ADMIN — PREDNISONE 40 MG: 20 TABLET ORAL at 07:48

## 2023-01-01 RX ADMIN — SODIUM CHLORIDE TAB 1 GM 1 G: 1 TAB at 13:25

## 2023-01-01 RX ADMIN — ACETAMINOPHEN 650 MG: 325 TABLET, FILM COATED ORAL at 07:48

## 2023-01-01 RX ADMIN — ACETAMINOPHEN 650 MG: 325 TABLET, FILM COATED ORAL at 08:39

## 2023-01-01 RX ADMIN — METOPROLOL SUCCINATE 50 MG: 50 TABLET, EXTENDED RELEASE ORAL at 07:55

## 2023-01-01 RX ADMIN — CEFEPIME 2 G: 2 INJECTION, POWDER, FOR SOLUTION INTRAVENOUS at 04:17

## 2023-01-01 RX ADMIN — IPRATROPIUM BROMIDE AND ALBUTEROL SULFATE 3 ML: 2.5; .5 SOLUTION RESPIRATORY (INHALATION) at 13:09

## 2023-01-01 RX ADMIN — METRONIDAZOLE 500 MG: 500 INJECTION, SOLUTION INTRAVENOUS at 22:10

## 2023-01-01 RX ADMIN — IPRATROPIUM BROMIDE AND ALBUTEROL SULFATE 3 ML: 2.5; .5 SOLUTION RESPIRATORY (INHALATION) at 11:28

## 2023-01-01 RX ADMIN — ACETYLCYSTEINE 2 ML: 200 SOLUTION ORAL; RESPIRATORY (INHALATION) at 19:50

## 2023-01-01 RX ADMIN — ACETAMINOPHEN 650 MG: 325 TABLET, FILM COATED ORAL at 20:28

## 2023-01-01 RX ADMIN — IPRATROPIUM BROMIDE AND ALBUTEROL SULFATE 3 ML: 2.5; .5 SOLUTION RESPIRATORY (INHALATION) at 08:04

## 2023-01-01 RX ADMIN — SODIUM CHLORIDE TAB 1 GM 1 G: 1 TAB at 20:49

## 2023-01-01 RX ADMIN — CEFEPIME 2 G: 2 INJECTION, POWDER, FOR SOLUTION INTRAVENOUS at 13:53

## 2023-01-01 RX ADMIN — WARFARIN SODIUM 1 MG: 1 TABLET ORAL at 17:49

## 2023-01-01 RX ADMIN — ACETYLCYSTEINE 2 ML: 200 SOLUTION ORAL; RESPIRATORY (INHALATION) at 09:04

## 2023-01-01 RX ADMIN — GUAIFENESIN 600 MG: 600 TABLET ORAL at 20:41

## 2023-01-01 RX ADMIN — SODIUM CHLORIDE TAB 1 GM 1 G: 1 TAB at 22:08

## 2023-01-01 RX ADMIN — CEFEPIME 2 G: 2 INJECTION, POWDER, FOR SOLUTION INTRAVENOUS at 20:00

## 2023-01-01 RX ADMIN — LEVOTHYROXINE SODIUM 50 MCG: 0.05 TABLET ORAL at 05:32

## 2023-01-01 RX ADMIN — Medication 1 MG: at 02:17

## 2023-01-01 RX ADMIN — GUAIFENESIN 200 MG: 200 SOLUTION ORAL at 02:38

## 2023-01-01 RX ADMIN — LEVOTHYROXINE SODIUM 50 MCG: 0.05 TABLET ORAL at 05:53

## 2023-01-01 RX ADMIN — METRONIDAZOLE 500 MG: 500 INJECTION, SOLUTION INTRAVENOUS at 22:09

## 2023-01-01 RX ADMIN — POLYETHYLENE GLYCOL 3350 17 G: 17 POWDER, FOR SOLUTION ORAL at 08:06

## 2023-01-01 RX ADMIN — ACETYLCYSTEINE 2 ML: 200 SOLUTION ORAL; RESPIRATORY (INHALATION) at 23:43

## 2023-01-01 RX ADMIN — IPRATROPIUM BROMIDE AND ALBUTEROL SULFATE 3 ML: 2.5; .5 SOLUTION RESPIRATORY (INHALATION) at 16:16

## 2023-01-01 RX ADMIN — IPRATROPIUM BROMIDE AND ALBUTEROL SULFATE 3 ML: 2.5; .5 SOLUTION RESPIRATORY (INHALATION) at 20:32

## 2023-01-01 RX ADMIN — IPRATROPIUM BROMIDE AND ALBUTEROL SULFATE 3 ML: .5; 2.5 SOLUTION RESPIRATORY (INHALATION) at 23:45

## 2023-01-01 RX ADMIN — PROCHLORPERAZINE MALEATE 5 MG: 5 TABLET ORAL at 15:48

## 2023-01-01 RX ADMIN — FLUTICASONE FUROATE AND VILANTEROL TRIFENATATE 1 PUFF: 100; 25 POWDER RESPIRATORY (INHALATION) at 07:48

## 2023-01-01 RX ADMIN — GUAIFENESIN 600 MG: 600 TABLET ORAL at 07:55

## 2023-01-01 RX ADMIN — ACETYLCYSTEINE 2 ML: 200 SOLUTION ORAL; RESPIRATORY (INHALATION) at 08:06

## 2023-01-01 RX ADMIN — METRONIDAZOLE 500 MG: 500 INJECTION, SOLUTION INTRAVENOUS at 06:08

## 2023-01-01 RX ADMIN — SODIUM CHLORIDE TAB 1 GM 1 G: 1 TAB at 08:06

## 2023-01-01 RX ADMIN — IPRATROPIUM BROMIDE AND ALBUTEROL SULFATE 3 ML: 2.5; .5 SOLUTION RESPIRATORY (INHALATION) at 19:04

## 2023-01-01 RX ADMIN — ACETAMINOPHEN 650 MG: 325 TABLET, FILM COATED ORAL at 18:14

## 2023-01-01 RX ADMIN — CARBOXYMETHYLCELLULOSE SODIUM 1 DROP: 0.5 SOLUTION/ DROPS OPHTHALMIC at 23:53

## 2023-01-01 RX ADMIN — ONDANSETRON 4 MG: 2 INJECTION INTRAMUSCULAR; INTRAVENOUS at 04:30

## 2023-01-01 RX ADMIN — GUAIFENESIN 600 MG: 600 TABLET ORAL at 07:48

## 2023-01-01 RX ADMIN — IPRATROPIUM BROMIDE AND ALBUTEROL SULFATE 3 ML: 2.5; .5 SOLUTION RESPIRATORY (INHALATION) at 11:29

## 2023-01-01 RX ADMIN — ACETYLCYSTEINE 2 ML: 200 SOLUTION ORAL; RESPIRATORY (INHALATION) at 19:41

## 2023-01-01 RX ADMIN — ACETAMINOPHEN 650 MG: 325 TABLET, FILM COATED ORAL at 20:07

## 2023-01-01 ASSESSMENT — ACTIVITIES OF DAILY LIVING (ADL)
ADLS_ACUITY_SCORE: 44
ADLS_ACUITY_SCORE: 48
ADLS_ACUITY_SCORE: 50
ADLS_ACUITY_SCORE: 44
ADLS_ACUITY_SCORE: 48
EQUIPMENT_CURRENTLY_USED_AT_HOME: WALKER, ROLLING
ADLS_ACUITY_SCORE: 44
ADLS_ACUITY_SCORE: 50
ADLS_ACUITY_SCORE: 50
ADLS_ACUITY_SCORE: 46
ADLS_ACUITY_SCORE: 46
ADLS_ACUITY_SCORE: 50
CHANGE_IN_FUNCTIONAL_STATUS_SINCE_ONSET_OF_CURRENT_ILLNESS/INJURY: YES
ADLS_ACUITY_SCORE: 52
DRESSING/BATHING_DIFFICULTY: YES
EATING/SWALLOWING: SWALLOWING SOLID FOOD
ADLS_ACUITY_SCORE: 48
ADLS_ACUITY_SCORE: 50
ADLS_ACUITY_SCORE: 44
ADLS_ACUITY_SCORE: 50
ADLS_ACUITY_SCORE: 48
ADLS_ACUITY_SCORE: 48
ADLS_ACUITY_SCORE: 50
ADLS_ACUITY_SCORE: 44
ADLS_ACUITY_SCORE: 44
ADLS_ACUITY_SCORE: 50
ADLS_ACUITY_SCORE: 52
ADLS_ACUITY_SCORE: 50
ADLS_ACUITY_SCORE: 49
ADLS_ACUITY_SCORE: 44
ADLS_ACUITY_SCORE: 52
ADLS_ACUITY_SCORE: 44
DIFFICULTY_EATING/SWALLOWING: YES
ADLS_ACUITY_SCORE: 44
ADLS_ACUITY_SCORE: 48
ADLS_ACUITY_SCORE: 40
ADLS_ACUITY_SCORE: 44
ADLS_ACUITY_SCORE: 48
ADLS_ACUITY_SCORE: 44
ADLS_ACUITY_SCORE: 46
ADLS_ACUITY_SCORE: 44
ADLS_ACUITY_SCORE: 44
ADLS_ACUITY_SCORE: 46
ADLS_ACUITY_SCORE: 44
ADLS_ACUITY_SCORE: 49
ADLS_ACUITY_SCORE: 50
TOILETING_ISSUES: YES
WALKING_OR_CLIMBING_STAIRS_DIFFICULTY: YES
TOILETING_MANAGEMENT: INCONTINENCE
ADLS_ACUITY_SCORE: 50
ADLS_ACUITY_SCORE: 50
ADLS_ACUITY_SCORE: 44
ADLS_ACUITY_SCORE: 52
ADLS_ACUITY_SCORE: 44
ADLS_ACUITY_SCORE: 50
ADLS_ACUITY_SCORE: 50
ADLS_ACUITY_SCORE: 48
ADLS_ACUITY_SCORE: 48
CONCENTRATING,_REMEMBERING_OR_MAKING_DECISIONS_DIFFICULTY: NO
ADLS_ACUITY_SCORE: 48
ADLS_ACUITY_SCORE: 44
ADLS_ACUITY_SCORE: 50
ADLS_ACUITY_SCORE: 48
ADLS_ACUITY_SCORE: 35
ADLS_ACUITY_SCORE: 48
ADLS_ACUITY_SCORE: 48
ADLS_ACUITY_SCORE: 50
VISION_MANAGEMENT: GLASSES
ADLS_ACUITY_SCORE: 52
ADLS_ACUITY_SCORE: 44
TOILETING_ASSISTANCE: TOILETING DIFFICULTY, REQUIRES EQUIPMENT
DRESSING/BATHING: BATHING DIFFICULTY, ASSISTANCE 1 PERSON
ADLS_ACUITY_SCORE: 44
ADLS_ACUITY_SCORE: 44
ADLS_ACUITY_SCORE: 40
ADLS_ACUITY_SCORE: 50
ADLS_ACUITY_SCORE: 48
ADLS_ACUITY_SCORE: 48
ADLS_ACUITY_SCORE: 50
ADLS_ACUITY_SCORE: 52
ADLS_ACUITY_SCORE: 38
ADLS_ACUITY_SCORE: 50
ADLS_ACUITY_SCORE: 48
ADLS_ACUITY_SCORE: 50
ADLS_ACUITY_SCORE: 50
ADLS_ACUITY_SCORE: 49
ADLS_ACUITY_SCORE: 50
ADLS_ACUITY_SCORE: 48
ADLS_ACUITY_SCORE: 50
ADLS_ACUITY_SCORE: 49
FALL_HISTORY_WITHIN_LAST_SIX_MONTHS: NO
ADLS_ACUITY_SCORE: 44
ADLS_ACUITY_SCORE: 48
ADLS_ACUITY_SCORE: 44
ADLS_ACUITY_SCORE: 46
ADLS_ACUITY_SCORE: 50
ADLS_ACUITY_SCORE: 50
ADLS_ACUITY_SCORE: 44
ADLS_ACUITY_SCORE: 48
ADLS_ACUITY_SCORE: 44
ADLS_ACUITY_SCORE: 50
ADLS_ACUITY_SCORE: 46
ADLS_ACUITY_SCORE: 50
ADLS_ACUITY_SCORE: 50
WEAR_GLASSES_OR_BLIND: YES
ADLS_ACUITY_SCORE: 44
ADLS_ACUITY_SCORE: 48
ADLS_ACUITY_SCORE: 48
ADLS_ACUITY_SCORE: 50
ADLS_ACUITY_SCORE: 40
ADLS_ACUITY_SCORE: 46
ADLS_ACUITY_SCORE: 40
DEPENDENT_IADLS:: TRANSPORTATION;CLEANING;LAUNDRY;SHOPPING
ADLS_ACUITY_SCORE: 35
ADLS_ACUITY_SCORE: 48
WALKING_OR_CLIMBING_STAIRS: AMBULATION DIFFICULTY, REQUIRES EQUIPMENT
ADLS_ACUITY_SCORE: 35
ADLS_ACUITY_SCORE: 50
ADLS_ACUITY_SCORE: 48
DOING_ERRANDS_INDEPENDENTLY_DIFFICULTY: YES
ADLS_ACUITY_SCORE: 50

## 2023-01-01 ASSESSMENT — ENCOUNTER SYMPTOMS
ALLERGIC/IMMUNOLOGIC NEGATIVE: 1
HEMATOLOGIC/LYMPHATIC NEGATIVE: 1
MUSCULOSKELETAL NEGATIVE: 1
CARDIOVASCULAR NEGATIVE: 1
CONSTITUTIONAL NEGATIVE: 1
ALLERGIC/IMMUNOLOGIC NEGATIVE: 1
WHEEZING: 1
CARDIOVASCULAR NEGATIVE: 1
HEMATOLOGIC/LYMPHATIC NEGATIVE: 1
HEMATOLOGIC/LYMPHATIC NEGATIVE: 1
ENDOCRINE NEGATIVE: 1
CONSTITUTIONAL NEGATIVE: 1
COUGH: 1
MUSCULOSKELETAL NEGATIVE: 1
HEMATOLOGIC/LYMPHATIC NEGATIVE: 1
COUGH: 1
PSYCHIATRIC NEGATIVE: 1
EYES NEGATIVE: 1
GASTROINTESTINAL NEGATIVE: 1
NEUROLOGICAL NEGATIVE: 1
COUGH: 1
SINUS PRESSURE: 1
WHEEZING: 1
CONSTITUTIONAL NEGATIVE: 1
FATIGUE: 1
CARDIOVASCULAR NEGATIVE: 1
CARDIOVASCULAR NEGATIVE: 1
ENDOCRINE NEGATIVE: 1
NEUROLOGICAL NEGATIVE: 1
NEUROLOGICAL NEGATIVE: 1
SHORTNESS OF BREATH: 1
RHINORRHEA: 1
GASTROINTESTINAL NEGATIVE: 1
CONSTITUTIONAL NEGATIVE: 1
GASTROINTESTINAL NEGATIVE: 1
PSYCHIATRIC NEGATIVE: 1
RESPIRATORY NEGATIVE: 1
GASTROINTESTINAL NEGATIVE: 1
ALLERGIC/IMMUNOLOGIC NEGATIVE: 1
PSYCHIATRIC NEGATIVE: 1
ENDOCRINE NEGATIVE: 1
SHORTNESS OF BREATH: 1
PSYCHIATRIC NEGATIVE: 1
ENDOCRINE NEGATIVE: 1
NEUROLOGICAL NEGATIVE: 1
EYES NEGATIVE: 1
ACTIVITY CHANGE: 1
MUSCULOSKELETAL NEGATIVE: 1
COUGH: 1
SHORTNESS OF BREATH: 1
CARDIOVASCULAR NEGATIVE: 1

## 2023-01-01 ASSESSMENT — PATIENT HEALTH QUESTIONNAIRE - PHQ9
10. IF YOU CHECKED OFF ANY PROBLEMS, HOW DIFFICULT HAVE THESE PROBLEMS MADE IT FOR YOU TO DO YOUR WORK, TAKE CARE OF THINGS AT HOME, OR GET ALONG WITH OTHER PEOPLE: NOT DIFFICULT AT ALL
SUM OF ALL RESPONSES TO PHQ QUESTIONS 1-9: 5
SUM OF ALL RESPONSES TO PHQ QUESTIONS 1-9: 3
SUM OF ALL RESPONSES TO PHQ QUESTIONS 1-9: 5
SUM OF ALL RESPONSES TO PHQ QUESTIONS 1-9: 3

## 2023-01-01 ASSESSMENT — PAIN SCALES - GENERAL
PAINLEVEL: NO PAIN (0)
PAINLEVEL: MILD PAIN (2)
PAINLEVEL: NO PAIN (0)
PAINLEVEL: NO PAIN (0)
PAINLEVEL: MODERATE PAIN (4)
PAINLEVEL: MILD PAIN (3)
PAINLEVEL: MILD PAIN (3)
PAINLEVEL: NO PAIN (0)
PAINLEVEL: NO PAIN (0)

## 2023-01-12 NOTE — PROGRESS NOTES
ANTICOAGULATION MANAGEMENT     Ellie Leigh 89 year old female is on warfarin with therapeutic INR result. (Goal INR 2.0-3.0)    Recent labs: (last 7 days)     01/10/23  1326   INR 2.3*       ASSESSMENT       Source(s): Chart Review and Patient/Caregiver Call       Warfarin doses taken: Warfarin taken as instructed    Diet: No new diet changes identified    New illness, injury, or hospitalization: No    Medication/supplement changes: None noted-patient was slowly tapering off her omeprazole and has now been off completely for several weeks.    Signs or symptoms of bleeding or clotting: No    Previous INR: Therapeutic last 2(+) visits    Additional findings: None       PLAN     Recommended plan for no diet, medication or health factor changes affecting INR     Dosing Instructions: Continue your current warfarin dose with next INR in 6 weeks       Summary  As of 1/10/2023    Full warfarin instructions:  1.5 mg every Mon, Fri; 1 mg all other days   Next INR check:  2/21/2023             Telephone call with Ellie who verbalizes understanding and agrees to plan    Lab visit scheduled    Education provided:     Contact [phone]  with any changes, questions or concerns.     Plan made per [facility] anticoagulation protocol    Gina Persaud RN  Anticoagulation Clinic  1/12/2023    _______________________________________________________________________     Anticoagulation Episode Summary     Current INR goal:  2.0-3.0   TTR:  93.6 % (1 y)   Target end date:  Indefinite   Send INR reminders to:  [facility]    Indications    Atrial fibrillation with rapid ventricular response (H) (Resolved) [I48.91]  DVT (deep venous thrombosis) [I82.409]  Long term current use of anticoagulant therapy [Z79.01]  Intermittent atrial fibrillation (H) [I48.0]  Deep vein thrombosis (DVT) of proximal lower extremity  unspecified chronicity  unspecified laterality (H) [I82.4Y9]           Comments:           Anticoagulation Care  Providers     Provider Role Specialty Phone number    Anjum Vidales MD Referring Family Medicine [phone]

## 2023-01-12 NOTE — PROGRESS NOTES
Patients daughter calling back. Wanting to start seeing a provider in [facility]. Would be interested in seeing Edelmira Lopes. Daughter states that patient said she was going to wait to pick a new provider until she got sick and needed to be seen again.

## 2023-01-12 NOTE — PROGRESS NOTES
Left message with daughter Suyapa to check to see who this patient wants as next PCP, will then send to that provider for review, needs medicare wellness.      AYLIN Lombardi

## 2023-01-12 NOTE — PROGRESS NOTES
Patient needs an updated INR clinic referral signed for [facility] to continue management of her warfarin. Patient's PCP was Dr. Vidales, and she needs to establish care with a new PCP. Routing to care team, I do not see that she has any upcoming appointments scheduled to establish care, is a provider is willing to sign referral until patient establishes care?        ANTICOAGULATION CLINIC REFERRAL RENEWAL REQUEST       An annual renewal order is required for all patients referred to [facility].?  Please review and sign the pended referral order for Ellie Leigh.       ANTICOAGULATION SUMMARY      Warfarin indication(s)   Atrial Fibrillation and DVT    Mechanical heart valve present?  NO       Current goal range   INR: 2.0-3.0     Goal appropriate for indication? Goal INR 2-3, standard for indication(s) above     Time in Therapeutic Range (TTR)  (Goal > 60%) 93.6%       Office visit with referring provider's group within last year yes on 11/10/22       Gina Persaud RN  [facility]

## 2023-01-18 NOTE — RESULT ENCOUNTER NOTE
Ellie Perry's chest x-ray showed some scarring and emphysema but no sign of infection. It has not changed.   TSH is normal indicating that the level of thyroid hormone is in the normal range.   Sodium slightly low-better than it has been in the past. The blood chemistries (Basic metabolic panel) are all normal including electrolytes (salt balances in the blood), blood glucose, liver and kidney tests.   CRP and sed rate tests for inflammatory condition are both mildly increased.   Your blood counts including the white blood cells, red blood cells and platelets are all normal.     PLAN: Try the mirtazapine as planned and recheck in a month. .   MARCY DAUGHERTY MD    Home

## 2023-01-27 NOTE — TELEPHONE ENCOUNTER
Conjuntivae and eyelids appear normal , Sclerae : White without injection Received message from [employer] monitoring that no message from [employer] has been received for 21 days. Called dtr Suyapa who spoke with pt and pt adm that she had accidentally unplugged monitor. It was plugged back in today. Bryanna

## 2023-02-21 NOTE — PROGRESS NOTES
ANTICOAGULATION MANAGEMENT     Ellie Leigh 89 year old female is on warfarin with therapeutic INR result. (Goal INR 2.0-3.0)    Recent labs: (last 7 days)     02/21/23  1329   INR 2.4*       ASSESSMENT       Source(s): Chart Review and Patient/Caregiver Call       Warfarin doses taken: Warfarin taken as instructed    Diet: No new diet changes identified    New illness, injury, or hospitalization: No    Medication/supplement changes: None noted    Signs or symptoms of bleeding or clotting: No    Previous INR: Therapeutic last 2(+) visits    Additional findings: None       PLAN     Recommended plan for no diet, medication or health factor changes affecting INR     Dosing Instructions: Continue your current warfarin dose with next INR in 6 weeks       Summary  As of 2/21/2023    Full warfarin instructions:  1.5 mg every Mon, Fri; 1 mg all other days   Next INR check:  4/4/2023             Telephone call with  Suyapa and Ellie on speakerphone who verbalizes understanding and agrees to plan    Lab visit scheduled    Education provided:     Contact [phone]  with any changes, questions or concerns.     Plan made per [facility] anticoagulation protocol    Gina Persaud, RN  Anticoagulation Clinic  2/21/2023    _______________________________________________________________________     Anticoagulation Episode Summary     Current INR goal:  2.0-3.0   TTR:  93.6 % (1 y)   Target end date:  Indefinite   Send INR reminders to:  [facility]    Indications    Atrial fibrillation with rapid ventricular response (H) (Resolved) [I48.91]  DVT (deep venous thrombosis) [I82.409]  Long term current use of anticoagulant therapy [Z79.01]  Intermittent atrial fibrillation (H) [I48.0]  Deep vein thrombosis (DVT) of proximal lower extremity  unspecified chronicity  unspecified laterality (H) [I82.4Y9]           Comments:           Anticoagulation Care Providers     Provider Role Specialty Phone number    Anjum Vidales MD  Referring Family Medicine [phone]    Marilyn Shipley MD Referring Family Medicine [phone]

## 2023-02-27 NOTE — PROGRESS NOTES
"Patient is a pleasant 89 yr old female here to establish care. Patient has a history of COPD, atrial fibrillation , hypothyroidism, insomnia, depression and anxiety. Patient is here with her daughter.     She reports that she has had a cough for the last two weeks. Cough is productive of brownish sputum  and she has been using her duo nebs. She reports that she feels more short of breath and she has also felt \"clammy\" .she reports no fevers.Last time she had bronchitis was in the Summer of last year.       Assessment & Plan     COPD exacerbation (H)  Patient had some expiratory wheezing. Recommend antibiotics - if sym  - azithromycin (ZITHROMAX Z-ADDY) 250 MG tablet; Two tablets on the first day, then one tablet daily for the next 4 days  - predniSONE (DELTASONE) 20 MG tablet; Take 2 tablets (40 mg) by mouth daily for 5 days    Deep vein thrombosis (DVT) of proximal lower extremity, unspecified chronicity, unspecified laterality (H)  Patient on chronic anticoagulation.     Acute respiratory failure with hypoxia (H)  Resolved.    Paroxysmal atrial fibrillation (H)  Patient is on anticoagulation.     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     05776}     BMI:   Estimated body mass index is 27.59 kg/m  as calculated from the following:    Height as of 11/10/22: 1.473 m (4' 10\").    Weight as of 11/10/22: 59.9 kg (132 lb).       FUTURE APPOINTMENTS:       - Follow-up visit in one month or sooner as needed.    Return in about 4 weeks (around 3/27/2023).    Ailyn Roland MD  [facility]    Renay Argueta is a 89 year old, presenting for the following health issues:  Cough    Declines covid and flu swabs before meeting with provider to discuss symptoms.     HPI     Acute Illness  Acute illness concerns: cough  Onset/Duration: 2 weeks  Symptoms:  Fever: No  Chills/Sweats: YES-sweats  Headache (location?): " YES  Sinus Pressure: No  Conjunctivitis:  Eyes painful  Ear Pain: no  Rhinorrhea: YES  Congestion: YES  Sore Throat: No  Cough: YES-productive of brown sputum  Wheeze: YES  Decreased Appetite: No  Nausea: YES  Vomiting: No  Diarrhea: No  Dysuria/Freq.: No  Dysuria or Hematuria: No  Fatigue/Achiness: YES-fatigue  Sick/Strep Exposure: None known  Therapies tried and outcome: Duo Neb helps       Review of Systems   Constitutional: Positive for activity change and fatigue.   HENT: Positive for congestion, rhinorrhea and sinus pressure.    Respiratory: Positive for cough, shortness of breath and wheezing.    Cardiovascular: Negative.    Gastrointestinal: Negative.    Endocrine: Negative.    Breasts:  negative.    Genitourinary: Negative.    Musculoskeletal: Negative.    Skin: Negative.    Neurological: Negative.    Hematological: Negative.    Psychiatric/Behavioral: Negative.         Objective    /60 (BP Location: Right arm, Patient Position: Sitting, Cuff Size: Adult Regular)   Pulse 74   Temp 98.4  F (36.9  C) (Tympanic)   Resp 20   LMP 06/15/1985   SpO2 97%   There is no height or weight on file to calculate BMI.  Physical Exam  Constitutional:       Appearance: Normal appearance.   HENT:      Head: Normocephalic and atraumatic.      Right Ear: Tympanic membrane normal.      Left Ear: Tympanic membrane normal.      Mouth/Throat:      Mouth: Mucous membranes are moist.   Eyes:      Pupils: Pupils are equal, round, and reactive to light.   Cardiovascular:      Rate and Rhythm: Normal rate and regular rhythm.   Pulmonary:      Effort: Pulmonary effort is normal.      Breath sounds: Wheezing and rhonchi present.   Musculoskeletal:         General: Normal range of motion.   Skin:     General: Skin is warm and dry.   Neurological:      General: No focal deficit present.      Mental Status: She is alert and oriented to person, place, and time.   Psychiatric:         Mood and Affect: Mood normal.         Behavior:  Behavior normal.

## 2023-02-27 NOTE — TELEPHONE ENCOUNTER
ANTICOAGULATION  MANAGEMENT     Interacting Medication Review    Interacting medication(s): Azithromycin (Zithromax, Z-laura) with warfarin. Also prescribed Prednisone x 5 days    Duration: 5 days (2/27/23 to 3/3/23)    Indication: COPD exacerbation/bronchitis?    New medication?: Yes, interaction may increase INR and risk of bleeding. Closer INR monitoring recommended.        PLAN     Continue your current warfarin dose with next INR in 1 week        Left detailed voice message for  Suyapa with dosing instructions and follow up date.     New recheck date updated on anticoagulation calendar     Plan made per [facility] anticoagulation protocol    Gina Persaud, RN  Anticoagulation Clinic

## 2023-03-06 NOTE — PROGRESS NOTES
ANTICOAGULATION MANAGEMENT     Ellie Leigh 89 year old female is on warfarin with supratherapeutic INR result. (Goal INR 2.0-3.0)    Recent labs: (last 7 days)     03/06/23  1420   INR 4.5*       ASSESSMENT       Source(s): Chart Review and Patient/Caregiver Call       Warfarin doses taken: Warfarin taken as instructed    Diet: No new diet changes identified    New illness, injury, or hospitalization: Yes: COPD exacerbation/bronchitis     Medication/supplement changes: Azithromycin prescribed 2/27/23-3/3/23 - finished full course    Prednisone X 5 days - finished full course    Pt does not feel that she is getting better    Signs or symptoms of bleeding or clotting: No    Previous INR: Therapeutic last 2(+) visits    Additional findings: None     PLAN     Recommended plan for temporary change(s) affecting INR     Dosing Instructions: Hold dose today, take 0.5 mg tomorrow,  with next INR in 1 week       Follow up with FP or UC as patient is still not feeling well.    Summary  As of 3/6/2023    Full warfarin instructions:  3/6: Hold; 3/7: 0.5 mg; Otherwise 1.5 mg every Mon, Fri; 1 mg all other days   Next INR check:  3/13/2023             Telephone call with  Suyapa and pt who verbalizes understanding and agrees to plan    Lab visit scheduled    Education provided:     Please call back if any changes to your diet, medications or how you've been taking warfarin    Symptom monitoring: monitoring for bleeding signs and symptoms    Plan made per [facility] anticoagulation protocol    Kylah Dorsey RN  Anticoagulation Clinic  3/6/2023    _______________________________________________________________________     Anticoagulation Episode Summary     Current INR goal:  2.0-3.0   TTR:  91.1 % (1 y)   Target end date:  Indefinite   Send INR reminders to:  GARRY BARRY    Indications    Atrial fibrillation with rapid ventricular response (H) (Resolved) [I48.91]  DVT (deep venous thrombosis) [I82.409]  Long term current use  of anticoagulant therapy [Z79.01]  Intermittent atrial fibrillation (H) [I48.0]  Deep vein thrombosis (DVT) of proximal lower extremity  unspecified chronicity  unspecified laterality (H) [I82.4Y9]           Comments:           Anticoagulation Care Providers     Provider Role Specialty Phone number    Anjum Vidales MD Referring Family Medicine [phone]    Marilyn Shipley MD Referring Family Medicine [phone]

## 2023-03-06 NOTE — TELEPHONE ENCOUNTER
Symptoms    Describe your symptoms: was on antibiotic but is no better. 5 days worth completed las Friday and she is no better    Any pain: INR TODAY IS 4.5 she is concerned.    How long have you been having symptoms: bronchitis .coughing more. Coughing up phlegm and does not feel good at all.     Have you been seen for this:  Yes:     Appointment offered?: No    Triage offered?: Yes:     Home remedies tried:no   I don't know what to take  Please advise     Preferred Pharmacy:   [id number] - [place], [place] - [address]  [address] [address]  Phone: [phone] Fax: [phone]      Could we send this information to you in [employer] or would you prefer to receive a phone call?:   Patient would prefer a phone call   Okay to leave a detailed message?: Yes at Home number on file [phone] (home)

## 2023-03-06 NOTE — TELEPHONE ENCOUNTER
Addendum to below note.  Pt has received call and directions from the Anticoagulation Clinic regarding her INR of 4.5.

## 2023-03-06 NOTE — TELEPHONE ENCOUNTER
Pt calling us with complaint of continued productive cough with brown sputum but she doesn't feel it's any better and wants to be seen again for it.  No fever  She is keeping hydrated says her daughter.  I made her an appt for tomorrow.

## 2023-03-07 NOTE — PROGRESS NOTES
"  Assessment & Plan     Cough, unspecified type  Chest x-rays show chronic changes   - XR Chest 2 Views; Future    Bronchitis  Antibiotics faxed, asked that she continue to use her neb treatments.   - cefdinir (OMNICEF) 300 MG capsule; Take 1 capsule (300 mg) by mouth 2 times daily  956}     BMI:   Estimated body mass index is 27.8 kg/m  as calculated from the following:    Height as of this encounter: 1.473 m (4' 10\").    Weight as of this encounter: 60.3 kg (133 lb).       FUTURE APPOINTMENTS:       - Follow-up visit in one month     Return in about 4 weeks (around 4/4/2023).    Ailyn Roland MD  [facility]    Subjective   Ellie is a 89 year old, presenting for the following health issues:    Ellie is a 89 yr old female with past medical significant for asthma/copd. Was seen last week for cough that has been ongoing for a couple of weeks,says she was getting better and then symptoms came back . She reports chest tightness and also some wheezing . Patient reports that she has had cough that is productive of brownish sputum. She reports tightness and wheezing.       Cough and COPD      Cough    History of Present Illness     Asthma:  She presents for follow up of asthma.  She has some cough, no wheezing, and some shortness of breath. She is using a relief medication 2-3 times per day. She does not miss any doses of her controller medication throughout the week.Patient is aware of the following triggers: dust mites. The patient has not had a visit to the Emergency Room, Urgent Care or Hospital due to asthma since the last clinic visit.     COPD:  She presents for follow up of COPD.  Overall, COPD symptoms are slightly worse since last visit. She has more than usual fatigue or shortness of breath with exertion and no shortness of breath at rest.She often coughs and does have change in sputum. No recent fever. She can walk the length of 1-2 rooms without stopping to rest. She can not " "walk a flight of stairs without resting. The patient has had no ED, urgent care, or hospital admissions because of COPD since the last visit.     She eats 2-3 servings of fruits and vegetables daily.She consumes 1 sweetened beverage(s) daily.She exercises with enough effort to increase her heart rate 9 or less minutes per day.  She exercises with enough effort to increase her heart rate 3 or less days per week.   She is taking medications regularly.  Azithromycin and Prednisone 2/27/2023. Therapy completed 3/3/2023  Pt feels cough has gotten worse. Feels she is producing more sputum that is brown. Worse with laying down.          Review of Systems   Constitutional: Negative.    HENT: Negative.    Respiratory: Positive for cough.    Cardiovascular: Negative.    Gastrointestinal: Negative.    Endocrine: Negative.    Breasts:  negative.    Genitourinary: Negative.    Musculoskeletal: Negative.    Skin: Negative.    Allergic/Immunologic: Negative.    Neurological: Negative.    Hematological: Negative.    Psychiatric/Behavioral: Negative.             Objective    /68 (Cuff Size: Adult Regular)   Pulse 79   Temp 99.1  F (37.3  C) (Tympanic)   Resp 20   Ht 1.473 m (4' 10\")   Wt 60.3 kg (133 lb)   LMP 06/15/1985   SpO2 95%   BMI 27.80 kg/m    Body mass index is 27.8 kg/m .  Physical Exam  Constitutional:       Appearance: Normal appearance.   HENT:      Head: Normocephalic and atraumatic.      Right Ear: Tympanic membrane normal.      Left Ear: Tympanic membrane normal.      Nose: Nose normal.   Eyes:      Extraocular Movements: Extraocular movements intact.      Pupils: Pupils are equal, round, and reactive to light.   Cardiovascular:      Rate and Rhythm: Normal rate and regular rhythm.      Pulses: Normal pulses.      Heart sounds: Normal heart sounds.   Pulmonary:      Effort: Pulmonary effort is normal.      Breath sounds: Normal breath sounds.   Musculoskeletal:         General: Normal range of motion. "   Skin:     General: Skin is warm and dry.   Neurological:      Mental Status: She is alert and oriented to person, place, and time.   Psychiatric:         Mood and Affect: Mood normal.         Behavior: Behavior normal.        Chest x-ray  IMPRESSION: Cardiac silhouette is within normal limits. Hyperinflated  lungs are suggestive of underlying COPD. Interstitial prominence  persists in the medial right apex. 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 Likely small right pleural effusion versus atelectasis or  scarring in the right costophrenic angle. Degenerative changes are  noted through the spine.

## 2023-03-13 NOTE — PROGRESS NOTES
ANTICOAGULATION MANAGEMENT     Ellie Leigh 89 year old female is on warfarin with therapeutic INR result. (Goal INR 2.0-3.0)    Recent labs: (last 7 days)     03/13/23  1047   INR 2.0*       ASSESSMENT       Source(s): Chart Review and Patient/Caregiver Call       Warfarin doses taken: Warfarin taken as instructed    Diet: No new diet changes identified    New illness, injury, or hospitalization: Yes: seen on 3/7 for COPD and bronchitis, started on another antibiotic     Medication/supplement changes: Omnicef  7 day course (dates: 3/7-3/13) not expected to affect INR, but may increase risk of bleeding    Signs or symptoms of bleeding or clotting: No    Previous INR: Supratherapeutic    Additional findings: patient is feeling much better and her symptoms have resolved         PLAN     Recommended plan for temporary change(s) affecting INR     Dosing Instructions: Continue your current warfarin dose with next INR in 2 weeks       Summary  As of 3/13/2023    Full warfarin instructions:  1.5 mg every Mon, Fri; 1 mg all other days   Next INR check:  3/27/2023             Telephone call with  Suyapa who verbalizes understanding and agrees to plan    Lab visit scheduled    Education provided:     Contact [phone]  with any changes, questions or concerns.     Plan made per [facility] anticoagulation protocol    Kristina Champagne RN  Anticoagulation Clinic  3/13/2023    _______________________________________________________________________     Anticoagulation Episode Summary     Current INR goal:  2.0-3.0   TTR:  89.9 % (1 y)   Target end date:  Indefinite   Send INR reminders to:  GARRY BARRY    Indications    Atrial fibrillation with rapid ventricular response (H) (Resolved) [I48.91]  DVT (deep venous thrombosis) [I82.409]  Long term current use of anticoagulant therapy [Z79.01]  Intermittent atrial fibrillation (H) [I48.0]  Deep vein thrombosis (DVT) of proximal lower extremity  unspecified  chronicity  unspecified laterality (H) [I82.4Y9]           Comments:           Anticoagulation Care Providers     Provider Role Specialty Phone number    Anjum Vidales MD Referring Family Medicine [phone]    Marilyn Shipley MD Referring Family Medicine [phone]

## 2023-03-27 NOTE — PROGRESS NOTES
ANTICOAGULATION MANAGEMENT     Ellie Leigh 89 year old female is on warfarin with supratherapeutic INR result. (Goal INR 2.0-3.0)    Recent labs: (last 7 days)     03/27/23  1507   INR 3.2*       ASSESSMENT       Source(s): Chart Review and Patient/Caregiver Call       Warfarin doses taken: Warfarin taken as instructed    Diet: No new diet changes identified    New illness, injury, or hospitalization: Yes: pt is not feeling well, she thinks that she may have bronchitis     Sx include a cough    Pt will try to be seen tomorrow    Medication/supplement changes: None noted    Signs or symptoms of bleeding or clotting: No    Previous INR: Therapeutic last visit; previously outside of goal range    Additional findings: None     PLAN     Recommended plan for temporary change(s) affecting INR     Dosing Instructions: partial hold then continue your current warfarin dose with next INR in 2 weeks       Call if Sx worsen or if patient is started on new medications    Summary  As of 3/27/2023    Full warfarin instructions:  3/27: 0.5 mg; Otherwise 1.5 mg every Mon, Fri; 1 mg all other days   Next INR check:  4/10/2023             Telephone call with  Suyapa who verbalizes understanding and agrees to plan    Lab visit scheduled    Education provided:     Please call back if any changes to your diet, medications or how you've been taking warfarin    Symptom monitoring: monitoring for bleeding signs and symptoms    Plan made per [facility] anticoagulation protocol    Kylah Dorsey RN  Anticoagulation Clinic  3/27/2023    _______________________________________________________________________     Anticoagulation Episode Summary     Current INR goal:  2.0-3.0   TTR:  89.3 % (1 y)   Target end date:  Indefinite   Send INR reminders to:  GARRY BARRY    Indications    Atrial fibrillation with rapid ventricular response (H) (Resolved) [I48.91]  DVT (deep venous thrombosis) [I82.409]  Long term current use of anticoagulant  therapy [Z79.01]  Intermittent atrial fibrillation (H) [I48.0]  Deep vein thrombosis (DVT) of proximal lower extremity  unspecified chronicity  unspecified laterality (H) [I82.4Y9]           Comments:           Anticoagulation Care Providers     Provider Role Specialty Phone number    Anjum Vidales MD Referring Family Medicine [phone]    Marilyn Shipley MD Referring [facility] Medicine [phone]

## 2023-03-28 NOTE — TELEPHONE ENCOUNTER
ANTICOAGULATION  MANAGEMENT     Interacting Medication Review    Interacting medication(s): Levofloxacin (Levaquin) with warfarin.    Duration: 10 days  (3/28/23 to 4/7/23)    Indication: bronchitis-third round of antibiotics for patient, previous tried azithromycin and cefdinir    New medication?: Yes, interaction may increase INR and risk of bleeding. Closer INR monitoring recommended.  Historically patient has not had elevated INRs while taking levaquin.       PLAN     Continue your current warfarin dose with next INR in 1 week   - INR was done yesterday with a dose adjustment made by [facility].     Telephone call with Ellie who verbalizes understanding and agrees to plan    New recheck date updated on anticoagulation calendar     Plan made per [facility] anticoagulation protocol    Gina Persaud, RN  Anticoagulation Clinic

## 2023-03-28 NOTE — PATIENT INSTRUCTIONS
Start the levaquin 500mg once a day for 10 days.  Start the spiriva inhaler once a day ongoing to hopefully prevent the COPD flare ups    Continue the advair inhaler    Because we can't get the Duoneb, we'll try to switch to the albuterol neb solution to use 3 times a day while shortness of breath.    If this gets worse, unable to speak in full sentence then to ER, if worse after 3-5 please let me know and we could add prednisone.  Then if still not better we'll plan the CT of the lungs.    I will notify [facility] of this levaquin and the need to reduce dosage.

## 2023-03-28 NOTE — PROGRESS NOTES
"  Assessment & Plan       COPD exacerbation (H): not well controlled lately with another exacerbation  Did get better after last antibiotic then worse in the last week again.  Start levaquin, discussed, but will hold off on prednisone, if not getting better in 3-5 days can then start it.  DuoNeb no longer available due to shortage and needing better preventative for COPD, so start spiriva, continue advair  Switch to neb albutrol if able to get that.  If getting significantly worse then go to ER  If not getting better then prednisone and if after that not getting better then lung CT.  - tiotropium (SPIRIVA) 18 MCG inhaled capsule; Inhale 1 capsule (18 mcg) into the lungs daily  - albuterol (PROVENTIL) (2.5 MG/3ML) 0.083% neb solution; Take 1 vial (2.5 mg) by nebulization every 6 hours as needed for shortness of breath, wheezing or cough  - levofloxacin (LEVAQUIN) 500 MG tablet; Take 1 tablet (500 mg) by mouth daily for 10 days  -discussed risks, benefits, and side effects of this new levaquin and spiriva medications. Patient understands and is in agreement.     BMI:   Estimated body mass index is 27.8 kg/m  as calculated from the following:    Height as of 3/7/23: 1.473 m (4' 10\").    Weight as of 3/7/23: 60.3 kg (133 lb).       See Patient Instructions    Brenton Valdes MD  [facility]    Renay Argueta is a 89 year old, presenting for the following health issues:  Cough (Has been on two different antibiotics.)    Additional Questions 3/28/2023   Roomed by Tiesha Oropeza MA   Accompanied by Daughter (Suyapa)     Patient Reported Additional Medications 3/28/2023   Patient reports taking the following new medications NO new meds     HPI     Acute Illness  Acute illness concerns: Bronchitis follow up.  Patient states she has finished two rounds of antibiotics. She was on Prednisone and azithromycin (2/27/23) and cefdinir (3/7/23) and is still not better. Patient states Dr. Vidales use to " give her Levofloxacin 500 MG and that would work.  Onset/Duration: x 1 month.  Symptoms:  Fever: No  Chills/Sweats: YES- sweats  Headache (location?): No  Sinus Pressure: No  Conjunctivitis:  YES- sore eyes  Ear Pain: no  Rhinorrhea: YES- dripping all the time  Congestion: No  Sore Throat: No  Cough: YES-productive of brown sputum with some blood, and with shortness of breath  Wheeze: YES  Decreased Appetite: No  Nausea: YES  Vomiting: No  Diarrhea: No  Dysuria/Freq.: No  Dysuria or Hematuria: No  Fatigue/Achiness: YES- little body aches and faitgue  Sick/Strep Exposure: No  Therapies tried and outcome: Prednisone and azithromycin and cefdinir afterwards the shortness of breath did improve for 1-2 weeks but now for the last week having worsening shortness of breath and fatigue again and the brown sputum never resolved.    COPD: on advair and duoneb    Last Xray 3/7/23 with hyperinflation.        Review of Systems         Objective    BP (!) 146/66 (BP Location: Right arm, Patient Position: Sitting, Cuff Size: Adult Large)   Pulse 73   Temp 97.8  F (36.6  C) (Tympanic)   Resp 20   LMP 06/15/1985   SpO2 96%   There is no height or weight on file to calculate BMI.  Physical Exam   GENERAL: healthy, alert and no distress  NECK: no adenopathy, no asymmetry, masses, or scars and thyroid normal to palpation  RESP: rhonchi R lower posterior and L lower posterior and expiratory wheezes R lower posterior and L lower posterior  CV: regular rate and rhythm, normal S1 S2, no S3 or S4, no murmur, click or rub, no peripheral edema and peripheral pulses strong  MS: no gross musculoskeletal defects noted, no edema

## 2023-04-04 NOTE — PROGRESS NOTES
ANTICOAGULATION MANAGEMENT     Ellie Leigh 89 year old female is on warfarin with therapeutic INR result. (Goal INR 2.0-3.0)    Recent labs: (last 7 days)     04/04/23  1331   INR 2.4*       ASSESSMENT       Source(s): Chart Review and Patient/Caregiver Call       Warfarin doses taken: Warfarin taken as instructed    Diet: No new diet changes identified    New illness, injury, or hospitalization: No    Medication/supplement changes: levaquin started on 3-28 for 10 days which may be increasing INR today    Signs or symptoms of bleeding or clotting: No    Previous INR: Supratherapeutic    Additional findings: patient is feeling better. Just a little nauseous from the abx. She is taking them with food         PLAN     Recommended plan for temporary change(s) affecting INR     Dosing Instructions: Continue your current warfarin dose with next INR in 2 weeks       Summary  As of 4/4/2023    Full warfarin instructions:  1.5 mg every Mon, Fri; 1 mg all other days   Next INR check:  4/18/2023             Telephone call with  Suyapa who verbalizes understanding and agrees to plan    Lab visit scheduled    Education provided:     Please call back if any changes to your diet, medications or how you've been taking warfarin    Contact [phone]  with any changes, questions or concerns.     Plan made per [facility] anticoagulation protocol    Lissy Beaulieu RN  Anticoagulation Clinic  4/4/2023    _______________________________________________________________________     Anticoagulation Episode Summary     Current INR goal:  2.0-3.0   TTR:  88.8 % (1 y)   Target end date:  Indefinite   Send INR reminders to:  [facility] [place]    Indications    Atrial fibrillation with rapid ventricular response (H) (Resolved) [I48.91]  DVT (deep venous thrombosis) [I82.409]  Long term current use of anticoagulant therapy [Z79.01]  Intermittent atrial fibrillation (H) [I48.0]  Deep vein thrombosis (DVT) of proximal lower  extremity  unspecified chronicity  unspecified laterality (H) [I82.4Y9]           Comments:           Anticoagulation Care Providers     Provider Role Specialty Phone number    Anjum Vidales MD Referring Family Medicine [phone]    Marilyn Shipley MD Referring Family Medicine [phone]

## 2023-04-12 NOTE — PATIENT INSTRUCTIONS
Start prednisone.  I did send a prescription for the second component of your duoneb, you would add this to the albuterol and use together in a neb. If you can get this, you can stop the Spiriva inhaler and go back to how you used your Duonebs  Let me know if things are not improving in one week, if not, we will get a CT scan of your chest.

## 2023-04-12 NOTE — TELEPHONE ENCOUNTER
Patient daughter is calling to make an appt for Ellie.  Consent to communicate verified.    Patient has been in and out of the clinic the past few months for bronchitis.  Last was 2 weeks ago.  She was prescribed levofloxacin.    Pt continues to have cough with brown phlegm.  She feels very tired and has shortness of breath with activity that is worse than normal.    Disposition:   See HCP within 4 hours or PCP triage.  Care advice given.  Daughter was able to make a same day appt.    Ibeth Guzmán, RN, BSN Nurse Triage Advisor 4/12/2023 7:26 AM      Reason for Disposition    [1] MILD difficulty breathing (e.g., minimal/no SOB at rest, SOB with walking, pulse <100) AND [2] still present when not coughing    Additional Information    Negative: SEVERE difficulty breathing (e.g., struggling for each breath, speaks in single words)    Negative: Bluish (or gray) lips or face now    Negative: [1] Difficulty breathing AND [2] exposure to flames, smoke, or fumes    Negative: [1] Stridor AND [2] difficulty breathing    Negative: Sounds like a life-threatening emergency to the triager    Negative: [1] MODERATE difficulty breathing (e.g., speaks in phrases, SOB even at rest, pulse 100-120) AND [2] still present when not coughing    Negative: Chest pain  (Exception: MILD central chest pain, present only when coughing)    Negative: Patient sounds very sick or weak to the triager    Protocols used: COUGH - ACUTE [id number]

## 2023-04-12 NOTE — PROGRESS NOTES
"  Assessment & Plan     COPD exacerbation (H)  We will add prednisone.  I did write a prescription for nebulized ipratropium to see if this is available for her to mix with her albuterol nebs.  She did feel this was more effective than the Spiriva.  If she can get this, we can discontinue the Spiriva and return to Duonebs. If not improving after the prednisone, will obtain chest CT for further eval.  - predniSONE (DELTASONE) 20 MG tablet; Take 2 tablets (40 mg) by mouth daily for 5 days  - ipratropium (ATROVENT) 0.02 % neb solution; Take 2.5 mLs (0.5 mg) by nebulization 4 times daily To be used with albuterol in nebulizer  - Nebulizer and Supplies Order for DME - ONLY FOR DME       BMI:   Estimated body mass index is 27.8 kg/m  as calculated from the following:    Height as of 3/7/23: 1.473 m (4' 10\").    Weight as of 3/7/23: 60.3 kg (133 lb).       Patient Instructions   Start prednisone.  I did send a prescription for the second component of your duoneb, you would add this to the albuterol and use together in a neb. If you can get this, you can stop the Spiriva inhaler and go back to how you used your Duonebs  Let me know if things are not improving in one week, if not, we will get a CT scan of your chest.      LACI Segundo CNP  [facility]    Renay Argueta is a 89 year old, presenting for the following health issues:  Cough (Diagnosed bronchitis but feels that it is not getting better. )        4/12/2023     1:05 PM   Additional Questions   Roomed by Yoselin Manjarrez CMA   Accompanied by daughter Suyapa LARA     Acute Illness  Acute illness concerns: Cough   Onset/Duration: A couple months   Symptoms:  Fever: No  Chills/Sweats: No  Headache (location?): YES  Sinus Pressure: No  Conjunctivitis:  YES- Reports that her eyes hurt   Ear Pain: no  Rhinorrhea: YES  Congestion: YES  Sore Throat: No  Cough: YES-productive of green sputum  Wheeze: No  Decreased Appetite: No   Nausea: " YES  Vomiting: No  Diarrhea: No  Dysuria/Freq.: No  Dysuria or Hematuria: No  Fatigue/Achiness: YES  Sick/Strep Exposure: No  Therapies tried and outcome: Pt reports that she has been seen at least 3 times for this cough, cough not getting worse but is constant.     Above HPI reviewed. Additionally, seen initially on March 7, for cough and bronchitis.  Was prescribed cefdinir.  Was doing better while on cefdinir, but a week after completing the symptoms returned.  Was again seen on March 28 and diagnosed with exacerbated COPD.  She had been unable to get DuoNebs related to a shortage, so she was started on Spiriva and prescribed nebulized albuterol.  She was also started on levofloxacin and asked to let provider know if things were not improving over the course of 3 to 5 days at which point was recommended to start prednisone.  She completed the levofloxacin on April 5, she has not had improvement in wheezing or cough.  She is using albuterol nebs daily.  She continues Spiriva and Advair.  She did not call back to clinic to get prednisone.  She is not having significant exertional dyspnea or shortness of breath at rest.  No swelling of the extremities.  Cough is occasionally productive.  Chest x-ray done on March 7 was without significant finding, questionable small right pleural effusion versus atelectasis/scarring in the right costophrenic angle.          Review of Systems   Constitutional, HEENT, cardiovascular, pulmonary, gi and gu systems are negative, except as otherwise noted.      Objective    /56 (BP Location: Right arm, Patient Position: Sitting, Cuff Size: Adult Regular)   Pulse 64   Temp 97.5  F (36.4  C) (Tympanic)   Resp 16   LMP 06/15/1985   SpO2 95%   There is no height or weight on file to calculate BMI.  Physical Exam  Vitals and nursing note reviewed.   Constitutional:       Appearance: Normal appearance.   HENT:      Head: Normocephalic and atraumatic.      Mouth/Throat:      Mouth:  Mucous membranes are moist.   Eyes:      Comments: Non-icteric   Cardiovascular:      Rate and Rhythm: Normal rate and regular rhythm.      Pulses: Normal pulses.      Heart sounds: Normal heart sounds, S1 normal and S2 normal. Heart sounds not distant. No murmur heard.     No friction rub. No gallop.   Pulmonary:      Effort: Pulmonary effort is normal.      Breath sounds: Wheezing (bilateral) present.   Abdominal:      General: Abdomen is flat. Bowel sounds are normal.      Palpations: Abdomen is soft.   Musculoskeletal:      Cervical back: Neck supple.      Right lower leg: No edema.      Left lower leg: No edema.   Skin:     General: Skin is warm and dry.      Capillary Refill: Capillary refill takes less than 2 seconds.   Neurological:      General: No focal deficit present.      Mental Status: She is alert and oriented to person, place, and time.   Psychiatric:         Mood and Affect: Mood normal.         Behavior: Behavior normal.         Thought Content: Thought content normal.         Judgment: Judgment normal.

## 2023-04-18 NOTE — PROGRESS NOTES
ANTICOAGULATION MANAGEMENT     Ellie Leigh 89 year old female is on warfarin with supratherapeutic INR result. (Goal INR 2.0-3.0)    Recent labs: (last 7 days)     04/18/23  1104   INR 3.4*       ASSESSMENT       Source(s): Chart Review and Patient/Caregiver Call       Warfarin doses taken: Warfarin taken as instructed    Diet: No new diet changes identified    Medication/supplement changes: prednisone 5 day course (dates: 4-12 to 4-17) which may be increasing INR today    New illness, injury, or hospitalization: No, ongoing cough and nausea    Signs or symptoms of bleeding or clotting: No    Previous INR: Therapeutic last visit; previously outside of goal range    Additional findings: None, pt had broccoli last night         PLAN     Recommended plan for ongoing change(s) affecting INR     Dosing Instructions: partial hold then decrease your warfarin dose (6.2% change) with next INR in 2 weeks       Summary  As of 4/18/2023    Full warfarin instructions:  4/18: 0.5 mg; Otherwise 1.5 mg every Mon; 1 mg all other days   Next INR check:  5/2/2023             Telephone call with Ellie who verbalizes understanding and agrees to plan    Lab visit scheduled    Education provided:     Please call back if any changes to your diet, medications or how you've been taking warfarin    Contact [phone]  with any changes, questions or concerns.     Plan made per [facility] anticoagulation protocol    Lissy Beaulieu RN  Anticoagulation Clinic  4/18/2023    _______________________________________________________________________     Anticoagulation Episode Summary     Current INR goal:  2.0-3.0   TTR:  87.3 % (1 y)   Target end date:  Indefinite   Send INR reminders to:  [facility]    Indications    Atrial fibrillation with rapid ventricular response (H) (Resolved) [I48.91]  DVT (deep venous thrombosis) [I82.409]  Long term current use of anticoagulant therapy [Z79.01]  Intermittent atrial fibrillation (H) [I48.0]  Deep  vein thrombosis (DVT) of proximal lower extremity  unspecified chronicity  unspecified laterality (H) [I82.4Y9]           Comments:           Anticoagulation Care Providers     Provider Role Specialty Phone number    Anjum Vidales MD Referring Family Medicine [phone]    Marilyn Shipley MD Referring Family Medicine [phone]

## 2023-04-19 NOTE — PATIENT INSTRUCTIONS
Increase Advair to 2 puffs twice daily.  Scheduled nebs with both ipratropium and albuterol. Do this 4 times daily.  To schedule CT, call [phone].  Recheck with Dr. Roland in 2 days

## 2023-04-19 NOTE — PROGRESS NOTES
"  Assessment & Plan     COPD exacerbation (H)  Chest x-ray today is without significant finding.  She is not hypoxic.  Lung exam remains with wheezing and rhonchi following 2 courses of antibiotics and 1 course of steroids.  She was only able to obtain the ipratropium nebulizers yesterday, so she has only had a 1 ipratropium plus albuterol neb since I last saw her.  We will have her return to her scheduled dosing of this 4 times daily.  She is in need of more albuterol solution, so I did send a prescription for this today.  We will increase her Advair from 1 puff twice daily to 2 puffs twice daily.  We will obtain CT imaging of her chest for further evaluation.  Close follow-up with her PCP scheduled in 2 days for recheck of symptoms.  May need a pulmonology referral.  - albuterol (PROVENTIL) (2.5 MG/3ML) 0.083% neb solution; TAKE 1 VIAL (2.5MG) VIA NEBULIZATION EVERY 6 HOURS AS NEEDED FOR SHORTNESS OF BREATH, WHEEZING OR COUGH  - CT Chest w/o Contrast; Future    Subacute cough  As above.  - XR Chest 2 Views; Future  - CT Chest w/o Contrast; Future    Chest pain, unspecified type  EKG was obtained today to rule out ischemic causes of her shortness of breath.  She did have 1 episode of chest pain last night, however this did resolve quickly with position change and cough.  EKG is a paced rhythm with no concerning ischemic changes.  - EKG 12-lead complete w/read - Clinics         BMI:   Estimated body mass index is 27.8 kg/m  as calculated from the following:    Height as of 3/7/23: 1.473 m (4' 10\").    Weight as of 3/7/23: 60.3 kg (133 lb).       Patient Instructions   Increase Advair to 2 puffs twice daily.  Scheduled nebs with both ipratropium and albuterol. Do this 4 times daily.  To schedule CT, call [phone].  Recheck with Dr. Roland in 2 days        LACI Segundo CNP  [facility]    Renay Argueta is a 89 year old, presenting for the following health " issues:  RECHECK        4/12/2023     1:05 PM   Additional Questions   Roomed by Yoselin Manjarrez CMA   Accompanied by daughter Suyapa     History of Present Illness       Reason for visit:  Recheck COPD exacerbation    She eats 2-3 servings of fruits and vegetables daily.She consumes 0 sweetened beverage(s) daily.She exercises with enough effort to increase her heart rate 9 or less minutes per day.  She exercises with enough effort to increase her heart rate 3 or less days per week.   She is taking medications regularly.       Is feeling worse since last week. Finished prednisone prescription and has been coughing up a lot more phlegm and is not sleeping good at all. She is still using the nebulizer, gets some relief for a short while after using.     Above HPI reviewed. Additionally, initially seen in clinic on March 7 for a cough, was treated for bronchitis with cefdinir at that time.  Had worsening of symptoms and was seen again in clinic on March 28.  Was started on levofloxacin.  She was also started on Spiriva as she had been unable to obtain DuoNebs due to to a shortage.  Was then seen by me on April 12 with no improvement.  Trial of a 5-day course of prednisone.  I did write a prescription at that time for ipratropium to mix with her albuterol as she was unable to obtain DuoNebs as she felt this was more effective than the Spiriva.  She returns today noting she is feeling worse than she initially did.  She is having persistent cough, worsening shortness of breath.  She did have 1 episode of chest pain last night that lasted approximately 10 minutes but improved with position change and cough.  She has not had any fevers, chills or other ill symptoms.          Review of Systems   Constitutional, HEENT, cardiovascular, pulmonary, gi and gu systems are negative, except as otherwise noted.      Objective    /64   Pulse 73   Temp 97.8  F (36.6  C) (Tympanic)   Resp 18   LMP 06/15/1985   SpO2 97%   There  is no height or weight on file to calculate BMI.  Physical Exam  Vitals and nursing note reviewed.   Constitutional:       Appearance: Normal appearance.   HENT:      Head: Normocephalic and atraumatic.      Mouth/Throat:      Mouth: Mucous membranes are moist.   Eyes:      Comments: Non-icteric   Cardiovascular:      Rate and Rhythm: Normal rate and regular rhythm.      Pulses: Normal pulses.      Heart sounds: Normal heart sounds, S1 normal and S2 normal. Heart sounds not distant. No murmur heard.     No friction rub. No gallop.   Pulmonary:      Effort: Pulmonary effort is normal.      Breath sounds: Wheezing and rhonchi present.   Abdominal:      General: Abdomen is flat. Bowel sounds are normal.      Palpations: Abdomen is soft.   Musculoskeletal:      Cervical back: Neck supple.      Right lower leg: No edema.      Left lower leg: No edema.   Skin:     General: Skin is warm and dry.      Capillary Refill: Capillary refill takes less than 2 seconds.   Neurological:      General: No focal deficit present.      Mental Status: She is alert and oriented to person, place, and time.   Psychiatric:         Mood and Affect: Mood normal.         Behavior: Behavior normal.         Thought Content: Thought content normal.         Judgment: Judgment normal.            No results found for this visit on 04/19/23.  XR Chest 2 Views    Result Date: 4/19/2023  CHEST TWO VIEWS  4/19/2023 11:10 AM HISTORY: Subacute cough. COMPARISON: March 7, 2023     IMPRESSION: Right upper lobe fibrotic changes similar to previous. No acute infiltrates or effusions. Stable cardiac device. The cardiac silhouette is not enlarged. Pulmonary vasculature is unremarkable. RANJANA NAVARRETE MD   SYSTEM ID:  [id number]    Cardiac Device Check - Remote (Standing ORD 4 count)    Result Date: 4/17/2023  Biotronik Denia (D) Remote PPM Device Check AP: 76% : 1% Mode: DDD-CLS 60/130 Presenting Rhythm: AP/VS Heart Rate: adequate rates per histograms Sensing:  stable Pacing Threshold: stable Impedance: stable Battery Status: 40% remaining Atrial Arrhythmia: none Ventricular Arrhythmia: none Care Plan: F/u annual threshold in 3 months. F/U with Dr. Orozco 7/2023. Gave results over the phone. PAMELA Cra I have reviewed and interpreted the device interrogation, settings, programming and nurse's summary. The device is functioning within normal device parameters. I agree with the current findings, assessment and plan.

## 2023-04-21 NOTE — PROGRESS NOTES
"Patient is a 89-year-old female with past medical history significant for COPD, hypertension, hypothyroidism, atrial fibrillation on chronic anticoagulation.  The last couple of months she has been experiencing more shortness of breath, cough and  fatigue.      She has had 3 rounds of antibiotics and prednisone in the last two months and eventually started to turn the corner.  She has no need for oxygen at the present time but appears her COPD seems to be worsening.  She was restarted on her Advair recently and states that she is using that twice a day.  It appears that she had seen a pulmonologist in the past.     I discussed this with her and her daughter today that it would be prudent for her to see pulmonologist again given that she has a longer period of bouncing back from COPD flares.    She is open to this idea.    Overall she appears to be doing much better and appears to be back at her baseline.  She did mention that she struggles to sleep and gets maybe  2 to 3 hours of sleep nightly and then she is unable to fall asleep.  She was on mirtazapine which she said it was pretty effective initially but then after a while the effectiveness waned.  She is open to trying something else for the insomnia.  No other complaints today.  Assessment & Plan     Bronchiectasis without complication (H)  Advair resent ( there was a problem with the prescription )   - fluticasone-salmeterol (ADVAIR) 250-50 MCG/ACT inhaler; Inhale 1 puff into the lungs every 12 hours    Insomnia, unspecified type  Medication faxed  - traZODone (DESYREL) 50 MG tablet; Take 1 tablet (50 mg) by mouth At Bedtime    Chronic obstructive pulmonary disease, unspecified COPD type (H)  Referral placed to see pulmonology.   - Adult Pulmonary Medicine Referral; Future    956}     BMI:   Estimated body mass index is 27.38 kg/m  as calculated from the following:    Height as of this encounter: 1.473 m (4' 10\").    Weight as of this encounter: 59.4 kg (131 " adal).       FUTURE APPOINTMENTS:       - Follow-up visit in one month or sooner as needed    Ailyn Roland MD  [facility]    Subjective   Ellie is a 89 year old, presenting for the following health issues:  COPD (2 day follow-up on COPD exacerbation. ) and Medication problem (Her Pharmacy is not seeing the correct order that was placed by Sandra for the Advair inhaler.  If a new order could be sent.  It was filled for one year when ordered on 4-19-23.)        4/21/2023     8:54 AM   Additional Questions   Roomed by Cydney Montoya CMA   Accompanied by Suyapa-daughter.     Chief Complaint   Patient presents with     COPD     2 day follow-up on COPD exacerbation.      Medication problem     Her Pharmacy is not seeing the correct order that was placed by Sandra for the Advair inhaler.  If a new order could be sent.  It was filled for one year when ordered on 4-19-23.     HPI     COPD EXACERBATION:  2 day follow up appointment.  She saw Sandra Scott on 4-19-23.    Patient Instructions from the visit were:   Increase Advair to 2 puffs twice daily.  Scheduled nebs with both ipratropium and albuterol. Do this 4 times daily.  To schedule CT, call [phone].  Recheck with Dr. Roland in 2 days  She has her CT scheduled for tomorrow.  She is noticing improvement with the medication changes from 4-19-23.             Review of Systems   Constitutional: Negative.    HENT: Negative.    Eyes: Negative.    Respiratory: Positive for cough, shortness of breath and wheezing.    Cardiovascular: Negative.    Endocrine: Negative.    Breasts:  negative.    Genitourinary: Negative.    Musculoskeletal: Negative.    Allergic/Immunologic: Negative.    Neurological: Negative.    Hematological: Negative.    Psychiatric/Behavioral: Negative.             Objective    /58 (BP Location: Right arm, Patient Position: Chair, Cuff Size: Adult Regular)   Pulse 80   Temp 98.2  F (36.8  C) (Tympanic)    "Resp 24   Ht 1.473 m (4' 10\")   Wt 59.4 kg (131 lb)   LMP 06/15/1985   SpO2 97%   BMI 27.38 kg/m    Body mass index is 27.38 kg/m .  Physical Exam  Constitutional:       Appearance: Normal appearance.   HENT:      Head: Normocephalic and atraumatic.   Eyes:      Pupils: Pupils are equal, round, and reactive to light.   Cardiovascular:      Rate and Rhythm: Normal rate and regular rhythm.   Pulmonary:      Effort: Pulmonary effort is normal.      Breath sounds: Normal breath sounds.   Musculoskeletal:         General: Normal range of motion.      Cervical back: Normal range of motion and neck supple.   Skin:     General: Skin is warm and dry.   Neurological:      Mental Status: She is alert and oriented to person, place, and time.   Psychiatric:         Mood and Affect: Mood normal.         Behavior: Behavior normal.                    "

## 2023-04-24 PROBLEM — R91.8 PULMONARY NODULES: Status: ACTIVE | Noted: 2023-01-01

## 2023-04-25 NOTE — TELEPHONE ENCOUNTER
New Medication Request    Contacts       Type Contact Phone/Fax    04/25/2023 10:47 AM CDT Phone (Incoming) Ellie Leigh Mel (Self) [phone] (H)          What medication are you requesting?: Ipatropium neb solution    Reason for medication request: Pharmacy can't get medication, need substitute    Have you taken this medication before?: Yes:     Controlled Substance Agreement on file:   CSA -- Patient Level:    CSA: None found at the patient level.         Patient offered an appointment? No    Preferred Pharmacy:   [id number] - [place], [place] - [address]  [address] [address]  Phone: [phone] Fax: [phone]      Could we send this information to you in [facility] or would you prefer to receive a phone call?:   Patient would prefer a phone call   Okay to leave a detailed message?: Yes at Home number on file [phone] (home)

## 2023-04-28 NOTE — TELEPHONE ENCOUNTER
"Routing refill request to provider for review/approval because:  Failed RN protocol, see below.      Last ACT was 20 on 1-8-21.    Asthma control assessment score within normal limits in last 6 months    Please review ACT score.     Medication is active on med list      Recent (6 mo) or future (30 days) visit within the authorizing provider's specialty    Patient had office visit in the last 6 months or has a visit in the next 30 days with authorizing provider or within the authorizing provider's specialty.  See \"Patient Info\" tab in inbasket, or \"Choose Columns\" in Meds & Orders section of the refill encounter.        AYLIN Lombardi    "

## 2023-04-30 NOTE — TELEPHONE ENCOUNTER
Nurse Triage SBAR     Situation: Daughter calling to see if pt should be seen sooner than Tuesday regarding some unexplained bruising. CTC on file     Background: Pt is on warfarin. Scheduled for INR check on Tuesday but has 3 bruised spots that are on her wrists     Assessment: 2 bruises on one wrist each about an inch in size and 1 bruise on the other wrist about 1/2 an inch. No known injury. No other concerning areas or bleeding. Pt has been complaining of neck pain since Friday that daughter wanted to make an appointment for as well     Recommendation: Advised she should be seen tomorrow and could have both issues addressed.  Reviewed care advice per protocol. Daughter verbalizes understanding and agrees to plan. Transferred to scheduling for further assistance          Reason for Disposition    Taking Coumadin (warfarin) or other strong blood thinner, or known bleeding disorder (e.g., thrombocytopenia)    Additional Information    Negative: Shock suspected (e.g., cold/pale/clammy skin, too weak to stand, low BP, rapid pulse)    Negative: Sounds like a life-threatening emergency to the triager    Negative: Bruises with fever    Negative: Tiny bruises (spots or dots) of unknown cause    Negative: Bruise(s) of forehead or head    Negative: Bruise(s) of face or jaw    Negative: Followed an injury, and triager doesn't know which injury guideline to use first    Negative: Post-operative bruising    Negative: Dizziness or lightheadedness    Negative: [1] Bruise on head/face, chest, or abdomen AND [2]  taking Coumadin (warfarin) or other strong blood thinner, or known bleeding disorder (e.g., thrombocytopenia)    Negative: Unexplained bleeding from another site (e.g., gums, nose, urine) as well    Negative: Patient sounds very sick or weak to the triager    Negative: [1] Not caused by an injury AND [2] 5 or more bruises now    Negative: [1] Raised bruise AND [2] size > 2 inches (5 cm) AND [3] getting bigger     Negative: [1] SEVERE pain AND [2] not improved 2 hours after pain medicine/ice packs    Negative: Suspicious history for the injury    Protocols used: BRUISES-NIGEL-ARIE Styles RN [facility] April 30, 2023 11:54 AM

## 2023-05-02 NOTE — PROGRESS NOTES
Assessment & Plan     Bruising  No acute findings.   Advised patient and daughter bruising, even with minor skin trauma, is notuncommon in patients taking warfarin.  Reviewed with them her slightly supratherapeutic INR last measurement. Patient said she followed the recommended warfarin hold at the time.  Recheck INR today.  Patient is scheduled to be contacted by the INR clinic staff today after INR is completed.  Reasons to go to ER discussed in detail with patient and daughter.  - INR  - CBC with platelets  - INR  - CBC with platelets    Acute neck pain  No acute sign today.  Suspect DJD aggravated by strain causing spasms.  XR to rule out occult pathology considering her age.  Advised symptomatic measures in detail.  Return precautions discussed and given to patient.   - XR Cervical Spine 2/3 Views    Long term current use of anticoagulant therapy  See above.  - INR  - INR      Patient Instructions   Your neck pain is consistent with muscle spasms or strain of the neck. Possible arthritis, too.  You may apply warm compress as needed for pain.   Try muscle pain reliever creams topically as needed.    May take Tylenol 325 mg 1-2 tabs as needed for pain only; do not take more than 6 tabs per day.   Activity as tolerated.  See doctor if with new or worsening symptoms.   Expect improvement in the next 2-3 weeks.    Bruising is not uncommon if you are on wrafarin.  However, we need to check if your INR is in therapeutic range or not to make sure you are not overmedicated.  Wait for the INR clinic nurse to call you this morning.    If with eder bleeding, unexplained new pain, severe pain, shooting pain to arms from the neck, or fever, see a provider promptly.      Mark Servin MD  [facility]    Renay Argueta is a 89 year old, presenting for the following health issues:  Bleeding/Bruising (X 1 week) and Neck Pain (Since Friday)        5/2/2023     7:47 AM   Additional Questions    Roomed by Nina FRENCH   Accompanied by daughter         5/2/2023     7:47 AM   Patient Reported Additional Medications   Patient reports taking the following new medications none     HPI     Concern - bruising  Onset: 1 week  Description: bruising on left arm, no known injury  Intensity:   Progression of Symptoms:  worsening  Accompanying Signs & Symptoms: none  Previous history of similar problem: none  Precipitating factors:        Worsened by: n/a   Alleviating factors:        Improved by: n/a  Therapies tried and outcome:  none   Patient is taking coumadin    Pain History:  When did you first notice your pain? 5 days   Have you seen anyone else for your pain? No  How has your pain affected your ability to work? Not applicable  Where in your body do you have pain? Neck Pain  Onset/Duration: started Friday  Description:   Location: neck, started with stabbing pain left side, now sore and stiff  Radiation: none  Intensity: 4/10  Progression of Symptoms:  same  Accompanying Signs & Symptoms:  Burning, tingling, prickly sensation in arm(s): No  Numbness in arm(s): No  Weakness in arm(s):  No  Fever: No  Headache: YES- slight  Nausea and/or vomiting: No  History:   Trauma: No  Previous neck pain: YES- off and on, fracture to neck about 12 years ago  Previous surgery or injections: YES- about 12 years ago with fracture to neck  Previous Imaging (MRI,X ray): YES- done at [facility] during time of fracture  Precipitating or alleviating factors: None  Does movement impact the pain:  YES  Therapies tried and outcome: acetaminophen - takes this for her arthritis, does not seem to help with the neck pain        Review of Systems   Constitutional, HEENT, cardiovascular, pulmonary, GI, , musculoskeletal, neuro, skin, endocrine and psych systems are negative, except as otherwise noted.      Objective    BP (!) 142/62   Pulse 71   Temp 97  F (36.1  C) (Tympanic)   Resp 16   Wt 60 kg (132 lb 3.2 oz)   LMP 06/15/1985   SpO2 95%    BMI 27.63 kg/m    Body mass index is 27.63 kg/m .  Physical Exam   GENERAL:  alert and no distress, ambulatory with assist but came in clinic on a wheelchair  NECK: some loss of anatomic kyphosis, mild diffuse TTP of the lower posterior muscles and trapezius bilaterally; full range of motion with pain on right side only on right lateral rotation; no elicited radiculopathy  MS: extremities- no gross deformities noted, no edema  SKIN: on left wrist dorsum over the radial head there is a non-tender ecchymosis; smaller ecchymosis on left distal forearm; small reddish ecchymosis on right mid forearm; no other ecchymosis on visualized exposed skin  Neuro: Patient is A and O X 3, Cranial nerves 2-12 intact, PERRL, Strength 5/5 all extremities,  Sensory intact, DTRs 2+ x 4, No problems with motor coordination    Results for orders placed or performed in visit on 05/02/23   INR     Status: Abnormal   Result Value Ref Range    INR 2.74 (H) 0.85 - 1.15   CBC with platelets     Status: Abnormal   Result Value Ref Range    WBC Count 4.1 4.0 - 11.0 10e3/uL    RBC Count 3.61 (L) 3.80 - 5.20 10e6/uL    Hemoglobin 10.6 (L) 11.7 - 15.7 g/dL    Hematocrit 33.2 (L) 35.0 - 47.0 %    MCV 92 78 - 100 fL    MCH 29.4 26.5 - 33.0 pg    MCHC 31.9 31.5 - 36.5 g/dL    RDW 13.4 10.0 - 15.0 %    Platelet Count 198 150 - 450 10e3/uL                   Answers for HPI/ROS submitted by the patient on 5/2/2023  PHQ9 TOTAL SCORE: 3

## 2023-05-02 NOTE — PATIENT INSTRUCTIONS
Your neck pain is consistent with muscle spasms or strain of the neck. Possible arthritis, too.  You may apply warm compress as needed for pain.   Try muscle pain reliever creams topically as needed.    May take Tylenol 325 mg 1-2 tabs as needed for pain only; do not take more than 6 tabs per day.   Activity as tolerated.  See doctor if with new or worsening symptoms.   Expect improvement in the next 2-3 weeks.    Bruising is not uncommon if you are on wrafarin.  However, we need to check if your INR is in therapeutic range or not to make sure you are not overmedicated.  Wait for the INR clinic nurse to call you this morning.    If with eder bleeding, unexplained new pain, severe pain, shooting pain to arms from the neck, or fever, see a provider promptly.

## 2023-05-02 NOTE — PROGRESS NOTES
ANTICOAGULATION MANAGEMENT     Ellie Leigh 89 year old female is on warfarin with therapeutic INR result. (Goal INR 2.0-3.0)    Recent labs: (last 7 days)     05/02/23  0850   INR 2.74*       ASSESSMENT       Source(s): Chart Review and Patient/Caregiver Call       Warfarin doses taken: Warfarin taken as instructed    Diet: No new diet changes identified - pt reports that she had not changes with vitamink/green intake, does not want to increase the amount at this time.    Medication/supplement changes: None noted    New illness, injury, or hospitalization: No    Signs or symptoms of bleeding or clotting: Yes: Pt saw provider today, reported that she has 3 bruises on her wrist and doesn't know how she got them.    Previous result: Supratherapeutic    Additional findings: Consulted with [facility], pt typically had POCT INRs, but had venous INR today. INR towards higher end of goal range, will make a slight MD adjustment today due to pt concerns with bruising          PLAN     Recommended plan for temporary change(s) and ongoing change(s) affecting INR     Dosing Instructions: decrease your warfarin dose (6.7% change) with next INR in 2 weeks       Summary  As of 5/2/2023    Full warfarin instructions:  1 mg every day   Next INR check:  5/16/2023             Telephone call with  Ellie & daughter Suyapa who verbalizes understanding and agrees to plan    Lab visit scheduled    Education provided:     Goal range and lab monitoring: goal range and significance of current result and Importance of therapeutic range    Symptom monitoring: monitoring for bleeding signs and symptoms    Plan made with [facility] Pharmacist Edelmira Judge, RN  Anticoagulation Clinic  5/2/2023    _______________________________________________________________________     Anticoagulation Episode Summary     Current INR goal:  2.0-3.0   TTR:  84.9 % (1 y)   Target end date:  Indefinite   Send INR reminders to:  GARRY BARRY     Indications    Atrial fibrillation with rapid ventricular response (H) (Resolved) [I48.91]  DVT (deep venous thrombosis) [I82.409]  Long term current use of anticoagulant therapy [Z79.01]  Intermittent atrial fibrillation (H) [I48.0]  Deep vein thrombosis (DVT) of proximal lower extremity  unspecified chronicity  unspecified laterality (H) [I82.4Y9]           Comments:           Anticoagulation Care Providers     Provider Role Specialty Phone number    Anjum Vidales MD Referring Family Medicine [phone]    Marilyn Shipley MD Referring Family Medicine [phone]

## 2023-05-03 NOTE — TELEPHONE ENCOUNTER
"Caller reports she has had productive cough of \"brown phlegm\"  For several weeks but is much sicker now   Feels very weak  and coughing  Is worse; denies fevers  Has history of bronchectasis and  COPD, Afib and is on coumadin  Triage protocol reviewed   Advised ED now; cll EMS ifunable to safely transport with daughter   Caller understands and will comply    Marianne Nath RN  FNA    Reason for Disposition    Chest pain  (Exception: MILD central chest pain, present only when coughing)    Additional Information    Negative: SEVERE difficulty breathing (e.g., struggling for each breath, speaks in single words)    Negative: Bluish (or gray) lips or face now    Negative: [1] Difficulty breathing AND [2] exposure to flames, smoke, or fumes    Negative: [1] Stridor AND [2] difficulty breathing    Negative: Sounds like a life-threatening emergency to the triager    Negative: [1] Previous asthma attacks AND [2] this feels like asthma attack    Negative: Dry cough (non-productive;  no sputum or minimal clear sputum)    Negative: [1] MODERATE difficulty breathing (e.g., speaks in phrases, SOB even at rest, pulse 100-120) AND [2] still present when not coughing    Protocols used: COUGH - ACUTE [id number]      "

## 2023-05-04 NOTE — TELEPHONE ENCOUNTER
Dr. Yepez requested author contact patient regarding upcoming appointment and triage symptoms    Spoke with patient's daughter Suyapa Dale states patient is feeling much improved today and would like to cancel appointment   Patient continues to have a cough - this has been ongoing   States weakness has improved    Appointment cancelled  Advised to re-schedule with PCP if patient's symptoms do not continue to improve or worsen   Reviewed red flag symptoms that would warrant ED evaluation     Patient and daughter verbalized understanding  No further questions/concerns    Milind Walsh RN

## 2023-05-08 NOTE — PROGRESS NOTES
"  Assessment & Plan     COPD exacerbation (H)    - predniSONE (DELTASONE) 20 MG tablet; Take 1 tablet (20 mg) by mouth daily  - benzonatate (TESSALON) 100 MG capsule; Take 1 capsule (100 mg) by mouth 3 times daily as needed for cough  Discussed how to take the medication(s), expected outcomes, potential side effects.    Cough, unspecified type    - Symptomatic COVID-19 Virus (Coronavirus) by PCR  She wanted to be tested for COVID.             BMI:   Estimated body mass index is 27.59 kg/m  as calculated from the following:    Height as of this encounter: 1.473 m (4' 10\").    Weight as of this encounter: 59.9 kg (132 lb).       See Patient Instructions  Patient Instructions   Try the prednisone as prescribed and the lozenges as needed for worsening cough.  Will be notified of COVID swab results.  Continue all other inhalers and nebulizer.  Follow up in clinic or urgently if worsening.      Our Clinic hours are:  Mondays    7:20 am - 7 pm  Tues -  Fri  7:20 am - 5 pm    Clinic Phone: [phone]    The clinic lab opens at 7:30 am Mon - Fri and appointments are required.    [facility] [place]  Ph. [phone]  Monday  8 am - 7pm  Tues - Fri 8 am - 5:30 pm           LACI Carreon CNP [facility]    Renay Argueta is a 89 year old, presenting for the following health issues:  URI        5/8/2023     1:53 PM   Additional Questions   Roomed by      HPI     COPD Follow-Up    Overall, how are your COPD symptoms since your last clinic visit?  Slightly worse    How much fatigue or shortness of breath do you have when you are walking?  More than usual    How much shortness of breath do you have when you are resting?  Same as usual    How often do you cough? Often    Have you noticed any change in your sputum/phlegm?  Yes- brown     Have you experienced a recent fever? No    Please describe how far you can walk without stopping to rest:  Less than 10 feet    How many " "flights of stairs are you able to walk up without stopping?  None  Have you had any Emergency Room Visits, Urgent Care Visits, or Hospital Admissions because of your COPD since your last office visit?  Yes      How many times have you gone to the ED, Urgent Care, or been hospitalized for COPD since your last clinic visit?  1    History   Smoking Status     Never   Smokeless Tobacco     Never     No results found for: FEV1, ADB8RIJ      How many servings of fruits and vegetables do you eat daily?  0-1    On average, how many sweetened beverages do you drink each day (Examples: soda, juice, sweet tea, etc.  Do NOT count diet or artificially sweetened beverages)?   0    How many days per week do you exercise enough to make your heart beat faster? 3 or less    How many minutes a day do you exercise enough to make your heart beat faster? 9 or less    How many days per week do you miss taking your medication? 0    Unfamiliar patient to me, accompanied by her daughter whom she lives with. She has been fighting a cough/cold since February.   Patient states she just doesn't feel well, no energy, coughing is getting to be problematic. States expectorant is \"brown\"   She states she feels she's struggling because she cannot get the right nebulizer medicine.  Had recent labs and chest x ray done which were ok.    Current Outpatient Medications   Medication     Acetaminophen (TYLENOL PO)     albuterol (PROAIR HFA) 108 (90 Base) MCG/ACT inhaler     albuterol (PROVENTIL) (2.5 MG/3ML) 0.083% neb solution     benzonatate (TESSALON) 100 MG capsule     CRANBERRY     fluticasone-salmeterol (ADVAIR) 250-50 MCG/ACT inhaler     ipratropium (ATROVENT) 0.02 % neb solution     ipratropium - albuterol 0.5 mg/2.5 mg/3 mL (DUONEB) 0.5-2.5 (3) MG/3ML neb solution     levothyroxine (SYNTHROID/LEVOTHROID) 50 MCG tablet     metoprolol succinate ER (TOPROL XL) 25 MG 24 hr tablet     metoprolol succinate ER (TOPROL XL) 50 MG 24 hr tablet     order for " DME     polyethylene glycol (MIRALAX/GLYCOLAX) Packet     predniSONE (DELTASONE) 20 MG tablet     probiotic CAPS     sodium chloride 0.9 % neb solution     sodium chloride 1 GM tablet     traZODone (DESYREL) 50 MG tablet     warfarin ANTICOAGULANT (COUMADIN) 1 MG tablet     No current facility-administered medications for this visit.     Past Medical History:   Diagnosis Date     Basal cell carcinoma      Breast cancer (H)      COPD (chronic obstructive pulmonary disease) (H)     ct 2014 does show some bronchiectaisi RUL as well as fibronodular disease bilat upper lobes     Dust allergy      DVT (deep vein thrombosis) in pregnancy     after neck fracture when in hospital     HTN (hypertension)      Hyperlipidemia LDL goal <130 10/31/2010     Hyponatremia     chronic     Hypothyroid      Intermittent atrial fibrillation (H) 12/1/2011    hx tachy-keri, has pacer, on chronic coumadin     Loose body in joint 4/15/2011     Neck fracture (H)     after a fall     Syncope 5/12/2013    multiple hospital visits, lates 3/2016, 6/2016, 7/2016 with no clear cause found           Review of Systems   Constitutional, HEENT, cardiovascular, pulmonary, gi and gu systems are negative, except as otherwise noted.      Objective    LMP 06/15/1985   There is no height or weight on file to calculate BMI.  Physical Exam   GENERAL: healthy, alert and no distress  HENT: ear canals hearing aids  NECK: no adenopathy, no asymmetry  RESP: lungs with some mild wheezing on initial inhalation, worse on left lower base - no rales, rhonchi   CV: regular rate and rhythm, trace ankle edema  ABDOMEN: soft, nontender, no hepatosplenomegaly, no masses and bowel sounds normal  MS: no gross musculoskeletal defects noted

## 2023-05-08 NOTE — PATIENT INSTRUCTIONS
Try the prednisone as prescribed and the lozenges as needed for worsening cough.  Will be notified of COVID swab results.  Continue all other inhalers and nebulizer.  Follow up in clinic or urgently if worsening.      Our Clinic hours are:  Mondays    7:20 am - 7 pm  Tues -  Fri  7:20 am - 5 pm    Clinic Phone: [phone]    The clinic lab opens at 7:30 am Mon - Fri and appointments are required.    [facility] [place]  Ph. [phone]  Monday  8 am - 7pm  Tues - Fri 8 am - 5:30 pm

## 2023-05-12 NOTE — TELEPHONE ENCOUNTER
Patient calls as well and is hoping to get this refilled for the weekend, as she only has one dose left.   Forwarding to providers in PCP's absence.  RN unable to fill per protocol because last filled by Dr. Vidales.     Berenice Hazel RN  [facility]

## 2023-05-12 NOTE — TELEPHONE ENCOUNTER
Received request from Pharmacy for the following:    Sodium Chloride Inhalation nebulization Solution 0.9%   SIG: Inhale 5mLs into the lungs by nebulization 3 times daily. Use after Albuterol in nebulizer to thin secretions.    Preferred Pharmacy:  [id number] - [place], [place] - [address]  [address] [address]  Phone: [phone] Fax: [phone]        Could we send this information to you in [facility] or would you prefer to receive a phone call?:   Patient would prefer a phone call   Okay to leave a detailed message?: Yes at Home number on file [phone] (home)

## 2023-05-16 NOTE — PROGRESS NOTES
ANTICOAGULATION MANAGEMENT     Ellie Leigh 89 year old female is on warfarin with supratherapeutic INR result. (Goal INR 2.0-3.0)    Recent labs: (last 7 days)     05/16/23  1046   INR 3.8*       ASSESSMENT       Source(s): Chart Review and Patient/Caregiver Call       Warfarin doses taken: Warfarin taken as instructed    Diet: No new diet changes identified    Medication/supplement changes: took 5 days of predisone burst with saturday being last dose    New illness, injury, or hospitalization: No, ongoing cough that did not improve with prednisone or tessalon. She was covid negative    Signs or symptoms of bleeding or clotting: Yes: ongoing coughing up brown phlegm    Previous result: Therapeutic last visit; previously outside of goal range    Additional findings: patient has had this cough for more than a month. She reports brown colored phlegm and no improvement with prednisone. Writer advises to be seen at UC/ER or make an urgent appointment with her provider if she won't go to ER. Will also route this to PCP as FYI.         PLAN     Recommended plan for ongoing change(s) affecting INR     Dosing Instructions: hold dose then decrease your warfarin dose (7.1% change) with next INR in 7-10  days       Summary  As of 5/16/2023    Full warfarin instructions:  5/16: Hold; Otherwise 0.5 mg every Fri; 1 mg all other days   Next INR check:               Telephone call with  Suyapa and patient who verbalizes understanding and agrees to plan    patient will check when she sees provider. Advised to call [facility] next week, if she has not yet had her INR rechecked anywhere    Education provided:     Please call back if any changes to your diet, medications or how you've been taking warfarin    Symptom monitoring: monitoring for bleeding signs and symptoms, when to seek medical attention/emergency care and if you hit your head or have a bad fall seek emergency care    Contact [phone]  with any changes, questions or  concerns.     Plan made per [facility] anticoagulation protocol    Lissy Beaulieu RN  Anticoagulation Clinic  5/16/2023    _______________________________________________________________________     Anticoagulation Episode Summary     Current INR goal:  2.0-3.0   TTR:  83.1 % (1 y)   Target end date:  Indefinite   Send INR reminders to:  [facility]    Indications    Atrial fibrillation with rapid ventricular response (H) (Resolved) [I48.91]  DVT (deep venous thrombosis) [I82.409]  Long term current use of anticoagulant therapy [Z79.01]  Intermittent atrial fibrillation (H) [I48.0]  Deep vein thrombosis (DVT) of proximal lower extremity  unspecified chronicity  unspecified laterality (H) [I82.4Y9]           Comments:           Anticoagulation Care Providers     Provider Role Specialty Phone number    Anjum Vidales MD Referring Family Medicine [phone]    Marilyn Shipley MD Referring Family Medicine [phone]

## 2023-05-18 NOTE — PATIENT INSTRUCTIONS
Schedule pulmonary rehab.  To schedule pulmonary function test, call [phone].  Continue Duonebs, Advair and saline nebs.

## 2023-05-18 NOTE — PROGRESS NOTES
"  Assessment & Plan     Bronchiectasis with acute exacerbation (H)  Symptoms have remained relatively unchanged since February of this year.  She has had multiple rounds of antibiotics and steroids, most recently she had steroids that began on May 8 without any change in symptoms.  CT in April did show slight progression in bronchiectasis as well has a new nodular density in the right lower lobe with recommended follow-up in 6 months.  She has not had fevers or significant change in symptoms, so I do not think that further antibiotics at this point are prudent given that she has multiple allergies and sensitivities to most medications that she has been prescribed.  She will continue her Advair, DuoNebs and saline nebs.  We will get a repeat PFT as she does have an upcoming pulmonology appointment.  Have recommended a pulmonary rehab evaluation as this may be helpful.  Can certainly try Mucinex to see if this is helpful.  I have recommended that she remain adequately hydrated.  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  - Pulmonary Rehab Referral; Future  - General PFT Lab (Please always keep checked); Future  - Pulmonary Function Test; Future       BMI:   Estimated body mass index is 27.38 kg/m  as calculated from the following:    Height as of this encounter: 1.473 m (4' 10\").    Weight as of this encounter: 59.4 kg (131 lb).       Patient Instructions   Schedule pulmonary rehab.  To schedule pulmonary function test, call [phone].  Continue Duonebs, Advair and saline nebs.        Sandra Scott, LACI CNP  [facility]    Renay Argueta is a 89 year old, presenting for the following health issues:  Cough        5/18/2023     2:39 PM   Additional " Questions   Roomed by Valorie JOSHUA   Accompanied by daughter- Suyapa LARA     Concern - chronic cough  INR drawn Tuesday was elevated  Nurse concerned due to brown phlegm   Onset: since the end of February  Copd exacerbation  Description: patient reports coughing a lot  Brown phlegm  Back hurts  sweats  Intensity: moderate  Progression of Symptoms:  worsening  Accompanying Signs & Symptoms: nausea and fatigue  Denies fever  Previous history of similar problem: yes  Precipitating factors:        Worsened by: unknown  Alleviating factors:        Improved by: felt better while on antibiotics  Therapies tried and outcome: antibiotic,  Albuterol and duonebs;      Possibly recheck INR today?    Above HPI reviewed. Additionally, has been seen many times since February of this year with what was thought to be COPD exacerbation.  She has been treated with multiple rounds of antibiotics and steroids without any change in productive cough.  She is not experiencing chest pain, shortness of breath or significant exertional dyspnea.  She most recently was seen in clinic on May 8, and given another course of prednisone as well as benzonatate.  She has had no change in symptoms.  She is currently using Advair, DuoNebs 4 times daily, saline nebs 4 times daily.  [facility] nurse was concerned as patient had a mildly elevated INR 2 days ago and reported brown sputum.  However, in conversation she notes that she has had dark-colored sputum since the onset of this illness.  In April, she had a chest CT which did show progression of bronchiectasis and a 9 x 5 mm nodular density versus nodular infiltrate in the right lower lobe with recommendation for 6-month follow-up to evaluate stability.  She has remained afebrile with unchanged symptoms since the onset of this cough.  Last PFT was in 2014 with severe airflow obstruction without significant change with bronchodilator.    She is here today with unchanged symptoms.  She is growing frustrated as  "things have not improved.  She does have a pulmonology appointment in July.        Review of Systems   Constitutional, HEENT, cardiovascular, pulmonary, gi and gu systems are negative, except as otherwise noted.      Objective    BP (!) 154/70 (BP Location: Right arm, Cuff Size: Adult Regular)   Pulse 70   Temp 97.9  F (36.6  C) (Oral)   Resp 20   Ht 1.473 m (4' 10\")   Wt 59.4 kg (131 lb)   LMP 06/15/1985   SpO2 95%   BMI 27.38 kg/m    Body mass index is 27.38 kg/m .  Physical Exam  Vitals and nursing note reviewed.   Constitutional:       Appearance: Normal appearance.   HENT:      Head: Normocephalic and atraumatic.      Mouth/Throat:      Mouth: Mucous membranes are moist.   Eyes:      Comments: Non-icteric   Cardiovascular:      Rate and Rhythm: Normal rate and regular rhythm.      Pulses: Normal pulses.      Heart sounds: Normal heart sounds, S1 normal and S2 normal. Heart sounds not distant. No murmur heard.     No friction rub. No gallop.   Pulmonary:      Effort: Pulmonary effort is normal.      Breath sounds: Wheezing (scattered) present.   Abdominal:      General: Abdomen is flat. Bowel sounds are normal.      Palpations: Abdomen is soft.   Musculoskeletal:      Cervical back: Neck supple.      Right lower leg: No edema.      Left lower leg: No edema.   Skin:     General: Skin is warm and dry.      Capillary Refill: Capillary refill takes less than 2 seconds.   Neurological:      General: No focal deficit present.      Mental Status: She is alert and oriented to person, place, and time.   Psychiatric:         Mood and Affect: Mood normal.         Behavior: Behavior normal.         Thought Content: Thought content normal.         Judgment: Judgment normal.                  "

## 2023-05-23 NOTE — PROGRESS NOTES
ANTICOAGULATION MANAGEMENT     Ellie Leigh 89 year old female is on warfarin with therapeutic INR result. (Goal INR 2.0-3.0)    Recent labs: (last 7 days)     05/23/23  1225   INR 2.3*       ASSESSMENT       Source(s): Chart Review and Patient/Caregiver Call       Warfarin doses taken: Warfarin taken as instructed    Diet: No new diet changes identified    Medication/supplement changes: None noted    New illness, injury, or hospitalization: Yes: ongoing cough, but less productive - PFT and pulmonary rehab scheduled for Erica    Signs or symptoms of bleeding or clotting: No    Previous result: Supratherapeutic    Additional findings: MD previously reduced by 7.1%         PLAN     Recommended plan for ongoing change(s) affecting INR     Dosing Instructions: Continue your current warfarin dose with next INR in 2 weeks       Summary  As of 5/23/2023    Full warfarin instructions:  0.5 mg every Fri; 1 mg all other days   Next INR check:  6/6/2023             Telephone call with Suyapa and Ellie who verbalizes understanding and agrees to plan    Lab visit scheduled    Education provided:     Please call back if any changes to your diet, medications or how you've been taking warfarin    Symptom monitoring: monitoring for bleeding signs and symptoms and monitoring for clotting signs and symptoms    Plan made per [facility] anticoagulation protocol    Fabi Pierce, RN  Anticoagulation Clinic  5/23/2023    _______________________________________________________________________     Anticoagulation Episode Summary     Current INR goal:  2.0-3.0   TTR:  84.0 % (1 y)   Target end date:  Indefinite   Send INR reminders to:  [facility]    Indications    Atrial fibrillation with rapid ventricular response (H) (Resolved) [I48.91]  DVT (deep venous thrombosis) [I82.409]  Long term current use of anticoagulant therapy [Z79.01]  Intermittent atrial fibrillation (H) [I48.0]  Deep vein thrombosis (DVT) of proximal lower  extremity  unspecified chronicity  unspecified laterality (H) [I82.4Y9]           Comments:           Anticoagulation Care Providers     Provider Role Specialty Phone number    Anjum Vidales MD Referring Family Medicine [phone]    Marilyn Shipley MD Referring Family Medicine [phone]

## 2023-06-01 NOTE — TELEPHONE ENCOUNTER
ANTICOAGULATION MANAGEMENT:  Medication Refill    Anticoagulation Summary  As of 5/23/2023    Warfarin maintenance plan:  0.5 mg (1 mg x 0.5) every Fri; 1 mg (1 mg x 1) all other days   Next INR check:  6/6/2023   Target end date:  Indefinite    Indications    Atrial fibrillation with rapid ventricular response (H) (Resolved) [I48.91]  DVT (deep venous thrombosis) [I82.409]  Long term current use of anticoagulant therapy [Z79.01]  Intermittent atrial fibrillation (H) [I48.0]  Deep vein thrombosis (DVT) of proximal lower extremity  unspecified chronicity  unspecified laterality (H) [I82.4Y9]             Anticoagulation Care Providers     Provider Role Specialty Phone number    Anjum Vidales MD Referring Family Medicine [phone]    Marilyn Shipley MD Referring Family Medicine [phone]          Visit with referring provider/group within last year: Yes    [facility] referral signed within last year: Yes    Ellie meets all criteria for refill (current [facility] referral, office visit with referring provider/group in last year, lab monitoring up to date or not exceeding 2 weeks overdue). Rx instructions and quantity supplied updated to match patient's current dosing plan. Warfarin 90 day supply with 1 refill granted per [facility] protocol     Caterina Franco RN  Anticoagulation Clinic

## 2023-06-06 NOTE — PROGRESS NOTES
ANTICOAGULATION MANAGEMENT     Ellie Leigh 89 year old female is on warfarin with therapeutic INR result. (Goal INR 2.0-3.0)    Recent labs: (last 7 days)     06/06/23  1310   INR 2.1*       ASSESSMENT       Source(s): Chart Review and Patient/Caregiver Call       Warfarin doses taken: Warfarin taken as instructed    Diet: No new diet changes identified    Medication/supplement changes: None noted    New illness, injury, or hospitalization: No    Signs or symptoms of bleeding or clotting: No    Previous result: Therapeutic last visit; previously outside of goal range    Additional findings: None         PLAN     Recommended plan for no diet, medication or health factor changes affecting INR     Dosing Instructions: Continue your current warfarin dose with next INR in 9 days       Summary  As of 6/6/2023    Full warfarin instructions:  0.5 mg every Fri; 1 mg all other days   Next INR check:  6/15/2023             Telephone call with daughter Suyapa who verbalizes understanding and agrees to plan and who agrees to plan and repeated back plan correctly    Lab visit scheduled    Education provided:     None required    Plan made per [facility] anticoagulation protocol    Dorothea Wright, RN  Anticoagulation Clinic  6/6/2023    _______________________________________________________________________     Anticoagulation Episode Summary     Current INR goal:  2.0-3.0   TTR:  87.4 % (1 y)   Target end date:  Indefinite   Send INR reminders to:  [facility]    Indications    Atrial fibrillation with rapid ventricular response (H) (Resolved) [I48.91]  DVT (deep venous thrombosis) [I82.409]  Long term current use of anticoagulant therapy [Z79.01]  Intermittent atrial fibrillation (H) [I48.0]  Deep vein thrombosis (DVT) of proximal lower extremity  unspecified chronicity  unspecified laterality (H) [I82.4Y9]           Comments:           Anticoagulation Care Providers     Provider Role Specialty Phone number    Anjum Vidales  MD Boom Referring Family Medicine [phone]    Marilyn Shipley MD Referring Family Medicine [phone]

## 2023-06-15 NOTE — TELEPHONE ENCOUNTER
Dr. Roland:    Routing refill request to provider for review/approval because:  Last filled by Dr. Vidales, ACT was 20 last on 1-8-21, patient is out of medication and needs today, please advise remainder of the refills, thank you.      AYLIN Lombardi

## 2023-06-15 NOTE — TELEPHONE ENCOUNTER
Needed confirmation that prescription had been sent  Confirmed with patient that pharmacy acknowledged order Boom Shelley RN

## 2023-06-15 NOTE — TELEPHONE ENCOUNTER
Routed in error to PCP, please disregard information below, this medication was last refilled by PCP, will approve refill per protocol.      AYLIN Lombardi

## 2023-06-15 NOTE — LETTER
June 19, 2023      Ellie Leal Lu  [address] [address] [facility] [place] [address]        Dear ,    We are writing to inform you of your test results.  Fay Argueta  Your thyroid labs were within normal limits.  Thanks  Dr Roland    Resulted Orders   TSH WITH FREE T4 REFLEX   Result Value Ref Range    TSH 1.95 0.30 - 4.20 uIU/mL       If you have any questions or concerns, please call the clinic at the number listed above.       Sincerely,      Ailyn Roland MD

## 2023-06-15 NOTE — PROGRESS NOTES
Thank you for completing pulmonary function testing today.  All results will be scanned into your epic results for your doctor to review.  Please resume taking all your current prescribed medications and diet as directed by your provider.   If you have not heard from your provider about your testing within two weeks and do not have a follow-up appointment scheduled with them please contact your provider about any questions you have concerning your testing.   Thank you  [facility]

## 2023-06-15 NOTE — PROGRESS NOTES
ANTICOAGULATION MANAGEMENT     Ellie Leigh 89 year old female is on warfarin with therapeutic INR result. (Goal INR 2.0-3.0)    Recent labs: (last 7 days)     06/15/23  1242   INR 2.0*       ASSESSMENT       Source(s): Chart Review and Patient/Caregiver Call       Warfarin doses taken: Warfarin taken as instructed    Diet: No new diet changes identified    Medication/supplement changes: None noted    New illness, injury, or hospitalization: No    Signs or symptoms of bleeding or clotting: No    Previous result: Therapeutic last 2(+) visits    Additional findings: None     PLAN     Recommended plan for no diet, medication or health factor changes affecting INR     Dosing Instructions: Continue your current warfarin dose with next INR in 2 weeks       Summary  As of 6/15/2023    Full warfarin instructions:  0.5 mg every Fri; 1 mg all other days   Next INR check:  6/29/2023             Telephone call with  Suyapa who verbalizes understanding and agrees to plan    Lab visit scheduled    Education provided:     Please call back if any changes to your diet, medications or how you've been taking warfarin    Plan made per [facility] anticoagulation protocol    Kylah Dorsey RN  Anticoagulation Clinic  6/15/2023    _______________________________________________________________________     Anticoagulation Episode Summary     Current INR goal:  2.0-3.0   TTR:  87.4 % (1 y)   Target end date:  Indefinite   Send INR reminders to:  [facility]    Indications    Atrial fibrillation with rapid ventricular response (H) (Resolved) [I48.91]  DVT (deep venous thrombosis) [I82.409]  Long term current use of anticoagulant therapy [Z79.01]  Intermittent atrial fibrillation (H) [I48.0]  Deep vein thrombosis (DVT) of proximal lower extremity  unspecified chronicity  unspecified laterality (H) [I82.4Y9]           Comments:           Anticoagulation Care Providers     Provider Role Specialty Phone number    Anjum Vidales MD  Referring Family Medicine [phone]    Marilyn Shipley MD Referring Family Medicine [phone]

## 2023-06-16 NOTE — RESULT ENCOUNTER NOTE
Please inform patient that test result was within normal parameters.   Thank you.     Ailyn Roland M.D.

## 2023-06-24 PROBLEM — J47.1 BRONCHIECTASIS WITH ACUTE EXACERBATION (H): Status: ACTIVE | Noted: 2023-01-01

## 2023-06-24 PROBLEM — J96.01 ACUTE RESPIRATORY FAILURE WITH HYPOXIA (H): Status: ACTIVE | Noted: 2021-07-09

## 2023-06-24 PROBLEM — Z95.0 CARDIAC PACEMAKER IN SITU: Status: ACTIVE | Noted: 2021-07-09

## 2023-06-24 NOTE — ED PROVIDER NOTES
History     Chief Complaint   Patient presents with     Nasal Congestion     green     Cough     wheezing     HPI  Ellie Leigh is a 89 year old female who initially presented to urgent care secondary to cough shortness of air.  She was seen by an urgent care provider and ultimately encouraged to seek care in the emergency department.  I saw her after she had been in the department for 5-1/2 hours.  She is here with her daughter with whom she lives.  She describes progressive cough productive of initial yellow sputum that is turned dark green.  She describes wheezing and shortness of air.  She has history of asthma long-term, there is a tentative diagnosis of bronchiectasis based on CT scan chest from April 22, results of study reviewed in epic at time of presentation.  She has had treatment with antibiotic, received a course of Levaquin at the end of March.  She is on warfarin secondary to accessible atrial fibrillation.  She denies fever chills or sweats.  Appetite remains intact.  No recent fall or injury.  Denies diarrhea or urinary symptoms.  No current prednisone.    Allergies:  Allergies   Allergen Reactions     Cephalosporins Nausea     Cefzil     Doxycycline Nausea and Vomiting     Sulfa Antibiotics Nausea     Penicillins Rash     PCN       Problem List:    Patient Active Problem List    Diagnosis Date Noted     Pulmonary nodules 04/24/2023     Priority: High     New, noted on CT 4/2023, f/u CT recommended in 6 months       Bronchiectasis with acute exacerbation (H) 06/24/2023     Priority: Medium     Bronchiectasis (H) 11/10/2022     Priority: Medium     10/8/21 pulmonary consult notes:Assessment: Elderly female, limited functional status with chronic bronchiectasis and Pseudomonas colonization.  Also on warfarin therapy which would predispose to hemoptysis in the setting of bronchiectasis.  This is never been life-threatening or large amounts and seems to be associated with worsening of  Pseudomonas infection.  Seems to improved with current antibiotics as Pseudomonas is sensitive to levofloxacin.  Patient and her daughter had lots of questions regarding whether they need to go to the emergency room or get a CT scan every time she has a small amount of hemoptysis.  I said as long as the hemoptysis is not a large amount, associated with severe shortness of breath and seems to be associated with increased sputum production I do not think she needs repeat imaging and I would treat empirically with additional courses of antibiotics, levofloxacin, for 10 days to see if there is improvement.  If she is feeling more wheezy or short of breath or has chest tightness then adding low-dose prednisone 20 mg would be an adequate dose in this elderly patient, for 5 to 7 days.  I do not think she needs automatic repeat imaging as her imaging test will always be abnormal.        Deep vein thrombosis (DVT) of proximal lower extremity, unspecified chronicity, unspecified laterality (H) 02/18/2022     Priority: Medium     Acute respiratory failure with hypoxia (H) 07/09/2021     Priority: Medium     Cardiac pacemaker in situ 07/09/2021     Priority: Medium     H/O TB (tuberculosis) 02/09/2018     Priority: Medium     Pt with chronic RUL infiltrate with pos AFB sputum  Full treatmetn for TB following ID consult in 2000's       Back pain 02/06/2018     Priority: Medium     Nausea 06/05/2017     Priority: Medium     3/29/2021: She has had chronic nausea.  She has been on omeprazole  40mg daily for years.  Tried sucralfate (CARAFATE) which did not help.  Was on FODMAP diet, gluten free and no dairy diets.  Evaluation with GI in the past in 1/23/2015.  She has had gastroscopy several times.  10/30/2018 CT abdomen and pelvis unremarkable.   Gastric emptying normal.    Nausea in the AM.   No vomiting.   Hot flashes around Noon.  Feels like her whole body is on fire until med to late afternoon.  This is cyclical daily.   Feels  shortness of breath with the hot flashes.    Nothing has helped much. Tried meclizine and Dramamine.   She has tried various ginger preparations.   She has tried ondansetron.   Mirtazapine seemed to help for a while, does help sleep.  She has tried compazine in the past.   Today will try phenergan.         Irritable bowel syndrome without diarrhea 05/02/2016     Priority: Medium     Mild persistent asthma without complication 12/01/2015     Priority: Medium     Long term current use of anticoagulant therapy 03/02/2015     Priority: Medium     Syncope 01/12/2015     Priority: Medium     Advance Care Planning 01/09/2015     Priority: Medium     Advance Care Planning 7/2/2015: Receipt of ACP document:  Received: Health Care Directive which was witnessed or notarized on 1-9-15.  Document previously scanned on 2-27-15.  Validation form completed and sent to be scanned.  Code Status reflects choices in most recent ACP document.  Confirmed/documented designated decision maker(s).  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  Patient has had lung CT, will see Dr Gómez for consideration of further workup.  Also has pulmonology appt 8/18.  5/2/2018:Reviewing chart she has had hyponatremia since 2010.        Positive fecal occult blood test 07/07/2014     Priority: Medium     x2 -- first was in ED.  Patient expresses that she does not want colonoscopy.  She'll set appt to discuss with her PCP.       Benign essential HTN 02/11/2014     Priority: Medium     BPPV (benign paroxysmal positional vertigo) 11/05/2013     Priority: Medium     History of skin cancer 05/07/2013     Priority: Medium     Recurrent UTI 07/16/2012     Priority: Medium     recurrent UTI  Recent Urologic workup neg, 2005  Has Cipro at home for treatment as needed        Hypothyroidism 05/07/2012     Priority: Medium     Squamous cell carcinoma in situ of skin of face 04/19/2012     Priority: Medium     SK (seborrheic keratosis) 04/19/2012     Priority: Medium     Intermittent atrial fibrillation (H) 12/01/2011     Priority: Medium     Cervical vertebral fracture (H) 11/20/2011     Priority: Medium     Anxiety 11/15/2011     Priority: Medium     DVT (deep venous thrombosis) 10/12/2011     Priority: Medium     H/o in past, on current coumadin therapy.       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 Provider to review       Right arm weakness 07/29/2011     Priority: Medium     Ulnar neuropathy 07/29/2011     Priority: Medium     Health Care Home 04/21/2011     Priority: Medium     Conchis Robles [phone]  A / [facility]              DX V65.8 REPLACED WITH [id number] HEALTH CARE HOME (04/08/2013)       Chronic constipation 03/03/2011     Priority: Medium     Osteoporosis 03/03/2011     Priority: Medium     Hyperlipidemia LDL goal <130 10/31/2010     Priority: Medium     Infectious colitis, enteritis and gastroenteritis 07/10/2010     Priority: Medium     Chronic allergic conjunctivitis 11/18/2008     Priority: Medium     Chronic seasonal allergic rhinitis 11/18/2008     Priority: Medium     Esophageal reflux 11/29/2007     Priority: Medium     PERSONAL HISTORY OF MALIGNANCY- BREAST 06/13/2007     Priority: Medium     Sensorineural hearing loss, asymmetrical 06/20/2005     Priority: Medium     Generalized osteoarthrosis, unspecified site 06/20/2005     Priority: Medium     Right hip and knee are the most symptomatic          Past Medical History:    Past Medical History:   Diagnosis Date     Basal cell carcinoma      Breast cancer (H)      COPD (chronic obstructive pulmonary disease) (H)      Dust allergy      DVT (deep vein thrombosis) in pregnancy       HTN (hypertension)      Hyperlipidemia LDL goal <130 10/31/2010     Hyponatremia      Hypothyroid      Intermittent atrial fibrillation (H) 12/1/2011     Loose body in joint 4/15/2011     Neck fracture (H)      Syncope 5/12/2013       Past Surgical History:    Past Surgical History:   Procedure Laterality Date     APPENDECTOMY       ARTHROSCOPY KNEE  4/15/2011    Procedure:ARTHROSCOPY KNEE; removal of loose body; Surgeon:LEY, JEFFREY DUANE; Location:[facility]     CHOLECYSTECTOMY       ESOPHAGOSCOPY, GASTROSCOPY, DUODENOSCOPY (EGD), COMBINED  6/9/2014    Procedure: COMBINED ESOPHAGOSCOPY, GASTROSCOPY, DUODENOSCOPY (EGD);  Surgeon: Gilberto Richard MD;  Location: [facility]     ESOPHAGOSCOPY, GASTROSCOPY, DUODENOSCOPY (EGD), COMBINED N/A 10/18/2019    Procedure: ESOPHAGOGASTRODUODENOSCOPY (EGD);  Surgeon: Noe Sams DO;  Location: [facility]     IMPLANT PACEMAKER  5/21/2013    Biotronik Moderl [id number] Serial#[id number]     INJECT EPIDURAL LUMBAR  3/23/2011    INJECT EPIDURAL LUMBAR performed by GENERIC ANESTHESIA PROVIDER at [facility] OR     JOINT REPLACEMENT, HIP RT/LT      Joint Replacement Hip Rt     MASTECTOMY, SIMPLE RT/LT/CAITLYN      Left breast - following breast ca     OTHER SURGICAL HISTORY      C1-C2 fusion after fx      SURGICAL HISTORY OF -   05/22/2001    Colonoscopy     TONSILLECTOMY         Family History:    Family History   Problem Relation Age of Onset     Cerebrovascular Disease Mother      Heart Disease Mother         MI     Cerebrovascular Disease Father      Heart Disease Father         MI     Respiratory Maternal Grandfather         TB     Neurologic Disorder Brother         ALS     Heart Disease Brother      Cerebrovascular Disease Brother      Breast Cancer Daughter         age:49     Asthma Brother      Cancer Brother         brain and lung     Cancer Daughter         thyroid       Social History:  Marital Status:   [5]  Social History     Tobacco Use     Smoking status: Never     Passive  exposure: Past     Smokeless tobacco: Never   Vaping Use     Vaping Use: Never used   Substance Use Topics     Alcohol use: No     Drug use: No        Medications:    acetaminophen (TYLENOL 8 HOUR ARTHRITIS PAIN) 650 MG CR tablet  albuterol (PROAIR HFA) 108 (90 Base) MCG/ACT inhaler  albuterol (PROVENTIL) (2.5 MG/3ML) 0.083% neb solution  Carboxymethylcellulose Sodium (EYE DROPS OP)  CRANBERRY  fluticasone-salmeterol (ADVAIR) 250-50 MCG/ACT inhaler  guaiFENesin (MUCINEX) 600 MG 12 hr tablet  ipratropium (ATROVENT) 0.02 % neb solution  ipratropium - albuterol 0.5 mg/2.5 mg/3 mL (DUONEB) 0.5-2.5 (3) MG/3ML neb solution  levothyroxine (SYNTHROID/LEVOTHROID) 50 MCG tablet  metoprolol succinate ER (TOPROL XL) 25 MG 24 hr tablet  polyethylene glycol (MIRALAX/GLYCOLAX) Packet  probiotic CAPS  sodium chloride 0.9 % neb solution  sodium chloride 1 GM tablet  traZODone (DESYREL) 50 MG tablet  warfarin ANTICOAGULANT (COUMADIN) 1 MG tablet  order for DME          Review of Systems    Physical Exam   BP: (!) 183/76  Pulse: 69  Temp: 97.2  F (36.2  C)  Resp: 18  SpO2: 91 %      Physical Exam  Nontoxic-appearing, baseline respiratory distress with tachypnea audible expiratory wheeze, alert and oriented  Skin Pink warm and dry  Lungs diminished breath sounds increased expiratory phase diffuse expiratory wheeze  Heart regular no murmur  Strength intact throughout the extremities  No significant lower extremity edema, no redness swelling or tenderness  ED Course                 Procedures                Results for orders placed or performed during the hospital encounter of 06/24/23 (from the past 24 hour(s))   Group A Streptococcus PCR Throat Swab    Specimen: Throat; Swab   Result Value Ref Range    Group A strep by PCR Not Detected Not Detected    Narrative    The [employer] Xpress Strep A test, performed on the [employer], is a rapid, qualitative in vitro diagnostic test for the detection of Streptococcus pyogenes  (Group A ß-hemolytic Streptococcus, Strep A) in throat swab specimens from patients with signs and symptoms of pharyngitis. The Xpert Xpress Strep A test can be used as an aid in the diagnosis of Group A Streptococcal pharyngitis. The assay is not intended to monitor treatment for Group A Streptococcus infections. The Xpert Xpress Strep A test utilizes an automated real-time polymerase chain reaction (PCR) to detect Streptococcus pyogenes DNA.   Symptomatic Influenza A/B, RSV, & SARS-CoV2 PCR (COVID-19) Nose    Specimen: Nose; Swab   Result Value Ref Range    Influenza A PCR Negative Negative    Influenza B PCR Negative Negative    RSV PCR Negative Negative    SARS CoV2 PCR Negative Negative    Narrative    Testing was performed using the Xpert Xpress CoV2/Flu/RSV Assay on the Cepheid GeneXpert Instrument. This test should be ordered for the detection of SARS-CoV-2, influenza, and RSV viruses in individuals who meet clinical and/or epidemiological criteria. Test performance is unknown in asymptomatic patients. This test is for in vitro diagnostic use under the FDA EUA for laboratories certified under CLIA to perform high or moderate complexity testing. This test has not been FDA cleared or approved. A negative result does not rule out the presence of PCR inhibitors in the specimen or target RNA in concentration below the limit of detection for the assay. If only one viral target is positive but coinfection with multiple targets is suspected, the sample should be re-tested with another FDA cleared, approved, or authorized test, if coinfection would change clinical management. This test was validated by the [facility]. These laboratories are certified under the Clinical Laboratory Improvement Amendments of 1988 (CLIA-88) as qualified to perform high complexity laboratory testing.   XR Chest 2 Views    Narrative    EXAM: XR CHEST 2 VIEWS  LOCATION: [facility]  DATE:  6/24/2023    INDICATION: Diffuse wheezing, concern for an acute pulmonary issue such as pneumonia  COMPARISON: Chest CT 04/22/2023      Impression    IMPRESSION: Stable cardiomediastinal silhouette with pacemaker leads overlying the right atrium and right ventricle. No significant change in biapical scarring, right greater than left. No definite new airspace consolidation, pleural effusion or   pneumothorax. Bones are unchanged. Diffuse osteopenia.   CBC with platelets differential    Narrative    The following orders were created for panel order CBC with platelets differential.  Procedure                               Abnormality         Status                     ---------                               -----------         ------                     CBC with platelets and d...[[id number]]  Abnormal            Final result                 Please view results for these tests on the individual orders.   Comprehensive metabolic panel   Result Value Ref Range    Sodium 130 (L) 136 - 145 mmol/L    Potassium 4.0 3.4 - 5.3 mmol/L    Chloride 97 (L) 98 - 107 mmol/L    Carbon Dioxide (CO2) 23 22 - 29 mmol/L    Anion Gap 10 7 - 15 mmol/L    Urea Nitrogen 15.5 8.0 - 23.0 mg/dL    Creatinine 0.44 (L) 0.51 - 0.95 mg/dL    Calcium 8.7 8.2 - 9.6 mg/dL    Glucose 104 (H) 70 - 99 mg/dL    Alkaline Phosphatase 63 35 - 104 U/L    AST 21 0 - 45 U/L    ALT 9 0 - 50 U/L    Protein Total 6.2 (L) 6.4 - 8.3 g/dL    Albumin 3.5 3.5 - 5.2 g/dL    Bilirubin Total 0.2 <=1.2 mg/dL    GFR Estimate 90 >60 mL/min/1.73m2   NT pro BNP   Result Value Ref Range    N terminal Pro BNP Inpatient 602 0 - 1,800 pg/mL   Troponin T, High Sensitivity   Result Value Ref Range    Troponin T, High Sensitivity 14 <=14 ng/L   Blood gas venous   Result Value Ref Range    pH Venous 7.37 7.32 - 7.43    pCO2 Venous 47 40 - 50 mm Hg    pO2 Venous 21 (L) 25 - 47 mm Hg    Bicarbonate Venous 27 21 - 28 mmol/L    Base Excess/Deficit (+/-) 1.5 -7.7 - 1.9 mmol/L    FIO2 14     [place] Draw    Narrative    The following orders were created for panel order [place] Draw.  Procedure                               Abnormality         Status                     ---------                               -----------         ------                     Extra Blue Top Tube[[id number]]                                                         Extra Red Top Tube[[id number]]                               Final result                 Please view results for these tests on the individual orders.   CBC with platelets and differential   Result Value Ref Range    WBC Count 8.1 4.0 - 11.0 10e3/uL    RBC Count 3.82 3.80 - 5.20 10e6/uL    Hemoglobin 11.3 (L) 11.7 - 15.7 g/dL    Hematocrit 34.8 (L) 35.0 - 47.0 %    MCV 91 78 - 100 fL    MCH 29.6 26.5 - 33.0 pg    MCHC 32.5 31.5 - 36.5 g/dL    RDW 14.7 10.0 - 15.0 %    Platelet Count 198 150 - 450 10e3/uL    % Neutrophils 72 %    % Lymphocytes 20 %    % Monocytes 8 %    % Eosinophils 0 %    % Basophils 0 %    % Immature Granulocytes 0 %    NRBCs per 100 WBC 0 <1 /100    Absolute Neutrophils 5.7 1.6 - 8.3 10e3/uL    Absolute Lymphocytes 1.6 0.8 - 5.3 10e3/uL    Absolute Monocytes 0.7 0.0 - 1.3 10e3/uL    Absolute Eosinophils 0.0 0.0 - 0.7 10e3/uL    Absolute Basophils 0.0 0.0 - 0.2 10e3/uL    Absolute Immature Granulocytes 0.0 <=0.4 10e3/uL    Absolute NRBCs 0.0 10e3/uL   Extra Red Top Tube   Result Value Ref Range    Hold Specimen [facility]    INR   Result Value Ref Range    INR 1.76 (H) 0.85 - 1.15   [place] Draw    Narrative    The following orders were created for panel order [place] Draw.  Procedure                               Abnormality         Status                     ---------                               -----------         ------                     Extra Green Top (Lithium...[[id number]]                      Final result                 Please view results for these tests on the individual orders.   Extra Green Top (Lithium Heparin) Tube   Result Value Ref  Range    Hold Specimen [facility]    Chest CT w/o contrast    Narrative    EXAM: CT CHEST W/O CONTRAST  LOCATION: [facility]  DATE: 6/24/2023    INDICATION: Reported history of bronchiectasis, shortness of air  COMPARISON: 04/22/2023  TECHNIQUE: CT chest without IV contrast. Multiplanar reformats were obtained. Dose reduction techniques were used.  CONTRAST: None.    FINDINGS:   LUNGS AND PLEURA: Complete collapse of the middle lobe and anterior portion of the right upper lobe with associated bronchiectasis, unchanged. Cavitary and nodular opacities in the right upper lobe are unchanged, but there are many new nodular opacities   in the lower lobes, most of which have peripheral groundglass opacity (e.g. 7/158, 165, 212). Scattered bronchial wall thickening and endobronchial debris. No pleural effusion or pneumothorax.     MEDIASTINUM/AXILLAE: No lymphadenopathy. Left chest pacemaker. No thoracic aortic aneurysm    CORONARY ARTERY CALCIFICATION: Severe.    UPPER ABDOMEN: No acute findings.    MUSCULOSKELETAL: No aggressive or destructive osseous lesions. Degenerative changes in the spine with accentuated kyphosis.       Impression    IMPRESSION:   1.  Numerous new nodular airspace opacities with groundglass halo, likely infectious or inflammatory. Recommend follow-up CT in 6-12 weeks to assess for resolution. Associated bronchial wall thickening and endobronchial debris also raises concern for   aspiration.    2.  Extensive bronchiectasis with volume loss in the middle lobe and anterior right upper lobe, unchanged.         *Note: Due to a large number of results and/or encounters for the requested time period, some results have not been displayed. A complete set of results can be found in Results Review.       Medications   levofloxacin (LEVAQUIN) infusion 750 mg (has no administration in time range)   ipratropium - albuterol 0.5 mg/2.5 mg/3 mL (DUONEB) neb solution 3 mL (3 mLs Nebulization  $Given 6/24/23 1410)   predniSONE (DELTASONE) tablet 40 mg (40 mg Oral $Given 6/24/23 0127)       Assessments & Plan (with Medical Decision Making)  89-year-old female presents with wheezing shortness of air cough productive of green sputum.  Details per HPI.  Broad differential considered.  CT scan chest numerous new nodular airspace opacities with groundglass halo likely infectious or inflammatory, extensive bronchiectasis with volume loss middle lobe and anterior right upper lobe are unchanged.  Afebrile.  White count normal, chemistries essentially normal, hypoxic by venous gas, BNP troponin normal, ECG without acute ischemic change.  Sputum culture and Gram stain pending, question Pseudomonas colonization/infection.  Penicillin allergy, treated with levofloxacin 750 mg IV in ED.  She will require admission to hospital given work of breathing, hypoxic respiratory failure and bronchiectasis exacerbation.  Reviewed with  who accepts for hospital service.     I have reviewed the nursing notes.    I have reviewed the findings, diagnosis, plan and need for follow up with the patient.          New Prescriptions    No medications on file       Final diagnoses:   Acute respiratory failure with hypoxia (H)   Bronchiectasis with acute exacerbation (H)       6/24/2023   [facility] EMERGENCY DEPT     Steve Mathis MD  06/24/23 [id number]

## 2023-06-24 NOTE — ED PROVIDER NOTES
History     Chief Complaint   Patient presents with     Nasal Congestion     green     Cough     wheezing     HPI  Ellie Leigh is a 89 year old female with a past medical history of DVT, intermittent atrial fibrillation, mild persistent asthma without complication, bronchiectasis with exacerbation, SIADH, cardiac pacemaker in situ, chronic neck pain and headaches, previous infection with tuberculosis, hypothyroidism and hyperlipidemia who presents for evaluation of cough with wheezing over the past 3 to 4 days.  She has been coughing up green phlegm.  Has felt short of breath for the past 3 days.  She has been taking DuoNeb every morning, including this morning with limited relief of shortness of breath.  Over the past several months, she has been to clinic for similar concerns, diagnosed with COPD exacerbation, treated with prednisone and antibiotics with good relief of symptoms at the time.  He was mostly recently treated with antibiotics at the end of March 2023 with Levaquin.  Was most recently diagnosed with bronchiectasis with acute exacerbation on May 18, 2023.  Has a pulmonology rehab referral.  CT scan of the lungs completed on April 22, 2023 showed a new 9 x 5 mm nodular density versus infiltrate in the right lower lobe, recommended 6-month follow-up.  There was some bronchial wall thickening and consistent with bronchiectasis.  She has no tobacco use history.  Provider note from May 18, 2023 states that her pulmonary diagnosis may be more consistent with bronchiectasis since she has not been improving on medications typical for treatment of COPD.  She is currently on warfarin.  She denies any chest pain, no abdominal pain, no nausea or vomiting.  Has chronic diarrhea.  No urinary complaints.    Allergies:  Allergies   Allergen Reactions     Cephalosporins Nausea     Cefzil     Doxycycline Nausea and Vomiting     Sulfa Antibiotics Nausea     Penicillins Rash     PCN       Problem List:     Patient Active Problem List    Diagnosis Date Noted     Pulmonary nodules 04/24/2023     Priority: High     New, noted on CT 4/2023, f/u CT recommended in 6 months       Bronchiectasis (H) 11/10/2022     Priority: Medium     10/8/21 pulmonary consult notes:Assessment: Elderly female, limited functional status with chronic bronchiectasis and Pseudomonas colonization.  Also on warfarin therapy which would predispose to hemoptysis in the setting of bronchiectasis.  This is never been life-threatening or large amounts and seems to be associated with worsening of Pseudomonas infection.  Seems to improved with current antibiotics as Pseudomonas is sensitive to levofloxacin.  Patient and her daughter had lots of questions regarding whether they need to go to the emergency room or get a CT scan every time she has a small amount of hemoptysis.  I said as long as the hemoptysis is not a large amount, associated with severe shortness of breath and seems to be associated with increased sputum production I do not think she needs repeat imaging and I would treat empirically with additional courses of antibiotics, levofloxacin, for 10 days to see if there is improvement.  If she is feeling more wheezy or short of breath or has chest tightness then adding low-dose prednisone 20 mg would be an adequate dose in this elderly patient, for 5 to 7 days.  I do not think she needs automatic repeat imaging as her imaging test will always be abnormal.        Deep vein thrombosis (DVT) of proximal lower extremity, unspecified chronicity, unspecified laterality (H) 02/18/2022     Priority: Medium     Acute respiratory failure with hypoxia (H) 07/09/2021     Priority: Medium     Cardiac pacemaker in situ 07/09/2021     Priority: Medium     H/O TB (tuberculosis) 02/09/2018     Priority: Medium     Pt with chronic RUL infiltrate with pos AFB sputum  Full treatmetn for TB following ID consult in 2000's       Back pain 02/06/2018     Priority:  Medium     Nausea 06/05/2017     Priority: Medium     3/29/2021: She has had chronic nausea.  She has been on omeprazole  40mg daily for years.  Tried sucralfate (CARAFATE) which did not help.  Was on FODMAP diet, gluten free and no dairy diets.  Evaluation with GI in the past in 1/23/2015.  She has had gastroscopy several times.  10/30/2018 CT abdomen and pelvis unremarkable.   Gastric emptying normal.    Nausea in the AM.   No vomiting.   Hot flashes around Noon.  Feels like her whole body is on fire until med to late afternoon.  This is cyclical daily.   Feels shortness of breath with the hot flashes.    Nothing has helped much. Tried meclizine and Dramamine.   She has tried various liz preparations.   She has tried ondansetron.   Mirtazapine seemed to help for a while, does help sleep.  She has tried compazine in the past.   Today will try phenergan.         Irritable bowel syndrome without diarrhea 05/02/2016     Priority: Medium     Mild persistent asthma without complication 12/01/2015     Priority: Medium     Long term current use of anticoagulant therapy 03/02/2015     Priority: Medium     Syncope 01/12/2015     Priority: Medium     Advance Care Planning 01/09/2015     Priority: Medium     Advance Care Planning 7/2/2015: Receipt of ACP document:  Received: Health Care Directive which was witnessed or notarized on 1-9-15.  Document previously scanned on 2-27-15.  Validation form completed and sent to be scanned.  Code Status reflects choices in most recent ACP document.  Confirmed/documented designated decision maker(s).  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  Patient has had lung CT, will see Dr Gómez for consideration of further workup.  Also has pulmonology appt 8/18.  5/2/2018:Reviewing chart she has had  hyponatremia since 2010.        Positive fecal occult blood test 07/07/2014     Priority: Medium     x2 -- first was in ED.  Patient expresses that she does not want colonoscopy.  She'll set appt to discuss with her PCP.       Benign essential HTN 02/11/2014     Priority: Medium     BPPV (benign paroxysmal positional vertigo) 11/05/2013     Priority: Medium     History of skin cancer 05/07/2013     Priority: Medium     Recurrent UTI 07/16/2012     Priority: Medium     recurrent UTI  Recent Urologic workup neg, 2005  Has Cipro at home for treatment as needed       Hypothyroidism 05/07/2012     Priority: Medium     Squamous cell carcinoma in situ of skin of face 04/19/2012     Priority: Medium     SK (seborrheic keratosis) 04/19/2012     Priority: Medium     Intermittent atrial fibrillation (H) 12/01/2011     Priority: Medium     Cervical vertebral fracture (H) 11/20/2011     Priority: Medium     Anxiety 11/15/2011     Priority: Medium     DVT (deep venous thrombosis) 10/12/2011     Priority: Medium     H/o in past, on current coumadin therapy.       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 Provider to review       Right arm weakness 07/29/2011     Priority: Medium     Ulnar neuropathy 07/29/2011     Priority: Medium     Health Care Home 04/21/2011     Priority: Medium     Conchis Robles [phone]  A / [facility]              DX V65.8 REPLACED WITH [id number] HEALTH CARE HOME (04/08/2013)       Chronic constipation 03/03/2011     Priority: Medium     Osteoporosis 03/03/2011     Priority: Medium     Hyperlipidemia LDL goal <130 10/31/2010     Priority: Medium     Infectious colitis, enteritis and gastroenteritis 07/10/2010     Priority: Medium     Chronic allergic conjunctivitis 11/18/2008     Priority: Medium     Chronic seasonal allergic rhinitis 11/18/2008     Priority: Medium      Esophageal reflux 11/29/2007     Priority: Medium     PERSONAL HISTORY OF MALIGNANCY- BREAST 06/13/2007     Priority: Medium     Sensorineural hearing loss, asymmetrical 06/20/2005     Priority: Medium     Generalized osteoarthrosis, unspecified site 06/20/2005     Priority: Medium     Right hip and knee are the most symptomatic          Past Medical History:    Past Medical History:   Diagnosis Date     Basal cell carcinoma      Breast cancer (H)      COPD (chronic obstructive pulmonary disease) (H)      Dust allergy      DVT (deep vein thrombosis) in pregnancy      HTN (hypertension)      Hyperlipidemia LDL goal <130 10/31/2010     Hyponatremia      Hypothyroid      Intermittent atrial fibrillation (H) 12/1/2011     Loose body in joint 4/15/2011     Neck fracture (H)      Syncope 5/12/2013       Past Surgical History:    Past Surgical History:   Procedure Laterality Date     APPENDECTOMY       ARTHROSCOPY KNEE  4/15/2011    Procedure:ARTHROSCOPY KNEE; removal of loose body; Surgeon:LEY, JEFFREY DUANE; Location:[facility]     CHOLECYSTECTOMY       ESOPHAGOSCOPY, GASTROSCOPY, DUODENOSCOPY (EGD), COMBINED  6/9/2014    Procedure: COMBINED ESOPHAGOSCOPY, GASTROSCOPY, DUODENOSCOPY (EGD);  Surgeon: Gilberto Richard MD;  Location: [facility]     ESOPHAGOSCOPY, GASTROSCOPY, DUODENOSCOPY (EGD), COMBINED N/A 10/18/2019    Procedure: ESOPHAGOGASTRODUODENOSCOPY (EGD);  Surgeon: Noe Sams DO;  Location: [facility]     IMPLANT PACEMAKER  5/21/2013    Biotronik Moderl [id number] Serial#[id number]     INJECT EPIDURAL LUMBAR  3/23/2011    INJECT EPIDURAL LUMBAR performed by GENERIC ANESTHESIA PROVIDER at [facility]     JOINT REPLACEMENT, HIP RT/LT      Joint Replacement Hip Rt     MASTECTOMY, SIMPLE RT/LT/CAITLYN      Left breast - following breast ca     OTHER SURGICAL HISTORY      C1-C2 fusion after fx      SURGICAL HISTORY OF -   05/22/2001    Colonoscopy     TONSILLECTOMY         Family History:    Family History   Problem Relation Age of Onset      Cerebrovascular Disease Mother      Heart Disease Mother         MI     Cerebrovascular Disease Father      Heart Disease Father         MI     Respiratory Maternal Grandfather         TB     Neurologic Disorder Brother         ALS     Heart Disease Brother      Cerebrovascular Disease Brother      Breast Cancer Daughter         age:49     Asthma Brother      Cancer Brother         brain and lung     Cancer Daughter         thyroid       Social History:  Marital Status:   [5]  Social History     Tobacco Use     Smoking status: Never     Passive exposure: Past     Smokeless tobacco: Never   Vaping Use     Vaping Use: Never used   Substance Use Topics     Alcohol use: No     Drug use: No        Medications:    Acetaminophen (TYLENOL PO)  albuterol (PROAIR HFA) 108 (90 Base) MCG/ACT inhaler  albuterol (PROVENTIL) (2.5 MG/3ML) 0.083% neb solution  benzonatate (TESSALON) 100 MG capsule  CRANBERRY  fluticasone-salmeterol (ADVAIR) 250-50 MCG/ACT inhaler  guaiFENesin (MUCINEX) 600 MG 12 hr tablet  ipratropium (ATROVENT) 0.02 % neb solution  ipratropium - albuterol 0.5 mg/2.5 mg/3 mL (DUONEB) 0.5-2.5 (3) MG/3ML neb solution  levothyroxine (SYNTHROID/LEVOTHROID) 50 MCG tablet  metoprolol succinate ER (TOPROL XL) 25 MG 24 hr tablet  metoprolol succinate ER (TOPROL XL) 50 MG 24 hr tablet  order for DME  polyethylene glycol (MIRALAX/GLYCOLAX) Packet  predniSONE (DELTASONE) 20 MG tablet  probiotic CAPS  sodium chloride 0.9 % neb solution  sodium chloride 1 GM tablet  traZODone (DESYREL) 50 MG tablet  warfarin ANTICOAGULANT (COUMADIN) 1 MG tablet          Review of Systems   Constitutional: Negative.    HENT: Negative.    Respiratory: Positive for cough and shortness of breath.    Cardiovascular: Negative.    Gastrointestinal: Negative.    Genitourinary: Negative.        Physical Exam   BP: (!) 183/76  Pulse: 69  Temp: 97.2  F (36.2  C)  Resp: 18  SpO2: 91 %      Physical Exam  Vitals reviewed.   Constitutional:        General: She is not in acute distress.     Appearance: Normal appearance. She is not ill-appearing, toxic-appearing or diaphoretic.   HENT:      Head: Normocephalic and atraumatic.      Nose: Nose normal. No congestion or rhinorrhea.      Mouth/Throat:      Mouth: Mucous membranes are moist.      Pharynx: Oropharynx is clear. No oropharyngeal exudate or posterior oropharyngeal erythema.   Cardiovascular:      Rate and Rhythm: Normal rate and regular rhythm.      Pulses: Normal pulses.      Heart sounds: Normal heart sounds. No murmur heard.  Pulmonary:      Effort: Pulmonary effort is normal. No respiratory distress.      Breath sounds: No stridor. Examination of the right-upper field reveals wheezing. Examination of the left-upper field reveals wheezing. Examination of the right-middle field reveals wheezing. Examination of the left-middle field reveals wheezing. Examination of the right-lower field reveals wheezing. Examination of the left-lower field reveals wheezing. Wheezing present. No decreased breath sounds, rhonchi or rales.   Chest:      Chest wall: No tenderness.   Abdominal:      General: Abdomen is flat. Bowel sounds are normal. There is no distension.      Palpations: Abdomen is soft.      Tenderness: There is no abdominal tenderness. There is no guarding or rebound.   Musculoskeletal:      Cervical back: Neck supple. No rigidity. No muscular tenderness.   Lymphadenopathy:      Cervical: No cervical adenopathy.      Right cervical: No superficial, deep or posterior cervical adenopathy.     Left cervical: No superficial, deep or posterior cervical adenopathy.   Neurological:      Mental Status: She is alert and oriented to person, place, and time.      Sensory: No sensory deficit.         ED Course          Administered DuoNeb in urgent care, the patient's shortness of breath did not improve.       Procedures              Results for orders placed or performed during the hospital encounter of 06/24/23  (from the past 24 hour(s))   Group A Streptococcus PCR Throat Swab    Specimen: Throat; Swab   Result Value Ref Range    Group A strep by PCR Not Detected Not Detected    Narrative    The Xpert Xpress Strep A test, performed on the [employer]  Instrument Systems, is a rapid, qualitative in vitro diagnostic test for the detection of Streptococcus pyogenes (Group A ß-hemolytic Streptococcus, Strep A) in throat swab specimens from patients with signs and symptoms of pharyngitis. The Xpert Xpress Strep A test can be used as an aid in the diagnosis of Group A Streptococcal pharyngitis. The assay is not intended to monitor treatment for Group A Streptococcus infections. The Xpert Xpress Strep A test utilizes an automated real-time polymerase chain reaction (PCR) to detect Streptococcus pyogenes DNA.   Symptomatic Influenza A/B, RSV, & SARS-CoV2 PCR (COVID-19) Nose    Specimen: Nose; Swab   Result Value Ref Range    Influenza A PCR Negative Negative    Influenza B PCR Negative Negative    RSV PCR Negative Negative    SARS CoV2 PCR Negative Negative    Narrative    Testing was performed using the Xpert Xpress CoV2/Flu/RSV Assay on the [employer] Instrument. This test should be ordered for the detection of SARS-CoV-2, influenza, and RSV viruses in individuals who meet clinical and/or epidemiological criteria. Test performance is unknown in asymptomatic patients. This test is for in vitro diagnostic use under the FDA EUA for laboratories certified under CLIA to perform high or moderate complexity testing. This test has not been FDA cleared or approved. A negative result does not rule out the presence of PCR inhibitors in the specimen or target RNA in concentration below the limit of detection for the assay. If only one viral target is positive but coinfection with multiple targets is suspected, the sample should be re-tested with another FDA cleared, approved, or authorized test, if coinfection would change clinical  management. This test was validated by the [facility]. These laboratories are certified under the Clinical Laboratory Improvement Amendments of 1988 (CLIA-88) as qualified to perform high complexity laboratory testing.   XR Chest 2 Views    Narrative    EXAM: XR CHEST 2 VIEWS  LOCATION: [facility]  DATE: 6/24/2023    INDICATION: Diffuse wheezing, concern for an acute pulmonary issue such as pneumonia  COMPARISON: Chest CT 04/22/2023      Impression    IMPRESSION: Stable cardiomediastinal silhouette with pacemaker leads overlying the right atrium and right ventricle. No significant change in biapical scarring, right greater than left. No definite new airspace consolidation, pleural effusion or   pneumothorax. Bones are unchanged. Diffuse osteopenia.     *Note: Due to a large number of results and/or encounters for the requested time period, some results have not been displayed. A complete set of results can be found in Results Review.       Medications   ipratropium - albuterol 0.5 mg/2.5 mg/3 mL (DUONEB) neb solution 3 mL (3 mLs Nebulization $Given 6/24/23 1410)       Assessments & Plan (with Medical Decision Making)       Chest x-ray showed a stable cardiomediastinal silhouette with pacemaker leads overlying the right atrium.  There is no definite new airspace consolidation, pleural effusion, or pneumothorax.     The patient is on warfarin, low likelihood of a blood clot today.  She denies any chest pain, diaphoresis, nausea or vomiting.    I suspect the patient is having bronchiectasis with acute exacerbation today, consistent with her previous symptoms.  However I am more concerned about her persistent shortness of breath over the past 3 days, not improved with DuoNeb both this morning and now in clinic.  Oxygen saturation on recheck remains in the low 90%'s, was 92%.    Given the patient's persistent shortness of breath, I feel additional work-up is warranted in  the emergency department, discussed with the patient.  She is willing to be transferred for further evaluation management.  I have reviewed the nursing notes.    I have reviewed the findings, diagnosis, plan and need for follow up with the patient.    New Prescriptions    No medications on file       Final diagnoses:   Shortness of breath       6/24/2023   [facility] EMERGENCY DEPT     Mickey Huynh PA-C  06/24/23 1500

## 2023-06-24 NOTE — H&P
[facility]    History and Physical  Hospital Medicine       Date of Admission:  6/24/2023  Date of Service: 6/24/2023     Assessment & Plan   Ellie Leigh is a 89 year old female who presents on 6/24/2023 with productive cough of green sputum and shortness of breath for 1 week.  Patient was admitted for concern of bronchiectasis with acute exacerbation      Bronchiectasis with acute exacerbation (H)    Acute respiratory failure with hypoxia (H)  Patient is a 89 year old female with past medical history of bronchiectasis, DVT, A-fib, hyperlipidemia, hypertension presented on with productive cough of green sputum and shortness of breath for 1 week.  She initially presented to the urgent care and was sent to the emergency department for further evaluation.  In the ED she was hypertensive with blood pressure 180/76, oxygen saturation 91% on room air, otherwise hemodynamically stable.  CT chest revealed numerous new nodular airspace opacities with groundglass likely infectious or inflammatory etiology, extensive bronchiectasis with volume loss in the middle lobe and anterior right upper lobe are unchanged.  Influenza A/B, and COVID-19 were negative.  Troponin and BNP were normal.  VBG unremarkable.  In the ED patient was started on levofloxacin, prednisone 5 mg, and DuoNeb.  Sputum culture in process.    -Admission to inpatient  -Continue DuoNeb every 6 hours  -Continue Levaquin  -Continue prednisone 40 mg for total of 5 doses  -Resume home inhaler Advair  -We will check procalcitonin  -Follow-up on sputum culture      Mild major depression    Anxiety  Stable mood  Managed per admission with trazodone.  Continue trazodone      Intermittent atrial fibrillation (H)    Cardiac pacemaker in situ  Rate controlled with metoprolol, anticoagulated with warfarin  Continue metoprolol, continue warfarin-pharmacy to dose       Hypothyroidism  resume home Synthroid      Benign essential  HTN  Continue home medication metoprolol      Long term current use of anticoagulant therapy    DVT (deep venous thrombosis)  Continue warfarin, pharmacy to dose      Mild persistent asthma without complication  Admitted for bronchitis with acute exacerbation  We will give DuoNeb every 6 hours.  Continue home inhaler Advair.    Clinically Significant Risk Factors Present on Admission               # Drug Induced Coagulation Defect: home medication list includes an anticoagulant medication    # Hypertension: Noted on problem list                    Diet: Regular Diet Adult    DVT Prophylaxis: Warfarin  Glasgow Catheter: Not present  Code Status:  Full code  Lines: IV line    Disposition Plan      Expected Discharge Date: 06/26/2023             Entered: Mykel Dickerson MD 06/24/2023, 6:33 PM     Status: Patient is appropriate for inpatient  Mykel Dickerson MD        The patient's care was discussed with the Patient and Patient's Family.    Primary Care Physician   Ailyn Roland [phone]    History is obtained from the patient,  and review of old records via the EMR.    History of Present Illness   Ellie Leigh is a 89 year old female with past medical history of bronchiectasis, DVT, A-fib, hyperlipidemia, hypertension now presents on 6/24/2023 with productive cough of green sputum and shortness of breath for 1 week.  She initially presented to the urgent care and was sent to the emergency department for further evaluation.  In the ED she was hypertensive with blood pressure 180/76, oxygen saturation 91% on room air, otherwise hemodynamically stable.  CT chest revealed numerous new nodular airspace opacities with groundglass likely infectious or inflammatory etiology, extensive bronchiectasis with volume loss in the middle lobe and anterior right upper lobe are unchanged.  Influenza A/B, and COVID-19 were negative.  Troponin and BNP were normal.  VBG unremarkable.  In the ED patient was started on  levofloxacin, prednisone 5 mg, and DuoNeb.  Sputum culture in process.    Review of Systems   The 10 point Review of Systems is negative other than noted in the HPI or here.     Past Medical History      Past Medical History:   Diagnosis Date     Basal cell carcinoma      Breast cancer (H)      COPD (chronic obstructive pulmonary disease) (H)     ct 2014 does show some bronchiectaisi RUL as well as fibronodular disease bilat upper lobes     Dust allergy      DVT (deep vein thrombosis) in pregnancy     after neck fracture when in hospital     HTN (hypertension)      Hyperlipidemia LDL goal <130 10/31/2010     Hyponatremia     chronic     Hypothyroid      Intermittent atrial fibrillation (H) 12/1/2011    hx tachy-keri, has pacer, on chronic coumadin     Loose body in joint 4/15/2011     Neck fracture (H)     after a fall     Syncope 5/12/2013    multiple hospital visits, lates 3/2016, 6/2016, 7/2016 with no clear cause found     Patient Active Problem List    Diagnosis Date Noted     Bronchiectasis with acute exacerbation (H) 06/24/2023     Priority: High     Pulmonary nodules 04/24/2023     Priority: High     New, noted on CT 4/2023, f/u CT recommended in 6 months       Acute respiratory failure with hypoxia (H) 07/09/2021     Priority: High     Bronchiectasis (H) 11/10/2022     Priority: Medium     10/8/21 pulmonary consult notes:Assessment: Elderly female, limited functional status with chronic bronchiectasis and Pseudomonas colonization.  Also on warfarin therapy which would predispose to hemoptysis in the setting of bronchiectasis.  This is never been life-threatening or large amounts and seems to be associated with worsening of Pseudomonas infection.  Seems to improved with current antibiotics as Pseudomonas is sensitive to levofloxacin.  Patient and her daughter had lots of questions regarding whether they need to go to the emergency room or get a CT scan every time she has a small amount of hemoptysis.  I  said as long as the hemoptysis is not a large amount, associated with severe shortness of breath and seems to be associated with increased sputum production I do not think she needs repeat imaging and I would treat empirically with additional courses of antibiotics, levofloxacin, for 10 days to see if there is improvement.  If she is feeling more wheezy or short of breath or has chest tightness then adding low-dose prednisone 20 mg would be an adequate dose in this elderly patient, for 5 to 7 days.  I do not think she needs automatic repeat imaging as her imaging test will always be abnormal.        Deep vein thrombosis (DVT) of proximal lower extremity, unspecified chronicity, unspecified laterality (H) 02/18/2022     Priority: Medium     Cardiac pacemaker in situ 07/09/2021     Priority: Medium     H/O TB (tuberculosis) 02/09/2018     Priority: Medium     Pt with chronic RUL infiltrate with pos AFB sputum  Full treatmetn for TB following ID consult in 2000's       Back pain 02/06/2018     Priority: Medium     Nausea 06/05/2017     Priority: Medium     3/29/2021: She has had chronic nausea.  She has been on omeprazole  40mg daily for years.  Tried sucralfate (CARAFATE) which did not help.  Was on FODMAP diet, gluten free and no dairy diets.  Evaluation with GI in the past in 1/23/2015.  She has had gastroscopy several times.  10/30/2018 CT abdomen and pelvis unremarkable.   Gastric emptying normal.    Nausea in the AM.   No vomiting.   Hot flashes around Noon.  Feels like her whole body is on fire until med to late afternoon.  This is cyclical daily.   Feels shortness of breath with the hot flashes.    Nothing has helped much. Tried meclizine and Dramamine.   She has tried various liz preparations.   She has tried ondansetron.   Mirtazapine seemed to help for a while, does help sleep.  She has tried compazine in the past.   Today will try phenergan.         Irritable bowel syndrome without diarrhea 05/02/2016      Priority: Medium     Mild persistent asthma without complication 12/01/2015     Priority: Medium     Long term current use of anticoagulant therapy 03/02/2015     Priority: Medium     Syncope 01/12/2015     Priority: Medium     Advance Care Planning 01/09/2015     Priority: Medium     Advance Care Planning 7/2/2015: Receipt of ACP document:  Received: Health Care Directive which was witnessed or notarized on 1-9-15.  Document previously scanned on 2-27-15.  Validation form completed and sent to be scanned.  Code Status reflects choices in most recent ACP document.  Confirmed/documented designated decision maker(s).  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  Patient has had lung CT, will see Dr Gómez for consideration of further workup.  Also has pulmonology appt 8/18.  5/2/2018:Reviewing chart she has had hyponatremia since 2010.        Positive fecal occult blood test 07/07/2014     Priority: Medium     x2 -- first was in ED.  Patient expresses that she does not want colonoscopy.  She'll set appt to discuss with her PCP.       Benign essential HTN 02/11/2014     Priority: Medium     BPPV (benign paroxysmal positional vertigo) 11/05/2013     Priority: Medium     History of skin cancer 05/07/2013     Priority: Medium     Recurrent UTI 07/16/2012     Priority: Medium     recurrent UTI  Recent Urologic workup neg, 2005  Has Cipro at home for treatment as needed       Hypothyroidism 05/07/2012     Priority: Medium     Squamous cell carcinoma in situ of skin of face 04/19/2012     Priority: Medium     SK (seborrheic keratosis) 04/19/2012     Priority: Medium     Intermittent atrial fibrillation (H) 12/01/2011     Priority: Medium     Cervical vertebral fracture (H) 11/20/2011     Priority: Medium     Anxiety 11/15/2011     Priority:  Medium     DVT (deep venous thrombosis) 10/12/2011     Priority: Medium     H/o in past, on current coumadin therapy.       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 Provider to review       Right arm weakness 07/29/2011     Priority: Medium     Ulnar neuropathy 07/29/2011     Priority: Medium     Health Care Home 04/21/2011     Priority: Medium     Conchis Robles, [phone]  FPA / [facility]              DX V65.8 REPLACED WITH [id number] HEALTH CARE HOME (04/08/2013)       Chronic constipation 03/03/2011     Priority: Medium     Osteoporosis 03/03/2011     Priority: Medium     Hyperlipidemia LDL goal <130 10/31/2010     Priority: Medium     Infectious colitis, enteritis and gastroenteritis 07/10/2010     Priority: Medium     Chronic allergic conjunctivitis 11/18/2008     Priority: Medium     Chronic seasonal allergic rhinitis 11/18/2008     Priority: Medium     Esophageal reflux 11/29/2007     Priority: Medium     PERSONAL HISTORY OF MALIGNANCY- BREAST 06/13/2007     Priority: Medium     Sensorineural hearing loss, asymmetrical 06/20/2005     Priority: Medium     Generalized osteoarthrosis, unspecified site 06/20/2005     Priority: Medium     Right hip and knee are the most symptomatic          Past Surgical History     Past Surgical History:   Procedure Laterality Date     APPENDECTOMY       ARTHROSCOPY KNEE  4/15/2011    Procedure:ARTHROSCOPY KNEE; removal of loose body; Surgeon:LEY, JEFFREY DUANE; Location:[facility]     CHOLECYSTECTOMY       ESOPHAGOSCOPY, GASTROSCOPY, DUODENOSCOPY (EGD), COMBINED  6/9/2014    Procedure: COMBINED ESOPHAGOSCOPY, GASTROSCOPY, DUODENOSCOPY (EGD);  Surgeon: Gilberto Richard MD;  Location: [facility]     ESOPHAGOSCOPY, GASTROSCOPY, DUODENOSCOPY (EGD), COMBINED N/A 10/18/2019    Procedure: ESOPHAGOGASTRODUODENOSCOPY (EGD);  Surgeon: Noe Sams DO;   Location: [facility]     IMPLANT PACEMAKER  5/21/2013    Sarah Reyna [id number] Serial#[id number]     INJECT EPIDURAL LUMBAR  3/23/2011    INJECT EPIDURAL LUMBAR performed by GENERIC ANESTHESIA PROVIDER at [facility]     JOINT REPLACEMENT, HIP RT/LT      Joint Replacement Hip Rt     MASTECTOMY, SIMPLE RT/LT/CAITLYN      Left breast - following breast ca     OTHER SURGICAL HISTORY      C1-C2 fusion after fx      SURGICAL HISTORY OF -   05/22/2001    Colonoscopy     TONSILLECTOMY          Prior to Admission Medications   Prior to Admission Medications   Prescriptions Last Dose Informant Patient Reported? Taking?   CRANBERRY 6/23/2023 at hs Self, Daughter Yes Yes   Sig: Take 475 mg by mouth At Bedtime   Carboxymethylcellulose Sodium (EYE DROPS OP) 6/23/2023 at hs Self, Daughter Yes Yes   Sig: Place 1 drop into both eyes nightly as needed   acetaminophen (TYLENOL 8 HOUR ARTHRITIS PAIN) 650 MG CR tablet 6/24/2023 at 0900 Self, Daughter Yes Yes   Sig: Take 3 tablets by mouth 2 times daily   albuterol (PROAIR HFA) 108 (90 Base) MCG/ACT inhaler Past Week at unknown Self, Daughter No Yes   Sig: Inhale 2 puffs into the lungs every 6 hours as needed for shortness of breath / dyspnea   albuterol (PROVENTIL) (2.5 MG/3ML) 0.083% neb solution 6/24/2023 at am Self, Daughter No Yes   Sig: TAKE 1 VIAL (2.5MG) VIA NEBULIZATION EVERY 6 HOURS AS NEEDED FOR SHORTNESS OF BREATH, WHEEZING OR COUGH   fluticasone-salmeterol (ADVAIR) 250-50 MCG/ACT inhaler 6/24/2023 at am Self, Daughter No Yes   Sig: Inhale 1 puff into the lungs every 12 hours   guaiFENesin (MUCINEX) 600 MG 12 hr tablet Unknown at unknown Self, Daughter No Yes   Sig: Take 2 tablets (1,200 mg) by mouth 2 times daily   ipratropium (ATROVENT) 0.02 % neb solution 6/24/2023 at am Self, Daughter No Yes   Sig: Take 2.5 mLs (0.5 mg) by nebulization 4 times daily To be used with albuterol in nebulizer   ipratropium - albuterol 0.5 mg/2.5 mg/3 mL (DUONEB) 0.5-2.5 (3) MG/3ML neb solution 6/24/2023 at  am Self, Daughter No Yes   Sig: inhale 1 vial via nebulization 3 times a day. may use 1 extra dose if needed.   levothyroxine (SYNTHROID/LEVOTHROID) 50 MCG tablet 6/24/2023 at am Self, Daughter No Yes   Sig: Take 1 tablet (50 mcg) by mouth daily   metoprolol succinate ER (TOPROL XL) 25 MG 24 hr tablet 6/24/2023 at am Self, Daughter No Yes   Sig: TAKE TWO TABLETS BY MOUTH TWICE DAILY   order for DME  Self, Daughter No No   Sig: Equipment being ordered: Nebulizer   polyethylene glycol (MIRALAX/GLYCOLAX) Packet 6/23/2023 at am Self, Daughter Yes Yes   Sig: Take 17 g by mouth daily    probiotic CAPS 6/23/2023 Self, Daughter Yes Yes   Sig: Take 1 capsule by mouth every evening    sodium chloride 0.9 % neb solution 6/24/2023 at am Self, Daughter No Yes   Sig: Inhale 5 mLs into the lungs by nebulization 3 times daily. Use after albuterol in nebulizer to thin secretions.   sodium chloride 1 GM tablet 6/24/2023 at am Self, Daughter No Yes   Sig: Take 1 tablet (1 g) by mouth 2 times daily   traZODone (DESYREL) 50 MG tablet 6/23/2023 at hs Self, Daughter No Yes   Sig: Take 1 tablet (50 mg) by mouth At Bedtime   warfarin ANTICOAGULANT (COUMADIN) 1 MG tablet 6/23/2023 at hs Self, Daughter No Yes   Sig: Take 0.5 mg every Fri and 1 mg all other days of the week OR as directed by INR Clinic      Facility-Administered Medications: None     Allergies   Allergies   Allergen Reactions     Cephalosporins Nausea     Cefzil     Doxycycline Nausea and Vomiting     Sulfa Antibiotics Nausea     Penicillins Rash     PCN       Family History    Family History   Problem Relation Age of Onset     Cerebrovascular Disease Mother      Heart Disease Mother         MI     Cerebrovascular Disease Father      Heart Disease Father         MI     Respiratory Maternal Grandfather         TB     Neurologic Disorder Brother         ALS     Heart Disease Brother      Cerebrovascular Disease Brother      Breast Cancer Daughter         age:49     Asthma  Brother      Cancer Brother         brain and lung     Cancer Daughter         thyroid       Social History   Social History     Socioeconomic History     Marital status:      Spouse name: Not on file     Number of children: Not on file     Years of education: Not on file     Highest education level: Not on file   Occupational History     Employer: RETIRED   Tobacco Use     Smoking status: Never     Passive exposure: Past     Smokeless tobacco: Never   Vaping Use     Vaping Use: Never used   Substance and Sexual Activity     Alcohol use: No     Drug use: No     Sexual activity: Not Currently     Partners: Male   Other Topics Concern     Parent/sibling w/ CABG, MI or angioplasty before 65F 55M? No   Social History Narrative    L[facility] in [facility].      Daughters helping since her accident, getting home physical therapy.      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      - 2000    5 daughters, 11 grandchildren, 3 great granchildren     Social Determinants of Health     Financial Resource Strain: Not on file   Food Insecurity: Not on file   Transportation Needs: Not on file   Physical Activity: Not on file   Stress: Not on file   Social Connections: Not on file   Intimate Partner Violence: Not on file   Housing Stability: Not on file       Physical Exam   BP (!) 183/76   Pulse 69   Temp 97.2  F (36.2  C) (Tympanic)   Resp 18   LMP 06/15/1985   SpO2 92%      Weight: 0 lbs 0 oz There is no height or weight on file to calculate BMI.     Constitutional: Alert, oriented, cooperative, no apparent distress, appears nontoxic,  Eyes: Eyes are clear, pupils are reactive.  HENT:  No evidence of cranial trauma.  Lymph/Hematologic: No epitrochlear, axillary, anterior or posterior cervical, or supraclavicular lymphadenopathy is appreciated.  Cardiovascular: Regular rate and rhythm, normal S1 and S2, and no murmur noted. Good peripheral pulses in wrists bilaterally. No lower  extremity edema.  Respiratory: Diffuse bilateral wheezing  GI: Soft, non-tender, normal bowel sounds, no hepatomegaly.  Genitourinary: Deferred  Musculoskeletal: Normal muscle bulk and tone.  Skin: Warm and dry, no rashes.   Neurologic: Neck supple. Cranial nerves are grossly intact.  is symmetric.     Data   Data reviewed today:   Recent Labs   Lab 06/24/23  1648 06/24/23  1609   WBC  --  8.1   HGB  --  11.3*   MCV  --  91   PLT  --  198   INR 1.76*  --    NA  --  130*   POTASSIUM  --  4.0   CHLORIDE  --  97*   CO2  --  23   BUN  --  15.5   CR  --  0.44*   ANIONGAP  --  10   DOUG  --  8.7   GLC  --  104*   ALBUMIN  --  3.5   PROTTOTAL  --  6.2*   BILITOTAL  --  0.2   ALKPHOS  --  63   ALT  --  9   AST  --  21       Recent Results (from the past 24 hour(s))   XR Chest 2 Views    Narrative    EXAM: XR CHEST 2 VIEWS  LOCATION: [facility]  DATE: 6/24/2023    INDICATION: Diffuse wheezing, concern for an acute pulmonary issue such as pneumonia  COMPARISON: Chest CT 04/22/2023      Impression    IMPRESSION: Stable cardiomediastinal silhouette with pacemaker leads overlying the right atrium and right ventricle. No significant change in biapical scarring, right greater than left. No definite new airspace consolidation, pleural effusion or   pneumothorax. Bones are unchanged. Diffuse osteopenia.   Chest CT w/o contrast    Narrative    EXAM: CT CHEST W/O CONTRAST  LOCATION: [facility]  DATE: 6/24/2023    INDICATION: Reported history of bronchiectasis, shortness of air  COMPARISON: 04/22/2023  TECHNIQUE: CT chest without IV contrast. Multiplanar reformats were obtained. Dose reduction techniques were used.  CONTRAST: None.    FINDINGS:   LUNGS AND PLEURA: Complete collapse of the middle lobe and anterior portion of the right upper lobe with associated bronchiectasis, unchanged. Cavitary and nodular opacities in the right upper lobe are unchanged, but there are many new  nodular opacities   in the lower lobes, most of which have peripheral groundglass opacity (e.g. 7/158, 165, 212). Scattered bronchial wall thickening and endobronchial debris. No pleural effusion or pneumothorax.     MEDIASTINUM/AXILLAE: No lymphadenopathy. Left chest pacemaker. No thoracic aortic aneurysm    CORONARY ARTERY CALCIFICATION: Severe.    UPPER ABDOMEN: No acute findings.    MUSCULOSKELETAL: No aggressive or destructive osseous lesions. Degenerative changes in the spine with accentuated kyphosis.       Impression    IMPRESSION:   1.  Numerous new nodular airspace opacities with groundglass halo, likely infectious or inflammatory. Recommend follow-up CT in 6-12 weeks to assess for resolution. Associated bronchial wall thickening and endobronchial debris also raises concern for   aspiration.    2.  Extensive bronchiectasis with volume loss in the middle lobe and anterior right upper lobe, unchanged.           I personally reviewed the chest CT image(s) showing Numerous new nodular airspace opacities with groundglass halo, likely infectious or inflammatory

## 2023-06-24 NOTE — MEDICATION SCRIBE - ADMISSION MEDICATION HISTORY
Medication Scribe Admission Medication History    Admission medication history is complete. The information provided in this note is only as accurate as the sources available at the time of the update.    Medication reconciliation/reorder completed by provider prior to medication history? No    Information Source(s): Patient and Family member via in-person    Pertinent Information:Neither pt or daughter recognize mucinex, though it was prescribed in May, BID       Changes made to PTA medication list:    Added: eye drops    Deleted: tessalon, metoprolol, prednisone    Changed: tylenol 325 to 650      Medication Affordability:  Not including over the counter (OTC) medications, was there a time in the past 3 months when you did not take your medications as prescribed because of cost?: No    Allergies reviewed with patient and updates made in EHR: yes    Medication History Completed By: Lucinda Kc 6/24/2023 4:48 PM    Prior to Admission medications    Medication Sig Last Dose Taking? Auth Provider Long Term End Date   acetaminophen (TYLENOL 8 HOUR ARTHRITIS PAIN) 650 MG CR tablet Take 3 tablets by mouth 2 times daily 6/24/2023 at 0900 Yes Reported, Patient     albuterol (PROAIR HFA) 108 (90 Base) MCG/ACT inhaler Inhale 2 puffs into the lungs every 6 hours as needed for shortness of breath / dyspnea Past Week at unknown Yes Anjum Vidales MD Yes    albuterol (PROVENTIL) (2.5 MG/3ML) 0.083% neb solution TAKE 1 VIAL (2.5MG) VIA NEBULIZATION EVERY 6 HOURS AS NEEDED FOR SHORTNESS OF BREATH, WHEEZING OR COUGH 6/24/2023 at am Yes Sandra Scott, APRN CNP Yes    Carboxymethylcellulose Sodium (EYE DROPS OP) Place 1 drop into both eyes nightly as needed 6/23/2023 at hs Yes Reported, Patient     CRANBERRY Take 475 mg by mouth At Bedtime 6/23/2023 at hs Yes      fluticasone-salmeterol (ADVAIR) 250-50 MCG/ACT inhaler Inhale 1 puff into the lungs every 12 hours 6/24/2023 at am Yes Ailyn Roland MD Yes     guaiFENesin (MUCINEX) 600 MG 12 hr tablet Take 2 tablets (1,200 mg) by mouth 2 times daily Unknown at unknown Yes Sandra Scott APRN CNP     ipratropium (ATROVENT) 0.02 % neb solution Take 2.5 mLs (0.5 mg) by nebulization 4 times daily To be used with albuterol in nebulizer 6/24/2023 at am Yes Sandra Scott APRN CNP Yes    ipratropium - albuterol 0.5 mg/2.5 mg/3 mL (DUONEB) 0.5-2.5 (3) MG/3ML neb solution inhale 1 vial via nebulization 3 times a day. may use 1 extra dose if needed. 6/24/2023 at am Yes Ailyn Roland MD Yes    levothyroxine (SYNTHROID/LEVOTHROID) 50 MCG tablet Take 1 tablet (50 mcg) by mouth daily 6/24/2023 at am Yes Anjum Vidales MD Yes    metoprolol succinate ER (TOPROL XL) 25 MG 24 hr tablet TAKE TWO TABLETS BY MOUTH TWICE DAILY 6/24/2023 at am Yes Ailyn Roland MD Yes    polyethylene glycol (MIRALAX/GLYCOLAX) Packet Take 17 g by mouth daily  6/23/2023 at am Yes Reported, Patient     probiotic CAPS Take 1 capsule by mouth every evening  6/23/2023 Yes Jannette Lundberg APRN CNP     sodium chloride 0.9 % neb solution Inhale 5 mLs into the lungs by nebulization 3 times daily. Use after albuterol in nebulizer to thin secretions. 6/24/2023 at am Yes Olesya Machado APRN CNP     sodium chloride 1 GM tablet Take 1 tablet (1 g) by mouth 2 times daily 6/24/2023 at am Yes Anjum Vidales MD     traZODone (DESYREL) 50 MG tablet Take 1 tablet (50 mg) by mouth At Bedtime 6/23/2023 at hs Yes Ailyn Roland MD Yes    warfarin ANTICOAGULANT (COUMADIN) 1 MG tablet Take 0.5 mg every Fri and 1 mg all other days of the week OR as directed by INR Clinic 6/23/2023 at hs Yes Luan, Ailyn Butt MD     order for DME Equipment being ordered: Nebulizer   Anjum Vidales MD

## 2023-06-25 PROBLEM — B34.8 RHINOVIRUS INFECTION: Status: ACTIVE | Noted: 2023-01-01

## 2023-06-25 NOTE — PROGRESS NOTES
9:24 PM      Respiratory panel PCR revealed positive rhino/enterovirus.  Droplet precautions started.  Continue with the same plan per H&P.  Mykel Dickerson MD.

## 2023-06-25 NOTE — PLAN OF CARE
"[place] [facility] ADMISSION NOTE    Patient admitted to room 2306 at approximately 2000 via cart from emergency room. Patient was accompanied by transport tech.     Verbal SBAR report received from Kacey VIERA prior to patient arrival.     Patient trasferred to bed via pivot transfer. Patient alert and oriented X 3. The patient is not having any pain.  . Admission vital signs: Blood pressure (!) 159/78, pulse 88, temperature 98.3  F (36.8  C), temperature source Oral, resp. rate 21, height 1.473 m (4' 10\"), weight 59.3 kg (130 lb 11.7 oz), last menstrual period 06/15/1985, SpO2 95 %, not currently breastfeeding. Patient was oriented to plan of care, call light, bed controls, tv, telephone, bathroom, and visiting hours.     Risk Assessment    The following safety risks were identified during admission: fall. Yellow risk band applied: YES.     Skin Initial Assessment    This writer admitted this patient and completed a full skin assessment and Jose Daniel score in the Adult PCS flowsheet. Appropriate interventions initiated as needed.     Secondary skin check completed by Angelia VIERA.         Education    Patient has a Morse to Observation order: No  Observation education completed and documented: N/A      Halima Andersen RN                            "

## 2023-06-25 NOTE — PLAN OF CARE
Updated Dr. Dickerson: Ellie GALEANA([id number]) She is on 2L oxygen to keep sat >90%. Can she have tessalon and or guaifenesin for frequent cough?

## 2023-06-25 NOTE — PROGRESS NOTES
Pt sounds less congested at this time than she did earlier this morning. She states she still has some mucous she is coughing up. However her cough is also less frequent than it was earlier this morning. Eating and drinking well. Needs encouragement to ambulate.Did try to wean off O2. Sats dropped into upper 80s on room air, placed back on 1L per nasal cannula sats are in the low 90s. Family at bedside

## 2023-06-25 NOTE — PLAN OF CARE
"Patient reports difficulties swallowing food. She states, \"Throat is sore so it hurts to swallow. Difficult to chew because of teeth, takes longer.\" She requested a yogurt and ate 100%. Head of bed up to help prevent aspiration.                         "

## 2023-06-25 NOTE — PROGRESS NOTES
[facility]    Medicine Progress Note - Hospitalist Service    Date of Admission:  6/24/2023    Assessment & Plan   89 year old female with past medical history of asthma, bronchiectasis, DVT, A-fib, status post pacemaker, HLD, HTN, depression, and anxiety presented on 6/24/2023 with productive cough and shortness of breath for 1 week. Admitted with acute hypoxic respiratory failure initially thought to be due to exacerbation of bronchiectasis, however later was noted to be rhinovirus positive.  CT chest on admission noted new nodular airspace opacities groundglass, and extensive bronchiectasis with volume loss of the middle lobe and anterior right upper lobe, unchanged.  Started on prednisone (extensive wheezing) and nebulizers.    -Wean O2 as tolerated, goal sat > 90%. Continue nebulizers, prednisone,     Acute respiratory failure with hypoxia 2/2 Rhinovirus Infection  Bronchiectasis, likely without Acute Exacerbation   Asthma Exacerbation  -Wean O2 as tolerated, goal sat > 90%  -Sputum Cx pending  -Stop Levaquin at this time, given rhinovirus infection  -DuoNeb QID  -Start Mucinex BID   -Resume home Saline nebs  -Continue Prednisone 40 mg for total of 5 doses   -Noted to have significant wheezing  -Hold home inhaler Advair while on prednisone, but resume on discharge    Chronic Problems  Mild major depression  Anxiety  -Continue trazodone    Intermittent atrial fibrillation (H)  Cardiac pacemaker in situ  Rate controlled with metoprolol, anticoagulated with warfarin  -Continue metoprolol, continue warfarin-pharmacy to dose     Hypothyroidism  -Continue home Synthroid    Benign essential HTN  Continue home medication metoprolol    Long term current use of anticoagulant therapy  DVT (deep venous thrombosis)  Continue warfarin, pharmacy to dose    Mild persistent asthma without complication  -DuoNeb every 6 hours  -Continue home inhaler Advair on discharge    Chronic  "Hyponatremia  -Continue PO NaCl 1 gm BID       Diet: Low Saturated Fat Na <2400 mg    DVT Prophylaxis: Warfarin  Glasgow Catheter: Not present  Lines: None     Cardiac Monitoring: None  Code Status: No CPR- Pre-arrest intubation OK      Clinically Significant Risk Factors Present on Admission               # Drug Induced Coagulation Defect: home medication list includes an anticoagulant medication    # Hypertension: Noted on problem list      # Overweight: Estimated body mass index is 27.32 kg/m  as calculated from the following:    Height as of this encounter: 1.473 m (4' 10\").    Weight as of this encounter: 59.3 kg (130 lb 11.7 oz).            Disposition Plan     Expected Discharge Date: 06/26/2023                  Kevin Alvarado MD  Hospitalist Service  [facility]  Securely message with [employer] (more info)  Text page via [employer] Paging/Directory   ______________________________________________________________________    Interval History   No acute events overnight.     Patient seen and evaluated at bedside.  Feels a little better this morning, denies any chest pain.  Does have a mildly productive cough.    Spoke to patient and daughter about CODE STATUS, after some discussion patient states she would not want resuscitation.  Daughter present at time of discussion.    Physical Exam   Vital Signs: Temp: 97.4  F (36.3  C) Temp src: Oral BP: (!) 143/70 Pulse: 72   Resp: 16 SpO2: 95 % O2 Device: Nasal cannula Oxygen Delivery: 1 LPM  Weight: 130 lbs 11.72 oz    General: NAD  CV: +S1/S2, no pitting edema  Respiratory: Mild wheezing bilaterally  GI: soft, NT, non-distended  Neuro: alert    Medical Decision Making       55 MINUTES SPENT BY ME on the date of service doing chart review, history, exam, documentation & further activities per the note.      Data     I have personally reviewed the following data over the past 24 hrs:    8.1  \   11.3 (L)   / 198     130 (L) 97 (L) 15.5 /  104 (H)   4.0 " 23 0.44 (L) \       ALT: 9 AST: 21 AP: 63 TBILI: 0.2   ALB: 3.5 TOT PROTEIN: 6.2 (L) LIPASE: N/A       Trop: 14 BNP: 602       Procal: 0.06 (H) CRP: N/A Lactic Acid: N/A       INR:  1.86 (H) PTT:  N/A   D-dimer:  N/A Fibrinogen:  N/A       Imaging results reviewed over the past 24 hrs:   Recent Results (from the past 24 hour(s))   Chest CT w/o contrast    Narrative    EXAM: CT CHEST W/O CONTRAST  LOCATION: [facility]  DATE: 6/24/2023    INDICATION: Reported history of bronchiectasis, shortness of air  COMPARISON: 04/22/2023  TECHNIQUE: CT chest without IV contrast. Multiplanar reformats were obtained. Dose reduction techniques were used.  CONTRAST: None.    FINDINGS:   LUNGS AND PLEURA: Complete collapse of the middle lobe and anterior portion of the right upper lobe with associated bronchiectasis, unchanged. Cavitary and nodular opacities in the right upper lobe are unchanged, but there are many new nodular opacities   in the lower lobes, most of which have peripheral groundglass opacity (e.g. 7/158, 165, 212). Scattered bronchial wall thickening and endobronchial debris. No pleural effusion or pneumothorax.     MEDIASTINUM/AXILLAE: No lymphadenopathy. Left chest pacemaker. No thoracic aortic aneurysm    CORONARY ARTERY CALCIFICATION: Severe.    UPPER ABDOMEN: No acute findings.    MUSCULOSKELETAL: No aggressive or destructive osseous lesions. Degenerative changes in the spine with accentuated kyphosis.       Impression    IMPRESSION:   1.  Numerous new nodular airspace opacities with groundglass halo, likely infectious or inflammatory. Recommend follow-up CT in 6-12 weeks to assess for resolution. Associated bronchial wall thickening and endobronchial debris also raises concern for   aspiration.    2.  Extensive bronchiectasis with volume loss in the middle lobe and anterior right upper lobe, unchanged.

## 2023-06-25 NOTE — ED NOTES
[facility]   Admission Handoff    The patient is Ellie Leigh, 89 year old who arrived in the ED by WHEELCHAIR from home with a complaint of Nasal Congestion (green) and Cough (wheezing)  . The patient's current symptoms are an exacerbation of a chronic illness and during this time the symptoms have decreased. In the ED, patient was diagnosed with   Final diagnoses:   Acute respiratory failure with hypoxia (H)   Bronchiectasis with acute exacerbation (H)         Needed?: No    Allergies:    Allergies   Allergen Reactions    Cephalosporins Nausea     Cefzil    Doxycycline Nausea and Vomiting    Sulfa Antibiotics Nausea    Penicillins Rash     PCN       Past Medical Hx:   Past Medical History:   Diagnosis Date    Basal cell carcinoma     Breast cancer (H)     COPD (chronic obstructive pulmonary disease) (H)     ct 2014 does show some bronchiectaisi RUL as well as fibronodular disease bilat upper lobes    Dust allergy     DVT (deep vein thrombosis) in pregnancy     after neck fracture when in hospital    HTN (hypertension)     Hyperlipidemia LDL goal <130 10/31/2010    Hyponatremia     chronic    Hypothyroid     Intermittent atrial fibrillation (H) 12/1/2011    hx tachy-keri, has pacer, on chronic coumadin    Loose body in joint 4/15/2011    Neck fracture (H)     after a fall    Syncope 5/12/2013    multiple hospital visits, lates 3/2016, 6/2016, 7/2016 with no clear cause found       Initial vitals were: BP: (!) 183/76  Pulse: 69  Temp: 97.2  F (36.2  C)  Resp: 18  SpO2: 91 %   Recent vital Signs: BP (!) 183/76   Pulse 69   Temp 97.2  F (36.2  C) (Tympanic)   Resp 18   LMP 06/15/1985   SpO2 92%     Elimination Status: Continent: Yes     Activity Level: SBA w/ walker    Fall Status: Reason for falls risk:  Mobility  arm band in place and patient and family education    Baseline Mental status: WDL  Current Mental Status changes: at basesline    Infection present or  suspected this encounter: yes respiratory  Sepsis suspected: No    Isolation type: none    Bariatric equipment needed?: No    In the ED these meds were given:   Medications   levofloxacin (LEVAQUIN) infusion 750 mg (750 mg Intravenous $New Bag 6/24/23 1830)   Warfarin Dose Required Daily - Pharmacist Managed (has no administration in time range)   predniSONE (DELTASONE) tablet 40 mg (has no administration in time range)   ipratropium - albuterol 0.5 mg/2.5 mg/3 mL (DUONEB) neb solution 3 mL (has no administration in time range)   ipratropium - albuterol 0.5 mg/2.5 mg/3 mL (DUONEB) neb solution 3 mL (3 mLs Nebulization $Given 6/24/23 1410)   predniSONE (DELTASONE) tablet 40 mg (40 mg Oral $Given 6/24/23 1737)   warfarin ANTICOAGULANT (COUMADIN) tablet 1 mg (1 mg Oral $Given 6/24/23 1854)       Drips running?  Yes    Home pump  No    Current LDAs: Peripheral IV: Site Rt AC; Gauge 18  none     Results:   Labs/Imaging  Ordered and Resulted from Time of ED Arrival Up to the Time of Departure from the ED  Results for orders placed or performed during the hospital encounter of 06/24/23 (from the past 24 hour(s))   Group A Streptococcus PCR Throat Swab    Specimen: Throat; Swab   Result Value Ref Range    Group A strep by PCR Not Detected Not Detected    Narrative    The Xpert Xpress Strep A test, performed on the [employer], is a rapid, qualitative in vitro diagnostic test for the detection of Streptococcus pyogenes (Group A ß-hemolytic Streptococcus, Strep A) in throat swab specimens from patients with signs and symptoms of pharyngitis. The Xpert Xpress Strep A test can be used as an aid in the diagnosis of Group A Streptococcal pharyngitis. The assay is not intended to monitor treatment for Group A Streptococcus infections. The Xpert Xpress Strep A test utilizes an automated real-time polymerase chain reaction (PCR) to detect Streptococcus pyogenes DNA.   Symptomatic Influenza A/B, RSV, & SARS-CoV2 PCR  (COVID-19) Nose    Specimen: Nose; Swab   Result Value Ref Range    Influenza A PCR Negative Negative    Influenza B PCR Negative Negative    RSV PCR Negative Negative    SARS CoV2 PCR Negative Negative    Narrative    Testing was performed using the Xpert Xpress CoV2/Flu/RSV Assay on the [employer] Instrument. This test should be ordered for the detection of SARS-CoV-2, influenza, and RSV viruses in individuals who meet clinical and/or epidemiological criteria. Test performance is unknown in asymptomatic patients. This test is for in vitro diagnostic use under the FDA EUA for laboratories certified under CLIA to perform high or moderate complexity testing. This test has not been FDA cleared or approved. A negative result does not rule out the presence of PCR inhibitors in the specimen or target RNA in concentration below the limit of detection for the assay. If only one viral target is positive but coinfection with multiple targets is suspected, the sample should be re-tested with another FDA cleared, approved, or authorized test, if coinfection would change clinical management. This test was validated by the [facility] [employer]. These laboratories are certified under the Clinical Laboratory Improvement Amendments of 1988 (CLIA-88) as qualified to perform high complexity laboratory testing.   XR Chest 2 Views    Narrative    EXAM: XR CHEST 2 VIEWS  LOCATION: [facility]  DATE: 6/24/2023    INDICATION: Diffuse wheezing, concern for an acute pulmonary issue such as pneumonia  COMPARISON: Chest CT 04/22/2023      Impression    IMPRESSION: Stable cardiomediastinal silhouette with pacemaker leads overlying the right atrium and right ventricle. No significant change in biapical scarring, right greater than left. No definite new airspace consolidation, pleural effusion or   pneumothorax. Bones are unchanged. Diffuse osteopenia.   CBC with platelets differential    Narrative     The following orders were created for panel order CBC with platelets differential.  Procedure                               Abnormality         Status                     ---------                               -----------         ------                     CBC with platelets and d...[[id number]]  Abnormal            Final result                 Please view results for these tests on the individual orders.   Comprehensive metabolic panel   Result Value Ref Range    Sodium 130 (L) 136 - 145 mmol/L    Potassium 4.0 3.4 - 5.3 mmol/L    Chloride 97 (L) 98 - 107 mmol/L    Carbon Dioxide (CO2) 23 22 - 29 mmol/L    Anion Gap 10 7 - 15 mmol/L    Urea Nitrogen 15.5 8.0 - 23.0 mg/dL    Creatinine 0.44 (L) 0.51 - 0.95 mg/dL    Calcium 8.7 8.2 - 9.6 mg/dL    Glucose 104 (H) 70 - 99 mg/dL    Alkaline Phosphatase 63 35 - 104 U/L    AST 21 0 - 45 U/L    ALT 9 0 - 50 U/L    Protein Total 6.2 (L) 6.4 - 8.3 g/dL    Albumin 3.5 3.5 - 5.2 g/dL    Bilirubin Total 0.2 <=1.2 mg/dL    GFR Estimate 90 >60 mL/min/1.73m2   NT pro BNP   Result Value Ref Range    N terminal Pro BNP Inpatient 602 0 - 1,800 pg/mL   Troponin T, High Sensitivity   Result Value Ref Range    Troponin T, High Sensitivity 14 <=14 ng/L   Blood gas venous   Result Value Ref Range    pH Venous 7.37 7.32 - 7.43    pCO2 Venous 47 40 - 50 mm Hg    pO2 Venous 21 (L) 25 - 47 mm Hg    Bicarbonate Venous 27 21 - 28 mmol/L    Base Excess/Deficit (+/-) 1.5 -7.7 - 1.9 mmol/L    FIO2 14    [place] Draw    Narrative    The following orders were created for panel order [place] Draw.  Procedure                               Abnormality         Status                     ---------                               -----------         ------                     Extra Blue Top Tube[[id number]]                                                         Extra Red Top Tube[[id number]]                               Final result                 Please view results for these tests on the individual  orders.   CBC with platelets and differential   Result Value Ref Range    WBC Count 8.1 4.0 - 11.0 10e3/uL    RBC Count 3.82 3.80 - 5.20 10e6/uL    Hemoglobin 11.3 (L) 11.7 - 15.7 g/dL    Hematocrit 34.8 (L) 35.0 - 47.0 %    MCV 91 78 - 100 fL    MCH 29.6 26.5 - 33.0 pg    MCHC 32.5 31.5 - 36.5 g/dL    RDW 14.7 10.0 - 15.0 %    Platelet Count 198 150 - 450 10e3/uL    % Neutrophils 72 %    % Lymphocytes 20 %    % Monocytes 8 %    % Eosinophils 0 %    % Basophils 0 %    % Immature Granulocytes 0 %    NRBCs per 100 WBC 0 <1 /100    Absolute Neutrophils 5.7 1.6 - 8.3 10e3/uL    Absolute Lymphocytes 1.6 0.8 - 5.3 10e3/uL    Absolute Monocytes 0.7 0.0 - 1.3 10e3/uL    Absolute Eosinophils 0.0 0.0 - 0.7 10e3/uL    Absolute Basophils 0.0 0.0 - 0.2 10e3/uL    Absolute Immature Granulocytes 0.0 <=0.4 10e3/uL    Absolute NRBCs 0.0 10e3/uL   Extra Red Top Tube   Result Value Ref Range    Hold Specimen [facility]    INR   Result Value Ref Range    INR 1.76 (H) 0.85 - 1.15   [place] Draw    Narrative    The following orders were created for panel order [place] Draw.  Procedure                               Abnormality         Status                     ---------                               -----------         ------                     Extra Green Top (Lithium...[[id number]]                      Final result                 Please view results for these tests on the individual orders.   Extra Green Top (Lithium Heparin) Tube   Result Value Ref Range    Hold Specimen [facility]    Chest CT w/o contrast    Narrative    EXAM: CT CHEST W/O CONTRAST  LOCATION: [facility]  DATE: 6/24/2023    INDICATION: Reported history of bronchiectasis, shortness of air  COMPARISON: 04/22/2023  TECHNIQUE: CT chest without IV contrast. Multiplanar reformats were obtained. Dose reduction techniques were used.  CONTRAST: None.    FINDINGS:   LUNGS AND PLEURA: Complete collapse of the middle lobe and anterior portion of the right upper  lobe with associated bronchiectasis, unchanged. Cavitary and nodular opacities in the right upper lobe are unchanged, but there are many new nodular opacities   in the lower lobes, most of which have peripheral groundglass opacity (e.g. 7/158, 165, 212). Scattered bronchial wall thickening and endobronchial debris. No pleural effusion or pneumothorax.     MEDIASTINUM/AXILLAE: No lymphadenopathy. Left chest pacemaker. No thoracic aortic aneurysm    CORONARY ARTERY CALCIFICATION: Severe.    UPPER ABDOMEN: No acute findings.    MUSCULOSKELETAL: No aggressive or destructive osseous lesions. Degenerative changes in the spine with accentuated kyphosis.       Impression    IMPRESSION:   1.  Numerous new nodular airspace opacities with groundglass halo, likely infectious or inflammatory. Recommend follow-up CT in 6-12 weeks to assess for resolution. Associated bronchial wall thickening and endobronchial debris also raises concern for   aspiration.    2.  Extensive bronchiectasis with volume loss in the middle lobe and anterior right upper lobe, unchanged.         *Note: Due to a large number of results and/or encounters for the requested time period, some results have not been displayed. A complete set of results can be found in Results Review.       For the majority of the shift this patient's behavior was Green     Cardiac Rhythm: Atrial rhythm - paced rhythm   Pt needs tele? No  Skin/wound Issues: None    Code Status: Full Code    Pain control: pt had none    Nausea control: pt had none    Abnormal labs/tests/findings requiring intervention: none    Patient tested for COVID 19 prior to admission: YES     OBS brochure/video discussed/provided to patient/family: N/A     Family present during ED course? Yes     Family Comments/Social Situation comments: none    Tasks needing completion: None    Ross Galvez RN

## 2023-06-26 NOTE — PROGRESS NOTES
[facility] Medicine Progress Note  Date of Service: 06/26/2023     Assessment & Plan   89 year old female with past medical history of asthma, bronchiectasis, DVT, A-fib, status post pacemaker, HLD, HTN, depression, and anxiety presented on 6/24/2023 with productive cough and shortness of breath for 1 week. Admitted with acute hypoxic respiratory failure initially thought to be due to exacerbation of bronchiectasis, however later was noted to be rhinovirus positive.  CT chest on admission noted new nodular airspace opacities groundglass, and extensive bronchiectasis with volume loss of the middle lobe and anterior right upper lobe, unchanged.  Started on prednisone (extensive wheezing) and nebulizers.    Acute respiratory failure with hypoxia 2/2 Rhinovirus infection  Bronchiectasis, likely without acute exacerbation   Asthma exacerbation, acute    Still requiring O2 supplementation 1-2 LPM. On prednisone burst, nebulized albuterol and ipratropium and Mucinex. Rhinovirus positive. Levofloxacin stopped due to likely viral etiology. Sputum culture positive 1+ Pseudomonas which may be colonizer. Sensitivities pending but likely doesn't need treatment.    -Wean O2 as tolerated, goal sat > 90%  -continue DuoNeb QID  - continue Mucinex BID   - Resumed home Saline nebs  - Continue Prednisone 40 mg for total of 5 doses  -Hold home inhaler Advair while on prednisone, but resume on discharge    Chronic Problems  Mild major depression  Anxiety  -Continue trazodone    Intermittent atrial fibrillation (H)  Cardiac pacemaker in situ    Rate controlled with metoprolol, anticoagulated with warfarin  -Continue metoprolol, continue warfarin-pharmacy to dose     Hypothyroidism  - Continue home Synthroid    Benign essential HTN  - Continue home medication metoprolol    Long term current use of anticoagulant therapy  DVT (deep venous thrombosis)    INR subtherapeutic since admission 1.76 ? ?  "1.95.   - Continue warfarin, pharmacy to dose    Mild persistent asthma without complication  -DuoNeb every 6 hours  -Continue home inhaler Advair on discharge    Chronic Hyponatremia    Na recent baseline normal 137 on PTA PO NaCl 1 gm BID but 130 on admission. Recheck today 129.    - continue NaCl 1g BID, follow sodium but no new intervention at this point       Diet: Minced & Moist Diet (level 5) Thin Liquids (level 0)    DVT Prophylaxis: Warfarin  Glasgow Catheter: Not present  Lines: None     Cardiac Monitoring: None  Code Status: No CPR- Pre-arrest intubation OK      Clinically Significant Risk Factors         # Hyponatremia: Lowest Na = 129 mmol/L in last 2 days, will monitor as appropriate    # Hypomagnesemia: Lowest Mg = 1.6 mg/dL in last 2 days, will replace as needed       # Hypertension: Noted on problem list        # Overweight: Estimated body mass index is 27.32 kg/m  as calculated from the following:    Height as of this encounter: 1.473 m (4' 10\").    Weight as of this encounter: 59.3 kg (130 lb 11.7 oz)., PRESENT ON ADMISSION            Discussion: Stable, still requiring O2. Expect will improve gradually. Discussed with patient and family at bedside.     Disposition Plan      Expected Discharge Date: 06/27/2023                  Medical Decision Making       40 MINUTES SPENT BY ME on the date of service doing chart review, history, exam, documentation & further activities per the note.        Saeid Sosa MD  [facility] Medicine        Interval History   Still with cough productive of some brownish sputum. Otherwise about the same. No chest pain.     Physical Exam   Temp:  [97.5  F (36.4  C)-98.4  F (36.9  C)] 98.3  F (36.8  C)  Pulse:  [74-85] 78  Resp:  [18-20] 19  BP: (143-161)/(45-77) 161/77  SpO2:  [85 %-95 %] 92 %    Weights:   Vitals:    06/24/23 2001   Weight: 59.3 kg (130 lb 11.7 oz)    Body mass index is 27.32 kg/m .    Constitutional: alert and oriented, weak appearing but nontoxic  CV: " Regular, no edema  Respiratory: decreased breath sounds throughout, mild, coarse bilateral exp wheezes with few scattered crackles   GI: Soft, non-tender, bowel sounds normal  Skin: Warm and dry    Data   Recent Labs   Lab 06/26/23  0524 06/25/23  0520 06/24/23  1648 06/24/23  1609   WBC 10.5  --   --  8.1   HGB 10.5*  --   --  11.3*   MCV 87  --   --  91     --   --  198   INR 1.95* 1.86* 1.76*  --    *  --   --  130*   POTASSIUM 4.0  --   --  4.0   CHLORIDE 93*  --   --  97*   CO2 27  --   --  23   BUN 14.8  --   --  15.5   CR 0.50*  --   --  0.44*   ANIONGAP 9  --   --  10   DOUG 8.9  --   --  8.7   *  --   --  104*   ALBUMIN 3.5  --   --  3.5   PROTTOTAL 6.0*  --   --  6.2*   BILITOTAL 0.3  --   --  0.2   ALKPHOS 58  --   --  63   ALT 10  --   --  9   AST 23  --   --  21       Recent Labs   Lab 06/26/23  0524 06/24/23  1609   * 104*        Unresulted Labs Ordered in the Past 30 Days of this Admission     Date and Time Order Name Status Description    6/24/2023  3:16 PM Respiratory Aerobic Bacterial Culture with Gram Stain Preliminary            Imaging: No results found for this or any previous visit (from the past 24 hour(s)).     I reviewed all new labs and imaging results over the last 24 hours. I personally reviewed no images or EKG's today.    Medications     - MEDICATION INSTRUCTIONS -         [Held by provider] albuterol  2.5 mg Nebulization 3 times daily     [Held by provider] fluticasone-vilanterol  1 puff Inhalation Daily     guaiFENesin  600 mg Oral BID     ipratropium - albuterol 0.5 mg/2.5 mg/3 mL  3 mL Nebulization 4x daily     levothyroxine  50 mcg Oral Daily     magnesium sulfate  4 g Intravenous Once     metoprolol succinate ER  50 mg Oral BID     predniSONE  40 mg Oral Daily     sodium chloride  3 mL Nebulization TID     sodium chloride (PF)  3 mL Intracatheter Q8H     sodium chloride  1 g Oral BID     traZODone  50 mg Oral At Bedtime     warfarin ANTICOAGULANT  1 mg  Oral ONCE at 18:00     Warfarin Therapy Reminder  1 each Oral See Admin Instructions   Reviewed meds    Saeid Sosa MD  [facility] Medicine

## 2023-06-26 NOTE — PLAN OF CARE
"Reason for Admission:  Rhinovirus       Activity: up with 1 assist and a walker ambulating to bathroom        Neuro: alert and oriented      Respiratory: shortness of breath with exertion. Patients O2 sat dropped to 85% on room air at rest, has been able to maintain oxygen saturation greater than 90% on 1L.  Has frequent harsh productive cough      GI/: occasional dribbling and incontinence with coughing episodes        Diet: Minced moist diet       Lines/Drains: IV saline locked       Vitals: BP (!) 164/77 (BP Location: Right arm)   Pulse 77   Temp 98.8  F (37.1  C) (Oral)   Resp 20   Ht 1.473 m (4' 10\")   Wt 59.3 kg (130 lb 11.7 oz)   LMP 06/15/1985   SpO2 93%   BMI 27.32 kg/m        Labs: Mag 1.6 was replaced but is not on replacement protocol       Pain: was given Tylenol for headache       Plan: discharge home in 1 day    "

## 2023-06-26 NOTE — PROGRESS NOTES
"SPIRITUAL HEALTH SERVICES  SPIRITUAL ASSESSMENT Progress Note  [facility] - Med Surg    Referral Source: Patient request on admission    I shared a reflective conversation with Ellie which incorporated elements of illness and family narrative along with spiritual history.    - Ellie was being visited by one of her 5 daughters, Rosemary, who lives near [facility].  Ellie stated that it is good to have 5 daughters.  \"They take good care of me.\"  She said that she grew up with 8 brothers.  When she was having her first child \"I prayed, let me have a girl.\"  She said \"I guess I prayed well because I have 5.\"    - I commented that normally when I have visited during previous hospitalizations that she is watching the [employer] station.  She stated that today she isn't feeling well enough to want something on the TV.  Her cough is getting \"a little better.\"      - We concluded our visit with prayer including the \"Lord's Prayer.\"    Plan:  will monitor patient's ongoing need for support during LOS.      Aaron Aly M.A., [facility]  Lead   [facility]  Office: [phone]  Pager [phone]    "

## 2023-06-26 NOTE — PLAN OF CARE
LS coarse with expiratory wheeze. Oxygen sat 90-93% on 1L oxygen. Patient continues to have frequent cough. Given guaifenisen x1. She requested Tylenol for headache at bedtime. Reports Tylenol effective. Denies pain now.

## 2023-06-27 NOTE — PROGRESS NOTES
"[place] [facility] ADMISSION NOTE    Patient admitted to room 1005 at approximately 1600 via cart from [place] [facility]306. Patient was accompanied by nurse.     Verbal SBAR report received from 2306 prior to patient arrival.     Patient trasferred to bed via air carol ann. Patient alert and oriented X 3. The patient is not having any pain.  . Admission vital signs: Blood pressure (!) 188/93, pulse 82, temperature 98.3  F (36.8  C), temperature source Oral, resp. rate 25, height 1.473 m (4' 10\"), weight 59.3 kg (130 lb 11.7 oz), last menstrual period 06/15/1985, SpO2 98 %, not currently breastfeeding. Patient and daughter was oriented to plan of care, call light, bed controls, tv, telephone, bathroom and visiting hours.     Risk Assessment    The following safety risks were identified during admission: fall. Yellow risk band applied: YES.     Skin Initial Assessment    This writer admitted this patient and completed a full skin assessment and Jose Daniel score in the Adult PCS flowsheet. Appropriate interventions initiated as needed.     Secondary skin check completed by AYLIN Bucio.    Jose Daniel Risk Assessment  Sensory Perception: 4-->no impairment  Moisture: 3-->occasionally moist  Activity: 3-->walks occasionally  Mobility: 3-->slightly limited  Nutrition: 3-->adequate  Friction and Shear: 3-->no apparent problem  Jose Daniel Score: 19  Moisture Interventions: Incontinence pad, Urinary collection device  Mattress: Standard gel/foam mattress (IsoFlex, Atmos Air, etc.)  Bed Frame: Standard width and length    Education    Patient has a [place] to Observation order: No  Observation education completed and documented: N/A      Piedad Gonzalez RN    "

## 2023-06-27 NOTE — PROVIDER NOTIFICATION
Provider Notification     Message: Pt change in status with her breathing. Needing mask and an increase in oxygen to maintain 93% sats.     Dr.Hemmila lee at 11:22     Will continue to monitor.

## 2023-06-27 NOTE — PROGRESS NOTES
Skin affirmation note    Admitting nurse completed full skin assessment, Jose Daniel score and Jose Daniel interventions. This writer agrees with the initial skin assessment findings.

## 2023-06-27 NOTE — PROGRESS NOTES
End Of Shift Note    Situation: Increasing soa, hx of copd, asthma. Tested + for Rhinovirus, was on medsurg but o2 needs increased along with cough, mucus and exp wheezes.    Plan: ICU for Bipap, on ABX, oral steroids, Nebs, Mucinex, Robitussin    Subjective/Objective:    Neuro: A & O times 4, not impulsive    Cardiac: Afib CVR (has history) mild hypertension on Metoprolol. Afebrile    Resp: Bipap 13/6, 40% sats mid 90's. Soa and wheezy when off Bipap, increased RR. Congested cough with thick tan mucus    GI/: Ate some of meals today, appetite decreased. On Moist minced diet with thin liquids. Smear Bm today, incontinent of urine R/T freq coughing.    Endo: . Pt not diabetic but is on Prednisone 40mg    MSK: Transfer to [facility] with walker GB and asst 1, she is steady but tires and soa with activity    Skin: Skin is dry, no redness or open areas, bruises on forarms    LDAs:Right PIV x 1

## 2023-06-27 NOTE — PROGRESS NOTES
Time: Assumed care of this patient at 1900    Reason for Admission: Rhinovirus    Activity: 1 Assist, walker and gait belt up to BR.    Neuro: A/O x4    GI/: dribbles with cough.    Respiratory Oxygen @1 L N/C 93%  fair effort. Frequent harsh productive cough. Neb as scheduled x 1. Lungs coarse and expiratory whezes. Robitussin x 1@ 2327. Remains on droplet precautions.    Cardiac WNL    Skin: red blanchable coccyx.    Diet: Minced moist diet.    IV Access: SL    Vitals: Temp: 97.8  F (36.6  C) Temp src: Oral BP: (!) 159/72 Pulse: 86   Resp: 18 SpO2: 92 % O2 Device: Nasal cannula Oxygen Delivery: 1 LPM    Pain: Denies    Plan:  Discharge home with daughter 6/27/23

## 2023-06-27 NOTE — PROGRESS NOTES
"Time: 6835-9459       Reason for Admission: Rhinovirus       Activity: AX1       Neuro: WNL       GI/:  WNL       Lines/Drains: PIV saline locked        Vitals: BP (!) 152/78   Pulse 78   Temp 97.8  F (36.6  C) (Oral)   Resp 28   Ht 1.473 m (4' 10\")   Wt 59.3 kg (130 lb 11.7 oz)   LMP 06/15/1985   SpO2 92%   BMI 27.32 kg/m         Pain: Arthritic pain in knees managed per MAR       Plan: Transfer to ICU as patient needing more oxygen support.       Labs: LA- 1.5 (triggered sepsis),CRP- 54.31, Procalcitonin 0.07, WBC 12.6      PRN Meds: Tylenol. Robitussin.       Important Events: Paged provider about change in respiratory status. Triggered sepsis.                     "

## 2023-06-27 NOTE — PROGRESS NOTES
Patient started on Bipap 13/6, 40% secondary to increasing hpoxia, RR and SOA.  Patient appears to be tolerating this well. Current O2 sats are  96-97%. Will titrate and adjust settings as needed and able. Bs markedly diminished on L with exp wheeze with some improvement post neb and cough.

## 2023-06-27 NOTE — PROGRESS NOTES
[facility] TRANSPORT NOTE  Data:   Reason for Transport:  ICU    Ellie Leigh was transported to ICU via cart at 1545.  Patient was accompanied by Registered Nurse. Equipment used for transport: Cardiac monitor and oxygen . Family was aware of reason for transport: yes    Action:  Report: given to Dariana VIERA    Response:  Patient's condition when transferred off unit was stable.    Seven Rosenthal RN

## 2023-06-27 NOTE — PROGRESS NOTES
[facility] Medicine Progress Note  Date of Service: 06/27/2023     Assessment & Plan   89 year old female with past medical history of asthma, bronchiectasis, DVT, A-fib, status post pacemaker, HLD, HTN, depression, and anxiety presented on 6/24/2023 with productive cough and shortness of breath for 1 week.      Acute respiratory failure with hypoxia   Rhinovirus infection, acute  Asthma exacerbation, possible acute  Bronchiectasis, suspect acute exacerbation   Pneumonia bilateral lower lobes, possibly due to Pseudomonas    Admitted with acute hypoxic respiratory failure initially thought to be due to exacerbation of bronchiectasis and pneumonia.  CT chest on admission noted new nodular airspace opacities groundglass, and extensive bronchiectasis with volume loss of the middle lobe and anterior right upper lobe, unchanged.  Initially started on treatment for bronchiectasis exacerbation with pneumonia with steroid burst, nebulized albuterol and ipratropium, guaifenesin and levofloxacin.  Respiratory panel positive for rhinovirus, and levofloxacin stopped after one dose due to likely viral etiology. Sputum culture positive 1+ Pseudomonas suspected to be colonizer. Patient was gradually improving and needing 1-2 L supplemental O2 6/26 to 6/27 morning.     Acutely worsening hypoxemia and work of breathing late morning today 6/27 associated with decreased air excursion but no fever, tachycardia or appearance of sepsis, WBC mild up 12.6, CRP 54, procalcitonin 0.07. No heart failure clinically. CXR obtained and images reviewed and discussed with radiologist. There is new air space opacity right lower lobe compared with 6/24 imaging. Additional nebs given with minimal benefit. Possible bronchiectasis exacerbation and mucus plugging though concerned for pneumonia given increased infiltrate.    Plan:   - Start cefepime IV (6/27 - ) dosed for Pseudomonas and double cover with cipro IV  (6/27 - ) also to cover atypicals, follow up sensitivities on sputum culture and narrow spectrum when able   - Transfer to ICU for BiPAP as patient at risk of worsening failure given increased work of breathing and age   - Reviewed code status again with patient and daughter at bedside and confirmed DNR but not DNI   - Continue nebulized albuterol and ipratropium    - Continue Mucinex BID    - Continue home Saline nebs TID (important for possible mucus plugging)   - Continue prednisone burst 40 mg started 6/24 at least through 6/28   - Hold home inhaler Advair while on prednisone, but resume on discharge  Addendum: Patient tolerating BiPAP this afternoon/evening. Discussed with ICU RN and patient. Will try to use BiPAP as tolerated overnight.     Intermittent atrial fibrillation (H)  Long term current use of anticoagulant therapy  Cardiac pacemaker in situ    Rate controlled with metoprolol. INR subtherapeutic on admission 1.76 ? ? 1.95 ? 2.05 on 6/27.   - Continue warfarin, pharmacy to dose   - Continue metoprolol      Benign essential hypertension     Managed PTA with metoprolol XL. BP running mild to mod up this hospitalization likely due to stress of acute illness, hospitalization.    - Continue home medication metoprolol   - follow BP but would not treat acutely unless symptomatic or persistently/consistenly > 180/120    Mild persistent asthma without complication    Managed PTA with Advair 250-50, albuterol nebs and ipratropium nebs together QID.     Suspect exacerbation as above.     Bronchiectasis     Managed PTA with guaifenesin BID and 3% saline nebs TID.    Suspect exacerbation as above.    Acute on chronic hyponatremia    Na recent baseline normal 137 on PTA PO NaCl 1 gm BID but Na 130 on admission. F/u 129 ? 130. Appears euvolemic on exam. Asymptomatic.     - continue NaCl 1g BID, follow sodium with AM labs, but no new intervention at this point    Hypothyroidism  - Continue home Synthroid    Mild major  "depression  Anxiety  -Continue trazodone    H/o DVT (deep venous thrombosis), distant    Distant h/o right femoral vein DVT (June 2011) related to trauma hospitalization for C2 fracture and placement of right femoral vein catheter, s/p course of anticoagulation. This provoked DVT does not suggest a long term hypercoagulable state.       Diet: Minced & Moist Diet (level 5) Thin Liquids (level 0)    DVT Prophylaxis: Warfarin  Glasgow Catheter: Not present  Lines: None     Cardiac Monitoring: None  Code Status: No CPR- Pre-arrest intubation OK      Clinically Significant Risk Factors         # Hyponatremia: Lowest Na = 129 mmol/L in last 2 days, will monitor as appropriate    # Hypomagnesemia: Lowest Mg = 1.6 mg/dL in last 2 days, will replace as needed       # Hypertension: Noted on problem list        # Overweight: Estimated body mass index is 27.32 kg/m  as calculated from the following:    Height as of this encounter: 1.473 m (4' 10\").    Weight as of this encounter: 59.3 kg (130 lb 11.7 oz)., PRESENT ON ADMISSION        Discussion: Patient became more acutely short of breath late morning.  Suspect this could be related to mucous plugging given the acute nature and the fact that she does not appear septic or in heart failure.  However there is increased particularly right lower lobe infiltrate and patient has increased work of breathing with risk of further decompensation.  Started treatment for pneumonia and transfer to ICU for BiPAP support.  Discussed at length with the patient and daughter at bedside who are in agreement.    Disposition Plan     Expected Discharge Date: 06/27/2023                  Medical Decision Making       75 MINUTES SPENT BY ME on the date of service doing chart review, history, exam, documentation & further activities per the note.        Saeid Sosa MD  Hospital Medicine        Interval History   Patient was on 1 L O2 this morning then late morning noted to have increased work of " breathing and increased O2 needs requiring 5 L O2 by simple facemask.  Patient admits to some increased work of breathing and not feeling as well as she had been but denies significant distress despite her appearance.  No chest pain.  Still having cough of some thick sputum.  No fevers or chills today.    Physical Exam   Temp:  [97.8  F (36.6  C)-98.8  F (37.1  C)] 98.1  F (36.7  C)  Pulse:  [71-86] 86  Resp:  [18-28] 28  BP: (139-168)/(69-88) 139/69  SpO2:  [91 %-95 %] 94 %    Weights:   Vitals:    06/24/23 2001   Weight: 59.3 kg (130 lb 11.7 oz)    Body mass index is 27.32 kg/m .    Constitutional: Alert, oriented, moderate increased work of breathing with some appearance of mild respiratory distress though does not appear toxic  CV: Regular, trace lower extremity edema right more than left, no jugular distention  Respiratory: Markedly decreased breath sounds bilaterally with decreased air excursion throughout and a few scattered rhonchi and rare crackles with significant expiratory wheezes.  GI: Soft, non-tender, bowel sounds normal  Skin: Warm and dry    Data   Recent Labs   Lab 06/27/23  0540 06/26/23  0524 06/25/23  0520 06/24/23  1648 06/24/23  1609   WBC 12.6* 10.5  --   --  8.1   HGB 11.0* 10.5*  --   --  11.3*   MCV 90 87  --   --  91    205  --   --  198   INR 2.05* 1.95* 1.86*   < >  --    * 129*  --   --  130*   POTASSIUM 4.1 4.0  --   --  4.0   CHLORIDE 92* 93*  --   --  97*   CO2 27 27  --   --  23   BUN 16.8 14.8  --   --  15.5   CR 0.47* 0.50*  --   --  0.44*   ANIONGAP 11 9  --   --  10   DOUG 8.5 8.9  --   --  8.7   * 106*  --   --  104*   ALBUMIN  --  3.5  --   --  3.5   PROTTOTAL  --  6.0*  --   --  6.2*   BILITOTAL  --  0.3  --   --  0.2   ALKPHOS  --  58  --   --  63   ALT  --  10  --   --  9   AST  --  23  --   --  21    < > = values in this interval not displayed.       Recent Labs   Lab 06/27/23  0540 06/26/23  0524 06/24/23  1609   * 106* 104*        Unresulted  Labs Ordered in the Past 30 Days of this Admission     Date and Time Order Name Status Description    6/27/2023  1:29 PM Procalcitonin In process     6/24/2023  3:16 PM Respiratory Aerobic Bacterial Culture with Gram Stain Preliminary            Imaging:   Recent Results (from the past 24 hour(s))   XR Chest Port 1 View    Narrative    CHEST PORTABLE ONE VIEW June 27, 2023 12:12 PM     HISTORY: Acutely worsening respiratory distress and hypoxemia,  evaluate for worsening infiltrates.    COMPARISON: Chest CT on 6/24/2023.      Impression    IMPRESSION: Single AP view of the chest was obtained. Left chest wall  dual-lead automatic implantable cardiac defibrillator leads are in  stable position. Cardiomediastinal silhouette is within normal limits.  Right apical coarse interstitial pulmonary opacities, consistent with  the previously seen area of apical bronchiectasis with associated  atelectasis/consolidation. Basilar patchy nodular pulmonary opacities,  right more than left, increased as compared to 6/24/2023, likely  infectious. No significant pleural effusion or pneumothorax.    GILLIAN SOUZA MD         SYSTEM ID:  [id number]        I reviewed all new labs and imaging results over the last 24 hours. I personally reviewed the chest x-ray image(s) showing Obtained today and reviewed and compared to prior chest x-rays this admission and CT chest this admission personally with radiologist and there is a new right lower lobe infiltrate that appears inflammatory, some more subtle left lower lobe infiltrates which appear a little more prominent than admission, right upper lobe consolidation was also seems a little more prominent than admission, no pleural effusions or vascular congestion.    Medications     - MEDICATION INSTRUCTIONS -         [Held by provider] albuterol  2.5 mg Nebulization 3 times daily     ceFEPIme  2 g Intravenous Q8H     ciprofloxacin  400 mg Intravenous Q12H     [Held by provider]  fluticasone-vilanterol  1 puff Inhalation Daily     guaiFENesin  600 mg Oral BID     ipratropium - albuterol 0.5 mg/2.5 mg/3 mL  3 mL Nebulization 4x daily     levothyroxine  50 mcg Oral Daily     metoprolol succinate ER  50 mg Oral BID     predniSONE  40 mg Oral Daily     sodium chloride (PF)  3 mL Intracatheter Q8H     sodium chloride  1 g Oral BID     traZODone  50 mg Oral At Bedtime     warfarin ANTICOAGULANT  1 mg Oral ONCE at 18:00     Warfarin Therapy Reminder  1 each Oral See Admin Instructions   Reviewed    Saeid Sosa MD  [facility] Medicine

## 2023-06-27 NOTE — TELEPHONE ENCOUNTER
"Patient currently inpatient.      Requested Prescriptions   Pending Prescriptions Disp Refills     metoprolol succinate ER (TOPROL XL) 25 MG 24 hr tablet [Pharmacy Med Name: Metoprolol Succinate ER Oral Tablet Extended Release 24 Hour 25 MG] 360 tablet 0     Sig: TAKE TWO TABLETS BY MOUTH TWICE DAILY       Beta-Blockers Protocol Failed - 6/27/2023  2:01 AM        Failed - Blood pressure under 140/90 in past 12 months     BP Readings from Last 3 Encounters:   06/27/23 139/69   05/18/23 120/70   05/08/23 138/60                 Passed - Patient is age 6 or older        Passed - Recent (12 mo) or future (30 days) visit within the authorizing provider's specialty     Patient has had an office visit with the authorizing provider or a provider within the authorizing providers department within the previous 12 mos or has a future within next 30 days. See \"Patient Info\" tab in inbasket, or \"Choose Columns\" in Meds & Orders section of the refill encounter.              Passed - Medication is active on med list             "

## 2023-06-28 NOTE — PLAN OF CARE
Goal Outcome Evaluation:  Pt off bipap about 30 minutes B/4 she stated it was getting hard to breathe. Sats 98% on 5L NC. Pt offers no other complaints at present.bipap on , same settings.

## 2023-06-28 NOTE — PROGRESS NOTES
[facility]    Hospitalist Progress Note    Date of Service (when I saw the patient): 06/28/2023    Assessment & Plan   89 year old female with past medical history of asthma, bronchiectasis, DVT, A-fib, status post pacemaker, HLD, HTN, depression, and anxiety presented on 6/24/2023 with productive cough and shortness of breath for 1 week.       Acute respiratory failure with hypoxia   Rhinovirus infection, acute  Asthma exacerbation, possible acute  Bronchiectasis, suspect acute exacerbation   Pneumonia bilateral lower lobes, possibly due to Pseudomonas    Admitted with acute hypoxic respiratory failure initially thought to be due to exacerbation of bronchiectasis and pneumonia.  CT chest on admission noted new nodular airspace opacities groundglass, and extensive bronchiectasis with volume loss of the middle lobe and anterior right upper lobe, unchanged.  Initially started on treatment for bronchiectasis exacerbation with pneumonia with steroid burst, nebulized albuterol and ipratropium, guaifenesin and levofloxacin.  Respiratory panel positive for rhinovirus, and levofloxacin stopped after one dose due to likely viral etiology. Sputum culture positive 1+ Pseudomonas suspected to be colonizer. Patient was gradually improving and needing 1-2 L supplemental O2 6/26 to 6/27 morning.     Acutely worsening hypoxemia and work of breathing late morning today 6/27 associated with decreased air excursion but no fever, tachycardia or appearance of sepsis, WBC mild up 12.6, CRP 54, procalcitonin 0.07. No heart failure clinically. CXR obtained and images reviewed and discussed with radiologist. There is new air space opacity right lower lobe compared with 6/24 imaging. Additional nebs given with minimal benefit. Possible bronchiectasis exacerbation and mucus plugging though concerned for pneumonia given increased infiltrate.    Plan:   - Start cefepime IV (6/27 - ) dosed for Pseudomonas and double  cover with cipro IV (6/27 -6/28 ) also to cover atypicals, follow up sensitivities on sputum culture and narrow spectrum when able   - Transfer to ICU for BiPAP as patient at risk of worsening failure given increased work of breathing and age   - Reviewed code status again with patient and daughter at bedside and confirmed DNR but not DNI   - Continue nebulized albuterol and ipratropium    - Continue Mucinex BID    - Continue home Saline nebs TID (important for possible mucus plugging)   - Continue prednisone burst 40 mg started 6/24 at least through 6/28   - Hold home inhaler Advair while on prednisone, but resume on discharge   - Patient able to eat breakfast off bipap but then back on.  Was off bipap briefly for lunch, but then tired and placed back on.  - Continue to wean oxygen as tolerated  - Per AMT, discontinue ciprofloxacin and start metronidazole.  Continue cefepime.    Hyponatremia  Sodium decreased to 126 on 6/28.  -Check urine sodium and urine osmolality  -Repeat BMP in a.m.     Intermittent atrial fibrillation (H)  Long term current use of anticoagulant therapy  Cardiac pacemaker in situ    Rate controlled with metoprolol. INR subtherapeutic on admission 1.76 ? ? 1.95 ? 2.05 on 6/27.   - Continue warfarin, pharmacy to dose   - Continue metoprolol      Benign essential hypertension     Managed PTA with metoprolol XL. BP running mild to mod up this hospitalization likely due to stress of acute illness, hospitalization.    - Continue home medication metoprolol   - Follow BP but would not treat acutely unless symptomatic or persistently/consistenly > 180/120     Mild persistent asthma without complication    Managed PTA with Advair 250-50, albuterol nebs and ipratropium nebs together QID.     Suspect exacerbation as above.      Bronchiectasis     Managed PTA with guaifenesin BID and 3% saline nebs TID.    Suspect exacerbation as above.     Acute on chronic hyponatremia    Na recent baseline normal 137 on  PTA PO NaCl 1 gm BID but Na 130 on admission. F/u 129 ? 130. Appears euvolemic on exam. Asymptomatic.     - continue NaCl 1g BID, follow sodium with AM labs, but no new intervention at this point     Hypothyroidism  - Continue home Synthroid     Mild major depression  Anxiety  -Continue trazodone     H/o DVT (deep venous thrombosis), distant    Distant h/o right femoral vein DVT (June 2011) related to trauma hospitalization for C2 fracture and placement of right femoral vein catheter, s/p course of anticoagulation. This provoked DVT does not suggest a long term hypercoagulable state.          Diet: Minced & Moist Diet (level 5) Thin Liquids (level 0)    DVT Prophylaxis: Warfarin  Glasgow Catheter: Not present  Lines: None     Cardiac Monitoring: None  Code Status: No CPR- Pre-arrest intubation OK         Disposition: Expected discharge in 3 to 5 days days once hypoxia improved.    Aaron Martinez MD    Interval History   Patient is sitting in bed, eating breakfast.  She complains of generalized weakness but denies shortness of breath.  Appetite is modest.    -Data reviewed today: I reviewed all new labs and imaging results over the last 24 hours. I personally reviewed no images or EKG's today.    Physical Exam   Temp: 97.8  F (36.6  C) Temp src: Axillary BP: 132/62 Pulse: 68   Resp: 19 SpO2: 95 % O2 Device: BiPAP/CPAP Oxygen Delivery: 5 LPM  Vitals:    06/24/23 2001 06/27/23 1700   Weight: 59.3 kg (130 lb 11.7 oz) 60.2 kg (132 lb 11.5 oz)     Vital Signs with Ranges  Temp:  [97  F (36.1  C)-98.8  F (37.1  C)] 97.8  F (36.6  C)  Pulse:  [65-93] 68  Resp:  [10-32] 19  BP: (123-193)/(55-94) 132/62  FiO2 (%):  [40 %] 40 %  SpO2:  [91 %-99 %] 95 %  I/O last 3 completed shifts:  In: 975 [P.O.:375; I.V.:600]  Out: -     Gen: Cachectic, debilitated, elderly female resting in bed alert and oriented x 3.  HEENT: Atraumatic, bitemporal wasting; sclera non-injected, anicterric; oral mucosa moist, no lesion, no exudate  Lungs:  Diffuse bilateral rhonchi with scattered wheezes, no rales  Heart: Regular rate, regular rhythm, no gallops, no rubs, no murmurs  GI: Bowel sound normal, no hepatosplenomegaly, no masses, non-tender, non-distended, no guarding, no rebound tenderness  Lymph: No lymphadenopathy, no edema  Skin: No rashes, no chronic venous stasis     Medications     - MEDICATION INSTRUCTIONS -         [Held by provider] albuterol  2.5 mg Nebulization 3 times daily     ceFEPIme  2 g Intravenous Q12H     ciprofloxacin  400 mg Intravenous Q12H     [Held by provider] fluticasone-vilanterol  1 puff Inhalation Daily     guaiFENesin  600 mg Oral BID     ipratropium - albuterol 0.5 mg/2.5 mg/3 mL  3 mL Nebulization 4x daily     levothyroxine  50 mcg Oral Daily     metoprolol succinate ER  50 mg Oral BID     sodium chloride  3 mL Nebulization TID     sodium chloride (PF)  3 mL Intracatheter Q8H     sodium chloride  1 g Oral BID     traZODone  50 mg Oral At Bedtime     warfarin ANTICOAGULANT  1 mg Oral ONCE at 18:00     Warfarin Therapy Reminder  1 each Oral See Admin Instructions       Data   Recent Labs   Lab 06/28/23  1207 06/28/23  0829 06/28/23  0709 06/27/23  1704 06/27/23  0540 06/26/23  0524 06/24/23  1648 06/24/23  1609   WBC  --   --  14.3*  --  12.6* 10.5  --  8.1   HGB  --   --  11.4*  --  11.0* 10.5*  --  11.3*   MCV  --   --  89  --  90 87  --  91   PLT  --   --  232  --  227 205  --  198   INR  --   --  2.38*  --  2.05* 1.95*   < >  --    NA  --   --  126*  --  130* 129*  --  130*   POTASSIUM  --   --  5.1  --  4.1 4.0  --  4.0   CHLORIDE  --   --  89*  --  92* 93*  --  97*   CO2  --   --  29  --  27 27  --  23   BUN  --   --  13.6  --  16.8 14.8  --  15.5   CR  --   --  0.49*  --  0.47* 0.50*  --  0.44*   ANIONGAP  --   --  8  --  11 9  --  10   DOUG  --   --  8.6  --  8.5 8.9  --  8.7   * 116* 106*   < > 107* 106*  --  104*   ALBUMIN  --   --   --   --   --  3.5  --  3.5   PROTTOTAL  --   --   --   --   --  6.0*  --  6.2*    BILITOTAL  --   --   --   --   --  0.3  --  0.2   ALKPHOS  --   --   --   --   --  58  --  63   ALT  --   --   --   --   --  10  --  9   AST  --   --   --   --   --  23  --  21    < > = values in this interval not displayed.       No results found for this or any previous visit (from the past 24 hour(s)).

## 2023-06-28 NOTE — PROGRESS NOTES
[facility] care [id number]  Neuro: A&Ox4.   Cardiac: SR. VSS.   Respiratory: On BIPAP 40% consistently throughout the day. Short breaks for meals and water, however due to work of breathing pt does not tolerate being off the BIPAP for more than 30 minutes at a time.  GI/: Adequate urine output. No BM this shift.  Diet/appetite: Tolerating regular diet.   Activity:  Assist of 1, walker and gait belt, up to chair.  Pain: At acceptable level on current regimen.   Skin: No new deficits noted.  LDA's: PIV    Plan: Continue with POC. Notify primary team with changes.

## 2023-06-28 NOTE — PROGRESS NOTES
Antimicrobial Stewardship Team Note    Antimicrobial Stewardship Program - A joint venture between [facility] and  Physicians to optimize antibiotic management.  NOT a formal consult - Restricted Antimicrobial Review     Patient: Ellie Leigh  MRN: [id number]  Allergies: Cephalosporins, Doxycycline, Sulfa antibiotics, and Penicillins    Brief Summary:    Patient is a 89 year old female with a PMHx of asthma, bronchiectasis, DVT (on warfarin), Atrial fibrillation, status post pacemaker, hyperlipidemia, HTN, depression, and anxiety who presented on 6/24/23 with productive cough and shortness of breath for 1 week.    History of Present Illness:   Patient was afebrile upon presentation. Patient was hypertensive (/76) and she was requiring oxygen (2L) . Labs showed a procalcitonin (0.06), WBC within normal limits, and hyponatremia (130). A chest CT showed many new nodular opacities in the lower lobes and scattered bronchial wall thickening and endobronchial debris. No pleural effusion or pneumothorax.  Patient was started on treatment for bronchiectasis exacerbation with pneumonia with steroid burst and levofloxacin. A respiratory panel PCR was done and detected Human rhinovirus/enterovirus. Due to likely viral etiology, levofloxacin was discontinued. A respiratory aerobic sputum culture with Gram stain showed positive for 1+ Pseudomonas aeruginosa.    Yesterday, she has had worsening oxygen requirements with an escalation to BiPAP. WBC has been trending upwards and is now elevated (14.3), a repeat procalcitonin was 0.07, and elevated CRP (54.31). CXR showed new air space opacity right lower lobe compared with 6/24 imaging. Patient was started on Cefepime  and ciprofloxacin.        Active Anti-infective Medications   (From admission, onward)                 Start     Stop    06/27/23 1400  ceFEPIme  2 g,   Intravenous,   EVERY 12 HOURS        Community Acquired Pneumonia   Suspect  Pseudomonas       --    06/27/23 1400  ciprofloxacin  400 mg,   Intravenous,   EVERY 12 HOURS        Community Acquired Pneumonia   Suspect Pseudomonas       --                  Assessment: Acute hypoxic respiratory failure secondary to pneumonia (viral & potentially bacterial aspiration)   Patient has remained afebrile, with minimal antipyretic use, no tachycardia, or appearance of sepsis. However, respiratory rate has trended upwards (28-29) along with supplemental oxygen needs (5L NC-BiPAP). In addition,  labs have shown that WBC has trended upwards, and CRP is elevated. Leukocytosis was likely caused by the steroid burst (prednisone) given upon admission, though difficult to completely rule out bacterial infection with imaging findings and respiratory status. Given detection of human rhinovirus/enterovirus, agree with concern for viral etiology, noting virus may have exacerbated underlying bronchiectasis versus coughing may have caused aspiration event. Given results of chest CT and chest x-ray, agree with concern for bacterial pneumonia. Given sputum culture positive for Pseudomonas on day of admission with a known history of isolating this organism in the sputum and minimal change in repeat procalcitonin, suspect colonization with underlying bronchiectasis. With concerns for possible aspiration event, recommend starting metronidazole for added anaerobic coverage. Patient does have classical markers of a bacterial pneumonia including findings of opacification on imaging. Additionally, she has ongoing symptoms of SOB and worsening oxygen requirements (5L and BiPAP) which could be indicative of a viral pneumonia. Ciprofloxacin is not highly susceptible to Pseudomonas empirically and sensitivities do not support activity, recommend discontinuing. Given that this patient is considered non-severe and does not meet the criteria for double coverage for Pseudomonas, would recommend continuing on cefepime therapy and  start metronidazole for anaerobic coverage. Would plan for a 7-day antibiotic course with continued clinical improvement.      Recommendations:    1. Stop ciprofloxacin    2. Continue Cefepime and start metronidazole 500 mg IV/PO every 8 hours    3. Would plan for 7-day total antibiotic course with continued clinical improvement (6/28-7/4).        Pharmacy took the following actions: Called/paged provider, Electronic note created.    Discussed with ID Staff Willian Stephens MD, and Kirsten Toscano, PharmD, BCIDP    Conor NettlesD Student  Phone: [phone]    Vital Signs/Clinical Features:  Vitals         06/26 0700  06/27 0659 06/27 0700  06/28 0659 06/28 0700  06/28 1313   Most Recent      Temp ( F) 97.8 -  98.8    97 -  98.3    97.8 -  98.8     97.8 (36.6) 06/28 1206    Pulse 71 -  86    65 -  93    68 -  87     68 06/28 1200    Resp 18 -  20    18 -  32    10 -  30     19 06/28 1200    /72 -  168/88    126/67 -  193/89    123/92 -  147/66     132/62 06/28 1200    SpO2 (%) 85 -  93    91 -  99    91 -  95     95 06/28 1200            Labs  Estimated Creatinine Clearance: 69.6 mL/min (A) (based on SCr of 0.49 mg/dL (L)).  Recent Labs   Lab Test 03/03/22  1443 06/08/22  1154 06/24/23  1609 06/26/23  0524 06/27/23  0540 06/28/23  0709   CR 0.58 0.82 0.44* 0.50* 0.47* 0.49*       Recent Labs   Lab Test 07/13/19  1512 08/06/19  1141 12/25/19  1601 05/15/20  1457 10/02/20  1037 07/09/21  0925 07/10/21  0505 07/12/21  0542 06/16/22  1207 05/02/23  0850 06/24/23  1609 06/26/23  0524 06/27/23  0540 06/28/23  0709   WBC 8.4 4.1 9.8 4.8   < > 14.4* 13.6*   < > 13.9* 4.1 8.1 10.5 12.6* 14.3*   ANEU 5.6 1.9 6.6 2.5  --  11.6* 11.5*  --   --   --   --   --   --   --    ALYM 1.8 1.6 1.7 1.6  --  1.4 1.6  --   --   --   --   --   --   --    DIONNA 0.9 0.5 1.2 0.5  --  1.2 0.4  --   --   --   --   --   --   --    AEOS 0.0 0.1 0.2 0.1  --  0.0 0.0  --   --   --   --   --   --   --    HGB 12.9 12.9 11.4*  11.2*   < > 11.4* 10.8*   < > 10.1* 10.6* 11.3* 10.5* 11.0* 11.4*   HCT 39.5 38.3 34.6* 34.6*   < > 35.1 32.8*   < > 31.5* 33.2* 34.8* 31.0* 34.1* 33.8*   MCV 84 83 84 87   < > 91 90   < > 90 92 91 87 90 89    217 258 231   < > 248 248   < > 270 198 198 205 227 232    < > = values in this interval not displayed.       Recent Labs   Lab Test 07/10/21  0505 09/28/21  2239 03/03/22  1443 06/08/22  1154 06/24/23  1609 06/26/23  0524   BILITOTAL 0.4 0.3 0.3 0.2 0.2 0.3   ALKPHOS 68 87 80 67 63 58   ALBUMIN 2.8* 3.8 3.4 3.2* 3.5 3.5   AST 14 24 19 24 21 23   ALT 20 21 17 17 9 10       Recent Labs   Lab Test 03/10/16  1845 01/06/18  0930 04/15/18  1250 10/02/20  1037 12/09/20  1530 06/24/23  1648 06/27/23  0540 06/27/23  1507   PCAL  --   --  <0.05  --   --  0.06* 0.07*  --    LACT 1.3 0.6*  --   --   --   --   --  1.5   CRP  --   --   --  9.0* 2.9  --   --   --    CRPI  --   --   --   --   --   --  54.31*  --    SED  --   --   --  42* 42*  --   --   --              Culture Results:  7-Day Micro Results       Procedure Component Value Units Date/Time    Respiratory Aerobic Bacterial Culture with Gram Stain [[id number]]  (Abnormal)  (Susceptibility) Collected: 06/24/23 9146    Order Status: Completed Lab Status: Final result Updated: 06/28/23 1009    Specimen: Sputum from Expectorate      Culture 3+ Normal lexis      1+ Pseudomonas aeruginosa, mucoid strain     Gram Stain Result <10 Squamous epithelial cells/low power field      >25 PMNs/low power field      4+ Mixed lexis    Susceptibility       Pseudomonas aeruginosa, mucoid strain (2)       Antibiotic Interpretation Sensitivity   Method Status    Cefepime Susceptible <=2 ug/mL YANDY Final    Ceftazidime Susceptible <=1 ug/mL YANDY Final    Ciprofloxacin Resistant 2 ug/mL YANDY Final    Levofloxacin Resistant 4 ug/mL YANDY Final    Amikacin Susceptible <=16 ug/mL YANDY Final    Gentamicin Susceptible <=1 ug/mL YANDY Final    Tobramycin Susceptible <=1 ug/mL YANDY Final     Piperacillin/Tazobactam Susceptible <=4 ug/mL YANDY Final    Meropenem Susceptible <=1 ug/mL YANDY Final                       Respiratory Panel PCR [[id number]]  (Abnormal) Collected: 06/24/23 1731    Order Status: Completed Lab Status: Final result Updated: 06/24/23 2117    Specimen: Swab from Nasopharyngeal      Adenovirus Not Detected     Coronavirus Not Detected     Comment: This test detects Coronavirus 229E, HKU1, NL63 and OC43 but does not distinguish between them. It does not detect MERS ( Respiratory Syndrome), SARS (Severe Acute Respiratory Syndrome) or 2019-nCoV (Novel 2019) Coronavirus.        Human Metapneumovirus Not Detected     Human Rhin/Enterovirus Detected     Influenza A Not Detected     Influenza A, H1 Not Detected     Influenza A 2009 H1N1 Not Detected     Influenza A, H3 Not Detected     Influenza B Not Detected     Parainfluenza Virus 1 Not Detected     Parainfluenza Virus 2 Not Detected     Parainfluenza Virus 3 Not Detected     Parainfluenza Virus 4 Not Detected     Respiratory Syncytial Virus A Not Detected     Respiratory Syncytial Virus B Not Detected     Chlamydia Pneumoniae Not Detected     Mycoplasma Pneumoniae Not Detected    Narrative:      The ePlex Respiratory Panel is a qualitative nucleic acid, multiplex, in vitro diagnostic test for the simultaneous detection and identification of multiple respiratory viral and bacterial nucleic acids in nasopharyngeal swabs collected in viral transport media from individual exhibiting signs and symptoms of respiratory infection. The assay has received FDA approval for the testing of nasopharyngeal (NP) swabs only. The [facility] at [facility] has validated the performance characteristics for bronchial alveolar lavage specimens. This test is used for clinical purposes and should not be regarded as investigational or for research. This laboratory is certified under the Clinical Laboratory  Improvement Amendments of 1988 (CLIA-88) as qualified to perform high complexity clinical laboratory testing.     Group A Streptococcus PCR Throat Swab [[id number]]  (Normal) Collected: 06/24/23 1333    Order Status: Completed Lab Status: Final result Updated: 06/24/23 1404    Specimen: Swab from Throat      Group A strep by PCR Not Detected    Narrative:      The Xpert Xpress Strep A test, performed on the [employer]  Instrument Systems, is a rapid, qualitative in vitro diagnostic test for the detection of Streptococcus pyogenes (Group A ß-hemolytic Streptococcus, Strep A) in throat swab specimens from patients with signs and symptoms of pharyngitis. The Xpert Xpress Strep A test can be used as an aid in the diagnosis of Group A Streptococcal pharyngitis. The assay is not intended to monitor treatment for Group A Streptococcus infections. The Xpert Xpress Strep A test utilizes an automated real-time polymerase chain reaction (PCR) to detect Streptococcus pyogenes DNA.            Recent Labs   Lab Test 11/06/20  1152 11/18/20  0923 01/08/21  1324 07/09/21  1505 07/14/22  1002   URINEPH 7.0 6.5 7.0 7.0 6.0   NITRITE Positive* Negative Positive* Negative Negative   LEUKEST Large* Small* Moderate* Negative Moderate*   WBCU >100* 5-10* 25-50* 1 *             Recent Labs   Lab Test 06/24/23  1731   IFLUA Not Detected   FLUAH1 Not Detected   FLUAH3 Not Detected   AD1737 Not Detected   IFLUB Not Detected   RSVA Not Detected   RSVB Not Detected   PIV1 Not Detected   PIV2 Not Detected   PIV3 Not Detected   HMPV Not Detected             Imaging: XR Chest Port 1 View    Result Date: 6/27/2023  CHEST PORTABLE ONE VIEW June 27, 2023 12:12 PM HISTORY: Acutely worsening respiratory distress and hypoxemia, evaluate for worsening infiltrates. COMPARISON: Chest CT on 6/24/2023.     IMPRESSION: Single AP view of the chest was obtained. Left chest wall dual-lead automatic implantable cardiac defibrillator leads are in stable  position. Cardiomediastinal silhouette is within normal limits. Right apical coarse interstitial pulmonary opacities, consistent with the previously seen area of apical bronchiectasis with associated atelectasis/consolidation. Basilar patchy nodular pulmonary opacities, right more than left, increased as compared to 6/24/2023, likely infectious. No significant pleural effusion or pneumothorax. GILLIAN SOUZA MD   SYSTEM ID:  [id number]    Chest CT w/o contrast    Result Date: 6/24/2023  EXAM: CT CHEST W/O CONTRAST LOCATION: [facility] DATE: 6/24/2023 INDICATION: Reported history of bronchiectasis, shortness of air COMPARISON: 04/22/2023 TECHNIQUE: CT chest without IV contrast. Multiplanar reformats were obtained. Dose reduction techniques were used. CONTRAST: None. FINDINGS: LUNGS AND PLEURA: Complete collapse of the middle lobe and anterior portion of the right upper lobe with associated bronchiectasis, unchanged. Cavitary and nodular opacities in the right upper lobe are unchanged, but there are many new nodular opacities in the lower lobes, most of which have peripheral groundglass opacity (e.g. 7/158, 165, 212). Scattered bronchial wall thickening and endobronchial debris. No pleural effusion or pneumothorax. MEDIASTINUM/AXILLAE: No lymphadenopathy. Left chest pacemaker. No thoracic aortic aneurysm CORONARY ARTERY CALCIFICATION: Severe. UPPER ABDOMEN: No acute findings. MUSCULOSKELETAL: No aggressive or destructive osseous lesions. Degenerative changes in the spine with accentuated kyphosis.     IMPRESSION: 1.  Numerous new nodular airspace opacities with groundglass halo, likely infectious or inflammatory. Recommend follow-up CT in 6-12 weeks to assess for resolution. Associated bronchial wall thickening and endobronchial debris also raises concern for aspiration. 2.  Extensive bronchiectasis with volume loss in the middle lobe and anterior right upper lobe, unchanged.     XR Chest  2 Views    Result Date: 6/24/2023  EXAM: XR CHEST 2 VIEWS LOCATION: [facility] DATE: 6/24/2023 INDICATION: Diffuse wheezing, concern for an acute pulmonary issue such as pneumonia COMPARISON: Chest CT 04/22/2023     IMPRESSION: Stable cardiomediastinal silhouette with pacemaker leads overlying the right atrium and right ventricle. No significant change in biapical scarring, right greater than left. No definite new airspace consolidation, pleural effusion or pneumothorax. Bones are unchanged. Diffuse osteopenia.

## 2023-06-28 NOTE — PLAN OF CARE
Goal Outcome Evaluation:  Pt only able to sleep in short intervals. Continues to be able to cough out sputum. She did state some nausea this am with no emesis. VSS.

## 2023-06-28 NOTE — PLAN OF CARE
"Goal Outcome Evaluation: pt continues with loose productive cough. Assisted with taking the Bipap mask off frequently to spit into kleenex and take drinks of water. Pt incontinent of urine in very large amounts. Assist of 1 to bedside commode-some SOB though pt states\"not too bad\" pt on 5L NC and pursed lip breathing with rate of 28-32. She requests to have the bipap off for a short while\"I feel my nose is stuffed\".                         "

## 2023-06-28 NOTE — PROVIDER NOTIFICATION
Pt remained on BiPAP at night.  13/6, 40%.  Pt tolerates well.  No changes made to settings.  Will monitor closely.    BiPAP settings/Parameters:        06/28/23 0400   Tech Time   $Tech Time (10 minute increments) 2   Mode: CPAP/ BiPAP/ AVAPS/ AVAPS AE   CPAP/BiPAP/ AVAPS/ AVAPS AE Mode BiPAP S/T   Equipment   Device v60   CPAP/BiPAP/Settings   $CPAP/BiPAP Subsequent completed   BIPAP/CPAP On Standby On   IPAP/EPAP (cmH2O) 13/6   Rate (breaths/min) 12   Oxygen (%) 40   Timed Inspiration (sec) 0.8   IPAP RISE  Settings (V60) 2   CPAP/BiPAP Patient Parameters   IPAP (cm H2O) 13 cmH2O   EPAP (cm H2O) 6 cmH2O   Pressure Support (cm H2O) 7 cmH2O   RR Total (breaths/min) 21 breaths/min   Vt (mL) 448 mL   Minute Ventilation (L/min) 8 L/min   Peak Inspiratory Pressure (cm H2O) 13 cmH2O   Pt.  Leak (L/min) 38 L/min   CPAP/BiPAP/AVAPS/AVAPS AE Alarms   High Pressure (cm H2O) 25 cmH2O   Low Pressure (cm H2O) 6   Low Pressure Delay (sec) 20 sec   Lo Min Vent 2   High Rate (breaths/min) 40 breaths/min   Low Rate (breaths/min) 12   RT Device Skin Assessment   Site Appearance neck circumference Clean and dry   Site Appearance bridge of nose Clean and dry   Strap Tightness Finger Allowance between head and device strap   Device Skin Interventions Taken No adjustments needed

## 2023-06-28 NOTE — PROGRESS NOTES
"  Brief tele-ICU note  Chart reviewed and unable to discuss case with primary team due to late hour.    89 year old female with past medical history of asthma, bronchiectasis, DVT, A-fib, status post pacemaker admitted for acute hypoxic respiratory failure due to pneumonia.  Complicated history of previously treated reactivated TB about 20 years ago, long standing right upper lobe consolidation and now with new small nodular findings. History of pseudomonas pneumonia and with colonization in the past. Long standing SIADH.    Vital signs:  Temp: 97.6  F (36.4  C) Temp src: Axillary BP: (!) 154/100 Pulse: 84   Resp: 30 SpO2: 94 % O2 Device: Nasal cannula Oxygen Delivery: 5 LPM Height: 147.3 cm (4' 10\") Weight: 60.2 kg (132 lb 11.5 oz)  Estimated body mass index is 27.74 kg/m  as calculated from the following:    Height as of this encounter: 1.473 m (4' 10\").    Weight as of this encounter: 60.2 kg (132 lb 11.5 oz).    FiO2 (%): 40 %  Resp: 30      Imaging and labs reviewed.    Plan  - Agree with current plan of primary team and hospitalist service.  - Her main issues now is the respiratory failure due to acute on chronic lung issues bronchiectasis/pneumonia etc  - Pneumonia/consolidation may be related to pseudomonas or entero virus however given history of TB, may require additional work up to rule out chronic lung infections such as TB, fungal/atypical mycobacterium. Procalcitonin is not elevated.  - Will start work up with fungal serology, histo/blasto/cocci/aspergillus, fungitell/galactomannan, AFB x 3, quanitiferon gold test (to help with ruling out).  - Typically, she needs to be on airborne precautions if suspicion for TB is high, she has been admitted for 3 days and is now on PAP therapy. I will discuss these concerns with primary team tomorrow although my level of suspicion for TB is low given that the RUL consolidation is chronic and there may be other findings which can explain her new respiratory failure " such as viral infection.  - Oxygen needs makes it difficult to perform a bronchoscopy now, but if intubated, will recommend bronchoscopy.  - Schedule mucomyst with her metanebs.  - Cefepime appears to be an appropriate antibiotic based on sensitivities. Will check MRSA nares now. Stop Mucinex.  - Ok to start HFNC and alternate with BIPAP to give her breaks      At the time of my review I was able to access to the patient s medical records/chart but not personally see or evaluate the patient. I cannot provide direct medical advice or consultation, but can provide a general opinion for consideration by the in-person treating provider. The contents of this note are not meant to replace or supersede the clinical judgement of the in-person treating provider.     Alyson Cuellar MD  Pulm/ICU

## 2023-06-28 NOTE — PROGRESS NOTES
Patient cont on Bipap 13/6, 40% this shift and appears to tolerate well.  Patient may be mucous plugging with BS increasing significantly post nebs and cough. Hypertonic saline nebs reordered TID.  Patient does have exp wheezes and crackles/coarse.

## 2023-06-29 NOTE — PROGRESS NOTES
End Of Shift Note     Plan: ICU for Bipap, on ABX, oral steroids, Nebs, Robitussin     Subjective/Objective:     Neuro: A & O times 4, not impulsive     Cardiac:pt SR per tele, hx of afib. Afebrile     Resp: Bipap 13/6, 40% sats mid 90's. Pt on 5L NC most of day. Congested cough with thick tan mucus, sputum sample sent.     GI/: Ate some of meals today, appetite decreased. On Moist minced diet with thin liquids. incontinent of urine R/T freq coughing. no BM today, stool softener given.     MSK: Transfer to [facility] with walker GB and asst 1, she is steady but tires and soa with activity     Skin: Skin is dry, no redness or open areas, bruises on forarms     LDAs:Right PIV x 1 s/l.

## 2023-06-29 NOTE — PROVIDER NOTIFICATION
Pt remained on BiPAP @ night.  13/6, 40%.  No changes made to settings.    BiPAP settings/Parameters:        06/29/23 0400   Tech Time   $Tech Time (10 minute increments) 2   Mode: CPAP/ BiPAP/ AVAPS/ AVAPS AE   CPAP/BiPAP/ AVAPS/ AVAPS AE Mode BiPAP S/T   Equipment   Device V60   CPAP/BiPAP/Settings   $CPAP/BiPAP Subsequent completed   BIPAP/CPAP On Standby On   IPAP/EPAP (cmH2O) 13/6   Rate (breaths/min) 12   Oxygen (%) 40   Timed Inspiration (sec) 0.8   IPAP RISE  Settings (V60) 2   CPAP/BiPAP Patient Parameters   IPAP (cm H2O) 13 cmH2O   EPAP (cm H2O) 6 cmH2O   Pressure Support (cm H2O) 7 cmH2O   RR Total (breaths/min) 16 breaths/min   Vt (mL) 310 mL   Minute Ventilation (L/min) 5 L/min   Peak Inspiratory Pressure (cm H2O) 15 cmH2O   Pt.  Leak (L/min) 60 L/min   CPAP/BiPAP/AVAPS/AVAPS AE Alarms   High Pressure (cm H2O) 25 cmH2O   Low Pressure (cm H2O) 5   Low Pressure Delay (sec) 20 sec   Lo Min Vent 2   High Rate (breaths/min) 40 breaths/min   Low Rate (breaths/min) 12   RT Device Skin Assessment   Oxygen Delivery Device CPAP/BiPAP Mask   Interface Nasal Mask - Medium

## 2023-06-29 NOTE — PROGRESS NOTES
[facility]    Hospitalist Progress Note    Date of Service (when I saw the patient): 06/29/2023    Assessment & Plan   89 year old female with past medical history of asthma, bronchiectasis, DVT, A-fib, status post pacemaker, HLD, HTN, depression, and anxiety presented on 6/24/2023 with productive cough and shortness of breath for 1 week.       Acute respiratory failure with hypoxia   Rhinovirus infection, acute  Asthma exacerbation, possible acute  Bronchiectasis, suspect acute exacerbation   Pneumonia bilateral lower lobes, possibly due to Pseudomonas    Admitted with acute hypoxic respiratory failure initially thought to be due to exacerbation of bronchiectasis and pneumonia.  CT chest on admission noted new nodular airspace opacities groundglass, and extensive bronchiectasis with volume loss of the middle lobe and anterior right upper lobe, unchanged.  Initially started on treatment for bronchiectasis exacerbation with pneumonia with steroid burst, nebulized albuterol and ipratropium, guaifenesin and levofloxacin.  Respiratory panel positive for rhinovirus, and levofloxacin stopped after one dose due to likely viral etiology. Sputum culture positive 1+ Pseudomonas suspected to be colonizer. Patient was gradually improving and needing 1-2 L supplemental O2 6/26 to 6/27 morning.     Acutely worsening hypoxemia and work of breathing late morning today 6/27 associated with decreased air excursion but no fever, tachycardia or appearance of sepsis, WBC mild up 12.6, CRP 54, procalcitonin 0.07. No heart failure clinically. CXR obtained and images reviewed and discussed with radiologist. There is new air space opacity right lower lobe compared with 6/24 imaging. Additional nebs given with minimal benefit. Possible bronchiectasis exacerbation and mucus plugging though concerned for pneumonia given increased infiltrate.    Plan:   - Start cefepime IV (6/27 - ) dosed for Pseudomonas and double  cover with cipro IV (6/27 -6/28 ) also to cover atypicals, follow up sensitivities on sputum culture and narrow spectrum when able   - Transfer to ICU for BiPAP as patient at risk of worsening failure given increased work of breathing and age   - Reviewed code status again with patient and daughter at bedside and confirmed DNR but not DNI   - Continue nebulized albuterol and ipratropium    - Continue Mucinex BID    - Continue home Saline nebs TID (important for possible mucus plugging)   - Continue prednisone burst 40 mg started 6/24 at least through 6/28   - Hold home inhaler Advair while on prednisone, but resume on discharge   - Patient able to eat breakfast off bipap but then back on.  Was off bipap briefly for lunch, but then tired and placed back on.  - Continue to wean oxygen as tolerated  - Per AMT, discontinue ciprofloxacin and start metronidazole on 6/28.  Continue cefepime.  - On a.m. of 6/29, patient weaned back down to 5 L nasal cannula  - Continue to monitor in ICU given patient's tenuous respiratory status  - Case reviewed by telemetry ICU, recommended testing for TB with QuantiFERON gold and AFB culture, fungal antibodies for blastomycosis, coccidiomycosis antigen, blastomycosis antigen, histoplasma antigen Aspergillus galactomannan, and 1,3 beta glucan Fungitell  - Due to difficulty weaning off oxygen and intermittent requirements of increased oxygen needs in the setting of advanced age, will consult palliative care to discuss goals of care.    Hyponatremia due to SIADH  Sodium decreased to 126 on 6/28.  -Continue NaCl 1g BID  -Urine sodium 43 and urine osmolality 539  -Sodium is 128 on 6/29  -Fluid restriction of 800 cc/day  -Repeat BMP in a.m.     Intermittent atrial fibrillation (H)  Long term current use of anticoagulant therapy  Cardiac pacemaker in situ    Rate controlled with metoprolol. INR subtherapeutic on admission 1.76    - Continue warfarin, pharmacy to dose   - Continue metoprolol       Benign essential hypertension     Managed PTA with metoprolol XL. BP running mild to mod up this hospitalization likely due to stress of acute illness, hospitalization.    - Continue home medication metoprolol   - Follow BP but would not treat acutely unless symptomatic or persistently/consistenly > 180/120     Mild persistent asthma without complication    Managed PTA with Advair 250-50, albuterol nebs and ipratropium nebs together QID.     Suspect exacerbation as above.      Bronchiectasis     Managed PTA with guaifenesin BID and 3% saline nebs TID.    Suspect exacerbation as above.  - Discontinue Mucinex per telemetry ICU    Hypothyroidism  - Continue home Synthroid     Mild major depression  Anxiety  -Continue trazodone     H/o DVT (deep venous thrombosis), distant    Distant h/o right femoral vein DVT (June 2011) related to trauma hospitalization for C2 fracture and placement of right femoral vein catheter, s/p course of anticoagulation. This provoked DVT does not suggest a long term hypercoagulable state.       Diet: Minced & Moist Diet (level 5) Thin Liquids (level 0)    DVT Prophylaxis: Warfarin  Glasgow Catheter: Not present  Lines: None     Cardiac Monitoring: None  Code Status: No CPR- Pre-arrest intubation OK      Disposition: Expected discharge in 3 to 5 days days once hypoxia improved.    Aaron Martinez MD    Interval History   Patient is sitting in bed, eating breakfast.  She has no acute complaints    -Data reviewed today: I reviewed all new labs and imaging results over the last 24 hours. I personally reviewed no images or EKG's today.    Physical Exam   Temp: 97.8  F (36.6  C) Temp src: Oral BP: (!) 148/67 Pulse: 71   Resp: 23 SpO2: 97 % O2 Device: BiPAP/CPAP Oxygen Delivery: 5 LPM  Vitals:    06/24/23 2001 06/27/23 1700   Weight: 59.3 kg (130 lb 11.7 oz) 60.2 kg (132 lb 11.5 oz)     Vital Signs with Ranges  Temp:  [97.6  F (36.4  C)-97.8  F (36.6  C)] 97.8  F (36.6  C)  Pulse:  [60-90]  71  Resp:  [17-41] 23  BP: (111-168)/() 148/67  FiO2 (%):  [40 %] 40 %  SpO2:  [84 %-97 %] 97 %  I/O last 3 completed shifts:  In: 900 [P.O.:600; I.V.:300]  Out: 800 [Urine:800]    Gen: Cachectic, debilitated, elderly female resting in bed alert and oriented x 3.  HEENT: Atraumatic, bitemporal wasting; sclera non-injected, anicterric; oral mucosa moist, no lesion, no exudate  Lungs: Diffuse bilateral rhonchi with few scattered wheezes, no rales  Heart: Regular rate, regular rhythm, no gallops, no rubs, no murmurs  GI: Bowel sound normal, no hepatosplenomegaly, no masses, non-tender, non-distended, no guarding, no rebound tenderness  Lymph: No lymphadenopathy, no edema  Skin: No rashes, no chronic venous stasis     Medications     - MEDICATION INSTRUCTIONS -         acetylcysteine  2 mL Nebulization 4x Daily     [Held by provider] albuterol  2.5 mg Nebulization 3 times daily     ceFEPIme  2 g Intravenous Q12H     [Held by provider] fluticasone-vilanterol  1 puff Inhalation Daily     guaiFENesin  600 mg Oral BID     ipratropium - albuterol 0.5 mg/2.5 mg/3 mL  3 mL Nebulization 4x daily     levothyroxine  50 mcg Oral Daily     metoprolol succinate ER  50 mg Oral BID     metroNIDAZOLE  500 mg Intravenous Q8H     sodium chloride  3 mL Nebulization TID     sodium chloride (PF)  3 mL Intracatheter Q8H     sodium chloride  1 g Oral BID     traZODone  50 mg Oral At Bedtime     warfarin ANTICOAGULANT  0.5 mg Oral ONCE at 18:00     Warfarin Therapy Reminder  1 each Oral See Admin Instructions       Data   Recent Labs   Lab 06/29/23  0434 06/28/23  1207 06/28/23  0829 06/28/23  0709 06/27/23  1704 06/27/23  0540 06/26/23  0524 06/24/23  1648 06/24/23  1609   WBC 14.4*  --   --  14.3*  --  12.6* 10.5  --  8.1   HGB 9.7*  --   --  11.4*  --  11.0* 10.5*  --  11.3*   MCV 90  --   --  89  --  90 87  --  91     --   --  232  --  227 205  --  198   INR 2.67*  --   --  2.38*  --  2.05* 1.95*   < >  --    *  --   --   126*  --  130* 129*  --  130*   POTASSIUM 4.9  --   --  5.1  --  4.1 4.0  --  4.0   CHLORIDE 92*  --   --  89*  --  92* 93*  --  97*   CO2 31*  --   --  29  --  27 27  --  23   BUN 15.5  --   --  13.6  --  16.8 14.8  --  15.5   CR 0.54  --   --  0.49*  --  0.47* 0.50*  --  0.44*   ANIONGAP 5*  --   --  8  --  11 9  --  10   DOUG 8.7  --   --  8.6  --  8.5 8.9  --  8.7   * 166* 116* 106*   < > 107* 106*  --  104*   ALBUMIN  --   --   --   --   --   --  3.5  --  3.5   PROTTOTAL  --   --   --   --   --   --  6.0*  --  6.2*   BILITOTAL  --   --   --   --   --   --  0.3  --  0.2   ALKPHOS  --   --   --   --   --   --  58  --  63   ALT  --   --   --   --   --   --  10  --  9   AST  --   --   --   --   --   --  23  --  21    < > = values in this interval not displayed.       No results found for this or any previous visit (from the past 24 hour(s)).

## 2023-06-29 NOTE — PLAN OF CARE
Goal Outcome Evaluation:  RR28 while sleeping /resting thia am on 5L NC.sats 97-98% pt denies distress,using accessory muscles, monitor pt for fatigue.

## 2023-06-29 NOTE — PLAN OF CARE
Goal Outcome Evaluation:               Bipap off for a break, 5L NC on. Daughter in room visiting.

## 2023-06-29 NOTE — CONSULTS
[facility]   Palliative Care Consultation Note    Patient: Ellie Leigh  Date of Admission:  6/24/2023    Requesting Clinician / Team: Hospitalist  Reason for consult: Symptom management  Goals of care  Decisional support  Patient and family support    Code status: No CPR- Do NOT Intubate    Impression & Recommendations:  89 year old female with past medical history of asthma, bronchiectasis with recurrent infections requiring abx per PCP, DVT, A-fib, PPM, HLD, HTN, depression, and anxiety who presented on 6/24/2023 with productive cough and shortness of breath for 1 week.  Supportive care being provided including antibiotics, IV fluids, nasal cannula oxygen during the day, BiPAP at night, respiratory treatments, and correction of metabolic disturbances. 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  She was transferred to the intensive care unit for BiPAP.    At baseline, she has dyspnea on exertion, does not wear supplemental oxygen, and rarely leaves her home due to dyspnea.  She lives with her daughter Suyapa.  She has 5 daughters who are supportive.            Symptoms/recommendations/discussion:    Advanced Care Planning: Present in EMR.  Currently the patient is decisional.  If needed, her daughter Rosemary is listed as primary agent, and daughter Suyapa as alternate agent.  She has 5 daughters, Rosemary, Suyapa, Erin, Luz Maria, and and another who's name I didn't catch.     Meeting completed at the patient's bedside with daughters Erin and Ana present.  Patient is clear that she is agreeable to current measures.  She hopes to improve and be able to leave the hospital.  She confirms No CPR.  After further discussion, she does not want intubation even if it means the end of life will occur.  She states that she is 89 years old and knows she has baseline lung disease.  Also her  was on a ventilator for a week before needing to be removed 23 years ago, and she states she does not want to go through something  similar.  Her daughters back up her decision.    She has not had a bowel movement since last weekend.  Senna 2 tabs x1.  Begin MiraLAX tomorrow.  Monitor    Artificial tears and Chloraseptic spray for painful dry eyes and sore throat            Thank you for the opportunity to participate in the care of this patient and family. Our team: will continue to follow.     During regular M-F work hours -- if you are not sure who specifically to contact -- please contact us by calling us directly at the Palliative Care Main Line [phone]    After regular work hours and on weekends/holidays, you can leave a message at [phone]      History of Present Illness:  History gathered today from: patient, family/loved ones, medical chart, medical team members, unit team members, health care directive/s  Ellie Leigh is a 89 year old female with past medical history of asthma, bronchiectasis with recurrent infections requiring abx per PCP, DVT, A-fib, PPM, HLD, HTN, depression, and anxiety who presented on 6/24/2023 with productive cough and shortness of breath for 1 week.  Supportive care being provided including antibiotics, IV fluids, nasal cannula oxygen during much of the day, BiPAP at night, respiratory treatments, and correction of metabolic disturbances. 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  She was transferred to the intensive care unit for BiPAP.    At baseline, she has dyspnea on exertion, does not wear supplemental oxygen, and rarely leaves her home due to dyspnea.  She lives with her daughter Suyapa.  She has 5 daughters who are supportive.       Prognosis, Goals, & Planning:      Functional Status just prior to hospitalization: 2 (Ambulatory and capable of all selfcare but unable to carry out any work activities; may need help with IADLs up and about > 50% of waking hours)      Patient's decision making preferences: independently          Patient has decision-making capacity today for complex decisions: Yes             I have concerns about the patient/family's health literacy today: No           Patient has a completed Health Care Directive: Yes, and on file.      Code status: No CPR / No Intubation    Coping, Meaning, & Spirituality:   Mood, coping, and/or meaning in the context of serious illness were addressed today: Yes  Summary/Comments:      Palliative Symptom Data:  # Pain severity the last 12 hours: none  # Dyspnea severity the last 12 hours: moderate  # Nausea severity the last 12 hours: none  # Anxiety severity the last 12 hours: none    Patient is on opioids: assessed and I made recommendations about bowel care as above.    ROS:  Comprehensive ROS is reviewed and is negative except as here & per HPI:   Constitutional: Fatigue, dyspnea on exertion  Eyes: No reported issue  ENT: No reported issue  Cardiovascular: No reported issue  Respiratory: Dyspnea on exertion  GI: Constipation  : No reported history  MSK: No reported issue  Integumentary: No reported issue  Neurological: No reported issue  Psychiatric: No reported issue     Past Medical History:  Past Medical History:   Diagnosis Date     Basal cell carcinoma      Breast cancer (H)      COPD (chronic obstructive pulmonary disease) (H)     ct 2014 does show some bronchiectaisi RUL as well as fibronodular disease bilat upper lobes     Dust allergy      DVT (deep vein thrombosis) in pregnancy     after neck fracture when in hospital     DVT (deep venous thrombosis) 10/12/2011    Right femoral vein DVT (June 2011) related to trauma hospitalization with C2 fracture and placement of right femoral vein catheter, s/p course of anticoagulation. This provoked DVT does not suggest a long term hypercoagulable state.      HTN (hypertension)      Hyperlipidemia LDL goal <130 10/31/2010     Hyponatremia     chronic     Hypothyroid      Intermittent atrial fibrillation (H) 12/1/2011    hx tachy-keri, has pacer, on chronic coumadin     Loose body in joint 4/15/2011      Neck fracture (H)     after a fall     Positive fecal occult blood test 7/7/2014    x2 -- first was in ED.  Patient expresses that she does not want colonoscopy.  She'll set appt to discuss with her PCP.     Syncope 5/12/2013    multiple hospital visits, lates 3/2016, 6/2016, 7/2016 with no clear cause found        Past Surgical History:  Past Surgical History:   Procedure Laterality Date     APPENDECTOMY       ARTHROSCOPY KNEE  4/15/2011    Procedure:ARTHROSCOPY KNEE; removal of loose body; Surgeon:LEY, JEFFREY DUANE; Location:[facility]     CHOLECYSTECTOMY       ESOPHAGOSCOPY, GASTROSCOPY, DUODENOSCOPY (EGD), COMBINED  6/9/2014    Procedure: COMBINED ESOPHAGOSCOPY, GASTROSCOPY, DUODENOSCOPY (EGD);  Surgeon: Gilberto Richard MD;  Location: [facility]     ESOPHAGOSCOPY, GASTROSCOPY, DUODENOSCOPY (EGD), COMBINED N/A 10/18/2019    Procedure: ESOPHAGOGASTRODUODENOSCOPY (EGD);  Surgeon: Noe Sams DO;  Location: [facility]     IMPLANT PACEMAKER  5/21/2013    Biotronik Moderl [id number] Serial#[id number]     INJECT EPIDURAL LUMBAR  3/23/2011    INJECT EPIDURAL LUMBAR performed by GENERIC ANESTHESIA PROVIDER at [facility]     JOINT REPLACEMENT, HIP RT/LT      Joint Replacement Hip Rt     MASTECTOMY, SIMPLE RT/LT/CAITLYN      Left breast - following breast ca     OTHER SURGICAL HISTORY      C1-C2 fusion after fx      SURGICAL HISTORY OF -   05/22/2001    Colonoscopy     TONSILLECTOMY           Family History:  Family History   Problem Relation Age of Onset     Cerebrovascular Disease Mother      Heart Disease Mother         MI     Cerebrovascular Disease Father      Heart Disease Father         MI     Respiratory Maternal Grandfather         TB     Neurologic Disorder Brother         ALS     Heart Disease Brother      Cerebrovascular Disease Brother      Breast Cancer Daughter         age:49     Asthma Brother      Cancer Brother         brain and lung     Cancer Daughter         thyroid        Social History:   Social History      Socioeconomic History     Marital status:    Occupational History     Employer: RETIRED   Tobacco Use     Smoking status: Never     Passive exposure: Past     Smokeless tobacco: Never   Vaping Use     Vaping Use: Never used   Substance and Sexual Activity     Alcohol use: No     Drug use: No     Sexual activity: Not Currently     Partners: Male   Other Topics Concern     Parent/sibling w/ CABG, MI or angioplasty before 65F 55M? No   Social History Narrative    Lives in [facility].      Daughters helping since her accident, getting home physical therapy.      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      - 2000    5 daughters, 11 grandchildren, 3 great granchildren        Allergies:  Allergies   Allergen Reactions     Cephalosporins Nausea     Cefzil     Doxycycline Nausea and Vomiting     Sulfa Antibiotics Nausea     Penicillins Rash     PCN        Medications:  I have reviewed this patient's medication profile and medications from this hospitalization.         Lab Results: personally reviewed.   Urea Nitrogen   Date Value Ref Range Status   06/29/2023 15.5 8.0 - 23.0 mg/dL Final   06/08/2022 23 7 - 30 mg/dL Final   07/10/2021 23 7 - 30 mg/dL Final     Creatinine   Date Value Ref Range Status   06/29/2023 0.54 0.51 - 0.95 mg/dL Final   07/10/2021 0.52 0.52 - 1.04 mg/dL Final     AST   Date Value Ref Range Status   06/26/2023 23 0 - 45 U/L Final     Comment:     Reference intervals for this test were updated on 6/12/2023 to more accurately reflect our healthy population. There may be differences in the flagging of prior results with similar values performed with this method. Interpretation of those prior results can be made in the context of the updated reference intervals.   07/10/2021 14 0 - 45 U/L Final     ALT   Date Value Ref Range Status   06/26/2023 10 0 - 50 U/L Final     Comment:     Reference intervals for this test were updated on 6/12/2023 to more  accurately reflect our healthy population. There may be differences in the flagging of prior results with similar values performed with this method. Interpretation of those prior results can be made in the context of the updated reference intervals.     07/10/2021 20 0 - 50 U/L Final     Alkaline Phosphatase   Date Value Ref Range Status   06/26/2023 58 35 - 104 U/L Final   07/10/2021 68 40 - 150 U/L Final     Albumin   Date Value Ref Range Status   06/26/2023 3.5 3.5 - 5.2 g/dL Final   06/08/2022 3.2 (L) 3.4 - 5.0 g/dL Final   07/10/2021 2.8 (L) 3.4 - 5.0 g/dL Final       RADIOLOGY:  XR Chest Port 1 View    Result Date: 6/27/2023  CHEST PORTABLE ONE VIEW June 27, 2023 12:12 PM HISTORY: Acutely worsening respiratory distress and hypoxemia, evaluate for worsening infiltrates. COMPARISON: Chest CT on 6/24/2023.     IMPRESSION: Single AP view of the chest was obtained. Left chest wall dual-lead automatic implantable cardiac defibrillator leads are in stable position. Cardiomediastinal silhouette is within normal limits. Right apical coarse interstitial pulmonary opacities, consistent with the previously seen area of apical bronchiectasis with associated atelectasis/consolidation. Basilar patchy nodular pulmonary opacities, right more than left, increased as compared to 6/24/2023, likely infectious. No significant pleural effusion or pneumothorax. GILLIAN SOUZA MD   SYSTEM ID:  [id number]    Chest CT w/o contrast    Result Date: 6/24/2023  EXAM: CT CHEST W/O CONTRAST LOCATION: [facility] DATE: 6/24/2023 INDICATION: Reported history of bronchiectasis, shortness of air COMPARISON: 04/22/2023 TECHNIQUE: CT chest without IV contrast. Multiplanar reformats were obtained. Dose reduction techniques were used. CONTRAST: None. FINDINGS: LUNGS AND PLEURA: Complete collapse of the middle lobe and anterior portion of the right upper lobe with associated bronchiectasis, unchanged. Cavitary and nodular  opacities in the right upper lobe are unchanged, but there are many new nodular opacities in the lower lobes, most of which have peripheral groundglass opacity (e.g. 7/158, 165, 212). Scattered bronchial wall thickening and endobronchial debris. No pleural effusion or pneumothorax. MEDIASTINUM/AXILLAE: No lymphadenopathy. Left chest pacemaker. No thoracic aortic aneurysm CORONARY ARTERY CALCIFICATION: Severe. UPPER ABDOMEN: No acute findings. MUSCULOSKELETAL: No aggressive or destructive osseous lesions. Degenerative changes in the spine with accentuated kyphosis.     IMPRESSION: 1.  Numerous new nodular airspace opacities with groundglass halo, likely infectious or inflammatory. Recommend follow-up CT in 6-12 weeks to assess for resolution. Associated bronchial wall thickening and endobronchial debris also raises concern for aspiration. 2.  Extensive bronchiectasis with volume loss in the middle lobe and anterior right upper lobe, unchanged.     XR Chest 2 Views    Result Date: 6/24/2023  EXAM: XR CHEST 2 VIEWS LOCATION: [facility] DATE: 6/24/2023 INDICATION: Diffuse wheezing, concern for an acute pulmonary issue such as pneumonia COMPARISON: Chest CT 04/22/2023     IMPRESSION: Stable cardiomediastinal silhouette with pacemaker leads overlying the right atrium and right ventricle. No significant change in biapical scarring, right greater than left. No definite new airspace consolidation, pleural effusion or pneumothorax. Bones are unchanged. Diffuse osteopenia.        Physical Exam:  Temp:  [97.7  F (36.5  C)-97.8  F (36.6  C)] 97.8  F (36.6  C)  Pulse:  [60-90] 71  Resp:  [17-30] 23  BP: (111-154)/() 148/67  FiO2 (%):  [40 %] 40 %  SpO2:  [84 %-97 %] 97 %  Wt Readings from Last 3 Encounters:   06/27/23 60.2 kg (132 lb 11.5 oz)   05/18/23 59.4 kg (131 lb)   05/08/23 59.9 kg (132 lb)      Alert, comfortable appearing  Head NC, AT  Eyes anicteric without injection  Face symmetric,  eyes conjugate  Mouth grossly normal, no drainage, speech fluent.  Neck grossly normal, no significant limits in ROM  Lungs unlabored, no respiratory distress, nasal cannula oxygen  CV RR, normal rate, no edema  Abd soft, ntnd, benign  Extrems no deformities, no edema  Skin warm, dry  MSK joints of hand normal; muscle bulk and tone in the bilat UE, LE proximally & distally  Neuro face symmetric, AO, no tremor  Neuropsych exam normal including affect, sensorium, gross memory, thought processes, and fund of knowledge.       TTS: Total unit/floor 75 minutes, time consisted of the following, examination of patient, reviewing the record and lab results, and completing documentation.           LACI Mora, [facility]  Palliative Care

## 2023-06-29 NOTE — PROGRESS NOTES
CLINICAL NUTRITION SERVICES - ASSESSMENT NOTE     Nutrition Prescription    RECOMMENDATIONS FOR MDs/PROVIDERS TO ORDER:  None at this time.    Malnutrition Status:    Unable to determine due to isolation status.    Recommendations already ordered by Registered Dietitian (RD):  Medical food supplement therapy - Magic cup vanilla during lunch.     Future/Additional Recommendations:  Monitor labs/meds, PO intake, GI/BM, supplement tolerance.     REASON FOR ASSESSMENT  Ellie Leigh is a/an 89 year old female assessed by the dietitian for LOS.    NUTRITION HISTORY  Per physician note 6/29, pt presented on 6/24/2023 with productive cough and shortness of breath for 1 week. Pt has a history of asthma, bronchiectasis, DVT, A-fib, status post pacemaker, HLD, HTN, depression, and anxiety. Transfer to ICU for BiPAP as patient at risk of worsening failure given increased work of breathing and age. Due to difficulty weaning off oxygen and intermittent requirements of increased oxygen needs in the setting of advanced age, will consult palliative care to discuss goals of care.     Unable to assess pt due to isolation status. Per RN, pt has been eating small portions or her meal. RD suggested magic cup as nutritional supplementation. Pt daughter was able to confirm vanilla magic cup as a supplement at lunch.    CURRENT NUTRITION ORDERS  Diet: Orders Placed This Encounter      Minced & Moist Diet (level 5) Thin Liquids (level 0)  Fluid restriction (800 mL fluid) continous    Intake/Tolerance: Pt has poor-fair intake and consumes 25-50% of meals.     Per physician note 6/29, Patient able to eat breakfast off bipap but then back on.  Was off bipap briefly for lunch, but then tired and placed back on.    LABS  Labs reviewed  Sodium L 128 mmol/L  Chloride L 92 mmol/L  CO2 H 31 mmol/L  Glucose H 121 mg/dL  WBC H 14.4 10e/uL  Hemoglobin L9.7 g/dL    MEDICATIONS  Medications reviewed   acetylcysteine (MUCOMYST) 20 % nebulizer  "solution 2 mL  ceFEPIme (MAXIPIME) 2 g vial to attach to  ml bag for ADULTS  ipratropium - albuterol 0.5 mg/2.5 mg/3 mL (DUONEB) neb solution 3 mL  levothyroxine (SYNTHROID/LEVOTHROID) tablet 50 mcg  metoprolol succinate ER (TOPROL XL) 24 hr tablet 50 mg  metroNIDAZOLE (FLAGYL) infusion 500 mg  sodium chloride (NEBUSAL) 3 % neb solution 3 mL  sodium chloride (PF) 0.9% PF flush 3 mL  sodium chloride tablet 1 g  warfarin ANTICOAGULANT (COUMADIN) half-tab 0.5 mg    ANTHROPOMETRICS  Height: 147.3 cm (4' 10\")  Most Recent Weight: 60.2 kg (132 lb 11.5 oz)    IBW:  43 kg  BMI: Overweight BMI 25-29.9  Weight History:   Wt Readings from Last 15 Encounters:   06/27/23 60.2 kg (132 lb 11.5 oz)   05/18/23 59.4 kg (131 lb)   05/08/23 59.9 kg (132 lb)   05/02/23 60 kg (132 lb 3.2 oz)   04/21/23 59.4 kg (131 lb)   03/07/23 60.3 kg (133 lb)   11/10/22 59.9 kg (132 lb)   07/19/22 59.3 kg (130 lb 12.8 oz)   07/14/22 59 kg (130 lb)   06/16/22 59 kg (130 lb)   06/08/22 59.9 kg (132 lb)   04/29/22 59.9 kg (132 lb)   04/21/22 59 kg (130 lb)   03/24/22 59.7 kg (131 lb 11.2 oz)   03/16/22 59 kg (130 lb)   Pt has no significant changes in wt history.     Dosing Weight: 47 kg - Adjusted wt    ASSESSED NUTRITION NEEDS  Estimated Energy Needs: 1175 - 1410 kcals/day (25 - 30 kcals/kg)  Justification: Maintenance  Estimated Protein Needs: 51.7 - 65.8 grams protein/day (1.1 - 1.4 grams of pro/kg)  Justification: Increased needs and Maintenance  Estimated Fluid Needs: 1175 mL/day (25 mL/kg or per provider order)   Justification: On a fluid restriction 800 ml    PHYSICAL FINDINGS  See malnutrition section below.  No abnormal nutrition-related physical findings observed.     MALNUTRITION  % Intake: </= 50% for >/= 5 days (severe)  % Weight Loss: None noted  Subcutaneous Fat Loss: Unable to assess  Muscle Loss: Unable to assess  Fluid Accumulation/Edema: None noted  Malnutrition Diagnosis: Unable to determine due to isolation " status.    NUTRITION DIAGNOSIS  Inadequate protein-energy intake related to poor oral intake as evidenced by flow chart of poor-fair intake and consuming 25-50% of meals.     INTERVENTIONS  Implementation   Collaboration with other providers - IDT rounds  Medical food supplement therapy - Magic cup vanilla during lunch.    Goals  Patient to consume % of nutritionally adequate meal trays TID, or the equivalent with supplements/snacks.     Monitoring/Evaluation  Progress toward goals will be monitored and evaluated per protocol.    Robel Lowe  Dietetic Intern

## 2023-06-29 NOTE — PLAN OF CARE
Goal Outcome Evaluation:  Pt sleeping quietly at present. No distress noted on bipap, non labored RR 20. Less coughing at present while sleeping. Will allow uninterrupted sleep as able, pt was not able to sleep or rest well the previous night.

## 2023-06-29 NOTE — PLAN OF CARE
Goal Outcome Evaluation:       Pt slept well overnight, woke pt for medications and up to the bedside commode. Pt moves slow but is strong and steady. VSS, had one episode of possible SVT? while pt sleeping, short burst and back to baseline with no intervention.pt did take a few short breaks from the bipap but did tolerate it for most of the night. Bipap off at present, pt was up to the commode and asked to keep it off for awhile. Less coughing overnight but still a congested loose cough.sputum sent last evening pending results.

## 2023-06-30 NOTE — PROGRESS NOTES
Incessant cough was noticed on this patient when we use Mucomyst. I don't see any need for that drug since secretions production was achieved.

## 2023-06-30 NOTE — PROGRESS NOTES
[facility]    Hospitalist Progress Note    Date of Service (when I saw the patient): 06/30/2023    Assessment & Plan   89 year old female with past medical history of asthma, bronchiectasis, DVT, A-fib, status post pacemaker, HLD, HTN, depression, and anxiety presented on 6/24/2023 with productive cough and shortness of breath for 1 week.       Acute respiratory failure with hypoxia   Rhinovirus infection, acute  Asthma exacerbation, possible acute  Bronchiectasis, suspect acute exacerbation   Pneumonia bilateral lower lobes, possibly due to Pseudomonas    Admitted with acute hypoxic respiratory failure initially thought to be due to exacerbation of bronchiectasis and pneumonia.  CT chest on admission noted new nodular airspace opacities groundglass, and extensive bronchiectasis with volume loss of the middle lobe and anterior right upper lobe, unchanged.  Initially started on treatment for bronchiectasis exacerbation with pneumonia with steroid burst, nebulized albuterol and ipratropium, guaifenesin and levofloxacin.  Respiratory panel positive for rhinovirus, and levofloxacin stopped after one dose due to likely viral etiology. Sputum culture positive 1+ Pseudomonas suspected to be colonizer. Patient was gradually improving and needing 1-2 L supplemental O2 6/26 to 6/27 morning.     Acutely worsening hypoxemia and work of breathing late morning today 6/27 associated with decreased air excursion but no fever, tachycardia or appearance of sepsis, WBC mild up 12.6, CRP 54, procalcitonin 0.07. No heart failure clinically. CXR obtained and images reviewed and discussed with radiologist. There is new air space opacity right lower lobe compared with 6/24 imaging. Additional nebs given with minimal benefit. Possible bronchiectasis exacerbation and mucus plugging though concerned for pneumonia given increased infiltrate.    Plan:   - Start cefepime IV (6/27 - ) dosed for Pseudomonas and double  cover with cipro IV (6/27 -6/28 ) also to cover atypicals, follow up sensitivities on sputum culture and narrow spectrum when able   - Transfer to ICU for BiPAP as patient at risk of worsening failure given increased work of breathing and age   - Reviewed code status again with patient and daughter at bedside and confirmed DNR but not DNI   - Continue nebulized albuterol and ipratropium    - Continue Mucinex BID    - Continue home Saline nebs TID (important for possible mucus plugging)   - Continue prednisone burst 40 mg started 6/24 at least through 6/28   - Hold home inhaler Advair while on prednisone, but resume on discharge   - Patient able to eat breakfast off bipap but then back on.  Was off bipap briefly for lunch, but then tired and placed back on.  - Continue to wean oxygen as tolerated  - Per AMT, discontinue ciprofloxacin and start metronidazole on 6/28.  Continue cefepime.  - On a.m. of 6/29, patient weaned back down to 5 L nasal cannula  - Continue to monitor in ICU given patient's tenuous respiratory status  - Case reviewed by telemetry ICU, recommended testing for TB with QuantiFERON gold and AFB culture, fungal antibodies for blastomycosis, coccidiomycosis antigen, blastomycosis antigen, histoplasma antigen Aspergillus galactomannan, and 1,3 beta glucan Fungitell  - Due to difficulty weaning off oxygen and intermittent requirements of increased oxygen needs in the setting of advanced age, will consult palliative care to discuss goals of care.  -Patient and daughter, Estee, met with palliative care.  I agree with palliative care assessment that patient has a life expectancy of less than 6 months due to her severe bronchiectasis.  Patient and daughter are considering hospice  -Will place home health care RN/PT/OT/home health aide if discharged to home  -Sputum culture from 6/28 growing NLF-GNR, awaiting speciation and sensitivity  -QuantiFERON gold and fungal antibodies pending    Hyponatremia due to  SIADH  Sodium decreased to 126 on 6/28.  -Continue NaCl 1g BID  -Urine sodium 43 and urine osmolality 539  -Sodium is 128 on 6/29  -Fluid restriction of 800 cc/day starting 6/29  -Sodium decreased to 125 on 6/30  -Increase sodium tabs to 1 g 3 times daily  -Repeat BMP in a.m.     Intermittent atrial fibrillation (H)  Long term current use of anticoagulant therapy  Cardiac pacemaker in situ    Rate controlled with metoprolol. INR subtherapeutic on admission 1.76    - Continue warfarin, pharmacy to dose   - Continue metoprolol      Benign essential hypertension     Managed PTA with metoprolol XL. BP running mild to mod up this hospitalization likely due to stress of acute illness, hospitalization.    - Continue home medication metoprolol   - Follow BP but would not treat acutely unless symptomatic or persistently/consistenly > 180/120     Mild persistent asthma without complication    Managed PTA with Advair 250-50, albuterol nebs and ipratropium nebs together QID.     Suspect exacerbation as above.      Bronchiectasis     Managed PTA with guaifenesin BID and 3% saline nebs TID.    Suspect exacerbation as above.  - Discontinue Mucinex per telemetry ICU    Hypothyroidism  - Continue home Synthroid     Mild major depression  Anxiety  -Continue trazodone     H/o DVT (deep venous thrombosis), distant    Distant h/o right femoral vein DVT (June 2011) related to trauma hospitalization for C2 fracture and placement of right femoral vein catheter, s/p course of anticoagulation. This provoked DVT does not suggest a long term hypercoagulable state.       Diet: Minced & Moist Diet (level 5) Thin Liquids (level 0)    DVT Prophylaxis: Warfarin  [place] Catheter: Not present  Lines: None     Cardiac Monitoring: None  Code Status: No CPR- Pre-arrest intubation OK      Disposition: Expected discharge in 3 to 5 days days once hypoxia improved.    Aaron Martinez MD    Interval History   Patient is lying in bed.  She denies pain but  complains of shortness of breath and difficulty coughing up sputum.    -Data reviewed today: I reviewed all new labs and imaging results over the last 24 hours. I personally reviewed no images or EKG's today.    Physical Exam   Temp: 97.9  F (36.6  C) Temp src: Oral BP: 104/55 Pulse: 77   Resp: 21 SpO2: 93 % O2 Device: Nasal cannula Oxygen Delivery: 2 LPM  Vitals:    06/24/23 2001 06/27/23 1700 06/30/23 0435   Weight: 59.3 kg (130 lb 11.7 oz) 60.2 kg (132 lb 11.5 oz) 61 kg (134 lb 7.7 oz)     Vital Signs with Ranges  Temp:  [97.7  F (36.5  C)-98.4  F (36.9  C)] 97.9  F (36.6  C)  Pulse:  [69-96] 77  Resp:  [12-28] 21  BP: ()/() 104/55  FiO2 (%):  [40 %] 40 %  SpO2:  [92 %-98 %] 93 %  I/O last 3 completed shifts:  In: 300 [P.O.:300]  Out: -     Gen: Cachectic, debilitated, elderly female resting in bed alert and oriented x 3.  HEENT: Atraumatic, bitemporal wasting; sclera non-injected, anicterric; oral mucosa moist, no lesion, no exudate  Lungs: Diffuse bilateral rhonchi with few scattered wheezes, no rales  Heart: Regular rate, regular rhythm, no gallops, no rubs, no murmurs  GI: Bowel sound normal, no hepatosplenomegaly, no masses, non-tender, non-distended, no guarding, no rebound tenderness  Lymph: No lymphadenopathy, no edema  Skin: No rashes, no chronic venous stasis     Medications     - MEDICATION INSTRUCTIONS -         acetylcysteine  2 mL Nebulization 4x Daily     [Held by provider] albuterol  2.5 mg Nebulization 3 times daily     ceFEPIme  2 g Intravenous Q12H     [Held by provider] fluticasone-vilanterol  1 puff Inhalation Daily     ipratropium - albuterol 0.5 mg/2.5 mg/3 mL  3 mL Nebulization 4x daily     levothyroxine  50 mcg Oral Daily     metoprolol succinate ER  50 mg Oral BID     metroNIDAZOLE  500 mg Intravenous Q8H     polyethylene glycol  17 g Oral Daily     sodium chloride  3 mL Nebulization TID     sodium chloride (PF)  3 mL Intracatheter Q8H     sodium chloride  1 g Oral TID  w/meals     traZODone  50 mg Oral At Bedtime     warfarin ANTICOAGULANT  0.5 mg Oral ONCE at 18:00     Warfarin Therapy Reminder  1 each Oral See Admin Instructions       Data   Recent Labs   Lab 06/30/23  0420 06/29/23  0434 06/28/23  1207 06/28/23  0829 06/28/23  0709 06/27/23  0540 06/26/23  0524 06/24/23  1648 06/24/23  1609   WBC 15.1* 14.4*  --   --  14.3*   < > 10.5  --  8.1   HGB 10.9* 9.7*  --   --  11.4*   < > 10.5*  --  11.3*   MCV 91 90  --   --  89   < > 87  --  91    247  --   --  232   < > 205  --  198   INR 2.36* 2.67*  --   --  2.38*   < > 1.95*   < >  --    * 128*  --   --  126*   < > 129*  --  130*   POTASSIUM 5.2 4.9  --   --  5.1   < > 4.0  --  4.0   CHLORIDE 90* 92*  --   --  89*   < > 93*  --  97*   CO2 26 31*  --   --  29   < > 27  --  23   BUN 18.0 15.5  --   --  13.6   < > 14.8  --  15.5   CR 0.48* 0.54  --   --  0.49*   < > 0.50*  --  0.44*   ANIONGAP 9 5*  --   --  8   < > 9  --  10   DOUG 8.6 8.7  --   --  8.6   < > 8.9  --  8.7   * 121* 166*   < > 106*   < > 106*  --  104*   ALBUMIN  --   --   --   --   --   --  3.5  --  3.5   PROTTOTAL  --   --   --   --   --   --  6.0*  --  6.2*   BILITOTAL  --   --   --   --   --   --  0.3  --  0.2   ALKPHOS  --   --   --   --   --   --  58  --  63   ALT  --   --   --   --   --   --  10  --  9   AST  --   --   --   --   --   --  23  --  21    < > = values in this interval not displayed.       No results found for this or any previous visit (from the past 24 hour(s)).

## 2023-06-30 NOTE — PROGRESS NOTES
Verbal order from RT to order sputum culture and stain for pt. RT collected specimen and handed to this writer to send to lab.

## 2023-06-30 NOTE — PROGRESS NOTES
Occupational Therapy     06/30/23 1030   Appointment Info   Signing Clinician's Name / Credentials (OT) Mar Ramirez, OTR/L   Living Environment   People in Home child(grupo), adult  (Daughter)   Current Living Arrangements house   Home Accessibility no concerns   Living Environment Comments Ramp to enter home, all needs met on main level. Tub/shower combo, uses tub bench. Sleeps in adjustable bed.   Self-Care   Equipment Currently Used at Home walker, rolling;grab bar, toilet;grab bar, tub/shower;tub bench   Fall history within last six months no   Activity/Exercise/Self-Care Comment Pt reports indep with dressing, receives assist with socks. Pt's DTR assists with bathing, pt wanting additional assist. Pt indep with transfers and mobility w/ 4WW.   Instrumental Activities of Daily Living (IADL)   IADL Comments Pt receives assist from DTR for all IADLs   General Information   Onset of Illness/Injury or Date of Surgery 06/24/23   Referring Physician Aaron Martinez MD   Patient/Family Therapy Goal Statement (OT) To return home once feeling better.   Additional Occupational Profile Info/Pertinent History of Current Problem Per H&P: Ellie Leigh is a 89 year old female with past medical history of bronchiectasis, DVT, A-fib, hyperlipidemia, hypertension now presents on 6/24/2023 with productive cough of green sputum and shortness of breath for 1 week.   Existing Precautions/Restrictions oxygen therapy device and L/min   Limitations/Impairments hearing   General Observations and Info Pt currently on 2L O2 via nasal cannula, reports not on oxygen at baseline though may return home with oxygen if needed.   Cognitive Status Examination   Orientation Status orientation to person, place and time   Cognitive Status Comments Pt A&O x4, able to follow 1-2 step directions, conversation and communicate needs.   Range of Motion Comprehensive   General Range of Motion bilateral upper extremity ROM WFL   Strength  Comprehensive (MMT)   General Manual Muscle Testing (MMT) Assessment no strength deficits identified   Bed Mobility   Bed Mobility supine-sit   Supine-Sit [place] (Bed Mobility) supervision   Comment (Bed Mobility) Pt completes supine>sit SBA HOB elevated ~45 degrees, increased time required d/t fatigue and SOB. Pt able to sit EOB SBA.   Transfers   Transfers sit-stand transfer   Sit-Stand Transfer   Sit-Stand [place] (Transfers) contact guard   Assistive Device (Sit-Stand Transfers) walker, front-wheeled   Sit/Stand Transfer Comments Pt completes sit>stand EOB to FWW CGA.   Clinical Impression   Criteria for Skilled Therapeutic Interventions Met (OT) Yes, treatment indicated   OT Diagnosis Decreased ADL indep/safety   Influenced by the following impairments Rhinovirus infection, bronchiectstasis with acute exacerbation   OT Problem List-Impairments impacting ADL problems related to;activity tolerance impaired;strength;mobility   Assessment of Occupational Performance 1-3 Performance Deficits   Identified Performance Deficits transfers, functional mobility, standing tolerance   Planned Therapy Interventions (OT) ADL retraining;strengthening;home program guidelines;progressive activity/exercise   Clinical Decision Making Complexity (OT) low complexity   Risk & Benefits of therapy have been explained evaluation/treatment results reviewed;care plan/treatment goals reviewed;risks/benefits reviewed;current/potential barriers reviewed;participants voiced agreement with care plan   OT Total Evaluation Time   OT Eval, Low Complexity Minutes (64338) 8   OT Goals   Therapy Frequency (OT) 5 times/wk   OT Predicted Duration/Target Date for Goal Attainment 07/07/23   OT Goals Hygiene/Grooming;Lower Body Dressing;Toilet Transfer/Toileting;OT Goal 1   OT: Hygiene/Grooming supervision/stand-by assist;while standing  (3 minutes)   OT: Lower Body Dressing Moderate assist   OT: Toilet Transfer/Toileting Supervision/stand-by  assist;toilet transfer;cleaning and garment management   OT: Goal 1 Pt will demonstrate understanding of UB HEP for maintenance of UB functional strength/mobility and cardiac health by discharge.   Self-Care/Home Management   Self-Care/Home Mgmt/ADL, Compensatory, Meal Prep Minutes (62350) 24   Symptoms Noted During/After Treatment (Meal Preparation/Planning Training) fatigue;shortness of breath   Treatment Detail/Skilled Intervention Education provided on role of OT in hospital and throughout POC with pt and DTR verbalizing understanding. Upon standing bedside w/ FWW pt with kyphotic posture head looking at floor - verbal cueing provided to look straight ahead. Pt ambulates bedside>BR sink CGA + 1 person to manage IV pole. Pt requires increased time with ambulation d/t fatigue and SOB. Pt completes sit>stand x2 in bathroom to position self by sink on shower bench CGA, completes seated g/h x5 min with set-up provided. Pt ambulates to chair on opposite of side of bed, ambulating ~30 ft total this session CGA using FWW. Pt demonstrating/reporting significant increase in fatigue following ADLs today. Pt remains seated in chair with call light and needs in reach, DTR in room.   OT Discharge Planning   OT Plan POC Fri 1/5: transfers, toileting, standing g/h   OT Discharge Recommendation (DC Rec) home with home care occupational therapy;home with assist   OT Rationale for DC Rec Pt lives with DTR, recieves assist with IADLs and parts of ADLs at baseline, currently limited by weakness/fatigue. Recommend home care OT to assess ADL safety and indep in home. Pt DTR home to provide 24/7 assist, current plan is to return home on palliative care.   OT Brief overview of current status SBA supine>sit, seated g/h x5 min; CGA sit>stand to FWW, ambulating in room w/ FWW (~30 ft total)   Total Session Time   Timed Code Treatment Minutes 24   Total Session Time (sum of timed and untimed services) 32

## 2023-06-30 NOTE — CONSULTS
Care Management Initial Consult    General Information  Assessment completed with: Children,    Type of CM/SW Visit: Initial Assessment    Primary Care Provider verified and updated as needed:     Readmission within the last 30 days:        Reason for Consult: discharge planning  Advance Care Planning: Advance Care Planning Reviewed: present on chart       Communication Assessment  Patient's communication style: spoken language (English or Bilingual)    Hearing Difficulty or Deaf: yes   Wear Glasses or Blind: yes    Cognitive  Cognitive/Neuro/Behavioral: WDL  Level of Consciousness: alert  Arousal Level: opens eyes spontaneously  Orientation: oriented x 4             Living Environment:   People in home: child(grupo), adult     Current living Arrangements: house      Able to return to prior arrangements: yes    Family/Social Support:  Care provided by: self  Provides care for: no one, unable/limited ability to care for self  Marital Status:   Children          Description of Support System: Supportive, Involved    Support Assessment: Adequate family and caregiver support, Adequate social supports    Current Resources:   Patient receiving home care services: No     Community Resources: None  Equipment currently used at home: walker, rolling, hospital bed, tub bench, grab bar, toilet, grab bar, tub/shower  Supplies currently used at home: None    Employment/Financial:  Employment Status: retired        Financial Concerns: No concerns identified     Does the patient's insurance plan have a 3 day qualifying hospital stay waiver?  Yes   Will the waiver be used for post-acute placement? No    Lifestyle & Psychosocial Needs:  Social Determinants of Health     Tobacco Use: Low Risk  (6/27/2023)    Patient History      Smoking Tobacco Use: Never      Smokeless Tobacco Use: Never      Passive Exposure: Past   Alcohol Use: Not on file   Financial Resource Strain: Not on file   Food Insecurity: Not on file   Transportation  Needs: Not on file   Physical Activity: Not on file   Stress: Not on file   Social Connections: Not on file   Intimate Partner Violence: Not on file   Depression: Not at risk (5/2/2023)    PHQ-2      PHQ-2 Score: 2   Housing Stability: Not on file     Functional Status:  Prior to admission patient needed assistance:   Dependent ADLs:: Ambulation-walker  Dependent IADLs:: Transportation, Cleaning, Laundry, Shopping    Mental Health Status:  Mental Health Status: No Current Concerns       Chemical Dependency Status:  Chemical Dependency Status: No Current Concerns           Values/Beliefs:  Spiritual, Cultural Beliefs, Shinto Practices, Values that affect care: no  Description of Beliefs that Will Affect Care: Tenriism          Additional Information:    CM received referral for discharge planning-- HC vs Hospice services. CM unable to meet with patient at bedside due to Airborne precautions. Spoke with [facility] and daughter, Ana, over the phone to discuss discharge planning.     Discussed with Ana both home care and hospice options at discharge. Ana shared that patient has five daughters and lives with daughter, Suyapa, and SUKH. Patient has a lot of support at home, per daughter there are no concerns with patient returning home. Ana does not feel that patient is ready for hospice quite yet. Ana reports that she is going to discuss our conversation with her sister Suyapa and the patient. Ana reports that she will call CM with any questions/concerns.     Ana would like  to send a HC referral for PT/OT/RN/HHA. They do not have an agency preference.     HC referrals pending    PLAN: Return home with family support and HC services    TEJAS JONES RN

## 2023-06-30 NOTE — PROVIDER NOTIFICATION
Pt remained on BiPAP at night. PRN neb given for coughing. No changes made to BiPAP settings.    BiPAP settings/Parameters:        06/30/23 0400   Tech Time   $Tech Time (10 minute increments) 2   Mode: CPAP/ BiPAP/ AVAPS/ AVAPS AE   CPAP/BiPAP/ AVAPS/ AVAPS AE Mode BiPAP S/T   Equipment   Device V60   CPAP/BiPAP/Settings   $CPAP/BiPAP Subsequent completed   BIPAP/CPAP On Standby On   IPAP/EPAP (cmH2O) 13/6   Rate (breaths/min) 12   Oxygen (%) 40   Timed Inspiration (sec) 0.8   IPAP RISE  Settings (V60) 2   CPAP/BiPAP Patient Parameters   IPAP (cm H2O) 13 cmH2O   EPAP (cm H2O) 6 cmH2O   Pressure Support (cm H2O) 7 cmH2O   RR Total (breaths/min) 20 breaths/min   Vt (mL) 477 mL   Minute Ventilation (L/min) 9.6 L/min   Peak Inspiratory Pressure (cm H2O) 13 cmH2O   Pt.  Leak (L/min) 42 L/min   CPAP/BiPAP/AVAPS/AVAPS AE Alarms   High Pressure (cm H2O) 25 cmH2O   Low Pressure (cm H2O) 6   Low Pressure Delay (sec) 20 sec   Lo Min Vent 2   High Rate (breaths/min) 40 breaths/min   Low Rate (breaths/min) 12   RT Device Skin Assessment   Oxygen Delivery Device Nasal CPAP/BiPAP   Site Appearance neck circumference Clean and dry   Site Appearance bridge of nose Clean and dry   Site Appearance occiput Clean and dry   Strap Tightness Finger Allowance between head and device strap   Device Skin Interventions Taken No adjustments needed   Respiratory WDL   Respiratory WDL X;rhythm/pattern   Rhythm/Pattern, Respiratory dyspnea on exertion;tachypneic   Cough Frequency frequent   Cough Type congested;productive   Breath Sounds   Breath Sounds All Fields   All Lung Fields Breath Sounds diminished;coarse   LEANNA Breath Sounds diminished   LLL Breath Sounds coarse   RUL Breath Sounds diminished   RLL Breath Sounds coarse

## 2023-06-30 NOTE — PROGRESS NOTES
[facility]   Palliative Care Daily Progress Note      Code status: No CPR- Do NOT Intubate     Impressions, Recommendations & Counseling   89 year old female with past medical history of asthma, bronchiectasis with recurrent infections requiring abx per PCP, DVT, A-fib, PPM, HLD, HTN, depression, and anxiety who presented on 6/24/2023 with productive cough and shortness of breath for 1 week.  Supportive care being provided including antibiotics, IV fluids, nasal cannula oxygen during the day, BiPAP at night, respiratory treatments, and correction of metabolic disturbances. 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  She was transferred to the intensive care unit for BiPAP.     At baseline, she has dyspnea on exertion, does not wear supplemental oxygen, and rarely leaves her home due to dyspnea.  She lives with her daughter Suyapa.  She has 5 daughters who are supportive.               Symptoms/recommendations/discussion:    6/29/2023:    Advanced Care Planning: Present in EMR.  Currently the patient is decisional.  If needed, her daughter Rosemary is listed as primary agent, and daughter Suyapa as alternate agent.  She has 5 daughters, Rosemary, Suyapa, Erin, Luz Maria, and and another who's name I didn't catch.     Meeting completed at the patient's bedside with daughters Erin and Ana present.  Patient is clear that she is agreeable to current measures.  She hopes to improve and be able to leave the hospital.  She confirms No CPR.  After further discussion, she does not want intubation even if it means the end of life will occur.  She states that she is 89 years old and knows she has baseline lung disease.  Also her  was on a ventilator for a week before needing to be removed 23 years ago, and she states she does not want to go through something similar.  Her daughters back up her decision.    She has not had a bowel movement since last weekend.  Senna 2 tabs x1.  Begin MiraLAX tomorrow.  Monitor    Artificial tears  and Chloraseptic spray for painful dry eyes and sore throat     6/30/2023:     Discussion with patient and daughter Suyapa.  The patient states that she is interested in hospice services at home related to advanced lung disease with recurrent infections, chronic, dyspnea on exertion, and declining functional status.  Hospice diagnosis would be bronchiectasis.  The patient does not want to keep pulmonology appointment next week, daughter will cancel.  The patient wants to go home when medically ready, do standard supportive care including oxygen, respiratory treatments, and may want p.o. antibiotics and steroids per hospice service if possibly beneficial.  She says that she does not want to seek hospital admission when her condition worsens, rather prefer to pass peacefully at home.    Still constipated, bowel movement since this weekend.  Will give additional 2 tabs senna. Continue MiraLAX.  Bisacodyl suppository order placed for 7/1 if no BM       Thank you for the opportunity to participate in the care of this patient and family. Our team: will continue to follow.     During regular M-F work hours -- if you are not sure who specifically to contact -- please contact us by calling us directly at the Palliative Care Main Line [phone]    After regular work hours and on weekends/holidays, you can leave a message at [phone]     HPI          Today, the patient was seen for:  See above              Interval History:     Chart review/discussion with unit or clinical team members:   RN  Hospitalist    Per patient or family/caregivers today:  See above    Key Palliative Symptoms:  # Pain severity the last 12 hours: low  # Dyspnea severity the last 12 hours: moderate  # Nausea severity the last 12 hours: none  # Anxiety severity the last 12 hours: low    Patient is on opioids: assessed and I made recommendations about bowel care as above.           Review of Systems:     Unchanged consultation yesterday           Medications:     I have reviewed this patient's medication profile and medications during this hospitalization.             Physical Exam:   Temp:  [97.9  F (36.6  C)-98.4  F (36.9  C)] 97.9  F (36.6  C)  Pulse:  [69-96] 77  Resp:  [12-28] 21  BP: ()/() 104/55  FiO2 (%):  [40 %] 40 %  SpO2:  [92 %-98 %] 93 %    Alert, very fatigued appearing  Head NC, AT  Eyes anicteric without injection  Face symmetric, eyes conjugate  Mouth grossly normal, no drainage, speech fluent.  Neck grossly normal, no significant limits in ROM  Lungs unlabored, no respiratory distress, nasal cannula oxygen  CV RR, normal rate, no edema  Abd soft, ntnd, benign  Extrems no deformities, no edema  Skin warm, dry  MSK joints of hand normal; muscle bulk and tone in the bilat UE, LE proximally & distally  Neuro face symmetric, AO, no tremor  Neuropsych exam normal including affect, sensorium, gross memory, thought processes, and fund of knowledge.               Data Reviewed:     Pertinent Labs  Lab Results: personally reviewed.     Urea Nitrogen   Date Value Ref Range Status   06/30/2023 18.0 8.0 - 23.0 mg/dL Final   06/08/2022 23 7 - 30 mg/dL Final   07/10/2021 23 7 - 30 mg/dL Final     Creatinine   Date Value Ref Range Status   06/30/2023 0.48 (L) 0.51 - 0.95 mg/dL Final   07/10/2021 0.52 0.52 - 1.04 mg/dL Final     AST   Date Value Ref Range Status   06/26/2023 23 0 - 45 U/L Final     Comment:     Reference intervals for this test were updated on 6/12/2023 to more accurately reflect our healthy population. There may be differences in the flagging of prior results with similar values performed with this method. Interpretation of those prior results can be made in the context of the updated reference intervals.   07/10/2021 14 0 - 45 U/L Final     ALT   Date Value Ref Range Status   06/26/2023 10 0 - 50 U/L Final     Comment:     Reference intervals for this test were updated on 6/12/2023 to more accurately reflect our healthy  population. There may be differences in the flagging of prior results with similar values performed with this method. Interpretation of those prior results can be made in the context of the updated reference intervals.     07/10/2021 20 0 - 50 U/L Final     Alkaline Phosphatase   Date Value Ref Range Status   06/26/2023 58 35 - 104 U/L Final   07/10/2021 68 40 - 150 U/L Final     Albumin   Date Value Ref Range Status   06/26/2023 3.5 3.5 - 5.2 g/dL Final   06/08/2022 3.2 (L) 3.4 - 5.0 g/dL Final   07/10/2021 2.8 (L) 3.4 - 5.0 g/dL Final         Radiology Results  XR Chest Port 1 View    Result Date: 6/27/2023  CHEST PORTABLE ONE VIEW June 27, 2023 12:12 PM HISTORY: Acutely worsening respiratory distress and hypoxemia, evaluate for worsening infiltrates. COMPARISON: Chest CT on 6/24/2023.     IMPRESSION: Single AP view of the chest was obtained. Left chest wall dual-lead automatic implantable cardiac defibrillator leads are in stable position. Cardiomediastinal silhouette is within normal limits. Right apical coarse interstitial pulmonary opacities, consistent with the previously seen area of apical bronchiectasis with associated atelectasis/consolidation. Basilar patchy nodular pulmonary opacities, right more than left, increased as compared to 6/24/2023, likely infectious. No significant pleural effusion or pneumothorax. GILLIAN SOUZA MD   SYSTEM ID:  [id number]    Chest CT w/o contrast    Result Date: 6/24/2023  EXAM: CT CHEST W/O CONTRAST LOCATION: [facility] DATE: 6/24/2023 INDICATION: Reported history of bronchiectasis, shortness of air COMPARISON: 04/22/2023 TECHNIQUE: CT chest without IV contrast. Multiplanar reformats were obtained. Dose reduction techniques were used. CONTRAST: None. FINDINGS: LUNGS AND PLEURA: Complete collapse of the middle lobe and anterior portion of the right upper lobe with associated bronchiectasis, unchanged. Cavitary and nodular opacities in the right  upper lobe are unchanged, but there are many new nodular opacities in the lower lobes, most of which have peripheral groundglass opacity (e.g. 7/158, 165, 212). Scattered bronchial wall thickening and endobronchial debris. No pleural effusion or pneumothorax. MEDIASTINUM/AXILLAE: No lymphadenopathy. Left chest pacemaker. No thoracic aortic aneurysm CORONARY ARTERY CALCIFICATION: Severe. UPPER ABDOMEN: No acute findings. MUSCULOSKELETAL: No aggressive or destructive osseous lesions. Degenerative changes in the spine with accentuated kyphosis.     IMPRESSION: 1.  Numerous new nodular airspace opacities with groundglass halo, likely infectious or inflammatory. Recommend follow-up CT in 6-12 weeks to assess for resolution. Associated bronchial wall thickening and endobronchial debris also raises concern for aspiration. 2.  Extensive bronchiectasis with volume loss in the middle lobe and anterior right upper lobe, unchanged.     XR Chest 2 Views    Result Date: 6/24/2023  EXAM: XR CHEST 2 VIEWS LOCATION: [facility] DATE: 6/24/2023 INDICATION: Diffuse wheezing, concern for an acute pulmonary issue such as pneumonia COMPARISON: Chest CT 04/22/2023     IMPRESSION: Stable cardiomediastinal silhouette with pacemaker leads overlying the right atrium and right ventricle. No significant change in biapical scarring, right greater than left. No definite new airspace consolidation, pleural effusion or pneumothorax. Bones are unchanged. Diffuse osteopenia.         Medical decision making: High  Management discussed worsening symptom burden, advanced disease state, opioid management, deteriorating mental status  Hospitalist and nursing notes reviewed  Laboratory/imaging results reviewed  Outcomes of above conversation include: Hospice consultation placed      Advance Care Planning Discussion: Steve SMITH APRN met with patient and family today to discuss advance care planning.  Patient and family  understand advance care planning discussion is voluntary and wished to proceed.  Discussion included: Medical management options, recommendation for hospice care, patient values and goals.  Total ACP time was 30, beginning at 9:15 AM and ending at 9:45 AM.          LACI Mora, [facility]  Palliative Care

## 2023-06-30 NOTE — PLAN OF CARE
End Of Shift Note    Neuro: A&Ox4, can make needs known, calls appropriately.    Cardiac: SR, permanent pace maker, VSS.    Resp: 2L nasal canula throughout the day & tolerated well. Pt currently on BiPap: 13/6, R 12, FiO2 40%, LS diminished & course.    GI/: Incontinent of bladder, no BM, 800mL fluid restriction.    MSK: SBA w/walker & gait belt up to the chair & bedside commode.    Skin: WDL.    LDAs: 1PIV infusing TKO 10mL/hr.

## 2023-06-30 NOTE — PROGRESS NOTES
06/30/23 1321   Appointment Info   Signing Clinician's Name / Credentials (PT) Corie Pittman, PT   Living Environment   People in Home child(grupo), adult  (daughter)   Current Living Arrangements house   Home Accessibility no concerns   Living Environment Comments Ramp to enter home, all needs met on main level.   Self-Care   Equipment Currently Used at Home walker, rolling;hospital bed;tub bench;grab bar, toilet;grab bar, tub/shower   Fall history within last six months no   Activity/Exercise/Self-Care Comment Pt typically indep with mobility with 4WW, dressing toileting. Dtr assists with bathing.   General Information   Onset of Illness/Injury or Date of Surgery 06/27/23   Referring Physician Aaron Martinez MD   Patient/Family Therapy Goals Statement (PT) Return home with daughter   Pertinent History of Current Problem (include personal factors and/or comorbidities that impact the POC) 89 year old female with past medical history of asthma, bronchiectasis, DVT, A-fib, status post pacemaker, HLD, HTN, depression, and anxiety presented on 6/24/2023 with productive cough and shortness of breath for 1 week, found to have rhinovirus infection, now on 2L O2.   Existing Precautions/Restrictions oxygen therapy device and L/min  (2L O2, not on at baseline)   Cognition   Affect/Mental Status (Cognition) WFL   Orientation Status (Cognition) oriented x 4   Follows Commands (Cognition) WFL   Pain Assessment   Patient Currently in Pain No   Range of Motion (ROM)   Range of Motion ROM is WFL   Strength (Manual Muscle Testing)   Strength (Manual Muscle Testing) No deficits observed during functional mobility   Strength Comments General LE weakness but functional for current level of mobility   Bed Mobility   Comment, (Bed Mobility) pt sitting in chair, bed mobility not addressed, has hospital bed at home   Transfers   Comment, (Transfers) sit>stand from recliner with SBA and 2WW, good standing balance   Gait/Stairs  (Locomotion)   Comment, (Gait/Stairs) marching in place x30 seconds, desat to 88% so did not ambulate, good stability with ambulation, recovers to >90% once seated   Clinical Impression   Criteria for Skilled Therapeutic Intervention Yes, treatment indicated   PT Diagnosis (PT) impaired functional mobility   Influenced by the following impairments LE weakness, reduced activity tolerance   Functional limitations due to impairments impaired transfers, gait   Clinical Presentation (PT Evaluation Complexity) Stable/Uncomplicated   Clinical Presentation Rationale clinical reasoning   Clinical Decision Making (Complexity) low complexity   Planned Therapy Interventions (PT) bed mobility training;gait training;home exercise program;patient/family education;strengthening;transfer training   Anticipated Equipment Needs at Discharge (PT)   (has all equipment)   Risk & Benefits of therapy have been explained evaluation/treatment results reviewed;care plan/treatment goals reviewed;risks/benefits reviewed;current/potential barriers reviewed;participants voiced agreement with care plan;participants included;patient;daughter   PT Total Evaluation Time   PT Eval, Low Complexity Minutes (26273) 10   Physical Therapy Goals   PT Frequency 4x/week   PT Predicted Duration/Target Date for Goal Attainment 07/07/23   PT Goals Bed Mobility;Gait;PT Goal 1   PT: Bed Mobility Supervision/stand-by assist;Supine to/from sit  (has hospital bed at home)   PT: Gait Supervision/stand-by assist;Standard walker;50 feet   PT: Goal 1 Pt will be indep in LE HEP in order to progress strength with mobility.   PT Discharge Planning   PT Plan Fri 1/4; bring seated HEP for pt, amb with 2WW   PT Discharge Recommendation (DC Rec) home with home care physical therapy;home with assist   PT Rationale for DC Rec rec return home with support from daughter as previous as pt mobilizing with SBA and per chart review, goal is to return home with supportive cares. if  agreeable pending goals of care, rec home care PT to increase indep and ease in own environment   PT Brief overview of current status marching in place x30 seconds   Total Session Time   Total Session Time (sum of timed and untimed services) 10

## 2023-07-01 NOTE — PROGRESS NOTES
Oxygen Documentation  I certify that this patient, Ellie Leigh has been under my care (or a nurse practitioner or physican's assistant working with me). This is the face-to-face encounter for oxygen medical necessity.      At the time of this encounter supplemental oxygen is reasonable and necessary and is expected to improve the patient's condition in a home setting.       Patient has continued oxygen desaturation due to Bronchiectasis J47.9.    If portability is ordered, is the patient mobile within the home? yes

## 2023-07-01 NOTE — PROGRESS NOTES
Addendum:  As SW finalizing discussion with pt and daughter's Luz Maria Dale MD entered the room & informed that pt will need several more days of IV abx.  SW cancelled ride and referral to home care.    SW had conversation with daughters about transitioning to hospice cares at discharge, knowing that despite IV abx course, pt will become ill again & require hospitalization & IV abx & per MD, she has very little reserve to fight the infection.    Daughter's requested SW send referral to [facility] (Phone: [phone] Fax: [phone]).    MD informed that pt will need IV abx until 7/4/23 AM dose & then can discharge.    SW to send referral to [facility] on 7/2/23 due to time constraints today & request admit date/time of 7/4/23 in the afternoon.    Care Management team to follow for discharge plans.  CHRISTOPHER Gonzalez on 7/1/2023 at 4:32 PM          Care Management Discharge Note    Discharge Date: 07/01/2023    Discharge Disposition: Home Care, Home    Discharge Services: Home Care    Discharge DME: None    Discharge Transportation: agency    Private pay costs discussed: transportation costs    Does the patient's insurance plan have a 3 day qualifying hospital stay waiver?  Yes   Will the waiver be used for post-acute placement? No    PAS Confirmation Code:  NA  Patient/family educated on Medicare website which has current facility and service quality ratings: yes    Education Provided on the Discharge Plan: Yes  Persons Notified of Discharge Plans: Pt, Luz Maria Dale  Patient/Family in Agreement with the Plan: yes    Handoff Referral Completed: Yes    Additional Information:  Per IDT rounds, MD states that pt is medically stable for discharge today.    SW spoke with pt & daughter's Luz Maria & Suyapa (via cell phone speaker) as Luz Maria called writer this morning.  We discussed that pt will be discharged to home on new oxygen and that PT/OT recommends that Home Care services for pt to increase strength.   Pt and her daughters are in agreement with this plan, but we also discussed that the family is doing research on hospice agencies as pt will also engage with hospice cares in the near future.  SW will provide hospice agency information from Medicare.gov and residential hospice information to pt/daughters, as requested.     Pt is accepted for cares for RN, PT/OT, and HHA with [facility] (Phone: [phone]).  Pt and daughters are aware that Medicare allows 48 hours for the initial home appointment.     Transportation discussed and family requested for SW to arrange transportation as pt uses a step stool to get into Kamelio's car.  Sw educated on cost of transport; they verbalized understanding & requested SW proceed with setting up transportation.       SW provided printed resources on:  1.  [employer] [facility] for support for Suyapa as a caregiver.  2.  Hospice agencies in this geographical cares from Medicare.gov  3.  Residential hospice homes in the [address] [place] [address] area.    SW awaiting for home 02 system to be delivered and for pt to have a BM prior to discharge & setting up ride.  SW spoke with Charge RN and requested to be updated on both topics.-- both issues resolved.    [facility] wheelchair with oxygen to transport between 340-420 PM.      CHRISTOPHER Gonzalez

## 2023-07-01 NOTE — PROGRESS NOTES
Assumed care:  1900 to 0700    Neuro:  A/O x4.  Makes needs known.     Cardiac:  SR.  Bps soft.      Respiratory:  On O2 via nc first part of the night.  Later placed back on BIPAP when WOB increased.     GI/:  Incontinent of bladder.  No BM.  Up to BSC once to void.     Activity:  Assist x 1 with walker.      Pain: APAP given for knee pain.     Skin/wound:  Intact.     LDA's:  1 PIV TKO.  IV ABX as scheduled.

## 2023-07-01 NOTE — PLAN OF CARE
Patient has been assessed for Home Oxygen needs.     Pulse oximetry (SpO2) and Oxygen flow readings:    SpO2 = 86% on room air at rest while awake.    SpO2 improved to 95% on 2 liters/minute at rest.    SpO2 = 81% on room air during activity/with exercise.    *SpO2 improved to 90% on 2 liters/minute during activity/with exercise.

## 2023-07-01 NOTE — PLAN OF CARE
Physical Therapy Discharge Summary    Reason for therapy discharge:    Discharge today once home O2 arrives at hospital    Progress towards therapy goal(s). See [facility] on [facility] for goal details.  Goals not met.  Barriers to achieving goals:   limited tolerance for therapy and discharge from facility.    Therapy recommendation(s):    Continued therapy is recommended.  Rationale/Recommendations:  return home with support of family, home PT to increase independence in environment.

## 2023-07-01 NOTE — PHARMACY-ANTICOAGULATION SERVICE
Clinical Pharmacy- Warfarin Discharge Note  This patient is currently on warfarin for the treatment of Atrial fibrillation.  INR Goal= 2-3  Expected length of therapy lifetime.    Warfarin PTA Regimen: 0.5 mg FRI and 1 mg ROW      Anticoagulation Dose History  More data exists       Latest Ref Rng & Units 6/25/2023 6/26/2023 6/27/2023 6/28/2023   Recent Dosing and Labs   warfarin ANTICOAGULANT (COUMADIN) half-tab 0.5 mg - - - - -   warfarin ANTICOAGULANT (COUMADIN) tablet 1 mg - 1 mg, $Given 1 mg, $Given 1 mg, $Given 1 mg, $Given   INR 0.85 - 1.15 1.86  1.95  2.05  2.38            6/29/2023 6/30/2023 7/1/2023   Recent Dosing and Labs   warfarin ANTICOAGULANT (COUMADIN) half-tab 0.5 mg 0.5 mg, $Given 0.5 mg, $Given -   warfarin ANTICOAGULANT (COUMADIN) tablet 1 mg - - -   INR 2.67  2.36  2.20        Vitamin K doses administered during the last 7 days: none  FFP administered during the last 7 days: none  Recommend discharging the patient on a warfarin regimen of 0.5 mg daily due to drug-drug interactions with antibiotics.      The patient should have an INR checked on 7/3 with [facility]. Further dosing per [facility].    Do Mathis PharmD

## 2023-07-01 NOTE — PLAN OF CARE
Occupational Therapy Discharge Summary    Reason for therapy discharge:    Discharged to home with home therapy.    Progress towards therapy goal(s). See goals on Care Plan in [facility] electronic health record for goal details.  Goals partially met.  Barriers to achieving goals:   limited tolerance for therapy and discharge from facility.    Therapy recommendation(s):    Continued therapy is recommended.  Rationale/Recommendations:  increased overall strength and act carl for ind and safety with BADLs.

## 2023-07-01 NOTE — PLAN OF CARE
End Of Shift Note    Neuro: Pt A&O X4, can make needs known, calls appropriately.    Cardiac: SR, permanent pacemaker, afebrile.    Resp: Tolerating 2L nasal canula, dyspnea w/activity, LS diminished w/expiratory wheezing.    GI/: Intermittently incontinent of bladder, still no BM, rectal suppository given. Small amount of liquid stool w/worm like shape in stool. Sample in process.     MSK: SBA w/walker & gait belt up to the bedside commode & chair.    Skin: WDL    LDAs: 1PIV.

## 2023-07-01 NOTE — DISCHARGE INSTRUCTIONS
Warfarin Discharge Instructions:  Continue warfarin 0.5 mg Fri, 1 mg rest of week.  Recheck INR in a week via Home Care with results called to [facility] at [phone]. Further dosing per Anticoagulation Clinic.

## 2023-07-01 NOTE — PROGRESS NOTES
[facility]    Hospitalist Progress Note    Date of Service (when I saw the patient): 07/01/2023    Assessment & Plan   89 year old female with past medical history of asthma, bronchiectasis, DVT, A-fib, status post pacemaker, HLD, HTN, depression, and anxiety presented on 6/24/2023 with productive cough and shortness of breath for 1 week.       Acute respiratory failure with hypoxia   Rhinovirus infection, acute  Asthma exacerbation, possible acute  Bronchiectasis, suspect acute exacerbation   Pneumonia bilateral lower lobes, possibly due to Pseudomonas    Admitted with acute hypoxic respiratory failure initially thought to be due to exacerbation of bronchiectasis and pneumonia.  CT chest on admission noted new nodular airspace opacities groundglass, and extensive bronchiectasis with volume loss of the middle lobe and anterior right upper lobe, unchanged.  Initially started on treatment for bronchiectasis exacerbation with pneumonia with steroid burst, nebulized albuterol and ipratropium, guaifenesin and levofloxacin.  Respiratory panel positive for rhinovirus, and levofloxacin stopped after one dose due to likely viral etiology. Sputum culture positive 1+ Pseudomonas suspected to be colonizer. Patient was gradually improving and needing 1-2 L supplemental O2 6/26 to 6/27 morning.     Acutely worsening hypoxemia and work of breathing late morning today 6/27 associated with decreased air excursion but no fever, tachycardia or appearance of sepsis, WBC mild up 12.6, CRP 54, procalcitonin 0.07. No heart failure clinically. CXR obtained and images reviewed and discussed with radiologist. There is new air space opacity right lower lobe compared with 6/24 imaging. Additional nebs given with minimal benefit. Possible bronchiectasis exacerbation and mucus plugging though concerned for pneumonia given increased infiltrate.    Plan:   - Start cefepime IV (6/27 - ) dosed for Pseudomonas and double  cover with cipro IV (6/27 -6/28 ) also to cover atypicals, follow up sensitivities on sputum culture and narrow spectrum when able   - Transfer to ICU for BiPAP as patient at risk of worsening failure given increased work of breathing and age   - Reviewed code status again with patient and daughter at bedside and confirmed DNR but not DNI   - Continue nebulized albuterol and ipratropium    - Continue Mucinex BID    - Continue home Saline nebs TID (important for possible mucus plugging)   - Continue prednisone burst 40 mg started 6/24 at least through 6/28   - Hold home inhaler Advair while on prednisone, but resume on discharge   - Patient able to eat breakfast off bipap but then back on.  Was off bipap briefly for lunch, but then tired and placed back on.  - Continue to wean oxygen as tolerated  - Per AMT, discontinue ciprofloxacin and start metronidazole on 6/28.  Continue cefepime.  - On a.m. of 6/29, patient weaned back down to 5 L nasal cannula  - Continue to monitor in ICU given patient's tenuous respiratory status  - Case reviewed by telemetry ICU, recommended testing for TB with QuantiFERON gold and AFB culture, fungal antibodies for blastomycosis, coccidiomycosis antigen, blastomycosis antigen, histoplasma antigen Aspergillus galactomannan, and 1,3 beta glucan Fungitell  - Due to difficulty weaning off oxygen and intermittent requirements of increased oxygen needs in the setting of advanced age, will consult palliative care to discuss goals of care.  -Patient and daughter, Estee, met with palliative care.  I agree with palliative care assessment that patient has a life expectancy of less than 6 months due to her severe bronchiectasis.  Patient and daughter are considering hospice  -Will place home health care RN/PT/OT/home health aide if discharged to home  -Sputum culture from 62/4 and now 6/28 growing Pseudomonas aeruginosa, resistant to quinolones.  Discussed results with daughters.  We had discussed  discharge patient home with home cares, but will cancel discharge for 7/1 and have patient remain in hospital to complete treatment for Pseudomonas.  -QuantiFERON gold negative  -AFB and fungal antibodies pending  -Follow-up on stool ova and parasite    Hyponatremia due to SIADH  Sodium decreased to 126 on 6/28.  -Continue NaCl 1g BID  -Urine sodium 43 and urine osmolality 539  -Sodium is 128 on 6/29  -Fluid restriction of 800 cc/day starting 6/29  -Sodium decreased to 125 on 6/30  -Increase sodium tabs to 1 g 3 times daily  -Repeat BMP in a.m.     Intermittent atrial fibrillation (H)  Long term current use of anticoagulant therapy  Cardiac pacemaker in situ    Rate controlled with metoprolol. INR subtherapeutic on admission 1.76    - Continue warfarin, pharmacy to dose   - Continue metoprolol      Benign essential hypertension     Managed PTA with metoprolol XL. BP running mild to mod up this hospitalization likely due to stress of acute illness, hospitalization.    - Continue home medication metoprolol   - Follow BP but would not treat acutely unless symptomatic or persistently/consistenly > 180/120     Mild persistent asthma without complication    Managed PTA with Advair 250-50, albuterol nebs and ipratropium nebs together QID.     Suspect exacerbation as above.      Bronchiectasis     Managed PTA with guaifenesin BID and 3% saline nebs TID.    Suspect exacerbation as above.  - Discontinue Mucinex per telemetry ICU    Hypothyroidism  - Continue home Synthroid     Mild major depression  Anxiety  -Continue trazodone     H/o DVT (deep venous thrombosis), distant    Distant h/o right femoral vein DVT (June 2011) related to trauma hospitalization for C2 fracture and placement of right femoral vein catheter, s/p course of anticoagulation. This provoked DVT does not suggest a long term hypercoagulable state.       Diet: Minced & Moist Diet (level 5) Thin Liquids (level 0)    DVT Prophylaxis: Warfarin  Glasgow Catheter:  Not present  Lines: None     Cardiac Monitoring: None  Code Status: No CPR- Pre-arrest intubation OK      Disposition: Expected discharge in 3 to 5 days days once hypoxia improved.    Aaron Martinez MD    Interval History   Patient is lying in bed.  She has no acute complaints.    -Data reviewed today: I reviewed all new labs and imaging results over the last 24 hours. I personally reviewed no images or EKG's today.    Physical Exam   Temp: 97.9  F (36.6  C) Temp src: Oral BP: 131/59 Pulse: 87   Resp: 23 SpO2: 92 % O2 Device: Nasal cannula Oxygen Delivery: 2 LPM  Vitals:    06/27/23 1700 06/30/23 0435 07/01/23 0400   Weight: 60.2 kg (132 lb 11.5 oz) 61 kg (134 lb 7.7 oz) 60.8 kg (134 lb 0.6 oz)     Vital Signs with Ranges  Temp:  [97.4  F (36.3  C)-98.5  F (36.9  C)] 97.9  F (36.6  C)  Pulse:  [] 87  Resp:  [16-28] 23  BP: ()/(49-80) 131/59  FiO2 (%):  [40 %] 40 %  SpO2:  [91 %-98 %] 92 %  I/O last 3 completed shifts:  In: 1130 [P.O.:1130]  Out: 200 [Urine:200]    Gen: Cachectic, debilitated, elderly female resting in bed alert and oriented x 3.  HEENT: Atraumatic, bitemporal wasting; sclera non-injected, anicterric; oral mucosa moist, no lesion, no exudate  Lungs: Diffuse bilateral rhonchi with few scattered wheezes, no rales  Heart: Regular rate, regular rhythm, no gallops, no rubs, no murmurs  GI: Bowel sound normal, no hepatosplenomegaly, no masses, non-tender, non-distended, no guarding, no rebound tenderness  Lymph: No lymphadenopathy, no edema  Skin: No rashes, no chronic venous stasis     Medications     - MEDICATION INSTRUCTIONS -         acetylcysteine  2 mL Nebulization 4x Daily     [Held by provider] albuterol  2.5 mg Nebulization 3 times daily     ceFEPIme  2 g Intravenous Q12H     [Held by provider] fluticasone-vilanterol  1 puff Inhalation Daily     ipratropium - albuterol 0.5 mg/2.5 mg/3 mL  3 mL Nebulization 4x daily     levothyroxine  50 mcg Oral Daily     metoprolol succinate ER   50 mg Oral BID     metroNIDAZOLE  500 mg Intravenous Q8H     polyethylene glycol  17 g Oral Daily     sodium chloride  3 mL Nebulization TID     sodium chloride (PF)  3 mL Intracatheter Q8H     sodium chloride  1 g Oral TID w/meals     traZODone  50 mg Oral At Bedtime     Warfarin Therapy Reminder  1 each Oral See Admin Instructions       Data   Recent Labs   Lab 07/01/23  1113 07/01/23  0745 07/01/23  0521 06/30/23  1134 06/30/23  0420 06/29/23  0434 06/28/23  0829 06/28/23  0709 06/27/23  0540 06/26/23  0524 06/24/23  1648 06/24/23  1609   WBC  --   --   --   --  15.1* 14.4*  --  14.3*   < > 10.5  --  8.1   HGB  --   --   --   --  10.9* 9.7*  --  11.4*   < > 10.5*  --  11.3*   MCV  --   --   --   --  91 90  --  89   < > 87  --  91   PLT  --   --   --   --  306 247  --  232   < > 205  --  198   INR  --   --  2.20*  --  2.36* 2.67*  --  2.38*   < > 1.95*   < >  --    NA  --   --   --   --  125* 128*  --  126*   < > 129*  --  130*   POTASSIUM  --   --   --   --  5.2 4.9  --  5.1   < > 4.0  --  4.0   CHLORIDE  --   --   --   --  90* 92*  --  89*   < > 93*  --  97*   CO2  --   --   --   --  26 31*  --  29   < > 27  --  23   BUN  --   --   --   --  18.0 15.5  --  13.6   < > 14.8  --  15.5   CR  --   --   --   --  0.48* 0.54  --  0.49*   < > 0.50*  --  0.44*   ANIONGAP  --   --   --   --  9 5*  --  8   < > 9  --  10   DOUG  --   --   --   --  8.6 8.7  --  8.6   < > 8.9  --  8.7   * 109*  --  135* 108* 121*   < > 106*   < > 106*  --  104*   ALBUMIN  --   --   --   --   --   --   --   --   --  3.5  --  3.5   PROTTOTAL  --   --   --   --   --   --   --   --   --  6.0*  --  6.2*   BILITOTAL  --   --   --   --   --   --   --   --   --  0.3  --  0.2   ALKPHOS  --   --   --   --   --   --   --   --   --  58  --  63   ALT  --   --   --   --   --   --   --   --   --  10  --  9   AST  --   --   --   --   --   --   --   --   --  23  --  21    < > = values in this interval not displayed.       No results found for this or any  previous visit (from the past 24 hour(s)).

## 2023-07-02 NOTE — PLAN OF CARE
PT- asked DTR and pt about PT and DTR wishes for pt to stay mobile and strong as much as she can and that she was wanting home PT at discharge, after session relayed this info to SW, she checked with family and notified PT they wish to discharge on hospice, DTR was thinking hospice also did some therapy but this was clarified with family it sounds like, will sign off on PT as pt is moving with CGA and has adequate support and AE at home for discharge.  Physical Therapy Discharge Summary    Reason for therapy discharge:    All goals and outcomes met, no further needs identified.    Progress towards therapy goal(s). See goals on Care Plan in [facility] electronic health record for goal details.  Goals partially met.  Barriers to achieving goals:   will be doing hospice at discharge.

## 2023-07-02 NOTE — PROGRESS NOTES
Care Management Follow Up    Length of Stay (days): 8    Expected Discharge Date: 07/04/2023     Concerns to be Addressed: discharge planning     Patient plan of care discussed at interdisciplinary rounds: Yes    Anticipated Discharge Disposition: Home, Hospice     Anticipated Discharge Services: None  Anticipated Discharge DME:  (Hospice will provide all DME needed:  o2, overbed table, wheelchair, briefs and requested a shower chair)    Patient/family educated on Medicare website which has current facility and service quality ratings: yes  Education Provided on the Discharge Plan: Yes  Patient/Family in Agreement with the Plan: yes    Referrals Placed by CM/SW: Internal Clinic Care Coordination, Homecare, Hospice, Transportation  Private pay costs discussed: transportation costs    Additional Information:  Per IDT rounds today, MD team states that pt IS NOT medically stable for discharge today.  MD anticipates pt to remain hospitalized another 2 days.     The TENTATIVE PLAN, pending acceptance from [facility] is for:  1.  Pt to finish IV abx on 7/4/23 in the AM and  2.  IF  [facility] hospice has availability, will have DME delivered to alexandru Dale's home on 7/4/23 in the AM   3.  [facility] transportation & discharge in the afternoon of 7/4/23 and enroll with hospice on 7/4/23 in AFTERNOON    SW has provided education regarding Medicare.gov; how pt's insurance will cover services upon discharge, and offered choice of agency for cares.  Per discussion with pt/family, they requested referrals be sent to:  Service Provider Request Status Selected Services Address Phone Fax Patient Preferred   [facility] HOSPICE CENTRALIZED INTAKE  Pending - Request Sent N/A [id number] [facility] [id number] [phone] --     SHABNAM spoke with Luz Maria Ayon & Olga via phone today and confirmed above; all are in agreement with plan of care.      SW awaiting response from [facility] hospice & then will finalize  plan.    CHRISTOPHER Gonzalez

## 2023-07-02 NOTE — PROGRESS NOTES
[facility] TRANSPORT NOTE  Data:   Reason for Transport:  stable    Ellie Leigh was transported to 2317 via wheel chair at 2145.  Patient was accompanied by Registered Nurse and Nursing Assistant. Equipment used for transport: Oxygen  Nasal Cannula. Family was aware of reason for transport: no    Action:  Report: given to [place] VIERA    Response:  Patient's condition when transferred off unit was stable.    Miguel Villarreal RN

## 2023-07-02 NOTE — PROGRESS NOTES
[facility]    Hospitalist Progress Note    Date of Service (when I saw the patient): 07/02/2023    Assessment & Plan   89 year old female with past medical history of asthma, bronchiectasis, DVT, A-fib, status post pacemaker, HLD, HTN, depression, and anxiety presented on 6/24/2023 with productive cough and shortness of breath for 1 week.       Acute respiratory failure with hypoxia   Rhinovirus infection, acute  Asthma exacerbation, possible acute  Bronchiectasis, suspect acute exacerbation   Pneumonia bilateral lower lobes, possibly due to Pseudomonas    Admitted with acute hypoxic respiratory failure initially thought to be due to exacerbation of bronchiectasis and pneumonia.  CT chest on admission noted new nodular airspace opacities groundglass, and extensive bronchiectasis with volume loss of the middle lobe and anterior right upper lobe, unchanged.  Initially started on treatment for bronchiectasis exacerbation with pneumonia with steroid burst, nebulized albuterol and ipratropium, guaifenesin and levofloxacin.  Respiratory panel positive for rhinovirus, and levofloxacin stopped after one dose due to likely viral etiology. Sputum culture positive 1+ Pseudomonas suspected to be colonizer. Patient was gradually improving and needing 1-2 L supplemental O2 6/26 to 6/27 morning.     Acutely worsening hypoxemia and work of breathing late morning today 6/27 associated with decreased air excursion but no fever, tachycardia or appearance of sepsis, WBC mild up 12.6, CRP 54, procalcitonin 0.07. No heart failure clinically. CXR obtained and images reviewed and discussed with radiologist. There is new air space opacity right lower lobe compared with 6/24 imaging. Additional nebs given with minimal benefit. Possible bronchiectasis exacerbation and mucus plugging though concerned for pneumonia given increased infiltrate.    Plan:   - Start cefepime IV (6/27 - ) dosed for Pseudomonas and double  cover with cipro IV (6/27 -6/28 ) also to cover atypicals, follow up sensitivities on sputum culture and narrow spectrum when able   - Transfer to ICU for BiPAP as patient at risk of worsening failure given increased work of breathing and age   - Reviewed code status again with patient and daughter at bedside and confirmed DNR but not DNI   - Continue nebulized albuterol and ipratropium    - Continue Mucinex BID    - Continue home Saline nebs TID (important for possible mucus plugging)   - Continue prednisone burst 40 mg started 6/24 at least through 6/28   - Hold home inhaler Advair while on prednisone, but resume on discharge   - Patient able to eat breakfast off bipap but then back on.  Was off bipap briefly for lunch, but then tired and placed back on.  - Continue to wean oxygen as tolerated  - Per AMT, discontinue ciprofloxacin and start metronidazole on 6/28.  Continue cefepime.  - On a.m. of 6/29, patient weaned back down to 5 L nasal cannula  - Continue to monitor in ICU given patient's tenuous respiratory status  - Case reviewed by telemetry ICU, recommended testing for TB with QuantiFERON gold and AFB culture, fungal antibodies for blastomycosis, coccidiomycosis antigen, blastomycosis antigen, histoplasma antigen Aspergillus galactomannan, and 1,3 beta glucan Fungitell  - Due to difficulty weaning off oxygen and intermittent requirements of increased oxygen needs in the setting of advanced age, will consult palliative care to discuss goals of care.  -Patient and daughter, Estee, met with palliative care.  I agree with palliative care assessment that patient has a life expectancy of less than 6 months due to her severe bronchiectasis.  Patient and daughter are considering hospice  -Will place home health care RN/PT/OT/home health aide if discharged to home  -Sputum culture from 62/4 and now 6/28 growing Pseudomonas aeruginosa, resistant to quinolones.  Discussed results with daughters.  We had discussed  discharge patient home with home cares, but will cancel discharge for 7/1 and have patient remain in hospital to complete treatment for Pseudomonas.  -QuantiFERON gold and AFB negative  -Fungitell and Aspergillus galactomannan negative  -Histoplasma, Blastomyces, Coccidioides, and fungal antibodies pending  -Follow-up on stool ova and parasite  -Patient to complete 7-day course of cefepime on a.m. of 7/4    Hyponatremia due to SIADH  Sodium decreased to 126 on 6/28.  -Continue NaCl 1g BID  -Urine sodium 43 and urine osmolality 539  -Sodium is 128 on 6/29  -Fluid restriction of 800 cc/day starting 6/29  -Sodium decreased to 125 on 6/30  -Increase sodium tabs to 1 g 3 times daily  -Sodium 128 on 7/2  -Repeat BMP in a.m.     Intermittent atrial fibrillation (H)  Long term current use of anticoagulant therapy  Cardiac pacemaker in situ    Rate controlled with metoprolol. INR subtherapeutic on admission 1.76    - Continue warfarin, pharmacy to dose   - Continue metoprolol      Benign essential hypertension     Managed PTA with metoprolol XL. BP running mild to mod up this hospitalization likely due to stress of acute illness, hospitalization.    - Continue home medication metoprolol   - Follow BP but would not treat acutely unless symptomatic or persistently/consistenly > 180/120     Mild persistent asthma without complication    Managed PTA with Advair 250-50, albuterol nebs and ipratropium nebs together QID.     Suspect exacerbation as above.      Bronchiectasis     Managed PTA with guaifenesin BID and 3% saline nebs TID.    Suspect exacerbation as above.  - Discontinue Mucinex per telemetry ICU    Hypothyroidism  - Continue home Synthroid     Mild major depression  Anxiety  -Continue trazodone     H/o DVT (deep venous thrombosis), distant    Distant h/o right femoral vein DVT (June 2011) related to trauma hospitalization for C2 fracture and placement of right femoral vein catheter, s/p course of anticoagulation. This  provoked DVT does not suggest a long term hypercoagulable state.       Diet: Minced & Moist Diet (level 5) Thin Liquids (level 0)    DVT Prophylaxis: Warfarin  [place] Catheter: Not present  Lines: None     Cardiac Monitoring: None  Code Status: No CPR- Pre-arrest intubation OK      Disposition: Expected discharge in 3 to 5 days days once hypoxia improved.    Aaron Martinez MD    Interval History   Patient is sitting in chair eating breakfast.  She continues to have a productive cough, but denies shortness of breath    -Data reviewed today: I reviewed all new labs and imaging results over the last 24 hours. I personally reviewed no images or EKG's today.    Physical Exam   Temp: 98  F (36.7  C) Temp src: Oral BP: (!) 150/64 Pulse: 84   Resp: 20 SpO2: (!) 89 % O2 Device: Nasal cannula Oxygen Delivery: 2 LPM  Vitals:    06/30/23 0435 07/01/23 0400 07/01/23 2145   Weight: 61 kg (134 lb 7.7 oz) 60.8 kg (134 lb 0.6 oz) 61.5 kg (135 lb 9.3 oz)     Vital Signs with Ranges  Temp:  [97.9  F (36.6  C)-98.4  F (36.9  C)] 98  F (36.7  C)  Pulse:  [69-99] 84  Resp:  [16-32] 20  BP: (127-168)/(59-83) 150/64  SpO2:  [89 %-96 %] 89 %  I/O last 3 completed shifts:  In: 450 [P.O.:450]  Out: -     Gen: Cachectic, debilitated, elderly female, sitting in chair, eating breakfast.  HEENT: Atraumatic, bitemporal wasting; sclera non-injected, anicterric; oral mucosa moist, no lesion, no exudate  Lungs: Diffuse bilateral rhonchi with few scattered wheezes, no rales  Heart: Regular rate, regular rhythm, no gallops, no rubs, no murmurs  GI: Bowel sound normal, no hepatosplenomegaly, no masses, non-tender, non-distended, no guarding, no rebound tenderness  Lymph: No lymphadenopathy, no edema  Skin: No rashes, no chronic venous stasis     Medications     - MEDICATION INSTRUCTIONS -         acetylcysteine  2 mL Nebulization 4x Daily     [Held by provider] albuterol  2.5 mg Nebulization 3 times daily     ceFEPIme  2 g Intravenous Q12H     [Held  by provider] fluticasone-vilanterol  1 puff Inhalation Daily     ipratropium - albuterol 0.5 mg/2.5 mg/3 mL  3 mL Nebulization 4x daily     levothyroxine  50 mcg Oral Daily     metoprolol succinate ER  50 mg Oral BID     metroNIDAZOLE  500 mg Intravenous Q8H     polyethylene glycol  17 g Oral Daily     sodium chloride  3 mL Nebulization TID     sodium chloride (PF)  3 mL Intracatheter Q8H     sodium chloride  1 g Oral TID w/meals     traZODone  50 mg Oral At Bedtime     warfarin ANTICOAGULANT  1 mg Oral ONCE at 18:00     Warfarin Therapy Reminder  1 each Oral See Admin Instructions       Data   Recent Labs   Lab 07/02/23  0629 07/01/23  1714 07/01/23  1113 07/01/23  0745 07/01/23  0521 06/30/23  1134 06/30/23  0420 06/29/23  0434 06/27/23  0540 06/26/23  0524   WBC 8.5  --   --   --   --   --  15.1* 14.4*   < > 10.5   HGB 9.8*  --   --   --   --   --  10.9* 9.7*   < > 10.5*   MCV 89  --   --   --   --   --  91 90   < > 87     --   --   --   --   --  306 247   < > 205   INR 2.10*  --   --   --  2.20*  --  2.36* 2.67*   < > 1.95*   *  --   --   --   --   --  125* 128*   < > 129*   POTASSIUM 4.3  --   --   --   --   --  5.2 4.9   < > 4.0   CHLORIDE 93*  --   --   --   --   --  90* 92*   < > 93*   CO2 30*  --   --   --   --   --  26 31*   < > 27   BUN 12.4  --   --   --   --   --  18.0 15.5   < > 14.8   CR 0.39*  --   --   --   --   --  0.48* 0.54   < > 0.50*   ANIONGAP 5*  --   --   --   --   --  9 5*   < > 9   DOUG 8.2  --   --   --   --   --  8.6 8.7   < > 8.9   * 152* 143*   < >  --    < > 108* 121*   < > 106*   ALBUMIN  --   --   --   --   --   --   --   --   --  3.5   PROTTOTAL  --   --   --   --   --   --   --   --   --  6.0*   BILITOTAL  --   --   --   --   --   --   --   --   --  0.3   ALKPHOS  --   --   --   --   --   --   --   --   --  58   ALT  --   --   --   --   --   --   --   --   --  10   AST  --   --   --   --   --   --   --   --   --  23    < > = values in this interval not  displayed.       No results found for this or any previous visit (from the past 24 hour(s)).

## 2023-07-02 NOTE — PLAN OF CARE
Goal Outcome Evaluation:    DATE & TIME: 0700-1930 7/2/2023      Cognitive Concerns/ Orientation : alert and orientated x 4   BEHAVIOR & AGGRESSION TOOL COLOR: green   ABNL VS/O2: placed on room air by RT this evening/other care staff.  Continuous pulse oximetry in place and put back on 2 lpm after dinner-saturations 86% at rest on room air  MOBILITY: assist of one, transfer belt and walker  PAIN MANAGMENT: complained of sore buttocks from sitting in chair during extended visit with multiple family members.  Chair cushion and repositioning applied and effective  DIET: cardiac, low sodium.    BOWEL/BLADDER: incontinent of urine, sat on commode to attempt to have BM.  No success.   ABNL LAB/BG: in epic  DRAIN/DEVICES: none  TELEMETRY RHYTHM: none  SKIN: intact  TESTS/PROCEDURES: none  D/C DATE: July 4th after antibiotic  Discharge Barriers: none identified

## 2023-07-02 NOTE — PROGRESS NOTES
"Pt alert, oriented, [place]. Assist 1, walker, belt for transfers. IV antibiotics given. Telemetry monitoring. Has frequent coarse cough. Pt is incontinent of urine. No bm overnight. Denies pain and nausea. Has SOB with activity. PCR test pending. Minced, moist diet.     BP (!) 168/83   Pulse 69   Temp 98  F (36.7  C) (Oral)   Resp 18   Ht 1.473 m (4' 10\")   Wt 61.5 kg (135 lb 9.3 oz)   LMP 06/15/1985   SpO2 94%   BMI 28.34 kg/m      "

## 2023-07-03 NOTE — TELEPHONE ENCOUNTER
Home Health Care    Reason for call:  [facility] FROM [facility]    Orders are needed for this patient.     ORDER  To Treat, Eval and Treat and follow for Hospice care from PCP please . Currently in hospital now. Polanned discharge for 7/4 and possible admission to hospice on 7/5. She is going home for hospice care.    Pt Provider: edgardo      Phone Number Homecare Nurse can be reached at: [phone]     Can we leave a detailed message on this number? YES    Please have RN give verbal OK that she will be followed by PCP.  Thank you

## 2023-07-03 NOTE — TELEPHONE ENCOUNTER
Home Care is calling regarding an established patient with [facility].     Requesting orders from: Ailyn Roland  Provider is following patient: Yes  Is this a 60-day recertification request?  No    Orders Requested  see below.     Confirmed ok to leave a detailed message with call back.  Contact information confirmed and updated as needed.    [place] RN

## 2023-07-03 NOTE — DISCHARGE SUMMARY
[facility]  Hospitalist Discharge Summary       Date of Admission:  6/24/2023  Date of Discharge:  7/4/23  Discharging Provider: Aaron Martinez MD      Discharge Diagnoses     Acute respiratory failure with hypoxia   Rhinovirus infection, acute  Asthma exacerbation, possible acute  Bronchiectasis, suspect acute exacerbation   Pneumonia bilateral lower lobes, due to Pseudomonas  Hyponatremia due to SIADH  Intermittent atrial fibrillation (H)  Long term current use of anticoagulant therapy  Cardiac pacemaker in situ  Benign essential hypertension   Mild persistent asthma without complication  Bronchiectasis   Hypothyroidism  Mild major depression  Anxiety  H/o DVT (deep venous thrombosis), distant    Follow-ups Needed After Discharge   Follow-up Appointments     Follow-up and recommended labs and tests       Follow up with primary care provider, Ailyn Roland, within 7   days for hospital follow- up.  The following labs/tests are recommended:   CBC and CMP.          Unresulted Labs Ordered in the Past 30 Days of this Admission     Date and Time Order Name Status Description    7/1/2023  8:18 PM Routine parasitology exam In process     6/29/2023  8:01 AM Acid-Fast Bacilli Culture and Stain In process       These results will be followed up by Aaron Martinez or PCP    Discharge Disposition   Discharged to home  Condition at discharge: Terminal    Hospital Course   89 year old female with past medical history of asthma, bronchiectasis, DVT, A-fib, status post pacemaker, HLD, HTN, depression, and anxiety presented on 6/24/2023 with productive cough and shortness of breath for 1 week.       Acute respiratory failure with hypoxia   Rhinovirus infection, acute  Asthma exacerbation, possible acute  Bronchiectasis, suspect acute exacerbation   Pneumonia bilateral lower lobes, due to Pseudomonas    Admitted with acute hypoxic respiratory failure initially thought to be due to  exacerbation of bronchiectasis and pneumonia.  CT chest on admission noted new nodular airspace opacities groundglass, and extensive bronchiectasis with volume loss of the middle lobe and anterior right upper lobe, unchanged.  Initially started on treatment for bronchiectasis exacerbation with pneumonia with steroid burst, nebulized albuterol and ipratropium, guaifenesin and levofloxacin.  Respiratory panel positive for rhinovirus, and levofloxacin stopped after one dose due to likely viral etiology. Sputum culture positive 1+ Pseudomonas suspected to be colonizer. Patient was gradually improving and needing 1-2 L supplemental O2 6/26 to 6/27 morning.     Acutely worsening hypoxemia and work of breathing late morning today 6/27 associated with decreased air excursion but no fever, tachycardia or appearance of sepsis, WBC mild up 12.6, CRP 54, procalcitonin 0.07. No heart failure clinically. CXR obtained and images reviewed and discussed with radiologist. There is new air space opacity right lower lobe compared with 6/24 imaging. Additional nebs given with minimal benefit. Possible bronchiectasis exacerbation and mucus plugging though concerned for pneumonia given increased infiltrate.    Plan:   - Start cefepime IV (6/27 - ) dosed for Pseudomonas and double cover with cipro IV (6/27 -6/28 ) also to cover atypicals, follow up sensitivities on sputum culture and narrow spectrum when able   - Transfer to ICU for BiPAP as patient at risk of worsening failure given increased work of breathing and age   - Reviewed code status again with patient and daughter at bedside and confirmed DNR but not DNI   - Continue nebulized albuterol and ipratropium    - Continue Mucinex BID    - Continue home Saline nebs TID (important for possible mucus plugging)   - Continue prednisone burst 40 mg started 6/24 at least through 6/28   - Hold home inhaler Advair while on prednisone, but resume on discharge   - Patient able to eat breakfast  off bipap but then back on.  Was off bipap briefly for lunch, but then tired and placed back on.  - Continue to wean oxygen as tolerated  - Per AMT, discontinue ciprofloxacin and start metronidazole on 6/28.  Continue cefepime.  - On a.m. of 6/29, patient weaned back down to 5 L nasal cannula  - Continue to monitor in ICU given patient's tenuous respiratory status  - Case reviewed by telemetry ICU, recommended testing for TB with QuantiFERON gold and AFB culture, fungal antibodies for blastomycosis, coccidiomycosis antigen, blastomycosis antigen, histoplasma antigen Aspergillus galactomannan, and 1,3 beta glucan Fungitell  - Due to difficulty weaning off oxygen and intermittent requirements of increased oxygen needs in the setting of advanced age, will consult palliative care to discuss goals of care.  -Patient and daughter, Estee, met with palliative care.  I agree with palliative care assessment that patient has a life expectancy of less than 6 months due to her severe bronchiectasis.  Patient and daughter are considering hospice  -Will place home health care RN/PT/OT/home health aide if discharged to home  -Sputum culture from 62/4 and now 6/28 growing Pseudomonas aeruginosa, resistant to quinolones.  Discussed results with daughters.  We had discussed discharge patient home with home cares, but will cancel discharge for 7/1 and have patient remain in hospital to complete treatment for Pseudomonas.  -QuantiFERON gold and AFB negative  -Fungitell and Aspergillus galactomannan negative  -Histoplasma, Blastomyces, Coccidioides, and fungal antibodies pending  -Follow-up on stool ova and parasite  -Patient to complete 7-day course of cefepime on a.m. of 7/4  -Discharge to home with [facility] on 7/4/23    Hyponatremia due to SIADH  Sodium decreased to 126 on 6/28.  -Continue NaCl 1g BID  -Urine sodium 43 and urine osmolality 539  -Sodium is 128 on 6/29  -Fluid restriction of 800 cc/day starting 6/29  -Sodium  decreased to 125 on 6/30  -Increase sodium tabs to 1 g 3 times daily  -Sodium 128 on 7/2  -Sodium 127 on 7/3     Intermittent atrial fibrillation (H)  Long term current use of anticoagulant therapy  Cardiac pacemaker in situ    Rate controlled with metoprolol. INR subtherapeutic on admission 1.76    - Continue warfarin, pharmacy to dose   - Continue metoprolol      Benign essential hypertension     Managed PTA with metoprolol XL. BP running mild to mod up this hospitalization likely due to stress of acute illness, hospitalization.    - Continue home metoprolol   - Follow BP but would not treat acutely unless symptomatic or persistently/consistenly > 180/120     Mild persistent asthma without complication    Managed PTA with Advair 250-50, albuterol nebs and ipratropium nebs together QID.     Suspect exacerbation as above.      Bronchiectasis     Managed PTA with guaifenesin BID and 3% saline nebs TID.    Suspect exacerbation as above.  - Discontinue Mucinex per telemetry ICU    Hypothyroidism  - Continue home Synthroid     Mild major depression  Anxiety  -Continue trazodone     H/o DVT (deep venous thrombosis), distant    Distant h/o right femoral vein DVT (June 2011) related to trauma hospitalization for C2 fracture and placement of right femoral vein catheter, s/p course of anticoagulation. This provoked DVT does not suggest a long term hypercoagulable state.      Consultations This Hospital Stay   PHARMACY TO DOSE WARFARIN  SPIRITUAL HEALTH SERVICES IP CONSULT  SPIRITUAL HEALTH SERVICES IP CONSULT  PALLIATIVE CARE ADULT IP CONSULT  PHYSICAL THERAPY ADULT IP CONSULT  OCCUPATIONAL THERAPY ADULT IP CONSULT  CARE MANAGEMENT / SOCIAL WORK IP CONSULT    Code Status   No CPR- Do NOT Intubate    Time Spent on this Encounter   I, Aaron Martinez MD, personally saw the patient today and spent greater than 30 minutes discharging this patient.       Aaron Martinez MD  [facility]  Center  ______________________________________________________________________    Physical Exam   Vital Signs: Temp: 97.8  F (36.6  C) Temp src: Oral BP: (!) 142/70 Pulse: 73   Resp: 18 SpO2: 95 % O2 Device: Nasal cannula Oxygen Delivery: 2 LPM  Weight: 135 lbs 9.33 oz       Gen: Cachectic, debilitated, elderly female, sitting in chair, eating breakfast.  HEENT: Atraumatic, bitemporal wasting; sclera non-injected, anicterric; oral mucosa moist, no lesion, no exudate  Lungs: Diffuse bilateral rhonchi with few scattered wheezes, no rales  Heart: Regular rate, regular rhythm, no gallops, no rubs, no murmurs  GI: Bowel sound normal, no hepatosplenomegaly, no masses, non-tender, non-distended, no guarding, no rebound tenderness  Lymph: No lymphadenopathy, no edema  Skin: No rashes, no chronic venous stasis     Primary Care Physician   Ailyn Roland    Discharge Orders      Primary Care - Care Coordination Referral      Hospice Referral      Reason for your hospital stay    This is a 89 year old female admitted with an exacerbation of bronchiectasis.     Follow-up and recommended labs and tests     Follow up with primary care provider, Ailyn Roland, within 7 days for hospital follow- up.  The following labs/tests are recommended: CBC and CMP.     Activity    Your activity upon discharge: activity as tolerated     No CPR- Do NOT Intubate     Oxygen Adult/Peds    Oxygen Documentation  I certify that this patient, Ellie Leigh has been under my care (or a nurse practitioner or physican's assistant working with me). This is the face-to-face encounter for oxygen medical necessity.      At the time of this encounter supplemental oxygen is reasonable and necessary and is expected to improve the patient's condition in a home setting.       Patient has continued oxygen desaturation due to Bronchiectasis J47.9.    If portability is ordered, is the patient mobile within the home? yes     Diet    Follow  this diet upon discharge: Orders Placed This Encounter      Fluid restriction OTHER (SEE COMMENTS) (800 mL fluid)      Snacks/Supplements Adult: Magic Cup; With Meals      Minced & Moist Diet (level 5) Thin Liquids (level 0)         Significant Results and Procedures   Most Recent 3 CBC's:Recent Labs   Lab Test 07/02/23  0629 06/30/23  0420 06/29/23  0434   WBC 8.5 15.1* 14.4*   HGB 9.8* 10.9* 9.7*   MCV 89 91 90    306 247     Most Recent 3 BMP's:Recent Labs   Lab Test 07/03/23  0453 07/02/23  0629 07/01/23  1714 06/30/23  1134 06/30/23  0420   * 128*  --   --  125*   POTASSIUM 4.4 4.3  --   --  5.2   CHLORIDE 93* 93*  --   --  90*   CO2 31* 30*  --   --  26   BUN 12.5 12.4  --   --  18.0   CR 0.44* 0.39*  --   --  0.48*   ANIONGAP 3* 5*  --   --  9   DOUG 7.9* 8.2  --   --  8.6   GLC 98 106* 152*   < > 108*    < > = values in this interval not displayed.     Most Recent 2 LFT's:Recent Labs   Lab Test 06/26/23  0524 06/24/23  1609   AST 23 21   ALT 10 9   ALKPHOS 58 63   BILITOTAL 0.3 0.2     7-Day Micro Results     Collected Updated Procedure Result Status      07/01/2023 2039 07/01/2023 2047 Routine parasitology exam [[id number]]   Stool from Per Rectum    In process Component Value   No component results            06/29/2023 1316 06/29/2023 1626 Acid-Fast Bacilli Culture and Stain [[id number]]    Sputum from Bronchus    In process Component Value   No component results            06/29/2023 1316 07/01/2023 2211 Acid-Fast Bacilli Culture and Stain [[id number]]    Sputum from Bronchus    Preliminary result Component Value   Acid Fast Stain No acid fast bacilli seen  [P]    Performed by [facility],[address] [place] [id number] [phone], Jed Cole MD, PHD, Lab. Director   Acid Fast Stain No acid fast bacilli seen  [P]    Performed by [facility],[address] [place] [address],[place] [address] [phone], Jed Cole MD, PHD, Lab. Director  This is an  appended report. These results have been appended to a previously preliminary verified report.            06/28/2023 2020 07/02/2023 1249 Fungal Antibodies [[id number]]   Blood from Wrist, Right    Final result Component Value Units   Blastomyces Mitzy by EIA 0.4 IV   INTERPRETIVE INFORMATION: Blastomyces Antibodies EIA, SER    0.9 IV or less.......Negative  1.0-1.4 IV...........Equivocal   1.5 IV or greater....Positive   Aspergillus Antibody by CF <1:8    INTERPRETIVE INFORMATION: Aspergillus Antibodies by CF     A titer of 1:8 or greater suggests Aspergillus infection or   allergy.  Cross-reactions with dimorphic fungi are not   unusual within the genus Aspergillus.  Performed By: [facility]  [address] [place] [id number]  : Jed Cole MD, PhD   Coccidioides Antibody by CF <1:2    INTERPRETIVE INFORMATION: Coccidioides Ab by Complement   Fixation (CF)    Any titer suggests past or current infection. However,   greater than 30 percent of cases with chronic residual   pulmonary disease have negative Complement Fixation (CF)   tests. Titers of less than 1:32 (even as low as 1:2) may   indicate past infection or self-limited disease;   anticoccidiodal CF antibody titers in excess of 1:16 may   indicate disseminated infection. CF serology may be used to   follow therapy. Antibody in CSF is considered diagnostic   for coccidioidal meningitis, although 10 percent of   patients with coccidioidal meningitis will not have   antibody in CSF.   Histoplasma Mycelia, CF <1:8    INTREPRETIVE INFORMATION: Histoplasma Mycelia Antibodies                            by CF  A titer of 1:8 or greater is generally considered   presumptive evidence of histoplasmosis. A titer of 1:32 or   greater or rising titers indicate strong presumptive   evidence of histoplasmosis. Cross reactions, usually at   lower titers, may occur with other fungal diseases.   Histoplasma Yeast, CF <1:8     INTERPRETIVE INFORMATION: Histoplasma Yeast Antibodies                             by CF  A titer of 1:8 or greater is generally considered   presumptive evidence of histoplasmosis. A titer of 1:32 or   greater or rising titers indicate strong presumptive   evidence of histoplasmosis. Cross reactions, usually at   lower titers, may occur with other fungal diseases.            06/28/2023 2020 07/01/2023 0819 Quantiferon TB Gold Plus Grey Tube [[id number]]   Blood from Wrist, Right    Final result Component Value Units   Quantiferon Nil Tube 0.01 IU/mL            06/28/2023 2020 07/01/2023 0819 Quantiferon TB Gold Plus Green Tube [[id number]]   Blood from Wrist, Right    Final result Component Value Units   Quantiferon TB1 Tube 0.01 IU/mL            06/28/2023 2020 07/01/2023 0820 Quantiferon TB Gold Plus Yellow Tube [[id number]]   Blood from Wrist, Right    Final result Component Value   Quantiferon TB2 Tube 0.00            06/28/2023 2020 07/01/2023 0820 Quantiferon TB Gold Plus Purple Tube [[id number]]   Blood from Wrist, Right    Final result Component Value Units   Quantiferon Mitogen 2.32 IU/mL            06/28/2023 2020 07/01/2023 0943 Quantiferon TB Gold Plus [[id number]]   Blood from Wrist, Right    Final result Component Value Units   Quantiferon-TB Gold Plus Negative    No interferon gamma response to M.tuberculosis antigens was detected. Infection with M.tuberculosis is unlikely, however a single negative result does not exclude infection. In patients at high risk for infection, a second test should be considered in accordance with the 2017 ATS/IDSA/CDC Clinical Pract  ice Guidelines for Diagnosis of Tuberculosis in Adults and Children    TB1 Ag minus Nil Value 0.00 IU/mL   TB2 Ag minus Nil Value -0.01 IU/mL   Mitogen minus Nil Result 2.31 IU/mL   Nil Result 0.01 IU/mL            06/28/2023 2015 07/01/2023 1152 Respiratory Aerobic Bacterial Culture with Gram Stain [[id number]]   (Abnormal)    Sputum from Endotracheal    Final result Component Value   Culture 2+ Normal lexis    2+ Pseudomonas aeruginosa, mucoid strain    Susceptibilities done on previous cultures   Gram Stain Result >25 PMNs/low power field    3+ Mixed lexis               06/28/2023 1910 06/30/2023 1627 1,3 Beta D glucan fungitell [[id number]]   Blood from Hand, Right    Final result Component Value Units   (1,3)-Beta-D-Glucan <31 pg/mL   B-D GLUCAN INTERPRETATION (1,3) Negative    INTERPRETIVE INFORMATION: (1,3)-beta-D-glucan (Fungitell)      Less than 31 pg/mL ................... Negative    31-59 pg/mL .......................... Negative    60-79 pg/mL .......................... Indeterminate    Greater than or equal to 80 pg/mL .... Positive    The Fungitell test is indicated for presumptive diagnosis   of fungal infection and should be used in conjunction with   other diagnostic procedures. This test does not detect   certain fungal species such as Cryptococcus, which produce   very low levels of (1,3)-beta-D-glucan. This test will not   detect the zygomycetes, such as Absidia, Mucor, and   Rhizopus, which are not known to produce   (1,3)-beta-D-glucan. In addition, the yeast phase of   Blastomyces dermatitidis produces little   (1,3)-beta-D-glucan and may not be detected by the assay.  Performed By: [facility]  [address] [place] [id number]  : Jed Cole MD, PhD            06/28/2023 1910 06/30/2023 1520 Aspergillus Galactomannan Antigen [[id number]]   Blood from Hand, Right    Final result Component Value   Aspergillus Galactomannan Index 0.08   Aspergillus Galact AG Negative   INTERPRETIVE INFORMATION: Aspergillus Galactomannan Antigen   by EIA  Negative results do not exclude the diagnosis of invasive  aspergillosis. A single positive test result (index equal  to or greater than 0.5) should be clinically correlated  by testing a separate serum specimen because many agents  (e.g.  foods, antibiotics) may cross-react with the test.  If invasive aspergillosis is suspected in high-risk  patients, serial sampling is recommended.  Performed By: [facility]  [address] [place] [id number]  : Jed Cole MD, PhD            06/28/2023 1910 07/03/2023 0621 Histoplasma Capsulatum Antigen [[id number]]   Blood from Hand, Right    Final result Component Value   See Scanned Result HISTOPLASMA CAPSULATUM ANTIGEN-Scanned            06/28/2023 1910 07/03/2023 0626 Blastomyces Agn Quant EIA Blood [[id number]]   Blood from Hand, Right    Final result Component Value   See Scanned Result BLASTOMYCES AGN QUANT EIA BLOOD-Scanned            06/28/2023 1910 07/03/2023 0632 Coccidioides Agn Quant EIA Blood [[id number]]   Blood from Hand, Right    Final result Component Value   See Scanned Result COCCIDIOIDES AGN QUANT EIA BLOOD-Scanned            06/28/2023 1857 06/28/2023 2332 MRSA MSSA PCR, Nasal Swab [[id number]]    Swab from Nares, Bilateral    Final result Component Value   MRSA Target DNA Negative   SA Target DNA Negative              ,   Results for orders placed or performed during the hospital encounter of 06/24/23   XR Chest 2 Views    Narrative    EXAM: XR CHEST 2 VIEWS  LOCATION: [facility]  DATE: 6/24/2023    INDICATION: Diffuse wheezing, concern for an acute pulmonary issue such as pneumonia  COMPARISON: Chest CT 04/22/2023      Impression    IMPRESSION: Stable cardiomediastinal silhouette with pacemaker leads overlying the right atrium and right ventricle. No significant change in biapical scarring, right greater than left. No definite new airspace consolidation, pleural effusion or   pneumothorax. Bones are unchanged. Diffuse osteopenia.   Chest CT w/o contrast    Narrative    EXAM: CT CHEST W/O CONTRAST  LOCATION: [facility]  DATE: 6/24/2023    INDICATION: Reported history of bronchiectasis,  shortness of air  COMPARISON: 04/22/2023  TECHNIQUE: CT chest without IV contrast. Multiplanar reformats were obtained. Dose reduction techniques were used.  CONTRAST: None.    FINDINGS:   LUNGS AND PLEURA: Complete collapse of the middle lobe and anterior portion of the right upper lobe with associated bronchiectasis, unchanged. Cavitary and nodular opacities in the right upper lobe are unchanged, but there are many new nodular opacities   in the lower lobes, most of which have peripheral groundglass opacity (e.g. 7/158, 165, 212). Scattered bronchial wall thickening and endobronchial debris. No pleural effusion or pneumothorax.     MEDIASTINUM/AXILLAE: No lymphadenopathy. Left chest pacemaker. No thoracic aortic aneurysm    CORONARY ARTERY CALCIFICATION: Severe.    UPPER ABDOMEN: No acute findings.    MUSCULOSKELETAL: No aggressive or destructive osseous lesions. Degenerative changes in the spine with accentuated kyphosis.       Impression    IMPRESSION:   1.  Numerous new nodular airspace opacities with groundglass halo, likely infectious or inflammatory. Recommend follow-up CT in 6-12 weeks to assess for resolution. Associated bronchial wall thickening and endobronchial debris also raises concern for   aspiration.    2.  Extensive bronchiectasis with volume loss in the middle lobe and anterior right upper lobe, unchanged.       XR Chest Port 1 View    Narrative    CHEST PORTABLE ONE VIEW June 27, 2023 12:12 PM     HISTORY: Acutely worsening respiratory distress and hypoxemia,  evaluate for worsening infiltrates.    COMPARISON: Chest CT on 6/24/2023.      Impression    IMPRESSION: Single AP view of the chest was obtained. Left chest wall  dual-lead automatic implantable cardiac defibrillator leads are in  stable position. Cardiomediastinal silhouette is within normal limits.  Right apical coarse interstitial pulmonary opacities, consistent with  the previously seen area of apical bronchiectasis with  associated  atelectasis/consolidation. Basilar patchy nodular pulmonary opacities,  right more than left, increased as compared to 6/24/2023, likely  infectious. No significant pleural effusion or pneumothorax.    GILLIAN SOUZA MD         SYSTEM ID:  [id number]     *Note: Due to a large number of results and/or encounters for the requested time period, some results have not been displayed. A complete set of results can be found in Results Review.       Discharge Medications   Current Discharge Medication List      START taking these medications    Details   LORazepam (ATIVAN) 2 MG/ML (HIGH CONC) oral solution Take 0.5 mLs (1 mg) by mouth every 8 hours as needed for anxiety  Qty: 30 mL, Refills: 0    Associated Diagnoses: Bronchiectasis with acute exacerbation (H)      morphine sulfate, high concentrate, (ROXANOL-CONCENTRATED) 20 MG/ML concentrated solution Take 0.125 mLs (2.5 mg) by mouth every 2 hours as needed for shortness of breath or breakthrough pain  Qty: 30 mL, Refills: 0    Associated Diagnoses: Bronchiectasis with acute exacerbation (H)      ondansetron (ZOFRAN ODT) 4 MG ODT tab Take 1 tablet (4 mg) by mouth every 8 hours as needed for nausea  Qty: 20 tablet, Refills: 0    Associated Diagnoses: Bronchiectasis with acute exacerbation (H)      senna-docusate (SENOKOT-S/PERICOLACE) 8.6-50 MG tablet Take 1 tablet by mouth 2 times daily as needed for constipation  Qty: 30 tablet, Refills: 0    Associated Diagnoses: Bronchiectasis with acute exacerbation (H)         CONTINUE these medications which have CHANGED    Details   ipratropium - albuterol 0.5 mg/2.5 mg/3 mL (DUONEB) 0.5-2.5 (3) MG/3ML neb solution Take 1 vial (3 mLs) by nebulization 4 times daily inhale 1 vial via nebulization 3 times a day. may use 1 extra dose if needed.  Qty: 270 mL, Refills: 0    Associated Diagnoses: Chronic obstructive pulmonary disease, unspecified COPD type (H)      sodium chloride 1 GM tablet Take 1 tablet (1 g) by mouth 3  times daily (with meals) for 30 days  Qty: 90 tablet, Refills: 0    Associated Diagnoses: 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)         CONTINUE these medications which have NOT CHANGED    Details   acetaminophen (TYLENOL 8 HOUR ARTHRITIS PAIN) 650 MG CR tablet Take 3 tablets by mouth 2 times daily      albuterol (PROAIR HFA) 108 (90 Base) MCG/ACT inhaler Inhale 2 puffs into the lungs every 6 hours as needed for shortness of breath / dyspnea  Qty: 8.5 g, Refills: 3    Comments: Pharmacy may dispense brand covered by insurance (Proair, or proventil or ventolin or generic albuterol inhaler).  File Rx for filling at a later date per patient's request  Associated Diagnoses: Bronchiectasis without complication (H)      albuterol (PROVENTIL) (2.5 MG/3ML) 0.083% neb solution TAKE 1 VIAL (2.5MG) VIA NEBULIZATION EVERY 6 HOURS AS NEEDED FOR SHORTNESS OF BREATH, WHEEZING OR COUGH  Qty: 75 mL, Refills: 4    Associated Diagnoses: COPD exacerbation (H)      Carboxymethylcellulose Sodium (EYE DROPS OP) Place 1 drop into both eyes nightly as needed      CRANBERRY Take 475 mg by mouth At Bedtime      fluticasone-salmeterol (ADVAIR) 250-50 MCG/ACT inhaler Inhale 1 puff into the lungs every 12 hours  Qty: 60 each, Refills: 11    Associated Diagnoses: Bronchiectasis without complication (H)      levothyroxine (SYNTHROID/LEVOTHROID) 50 MCG tablet Take 1 tablet (50 mcg) by mouth daily  Qty: 90 tablet, Refills: 3    Associated Diagnoses: Hypothyroidism, unspecified type      polyethylene glycol (MIRALAX/GLYCOLAX) Packet Take 17 g by mouth daily       probiotic CAPS Take 1 capsule by mouth every evening       sodium chloride 0.9 % neb solution Inhale 5 mLs into the lungs by nebulization 3 times daily. Use after albuterol in nebulizer to thin secretions.  Qty: 450 mL, Refills: 3    Associated Diagnoses: Bronchiectasis without complication (H)      traZODone (DESYREL) 50 MG tablet Take 1 tablet (50 mg) by mouth At  Bedtime  Qty: 30 tablet, Refills: 1    Associated Diagnoses: Insomnia, unspecified type      warfarin ANTICOAGULANT (COUMADIN) 1 MG tablet Take 0.5 mg every Fri and 1 mg all other days of the week OR as directed by INR Clinic  Qty: 90 tablet, Refills: 1    Associated Diagnoses: Intermittent atrial fibrillation (H)      metoprolol succinate ER (TOPROL XL) 25 MG 24 hr tablet TAKE TWO TABLETS BY MOUTH TWICE DAILY  Qty: 360 tablet, Refills: 0    Associated Diagnoses: Essential hypertension, benign      order for DME Equipment being ordered: Nebulizer  Qty: 1 each, Refills: 0    Associated Diagnoses: Chronic obstructive pulmonary disease, unspecified COPD type (H)         STOP taking these medications       guaiFENesin (MUCINEX) 600 MG 12 hr tablet Comments:   Reason for Stopping:         ipratropium (ATROVENT) 0.02 % neb solution Comments:   Reason for Stopping:             Allergies   Allergies   Allergen Reactions     Cephalosporins Nausea     Cefzil     Doxycycline Nausea and Vomiting     Sulfa Antibiotics Nausea     Penicillins Rash     PCN

## 2023-07-03 NOTE — PROGRESS NOTES
Patient alert and oriented.  On 2L O2.  Up with assist of one walker and gait belt.  NS at 10 ml/hr TKO.  On IV antibiotics.  Incontinent of urine.   ml/24 hours.  Tylenol for right knee pain 5/10.

## 2023-07-03 NOTE — TELEPHONE ENCOUNTER
"Routing refill request to provider for review/approval because:  ACT not current    Pending Prescriptions:                       Disp   Refills    ipratropium - albuterol 0.5 mg/2.5 mg/3 mL*270 mL 0        Sig: inhale 1 vial via nebulization 3 times a day. may use           1 extra dose if needed.      Requested Prescriptions   Pending Prescriptions Disp Refills    ipratropium - albuterol 0.5 mg/2.5 mg/3 mL (DUONEB) 0.5-2.5 (3) MG/3ML neb solution [Pharmacy Med Name: Ipratropium-Albuterol Inhalation Solution 0.5-2.5 (3) MG/3ML] 270 mL 0     Sig: inhale 1 vial via nebulization 3 times a day. may use 1 extra dose if needed.       Short-Acting Beta Agonist Inhalers Protocol  Failed - 7/3/2023  2:01 AM        Failed - Asthma control assessment score within normal limits in last 6 months     Please review ACT score.           Failed - Recent (6 mo) or future (30 days) visit within the authorizing provider's specialty     Patient had office visit in the last 6 months or has a visit in the next 30 days with authorizing provider or within the authorizing provider's specialty.  See \"Patient Info\" tab in inbasket, or \"Choose Columns\" in Meds & Orders section of the refill encounter.            Passed - Patient is age 12 or older        Passed - Medication is active on med list       Asthma Nebs Protocol Failed - 7/3/2023  2:01 AM        Failed - Asthma control assessment score within normal limits in last 6 months     Please review ACT score.           Failed - Recent (6 mo) or future (30 days) visit within the authorizing provider's specialty     Patient had office visit in the last 6 months or has a visit in the next 30 days with authorizing provider or within the authorizing provider's specialty.  See \"Patient Info\" tab in inbasket, or \"Choose Columns\" in Meds & Orders section of the refill encounter.            Passed - Patient is age 4 years or older        Passed - Medication is active on med list           Rosemary B. " AYLIN Mathis on 7/3/2023 at 2:20 PM

## 2023-07-03 NOTE — PROGRESS NOTES
Pt has been up in chair for 1/2 of shift. Has been on commode trying to have a BM. Rectal check was positive for soft stool. Pt having difficulty passing. Continues to have an occasional congested cough. Denies pain. Sleeping on/off in chair and bed. Has no new concerns hoping to go home tomorrow.

## 2023-07-03 NOTE — TELEPHONE ENCOUNTER
RECEIVED A PAGE FROM ELY ABOUT THE PATIENT DISCHARGING WITH O2. PER THE CHART SOUNDS LIKE THE PATIENT IS DISCHARGING TO HOSPICE. CALLED ELY BACK AND LVM LETTING HER KNOW THAT IF PATIENT IS GOING HOME ON HOSPICE THEY WOULD BE THE ONES TO PROVIDE THE OXYGEN. LET HER KNOW IF THEY NEED ANYTHING TO CALL OUR AFTER HOURS AND LEAVE A MESSAGE FOR US.     7/4/23 - PT IS DISCHARGING TODAY AT 10:30 AM. SPOKE WITH ELY LAST NIGHT AND SHE ASKED ME TO DELIVER THE POC TO THE DAUGHTERS HOME. LET HER KNOW THAT THE SATURATIONS THAT WERE DONE ON FRIDAY WOULD NEED TO BE RE DONE. SHE WAS GOING TO HAVE THE NURSE TAKE CARE OF THAT. CHECKED THIS MORNING AND THE SATS WERE STILL NOT COMPLETED CALL THE CURRENT CHARGE NURSE RELL. EXPLAINED TO HER THAT WE NEED THIS COMPLETED OTHERWISE WE WOULD NOT BE ABLE TO DELIVER THE OXYGEN TO THE DAUGHTERS HOME. RELL STATED SHE WAS GOING TO GET IT COMPLETED.

## 2023-07-03 NOTE — PROGRESS NOTES
Request:  Bedside RN & NST:  Please educate daughters on home medications & personal cares, as needed.    Plan:  [facility] [facility] to deliver o2 to home before 1030 AM on 7/4/23, per Steffanie at [facility], she will come to the hospital, get the o2 that was delivered to our building this weekend & deliver to Suyapa's home before 10 am tomorrow and provide necessary education.    [facility] wheelchair transport w/02 between 6950-7259 AM.      CHRISTOPHER Gonzalez on 7/3/2023 at 5:18 PM      Care Management Follow Up    Length of Stay (days): 9    Expected Discharge Date: 07/04/2023     Concerns to be Addressed: discharge planning     Patient plan of care discussed at interdisciplinary rounds: Yes    Anticipated Discharge Disposition: Home on 7/4 with family and enroll with [facility] (Phone: [phone] Fax: [phone]) when ready.     Anticipated Discharge Services: None  Anticipated Discharge DME:  (Hospice will provide all DME needed:  o2, overbed table, wheelchair, briefs and requested a shower chair)    Patient/family educated on Medicare website which has current facility and service quality ratings: yes  Education Provided on the Discharge Plan: Yes  Patient/Family in Agreement with the Plan: yes    Referrals Placed by CM/SW: Internal Clinic Care Coordination, Homecare, Hospice, Transportation  Private pay costs discussed: transportation costs    Additional Information:  Per IDT rounds today, MD team states that ptIS NOT medically stable for discharge today.  MD anticipates pt to remain hospitalized another day for IV abx.     7/4/23:  Pt to discharge to home with family using the oxygen system in her room (MD may need to re-qualify due to timeframe) and family support.  Family hoping for an early [facility] wheelchair ride once o2 is delivered.  MD will need to write prescriptions & send to [facility] for 2 days of comfort meds.  Family to .    7/5/23:  [facility] will be at the pt's  home at 1PM to educate & enroll pt into hospice.  At that time, the RN can discuss and order DME as needed.    Care Management team to follow for discharge plans.    CHRISTOPHER Gonzalez

## 2023-07-04 NOTE — PROGRESS NOTES
[facility] DISCHARGE NOTE    Patient discharged to home at 11:03 AM via wheel chair. Accompanied by daughter and staff. Discharge instructions reviewed with patient and daughter, opportunity offered to ask questions. Prescriptions sent to patients preferred pharmacy. All belongings sent with patient.    Bartolo Ken RN

## 2023-07-04 NOTE — PROGRESS NOTES
Home Oxygen Assessment Tools  Patient's 02 sat on RA at rest81% for 2 minutes. Patient is on 2 LPM/%  (02 device) [id number]* %, [id number] of standing on the side of the bed. If patient's Sp02 at rest is less than 88%, then oxygen may be needed and must monitor patient's Sp02 with activity. Patient 02 80% sat on  RA with activity after 1 minutes. Patient's Sp02 90% on LPM/02% after 2* minutes of (standing activity).

## 2023-07-04 NOTE — PLAN OF CARE
Updated Dr. Concepcion: She is requesting Tylenol for neck and knee pain 6/10. She had it last at 1814. It is ordered every 8 hours prn. But she wants it now. Can you change time so she can have now?  Received order for Tylenol as needed. Given 650 mg Tylenol.  She reports still unable to sleep d/t knee pain. Applied ice bilateral knees.

## 2023-07-04 NOTE — PLAN OF CARE
Updated Dr. Concepcion: Her IV infiltrated. She is a super hard stick and she does not want another IV as she is discharging at 10 am today. But she is due for flagyl IV at 0600. Do ypu want to change flagyl to oral or should we just update day doctor?   Will update hospitalist in am.

## 2023-07-04 NOTE — PROGRESS NOTES
Pt seen this AM as a courtesy visit. Going home with hospice today. All paperwork/ discharge summary  kindly done by Dr Martinez. Pt and daughter comfortable with plan. No new questions.

## 2023-07-05 NOTE — PROGRESS NOTES
ANTICOAGULATION  MANAGEMENT: Discharge Review    Ellie Leigh chart reviewed for anticoagulation continuity of care    Hospital Admission on 6/24/23 for     Acute respiratory failure with hypoxia   Rhinovirus infection, acute  Asthma exacerbation, possible acute  Bronchiectasis, suspect acute exacerbation   Pneumonia bilateral lower lobes, due to Pseudomonas  Hyponatremia due to SIADH  Intermittent atrial fibrillation (H)  Long term current use of anticoagulant therapy  Cardiac pacemaker in situ  Benign essential hypertension   Mild persistent asthma without complication  Bronchiectasis   Hypothyroidism  Mild major depression  Anxiety  H/o DVT (deep venous thrombosis), distant.    Discharge disposition: Home with Hospice    Results:    Recent labs: (last 7 days)     06/29/23  0434 06/30/23  0420 07/01/23  0521 07/02/23  0629 07/03/23  0453 07/04/23  0524   INR 2.67* 2.36* 2.20* 2.10* 2.12* 2.23*     Anticoagulation inpatient management:     less warfarin administered than maintenance regimen    Anticoagulation discharge instructions:     Warfarin dosing: home regimen continued   Bridging: No   INR goal change: No      Medication changes affecting anticoagulation: Yes: prednisone while in hospital    Additional factors affecting anticoagulation: No     PLAN     No adjustment to anticoagulation plan needed    Left a detailed message for Suyapa    Anticoagulation Calendar updated    Caterina Franco RN

## 2023-07-05 NOTE — PROGRESS NOTES
Clinic Care Coordination Contact  Care Team Conversations    The patient is discharging 7/5/23 home with hospice.  Primary care-care coordination does not follow patients in hospice.  The PCP may make a referral for Primary care-care coordination should anything change.        Selvin Nance MSN, RN, PHN, [facility]   Primary Care Clinical RN Care Coordinator  [facility]  7/5/2023   8:40 AM  [email or web address]  Office: [phone]

## 2023-07-25 NOTE — TELEPHONE ENCOUNTER
ANTICOAGULATION  MANAGEMENT    Ellie Leigh is being discharged from the [facility] Anticoagulation Management Program ([facility]).    Reason for discharge: warfarin therapy completed    Anticoagulation episode resolved, ACC referral closed, and Standing order discontinued    If patient needs warfarin management in the future, please send a new referral    Caterina Franco RN

## 2023-07-28 NOTE — PROGRESS NOTES
ANTICOAGULATION FOLLOW-UP CLINIC VISIT    Patient Name:  Ellie Leigh  Date:  11/2/2018  Contact Type:  Telephone/ patient/ daughter rose mary    SUBJECTIVE:     Patient Findings     Positives Change in medications (started taking levoquin 10/31/18), No Problem Findings    Comments No changes in diet or activity. No concerns with bleeding or bruising. Took warfarin as prescribed.   Continue maintenance warfarin dose. Will recheck INR in 3 days.  Patient and daughter Rose Mary verbalize understanding and agrees with plan. No further questions at this time.              OBJECTIVE    INR   Date Value Ref Range Status   11/02/2018 1.68 (H) 0.86 - 1.14 Final       ASSESSMENT / PLAN  INR assessment THER    Recheck INR In: 3 DAYS    INR Location Outside lab      Anticoagulation Summary as of 11/2/2018     INR goal    Prior goal 1.5-2.0   Today's INR 1.68   Warfarin maintenance plan 1 mg (1 mg x 1) every day   Full warfarin instructions 1 mg every day   Weekly warfarin total 7 mg   Plan last modified Ruiz Meredith RN (8/21/2018)   Next INR check 11/5/2018   Priority INR   Target end date Indefinite    Indications   Atrial fibrillation with rapid ventricular response (H) (Resolved) [I48.91]  DVT (deep venous thrombosis) [I82.409]  Long term current use of anticoagulant therapy [Z79.01]         Anticoagulation Episode Summary     INR check location     Preferred lab     Send INR reminders to [facility]    Comments * Actual INR goal is 1.7-2.3  Taking Celebrex PRN         Anticoagulation Care Providers     Provider Role Specialty Phone number    Anjum Vidales MD [facility] [phone]            See the Encounter Report to view Anticoagulation Flowsheet and Dosing Calendar (Go to Encounters tab in chart review, and find the Anticoagulation Therapy Visit)        Caroline Stewart RN                Patient discharge teaching is done. IV's removed with no complications.  Patient left the unit with his son

## 2023-10-04 NOTE — TELEPHONE ENCOUNTER
Dr Roland requested that pt be called. Pt is on the schedule for VV today at 220 for vaginal problem. Needs in-person visit.  Called & spoke with daughter Suyapa (C2C on file)   Per Suyapa, pt was in hospital fo r9 days & d/c'd on 7/4. Was told she had 6 mos to live. Has not left house since. Has hospice coming in to see her.  Sx for last 4 weeks: vaginal burning, slight red. Denies itching, discharge.  Had UTI in August & was treated with abx & this cleared. Was dx with another UTI recently & currently on abx. Has 1 day left.  Hospice RN's have tried changing briefs, changing soaps, changing abx. Have tried different ointments & powder.    Updated provider. Ok to keep pt on her schedule as VV.   Called to update daughter Suyapa. The hospice RN will also be at the house for the virtual visit to answer questions.    Rachel Contreras RN

## 2023-10-04 NOTE — PROGRESS NOTES
"Ellie is a 89 year old who is being evaluated via a billable video visit.      How would you like to obtain your AVS? MyChart  If the video visit is dropped, the invitation should be resent by: Send to e-mail at: [email or web address]  Will anyone else be joining your video visit? No  Daughter Suyapa and Home Car/Hopsice RN ( Iris)  will be on video as well           Assessment & Plan     Vaginal pain  Trial of lidocaine cream   - lidocaine (XYLOCAINE) 5 % external ointment; Apply topically as needed for moderate pain         BMI:   Estimated body mass index is 28.34 kg/m  as calculated from the following:    Height as of 6/24/23: 1.473 m (4' 10\").    Weight as of 7/1/23: 61.5 kg (135 lb 9.3 oz).       FUTURE APPOINTMENTS:       - Follow-up visit in one month or sooner as needed,    Ailyn Roland MD  [facility]    Subjective   Ellie is a 89 year old, presenting for the following health issues:  Patient Is a 89-year-old who presents to the clinic via video chat she is accompanied by her daughter and her nurse.  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  The last couple of months she has complained of pain in her vaginal area.  She has used different topicals like fluconazole and also has had some UTIs that are being treated with antibiotics.  Nurse said that there is no odor and there is hardly any discharge from the vaginal area.  She complains the pain is more on the inside of her labial.  There is no swelling or redness no vaginal bleeding that he have observed.  She has not had any vaginal swabs taking and it is difficult for her to leave the house because of her chronic medical conditions.  Discussed the source of the symptoms at length with the nurse and her daughter and recommended that we try some lidocaine cream to give her some soothing effect.  Unfortunately, because this is a video visit I was unable to " observe the vaginal area.  I recommend that if we try the lidocaine cream and does not help she get some swabs and rule out yeast infection.  She may need to come into the clinic at some point.    Vaginal Problem      10/4/2023     1:57 PM   Additional Questions   Roomed by Fortunato MORGAN CMA   Accompanied by self         10/4/2023     1:57 PM   Patient Reported Additional Medications   Patient reports taking the following new medications Cefuroxime 500mg twice daily, Metoclopramide 10mg 3 times daily , miconazole powder 2% apply to affected area twice daily ,       History of Present Illness       Reason for visit:  Vaginal irritation    She eats 2-3 servings of fruits and vegetables daily.She consumes 0 sweetened beverage(s) daily.She exercises with enough effort to increase her heart rate 9 or less minutes per day.  She exercises with enough effort to increase her heart rate 3 or less days per week.   She is taking medications regularly.       Vaginal Symptoms  Onset/Duration: 4  weeks ago   Description:  Vaginal Discharge: none   Itching (Pruritis): No  Burning sensation:  YES  Odor: No  Accompanying Signs & Symptoms:  Urinary symptoms: was having urinary burning - prescribed antibiotic   Abdominal pain: No  Fever: No  History:   Sexually active: No  New Partner: No  Possibility of Pregnancy:  No  Recent antibiotic use: YES- Cefuroxime 500mg twice daily   Previous vaginitis issues: No  Precipitating or alleviating factors: None  Therapies tried and outcome: changing incontinecne products, soaps , wipes, using pads with cotton underwear, barrier cream, miconazole powder, flagyl,cortisone cream , Cefuroxime 500mg twice daily  - No relief from Any of These         Review of Systems   Constitutional: Negative.    HENT: Negative.     Eyes: Negative.    Respiratory: Negative.     Cardiovascular: Negative.    Gastrointestinal: Negative.    Endocrine: Negative.    Breasts:  negative.    Genitourinary:  Positive for vaginal  discharge.   Allergic/Immunologic: Negative.    Neurological: Negative.    Hematological: Negative.    Psychiatric/Behavioral: Negative.              Objective    Vitals - Patient Reported  Weight (Patient Reported): 55.3 kg (122 lb)  Pain Score: No Pain (0)        Physical Exam   GENERAL: Healthy, alert and no distress  EYES: Eyes grossly normal to inspection.  No discharge or erythema, or obvious scleral/conjunctival abnormalities.  RESP: No audible wheeze, cough, or visible cyanosis.  No visible retractions or increased work of breathing.    SKIN: Visible skin clear. No significant rash, abnormal pigmentation or lesions.  PSYCH: Mentation appears normal, affect normal/bright, judgement and insight intact, normal speech and appearance well-groomed.            Video-Visit Details    Type of service:  Video Visit   Video Start Time:  2:10-PM  Video End Time: 2:17PM    Originating Location (pt. Location): Home    Distant Location (provider location):  Off-site  Platform used for Video Visit: Jazmine

## 2023-10-12 NOTE — TELEPHONE ENCOUNTER
FYI - Status Update    Who is Calling: family member, Suyapa    Update: please put in order for vaginal swab for pt this was talked about on 10/4. Pt still experiencing pain vaginally. [facility] nurse can  supplies.     Does caller want a call/response back: Yes     Could we send this information to you in Vend or would you prefer to receive a phone call?:   Patient would prefer a phone call   Okay to leave a detailed message?: Yes at Cell number on file:    Telephone Information:   Mobile [phone]     .Mercedes Lentz PSC

## 2023-10-12 NOTE — TELEPHONE ENCOUNTER
See note below, as well as PCP's progress notes from virtual visit on 10/4/23. Routing to PCP for recommendation.    Joanna Smith RN  [facility]

## 2023-10-18 NOTE — TELEPHONE ENCOUNTER
Patient is waiting for labs to be reviewed so she can get results. Nurys Hernandez on 10/18/2023 at 3:07 PM

## (undated) RX ORDER — PROPOFOL 10 MG/ML
INJECTION, EMULSION INTRAVENOUS
Status: DISPENSED
Start: 2019-10-18

## (undated) RX ORDER — SIMETHICONE 40MG/0.6ML
SUSPENSION, DROPS(FINAL DOSAGE FORM)(ML) ORAL
Status: DISPENSED
Start: 2019-10-18

## (undated) RX ORDER — LIDOCAINE HYDROCHLORIDE 10 MG/ML
INJECTION, SOLUTION EPIDURAL; INFILTRATION; INTRACAUDAL; PERINEURAL
Status: DISPENSED
Start: 2019-10-18

## (undated) RX ORDER — GLYCOPYRROLATE 0.2 MG/ML
INJECTION, SOLUTION INTRAMUSCULAR; INTRAVENOUS
Status: DISPENSED
Start: 2019-10-18